# Patient Record
Sex: MALE | Race: WHITE | NOT HISPANIC OR LATINO | Employment: OTHER | URBAN - METROPOLITAN AREA
[De-identification: names, ages, dates, MRNs, and addresses within clinical notes are randomized per-mention and may not be internally consistent; named-entity substitution may affect disease eponyms.]

---

## 2018-01-08 ENCOUNTER — PES CALL (OUTPATIENT)
Dept: ADMINISTRATIVE | Facility: CLINIC | Age: 73
End: 2018-01-08

## 2018-05-07 ENCOUNTER — PES CALL (OUTPATIENT)
Dept: ADMINISTRATIVE | Facility: CLINIC | Age: 73
End: 2018-05-07

## 2018-08-15 ENCOUNTER — PES CALL (OUTPATIENT)
Dept: ADMINISTRATIVE | Facility: CLINIC | Age: 73
End: 2018-08-15

## 2019-04-25 ENCOUNTER — PES CALL (OUTPATIENT)
Dept: ADMINISTRATIVE | Facility: CLINIC | Age: 74
End: 2019-04-25

## 2019-07-26 DIAGNOSIS — N20.1 CALCULUS OF URETER: Primary | ICD-10-CM

## 2019-08-01 ENCOUNTER — ANESTHESIA (OUTPATIENT)
Dept: SURGERY | Facility: HOSPITAL | Age: 74
End: 2019-08-01
Payer: MEDICARE

## 2019-08-01 ENCOUNTER — HOSPITAL ENCOUNTER (OUTPATIENT)
Facility: HOSPITAL | Age: 74
Discharge: HOME | End: 2019-08-01
Attending: SPECIALIST | Admitting: SPECIALIST
Payer: MEDICARE

## 2019-08-01 ENCOUNTER — ANESTHESIA EVENT (OUTPATIENT)
Dept: SURGERY | Facility: HOSPITAL | Age: 74
End: 2019-08-01
Payer: MEDICARE

## 2019-08-01 VITALS
TEMPERATURE: 98 F | OXYGEN SATURATION: 98 % | SYSTOLIC BLOOD PRESSURE: 132 MMHG | HEART RATE: 62 BPM | RESPIRATION RATE: 16 BRPM | DIASTOLIC BLOOD PRESSURE: 72 MMHG

## 2019-08-01 DIAGNOSIS — N20.0 RENAL STONE: Primary | ICD-10-CM

## 2019-08-01 PROCEDURE — 71000033 HC RECOVERY, INTIAL HOUR: Performed by: SPECIALIST

## 2019-08-01 PROCEDURE — 63600175 PHARM REV CODE 636 W HCPCS: Performed by: NURSE ANESTHETIST, CERTIFIED REGISTERED

## 2019-08-01 PROCEDURE — 27000671 HC TUBING MICROBORE EXT: Performed by: ANESTHESIOLOGY

## 2019-08-01 PROCEDURE — S0028 INJECTION, FAMOTIDINE, 20 MG: HCPCS | Performed by: NURSE ANESTHETIST, CERTIFIED REGISTERED

## 2019-08-01 PROCEDURE — 27000673 HC TUBING BLOOD Y: Performed by: ANESTHESIOLOGY

## 2019-08-01 PROCEDURE — 36000705 HC OR TIME LEV I EA ADD 15 MIN: Performed by: SPECIALIST

## 2019-08-01 PROCEDURE — 71000015 HC POSTOP RECOV 1ST HR: Performed by: SPECIALIST

## 2019-08-01 PROCEDURE — 27200651 HC AIRWAY, LMA: Performed by: ANESTHESIOLOGY

## 2019-08-01 PROCEDURE — 37000008 HC ANESTHESIA 1ST 15 MINUTES: Performed by: SPECIALIST

## 2019-08-01 PROCEDURE — 37000009 HC ANESTHESIA EA ADD 15 MINS: Performed by: SPECIALIST

## 2019-08-01 PROCEDURE — 25000003 PHARM REV CODE 250: Performed by: NURSE ANESTHETIST, CERTIFIED REGISTERED

## 2019-08-01 PROCEDURE — 36000704 HC OR TIME LEV I 1ST 15 MIN: Performed by: SPECIALIST

## 2019-08-01 RX ORDER — PROPOFOL 10 MG/ML
INJECTION, EMULSION INTRAVENOUS
Status: DISCONTINUED | OUTPATIENT
Start: 2019-08-01 | End: 2019-08-01

## 2019-08-01 RX ORDER — HYDROMORPHONE HYDROCHLORIDE 1 MG/ML
0.2 INJECTION, SOLUTION INTRAMUSCULAR; INTRAVENOUS; SUBCUTANEOUS
Status: DISCONTINUED | OUTPATIENT
Start: 2019-08-01 | End: 2019-08-01 | Stop reason: HOSPADM

## 2019-08-01 RX ORDER — OXYCODONE HYDROCHLORIDE 5 MG/1
5 TABLET ORAL
Status: DISCONTINUED | OUTPATIENT
Start: 2019-08-01 | End: 2019-08-01 | Stop reason: HOSPADM

## 2019-08-01 RX ORDER — CEFAZOLIN SODIUM 1 G/50ML
SOLUTION INTRAVENOUS
Status: DISCONTINUED | OUTPATIENT
Start: 2019-08-01 | End: 2019-08-01

## 2019-08-01 RX ORDER — SODIUM CHLORIDE 0.9 % (FLUSH) 0.9 %
10 SYRINGE (ML) INJECTION
Status: DISCONTINUED | OUTPATIENT
Start: 2019-08-01 | End: 2019-08-01 | Stop reason: HOSPADM

## 2019-08-01 RX ORDER — ONDANSETRON 2 MG/ML
INJECTION INTRAMUSCULAR; INTRAVENOUS
Status: DISCONTINUED | OUTPATIENT
Start: 2019-08-01 | End: 2019-08-01

## 2019-08-01 RX ORDER — ACETAMINOPHEN 10 MG/ML
INJECTION, SOLUTION INTRAVENOUS
Status: DISCONTINUED | OUTPATIENT
Start: 2019-08-01 | End: 2019-08-01

## 2019-08-01 RX ORDER — FENTANYL CITRATE 50 UG/ML
25 INJECTION, SOLUTION INTRAMUSCULAR; INTRAVENOUS EVERY 5 MIN PRN
Status: DISCONTINUED | OUTPATIENT
Start: 2019-08-01 | End: 2019-08-01 | Stop reason: HOSPADM

## 2019-08-01 RX ORDER — ONDANSETRON 2 MG/ML
4 INJECTION INTRAMUSCULAR; INTRAVENOUS DAILY PRN
Status: DISCONTINUED | OUTPATIENT
Start: 2019-08-01 | End: 2019-08-01 | Stop reason: HOSPADM

## 2019-08-01 RX ORDER — FENTANYL CITRATE 50 UG/ML
INJECTION, SOLUTION INTRAMUSCULAR; INTRAVENOUS
Status: DISCONTINUED | OUTPATIENT
Start: 2019-08-01 | End: 2019-08-01

## 2019-08-01 RX ORDER — SODIUM CHLORIDE, SODIUM LACTATE, POTASSIUM CHLORIDE, CALCIUM CHLORIDE 600; 310; 30; 20 MG/100ML; MG/100ML; MG/100ML; MG/100ML
INJECTION, SOLUTION INTRAVENOUS CONTINUOUS PRN
Status: DISCONTINUED | OUTPATIENT
Start: 2019-08-01 | End: 2019-08-01

## 2019-08-01 RX ORDER — HYDROCODONE BITARTRATE AND ACETAMINOPHEN 5; 325 MG/1; MG/1
1 TABLET ORAL EVERY 4 HOURS PRN
COMMUNITY
End: 2020-10-30 | Stop reason: CLARIF

## 2019-08-01 RX ORDER — LIDOCAINE HCL/PF 100 MG/5ML
SYRINGE (ML) INTRAVENOUS
Status: DISCONTINUED | OUTPATIENT
Start: 2019-08-01 | End: 2019-08-01

## 2019-08-01 RX ORDER — FAMOTIDINE 10 MG/ML
INJECTION INTRAVENOUS
Status: DISCONTINUED | OUTPATIENT
Start: 2019-08-01 | End: 2019-08-01

## 2019-08-01 RX ORDER — OXYCODONE HYDROCHLORIDE 5 MG/1
5 TABLET ORAL
Status: DISCONTINUED | OUTPATIENT
Start: 2019-08-01 | End: 2019-08-01

## 2019-08-01 RX ORDER — DIPHENHYDRAMINE HYDROCHLORIDE 50 MG/ML
6.25 INJECTION INTRAMUSCULAR; INTRAVENOUS ONCE
Status: DISCONTINUED | OUTPATIENT
Start: 2019-08-01 | End: 2019-08-01 | Stop reason: HOSPADM

## 2019-08-01 RX ORDER — DEXAMETHASONE SODIUM PHOSPHATE 4 MG/ML
INJECTION, SOLUTION INTRA-ARTICULAR; INTRALESIONAL; INTRAMUSCULAR; INTRAVENOUS; SOFT TISSUE
Status: DISCONTINUED | OUTPATIENT
Start: 2019-08-01 | End: 2019-08-01

## 2019-08-01 RX ADMIN — PROPOFOL 150 MG: 10 INJECTION, EMULSION INTRAVENOUS at 11:08

## 2019-08-01 RX ADMIN — LIDOCAINE HYDROCHLORIDE 100 MG: 20 INJECTION, SOLUTION INTRAVENOUS at 11:08

## 2019-08-01 RX ADMIN — CEFAZOLIN SODIUM 2 G: 1 SOLUTION INTRAVENOUS at 11:08

## 2019-08-01 RX ADMIN — DEXAMETHASONE SODIUM PHOSPHATE 8 MG: 4 INJECTION, SOLUTION INTRAMUSCULAR; INTRAVENOUS at 11:08

## 2019-08-01 RX ADMIN — FAMOTIDINE 20 MG: 10 INJECTION, SOLUTION INTRAVENOUS at 11:08

## 2019-08-01 RX ADMIN — ONDANSETRON 4 MG: 2 INJECTION INTRAMUSCULAR; INTRAVENOUS at 10:08

## 2019-08-01 RX ADMIN — SODIUM CHLORIDE, SODIUM LACTATE, POTASSIUM CHLORIDE, AND CALCIUM CHLORIDE: .6; .31; .03; .02 INJECTION, SOLUTION INTRAVENOUS at 11:08

## 2019-08-01 RX ADMIN — ACETAMINOPHEN 1000 MG: 10 INJECTION, SOLUTION INTRAVENOUS at 11:08

## 2019-08-01 RX ADMIN — FENTANYL CITRATE 100 MCG: 50 INJECTION INTRAMUSCULAR; INTRAVENOUS at 11:08

## 2019-08-01 NOTE — ANESTHESIA POSTPROCEDURE EVALUATION
Anesthesia Post Evaluation    Patient: Jerome Amaro Jr.    Procedure(s) Performed: Procedure(s) (LRB):  LITHOTRIPSY, ESWL (Right)    Final Anesthesia Type: general  Patient location during evaluation: PACU  Patient participation: Yes- Able to Participate  Level of consciousness: awake and alert  Post-procedure vital signs: reviewed and stable  Pain management: adequate  Airway patency: patent  PONV status at discharge: No PONV  Anesthetic complications: no      Cardiovascular status: blood pressure returned to baseline and stable  Respiratory status: unassisted and room air  Hydration status: euvolemic  Follow-up not needed.          Vitals Value Taken Time   /80 8/1/2019 12:15 PM   Temp 36.7 °C (98 °F) 8/1/2019 12:15 PM   Pulse 61 8/1/2019 12:28 PM   Resp 10 8/1/2019 12:28 PM   SpO2 96 % 8/1/2019 12:28 PM   Vitals shown include unvalidated device data.      No case tracking events are documented in the log.      Pain/Susanne Score: Susanne Score: 10 (8/1/2019 12:15 PM)

## 2019-08-01 NOTE — DISCHARGE SUMMARY
Patient comes in for outpatient lithotripsy    Procedure is done w no complication    After a brief stay in recovery, patient is discharged in good condition    Meds given:  Lortab and Bactrim    F/u office:  2 weeks with belem SHARP

## 2019-08-01 NOTE — ANESTHESIA PREPROCEDURE EVALUATION
08/01/2019  Jerome Amaro Jr. is a 73 y.o., male.    Anesthesia Evaluation    I have reviewed the Patient Summary Reports.     I have reviewed the Medications.     Review of Systems  Anesthesia Hx:  History of prior surgery of interest to airway management or planning: Previous anesthesia: General Denies Family Hx of Anesthesia complications.   Denies Personal Hx of Anesthesia complications.   Social:  Former Smoker    Hematology/Oncology:     Oncology Normal    -- Anemia: Hematology Comments: Blood thinners    EENT/Dental:EENT/Dental Normal   Cardiovascular:   Pacemaker (st fatoumata) Hypertension Past MI CAD  CABG/stent Dysrhythmias atrial fibrillation ECG has been reviewed.    Pulmonary:   Sleep Apnea    Renal/:   renal calculi    Hepatic/GI:  Hepatic/GI Normal    Musculoskeletal:  Musculoskeletal Normal    Neurological:   Seizures Neuropathy both hands   Endocrine:   Hypothyroidism    Dermatological:  Skin Normal    Psych:   depression          Physical Exam  General:  Well nourished    Airway/Jaw/Neck:  Airway Findings: Mouth Opening: Normal Mallampati: III  TM Distance: Normal, at least 6 cm  Jaw/Neck Findings:  Neck ROM: Normal ROM      Dental:  Dental Findings:   Chest/Lungs:  Chest/Lungs Findings: Clear to auscultation     Heart/Vascular:  Heart Findings: Rate: Normal  Rhythm: Regular Rhythm  Sounds: Normal  Heart murmur: negative       Mental Status:  Mental Status Findings:  Cooperative, Alert and Oriented         Anesthesia Plan  Type of Anesthesia, risks & benefits discussed:  Anesthesia Type:  general  Patient's Preference:   Intra-op Monitoring Plan: standard ASA monitors  Intra-op Monitoring Plan Comments:   Post Op Pain Control Plan: multimodal analgesia  Post Op Pain Control Plan Comments:   Induction:   IV  Beta Blocker:         Informed Consent: Patient understands risks and agrees with  Anesthesia plan.  Questions answered. Anesthesia consent signed with patient.  ASA Score: 3     Day of Surgery Review of History & Physical:        Anesthesia Plan Notes: No Versed  Avoid sux.  LMA  [facility]an  [facility]  Decadron 8  Pepcid        Ready For Surgery From Anesthesia Perspective.

## 2019-08-01 NOTE — TRANSFER OF CARE
Anesthesia Transfer of Care Note    Patient: Jerome Amaro Jr.    Procedure(s) Performed: Procedure(s) (LRB):  LITHOTRIPSY, ESWL (Right)    Patient location: PACU    Anesthesia Type: general    Transport from OR: Transported from OR on room air with adequate spontaneous ventilation    Post pain: adequate analgesia    Post assessment: no apparent anesthetic complications    Post vital signs: stable    Level of consciousness: awake and alert    Nausea/Vomiting: no nausea/vomiting    Complications: none    Transfer of care protocol was followed      Last vitals:   Visit Vitals  /84 (BP Location: Right arm, Patient Position: Lying)   Temp 36.8 °C (98.2 °F) (Oral)   Resp 16   SpO2 97%

## 2019-08-01 NOTE — OP NOTE
Operative Note         SUMMARY     Surgery Date:  8/1/2019     Pre-op Diagnosis:   Right ureteral stone    Post-op Diagnosis:  Same    Procedure  right ESWL    Anesthesia: General    Description of Procedure:   After consents were obtained the patient was taken to the operating room  Placed in a supine position  Anesthesia is given  Dornier lithotripter is brought into the room  Patient is properly positioned on the lithotripsy probe  The stone is  Rt 5       mm  We began at a KV of 16 and gradually increased to 25    3000             Shocks were delivered      The procedure was done without any complication  After the procedure the patient is brought to the recovery room in satisfactory condition  See dictated    Findings/Key Components:          Estimated Blood Loss:

## 2019-08-12 ENCOUNTER — HOSPITAL ENCOUNTER (OUTPATIENT)
Dept: RADIOLOGY | Facility: HOSPITAL | Age: 74
Discharge: HOME | End: 2019-08-12
Attending: SPECIALIST
Payer: MEDICARE

## 2019-08-12 DIAGNOSIS — N20.1 CALCULUS OF URETER: ICD-10-CM

## 2019-08-12 PROCEDURE — 74018 RADEX ABDOMEN 1 VIEW: CPT | Mod: TC

## 2019-10-02 ENCOUNTER — PES CALL (OUTPATIENT)
Dept: ADMINISTRATIVE | Facility: CLINIC | Age: 74
End: 2019-10-02

## 2019-11-05 ENCOUNTER — HOSPITAL ENCOUNTER (INPATIENT)
Facility: HOSPITAL | Age: 74
LOS: 4 days | Discharge: HOME | DRG: 251 | End: 2019-11-09
Attending: EMERGENCY MEDICINE
Payer: MEDICARE

## 2019-11-05 DIAGNOSIS — I21.4 NSTEMI (NON-ST ELEVATED MYOCARDIAL INFARCTION): Primary | ICD-10-CM

## 2019-11-05 DIAGNOSIS — I25.10 CAD (CORONARY ARTERY DISEASE): ICD-10-CM

## 2019-11-05 DIAGNOSIS — R07.9 CHEST PAIN: ICD-10-CM

## 2019-11-05 LAB
ALBUMIN SERPL BCP-MCNC: 4.4 G/DL (ref 3.5–5.2)
ALP SERPL-CCNC: 75 U/L (ref 55–135)
ALT SERPL W/O P-5'-P-CCNC: 16 U/L (ref 10–44)
ANION GAP SERPL CALC-SCNC: 8 MMOL/L (ref 8–16)
AST SERPL-CCNC: 29 U/L (ref 10–40)
BASOPHILS # BLD AUTO: 0.06 K/UL (ref 0–0.2)
BASOPHILS NFR BLD: 0.8 % (ref 0–1.9)
BILIRUB SERPL-MCNC: 0.6 MG/DL (ref 0.1–1)
BNP SERPL-MCNC: 342 PG/ML (ref 0–99)
BUN SERPL-MCNC: 18 MG/DL (ref 8–23)
CALCIUM SERPL-MCNC: 8.6 MG/DL (ref 8.7–10.5)
CATH EF QUANTITATIVE: 60 %
CHLORIDE SERPL-SCNC: 103 MMOL/L (ref 95–110)
CO2 SERPL-SCNC: 27 MMOL/L (ref 23–29)
CREAT SERPL-MCNC: 1.2 MG/DL (ref 0.5–1.4)
DIFFERENTIAL METHOD: ABNORMAL
EOSINOPHIL # BLD AUTO: 0.1 K/UL (ref 0–0.5)
EOSINOPHIL NFR BLD: 1 % (ref 0–8)
ERYTHROCYTE [DISTWIDTH] IN BLOOD BY AUTOMATED COUNT: 13.2 % (ref 11.5–14.5)
EST. GFR  (AFRICAN AMERICAN): >60 ML/MIN/1.73 M^2
EST. GFR  (NON AFRICAN AMERICAN): 58.8 ML/MIN/1.73 M^2
GLUCOSE SERPL-MCNC: 121 MG/DL (ref 70–110)
HCT VFR BLD AUTO: 35.6 % (ref 40–54)
HGB BLD-MCNC: 11.8 G/DL (ref 14–18)
IMM GRANULOCYTES # BLD AUTO: 0.03 K/UL (ref 0–0.04)
IMM GRANULOCYTES NFR BLD AUTO: 0.4 % (ref 0–0.5)
INR PPP: 1.1
LYMPHOCYTES # BLD AUTO: 0.8 K/UL (ref 1–4.8)
LYMPHOCYTES NFR BLD: 11.6 % (ref 18–48)
MCH RBC QN AUTO: 29.7 PG (ref 27–31)
MCHC RBC AUTO-ENTMCNC: 33.1 G/DL (ref 32–36)
MCV RBC AUTO: 90 FL (ref 82–98)
MONOCYTES # BLD AUTO: 0.6 K/UL (ref 0.3–1)
MONOCYTES NFR BLD: 7.6 % (ref 4–15)
NEUTROPHILS # BLD AUTO: 5.7 K/UL (ref 1.8–7.7)
NEUTROPHILS NFR BLD: 78.6 % (ref 38–73)
NRBC BLD-RTO: 0 /100 WBC
PLATELET # BLD AUTO: 129 K/UL (ref 150–350)
PMV BLD AUTO: 10.8 FL (ref 9.2–12.9)
POTASSIUM SERPL-SCNC: 4.1 MMOL/L (ref 3.5–5.1)
PROT SERPL-MCNC: 7.4 G/DL (ref 6–8.4)
PROTHROMBIN TIME: 13.9 SEC (ref 10.6–14.8)
RBC # BLD AUTO: 3.97 M/UL (ref 4.6–6.2)
SODIUM SERPL-SCNC: 138 MMOL/L (ref 136–145)
TROPONIN I SERPL DL<=0.01 NG/ML-MCNC: 1.29 NG/ML (ref 0.02–0.04)
TROPONIN I SERPL DL<=0.01 NG/ML-MCNC: 1.33 NG/ML (ref 0.02–0.04)
TROPONIN I SERPL DL<=0.01 NG/ML-MCNC: 1.64 NG/ML (ref 0.02–0.04)
WBC # BLD AUTO: 7.23 K/UL (ref 3.9–12.7)

## 2019-11-05 PROCEDURE — 96375 TX/PRO/DX INJ NEW DRUG ADDON: CPT

## 2019-11-05 PROCEDURE — 63600175 PHARM REV CODE 636 W HCPCS: Performed by: NURSE PRACTITIONER

## 2019-11-05 PROCEDURE — 84484 ASSAY OF TROPONIN QUANT: CPT

## 2019-11-05 PROCEDURE — 27000221 HC OXYGEN, UP TO 24 HOURS

## 2019-11-05 PROCEDURE — 93459 L HRT ART/GRFT ANGIO: CPT

## 2019-11-05 PROCEDURE — 93005 ELECTROCARDIOGRAM TRACING: CPT

## 2019-11-05 PROCEDURE — 80053 COMPREHEN METABOLIC PANEL: CPT

## 2019-11-05 PROCEDURE — 63600175 PHARM REV CODE 636 W HCPCS: Performed by: EMERGENCY MEDICINE

## 2019-11-05 PROCEDURE — C1887 CATHETER, GUIDING: HCPCS | Performed by: SPECIALIST

## 2019-11-05 PROCEDURE — 94761 N-INVAS EAR/PLS OXIMETRY MLT: CPT

## 2019-11-05 PROCEDURE — 27201423 OPTIME MED/SURG SUP & DEVICES STERILE SUPPLY: Performed by: SPECIALIST

## 2019-11-05 PROCEDURE — 85025 COMPLETE CBC W/AUTO DIFF WBC: CPT

## 2019-11-05 PROCEDURE — C1725 CATH, TRANSLUMIN NON-LASER: HCPCS | Performed by: SPECIALIST

## 2019-11-05 PROCEDURE — 25000003 PHARM REV CODE 250

## 2019-11-05 PROCEDURE — 20000000 HC ICU ROOM

## 2019-11-05 PROCEDURE — 99285 EMERGENCY DEPT VISIT HI MDM: CPT | Mod: 25

## 2019-11-05 PROCEDURE — 36415 COLL VENOUS BLD VENIPUNCTURE: CPT

## 2019-11-05 PROCEDURE — C1760 CLOSURE DEV, VASC: HCPCS | Performed by: SPECIALIST

## 2019-11-05 PROCEDURE — 96374 THER/PROPH/DIAG INJ IV PUSH: CPT | Mod: 59

## 2019-11-05 PROCEDURE — C1769 GUIDE WIRE: HCPCS | Performed by: SPECIALIST

## 2019-11-05 PROCEDURE — 84484 ASSAY OF TROPONIN QUANT: CPT | Mod: 91

## 2019-11-05 PROCEDURE — 25000003 PHARM REV CODE 250: Performed by: NURSE PRACTITIONER

## 2019-11-05 PROCEDURE — 63600175 PHARM REV CODE 636 W HCPCS: Performed by: SPECIALIST

## 2019-11-05 PROCEDURE — 85610 PROTHROMBIN TIME: CPT

## 2019-11-05 PROCEDURE — 25000003 PHARM REV CODE 250: Performed by: EMERGENCY MEDICINE

## 2019-11-05 PROCEDURE — 93567 NJX CAR CTH SPRVLV AORTGRPHY: CPT

## 2019-11-05 PROCEDURE — 83880 ASSAY OF NATRIURETIC PEPTIDE: CPT

## 2019-11-05 DEVICE — ANGIO-SEAL VIP VASCULAR CLOSURE DEVICE
Type: IMPLANTABLE DEVICE | Site: GROIN | Status: FUNCTIONAL
Brand: ANGIO-SEAL

## 2019-11-05 RX ORDER — MAGNESIUM SULFATE HEPTAHYDRATE 40 MG/ML
4 INJECTION, SOLUTION INTRAVENOUS
Status: DISCONTINUED | OUTPATIENT
Start: 2019-11-05 | End: 2019-11-09 | Stop reason: HOSPADM

## 2019-11-05 RX ORDER — SODIUM CHLORIDE 450 MG/100ML
INJECTION, SOLUTION INTRAVENOUS ONCE
Status: COMPLETED | OUTPATIENT
Start: 2019-11-05 | End: 2019-11-05

## 2019-11-05 RX ORDER — ISOSORBIDE MONONITRATE 20 MG/1
20 TABLET ORAL DAILY
Status: DISCONTINUED | OUTPATIENT
Start: 2019-11-06 | End: 2019-11-09 | Stop reason: HOSPADM

## 2019-11-05 RX ORDER — NITROGLYCERIN 400 UG/1
1 SPRAY ORAL EVERY 5 MIN PRN
COMMUNITY
End: 2022-01-31

## 2019-11-05 RX ORDER — ENOXAPARIN SODIUM 100 MG/ML
80 INJECTION SUBCUTANEOUS EVERY 12 HOURS
Status: DISCONTINUED | OUTPATIENT
Start: 2019-11-05 | End: 2019-11-06

## 2019-11-05 RX ORDER — CHLORHEXIDINE GLUCONATE ORAL RINSE 1.2 MG/ML
15 SOLUTION DENTAL 2 TIMES DAILY
Status: DISCONTINUED | OUTPATIENT
Start: 2019-11-05 | End: 2019-11-09 | Stop reason: HOSPADM

## 2019-11-05 RX ORDER — DABIGATRAN ETEXILATE 150 MG/1
150 CAPSULE ORAL DAILY
COMMUNITY
End: 2020-10-30 | Stop reason: CLARIF

## 2019-11-05 RX ORDER — DIPHENHYDRAMINE HYDROCHLORIDE 50 MG/ML
INJECTION INTRAMUSCULAR; INTRAVENOUS
Status: DISCONTINUED | OUTPATIENT
Start: 2019-11-05 | End: 2019-11-08

## 2019-11-05 RX ORDER — ACETAMINOPHEN 325 MG/1
650 TABLET ORAL EVERY 4 HOURS PRN
Status: DISCONTINUED | OUTPATIENT
Start: 2019-11-05 | End: 2019-11-09 | Stop reason: HOSPADM

## 2019-11-05 RX ORDER — CLOPIDOGREL BISULFATE 75 MG/1
75 TABLET ORAL DAILY
COMMUNITY

## 2019-11-05 RX ORDER — ISOSORBIDE MONONITRATE 20 MG/1
10 TABLET ORAL DAILY
COMMUNITY
End: 2020-10-30 | Stop reason: CLARIF

## 2019-11-05 RX ORDER — MAGNESIUM SULFATE HEPTAHYDRATE 40 MG/ML
2 INJECTION, SOLUTION INTRAVENOUS
Status: DISCONTINUED | OUTPATIENT
Start: 2019-11-05 | End: 2019-11-09 | Stop reason: HOSPADM

## 2019-11-05 RX ORDER — AMOXICILLIN 250 MG
1 CAPSULE ORAL 2 TIMES DAILY
Status: DISCONTINUED | OUTPATIENT
Start: 2019-11-05 | End: 2019-11-09 | Stop reason: HOSPADM

## 2019-11-05 RX ORDER — AMIODARONE HYDROCHLORIDE 200 MG/1
200 TABLET ORAL DAILY
Status: DISCONTINUED | OUTPATIENT
Start: 2019-11-05 | End: 2019-11-09 | Stop reason: HOSPADM

## 2019-11-05 RX ORDER — AMLODIPINE BESYLATE 5 MG/1
5 TABLET ORAL DAILY
Status: DISCONTINUED | OUTPATIENT
Start: 2019-11-05 | End: 2019-11-05

## 2019-11-05 RX ORDER — LISINOPRIL 5 MG/1
10 TABLET ORAL DAILY
Status: DISCONTINUED | OUTPATIENT
Start: 2019-11-05 | End: 2019-11-09 | Stop reason: HOSPADM

## 2019-11-05 RX ORDER — RANOLAZINE 500 MG/1
500 TABLET, EXTENDED RELEASE ORAL DAILY
Status: DISCONTINUED | OUTPATIENT
Start: 2019-11-05 | End: 2019-11-07

## 2019-11-05 RX ORDER — LEVOTHYROXINE SODIUM 50 UG/1
50 TABLET ORAL DAILY
COMMUNITY
End: 2022-01-31

## 2019-11-05 RX ORDER — ISOSORBIDE MONONITRATE 20 MG/1
10 TABLET ORAL DAILY
Status: DISCONTINUED | OUTPATIENT
Start: 2019-11-05 | End: 2019-11-05

## 2019-11-05 RX ORDER — MIDAZOLAM HYDROCHLORIDE 1 MG/ML
INJECTION INTRAMUSCULAR; INTRAVENOUS
Status: DISCONTINUED | OUTPATIENT
Start: 2019-11-05 | End: 2019-11-08

## 2019-11-05 RX ORDER — PHENYTOIN SODIUM 100 MG/1
100 CAPSULE, EXTENDED RELEASE ORAL 4 TIMES DAILY
Status: DISCONTINUED | OUTPATIENT
Start: 2019-11-05 | End: 2019-11-09 | Stop reason: HOSPADM

## 2019-11-05 RX ORDER — ONDANSETRON 2 MG/ML
4 INJECTION INTRAMUSCULAR; INTRAVENOUS
Status: COMPLETED | OUTPATIENT
Start: 2019-11-05 | End: 2019-11-05

## 2019-11-05 RX ORDER — ONDANSETRON 2 MG/ML
4 INJECTION INTRAMUSCULAR; INTRAVENOUS EVERY 8 HOURS PRN
Status: DISCONTINUED | OUTPATIENT
Start: 2019-11-05 | End: 2019-11-09 | Stop reason: HOSPADM

## 2019-11-05 RX ORDER — METOPROLOL SUCCINATE 25 MG/1
100 TABLET, EXTENDED RELEASE ORAL DAILY
Status: DISCONTINUED | OUTPATIENT
Start: 2019-11-05 | End: 2019-11-09 | Stop reason: HOSPADM

## 2019-11-05 RX ORDER — POTASSIUM CHLORIDE 7.45 MG/ML
60 INJECTION INTRAVENOUS
Status: DISCONTINUED | OUTPATIENT
Start: 2019-11-05 | End: 2019-11-09 | Stop reason: HOSPADM

## 2019-11-05 RX ORDER — LEVOTHYROXINE SODIUM 25 UG/1
50 TABLET ORAL DAILY
Status: DISCONTINUED | OUTPATIENT
Start: 2019-11-06 | End: 2019-11-09 | Stop reason: HOSPADM

## 2019-11-05 RX ORDER — MUPIROCIN 20 MG/G
OINTMENT TOPICAL 2 TIMES DAILY
Status: DISCONTINUED | OUTPATIENT
Start: 2019-11-05 | End: 2019-11-09 | Stop reason: HOSPADM

## 2019-11-05 RX ORDER — NITROGLYCERIN 20 MG/100ML
5 INJECTION INTRAVENOUS CONTINUOUS
Status: DISCONTINUED | OUTPATIENT
Start: 2019-11-05 | End: 2019-11-07

## 2019-11-05 RX ORDER — POTASSIUM CHLORIDE 7.45 MG/ML
80 INJECTION INTRAVENOUS
Status: DISCONTINUED | OUTPATIENT
Start: 2019-11-05 | End: 2019-11-09 | Stop reason: HOSPADM

## 2019-11-05 RX ORDER — CLOPIDOGREL BISULFATE 75 MG/1
75 TABLET ORAL DAILY
Status: DISCONTINUED | OUTPATIENT
Start: 2019-11-05 | End: 2019-11-09 | Stop reason: HOSPADM

## 2019-11-05 RX ORDER — HYDROXYZINE PAMOATE 25 MG/1
25 CAPSULE ORAL DAILY
Status: DISCONTINUED | OUTPATIENT
Start: 2019-11-05 | End: 2019-11-05

## 2019-11-05 RX ORDER — HYDROCODONE BITARTRATE AND ACETAMINOPHEN 5; 325 MG/1; MG/1
1 TABLET ORAL EVERY 6 HOURS PRN
Status: DISCONTINUED | OUTPATIENT
Start: 2019-11-05 | End: 2019-11-09 | Stop reason: HOSPADM

## 2019-11-05 RX ORDER — MORPHINE SULFATE 4 MG/ML
4 INJECTION, SOLUTION INTRAMUSCULAR; INTRAVENOUS
Status: COMPLETED | OUTPATIENT
Start: 2019-11-05 | End: 2019-11-05

## 2019-11-05 RX ORDER — FENTANYL CITRATE 50 UG/ML
INJECTION, SOLUTION INTRAMUSCULAR; INTRAVENOUS
Status: DISCONTINUED | OUTPATIENT
Start: 2019-11-05 | End: 2019-11-08

## 2019-11-05 RX ORDER — SODIUM CHLORIDE 0.9 % (FLUSH) 0.9 %
10 SYRINGE (ML) INJECTION
Status: DISCONTINUED | OUTPATIENT
Start: 2019-11-05 | End: 2019-11-09 | Stop reason: HOSPADM

## 2019-11-05 RX ORDER — POTASSIUM CHLORIDE 7.45 MG/ML
40 INJECTION INTRAVENOUS
Status: DISCONTINUED | OUTPATIENT
Start: 2019-11-05 | End: 2019-11-09 | Stop reason: HOSPADM

## 2019-11-05 RX ADMIN — ISOSORBIDE MONONITRATE 10 MG: 20 TABLET ORAL at 04:11

## 2019-11-05 RX ADMIN — MORPHINE SULFATE 4 MG: 4 INJECTION INTRAVENOUS at 10:11

## 2019-11-05 RX ADMIN — AMIODARONE HYDROCHLORIDE 200 MG: 200 TABLET ORAL at 04:11

## 2019-11-05 RX ADMIN — HYDROXYZINE PAMOATE 25 MG: 25 CAPSULE ORAL at 04:11

## 2019-11-05 RX ADMIN — RANOLAZINE 500 MG: 500 TABLET, FILM COATED, EXTENDED RELEASE ORAL at 04:11

## 2019-11-05 RX ADMIN — CHLORHEXIDINE GLUCONATE 15 ML: 1.2 RINSE ORAL at 09:11

## 2019-11-05 RX ADMIN — PHENYTOIN SODIUM 100 MG: 100 CAPSULE ORAL at 05:11

## 2019-11-05 RX ADMIN — AMLODIPINE BESYLATE 5 MG: 5 TABLET ORAL at 04:11

## 2019-11-05 RX ADMIN — SENNOSIDES AND DOCUSATE SODIUM 1 TABLET: 8.6; 5 TABLET ORAL at 09:11

## 2019-11-05 RX ADMIN — CLOPIDOGREL BISULFATE 75 MG: 75 TABLET, FILM COATED ORAL at 04:11

## 2019-11-05 RX ADMIN — MUPIROCIN: 20 OINTMENT TOPICAL at 09:11

## 2019-11-05 RX ADMIN — ONDANSETRON 4 MG: 2 INJECTION INTRAMUSCULAR; INTRAVENOUS at 09:11

## 2019-11-05 RX ADMIN — ENOXAPARIN SODIUM 80 MG: 100 INJECTION SUBCUTANEOUS at 09:11

## 2019-11-05 RX ADMIN — NITROGLYCERIN 0.5 INCH: 20 OINTMENT TOPICAL at 10:11

## 2019-11-05 RX ADMIN — LISINOPRIL 10 MG: 5 TABLET ORAL at 04:11

## 2019-11-05 RX ADMIN — SODIUM CHLORIDE: 0.45 INJECTION, SOLUTION INTRAVENOUS at 03:11

## 2019-11-05 RX ADMIN — PHENYTOIN SODIUM 100 MG: 100 CAPSULE ORAL at 09:11

## 2019-11-05 RX ADMIN — METOPROLOL SUCCINATE 100 MG: 25 TABLET, FILM COATED, EXTENDED RELEASE ORAL at 04:11

## 2019-11-05 RX ADMIN — HYDROCORTISONE SODIUM SUCCINATE 125 MG: 100 INJECTION, POWDER, FOR SOLUTION INTRAMUSCULAR; INTRAVENOUS at 12:11

## 2019-11-05 NOTE — Clinical Note
Catheter is repositioned to the aorta. Angiography performed of the aorta in multiple views. Angiography performed via power injection with 20 mL contrast at 15 mL/s. AORTIC ROOT INJECTION; 6FR PIGTAIL

## 2019-11-05 NOTE — Clinical Note
Catheter is repositioned to the ostium   right coronary artery. Angiography performed of the right coronary arteries in multiple views. Angiography performed via power injection with 6 mL contrast at 3 mL/s. 6FR JR4

## 2019-11-05 NOTE — HPI
Mr. Amaro presents today with complaints of chest pain that radiates down the left arm for the last 2 days. It is severe. It is associated with nausea, diaphoresis, and weakness. He denies SOB, vomiting, diarrhea, fever, or chills. NTG improved symptoms. He has a history of CAD s/p CABG x2, PM, and AVR. In the ED, he was placed on a NTG gtt and was then quickly taken to the cath lab where he was found to need intervention on the left main circ, but had received too much contrast at this time. He was then transferred to the ICU where he will remain on a NTG gtt with IV fluids and be brought back to the cath lab later this week for intervention.

## 2019-11-05 NOTE — Clinical Note
Catheter is inserted into the left ventricle. Angiography performed of the left coronary arteries and LV PRESSURES in multiple views. Angiography performed via power injection with . 6fr JL4

## 2019-11-05 NOTE — Clinical Note
150 ml injected throughout the case. 30 mL total wasted during the case. 180 mL total used in the case.

## 2019-11-05 NOTE — ED PROVIDER NOTES
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  Patient states he uses nitroglycerin spray x2 with only equivocal relief.  He still rates his pain as 8/10 currently.  The patient denies any shortness of breath but states he has had nausea without any vomiting. He did admit becoming diaphoretic.  Patient admits he has had increasing frequency and chest pain as well as bouts of weakness.        Review of patient's allergies indicates:   Allergen Reactions    Iodine and iodide containing products Shortness Of Breath    Atorvastatin Other (See Comments)     Unknown reaction    Rosuvastatin Other (See Comments)     Unknown reaction     Past Medical History:   Diagnosis Date    Anticoagulant long-term use     2014    Aortic valve disease 2014    pig valve    Arthritis     all over    Chest pain 07/15/2019    CHF (congestive heart failure)     2014 on meds    Coronary artery disease     2007 cabg    Heart attack 1990    15 stents    Heart block     Hyperlipidemia     Hypertension     on meds    Hypothyroidism 2015    on meds    Seizures     last episode 1990 on meds     Past Surgical History:   Procedure Laterality Date    CARDIAC CATHETERIZATION      CARDIAC VALVE SURGERY      CORONARY ANGIOPLASTY      CORONARY ARTERY BYPASS GRAFT      EXTRACORPOREAL SHOCK WAVE LITHOTRIPSY Right 8/1/2019    Procedure: LITHOTRIPSY, ESWL;  Surgeon: Williams Ortega MD;  [facility];  Service: Urology;  Laterality: Right;    HEMORRHOID SURGERY  1990     Family History   Problem Relation Age of Onset    Heart attack Mother     Heart attack Father     Heart disease Sister     Stroke Sister      Diabetes Sister     No Known Problems Maternal Grandmother     No Known Problems Maternal Grandfather     No Known Problems Paternal Grandmother     No Known Problems Paternal Grandfather     No Known Problems Brother     No Known Problems Maternal Aunt     No Known Problems Maternal Uncle     No Known Problems Paternal Aunt     No Known Problems Paternal Uncle     Anemia Neg Hx     Arrhythmia Neg Hx     Asthma Neg Hx     Clotting disorder Neg Hx     Fainting Neg Hx     Heart failure Neg Hx     Hyperlipidemia Neg Hx     Hypertension Neg Hx     Atrial Septal Defect Neg Hx      Social History     Tobacco Use    Smoking status: Former Smoker     Packs/day: 0.50     Years: 4.00     Pack years: 2.00     Last attempt to quit:      Years since quittin.8   Substance Use Topics    Alcohol use: Yes     Comment: social    Drug use: No     Review of Systems   Constitutional: Positive for diaphoresis. Negative for activity change, appetite change, chills and fever.   HENT: Negative for congestion, ear pain, rhinorrhea, sinus pain and sore throat.    Eyes: Negative for pain.   Respiratory: Negative for cough, shortness of breath and wheezing.    Cardiovascular: Positive for chest pain. Negative for palpitations and leg swelling.   Gastrointestinal: Positive for nausea. Negative for abdominal pain, constipation, diarrhea and vomiting.   Genitourinary: Negative for flank pain, frequency and hematuria.   Musculoskeletal: Negative for arthralgias, back pain, myalgias and neck pain.   Skin: Negative for pallor, rash and wound.   Neurological: Negative for headaches.   All other systems reviewed and are negative.      Physical Exam     Initial Vitals [19 0855]   BP Pulse Resp Temp SpO2   (!) 187/88 65 17 97.9 °F (36.6 °C) 100 %      MAP       --         Physical Exam    Constitutional: He appears well-developed and well-nourished. He is not diaphoretic. He appears ill. No distress.   HENT:   Head:  Normocephalic and atraumatic.   Right Ear: External ear normal.   Left Ear: External ear normal.   Nose: Nose normal.   Mouth/Throat: Oropharynx is clear and moist.   Eyes: Conjunctivae and EOM are normal. Pupils are equal, round, and reactive to light. Right eye exhibits no discharge. Left eye exhibits no discharge. No scleral icterus.   Neck: Normal range of motion. Neck supple. No tracheal deviation present. No JVD present.   Cardiovascular: Normal rate, regular rhythm, normal heart sounds and intact distal pulses. Exam reveals no gallop and no friction rub.    No murmur heard.  Pulmonary/Chest: Breath sounds normal. No respiratory distress. He has no wheezes. He has no rhonchi. He has no rales. He exhibits no tenderness.   Healed midline sternal scar which extends into the upper abdomen   Abdominal: Soft. Bowel sounds are normal. He exhibits no distension and no mass. There is no tenderness. There is no rebound and no guarding.   Genitourinary: Penis normal.   Musculoskeletal: Normal range of motion. He exhibits no edema or tenderness.        Right lower leg: Normal. He exhibits no edema.        Left lower leg: Normal. He exhibits no edema.   Lymphadenopathy:     He has no cervical adenopathy.   Neurological: He is alert and oriented to person, place, and time. He has normal strength and normal reflexes. No cranial nerve deficit or sensory deficit. GCS score is 15. GCS eye subscore is 4. GCS verbal subscore is 5. GCS motor subscore is 6.   Skin: Skin is warm and dry. Capillary refill takes less than 2 seconds. No rash noted. No erythema. No pallor.   Psychiatric: He has a normal mood and affect. His behavior is normal. Judgment and thought content normal. His mood appears not anxious. He is not agitated.         ED Course   Procedures  Labs Reviewed   CBC W/ AUTO DIFFERENTIAL   COMPREHENSIVE METABOLIC PANEL   B-TYPE NATRIURETIC PEPTIDE   TROPONIN I   PROTIME-INR        ECG Results          EKG 12-lead (In  process)  Result time 11/05/19 09:28:53    In process by Interface, Lab In [facility] (11/05/19 09:28:53)                 Narrative:    Test Reason : R07.9,    Vent. Rate : 065 BPM     Atrial Rate : 065 BPM     P-R Int : 148 ms          QRS Dur : 194 ms      QT Int : 518 ms       P-R-T Axes : 014 -70 092 degrees     QTc Int : 538 ms    Atrial-sensed ventricular-paced rhythm  Abnormal ECG  When compared with ECG of 21-APR-2015 12:45,  Electronic ventricular pacemaker has replaced Electronic atrial pacemaker    Referred By:             Confirmed By:                             Imaging Results          X-Ray Chest AP Portable (Final result)  Result time 11/05/19 09:08:22    Final result by Sergio España MD (11/05/19 09:08:22)                 Impression:      No acute cardiac or pulmonary process.      Electronically signed by: Sergio España MD  Date:    11/05/2019  Time:    09:08             Narrative:    CLINICAL HISTORY:  ([id number])75 y/o  (1945) M    chest pain;    TECHNIQUE:  (A#[id number], exam time 11/5/2019 9:07)    XR CHEST AP PORTABLE [id number]    COMPARISON:  Most recent radiograph from 04/26/2019    FINDINGS:  The lungs are clear except for some vascular crowding bilaterally  likely related to a suboptimal inspiration. Costophrenic angles are seen without effusion. No pneumothorax is identified. The heart is mildly enlarged. The median sternotomy wires and the left sided pacemaker are unchanged. The mediastinum is within normal limits. Osseous structures show degenerative changes in the spine. The visualized upper abdomen is unremarkable.                                              Attending Attestation:             Attending ED Notes:   Who has a history of coronary artery disease a presented with chest pain since last night, has an elevated troponin of 1.64.  His H and H is 11.8 and 35.6.  Chemistries are remarkable for blood sugar 121.  BNP is elevated at 342.  Chest x-ray is negative.  EKG shows  a paced rhythm. During the ED course the patient was placed on Tridil with only some but inadequate relief his pain.  The patient's cardiologist, Dr. Willoughby was contacted and will be taking the patient to the cath lab.  Differential diagnosis include STEMI, non STEMI, unstable angina.    Hospital medicine is also being consulted for admission purposes.  Dr. Mcgregor will be admitting the patient                        Clinical Impression:       ICD-10-CM ICD-9-CM   1. NSTEMI (non-ST elevated myocardial infarction) I21.4 410.70   2. Chest pain R07.9 786.50                             Marbin Zamora Jr., MD  11/05/19 2065

## 2019-11-05 NOTE — Clinical Note
300 ml injected throughout the case. 30 mL total wasted during the case. 330 mL total used in the case.

## 2019-11-05 NOTE — ED NOTES
Pt reports feeling dizzy when getting out of bed this morning-reports getting diaphoretic,nauseated and having pain in l arm

## 2019-11-05 NOTE — Clinical Note
Catheter is inserted into the ostial  left coronary artery. Angiography performed of the aorta and left coronary arteries in multiple views. Angiography performed via power injection with 8 mL contrast at 4 mL/s.

## 2019-11-05 NOTE — Clinical Note
Catheter is inserted into the aorta. Angiography performed of the graft in multiple views. Angiography performed via power injection with .

## 2019-11-05 NOTE — Clinical Note
Catheter is removed from the aorta. Angiography performed of the left coronary arteries. Angiography performed via power injection with . JL4

## 2019-11-05 NOTE — Clinical Note
Catheter is inserted into the ostial  SVG . Angiography performed of the graft in multiple views. Angiography performed via power injection with 6 mL contrast at 3 mL/s. 6FR JR4

## 2019-11-05 NOTE — Clinical Note
Catheter is inserted into the left ventricle. Angiography performed of the aorta and LV. Angiography performed via power injection with 20 mL contrast at 15 mL/s. 6FR PIGTAIL

## 2019-11-06 LAB
ANION GAP SERPL CALC-SCNC: 10 MMOL/L (ref 8–16)
BASOPHILS # BLD AUTO: 0.04 K/UL (ref 0–0.2)
BASOPHILS NFR BLD: 0.5 % (ref 0–1.9)
BUN SERPL-MCNC: 13 MG/DL (ref 8–23)
CALCIUM SERPL-MCNC: 8.8 MG/DL (ref 8.7–10.5)
CHLORIDE SERPL-SCNC: 101 MMOL/L (ref 95–110)
CO2 SERPL-SCNC: 26 MMOL/L (ref 23–29)
CREAT SERPL-MCNC: 1.1 MG/DL (ref 0.5–1.4)
DIFFERENTIAL METHOD: ABNORMAL
EOSINOPHIL # BLD AUTO: 0.1 K/UL (ref 0–0.5)
EOSINOPHIL NFR BLD: 1.7 % (ref 0–8)
ERYTHROCYTE [DISTWIDTH] IN BLOOD BY AUTOMATED COUNT: 13.1 % (ref 11.5–14.5)
EST. GFR  (AFRICAN AMERICAN): >60 ML/MIN/1.73 M^2
EST. GFR  (NON AFRICAN AMERICAN): >60 ML/MIN/1.73 M^2
GLUCOSE SERPL-MCNC: 103 MG/DL (ref 70–110)
HCT VFR BLD AUTO: 33 % (ref 40–54)
HGB BLD-MCNC: 11.1 G/DL (ref 14–18)
IMM GRANULOCYTES # BLD AUTO: 0.04 K/UL (ref 0–0.04)
IMM GRANULOCYTES NFR BLD AUTO: 0.5 % (ref 0–0.5)
LYMPHOCYTES # BLD AUTO: 1.3 K/UL (ref 1–4.8)
LYMPHOCYTES NFR BLD: 16.6 % (ref 18–48)
MAGNESIUM SERPL-MCNC: 1.8 MG/DL (ref 1.6–2.6)
MCH RBC QN AUTO: 29.8 PG (ref 27–31)
MCHC RBC AUTO-ENTMCNC: 33.6 G/DL (ref 32–36)
MCV RBC AUTO: 89 FL (ref 82–98)
MONOCYTES # BLD AUTO: 0.8 K/UL (ref 0.3–1)
MONOCYTES NFR BLD: 9.8 % (ref 4–15)
NEUTROPHILS # BLD AUTO: 5.4 K/UL (ref 1.8–7.7)
NEUTROPHILS NFR BLD: 70.9 % (ref 38–73)
NRBC BLD-RTO: 0 /100 WBC
PLATELET # BLD AUTO: 133 K/UL (ref 150–350)
PMV BLD AUTO: 10.7 FL (ref 9.2–12.9)
POTASSIUM SERPL-SCNC: 3.7 MMOL/L (ref 3.5–5.1)
RBC # BLD AUTO: 3.73 M/UL (ref 4.6–6.2)
SODIUM SERPL-SCNC: 137 MMOL/L (ref 136–145)
TROPONIN I SERPL DL<=0.01 NG/ML-MCNC: 1.32 NG/ML (ref 0.02–0.04)
TSH SERPL DL<=0.005 MIU/L-ACNC: 3.01 UIU/ML (ref 0.34–5.6)
WBC # BLD AUTO: 7.67 K/UL (ref 3.9–12.7)

## 2019-11-06 PROCEDURE — 84443 ASSAY THYROID STIM HORMONE: CPT

## 2019-11-06 PROCEDURE — 25000003 PHARM REV CODE 250

## 2019-11-06 PROCEDURE — 85025 COMPLETE CBC W/AUTO DIFF WBC: CPT

## 2019-11-06 PROCEDURE — 25000003 PHARM REV CODE 250: Performed by: NURSE PRACTITIONER

## 2019-11-06 PROCEDURE — 83735 ASSAY OF MAGNESIUM: CPT

## 2019-11-06 PROCEDURE — 25000003 PHARM REV CODE 250: Performed by: STUDENT IN AN ORGANIZED HEALTH CARE EDUCATION/TRAINING PROGRAM

## 2019-11-06 PROCEDURE — 63600175 PHARM REV CODE 636 W HCPCS: Performed by: STUDENT IN AN ORGANIZED HEALTH CARE EDUCATION/TRAINING PROGRAM

## 2019-11-06 PROCEDURE — 80048 BASIC METABOLIC PNL TOTAL CA: CPT

## 2019-11-06 PROCEDURE — 84484 ASSAY OF TROPONIN QUANT: CPT

## 2019-11-06 PROCEDURE — 36415 COLL VENOUS BLD VENIPUNCTURE: CPT

## 2019-11-06 PROCEDURE — 94761 N-INVAS EAR/PLS OXIMETRY MLT: CPT

## 2019-11-06 PROCEDURE — 63600175 PHARM REV CODE 636 W HCPCS: Performed by: NURSE PRACTITIONER

## 2019-11-06 PROCEDURE — 20000000 HC ICU ROOM

## 2019-11-06 RX ORDER — POTASSIUM CHLORIDE 20 MEQ/1
20 TABLET, EXTENDED RELEASE ORAL ONCE
Status: COMPLETED | OUTPATIENT
Start: 2019-11-06 | End: 2019-11-06

## 2019-11-06 RX ORDER — ENOXAPARIN SODIUM 100 MG/ML
1 INJECTION SUBCUTANEOUS EVERY 12 HOURS
Status: DISCONTINUED | OUTPATIENT
Start: 2019-11-06 | End: 2019-11-08

## 2019-11-06 RX ADMIN — PHENYTOIN SODIUM 100 MG: 100 CAPSULE ORAL at 04:11

## 2019-11-06 RX ADMIN — CHLORHEXIDINE GLUCONATE 15 ML: 1.2 RINSE ORAL at 08:11

## 2019-11-06 RX ADMIN — LEVOTHYROXINE SODIUM 50 MCG: 25 TABLET ORAL at 05:11

## 2019-11-06 RX ADMIN — ENOXAPARIN SODIUM 90 MG: 100 INJECTION SUBCUTANEOUS at 08:11

## 2019-11-06 RX ADMIN — SENNOSIDES AND DOCUSATE SODIUM 1 TABLET: 8.6; 5 TABLET ORAL at 08:11

## 2019-11-06 RX ADMIN — CHLORHEXIDINE GLUCONATE 15 ML: 1.2 RINSE ORAL at 09:11

## 2019-11-06 RX ADMIN — PHENYTOIN SODIUM 100 MG: 100 CAPSULE ORAL at 09:11

## 2019-11-06 RX ADMIN — RANOLAZINE 500 MG: 500 TABLET, FILM COATED, EXTENDED RELEASE ORAL at 08:11

## 2019-11-06 RX ADMIN — NITROGLYCERIN 25 MCG/MIN: 20 INJECTION INTRAVENOUS at 12:11

## 2019-11-06 RX ADMIN — MUPIROCIN: 20 OINTMENT TOPICAL at 09:11

## 2019-11-06 RX ADMIN — PHENYTOIN SODIUM 100 MG: 100 CAPSULE ORAL at 01:11

## 2019-11-06 RX ADMIN — MUPIROCIN: 20 OINTMENT TOPICAL at 08:11

## 2019-11-06 RX ADMIN — AMIODARONE HYDROCHLORIDE 200 MG: 200 TABLET ORAL at 08:11

## 2019-11-06 RX ADMIN — CLOPIDOGREL BISULFATE 75 MG: 75 TABLET, FILM COATED ORAL at 08:11

## 2019-11-06 RX ADMIN — LISINOPRIL 10 MG: 5 TABLET ORAL at 08:11

## 2019-11-06 RX ADMIN — POTASSIUM CHLORIDE 20 MEQ: 1500 TABLET, EXTENDED RELEASE ORAL at 02:11

## 2019-11-06 RX ADMIN — ENOXAPARIN SODIUM 90 MG: 100 INJECTION SUBCUTANEOUS at 09:11

## 2019-11-06 RX ADMIN — PHENYTOIN SODIUM 100 MG: 100 CAPSULE ORAL at 08:11

## 2019-11-06 RX ADMIN — MAGNESIUM SULFATE 2 G: 2 INJECTION INTRAVENOUS at 05:11

## 2019-11-06 RX ADMIN — ISOSORBIDE MONONITRATE 20 MG: 20 TABLET ORAL at 08:11

## 2019-11-06 RX ADMIN — METOPROLOL SUCCINATE 100 MG: 25 TABLET, FILM COATED, EXTENDED RELEASE ORAL at 08:11

## 2019-11-06 NOTE — SUBJECTIVE & OBJECTIVE
Interval History: s/p cath yesterday. Stenosis noted but unable to continue with interventions 2/2 amount of contrast. Plans for repeat cath tomorrow. Pt still on NTG gtt 2/2 pain with is not present currently.   Patient seen and examined at bedside.  States he feels okay.  Back pain between shoulder blades  only when off drip.  Denies shortness of breath, cough, nausea, vomiting, diaphoresis, fever, chills    Review of Systems   Per interval history, all other systems reviewed and negative.     Objective:     Vital Signs (Most Recent):  Temp: 98.4 °F (36.9 °C) (11/06/19 1501)  Pulse: 62 (11/06/19 1701)  Resp: (!) 26 (11/06/19 1701)  BP: (!) 142/67 (11/06/19 1701)  SpO2: 97 % (11/06/19 1701) Vital Signs (24h Range):  Temp:  [97.7 °F (36.5 °C)-98.4 °F (36.9 °C)] 98.4 °F (36.9 °C)  Pulse:  [56-65] 62  Resp:  [9-33] 26  SpO2:  [95 %-99 %] 97 %  BP: (105-167)/(56-80) 142/67     Weight: 90 kg (198 lb 6.6 oz)  Body mass index is 32.02 kg/m².    Intake/Output Summary (Last 24 hours) at 11/6/2019 1746  Last data filed at 11/6/2019 1700  Gross per 24 hour   Intake 802 ml   Output 1200 ml   Net -398 ml      Physical Exam  Physical Exam   Constitutional:  Awake, alert and oriented x 3, NAD  HENT: ncat, mmm, perrla, eomi, neck normal rom and supple   Cardiovascular: RRR no mrg  Pulmonary/Chest: Effort normal, CTA b/l no wheezes, rales, rhonchi   Abdominal: Soft. +bs, ntnd  Musculoskeletal: Normal range of motion.  no edema, tenderness or deformity.   Neurological:  AAOx3, no focal deficits noted, CN II-XII grossly intact,  normal reflexes.   Skin: Skin is warm and dry. not diaphoretic.   Psychiatric:  normal mood and affect.  behavior is normal. Judgment and thought content normal.   Nursing note and vitals reviewed.    Significant Labs:   CBC:   Recent Labs   Lab 11/05/19  0954 11/06/19  0403   WBC 7.23 7.67   HGB 11.8* 11.1*   HCT 35.6* 33.0*   * 133*     CMP:   Recent Labs   Lab 11/05/19  0954 11/06/19  0403     137   K 4.1 3.7    101   CO2 27 26   * 103   BUN 18 13   CREATININE 1.2 1.1   CALCIUM 8.6* 8.8   PROT 7.4  --    ALBUMIN 4.4  --    BILITOT 0.6  --    ALKPHOS 75  --    AST 29  --    ALT 16  --    ANIONGAP 8 10   EGFRNONAA 58.8* >60.0     Cardiac Markers:   Recent Labs   Lab 11/05/19  0954   *     Troponin:   Recent Labs   Lab 11/05/19  1653 11/05/19 2010 11/06/19  0403   TROPONINI 1.295* 1.326* 1.318*     TSH:   Recent Labs   Lab 11/06/19  0403   TSH 3.010     All pertinent labs within the past 24 hours have been reviewed.    Significant Imaging: I have reviewed all pertinent imaging results/findings within the past 24 hours.

## 2019-11-06 NOTE — PROGRESS NOTES
[facility]    Critical Care Cardiology Progress Note    Subjective:  Patient comfortable sitting in chair.. events of yesterday noted.  Patient denies any shortness of breath or chest pain      Objective:  Vital Signs (Most Recent)  Temp: 98.3 °F (36.8 °C) (11/05/19 2300)  Pulse: 61 (11/06/19 0600)  Resp: (!) 9 (11/06/19 0600)  BP: (!) 144/74 (11/06/19 0600)  SpO2: 96 % (11/06/19 0600)    Vital Signs Range (Last 24H):  Temp:  [97.8 °F (36.6 °C)-98.3 °F (36.8 °C)]   Pulse:  [56-72]   Resp:  [8-34]   BP: (117-199)/()   SpO2:  [93 %-100 %]     I & O (Last 24H):    Intake/Output Summary (Last 24 hours) at 11/6/2019 0806  Last data filed at 11/6/2019 0600  Gross per 24 hour   Intake --   Output 800 ml   Net -800 ml       Current Diet:     Current Diet Order   Procedures    Diet Cardiac        Allergies:  Review of patient's allergies indicates:   Allergen Reactions    Iodine and iodide containing products Shortness Of Breath    Atorvastatin Other (See Comments)     Unknown reaction    Rosuvastatin Other (See Comments)     Unknown reaction       Meds:  Scheduled Meds:   amiodarone  200 mg Oral Daily    chlorhexidine  15 mL Mouth/Throat BID    clopidogrel  75 mg Oral Daily    enoxaparin  1 mg/kg Subcutaneous Q12H    isosorbide mononitrate  20 mg Oral Daily    levothyroxine  50 mcg Oral Daily    lisinopril  10 mg Oral Daily    metoprolol succinate  100 mg Oral Daily    mupirocin   Nasal BID    phenytoin  100 mg Oral QID    ranolazine  500 mg Oral Daily    senna-docusate 8.6-50 mg  1 tablet Oral BID     Continuous Infusions:   nitroGLYCERIN 25 mcg/min (11/06/19 0600)     PRN Meds:acetaminophen, calcium gluconate IVPB, calcium gluconate IVPB, calcium gluconate IVPB, diphenhydrAMINE, fentaNYL, HYDROcodone-acetaminophen, magnesium sulfate IVPB, magnesium sulfate IVPB, midazolam, ondansetron, potassium chloride in water **AND** potassium chloride in water **AND** potassium chloride in water,  sodium chloride 0.9%, sodium phosphate IVPB, sodium phosphate IVPB, sodium phosphate IVPB    Oxygen/Ventilator Data (Last 24H):  (if applicable)        Hemodynamic Parameters (Last 24H):   (if applicable)        Lines/Drains:  (if applicable)       Peripheral IV - Single Lumen 11/05/19 0955 20 G Right Forearm (Active)   Site Assessment Clean;Dry;Intact;No redness;No swelling 11/6/2019  3:05 AM   Line Status Flushed;Saline locked 11/6/2019  3:05 AM   Dressing Status Clean;Dry;Intact 11/6/2019  3:05 AM   Dressing Intervention Dressing reinforced 11/6/2019  3:05 AM   Reason Not Rotated Not due 11/6/2019  3:05 AM   Number of days: 0            Peripheral IV - Single Lumen 11/05/19 1151 20 G Left Antecubital (Active)   Site Assessment Clean;Dry;Intact;No redness;No swelling 11/6/2019  3:05 AM   Line Status Infusing 11/6/2019  3:05 AM   Dressing Status Clean;Dry;Intact 11/6/2019  3:05 AM   Dressing Intervention Dressing reinforced 11/6/2019  3:05 AM   Reason Not Rotated Not due 11/6/2019  3:05 AM   Number of days: 0       Lab Results :  Recent Results (from the past 24 hour(s))   CBC auto differential    Collection Time: 11/05/19  9:54 AM   Result Value Ref Range    WBC 7.23 3.90 - 12.70 K/uL    RBC 3.97 (L) 4.60 - 6.20 M/uL    Hemoglobin 11.8 (L) 14.0 - 18.0 g/dL    Hematocrit 35.6 (L) 40.0 - 54.0 %    Mean Corpuscular Volume 90 82 - 98 fL    Mean Corpuscular Hemoglobin 29.7 27.0 - 31.0 pg    Mean Corpuscular Hemoglobin Conc 33.1 32.0 - 36.0 g/dL    RDW 13.2 11.5 - 14.5 %    Platelets 129 (L) 150 - 350 K/uL    MPV 10.8 9.2 - 12.9 fL    Immature Granulocytes 0.4 0.0 - 0.5 %    Gran # (ANC) 5.7 1.8 - 7.7 K/uL    Immature Grans (Abs) 0.03 0.00 - 0.04 K/uL    Lymph # 0.8 (L) 1.0 - 4.8 K/uL    Mono # 0.6 0.3 - 1.0 K/uL    Eos # 0.1 0.0 - 0.5 K/uL    Baso # 0.06 0.00 - 0.20 K/uL    nRBC 0 0 /100 WBC    Gran% 78.6 (H) 38.0 - 73.0 %    Lymph% 11.6 (L) 18.0 - 48.0 %    Mono% 7.6 4.0 - 15.0 %    Eosinophil% 1.0 0.0 - 8.0 %     Basophil% 0.8 0.0 - 1.9 %    Differential Method Automated    Comprehensive metabolic panel    Collection Time: 11/05/19  9:54 AM   Result Value Ref Range    Sodium 138 136 - 145 mmol/L    Potassium 4.1 3.5 - 5.1 mmol/L    Chloride 103 95 - 110 mmol/L    CO2 27 23 - 29 mmol/L    Glucose 121 (H) 70 - 110 mg/dL    BUN, Bld 18 8 - 23 mg/dL    Creatinine 1.2 0.5 - 1.4 mg/dL    Calcium 8.6 (L) 8.7 - 10.5 mg/dL    Total Protein 7.4 6.0 - 8.4 g/dL    Albumin 4.4 3.5 - 5.2 g/dL    Total Bilirubin 0.6 0.1 - 1.0 mg/dL    Alkaline Phosphatase 75 55 - 135 U/L    AST 29 10 - 40 U/L    ALT 16 10 - 44 U/L    Anion Gap 8 8 - 16 mmol/L    eGFR if African American >60.0 >60 mL/min/1.73 m^2    eGFR if non  58.8 (A) >60 mL/min/1.73 m^2   Brain natriuretic peptide    Collection Time: 11/05/19  9:54 AM   Result Value Ref Range     (H) 0 - 99 pg/mL   Troponin I    Collection Time: 11/05/19  9:54 AM   Result Value Ref Range    Troponin I 1.644 (HH) 0.020 - 0.040 ng/mL   Protime-INR    Collection Time: 11/05/19  9:54 AM   Result Value Ref Range    PT 13.9 10.6 - 14.8 sec    INR 1.1    Cardiac catheterization    Collection Time: 11/05/19  2:19 PM   Result Value Ref Range    Cath EF Quantitative 60 %   Troponin I    Collection Time: 11/05/19  4:53 PM   Result Value Ref Range    Troponin I 1.295 (HH) 0.020 - 0.040 ng/mL   Troponin I    Collection Time: 11/05/19  8:10 PM   Result Value Ref Range    Troponin I 1.326 (HH) 0.020 - 0.040 ng/mL   Basic Metabolic Panel (BMP)    Collection Time: 11/06/19  4:03 AM   Result Value Ref Range    Sodium 137 136 - 145 mmol/L    Potassium 3.7 3.5 - 5.1 mmol/L    Chloride 101 95 - 110 mmol/L    CO2 26 23 - 29 mmol/L    Glucose 103 70 - 110 mg/dL    BUN, Bld 13 8 - 23 mg/dL    Creatinine 1.1 0.5 - 1.4 mg/dL    Calcium 8.8 8.7 - 10.5 mg/dL    Anion Gap 10 8 - 16 mmol/L    eGFR if African American >60.0 >60 mL/min/1.73 m^2    eGFR if non African American >60.0 >60 mL/min/1.73 m^2    Magnesium    Collection Time: 11/06/19  4:03 AM   Result Value Ref Range    Magnesium 1.8 1.6 - 2.6 mg/dL   CBC with Automated Differential    Collection Time: 11/06/19  4:03 AM   Result Value Ref Range    WBC 7.67 3.90 - 12.70 K/uL    RBC 3.73 (L) 4.60 - 6.20 M/uL    Hemoglobin 11.1 (L) 14.0 - 18.0 g/dL    Hematocrit 33.0 (L) 40.0 - 54.0 %    Mean Corpuscular Volume 89 82 - 98 fL    Mean Corpuscular Hemoglobin 29.8 27.0 - 31.0 pg    Mean Corpuscular Hemoglobin Conc 33.6 32.0 - 36.0 g/dL    RDW 13.1 11.5 - 14.5 %    Platelets 133 (L) 150 - 350 K/uL    MPV 10.7 9.2 - 12.9 fL    Immature Granulocytes 0.5 0.0 - 0.5 %    Gran # (ANC) 5.4 1.8 - 7.7 K/uL    Immature Grans (Abs) 0.04 0.00 - 0.04 K/uL    Lymph # 1.3 1.0 - 4.8 K/uL    Mono # 0.8 0.3 - 1.0 K/uL    Eos # 0.1 0.0 - 0.5 K/uL    Baso # 0.04 0.00 - 0.20 K/uL    nRBC 0 0 /100 WBC    Gran% 70.9 38.0 - 73.0 %    Lymph% 16.6 (L) 18.0 - 48.0 %    Mono% 9.8 4.0 - 15.0 %    Eosinophil% 1.7 0.0 - 8.0 %    Basophil% 0.5 0.0 - 1.9 %    Differential Method Automated        Diagnostic Results:  Imaging Results          X-Ray Chest AP Portable (Final result)  Result time 11/05/19 09:08:22    Final result by Sergio España MD (11/05/19 09:08:22)                 Impression:      No acute cardiac or pulmonary process.      Electronically signed by: Sergio España MD  Date:    11/05/2019  Time:    09:08             Narrative:    CLINICAL HISTORY:  ([id number] y/o  (1945) M    chest pain;    TECHNIQUE:  (A#[id number], exam time 11/5/2019 9:07)    XR CHEST AP PORTABLE [id number]    COMPARISON:  Most recent radiograph from 04/26/2019    FINDINGS:  The lungs are clear except for some vascular crowding bilaterally  likely related to a suboptimal inspiration. Costophrenic angles are seen without effusion. No pneumothorax is identified. The heart is mildly enlarged. The median sternotomy wires and the left sided pacemaker are unchanged. The mediastinum is within normal limits.  "Osseous structures show degenerative changes in the spine. The visualized upper abdomen is unremarkable.                                12-lead EKG interpretation:   (if applicable)    Recent Cardiac Rhythm:  (if applicable)      Physical Exam:  Objective:  General Appearance:  Comfortable.    Vital signs: (most recent): Blood pressure (!) 144/74, pulse 61, temperature 98.3 °F (36.8 °C), temperature source Oral, resp. rate (!) 9, height 5' 6" (1.676 m), weight 90 kg (198 lb 6.6 oz), SpO2 96 %.    Lungs:  Breath sounds clear to auscultation.    Heart: Normal rate.  Regular rhythm.  S1 normal and S2 normal.  Positive for murmur.  No gallop.   Abdomen: Abdomen is soft.  Bowel sounds are normal.   There is no abdominal tenderness.         Current Consults:  IP CONSULT TO CARDIOLOGY    Assessment/Plan:  Assessment:   Non-STEMI  Coronary artery disease status post cardiac catheterization  Status post aorta coronary bypass  Status post aortic valve replacement  DDD pacemaker      Plan:   Will discuss plan with Dr Juares I believe is going to try to intervene in the left main in stent stenosis  "

## 2019-11-06 NOTE — ASSESSMENT & PLAN NOTE
Admit to cardiology B   Consult Dr. Willoughby - seen in ED and brought to cath lab   Trend cardiac enzymes  Continue plavix  Wt based lovenox - holding pradaxa  IV hydration tonight   Routine post cath orders

## 2019-11-06 NOTE — PROGRESS NOTES
[facility]  Progress Note    Patient Name: Jerome Amaro Jr.  MRN: [id number]  Patient Class: IP- Inpatient   Admission Date: 11/5/2019  Length of Stay: 1 days  Attending Physician: Marybel Dowd DO  Primary Care Provider: Rodrigo Willoughby MD    Subjective:     Principal Problem:NSTEMI (non-ST elevated myocardial infarction)        HPI:  Mr. Amaro presents today with complaints of chest pain that radiates down the left arm for the last 2 days. It is severe. It is associated with nausea, diaphoresis, and weakness. He denies SOB, vomiting, diarrhea, fever, or chills. NTG improved symptoms. He has a history of CAD s/p CABG x2, PM, and AVR. In the ED, he was placed on a NTG gtt and was then quickly taken to the cath lab where he was found to need intervention on the left main circ, but had received too much contrast at this time. He was then transferred to the ICU where he will remain on a NTG gtt with IV fluids and be brought back to the cath lab later this week for intervention.           Overview/Hospital Course:  No notes on file    Interval History: s/p cath yesterday. Stenosis noted but unable to continue with interventions 2/2 amount of contrast. Plans for repeat cath tomorrow. Pt still on NTG gtt 2/2 pain with is not present currently.   Patient seen and examined at bedside.  States he feels okay.  Back pain between shoulder blades  only when off drip.  Denies shortness of breath, cough, nausea, vomiting, diaphoresis, fever, chills    Review of Systems   Per interval history, all other systems reviewed and negative.     Objective:     Vital Signs (Most Recent):  Temp: 98.4 °F (36.9 °C) (11/06/19 1501)  Pulse: 62 (11/06/19 1701)  Resp: (!) 26 (11/06/19 1701)  BP: (!) 142/67 (11/06/19 1701)  SpO2: 97 % (11/06/19 1701) Vital Signs (24h Range):  Temp:  [97.7 °F (36.5 °C)-98.4 °F (36.9 °C)] 98.4 °F (36.9 °C)  Pulse:  [56-65] 62  Resp:  [9-33] 26  SpO2:  [95 %-99 %] 97 %  BP:  (105-167)/(56-80) 142/67     Weight: 90 kg (198 lb 6.6 oz)  Body mass index is 32.02 kg/m².    Intake/Output Summary (Last 24 hours) at 11/6/2019 1746  Last data filed at 11/6/2019 1700  Gross per 24 hour   Intake 802 ml   Output 1200 ml   Net -398 ml      Physical Exam  Physical Exam   Constitutional:  Awake, alert and oriented x 3, NAD  HENT: ncat, mmm, perrla, eomi, neck normal rom and supple   Cardiovascular: RRR no mrg  Pulmonary/Chest: Effort normal, CTA b/l no wheezes, rales, rhonchi   Abdominal: Soft. +bs, ntnd  Musculoskeletal: Normal range of motion.  no edema, tenderness or deformity.   Neurological:  AAOx3, no focal deficits noted, CN II-XII grossly intact,  normal reflexes.   Skin: Skin is warm and dry. not diaphoretic.   Psychiatric:  normal mood and affect.  behavior is normal. Judgment and thought content normal.   Nursing note and vitals reviewed.    Significant Labs:   CBC:   Recent Labs   Lab 11/05/19 0954 11/06/19  0403   WBC 7.23 7.67   HGB 11.8* 11.1*   HCT 35.6* 33.0*   * 133*     CMP:   Recent Labs   Lab 11/05/19  0954 11/06/19  0403    137   K 4.1 3.7    101   CO2 27 26   * 103   BUN 18 13   CREATININE 1.2 1.1   CALCIUM 8.6* 8.8   PROT 7.4  --    ALBUMIN 4.4  --    BILITOT 0.6  --    ALKPHOS 75  --    AST 29  --    ALT 16  --    ANIONGAP 8 10   EGFRNONAA 58.8* >60.0     Cardiac Markers:   Recent Labs   Lab 11/05/19  0954   *     Troponin:   Recent Labs   Lab 11/05/19  1653 11/05/19 2010 11/06/19  0403   TROPONINI 1.295* 1.326* 1.318*     TSH:   Recent Labs   Lab 11/06/19  0403   TSH 3.010     All pertinent labs within the past 24 hours have been reviewed.    Significant Imaging: I have reviewed all pertinent imaging results/findings within the past 24 hours.      Assessment/Plan:      * NSTEMI (non-ST elevated myocardial infarction)  Consult Dr. Willoughby - seen in ED and brought to cath lab  plans for intervention tomorrow  Trend cardiac enzymes :   Plateaued  Continue plavix  Wt based lovenox - holding pradaxa  IV hydration tonight   Nitro GTT  Pain control  Routine post cath orders        HTN (hypertension)  Continue home meds  stable  Monitoring  Dr. Willoughby increased metoprolol   Current medications lisinopril 10 mg q.day and metoprolol 100 mg q.day            VTE Risk Mitigation (From admission, onward)         Ordered     enoxaparin injection 90 mg  Every 12 hours      11/06/19 0750                      Marybel Dowd DO  Department of Hospital Medicine   [facility]

## 2019-11-06 NOTE — H&P
[facility]  History & Physical    DOS: 11/05/2019  4:06 PM      Patient Name: Jerome Amaro Jr.  MRN: [id number]  Admission Date: 11/5/2019  Attending Physician: Dr. Zendejas  Primary Care Provider: Rodrigo Willoughby MD         Patient information was obtained from patient, spouse/SO and ER records   Case discussed with Dr. Willoughby.     Subjective:     Principal Problem:NSTEMI (non-ST elevated myocardial infarction)    Chief Complaint:   Chief Complaint   Patient presents with    Chest Pain        HPI: Mr. Amaro presents today with complaints of chest pain that radiates down the left arm for the last 2 days. It is severe. It is associated with nausea, diaphoresis, and weakness. He denies SOB, vomiting, diarrhea, fever, or chills. NTG improved symptoms. He has a history of CAD s/p CABG x2, PM, and AVR. In the ED, he was placed on a NTG gtt and was then quickly taken to the cath lab where he was found to need intervention on the left main circ, but had received too much contrast at this time. He was then transferred to the ICU where he will remain on a NTG gtt with IV fluids and be brought back to the cath lab later this week for intervention.           Past Medical History:   Diagnosis Date    Anticoagulant long-term use     2014    Aortic valve disease 2014    pig valve    Arthritis     all over    Chest pain 07/15/2019    CHF (congestive heart failure)     2014 on meds    Coronary artery disease     2007 cabg    Heart attack 1990    15 stents    Heart block     Hyperlipidemia     Hypertension     on meds    Hypothyroidism 2015    on meds    Seizures     last episode 1990 on meds       Past Surgical History:   Procedure Laterality Date    CARDIAC CATHETERIZATION      CARDIAC VALVE SURGERY      CORONARY ANGIOPLASTY      CORONARY ARTERY BYPASS GRAFT      EXTRACORPOREAL SHOCK WAVE LITHOTRIPSY Right 8/1/2019    Procedure: LITHOTRIPSY, ESWL;  Surgeon: Williams Ortega MD;   "Location: [facility] OR;  Service: Urology;  Laterality: Right;    HEMORRHOID SURGERY  1990       Review of patient's allergies indicates:   Allergen Reactions    Iodine and iodide containing products Shortness Of Breath    Atorvastatin Other (See Comments)     Unknown reaction    Rosuvastatin Other (See Comments)     Unknown reaction       No current facility-administered medications on file prior to encounter.      Current Outpatient Medications on File Prior to Encounter   Medication Sig    alirocumab (PRALUENT PEN SUBQ) Inject 1 Dose into the skin every 14 (fourteen) days.    amiodarone (PACERONE) 200 MG Tab Take 200 mg by mouth once daily.     amlodipine (NORVASC) 5 MG tablet Take 5 mg by mouth once daily.     citalopram (CELEXA) 20 MG tablet Take 20 mg by mouth once daily.    clopidogrel (PLAVIX) 75 mg tablet Take 75 mg by mouth once daily.    dabigatran etexilate (PRADAXA) 150 mg Cap Take 150 mg by mouth once daily. "Do NOT break, chew, or open capsules."    furosemide (LASIX) 20 MG tablet Take 20 mg by mouth 2 (two) times daily.    hydrOXYzine pamoate (VISTARIL) 25 MG Cap Take 25 mg by mouth once daily.    isosorbide mononitrate (ISMO,MONOKET) 20 MG Tab Take 10 mg by mouth once daily.    levothyroxine (SYNTHROID) 50 MCG tablet Take 50 mcg by mouth once daily.    lisinopril 10 MG tablet Take 10 mg by mouth once daily.    metoprolol succinate (TOPROL-XL) 50 MG 24 hr tablet Take 50 mg by mouth once daily.    nitroGLYCERIN 0.4 MG/DOSE TL SPRY (NITROLINGUAL) 400 mcg/spray spray Place 1 spray under the tongue every 5 (five) minutes as needed for Chest pain.    phenytoin (DILANTIN) 100 MG ER capsule Take 100 mg by mouth 4 (four) times daily.     ranolazine (RANEXA) 500 MG Tb12 Take 500 mg by mouth once daily.    HYDROcodone-acetaminophen (NORCO) 5-325 mg per tablet Take 1 tablet by mouth every 4 (four) hours as needed for Pain.    hydrocodone-acetaminophen 7.5-325mg (NORCO) 7.5-325 mg per tablet Take " 1 tablet by mouth 2 (two) times daily as needed.     levothyroxine (SYNTHROID) 25 MCG tablet Take 25 mcg by mouth before breakfast.      Family History     Problem Relation (Age of Onset)    Diabetes Sister    Heart attack Mother, Father    Heart disease Sister    No Known Problems Maternal Grandmother, Maternal Grandfather, Paternal Grandmother, Paternal Grandfather, Brother, Maternal Aunt, Maternal Uncle, Paternal Aunt, Paternal Uncle    Stroke Sister        Tobacco Use    Smoking status: Former Smoker     Packs/day: 0.50     Years: 4.00     Pack years: 2.00     Last attempt to quit:      Years since quittin.8   Substance and Sexual Activity    Alcohol use: Yes     Comment: social    Drug use: No    Sexual activity: Not on file     Review of Systems   Constitutional: Positive for diaphoresis and fatigue. Negative for chills and fever.   HENT: Negative for congestion, ear pain, sore throat and trouble swallowing.    Eyes: Negative for pain, discharge and visual disturbance.   Respiratory: Negative for cough, chest tightness, shortness of breath and wheezing.    Cardiovascular: Positive for chest pain. Negative for palpitations and leg swelling.   Gastrointestinal: Negative for abdominal distention, abdominal pain, blood in stool, constipation, diarrhea, nausea and vomiting.   Endocrine: Negative for polydipsia, polyphagia and polyuria.   Genitourinary: Negative for dysuria, flank pain, frequency and urgency.   Musculoskeletal: Negative for back pain, joint swelling, neck pain and neck stiffness.   Skin: Negative for rash and wound.   Allergic/Immunologic: Negative for immunocompromised state.   Neurological: Positive for weakness. Negative for dizziness, syncope, speech difficulty, light-headedness, numbness and headaches.   Hematological: Negative for adenopathy.   Psychiatric/Behavioral: Negative for confusion and suicidal ideas. The patient is not nervous/anxious.    All other systems reviewed and  are negative.    Objective:     Vital Signs (Most Recent):  Temp: 97.8 °F (36.6 °C) (11/05/19 1600)  Pulse: 62 (11/05/19 1701)  Resp: (!) 9 (11/05/19 1701)  BP: (!) 154/96 (11/05/19 1630)  SpO2: 97 % (11/05/19 1701) Vital Signs (24h Range):  Temp:  [97.8 °F (36.6 °C)-97.9 °F (36.6 °C)] 97.8 °F (36.6 °C)  Pulse:  [56-72] 62  Resp:  [8-34] 9  SpO2:  [93 %-100 %] 97 %  BP: (145-199)/() 154/96     Weight: 90 kg (198 lb 6.6 oz)  Body mass index is 32.02 kg/m².    Physical Exam   Constitutional: He is oriented to person, place, and time. He appears well-developed and well-nourished.   HENT:   Head: Normocephalic and atraumatic.   Eyes: Pupils are equal, round, and reactive to light. Conjunctivae and EOM are normal.   Neck: Normal range of motion. Neck supple.   Cardiovascular: Normal rate, regular rhythm, normal heart sounds and intact distal pulses.   Pulmonary/Chest: Effort normal and breath sounds normal.   Abdominal: Soft. Bowel sounds are normal.   Musculoskeletal: Normal range of motion.   Neurological: He is alert and oriented to person, place, and time.   Skin: Skin is warm and dry. Capillary refill takes less than 2 seconds.   Psychiatric: He has a normal mood and affect. His behavior is normal. Judgment and thought content normal.   Nursing note and vitals reviewed.        CRANIAL NERVES     CN III, IV, VI   Pupils are equal, round, and reactive to light.  Extraocular motions are normal.        Significant Labs:   CBC:   Recent Labs   Lab 11/05/19  0954   WBC 7.23   HGB 11.8*   HCT 35.6*   *     CMP:   Recent Labs   Lab 11/05/19  0954      K 4.1      CO2 27   *   BUN 18   CREATININE 1.2   CALCIUM 8.6*   PROT 7.4   ALBUMIN 4.4   BILITOT 0.6   ALKPHOS 75   AST 29   ALT 16   ANIONGAP 8   EGFRNONAA 58.8*     Cardiac Markers:   Recent Labs   Lab 11/05/19  0954   *     Troponin:   Recent Labs   Lab 11/05/19  0954 11/05/19  1653   TROPONINI 1.644* 1.295*       Significant Imaging:  CXR: I have reviewed all pertinent results/findings within the past 24 hours and my personal findings are:  no acute consolidation noted  EKG: I have reviewed all pertinent results/findings within the past 24 hours and my personal findings are: see below     EKG: Atrial-sensed ventricular-paced rhythm  Abnormal ECG  When compared with ECG of 21-APR-2015 12:45,  Electronic ventricular pacemaker has replaced Electronic atrial pacemaker    X-ray Chest Ap Portable    Result Date: 11/5/2019  CLINICAL HISTORY: ([id number] y/o  (1945) M chest pain; TECHNIQUE: (A#[id number], exam time 11/5/2019 9:07) XR CHEST AP PORTABLE [id number] COMPARISON: Most recent radiograph from 04/26/2019 FINDINGS: The lungs are clear except for some vascular crowding bilaterally  likely related to a suboptimal inspiration. Costophrenic angles are seen without effusion. No pneumothorax is identified. The heart is mildly enlarged. The median sternotomy wires and the left sided pacemaker are unchanged. The mediastinum is within normal limits. Osseous structures show degenerative changes in the spine. The visualized upper abdomen is unremarkable.     No acute cardiac or pulmonary process. Electronically signed by: Sergio España MD Date:    11/05/2019 Time:    09:08      Assessment/Plan:     * NSTEMI (non-ST elevated myocardial infarction)  Admit to cardiology B   Consult Dr. Willoughby - seen in ED and brought to cath lab   Trend cardiac enzymes  Continue plavix  Wt based lovenox - holding pradaxa  IV hydration tonight   Routine post cath orders        HTN (hypertension)  Continue home meds  Monitor  Dr. Willoughby increased metoprolol        VTE Risk Mitigation (From admission, onward)         Ordered     enoxaparin injection 80 mg  Every 12 hours      11/05/19 2196                   Ana Paula Varghese NP  Department of Hospital Medicine   [facility]

## 2019-11-06 NOTE — ASSESSMENT & PLAN NOTE
Continue home meds  stable  Monitoring  Dr. Willoughby increased metoprolol   Current medications lisinopril 10 mg q.day and metoprolol 100 mg q.day

## 2019-11-06 NOTE — ASSESSMENT & PLAN NOTE
Consult Dr. Willoughby - seen in ED and brought to cath lab  plans for intervention tomorrow  Trend cardiac enzymes :  Plateaued  Continue plavix  Wt based lovenox - holding pradaxa  IV hydration tonight   Nitro GTT  Pain control  Routine post cath orders

## 2019-11-06 NOTE — SUBJECTIVE & OBJECTIVE
"Past Medical History:   Diagnosis Date    Anticoagulant long-term use     2014    Aortic valve disease 2014    pig valve    Arthritis     all over    Chest pain 07/15/2019    CHF (congestive heart failure)     2014 on meds    Coronary artery disease     2007 cabg    Heart attack 1990    15 stents    Heart block     Hyperlipidemia     Hypertension     on meds    Hypothyroidism 2015    on meds    Seizures     last episode 1990 on meds       Past Surgical History:   Procedure Laterality Date    CARDIAC CATHETERIZATION      CARDIAC VALVE SURGERY      CORONARY ANGIOPLASTY      CORONARY ARTERY BYPASS GRAFT      EXTRACORPOREAL SHOCK WAVE LITHOTRIPSY Right 8/1/2019    Procedure: LITHOTRIPSY, ESWL;  Surgeon: Williams Ortega MD;  Location: [facility];  Service: Urology;  Laterality: Right;    HEMORRHOID SURGERY  1990       Review of patient's allergies indicates:   Allergen Reactions    Iodine and iodide containing products Shortness Of Breath    Atorvastatin Other (See Comments)     Unknown reaction    Rosuvastatin Other (See Comments)     Unknown reaction       No current facility-administered medications on file prior to encounter.      Current Outpatient Medications on File Prior to Encounter   Medication Sig    alirocumab (PRALUENT PEN SUBQ) Inject 1 Dose into the skin every 14 (fourteen) days.    amiodarone (PACERONE) 200 MG Tab Take 200 mg by mouth once daily.     amlodipine (NORVASC) 5 MG tablet Take 5 mg by mouth once daily.     citalopram (CELEXA) 20 MG tablet Take 20 mg by mouth once daily.    clopidogrel (PLAVIX) 75 mg tablet Take 75 mg by mouth once daily.    dabigatran etexilate (PRADAXA) 150 mg Cap Take 150 mg by mouth once daily. "Do NOT break, chew, or open capsules."    furosemide (LASIX) 20 MG tablet Take 20 mg by mouth 2 (two) times daily.    hydrOXYzine pamoate (VISTARIL) 25 MG Cap Take 25 mg by mouth once daily.    isosorbide mononitrate (ISMO,MONOKET) 20 MG Tab Take 10 mg " by mouth once daily.    levothyroxine (SYNTHROID) 50 MCG tablet Take 50 mcg by mouth once daily.    lisinopril 10 MG tablet Take 10 mg by mouth once daily.    metoprolol succinate (TOPROL-XL) 50 MG 24 hr tablet Take 50 mg by mouth once daily.    nitroGLYCERIN 0.4 MG/DOSE TL SPRY (NITROLINGUAL) 400 mcg/spray spray Place 1 spray under the tongue every 5 (five) minutes as needed for Chest pain.    phenytoin (DILANTIN) 100 MG ER capsule Take 100 mg by mouth 4 (four) times daily.     ranolazine (RANEXA) 500 MG Tb12 Take 500 mg by mouth once daily.    HYDROcodone-acetaminophen (NORCO) 5-325 mg per tablet Take 1 tablet by mouth every 4 (four) hours as needed for Pain.    hydrocodone-acetaminophen 7.5-325mg (NORCO) 7.5-325 mg per tablet Take 1 tablet by mouth 2 (two) times daily as needed.     levothyroxine (SYNTHROID) 25 MCG tablet Take 25 mcg by mouth before breakfast.      Family History     Problem Relation (Age of Onset)    Diabetes Sister    Heart attack Mother, Father    Heart disease Sister    No Known Problems Maternal Grandmother, Maternal Grandfather, Paternal Grandmother, Paternal Grandfather, Brother, Maternal Aunt, Maternal Uncle, Paternal Aunt, Paternal Uncle    Stroke Sister        Tobacco Use    Smoking status: Former Smoker     Packs/day: 0.50     Years: 4.00     Pack years: 2.00     Last attempt to quit:      Years since quittin.8   Substance and Sexual Activity    Alcohol use: Yes     Comment: social    Drug use: No    Sexual activity: Not on file     Review of Systems   Constitutional: Positive for diaphoresis and fatigue. Negative for chills and fever.   HENT: Negative for congestion, ear pain, sore throat and trouble swallowing.    Eyes: Negative for pain, discharge and visual disturbance.   Respiratory: Negative for cough, chest tightness, shortness of breath and wheezing.    Cardiovascular: Positive for chest pain. Negative for palpitations and leg swelling.   Gastrointestinal:  Negative for abdominal distention, abdominal pain, blood in stool, constipation, diarrhea, nausea and vomiting.   Endocrine: Negative for polydipsia, polyphagia and polyuria.   Genitourinary: Negative for dysuria, flank pain, frequency and urgency.   Musculoskeletal: Negative for back pain, joint swelling, neck pain and neck stiffness.   Skin: Negative for rash and wound.   Allergic/Immunologic: Negative for immunocompromised state.   Neurological: Positive for weakness. Negative for dizziness, syncope, speech difficulty, light-headedness, numbness and headaches.   Hematological: Negative for adenopathy.   Psychiatric/Behavioral: Negative for confusion and suicidal ideas. The patient is not nervous/anxious.    All other systems reviewed and are negative.    Objective:     Vital Signs (Most Recent):  Temp: 97.8 °F (36.6 °C) (11/05/19 1600)  Pulse: 62 (11/05/19 1701)  Resp: (!) 9 (11/05/19 1701)  BP: (!) 154/96 (11/05/19 1630)  SpO2: 97 % (11/05/19 1701) Vital Signs (24h Range):  Temp:  [97.8 °F (36.6 °C)-97.9 °F (36.6 °C)] 97.8 °F (36.6 °C)  Pulse:  [56-72] 62  Resp:  [8-34] 9  SpO2:  [93 %-100 %] 97 %  BP: (145-199)/() 154/96     Weight: 90 kg (198 lb 6.6 oz)  Body mass index is 32.02 kg/m².    Physical Exam   Constitutional: He is oriented to person, place, and time. He appears well-developed and well-nourished.   HENT:   Head: Normocephalic and atraumatic.   Eyes: Pupils are equal, round, and reactive to light. Conjunctivae and EOM are normal.   Neck: Normal range of motion. Neck supple.   Cardiovascular: Normal rate, regular rhythm, normal heart sounds and intact distal pulses.   Pulmonary/Chest: Effort normal and breath sounds normal.   Abdominal: Soft. Bowel sounds are normal.   Musculoskeletal: Normal range of motion.   Neurological: He is alert and oriented to person, place, and time.   Skin: Skin is warm and dry. Capillary refill takes less than 2 seconds.   Psychiatric: He has a normal mood and  affect. His behavior is normal. Judgment and thought content normal.   Nursing note and vitals reviewed.        CRANIAL NERVES     CN III, IV, VI   Pupils are equal, round, and reactive to light.  Extraocular motions are normal.        Significant Labs:   CBC:   Recent Labs   Lab 11/05/19  0954   WBC 7.23   HGB 11.8*   HCT 35.6*   *     CMP:   Recent Labs   Lab 11/05/19  0954      K 4.1      CO2 27   *   BUN 18   CREATININE 1.2   CALCIUM 8.6*   PROT 7.4   ALBUMIN 4.4   BILITOT 0.6   ALKPHOS 75   AST 29   ALT 16   ANIONGAP 8   EGFRNONAA 58.8*     Cardiac Markers:   Recent Labs   Lab 11/05/19  0954   *     Troponin:   Recent Labs   Lab 11/05/19  0954 11/05/19  1653   TROPONINI 1.644* 1.295*       Significant Imaging: CXR: I have reviewed all pertinent results/findings within the past 24 hours and my personal findings are:  no acute consolidation noted  EKG: I have reviewed all pertinent results/findings within the past 24 hours and my personal findings are: see below     EKG: Atrial-sensed ventricular-paced rhythm  Abnormal ECG  When compared with ECG of 21-APR-2015 12:45,  Electronic ventricular pacemaker has replaced Electronic atrial pacemaker    X-ray Chest Ap Portable    Result Date: 11/5/2019  CLINICAL HISTORY: ([id number])73 y/o  (1945) M chest pain; TECHNIQUE: (A#[id number], exam time 11/5/2019 9:07) XR CHEST AP PORTABLE [id number] COMPARISON: Most recent radiograph from 04/26/2019 FINDINGS: The lungs are clear except for some vascular crowding bilaterally  likely related to a suboptimal inspiration. Costophrenic angles are seen without effusion. No pneumothorax is identified. The heart is mildly enlarged. The median sternotomy wires and the left sided pacemaker are unchanged. The mediastinum is within normal limits. Osseous structures show degenerative changes in the spine. The visualized upper abdomen is unremarkable.     No acute cardiac or pulmonary process. Electronically  signed by: Sergio España MD Date:    11/05/2019 Time:    09:08

## 2019-11-06 NOTE — PROGRESS NOTES
Cardiac Rehab     Jerome Amarorony Chery   [id number]   11/6/2019         Cardiac Rehab Phase Taught: Phase 1    Teaching Method: Verbal    Handouts: None    Educational Videos: None    Understanding:  History of Previous Information Given, Knowledge indicated by feedback, Learning indicated by feedback and Verbalize understanding    Comments: discussed MI, angiogram, meds abnd cardiac rehab. Pt/wife voiced understanding.    total time spent: 20mins            Khadijah An RN

## 2019-11-07 LAB
ANION GAP SERPL CALC-SCNC: 9 MMOL/L (ref 8–16)
BASOPHILS # BLD AUTO: 0.06 K/UL (ref 0–0.2)
BASOPHILS NFR BLD: 0.8 % (ref 0–1.9)
BUN SERPL-MCNC: 12 MG/DL (ref 8–23)
CALCIUM SERPL-MCNC: 8.7 MG/DL (ref 8.7–10.5)
CHLORIDE SERPL-SCNC: 102 MMOL/L (ref 95–110)
CO2 SERPL-SCNC: 27 MMOL/L (ref 23–29)
CREAT SERPL-MCNC: 1.2 MG/DL (ref 0.5–1.4)
DIFFERENTIAL METHOD: ABNORMAL
EOSINOPHIL # BLD AUTO: 0.1 K/UL (ref 0–0.5)
EOSINOPHIL NFR BLD: 1.6 % (ref 0–8)
ERYTHROCYTE [DISTWIDTH] IN BLOOD BY AUTOMATED COUNT: 12.9 % (ref 11.5–14.5)
EST. GFR  (AFRICAN AMERICAN): >60 ML/MIN/1.73 M^2
EST. GFR  (NON AFRICAN AMERICAN): 58.8 ML/MIN/1.73 M^2
GLUCOSE SERPL-MCNC: 97 MG/DL (ref 70–110)
HCT VFR BLD AUTO: 32.4 % (ref 40–54)
HGB BLD-MCNC: 11.2 G/DL (ref 14–18)
IMM GRANULOCYTES # BLD AUTO: 0.02 K/UL (ref 0–0.04)
IMM GRANULOCYTES NFR BLD AUTO: 0.3 % (ref 0–0.5)
LYMPHOCYTES # BLD AUTO: 1.2 K/UL (ref 1–4.8)
LYMPHOCYTES NFR BLD: 17.3 % (ref 18–48)
MAGNESIUM SERPL-MCNC: 1.8 MG/DL (ref 1.6–2.6)
MCH RBC QN AUTO: 30.2 PG (ref 27–31)
MCHC RBC AUTO-ENTMCNC: 34.6 G/DL (ref 32–36)
MCV RBC AUTO: 87 FL (ref 82–98)
MONOCYTES # BLD AUTO: 0.9 K/UL (ref 0.3–1)
MONOCYTES NFR BLD: 12 % (ref 4–15)
NEUTROPHILS # BLD AUTO: 4.8 K/UL (ref 1.8–7.7)
NEUTROPHILS NFR BLD: 68 % (ref 38–73)
NRBC BLD-RTO: 0 /100 WBC
PHENYTOIN SERPL-MCNC: 9.8 UG/ML (ref 10–20)
PLATELET # BLD AUTO: 131 K/UL (ref 150–350)
PMV BLD AUTO: 10.8 FL (ref 9.2–12.9)
POTASSIUM SERPL-SCNC: 4.1 MMOL/L (ref 3.5–5.1)
RBC # BLD AUTO: 3.71 M/UL (ref 4.6–6.2)
SODIUM SERPL-SCNC: 138 MMOL/L (ref 136–145)
WBC # BLD AUTO: 7.09 K/UL (ref 3.9–12.7)

## 2019-11-07 PROCEDURE — 85025 COMPLETE CBC W/AUTO DIFF WBC: CPT

## 2019-11-07 PROCEDURE — 25000003 PHARM REV CODE 250: Performed by: NURSE PRACTITIONER

## 2019-11-07 PROCEDURE — 83735 ASSAY OF MAGNESIUM: CPT

## 2019-11-07 PROCEDURE — 80048 BASIC METABOLIC PNL TOTAL CA: CPT

## 2019-11-07 PROCEDURE — 25000003 PHARM REV CODE 250: Performed by: PHYSICIAN ASSISTANT

## 2019-11-07 PROCEDURE — 63600175 PHARM REV CODE 636 W HCPCS

## 2019-11-07 PROCEDURE — 36415 COLL VENOUS BLD VENIPUNCTURE: CPT

## 2019-11-07 PROCEDURE — 80185 ASSAY OF PHENYTOIN TOTAL: CPT

## 2019-11-07 PROCEDURE — 93005 ELECTROCARDIOGRAM TRACING: CPT

## 2019-11-07 PROCEDURE — 20000000 HC ICU ROOM

## 2019-11-07 PROCEDURE — 27000221 HC OXYGEN, UP TO 24 HOURS

## 2019-11-07 PROCEDURE — 25000003 PHARM REV CODE 250

## 2019-11-07 PROCEDURE — 94761 N-INVAS EAR/PLS OXIMETRY MLT: CPT

## 2019-11-07 PROCEDURE — 63600175 PHARM REV CODE 636 W HCPCS: Performed by: STUDENT IN AN ORGANIZED HEALTH CARE EDUCATION/TRAINING PROGRAM

## 2019-11-07 RX ORDER — MORPHINE SULFATE 2 MG/ML
INJECTION, SOLUTION INTRAMUSCULAR; INTRAVENOUS
Status: COMPLETED
Start: 2019-11-07 | End: 2019-11-07

## 2019-11-07 RX ORDER — MORPHINE SULFATE 2 MG/ML
2 INJECTION, SOLUTION INTRAMUSCULAR; INTRAVENOUS EVERY 6 HOURS PRN
Status: DISCONTINUED | OUTPATIENT
Start: 2019-11-07 | End: 2019-11-09 | Stop reason: HOSPADM

## 2019-11-07 RX ORDER — RANOLAZINE 500 MG/1
1000 TABLET, EXTENDED RELEASE ORAL DAILY
Status: DISCONTINUED | OUTPATIENT
Start: 2019-11-08 | End: 2019-11-09 | Stop reason: HOSPADM

## 2019-11-07 RX ADMIN — ENOXAPARIN SODIUM 90 MG: 100 INJECTION SUBCUTANEOUS at 09:11

## 2019-11-07 RX ADMIN — AMIODARONE HYDROCHLORIDE 200 MG: 200 TABLET ORAL at 09:11

## 2019-11-07 RX ADMIN — NITROGLYCERIN 1 INCH: 20 OINTMENT TOPICAL at 05:11

## 2019-11-07 RX ADMIN — MORPHINE SULFATE 2 MG: 2 INJECTION, SOLUTION INTRAMUSCULAR; INTRAVENOUS at 11:11

## 2019-11-07 RX ADMIN — LEVOTHYROXINE SODIUM 50 MCG: 25 TABLET ORAL at 06:11

## 2019-11-07 RX ADMIN — PHENYTOIN SODIUM 100 MG: 100 CAPSULE ORAL at 08:11

## 2019-11-07 RX ADMIN — ISOSORBIDE MONONITRATE 20 MG: 20 TABLET ORAL at 11:11

## 2019-11-07 RX ADMIN — HYDROCODONE BITARTRATE AND ACETAMINOPHEN 1 TABLET: 5; 325 TABLET ORAL at 05:11

## 2019-11-07 RX ADMIN — CLOPIDOGREL BISULFATE 75 MG: 75 TABLET, FILM COATED ORAL at 09:11

## 2019-11-07 RX ADMIN — PHENYTOIN SODIUM 100 MG: 100 CAPSULE ORAL at 09:11

## 2019-11-07 RX ADMIN — ENOXAPARIN SODIUM 90 MG: 100 INJECTION SUBCUTANEOUS at 08:11

## 2019-11-07 RX ADMIN — RANOLAZINE 500 MG: 500 TABLET, FILM COATED, EXTENDED RELEASE ORAL at 09:11

## 2019-11-07 RX ADMIN — MUPIROCIN: 20 OINTMENT TOPICAL at 09:11

## 2019-11-07 RX ADMIN — PHENYTOIN SODIUM 100 MG: 100 CAPSULE ORAL at 02:11

## 2019-11-07 RX ADMIN — NITROGLYCERIN 1 INCH: 20 OINTMENT TOPICAL at 11:11

## 2019-11-07 RX ADMIN — PHENYTOIN SODIUM 100 MG: 100 CAPSULE ORAL at 04:11

## 2019-11-07 RX ADMIN — MUPIROCIN: 20 OINTMENT TOPICAL at 08:11

## 2019-11-07 RX ADMIN — ACETAMINOPHEN 650 MG: 325 TABLET ORAL at 02:11

## 2019-11-07 RX ADMIN — SENNOSIDES AND DOCUSATE SODIUM 1 TABLET: 8.6; 5 TABLET ORAL at 09:11

## 2019-11-07 RX ADMIN — CHLORHEXIDINE GLUCONATE 15 ML: 1.2 RINSE ORAL at 09:11

## 2019-11-07 RX ADMIN — CHLORHEXIDINE GLUCONATE 15 ML: 1.2 RINSE ORAL at 08:11

## 2019-11-07 NOTE — NURSING
Pt states pain much improved after morphine administration. Rates 3/10 at this time. Resting quietly in bed with eyes closed. Skin warm and dry. No other complaints at this time.

## 2019-11-07 NOTE — PROGRESS NOTES
[facility]    Critical Care Cardiology Progress Note    Subjective: Seen and examined this AM. Plan for LM PCI tomorrow with Dr. Willoughby. Remains on Tridil drip, experiences CP off of drip. However, BP becomes intermittently hypotensive while on drip. Patient is comfortable at this time. VS otherwise stable.     Objective:  Vital Signs (Most Recent)  Temp: 98.5 °F (36.9 °C) (11/07/19 0730)  Pulse: 76 (11/07/19 0900)  Resp: 17 (11/07/19 0900)  BP: (!) 116/57 (11/07/19 0900)  SpO2: (!) 93 % (11/07/19 0900)    Vital Signs Range (Last 24H):  Temp:  [97.7 °F (36.5 °C)-99 °F (37.2 °C)]   Pulse:  [57-76]   Resp:  [8-37]   BP: (105-167)/(54-78)   SpO2:  [91 %-99 %]     I & O (Last 24H):    Intake/Output Summary (Last 24 hours) at 11/7/2019 1011  Last data filed at 11/7/2019 0900  Gross per 24 hour   Intake 884.63 ml   Output 1850 ml   Net -965.37 ml       Current Diet:     Current Diet Order   Procedures    Diet Cardiac        Allergies:  Review of patient's allergies indicates:   Allergen Reactions    Iodine and iodide containing products Shortness Of Breath    Atorvastatin Other (See Comments)     Unknown reaction    Rosuvastatin Other (See Comments)     Unknown reaction       Meds:  Scheduled Meds:   amiodarone  200 mg Oral Daily    chlorhexidine  15 mL Mouth/Throat BID    clopidogrel  75 mg Oral Daily    enoxaparin  1 mg/kg Subcutaneous Q12H    isosorbide mononitrate  20 mg Oral Daily    levothyroxine  50 mcg Oral Daily    lisinopril  10 mg Oral Daily    metoprolol succinate  100 mg Oral Daily    mupirocin   Nasal BID    nitroGLYCERIN 2% TD oint  1 inch Topical (Top) Q6H    phenytoin  100 mg Oral QID    [START ON 11/8/2019] ranolazine  1,000 mg Oral Daily    senna-docusate 8.6-50 mg  1 tablet Oral BID     Continuous Infusions:    PRN Meds:acetaminophen, calcium gluconate IVPB, calcium gluconate IVPB, calcium gluconate IVPB, diphenhydrAMINE, fentaNYL, HYDROcodone-acetaminophen, magnesium  sulfate IVPB, magnesium sulfate IVPB, midazolam, ondansetron, potassium chloride in water **AND** potassium chloride in water **AND** potassium chloride in water, sodium chloride 0.9%, sodium phosphate IVPB, sodium phosphate IVPB, sodium phosphate IVPB         Lines/Drains:  (if applicable)       Peripheral IV - Single Lumen 11/05/19 0955 20 G Right Forearm (Active)   Site Assessment Clean;Dry;Intact;No redness;No swelling 11/6/2019  3:05 AM   Line Status Flushed;Saline locked 11/6/2019  3:05 AM   Dressing Status Clean;Dry;Intact 11/6/2019  3:05 AM   Dressing Intervention Dressing reinforced 11/6/2019  3:05 AM   Reason Not Rotated Not due 11/6/2019  3:05 AM   Number of days: 0            Peripheral IV - Single Lumen 11/05/19 1151 20 G Left Antecubital (Active)   Site Assessment Clean;Dry;Intact;No redness;No swelling 11/6/2019  3:05 AM   Line Status Infusing 11/6/2019  3:05 AM   Dressing Status Clean;Dry;Intact 11/6/2019  3:05 AM   Dressing Intervention Dressing reinforced 11/6/2019  3:05 AM   Reason Not Rotated Not due 11/6/2019  3:05 AM   Number of days: 0       Lab Results :  Recent Results (from the past 24 hour(s))   Basic Metabolic Panel (BMP)    Collection Time: 11/07/19  5:00 AM   Result Value Ref Range    Sodium 138 136 - 145 mmol/L    Potassium 4.1 3.5 - 5.1 mmol/L    Chloride 102 95 - 110 mmol/L    CO2 27 23 - 29 mmol/L    Glucose 97 70 - 110 mg/dL    BUN, Bld 12 8 - 23 mg/dL    Creatinine 1.2 0.5 - 1.4 mg/dL    Calcium 8.7 8.7 - 10.5 mg/dL    Anion Gap 9 8 - 16 mmol/L    eGFR if African American >60.0 >60 mL/min/1.73 m^2    eGFR if non  58.8 (A) >60 mL/min/1.73 m^2   Magnesium    Collection Time: 11/07/19  5:00 AM   Result Value Ref Range    Magnesium 1.8 1.6 - 2.6 mg/dL   CBC with Automated Differential    Collection Time: 11/07/19  5:00 AM   Result Value Ref Range    WBC 7.09 3.90 - 12.70 K/uL    RBC 3.71 (L) 4.60 - 6.20 M/uL    Hemoglobin 11.2 (L) 14.0 - 18.0 g/dL    Hematocrit 32.4  (L) 40.0 - 54.0 %    Mean Corpuscular Volume 87 82 - 98 fL    Mean Corpuscular Hemoglobin 30.2 27.0 - 31.0 pg    Mean Corpuscular Hemoglobin Conc 34.6 32.0 - 36.0 g/dL    RDW 12.9 11.5 - 14.5 %    Platelets 131 (L) 150 - 350 K/uL    MPV 10.8 9.2 - 12.9 fL    Immature Granulocytes 0.3 0.0 - 0.5 %    Gran # (ANC) 4.8 1.8 - 7.7 K/uL    Immature Grans (Abs) 0.02 0.00 - 0.04 K/uL    Lymph # 1.2 1.0 - 4.8 K/uL    Mono # 0.9 0.3 - 1.0 K/uL    Eos # 0.1 0.0 - 0.5 K/uL    Baso # 0.06 0.00 - 0.20 K/uL    nRBC 0 0 /100 WBC    Gran% 68.0 38.0 - 73.0 %    Lymph% 17.3 (L) 18.0 - 48.0 %    Mono% 12.0 4.0 - 15.0 %    Eosinophil% 1.6 0.0 - 8.0 %    Basophil% 0.8 0.0 - 1.9 %    Differential Method Automated    Phenytoin level, total    Collection Time: 11/07/19  5:00 AM   Result Value Ref Range    Phenytoin Lvl 9.8 (L) 10.0 - 20.0 ug/mL       Diagnostic Results:  Imaging Results          X-Ray Chest AP Portable (Final result)  Result time 11/05/19 09:08:22    Final result by Sergio España MD (11/05/19 09:08:22)                 Impression:      No acute cardiac or pulmonary process.      Electronically signed by: Sergio España MD  Date:    11/05/2019  Time:    09:08             Narrative:    CLINICAL HISTORY:  ([id number])75 y/o  (1945) M    chest pain;    TECHNIQUE:  (A#[id number], exam time 11/5/2019 9:07)    XR CHEST AP PORTABLE [id number]    COMPARISON:  Most recent radiograph from 04/26/2019    FINDINGS:  The lungs are clear except for some vascular crowding bilaterally  likely related to a suboptimal inspiration. Costophrenic angles are seen without effusion. No pneumothorax is identified. The heart is mildly enlarged. The median sternotomy wires and the left sided pacemaker are unchanged. The mediastinum is within normal limits. Osseous structures show degenerative changes in the spine. The visualized upper abdomen is unremarkable.                                  Physical Exam:  Objective:  General Appearance:   "Comfortable.    Vital signs: (most recent): Blood pressure (!) 116/57, pulse 76, temperature 98.5 °F (36.9 °C), temperature source Oral, resp. rate 17, height 5' 6" (1.676 m), weight 87.5 kg (192 lb 14.4 oz), SpO2 (!) 93 %.  Vital signs are normal.    Lungs:  Normal effort and normal respiratory rate.  Breath sounds clear to auscultation.    Heart: Normal rate.  Regular rhythm.  S1 normal and S2 normal.  Positive for murmur.  No gallop.   Abdomen: Abdomen is soft.  Bowel sounds are normal.   There is no abdominal tenderness.     Extremities: Normal range of motion.  There is no dependent edema.    Neurological: Patient is alert and oriented to person, place and time.        Current Consults:  IP CONSULT TO CARDIOLOGY    Assessment/Plan:  Assessment:   Non-STEMI  Coronary artery disease status post cardiac catheterization  Status post aorta coronary bypass  Status post aortic valve replacement  DDD pacemaker  HTN    Plan:   PCI LM/Cx tomorrow AM Dr. Willoughby. NPO midnight. Last Lovenox dose today.  Wean off Tridil, administer nitropaste. Monitor BP.   Increase Ranexa to 1000 mg BID.     Ever Bush PA-C  11/07/19  "

## 2019-11-07 NOTE — PROGRESS NOTES
"[facility]  Adult Nutrition   Progress Note (Initial Assessment)     SUMMARY     Recommendations/Interventions:  1. Continue current diet as tolerated,encourage intake.  2. Reiterated the importance of following a Heart Healthy diet and the importance of compliance. Pt has no desire to follow diet and does not show understanding of its importance. Offered education materials, pt declined.  Goals:   1. Pt to meet at least 75% of estimated needs via PO intake of meals.  2. Pt to show understanding of Heart Healthy diet and the importance of compliance-NOT MET  Nutrition Goal Status: new  Communication of RD Recs: discussed on rounds    Reason for Assessment    Reason For Assessment: (ICU status )  Diagnosis: cardiac disease  Relevant Medical History: CHF, CAD, HLD, HTN, hypothyroidism  Interdisciplinary Rounds: attended    Nutrition Risk Screen    Nutrition Risk Screen: no indicators present    Nutrition/Diet History    Patient Reported Diet/Restrictions/Preferences: general  Food Allergies: NKFA  Factors Affecting Nutritional Intake: None identified at this time    Anthropometrics    Temp: 98.5 °F (36.9 °C)  Height Method: Stated  Height: 5' 6" (167.6 cm)  Height (inches): 66 in  Weight Method: Bed Scale  Weight: 87.5 kg (192 lb 14.4 oz)  Weight (lb): 192.9 lb  Ideal Body Weight (IBW), Male: 142 lb  % Ideal Body Weight, Male (lb): 139.73 lb  BMI (Calculated): 31.2  BMI Grade: 30 - 34.9- obesity - grade I     Weight History:  Wt Readings from Last 10 Encounters:   11/07/19 87.5 kg (192 lb 14.4 oz)   07/31/19 85.7 kg (189 lb)   12/02/15 90.4 kg (199 lb 4.7 oz)   04/21/15 87.5 kg (193 lb)   02/10/15 85.6 kg (188 lb 11.2 oz)   01/07/15 86 kg (189 lb 9.5 oz)   12/08/14 87.5 kg (193 lb)   12/08/14 87.9 kg (193 lb 11.2 oz)   12/08/14 87.8 kg (193 lb 9.6 oz)     Lab/Procedures/Meds: Pertinent Labs Reviewed  Clinical Chemistry:  Recent Labs   Lab 11/05/19  0954 11/07/19  0500    138   K 4.1 4.1    " 102   CO2 27 27   * 97   BUN 18 12   CREATININE 1.2 1.2   CALCIUM 8.6* 8.7   PROT 7.4  --    ALBUMIN 4.4  --    BILITOT 0.6  --    ALKPHOS 75  --    AST 29  --    ALT 16  --    ANIONGAP 8 9   ESTGFRAFRICA >60.0 >60.0   EGFRNONAA 58.8* 58.8*   MG  --  1.8    < > = values in this interval not displayed.     CBC:   Recent Labs   Lab 11/07/19  0500   WBC 7.09   RBC 3.71*   HGB 11.2*   HCT 32.4*   *   MCV 87   MCH 30.2   MCHC 34.6     Cardiac Profile:  Recent Labs   Lab 11/05/19  0954 11/05/19  1653 11/05/19 2010 11/06/19  0403   *  --   --   --    TROPONINI 1.644* 1.295* 1.326* 1.318*     Thyroid & Parathyroid:  Recent Labs   Lab 11/06/19  0403   TSH 3.010     Medications: Pertinent Medications reviewed  Scheduled Meds:   amiodarone  200 mg Oral Daily    chlorhexidine  15 mL Mouth/Throat BID    clopidogrel  75 mg Oral Daily    enoxaparin  1 mg/kg Subcutaneous Q12H    isosorbide mononitrate  20 mg Oral Daily    levothyroxine  50 mcg Oral Daily    lisinopril  10 mg Oral Daily    metoprolol succinate  100 mg Oral Daily    mupirocin   Nasal BID    nitroGLYCERIN 2% TD oint  1 inch Topical (Top) Q6H    phenytoin  100 mg Oral QID    [START ON 11/8/2019] ranolazine  1,000 mg Oral Daily    senna-docusate 8.6-50 mg  1 tablet Oral BID     Continuous Infusions:  PRN Meds:.acetaminophen, calcium gluconate IVPB, calcium gluconate IVPB, calcium gluconate IVPB, diphenhydrAMINE, fentaNYL, HYDROcodone-acetaminophen, magnesium sulfate IVPB, magnesium sulfate IVPB, midazolam, morphine, ondansetron, potassium chloride in water **AND** potassium chloride in water **AND** potassium chloride in water, sodium chloride 0.9%, sodium phosphate IVPB, sodium phosphate IVPB, sodium phosphate IVPB    Estimated/Assessed Needs    Weight Used For Calorie Calculations: 87.1 kg (192 lb)  Energy Calorie Requirements (kcal): 9751-9084 kcals/day (20-25 kcal/kg)  Energy Need Method: Kcal/kg  Protein Requirements:  g/day  (1.5-2.0 per IBW)  Weight Used For Protein Calculations: 64.4 kg (142 lb)     Estimated Fluid Requirement Method: RDA Method    Nutrition Prescription Ordered    Current Diet Order: Cardiac Diet    Evaluation of Received Nutrient/Fluid Intake    Energy Calories Required: not meeting needs  Protein Required: not meeting needs  Fluid Required: meeting needs  Tolerance: tolerating  % Intake of Estimated Energy Needs: 0 - 25 %  % Meal Intake: 0 - 25 %    Intake/Output Summary (Last 24 hours) at 11/7/2019 1304  Last data filed at 11/7/2019 0900  Gross per 24 hour   Intake 684.63 ml   Output 1650 ml   Net -965.37 ml      Nutrition Risk    Level of Risk/Frequency of Follow-up: moderate     Dietitian Rounds Brief:  · Non-complaint.  · Does not eat much because he doesn't like hospital food.  · Offered ONS, pt declined.    Monitor and Evaluation    Food and Nutrient Intake: energy intake, food and beverage intake  Food and Nutrient Adminstration: diet order  Knowledge/Beliefs/Attitudes: food and nutrition knowledge/skill, beliefs and attitudes  Physical Activity and Function: nutrition-related ADLs and IADLs  Anthropometric Measurements: weight, weight change  Biochemical Data, Medical Tests and Procedures: electrolyte and renal panel, lipid profile, gastrointestinal profile, glucose/endocrine profile, inflammatory profile  Nutrition-Focused Physical Findings: overall appearance     Nutrition Follow-Up    RD Follow-up?: Yes  Arielle Camacho RD 11/07/2019 1:10 PM

## 2019-11-07 NOTE — ASSESSMENT & PLAN NOTE
Continue home meds  stable  Monitoring  Dr. Willoughby increased metoprolol   Current medications lisinopril 10 mg q.day and metoprolol 100 mg q.day         never used

## 2019-11-07 NOTE — PLAN OF CARE
Spoke of tomorrow's cath procedure and what to expect before, during and after procedure. Questions answered.

## 2019-11-07 NOTE — SUBJECTIVE & OBJECTIVE
Interval History: -------    Review of Systems   Per interval history, all other systems reviewed and negative.     Objective:     Vital Signs (Most Recent):  Temp: 98.5 °F (36.9 °C) (11/07/19 0300)  Pulse: 66 (11/07/19 0600)  Resp: 10 (11/07/19 0600)  BP: (!) 147/68 (11/07/19 0600)  SpO2: 95 % (11/07/19 0600) Vital Signs (24h Range):  Temp:  [97.7 °F (36.5 °C)-99 °F (37.2 °C)] 98.5 °F (36.9 °C)  Pulse:  [57-69] 66  Resp:  [8-37] 10  SpO2:  [93 %-99 %] 95 %  BP: (105-167)/(54-78) 147/68     Weight: 87.5 kg (192 lb 14.4 oz)  Body mass index is 31.14 kg/m².    Intake/Output Summary (Last 24 hours) at 11/7/2019 0735  Last data filed at 11/7/2019 0600  Gross per 24 hour   Intake 869.5 ml   Output 1500 ml   Net -630.5 ml      Physical Exam    Significant Labs:   CBC:   Recent Labs   Lab 11/05/19 0954 11/06/19  0403 11/07/19  0500   WBC 7.23 7.67 7.09   HGB 11.8* 11.1* 11.2*   HCT 35.6* 33.0* 32.4*   * 133* 131*     CMP:   Recent Labs   Lab 11/05/19 0954 11/06/19  0403 11/07/19  0500    137 138   K 4.1 3.7 4.1    101 102   CO2 27 26 27   * 103 97   BUN 18 13 12   CREATININE 1.2 1.1 1.2   CALCIUM 8.6* 8.8 8.7   PROT 7.4  --   --    ALBUMIN 4.4  --   --    BILITOT 0.6  --   --    ALKPHOS 75  --   --    AST 29  --   --    ALT 16  --   --    ANIONGAP 8 10 9   EGFRNONAA 58.8* >60.0 58.8*     Cardiac Markers:   Recent Labs   Lab 11/05/19  0954   *     Recent Labs   Lab 11/06/19  0403   TROPONINI 1.318*         All pertinent labs within the past 24 hours have been reviewed.    Significant Imaging: no new images last 24 hours

## 2019-11-07 NOTE — PLAN OF CARE
Pt awaiting cath in AM. Pt rested quietly in bed and bedside chair throughout this shift. Pt able to be weaned off tridil drip and placed on nitro paste. Pt chest pain free at this time. Pt and family educated on plan of care. Verbalized understanding.

## 2019-11-07 NOTE — NURSING
Spoke to Dr. Willoughby. Received clarification that pt is not going to cath lab for procedure today. States pt can eat today. Informed Dr. Willoughby that pt remains on tridil drip at 25 mcg/min.Informed that pt family is here and questioning when angio will be done. Dr. Willoughby states he will do angio at some point tomorrow. Timing will be determined by cath lab. Updated pt and family on plan of care.Verbalized understanding.

## 2019-11-07 NOTE — PROGRESS NOTES
[facility]  Progress Note    Patient Name: Jerome Amaro Jr.  MRN: [id number]  Patient Class: IP- Inpatient   Admission Date: 11/5/2019  Length of Stay: 2 days  Attending Physician: Marybel Dowd DO  Primary Care Provider: Rodrigo Willoughby MD    Subjective:     Principal Problem:NSTEMI (non-ST elevated myocardial infarction)    HPI:  Mr. Amaro presents today with complaints of chest pain that radiates down the left arm for the last 2 days. It is severe. It is associated with nausea, diaphoresis, and weakness. He denies SOB, vomiting, diarrhea, fever, or chills. NTG improved symptoms. He has a history of CAD s/p CABG x2, PM, and AVR. In the ED, he was placed on a NTG gtt and was then quickly taken to the cath lab where he was found to need intervention on the left main circ, but had received too much contrast at this time. He was then transferred to the ICU where he will remain on a NTG gtt with IV fluids and be brought back to the cath lab later this week for intervention.         Interval History: Cath lab deferred until tomorrow per Cardiology.  Patient seen and examined at bedside.  States he feels okay.  Denies chest pain while on nitro drip.  Reports shoulder pain while off trip.  Denies nausea, vomiting, diaphoresis, SOB or cough.    Review of Systems   Per interval history, all other systems reviewed and negative.     Objective:     Vital Signs (Most Recent):  Temp: 98.5 °F (36.9 °C) (11/07/19 0300)  Pulse: 66 (11/07/19 0600)  Resp: 10 (11/07/19 0600)  BP: (!) 147/68 (11/07/19 0600)  SpO2: 95 % (11/07/19 0600) Vital Signs (24h Range):  Temp:  [97.7 °F (36.5 °C)-99 °F (37.2 °C)] 98.5 °F (36.9 °C)  Pulse:  [57-69] 66  Resp:  [8-37] 10  SpO2:  [93 %-99 %] 95 %  BP: (105-167)/(54-78) 147/68     Weight: 87.5 kg (192 lb 14.4 oz)  Body mass index is 31.14 kg/m².    Intake/Output Summary (Last 24 hours) at 11/7/2019 0735  Last data filed at 11/7/2019 0600  Gross per 24 hour   Intake  869.5 ml   Output 1500 ml   Net -630.5 ml      Physical Exam  Gen: nad, sitting up in bed eating breakfast   CV: rrr no mrg  Resp: CTA B/l  AB: +bs soft ntnd  Skin: dry, warm  Neuro: awake, sensation grossly intact        Significant Labs:   CBC:   Recent Labs   Lab 11/05/19  0954 11/06/19  0403 11/07/19  0500   WBC 7.23 7.67 7.09   HGB 11.8* 11.1* 11.2*   HCT 35.6* 33.0* 32.4*   * 133* 131*     CMP:   Recent Labs   Lab 11/05/19  0954 11/06/19  0403 11/07/19  0500    137 138   K 4.1 3.7 4.1    101 102   CO2 27 26 27   * 103 97   BUN 18 13 12   CREATININE 1.2 1.1 1.2   CALCIUM 8.6* 8.8 8.7   PROT 7.4  --   --    ALBUMIN 4.4  --   --    BILITOT 0.6  --   --    ALKPHOS 75  --   --    AST 29  --   --    ALT 16  --   --    ANIONGAP 8 10 9   EGFRNONAA 58.8* >60.0 58.8*     Cardiac Markers:   Recent Labs   Lab 11/05/19  0954   *     Recent Labs   Lab 11/06/19  0403   TROPONINI 1.318*         All pertinent labs within the past 24 hours have been reviewed.    Significant Imaging: no new images last 24 hours      Assessment/Plan:      * NSTEMI (non-ST elevated myocardial infarction)  Consult Dr. Willoughby - seen in ED and brought to cath lab  plans for intervention tomorrow  Trend cardiac enzymes :  Plateaued   Continue plavix  Wt based lovenox - holding pradaxa  Nitro GTT  Pain control      CAD (coronary artery disease)  S/P CABG (coronary artery bypass graft)  S/P TAVR (transcatheter aortic valve replacement)  Aware  As above    HTN (hypertension)  Continue home meds  stable  Monitoring  Dr. Willoughby increased metoprolol   Current medications lisinopril 10 mg q.day and metoprolol 100 mg q.day          VTE Risk Mitigation (From admission, onward)         Ordered     enoxaparin injection 90 mg  Every 12 hours      11/06/19 0750                    Marybel Dowd DO  Department of Hospital Medicine   [facility]

## 2019-11-07 NOTE — NURSING
Tridil has been weaned to 10 mcg/min in attempt to wean off per cardiology orders. Pt beginning to c/o increasing chest pain/pressure, R shoulder pain. Nitro paste applied and tridil increased to 20 mcg/min. Attempted to call John CAMPOS, awaiting call back. Notified Dr. Dowd who placed order for morphine 2 mg IV.

## 2019-11-08 LAB
ANION GAP SERPL CALC-SCNC: 11 MMOL/L (ref 8–16)
BASOPHILS # BLD AUTO: 0.04 K/UL (ref 0–0.2)
BASOPHILS NFR BLD: 0.8 % (ref 0–1.9)
BUN SERPL-MCNC: 14 MG/DL (ref 8–23)
CALCIUM SERPL-MCNC: 8.3 MG/DL (ref 8.7–10.5)
CHLORIDE SERPL-SCNC: 101 MMOL/L (ref 95–110)
CO2 SERPL-SCNC: 26 MMOL/L (ref 23–29)
CREAT SERPL-MCNC: 1.3 MG/DL (ref 0.5–1.4)
DIFFERENTIAL METHOD: ABNORMAL
EOSINOPHIL # BLD AUTO: 0.2 K/UL (ref 0–0.5)
EOSINOPHIL NFR BLD: 2.9 % (ref 0–8)
ERYTHROCYTE [DISTWIDTH] IN BLOOD BY AUTOMATED COUNT: 12.9 % (ref 11.5–14.5)
EST. GFR  (AFRICAN AMERICAN): >60 ML/MIN/1.73 M^2
EST. GFR  (NON AFRICAN AMERICAN): 53.4 ML/MIN/1.73 M^2
GLUCOSE SERPL-MCNC: 100 MG/DL (ref 70–110)
HCT VFR BLD AUTO: 32 % (ref 40–54)
HGB BLD-MCNC: 10.8 G/DL (ref 14–18)
IMM GRANULOCYTES # BLD AUTO: 0.02 K/UL (ref 0–0.04)
IMM GRANULOCYTES NFR BLD AUTO: 0.4 % (ref 0–0.5)
LYMPHOCYTES # BLD AUTO: 1.3 K/UL (ref 1–4.8)
LYMPHOCYTES NFR BLD: 25.5 % (ref 18–48)
MAGNESIUM SERPL-MCNC: 1.8 MG/DL (ref 1.6–2.6)
MCH RBC QN AUTO: 29.7 PG (ref 27–31)
MCHC RBC AUTO-ENTMCNC: 33.8 G/DL (ref 32–36)
MCV RBC AUTO: 88 FL (ref 82–98)
MONOCYTES # BLD AUTO: 0.7 K/UL (ref 0.3–1)
MONOCYTES NFR BLD: 12.7 % (ref 4–15)
NEUTROPHILS # BLD AUTO: 3 K/UL (ref 1.8–7.7)
NEUTROPHILS NFR BLD: 57.7 % (ref 38–73)
NRBC BLD-RTO: 0 /100 WBC
PLATELET # BLD AUTO: 117 K/UL (ref 150–350)
PMV BLD AUTO: 10.8 FL (ref 9.2–12.9)
POTASSIUM SERPL-SCNC: 3.6 MMOL/L (ref 3.5–5.1)
RBC # BLD AUTO: 3.64 M/UL (ref 4.6–6.2)
SODIUM SERPL-SCNC: 138 MMOL/L (ref 136–145)
WBC # BLD AUTO: 5.26 K/UL (ref 3.9–12.7)

## 2019-11-08 PROCEDURE — 80048 BASIC METABOLIC PNL TOTAL CA: CPT

## 2019-11-08 PROCEDURE — 63600175 PHARM REV CODE 636 W HCPCS: Performed by: NURSE PRACTITIONER

## 2019-11-08 PROCEDURE — 93005 ELECTROCARDIOGRAM TRACING: CPT

## 2019-11-08 PROCEDURE — 25000003 PHARM REV CODE 250: Performed by: NURSE PRACTITIONER

## 2019-11-08 PROCEDURE — C1725 CATH, TRANSLUMIN NON-LASER: HCPCS | Performed by: SPECIALIST

## 2019-11-08 PROCEDURE — 27800903 OPTIME MED/SURG SUP & DEVICES OTHER IMPLANTS: Performed by: SPECIALIST

## 2019-11-08 PROCEDURE — 63600175 PHARM REV CODE 636 W HCPCS: Performed by: SPECIALIST

## 2019-11-08 PROCEDURE — 25500020 PHARM REV CODE 255: Performed by: SPECIALIST

## 2019-11-08 PROCEDURE — 92920 PRQ TRLUML C ANGIOP 1ART&/BR: CPT | Mod: LC

## 2019-11-08 PROCEDURE — C1760 CLOSURE DEV, VASC: HCPCS | Performed by: SPECIALIST

## 2019-11-08 PROCEDURE — C1753 CATH, INTRAVAS ULTRASOUND: HCPCS | Performed by: SPECIALIST

## 2019-11-08 PROCEDURE — 25000003 PHARM REV CODE 250: Performed by: SPECIALIST

## 2019-11-08 PROCEDURE — 25000003 PHARM REV CODE 250: Performed by: INTERNAL MEDICINE

## 2019-11-08 PROCEDURE — 27201423 OPTIME MED/SURG SUP & DEVICES STERILE SUPPLY: Performed by: SPECIALIST

## 2019-11-08 PROCEDURE — 93458 L HRT ARTERY/VENTRICLE ANGIO: CPT | Mod: XU

## 2019-11-08 PROCEDURE — 83735 ASSAY OF MAGNESIUM: CPT

## 2019-11-08 PROCEDURE — 25000003 PHARM REV CODE 250: Performed by: STUDENT IN AN ORGANIZED HEALTH CARE EDUCATION/TRAINING PROGRAM

## 2019-11-08 PROCEDURE — C1769 GUIDE WIRE: HCPCS | Performed by: SPECIALIST

## 2019-11-08 PROCEDURE — 36415 COLL VENOUS BLD VENIPUNCTURE: CPT

## 2019-11-08 PROCEDURE — 63600175 PHARM REV CODE 636 W HCPCS: Performed by: STUDENT IN AN ORGANIZED HEALTH CARE EDUCATION/TRAINING PROGRAM

## 2019-11-08 PROCEDURE — 25000003 PHARM REV CODE 250

## 2019-11-08 PROCEDURE — 94761 N-INVAS EAR/PLS OXIMETRY MLT: CPT

## 2019-11-08 PROCEDURE — 92978 ENDOLUMINL IVUS OCT C 1ST: CPT

## 2019-11-08 PROCEDURE — 27000221 HC OXYGEN, UP TO 24 HOURS

## 2019-11-08 PROCEDURE — 25000003 PHARM REV CODE 250: Performed by: PHYSICIAN ASSISTANT

## 2019-11-08 PROCEDURE — C1887 CATHETER, GUIDING: HCPCS | Performed by: SPECIALIST

## 2019-11-08 PROCEDURE — 63600175 PHARM REV CODE 636 W HCPCS: Performed by: INTERNAL MEDICINE

## 2019-11-08 PROCEDURE — 85025 COMPLETE CBC W/AUTO DIFF WBC: CPT

## 2019-11-08 PROCEDURE — 20000000 HC ICU ROOM

## 2019-11-08 RX ORDER — LIDOCAINE HYDROCHLORIDE 10 MG/ML
INJECTION, SOLUTION EPIDURAL; INFILTRATION; INTRACAUDAL; PERINEURAL
Status: DISCONTINUED | OUTPATIENT
Start: 2019-11-08 | End: 2019-11-08

## 2019-11-08 RX ORDER — PROMETHAZINE HYDROCHLORIDE 25 MG/1
25 TABLET ORAL EVERY 6 HOURS PRN
Status: DISCONTINUED | OUTPATIENT
Start: 2019-11-08 | End: 2019-11-09 | Stop reason: HOSPADM

## 2019-11-08 RX ORDER — SODIUM CHLORIDE 9 MG/ML
INJECTION, SOLUTION INTRAVENOUS CONTINUOUS
Status: ACTIVE | OUTPATIENT
Start: 2019-11-08 | End: 2019-11-09

## 2019-11-08 RX ORDER — DABIGATRAN ETEXILATE 150 MG/1
150 CAPSULE ORAL 2 TIMES DAILY
Status: DISCONTINUED | OUTPATIENT
Start: 2019-11-09 | End: 2019-11-09 | Stop reason: HOSPADM

## 2019-11-08 RX ORDER — POTASSIUM CHLORIDE 20 MEQ/1
40 TABLET, EXTENDED RELEASE ORAL ONCE
Status: COMPLETED | OUTPATIENT
Start: 2019-11-08 | End: 2019-11-08

## 2019-11-08 RX ORDER — ACETAMINOPHEN 325 MG/1
650 TABLET ORAL EVERY 4 HOURS PRN
Status: DISCONTINUED | OUTPATIENT
Start: 2019-11-08 | End: 2019-11-08

## 2019-11-08 RX ORDER — HEPARIN SODIUM 1000 [USP'U]/ML
INJECTION, SOLUTION INTRAVENOUS; SUBCUTANEOUS
Status: DISCONTINUED | OUTPATIENT
Start: 2019-11-08 | End: 2019-11-08

## 2019-11-08 RX ADMIN — CHLORHEXIDINE GLUCONATE 15 ML: 1.2 RINSE ORAL at 09:11

## 2019-11-08 RX ADMIN — SENNOSIDES AND DOCUSATE SODIUM 1 TABLET: 8.6; 5 TABLET ORAL at 09:11

## 2019-11-08 RX ADMIN — PHENYTOIN SODIUM 100 MG: 100 CAPSULE ORAL at 09:11

## 2019-11-08 RX ADMIN — CLOPIDOGREL BISULFATE 75 MG: 75 TABLET, FILM COATED ORAL at 01:11

## 2019-11-08 RX ADMIN — SENNOSIDES AND DOCUSATE SODIUM 1 TABLET: 8.6; 5 TABLET ORAL at 10:11

## 2019-11-08 RX ADMIN — AMIODARONE HYDROCHLORIDE 200 MG: 200 TABLET ORAL at 09:11

## 2019-11-08 RX ADMIN — SODIUM CHLORIDE: 0.9 INJECTION, SOLUTION INTRAVENOUS at 01:11

## 2019-11-08 RX ADMIN — METOPROLOL SUCCINATE 100 MG: 25 TABLET, FILM COATED, EXTENDED RELEASE ORAL at 09:11

## 2019-11-08 RX ADMIN — POTASSIUM CHLORIDE 40 MEQ: 20 TABLET, EXTENDED RELEASE ORAL at 09:11

## 2019-11-08 RX ADMIN — NITROGLYCERIN 1 INCH: 20 OINTMENT TOPICAL at 05:11

## 2019-11-08 RX ADMIN — MAGNESIUM SULFATE 2 G: 2 INJECTION INTRAVENOUS at 05:11

## 2019-11-08 RX ADMIN — RANOLAZINE 1000 MG: 500 TABLET, EXTENDED RELEASE ORAL at 09:11

## 2019-11-08 RX ADMIN — POTASSIUM CHLORIDE 40 MEQ: 7.46 INJECTION, SOLUTION INTRAVENOUS at 07:11

## 2019-11-08 RX ADMIN — PHENYTOIN SODIUM 100 MG: 100 CAPSULE ORAL at 05:11

## 2019-11-08 RX ADMIN — PHENYTOIN SODIUM 100 MG: 100 CAPSULE ORAL at 01:11

## 2019-11-08 RX ADMIN — ISOSORBIDE MONONITRATE 20 MG: 20 TABLET ORAL at 10:11

## 2019-11-08 RX ADMIN — SODIUM CHLORIDE: 0.9 INJECTION, SOLUTION INTRAVENOUS at 09:11

## 2019-11-08 RX ADMIN — LEVOTHYROXINE SODIUM 50 MCG: 25 TABLET ORAL at 05:11

## 2019-11-08 RX ADMIN — MUPIROCIN: 20 OINTMENT TOPICAL at 09:11

## 2019-11-08 RX ADMIN — NITROGLYCERIN 1 INCH: 20 OINTMENT TOPICAL at 12:11

## 2019-11-08 RX ADMIN — MORPHINE SULFATE 2 MG: 2 INJECTION, SOLUTION INTRAMUSCULAR; INTRAVENOUS at 01:11

## 2019-11-08 RX ADMIN — HYDROCODONE BITARTRATE AND ACETAMINOPHEN 1 TABLET: 5; 325 TABLET ORAL at 02:11

## 2019-11-08 NOTE — SUBJECTIVE & OBJECTIVE
Interval History: weaned off of Nitro gtt to nitro paste. Chest pain controlled. OOB to toilet. NPO for Cath Lab this morning.   .deneis      Review of Systems   Per interval history, all other systems reviewed and negative.     Objective:     Vital Signs (Most Recent):  Temp: 97.8 °F (36.6 °C) (11/08/19 0300)  Pulse: 60 (11/08/19 0700)  Resp: 12 (11/08/19 0700)  BP: (!) 151/70 (11/08/19 0700)  SpO2: 100 % (11/08/19 0700) Vital Signs (24h Range):  Temp:  [97.7 °F (36.5 °C)-98.5 °F (36.9 °C)] 97.8 °F (36.6 °C)  Pulse:  [59-76] 60  Resp:  [11-29] 12  SpO2:  [91 %-100 %] 100 %  BP: ()/(46-82) 151/70     Weight: 86.9 kg (191 lb 9.3 oz)  Body mass index is 30.92 kg/m².    Intake/Output Summary (Last 24 hours) at 11/8/2019 0743  Last data filed at 11/8/2019 0500  Gross per 24 hour   Intake 876.05 ml   Output 2150 ml   Net -1273.95 ml      Physical Exam   Constitutional: He is oriented to person, place, and time. He appears well-developed and well-nourished. No distress.   HENT:   Head: Normocephalic and atraumatic.   Mouth/Throat: Oropharynx is clear and moist.   Eyes: Pupils are equal, round, and reactive to light. Conjunctivae and EOM are normal.   Neck: Neck supple.   Cardiovascular: Normal rate, regular rhythm, normal heart sounds and intact distal pulses.   Pulmonary/Chest: Effort normal and breath sounds normal.   Abdominal: Soft. Bowel sounds are normal. He exhibits no distension. There is no tenderness.   Musculoskeletal: Normal range of motion. He exhibits no edema, tenderness or deformity.   Neurological: He is alert and oriented to person, place, and time. He has normal reflexes.   Skin: Skin is warm and dry. He is not diaphoretic.   Psychiatric: He has a normal mood and affect. His behavior is normal. Judgment and thought content normal.   Nursing note and vitals reviewed.      Significant Labs:   CBC:   Recent Labs   Lab 11/07/19  0500 11/08/19  0323   WBC 7.09 5.26   HGB 11.2* 10.8*   HCT 32.4* 32.0*    * 117*     CMP:   Recent Labs   Lab 11/07/19  0500 11/08/19  0323    138   K 4.1 3.6    101   CO2 27 26   GLU 97 100   BUN 12 14   CREATININE 1.2 1.3   CALCIUM 8.7 8.3*   ANIONGAP 9 11   EGFRNONAA 58.8* 53.4*     All pertinent labs within the past 24 hours have been reviewed.    Significant Imaging: no new images

## 2019-11-08 NOTE — NURSING
Pt returned to room s/p L heart cath. L groin access site clean, dry, soft with no drainage or hematoma. Report received from John SUTTON. Pt wife at bedside and aware of pt return from cath lab. Pt without complaints at this time.

## 2019-11-08 NOTE — PLAN OF CARE
Reviewed safety precautions and up coming angiogram. Instructed on what to exoect and what to report after angiogram, verbalizes understanding.

## 2019-11-08 NOTE — NURSING
Cath lab team at bedside to transport pt to cath lab for angiogram. Pt family at bedside and aware. Report given to John SUTTON.

## 2019-11-08 NOTE — PLAN OF CARE
Pt went for cath today. Pt remains CP free at this time. No complaints. Plan of care reviewed with pt and family. Verbalized understanding of education provided.

## 2019-11-08 NOTE — PROGRESS NOTES
Cardiac Rehab     Jerome Amaro Jr.   [id number]   11/8/2019         Cardiac Rehab Phase Taught: Phase 1    Teaching Method: Verbal    Handouts: None    Educational Videos: None    Understanding:  History of Previous Information Given, Knowledge indicated by feedback, Learning indicated by feedback and Verbalize understanding    Comments: pt sleeping, spoke with wife at bedside, will follow on Monday for cardiac rehab appt    Total time spent: 15mins            Khadijah An RN

## 2019-11-08 NOTE — PLAN OF CARE
11/08/19 1538   Discharge Assessment   Assessment Type Discharge Planning Assessment   Confirmed/corrected address and phone number on facesheet? Yes   Assessment information obtained from? Caregiver  (Tiffany Amaro wife)   Communicated expected length of stay with patient/caregiver no   Prior to hospitilization cognitive status: Alert/Oriented   Prior to hospitalization functional status: Independent   Current cognitive status: Alert/Oriented   Current Functional Status: Needs Assistance   Lives With spouse   Able to Return to Prior Arrangements yes   Is patient able to care for self after discharge? Yes   Who are your caregiver(s) and their phone number(s)? Tiffany Amaro wife [phone]  Nilda Amaro  [phone]   Patient's perception of discharge disposition home or selfcare   Readmission Within the Last 30 Days no previous admission in last 30 days   Patient currently being followed by outpatient case management? No   Patient currently receives any other outside agency services? No   Equipment Currently Used at Home none   Do you have any problems affording any of your prescribed medications? No   Is the patient taking medications as prescribed? yes   Does the patient have transportation home? Yes   Transportation Anticipated family or friend will provide   Does the patient receive services at the [facility]? No   Discharge Plan A Home with family   Patient/Family in Agreement with Plan yes   Pt does not have home health and states he does not need a home health. Pt was ambulatory with no assistive devices. Cm to follow pt until discharge from hospital

## 2019-11-08 NOTE — PROGRESS NOTES
[facility]  Progress Note    Patient Name: Jerome Amaro Jr.  MRN: [id number]  Patient Class: IP- Inpatient   Admission Date: 11/5/2019  Length of Stay: 3 days  Attending Physician: Marybel Dowd DO  Primary Care Provider: Rodrigo Willoughby MD    Subjective:     Principal Problem:NSTEMI (non-ST elevated myocardial infarction)    HPI:  Mr. Amaro presents today with complaints of chest pain that radiates down the left arm for the last 2 days. It is severe. It is associated with nausea, diaphoresis, and weakness. He denies SOB, vomiting, diarrhea, fever, or chills. NTG improved symptoms. He has a history of CAD s/p CABG x2, PM, and AVR. In the ED, he was placed on a NTG gtt and was then quickly taken to the cath lab where he was found to need intervention on the left main circ, but had received too much contrast at this time. He was then transferred to the ICU where he will remain on a NTG gtt with IV fluids and be brought back to the cath lab later this week for intervention.         Interval History: Weaned off of Nitro gtt to nitro paste. Chest pain controlled. OOB to toilet. NPO for Cath Lab this morning.   Patient seen and examined at bed side. States that he is ready for procedure. Denies any CP, SOB, N/V/D, headaches, fever or chills.         Review of Systems   Per interval history, all other systems reviewed and negative.     Objective:     Vital Signs (Most Recent):  Temp: 97.8 °F (36.6 °C) (11/08/19 0300)  Pulse: 60 (11/08/19 0700)  Resp: 12 (11/08/19 0700)  BP: (!) 151/70 (11/08/19 0700)  SpO2: 100 % (11/08/19 0700) Vital Signs (24h Range):  Temp:  [97.7 °F (36.5 °C)-98.5 °F (36.9 °C)] 97.8 °F (36.6 °C)  Pulse:  [59-76] 60  Resp:  [11-29] 12  SpO2:  [91 %-100 %] 100 %  BP: ()/(46-82) 151/70     Weight: 86.9 kg (191 lb 9.3 oz)  Body mass index is 30.92 kg/m².    Intake/Output Summary (Last 24 hours) at 11/8/2019 2992  Last data filed at 11/8/2019 0500  Gross per 24 hour    Intake 876.05 ml   Output 2150 ml   Net -1273.95 ml      Physical Exam   Constitutional: He is oriented to person, place, and time. He appears well-developed and well-nourished. No distress.   HENT:   Head: Normocephalic and atraumatic.   Mouth/Throat: Oropharynx is clear and moist.   Eyes: Pupils are equal, round, and reactive to light. Conjunctivae and EOM are normal.   Neck: Neck supple.   Cardiovascular: Normal rate, regular rhythm, normal heart sounds and intact distal pulses.   Pulmonary/Chest: Effort normal and breath sounds normal.   Abdominal: Soft. Bowel sounds are normal. He exhibits no distension. There is no tenderness.   Musculoskeletal: Normal range of motion. He exhibits no edema, tenderness or deformity.   Neurological: He is alert and oriented to person, place, and time. He has normal reflexes.   Skin: Skin is warm and dry. He is not diaphoretic.   Psychiatric: He has a normal mood and affect. His behavior is normal. Judgment and thought content normal.   Nursing note and vitals reviewed.      Significant Labs:   CBC:   Recent Labs   Lab 11/07/19  0500 11/08/19  0323   WBC 7.09 5.26   HGB 11.2* 10.8*   HCT 32.4* 32.0*   * 117*     CMP:   Recent Labs   Lab 11/07/19  0500 11/08/19  0323    138   K 4.1 3.6    101   CO2 27 26   GLU 97 100   BUN 12 14   CREATININE 1.2 1.3   CALCIUM 8.7 8.3*   ANIONGAP 9 11   EGFRNONAA 58.8* 53.4*     All pertinent labs within the past 24 hours have been reviewed.    Significant Imaging: no new images      Assessment/Plan:      * NSTEMI (non-ST elevated myocardial infarction)  Consult Dr. Willoughby  Trend cardiac enzymes :  Plateaued  Continue plavix  Wt based lovenox - holding pradaxa   Nitro GTT weaned yesterday. Pain stable with NTG paste.   Plans for Cath lab toay      CAD (coronary artery disease)  S/P CABG (coronary artery bypass graft)  S/P TAVR (transcatheter aortic valve replacement)  Aware  As above      HTN (hypertension)  Continue home  meds  stable  Monitoring  Dr. Willoughby increased metoprolol   Current medications lisinopril 10 mg q.day and metoprolol 100 mg q.day        VTE Risk Mitigation (From admission, onward)         Ordered     enoxaparin injection 90 mg  Every 12 hours      11/06/19 0750                Marybel Dowd DO  Department of Hospital Medicine   [facility]

## 2019-11-09 VITALS
SYSTOLIC BLOOD PRESSURE: 156 MMHG | TEMPERATURE: 98 F | HEIGHT: 66 IN | DIASTOLIC BLOOD PRESSURE: 75 MMHG | BODY MASS INDEX: 30.86 KG/M2 | RESPIRATION RATE: 14 BRPM | HEART RATE: 67 BPM | OXYGEN SATURATION: 95 % | WEIGHT: 192 LBS

## 2019-11-09 LAB
ANION GAP SERPL CALC-SCNC: 8 MMOL/L (ref 8–16)
BASOPHILS # BLD AUTO: 0.02 K/UL (ref 0–0.2)
BASOPHILS NFR BLD: 0.3 % (ref 0–1.9)
BUN SERPL-MCNC: 16 MG/DL (ref 8–23)
CALCIUM SERPL-MCNC: 8.1 MG/DL (ref 8.7–10.5)
CHLORIDE SERPL-SCNC: 104 MMOL/L (ref 95–110)
CO2 SERPL-SCNC: 25 MMOL/L (ref 23–29)
CREAT SERPL-MCNC: 1.2 MG/DL (ref 0.5–1.4)
DIFFERENTIAL METHOD: ABNORMAL
EOSINOPHIL # BLD AUTO: 0.1 K/UL (ref 0–0.5)
EOSINOPHIL NFR BLD: 0.9 % (ref 0–8)
ERYTHROCYTE [DISTWIDTH] IN BLOOD BY AUTOMATED COUNT: 12.8 % (ref 11.5–14.5)
EST. GFR  (AFRICAN AMERICAN): >60 ML/MIN/1.73 M^2
EST. GFR  (NON AFRICAN AMERICAN): 58.8 ML/MIN/1.73 M^2
GLUCOSE SERPL-MCNC: 92 MG/DL (ref 70–110)
HCT VFR BLD AUTO: 30.8 % (ref 40–54)
HGB BLD-MCNC: 10.5 G/DL (ref 14–18)
IMM GRANULOCYTES # BLD AUTO: 0.02 K/UL (ref 0–0.04)
IMM GRANULOCYTES NFR BLD AUTO: 0.3 % (ref 0–0.5)
LYMPHOCYTES # BLD AUTO: 0.9 K/UL (ref 1–4.8)
LYMPHOCYTES NFR BLD: 11.5 % (ref 18–48)
MAGNESIUM SERPL-MCNC: 1.7 MG/DL (ref 1.6–2.6)
MCH RBC QN AUTO: 30 PG (ref 27–31)
MCHC RBC AUTO-ENTMCNC: 34.1 G/DL (ref 32–36)
MCV RBC AUTO: 88 FL (ref 82–98)
MONOCYTES # BLD AUTO: 0.7 K/UL (ref 0.3–1)
MONOCYTES NFR BLD: 10 % (ref 4–15)
NEUTROPHILS # BLD AUTO: 5.7 K/UL (ref 1.8–7.7)
NEUTROPHILS NFR BLD: 77 % (ref 38–73)
NRBC BLD-RTO: 0 /100 WBC
PLATELET # BLD AUTO: 128 K/UL (ref 150–350)
PMV BLD AUTO: 10.6 FL (ref 9.2–12.9)
POTASSIUM SERPL-SCNC: 4.2 MMOL/L (ref 3.5–5.1)
RBC # BLD AUTO: 3.5 M/UL (ref 4.6–6.2)
SODIUM SERPL-SCNC: 137 MMOL/L (ref 136–145)
WBC # BLD AUTO: 7.39 K/UL (ref 3.9–12.7)

## 2019-11-09 PROCEDURE — 94761 N-INVAS EAR/PLS OXIMETRY MLT: CPT

## 2019-11-09 PROCEDURE — 25000003 PHARM REV CODE 250: Performed by: INTERNAL MEDICINE

## 2019-11-09 PROCEDURE — 27000221 HC OXYGEN, UP TO 24 HOURS

## 2019-11-09 PROCEDURE — 83735 ASSAY OF MAGNESIUM: CPT

## 2019-11-09 PROCEDURE — 80048 BASIC METABOLIC PNL TOTAL CA: CPT

## 2019-11-09 PROCEDURE — 85025 COMPLETE CBC W/AUTO DIFF WBC: CPT

## 2019-11-09 PROCEDURE — 36415 COLL VENOUS BLD VENIPUNCTURE: CPT

## 2019-11-09 PROCEDURE — 63600175 PHARM REV CODE 636 W HCPCS: Performed by: INTERNAL MEDICINE

## 2019-11-09 PROCEDURE — 93005 ELECTROCARDIOGRAM TRACING: CPT

## 2019-11-09 RX ORDER — METOPROLOL SUCCINATE 100 MG/1
100 TABLET, EXTENDED RELEASE ORAL DAILY
Qty: 30 TABLET | Refills: 11 | Status: SHIPPED | OUTPATIENT
Start: 2019-11-09 | End: 2021-05-07

## 2019-11-09 RX ADMIN — RANOLAZINE 1000 MG: 500 TABLET, EXTENDED RELEASE ORAL at 08:11

## 2019-11-09 RX ADMIN — METOPROLOL SUCCINATE 100 MG: 25 TABLET, FILM COATED, EXTENDED RELEASE ORAL at 08:11

## 2019-11-09 RX ADMIN — MUPIROCIN: 20 OINTMENT TOPICAL at 08:11

## 2019-11-09 RX ADMIN — NITROGLYCERIN 1 INCH: 20 OINTMENT TOPICAL at 12:11

## 2019-11-09 RX ADMIN — PHENYTOIN SODIUM 100 MG: 100 CAPSULE ORAL at 08:11

## 2019-11-09 RX ADMIN — AMIODARONE HYDROCHLORIDE 200 MG: 200 TABLET ORAL at 08:11

## 2019-11-09 RX ADMIN — NITROGLYCERIN 1 INCH: 20 OINTMENT TOPICAL at 06:11

## 2019-11-09 RX ADMIN — ISOSORBIDE MONONITRATE 20 MG: 20 TABLET ORAL at 08:11

## 2019-11-09 RX ADMIN — LEVOTHYROXINE SODIUM 50 MCG: 25 TABLET ORAL at 06:11

## 2019-11-09 RX ADMIN — CLOPIDOGREL BISULFATE 75 MG: 75 TABLET, FILM COATED ORAL at 08:11

## 2019-11-09 RX ADMIN — SENNOSIDES AND DOCUSATE SODIUM 1 TABLET: 8.6; 5 TABLET ORAL at 08:11

## 2019-11-09 RX ADMIN — CHLORHEXIDINE GLUCONATE 15 ML: 1.2 RINSE ORAL at 08:11

## 2019-11-09 RX ADMIN — DABIGATRAN ETEXILATE MESYLATE 150 MG: 150 CAPSULE ORAL at 08:11

## 2019-11-09 RX ADMIN — MAGNESIUM SULFATE 2 G: 2 INJECTION INTRAVENOUS at 05:11

## 2019-11-09 RX ADMIN — LISINOPRIL 10 MG: 5 TABLET ORAL at 08:11

## 2019-11-09 NOTE — PLAN OF CARE
11/08/19 1905   PRE-TX-O2   O2 Device (Oxygen Therapy) room air   SpO2 99 %   Pulse Oximetry Type Continuous   Pulse 66   Resp 19

## 2019-11-09 NOTE — DISCHARGE SUMMARY
[facility]  Discharge Summary      Patient Name: Jerome Amaro Jr.  MRN: [id number]  Admission Date: 11/5/2019  Hospital Length of Stay: 4 days  Discharge Date and Time: 11/9/2019  9:03 AM  Attending Physician: Marybel Dowd DO   Discharging Provider: Marybel Dowd DO  Primary Care Provider: Rodrigo Willoughby MD      HPI:   Mr. Amaro presents today with complaints of chest pain that radiates down the left arm for the last 2 days. It is severe. It is associated with nausea, diaphoresis, and weakness. He denies SOB, vomiting, diarrhea, fever, or chills. NTG improved symptoms. He has a history of CAD s/p CABG x2, PM, and AVR. In the ED, he was placed on a NTG gtt and was then quickly taken to the cath lab where he was found to need intervention on the left main circ, but had received too much contrast at this time. He was then transferred to the ICU where he will remain on a NTG gtt with IV fluids and be brought back to the cath lab later this week for intervention.           Procedure(s) (LRB):  Angioplasty-coronary (N/A)  IVUS, Coronary      Hospital Course:   Patient was admitted for NSTEMI and brought to cath lab.  He was found to be in need of intervention however too much contrast was given and he was brought to ICU.  Patient continued to endorse chest pain at however resolved while on nitro drip.  He eventually was able to be weaned off of drip to nitro paste.  He was returned to cath lab on 11/09 for intervention.  See below.  Following intervention he returned ICU for monitoring overnight.  On day of discharge he was hemodynamically stable chest pain-free tolerating diet and suitable for discharge home with medication adjustments including increased metoprolol from 50-->100 q.day and addition of Pradaxa.  He is recommended to follow up with Cardiology as listed below and PCP as needed.    Physical exam on day of discharge  BP (!) 156/75   Pulse 67   Temp 97.8 °F (36.6 °C)    "Resp 14   Ht 5' 6" (1.676 m)   Wt 87.1 kg (192 lb 0.3 oz)   SpO2 95%   BMI 30.99 kg/m²    Gen: nad, sitting up in chair  CV: rrr no mrg  Resp: CTA B/l  AB: +bs soft nontender nondistended  Skin: dry, warm        Cardiac INTERVENTION  · POBA to distal LM into ostial LAD with sequentially upsized Sapphire Pro 11 SC balloons reducing a moderately-heavily calcified 99% lesion to 10% residual stenosis in the left main and 70% residual stenosis in the ostial LAD.  · Successful POBA to the entire length of the ostial to late mid LCx ISR of 70% with sequentially upsized Sapphire Pro 11 SC balloons; reduced to 10% residual stenosis.    Consults:   Consults (From admission, onward)        Status Ordering Provider     Inpatient consult to Cardiology  Once     Provider:  Rodrigo Willoughby MD    Acknowledged EMILY STROUD          No new Assessment & Plan notes have been filed under this hospital service since the last note was generated.  Service: Hospital Medicine    Final Active Diagnoses:    Diagnosis Date Noted POA    PRINCIPAL PROBLEM:  NSTEMI (non-ST elevated myocardial infarction) [I21.4] 11/05/2019 Yes    CAD (coronary artery disease) [I25.10] 01/07/2015 Yes    S/P CABG (coronary artery bypass graft) [Z95.1] 04/21/2015 Not Applicable    S/P TAVR (transcatheter aortic valve replacement) [Z95.2] 01/07/2015 Not Applicable    HTN (hypertension) [I10] 01/07/2015 Yes      Problems Resolved During this Admission:       Discharged Condition: poor    Disposition: Home or Self Care    Follow Up:  Follow-up Information     Call Rodrigo Willoughby MD.    Specialty:  Cardiology  Why:  hospital follow up  Contact information:  [address] [place] [address]  [place] [address] [phone]                 Patient Instructions:      Diet Cardiac     Notify your health care provider if you experience any of the following:  persistent nausea and vomiting or diarrhea     Notify your health care provider if you " experience any of the following:  severe uncontrolled pain     Notify your health care provider if you experience any of the following:  redness, tenderness, or signs of infection (pain, swelling, redness, odor or green/yellow discharge around incision site)     Notify your health care provider if you experience any of the following:  severe persistent headache     Notify your health care provider if you experience any of the following:  worsening rash     Notify your health care provider if you experience any of the following:  persistent dizziness, light-headedness, or visual disturbances     Notify your health care provider if you experience any of the following:  increased confusion or weakness     Activity as tolerated       Significant Diagnostic Studies: Labs: All labs within the past 24 hours have been reviewed    Pending Diagnostic Studies:     None         Medications:  Reconciled Home Medications:      Medication List      CHANGE how you take these medications    levothyroxine 50 MCG tablet  Commonly known as:  SYNTHROID  Take 50 mcg by mouth once daily.  What changed:  Another medication with the same name was removed. Continue taking this medication, and follow the directions you see here.     metoprolol succinate 100 MG 24 hr tablet  Commonly known as:  TOPROL-XL  Take 1 tablet (100 mg total) by mouth once daily.  What changed:    · medication strength  · how much to take        CONTINUE taking these medications    amiodarone 200 MG Tab  Commonly known as:  PACERONE  Take 200 mg by mouth once daily.     citalopram 20 MG tablet  Commonly known as:  CELEXA  Take 20 mg by mouth once daily.     clopidogrel 75 mg tablet  Commonly known as:  PLAVIX  Take 75 mg by mouth once daily.     furosemide 20 MG tablet  Commonly known as:  LASIX  Take 20 mg by mouth 2 (two) times daily.     * HYDROcodone-acetaminophen 5-325 mg per tablet  Commonly known as:  NORCO  Take 1 tablet by mouth every 4 (four) hours as needed  "for Pain.     * HYDROcodone-acetaminophen 7.5-325 mg per tablet  Commonly known as:  NORCO  Take 1 tablet by mouth 2 (two) times daily as needed.     hydrOXYzine pamoate 25 MG Cap  Commonly known as:  VISTARIL  Take 25 mg by mouth once daily.     isosorbide mononitrate 20 MG Tab  Commonly known as:  ISMO,MONOKET  Take 10 mg by mouth once daily.     lisinopril 10 MG tablet  Take 10 mg by mouth once daily.     nitroGLYCERIN 0.4 MG/DOSE TL SPRY 400 mcg/spray spray  Commonly known as:  NITROLINGUAL  Place 1 spray under the tongue every 5 (five) minutes as needed for Chest pain.     phenytoin 100 MG ER capsule  Commonly known as:  DILANTIN  Take 100 mg by mouth 4 (four) times daily.     PRADAXA 150 mg Cap  Generic drug:  dabigatran etexilate  Take 150 mg by mouth once daily. "Do NOT break, chew, or open capsules."     PRALUENT PEN SUBQ  Inject 1 Dose into the skin every 14 (fourteen) days.     RANEXA 500 MG Tb12  Generic drug:  ranolazine  Take 500 mg by mouth once daily.         * This list has 2 medication(s) that are the same as other medications prescribed for you. Read the directions carefully, and ask your doctor or other care provider to review them with you.            STOP taking these medications    amLODIPine 5 MG tablet  Commonly known as:  NORVASC            Indwelling Lines/Drains at time of discharge:   Lines/Drains/Airways     None                 Time spent on the discharge of patient: 32 minutes  Patient was seen and examined on the date of discharge and determined to be suitable for discharge.         Marybel Dowd DO  Department of Hospital Medicine  [facility]  "

## 2019-11-09 NOTE — PLAN OF CARE
Denies pain in groins, sites clean and intact, no bruising or hematoma. Reviewed safety precautions.

## 2019-11-11 ENCOUNTER — TELEPHONE (OUTPATIENT)
Dept: CARDIAC REHAB | Facility: HOSPITAL | Age: 74
End: 2019-11-11

## 2019-11-11 NOTE — TELEPHONE ENCOUNTER
Jerome Amaro Jr.   [id number]   11/11/2019         Spoke with: pt wife    Received Medications?:yes    Follow Up Appt?:yes    Cardio Pulmonary Rehab Appt?:yes    Comments: cardiac rehab apt 11/19/2019 @0930        Khadijah An RN

## 2019-11-19 ENCOUNTER — CLINICAL SUPPORT (OUTPATIENT)
Dept: CARDIAC REHAB | Facility: HOSPITAL | Age: 74
End: 2019-11-19
Attending: SPECIALIST
Payer: MEDICARE

## 2019-11-19 DIAGNOSIS — I21.4 NSTEMI (NON-ST ELEVATED MYOCARDIAL INFARCTION): Primary | ICD-10-CM

## 2019-11-19 PROCEDURE — 93797 PHYS/QHP OP CAR RHAB WO ECG: CPT

## 2020-01-29 ENCOUNTER — HOSPITAL ENCOUNTER (OUTPATIENT)
Dept: RADIOLOGY | Facility: HOSPITAL | Age: 75
Discharge: HOME | End: 2020-01-29
Attending: SPECIALIST
Payer: MEDICARE

## 2020-01-29 DIAGNOSIS — Z12.5 SPECIAL SCREENING FOR MALIGNANT NEOPLASM OF PROSTATE: ICD-10-CM

## 2020-01-29 DIAGNOSIS — N20.1 CALCULUS OF URETER: Primary | ICD-10-CM

## 2020-01-29 DIAGNOSIS — N20.1 CALCULUS OF URETER: ICD-10-CM

## 2020-01-29 PROCEDURE — 74018 RADEX ABDOMEN 1 VIEW: CPT | Mod: TC,PO

## 2020-09-17 DIAGNOSIS — I25.118 ATHEROSCLEROTIC HEART DISEASE OF NATIVE CORONARY ARTERY WITH OTHER FORMS OF ANGINA PECTORIS: Primary | ICD-10-CM

## 2020-10-26 DIAGNOSIS — I25.118 ATHEROSCLEROTIC HEART DISEASE OF NATIVE CORONARY ARTERY WITH OTHER FORMS OF ANGINA PECTORIS: Primary | ICD-10-CM

## 2020-10-30 ENCOUNTER — HOSPITAL ENCOUNTER (OUTPATIENT)
Dept: RADIOLOGY | Facility: HOSPITAL | Age: 75
Discharge: HOME | End: 2020-10-30
Attending: SPECIALIST
Payer: MEDICARE

## 2020-10-30 ENCOUNTER — HOSPITAL ENCOUNTER (OUTPATIENT)
Dept: PREADMISSION TESTING | Facility: HOSPITAL | Age: 75
Discharge: HOME | End: 2020-10-30
Attending: SPECIALIST
Payer: MEDICARE

## 2020-10-30 DIAGNOSIS — I25.118 ATHEROSCLEROTIC HEART DISEASE OF NATIVE CORONARY ARTERY WITH OTHER FORMS OF ANGINA PECTORIS: ICD-10-CM

## 2020-10-30 DIAGNOSIS — Z01.810 PRE-PROCEDURAL CARDIOVASCULAR EXAMINATION: Primary | ICD-10-CM

## 2020-10-30 LAB
ALBUMIN SERPL BCP-MCNC: 4.1 G/DL (ref 3.5–5.2)
ALP SERPL-CCNC: 67 U/L (ref 55–135)
ALT SERPL W/O P-5'-P-CCNC: 16 U/L (ref 10–44)
ANION GAP SERPL CALC-SCNC: 9 MMOL/L (ref 8–16)
APTT PPP: 33.4 SEC (ref 23.6–33.3)
AST SERPL-CCNC: 21 U/L (ref 10–40)
BILIRUB SERPL-MCNC: 0.6 MG/DL (ref 0.1–1)
BUN SERPL-MCNC: 17 MG/DL (ref 8–23)
CALCIUM SERPL-MCNC: 8.5 MG/DL (ref 8.7–10.5)
CHLORIDE SERPL-SCNC: 100 MMOL/L (ref 95–110)
CO2 SERPL-SCNC: 24 MMOL/L (ref 23–29)
CREAT SERPL-MCNC: 1.2 MG/DL (ref 0.5–1.4)
ERYTHROCYTE [DISTWIDTH] IN BLOOD BY AUTOMATED COUNT: 13.2 % (ref 11.5–14.5)
EST. GFR  (AFRICAN AMERICAN): >60 ML/MIN/1.73 M^2
EST. GFR  (NON AFRICAN AMERICAN): 58.4 ML/MIN/1.73 M^2
GLUCOSE SERPL-MCNC: 88 MG/DL (ref 70–110)
HCT VFR BLD AUTO: 36.7 % (ref 40–54)
HGB BLD-MCNC: 12.2 G/DL (ref 14–18)
INR PPP: 1.4
MAGNESIUM SERPL-MCNC: 1.9 MG/DL (ref 1.6–2.6)
MCH RBC QN AUTO: 29.3 PG (ref 27–31)
MCHC RBC AUTO-ENTMCNC: 33.2 G/DL (ref 32–36)
MCV RBC AUTO: 88 FL (ref 82–98)
PLATELET # BLD AUTO: 145 K/UL (ref 150–350)
PMV BLD AUTO: 10.9 FL (ref 9.2–12.9)
POTASSIUM SERPL-SCNC: 4.4 MMOL/L (ref 3.5–5.1)
PROT SERPL-MCNC: 7 G/DL (ref 6–8.4)
PROTHROMBIN TIME: 16.1 SEC (ref 10.6–14.8)
RBC # BLD AUTO: 4.17 M/UL (ref 4.6–6.2)
SODIUM SERPL-SCNC: 133 MMOL/L (ref 136–145)
WBC # BLD AUTO: 5.16 K/UL (ref 3.9–12.7)

## 2020-10-30 PROCEDURE — 85610 PROTHROMBIN TIME: CPT

## 2020-10-30 PROCEDURE — 85027 COMPLETE CBC AUTOMATED: CPT

## 2020-10-30 PROCEDURE — 80053 COMPREHEN METABOLIC PANEL: CPT

## 2020-10-30 PROCEDURE — 83735 ASSAY OF MAGNESIUM: CPT

## 2020-10-30 PROCEDURE — 71046 X-RAY EXAM CHEST 2 VIEWS: CPT | Mod: TC

## 2020-10-30 PROCEDURE — 85730 THROMBOPLASTIN TIME PARTIAL: CPT

## 2020-10-30 PROCEDURE — 36415 COLL VENOUS BLD VENIPUNCTURE: CPT

## 2020-10-30 RX ORDER — AMLODIPINE BESYLATE 5 MG/1
5 TABLET ORAL DAILY
COMMUNITY
End: 2021-09-24

## 2020-10-30 RX ORDER — TRAMADOL HYDROCHLORIDE 50 MG/1
50 TABLET ORAL EVERY 6 HOURS PRN
COMMUNITY
End: 2022-01-31

## 2020-10-30 RX ORDER — EZETIMIBE 10 MG/1
10 TABLET ORAL DAILY
COMMUNITY

## 2020-11-01 ENCOUNTER — LAB VISIT (OUTPATIENT)
Dept: PRIMARY CARE CLINIC | Facility: CLINIC | Age: 75
End: 2020-11-01
Payer: MEDICARE

## 2020-11-01 DIAGNOSIS — I25.118 ATHEROSCLEROTIC HEART DISEASE OF NATIVE CORONARY ARTERY WITH OTHER FORMS OF ANGINA PECTORIS: ICD-10-CM

## 2020-11-01 PROCEDURE — U0003 INFECTIOUS AGENT DETECTION BY NUCLEIC ACID (DNA OR RNA); SEVERE ACUTE RESPIRATORY SYNDROME CORONAVIRUS 2 (SARS-COV-2) (CORONAVIRUS DISEASE [COVID-19]), AMPLIFIED PROBE TECHNIQUE, MAKING USE OF HIGH THROUGHPUT TECHNOLOGIES AS DESCRIBED BY CMS-2020-01-R: HCPCS

## 2020-11-02 LAB — SARS-COV-2 RNA RESP QL NAA+PROBE: NOT DETECTED

## 2020-11-03 ENCOUNTER — HOSPITAL ENCOUNTER (OUTPATIENT)
Facility: HOSPITAL | Age: 75
Discharge: HOME | End: 2020-11-03
Attending: SPECIALIST | Admitting: SPECIALIST
Payer: MEDICARE

## 2020-11-03 VITALS
RESPIRATION RATE: 20 BRPM | HEART RATE: 50 BPM | DIASTOLIC BLOOD PRESSURE: 73 MMHG | SYSTOLIC BLOOD PRESSURE: 148 MMHG | OXYGEN SATURATION: 99 %

## 2020-11-03 DIAGNOSIS — I73.9 PAD (PERIPHERAL ARTERY DISEASE): ICD-10-CM

## 2020-11-03 DIAGNOSIS — I25.118 CORONARY ARTERY DISEASE OF NATIVE ARTERY WITH STABLE ANGINA PECTORIS, UNSPECIFIED WHETHER NATIVE OR TRANSPLANTED HEART: ICD-10-CM

## 2020-11-03 LAB
CATH EF QUANTITATIVE: 55 %
POC ACTIVATED CLOTTING TIME K: 285 SEC (ref 74–137)
SAMPLE: ABNORMAL

## 2020-11-03 PROCEDURE — 99152 MOD SED SAME PHYS/QHP 5/>YRS: CPT | Performed by: SPECIALIST

## 2020-11-03 PROCEDURE — 25500020 PHARM REV CODE 255: Performed by: SPECIALIST

## 2020-11-03 PROCEDURE — C1725 CATH, TRANSLUMIN NON-LASER: HCPCS | Performed by: SPECIALIST

## 2020-11-03 PROCEDURE — 96375 TX/PRO/DX INJ NEW DRUG ADDON: CPT

## 2020-11-03 PROCEDURE — 93459 L HRT ART/GRFT ANGIO: CPT | Mod: XU

## 2020-11-03 PROCEDURE — 25000003 PHARM REV CODE 250: Performed by: PHYSICIAN ASSISTANT

## 2020-11-03 PROCEDURE — 27201423 OPTIME MED/SURG SUP & DEVICES STERILE SUPPLY: Performed by: SPECIALIST

## 2020-11-03 PROCEDURE — C1894 INTRO/SHEATH, NON-LASER: HCPCS | Performed by: SPECIALIST

## 2020-11-03 PROCEDURE — 99153 MOD SED SAME PHYS/QHP EA: CPT | Performed by: SPECIALIST

## 2020-11-03 PROCEDURE — 25000003 PHARM REV CODE 250: Performed by: SPECIALIST

## 2020-11-03 PROCEDURE — C1874 STENT, COATED/COV W/DEL SYS: HCPCS | Performed by: SPECIALIST

## 2020-11-03 PROCEDURE — C1769 GUIDE WIRE: HCPCS | Performed by: SPECIALIST

## 2020-11-03 PROCEDURE — C9600 PERC DRUG-EL COR STENT SING: HCPCS | Mod: LC

## 2020-11-03 PROCEDURE — C1887 CATHETER, GUIDING: HCPCS | Performed by: SPECIALIST

## 2020-11-03 PROCEDURE — 63600175 PHARM REV CODE 636 W HCPCS: Performed by: SPECIALIST

## 2020-11-03 PROCEDURE — 27800903 OPTIME MED/SURG SUP & DEVICES OTHER IMPLANTS: Performed by: SPECIALIST

## 2020-11-03 PROCEDURE — C1760 CLOSURE DEV, VASC: HCPCS | Performed by: SPECIALIST

## 2020-11-03 PROCEDURE — 96374 THER/PROPH/DIAG INJ IV PUSH: CPT

## 2020-11-03 DEVICE — IMPLANTABLE DEVICE: Type: IMPLANTABLE DEVICE | Site: CORONARY | Status: FUNCTIONAL

## 2020-11-03 DEVICE — STENT RONYX35012UX RESOLUTE ONYX 3.50X12
Type: IMPLANTABLE DEVICE | Site: CORONARY | Status: FUNCTIONAL
Brand: RESOLUTE ONYX™

## 2020-11-03 RX ORDER — SODIUM CHLORIDE 450 MG/100ML
INJECTION, SOLUTION INTRAVENOUS CONTINUOUS
Status: DISCONTINUED | OUTPATIENT
Start: 2020-11-03 | End: 2020-11-03 | Stop reason: HOSPADM

## 2020-11-03 RX ORDER — SODIUM CHLORIDE 9 MG/ML
INJECTION, SOLUTION INTRAVENOUS CONTINUOUS
Status: DISCONTINUED | OUTPATIENT
Start: 2020-11-03 | End: 2020-11-03 | Stop reason: HOSPADM

## 2020-11-03 RX ORDER — HYDROMORPHONE HYDROCHLORIDE 1 MG/ML
INJECTION, SOLUTION INTRAMUSCULAR; INTRAVENOUS; SUBCUTANEOUS
Status: DISCONTINUED | OUTPATIENT
Start: 2020-11-03 | End: 2020-11-03 | Stop reason: HOSPADM

## 2020-11-03 RX ORDER — ONDANSETRON 4 MG/1
8 TABLET, ORALLY DISINTEGRATING ORAL EVERY 8 HOURS PRN
Status: DISCONTINUED | OUTPATIENT
Start: 2020-11-03 | End: 2020-11-03 | Stop reason: HOSPADM

## 2020-11-03 RX ORDER — MIDAZOLAM HYDROCHLORIDE 1 MG/ML
INJECTION INTRAMUSCULAR; INTRAVENOUS
Status: DISCONTINUED | OUTPATIENT
Start: 2020-11-03 | End: 2020-11-03 | Stop reason: HOSPADM

## 2020-11-03 RX ORDER — LIDOCAINE HYDROCHLORIDE 10 MG/ML
INJECTION, SOLUTION EPIDURAL; INFILTRATION; INTRACAUDAL; PERINEURAL
Status: DISCONTINUED | OUTPATIENT
Start: 2020-11-03 | End: 2020-11-03 | Stop reason: HOSPADM

## 2020-11-03 RX ORDER — HEPARIN SODIUM 1000 [USP'U]/ML
INJECTION, SOLUTION INTRAVENOUS; SUBCUTANEOUS
Status: DISCONTINUED | OUTPATIENT
Start: 2020-11-03 | End: 2020-11-03 | Stop reason: HOSPADM

## 2020-11-03 RX ORDER — IODIXANOL 320 MG/ML
INJECTION, SOLUTION INTRAVASCULAR
Status: DISCONTINUED | OUTPATIENT
Start: 2020-11-03 | End: 2020-11-03 | Stop reason: HOSPADM

## 2020-11-03 RX ORDER — FENTANYL CITRATE 50 UG/ML
INJECTION, SOLUTION INTRAMUSCULAR; INTRAVENOUS
Status: DISCONTINUED | OUTPATIENT
Start: 2020-11-03 | End: 2020-11-03 | Stop reason: HOSPADM

## 2020-11-03 RX ORDER — DIPHENHYDRAMINE HYDROCHLORIDE 50 MG/ML
50 INJECTION INTRAMUSCULAR; INTRAVENOUS ONCE
Status: COMPLETED | OUTPATIENT
Start: 2020-11-03 | End: 2020-11-03

## 2020-11-03 RX ORDER — ACETAMINOPHEN 325 MG/1
650 TABLET ORAL EVERY 4 HOURS PRN
Status: DISCONTINUED | OUTPATIENT
Start: 2020-11-03 | End: 2020-11-03 | Stop reason: HOSPADM

## 2020-11-03 RX ADMIN — SODIUM CHLORIDE: 0.9 INJECTION, SOLUTION INTRAVENOUS at 06:11

## 2020-11-03 RX ADMIN — DIPHENHYDRAMINE HYDROCHLORIDE 50 MG: 50 INJECTION INTRAMUSCULAR; INTRAVENOUS at 06:11

## 2020-11-03 RX ADMIN — ACETAMINOPHEN 650 MG: 325 TABLET, FILM COATED ORAL at 10:11

## 2020-11-03 RX ADMIN — HYDROCORTISONE SODIUM SUCCINATE 125 MG: 100 INJECTION, POWDER, FOR SOLUTION INTRAMUSCULAR; INTRAVENOUS at 06:11

## 2020-11-03 RX ADMIN — SODIUM CHLORIDE: 0.45 INJECTION, SOLUTION INTRAVENOUS at 11:11

## 2020-11-03 NOTE — DISCHARGE INSTRUCTIONS
Post angiogram discharge care   You have a puncture site in your right groin   You can remove your current dressing in 24 hours and take a shower. Showers only for the next week. Do not sit in a bathtub or pool of water 7 days until the puncture site is healed.    Gently clean the puncture site daily using soap and water while showering, dry thoroughly and cover with a Band-Aid. Make sure the Band-Aid and puncture site stay clean and dry.   Inspect the site daily for tenderness, discharge or signs of infection.    Observe the puncture site for signs of bleeding, obvious oozing, formation on knot under skin, or bruising. If any of these were to occur you should immediately lie down flat and apply firm pressure at the insertion site for 10 minutes. If bleeding or swelling does not stop, call 911! Do NOT try to drive yourself to the hospital.    Drink at least 6-8 glasses of clear liquids durin the next 24 hours to flush out the dye.   You must not be alone for the next 24 hours.    You may experience soreness or tenderness that my last for 1 week.    You may have mild oozing from the puncture site and/or possible bruising that could last up to 2 weeks.   You may also have a small lump form that may last up to 6 weeks.        Activity   Do not drive or operate an automobile or hazardous machinery for 24 hours   Do note engage in sports for 1 week.   Limit lifting over 10 pounds for the nest week, or until puncture site heals.   Limit you activities for the next 48 hours. Avoid excessive bending or squatting.   You may resume normal activity in 2-3 days, let pain be your guide.    Call your Doctor immediatly if you experience any of the following:   Chest pain, palpitations, shortness of breath, excessive dizziness, fainting, nausea or vomiting.   Jefe signs of infection: redness, warmth, swelling, pain, drainage (other than a little blood on bandaide), chills, poorly healing puncture site, fever  greater than 101.0 F   Change in color, coolness, swelling, numbness, or pain to the involved extremity   Significant bleeding from the puncture site.

## 2020-11-03 NOTE — Clinical Note
Catheter is inserted into the ostial  SVG graft. Angiography performed of the right coronary arteries in multiple views. Angiography performed via power injection. Injection was 6 mL contrast at 3 mL/s. Power injection PSI was 300.. SVG to LAD/LCX

## 2020-11-03 NOTE — Clinical Note
Catheter is inserted into the ostium   left main. Angiography performed of the left coronary arteries. Angiography performed via power injection. Injection was 8 mL contrast at 4 mL/s. Power injection PSI was 450.. Unable to engage and suboptimal result.[id number]

## 2020-11-03 NOTE — Clinical Note
220 ml injected throughout the case. 230 mL total wasted during the case. 450 mL total used in the case.

## 2020-11-03 NOTE — DISCHARGE SUMMARY
[facility]  Cardiology  Discharge Summary      Patient Name: Jerome Amaro Jr.  MRN: [id number]  Admission Date: 11/3/2020  Hospital Length of Stay: 0 days  Discharge Date and Time: 11/03/2020 1500   Attending Physician: Rodrigo Willoughby MD  Discharging Provider: Ever Bush PA-C  Primary Care Physician: Rodrigo Willoughby MD    HPI: [facility]    Procedure(s) (LRB):  ANGIOGRAM, CORONARY, INCLUDING BYPASS GRAFT, WITH LEFT HEART CATHETERIZATION (Left)  Percutaneous coronary intervention (N/A)  Stent, Drug Eluting, Single Vessel, Coronary     Indwelling Lines/Drains at time of discharge:  Lines/Drains/Airways     None                 Hospital Course This is a 74 y/o male with a PMHx CAD/PAD who presented to the cath lab on outpatient basis for [facility]. The study demonstrated occluded LAD which was unable to be intervened upon secondary to difficulty engaging the catheter for balloon angioplasty/stenting. Considering the patients CKD, the study was then aborted. We will plan to bring the patient back for repeat angiography for angioplasty of said occlusion. No intra-op complications. F/U Dr. Willoughby one week.     Pending Diagnostic Studies:     None          Final Active Diagnoses:    Diagnosis Date Noted POA    PRINCIPAL PROBLEM:  CAD (coronary artery disease) [I25.10] 01/07/2015 Yes      Problems Resolved During this Admission:       Discharged Condition: good    Follow Up:  Follow-up Information     Rodrigo Willoughby MD In 1 week.    Specialty: Cardiology  Contact information:  [address]  [address]  [place] [address]  [phone]                 Patient Instructions:   No discharge procedures on file.  Medications:  Reconciled Home Medications:      Medication List      CONTINUE taking these medications    amiodarone 200 MG Tab  Commonly known as: PACERONE  Take 200 mg by mouth once daily.     amLODIPine 5 MG tablet  Commonly known as: NORVASC  Take 5 mg by mouth once daily.     citalopram  20 MG tablet  Commonly known as: CELEXA  Take 20 mg by mouth once daily.     clopidogreL 75 mg tablet  Commonly known as: PLAVIX  Take 75 mg by mouth once daily.     ELIQUIS 5 mg Tab  Generic drug: apixaban  Take 5 mg by mouth 2 (two) times daily.     ezetimibe 10 mg tablet  Commonly known as: ZETIA  Take 10 mg by mouth once daily.     furosemide 20 MG tablet  Commonly known as: LASIX  Take 20 mg by mouth daily as needed.     hydrOXYzine pamoate 25 MG Cap  Commonly known as: VISTARIL  Take 25 mg by mouth once daily.     levothyroxine 50 MCG tablet  Commonly known as: SYNTHROID  Take 50 mcg by mouth once daily.     lisinopriL 40 MG tablet  Commonly known as: PRINIVIL,ZESTRIL  Take 40 mg by mouth once daily.     metoprolol succinate 100 MG 24 hr tablet  Commonly known as: TOPROL-XL  Take 1 tablet (100 mg total) by mouth once daily.     nitroGLYCERIN 0.4 MG/DOSE TL SPRY 400 mcg/spray spray  Commonly known as: NITROLINGUAL  Place 1 spray under the tongue every 5 (five) minutes as needed for Chest pain.     phenytoin 100 MG ER capsule  Commonly known as: DILANTIN  Take 100 mg by mouth 4 (four) times daily.     RANEXA 1,000 mg Tb12  Generic drug: ranolazine  Take 1,000 mg by mouth once daily.     traMADoL 50 mg tablet  Commonly known as: ULTRAM  Take 50 mg by mouth every 6 (six) hours as needed for Pain.            Time spent on the discharge of patient: 15 minutes    Ever Bush PA-C  Cardiology  Former[facility]

## 2020-11-03 NOTE — PROGRESS NOTES
Cardiac Rehab     Jerome Flanaganrony Chery   [id number]   11/3/2020         Cardiac Rehab Phase Taught: Phase 1    Teaching Method: Verbal, Written    Handouts: Cardiac Rehab Tear Sheet and Stent Card    Educational Videos: None    Understanding:  History of Previous Information Given, Knowledge indicated by feedback, Learning indicated by feedback and Verbalize understanding    Comments: discussed coronary stent, meds..plavix, cardiac rehab, lifestyle changes. Questions answered. Pt states he is interested in cardiac rehab. Pt/wife voiced understanding. Will follow    Total Time Spent:15mins            Khadijah An RN

## 2020-11-03 NOTE — Clinical Note
Catheter is inserted into the ostium   left main. Angiography performed of the left coronary arteries in multiple views. Angiography performed via power injection. Injection was 8 mL contrast at 4 mL/s. Power injection PSI was 450..

## 2020-11-03 NOTE — Clinical Note
Catheter is repositioned to the left ventricle. Angiography performed of the LV. Angiography performed via power injection. Injection was 12 mL contrast at 12 mL/s. Power injection PSI was 400.. JR4

## 2020-11-03 NOTE — Clinical Note
Catheter is repositioned to the ostium   left main. Angiography performed of the left coronary arteries in multiple views. Angiography performed via power injection. Injection was 8 mL contrast at 4 mL/s. Power injection PSI was 450..

## 2020-11-06 ENCOUNTER — TELEPHONE (OUTPATIENT)
Dept: CARDIAC REHAB | Facility: HOSPITAL | Age: 75
End: 2020-11-06

## 2020-11-06 NOTE — TELEPHONE ENCOUNTER
Jerome Amaro Jr.   [id number]   11/6/2020         Spoke with: no answer    Received Medications?:not applicable    Follow Up Appt?:not applicable    Cardio Pulmonary Rehab Appt?:not applicable    Comments: pt info to gym for follow up    Khadijah An RN

## 2021-05-07 ENCOUNTER — OFFICE VISIT (OUTPATIENT)
Dept: FAMILY MEDICINE | Facility: CLINIC | Age: 76
End: 2021-05-07
Payer: MEDICARE

## 2021-05-07 VITALS
DIASTOLIC BLOOD PRESSURE: 59 MMHG | WEIGHT: 185.88 LBS | OXYGEN SATURATION: 97 % | TEMPERATURE: 100 F | HEIGHT: 66 IN | HEART RATE: 88 BPM | SYSTOLIC BLOOD PRESSURE: 121 MMHG | BODY MASS INDEX: 29.87 KG/M2

## 2021-05-07 DIAGNOSIS — E78.5 DYSLIPIDEMIA: ICD-10-CM

## 2021-05-07 DIAGNOSIS — J02.9 SORE THROAT: ICD-10-CM

## 2021-05-07 DIAGNOSIS — G40.909 SEIZURE DISORDER: ICD-10-CM

## 2021-05-07 DIAGNOSIS — I25.119 CORONARY ARTERY DISEASE INVOLVING NATIVE CORONARY ARTERY OF NATIVE HEART WITH ANGINA PECTORIS: ICD-10-CM

## 2021-05-07 DIAGNOSIS — I10 ESSENTIAL HYPERTENSION: ICD-10-CM

## 2021-05-07 DIAGNOSIS — Z95.0 PACEMAKER: ICD-10-CM

## 2021-05-07 DIAGNOSIS — I48.0 PAROXYSMAL ATRIAL FIBRILLATION: ICD-10-CM

## 2021-05-07 DIAGNOSIS — J06.9 URI WITH COUGH AND CONGESTION: ICD-10-CM

## 2021-05-07 DIAGNOSIS — Z76.89 ENCOUNTER TO ESTABLISH CARE: Primary | ICD-10-CM

## 2021-05-07 PROCEDURE — 99204 PR OFFICE/OUTPT VISIT, NEW, LEVL IV, 45-59 MIN: ICD-10-PCS | Mod: S$GLB,,, | Performed by: NURSE PRACTITIONER

## 2021-05-07 PROCEDURE — 99204 OFFICE O/P NEW MOD 45 MIN: CPT | Mod: S$GLB,,, | Performed by: NURSE PRACTITIONER

## 2021-05-07 RX ORDER — FLUTICASONE PROPIONATE 50 MCG
2 SPRAY, SUSPENSION (ML) NASAL DAILY
Qty: 15.8 ML | Refills: 0 | Status: SHIPPED | OUTPATIENT
Start: 2021-05-07 | End: 2021-06-09

## 2021-05-07 RX ORDER — ISOSORBIDE DINITRATE 20 MG/1
40 TABLET ORAL DAILY PRN
Status: ON HOLD | COMMUNITY
End: 2023-12-06 | Stop reason: HOSPADM

## 2021-05-07 RX ORDER — DOXYCYCLINE HYCLATE 100 MG
100 TABLET ORAL 2 TIMES DAILY
Qty: 14 TABLET | Refills: 0 | Status: SHIPPED | OUTPATIENT
Start: 2021-05-07 | End: 2021-05-14

## 2021-05-07 RX ORDER — BENZONATATE 100 MG/1
100 CAPSULE ORAL 3 TIMES DAILY PRN
Qty: 21 CAPSULE | Refills: 0 | Status: SHIPPED | OUTPATIENT
Start: 2021-05-07 | End: 2021-05-10 | Stop reason: SDUPTHER

## 2021-05-07 RX ORDER — METOPROLOL SUCCINATE 50 MG/1
100 TABLET, EXTENDED RELEASE ORAL 2 TIMES DAILY
Status: ON HOLD | COMMUNITY
Start: 2021-02-20 | End: 2024-03-04 | Stop reason: HOSPADM

## 2021-05-10 ENCOUNTER — OFFICE VISIT (OUTPATIENT)
Dept: FAMILY MEDICINE | Facility: CLINIC | Age: 76
End: 2021-05-10
Payer: MEDICARE

## 2021-05-10 ENCOUNTER — HOSPITAL ENCOUNTER (OUTPATIENT)
Dept: RADIOLOGY | Facility: HOSPITAL | Age: 76
Discharge: HOME | End: 2021-05-10
Attending: NURSE PRACTITIONER
Payer: MEDICARE

## 2021-05-10 VITALS
OXYGEN SATURATION: 97 % | HEART RATE: 80 BPM | WEIGHT: 183 LBS | BODY MASS INDEX: 29.41 KG/M2 | SYSTOLIC BLOOD PRESSURE: 112 MMHG | DIASTOLIC BLOOD PRESSURE: 66 MMHG | TEMPERATURE: 98 F | HEIGHT: 66 IN

## 2021-05-10 DIAGNOSIS — R09.81 NASAL CONGESTION WITH RHINORRHEA: ICD-10-CM

## 2021-05-10 DIAGNOSIS — R05.9 COUGH: Primary | ICD-10-CM

## 2021-05-10 DIAGNOSIS — J34.89 NASAL CONGESTION WITH RHINORRHEA: ICD-10-CM

## 2021-05-10 DIAGNOSIS — R05.9 COUGH: ICD-10-CM

## 2021-05-10 PROCEDURE — 71046 X-RAY EXAM CHEST 2 VIEWS: CPT | Mod: TC,PO

## 2021-05-10 PROCEDURE — 99214 OFFICE O/P EST MOD 30 MIN: CPT | Mod: S$GLB,,, | Performed by: NURSE PRACTITIONER

## 2021-05-10 PROCEDURE — 99214 PR OFFICE/OUTPT VISIT, EST, LEVL IV, 30-39 MIN: ICD-10-PCS | Mod: S$GLB,,, | Performed by: NURSE PRACTITIONER

## 2021-05-10 RX ORDER — BENZONATATE 100 MG/1
100 CAPSULE ORAL 3 TIMES DAILY PRN
Qty: 21 CAPSULE | Refills: 0 | Status: SHIPPED | OUTPATIENT
Start: 2021-05-10 | End: 2021-05-20

## 2021-05-10 RX ORDER — AZELASTINE 1 MG/ML
1 SPRAY, METERED NASAL 2 TIMES DAILY
Qty: 30 ML | Refills: 0 | Status: SHIPPED | OUTPATIENT
Start: 2021-05-10 | End: 2021-06-08

## 2021-09-24 ENCOUNTER — HOSPITAL ENCOUNTER (OUTPATIENT)
Dept: RADIOLOGY | Facility: HOSPITAL | Age: 76
Discharge: HOME | End: 2021-09-24
Attending: SPECIALIST
Payer: MEDICARE

## 2021-09-24 ENCOUNTER — HOSPITAL ENCOUNTER (OUTPATIENT)
Dept: PREADMISSION TESTING | Facility: HOSPITAL | Age: 76
Discharge: HOME | End: 2021-09-24
Attending: SPECIALIST
Payer: MEDICARE

## 2021-09-24 VITALS — HEIGHT: 66 IN | BODY MASS INDEX: 31.02 KG/M2 | WEIGHT: 193 LBS

## 2021-09-24 DIAGNOSIS — I25.118 CORONARY ARTERY DISEASE OF NATIVE ARTERY OF NATIVE HEART WITH STABLE ANGINA PECTORIS: Primary | ICD-10-CM

## 2021-09-24 DIAGNOSIS — I25.118 CORONARY ARTERY DISEASE OF NATIVE ARTERY OF NATIVE HEART WITH STABLE ANGINA PECTORIS: ICD-10-CM

## 2021-09-24 DIAGNOSIS — Z01.818 PRE-PROCEDURAL EXAMINATION: Primary | ICD-10-CM

## 2021-09-24 LAB
ALBUMIN SERPL BCP-MCNC: 4.2 G/DL (ref 3.5–5.2)
ALP SERPL-CCNC: 63 U/L (ref 55–135)
ALT SERPL W/O P-5'-P-CCNC: 18 U/L (ref 10–44)
ANION GAP SERPL CALC-SCNC: 15 MMOL/L (ref 8–16)
APTT PPP: 33.2 SEC (ref 25.6–35.8)
AST SERPL-CCNC: 20 U/L (ref 10–40)
BASOPHILS # BLD AUTO: 0.04 K/UL (ref 0–0.2)
BASOPHILS NFR BLD: 0.6 % (ref 0–1.9)
BILIRUB SERPL-MCNC: 1 MG/DL (ref 0.1–1)
BUN SERPL-MCNC: 17 MG/DL (ref 8–23)
CALCIUM SERPL-MCNC: 9.3 MG/DL (ref 8.7–10.5)
CHLORIDE SERPL-SCNC: 104 MMOL/L (ref 95–110)
CO2 SERPL-SCNC: 23 MMOL/L (ref 23–29)
CREAT SERPL-MCNC: 1.2 MG/DL (ref 0.5–1.4)
DIFFERENTIAL METHOD: ABNORMAL
EOSINOPHIL # BLD AUTO: 0.2 K/UL (ref 0–0.5)
EOSINOPHIL NFR BLD: 2.8 % (ref 0–8)
ERYTHROCYTE [DISTWIDTH] IN BLOOD BY AUTOMATED COUNT: 13.9 % (ref 11.5–14.5)
EST. GFR  (AFRICAN AMERICAN): >60 ML/MIN/1.73 M^2
EST. GFR  (NON AFRICAN AMERICAN): 58 ML/MIN/1.73 M^2
GLUCOSE SERPL-MCNC: 99 MG/DL (ref 70–110)
HCT VFR BLD AUTO: 34.4 % (ref 40–54)
HGB BLD-MCNC: 11.6 G/DL (ref 14–18)
IMM GRANULOCYTES # BLD AUTO: 0.02 K/UL (ref 0–0.04)
IMM GRANULOCYTES NFR BLD AUTO: 0.3 % (ref 0–0.5)
INR PPP: 1.3
LYMPHOCYTES # BLD AUTO: 1 K/UL (ref 1–4.8)
LYMPHOCYTES NFR BLD: 15.8 % (ref 18–48)
MAGNESIUM SERPL-MCNC: 1.8 MG/DL (ref 1.6–2.6)
MCH RBC QN AUTO: 30.2 PG (ref 27–31)
MCHC RBC AUTO-ENTMCNC: 33.7 G/DL (ref 32–36)
MCV RBC AUTO: 90 FL (ref 82–98)
MONOCYTES # BLD AUTO: 0.7 K/UL (ref 0.3–1)
MONOCYTES NFR BLD: 11.4 % (ref 4–15)
NEUTROPHILS # BLD AUTO: 4.4 K/UL (ref 1.8–7.7)
NEUTROPHILS NFR BLD: 69.1 % (ref 38–73)
NRBC BLD-RTO: 0 /100 WBC
PLATELET # BLD AUTO: 156 K/UL (ref 150–450)
PMV BLD AUTO: 10.7 FL (ref 9.2–12.9)
POTASSIUM SERPL-SCNC: 4.8 MMOL/L (ref 3.5–5.1)
PROT SERPL-MCNC: 7.2 G/DL (ref 6–8.4)
PROTHROMBIN TIME: 15.2 SEC (ref 11.8–14.3)
RBC # BLD AUTO: 3.84 M/UL (ref 4.6–6.2)
SODIUM SERPL-SCNC: 142 MMOL/L (ref 136–145)
WBC # BLD AUTO: 6.33 K/UL (ref 3.9–12.7)

## 2021-09-24 PROCEDURE — 71046 X-RAY EXAM CHEST 2 VIEWS: CPT | Mod: TC

## 2021-09-24 PROCEDURE — 83735 ASSAY OF MAGNESIUM: CPT | Performed by: SPECIALIST

## 2021-09-24 PROCEDURE — 85730 THROMBOPLASTIN TIME PARTIAL: CPT | Performed by: SPECIALIST

## 2021-09-24 PROCEDURE — 80053 COMPREHEN METABOLIC PANEL: CPT | Performed by: SPECIALIST

## 2021-09-24 PROCEDURE — 36415 COLL VENOUS BLD VENIPUNCTURE: CPT | Performed by: SPECIALIST

## 2021-09-24 PROCEDURE — 85610 PROTHROMBIN TIME: CPT | Performed by: SPECIALIST

## 2021-09-24 PROCEDURE — 85025 COMPLETE CBC W/AUTO DIFF WBC: CPT | Performed by: SPECIALIST

## 2021-09-28 ENCOUNTER — HOSPITAL ENCOUNTER (OUTPATIENT)
Facility: HOSPITAL | Age: 76
Discharge: HOME | End: 2021-09-28
Attending: SPECIALIST | Admitting: SPECIALIST
Payer: MEDICARE

## 2021-09-28 DIAGNOSIS — I25.118 CORONARY ARTERY DISEASE OF NATIVE ARTERY OF NATIVE HEART WITH STABLE ANGINA PECTORIS: ICD-10-CM

## 2021-09-28 DIAGNOSIS — I21.4 NSTEMI (NON-ST ELEVATED MYOCARDIAL INFARCTION): ICD-10-CM

## 2021-09-28 DIAGNOSIS — Z01.810 PREOP CARDIOVASCULAR EXAM: ICD-10-CM

## 2021-09-28 DIAGNOSIS — I49.49 PREMATURE BEATS: Primary | ICD-10-CM

## 2021-09-28 LAB
CATH EF QUANTITATIVE: 55 %
SARS-COV-2 RDRP RESP QL NAA+PROBE: NEGATIVE

## 2021-09-28 PROCEDURE — 27800903 OPTIME MED/SURG SUP & DEVICES OTHER IMPLANTS: Performed by: SPECIALIST

## 2021-09-28 PROCEDURE — 25500020 PHARM REV CODE 255: Performed by: SPECIALIST

## 2021-09-28 PROCEDURE — 25000003 PHARM REV CODE 250: Performed by: PHYSICIAN ASSISTANT

## 2021-09-28 PROCEDURE — 93459 L HRT ART/GRFT ANGIO: CPT | Mod: XU

## 2021-09-28 PROCEDURE — 99153 MOD SED SAME PHYS/QHP EA: CPT | Performed by: SPECIALIST

## 2021-09-28 PROCEDURE — C9600 PERC DRUG-EL COR STENT SING: HCPCS | Mod: LC

## 2021-09-28 PROCEDURE — C1769 GUIDE WIRE: HCPCS | Performed by: SPECIALIST

## 2021-09-28 PROCEDURE — U0002 COVID-19 LAB TEST NON-CDC: HCPCS | Performed by: SPECIALIST

## 2021-09-28 PROCEDURE — C1887 CATHETER, GUIDING: HCPCS | Performed by: SPECIALIST

## 2021-09-28 PROCEDURE — C1894 INTRO/SHEATH, NON-LASER: HCPCS | Performed by: SPECIALIST

## 2021-09-28 PROCEDURE — 25000003 PHARM REV CODE 250: Performed by: SPECIALIST

## 2021-09-28 PROCEDURE — 63600175 PHARM REV CODE 636 W HCPCS: Performed by: SPECIALIST

## 2021-09-28 PROCEDURE — C1760 CLOSURE DEV, VASC: HCPCS | Performed by: SPECIALIST

## 2021-09-28 PROCEDURE — C1725 CATH, TRANSLUMIN NON-LASER: HCPCS | Performed by: SPECIALIST

## 2021-09-28 PROCEDURE — 99152 MOD SED SAME PHYS/QHP 5/>YRS: CPT | Performed by: SPECIALIST

## 2021-09-28 PROCEDURE — C1874 STENT, COATED/COV W/DEL SYS: HCPCS | Performed by: SPECIALIST

## 2021-09-28 DEVICE — ANGIO-SEAL VIP VASCULAR CLOSURE DEVICE
Type: IMPLANTABLE DEVICE | Site: CHEST | Status: FUNCTIONAL
Brand: ANGIO-SEAL

## 2021-09-28 DEVICE — STENT RONYX30022UX RESOLUTE ONYX 3.00X22
Type: IMPLANTABLE DEVICE | Site: CHEST | Status: FUNCTIONAL
Brand: RESOLUTE ONYX™

## 2021-09-28 RX ORDER — LORAZEPAM 2 MG/ML
0.5 INJECTION INTRAMUSCULAR ONCE
Status: COMPLETED | OUTPATIENT
Start: 2021-09-28 | End: 2021-09-28

## 2021-09-28 RX ORDER — LANOLIN ALCOHOL/MO/W.PET/CERES
800 CREAM (GRAM) TOPICAL
Status: DISCONTINUED | OUTPATIENT
Start: 2021-09-28 | End: 2021-09-28 | Stop reason: HOSPADM

## 2021-09-28 RX ORDER — SODIUM CHLORIDE 9 MG/ML
INJECTION, SOLUTION INTRAVENOUS ONCE
Status: COMPLETED | OUTPATIENT
Start: 2021-09-28 | End: 2021-09-28

## 2021-09-28 RX ORDER — IODIXANOL 320 MG/ML
INJECTION, SOLUTION INTRAVASCULAR
Status: DISCONTINUED | OUTPATIENT
Start: 2021-09-28 | End: 2021-09-28 | Stop reason: HOSPADM

## 2021-09-28 RX ORDER — SODIUM CHLORIDE 450 MG/100ML
INJECTION, SOLUTION INTRAVENOUS CONTINUOUS
Status: DISCONTINUED | OUTPATIENT
Start: 2021-09-28 | End: 2021-09-28 | Stop reason: HOSPADM

## 2021-09-28 RX ORDER — METOPROLOL TARTRATE 1 MG/ML
INJECTION, SOLUTION INTRAVENOUS
Status: DISCONTINUED | OUTPATIENT
Start: 2021-09-28 | End: 2021-09-28 | Stop reason: HOSPADM

## 2021-09-28 RX ORDER — FENTANYL CITRATE 50 UG/ML
INJECTION, SOLUTION INTRAMUSCULAR; INTRAVENOUS
Status: DISCONTINUED | OUTPATIENT
Start: 2021-09-28 | End: 2021-09-28 | Stop reason: HOSPADM

## 2021-09-28 RX ORDER — MIDAZOLAM HYDROCHLORIDE 1 MG/ML
INJECTION INTRAMUSCULAR; INTRAVENOUS
Status: DISCONTINUED | OUTPATIENT
Start: 2021-09-28 | End: 2021-09-28 | Stop reason: HOSPADM

## 2021-09-28 RX ORDER — LIDOCAINE HYDROCHLORIDE 10 MG/ML
INJECTION, SOLUTION EPIDURAL; INFILTRATION; INTRACAUDAL; PERINEURAL
Status: DISCONTINUED | OUTPATIENT
Start: 2021-09-28 | End: 2021-09-28 | Stop reason: HOSPADM

## 2021-09-28 RX ORDER — CLOPIDOGREL BISULFATE 75 MG/1
TABLET ORAL
Status: DISCONTINUED | OUTPATIENT
Start: 2021-09-28 | End: 2021-09-28 | Stop reason: HOSPADM

## 2021-09-28 RX ORDER — HEPARIN SODIUM 10000 [USP'U]/ML
INJECTION, SOLUTION INTRAVENOUS; SUBCUTANEOUS
Status: DISCONTINUED | OUTPATIENT
Start: 2021-09-28 | End: 2021-09-28 | Stop reason: HOSPADM

## 2021-09-28 RX ADMIN — SODIUM CHLORIDE: 0.45 INJECTION, SOLUTION INTRAVENOUS at 12:09

## 2021-09-28 RX ADMIN — MAGNESIUM OXIDE 800 MG: 400 TABLET ORAL at 08:09

## 2021-09-28 RX ADMIN — LORAZEPAM 0.5 MG: 2 INJECTION INTRAMUSCULAR; INTRAVENOUS at 08:09

## 2021-09-28 RX ADMIN — SODIUM CHLORIDE: 0.9 INJECTION, SOLUTION INTRAVENOUS at 08:09

## 2021-09-29 VITALS
BODY MASS INDEX: 31.02 KG/M2 | WEIGHT: 193 LBS | HEART RATE: 68 BPM | RESPIRATION RATE: 16 BRPM | OXYGEN SATURATION: 100 % | HEIGHT: 66 IN | TEMPERATURE: 98 F | SYSTOLIC BLOOD PRESSURE: 155 MMHG | DIASTOLIC BLOOD PRESSURE: 77 MMHG

## 2021-11-15 ENCOUNTER — HOSPITAL ENCOUNTER (OUTPATIENT)
Dept: PREADMISSION TESTING | Facility: HOSPITAL | Age: 76
Discharge: HOME | End: 2021-11-15
Attending: SPECIALIST
Payer: MEDICARE

## 2021-11-15 VITALS — BODY MASS INDEX: 30.86 KG/M2 | HEIGHT: 66 IN | WEIGHT: 192 LBS

## 2021-11-15 DIAGNOSIS — Z01.812 PRE-PROCEDURE LAB EXAM: Primary | ICD-10-CM

## 2021-11-15 LAB
ALBUMIN SERPL BCP-MCNC: 4.3 G/DL (ref 3.5–5.2)
ALP SERPL-CCNC: 69 U/L (ref 55–135)
ALT SERPL W/O P-5'-P-CCNC: 15 U/L (ref 10–44)
ANION GAP SERPL CALC-SCNC: 9 MMOL/L (ref 8–16)
APTT PPP: 32.8 SEC (ref 23.3–35.1)
AST SERPL-CCNC: 19 U/L (ref 10–40)
BASOPHILS # BLD AUTO: 0.05 K/UL (ref 0–0.2)
BASOPHILS NFR BLD: 0.8 % (ref 0–1.9)
BILIRUB SERPL-MCNC: 1.2 MG/DL (ref 0.1–1)
BUN SERPL-MCNC: 13 MG/DL (ref 8–23)
CALCIUM SERPL-MCNC: 9.1 MG/DL (ref 8.7–10.5)
CHLORIDE SERPL-SCNC: 101 MMOL/L (ref 95–110)
CO2 SERPL-SCNC: 26 MMOL/L (ref 23–29)
CREAT SERPL-MCNC: 1.1 MG/DL (ref 0.5–1.4)
DIFFERENTIAL METHOD: ABNORMAL
EOSINOPHIL # BLD AUTO: 0.1 K/UL (ref 0–0.5)
EOSINOPHIL NFR BLD: 1.8 % (ref 0–8)
ERYTHROCYTE [DISTWIDTH] IN BLOOD BY AUTOMATED COUNT: 13.2 % (ref 11.5–14.5)
EST. GFR  (AFRICAN AMERICAN): >60 ML/MIN/1.73 M^2
EST. GFR  (NON AFRICAN AMERICAN): >60 ML/MIN/1.73 M^2
GLUCOSE SERPL-MCNC: 110 MG/DL (ref 70–110)
HCT VFR BLD AUTO: 39.4 % (ref 40–54)
HGB BLD-MCNC: 13.4 G/DL (ref 14–18)
IMM GRANULOCYTES # BLD AUTO: 0.02 K/UL (ref 0–0.04)
IMM GRANULOCYTES NFR BLD AUTO: 0.3 % (ref 0–0.5)
INR PPP: 1.1
LYMPHOCYTES # BLD AUTO: 1 K/UL (ref 1–4.8)
LYMPHOCYTES NFR BLD: 15.8 % (ref 18–48)
MAGNESIUM SERPL-MCNC: 1.8 MG/DL (ref 1.6–2.6)
MCH RBC QN AUTO: 30.4 PG (ref 27–31)
MCHC RBC AUTO-ENTMCNC: 34 G/DL (ref 32–36)
MCV RBC AUTO: 89 FL (ref 82–98)
MONOCYTES # BLD AUTO: 0.8 K/UL (ref 0.3–1)
MONOCYTES NFR BLD: 12.2 % (ref 4–15)
NEUTROPHILS # BLD AUTO: 4.5 K/UL (ref 1.8–7.7)
NEUTROPHILS NFR BLD: 69.1 % (ref 38–73)
NRBC BLD-RTO: 0 /100 WBC
PLATELET # BLD AUTO: 160 K/UL (ref 150–450)
PMV BLD AUTO: 10.6 FL (ref 9.2–12.9)
POTASSIUM SERPL-SCNC: 4.2 MMOL/L (ref 3.5–5.1)
PROT SERPL-MCNC: 7.4 G/DL (ref 6–8.4)
PROTHROMBIN TIME: 13.8 SEC (ref 11.4–13.7)
RBC # BLD AUTO: 4.41 M/UL (ref 4.6–6.2)
SODIUM SERPL-SCNC: 136 MMOL/L (ref 136–145)
WBC # BLD AUTO: 6.5 K/UL (ref 3.9–12.7)

## 2021-11-15 PROCEDURE — 93010 EKG 12-LEAD: ICD-10-PCS | Mod: ,,, | Performed by: INTERNAL MEDICINE

## 2021-11-15 PROCEDURE — 93005 ELECTROCARDIOGRAM TRACING: CPT | Performed by: INTERNAL MEDICINE

## 2021-11-15 PROCEDURE — 85025 COMPLETE CBC W/AUTO DIFF WBC: CPT | Performed by: SPECIALIST

## 2021-11-15 PROCEDURE — 83735 ASSAY OF MAGNESIUM: CPT | Performed by: SPECIALIST

## 2021-11-15 PROCEDURE — 85730 THROMBOPLASTIN TIME PARTIAL: CPT | Performed by: SPECIALIST

## 2021-11-15 PROCEDURE — 93010 ELECTROCARDIOGRAM REPORT: CPT | Mod: ,,, | Performed by: INTERNAL MEDICINE

## 2021-11-15 PROCEDURE — 85610 PROTHROMBIN TIME: CPT | Performed by: SPECIALIST

## 2021-11-15 PROCEDURE — 80053 COMPREHEN METABOLIC PANEL: CPT | Performed by: SPECIALIST

## 2021-11-15 PROCEDURE — 36415 COLL VENOUS BLD VENIPUNCTURE: CPT | Performed by: SPECIALIST

## 2021-11-15 RX ORDER — DICLOFENAC SODIUM 20 MG/G
1 SOLUTION TOPICAL DAILY
COMMUNITY
End: 2024-01-24

## 2021-11-16 ENCOUNTER — HOSPITAL ENCOUNTER (OUTPATIENT)
Facility: HOSPITAL | Age: 76
Discharge: HOME | End: 2021-11-16
Attending: SPECIALIST | Admitting: SPECIALIST
Payer: MEDICARE

## 2021-11-16 VITALS
TEMPERATURE: 98 F | WEIGHT: 192 LBS | DIASTOLIC BLOOD PRESSURE: 88 MMHG | SYSTOLIC BLOOD PRESSURE: 148 MMHG | HEART RATE: 61 BPM | HEIGHT: 66 IN | RESPIRATION RATE: 12 BRPM | BODY MASS INDEX: 30.86 KG/M2 | OXYGEN SATURATION: 99 %

## 2021-11-16 DIAGNOSIS — I70.219 ATHEROSCLEROTIC PVD WITH INTERMITTENT CLAUDICATION: ICD-10-CM

## 2021-11-16 LAB — SARS-COV-2 RDRP RESP QL NAA+PROBE: NEGATIVE

## 2021-11-16 PROCEDURE — C1884 EMBOLIZATION PROTECT SYST: HCPCS | Performed by: SPECIALIST

## 2021-11-16 PROCEDURE — 37225 HC FEM/POPL REVAS W/ATHER: CPT | Mod: LT

## 2021-11-16 PROCEDURE — 99152 MOD SED SAME PHYS/QHP 5/>YRS: CPT | Performed by: SPECIALIST

## 2021-11-16 PROCEDURE — 63600175 PHARM REV CODE 636 W HCPCS: Performed by: SPECIALIST

## 2021-11-16 PROCEDURE — 25000003 PHARM REV CODE 250: Performed by: PHYSICIAN ASSISTANT

## 2021-11-16 PROCEDURE — 37228 HC TIB/PER REVASC W/TLA: CPT | Mod: LT

## 2021-11-16 PROCEDURE — C1894 INTRO/SHEATH, NON-LASER: HCPCS | Performed by: SPECIALIST

## 2021-11-16 PROCEDURE — C1760 CLOSURE DEV, VASC: HCPCS | Performed by: SPECIALIST

## 2021-11-16 PROCEDURE — C1769 GUIDE WIRE: HCPCS | Performed by: SPECIALIST

## 2021-11-16 PROCEDURE — 75630 X-RAY AORTA LEG ARTERIES: CPT | Mod: XU

## 2021-11-16 PROCEDURE — 25000003 PHARM REV CODE 250: Performed by: SPECIALIST

## 2021-11-16 PROCEDURE — C1725 CATH, TRANSLUMIN NON-LASER: HCPCS | Performed by: SPECIALIST

## 2021-11-16 PROCEDURE — 27201423 OPTIME MED/SURG SUP & DEVICES STERILE SUPPLY: Performed by: SPECIALIST

## 2021-11-16 PROCEDURE — 99153 MOD SED SAME PHYS/QHP EA: CPT | Performed by: SPECIALIST

## 2021-11-16 PROCEDURE — C1887 CATHETER, GUIDING: HCPCS | Performed by: SPECIALIST

## 2021-11-16 PROCEDURE — U0002 COVID-19 LAB TEST NON-CDC: HCPCS | Performed by: SPECIALIST

## 2021-11-16 PROCEDURE — C1714 CATH, TRANS ATHERECTOMY, DIR: HCPCS | Performed by: SPECIALIST

## 2021-11-16 PROCEDURE — 25500020 PHARM REV CODE 255: Performed by: SPECIALIST

## 2021-11-16 DEVICE — DEVICE CLOSURE  ANGIO-SEAL 6FR 610130: Type: IMPLANTABLE DEVICE | Site: GROIN | Status: FUNCTIONAL

## 2021-11-16 RX ORDER — ACETAMINOPHEN 325 MG/1
650 TABLET ORAL EVERY 6 HOURS PRN
Status: DISCONTINUED | OUTPATIENT
Start: 2021-11-16 | End: 2021-11-16 | Stop reason: HOSPADM

## 2021-11-16 RX ORDER — LIDOCAINE HYDROCHLORIDE 10 MG/ML
INJECTION, SOLUTION EPIDURAL; INFILTRATION; INTRACAUDAL; PERINEURAL
Status: DISCONTINUED | OUTPATIENT
Start: 2021-11-16 | End: 2021-11-16 | Stop reason: HOSPADM

## 2021-11-16 RX ORDER — FENTANYL CITRATE 50 UG/ML
INJECTION, SOLUTION INTRAMUSCULAR; INTRAVENOUS
Status: DISCONTINUED | OUTPATIENT
Start: 2021-11-16 | End: 2021-11-16 | Stop reason: HOSPADM

## 2021-11-16 RX ORDER — IODIXANOL 320 MG/ML
INJECTION, SOLUTION INTRAVASCULAR
Status: DISCONTINUED | OUTPATIENT
Start: 2021-11-16 | End: 2021-11-16 | Stop reason: HOSPADM

## 2021-11-16 RX ORDER — SODIUM CHLORIDE 450 MG/100ML
INJECTION, SOLUTION INTRAVENOUS CONTINUOUS
Status: DISCONTINUED | OUTPATIENT
Start: 2021-11-16 | End: 2021-11-16 | Stop reason: HOSPADM

## 2021-11-16 RX ORDER — MIDAZOLAM HYDROCHLORIDE 1 MG/ML
INJECTION INTRAMUSCULAR; INTRAVENOUS
Status: DISCONTINUED | OUTPATIENT
Start: 2021-11-16 | End: 2021-11-16 | Stop reason: HOSPADM

## 2021-11-16 RX ORDER — SODIUM CHLORIDE 0.9 % (FLUSH) 0.9 %
10 SYRINGE (ML) INJECTION
Status: ACTIVE | OUTPATIENT
Start: 2021-11-16

## 2021-11-16 RX ORDER — SODIUM CHLORIDE 9 MG/ML
INJECTION, SOLUTION INTRAVENOUS ONCE
Status: COMPLETED | OUTPATIENT
Start: 2021-11-16 | End: 2021-11-16

## 2021-11-16 RX ORDER — NITROGLYCERIN 5 MG/ML
INJECTION, SOLUTION INTRAVENOUS
Status: DISCONTINUED | OUTPATIENT
Start: 2021-11-16 | End: 2021-11-16 | Stop reason: HOSPADM

## 2021-11-16 RX ORDER — LANOLIN ALCOHOL/MO/W.PET/CERES
800 CREAM (GRAM) TOPICAL
Status: ACTIVE | OUTPATIENT
Start: 2021-11-16

## 2021-11-16 RX ORDER — HEPARIN SODIUM 1000 [USP'U]/ML
INJECTION, SOLUTION INTRAVENOUS; SUBCUTANEOUS
Status: DISCONTINUED | OUTPATIENT
Start: 2021-11-16 | End: 2021-11-16 | Stop reason: HOSPADM

## 2021-11-16 RX ADMIN — MAGNESIUM OXIDE 800 MG: 400 TABLET ORAL at 07:11

## 2021-11-16 RX ADMIN — ACETAMINOPHEN 650 MG: 325 TABLET, FILM COATED ORAL at 03:11

## 2021-11-16 RX ADMIN — SODIUM CHLORIDE: 0.9 INJECTION, SOLUTION INTRAVENOUS at 07:11

## 2021-11-16 RX ADMIN — SODIUM CHLORIDE: 0.45 INJECTION, SOLUTION INTRAVENOUS at 12:11

## 2022-01-18 DIAGNOSIS — U07.1 COVID: Primary | ICD-10-CM

## 2022-01-18 RX ORDER — AZITHROMYCIN 250 MG/1
TABLET, FILM COATED ORAL
Qty: 6 TABLET | Refills: 0 | Status: SHIPPED | OUTPATIENT
Start: 2022-01-18 | End: 2022-01-23

## 2022-01-18 RX ORDER — PREDNISONE 10 MG/1
TABLET ORAL
Qty: 13 TABLET | Refills: 0 | Status: SHIPPED | OUTPATIENT
Start: 2022-01-18 | End: 2022-01-23

## 2022-01-18 RX ORDER — PROMETHAZINE HYDROCHLORIDE AND DEXTROMETHORPHAN HYDROBROMIDE 6.25; 15 MG/5ML; MG/5ML
5 SYRUP ORAL EVERY 6 HOURS PRN
Qty: 100 ML | Refills: 0 | Status: SHIPPED | OUTPATIENT
Start: 2022-01-18 | End: 2022-01-23

## 2022-01-31 ENCOUNTER — OFFICE VISIT (OUTPATIENT)
Dept: FAMILY MEDICINE | Facility: CLINIC | Age: 77
End: 2022-01-31
Payer: COMMERCIAL

## 2022-01-31 VITALS
HEART RATE: 86 BPM | DIASTOLIC BLOOD PRESSURE: 82 MMHG | SYSTOLIC BLOOD PRESSURE: 126 MMHG | HEIGHT: 66 IN | WEIGHT: 181 LBS | OXYGEN SATURATION: 98 % | BODY MASS INDEX: 29.09 KG/M2 | TEMPERATURE: 99 F

## 2022-01-31 DIAGNOSIS — U07.1 COVID-19: Primary | ICD-10-CM

## 2022-01-31 DIAGNOSIS — R06.89 DECREASED BREATH SOUNDS: ICD-10-CM

## 2022-01-31 DIAGNOSIS — U07.1 COVID-19 VIRUS DETECTED: ICD-10-CM

## 2022-01-31 DIAGNOSIS — R09.89 SYMPTOMS OF UPPER RESPIRATORY INFECTION (URI): ICD-10-CM

## 2022-01-31 LAB
CTP QC/QA: YES
SARS-COV-2 RDRP RESP QL NAA+PROBE: POSITIVE

## 2022-01-31 PROCEDURE — 1159F PR MEDICATION LIST DOCUMENTED IN MEDICAL RECORD: ICD-10-PCS | Mod: S$GLB,,, | Performed by: NURSE PRACTITIONER

## 2022-01-31 PROCEDURE — 1160F PR REVIEW ALL MEDS BY PRESCRIBER/CLIN PHARMACIST DOCUMENTED: ICD-10-PCS | Mod: S$GLB,,, | Performed by: NURSE PRACTITIONER

## 2022-01-31 PROCEDURE — 1101F PT FALLS ASSESS-DOCD LE1/YR: CPT | Mod: S$GLB,,, | Performed by: NURSE PRACTITIONER

## 2022-01-31 PROCEDURE — 3079F DIAST BP 80-89 MM HG: CPT | Mod: S$GLB,,, | Performed by: NURSE PRACTITIONER

## 2022-01-31 PROCEDURE — 3074F PR MOST RECENT SYSTOLIC BLOOD PRESSURE < 130 MM HG: ICD-10-PCS | Mod: S$GLB,,, | Performed by: NURSE PRACTITIONER

## 2022-01-31 PROCEDURE — U0002 COVID-19 LAB TEST NON-CDC: HCPCS | Mod: QW,S$GLB,, | Performed by: NURSE PRACTITIONER

## 2022-01-31 PROCEDURE — 1160F RVW MEDS BY RX/DR IN RCRD: CPT | Mod: S$GLB,,, | Performed by: NURSE PRACTITIONER

## 2022-01-31 PROCEDURE — 3288F FALL RISK ASSESSMENT DOCD: CPT | Mod: S$GLB,,, | Performed by: NURSE PRACTITIONER

## 2022-01-31 PROCEDURE — 1101F PR PT FALLS ASSESS DOC 0-1 FALLS W/OUT INJ PAST YR: ICD-10-PCS | Mod: S$GLB,,, | Performed by: NURSE PRACTITIONER

## 2022-01-31 PROCEDURE — 3288F PR FALLS RISK ASSESSMENT DOCUMENTED: ICD-10-PCS | Mod: S$GLB,,, | Performed by: NURSE PRACTITIONER

## 2022-01-31 PROCEDURE — 99213 OFFICE O/P EST LOW 20 MIN: CPT | Mod: S$GLB,,, | Performed by: NURSE PRACTITIONER

## 2022-01-31 PROCEDURE — 3079F PR MOST RECENT DIASTOLIC BLOOD PRESSURE 80-89 MM HG: ICD-10-PCS | Mod: S$GLB,,, | Performed by: NURSE PRACTITIONER

## 2022-01-31 PROCEDURE — 99213 PR OFFICE/OUTPT VISIT, EST, LEVL III, 20-29 MIN: ICD-10-PCS | Mod: S$GLB,,, | Performed by: NURSE PRACTITIONER

## 2022-01-31 PROCEDURE — U0002: ICD-10-PCS | Mod: QW,S$GLB,, | Performed by: NURSE PRACTITIONER

## 2022-01-31 PROCEDURE — 3074F SYST BP LT 130 MM HG: CPT | Mod: S$GLB,,, | Performed by: NURSE PRACTITIONER

## 2022-01-31 PROCEDURE — 1159F MED LIST DOCD IN RCRD: CPT | Mod: S$GLB,,, | Performed by: NURSE PRACTITIONER

## 2022-01-31 RX ORDER — ISOSORBIDE MONONITRATE 20 MG/1
TABLET ORAL
COMMUNITY
Start: 2022-01-17 | End: 2022-07-07 | Stop reason: SDUPTHER

## 2022-01-31 RX ORDER — AMLODIPINE BESYLATE 5 MG/1
5 TABLET ORAL 2 TIMES DAILY
Status: ON HOLD | COMMUNITY
Start: 2021-11-08 | End: 2024-01-27 | Stop reason: HOSPADM

## 2022-01-31 RX ORDER — DOXYCYCLINE HYCLATE 100 MG
100 TABLET ORAL 2 TIMES DAILY
Qty: 20 TABLET | Refills: 0 | Status: SHIPPED | OUTPATIENT
Start: 2022-01-31 | End: 2023-12-02 | Stop reason: ALTCHOICE

## 2022-01-31 RX ORDER — AZITHROMYCIN 250 MG/1
TABLET, FILM COATED ORAL
Qty: 6 TABLET | Refills: 0 | Status: SHIPPED | OUTPATIENT
Start: 2022-01-31 | End: 2022-01-31 | Stop reason: ALTCHOICE

## 2022-01-31 RX ORDER — PREDNISONE 20 MG/1
TABLET ORAL
Qty: 7 TABLET | Refills: 0 | Status: SHIPPED | OUTPATIENT
Start: 2022-01-31 | End: 2022-01-31 | Stop reason: ALTCHOICE

## 2022-01-31 RX ORDER — HYDROCODONE BITARTRATE AND ACETAMINOPHEN 5; 325 MG/1; MG/1
TABLET ORAL
COMMUNITY
Start: 2021-09-29 | End: 2022-01-31

## 2022-01-31 NOTE — PROGRESS NOTES
SUBJECTIVE:      Patient ID: Jerome Amaro Jr. is a 76 y.o. male.    Chief Complaint: Fatigue (Fatigue, sore throat, diarrhea, low grade fever. Feels very weak)    Pt of [facility] [place] presents for sick visit, wife tested positive for Covid 1/18, he was given ABX & steroids at that time with minimal relief of his symptoms    Fatigue  This is a new problem. The current episode started 1 to 4 weeks ago. The problem occurs constantly. The problem has been unchanged. Associated symptoms include anorexia, a change in bowel habit, congestion, fatigue, a fever, myalgias, a sore throat and weakness. Pertinent negatives include no abdominal pain, arthralgias, chest pain, coughing, headaches, joint swelling, nausea, neck pain, rash or vomiting. The symptoms are aggravated by exertion. He has tried lying down, rest and sleep (ABX, steroids) for the symptoms. The treatment provided mild relief.       Past Surgical History:   Procedure Laterality Date    AORTOGRAPHY WITH SERIALOGRAPHY N/A 11/16/2021    Procedure: AORTOGRAM, WITH RUNOFF;  Surgeon: Rodrigo Willoughby MD;  Location: [facility] CATH/EP LAB;  Service: Cardiology;  Laterality: N/A;    ARTERIOGRAPHY OF AORTIC ROOT N/A 11/5/2019    Procedure: ARTERIOGRAM, AORTIC ROOT;  Surgeon: Rodrigo Willoughby MD;  Location: [facility] CATH/EP LAB;  Service: Cardiology;  Laterality: N/A;    CARDIAC CATHETERIZATION      CARDIAC VALVE SURGERY      CORONARY ANGIOGRAPHY INCLUDING BYPASS GRAFTS WITH CATHETERIZATION OF LEFT HEART N/A 11/5/2019    Procedure: ANGIOGRAM, CORONARY, INCLUDING BYPASS GRAFT, WITH LEFT HEART CATHETERIZATION;  Surgeon: Rodrigo Willoughby MD;  Location: [facility] CATH/EP LAB;  Service: Cardiology;  Laterality: N/A;    CORONARY ANGIOGRAPHY INCLUDING BYPASS GRAFTS WITH CATHETERIZATION OF LEFT HEART Left 11/3/2020    Procedure: ANGIOGRAM, CORONARY, INCLUDING BYPASS GRAFT, WITH LEFT HEART CATHETERIZATION;  Surgeon: Rodrigo Willoughby MD;  Location: [facility] CATH/EP LAB;  Service: Cardiology;   Laterality: Left;    CORONARY ANGIOGRAPHY INCLUDING BYPASS GRAFTS WITH CATHETERIZATION OF LEFT HEART N/A 2021    Procedure: ANGIOGRAM, CORONARY, INCLUDING BYPASS GRAFT, WITH LEFT HEART CATHETERIZATION;  Surgeon: Rodrigo Willoughby MD;  Location: [facility] CATH/EP LAB;  Service: Cardiology;  Laterality: N/A;    CORONARY ANGIOPLASTY      CORONARY ARTERY BYPASS GRAFT      EXTRACORPOREAL SHOCK WAVE LITHOTRIPSY Right 2019    Procedure: LITHOTRIPSY, ESWL;  Surgeon: Williams Ortega MD;  Location: [facility] OR;  Service: Urology;  Laterality: Right;    HEMORRHOID SURGERY      INSERTION OF PACEMAKER      PERCUTANEOUS TRANSLUMINAL BALLOON ANGIOPLASTY OF CORONARY ARTERY N/A 2019    Procedure: Angioplasty-coronary;  Surgeon: Rodrigo Willoughby MD;  Location: [facility] CATH/EP LAB;  Service: Cardiology;  Laterality: N/A;    WRIST FRACTURE SURGERY       Family History   Problem Relation Age of Onset    Heart attack Mother     Heart attack Father     Heart disease Sister     Stroke Sister     Diabetes Sister     No Known Problems Maternal Grandmother     No Known Problems Maternal Grandfather     No Known Problems Paternal Grandmother     No Known Problems Paternal Grandfather     No Known Problems Brother     No Known Problems Maternal Aunt     No Known Problems Maternal Uncle     No Known Problems Paternal Aunt     No Known Problems Paternal Uncle     Anemia Neg Hx     Arrhythmia Neg Hx     Asthma Neg Hx     Clotting disorder Neg Hx     Fainting Neg Hx     Heart failure Neg Hx     Hyperlipidemia Neg Hx     Hypertension Neg Hx     Atrial Septal Defect Neg Hx       Social History     Socioeconomic History    Marital status:    Tobacco Use    Smoking status: Former Smoker     Packs/day: 0.50     Years: 4.00     Pack years: 2.00     Quit date:      Years since quittin.1    Smokeless tobacco: Never Used   Substance and Sexual Activity    Alcohol use: Yes     Comment: social    Drug  use: No     Current Outpatient Medications   Medication Sig Dispense Refill    amLODIPine (NORVASC) 5 MG tablet       citalopram (CELEXA) 20 MG tablet Take 20 mg by mouth once daily.      clopidogrel (PLAVIX) 75 mg tablet Take 75 mg by mouth once daily.      ezetimibe (ZETIA) 10 mg tablet Take 10 mg by mouth once daily.      furosemide (LASIX) 20 MG tablet Take 20 mg by mouth daily as needed.       hydrOXYzine pamoate (VISTARIL) 25 MG Cap Take 25 mg by mouth once daily.      isosorbide mononitrate (ISMO,MONOKET) 20 MG Tab       ivabradine (CORLANOR) 5 mg Tab Take by mouth.      lisinopriL (PRINIVIL,ZESTRIL) 40 MG tablet Take 40 mg by mouth once daily.      metoprolol succinate (TOPROL-XL) 50 MG 24 hr tablet TAKE 2 TABLETS BY MOUTH IN AM AND 1 TAB IN THE EVENING      phenytoin (DILANTIN) 100 MG ER capsule Take 400 mg by mouth once daily.   0    ranolazine (RANEXA) 1,000 mg Tb12 Take 1,000 mg by mouth 2 (two) times daily.       diclofenac sodium (PENNSAID) 2 % SoPk Apply 1 packet topically once daily.      doxycycline (VIBRA-TABS) 100 MG tablet Take 1 tablet (100 mg total) by mouth 2 (two) times daily. 20 tablet 0    isosorbide dinitrate (ISORDIL) 20 MG tablet Take 40 mg by mouth daily as needed.        No current facility-administered medications for this visit.     Facility-Administered Medications Ordered in Other Visits   Medication Dose Route Frequency Provider Last Rate Last Admin    magnesium oxide tablet 800 mg  800 mg Oral PRN Rodrigo Willoughby MD   800 mg at 11/16/21 0737    sodium chloride 0.9% flush 10 mL  10 mL Intravenous PRN Rodrigo Willoughby MD         Review of patient's allergies indicates:   Allergen Reactions    Atorvastatin Other (See Comments)     Muscle pain    Rosuvastatin Other (See Comments)     Muscle pain      Past Medical History:   Diagnosis Date    Anticoagulant long-term use     2014    Aortic valve disease 2014    pig valve    Arthritis     all over    Chest pain  07/15/2019    CHF (congestive heart failure)     2014 on meds    Coronary artery disease     2007 cabg    Heart attack 1990    15 stents    Heart block     Hyperlipidemia     Hypertension     on meds    Hypothyroidism 2015    on meds    PVD (peripheral vascular disease)     Seizures     last episode 1990 on meds     Past Surgical History:   Procedure Laterality Date    AORTOGRAPHY WITH SERIALOGRAPHY N/A 11/16/2021    Procedure: AORTOGRAM, WITH RUNOFF;  Surgeon: Rodrigo Willoughby MD;  Location: [facility] CATH/EP LAB;  Service: Cardiology;  Laterality: N/A;    ARTERIOGRAPHY OF AORTIC ROOT N/A 11/5/2019    Procedure: ARTERIOGRAM, AORTIC ROOT;  Surgeon: Rodrigo Willoughby MD;  Location: [facility] CATH/EP LAB;  Service: Cardiology;  Laterality: N/A;    CARDIAC CATHETERIZATION      CARDIAC VALVE SURGERY      CORONARY ANGIOGRAPHY INCLUDING BYPASS GRAFTS WITH CATHETERIZATION OF LEFT HEART N/A 11/5/2019    Procedure: ANGIOGRAM, CORONARY, INCLUDING BYPASS GRAFT, WITH LEFT HEART CATHETERIZATION;  Surgeon: Rodrigo Willoughby MD;  Location: [facility] CATH/EP LAB;  Service: Cardiology;  Laterality: N/A;    CORONARY ANGIOGRAPHY INCLUDING BYPASS GRAFTS WITH CATHETERIZATION OF LEFT HEART Left 11/3/2020    Procedure: ANGIOGRAM, CORONARY, INCLUDING BYPASS GRAFT, WITH LEFT HEART CATHETERIZATION;  Surgeon: Rodrigo Willoughby MD;  Location: [facility] CATH/EP LAB;  Service: Cardiology;  Laterality: Left;    CORONARY ANGIOGRAPHY INCLUDING BYPASS GRAFTS WITH CATHETERIZATION OF LEFT HEART N/A 9/28/2021    Procedure: ANGIOGRAM, CORONARY, INCLUDING BYPASS GRAFT, WITH LEFT HEART CATHETERIZATION;  Surgeon: Rodrigo Willoughby MD;  Location: [facility] CATH/EP LAB;  Service: Cardiology;  Laterality: N/A;    CORONARY ANGIOPLASTY      CORONARY ARTERY BYPASS GRAFT      EXTRACORPOREAL SHOCK WAVE LITHOTRIPSY Right 8/1/2019    Procedure: LITHOTRIPSY, ESWL;  Surgeon: Williams Ortega MD;  Location: [facility] OR;  Service: Urology;  Laterality: Right;    HEMORRHOID SURGERY  1990  "   INSERTION OF PACEMAKER      PERCUTANEOUS TRANSLUMINAL BALLOON ANGIOPLASTY OF CORONARY ARTERY N/A 11/8/2019    Procedure: Angioplasty-coronary;  Surgeon: Rodrigo Willoughby MD;  Location: [facility] CATH/EP LAB;  Service: Cardiology;  Laterality: N/A;    WRIST FRACTURE SURGERY         Review of Systems   Constitutional: Positive for activity change, appetite change, fatigue and fever.   HENT: Positive for congestion, sinus pressure and sore throat. Negative for ear pain, hearing loss, postnasal drip, sinus pain and sneezing.    Eyes: Negative for photophobia and pain.   Respiratory: Negative for cough, chest tightness, shortness of breath and wheezing.    Cardiovascular: Negative for chest pain and leg swelling.   Gastrointestinal: Positive for anorexia, change in bowel habit and diarrhea. Negative for abdominal distention, abdominal pain, blood in stool, constipation, nausea and vomiting.   Endocrine: Negative for cold intolerance, heat intolerance, polydipsia and polyuria.   Genitourinary: Negative for difficulty urinating, dysuria, flank pain, frequency, hematuria and urgency.   Musculoskeletal: Positive for myalgias. Negative for arthralgias, back pain, joint swelling and neck pain.   Skin: Negative for pallor and rash.   Allergic/Immunologic: Negative for environmental allergies and food allergies.   Neurological: Positive for weakness. Negative for dizziness, light-headedness and headaches.   Hematological: Does not bruise/bleed easily.   Psychiatric/Behavioral: Negative for confusion, decreased concentration and sleep disturbance. The patient is not nervous/anxious.       OBJECTIVE:      Vitals:    01/31/22 0948   BP: 126/82   BP Location: Right arm   Patient Position: Sitting   BP Method: Large (Manual)   Pulse: 86   Temp: 99.4 °F (37.4 °C)   TempSrc: Oral   SpO2: 98%   Weight: 82.1 kg (181 lb)   Height: 5' 6" (1.676 m)     Physical Exam  Vitals and nursing note reviewed.   Constitutional:       General: He is " not in acute distress.Vital signs are normal.      Appearance: He is well-developed, well-groomed, overweight and well-nourished. He is ill-appearing.   HENT:      Head: Normocephalic and atraumatic.      Right Ear: Hearing normal.      Left Ear: Hearing normal.      Nose: Nose normal. No rhinorrhea.      Right Sinus: No maxillary sinus tenderness or frontal sinus tenderness.      Left Sinus: No maxillary sinus tenderness or frontal sinus tenderness.      Mouth/Throat:      Lips: Pink.      Mouth: Mucous membranes are normal. Mucous membranes are moist.      Tongue: No lesions.      Palate: No lesions.      Pharynx: Uvula midline.   Eyes:      General: Lids are normal.         Right eye: No discharge.         Left eye: No discharge.      Conjunctiva/sclera: Conjunctivae normal.      Right eye: Right conjunctiva is not injected.      Left eye: Left conjunctiva is not injected.      Pupils: Pupils are equal, round, and reactive to light. Pupils are equal.      Right eye: Pupil is round and reactive.      Left eye: Pupil is round and reactive.   Neck:      Thyroid: No thyromegaly.      Vascular: No JVD.      Trachea: Trachea normal. No tracheal deviation.   Cardiovascular:      Rate and Rhythm: Normal rate and regular rhythm.      Pulses: Intact distal pulses.           Radial pulses are 2+ on the right side and 2+ on the left side.      Heart sounds: Normal heart sounds. No murmur heard.  No friction rub. No gallop.    Pulmonary:      Effort: Pulmonary effort is normal. No respiratory distress.      Breath sounds: No stridor. Examination of the right-lower field reveals decreased breath sounds. Examination of the left-lower field reveals decreased breath sounds. Decreased breath sounds present. No wheezing, rhonchi or rales.   Abdominal:      General: Bowel sounds are normal. There is no distension.      Palpations: Abdomen is soft. Abdomen is not rigid.      Tenderness: There is no abdominal tenderness. There is no  guarding.   Musculoskeletal:         General: No edema. Normal range of motion.      Cervical back: Normal range of motion and neck supple.   Lymphadenopathy:      Head:      Right side of head: Submandibular adenopathy present.      Left side of head: Submandibular adenopathy present.      Cervical: No cervical adenopathy.   Skin:     General: Skin is warm and dry.      Capillary Refill: Capillary refill takes less than 2 seconds.      Coloration: Skin is not pale.      Findings: No lesion or rash.      Nails: There is no cyanosis.   Neurological:      Mental Status: He is alert and oriented to person, place, and time.      Motor: No atrophy.      Coordination: He displays a negative Romberg sign. Coordination normal.      Gait: Gait normal.      Deep Tendon Reflexes: Strength normal.   Psychiatric:         Attention and Perception: He is attentive.         Mood and Affect: Mood and affect normal.         Speech: Speech normal.         Behavior: Behavior normal.         Thought Content: Thought content normal.         Cognition and Memory: Cognition and memory normal.         Judgment: Judgment normal.        Assessment:       1. COVID-19    2. Decreased breath sounds    3. Symptoms of upper respiratory infection (URI)        Plan:       COVID-19  -     doxycycline (VIBRA-TABS) 100 MG tablet; Take 1 tablet (100 mg total) by mouth 2 (two) times daily.  Dispense: 20 tablet; Refill: 0  -     Ambulatory referral/consult to [facility]; Future; Expected date: 02/01/2022    Decreased breath sounds  -     doxycycline (VIBRA-TABS) 100 MG tablet; Take 1 tablet (100 mg total) by mouth 2 (two) times daily.  Dispense: 20 tablet; Refill: 0  -     Ambulatory referral/consult to [facility]; Future; Expected date: 02/01/2022    Symptoms of upper respiratory infection (URI)  -     POCT COVID-19 Rapid Screening        Follow up if symptoms worsen or fail to improve.      1/31/2022 LEVAR Macario, FNP-C

## 2022-07-07 ENCOUNTER — OFFICE VISIT (OUTPATIENT)
Dept: FAMILY MEDICINE | Facility: CLINIC | Age: 77
End: 2022-07-07
Payer: COMMERCIAL

## 2022-07-07 VITALS
WEIGHT: 178 LBS | SYSTOLIC BLOOD PRESSURE: 100 MMHG | HEART RATE: 58 BPM | TEMPERATURE: 98 F | HEIGHT: 66 IN | DIASTOLIC BLOOD PRESSURE: 76 MMHG | OXYGEN SATURATION: 99 % | BODY MASS INDEX: 28.61 KG/M2

## 2022-07-07 DIAGNOSIS — J02.9 SORE THROAT: Primary | ICD-10-CM

## 2022-07-07 DIAGNOSIS — R09.82 POST-NASAL DRAINAGE: ICD-10-CM

## 2022-07-07 DIAGNOSIS — E66.3 OVERWEIGHT (BMI 25.0-29.9): ICD-10-CM

## 2022-07-07 LAB
CTP QC/QA: YES
SARS-COV-2 RDRP RESP QL NAA+PROBE: NEGATIVE

## 2022-07-07 PROCEDURE — 1160F RVW MEDS BY RX/DR IN RCRD: CPT | Mod: CPTII,S$GLB,, | Performed by: NURSE PRACTITIONER

## 2022-07-07 PROCEDURE — 1159F MED LIST DOCD IN RCRD: CPT | Mod: CPTII,S$GLB,, | Performed by: NURSE PRACTITIONER

## 2022-07-07 PROCEDURE — 1160F PR REVIEW ALL MEDS BY PRESCRIBER/CLIN PHARMACIST DOCUMENTED: ICD-10-PCS | Mod: CPTII,S$GLB,, | Performed by: NURSE PRACTITIONER

## 2022-07-07 PROCEDURE — 3288F PR FALLS RISK ASSESSMENT DOCUMENTED: ICD-10-PCS | Mod: CPTII,S$GLB,, | Performed by: NURSE PRACTITIONER

## 2022-07-07 PROCEDURE — 3288F FALL RISK ASSESSMENT DOCD: CPT | Mod: CPTII,S$GLB,, | Performed by: NURSE PRACTITIONER

## 2022-07-07 PROCEDURE — U0002 COVID-19 LAB TEST NON-CDC: HCPCS | Mod: QW,S$GLB,, | Performed by: NURSE PRACTITIONER

## 2022-07-07 PROCEDURE — 99213 PR OFFICE/OUTPT VISIT, EST, LEVL III, 20-29 MIN: ICD-10-PCS | Mod: S$GLB,,, | Performed by: NURSE PRACTITIONER

## 2022-07-07 PROCEDURE — 1125F AMNT PAIN NOTED PAIN PRSNT: CPT | Mod: CPTII,S$GLB,, | Performed by: NURSE PRACTITIONER

## 2022-07-07 PROCEDURE — U0002: ICD-10-PCS | Mod: QW,S$GLB,, | Performed by: NURSE PRACTITIONER

## 2022-07-07 PROCEDURE — 99213 OFFICE O/P EST LOW 20 MIN: CPT | Mod: S$GLB,,, | Performed by: NURSE PRACTITIONER

## 2022-07-07 PROCEDURE — 1101F PR PT FALLS ASSESS DOC 0-1 FALLS W/OUT INJ PAST YR: ICD-10-PCS | Mod: CPTII,S$GLB,, | Performed by: NURSE PRACTITIONER

## 2022-07-07 PROCEDURE — 1125F PR PAIN SEVERITY QUANTIFIED, PAIN PRESENT: ICD-10-PCS | Mod: CPTII,S$GLB,, | Performed by: NURSE PRACTITIONER

## 2022-07-07 PROCEDURE — 1159F PR MEDICATION LIST DOCUMENTED IN MEDICAL RECORD: ICD-10-PCS | Mod: CPTII,S$GLB,, | Performed by: NURSE PRACTITIONER

## 2022-07-07 PROCEDURE — 1101F PT FALLS ASSESS-DOCD LE1/YR: CPT | Mod: CPTII,S$GLB,, | Performed by: NURSE PRACTITIONER

## 2022-07-07 RX ORDER — BEMPEDOIC ACID AND EZETIMIBE 180; 10 MG/1; MG/1
1 TABLET, FILM COATED ORAL DAILY
COMMUNITY
Start: 2022-03-21 | End: 2023-12-07

## 2022-12-06 ENCOUNTER — OFFICE VISIT (OUTPATIENT)
Dept: FAMILY MEDICINE | Facility: CLINIC | Age: 77
End: 2022-12-06
Payer: COMMERCIAL

## 2022-12-06 VITALS
DIASTOLIC BLOOD PRESSURE: 64 MMHG | OXYGEN SATURATION: 98 % | TEMPERATURE: 98 F | HEART RATE: 61 BPM | HEIGHT: 66 IN | BODY MASS INDEX: 29.86 KG/M2 | WEIGHT: 185.81 LBS | SYSTOLIC BLOOD PRESSURE: 106 MMHG

## 2022-12-06 DIAGNOSIS — S51.812A SKIN TEAR OF LEFT FOREARM WITHOUT COMPLICATION, INITIAL ENCOUNTER: Primary | ICD-10-CM

## 2022-12-06 DIAGNOSIS — Z79.01 LONG TERM (CURRENT) USE OF ANTICOAGULANTS: ICD-10-CM

## 2022-12-06 PROCEDURE — 1101F PT FALLS ASSESS-DOCD LE1/YR: CPT | Mod: CPTII,S$GLB,, | Performed by: NURSE PRACTITIONER

## 2022-12-06 PROCEDURE — 1125F PR PAIN SEVERITY QUANTIFIED, PAIN PRESENT: ICD-10-PCS | Mod: CPTII,S$GLB,, | Performed by: NURSE PRACTITIONER

## 2022-12-06 PROCEDURE — 3078F PR MOST RECENT DIASTOLIC BLOOD PRESSURE < 80 MM HG: ICD-10-PCS | Mod: CPTII,S$GLB,, | Performed by: NURSE PRACTITIONER

## 2022-12-06 PROCEDURE — 1101F PR PT FALLS ASSESS DOC 0-1 FALLS W/OUT INJ PAST YR: ICD-10-PCS | Mod: CPTII,S$GLB,, | Performed by: NURSE PRACTITIONER

## 2022-12-06 PROCEDURE — 99214 OFFICE O/P EST MOD 30 MIN: CPT | Mod: S$GLB,,, | Performed by: NURSE PRACTITIONER

## 2022-12-06 PROCEDURE — 1159F PR MEDICATION LIST DOCUMENTED IN MEDICAL RECORD: ICD-10-PCS | Mod: CPTII,S$GLB,, | Performed by: NURSE PRACTITIONER

## 2022-12-06 PROCEDURE — 1159F MED LIST DOCD IN RCRD: CPT | Mod: CPTII,S$GLB,, | Performed by: NURSE PRACTITIONER

## 2022-12-06 PROCEDURE — 3078F DIAST BP <80 MM HG: CPT | Mod: CPTII,S$GLB,, | Performed by: NURSE PRACTITIONER

## 2022-12-06 PROCEDURE — 99214 PR OFFICE/OUTPT VISIT, EST, LEVL IV, 30-39 MIN: ICD-10-PCS | Mod: S$GLB,,, | Performed by: NURSE PRACTITIONER

## 2022-12-06 PROCEDURE — 1125F AMNT PAIN NOTED PAIN PRSNT: CPT | Mod: CPTII,S$GLB,, | Performed by: NURSE PRACTITIONER

## 2022-12-06 PROCEDURE — 3288F FALL RISK ASSESSMENT DOCD: CPT | Mod: CPTII,S$GLB,, | Performed by: NURSE PRACTITIONER

## 2022-12-06 PROCEDURE — 3074F PR MOST RECENT SYSTOLIC BLOOD PRESSURE < 130 MM HG: ICD-10-PCS | Mod: CPTII,S$GLB,, | Performed by: NURSE PRACTITIONER

## 2022-12-06 PROCEDURE — 3288F PR FALLS RISK ASSESSMENT DOCUMENTED: ICD-10-PCS | Mod: CPTII,S$GLB,, | Performed by: NURSE PRACTITIONER

## 2022-12-06 PROCEDURE — 3074F SYST BP LT 130 MM HG: CPT | Mod: CPTII,S$GLB,, | Performed by: NURSE PRACTITIONER

## 2022-12-06 RX ORDER — MUPIROCIN 20 MG/G
OINTMENT TOPICAL 3 TIMES DAILY
Qty: 22 G | Refills: 0 | Status: SHIPPED | OUTPATIENT
Start: 2022-12-06 | End: 2024-01-24

## 2022-12-06 NOTE — PROGRESS NOTES
SUBJECTIVE:      Patient ID: Jerome Amaro Jr. is a 77 y.o. male.    Chief Complaint: Injury (Pt states a case of water fell on his left arm and he hasnt stopped bleeding since  )    77-year-old male with a history of aortic valve disease, CHF, CAD, MI, hypertension, hyperlipidemia, seizures, and hypothyroidism presents to the clinic with complaints of an injury to his left arm. A case of water fell on his left forearm  causing a skin tear.  The area continues to bleed, he is on Plavix.  Wound is dressed in a coban dressing.        Family History   Problem Relation Age of Onset    Heart attack Mother     Heart attack Father     Heart disease Sister     Stroke Sister     Diabetes Sister     No Known Problems Maternal Grandmother     No Known Problems Maternal Grandfather     No Known Problems Paternal Grandmother     No Known Problems Paternal Grandfather     No Known Problems Brother     No Known Problems Maternal Aunt     No Known Problems Maternal Uncle     No Known Problems Paternal Aunt     No Known Problems Paternal Uncle     Anemia Neg Hx     Arrhythmia Neg Hx     Asthma Neg Hx     Clotting disorder Neg Hx     Fainting Neg Hx     Heart failure Neg Hx     Hyperlipidemia Neg Hx     Hypertension Neg Hx     Atrial Septal Defect Neg Hx       Social History     Socioeconomic History    Marital status:    Tobacco Use    Smoking status: Former     Packs/day: 0.50     Years: 4.00     Pack years: 2.00     Types: Cigarettes     Quit date:      Years since quittin.9    Smokeless tobacco: Never   Substance and Sexual Activity    Alcohol use: Yes     Comment: social    Drug use: No    Sexual activity: Not Currently     Current Outpatient Medications   Medication Sig Dispense Refill    amLODIPine (NORVASC) 5 MG tablet       citalopram (CELEXA) 20 MG tablet Take 20 mg by mouth once daily.      clopidogrel (PLAVIX) 75 mg tablet Take 75 mg by mouth once daily.      diclofenac sodium (PENNSAID) 2 %  SoPk Apply 1 packet topically once daily.      doxycycline (VIBRA-TABS) 100 MG tablet Take 1 tablet (100 mg total) by mouth 2 (two) times daily. 20 tablet 0    ezetimibe (ZETIA) 10 mg tablet Take 10 mg by mouth once daily.      furosemide (LASIX) 20 MG tablet Take 20 mg by mouth daily as needed.       hydrOXYzine pamoate (VISTARIL) 25 MG Cap Take 25 mg by mouth once daily.      isosorbide dinitrate (ISORDIL) 20 MG tablet Take 40 mg by mouth daily as needed.       ivabradine (CORLANOR) 5 mg Tab Take by mouth.      lisinopriL (PRINIVIL,ZESTRIL) 40 MG tablet Take 40 mg by mouth once daily.      metoprolol succinate (TOPROL-XL) 50 MG 24 hr tablet TAKE 2 TABLETS BY MOUTH IN AM AND 1 TAB IN THE EVENING      NEXLIZET 180-10 mg Tab Take 1 tablet by mouth once daily.      phenytoin (DILANTIN) 100 MG ER capsule Take 400 mg by mouth once daily.   0    ranolazine (RANEXA) 1,000 mg Tb12 Take 1,000 mg by mouth 2 (two) times daily.       mupirocin (BACTROBAN) 2 % ointment Apply topically 3 (three) times daily. 22 g 0     No current facility-administered medications for this visit.     Facility-Administered Medications Ordered in Other Visits   Medication Dose Route Frequency Provider Last Rate Last Admin    magnesium oxide tablet 800 mg  800 mg Oral PRN Rodrigo Willoughby MD   800 mg at 11/16/21 0737    sodium chloride 0.9% flush 10 mL  10 mL Intravenous PRN Rodrigo Willoughby MD         Review of patient's allergies indicates:   Allergen Reactions    Atorvastatin Other (See Comments)     Muscle pain    Rosuvastatin Other (See Comments)     Muscle pain      Past Medical History:   Diagnosis Date    Anticoagulant long-term use     2014    Aortic valve disease 2014    pig valve    Arthritis     all over    Chest pain 07/15/2019    CHF (congestive heart failure)     2014 on meds    Coronary artery disease     2007 cabg    Heart attack 1990    15 stents    Heart block     Hyperlipidemia     Hypertension     on meds    Hypothyroidism 2015     on meds    PVD (peripheral vascular disease)     Seizures     last episode 1990 on meds     Past Surgical History:   Procedure Laterality Date    AORTOGRAPHY WITH SERIALOGRAPHY N/A 11/16/2021    Procedure: AORTOGRAM, WITH RUNOFF;  Surgeon: Rodrigo Willoughby MD;  Location: [facility] CATH/EP LAB;  Service: Cardiology;  Laterality: N/A;    ARTERIOGRAPHY OF AORTIC ROOT N/A 11/5/2019    Procedure: ARTERIOGRAM, AORTIC ROOT;  Surgeon: Rodrigo Willoughby MD;  Location: [facility] CATH/EP LAB;  Service: Cardiology;  Laterality: N/A;    CARDIAC CATHETERIZATION      CARDIAC VALVE SURGERY      CORONARY ANGIOGRAPHY INCLUDING BYPASS GRAFTS WITH CATHETERIZATION OF LEFT HEART N/A 11/5/2019    Procedure: ANGIOGRAM, CORONARY, INCLUDING BYPASS GRAFT, WITH LEFT HEART CATHETERIZATION;  Surgeon: Rodrigo Willoughby MD;  Location: [facility] CATH/EP LAB;  Service: Cardiology;  Laterality: N/A;    CORONARY ANGIOGRAPHY INCLUDING BYPASS GRAFTS WITH CATHETERIZATION OF LEFT HEART Left 11/3/2020    Procedure: ANGIOGRAM, CORONARY, INCLUDING BYPASS GRAFT, WITH LEFT HEART CATHETERIZATION;  Surgeon: Rodrigo Willoughby MD;  Location: [facility] CATH/EP LAB;  Service: Cardiology;  Laterality: Left;    CORONARY ANGIOGRAPHY INCLUDING BYPASS GRAFTS WITH CATHETERIZATION OF LEFT HEART N/A 9/28/2021    Procedure: ANGIOGRAM, CORONARY, INCLUDING BYPASS GRAFT, WITH LEFT HEART CATHETERIZATION;  Surgeon: Rodrigo Willoughby MD;  Location: [facility] CATH/EP LAB;  Service: Cardiology;  Laterality: N/A;    CORONARY ANGIOPLASTY      CORONARY ARTERY BYPASS GRAFT      EXTRACORPOREAL SHOCK WAVE LITHOTRIPSY Right 8/1/2019    Procedure: LITHOTRIPSY, ESWL;  Surgeon: Williams Ortega MD;  Location: [facility] OR;  Service: Urology;  Laterality: Right;    HEMORRHOID SURGERY  1990    INSERTION OF PACEMAKER      PERCUTANEOUS TRANSLUMINAL BALLOON ANGIOPLASTY OF CORONARY ARTERY N/A 11/8/2019    Procedure: Angioplasty-coronary;  Surgeon: Rodrigo Willoughby MD;  Location: [facility] CATH/EP LAB;  Service: Cardiology;  Laterality: N/A;     "WRIST FRACTURE SURGERY         Review of Systems   Constitutional:  Negative for activity change, chills, fatigue, fever and unexpected weight change.   HENT:  Negative for congestion, ear pain, sinus pressure, sore throat, trouble swallowing and voice change.    Eyes:  Negative for pain, discharge and visual disturbance.   Respiratory:  Negative for cough, chest tightness, shortness of breath and wheezing.    Cardiovascular:  Negative for chest pain and palpitations.        CAD, hypertension, hyperlipidemia, CHF   Gastrointestinal:  Negative for abdominal pain, constipation, diarrhea, nausea and vomiting.   Endocrine:        Hypothyroidism   Genitourinary:  Negative for difficulty urinating, flank pain, frequency and urgency.   Musculoskeletal:  Negative for back pain and joint swelling.   Skin:  Positive for wound (left forearm). Negative for color change and rash.   Neurological:  Negative for dizziness, seizures, syncope, weakness, numbness and headaches.   Hematological:  Negative for adenopathy. Bruises/bleeds easily (On Plavix).   Psychiatric/Behavioral:  Negative for dysphoric mood and sleep disturbance. The patient is not nervous/anxious.     OBJECTIVE:      Vitals:    12/06/22 1618   BP: 106/64   BP Location: Right arm   Patient Position: Sitting   BP Method: Medium (Manual)   Pulse: 61   Temp: 97.8 °F (36.6 °C)   TempSrc: Oral   SpO2: 98%   Weight: 84.3 kg (185 lb 12.8 oz)   Height: 5' 6" (1.676 m)     Physical Exam  Vitals and nursing note reviewed.   Constitutional:       General: He is awake. He is not in acute distress.     Appearance: Normal appearance. He is overweight. He is not ill-appearing, toxic-appearing or diaphoretic.   HENT:      Head: Normocephalic and atraumatic.      Nose: Nose normal.   Eyes:      General: Lids are normal. Gaze aligned appropriately.      Conjunctiva/sclera: Conjunctivae normal.      Right eye: Right conjunctiva is not injected.      Left eye: Left conjunctiva is not " injected.      Pupils: Pupils are equal, round, and reactive to light.   Cardiovascular:      Rate and Rhythm: Normal rate and regular rhythm.      Pulses: Normal pulses.      Heart sounds: Normal heart sounds, S1 normal and S2 normal. No murmur heard.    No friction rub. No gallop.   Pulmonary:      Effort: Pulmonary effort is normal. No respiratory distress.      Breath sounds: Normal breath sounds. No stridor. No decreased breath sounds, wheezing, rhonchi or rales.   Chest:      Chest wall: No tenderness.   Musculoskeletal:      Cervical back: Neck supple.      Right lower leg: No edema.      Left lower leg: No edema.   Lymphadenopathy:      Cervical: No cervical adenopathy.   Skin:     General: Skin is warm and dry.      Capillary Refill: Capillary refill takes less than 2 seconds.      Findings: Wound present. No erythema or rash.          Neurological:      Mental Status: He is alert and oriented to person, place, and time. Mental status is at baseline.   Psychiatric:         Attention and Perception: Attention normal.         Mood and Affect: Mood normal.         Speech: Speech normal.         Behavior: Behavior normal. Behavior is cooperative.         Thought Content: Thought content normal.         Judgment: Judgment normal.      Assessment:       1. Skin tear of left forearm without complication, initial encounter    2. Long term (current) use of anticoagulants        Plan:       Skin tear of left forearm without complication, initial encounter  Patient informed the skin tear will take time to heal.  Apply mupirocin ointment.  Use only non adherent dressing.  Continue Coban wrap or Ace wrap to keep pressure on wound and prevent bleeding.  Makes sure dressing is not too tight.  Avoid soaking extremity.  May wash with soap and water.  -     mupirocin (BACTROBAN) 2 % ointment; Apply topically 3 (three) times daily.  Dispense: 22 g; Refill: 0    Long term (current) use of anticoagulants  Continue Plavix, wound  may continue to bleed, keep light pressure on the wound.     This note was created using [employer] voice recognition software that occasionally misinterprets phrases or words.     No follow-ups on file.      12/6/2022 LEVAR Howe, MARTÍNEZP

## 2023-08-16 ENCOUNTER — OFFICE VISIT (OUTPATIENT)
Dept: FAMILY MEDICINE | Facility: CLINIC | Age: 78
End: 2023-08-16
Payer: COMMERCIAL

## 2023-08-16 VITALS
HEART RATE: 62 BPM | OXYGEN SATURATION: 98 % | BODY MASS INDEX: 29.57 KG/M2 | WEIGHT: 184 LBS | TEMPERATURE: 98 F | HEIGHT: 66 IN | DIASTOLIC BLOOD PRESSURE: 74 MMHG | SYSTOLIC BLOOD PRESSURE: 120 MMHG

## 2023-08-16 DIAGNOSIS — L73.9 FOLLICULITIS: Primary | ICD-10-CM

## 2023-08-16 DIAGNOSIS — R21 FACIAL RASH: ICD-10-CM

## 2023-08-16 PROCEDURE — 1101F PT FALLS ASSESS-DOCD LE1/YR: CPT | Mod: CPTII,S$GLB,, | Performed by: NURSE PRACTITIONER

## 2023-08-16 PROCEDURE — 1159F PR MEDICATION LIST DOCUMENTED IN MEDICAL RECORD: ICD-10-PCS | Mod: CPTII,S$GLB,, | Performed by: NURSE PRACTITIONER

## 2023-08-16 PROCEDURE — 1126F AMNT PAIN NOTED NONE PRSNT: CPT | Mod: CPTII,S$GLB,, | Performed by: NURSE PRACTITIONER

## 2023-08-16 PROCEDURE — 3074F PR MOST RECENT SYSTOLIC BLOOD PRESSURE < 130 MM HG: ICD-10-PCS | Mod: CPTII,S$GLB,, | Performed by: NURSE PRACTITIONER

## 2023-08-16 PROCEDURE — 3074F SYST BP LT 130 MM HG: CPT | Mod: CPTII,S$GLB,, | Performed by: NURSE PRACTITIONER

## 2023-08-16 PROCEDURE — 3078F PR MOST RECENT DIASTOLIC BLOOD PRESSURE < 80 MM HG: ICD-10-PCS | Mod: CPTII,S$GLB,, | Performed by: NURSE PRACTITIONER

## 2023-08-16 PROCEDURE — 1159F MED LIST DOCD IN RCRD: CPT | Mod: CPTII,S$GLB,, | Performed by: NURSE PRACTITIONER

## 2023-08-16 PROCEDURE — 99213 PR OFFICE/OUTPT VISIT, EST, LEVL III, 20-29 MIN: ICD-10-PCS | Mod: S$GLB,,, | Performed by: NURSE PRACTITIONER

## 2023-08-16 PROCEDURE — 3288F PR FALLS RISK ASSESSMENT DOCUMENTED: ICD-10-PCS | Mod: CPTII,S$GLB,, | Performed by: NURSE PRACTITIONER

## 2023-08-16 PROCEDURE — 3078F DIAST BP <80 MM HG: CPT | Mod: CPTII,S$GLB,, | Performed by: NURSE PRACTITIONER

## 2023-08-16 PROCEDURE — 1160F RVW MEDS BY RX/DR IN RCRD: CPT | Mod: CPTII,S$GLB,, | Performed by: NURSE PRACTITIONER

## 2023-08-16 PROCEDURE — 3288F FALL RISK ASSESSMENT DOCD: CPT | Mod: CPTII,S$GLB,, | Performed by: NURSE PRACTITIONER

## 2023-08-16 PROCEDURE — 1160F PR REVIEW ALL MEDS BY PRESCRIBER/CLIN PHARMACIST DOCUMENTED: ICD-10-PCS | Mod: CPTII,S$GLB,, | Performed by: NURSE PRACTITIONER

## 2023-08-16 PROCEDURE — 1101F PR PT FALLS ASSESS DOC 0-1 FALLS W/OUT INJ PAST YR: ICD-10-PCS | Mod: CPTII,S$GLB,, | Performed by: NURSE PRACTITIONER

## 2023-08-16 PROCEDURE — 1126F PR PAIN SEVERITY QUANTIFIED, NO PAIN PRESENT: ICD-10-PCS | Mod: CPTII,S$GLB,, | Performed by: NURSE PRACTITIONER

## 2023-08-16 PROCEDURE — 99213 OFFICE O/P EST LOW 20 MIN: CPT | Mod: S$GLB,,, | Performed by: NURSE PRACTITIONER

## 2023-08-16 NOTE — PROGRESS NOTES
SUBJECTIVE:      Patient ID: Jerome Amaro Jr. is a 77 y.o. male.    Chief Complaint: Referral (Pt states he has scaps appearing around his beard line and wants to be seen by dermatology )    77-year-old male presents to the clinic for a dermatology referral.  Reports rash to face.  Started 3-4 months ago.  Reports scabs and red bumps around hair follicles.  The rash is intermittent.  Its not always associated with shaving. The area burns if he scratches a scab, but other wise denies pain or itching.  He has tried Cortizone cream, neosporin, and lotions without relief. Denies fever. Requesting referral to Dr. Reid.        Family History   Problem Relation Age of Onset    Heart attack Mother     Heart attack Father     Heart disease Sister     Stroke Sister     Diabetes Sister     No Known Problems Maternal Grandmother     No Known Problems Maternal Grandfather     No Known Problems Paternal Grandmother     No Known Problems Paternal Grandfather     No Known Problems Brother     No Known Problems Maternal Aunt     No Known Problems Maternal Uncle     No Known Problems Paternal Aunt     No Known Problems Paternal Uncle     Anemia Neg Hx     Arrhythmia Neg Hx     Asthma Neg Hx     Clotting disorder Neg Hx     Fainting Neg Hx     Heart failure Neg Hx     Hyperlipidemia Neg Hx     Hypertension Neg Hx     Atrial Septal Defect Neg Hx       Social History     Socioeconomic History    Marital status:    Tobacco Use    Smoking status: Former     Current packs/day: 0.00     Average packs/day: 0.5 packs/day for 4.0 years (2.0 ttl pk-yrs)     Types: Cigarettes     Start date:      Quit date:      Years since quittin.6    Smokeless tobacco: Never   Substance and Sexual Activity    Alcohol use: Yes     Comment: social    Drug use: No    Sexual activity: Not Currently     Current Outpatient Medications   Medication Sig Dispense Refill    amLODIPine (NORVASC) 5 MG tablet       citalopram (CELEXA) 20 MG  tablet Take 20 mg by mouth once daily.      clopidogrel (PLAVIX) 75 mg tablet Take 75 mg by mouth once daily.      diclofenac sodium (PENNSAID) 2 % SoPk Apply 1 packet topically once daily.      doxycycline (VIBRA-TABS) 100 MG tablet Take 1 tablet (100 mg total) by mouth 2 (two) times daily. 20 tablet 0    ezetimibe (ZETIA) 10 mg tablet Take 10 mg by mouth once daily.      furosemide (LASIX) 20 MG tablet Take 20 mg by mouth daily as needed.       hydrOXYzine pamoate (VISTARIL) 25 MG Cap Take 25 mg by mouth once daily.      isosorbide dinitrate (ISORDIL) 20 MG tablet Take 40 mg by mouth daily as needed.       ivabradine (CORLANOR) 5 mg Tab Take by mouth.      lisinopriL (PRINIVIL,ZESTRIL) 40 MG tablet Take 40 mg by mouth once daily.      metoprolol succinate (TOPROL-XL) 50 MG 24 hr tablet TAKE 2 TABLETS BY MOUTH IN AM AND 1 TAB IN THE EVENING      mupirocin (BACTROBAN) 2 % ointment Apply topically 3 (three) times daily. 22 g 0    NEXLIZET 180-10 mg Tab Take 1 tablet by mouth once daily.      phenytoin (DILANTIN) 100 MG ER capsule Take 400 mg by mouth once daily.   0    ranolazine (RANEXA) 1,000 mg Tb12 Take 1,000 mg by mouth 2 (two) times daily.        No current facility-administered medications for this visit.     Facility-Administered Medications Ordered in Other Visits   Medication Dose Route Frequency Provider Last Rate Last Admin    magnesium oxide tablet 800 mg  800 mg Oral PRN Rodrigo Willoughby MD   800 mg at 11/16/21 0737    sodium chloride 0.9% flush 10 mL  10 mL Intravenous PRN Rodrigo Willoughby MD         Review of patient's allergies indicates:   Allergen Reactions    Atorvastatin Other (See Comments)     Muscle pain    Rosuvastatin Other (See Comments)     Muscle pain      Past Medical History:   Diagnosis Date    Anticoagulant long-term use     2014    Aortic valve disease 2014    pig valve    Arthritis     all over    Chest pain 07/15/2019    CHF (congestive heart failure)     2014 on meds    Coronary  artery disease     2007 cabg    Heart attack 1990    15 stents    Heart block     Hyperlipidemia     Hypertension     on meds    Hypothyroidism 2015    on meds    PVD (peripheral vascular disease)     Seizures     last episode 1990 on meds     Past Surgical History:   Procedure Laterality Date    AORTOGRAPHY WITH SERIALOGRAPHY N/A 11/16/2021    Procedure: AORTOGRAM, WITH RUNOFF;  Surgeon: Rodrigo Willoughby MD;  Location: [facility] CATH/EP LAB;  Service: Cardiology;  Laterality: N/A;    ARTERIOGRAPHY OF AORTIC ROOT N/A 11/5/2019    Procedure: ARTERIOGRAM, AORTIC ROOT;  Surgeon: Rodrigo Willoughby MD;  Location: [facility] CATH/EP LAB;  Service: Cardiology;  Laterality: N/A;    CARDIAC CATHETERIZATION      CARDIAC VALVE SURGERY      CORONARY ANGIOGRAPHY INCLUDING BYPASS GRAFTS WITH CATHETERIZATION OF LEFT HEART N/A 11/5/2019    Procedure: ANGIOGRAM, CORONARY, INCLUDING BYPASS GRAFT, WITH LEFT HEART CATHETERIZATION;  Surgeon: Rodrigo Willoughby MD;  Location: [facility] CATH/EP LAB;  Service: Cardiology;  Laterality: N/A;    CORONARY ANGIOGRAPHY INCLUDING BYPASS GRAFTS WITH CATHETERIZATION OF LEFT HEART Left 11/3/2020    Procedure: ANGIOGRAM, CORONARY, INCLUDING BYPASS GRAFT, WITH LEFT HEART CATHETERIZATION;  Surgeon: Rodrigo Willoughby MD;  Location: [facility] CATH/EP LAB;  Service: Cardiology;  Laterality: Left;    CORONARY ANGIOGRAPHY INCLUDING BYPASS GRAFTS WITH CATHETERIZATION OF LEFT HEART N/A 9/28/2021    Procedure: ANGIOGRAM, CORONARY, INCLUDING BYPASS GRAFT, WITH LEFT HEART CATHETERIZATION;  Surgeon: Rodrigo Willoughby MD;  Location: [facility] CATH/EP LAB;  Service: Cardiology;  Laterality: N/A;    CORONARY ANGIOPLASTY      CORONARY ARTERY BYPASS GRAFT      EXTRACORPOREAL SHOCK WAVE LITHOTRIPSY Right 8/1/2019    Procedure: LITHOTRIPSY, ESWL;  Surgeon: Williams Ortega MD;  Location: [facility] OR;  Service: Urology;  Laterality: Right;    HEMORRHOID SURGERY  1990    INSERTION OF PACEMAKER      PERCUTANEOUS TRANSLUMINAL BALLOON ANGIOPLASTY OF CORONARY ARTERY  "N/A 11/8/2019    Procedure: Angioplasty-coronary;  Surgeon: Rodrigo Willuoghby MD;  Location: [facility] CATH/EP LAB;  Service: Cardiology;  Laterality: N/A;    WRIST FRACTURE SURGERY         Review of Systems   Constitutional:  Negative for activity change, appetite change, chills, diaphoresis, fatigue, fever and unexpected weight change.   HENT:  Negative for congestion, ear pain, sinus pressure, sore throat, trouble swallowing and voice change.    Eyes:  Negative for pain, discharge and visual disturbance.   Respiratory:  Negative for cough, chest tightness, shortness of breath and wheezing.    Cardiovascular:  Negative for chest pain and palpitations.        CAD, hypertension, hyperlipidemia, CHF   Gastrointestinal:  Negative for abdominal pain, constipation, diarrhea, nausea and vomiting.   Endocrine:        Hypothyroidism   Genitourinary:  Negative for difficulty urinating, flank pain, frequency and urgency.   Musculoskeletal:  Negative for back pain and joint swelling.   Skin:  Positive for rash. Negative for color change.   Neurological:  Negative for dizziness, seizures, syncope, weakness, numbness and headaches.   Hematological:  Negative for adenopathy. Bruises/bleeds easily (On Plavix).   Psychiatric/Behavioral:  Negative for dysphoric mood and sleep disturbance. The patient is not nervous/anxious.       OBJECTIVE:      Vitals:    08/16/23 1255   BP: 120/74   BP Location: Left arm   Patient Position: Sitting   BP Method: Medium (Manual)   Pulse: 62   Temp: 98.3 °F (36.8 °C)   TempSrc: Oral   SpO2: 98%   Weight: 83.5 kg (184 lb)   Height: 5' 6" (1.676 m)     Physical Exam  Vitals and nursing note reviewed.   Constitutional:       General: He is awake. He is not in acute distress.     Appearance: Normal appearance. He is overweight. He is not ill-appearing, toxic-appearing or diaphoretic.   HENT:      Head: Normocephalic and atraumatic.      Nose: Nose normal.   Eyes:      General: Lids are normal. Gaze aligned " appropriately.      Conjunctiva/sclera: Conjunctivae normal.      Right eye: Right conjunctiva is not injected.      Left eye: Left conjunctiva is not injected.      Pupils: Pupils are equal, round, and reactive to light.   Cardiovascular:      Rate and Rhythm: Normal rate and regular rhythm.      Pulses: Normal pulses.      Heart sounds: Normal heart sounds, S1 normal and S2 normal. No murmur heard.     No friction rub. No gallop.   Pulmonary:      Effort: Pulmonary effort is normal. No respiratory distress.      Breath sounds: Normal breath sounds. No stridor. No decreased breath sounds, wheezing, rhonchi or rales.   Chest:      Chest wall: No tenderness.   Musculoskeletal:      Cervical back: Neck supple.      Right lower leg: No edema.      Left lower leg: No edema.   Lymphadenopathy:      Cervical: No cervical adenopathy.   Skin:     General: Skin is warm and dry.      Capillary Refill: Capillary refill takes less than 2 seconds.      Findings: Rash present. No erythema. Rash is papular.      Comments: Erythematous papules to chin and sides of face within his beard.  The erythema is mild. No significant scabs noted.    Neurological:      Mental Status: He is alert and oriented to person, place, and time. Mental status is at baseline.   Psychiatric:         Attention and Perception: Attention normal.         Mood and Affect: Mood normal.         Speech: Speech normal.         Behavior: Behavior normal. Behavior is cooperative.         Thought Content: Thought content normal.         Judgment: Judgment normal.        Assessment:       1. Folliculitis    2. Facial rash        Plan:       Folliculitis  Derm referral placed at patient request.  The rash/papules are not significantly inflamed today. He is not having pain or pruritis.  Will hold off on oral antibiotics such as doxycyline at this time as there does not appear to be an acute infection. He can continue otc hydrocortisone until he sees dermatology.   Electric razor may help.  Shave in one direction.  Limit frequency of shaving.   -     Ambulatory referral/consult to Dermatology; Future; Expected date: 08/23/2023    Facial rash  -     Ambulatory referral/consult to Dermatology; Future; Expected date: 08/23/2023    This note was created using [employer] voice recognition software that occasionally misinterprets phrases or words.     I spent a total of 25 minutes on the day of the visit.This includes face to face time and non-face to face time preparing to see the patient (eg, review of tests), obtaining and/or reviewing separately obtained history, documenting clinical information in the electronic or other health record, independently interpreting results and communicating results to the patient/family/caregiver, or care coordinator.    Follow up for Keep routine follow-up.      8/16/2023 LEVAR Howe, MARTÍNEZP

## 2023-08-17 ENCOUNTER — TELEPHONE (OUTPATIENT)
Dept: FAMILY MEDICINE | Facility: CLINIC | Age: 78
End: 2023-08-17

## 2023-12-02 ENCOUNTER — CLINICAL SUPPORT (OUTPATIENT)
Dept: CARDIOLOGY | Facility: HOSPITAL | Age: 78
DRG: 280 | End: 2023-12-02
Attending: STUDENT IN AN ORGANIZED HEALTH CARE EDUCATION/TRAINING PROGRAM
Payer: COMMERCIAL

## 2023-12-02 ENCOUNTER — HOSPITAL ENCOUNTER (INPATIENT)
Facility: HOSPITAL | Age: 78
LOS: 4 days | Discharge: HOME | DRG: 280 | End: 2023-12-06
Attending: EMERGENCY MEDICINE | Admitting: STUDENT IN AN ORGANIZED HEALTH CARE EDUCATION/TRAINING PROGRAM
Payer: COMMERCIAL

## 2023-12-02 VITALS — BODY MASS INDEX: 29.73 KG/M2 | HEIGHT: 66 IN | WEIGHT: 185 LBS

## 2023-12-02 DIAGNOSIS — I50.9 ACUTE EXACERBATION OF CHF (CONGESTIVE HEART FAILURE): ICD-10-CM

## 2023-12-02 DIAGNOSIS — I50.9 CONGESTIVE HEART FAILURE, UNSPECIFIED HF CHRONICITY, UNSPECIFIED HEART FAILURE TYPE: ICD-10-CM

## 2023-12-02 DIAGNOSIS — I21.4 NSTEMI (NON-ST ELEVATED MYOCARDIAL INFARCTION): Primary | ICD-10-CM

## 2023-12-02 DIAGNOSIS — R07.9 CHEST PAIN: ICD-10-CM

## 2023-12-02 LAB
ALBUMIN SERPL BCP-MCNC: 4.3 G/DL (ref 3.5–5.2)
ALLENS TEST: ABNORMAL
ALP SERPL-CCNC: 75 U/L (ref 55–135)
ALT SERPL W/O P-5'-P-CCNC: 11 U/L (ref 10–44)
ANION GAP SERPL CALC-SCNC: 11 MMOL/L (ref 8–16)
AST SERPL-CCNC: 20 U/L (ref 10–40)
BASOPHILS # BLD AUTO: 0.08 K/UL (ref 0–0.2)
BASOPHILS NFR BLD: 0.8 % (ref 0–1.9)
BILIRUB SERPL-MCNC: 1 MG/DL (ref 0.1–1)
BNP SERPL-MCNC: 773 PG/ML (ref 0–99)
BUN SERPL-MCNC: 20 MG/DL (ref 8–23)
CALCIUM SERPL-MCNC: 8.9 MG/DL (ref 8.7–10.5)
CHLORIDE SERPL-SCNC: 101 MMOL/L (ref 95–110)
CO2 SERPL-SCNC: 22 MMOL/L (ref 23–29)
CREAT SERPL-MCNC: 1.2 MG/DL (ref 0.5–1.4)
DELSYS: ABNORMAL
DIFFERENTIAL METHOD: ABNORMAL
EOSINOPHIL # BLD AUTO: 0.1 K/UL (ref 0–0.5)
EOSINOPHIL NFR BLD: 0.8 % (ref 0–8)
ERYTHROCYTE [DISTWIDTH] IN BLOOD BY AUTOMATED COUNT: 13.6 % (ref 11.5–14.5)
EST. GFR  (NO RACE VARIABLE): >60 ML/MIN/1.73 M^2
FLOW: 15
GLUCOSE SERPL-MCNC: 120 MG/DL (ref 70–110)
GLUCOSE SERPL-MCNC: 127 MG/DL (ref 70–110)
HCO3 UR-SCNC: 24.7 MMOL/L (ref 24–28)
HCT VFR BLD AUTO: 38.4 % (ref 40–54)
HCT VFR BLD CALC: 39 %PCV (ref 36–54)
HGB BLD-MCNC: 13 G/DL (ref 14–18)
IMM GRANULOCYTES # BLD AUTO: 0.03 K/UL (ref 0–0.04)
IMM GRANULOCYTES NFR BLD AUTO: 0.3 % (ref 0–0.5)
LYMPHOCYTES # BLD AUTO: 1 K/UL (ref 1–4.8)
LYMPHOCYTES NFR BLD: 9.3 % (ref 18–48)
MAGNESIUM SERPL-MCNC: 1.8 MG/DL (ref 1.6–2.6)
MCH RBC QN AUTO: 30.7 PG (ref 27–31)
MCHC RBC AUTO-ENTMCNC: 33.9 G/DL (ref 32–36)
MCV RBC AUTO: 91 FL (ref 82–98)
MODE: ABNORMAL
MONOCYTES # BLD AUTO: 1.2 K/UL (ref 0.3–1)
MONOCYTES NFR BLD: 11.2 % (ref 4–15)
NEUTROPHILS # BLD AUTO: 8.1 K/UL (ref 1.8–7.7)
NEUTROPHILS NFR BLD: 77.6 % (ref 38–73)
NRBC BLD-RTO: 0 /100 WBC
PCO2 BLDA: 43.5 MMHG (ref 35–45)
PH SMN: 7.36 [PH] (ref 7.35–7.45)
PHENYTOIN SERPL-MCNC: 13.6 UG/ML (ref 10–20)
PLATELET # BLD AUTO: 188 K/UL (ref 150–450)
PMV BLD AUTO: 10.8 FL (ref 9.2–12.9)
PO2 BLDA: 27 MMHG (ref 40–60)
POC BE: -1 MMOL/L
POC IONIZED CALCIUM: 1.13 MMOL/L (ref 1.06–1.42)
POC SATURATED O2: 47 % (ref 95–100)
POC TCO2: 26 MMOL/L (ref 24–29)
POTASSIUM BLD-SCNC: 5.1 MMOL/L (ref 3.5–5.1)
POTASSIUM SERPL-SCNC: 4.9 MMOL/L (ref 3.5–5.1)
PROT SERPL-MCNC: 7.2 G/DL (ref 6–8.4)
RBC # BLD AUTO: 4.23 M/UL (ref 4.6–6.2)
SAMPLE: ABNORMAL
SARS-COV-2 RDRP RESP QL NAA+PROBE: NEGATIVE
SITE: ABNORMAL
SODIUM BLD-SCNC: 136 MMOL/L (ref 136–145)
SODIUM SERPL-SCNC: 134 MMOL/L (ref 136–145)
SP02: 98
TROPONIN I SERPL HS-MCNC: 2180.3 PG/ML (ref 0–14.9)
TROPONIN I SERPL HS-MCNC: 2226.4 PG/ML (ref 0–14.9)
TROPONIN I SERPL HS-MCNC: 458.4 PG/ML (ref 0–14.9)
TROPONIN I SERPL HS-MCNC: 94.3 PG/ML (ref 0–14.9)
TSH SERPL DL<=0.005 MIU/L-ACNC: 2.04 UIU/ML (ref 0.34–5.6)
WBC # BLD AUTO: 10.39 K/UL (ref 3.9–12.7)

## 2023-12-02 PROCEDURE — 85014 HEMATOCRIT: CPT

## 2023-12-02 PROCEDURE — U0002 COVID-19 LAB TEST NON-CDC: HCPCS | Performed by: STUDENT IN AN ORGANIZED HEALTH CARE EDUCATION/TRAINING PROGRAM

## 2023-12-02 PROCEDURE — 80185 ASSAY OF PHENYTOIN TOTAL: CPT | Performed by: EMERGENCY MEDICINE

## 2023-12-02 PROCEDURE — 36415 COLL VENOUS BLD VENIPUNCTURE: CPT | Performed by: STUDENT IN AN ORGANIZED HEALTH CARE EDUCATION/TRAINING PROGRAM

## 2023-12-02 PROCEDURE — 93005 ELECTROCARDIOGRAM TRACING: CPT | Performed by: GENERAL PRACTICE

## 2023-12-02 PROCEDURE — 21000000 HC CCU ICU ROOM CHARGE

## 2023-12-02 PROCEDURE — 84443 ASSAY THYROID STIM HORMONE: CPT | Performed by: EMERGENCY MEDICINE

## 2023-12-02 PROCEDURE — 84484 ASSAY OF TROPONIN QUANT: CPT | Mod: 91 | Performed by: EMERGENCY MEDICINE

## 2023-12-02 PROCEDURE — 80053 COMPREHEN METABOLIC PANEL: CPT | Performed by: EMERGENCY MEDICINE

## 2023-12-02 PROCEDURE — 84132 ASSAY OF SERUM POTASSIUM: CPT

## 2023-12-02 PROCEDURE — 94761 N-INVAS EAR/PLS OXIMETRY MLT: CPT

## 2023-12-02 PROCEDURE — 94640 AIRWAY INHALATION TREATMENT: CPT

## 2023-12-02 PROCEDURE — 96374 THER/PROPH/DIAG INJ IV PUSH: CPT

## 2023-12-02 PROCEDURE — 27000221 HC OXYGEN, UP TO 24 HOURS

## 2023-12-02 PROCEDURE — 82803 BLOOD GASES ANY COMBINATION: CPT

## 2023-12-02 PROCEDURE — 83735 ASSAY OF MAGNESIUM: CPT | Performed by: EMERGENCY MEDICINE

## 2023-12-02 PROCEDURE — 36415 COLL VENOUS BLD VENIPUNCTURE: CPT | Performed by: EMERGENCY MEDICINE

## 2023-12-02 PROCEDURE — 63600175 PHARM REV CODE 636 W HCPCS: Performed by: STUDENT IN AN ORGANIZED HEALTH CARE EDUCATION/TRAINING PROGRAM

## 2023-12-02 PROCEDURE — 99900035 HC TECH TIME PER 15 MIN (STAT)

## 2023-12-02 PROCEDURE — 82962 GLUCOSE BLOOD TEST: CPT

## 2023-12-02 PROCEDURE — 93010 EKG 12-LEAD: ICD-10-PCS | Mod: ,,, | Performed by: GENERAL PRACTICE

## 2023-12-02 PROCEDURE — 25000003 PHARM REV CODE 250: Performed by: STUDENT IN AN ORGANIZED HEALTH CARE EDUCATION/TRAINING PROGRAM

## 2023-12-02 PROCEDURE — 94799 UNLISTED PULMONARY SVC/PX: CPT

## 2023-12-02 PROCEDURE — 93306 TTE W/DOPPLER COMPLETE: CPT

## 2023-12-02 PROCEDURE — 99285 EMERGENCY DEPT VISIT HI MDM: CPT | Mod: 25

## 2023-12-02 PROCEDURE — 99900031 HC PATIENT EDUCATION (STAT)

## 2023-12-02 PROCEDURE — 93010 ELECTROCARDIOGRAM REPORT: CPT | Mod: ,,, | Performed by: GENERAL PRACTICE

## 2023-12-02 PROCEDURE — 82330 ASSAY OF CALCIUM: CPT

## 2023-12-02 PROCEDURE — 84484 ASSAY OF TROPONIN QUANT: CPT | Mod: 91 | Performed by: STUDENT IN AN ORGANIZED HEALTH CARE EDUCATION/TRAINING PROGRAM

## 2023-12-02 PROCEDURE — 85025 COMPLETE CBC W/AUTO DIFF WBC: CPT | Performed by: EMERGENCY MEDICINE

## 2023-12-02 PROCEDURE — 83880 ASSAY OF NATRIURETIC PEPTIDE: CPT | Performed by: EMERGENCY MEDICINE

## 2023-12-02 PROCEDURE — 63600175 PHARM REV CODE 636 W HCPCS: Performed by: NURSE PRACTITIONER

## 2023-12-02 PROCEDURE — 94660 CPAP INITIATION&MGMT: CPT

## 2023-12-02 PROCEDURE — 25000242 PHARM REV CODE 250 ALT 637 W/ HCPCS: Performed by: EMERGENCY MEDICINE

## 2023-12-02 PROCEDURE — 84295 ASSAY OF SERUM SODIUM: CPT

## 2023-12-02 RX ORDER — METOPROLOL SUCCINATE 50 MG/1
50 TABLET, EXTENDED RELEASE ORAL NIGHTLY
Status: DISCONTINUED | OUTPATIENT
Start: 2023-12-02 | End: 2023-12-06

## 2023-12-02 RX ORDER — AMLODIPINE BESYLATE 5 MG/1
5 TABLET ORAL DAILY
Status: DISCONTINUED | OUTPATIENT
Start: 2023-12-03 | End: 2023-12-06 | Stop reason: HOSPADM

## 2023-12-02 RX ORDER — IPRATROPIUM BROMIDE AND ALBUTEROL SULFATE 2.5; .5 MG/3ML; MG/3ML
3 SOLUTION RESPIRATORY (INHALATION) EVERY 4 HOURS PRN
Status: DISCONTINUED | OUTPATIENT
Start: 2023-12-02 | End: 2023-12-06 | Stop reason: HOSPADM

## 2023-12-02 RX ORDER — FUROSEMIDE 10 MG/ML
40 INJECTION INTRAMUSCULAR; INTRAVENOUS
Status: COMPLETED | OUTPATIENT
Start: 2023-12-02 | End: 2023-12-02

## 2023-12-02 RX ORDER — ASPIRIN 81 MG/1
81 TABLET ORAL DAILY
Status: DISCONTINUED | OUTPATIENT
Start: 2023-12-03 | End: 2023-12-06 | Stop reason: HOSPADM

## 2023-12-02 RX ORDER — HYDRALAZINE HYDROCHLORIDE 20 MG/ML
10 INJECTION INTRAMUSCULAR; INTRAVENOUS EVERY 4 HOURS PRN
Status: DISCONTINUED | OUTPATIENT
Start: 2023-12-02 | End: 2023-12-06 | Stop reason: HOSPADM

## 2023-12-02 RX ORDER — EZETIMIBE 10 MG/1
10 TABLET ORAL DAILY
Status: DISCONTINUED | OUTPATIENT
Start: 2023-12-03 | End: 2023-12-06 | Stop reason: HOSPADM

## 2023-12-02 RX ORDER — MAGNESIUM SULFATE HEPTAHYDRATE 40 MG/ML
2 INJECTION, SOLUTION INTRAVENOUS ONCE
Status: DISCONTINUED | OUTPATIENT
Start: 2023-12-02 | End: 2023-12-02

## 2023-12-02 RX ORDER — LISINOPRIL 10 MG/1
40 TABLET ORAL DAILY
Status: DISCONTINUED | OUTPATIENT
Start: 2023-12-03 | End: 2023-12-04

## 2023-12-02 RX ORDER — PHENYTOIN SODIUM 100 MG/1
400 CAPSULE, EXTENDED RELEASE ORAL DAILY
Status: DISCONTINUED | OUTPATIENT
Start: 2023-12-03 | End: 2023-12-06 | Stop reason: HOSPADM

## 2023-12-02 RX ORDER — MAGNESIUM SULFATE HEPTAHYDRATE 40 MG/ML
2 INJECTION, SOLUTION INTRAVENOUS ONCE
Status: COMPLETED | OUTPATIENT
Start: 2023-12-02 | End: 2023-12-02

## 2023-12-02 RX ORDER — CITALOPRAM 20 MG/1
20 TABLET, FILM COATED ORAL DAILY
Status: DISCONTINUED | OUTPATIENT
Start: 2023-12-03 | End: 2023-12-06 | Stop reason: HOSPADM

## 2023-12-02 RX ORDER — ENOXAPARIN SODIUM 100 MG/ML
1 INJECTION SUBCUTANEOUS EVERY 12 HOURS
Status: DISCONTINUED | OUTPATIENT
Start: 2023-12-02 | End: 2023-12-06 | Stop reason: HOSPADM

## 2023-12-02 RX ORDER — ONDANSETRON 2 MG/ML
4 INJECTION INTRAMUSCULAR; INTRAVENOUS EVERY 8 HOURS PRN
Status: DISCONTINUED | OUTPATIENT
Start: 2023-12-02 | End: 2023-12-05

## 2023-12-02 RX ORDER — NITROGLYCERIN 400 UG/1
1 SPRAY ORAL EVERY 5 MIN PRN
COMMUNITY
Start: 2023-09-19

## 2023-12-02 RX ORDER — ISOSORBIDE MONONITRATE 20 MG/1
40 TABLET ORAL DAILY PRN
Status: ON HOLD | COMMUNITY
Start: 2023-11-13 | End: 2023-12-08 | Stop reason: HOSPADM

## 2023-12-02 RX ORDER — CLOPIDOGREL BISULFATE 75 MG/1
75 TABLET ORAL DAILY
Status: DISCONTINUED | OUTPATIENT
Start: 2023-12-03 | End: 2023-12-02

## 2023-12-02 RX ORDER — METOPROLOL SUCCINATE 50 MG/1
100 TABLET, EXTENDED RELEASE ORAL DAILY
Status: DISCONTINUED | OUTPATIENT
Start: 2023-12-03 | End: 2023-12-06

## 2023-12-02 RX ORDER — LEVALBUTEROL INHALATION SOLUTION 1.25 MG/3ML
1.25 SOLUTION RESPIRATORY (INHALATION)
Status: COMPLETED | OUTPATIENT
Start: 2023-12-02 | End: 2023-12-02

## 2023-12-02 RX ORDER — FUROSEMIDE 10 MG/ML
40 INJECTION INTRAMUSCULAR; INTRAVENOUS
Status: DISCONTINUED | OUTPATIENT
Start: 2023-12-02 | End: 2023-12-04

## 2023-12-02 RX ORDER — RANOLAZINE 500 MG/1
1000 TABLET, EXTENDED RELEASE ORAL 2 TIMES DAILY
Status: DISCONTINUED | OUTPATIENT
Start: 2023-12-02 | End: 2023-12-06 | Stop reason: HOSPADM

## 2023-12-02 RX ORDER — ACETAMINOPHEN 325 MG/1
650 TABLET ORAL EVERY 4 HOURS PRN
Status: DISCONTINUED | OUTPATIENT
Start: 2023-12-02 | End: 2023-12-04

## 2023-12-02 RX ORDER — SODIUM CHLORIDE 0.9 % (FLUSH) 0.9 %
10 SYRINGE (ML) INJECTION
Status: DISCONTINUED | OUTPATIENT
Start: 2023-12-02 | End: 2023-12-04

## 2023-12-02 RX ADMIN — LEVALBUTEROL HYDROCHLORIDE 1.25 MG: 1.25 SOLUTION RESPIRATORY (INHALATION) at 09:12

## 2023-12-02 RX ADMIN — FUROSEMIDE 40 MG: 10 INJECTION, SOLUTION INTRAMUSCULAR; INTRAVENOUS at 10:12

## 2023-12-02 RX ADMIN — RANOLAZINE 1000 MG: 500 TABLET, EXTENDED RELEASE ORAL at 08:12

## 2023-12-02 RX ADMIN — ENOXAPARIN SODIUM 80 MG: 80 INJECTION SUBCUTANEOUS at 08:12

## 2023-12-02 RX ADMIN — MAGNESIUM SULFATE HEPTAHYDRATE 2 G: 40 INJECTION, SOLUTION INTRAVENOUS at 01:12

## 2023-12-02 RX ADMIN — METOPROLOL SUCCINATE 50 MG: 50 TABLET, EXTENDED RELEASE ORAL at 08:12

## 2023-12-02 RX ADMIN — FUROSEMIDE 40 MG: 10 INJECTION, SOLUTION INTRAMUSCULAR; INTRAVENOUS at 05:12

## 2023-12-02 NOTE — SUBJECTIVE & OBJECTIVE
Past Medical History:   Diagnosis Date    Anticoagulant long-term use     2014    Aortic valve disease 2014    pig valve    Arthritis     all over    Chest pain 07/15/2019    CHF (congestive heart failure)     2014 on meds    Coronary artery disease     2007 cabg    Heart attack 1990    15 stents    Heart block     Hyperlipidemia     Hypertension     on meds    Hypothyroidism 2015    on meds    PVD (peripheral vascular disease)     Seizures     last episode 1990 on meds       Past Surgical History:   Procedure Laterality Date    AORTOGRAPHY WITH SERIALOGRAPHY N/A 11/16/2021    Procedure: AORTOGRAM, WITH RUNOFF;  Surgeon: Rodrigo Willoughby MD;  Location: [facility] CATH/EP LAB;  Service: Cardiology;  Laterality: N/A;    ARTERIOGRAPHY OF AORTIC ROOT N/A 11/5/2019    Procedure: ARTERIOGRAM, AORTIC ROOT;  Surgeon: Rodrigo Willoughby MD;  Location: [facility] CATH/EP LAB;  Service: Cardiology;  Laterality: N/A;    CARDIAC CATHETERIZATION      CARDIAC VALVE SURGERY      CORONARY ANGIOGRAPHY INCLUDING BYPASS GRAFTS WITH CATHETERIZATION OF LEFT HEART N/A 11/5/2019    Procedure: ANGIOGRAM, CORONARY, INCLUDING BYPASS GRAFT, WITH LEFT HEART CATHETERIZATION;  Surgeon: Rodrigo Willoughby MD;  Location: [facility] CATH/EP LAB;  Service: Cardiology;  Laterality: N/A;    CORONARY ANGIOGRAPHY INCLUDING BYPASS GRAFTS WITH CATHETERIZATION OF LEFT HEART Left 11/3/2020    Procedure: ANGIOGRAM, CORONARY, INCLUDING BYPASS GRAFT, WITH LEFT HEART CATHETERIZATION;  Surgeon: Rodrigo Willoughby MD;  Location: [facility] CATH/EP LAB;  Service: Cardiology;  Laterality: Left;    CORONARY ANGIOGRAPHY INCLUDING BYPASS GRAFTS WITH CATHETERIZATION OF LEFT HEART N/A 9/28/2021    Procedure: ANGIOGRAM, CORONARY, INCLUDING BYPASS GRAFT, WITH LEFT HEART CATHETERIZATION;  Surgeon: Rodrigo Willoughby MD;  Location: [facility] CATH/EP LAB;  Service: Cardiology;  Laterality: N/A;    CORONARY ANGIOPLASTY      CORONARY ARTERY BYPASS GRAFT      EXTRACORPOREAL SHOCK WAVE LITHOTRIPSY Right  8/1/2019    Procedure: LITHOTRIPSY, ESWL;  Surgeon: Williams Ortega MD;  Location: [facility] OR;  Service: Urology;  Laterality: Right;    HEMORRHOID SURGERY  1990    INSERTION OF PACEMAKER      PERCUTANEOUS TRANSLUMINAL BALLOON ANGIOPLASTY OF CORONARY ARTERY N/A 11/8/2019    Procedure: Angioplasty-coronary;  Surgeon: Rodrigo Willoughby MD;  Location: [facility] CATH/EP LAB;  Service: Cardiology;  Laterality: N/A;    WRIST FRACTURE SURGERY         Review of patient's allergies indicates:   Allergen Reactions    Atorvastatin Other (See Comments)     Muscle pain    Rosuvastatin Other (See Comments)     Muscle pain       Current Facility-Administered Medications on File Prior to Encounter   Medication    magnesium oxide tablet 800 mg    sodium chloride 0.9% flush 10 mL     Current Outpatient Medications on File Prior to Encounter   Medication Sig    amLODIPine (NORVASC) 5 MG tablet Take 5 mg by mouth once daily.    citalopram (CELEXA) 20 MG tablet Take 20 mg by mouth once daily.    clopidogrel (PLAVIX) 75 mg tablet Take 75 mg by mouth once daily.    ezetimibe (ZETIA) 10 mg tablet Take 10 mg by mouth once daily.    hydrOXYzine pamoate (VISTARIL) 25 MG Cap Take 25 mg by mouth once daily.    isosorbide mononitrate (ISMO,MONOKET) 20 MG Tab Take 40 mg by mouth daily as needed.    ivabradine (CORLANOR) 5 mg Tab Take 1 tablet by mouth 2 (two) times a day.    lisinopriL (PRINIVIL,ZESTRIL) 40 MG tablet Take 40 mg by mouth once daily.    metoprolol succinate (TOPROL-XL) 50 MG 24 hr tablet TAKE 2 TABLETS BY MOUTH IN AM AND 1 TAB IN THE EVENING    NEXLIZET 180-10 mg Tab Take 1 tablet by mouth once daily.    NITROLINGUAL 400 mcg/spray spray Place 1 spray under the tongue every 5 (five) minutes as needed.    phenytoin (DILANTIN) 100 MG ER capsule Take 400 mg by mouth once daily.     ranolazine (RANEXA) 1,000 mg Tb12 Take 1,000 mg by mouth 2 (two) times daily.     diclofenac sodium (PENNSAID) 2 % SoPk Apply 1 packet  topically once daily.    furosemide (LASIX) 20 MG tablet Take 20 mg by mouth daily as needed.     isosorbide dinitrate (ISORDIL) 20 MG tablet Take 40 mg by mouth daily as needed.     mupirocin (BACTROBAN) 2 % ointment Apply topically 3 (three) times daily.    [DISCONTINUED] doxycycline (VIBRA-TABS) 100 MG tablet Take 1 tablet (100 mg total) by mouth 2 (two) times daily.     Family History       Problem Relation (Age of Onset)    Diabetes Sister    Heart attack Mother, Father    Heart disease Sister    No Known Problems Maternal Grandmother, Maternal Grandfather, Paternal Grandmother, Paternal Grandfather, Brother, Maternal Aunt, Maternal Uncle, Paternal Aunt, Paternal Uncle    Stroke Sister          Tobacco Use    Smoking status: Former     Current packs/day: 0.00     Average packs/day: 0.5 packs/day for 4.0 years (2.0 ttl pk-yrs)     Types: Cigarettes     Start date:      Quit date:      Years since quittin.9    Smokeless tobacco: Never   Substance and Sexual Activity    Alcohol use: Yes     Comment: social    Drug use: No    Sexual activity: Not Currently     Review of Systems   Constitutional: Negative.    HENT: Negative.     Eyes: Negative.    Respiratory:  Positive for shortness of breath. Negative for cough, wheezing and stridor.    Cardiovascular:  Positive for chest pain and leg swelling. Negative for palpitations.   Gastrointestinal: Negative.    Endocrine: Negative.    Genitourinary: Negative.    Musculoskeletal: Negative.    Skin: Negative.    Neurological: Negative.    Hematological: Negative.    Psychiatric/Behavioral: Negative.       Objective:     Vital Signs (Most Recent):  Temp: 98.3 °F (36.8 °C) (23 0833)  Pulse: 75 (23 1300)  Resp: 19 (23 1200)  BP: (!) 143/72 (23 1300)  SpO2: 97 % (23 1300) Vital Signs (24h Range):  Temp:  [98.3 °F (36.8 °C)] 98.3 °F (36.8 °C)  Pulse:  [71-86] 75  Resp:  [18-40] 19  SpO2:  [96 %-100 %] 97 %  BP:  (137-152)/(72-88) 143/72     Weight: 83.9 kg (185 lb)  Body mass index is 29.86 kg/m².     Physical Exam  Vitals and nursing note reviewed.   Constitutional:       Appearance: Normal appearance.   HENT:      Head: Normocephalic and atraumatic.      Nose: Nose normal.   Eyes:      Extraocular Movements: Extraocular movements intact.      Conjunctiva/sclera: Conjunctivae normal.   Cardiovascular:      Rate and Rhythm: Normal rate and regular rhythm.      Heart sounds: No murmur heard.  Pulmonary:      Effort: Respiratory distress present.      Breath sounds: No stridor. Rales present. No wheezing.      Comments: On bipap  Abdominal:      General: Abdomen is flat. There is no distension.      Palpations: Abdomen is soft.      Tenderness: There is no abdominal tenderness.   Musculoskeletal:         General: No swelling, tenderness or deformity. Normal range of motion.      Cervical back: Normal range of motion and neck supple.      Right lower leg: Edema present.      Left lower leg: Edema present.   Skin:     General: Skin is warm and dry.      Coloration: Skin is not jaundiced.   Neurological:      General: No focal deficit present.      Mental Status: He is alert and oriented to person, place, and time.                Significant Labs: All pertinent labs within the past 24 hours have been reviewed.  Recent Lab Results         12/02/23  1209   12/02/23  1024   12/02/23  0836   12/02/23  0834        Albumin       4.3       ALP       75       Allens Test     N/A         ALT       11       Anion Gap       11       AST       20       Baso #       0.08       Basophil %       0.8       BILIRUBIN TOTAL       1.0  Comment: For infants and newborns, interpretation of results should be based  on gestational age, weight and in agreement with clinical  observations.    Premature Infant recommended reference ranges:  Up to 24 hours.............<8.0 mg/dL  Up to 48 hours............<12.0 mg/dL  3-5 days..................<15.0  mg/dL  6-29 days.................<15.0 mg/dL         BNP       773  Comment: Values of less than 100 pg/ml are consistent with non-CHF populations.       Site     Other         BUN       20       Calcium       8.9       Chloride       101       CO2       22       Creatinine       1.2       DelSys     NRB         Differential Method       Automated       eGFR       >60.0       Eos #       0.1       Eosinophil %       0.8       Flow     15         Glucose       127       Gran # (ANC)       8.1       Gran %       77.6       Hematocrit       38.4       Hemoglobin       13.0       Immature Grans (Abs)       0.03  Comment: Mild elevation in immature granulocytes is non specific and   can be seen in a variety of conditions including stress response,   acute inflammation, trauma and pregnancy. Correlation with other   laboratory and clinical findings is essential.         Immature Granulocytes       0.3       Lymph #       1.0       Lymph %       9.3       Magnesium        1.8       MCH       30.7       MCHC       33.9       MCV       91       Mode     SPONT         Mono #       1.2       Mono %       11.2       MPV       10.8       nRBC       0       Phenytoin Lvl       13.6       Platelet Count       188       POC BE     -1         POC Glucose     120         POC HCO3     24.7         POC Hematocrit     39         POC Ionized Calcium     1.13         POC PCO2     43.5         POC PH     7.362         POC PO2     27         Potassium, Blood Gas     5.1         POC SATURATED O2     47         Sodium, Blood Gas     136         POC TCO2     26         Potassium       4.9       PROTEIN TOTAL       7.2       RBC       4.23       RDW       13.6       Sample     VENOUS         SARS-CoV-2 RNA, Amplification, Qual Negative  Comment: This test utilizes isothermal nucleic acid amplification technology   to   detect the SARS-CoV-2 RdRp nucleic acid segment. The analytical   sensitivity   (limit of detection) is 500 copies/swab.     A  POSITIVE result is indicative of the presence of SARS-CoV-2 RNA;   clinical   correlation with patient history and other diagnostic information is   necessary to determine patient infection status.    A NEGATIVE result means that SARS-CoV-2 nucleic acids are not present   above   the limit of detection. A NEGATIVE result should be treated as   presumptive.   It does not rule out the possibility of COVID-19 and should not be   the sole   basis for treatment decisions. If COVID-19 is strongly suspected   based on   clinical and exposure history, re-testing using an alternate   molecular assay   should be considered.     This test is only for use under the Food and Drug Administration s   Emergency   Use Authorization (EUA).     Commercial kits are provided by [employer]. Performance   characteristics of the EUA have been independently verified by   [facility]  _________________________________________________________________   The authorized Fact Sheet for Healthcare Providers and the authorized   Fact   Sheet for Patients of the ID NOW COVID-19 are available on the FDA   website:   https://www.fda.gov/media/951751/download   https://www.fda.gov/media/848200/download               Sodium       134       Sp02     98         Troponin I High Sensitivity   458.4  Comment: Troponin results differ between methods. Do not use   results between Troponin methods interchangeably.    Alkaline Phospatase levels above 400 U/L may   cause false positive results.    Access hsTnI should not be used for patients taking   Asfotase sanjay (Strensiq).  Critical result TNIHS 458.4 pg/mL called to and read back by Amy Jerez RN/ED at 02-Dec-2023 12:23 by [facility].  Result Rechecked       94.3  Comment: Troponin results differ between methods. Do not use   results between Troponin methods interchangeably.    Alkaline Phospatase levels above 400 U/L may   cause false positive results.    Access  hsTnI should not be used for patients taking   Asfotase sanjay (Strensiq).  Critical result TNIHS 94.3 pg/mL called to and read back by Amy Jerez RN/ED at 02-Dec-2023 10:17 by [facility].  Result Rechecked         WBC       10.39               Significant Imaging: I have reviewed all pertinent imaging results/findings within the past 24 hours.
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
## 2023-12-02 NOTE — CARE UPDATE
12/02/23 1504   PRE-TX-O2   Device (Oxygen Therapy) nasal cannula   $ Is the patient on Low Flow Oxygen? Yes   Flow (L/min) 3   SpO2 98 %   Pulse Oximetry Type Continuous   $ Pulse Oximetry - Multiple Charge Pulse Oximetry - Multiple   Pulse 79   Resp 16   /76   Respiratory Evaluation   $ Care Plan Tech Time 15 min

## 2023-12-02 NOTE — Clinical Note
The catheter was inserted into the and was inserted over the wire into the left ventricle. Hemodynamics were performed.  and Pullback was recorded.  An angiography was performed of the lV. The angiography was performed via power injection. The injected amount was 18 mL contrast at 12 mL/s.

## 2023-12-02 NOTE — NURSING
Nurses Note -- 4 Eyes      12/2/2023   3:22 PM      Skin assessed during: Admit      [x] No Altered Skin Integrity Present    []Prevention Measures Documented      [] Yes- Altered Skin Integrity Present or Discovered   [] LDA Added if Not in Epic (Describe Wound)   [] New Altered Skin Integrity was Present on Admit and Documented in LDA   [] Wound Image Taken    Wound Care Consulted? No    Attending Nurse:  Ashley SUERO RN    Second RN/Staff Member:     Renata CABALLERO RN

## 2023-12-02 NOTE — ASSESSMENT & PLAN NOTE
Troponin was 94.3 with repeat of 458.  Trend troponin.  Has been on Eliquis and Plavix outpatient.  We will start therapeutic Lovenox.  Cardiology consulted.

## 2023-12-02 NOTE — ASSESSMENT & PLAN NOTE
Patient is identified as having Diastolic (HFpEF) heart failure that is Acute on chronic. CHF is currently controlled. Latest ECHO performed and demonstrates- No results found for this or any previous visit.  . Continue Beta Blocker, ACE/ARB, and Furosemide and monitor clinical status closely. Monitor on telemetry. Patient is on CHF pathway.  Monitor strict Is&Os and daily weights.  Place on fluid restriction of 1.5 L. Cardiology has been consulted. Continue to stress to patient importance of self efficacy and  on diet for CHF. Last BNP reviewed- and noted below   Recent Labs   Lab 12/02/23  0834   *   .  Currently on BiPAP.  Continue IV Lasix 40 mg b.i.d..

## 2023-12-02 NOTE — Clinical Note
85 ml of contrast were injected throughout the case. 40 mL of contrast was the total wasted during the case. 125 mL was the total amount used during the case.

## 2023-12-02 NOTE — CARE UPDATE
12/02/23 0840   Patient Assessment/Suction   Level of Consciousness (AVPU) alert   Respiratory Effort Mild;Labored   Expansion/Accessory Muscles/Retractions abdominal muscle use;accessory muscle use   All Lung Fields Breath Sounds wheezes, expiratory;crackles, fine   Rhythm/Pattern, Respiratory assisted mechanically;shortness of breath;shallow;tachypneic   Cough Frequency no cough   PRE-TX-O2   Device (Oxygen Therapy) BIPAP   $ Is the patient on Low Flow Oxygen? Yes   Oxygen Concentration (%) 80   Pulse Oximetry Type Continuous   $ Pulse Oximetry - Multiple Charge Pulse Oximetry - Multiple   Ready to Wean/Extubation Screen   FIO2<=50 (chart decimal) (!) 0.8   Preset CPAP/BiPAP Settings   Mode Of Delivery BiPAP S/T   $ CPAP/BiPAP Daily Charge BiPAP/CPAP Daily   $ Initial CPAP/BiPAP Setup? Yes   $ Is patient using? Yes   Size of Mask Small   Sized Appropriately? Yes   Equipment Type V60   Airway Device Type small full face mask   Humidifier not applicable   Ipap 12   EPAP (cm H2O) 6   Pressure Support (cm H2O) 6   Set Rate (Breaths/Min) 16   ITime (sec) 1   Rise Time (sec) 3   Patient CPAP/BiPAP Settings   CPAP/BIPAP ID   (')   Timed Inspiration (Sec) 1   IPAP Rise Time (sec) 3   RR Total (Breaths/Min) 32   Tidal Volume (mL) 426   VE Minute Ventilation (L/min) 13.4 L/min   Peak Inspiratory Pressure (cm H2O) 13   TiTOT (%) 32   Total Leak (L/Min) 15   Patient Trigger - ST Mode Only (%) 72   CPAP/BiPAP Backup Settings   Backup Rate 16 breaths per minute (bpm)   CPAP/BiPAP Alarms   High Pressure (cm H2O) 40   Low Pressure (cm H2O) 5   Minute Ventilation (L/Min) 3   High RR (breaths/min) 50   Low RR (breaths/min) 8   Apnea (Sec) 20   Education   $ Education BiPAP;15 min   Respiratory Evaluation   $ Care Plan Tech Time 15 min   $ Eval/Re-eval Charges Evaluation   Evaluation For New Orders   Admitting Diagnosis CHEST PAIN   Cardiac Diagnosis CHF   Pulmonary Diagnosis NA   Home Oxygen   Has Home Oxygen? No   Home Aerosol,  MDI, DPI, and Other Treatments/Therapies   Home Respiratory Therapy Per Patient/Review of Chart No

## 2023-12-02 NOTE — Clinical Note
The catheter was repositioned into the ostial SVG graft. An angiography was performed of the graft. Multiple views were taken. The angiography was performed via power injection. The injected amount was 6 mL contrast at 3 mL/s.  SVG to LAD, CX, RCA, OM

## 2023-12-02 NOTE — PHARMACY MED REC
"Admission Medication History     The home medication history was taken by Julia Hinojosa.    You may go to "Admission" then "Reconcile Home Medications" tabs to review and/or act upon these items.     The home medication list has been updated by the Pharmacy department.   Please read ALL comments highlighted in yellow.   Please address this information as you see fit.    Feel free to contact us if you have any questions or require assistance.      The medications listed below were removed from the home medication list. Please reorder if appropriate:  Patient reports no longer taking the following medication(s):  Doxycycline 100        Medications listed below were obtained from: Patient/family and Analytic software- [employer]  Current Facility-Administered Medications on File Prior to Encounter   Medication Dose Route Frequency Provider Last Rate Last Admin    magnesium oxide tablet 800 mg  800 mg Oral PRN Rodrigo Willoughby MD   800 mg at 11/16/21 0737    sodium chloride 0.9% flush 10 mL  10 mL Intravenous PRN Rodrigo Willoughby MD         Current Outpatient Medications on File Prior to Encounter   Medication Sig Dispense Refill    amLODIPine (NORVASC) 5 MG tablet Take 5 mg by mouth once daily.      citalopram (CELEXA) 20 MG tablet Take 20 mg by mouth once daily.      clopidogrel (PLAVIX) 75 mg tablet Take 75 mg by mouth once daily.      ezetimibe (ZETIA) 10 mg tablet Take 10 mg by mouth once daily.      hydrOXYzine pamoate (VISTARIL) 25 MG Cap Take 25 mg by mouth once daily.      isosorbide mononitrate (ISMO,MONOKET) 20 MG Tab Take 40 mg by mouth daily as needed.      ivabradine (CORLANOR) 5 mg Tab Take 1 tablet by mouth 2 (two) times a day.      lisinopriL (PRINIVIL,ZESTRIL) 40 MG tablet Take 40 mg by mouth once daily.      metoprolol succinate (TOPROL-XL) 50 MG 24 hr tablet TAKE 2 TABLETS BY MOUTH IN AM AND 1 TAB IN THE EVENING      NEXLIZET 180-10 mg Tab Take 1 tablet by mouth once daily.      NITROLINGUAL 400 mcg/spray " spray Place 1 spray under the tongue every 5 (five) minutes as needed.      phenytoin (DILANTIN) 100 MG ER capsule Take 400 mg by mouth once daily.   0    ranolazine (RANEXA) 1,000 mg Tb12 Take 1,000 mg by mouth 2 (two) times daily.       diclofenac sodium (PENNSAID) 2 % SoPk Apply 1 packet topically once daily.      furosemide (LASIX) 20 MG tablet Take 20 mg by mouth daily as needed.       isosorbide dinitrate (ISORDIL) 20 MG tablet Take 40 mg by mouth daily as needed.       mupirocin (BACTROBAN) 2 % ointment Apply topically 3 (three) times daily. 22 g 0    [DISCONTINUED] doxycycline (VIBRA-TABS) 100 MG tablet Take 1 tablet (100 mg total) by mouth 2 (two) times daily. 20 tablet 0       Potential issues to be addressed PRIOR TO DISCHARGE  none    Julia Hinojosa  [id number]                  .

## 2023-12-02 NOTE — H&P
[facility] - Emergency Dept  Hospital Medicine  History & Physical    Patient Name: Jerome Amaro Jr.  MRN: [id number]  Patient Class: IP- Inpatient  Admission Date: 12/2/2023  Attending Physician: Jacob Casas MD   Primary Care Provider: Oscar Madrid NP         Patient information was obtained from patient, relative(s), past medical records, and ER records.     Subjective:     Principal Problem:NSTEMI (non-ST elevated myocardial infarction)    Chief Complaint:   Chief Complaint   Patient presents with    Chest Pain        HPI: Patient is a 78-year-old male with CAD, PVD, hypertension, seizures, and CHF who presents with shortness breath and chest pain.  Shortness for breath and chest pain began earlier this morning.  Patient took 2 sublingual nitro was at home with some improvement in chest pain.  Chest pain is located centrally chest and radiates to his left arm.  Pain was 8/10.  Patient was given 324 mg of aspirin.  Patient was found to be 84% on room air.  Patient was placed on a non-rebreather.  On arrival to ED, patient was placed on BiPAP.  WBC 10.3 and hemoglobin 13.0.  Creatinine 1.2.  Troponin was 94.3 with repeat of 458.  .  EKG with atrial sensed ventricular paced rhythm.  Chest x-ray consistent with CHF.  Patient was given IV Lasix in ED.  Cardiology consulted.    Past Medical History:   Diagnosis Date    Anticoagulant long-term use     2014    Aortic valve disease 2014    pig valve    Arthritis     all over    Chest pain 07/15/2019    CHF (congestive heart failure)     2014 on meds    Coronary artery disease     2007 cabg    Heart attack 1990    15 stents    Heart block     Hyperlipidemia     Hypertension     on meds    Hypothyroidism 2015    on meds    PVD (peripheral vascular disease)     Seizures     last episode 1990 on meds       Past Surgical History:   Procedure Laterality Date    AORTOGRAPHY WITH SERIALOGRAPHY N/A 11/16/2021    Procedure:  AORTOGRAM, WITH RUNOFF;  Surgeon: Rodrigo Willoughby MD;  Location: [facility] CATH/EP LAB;  Service: Cardiology;  Laterality: N/A;    ARTERIOGRAPHY OF AORTIC ROOT N/A 11/5/2019    Procedure: ARTERIOGRAM, AORTIC ROOT;  Surgeon: Rodrigo Willoughby MD;  Location: [facility] CATH/EP LAB;  Service: Cardiology;  Laterality: N/A;    CARDIAC CATHETERIZATION      CARDIAC VALVE SURGERY      CORONARY ANGIOGRAPHY INCLUDING BYPASS GRAFTS WITH CATHETERIZATION OF LEFT HEART N/A 11/5/2019    Procedure: ANGIOGRAM, CORONARY, INCLUDING BYPASS GRAFT, WITH LEFT HEART CATHETERIZATION;  Surgeon: Rodrigo Willoughby MD;  Location: [facility] CATH/EP LAB;  Service: Cardiology;  Laterality: N/A;    CORONARY ANGIOGRAPHY INCLUDING BYPASS GRAFTS WITH CATHETERIZATION OF LEFT HEART Left 11/3/2020    Procedure: ANGIOGRAM, CORONARY, INCLUDING BYPASS GRAFT, WITH LEFT HEART CATHETERIZATION;  Surgeon: Rodrigo Willoughby MD;  Location: [facility] CATH/EP LAB;  Service: Cardiology;  Laterality: Left;    CORONARY ANGIOGRAPHY INCLUDING BYPASS GRAFTS WITH CATHETERIZATION OF LEFT HEART N/A 9/28/2021    Procedure: ANGIOGRAM, CORONARY, INCLUDING BYPASS GRAFT, WITH LEFT HEART CATHETERIZATION;  Surgeon: Rodrigo Willoughby MD;  Location: [facility] CATH/EP LAB;  Service: Cardiology;  Laterality: N/A;    CORONARY ANGIOPLASTY      CORONARY ARTERY BYPASS GRAFT      EXTRACORPOREAL SHOCK WAVE LITHOTRIPSY Right 8/1/2019    Procedure: LITHOTRIPSY, ESWL;  Surgeon: Williams Ortega MD;  Location: [facility] OR;  Service: Urology;  Laterality: Right;    HEMORRHOID SURGERY  1990    INSERTION OF PACEMAKER      PERCUTANEOUS TRANSLUMINAL BALLOON ANGIOPLASTY OF CORONARY ARTERY N/A 11/8/2019    Procedure: Angioplasty-coronary;  Surgeon: Rodrigo Willoughby MD;  Location: [facility] CATH/EP LAB;  Service: Cardiology;  Laterality: N/A;    WRIST FRACTURE SURGERY         Review of patient's allergies indicates:   Allergen Reactions    Atorvastatin Other (See Comments)     Muscle pain    Rosuvastatin Other (See Comments)      Muscle pain       Current Facility-Administered Medications on File Prior to Encounter   Medication    magnesium oxide tablet 800 mg    sodium chloride 0.9% flush 10 mL     Current Outpatient Medications on File Prior to Encounter   Medication Sig    amLODIPine (NORVASC) 5 MG tablet Take 5 mg by mouth once daily.    citalopram (CELEXA) 20 MG tablet Take 20 mg by mouth once daily.    clopidogrel (PLAVIX) 75 mg tablet Take 75 mg by mouth once daily.    ezetimibe (ZETIA) 10 mg tablet Take 10 mg by mouth once daily.    hydrOXYzine pamoate (VISTARIL) 25 MG Cap Take 25 mg by mouth once daily.    isosorbide mononitrate (ISMO,MONOKET) 20 MG Tab Take 40 mg by mouth daily as needed.    ivabradine (CORLANOR) 5 mg Tab Take 1 tablet by mouth 2 (two) times a day.    lisinopriL (PRINIVIL,ZESTRIL) 40 MG tablet Take 40 mg by mouth once daily.    metoprolol succinate (TOPROL-XL) 50 MG 24 hr tablet TAKE 2 TABLETS BY MOUTH IN AM AND 1 TAB IN THE EVENING    NEXLIZET 180-10 mg Tab Take 1 tablet by mouth once daily.    NITROLINGUAL 400 mcg/spray spray Place 1 spray under the tongue every 5 (five) minutes as needed.    phenytoin (DILANTIN) 100 MG ER capsule Take 400 mg by mouth once daily.     ranolazine (RANEXA) 1,000 mg Tb12 Take 1,000 mg by mouth 2 (two) times daily.     diclofenac sodium (PENNSAID) 2 % SoPk Apply 1 packet topically once daily.    furosemide (LASIX) 20 MG tablet Take 20 mg by mouth daily as needed.     isosorbide dinitrate (ISORDIL) 20 MG tablet Take 40 mg by mouth daily as needed.     mupirocin (BACTROBAN) 2 % ointment Apply topically 3 (three) times daily.    [DISCONTINUED] doxycycline (VIBRA-TABS) 100 MG tablet Take 1 tablet (100 mg total) by mouth 2 (two) times daily.     Family History       Problem Relation (Age of Onset)    Diabetes Sister    Heart attack Mother, Father    Heart disease Sister    No Known Problems Maternal Grandmother, Maternal Grandfather, Paternal Grandmother, Paternal  Grandfather, Brother, Maternal Aunt, Maternal Uncle, Paternal Aunt, Paternal Uncle    Stroke Sister          Tobacco Use    Smoking status: Former     Current packs/day: 0.00     Average packs/day: 0.5 packs/day for 4.0 years (2.0 ttl pk-yrs)     Types: Cigarettes     Start date:      Quit date:      Years since quittin.9    Smokeless tobacco: Never   Substance and Sexual Activity    Alcohol use: Yes     Comment: social    Drug use: No    Sexual activity: Not Currently     Review of Systems   Constitutional: Negative.    HENT: Negative.     Eyes: Negative.    Respiratory:  Positive for shortness of breath. Negative for cough, wheezing and stridor.    Cardiovascular:  Positive for chest pain and leg swelling. Negative for palpitations.   Gastrointestinal: Negative.    Endocrine: Negative.    Genitourinary: Negative.    Musculoskeletal: Negative.    Skin: Negative.    Neurological: Negative.    Hematological: Negative.    Psychiatric/Behavioral: Negative.       Objective:     Vital Signs (Most Recent):  Temp: 98.3 °F (36.8 °C) (23 0833)  Pulse: 75 (23 1300)  Resp: 19 (23 1200)  BP: (!) 143/72 (23 1300)  SpO2: 97 % (23 1300) Vital Signs (24h Range):  Temp:  [98.3 °F (36.8 °C)] 98.3 °F (36.8 °C)  Pulse:  [71-86] 75  Resp:  [18-40] 19  SpO2:  [96 %-100 %] 97 %  BP: (137-152)/(72-88) 143/72     Weight: 83.9 kg (185 lb)  Body mass index is 29.86 kg/m².     Physical Exam  Vitals and nursing note reviewed.   Constitutional:       Appearance: Normal appearance.   HENT:      Head: Normocephalic and atraumatic.      Nose: Nose normal.   Eyes:      Extraocular Movements: Extraocular movements intact.      Conjunctiva/sclera: Conjunctivae normal.   Cardiovascular:      Rate and Rhythm: Normal rate and regular rhythm.      Heart sounds: No murmur heard.  Pulmonary:      Effort: Respiratory distress present.      Breath sounds: No stridor. Rales present. No wheezing.      Comments: On  bipap  Abdominal:      General: Abdomen is flat. There is no distension.      Palpations: Abdomen is soft.      Tenderness: There is no abdominal tenderness.   Musculoskeletal:         General: No swelling, tenderness or deformity. Normal range of motion.      Cervical back: Normal range of motion and neck supple.      Right lower leg: Edema present.      Left lower leg: Edema present.   Skin:     General: Skin is warm and dry.      Coloration: Skin is not jaundiced.   Neurological:      General: No focal deficit present.      Mental Status: He is alert and oriented to person, place, and time.                Significant Labs: All pertinent labs within the past 24 hours have been reviewed.  Recent Lab Results         12/02/23  1209   12/02/23  1024   12/02/23  0836   12/02/23  0834        Albumin       4.3       ALP       75       Allens Test     N/A         ALT       11       Anion Gap       11       AST       20       Baso #       0.08       Basophil %       0.8       BILIRUBIN TOTAL       1.0  Comment: For infants and newborns, interpretation of results should be based  on gestational age, weight and in agreement with clinical  observations.    Premature Infant recommended reference ranges:  Up to 24 hours.............<8.0 mg/dL  Up to 48 hours............<12.0 mg/dL  3-5 days..................<15.0 mg/dL  6-29 days.................<15.0 mg/dL         BNP       773  Comment: Values of less than 100 pg/ml are consistent with non-CHF populations.       Site     Other         BUN       20       Calcium       8.9       Chloride       101       CO2       22       Creatinine       1.2       DelSys     NRB         Differential Method       Automated       eGFR       >60.0       Eos #       0.1       Eosinophil %       0.8       Flow     15         Glucose       127       Gran # (ANC)       8.1       Gran %       77.6       Hematocrit       38.4       Hemoglobin       13.0       Immature Grans (Abs)       0.03  Comment:  Mild elevation in immature granulocytes is non specific and   can be seen in a variety of conditions including stress response,   acute inflammation, trauma and pregnancy. Correlation with other   laboratory and clinical findings is essential.         Immature Granulocytes       0.3       Lymph #       1.0       Lymph %       9.3       Magnesium        1.8       MCH       30.7       MCHC       33.9       MCV       91       Mode     SPONT         Mono #       1.2       Mono %       11.2       MPV       10.8       nRBC       0       Phenytoin Lvl       13.6       Platelet Count       188       POC BE     -1         POC Glucose     120         POC HCO3     24.7         POC Hematocrit     39         POC Ionized Calcium     1.13         POC PCO2     43.5         POC PH     7.362         POC PO2     27         Potassium, Blood Gas     5.1         POC SATURATED O2     47         Sodium, Blood Gas     136         POC TCO2     26         Potassium       4.9       PROTEIN TOTAL       7.2       RBC       4.23       RDW       13.6       Sample     VENOUS         SARS-CoV-2 RNA, Amplification, Qual Negative  Comment: This test utilizes isothermal nucleic acid amplification technology   to   detect the SARS-CoV-2 RdRp nucleic acid segment. The analytical   sensitivity   (limit of detection) is 500 copies/swab.     A POSITIVE result is indicative of the presence of SARS-CoV-2 RNA;   clinical   correlation with patient history and other diagnostic information is   necessary to determine patient infection status.    A NEGATIVE result means that SARS-CoV-2 nucleic acids are not present   above   the limit of detection. A NEGATIVE result should be treated as   presumptive.   It does not rule out the possibility of COVID-19 and should not be   the sole   basis for treatment decisions. If COVID-19 is strongly suspected   based on   clinical and exposure history, re-testing using an alternate   molecular assay   should be considered.      This test is only for use under the Food and Drug Administration s   Emergency   Use Authorization (EUA).     Commercial kits are provided by [employer]. Performance   characteristics of the EUA have been independently verified by   [facility]  _________________________________________________________________   The authorized Fact Sheet for Healthcare Providers and the authorized   Fact   Sheet for Patients of the ID NOW COVID-19 are available on the FDA   website:   https://www.fda.gov/media/013436/download   https://www.fda.gov/media/026993/download               Sodium       134       Sp02     98         Troponin I High Sensitivity   458.4  Comment: Troponin results differ between methods. Do not use   results between Troponin methods interchangeably.    Alkaline Phospatase levels above 400 U/L may   cause false positive results.    Access hsTnI should not be used for patients taking   Asfotase sanjay (Strensiq).  Critical result TNIHS 458.4 pg/mL called to and read back by Amy Jerez RN/ED at 02-Dec-2023 12:[facility]  Result Rechecked       94.3  Comment: Troponin results differ between methods. Do not use   results between Troponin methods interchangeably.    Alkaline Phospatase levels above 400 U/L may   cause false positive results.    Access hsTnI should not be used for patients taking   Asfotase sanjay (Strensiq).  Critical result TNIHS 94.3 pg/mL called to and read back by Amy Jerez RN/ED at 02-Dec-2023 10:17 [facility]  Result Rechecked         WBC       10.39               Significant Imaging: I have reviewed all pertinent imaging results/findings within the past 24 hours.  Assessment/Plan:     * NSTEMI (non-ST elevated myocardial infarction)  Troponin was 94.3 with repeat of 458.  Trend troponin.  Has been on Eliquis and Plavix outpatient.  We will start therapeutic Lovenox.  Cardiology consulted.        Congestive heart  failure  Patient is identified as having Diastolic (HFpEF) heart failure that is Acute on chronic. CHF is currently controlled. Latest ECHO performed and demonstrates- No results found for this or any previous visit.  . Continue Beta Blocker, ACE/ARB, and Furosemide and monitor clinical status closely. Monitor on telemetry. Patient is on CHF pathway.  Monitor strict Is&Os and daily weights.  Place on fluid restriction of 1.5 L. Cardiology has been consulted. Continue to stress to patient importance of self efficacy and  on diet for CHF. Last BNP reviewed- and noted below   Recent Labs   Lab 12/02/23  0834   *   .  Currently on BiPAP.  Continue IV Lasix 40 mg b.i.d..    Seizure disorder  Continue home medication      HTN (hypertension)  Continue home medications      VTE Risk Mitigation (From admission, onward)           Ordered     enoxaparin injection 80 mg  Every 12 hours         12/02/23 1230     IP VTE HIGH RISK PATIENT  Once         12/02/23 1202     Place sequential compression device  Until discontinued         12/02/23 1202                                    Jacob Casas MD  Department of Hospital Medicine  [facility] - Emergency Dept

## 2023-12-02 NOTE — HPI
Patient is a 78-year-old male with CAD, PVD, hypertension, seizures, and CHF who presents with shortness breath and chest pain.  Shortness for breath and chest pain began earlier this morning.  Patient took 2 sublingual nitro was at home with some improvement in chest pain.  Chest pain is located centrally chest and radiates to his left arm.  Pain was 8/10.  Patient was given 324 mg of aspirin.  Patient was found to be 84% on room air.  Patient was placed on a non-rebreather.  On arrival to ED, patient was placed on BiPAP.  WBC 10.3 and hemoglobin 13.0.  Creatinine 1.2.  Troponin was 94.3 with repeat of 458.  .  EKG with atrial sensed ventricular paced rhythm.  Chest x-ray consistent with CHF.  Patient was given IV Lasix in ED.  Cardiology consulted.

## 2023-12-03 PROBLEM — F41.9 ANXIETY AND DEPRESSION: Status: ACTIVE | Noted: 2023-12-03

## 2023-12-03 PROBLEM — F32.A ANXIETY AND DEPRESSION: Status: ACTIVE | Noted: 2023-12-03

## 2023-12-03 LAB
ANION GAP SERPL CALC-SCNC: 11 MMOL/L (ref 8–16)
BUN SERPL-MCNC: 24 MG/DL (ref 8–23)
CALCIUM SERPL-MCNC: 9 MG/DL (ref 8.7–10.5)
CHLORIDE SERPL-SCNC: 97 MMOL/L (ref 95–110)
CHOLEST SERPL-MCNC: 271 MG/DL (ref 120–199)
CHOLEST/HDLC SERPL: 6 {RATIO} (ref 2–5)
CO2 SERPL-SCNC: 26 MMOL/L (ref 23–29)
CREAT SERPL-MCNC: 1.4 MG/DL (ref 0.5–1.4)
EST. GFR  (NO RACE VARIABLE): 51.1 ML/MIN/1.73 M^2
ESTIMATED AVG GLUCOSE: 103 MG/DL (ref 68–131)
GLUCOSE SERPL-MCNC: 99 MG/DL (ref 70–110)
HBA1C MFR BLD: 5.2 % (ref 4.5–6.2)
HDLC SERPL-MCNC: 45 MG/DL (ref 40–75)
HDLC SERPL: 16.6 % (ref 20–50)
LDLC SERPL CALC-MCNC: 201.2 MG/DL (ref 63–159)
MAGNESIUM SERPL-MCNC: 1.9 MG/DL (ref 1.6–2.6)
NONHDLC SERPL-MCNC: 226 MG/DL
POTASSIUM SERPL-SCNC: 4 MMOL/L (ref 3.5–5.1)
SODIUM SERPL-SCNC: 134 MMOL/L (ref 136–145)
TRIGL SERPL-MCNC: 124 MG/DL (ref 30–150)
TROPONIN I SERPL HS-MCNC: 1236.7 PG/ML (ref 0–14.9)
TROPONIN I SERPL HS-MCNC: 1270.8 PG/ML (ref 0–14.9)
TROPONIN I SERPL HS-MCNC: 2392.2 PG/ML (ref 0–14.9)

## 2023-12-03 PROCEDURE — 99900031 HC PATIENT EDUCATION (STAT)

## 2023-12-03 PROCEDURE — 99900035 HC TECH TIME PER 15 MIN (STAT)

## 2023-12-03 PROCEDURE — 80048 BASIC METABOLIC PNL TOTAL CA: CPT | Performed by: STUDENT IN AN ORGANIZED HEALTH CARE EDUCATION/TRAINING PROGRAM

## 2023-12-03 PROCEDURE — 27000221 HC OXYGEN, UP TO 24 HOURS

## 2023-12-03 PROCEDURE — 25000003 PHARM REV CODE 250: Performed by: STUDENT IN AN ORGANIZED HEALTH CARE EDUCATION/TRAINING PROGRAM

## 2023-12-03 PROCEDURE — 83036 HEMOGLOBIN GLYCOSYLATED A1C: CPT | Performed by: STUDENT IN AN ORGANIZED HEALTH CARE EDUCATION/TRAINING PROGRAM

## 2023-12-03 PROCEDURE — 83735 ASSAY OF MAGNESIUM: CPT | Performed by: STUDENT IN AN ORGANIZED HEALTH CARE EDUCATION/TRAINING PROGRAM

## 2023-12-03 PROCEDURE — 84484 ASSAY OF TROPONIN QUANT: CPT | Mod: 91 | Performed by: STUDENT IN AN ORGANIZED HEALTH CARE EDUCATION/TRAINING PROGRAM

## 2023-12-03 PROCEDURE — 94761 N-INVAS EAR/PLS OXIMETRY MLT: CPT

## 2023-12-03 PROCEDURE — 84484 ASSAY OF TROPONIN QUANT: CPT | Performed by: STUDENT IN AN ORGANIZED HEALTH CARE EDUCATION/TRAINING PROGRAM

## 2023-12-03 PROCEDURE — 94660 CPAP INITIATION&MGMT: CPT

## 2023-12-03 PROCEDURE — 36415 COLL VENOUS BLD VENIPUNCTURE: CPT | Performed by: STUDENT IN AN ORGANIZED HEALTH CARE EDUCATION/TRAINING PROGRAM

## 2023-12-03 PROCEDURE — 21000000 HC CCU ICU ROOM CHARGE

## 2023-12-03 PROCEDURE — 80061 LIPID PANEL: CPT | Performed by: STUDENT IN AN ORGANIZED HEALTH CARE EDUCATION/TRAINING PROGRAM

## 2023-12-03 PROCEDURE — 63600175 PHARM REV CODE 636 W HCPCS: Performed by: STUDENT IN AN ORGANIZED HEALTH CARE EDUCATION/TRAINING PROGRAM

## 2023-12-03 RX ORDER — NITROGLYCERIN 0.4 MG/1
0.4 TABLET SUBLINGUAL EVERY 5 MIN PRN
Status: DISCONTINUED | OUTPATIENT
Start: 2023-12-03 | End: 2023-12-06 | Stop reason: HOSPADM

## 2023-12-03 RX ORDER — CLOPIDOGREL BISULFATE 75 MG/1
75 TABLET ORAL DAILY
Status: DISCONTINUED | OUTPATIENT
Start: 2023-12-03 | End: 2023-12-06 | Stop reason: HOSPADM

## 2023-12-03 RX ORDER — ISOSORBIDE DINITRATE 10 MG/1
40 TABLET ORAL DAILY
Status: DISCONTINUED | OUTPATIENT
Start: 2023-12-03 | End: 2023-12-06 | Stop reason: HOSPADM

## 2023-12-03 RX ORDER — SODIUM CHLORIDE 0.9 % (FLUSH) 0.9 %
10 SYRINGE (ML) INJECTION
Status: DISCONTINUED | OUTPATIENT
Start: 2023-12-03 | End: 2023-12-06 | Stop reason: HOSPADM

## 2023-12-03 RX ADMIN — RANOLAZINE 1000 MG: 500 TABLET, EXTENDED RELEASE ORAL at 09:12

## 2023-12-03 RX ADMIN — ASPIRIN 81 MG: 81 TABLET, COATED ORAL at 09:12

## 2023-12-03 RX ADMIN — CLOPIDOGREL BISULFATE 75 MG: 75 TABLET, FILM COATED ORAL at 09:12

## 2023-12-03 RX ADMIN — PHENYTOIN SODIUM 400 MG: 100 CAPSULE ORAL at 09:12

## 2023-12-03 RX ADMIN — ACETAMINOPHEN 650 MG: 325 TABLET ORAL at 04:12

## 2023-12-03 RX ADMIN — FUROSEMIDE 40 MG: 10 INJECTION, SOLUTION INTRAMUSCULAR; INTRAVENOUS at 05:12

## 2023-12-03 RX ADMIN — AMLODIPINE BESYLATE 5 MG: 5 TABLET ORAL at 09:12

## 2023-12-03 RX ADMIN — ISOSORBIDE DINITRATE 40 MG: 10 TABLET ORAL at 09:12

## 2023-12-03 RX ADMIN — METOPROLOL SUCCINATE 50 MG: 50 TABLET, EXTENDED RELEASE ORAL at 10:12

## 2023-12-03 RX ADMIN — LISINOPRIL 40 MG: 10 TABLET ORAL at 09:12

## 2023-12-03 RX ADMIN — METOPROLOL SUCCINATE 100 MG: 50 TABLET, EXTENDED RELEASE ORAL at 09:12

## 2023-12-03 RX ADMIN — ENOXAPARIN SODIUM 80 MG: 80 INJECTION SUBCUTANEOUS at 11:12

## 2023-12-03 RX ADMIN — CITALOPRAM HYDROBROMIDE 20 MG: 20 TABLET ORAL at 09:12

## 2023-12-03 RX ADMIN — EZETIMIBE 10 MG: 10 TABLET ORAL at 09:12

## 2023-12-03 NOTE — CARE UPDATE
12/03/23 0904   Patient Assessment/Suction   Level of Consciousness (AVPU) alert   Respiratory Effort Unlabored   All Lung Fields Breath Sounds diminished   Skin Integrity   $ Wound Care Tech Time 15 min   Area Observed Bridge of nose   Skin Appearance without discoloration   PRE-TX-O2   Device (Oxygen Therapy) nasal cannula   $ Is the patient on Low Flow Oxygen? Yes   Flow (L/min) 3   SpO2 100 %   Pulse Oximetry Type Continuous   $ Pulse Oximetry - Multiple Charge Pulse Oximetry - Multiple   Pulse 88   Resp 16   /62   Aerosol Therapy   $ Aerosol Therapy Charges PRN treatment not required   Preset CPAP/BiPAP Settings   $ CPAP/BiPAP Daily Charge BiPAP/CPAP Daily   Education   $ Education Bronchodilator;15 min   Respiratory Evaluation   $ Care Plan Tech Time 15 min

## 2023-12-03 NOTE — HOSPITAL COURSE
Jerome Amaro is a 78 year old male with a past medical history of CAD s/p CABG, PVD, HTN, seizures, CHF, CKD, pacemaker, anemia, and MDD/JESUS who presented with an NSTEMI. Cardiology was consulted and performed an angiogram 12/5/2023 with no new interventions. The patient was on IV diuretics and was treated for a CHF exacerbation as well. His home medications were continued and he was able to be discharged 12/6/2023 and will follow up with Cardiology in the outpatient setting.

## 2023-12-03 NOTE — PROGRESS NOTES
"[facility] Medicine  Progress Note    Patient Name: Jerome Amaro Jr.  MRN: [id number]  Patient Class: IP- Inpatient   Admission Date: 12/2/2023  Length of Stay: 1 days  Attending Physician: Endy Ellison MD  Primary Care Provider: Oscar Madrid NP        Subjective:     Principal Problem:NSTEMI (non-ST elevated myocardial infarction)        HPI:  Patient is a 78-year-old male with CAD, PVD, hypertension, seizures, and CHF who presents with shortness breath and chest pain.  Shortness for breath and chest pain began earlier this morning.  Patient took 2 sublingual nitro was at home with some improvement in chest pain.  Chest pain is located centrally chest and radiates to his left arm.  Pain was 8/10.  Patient was given 324 mg of aspirin.  Patient was found to be 84% on room air.  Patient was placed on a non-rebreather.  On arrival to ED, patient was placed on BiPAP.  WBC 10.3 and hemoglobin 13.0.  Creatinine 1.2.  Troponin was 94.3 with repeat of 458.  .  EKG with atrial sensed ventricular paced rhythm.  Chest x-ray consistent with CHF.  Patient was given IV Lasix in ED.  Cardiology consulted.    Overview/Hospital Course:  Jerome Amaro is a 78 year old male with a past medical history of CAD s/p CABG, PVD, HTN, seizures, CHF, CKD, pacemaker, anemia, and MDD/JESUS who presented with an NSTEMI. Cardiology has been consulted and is planning to perform an angiogram 12/5/2023. The patient is on IV diuretics and being treated for a CHF exacerbation as well. He is on ASA, Isordil, lisinopril, metoprolol, and Lovenox. Troponin is being trended.    Interval History: see "Hospital Course"    Review of Systems   Respiratory:  Positive for shortness of breath.    Cardiovascular:  Positive for chest pain and leg swelling. Negative for palpitations.     Objective:     Vital Signs (Most Recent):  Temp: 97.9 °F (36.6 °C) (12/03/23 1100)  Pulse: 85 (12/03/23 1300)  Resp: 18 (12/03/23 " 1300)  BP: (!) 116/58 (12/03/23 1300)  SpO2: 98 % (12/03/23 1300) Vital Signs (24h Range):  Temp:  [97.7 °F (36.5 °C)-99 °F (37.2 °C)] 97.9 °F (36.6 °C)  Pulse:  [79-96] 85  Resp:  [15-25] 18  SpO2:  [94 %-100 %] 98 %  BP: (116-149)/(58-76) 116/58     Weight: 81 kg (178 lb 9.2 oz)  Body mass index is 28.82 kg/m².    Intake/Output Summary (Last 24 hours) at 12/3/2023 1402  Last data filed at 12/3/2023 1201  Gross per 24 hour   Intake 690 ml   Output 3550 ml   Net -2860 ml         Physical Exam  Vitals and nursing note reviewed.   Constitutional:       Appearance: He is ill-appearing.   HENT:      Head: Normocephalic and atraumatic.      Right Ear: External ear normal.      Left Ear: External ear normal.      Nose: Nose normal.      Mouth/Throat:      Mouth: Mucous membranes are moist.      Pharynx: Oropharynx is clear.   Eyes:      Extraocular Movements: Extraocular movements intact.      Conjunctiva/sclera: Conjunctivae normal.   Cardiovascular:      Rate and Rhythm: Normal rate and regular rhythm.      Pulses: Normal pulses.      Heart sounds: Normal heart sounds.   Pulmonary:      Effort: Pulmonary effort is normal.      Breath sounds: Rales present.      Comments: NC.  Abdominal:      General: Bowel sounds are normal. There is no distension.      Palpations: Abdomen is soft.      Tenderness: There is no abdominal tenderness.   Musculoskeletal:         General: Normal range of motion.      Cervical back: Normal range of motion and neck supple.      Right lower leg: Edema present.      Left lower leg: Edema present.   Skin:     General: Skin is warm and dry.      Comments: Sternotomy scar.   Neurological:      Mental Status: He is alert. Mental status is at baseline.   Psychiatric:         Mood and Affect: Mood normal.         Behavior: Behavior normal.             Significant Labs: All pertinent labs within the past 24 hours have been reviewed.    Significant Imaging: I have reviewed all pertinent imaging  results/findings within the past 24 hours.    Assessment/Plan:      * NSTEMI (non-ST elevated myocardial infarction)  -ASA  -Plavix  -Lovenox  -Metoprolol  -Lisinopril  -Isordil  -Cardiology consulted; will perform angiogram 12/5  -Telemetry      Congestive heart failure  Treating for acute exacerbation.  -IV Lasix  -Metoprolol  -Lisinopril  -Telemetry  -Strict I's and O's  -Keep K > 4 and Mg > 2    Anxiety and depression  -Continue home citalopram      Seizure disorder  -Continue home Dilantin      Pacemaker  Chronic. Stable.      Dyslipidemia  -Continue ezetimibe      CAD (coronary artery disease)  -Continue ASA  -Continue metoprolol  -Lovenox  -Telemetry  -Cardiology following    S/P TAVR (transcatheter aortic valve replacement)  Chronic. Stable.      Anemia  Stable.  -Trend Hgb with CBC    HTN (hypertension)  -Continue home amlodipine, nitrate, lisinopril and metoprolol  -Continue to monitor      VTE Risk Mitigation (From admission, onward)           Ordered     enoxaparin injection 80 mg  Every 12 hours         12/02/23 1230     IP VTE HIGH RISK PATIENT  Once         12/02/23 1202     Place sequential compression device  Until discontinued         12/02/23 1202                    Discharge Planning   AXEL:      Code Status: Full Code   Is the patient medically ready for discharge?:     Reason for patient still in hospital (select all that apply): Patient trending condition, Laboratory test, Treatment, and Consult recommendations                     Endy Ellison MD  Department of Hospital Medicine   [facility]

## 2023-12-03 NOTE — RESPIRATORY THERAPY
12/02/23 1950   Patient Assessment/Suction   Level of Consciousness (AVPU) alert   Respiratory Effort Unlabored;Normal   Expansion/Accessory Muscles/Retractions no use of accessory muscles   Rhythm/Pattern, Respiratory unlabored;pattern regular;depth regular;no shortness of breath reported   PRE-TX-O2   Device (Oxygen Therapy) nasal cannula   Flow (L/min) 3   SpO2 (!) 94 %   Pulse Oximetry Type Continuous   $ Pulse Oximetry - Multiple Charge Pulse Oximetry - Multiple   Pulse 85   Resp (!) 21   Aerosol Therapy   $ Aerosol Therapy Charges PRN treatment not required   Daily Review of Necessity (SVN) completed   Respiratory Treatment Status (SVN) PRN treatment not required   Preset CPAP/BiPAP Settings   Mode Of Delivery Standby  (pt stated he does not want to wear and does not wear one at home)   Equipment Type V60   Education   $ Education DME BiPAP;DME Oxygen;15 min   Respiratory Evaluation   $ Care Plan Tech Time 15 min

## 2023-12-03 NOTE — PLAN OF CARE
Problem: Adult Inpatient Plan of Care  Goal: Plan of Care Review  Outcome: Ongoing, Progressing     Problem: Fall Injury Risk  Goal: Absence of Fall and Fall-Related Injury  Outcome: Ongoing, Progressing  Intervention: Identify and Manage Contributors  Flowsheets (Taken 12/3/2023 0147)  Self-Care Promotion: independence encouraged  Medication Review/Management: medications reviewed     Problem: Gas Exchange Impaired  Goal: Optimal Gas Exchange  Outcome: Ongoing, Progressing  Intervention: Optimize Oxygenation and Ventilation  Flowsheets (Taken 12/3/2023 0147)  Airway/Ventilation Management: airway patency maintained  Head of Bed (HOB) Positioning: HOB at 20-30 degrees     Problem: Chest Pain  Goal: Resolution of Chest Pain Symptoms  Outcome: Ongoing, Progressing  Intervention: Manage Acute Chest Pain  Flowsheets (Taken 12/3/2023 0147)  Chest Pain Intervention: activity minimized

## 2023-12-03 NOTE — SUBJECTIVE & OBJECTIVE
"Interval History: see "Hospital Course"    Review of Systems   Respiratory:  Positive for shortness of breath.    Cardiovascular:  Positive for chest pain and leg swelling. Negative for palpitations.     Objective:     Vital Signs (Most Recent):  Temp: 97.9 °F (36.6 °C) (12/03/23 1100)  Pulse: 85 (12/03/23 1300)  Resp: 18 (12/03/23 1300)  BP: (!) 116/58 (12/03/23 1300)  SpO2: 98 % (12/03/23 1300) Vital Signs (24h Range):  Temp:  [97.7 °F (36.5 °C)-99 °F (37.2 °C)] 97.9 °F (36.6 °C)  Pulse:  [79-96] 85  Resp:  [15-25] 18  SpO2:  [94 %-100 %] 98 %  BP: (116-149)/(58-76) 116/58     Weight: 81 kg (178 lb 9.2 oz)  Body mass index is 28.82 kg/m².    Intake/Output Summary (Last 24 hours) at 12/3/2023 1402  Last data filed at 12/3/2023 1201  Gross per 24 hour   Intake 690 ml   Output 3550 ml   Net -2860 ml         Physical Exam  Vitals and nursing note reviewed.   Constitutional:       Appearance: He is ill-appearing.   HENT:      Head: Normocephalic and atraumatic.      Right Ear: External ear normal.      Left Ear: External ear normal.      Nose: Nose normal.      Mouth/Throat:      Mouth: Mucous membranes are moist.      Pharynx: Oropharynx is clear.   Eyes:      Extraocular Movements: Extraocular movements intact.      Conjunctiva/sclera: Conjunctivae normal.   Cardiovascular:      Rate and Rhythm: Normal rate and regular rhythm.      Pulses: Normal pulses.      Heart sounds: Normal heart sounds.   Pulmonary:      Effort: Pulmonary effort is normal.      Breath sounds: Rales present.      Comments: NC.  Abdominal:      General: Bowel sounds are normal. There is no distension.      Palpations: Abdomen is soft.      Tenderness: There is no abdominal tenderness.   Musculoskeletal:         General: Normal range of motion.      Cervical back: Normal range of motion and neck supple.      Right lower leg: Edema present.      Left lower leg: Edema present.   Skin:     General: Skin is warm and dry.      Comments: Sternotomy " Patient had also called on 9/26/23 stating he believes he has had an ultrasound of the kidneys done within the last 6 months ordered by dr Missael Kumar. scar.   Neurological:      Mental Status: He is alert. Mental status is at baseline.   Psychiatric:         Mood and Affect: Mood normal.         Behavior: Behavior normal.             Significant Labs: All pertinent labs within the past 24 hours have been reviewed.    Significant Imaging: I have reviewed all pertinent imaging results/findings within the past 24 hours.

## 2023-12-03 NOTE — ASSESSMENT & PLAN NOTE
Treating for acute exacerbation.  -IV Lasix  -Metoprolol  -Lisinopril  -Telemetry  -Strict I's and O's  -Keep K > 4 and Mg > 2

## 2023-12-03 NOTE — CONSULTS
[facility]   Cardiology Note    Consult Requested By: GAIL  Reason for Consult: elevated trop    SUBJECTIVE:     History of Present Illness:   Patient of Dr Willoughby h/o CAD  Chronic angina  NSTEMI  HLP  HFpEF  Pafib  PM  TAVR bioprosthetic    H/o CABG with redo  2021 LHC- angioplasty and restenting of AV grooveof circ- instent restenosis, patent graft to LAD, OM, distal RCA, occlusion native LAD and RCA  ECHO 2022 ef normal, DD    Patient was seen recently in office and had been using increased SL nitro and MD wanted to proceed with [facility] but patient was reluctant due to elderly wife he cares for at home  Currently wife in hospital and 2 days ago patient was visiting her and used nitro a few times went home and continues with chest pain radiating to arm with associated SOB and nausea and dry heaving - he called ems and received additional asa and nitro and chest pain resolved in er  Trop elevated to 2000s, BNP elevated   Labs reviewed   EKG v paced  Did receive lasix in er with good response  Patient reports no further chest pain or SOB throughout the night  Discussed plan with patient and he is receptive to proceeding with [facility] with Dr Wliloughby on Tuesday am  Patient has significant CAD with h/o bypass with possible little options for intervention         Review of patient's allergies indicates:   Allergen Reactions    Atorvastatin Other (See Comments)     Muscle pain    Rosuvastatin Other (See Comments)     Muscle pain       Past Medical History:   Diagnosis Date    Anticoagulant long-term use     2014    Aortic valve disease 2014    pig valve    Arthritis     all over    Chest pain 07/15/2019    CHF (congestive heart failure)     2014 on meds    Coronary artery disease     2007 cabg    Heart attack 1990    15 stents    Heart block     Hyperlipidemia     Hypertension     on meds    Hypothyroidism 2015    on meds    PVD (peripheral vascular disease)     Seizures     last episode 1990 on meds     Past Surgical  History:   Procedure Laterality Date    AORTOGRAPHY WITH SERIALOGRAPHY N/A 11/16/2021    Procedure: AORTOGRAM, WITH RUNOFF;  Surgeon: Rodrigo Willoughby MD;  Location: [facility] CATH/EP LAB;  Service: Cardiology;  Laterality: N/A;    ARTERIOGRAPHY OF AORTIC ROOT N/A 11/5/2019    Procedure: ARTERIOGRAM, AORTIC ROOT;  Surgeon: Rodrigo Willoughby MD;  Location: [facility] CATH/EP LAB;  Service: Cardiology;  Laterality: N/A;    CARDIAC CATHETERIZATION      CARDIAC VALVE SURGERY      CORONARY ANGIOGRAPHY INCLUDING BYPASS GRAFTS WITH CATHETERIZATION OF LEFT HEART N/A 11/5/2019    Procedure: ANGIOGRAM, CORONARY, INCLUDING BYPASS GRAFT, WITH LEFT HEART CATHETERIZATION;  Surgeon: Rodrigo Willoughby MD;  Location: [facility] CATH/EP LAB;  Service: Cardiology;  Laterality: N/A;    CORONARY ANGIOGRAPHY INCLUDING BYPASS GRAFTS WITH CATHETERIZATION OF LEFT HEART Left 11/3/2020    Procedure: ANGIOGRAM, CORONARY, INCLUDING BYPASS GRAFT, WITH LEFT HEART CATHETERIZATION;  Surgeon: Rodrigo Willoughby MD;  Location: [facility] CATH/EP LAB;  Service: Cardiology;  Laterality: Left;    CORONARY ANGIOGRAPHY INCLUDING BYPASS GRAFTS WITH CATHETERIZATION OF LEFT HEART N/A 9/28/2021    Procedure: ANGIOGRAM, CORONARY, INCLUDING BYPASS GRAFT, WITH LEFT HEART CATHETERIZATION;  Surgeon: Rodrigo Willoughby MD;  Location: [facility] CATH/EP LAB;  Service: Cardiology;  Laterality: N/A;    CORONARY ANGIOPLASTY      CORONARY ARTERY BYPASS GRAFT      EXTRACORPOREAL SHOCK WAVE LITHOTRIPSY Right 8/1/2019    Procedure: LITHOTRIPSY, ESWL;  Surgeon: Williams Ortega MD;  Location: [facility] OR;  Service: Urology;  Laterality: Right;    HEMORRHOID SURGERY  1990    INSERTION OF PACEMAKER      PERCUTANEOUS TRANSLUMINAL BALLOON ANGIOPLASTY OF CORONARY ARTERY N/A 11/8/2019    Procedure: Angioplasty-coronary;  Surgeon: Rodrigo Willoughby MD;  Location: [facility] CATH/EP LAB;  Service: Cardiology;  Laterality: N/A;    WRIST FRACTURE SURGERY       Family History   Problem Relation Age of Onset    Heart attack Mother      Heart attack Father     Heart disease Sister     Stroke Sister     Diabetes Sister     No Known Problems Maternal Grandmother     No Known Problems Maternal Grandfather     No Known Problems Paternal Grandmother     No Known Problems Paternal Grandfather     No Known Problems Brother     No Known Problems Maternal Aunt     No Known Problems Maternal Uncle     No Known Problems Paternal Aunt     No Known Problems Paternal Uncle     Anemia Neg Hx     Arrhythmia Neg Hx     Asthma Neg Hx     Clotting disorder Neg Hx     Fainting Neg Hx     Heart failure Neg Hx     Hyperlipidemia Neg Hx     Hypertension Neg Hx     Atrial Septal Defect Neg Hx      Social History     Tobacco Use    Smoking status: Former     Current packs/day: 0.00     Average packs/day: 0.5 packs/day for 4.0 years (2.0 ttl pk-yrs)     Types: Cigarettes     Start date:      Quit date:      Years since quittin.9    Smokeless tobacco: Never   Substance Use Topics    Alcohol use: Yes     Comment: social    Drug use: No       Review of Systems:  Review of Systems   Constitutional:  Negative for chills and fever.   Respiratory:  Positive for shortness of breath.    Cardiovascular:  Positive for chest pain and leg swelling.   Gastrointestinal:  Positive for nausea. Negative for heartburn.       OBJECTIVE:     Vital Signs (Most Recent)  Temp: 98 °F (36.7 °C) (23)  Pulse: 86 (23)  Resp: 19 (23)  BP: (!) 143/69 (23)  SpO2: 100 % (23)    Vital Signs Range (Last 24H):  Temp:  [97.7 °F (36.5 °C)-99 °F (37.2 °C)]   Pulse:  [71-90]   Resp:  [15-33]   BP: (118-152)/(62-82)   SpO2:  [94 %-100 %]     I & O (Last 24H):    Intake/Output Summary (Last 24 hours) at 12/3/2023 0848  Last data filed at 12/3/2023 0651  Gross per 24 hour   Intake 50 ml   Output 3650 ml   Net -3600 ml       Current Diet:     Current Diet Order   Procedures    Diet Cardiac        Allergies:  Review of patient's allergies indicates:    Allergen Reactions    Atorvastatin Other (See Comments)     Muscle pain    Rosuvastatin Other (See Comments)     Muscle pain       Meds:  Scheduled Meds:   amLODIPine  5 mg Oral Daily    aspirin  81 mg Oral Daily    citalopram  20 mg Oral Daily    clopidogreL  75 mg Oral Daily    enoxparin  1 mg/kg Subcutaneous Q12H (treatment, non-standard time)    ezetimibe  10 mg Oral Daily    furosemide (LASIX) injection  40 mg Intravenous Q12H    isosorbide dinitrate  40 mg Oral Daily    lisinopriL  40 mg Oral Daily    metoprolol succinate  100 mg Oral Daily    metoprolol succinate  50 mg Oral QHS    phenytoin  400 mg Oral Daily    ranolazine  1,000 mg Oral BID     Continuous Infusions:  PRN Meds:acetaminophen, albuterol-ipratropium, hydrALAZINE, nitroGLYCERIN, ondansetron, sodium chloride 0.9%    Oxygen/Ventilator Data (Last 24H):  (if applicable)   Oxygen Concentration (%):  [24-35] 24        Hemodynamic Parameters (Last 24H):   (if applicable)        Laboratory and Radiology Data:  Recent Results (from the past 24 hour(s))   Troponin I High Sensitivity #2    Collection Time: 12/02/23 10:24 AM   Result Value Ref Range    Troponin I High Sensitivity 458.4 (HH) 0.0 - 14.9 pg/mL   COVID-19 Rapid Screening    Collection Time: 12/02/23 12:09 PM   Result Value Ref Range    SARS-CoV-2 RNA, Amplification, Qual Negative Negative   Echo    Collection Time: 12/02/23  1:32 PM   Result Value Ref Range    Loco's Biplane MOD Ejection Fraction 48 %    LVOT stroke volume 58.51 cm3    LVIDd 4.79 3.5 - 6.0 cm    LV Systolic Volume 75.90 mL    LV Systolic Volume Index 39.3 mL/m2    LVIDs 4.14 (A) 2.1 - 4.0 cm    LV Diastolic Volume 107.00 mL    LV Diastolic Volume Index 55.44 mL/m2    IVS 1.45 (A) 0.6 - 1.1 cm    LVOT diameter 2.10 cm    LVOT area 3.5 cm2    FS 14 (A) 28 - 44 %    Left Ventricle Relative Wall Thickness 0.54 cm    Posterior Wall 1.29 (A) 0.6 - 1.1 cm    LV mass 264.39 g    LV Mass Index 137 g/m2    MV Peak E Bradly 1.48 m/s     TDI LATERAL 0.06 m/s    TDI SEPTAL 0.05 m/s    E/E' ratio 26.91 m/s    MV Peak A Bradly 1.53 m/s    TR Max Bradly 2.62 m/s    E/A ratio 0.97     E wave deceleration time 225.00 msec    LV SEPTAL E/E' RATIO 29.60 m/s    LV LATERAL E/E' RATIO 24.67 m/s    LVOT peak bradly 0.82 m/s    Left Ventricular Outflow Tract Mean Velocity 0.53 cm/s    Left Ventricular Outflow Tract Mean Gradient 1.00 mmHg    RV S' 4.57 cm/s    TAPSE 1.68 cm    LA volume (mod) 69.00 cm3    LA Volume Index (Mod) 35.8 mL/m2    Vn Nyquist MS 0.27 m/s    AV mean gradient 4 mmHg    AV peak gradient 8 mmHg    Ao peak bradly 1.38 m/s    Ao VTI 26.90 cm    LVOT peak VTI 16.90 cm    AV valve area 2.17 cm²    AV Velocity Ratio 0.59     AV index (prosthetic) 0.63     CASSIE by Velocity Ratio 2.06 cm²    Radius 0.60 cm    Vn Nyquist 0.27 m/s    Mr max bradly 5.07 m/s    MR PISA EROA 0.12 cm2    MV mean gradient 6 mmHg    MV peak gradient 9 mmHg    MV stenosis pressure 1/2 time 68.00 ms    MV valve area p 1/2 method 3.24 cm2    MV valve area by continuity eq 1.50 cm2    MV VTI 39.0 cm    Triscuspid Valve Regurgitation Peak Gradient 27 mmHg    PV PEAK VELOCITY 0.97 m/s    PV peak gradient 4 mmHg    Pulmonary Valve Mean Velocity 0.66 m/s    Ao root annulus 2.30 cm    Sinus 2.88 cm    STJ 2.23 cm    Mean e' 0.06 m/s    ZLVIDS 1.64     ZLVIDD -1.32     BSA 1.98 m2   TROPONIN I HIGH SENSITIVITY    Collection Time: 12/02/23  4:19 PM   Result Value Ref Range    Troponin I High Sensitivity 2180.3 (HH) 0.0 - 14.9 pg/mL   TROPONIN I HIGH SENSITIVITY    Collection Time: 12/02/23  7:09 PM   Result Value Ref Range    Troponin I High Sensitivity 2226.4 (HH) 0.0 - 14.9 pg/mL   TROPONIN I HIGH SENSITIVITY    Collection Time: 12/03/23 12:32 AM   Result Value Ref Range    Troponin I High Sensitivity 2392.2 (HH) 0.0 - 14.9 pg/mL   Basic Metabolic Panel    Collection Time: 12/03/23  4:55 AM   Result Value Ref Range    Sodium 134 (L) 136 - 145 mmol/L    Potassium 4.0 3.5 - 5.1 mmol/L    Chloride  97 95 - 110 mmol/L    CO2 26 23 - 29 mmol/L    Glucose 99 70 - 110 mg/dL    BUN 24 (H) 8 - 23 mg/dL    Creatinine 1.4 0.5 - 1.4 mg/dL    Calcium 9.0 8.7 - 10.5 mg/dL    Anion Gap 11 8 - 16 mmol/L    eGFR 51.1 (A) >60 mL/min/1.73 m^2   Magnesium    Collection Time: 12/03/23  4:55 AM   Result Value Ref Range    Magnesium 1.9 1.6 - 2.6 mg/dL   Lipid Panel    Collection Time: 12/03/23  4:55 AM   Result Value Ref Range    Cholesterol 271 (H) 120 - 199 mg/dL    Triglycerides 124 30 - 150 mg/dL    HDL 45 40 - 75 mg/dL    LDL Cholesterol 201.2 (H) 63.0 - 159.0 mg/dL    HDL/Cholesterol Ratio 16.6 (L) 20.0 - 50.0 %    Total Cholesterol/HDL Ratio 6.0 (H) 2.0 - 5.0    Non-HDL Cholesterol 226 mg/dL     Imaging Results              X-Ray Chest AP Portable (Final result)  Result time 12/02/23 09:25:31      Final result by Francisco Nicholson MD (12/02/23 09:25:31)                   Narrative:    Reason: Chest Pain Chest pain; Hx of chf, cad, heart attack    FINDINGS:  Portable chest at 843 compared with 9/24/2021 shows unchanged cardiomediastinal silhouette. Dual lead left transvenous pacer unchanged.    Bibasilar pleural-parenchymal opacities are new. Central pulmonary vasculature has not significantly changed. Lung volumes remain low. No pneumothorax. No acute osseous abnormality.    IMPRESSION:  New bibasilar pleural-parenchymal opacities suggesting small bilateral pleural effusions.    Electronically signed by:  Francisco Nicholson MD  12/02/2023 09:25 AM [facility] Workstation: [id number]                                    12-lead EKG interpretation:  (if applicable)      Current Cardiac Rhythm:   (if applicable)    Physical Exam:   Physical Exam  Constitutional:       Appearance: Normal appearance.      Comments: elderly   Cardiovascular:      Rate and Rhythm: Normal rate and regular rhythm.   Pulmonary:      Effort: Pulmonary effort is normal.      Breath sounds: Normal breath sounds.   Musculoskeletal:         General: No swelling.    Skin:     General: Skin is warm and dry.   Neurological:      Mental Status: He is alert.         ASSESSMENT/PLAN:   Assessment: Plan:       CAD  Chronic angina  NSTEMI  HLP  HFpEF  Pafib  PM  TAVR bioprosthetic    H/o CABG with redo  2021 LHC- angioplasty and restenting of AV groove of circ- instent restenosis, patent graft to LAD, OM, distal RCA, occlusion native LAD and RCA  ECHO 2022 ef normal, DD      ECHO pending  Zetia  Statin intolerant  Unable to afford PCSK9- may need to look into drug assist program as outpatient  DAPT  JAVIER, Imdur, ranexa  Hold eliquis- on lovenox  Will schedule LHC with Dr Willoughby for Tuesday am

## 2023-12-03 NOTE — PLAN OF CARE
[facility]  Initial Discharge Assessment       Primary Care Provider: Oscar Madrid NP    Admission Diagnosis: Congestive heart failure, unspecified HF chronicity, unspecified heart failure type [I50.9]    Admission Date: 12/2/2023  Expected Discharge Date:   Assessment was completed at bedside with Pt. Pt was alert and oriented. Cm verified demographic, insurance and pharmacy. Pt denies any in home services, DME's, dialysis or coumadin clinics. Pt reports he is independent with all ADL's. Pt is a full code.  Pt states his wife are daughter is her power of     Pt plans to discharge home. At this time Pt has no discharge needs.       Transition of Care Barriers: None    Payor: [employer] / Plan: [employer] MEDICARE HMO / Product Type: Medicare Advantage /     Extended Emergency Contact Information  Primary Emergency Contact: Tiffany Rhodes  Address: [address] [place] [address] .           [place] [address] [facility]  Home Phone: [phone]  Mobile Phone: [phone]  Relation: Spouse  Secondary Emergency Contact: hoa rhodes  Mobile Phone: [phone]  Relation: Daughter   needed? No    Discharge Plan A: Home with family  Discharge Plan B: Home with family      [id number] - [place] - [address]  [address] [place] [address]  Phone: [phone] Fax: [phone]      Initial Assessment (most recent)       Adult Discharge Assessment - 12/03/23 1602          Discharge Assessment    Assessment Type Discharge Planning Assessment     Confirmed/corrected address, phone number and insurance Yes     Confirmed Demographics Correct on Facesheet     Source of Information patient     When was your last doctors appointment? --   pt reported a month ago    Reason For Admission NSTEMI (non-ST elevated myocardial infarction)     People in Home spouse     Facility Arrived From: home     Do you expect to return to your current living situation? Yes      Do you have help at home or someone to help you manage your care at home? Yes     Who are your caregiver(s) and their phone number(s)? negrito Tai [phone]     Prior to hospitilization cognitive status: Alert/Oriented     Current cognitive status: Alert/Oriented     Home Accessibility wheelchair accessible     Home Layout Able to live on 1st floor     Equipment Currently Used at Home none     Patient currently being followed by outpatient case management? No     Do you currently have service(s) that help you manage your care at home? No     Do you take prescription medications? Yes     Do you have prescription coverage? Yes     Coverage [employer] medicare hmo     Do you have any problems affording any of your prescribed medications? No     Is the patient taking medications as prescribed? yes     Who is going to help you get home at discharge? negrito     How do you get to doctors appointments? family or friend will provide     Are you on dialysis? No     Do you take coumadin? No     DME Needed Upon Discharge  none     Discharge Plan discussed with: Patient     Transition of Care Barriers None     Discharge Plan A Home with family     Discharge Plan B Home with family

## 2023-12-03 NOTE — ASSESSMENT & PLAN NOTE
-ASA  -Plavix  -Lovenox  -Metoprolol  -Lisinopril  -Isordil  -Cardiology consulted; will perform angiogram 12/5  -Telemetry

## 2023-12-04 LAB
ANION GAP SERPL CALC-SCNC: 9 MMOL/L (ref 8–16)
BUN SERPL-MCNC: 28 MG/DL (ref 8–23)
CALCIUM SERPL-MCNC: 8.8 MG/DL (ref 8.7–10.5)
CHLORIDE SERPL-SCNC: 96 MMOL/L (ref 95–110)
CO2 SERPL-SCNC: 30 MMOL/L (ref 23–29)
CREAT SERPL-MCNC: 1.5 MG/DL (ref 0.5–1.4)
ERYTHROCYTE [DISTWIDTH] IN BLOOD BY AUTOMATED COUNT: 13.1 % (ref 11.5–14.5)
EST. GFR  (NO RACE VARIABLE): 47.1 ML/MIN/1.73 M^2
GLUCOSE SERPL-MCNC: 107 MG/DL (ref 70–110)
HCT VFR BLD AUTO: 39.8 % (ref 40–54)
HGB BLD-MCNC: 13.5 G/DL (ref 14–18)
MAGNESIUM SERPL-MCNC: 1.8 MG/DL (ref 1.6–2.6)
MCH RBC QN AUTO: 30.4 PG (ref 27–31)
MCHC RBC AUTO-ENTMCNC: 33.9 G/DL (ref 32–36)
MCV RBC AUTO: 90 FL (ref 82–98)
PLATELET # BLD AUTO: 147 K/UL (ref 150–450)
PMV BLD AUTO: 11.3 FL (ref 9.2–12.9)
POTASSIUM SERPL-SCNC: 4.5 MMOL/L (ref 3.5–5.1)
RBC # BLD AUTO: 4.44 M/UL (ref 4.6–6.2)
SODIUM SERPL-SCNC: 135 MMOL/L (ref 136–145)
TROPONIN I SERPL HS-MCNC: 843.8 PG/ML (ref 0–14.9)
WBC # BLD AUTO: 8.64 K/UL (ref 3.9–12.7)

## 2023-12-04 PROCEDURE — 25000003 PHARM REV CODE 250: Performed by: STUDENT IN AN ORGANIZED HEALTH CARE EDUCATION/TRAINING PROGRAM

## 2023-12-04 PROCEDURE — 21000000 HC CCU ICU ROOM CHARGE

## 2023-12-04 PROCEDURE — 83735 ASSAY OF MAGNESIUM: CPT | Performed by: STUDENT IN AN ORGANIZED HEALTH CARE EDUCATION/TRAINING PROGRAM

## 2023-12-04 PROCEDURE — 63600175 PHARM REV CODE 636 W HCPCS: Performed by: STUDENT IN AN ORGANIZED HEALTH CARE EDUCATION/TRAINING PROGRAM

## 2023-12-04 PROCEDURE — 80048 BASIC METABOLIC PNL TOTAL CA: CPT | Performed by: STUDENT IN AN ORGANIZED HEALTH CARE EDUCATION/TRAINING PROGRAM

## 2023-12-04 PROCEDURE — 99900035 HC TECH TIME PER 15 MIN (STAT)

## 2023-12-04 PROCEDURE — 85027 COMPLETE CBC AUTOMATED: CPT | Performed by: STUDENT IN AN ORGANIZED HEALTH CARE EDUCATION/TRAINING PROGRAM

## 2023-12-04 PROCEDURE — 84484 ASSAY OF TROPONIN QUANT: CPT | Performed by: STUDENT IN AN ORGANIZED HEALTH CARE EDUCATION/TRAINING PROGRAM

## 2023-12-04 PROCEDURE — 36415 COLL VENOUS BLD VENIPUNCTURE: CPT | Performed by: STUDENT IN AN ORGANIZED HEALTH CARE EDUCATION/TRAINING PROGRAM

## 2023-12-04 PROCEDURE — 99900031 HC PATIENT EDUCATION (STAT)

## 2023-12-04 PROCEDURE — 27000221 HC OXYGEN, UP TO 24 HOURS

## 2023-12-04 PROCEDURE — 94761 N-INVAS EAR/PLS OXIMETRY MLT: CPT

## 2023-12-04 RX ORDER — ACETAMINOPHEN 325 MG/1
650 TABLET ORAL EVERY 4 HOURS PRN
Status: DISCONTINUED | OUTPATIENT
Start: 2023-12-04 | End: 2023-12-06 | Stop reason: HOSPADM

## 2023-12-04 RX ORDER — MAGNESIUM SULFATE HEPTAHYDRATE 40 MG/ML
2 INJECTION, SOLUTION INTRAVENOUS ONCE
Status: COMPLETED | OUTPATIENT
Start: 2023-12-04 | End: 2023-12-04

## 2023-12-04 RX ORDER — SODIUM CHLORIDE 9 MG/ML
INJECTION, SOLUTION INTRAVENOUS ONCE
Status: COMPLETED | OUTPATIENT
Start: 2023-12-05 | End: 2023-12-05

## 2023-12-04 RX ADMIN — METOPROLOL SUCCINATE 50 MG: 50 TABLET, EXTENDED RELEASE ORAL at 09:12

## 2023-12-04 RX ADMIN — ENOXAPARIN SODIUM 80 MG: 80 INJECTION SUBCUTANEOUS at 12:12

## 2023-12-04 RX ADMIN — FUROSEMIDE 40 MG: 10 INJECTION, SOLUTION INTRAMUSCULAR; INTRAVENOUS at 05:12

## 2023-12-04 RX ADMIN — RANOLAZINE 1000 MG: 500 TABLET, EXTENDED RELEASE ORAL at 09:12

## 2023-12-04 RX ADMIN — ASPIRIN 81 MG: 81 TABLET, COATED ORAL at 09:12

## 2023-12-04 RX ADMIN — ISOSORBIDE DINITRATE 40 MG: 10 TABLET ORAL at 09:12

## 2023-12-04 RX ADMIN — CLOPIDOGREL BISULFATE 75 MG: 75 TABLET, FILM COATED ORAL at 09:12

## 2023-12-04 RX ADMIN — CITALOPRAM HYDROBROMIDE 20 MG: 20 TABLET ORAL at 09:12

## 2023-12-04 RX ADMIN — SODIUM CHLORIDE, SODIUM LACTATE, POTASSIUM CHLORIDE, AND CALCIUM CHLORIDE 500 ML: .6; .31; .03; .02 INJECTION, SOLUTION INTRAVENOUS at 12:12

## 2023-12-04 RX ADMIN — METOPROLOL SUCCINATE 100 MG: 50 TABLET, EXTENDED RELEASE ORAL at 09:12

## 2023-12-04 RX ADMIN — EZETIMIBE 10 MG: 10 TABLET ORAL at 09:12

## 2023-12-04 RX ADMIN — PHENYTOIN SODIUM 400 MG: 100 CAPSULE ORAL at 09:12

## 2023-12-04 RX ADMIN — MAGNESIUM SULFATE HEPTAHYDRATE 2 G: 40 INJECTION, SOLUTION INTRAVENOUS at 09:12

## 2023-12-04 RX ADMIN — AMLODIPINE BESYLATE 5 MG: 5 TABLET ORAL at 09:12

## 2023-12-04 NOTE — PROGRESS NOTES
"[facility] Medicine  Progress Note    Patient Name: Jerome Amaro Jr.  MRN: [id number]  Patient Class: IP- Inpatient   Admission Date: 12/2/2023  Length of Stay: 2 days  Attending Physician: Endy Ellison MD  Primary Care Provider: Oscar Madrid NP        Subjective:     Principal Problem:NSTEMI (non-ST elevated myocardial infarction)        HPI:  Patient is a 78-year-old male with CAD, PVD, hypertension, seizures, and CHF who presents with shortness breath and chest pain.  Shortness for breath and chest pain began earlier this morning.  Patient took 2 sublingual nitro was at home with some improvement in chest pain.  Chest pain is located centrally chest and radiates to his left arm.  Pain was 8/10.  Patient was given 324 mg of aspirin.  Patient was found to be 84% on room air.  Patient was placed on a non-rebreather.  On arrival to ED, patient was placed on BiPAP.  WBC 10.3 and hemoglobin 13.0.  Creatinine 1.2.  Troponin was 94.3 with repeat of 458.  .  EKG with atrial sensed ventricular paced rhythm.  Chest x-ray consistent with CHF.  Patient was given IV Lasix in ED.  Cardiology consulted.    Overview/Hospital Course:  Jerome Amaro is a 78 year old male with a past medical history of CAD s/p CABG, PVD, HTN, seizures, CHF, CKD, pacemaker, anemia, and MDD/JESUS who presented with an NSTEMI. Cardiology has been consulted and is planning to perform an angiogram 12/5/2023. The patient was on IV diuretics and was treated for a CHF exacerbation as well. He is on ASA, Isordil, lisinopril, metoprolol, and Lovenox. Troponin is now improving.    Interval History: see "Hospital Course"    Review of Systems   Respiratory:  Positive for shortness of breath.    Cardiovascular:  Positive for chest pain and leg swelling. Negative for palpitations.     Objective:     Vital Signs (Most Recent):  Temp: 99 °F (37.2 °C) (12/04/23 0701)  Pulse: 94 (12/04/23 0900)  Resp: 16 (12/04/23 " 0900)  BP: (!) 143/68 (12/04/23 0900)  SpO2: 97 % (12/04/23 0900) Vital Signs (24h Range):  Temp:  [97 °F (36.1 °C)-99 °F (37.2 °C)] 99 °F (37.2 °C)  Pulse:  [] 94  Resp:  [12-31] 16  SpO2:  [92 %-100 %] 97 %  BP: ()/(50-86) 143/68     Weight: 81 kg (178 lb 9.2 oz)  Body mass index is 28.82 kg/m².    Intake/Output Summary (Last 24 hours) at 12/4/2023 1303  Last data filed at 12/4/2023 0959  Gross per 24 hour   Intake 220 ml   Output 1650 ml   Net -1430 ml         Physical Exam  Vitals and nursing note reviewed.   Constitutional:       Appearance: He is ill-appearing.   HENT:      Head: Normocephalic and atraumatic.      Right Ear: External ear normal.      Left Ear: External ear normal.      Nose: Nose normal.      Mouth/Throat:      Mouth: Mucous membranes are moist.      Pharynx: Oropharynx is clear.   Eyes:      Extraocular Movements: Extraocular movements intact.      Conjunctiva/sclera: Conjunctivae normal.   Cardiovascular:      Rate and Rhythm: Normal rate and regular rhythm.      Pulses: Normal pulses.      Heart sounds: Normal heart sounds.   Pulmonary:      Effort: Pulmonary effort is normal.      Breath sounds: No rales.      Comments: NC.  Abdominal:      General: Bowel sounds are normal. There is no distension.      Palpations: Abdomen is soft.      Tenderness: There is no abdominal tenderness.   Musculoskeletal:         General: Normal range of motion.      Cervical back: Normal range of motion and neck supple.      Right lower leg: No edema.      Left lower leg: No edema.   Skin:     General: Skin is warm and dry.      Comments: Sternotomy scar.   Neurological:      Mental Status: He is alert. Mental status is at baseline.   Psychiatric:         Mood and Affect: Mood normal.         Behavior: Behavior normal.             Significant Labs: All pertinent labs within the past 24 hours have been reviewed.    Significant Imaging: I have reviewed all pertinent imaging results/findings within the  past 24 hours.    Assessment/Plan:      * NSTEMI (non-ST elevated myocardial infarction)  -ASA  -Plavix  -Lovenox  -Metoprolol  -Lisinopril held  -Isordil  -Cardiology consulted; will perform angiogram 12/5  -Telemetry      Congestive heart failure  Treated for acute exacerbation.  -IV Lasix held  -Metoprolol  -Lisinopril held  -Telemetry  -Strict I's and O's  -Keep K > 4 and Mg > 2    Anxiety and depression  -Continue home citalopram      Seizure disorder  -Continue home Dilantin      Pacemaker  Chronic. Stable.      Dyslipidemia  -Continue ezetimibe      CAD (coronary artery disease)  -Continue ASA  -Continue metoprolol  -Lovenox  -Telemetry  -Cardiology following    S/P TAVR (transcatheter aortic valve replacement)  Chronic. Stable.      Anemia  Stable.  -Trend Hgb with CBC    HTN (hypertension)  -Continue home amlodipine, nitrate, and metoprolol  -Continue to monitor      VTE Risk Mitigation (From admission, onward)           Ordered     enoxaparin injection 80 mg  Every 12 hours         12/02/23 1230     IP VTE HIGH RISK PATIENT  Once         12/02/23 1202     Place sequential compression device  Until discontinued         12/02/23 1202                    Discharge Planning   AXEL: 12/5/2023     Code Status: Full Code   Is the patient medically ready for discharge?:     Reason for patient still in hospital (select all that apply): Patient trending condition, Laboratory test, Treatment, Imaging, and Consult recommendations  Discharge Plan A: Home with family                  Endy Ellison MD  Department of Hospital Medicine   Formerly [facility]

## 2023-12-04 NOTE — CONSULTS
Educated pt on a Healthy Heart diet today due to pt having an MI, CHF, HTN, dyslipidemia and CAD by providing written handouts on Low Sodium diet, Healthy Heart, Heart Failure Nutrition Therapy and Hypertension Nutrition Therapy (DASH) diet along with my contact information. He has previously been offered this education and declined due to no desire to follow these recommendations. The written information was left with him in case he thought about it and changed his mind. Below is documentation from the previous attempt.              11/07/2019 (Arielle Camacho RD):  Recommendations/Interventions:  Continue current diet as tolerated,encourage intake.  Reiterated the importance of following a Heart Healthy diet and the importance of compliance. Pt has no desire to follow diet and does not show understanding of its importance. Offered education materials, pt declined.  Goals:   Pt to meet at least 75% of estimated needs via PO intake of meals.  Pt to show understanding of Heart Healthy diet and the importance of compliance-NOT MET

## 2023-12-04 NOTE — CARE UPDATE
12/03/23 2154   Patient Assessment/Suction   Level of Consciousness (AVPU) alert   Respiratory Effort Normal   Expansion/Accessory Muscles/Retractions no use of accessory muscles   All Lung Fields Breath Sounds clear;diminished   Rhythm/Pattern, Respiratory unlabored   Cough Frequency no cough   PRE-TX-O2   Device (Oxygen Therapy) nasal cannula   $ Is the patient on Low Flow Oxygen? Yes   Flow (L/min) 1   SpO2 97 %   Pulse Oximetry Type Continuous   $ Pulse Oximetry - Multiple Charge Pulse Oximetry - Multiple   Pulse 82   Resp 20   Aerosol Therapy   $ Aerosol Therapy Charges PRN treatment not required   Education   $ Education 15 min   Respiratory Evaluation   $ Care Plan Tech Time 15 min

## 2023-12-04 NOTE — ASSESSMENT & PLAN NOTE
Treated for acute exacerbation.  -IV Lasix held  -Metoprolol  -Lisinopril held  -Telemetry  -Strict I's and O's  -Keep K > 4 and Mg > 2

## 2023-12-04 NOTE — SUBJECTIVE & OBJECTIVE
"Interval History: see "Hospital Course"    Review of Systems   Respiratory:  Positive for shortness of breath.    Cardiovascular:  Positive for chest pain and leg swelling. Negative for palpitations.     Objective:     Vital Signs (Most Recent):  Temp: 99 °F (37.2 °C) (12/04/23 0701)  Pulse: 94 (12/04/23 0900)  Resp: 16 (12/04/23 0900)  BP: (!) 143/68 (12/04/23 0900)  SpO2: 97 % (12/04/23 0900) Vital Signs (24h Range):  Temp:  [97 °F (36.1 °C)-99 °F (37.2 °C)] 99 °F (37.2 °C)  Pulse:  [] 94  Resp:  [12-31] 16  SpO2:  [92 %-100 %] 97 %  BP: ()/(50-86) 143/68     Weight: 81 kg (178 lb 9.2 oz)  Body mass index is 28.82 kg/m².    Intake/Output Summary (Last 24 hours) at 12/4/2023 1303  Last data filed at 12/4/2023 0959  Gross per 24 hour   Intake 220 ml   Output 1650 ml   Net -1430 ml         Physical Exam  Vitals and nursing note reviewed.   Constitutional:       Appearance: He is ill-appearing.   HENT:      Head: Normocephalic and atraumatic.      Right Ear: External ear normal.      Left Ear: External ear normal.      Nose: Nose normal.      Mouth/Throat:      Mouth: Mucous membranes are moist.      Pharynx: Oropharynx is clear.   Eyes:      Extraocular Movements: Extraocular movements intact.      Conjunctiva/sclera: Conjunctivae normal.   Cardiovascular:      Rate and Rhythm: Normal rate and regular rhythm.      Pulses: Normal pulses.      Heart sounds: Normal heart sounds.   Pulmonary:      Effort: Pulmonary effort is normal.      Breath sounds: No rales.      Comments: NC.  Abdominal:      General: Bowel sounds are normal. There is no distension.      Palpations: Abdomen is soft.      Tenderness: There is no abdominal tenderness.   Musculoskeletal:         General: Normal range of motion.      Cervical back: Normal range of motion and neck supple.      Right lower leg: No edema.      Left lower leg: No edema.   Skin:     General: Skin is warm and dry.      Comments: Sternotomy scar.   Neurological:    "   Mental Status: He is alert. Mental status is at baseline.   Psychiatric:         Mood and Affect: Mood normal.         Behavior: Behavior normal.             Significant Labs: All pertinent labs within the past 24 hours have been reviewed.    Significant Imaging: I have reviewed all pertinent imaging results/findings within the past 24 hours.

## 2023-12-04 NOTE — PROGRESS NOTES
[facility]   Cardiology Note    Consult Requested By: GAIL  Reason for Consult: elevated trop    SUBJECTIVE:     History of Present Illness:   Patient of Dr Willoughby h/o CAD  Chronic angina  NSTEMI  HLP  HFpEF  Pafib  PM  TAVR bioprosthetic    H/o CABG with redo  2021 [facility]- angioplasty and restenting of AV grooveof circ- instent restenosis, patent graft to LAD, OM, distal RCA, occlusion native LAD and RCA  ECHO 2022 ef normal, DD    Patient was seen recently in office and had been using increased SL nitro and MD wanted to proceed with [facility] but patient was reluctant due to elderly wife he cares for at home  Currently wife in hospital and 2 days ago patient was visiting her and used nitro a few times went home and continues with chest pain radiating to arm with associated SOB and nausea and dry heaving - he called ems and received additional asa and nitro and chest pain resolved in er  Trop elevated to 2000s, BNP elevated   Labs reviewed   EKG v paced  Did receive lasix in er with good response  Patient reports no further chest pain or SOB throughout the night  Discussed plan with patient and he is receptive to proceeding with [facility] with Dr Willoughby on Tuesday am  Patient has significant CAD with h/o bypass with possible little options for intervention     12/4---trop trending down , peak 2392 down to 843 yesterday . H/H 13.3/39.8 cr 1.5 this am. BUN 28 LDL noted at 201. Echo pending --net output since admit -4565    Review of patient's allergies indicates:   Allergen Reactions    Atorvastatin Other (See Comments)     Muscle pain    Rosuvastatin Other (See Comments)     Muscle pain       Past Medical History:   Diagnosis Date    Anticoagulant long-term use     2014    Aortic valve disease 2014    pig valve    Arthritis     all over    Chest pain 07/15/2019    CHF (congestive heart failure)     2014 on meds    Coronary artery disease     2007 cabg    Heart attack 1990    15 stents    Heart block     Hyperlipidemia      Hypertension     on meds    Hypothyroidism 2015    on meds    PVD (peripheral vascular disease)     Seizures     last episode 1990 on meds     Past Surgical History:   Procedure Laterality Date    AORTOGRAPHY WITH SERIALOGRAPHY N/A 11/16/2021    Procedure: AORTOGRAM, WITH RUNOFF;  Surgeon: Rodrigo Willoughby MD;  Location: [facility] CATH/EP LAB;  Service: Cardiology;  Laterality: N/A;    ARTERIOGRAPHY OF AORTIC ROOT N/A 11/5/2019    Procedure: ARTERIOGRAM, AORTIC ROOT;  Surgeon: Rodrigo Willougbhy MD;  Location: [facility] CATH/EP LAB;  Service: Cardiology;  Laterality: N/A;    CARDIAC CATHETERIZATION      CARDIAC VALVE SURGERY      CORONARY ANGIOGRAPHY INCLUDING BYPASS GRAFTS WITH CATHETERIZATION OF LEFT HEART N/A 11/5/2019    Procedure: ANGIOGRAM, CORONARY, INCLUDING BYPASS GRAFT, WITH LEFT HEART CATHETERIZATION;  Surgeon: Rodrigo Willoughby MD;  Location: [facility] CATH/EP LAB;  Service: Cardiology;  Laterality: N/A;    CORONARY ANGIOGRAPHY INCLUDING BYPASS GRAFTS WITH CATHETERIZATION OF LEFT HEART Left 11/3/2020    Procedure: ANGIOGRAM, CORONARY, INCLUDING BYPASS GRAFT, WITH LEFT HEART CATHETERIZATION;  Surgeon: Rodrigo Willoughby MD;  Location: [facility] CATH/EP LAB;  Service: Cardiology;  Laterality: Left;    CORONARY ANGIOGRAPHY INCLUDING BYPASS GRAFTS WITH CATHETERIZATION OF LEFT HEART N/A 9/28/2021    Procedure: ANGIOGRAM, CORONARY, INCLUDING BYPASS GRAFT, WITH LEFT HEART CATHETERIZATION;  Surgeon: Rodrigo Willoughby MD;  Location: [facility] CATH/EP LAB;  Service: Cardiology;  Laterality: N/A;    CORONARY ANGIOPLASTY      CORONARY ARTERY BYPASS GRAFT      EXTRACORPOREAL SHOCK WAVE LITHOTRIPSY Right 8/1/2019    Procedure: LITHOTRIPSY, ESWL;  Surgeon: Williams Ortega MD;  Location: [facility] OR;  Service: Urology;  Laterality: Right;    HEMORRHOID SURGERY  1990    INSERTION OF PACEMAKER      PERCUTANEOUS TRANSLUMINAL BALLOON ANGIOPLASTY OF CORONARY ARTERY N/A 11/8/2019    Procedure: Angioplasty-coronary;  Surgeon: Rodrigo Willoughby MD;  Location: [facility]  CATH/EP LAB;  Service: Cardiology;  Laterality: N/A;    WRIST FRACTURE SURGERY       Family History   Problem Relation Age of Onset    Heart attack Mother     Heart attack Father     Heart disease Sister     Stroke Sister     Diabetes Sister     No Known Problems Maternal Grandmother     No Known Problems Maternal Grandfather     No Known Problems Paternal Grandmother     No Known Problems Paternal Grandfather     No Known Problems Brother     No Known Problems Maternal Aunt     No Known Problems Maternal Uncle     No Known Problems Paternal Aunt     No Known Problems Paternal Uncle     Anemia Neg Hx     Arrhythmia Neg Hx     Asthma Neg Hx     Clotting disorder Neg Hx     Fainting Neg Hx     Heart failure Neg Hx     Hyperlipidemia Neg Hx     Hypertension Neg Hx     Atrial Septal Defect Neg Hx      Social History     Tobacco Use    Smoking status: Former     Current packs/day: 0.00     Average packs/day: 0.5 packs/day for 4.0 years (2.0 ttl pk-yrs)     Types: Cigarettes     Start date:      Quit date:      Years since quittin.9    Smokeless tobacco: Never   Substance Use Topics    Alcohol use: Yes     Comment: social    Drug use: No       Review of Systems:  Review of Systems   Constitutional:  Negative for chills and fever.   Respiratory:  Negative for shortness of breath.    Cardiovascular:  Positive for leg swelling. Negative for chest pain.   Gastrointestinal:  Negative for heartburn and nausea.       OBJECTIVE:     Vital Signs (Most Recent)  Temp: 99 °F (37.2 °C) (23 0701)  Pulse: 104 (23 0754)  Resp: (!) 25 (23 0754)  BP: 130/68 (23 0500)  SpO2: (!) 92 % (23 0754)    Vital Signs Range (Last 24H):  Temp:  [97 °F (36.1 °C)-99 °F (37.2 °C)]   Pulse:  []   Resp:  [12-31]   BP: ()/(50-75)   SpO2:  [92 %-100 %]     I & O (Last 24H):    Intake/Output Summary (Last 24 hours) at 2023 0835  Last data filed at 2023 0558  Gross per 24 hour   Intake 440 ml    Output 1225 ml   Net -785 ml         Current Diet:     Current Diet Order   Procedures    Diet Cardiac    Diet NPO Except for: Medication, Ice Chips, Sips with Medication     Order Specific Question:   Except for     Answer:   Medication     Order Specific Question:   Except for     Answer:   Ice Chips     Order Specific Question:   Except for     Answer:   Sips with Medication        Allergies:  Review of patient's allergies indicates:   Allergen Reactions    Atorvastatin Other (See Comments)     Muscle pain    Rosuvastatin Other (See Comments)     Muscle pain       Meds:  Scheduled Meds:   amLODIPine  5 mg Oral Daily    aspirin  81 mg Oral Daily    citalopram  20 mg Oral Daily    clopidogreL  75 mg Oral Daily    enoxparin  1 mg/kg Subcutaneous Q12H (treatment, non-standard time)    ezetimibe  10 mg Oral Daily    isosorbide dinitrate  40 mg Oral Daily    magnesium sulfate IVPB  2 g Intravenous Once    metoprolol succinate  100 mg Oral Daily    metoprolol succinate  50 mg Oral QHS    phenytoin  400 mg Oral Daily    ranolazine  1,000 mg Oral BID     Continuous Infusions:  PRN Meds:acetaminophen, albuterol-ipratropium, hydrALAZINE, nitroGLYCERIN, ondansetron, sodium chloride 0.9%    Oxygen/Ventilator Data (Last 24H):  (if applicable)            Hemodynamic Parameters (Last 24H):   (if applicable)        Laboratory and Radiology Data:  Recent Results (from the past 24 hour(s))   Troponin I High Sensitivity    Collection Time: 12/03/23 12:03 PM   Result Value Ref Range    Troponin I High Sensitivity 1270.8 (HH) 0.0 - 14.9 pg/mL   Troponin I High Sensitivity    Collection Time: 12/03/23  6:45 PM   Result Value Ref Range    Troponin I High Sensitivity 1236.7 (HH) 0.0 - 14.9 pg/mL   Troponin I High Sensitivity    Collection Time: 12/04/23 12:29 AM   Result Value Ref Range    Troponin I High Sensitivity 843.8 (HH) 0.0 - 14.9 pg/mL   CBC Without Differential    Collection Time: 12/04/23  3:29 AM   Result Value Ref Range     WBC 8.64 3.90 - 12.70 K/uL    RBC 4.44 (L) 4.60 - 6.20 M/uL    Hemoglobin 13.5 (L) 14.0 - 18.0 g/dL    Hematocrit 39.8 (L) 40.0 - 54.0 %    MCV 90 82 - 98 fL    MCH 30.4 27.0 - 31.0 pg    MCHC 33.9 32.0 - 36.0 g/dL    RDW 13.1 11.5 - 14.5 %    Platelets 147 (L) 150 - 450 K/uL    MPV 11.3 9.2 - 12.9 fL   Basic Metabolic Panel    Collection Time: 12/04/23  3:29 AM   Result Value Ref Range    Sodium 135 (L) 136 - 145 mmol/L    Potassium 4.5 3.5 - 5.1 mmol/L    Chloride 96 95 - 110 mmol/L    CO2 30 (H) 23 - 29 mmol/L    Glucose 107 70 - 110 mg/dL    BUN 28 (H) 8 - 23 mg/dL    Creatinine 1.5 (H) 0.5 - 1.4 mg/dL    Calcium 8.8 8.7 - 10.5 mg/dL    Anion Gap 9 8 - 16 mmol/L    eGFR 47.1 (A) >60 mL/min/1.73 m^2   Magnesium    Collection Time: 12/04/23  3:29 AM   Result Value Ref Range    Magnesium 1.8 1.6 - 2.6 mg/dL     Imaging Results              X-Ray Chest AP Portable (Final result)  Result time 12/02/23 09:25:31      Final result by Francisco Nicholson MD (12/02/23 09:25:31)                   Narrative:    Reason: Chest Pain Chest pain; Hx of chf, cad, heart attack    FINDINGS:  Portable chest at 843 compared with 9/24/2021 shows unchanged cardiomediastinal silhouette. Dual lead left transvenous pacer unchanged.    Bibasilar pleural-parenchymal opacities are new. Central pulmonary vasculature has not significantly changed. Lung volumes remain low. No pneumothorax. No acute osseous abnormality.    IMPRESSION:  New bibasilar pleural-parenchymal opacities suggesting small bilateral pleural effusions.    Electronically signed by:  Francisco Nicholson MD  12/02/2023 09:25 AM CST Workstation: [id number]                                    12-lead EKG interpretation:  (if applicable)      Current Cardiac Rhythm:   (if applicable)    Physical Exam:   Physical Exam  Constitutional:       Appearance: Normal appearance.      Comments: elderly   Cardiovascular:      Rate and Rhythm: Normal rate and regular rhythm.   Pulmonary:      Effort:  Pulmonary effort is normal.      Breath sounds: Normal breath sounds.   Musculoskeletal:         General: No swelling.   Skin:     General: Skin is warm and dry.   Neurological:      Mental Status: He is alert.         ASSESSMENT/PLAN:   Assessment: Plan:       CAD  Chronic angina  NSTEMI  HLP  HFpEF  Pafib  PM  TAVR bioprosthetic    H/o CABG with redo  2021 [facility]- angioplasty and restenting of AV groove of circ- instent restenosis, patent graft to LAD, OM, distal RCA, occlusion native LAD and RCA  ECHO 2022 ef normal, DD      ECHO pending  Zetia  Statin intolerant  Unable to afford PCSK9- may need to look into drug assist program as outpatient  DAPT  BB, Imdur, ranexa  Hold eliquis- on lovenox  C scheduled Tomorrow, cr 1.5 BUN 28--plan IV hydration prior to procedure  Recheck kidney function in early am.

## 2023-12-04 NOTE — ASSESSMENT & PLAN NOTE
-ASA  -Plavix  -Lovenox  -Metoprolol  -Lisinopril held  -Isordil  -Cardiology consulted; will perform angiogram 12/5  -Telemetry

## 2023-12-05 LAB
ANION GAP SERPL CALC-SCNC: 7 MMOL/L (ref 8–16)
BUN SERPL-MCNC: 27 MG/DL (ref 8–23)
CALCIUM SERPL-MCNC: 8.5 MG/DL (ref 8.7–10.5)
CATH EF QUANTITATIVE: 45 %
CHLORIDE SERPL-SCNC: 95 MMOL/L (ref 95–110)
CO2 SERPL-SCNC: 31 MMOL/L (ref 23–29)
CREAT SERPL-MCNC: 1.3 MG/DL (ref 0.5–1.4)
ERYTHROCYTE [DISTWIDTH] IN BLOOD BY AUTOMATED COUNT: 13.2 % (ref 11.5–14.5)
EST. GFR  (NO RACE VARIABLE): 55.9 ML/MIN/1.73 M^2
GLUCOSE SERPL-MCNC: 100 MG/DL (ref 70–110)
HCT VFR BLD AUTO: 37.1 % (ref 40–54)
HGB BLD-MCNC: 12.8 G/DL (ref 14–18)
MAGNESIUM SERPL-MCNC: 2.2 MG/DL (ref 1.6–2.6)
MCH RBC QN AUTO: 30.7 PG (ref 27–31)
MCHC RBC AUTO-ENTMCNC: 34.5 G/DL (ref 32–36)
MCV RBC AUTO: 89 FL (ref 82–98)
PLATELET # BLD AUTO: 129 K/UL (ref 150–450)
PMV BLD AUTO: 11.4 FL (ref 9.2–12.9)
POTASSIUM SERPL-SCNC: 3.5 MMOL/L (ref 3.5–5.1)
RBC # BLD AUTO: 4.17 M/UL (ref 4.6–6.2)
SODIUM SERPL-SCNC: 133 MMOL/L (ref 136–145)
WBC # BLD AUTO: 6.54 K/UL (ref 3.9–12.7)

## 2023-12-05 PROCEDURE — 25000003 PHARM REV CODE 250: Performed by: NURSE PRACTITIONER

## 2023-12-05 PROCEDURE — 93459 L HRT ART/GRFT ANGIO: CPT | Performed by: SPECIALIST

## 2023-12-05 PROCEDURE — C1760 CLOSURE DEV, VASC: HCPCS | Performed by: SPECIALIST

## 2023-12-05 PROCEDURE — 25000003 PHARM REV CODE 250: Performed by: STUDENT IN AN ORGANIZED HEALTH CARE EDUCATION/TRAINING PROGRAM

## 2023-12-05 PROCEDURE — 85027 COMPLETE CBC AUTOMATED: CPT | Performed by: STUDENT IN AN ORGANIZED HEALTH CARE EDUCATION/TRAINING PROGRAM

## 2023-12-05 PROCEDURE — 63600175 PHARM REV CODE 636 W HCPCS: Performed by: STUDENT IN AN ORGANIZED HEALTH CARE EDUCATION/TRAINING PROGRAM

## 2023-12-05 PROCEDURE — 94761 N-INVAS EAR/PLS OXIMETRY MLT: CPT

## 2023-12-05 PROCEDURE — 83735 ASSAY OF MAGNESIUM: CPT | Performed by: STUDENT IN AN ORGANIZED HEALTH CARE EDUCATION/TRAINING PROGRAM

## 2023-12-05 PROCEDURE — 80048 BASIC METABOLIC PNL TOTAL CA: CPT | Performed by: STUDENT IN AN ORGANIZED HEALTH CARE EDUCATION/TRAINING PROGRAM

## 2023-12-05 PROCEDURE — 25500020 PHARM REV CODE 255: Performed by: SPECIALIST

## 2023-12-05 PROCEDURE — 36415 COLL VENOUS BLD VENIPUNCTURE: CPT | Performed by: STUDENT IN AN ORGANIZED HEALTH CARE EDUCATION/TRAINING PROGRAM

## 2023-12-05 PROCEDURE — C1769 GUIDE WIRE: HCPCS | Performed by: SPECIALIST

## 2023-12-05 PROCEDURE — C1887 CATHETER, GUIDING: HCPCS | Performed by: SPECIALIST

## 2023-12-05 PROCEDURE — 99900035 HC TECH TIME PER 15 MIN (STAT)

## 2023-12-05 PROCEDURE — 27000221 HC OXYGEN, UP TO 24 HOURS

## 2023-12-05 PROCEDURE — 21000000 HC CCU ICU ROOM CHARGE

## 2023-12-05 PROCEDURE — 27201423 OPTIME MED/SURG SUP & DEVICES STERILE SUPPLY: Performed by: SPECIALIST

## 2023-12-05 PROCEDURE — 63600175 PHARM REV CODE 636 W HCPCS: Performed by: SPECIALIST

## 2023-12-05 PROCEDURE — 25000003 PHARM REV CODE 250: Performed by: SPECIALIST

## 2023-12-05 DEVICE — DEVICE CLOSURE  ANGIO-SEAL 6FR 610130: Type: IMPLANTABLE DEVICE | Site: GROIN | Status: FUNCTIONAL

## 2023-12-05 RX ORDER — LANOLIN ALCOHOL/MO/W.PET/CERES
800 CREAM (GRAM) TOPICAL
Status: DISCONTINUED | OUTPATIENT
Start: 2023-12-05 | End: 2023-12-06 | Stop reason: HOSPADM

## 2023-12-05 RX ORDER — MIDAZOLAM HYDROCHLORIDE 1 MG/ML
INJECTION INTRAMUSCULAR; INTRAVENOUS
Status: DISCONTINUED | OUTPATIENT
Start: 2023-12-05 | End: 2023-12-05 | Stop reason: HOSPADM

## 2023-12-05 RX ORDER — FENTANYL CITRATE 50 UG/ML
INJECTION, SOLUTION INTRAMUSCULAR; INTRAVENOUS
Status: DISCONTINUED | OUTPATIENT
Start: 2023-12-05 | End: 2023-12-05 | Stop reason: HOSPADM

## 2023-12-05 RX ORDER — ACETAMINOPHEN 325 MG/1
650 TABLET ORAL EVERY 4 HOURS PRN
Status: DISCONTINUED | OUTPATIENT
Start: 2023-12-05 | End: 2023-12-05

## 2023-12-05 RX ORDER — ONDANSETRON 4 MG/1
8 TABLET, ORALLY DISINTEGRATING ORAL EVERY 8 HOURS PRN
Status: DISCONTINUED | OUTPATIENT
Start: 2023-12-05 | End: 2023-12-06 | Stop reason: HOSPADM

## 2023-12-05 RX ORDER — SODIUM,POTASSIUM PHOSPHATES 280-250MG
2 POWDER IN PACKET (EA) ORAL
Status: DISCONTINUED | OUTPATIENT
Start: 2023-12-05 | End: 2023-12-06 | Stop reason: HOSPADM

## 2023-12-05 RX ORDER — SODIUM CHLORIDE 9 MG/ML
INJECTION, SOLUTION INTRAVENOUS CONTINUOUS
Status: ACTIVE | OUTPATIENT
Start: 2023-12-05 | End: 2023-12-05

## 2023-12-05 RX ORDER — IODIXANOL 320 MG/ML
INJECTION, SOLUTION INTRAVASCULAR
Status: DISCONTINUED | OUTPATIENT
Start: 2023-12-05 | End: 2023-12-05 | Stop reason: HOSPADM

## 2023-12-05 RX ADMIN — ENOXAPARIN SODIUM 80 MG: 80 INJECTION SUBCUTANEOUS at 08:12

## 2023-12-05 RX ADMIN — ISOSORBIDE DINITRATE 40 MG: 10 TABLET ORAL at 09:12

## 2023-12-05 RX ADMIN — METOPROLOL SUCCINATE 100 MG: 50 TABLET, EXTENDED RELEASE ORAL at 09:12

## 2023-12-05 RX ADMIN — CLOPIDOGREL BISULFATE 75 MG: 75 TABLET, FILM COATED ORAL at 09:12

## 2023-12-05 RX ADMIN — AMLODIPINE BESYLATE 5 MG: 5 TABLET ORAL at 09:12

## 2023-12-05 RX ADMIN — RANOLAZINE 1000 MG: 500 TABLET, EXTENDED RELEASE ORAL at 08:12

## 2023-12-05 RX ADMIN — POTASSIUM BICARBONATE 50 MEQ: 977.5 TABLET, EFFERVESCENT ORAL at 06:12

## 2023-12-05 RX ADMIN — ASPIRIN 81 MG: 81 TABLET, COATED ORAL at 09:12

## 2023-12-05 RX ADMIN — METOPROLOL SUCCINATE 50 MG: 50 TABLET, EXTENDED RELEASE ORAL at 08:12

## 2023-12-05 RX ADMIN — SODIUM CHLORIDE: 0.9 INJECTION, SOLUTION INTRAVENOUS at 02:12

## 2023-12-05 RX ADMIN — ACETAMINOPHEN 650 MG: 325 TABLET ORAL at 12:12

## 2023-12-05 RX ADMIN — SODIUM CHLORIDE: 0.9 INJECTION, SOLUTION INTRAVENOUS at 05:12

## 2023-12-05 NOTE — PLAN OF CARE
Problem: Adult Inpatient Plan of Care  Goal: Plan of Care Review  Outcome: Ongoing, Progressing  Goal: Patient-Specific Goal (Individualized)  Outcome: Ongoing, Progressing  Goal: Absence of Hospital-Acquired Illness or Injury  Outcome: Ongoing, Progressing  Goal: Optimal Comfort and Wellbeing  Outcome: Ongoing, Progressing     Problem: Fall Injury Risk  Goal: Absence of Fall and Fall-Related Injury  Outcome: Ongoing, Progressing     Problem: Gas Exchange Impaired  Goal: Optimal Gas Exchange  Outcome: Ongoing, Progressing     Problem: Chest Pain  Goal: Resolution of Chest Pain Symptoms  Outcome: Ongoing, Progressing

## 2023-12-05 NOTE — SUBJECTIVE & OBJECTIVE
"Interval History: see "Hospital Course"    Review of Systems   Respiratory:  Negative for shortness of breath.    Cardiovascular:  Negative for chest pain, palpitations and leg swelling.     Objective:     Vital Signs (Most Recent):  Temp: 97.8 °F (36.6 °C) (12/05/23 0701)  Pulse: 80 (12/05/23 1315)  Resp: 10 (12/05/23 1315)  BP: 128/62 (12/05/23 1315)  SpO2: (!) 93 % (12/05/23 1315) Vital Signs (24h Range):  Temp:  [97.2 °F (36.2 °C)-100.5 °F (38.1 °C)] 97.8 °F (36.6 °C)  Pulse:  [54-88] 80  Resp:  [10-29] 10  SpO2:  [92 %-97 %] 93 %  BP: ()/(50-71) 128/62     Weight: 81 kg (178 lb 9.2 oz)  Body mass index is 28.82 kg/m².    Intake/Output Summary (Last 24 hours) at 12/5/2023 1326  Last data filed at 12/5/2023 1100  Gross per 24 hour   Intake 620 ml   Output 1025 ml   Net -405 ml         Physical Exam  Vitals and nursing note reviewed.   Constitutional:       Appearance: He is not ill-appearing.   HENT:      Head: Normocephalic and atraumatic.      Right Ear: External ear normal.      Left Ear: External ear normal.      Nose: Nose normal.      Mouth/Throat:      Mouth: Mucous membranes are moist.      Pharynx: Oropharynx is clear.   Eyes:      Extraocular Movements: Extraocular movements intact.      Conjunctiva/sclera: Conjunctivae normal.   Cardiovascular:      Rate and Rhythm: Normal rate and regular rhythm.      Pulses: Normal pulses.      Heart sounds: Normal heart sounds.   Pulmonary:      Effort: Pulmonary effort is normal.      Breath sounds: No rales.      Comments: NC.  Abdominal:      General: Bowel sounds are normal. There is no distension.      Palpations: Abdomen is soft.      Tenderness: There is no abdominal tenderness.   Musculoskeletal:         General: Normal range of motion.      Cervical back: Normal range of motion and neck supple.      Right lower leg: No edema.      Left lower leg: No edema.   Skin:     General: Skin is warm and dry.      Comments: Sternotomy scar.   Neurological:      " Mental Status: He is alert. Mental status is at baseline.   Psychiatric:         Mood and Affect: Mood normal.         Behavior: Behavior normal.             Significant Labs: All pertinent labs within the past 24 hours have been reviewed.    Significant Imaging: I have reviewed all pertinent imaging results/findings within the past 24 hours.

## 2023-12-05 NOTE — PROGRESS NOTES
"[facility] Medicine  Progress Note    Patient Name: Jerome Amaro Jr.  MRN: [id number]  Patient Class: IP- Inpatient   Admission Date: 12/2/2023  Length of Stay: 3 days  Attending Physician: Endy Ellison MD  Primary Care Provider: Oscar Madrid NP        Subjective:     Principal Problem:NSTEMI (non-ST elevated myocardial infarction)        HPI:  Patient is a 78-year-old male with CAD, PVD, hypertension, seizures, and CHF who presents with shortness breath and chest pain.  Shortness for breath and chest pain began earlier this morning.  Patient took 2 sublingual nitro was at home with some improvement in chest pain.  Chest pain is located centrally chest and radiates to his left arm.  Pain was 8/10.  Patient was given 324 mg of aspirin.  Patient was found to be 84% on room air.  Patient was placed on a non-rebreather.  On arrival to ED, patient was placed on BiPAP.  WBC 10.3 and hemoglobin 13.0.  Creatinine 1.2.  Troponin was 94.3 with repeat of 458.  .  EKG with atrial sensed ventricular paced rhythm.  Chest x-ray consistent with CHF.  Patient was given IV Lasix in ED.  Cardiology consulted.    Overview/Hospital Course:  Jerome Amaro is a 78 year old male with a past medical history of CAD s/p CABG, PVD, HTN, seizures, CHF, CKD, pacemaker, anemia, and MDD/JESUS who presented with an NSTEMI. Cardiology has been consulted and is planning to perform an angiogram 12/5/2023. The patient was on IV diuretics and was treated for a CHF exacerbation as well. He is on ASA, Isordil, lisinopril, metoprolol, and Lovenox. Troponin is now improving.    Interval History: see "Hospital Course"    Review of Systems   Respiratory:  Negative for shortness of breath.    Cardiovascular:  Negative for chest pain, palpitations and leg swelling.     Objective:     Vital Signs (Most Recent):  Temp: 97.8 °F (36.6 °C) (12/05/23 0701)  Pulse: 80 (12/05/23 1315)  Resp: 10 (12/05/23 1315)  BP: 128/62 " (12/05/23 1315)  SpO2: (!) 93 % (12/05/23 1315) Vital Signs (24h Range):  Temp:  [97.2 °F (36.2 °C)-100.5 °F (38.1 °C)] 97.8 °F (36.6 °C)  Pulse:  [54-88] 80  Resp:  [10-29] 10  SpO2:  [92 %-97 %] 93 %  BP: ()/(50-71) 128/62     Weight: 81 kg (178 lb 9.2 oz)  Body mass index is 28.82 kg/m².    Intake/Output Summary (Last 24 hours) at 12/5/2023 1326  Last data filed at 12/5/2023 1100  Gross per 24 hour   Intake 620 ml   Output 1025 ml   Net -405 ml         Physical Exam  Vitals and nursing note reviewed.   Constitutional:       Appearance: He is not ill-appearing.   HENT:      Head: Normocephalic and atraumatic.      Right Ear: External ear normal.      Left Ear: External ear normal.      Nose: Nose normal.      Mouth/Throat:      Mouth: Mucous membranes are moist.      Pharynx: Oropharynx is clear.   Eyes:      Extraocular Movements: Extraocular movements intact.      Conjunctiva/sclera: Conjunctivae normal.   Cardiovascular:      Rate and Rhythm: Normal rate and regular rhythm.      Pulses: Normal pulses.      Heart sounds: Normal heart sounds.   Pulmonary:      Effort: Pulmonary effort is normal.      Breath sounds: No rales.      Comments: NC.  Abdominal:      General: Bowel sounds are normal. There is no distension.      Palpations: Abdomen is soft.      Tenderness: There is no abdominal tenderness.   Musculoskeletal:         General: Normal range of motion.      Cervical back: Normal range of motion and neck supple.      Right lower leg: No edema.      Left lower leg: No edema.   Skin:     General: Skin is warm and dry.      Comments: Sternotomy scar.   Neurological:      Mental Status: He is alert. Mental status is at baseline.   Psychiatric:         Mood and Affect: Mood normal.         Behavior: Behavior normal.             Significant Labs: All pertinent labs within the past 24 hours have been reviewed.    Significant Imaging: I have reviewed all pertinent imaging results/findings within the past 24  hours.    Assessment/Plan:      * NSTEMI (non-ST elevated myocardial infarction)  -ASA  -Plavix  -Lovenox  -Metoprolol  -Lisinopril held  -Isordil  -Cardiology consulted; will perform angiogram 12/5  -Telemetry      Congestive heart failure  Treated for acute exacerbation.  -IV Lasix held  -Metoprolol  -Lisinopril held  -Telemetry  -Strict I's and O's  -Keep K > 4 and Mg > 2    Anxiety and depression  -Continue home citalopram      Seizure disorder  -Continue home Dilantin      Pacemaker  Chronic. Stable.      Dyslipidemia  -Continue ezetimibe      CAD (coronary artery disease)  -Continue ASA  -Continue metoprolol  -Lovenox  -Telemetry  -Cardiology following    S/P TAVR (transcatheter aortic valve replacement)  Chronic. Stable.      Anemia  Stable.  -Trend Hgb with CBC    HTN (hypertension)  -Continue home amlodipine, nitrate, and metoprolol  -Continue to monitor      VTE Risk Mitigation (From admission, onward)           Ordered     enoxaparin injection 80 mg  Every 12 hours         12/02/23 1230     IP VTE HIGH RISK PATIENT  Once         12/02/23 1202     Place sequential compression device  Until discontinued         12/02/23 1202                    Discharge Planning   AXEL: 12/5/2023     Code Status: Full Code   Is the patient medically ready for discharge?:     Reason for patient still in hospital (select all that apply): Patient trending condition, Treatment, Imaging, and Consult recommendations  Discharge Plan A: Home with family                  Endy Ellison MD  Department of Hospital Medicine   [facility]

## 2023-12-05 NOTE — CARE UPDATE
12/04/23 2009   Patient Assessment/Suction   Level of Consciousness (AVPU) alert   Respiratory Effort Normal   Expansion/Accessory Muscles/Retractions no use of accessory muscles   All Lung Fields Breath Sounds clear   Rhythm/Pattern, Respiratory unlabored   Cough Frequency no cough   PRE-TX-O2   Device (Oxygen Therapy) nasal cannula   $ Is the patient on Low Flow Oxygen? Yes   Flow (L/min) 2   SpO2 97 %   Pulse Oximetry Type Continuous   $ Pulse Oximetry - Multiple Charge Pulse Oximetry - Multiple   Pulse 80   Aerosol Therapy   $ Aerosol Therapy Charges PRN treatment not required   Education   $ Education 15 min   Respiratory Evaluation   $ Care Plan Tech Time 15 min

## 2023-12-05 NOTE — PLAN OF CARE
Problem: Adult Inpatient Plan of Care  Goal: Plan of Care Review  Outcome: Ongoing, Progressing  Goal: Patient-Specific Goal (Individualized)  Outcome: Ongoing, Progressing  Goal: Absence of Hospital-Acquired Illness or Injury  Outcome: Ongoing, Progressing  Goal: Optimal Comfort and Wellbeing  Outcome: Ongoing, Progressing  Goal: Readiness for Transition of Care  Outcome: Ongoing, Progressing     Problem: Fall Injury Risk  Goal: Absence of Fall and Fall-Related Injury  Outcome: Ongoing, Progressing     Problem: Gas Exchange Impaired  Goal: Optimal Gas Exchange  Outcome: Ongoing, Progressing     Problem: Chest Pain  Goal: Resolution of Chest Pain Symptoms  Outcome: Ongoing, Progressing

## 2023-12-06 ENCOUNTER — HOSPITAL ENCOUNTER (OUTPATIENT)
Facility: HOSPITAL | Age: 78
Discharge: HOME | End: 2023-12-08
Attending: STUDENT IN AN ORGANIZED HEALTH CARE EDUCATION/TRAINING PROGRAM | Admitting: STUDENT IN AN ORGANIZED HEALTH CARE EDUCATION/TRAINING PROGRAM
Payer: COMMERCIAL

## 2023-12-06 VITALS
SYSTOLIC BLOOD PRESSURE: 123 MMHG | DIASTOLIC BLOOD PRESSURE: 70 MMHG | WEIGHT: 177.94 LBS | HEART RATE: 90 BPM | BODY MASS INDEX: 28.6 KG/M2 | TEMPERATURE: 99 F | HEIGHT: 66 IN | OXYGEN SATURATION: 95 % | RESPIRATION RATE: 18 BRPM

## 2023-12-06 DIAGNOSIS — I20.0 UNSTABLE ANGINA: Primary | ICD-10-CM

## 2023-12-06 DIAGNOSIS — R07.9 CHEST PAIN: ICD-10-CM

## 2023-12-06 PROBLEM — I21.4 NSTEMI (NON-ST ELEVATED MYOCARDIAL INFARCTION): Status: RESOLVED | Noted: 2019-11-05 | Resolved: 2023-12-06

## 2023-12-06 LAB
ANION GAP SERPL CALC-SCNC: 7 MMOL/L (ref 8–16)
AORTIC ROOT ANNULUS: 2.3 CM
AV INDEX (PROSTH): 0.63
AV MEAN GRADIENT: 4 MMHG
AV PEAK GRADIENT: 8 MMHG
AV VALVE AREA BY VELOCITY RATIO: 2.06 CM²
AV VALVE AREA: 2.17 CM²
AV VELOCITY RATIO: 0.59
BSA FOR ECHO PROCEDURE: 1.98 M2
BUN SERPL-MCNC: 20 MG/DL (ref 8–23)
CALCIUM SERPL-MCNC: 8.2 MG/DL (ref 8.7–10.5)
CHLORIDE SERPL-SCNC: 96 MMOL/L (ref 95–110)
CO2 SERPL-SCNC: 29 MMOL/L (ref 23–29)
CREAT SERPL-MCNC: 1.1 MG/DL (ref 0.5–1.4)
CV ECHO LV RWT: 0.54 CM
DOP CALC AO PEAK VEL: 1.38 M/S
DOP CALC AO VTI: 26.9 CM
DOP CALC LVOT AREA: 3.5 CM2
DOP CALC LVOT DIAMETER: 2.1 CM
DOP CALC LVOT PEAK VEL: 0.82 M/S
DOP CALC LVOT STROKE VOLUME: 58.51 CM3
DOP CALC MV VTI: 39 CM
DOP CALCLVOT PEAK VEL VTI: 16.9 CM
E WAVE DECELERATION TIME: 225 MSEC
E/A RATIO: 0.97
E/E' RATIO: 26.91 M/S
ECHO LV POSTERIOR WALL: 1.29 CM (ref 0.6–1.1)
ERYTHROCYTE [DISTWIDTH] IN BLOOD BY AUTOMATED COUNT: 13 % (ref 11.5–14.5)
EST. GFR  (NO RACE VARIABLE): >60 ML/MIN/1.73 M^2
FRACTIONAL SHORTENING: 14 % (ref 28–44)
GLUCOSE SERPL-MCNC: 88 MG/DL (ref 70–110)
GLUCOSE SERPL-MCNC: 92 MG/DL (ref 70–110)
HCT VFR BLD AUTO: 36.7 % (ref 40–54)
HGB BLD-MCNC: 12.4 G/DL (ref 14–18)
INTERVENTRICULAR SEPTUM: 1.45 CM (ref 0.6–1.1)
LEFT ATRIUM VOLUME INDEX MOD: 35.8 ML/M2
LEFT ATRIUM VOLUME MOD: 69 CM3
LEFT INTERNAL DIMENSION IN SYSTOLE: 4.14 CM (ref 2.1–4)
LEFT VENTRICLE DIASTOLIC VOLUME INDEX: 55.44 ML/M2
LEFT VENTRICLE DIASTOLIC VOLUME: 107 ML
LEFT VENTRICLE MASS INDEX: 137 G/M2
LEFT VENTRICLE SYSTOLIC VOLUME INDEX: 39.3 ML/M2
LEFT VENTRICLE SYSTOLIC VOLUME: 75.9 ML
LEFT VENTRICULAR INTERNAL DIMENSION IN DIASTOLE: 4.79 CM (ref 3.5–6)
LEFT VENTRICULAR MASS: 264.39 G
LV LATERAL E/E' RATIO: 24.67 M/S
LV SEPTAL E/E' RATIO: 29.6 M/S
LVOT MG: 1 MMHG
LVOT MV: 0.53 CM/S
MAGNESIUM SERPL-MCNC: 1.9 MG/DL (ref 1.6–2.6)
MCH RBC QN AUTO: 30.4 PG (ref 27–31)
MCHC RBC AUTO-ENTMCNC: 33.8 G/DL (ref 32–36)
MCV RBC AUTO: 90 FL (ref 82–98)
MR PISA EROA: 0.12 CM2
MV MEAN GRADIENT: 6 MMHG
MV PEAK A VEL: 1.53 M/S
MV PEAK E VEL: 1.48 M/S
MV PEAK GRADIENT: 9 MMHG
MV STENOSIS PRESSURE HALF TIME: 68 MS
MV VALVE AREA BY CONTINUITY EQUATION: 1.5 CM2
MV VALVE AREA P 1/2 METHOD: 3.24 CM2
OHS LV EJECTION FRACTION SIMPSONS BIPLANE MOD: 48 %
PISA MRMAX VEL: 5.07 M/S
PISA RADIUS: 0.6 CM
PISA TR MAX VEL: 2.62 M/S
PISA VN NYQUIST MS: 0.27 M/S
PISA VN NYQUIST: 0.27 M/S
PLATELET # BLD AUTO: 149 K/UL (ref 150–450)
PMV BLD AUTO: 11.1 FL (ref 9.2–12.9)
POTASSIUM SERPL-SCNC: 4.3 MMOL/L (ref 3.5–5.1)
PV MV: 0.66 M/S
PV PEAK GRADIENT: 4 MMHG
PV PEAK VELOCITY: 0.97 M/S
RA PRESSURE ESTIMATED: 3 MMHG
RBC # BLD AUTO: 4.08 M/UL (ref 4.6–6.2)
RV TB RVSP: 6 MMHG
RV TISSUE DOPPLER FREE WALL SYSTOLIC VELOCITY 1 (APICAL 4 CHAMBER VIEW): 4.57 CM/S
SINUS: 2.88 CM
SODIUM SERPL-SCNC: 132 MMOL/L (ref 136–145)
STJ: 2.23 CM
TDI LATERAL: 0.06 M/S
TDI SEPTAL: 0.05 M/S
TDI: 0.06 M/S
TR MAX PG: 27 MMHG
TRICUSPID ANNULAR PLANE SYSTOLIC EXCURSION: 1.68 CM
TV REST PULMONARY ARTERY PRESSURE: 30 MMHG
WBC # BLD AUTO: 6.18 K/UL (ref 3.9–12.7)
Z-SCORE OF LEFT VENTRICULAR DIMENSION IN END DIASTOLE: -1.32
Z-SCORE OF LEFT VENTRICULAR DIMENSION IN END SYSTOLE: 1.64

## 2023-12-06 PROCEDURE — 99900035 HC TECH TIME PER 15 MIN (STAT)

## 2023-12-06 PROCEDURE — 85027 COMPLETE CBC AUTOMATED: CPT | Performed by: STUDENT IN AN ORGANIZED HEALTH CARE EDUCATION/TRAINING PROGRAM

## 2023-12-06 PROCEDURE — 94761 N-INVAS EAR/PLS OXIMETRY MLT: CPT

## 2023-12-06 PROCEDURE — 93005 ELECTROCARDIOGRAM TRACING: CPT | Performed by: GENERAL PRACTICE

## 2023-12-06 PROCEDURE — 83735 ASSAY OF MAGNESIUM: CPT | Performed by: STUDENT IN AN ORGANIZED HEALTH CARE EDUCATION/TRAINING PROGRAM

## 2023-12-06 PROCEDURE — 25000003 PHARM REV CODE 250: Performed by: SPECIALIST

## 2023-12-06 PROCEDURE — 80048 BASIC METABOLIC PNL TOTAL CA: CPT | Performed by: STUDENT IN AN ORGANIZED HEALTH CARE EDUCATION/TRAINING PROGRAM

## 2023-12-06 PROCEDURE — 93010 EKG 12-LEAD: ICD-10-PCS | Mod: ,,, | Performed by: GENERAL PRACTICE

## 2023-12-06 PROCEDURE — 36415 COLL VENOUS BLD VENIPUNCTURE: CPT | Performed by: STUDENT IN AN ORGANIZED HEALTH CARE EDUCATION/TRAINING PROGRAM

## 2023-12-06 PROCEDURE — 27000221 HC OXYGEN, UP TO 24 HOURS

## 2023-12-06 PROCEDURE — 93010 ELECTROCARDIOGRAM REPORT: CPT | Mod: ,,, | Performed by: GENERAL PRACTICE

## 2023-12-06 PROCEDURE — 99285 EMERGENCY DEPT VISIT HI MDM: CPT | Mod: 25

## 2023-12-06 PROCEDURE — 93010 ELECTROCARDIOGRAM REPORT: CPT | Mod: 76,,, | Performed by: GENERAL PRACTICE

## 2023-12-06 PROCEDURE — 93010 EKG 12-LEAD: ICD-10-PCS | Mod: 76,,, | Performed by: GENERAL PRACTICE

## 2023-12-06 RX ORDER — FUROSEMIDE 20 MG/1
20 TABLET ORAL DAILY PRN
Status: DISCONTINUED | OUTPATIENT
Start: 2023-12-06 | End: 2023-12-06 | Stop reason: HOSPADM

## 2023-12-06 RX ORDER — HYDROXYZINE PAMOATE 25 MG/1
25 CAPSULE ORAL DAILY PRN
Start: 2023-12-06

## 2023-12-06 RX ORDER — HYDROXYZINE PAMOATE 25 MG/1
25 CAPSULE ORAL DAILY
Status: DISCONTINUED | OUTPATIENT
Start: 2023-12-06 | End: 2023-12-06 | Stop reason: HOSPADM

## 2023-12-06 RX ORDER — METOPROLOL SUCCINATE 50 MG/1
50 TABLET, EXTENDED RELEASE ORAL 2 TIMES DAILY
Status: DISCONTINUED | OUTPATIENT
Start: 2023-12-06 | End: 2023-12-06 | Stop reason: HOSPADM

## 2023-12-06 RX ORDER — MUPIROCIN 20 MG/G
OINTMENT TOPICAL 3 TIMES DAILY
Status: DISCONTINUED | OUTPATIENT
Start: 2023-12-06 | End: 2023-12-06 | Stop reason: HOSPADM

## 2023-12-06 RX ORDER — METOPROLOL TARTRATE 1 MG/ML
5 INJECTION, SOLUTION INTRAVENOUS
Status: COMPLETED | OUTPATIENT
Start: 2023-12-06 | End: 2023-12-07

## 2023-12-06 RX ORDER — ASPIRIN 325 MG
325 TABLET ORAL
Status: COMPLETED | OUTPATIENT
Start: 2023-12-06 | End: 2023-12-07

## 2023-12-06 RX ADMIN — RANOLAZINE 1000 MG: 500 TABLET, EXTENDED RELEASE ORAL at 09:12

## 2023-12-06 RX ADMIN — EZETIMIBE 10 MG: 10 TABLET ORAL at 09:12

## 2023-12-06 RX ADMIN — METOPROLOL SUCCINATE 50 MG: 50 TABLET, EXTENDED RELEASE ORAL at 09:12

## 2023-12-06 RX ADMIN — PHENYTOIN SODIUM 400 MG: 100 CAPSULE ORAL at 09:12

## 2023-12-06 RX ADMIN — MUPIROCIN 1 G: 20 OINTMENT TOPICAL at 09:12

## 2023-12-06 RX ADMIN — CLOPIDOGREL BISULFATE 75 MG: 75 TABLET, FILM COATED ORAL at 09:12

## 2023-12-06 RX ADMIN — CITALOPRAM HYDROBROMIDE 20 MG: 20 TABLET ORAL at 09:12

## 2023-12-06 RX ADMIN — HYDROXYZINE PAMOATE 25 MG: 25 CAPSULE ORAL at 09:12

## 2023-12-06 RX ADMIN — AMLODIPINE BESYLATE 5 MG: 5 TABLET ORAL at 09:12

## 2023-12-06 RX ADMIN — ISOSORBIDE DINITRATE 40 MG: 10 TABLET ORAL at 09:12

## 2023-12-06 RX ADMIN — ASPIRIN 81 MG: 81 TABLET, COATED ORAL at 09:12

## 2023-12-06 NOTE — ASSESSMENT & PLAN NOTE
Treated for acute exacerbation.  -Lasix  -Metoprolol  -Lisinopril  -Ivabradine  -Telemetry  -Strict I's and O's  -Keep K > 4 and Mg > 2

## 2023-12-06 NOTE — DISCHARGE SUMMARY
[facility] Medicine  Discharge Summary      Patient Name: Jerome Amaro Jr.  MRN: [id number]  HANSEL: [id number]  Patient Class: IP- Inpatient  Admission Date: 12/2/2023  Hospital Length of Stay: 4 days  Discharge Date and Time:  12/06/2023 7:36 AM  Attending Physician: Endy Ellison MD   Discharging Provider: Endy Ellison MD  Primary Care Provider: Oscar Madrid NP    Primary Care Team: Networked reference to record PCT     HPI:   Patient is a 78-year-old male with CAD, PVD, hypertension, seizures, and CHF who presents with shortness breath and chest pain.  Shortness for breath and chest pain began earlier this morning.  Patient took 2 sublingual nitro was at home with some improvement in chest pain.  Chest pain is located centrally chest and radiates to his left arm.  Pain was 8/10.  Patient was given 324 mg of aspirin.  Patient was found to be 84% on room air.  Patient was placed on a non-rebreather.  On arrival to ED, patient was placed on BiPAP.  WBC 10.3 and hemoglobin 13.0.  Creatinine 1.2.  Troponin was 94.3 with repeat of 458.  .  EKG with atrial sensed ventricular paced rhythm.  Chest x-ray consistent with CHF.  Patient was given IV Lasix in ED.  Cardiology consulted.    Procedure(s) (LRB):  ANGIOGRAM, CORONARY, INCLUDING BYPASS GRAFT, WITH LEFT HEART CATHETERIZATION (N/A)      Hospital Course:   Jerome Amaro is a 78 year old male with a past medical history of CAD s/p CABG, PVD, HTN, seizures, CHF, CKD, pacemaker, anemia, and MDD/JESUS who presented with an NSTEMI. Cardiology was consulted and performed an angiogram 12/5/2023 with no new interventions. The patient was on IV diuretics and was treated for a CHF exacerbation as well. His home medications were continued and he was able to be discharged 12/6/2023 and will follow up with Cardiology in the outpatient setting.     Goals of Care Treatment Preferences:  Code Status: Full Code      Consults:   Consults  (From admission, onward)          Status Ordering Provider     Inpatient consult to Cardiology  Once        Provider:  Rodrigo Willoughby MD    Completed NIYAH PANDYA     Inpatient consult to Registered Dietitian/Nutritionist  Once        Provider:  (Not yet assigned)    Completed NIYAH PANDYA            Neuro  Seizure disorder  -Continue home Dilantin      Cardiac/Vascular  Pacemaker  Chronic. Stable.      Dyslipidemia  -Continue ezetimibe      CAD (coronary artery disease)  -Continue Plavix and ranolazine  -Continue metoprolol  -Telemetry  -Cardiology following    S/P TAVR (transcatheter aortic valve replacement)  Chronic. Stable.      HTN (hypertension)  -Continue home amlodipine, nitrate, and metoprolol  -Continue to monitor    Congestive heart failure  Treated for acute exacerbation.  -Lasix  -Metoprolol  -Lisinopril  -Ivabradine  -Telemetry  -Strict I's and O's  -Keep K > 4 and Mg > 2    Oncology  Anemia  Stable.  -Trend Hgb with CBC    Other  Anxiety and depression  -Continue home citalopram        Final Active Diagnoses:    Diagnosis Date Noted POA    Congestive heart failure [I50.9] 12/02/2023 Yes    Anxiety and depression [F41.9, F32.A] 12/03/2023 Yes    Seizure disorder [G40.909] 05/07/2021 Yes    Pacemaker [Z95.0] 04/21/2015 Yes    HTN (hypertension) [I10] 01/07/2015 Yes    S/P TAVR (transcatheter aortic valve replacement) [Z95.2] 01/07/2015 Not Applicable    CAD (coronary artery disease) [I25.10] 01/07/2015 Yes    Dyslipidemia [E78.5] 01/07/2015 Yes    Anemia [D64.9] 01/07/2015 Yes      Problems Resolved During this Admission:    Diagnosis Date Noted Date Resolved POA    PRINCIPAL PROBLEM:  NSTEMI (non-ST elevated myocardial infarction) [I21.4] 11/05/2019 12/06/2023 Yes       Discharged Condition: stable    Disposition: Home or Self Care    Follow Up:   Follow-up Information       Rodrigo Willoughby MD Follow up in 2 week(s).    Specialty: Cardiology  Contact information:  [address]  [address]  [facility]  [place] [address]  [phone]                           Patient Instructions:      Diet Cardiac     Notify your health care provider if you experience any of the following:  increased confusion or weakness     Notify your health care provider if you experience any of the following:  persistent dizziness, light-headedness, or visual disturbances     Notify your health care provider if you experience any of the following:  worsening rash     Notify your health care provider if you experience any of the following:  severe persistent headache     Notify your health care provider if you experience any of the following:  difficulty breathing or increased cough     Notify your health care provider if you experience any of the following:  redness, tenderness, or signs of infection (pain, swelling, redness, odor or green/yellow discharge around incision site)     Notify your health care provider if you experience any of the following:  severe uncontrolled pain     Notify your health care provider if you experience any of the following:  persistent nausea and vomiting or diarrhea     Notify your health care provider if you experience any of the following:  temperature >100.4     Activity as tolerated       Significant Diagnostic Studies: Labs: All labs within the past 24 hours have been reviewed    Pending Diagnostic Studies:       Procedure Component Value Units Date/Time    Echo [[id number]]  (Abnormal) Resulted: 12/02/23 1435    Order Status: Sent Lab Status: In process Updated: 12/02/23 1435     Loco's Biplane MOD Ejection Fraction 48 %      LVOT stroke volume 58.51 cm3      LVIDd 4.79 cm      LV Systolic Volume 75.90 mL      LV Systolic Volume Index 39.3 mL/m2      LVIDs 4.14 cm      LV Diastolic Volume 107.00 mL      LV Diastolic Volume Index 55.44 mL/m2      IVS 1.45 cm      LVOT diameter 2.10 cm      LVOT area 3.5 cm2      FS 14 %      Left Ventricle Relative Wall Thickness 0.54 cm       Posterior Wall 1.29 cm      LV mass 264.39 g      LV Mass Index 137 g/m2      MV Peak E Bradly 1.48 m/s      TDI LATERAL 0.06 m/s      TDI SEPTAL 0.05 m/s      E/E' ratio 26.91 m/s      MV Peak A Bradly 1.53 m/s      TR Max Bradly 2.62 m/s      E/A ratio 0.97     E wave deceleration time 225.00 msec      LV SEPTAL E/E' RATIO 29.60 m/s      LV LATERAL E/E' RATIO 24.67 m/s      LVOT peak bradly 0.82 m/s      Left Ventricular Outflow Tract Mean Velocity 0.53 cm/s      Left Ventricular Outflow Tract Mean Gradient 1.00 mmHg      RV S' 4.57 cm/s      TAPSE 1.68 cm      LA volume (mod) 69.00 cm3      LA Volume Index (Mod) 35.8 mL/m2      Vn Nyquist MS 0.27 m/s      AV mean gradient 4 mmHg      AV peak gradient 8 mmHg      Ao peak bradly 1.38 m/s      Ao VTI 26.90 cm      LVOT peak VTI 16.90 cm      AV valve area 2.17 cm²      AV Velocity Ratio 0.59     AV index (prosthetic) 0.63     CASSIE by Velocity Ratio 2.06 cm²      Radius 0.60 cm      Vn Nyquist 0.27 m/s      Mr max bradly 5.07 m/s      MR PISA EROA 0.12 cm2      MV mean gradient 6 mmHg      MV peak gradient 9 mmHg      MV stenosis pressure 1/2 time 68.00 ms      MV valve area p 1/2 method 3.24 cm2      MV valve area by continuity eq 1.50 cm2      MV VTI 39.0 cm      Triscuspid Valve Regurgitation Peak Gradient 27 mmHg      PV PEAK VELOCITY 0.97 m/s      PV peak gradient 4 mmHg      Pulmonary Valve Mean Velocity 0.66 m/s      Ao root annulus 2.30 cm      Sinus 2.88 cm      STJ 2.23 cm      Mean e' 0.06 m/s      ZLVIDS 1.64     ZLVIDD -1.32     BSA 1.98 m2            Medications:  Reconciled Home Medications:      Medication List        CHANGE how you take these medications      hydrOXYzine pamoate 25 MG Cap  Commonly known as: VISTARIL  Take 1 capsule (25 mg total) by mouth daily as needed (anxiety).  What changed:   when to take this  reasons to take this            CONTINUE taking these medications      amLODIPine 5 MG tablet  Commonly known as: NORVASC  Take 5 mg by mouth 2 (two)  times daily.     citalopram 20 MG tablet  Commonly known as: CeleXA  Take 20 mg by mouth once daily.     clopidogreL 75 mg tablet  Commonly known as: PLAVIX  Take 75 mg by mouth once daily.     ezetimibe 10 mg tablet  Commonly known as: ZETIA  Take 10 mg by mouth once daily.     furosemide 20 MG tablet  Commonly known as: LASIX  Take 20 mg by mouth daily as needed.     isosorbide mononitrate 20 MG Tab  Commonly known as: ISMO,MONOKET  Take 40 mg by mouth daily as needed.     ivabradine 5 mg Tab  Commonly known as: CORLANOR  Take 1 tablet by mouth 2 (two) times a day.     lisinopriL 40 MG tablet  Commonly known as: PRINIVIL,ZESTRIL  Take 40 mg by mouth once daily.     metoprolol succinate 50 MG 24 hr tablet  Commonly known as: TOPROL-XL  TAKE 2 TABLETS BY MOUTH IN AM AND 1 TAB IN THE EVENING     mupirocin 2 % ointment  Commonly known as: BACTROBAN  Apply topically 3 (three) times daily.     NEXLIZET 180-10 mg Tab  Generic drug: bempedoic acid-ezetimibe  Take 1 tablet by mouth once daily.     NITROLINGUAL 400 mcg/spray spray  Generic drug: nitroGLYCERIN 0.4 MG/DOSE TL SPRY  Place 1 spray under the tongue every 5 (five) minutes as needed.     PENNSAID 2 % Sopk  Generic drug: diclofenac sodium  Apply 1 packet topically once daily.     phenytoin 100 MG ER capsule  Commonly known as: DILANTIN  Take 400 mg by mouth once daily.     ranolazine 1,000 mg Tb12  Commonly known as: RANEXA  Take 1,000 mg by mouth 2 (two) times daily.            STOP taking these medications      isosorbide dinitrate 20 MG tablet  Commonly known as: ISORDIL              Indwelling Lines/Drains at time of discharge:   Lines/Drains/Airways       None                   Time spent on the discharge of patient: 31 minutes         Endy Ellison MD  Department of Hospital Medicine  [facility]

## 2023-12-06 NOTE — PLAN OF CARE
12/06/23 0800   Patient Assessment/Suction   Level of Consciousness (AVPU) alert   Respiratory Effort Unlabored   PRE-TX-O2   Device (Oxygen Therapy) nasal cannula   $ Is the patient on Low Flow Oxygen? Yes   Flow (L/min) 1   SpO2 95 %   Pulse Oximetry Type Continuous   $ Pulse Oximetry - Multiple Charge Pulse Oximetry - Multiple   Pulse 90   Resp 18   /70   Aerosol Therapy   $ Aerosol Therapy Charges PRN treatment not required   Respiratory Evaluation   $ Care Plan Tech Time 15 min

## 2023-12-06 NOTE — PLAN OF CARE
Pt is clear for dc from CM once seen by hospital medicine       12/06/23 0107   Final Note   Assessment Type Final Discharge Note   Anticipated Discharge Disposition Home   Hospital Resources/Appts/Education Provided Appointments scheduled and added to AVS

## 2023-12-07 PROBLEM — E87.1 HYPONATREMIA: Status: ACTIVE | Noted: 2023-12-07

## 2023-12-07 PROBLEM — R07.9 CHEST PAIN: Status: ACTIVE | Noted: 2023-12-07

## 2023-12-07 LAB
ALBUMIN SERPL BCP-MCNC: 3.6 G/DL (ref 3.5–5.2)
ALP SERPL-CCNC: 64 U/L (ref 55–135)
ALT SERPL W/O P-5'-P-CCNC: 11 U/L (ref 10–44)
ANION GAP SERPL CALC-SCNC: 11 MMOL/L (ref 8–16)
ANION GAP SERPL CALC-SCNC: 8 MMOL/L (ref 8–16)
APTT PPP: 33.6 SEC (ref 21–32)
APTT PPP: 47.2 SEC (ref 21–32)
AST SERPL-CCNC: 20 U/L (ref 10–40)
BACTERIA #/AREA URNS HPF: NORMAL /HPF
BASOPHILS # BLD AUTO: 0.04 K/UL (ref 0–0.2)
BASOPHILS # BLD AUTO: 0.04 K/UL (ref 0–0.2)
BASOPHILS NFR BLD: 0.8 % (ref 0–1.9)
BASOPHILS NFR BLD: 0.9 % (ref 0–1.9)
BILIRUB SERPL-MCNC: 0.5 MG/DL (ref 0.1–1)
BILIRUB UR QL STRIP: NEGATIVE
BNP SERPL-MCNC: 662 PG/ML (ref 0–99)
BUN SERPL-MCNC: 21 MG/DL (ref 8–23)
BUN SERPL-MCNC: 22 MG/DL (ref 8–23)
CALCIUM SERPL-MCNC: 7.7 MG/DL (ref 8.7–10.5)
CALCIUM SERPL-MCNC: 8.2 MG/DL (ref 8.7–10.5)
CHLORIDE SERPL-SCNC: 95 MMOL/L (ref 95–110)
CHLORIDE SERPL-SCNC: 99 MMOL/L (ref 95–110)
CLARITY UR: CLEAR
CO2 SERPL-SCNC: 22 MMOL/L (ref 23–29)
CO2 SERPL-SCNC: 27 MMOL/L (ref 23–29)
COLOR UR: YELLOW
CREAT SERPL-MCNC: 1 MG/DL (ref 0.5–1.4)
CREAT SERPL-MCNC: 1.2 MG/DL (ref 0.5–1.4)
DIFFERENTIAL METHOD: ABNORMAL
DIFFERENTIAL METHOD: ABNORMAL
EOSINOPHIL # BLD AUTO: 0.2 K/UL (ref 0–0.5)
EOSINOPHIL # BLD AUTO: 0.2 K/UL (ref 0–0.5)
EOSINOPHIL NFR BLD: 3.5 % (ref 0–8)
EOSINOPHIL NFR BLD: 4.3 % (ref 0–8)
ERYTHROCYTE [DISTWIDTH] IN BLOOD BY AUTOMATED COUNT: 12.9 % (ref 11.5–14.5)
ERYTHROCYTE [DISTWIDTH] IN BLOOD BY AUTOMATED COUNT: 13.1 % (ref 11.5–14.5)
EST. GFR  (NO RACE VARIABLE): >60 ML/MIN/1.73 M^2
EST. GFR  (NO RACE VARIABLE): >60 ML/MIN/1.73 M^2
GLUCOSE SERPL-MCNC: 125 MG/DL (ref 70–110)
GLUCOSE SERPL-MCNC: 97 MG/DL (ref 70–110)
GLUCOSE UR QL STRIP: NEGATIVE
HCT VFR BLD AUTO: 33.5 % (ref 40–54)
HCT VFR BLD AUTO: 37.2 % (ref 40–54)
HGB BLD-MCNC: 11.5 G/DL (ref 14–18)
HGB BLD-MCNC: 12.7 G/DL (ref 14–18)
HGB UR QL STRIP: NEGATIVE
HYALINE CASTS #/AREA URNS LPF: 1 /LPF
IMM GRANULOCYTES # BLD AUTO: 0.01 K/UL (ref 0–0.04)
IMM GRANULOCYTES # BLD AUTO: 0.01 K/UL (ref 0–0.04)
IMM GRANULOCYTES NFR BLD AUTO: 0.2 % (ref 0–0.5)
IMM GRANULOCYTES NFR BLD AUTO: 0.2 % (ref 0–0.5)
INR PPP: 1 (ref 0.8–1.2)
KETONES UR QL STRIP: NEGATIVE
LEUKOCYTE ESTERASE UR QL STRIP: NEGATIVE
LYMPHOCYTES # BLD AUTO: 1.1 K/UL (ref 1–4.8)
LYMPHOCYTES # BLD AUTO: 1.2 K/UL (ref 1–4.8)
LYMPHOCYTES NFR BLD: 21.5 % (ref 18–48)
LYMPHOCYTES NFR BLD: 25.9 % (ref 18–48)
MAGNESIUM SERPL-MCNC: 1.8 MG/DL (ref 1.6–2.6)
MCH RBC QN AUTO: 30.2 PG (ref 27–31)
MCH RBC QN AUTO: 31.3 PG (ref 27–31)
MCHC RBC AUTO-ENTMCNC: 34.1 G/DL (ref 32–36)
MCHC RBC AUTO-ENTMCNC: 34.3 G/DL (ref 32–36)
MCV RBC AUTO: 88 FL (ref 82–98)
MCV RBC AUTO: 92 FL (ref 82–98)
MICROSCOPIC COMMENT: NORMAL
MONOCYTES # BLD AUTO: 1 K/UL (ref 0.3–1)
MONOCYTES # BLD AUTO: 1.2 K/UL (ref 0.3–1)
MONOCYTES NFR BLD: 22.5 % (ref 4–15)
MONOCYTES NFR BLD: 22.8 % (ref 4–15)
NEUTROPHILS # BLD AUTO: 2.1 K/UL (ref 1.8–7.7)
NEUTROPHILS # BLD AUTO: 2.7 K/UL (ref 1.8–7.7)
NEUTROPHILS NFR BLD: 46.2 % (ref 38–73)
NEUTROPHILS NFR BLD: 51.2 % (ref 38–73)
NITRITE UR QL STRIP: NEGATIVE
NRBC BLD-RTO: 0 /100 WBC
NRBC BLD-RTO: 0 /100 WBC
PH UR STRIP: 6 [PH] (ref 5–8)
PHENYTOIN SERPL-MCNC: 7.5 UG/ML (ref 10–20)
PLATELET # BLD AUTO: 137 K/UL (ref 150–450)
PLATELET # BLD AUTO: 163 K/UL (ref 150–450)
PMV BLD AUTO: 10.8 FL (ref 9.2–12.9)
PMV BLD AUTO: 11.1 FL (ref 9.2–12.9)
POTASSIUM SERPL-SCNC: 3.4 MMOL/L (ref 3.5–5.1)
POTASSIUM SERPL-SCNC: 3.5 MMOL/L (ref 3.5–5.1)
PROT SERPL-MCNC: 6.5 G/DL (ref 6–8.4)
PROT UR QL STRIP: ABNORMAL
PROTHROMBIN TIME: 11 SEC (ref 9–12.5)
RBC # BLD AUTO: 3.81 M/UL (ref 4.6–6.2)
RBC # BLD AUTO: 4.06 M/UL (ref 4.6–6.2)
RBC #/AREA URNS HPF: 2 /HPF (ref 0–4)
SODIUM SERPL-SCNC: 130 MMOL/L (ref 136–145)
SODIUM SERPL-SCNC: 132 MMOL/L (ref 136–145)
SP GR UR STRIP: 1.02 (ref 1–1.03)
SQUAMOUS #/AREA URNS HPF: 0 /HPF
TROPONIN I SERPL HS-MCNC: 236.4 PG/ML (ref 0–14.9)
TROPONIN I SERPL HS-MCNC: 292.8 PG/ML (ref 0–14.9)
TROPONIN I SERPL HS-MCNC: 388.6 PG/ML (ref 0–14.9)
TROPONIN I SERPL HS-MCNC: 407 PG/ML (ref 0–14.9)
TROPONIN I SERPL HS-MCNC: 421.9 PG/ML (ref 0–14.9)
URN SPEC COLLECT METH UR: ABNORMAL
UROBILINOGEN UR STRIP-ACNC: ABNORMAL EU/DL
WBC # BLD AUTO: 4.44 K/UL (ref 3.9–12.7)
WBC # BLD AUTO: 5.17 K/UL (ref 3.9–12.7)
WBC #/AREA URNS HPF: 1 /HPF (ref 0–5)

## 2023-12-07 PROCEDURE — 84484 ASSAY OF TROPONIN QUANT: CPT | Mod: 91 | Performed by: STUDENT IN AN ORGANIZED HEALTH CARE EDUCATION/TRAINING PROGRAM

## 2023-12-07 PROCEDURE — G0378 HOSPITAL OBSERVATION PER HR: HCPCS

## 2023-12-07 PROCEDURE — 25000003 PHARM REV CODE 250: Performed by: STUDENT IN AN ORGANIZED HEALTH CARE EDUCATION/TRAINING PROGRAM

## 2023-12-07 PROCEDURE — 63600175 PHARM REV CODE 636 W HCPCS: Performed by: STUDENT IN AN ORGANIZED HEALTH CARE EDUCATION/TRAINING PROGRAM

## 2023-12-07 PROCEDURE — 96365 THER/PROPH/DIAG IV INF INIT: CPT

## 2023-12-07 PROCEDURE — 85025 COMPLETE CBC W/AUTO DIFF WBC: CPT | Mod: 91

## 2023-12-07 PROCEDURE — 84484 ASSAY OF TROPONIN QUANT: CPT | Performed by: STUDENT IN AN ORGANIZED HEALTH CARE EDUCATION/TRAINING PROGRAM

## 2023-12-07 PROCEDURE — 85025 COMPLETE CBC W/AUTO DIFF WBC: CPT | Performed by: STUDENT IN AN ORGANIZED HEALTH CARE EDUCATION/TRAINING PROGRAM

## 2023-12-07 PROCEDURE — 80048 BASIC METABOLIC PNL TOTAL CA: CPT | Mod: XB

## 2023-12-07 PROCEDURE — 25000003 PHARM REV CODE 250

## 2023-12-07 PROCEDURE — 63600175 PHARM REV CODE 636 W HCPCS

## 2023-12-07 PROCEDURE — 96366 THER/PROPH/DIAG IV INF ADDON: CPT

## 2023-12-07 PROCEDURE — 85610 PROTHROMBIN TIME: CPT | Performed by: STUDENT IN AN ORGANIZED HEALTH CARE EDUCATION/TRAINING PROGRAM

## 2023-12-07 PROCEDURE — 96375 TX/PRO/DX INJ NEW DRUG ADDON: CPT

## 2023-12-07 PROCEDURE — 83880 ASSAY OF NATRIURETIC PEPTIDE: CPT | Performed by: STUDENT IN AN ORGANIZED HEALTH CARE EDUCATION/TRAINING PROGRAM

## 2023-12-07 PROCEDURE — 36415 COLL VENOUS BLD VENIPUNCTURE: CPT | Performed by: STUDENT IN AN ORGANIZED HEALTH CARE EDUCATION/TRAINING PROGRAM

## 2023-12-07 PROCEDURE — 83735 ASSAY OF MAGNESIUM: CPT | Performed by: STUDENT IN AN ORGANIZED HEALTH CARE EDUCATION/TRAINING PROGRAM

## 2023-12-07 PROCEDURE — 96376 TX/PRO/DX INJ SAME DRUG ADON: CPT

## 2023-12-07 PROCEDURE — 80053 COMPREHEN METABOLIC PANEL: CPT | Performed by: STUDENT IN AN ORGANIZED HEALTH CARE EDUCATION/TRAINING PROGRAM

## 2023-12-07 PROCEDURE — 96361 HYDRATE IV INFUSION ADD-ON: CPT

## 2023-12-07 PROCEDURE — 80185 ASSAY OF PHENYTOIN TOTAL: CPT | Performed by: STUDENT IN AN ORGANIZED HEALTH CARE EDUCATION/TRAINING PROGRAM

## 2023-12-07 PROCEDURE — 84484 ASSAY OF TROPONIN QUANT: CPT | Mod: 91,XB | Performed by: STUDENT IN AN ORGANIZED HEALTH CARE EDUCATION/TRAINING PROGRAM

## 2023-12-07 PROCEDURE — 81001 URINALYSIS AUTO W/SCOPE: CPT | Performed by: STUDENT IN AN ORGANIZED HEALTH CARE EDUCATION/TRAINING PROGRAM

## 2023-12-07 PROCEDURE — 85730 THROMBOPLASTIN TIME PARTIAL: CPT | Performed by: STUDENT IN AN ORGANIZED HEALTH CARE EDUCATION/TRAINING PROGRAM

## 2023-12-07 RX ORDER — LANOLIN ALCOHOL/MO/W.PET/CERES
800 CREAM (GRAM) TOPICAL
Status: DISCONTINUED | OUTPATIENT
Start: 2023-12-07 | End: 2023-12-08 | Stop reason: HOSPADM

## 2023-12-07 RX ORDER — AMLODIPINE BESYLATE 5 MG/1
5 TABLET ORAL 2 TIMES DAILY
Status: DISCONTINUED | OUTPATIENT
Start: 2023-12-07 | End: 2023-12-08 | Stop reason: HOSPADM

## 2023-12-07 RX ORDER — HEPARIN SODIUM,PORCINE/D5W 25000/250
0-40 INTRAVENOUS SOLUTION INTRAVENOUS CONTINUOUS
Status: DISCONTINUED | OUTPATIENT
Start: 2023-12-07 | End: 2023-12-08

## 2023-12-07 RX ORDER — PROMETHAZINE HYDROCHLORIDE 25 MG/1
25 TABLET ORAL EVERY 6 HOURS PRN
Status: DISCONTINUED | OUTPATIENT
Start: 2023-12-07 | End: 2023-12-08 | Stop reason: HOSPADM

## 2023-12-07 RX ORDER — FUROSEMIDE 20 MG/1
20 TABLET ORAL DAILY PRN
Status: DISCONTINUED | OUTPATIENT
Start: 2023-12-07 | End: 2023-12-08 | Stop reason: HOSPADM

## 2023-12-07 RX ORDER — EZETIMIBE 10 MG/1
10 TABLET ORAL DAILY
Status: DISCONTINUED | OUTPATIENT
Start: 2023-12-07 | End: 2023-12-08 | Stop reason: HOSPADM

## 2023-12-07 RX ORDER — ISOSORBIDE MONONITRATE 20 MG/1
40 TABLET ORAL DAILY PRN
Status: DISCONTINUED | OUTPATIENT
Start: 2023-12-07 | End: 2023-12-07

## 2023-12-07 RX ORDER — NITROGLYCERIN 0.4 MG/1
0.4 TABLET SUBLINGUAL EVERY 5 MIN PRN
COMMUNITY
Start: 2023-09-20 | End: 2024-01-24 | Stop reason: ALTCHOICE

## 2023-12-07 RX ORDER — ASPIRIN 81 MG/1
81 TABLET ORAL DAILY
Status: DISCONTINUED | OUTPATIENT
Start: 2023-12-07 | End: 2023-12-08 | Stop reason: HOSPADM

## 2023-12-07 RX ORDER — SODIUM,POTASSIUM PHOSPHATES 280-250MG
2 POWDER IN PACKET (EA) ORAL
Status: DISCONTINUED | OUTPATIENT
Start: 2023-12-07 | End: 2023-12-08 | Stop reason: HOSPADM

## 2023-12-07 RX ORDER — MORPHINE SULFATE 4 MG/ML
4 INJECTION, SOLUTION INTRAMUSCULAR; INTRAVENOUS EVERY 4 HOURS PRN
Status: DISCONTINUED | OUTPATIENT
Start: 2023-12-07 | End: 2023-12-08 | Stop reason: HOSPADM

## 2023-12-07 RX ORDER — TALC
6 POWDER (GRAM) TOPICAL NIGHTLY PRN
Status: DISCONTINUED | OUTPATIENT
Start: 2023-12-07 | End: 2023-12-08 | Stop reason: HOSPADM

## 2023-12-07 RX ORDER — LISINOPRIL 20 MG/1
40 TABLET ORAL DAILY
Status: DISCONTINUED | OUTPATIENT
Start: 2023-12-07 | End: 2023-12-08 | Stop reason: HOSPADM

## 2023-12-07 RX ORDER — MORPHINE SULFATE 2 MG/ML
2 INJECTION, SOLUTION INTRAMUSCULAR; INTRAVENOUS
Status: COMPLETED | OUTPATIENT
Start: 2023-12-07 | End: 2023-12-07

## 2023-12-07 RX ORDER — CITALOPRAM 20 MG/1
20 TABLET, FILM COATED ORAL DAILY
Status: DISCONTINUED | OUTPATIENT
Start: 2023-12-07 | End: 2023-12-08 | Stop reason: HOSPADM

## 2023-12-07 RX ORDER — MORPHINE SULFATE 2 MG/ML
2 INJECTION, SOLUTION INTRAMUSCULAR; INTRAVENOUS EVERY 4 HOURS PRN
Status: DISCONTINUED | OUTPATIENT
Start: 2023-12-07 | End: 2023-12-08 | Stop reason: HOSPADM

## 2023-12-07 RX ORDER — CLOPIDOGREL BISULFATE 75 MG/1
75 TABLET ORAL DAILY
Status: DISCONTINUED | OUTPATIENT
Start: 2023-12-07 | End: 2023-12-08 | Stop reason: HOSPADM

## 2023-12-07 RX ORDER — ASPIRIN 81 MG/1
81 TABLET ORAL DAILY
Status: DISCONTINUED | OUTPATIENT
Start: 2023-12-07 | End: 2023-12-07

## 2023-12-07 RX ORDER — HYDROXYZINE PAMOATE 25 MG/1
25 CAPSULE ORAL DAILY PRN
Status: DISCONTINUED | OUTPATIENT
Start: 2023-12-07 | End: 2023-12-08 | Stop reason: HOSPADM

## 2023-12-07 RX ORDER — LIDOCAINE 50 MG/G
1 PATCH TOPICAL
Status: DISCONTINUED | OUTPATIENT
Start: 2023-12-07 | End: 2023-12-08 | Stop reason: HOSPADM

## 2023-12-07 RX ORDER — POTASSIUM CHLORIDE 20 MEQ/1
40 TABLET, EXTENDED RELEASE ORAL ONCE
Status: COMPLETED | OUTPATIENT
Start: 2023-12-07 | End: 2023-12-07

## 2023-12-07 RX ORDER — FUROSEMIDE 10 MG/ML
40 INJECTION INTRAMUSCULAR; INTRAVENOUS ONCE
Status: COMPLETED | OUTPATIENT
Start: 2023-12-07 | End: 2023-12-07

## 2023-12-07 RX ORDER — METOPROLOL SUCCINATE 50 MG/1
50 TABLET, EXTENDED RELEASE ORAL NIGHTLY
Status: DISCONTINUED | OUTPATIENT
Start: 2023-12-07 | End: 2023-12-08 | Stop reason: HOSPADM

## 2023-12-07 RX ORDER — ACETAMINOPHEN 325 MG/1
650 TABLET ORAL EVERY 8 HOURS PRN
Status: DISCONTINUED | OUTPATIENT
Start: 2023-12-07 | End: 2023-12-08 | Stop reason: HOSPADM

## 2023-12-07 RX ORDER — ACETAMINOPHEN 325 MG/1
650 TABLET ORAL EVERY 8 HOURS PRN
Status: DISCONTINUED | OUTPATIENT
Start: 2023-12-07 | End: 2023-12-07

## 2023-12-07 RX ORDER — ONDANSETRON 2 MG/ML
4 INJECTION INTRAMUSCULAR; INTRAVENOUS EVERY 8 HOURS PRN
Status: DISCONTINUED | OUTPATIENT
Start: 2023-12-07 | End: 2023-12-08 | Stop reason: HOSPADM

## 2023-12-07 RX ORDER — ENOXAPARIN SODIUM 100 MG/ML
40 INJECTION SUBCUTANEOUS EVERY 24 HOURS
Status: DISCONTINUED | OUTPATIENT
Start: 2023-12-07 | End: 2023-12-07

## 2023-12-07 RX ORDER — RANOLAZINE 500 MG/1
1000 TABLET, EXTENDED RELEASE ORAL 2 TIMES DAILY
Status: DISCONTINUED | OUTPATIENT
Start: 2023-12-07 | End: 2023-12-08 | Stop reason: HOSPADM

## 2023-12-07 RX ORDER — PHENYTOIN SODIUM 100 MG/1
400 CAPSULE, EXTENDED RELEASE ORAL DAILY
Status: DISCONTINUED | OUTPATIENT
Start: 2023-12-07 | End: 2023-12-08 | Stop reason: HOSPADM

## 2023-12-07 RX ORDER — SODIUM CHLORIDE 0.9 % (FLUSH) 0.9 %
2 SYRINGE (ML) INJECTION
Status: DISCONTINUED | OUTPATIENT
Start: 2023-12-07 | End: 2023-12-08 | Stop reason: HOSPADM

## 2023-12-07 RX ORDER — NITROGLYCERIN 0.4 MG/1
0.4 TABLET SUBLINGUAL EVERY 5 MIN PRN
Status: DISCONTINUED | OUTPATIENT
Start: 2023-12-07 | End: 2023-12-08 | Stop reason: HOSPADM

## 2023-12-07 RX ADMIN — EZETIMIBE 10 MG: 10 TABLET ORAL at 08:12

## 2023-12-07 RX ADMIN — MORPHINE SULFATE 2 MG: 2 INJECTION, SOLUTION INTRAMUSCULAR; INTRAVENOUS at 08:12

## 2023-12-07 RX ADMIN — METOPROLOL SUCCINATE 50 MG: 50 TABLET, EXTENDED RELEASE ORAL at 09:12

## 2023-12-07 RX ADMIN — NITROGLYCERIN 0.5 INCH: 20 OINTMENT TOPICAL at 03:12

## 2023-12-07 RX ADMIN — CITALOPRAM HYDROBROMIDE 20 MG: 20 TABLET ORAL at 08:12

## 2023-12-07 RX ADMIN — ASPIRIN 325 MG ORAL TABLET 325 MG: 325 PILL ORAL at 01:12

## 2023-12-07 RX ADMIN — METOPROLOL TARTRATE 5 MG: 1 INJECTION, SOLUTION INTRAVENOUS at 02:12

## 2023-12-07 RX ADMIN — AMLODIPINE BESYLATE 5 MG: 5 TABLET ORAL at 09:12

## 2023-12-07 RX ADMIN — POTASSIUM CHLORIDE 40 MEQ: 1500 TABLET, EXTENDED RELEASE ORAL at 08:12

## 2023-12-07 RX ADMIN — FUROSEMIDE 40 MG: 10 INJECTION, SOLUTION INTRAMUSCULAR; INTRAVENOUS at 08:12

## 2023-12-07 RX ADMIN — AMLODIPINE BESYLATE 5 MG: 5 TABLET ORAL at 08:12

## 2023-12-07 RX ADMIN — MORPHINE SULFATE 2 MG: 2 INJECTION, SOLUTION INTRAMUSCULAR; INTRAVENOUS at 01:12

## 2023-12-07 RX ADMIN — ASPIRIN 81 MG: 81 TABLET, COATED ORAL at 03:12

## 2023-12-07 RX ADMIN — NITROGLYCERIN 0.5 INCH: 20 OINTMENT TOPICAL at 08:12

## 2023-12-07 RX ADMIN — LIDOCAINE 1 PATCH: 50 PATCH TOPICAL at 08:12

## 2023-12-07 RX ADMIN — RANOLAZINE 1000 MG: 500 TABLET, FILM COATED, EXTENDED RELEASE ORAL at 09:12

## 2023-12-07 RX ADMIN — RANOLAZINE 1000 MG: 500 TABLET, FILM COATED, EXTENDED RELEASE ORAL at 08:12

## 2023-12-07 RX ADMIN — HEPARIN SODIUM 12 UNITS/KG/HR: 10000 INJECTION, SOLUTION INTRAVENOUS at 03:12

## 2023-12-07 RX ADMIN — PHENYTOIN SODIUM 400 MG: 100 CAPSULE ORAL at 08:12

## 2023-12-07 RX ADMIN — CLOPIDOGREL BISULFATE 75 MG: 75 TABLET, FILM COATED ORAL at 08:12

## 2023-12-07 RX ADMIN — NITROGLYCERIN 0.5 INCH: 20 OINTMENT TOPICAL at 11:12

## 2023-12-07 RX ADMIN — SODIUM CHLORIDE 1000 ML: 0.9 INJECTION, SOLUTION INTRAVENOUS at 01:12

## 2023-12-07 RX ADMIN — LISINOPRIL 40 MG: 20 TABLET ORAL at 08:12

## 2023-12-07 NOTE — ED PROVIDER NOTES
Encounter Date: 12/6/2023       History     Chief Complaint   Patient presents with    Chest Pain     Patient just got out of hospital     78-year-old male with history of chronic LAD occlusion status post angiogram 1 day ago, discharge this morning, presents now for ongoing chest pain.  It is left-sided, radiating down the left arm, constant, nothing makes better or worse.  He was pain-free on discharge, took his morning medications, went home and ate a sandwich.  He was laying in his chair when the pain restarted again.  Feels like his chronic angina.  It was improved with nitro spray.  Pain did not improve much so he came in for further evaluation.  History of aortic valve status post poor seen valve replacement, CHF, CAD with 15 stents.      Review of patient's allergies indicates:   Allergen Reactions    Atorvastatin Other (See Comments)     Muscle pain    Rosuvastatin Other (See Comments)     Muscle pain     Past Medical History:   Diagnosis Date    Anticoagulant long-term use     2014    Aortic valve disease 2014    pig valve    Arthritis     all over    Chest pain 07/15/2019    CHF (congestive heart failure)     2014 on meds    Coronary artery disease     2007 cabg    Heart attack 1990    15 stents    Heart block     Hyperlipidemia     Hypertension     on meds    Hypothyroidism 2015    on meds    PVD (peripheral vascular disease)     Seizures     last episode 1990 on meds     Past Surgical History:   Procedure Laterality Date    AORTOGRAPHY WITH SERIALOGRAPHY N/A 11/16/2021    Procedure: AORTOGRAM, WITH RUNOFF;  Surgeon: Rodrigo Willoughby MD;  Location: [facility] CATH/EP LAB;  Service: Cardiology;  Laterality: N/A;    ARTERIOGRAPHY OF AORTIC ROOT N/A 11/5/2019    Procedure: ARTERIOGRAM, AORTIC ROOT;  Surgeon: Rodrigo Willoughby MD;  Location: [facility] CATH/EP LAB;  Service: Cardiology;  Laterality: N/A;    CARDIAC CATHETERIZATION      CARDIAC VALVE SURGERY      CORONARY ANGIOGRAPHY INCLUDING BYPASS GRAFTS WITH  CATHETERIZATION OF LEFT HEART N/A 11/5/2019    Procedure: ANGIOGRAM, CORONARY, INCLUDING BYPASS GRAFT, WITH LEFT HEART CATHETERIZATION;  Surgeon: Rodrigo Willoughby MD;  Location: [facility] CATH/EP LAB;  Service: Cardiology;  Laterality: N/A;    CORONARY ANGIOGRAPHY INCLUDING BYPASS GRAFTS WITH CATHETERIZATION OF LEFT HEART Left 11/3/2020    Procedure: ANGIOGRAM, CORONARY, INCLUDING BYPASS GRAFT, WITH LEFT HEART CATHETERIZATION;  Surgeon: Rodrigo Willoughby MD;  Location: [facility] CATH/EP LAB;  Service: Cardiology;  Laterality: Left;    CORONARY ANGIOGRAPHY INCLUDING BYPASS GRAFTS WITH CATHETERIZATION OF LEFT HEART N/A 9/28/2021    Procedure: ANGIOGRAM, CORONARY, INCLUDING BYPASS GRAFT, WITH LEFT HEART CATHETERIZATION;  Surgeon: Rodrigo Willoughby MD;  Location: [facility] CATH/EP LAB;  Service: Cardiology;  Laterality: N/A;    CORONARY ANGIOGRAPHY INCLUDING BYPASS GRAFTS WITH CATHETERIZATION OF LEFT HEART N/A 12/5/2023    Procedure: ANGIOGRAM, CORONARY, INCLUDING BYPASS GRAFT, WITH LEFT HEART CATHETERIZATION;  Surgeon: Rodrigo Willoughby MD;  Location: [facility] CATH/EP LAB;  Service: Cardiology;  Laterality: N/A;    CORONARY ANGIOPLASTY      CORONARY ARTERY BYPASS GRAFT      EXTRACORPOREAL SHOCK WAVE LITHOTRIPSY Right 8/1/2019    Procedure: LITHOTRIPSY, ESWL;  Surgeon: Williams Ortega MD;  Location: [facility] OR;  Service: Urology;  Laterality: Right;    HEMORRHOID SURGERY  1990    INSERTION OF PACEMAKER      PERCUTANEOUS TRANSLUMINAL BALLOON ANGIOPLASTY OF CORONARY ARTERY N/A 11/8/2019    Procedure: Angioplasty-coronary;  Surgeon: Rodrigo Willoughby MD;  Location: [facility] CATH/EP LAB;  Service: Cardiology;  Laterality: N/A;    WRIST FRACTURE SURGERY       Family History   Problem Relation Age of Onset    Heart attack Mother     Heart attack Father     Heart disease Sister     Stroke Sister     Diabetes Sister     No Known Problems Maternal Grandmother     No Known Problems Maternal Grandfather     No Known Problems Paternal Grandmother     No Known  Problems Paternal Grandfather     No Known Problems Brother     No Known Problems Maternal Aunt     No Known Problems Maternal Uncle     No Known Problems Paternal Aunt     No Known Problems Paternal Uncle     Anemia Neg Hx     Arrhythmia Neg Hx     Asthma Neg Hx     Clotting disorder Neg Hx     Fainting Neg Hx     Heart failure Neg Hx     Hyperlipidemia Neg Hx     Hypertension Neg Hx     Atrial Septal Defect Neg Hx      Social History     Tobacco Use    Smoking status: Former     Current packs/day: 0.00     Average packs/day: 0.5 packs/day for 4.0 years (2.0 ttl pk-yrs)     Types: Cigarettes     Start date:      Quit date:      Years since quittin.9    Smokeless tobacco: Never   Substance Use Topics    Alcohol use: Yes     Comment: social    Drug use: No     Review of Systems   Constitutional:  Negative for activity change and appetite change.   HENT:  Negative for congestion and drooling.    Eyes:  Negative for discharge and itching.   Respiratory:  Negative for cough and chest tightness.    Cardiovascular:  Positive for chest pain. Negative for leg swelling.   Gastrointestinal:  Negative for abdominal distention and abdominal pain.   Genitourinary:  Negative for difficulty urinating and dysuria.   Musculoskeletal:  Negative for arthralgias.   Skin:  Negative for color change and pallor.   Neurological:  Negative for dizziness and facial asymmetry.   Psychiatric/Behavioral:  Negative for agitation and behavioral problems.        Physical Exam     Initial Vitals [23 2323]   BP Pulse Resp Temp SpO2   109/75 100 20 98.2 °F (36.8 °C) 95 %      MAP       --         Physical Exam    Nursing note and vitals reviewed.  Constitutional: He appears well-developed and well-nourished.   Patient is pleasant but uncomfortable   HENT:   Head: Normocephalic and atraumatic.   Mouth/Throat: Oropharynx is clear and moist.   Eyes: Conjunctivae and EOM are normal. Pupils are equal, round, and reactive to light.    Neck: No thyromegaly present.   Normal range of motion.  Cardiovascular:  Regular rhythm and intact distal pulses.           Murmur heard.  Pulmonary/Chest: Breath sounds normal. No respiratory distress. He has no wheezes.   Abdominal: Abdomen is soft. Bowel sounds are normal. He exhibits no distension. There is no abdominal tenderness.   Musculoskeletal:         General: No tenderness or edema. Normal range of motion.      Cervical back: Normal range of motion.     Neurological: He is alert and oriented to person, place, and time. He has normal strength. No cranial nerve deficit.   Skin: Skin is warm and dry. No rash noted.   Psychiatric: He has a normal mood and affect. His behavior is normal. Thought content normal.         ED Course   Procedures  Labs Reviewed   CBC W/ AUTO DIFFERENTIAL - Abnormal; Notable for the following components:       Result Value    RBC 4.06 (*)     Hemoglobin 12.7 (*)     Hematocrit 37.2 (*)     MCH 31.3 (*)     Mono # 1.2 (*)     Mono % 22.8 (*)     All other components within normal limits   COMPREHENSIVE METABOLIC PANEL - Abnormal; Notable for the following components:    Sodium 130 (*)     Glucose 125 (*)     Calcium 8.2 (*)     All other components within normal limits   TROPONIN I HIGH SENSITIVITY - Abnormal; Notable for the following components:    Troponin I High Sensitivity 236.4 (*)     All other components within normal limits   B-TYPE NATRIURETIC PEPTIDE - Abnormal; Notable for the following components:     (*)     All other components within normal limits   URINALYSIS, REFLEX TO URINE CULTURE - Abnormal; Notable for the following components:    Protein, UA 1+ (*)     Urobilinogen, UA 2.0-3.0 (*)     All other components within normal limits    Narrative:     Specimen Source->Urine   MAGNESIUM   URINALYSIS MICROSCOPIC    Narrative:     Specimen Source->Urine   PHENYTOIN LEVEL, TOTAL   TROPONIN I HIGH SENSITIVITY   CBC W/ AUTO DIFFERENTIAL   BASIC METABOLIC PANEL    PHENYTOIN LEVEL, TOTAL     EKG Readings: (Independently Interpreted)   Initial Reading: No STEMI.   EKG shows ventricular paced rhythm, no acute ST elevations or depressions, PVCs.  There are no SD, wide QRS, prolonged QT intervals.       Imaging Results              X-Ray Chest AP Portable (In process)                      Medications   aspirin EC tablet 81 mg (has no administration in time range)   amLODIPine tablet 5 mg (has no administration in time range)   citalopram tablet 20 mg (has no administration in time range)   clopidogreL tablet 75 mg (has no administration in time range)   ezetimibe tablet 10 mg (has no administration in time range)   furosemide tablet 20 mg (has no administration in time range)   hydrOXYzine pamoate capsule 25 mg (has no administration in time range)   isosorbide mononitrate tablet 40 mg (has no administration in time range)   metoprolol succinate (TOPROL-XL) 24 hr tablet 50 mg (has no administration in time range)   phenytoin (DILANTIN) ER capsule 400 mg (has no administration in time range)   ranolazine 12 hr tablet 1,000 mg (has no administration in time range)   sodium chloride 0.9% flush 2 mL (has no administration in time range)   melatonin tablet 6 mg (has no administration in time range)   enoxaparin injection 40 mg (has no administration in time range)   potassium bicarbonate disintegrating tablet 50 mEq (has no administration in time range)   potassium bicarbonate disintegrating tablet 35 mEq (has no administration in time range)   potassium bicarbonate disintegrating tablet 60 mEq (has no administration in time range)   magnesium oxide tablet 800 mg (has no administration in time range)   magnesium oxide tablet 800 mg (has no administration in time range)   potassium, sodium phosphates 280-160-250 mg packet 2 packet (has no administration in time range)   potassium, sodium phosphates 280-160-250 mg packet 2 packet (has no administration in time range)   potassium,  sodium phosphates 280-160-250 mg packet 2 packet (has no administration in time range)   acetaminophen tablet 650 mg (has no administration in time range)   morphine injection 2 mg (has no administration in time range)   morphine injection 4 mg (has no administration in time range)   ondansetron injection 4 mg (has no administration in time range)   promethazine tablet 25 mg (has no administration in time range)   acetaminophen tablet 650 mg (has no administration in time range)   nitroGLYCERIN 2% TD oint ointment 0.5 inch (has no administration in time range)   LIDOcaine 5 % patch 1 patch (has no administration in time range)   aspirin tablet 325 mg (325 mg Oral Given 12/7/23 0111)   metoprolol injection 5 mg (5 mg Intravenous Given 12/7/23 0207)   sodium chloride 0.9% bolus 1,000 mL 1,000 mL (0 mLs Intravenous Stopped 12/7/23 0207)   morphine injection 2 mg (2 mg Intravenous Given 12/7/23 0113)     Medical Decision Making  78-year-old with coronary artery disease, 15 stents, chronic angina, presents now for ongoing chest pain, discharged yesterday.  Pain is left-sided, radiating to left consistent with his chronic angina.  Vitals within normal limits on initial exam aside from borderline tachycardia.  EKG without acute ischemic changes he has a paced ventricular rhythm.  Troponin elevated at 259.  BNP elevated at 650.  Will admit to hospital medicine for unstable angina.    Amount and/or Complexity of Data Reviewed  Labs: ordered. Decision-making details documented in ED Course.  Radiology: ordered.    Risk  OTC drugs.  Prescription drug management.               ED Course as of 12/07/23 0433   Thu Dec 07, 2023   0211 B-Type natriuretic peptide (BNP)(!) [BS]   0211 Comprehensive metabolic panel(!) [BS]   0211 Magnesium [BS]   0211 CBC auto differential(!) [BS]   0231 Troponin I High Sensitivity #1(!!) [BS]      ED Course User Index  [BS] Nicolás Chi MD                           Clinical Impression:  Final  diagnoses:  [R07.9] Chest pain  [I20.0] Unstable angina (Primary)          ED Disposition Condition    Observation Stable                Nicolás Chi MD  12/07/23 7629

## 2023-12-07 NOTE — ASSESSMENT & PLAN NOTE
Patient has hyponatremia with sodium of 130 on arrival to the ED.  We will aim to correct the sodium by 4-6mEq in 24 hours.   We will monitor sodium Daily.   We will treat the hyponatremia with Fluid restriction of:  1 liter per day.

## 2023-12-07 NOTE — SUBJECTIVE & OBJECTIVE
Past Medical History:   Diagnosis Date    Anticoagulant long-term use     2014    Aortic valve disease 2014    pig valve    Arthritis     all over    Chest pain 07/15/2019    CHF (congestive heart failure)     2014 on meds    Coronary artery disease     2007 cabg    Heart attack 1990    15 stents    Heart block     Hyperlipidemia     Hypertension     on meds    Hypothyroidism 2015    on meds    PVD (peripheral vascular disease)     Seizures     last episode 1990 on meds       Past Surgical History:   Procedure Laterality Date    AORTOGRAPHY WITH SERIALOGRAPHY N/A 11/16/2021    Procedure: AORTOGRAM, WITH RUNOFF;  Surgeon: oRdrigo Willoughby MD;  Location: [facility] CATH/EP LAB;  Service: Cardiology;  Laterality: N/A;    ARTERIOGRAPHY OF AORTIC ROOT N/A 11/5/2019    Procedure: ARTERIOGRAM, AORTIC ROOT;  Surgeon: Rodrigo Willoughby MD;  Location: [facility] CATH/EP LAB;  Service: Cardiology;  Laterality: N/A;    CARDIAC CATHETERIZATION      CARDIAC VALVE SURGERY      CORONARY ANGIOGRAPHY INCLUDING BYPASS GRAFTS WITH CATHETERIZATION OF LEFT HEART N/A 11/5/2019    Procedure: ANGIOGRAM, CORONARY, INCLUDING BYPASS GRAFT, WITH LEFT HEART CATHETERIZATION;  Surgeon: Rodrigo Willoughby MD;  Location: [facility] CATH/EP LAB;  Service: Cardiology;  Laterality: N/A;    CORONARY ANGIOGRAPHY INCLUDING BYPASS GRAFTS WITH CATHETERIZATION OF LEFT HEART Left 11/3/2020    Procedure: ANGIOGRAM, CORONARY, INCLUDING BYPASS GRAFT, WITH LEFT HEART CATHETERIZATION;  Surgeon: Rodrigo Willoughby MD;  Location: [facility] CATH/EP LAB;  Service: Cardiology;  Laterality: Left;    CORONARY ANGIOGRAPHY INCLUDING BYPASS GRAFTS WITH CATHETERIZATION OF LEFT HEART N/A 9/28/2021    Procedure: ANGIOGRAM, CORONARY, INCLUDING BYPASS GRAFT, WITH LEFT HEART CATHETERIZATION;  Surgeon: Rodrigo Willoughby MD;  Location: [facility] CATH/EP LAB;  Service: Cardiology;  Laterality: N/A;    CORONARY ANGIOGRAPHY INCLUDING BYPASS GRAFTS WITH CATHETERIZATION OF LEFT HEART N/A 12/5/2023    Procedure: ANGIOGRAM,  CORONARY, INCLUDING BYPASS GRAFT, WITH LEFT HEART CATHETERIZATION;  Surgeon: Rodrigo Willoughby MD;  Location: [facility] CATH/EP LAB;  Service: Cardiology;  Laterality: N/A;    CORONARY ANGIOPLASTY      CORONARY ARTERY BYPASS GRAFT      EXTRACORPOREAL SHOCK WAVE LITHOTRIPSY Right 8/1/2019    Procedure: LITHOTRIPSY, ESWL;  Surgeon: Williams Ortega MD;  Location: [facility] OR;  Service: Urology;  Laterality: Right;    HEMORRHOID SURGERY  1990    INSERTION OF PACEMAKER      PERCUTANEOUS TRANSLUMINAL BALLOON ANGIOPLASTY OF CORONARY ARTERY N/A 11/8/2019    Procedure: Angioplasty-coronary;  Surgeon: Rodrigo Willoughby MD;  Location: [facility] CATH/EP LAB;  Service: Cardiology;  Laterality: N/A;    WRIST FRACTURE SURGERY         Review of patient's allergies indicates:   Allergen Reactions    Atorvastatin Other (See Comments)     Muscle pain    Rosuvastatin Other (See Comments)     Muscle pain       Current Facility-Administered Medications on File Prior to Encounter   Medication    magnesium oxide tablet 800 mg    sodium chloride 0.9% flush 10 mL    [DISCONTINUED] acetaminophen tablet 650 mg    [DISCONTINUED] albuterol-ipratropium 2.5 mg-0.5 mg/3 mL nebulizer solution 3 mL    [DISCONTINUED] amLODIPine tablet 5 mg    [DISCONTINUED] aspirin EC tablet 81 mg    [DISCONTINUED] citalopram tablet 20 mg    [DISCONTINUED] clopidogreL tablet 75 mg    [DISCONTINUED] diclofenac sodium 2 % SoPk 1 packet    [DISCONTINUED] enoxaparin injection 80 mg    [DISCONTINUED] ezetimibe tablet 10 mg    [DISCONTINUED] furosemide tablet 20 mg    [DISCONTINUED] hydrALAZINE injection 10 mg    [DISCONTINUED] hydrOXYzine pamoate capsule 25 mg    [DISCONTINUED] isosorbide dinitrate tablet 40 mg    [DISCONTINUED] magnesium oxide tablet 800 mg    [DISCONTINUED] magnesium oxide tablet 800 mg    [DISCONTINUED] metoprolol succinate (TOPROL-XL) 24 hr tablet 50 mg    [DISCONTINUED] mupirocin 2 % ointment    [DISCONTINUED] nitroGLYCERIN SL tablet 0.4 mg    [DISCONTINUED]  ondansetron disintegrating tablet 8 mg    [DISCONTINUED] phenytoin (DILANTIN) ER capsule 400 mg    [DISCONTINUED] potassium bicarbonate disintegrating tablet 35 mEq    [DISCONTINUED] potassium bicarbonate disintegrating tablet 50 mEq    [DISCONTINUED] potassium bicarbonate disintegrating tablet 60 mEq    [DISCONTINUED] potassium, sodium phosphates 280-160-250 mg packet 2 packet    [DISCONTINUED] potassium, sodium phosphates 280-160-250 mg packet 2 packet    [DISCONTINUED] potassium, sodium phosphates 280-160-250 mg packet 2 packet    [DISCONTINUED] ranolazine 12 hr tablet 1,000 mg    [DISCONTINUED] sodium chloride 0.9% flush 10 mL     Current Outpatient Medications on File Prior to Encounter   Medication Sig    amLODIPine (NORVASC) 5 MG tablet Take 5 mg by mouth 2 (two) times daily.    citalopram (CELEXA) 20 MG tablet Take 20 mg by mouth once daily.    clopidogrel (PLAVIX) 75 mg tablet Take 75 mg by mouth once daily.    diclofenac sodium (PENNSAID) 2 % SoPk Apply 1 packet topically once daily.    ezetimibe (ZETIA) 10 mg tablet Take 10 mg by mouth once daily.    furosemide (LASIX) 20 MG tablet Take 20 mg by mouth daily as needed.     hydrOXYzine pamoate (VISTARIL) 25 MG Cap Take 1 capsule (25 mg total) by mouth daily as needed (anxiety).    isosorbide mononitrate (ISMO,MONOKET) 20 MG Tab Take 40 mg by mouth daily as needed.    ivabradine (CORLANOR) 5 mg Tab Take 1 tablet by mouth 2 (two) times a day.    lisinopriL (PRINIVIL,ZESTRIL) 40 MG tablet Take 40 mg by mouth once daily.    metoprolol succinate (TOPROL-XL) 50 MG 24 hr tablet TAKE 2 TABLETS BY MOUTH IN AM AND 1 TAB IN THE EVENING    mupirocin (BACTROBAN) 2 % ointment Apply topically 3 (three) times daily.    NEXLIZET 180-10 mg Tab Take 1 tablet by mouth once daily.    NITROLINGUAL 400 mcg/spray spray Place 1 spray under the tongue every 5 (five) minutes as needed.    phenytoin (DILANTIN) 100 MG ER capsule Take 400 mg by mouth once daily.     ranolazine (RANEXA)  1,000 mg Tb12 Take 1,000 mg by mouth 2 (two) times daily.      Family History       Problem Relation (Age of Onset)    Diabetes Sister    Heart attack Mother, Father    Heart disease Sister    No Known Problems Maternal Grandmother, Maternal Grandfather, Paternal Grandmother, Paternal Grandfather, Brother, Maternal Aunt, Maternal Uncle, Paternal Aunt, Paternal Uncle    Stroke Sister          Tobacco Use    Smoking status: Former     Current packs/day: 0.00     Average packs/day: 0.5 packs/day for 4.0 years (2.0 ttl pk-yrs)     Types: Cigarettes     Start date:      Quit date:      Years since quittin.9    Smokeless tobacco: Never   Substance and Sexual Activity    Alcohol use: Yes     Comment: social    Drug use: No    Sexual activity: Not Currently     Review of Systems   Constitutional:  Negative for activity change, appetite change, chills, diaphoresis, fatigue and fever.   HENT:  Negative for congestion, ear discharge, ear pain, postnasal drip, rhinorrhea, sinus pressure and sore throat.    Eyes:  Negative for pain, discharge, redness and itching.   Respiratory:  Negative for cough and chest tightness.    Cardiovascular:  Positive for chest pain (left sided radiating to his left arm). Negative for leg swelling.   Gastrointestinal:  Negative for abdominal pain, constipation, diarrhea, nausea and vomiting.   Genitourinary:  Negative for dysuria, frequency, hematuria and urgency.   Musculoskeletal:  Negative for back pain.        Leg and left arm pain with the chest pain   Skin:  Negative for color change, pallor and rash.   Neurological:  Negative for dizziness, syncope, facial asymmetry, weakness and light-headedness.   Psychiatric/Behavioral:  Negative for behavioral problems, confusion and hallucinations.      Objective:     Vital Signs (Most Recent):  Temp: 98.2 °F (36.8 °C) (23 2323)  Pulse: 83 (23 0245)  Resp: 13 (23 0245)  BP: 116/68 (23 0300)  SpO2: 95 % (23 0245)  Vital Signs (24h Range):  Temp:  [98.2 °F (36.8 °C)-99.1 °F (37.3 °C)] 98.2 °F (36.8 °C)  Pulse:  [] 83  Resp:  [13-20] 13  SpO2:  [91 %-96 %] 95 %  BP: (109-140)/(60-91) 116/68     Weight: 77.1 kg (170 lb)  Body mass index is 27.44 kg/m².     Physical Exam  Vitals and nursing note reviewed.   Constitutional:       General: He is not in acute distress.     Appearance: Normal appearance. He is not ill-appearing, toxic-appearing or diaphoretic.   HENT:      Head: Normocephalic and atraumatic.      Nose: Nose normal.      Mouth/Throat:      Mouth: Mucous membranes are moist.   Eyes:      Extraocular Movements: Extraocular movements intact.   Cardiovascular:      Rate and Rhythm: Normal rate and regular rhythm.      Comments: TAVR valve in place  Pulmonary:      Effort: Pulmonary effort is normal. No respiratory distress.      Breath sounds: Normal breath sounds. No wheezing.   Abdominal:      General: Abdomen is flat. Bowel sounds are normal. There is no distension.      Palpations: Abdomen is soft. There is no mass.      Tenderness: There is no abdominal tenderness. There is no guarding.      Hernia: No hernia is present.   Musculoskeletal:         General: No swelling, tenderness or deformity. Normal range of motion.      Cervical back: Normal range of motion. No rigidity or tenderness.   Skin:     General: Skin is warm and dry.      Coloration: Skin is not jaundiced.      Findings: No bruising or erythema.   Neurological:      General: No focal deficit present.      Mental Status: He is alert and oriented to person, place, and time. Mental status is at baseline.   Psychiatric:         Mood and Affect: Mood normal.         Behavior: Behavior normal.                Significant Labs: All pertinent labs within the past 24 hours have been reviewed.  CBC:   Recent Labs   Lab 12/05/23  0430 12/06/23  0305 12/07/23  0104   WBC 6.54 6.18 5.17   HGB 12.8* 12.4* 12.7*   HCT 37.1* 36.7* 37.2*   * 149* 163     CMP:    Recent Labs   Lab 12/05/23  0430 12/06/23  0305 12/07/23  0104   * 132* 130*   K 3.5 4.3 3.5   CL 95 96 95   CO2 31* 29 27    92 125*   BUN 27* 20 22   CREATININE 1.3 1.1 1.2   CALCIUM 8.5* 8.2* 8.2*   PROT  --   --  6.5   ALBUMIN  --   --  3.6   BILITOT  --   --  0.5   ALKPHOS  --   --  64   AST  --   --  20   ALT  --   --  11   ANIONGAP 7* 7* 8     Cardiac Markers:   Recent Labs   Lab 12/07/23  0104   *     Magnesium:   Recent Labs   Lab 12/05/23  0430 12/06/23  0305 12/07/23  0104   MG 2.2 1.9 1.8     Troponin:   Recent Labs   Lab 12/07/23 0104   TROPONINIHS 236.4*       Significant Imaging: I have reviewed all pertinent imaging results/findings within the past 24 hours.

## 2023-12-07 NOTE — H&P
[facility] - Emergency Dept  Hospital Medicine  History & Physical    Patient Name: Jerome Amaro Jr.  MRN: [id number]  Patient Class: OP- Observation  Admission Date: 12/6/2023  Attending Physician:  Dr. Alcantar  Primary Care Provider: Oscar Madrid NP         Patient information was obtained from patient, relative(s), and ER records.     Subjective:     Principal Problem:Chest pain    Chief Complaint:   Chief Complaint   Patient presents with    Chest Pain     Patient just got out of hospital        HPI: 79 y/o male with history of CHF, CAD s/p 2 CABG procedures, 15 cardiac stents, aortic valve replacement, MI, HTN, seizures, and pacemaker placement presents to the ED for chest pain. Patient was discharged from the hospital yesterday after having a heart cath done. Patient states that he was chest pain free when he left however while doing stuff around his house yesterday he had his typical anginal chest pain. Patient stated that it was tolerable and he continued to go about his day. Patient states that he had a sandwich for lunch then laid down to go to sleep and he suddenly had left sided chest pain that radiated to his left arm and was associated with leg pain. Patient said this lasted about 15 minutes and he was able to call his daughter to the room and take NTG which helped some. Patient denied nausea, vomiting, abdominal pain, or dizziness. Code status was discussed and patient was placed as a full code at this time.     ED: Vitals showed , /75, RR 20 saturating at 95% on RA. Labs were significant for troponin 236.4, , and sodium 130.     Past Medical History:   Diagnosis Date    Anticoagulant long-term use     2014    Aortic valve disease 2014    pig valve    Arthritis     all over    Chest pain 07/15/2019    CHF (congestive heart failure)     2014 on meds    Coronary artery disease     2007 cabg    Heart attack 1990    15 stents    Heart block      Hyperlipidemia     Hypertension     on meds    Hypothyroidism 2015    on meds    PVD (peripheral vascular disease)     Seizures     last episode 1990 on meds       Past Surgical History:   Procedure Laterality Date    AORTOGRAPHY WITH SERIALOGRAPHY N/A 11/16/2021    Procedure: AORTOGRAM, WITH RUNOFF;  Surgeon: Rodrigo Willoughby MD;  Location: [facility] CATH/EP LAB;  Service: Cardiology;  Laterality: N/A;    ARTERIOGRAPHY OF AORTIC ROOT N/A 11/5/2019    Procedure: ARTERIOGRAM, AORTIC ROOT;  Surgeon: Rodrigo Willoughby MD;  Location: [facility] CATH/EP LAB;  Service: Cardiology;  Laterality: N/A;    CARDIAC CATHETERIZATION      CARDIAC VALVE SURGERY      CORONARY ANGIOGRAPHY INCLUDING BYPASS GRAFTS WITH CATHETERIZATION OF LEFT HEART N/A 11/5/2019    Procedure: ANGIOGRAM, CORONARY, INCLUDING BYPASS GRAFT, WITH LEFT HEART CATHETERIZATION;  Surgeon: Rodrigo Willoughby MD;  Location: [facility] CATH/EP LAB;  Service: Cardiology;  Laterality: N/A;    CORONARY ANGIOGRAPHY INCLUDING BYPASS GRAFTS WITH CATHETERIZATION OF LEFT HEART Left 11/3/2020    Procedure: ANGIOGRAM, CORONARY, INCLUDING BYPASS GRAFT, WITH LEFT HEART CATHETERIZATION;  Surgeon: Rodrigo Willoughby MD;  Location: [facility] CATH/EP LAB;  Service: Cardiology;  Laterality: Left;    CORONARY ANGIOGRAPHY INCLUDING BYPASS GRAFTS WITH CATHETERIZATION OF LEFT HEART N/A 9/28/2021    Procedure: ANGIOGRAM, CORONARY, INCLUDING BYPASS GRAFT, WITH LEFT HEART CATHETERIZATION;  Surgeon: Rodrigo Willoughby MD;  Location: [facility] CATH/EP LAB;  Service: Cardiology;  Laterality: N/A;    CORONARY ANGIOGRAPHY INCLUDING BYPASS GRAFTS WITH CATHETERIZATION OF LEFT HEART N/A 12/5/2023    Procedure: ANGIOGRAM, CORONARY, INCLUDING BYPASS GRAFT, WITH LEFT HEART CATHETERIZATION;  Surgeon: Rodrigo Willoughby MD;  Location: [facility] CATH/EP LAB;  Service: Cardiology;  Laterality: N/A;    CORONARY ANGIOPLASTY      CORONARY ARTERY BYPASS GRAFT      EXTRACORPOREAL SHOCK WAVE LITHOTRIPSY Right 8/1/2019    Procedure: LITHOTRIPSY, ESWL;  Surgeon:  Williams Ortega MD;  Location: [facility] OR;  Service: Urology;  Laterality: Right;    HEMORRHOID SURGERY  1990    INSERTION OF PACEMAKER      PERCUTANEOUS TRANSLUMINAL BALLOON ANGIOPLASTY OF CORONARY ARTERY N/A 11/8/2019    Procedure: Angioplasty-coronary;  Surgeon: Rodrigo Willoughby MD;  Location: [facility] CATH/EP LAB;  Service: Cardiology;  Laterality: N/A;    WRIST FRACTURE SURGERY         Review of patient's allergies indicates:   Allergen Reactions    Atorvastatin Other (See Comments)     Muscle pain    Rosuvastatin Other (See Comments)     Muscle pain       Current Facility-Administered Medications on File Prior to Encounter   Medication    magnesium oxide tablet 800 mg    sodium chloride 0.9% flush 10 mL    [DISCONTINUED] acetaminophen tablet 650 mg    [DISCONTINUED] albuterol-ipratropium 2.5 mg-0.5 mg/3 mL nebulizer solution 3 mL    [DISCONTINUED] amLODIPine tablet 5 mg    [DISCONTINUED] aspirin EC tablet 81 mg    [DISCONTINUED] citalopram tablet 20 mg    [DISCONTINUED] clopidogreL tablet 75 mg    [DISCONTINUED] diclofenac sodium 2 % SoPk 1 packet    [DISCONTINUED] enoxaparin injection 80 mg    [DISCONTINUED] ezetimibe tablet 10 mg    [DISCONTINUED] furosemide tablet 20 mg    [DISCONTINUED] hydrALAZINE injection 10 mg    [DISCONTINUED] hydrOXYzine pamoate capsule 25 mg    [DISCONTINUED] isosorbide dinitrate tablet 40 mg    [DISCONTINUED] magnesium oxide tablet 800 mg    [DISCONTINUED] magnesium oxide tablet 800 mg    [DISCONTINUED] metoprolol succinate (TOPROL-XL) 24 hr tablet 50 mg    [DISCONTINUED] mupirocin 2 % ointment    [DISCONTINUED] nitroGLYCERIN SL tablet 0.4 mg    [DISCONTINUED] ondansetron disintegrating tablet 8 mg    [DISCONTINUED] phenytoin (DILANTIN) ER capsule 400 mg    [DISCONTINUED] potassium bicarbonate disintegrating tablet 35 mEq    [DISCONTINUED] potassium bicarbonate disintegrating tablet 50 mEq    [DISCONTINUED] potassium bicarbonate disintegrating tablet 60 mEq    [DISCONTINUED] potassium,  sodium phosphates 280-160-250 mg packet 2 packet    [DISCONTINUED] potassium, sodium phosphates 280-160-250 mg packet 2 packet    [DISCONTINUED] potassium, sodium phosphates 280-160-250 mg packet 2 packet    [DISCONTINUED] ranolazine 12 hr tablet 1,000 mg    [DISCONTINUED] sodium chloride 0.9% flush 10 mL     Current Outpatient Medications on File Prior to Encounter   Medication Sig    amLODIPine (NORVASC) 5 MG tablet Take 5 mg by mouth 2 (two) times daily.    citalopram (CELEXA) 20 MG tablet Take 20 mg by mouth once daily.    clopidogrel (PLAVIX) 75 mg tablet Take 75 mg by mouth once daily.    diclofenac sodium (PENNSAID) 2 % SoPk Apply 1 packet topically once daily.    ezetimibe (ZETIA) 10 mg tablet Take 10 mg by mouth once daily.    furosemide (LASIX) 20 MG tablet Take 20 mg by mouth daily as needed.     hydrOXYzine pamoate (VISTARIL) 25 MG Cap Take 1 capsule (25 mg total) by mouth daily as needed (anxiety).    isosorbide mononitrate (ISMO,MONOKET) 20 MG Tab Take 40 mg by mouth daily as needed.    ivabradine (CORLANOR) 5 mg Tab Take 1 tablet by mouth 2 (two) times a day.    lisinopriL (PRINIVIL,ZESTRIL) 40 MG tablet Take 40 mg by mouth once daily.    metoprolol succinate (TOPROL-XL) 50 MG 24 hr tablet TAKE 2 TABLETS BY MOUTH IN AM AND 1 TAB IN THE EVENING    mupirocin (BACTROBAN) 2 % ointment Apply topically 3 (three) times daily.    NEXLIZET 180-10 mg Tab Take 1 tablet by mouth once daily.    NITROLINGUAL 400 mcg/spray spray Place 1 spray under the tongue every 5 (five) minutes as needed.    phenytoin (DILANTIN) 100 MG ER capsule Take 400 mg by mouth once daily.     ranolazine (RANEXA) 1,000 mg Tb12 Take 1,000 mg by mouth 2 (two) times daily.      Family History       Problem Relation (Age of Onset)    Diabetes Sister    Heart attack Mother, Father    Heart disease Sister    No Known Problems Maternal Grandmother, Maternal Grandfather, Paternal Grandmother, Paternal Grandfather, Brother, Maternal Aunt, Maternal  Uncle, Paternal Aunt, Paternal Uncle    Stroke Sister          Tobacco Use    Smoking status: Former     Current packs/day: 0.00     Average packs/day: 0.5 packs/day for 4.0 years (2.0 ttl pk-yrs)     Types: Cigarettes     Start date:      Quit date:      Years since quittin.9    Smokeless tobacco: Never   Substance and Sexual Activity    Alcohol use: Yes     Comment: social    Drug use: No    Sexual activity: Not Currently     Review of Systems   Constitutional:  Negative for activity change, appetite change, chills, diaphoresis, fatigue and fever.   HENT:  Negative for congestion, ear discharge, ear pain, postnasal drip, rhinorrhea, sinus pressure and sore throat.    Eyes:  Negative for pain, discharge, redness and itching.   Respiratory:  Negative for cough and chest tightness.    Cardiovascular:  Positive for chest pain (left sided radiating to his left arm). Negative for leg swelling.   Gastrointestinal:  Negative for abdominal pain, constipation, diarrhea, nausea and vomiting.   Genitourinary:  Negative for dysuria, frequency, hematuria and urgency.   Musculoskeletal:  Negative for back pain.        Leg and left arm pain with the chest pain   Skin:  Negative for color change, pallor and rash.   Neurological:  Negative for dizziness, syncope, facial asymmetry, weakness and light-headedness.   Psychiatric/Behavioral:  Negative for behavioral problems, confusion and hallucinations.      Objective:     Vital Signs (Most Recent):  Temp: 98.2 °F (36.8 °C) (23 2323)  Pulse: 83 (23 0245)  Resp: 13 (23 0245)  BP: 116/68 (23 0300)  SpO2: 95 % (23 0245) Vital Signs (24h Range):  Temp:  [98.2 °F (36.8 °C)-99.1 °F (37.3 °C)] 98.2 °F (36.8 °C)  Pulse:  [] 83  Resp:  [13-20] 13  SpO2:  [91 %-96 %] 95 %  BP: (109-140)/(60-91) 116/68     Weight: 77.1 kg (170 lb)  Body mass index is 27.44 kg/m².     Physical Exam  Vitals and nursing note reviewed.   Constitutional:       General:  He is not in acute distress.     Appearance: Normal appearance. He is not ill-appearing, toxic-appearing or diaphoretic.   HENT:      Head: Normocephalic and atraumatic.      Nose: Nose normal.      Mouth/Throat:      Mouth: Mucous membranes are moist.   Eyes:      Extraocular Movements: Extraocular movements intact.   Cardiovascular:      Rate and Rhythm: Normal rate and regular rhythm.      Comments: TAVR valve in place  Pulmonary:      Effort: Pulmonary effort is normal. No respiratory distress.      Breath sounds: Normal breath sounds. No wheezing.   Abdominal:      General: Abdomen is flat. Bowel sounds are normal. There is no distension.      Palpations: Abdomen is soft. There is no mass.      Tenderness: There is no abdominal tenderness. There is no guarding.      Hernia: No hernia is present.   Musculoskeletal:         General: No swelling, tenderness or deformity. Normal range of motion.      Cervical back: Normal range of motion. No rigidity or tenderness.   Skin:     General: Skin is warm and dry.      Coloration: Skin is not jaundiced.      Findings: No bruising or erythema.   Neurological:      General: No focal deficit present.      Mental Status: He is alert and oriented to person, place, and time. Mental status is at baseline.   Psychiatric:         Mood and Affect: Mood normal.         Behavior: Behavior normal.                Significant Labs: All pertinent labs within the past 24 hours have been reviewed.  CBC:   Recent Labs   Lab 12/05/23  0430 12/06/23  0305 12/07/23  0104   WBC 6.54 6.18 5.17   HGB 12.8* 12.4* 12.7*   HCT 37.1* 36.7* 37.2*   * 149* 163     CMP:   Recent Labs   Lab 12/05/23  0430 12/06/23  0305 12/07/23  0104   * 132* 130*   K 3.5 4.3 3.5   CL 95 96 95   CO2 31* 29 27    92 125*   BUN 27* 20 22   CREATININE 1.3 1.1 1.2   CALCIUM 8.5* 8.2* 8.2*   PROT  --   --  6.5   ALBUMIN  --   --  3.6   BILITOT  --   --  0.5   ALKPHOS  --   --  64   AST  --   --  20   ALT   --   --  11   ANIONGAP 7* 7* 8     Cardiac Markers:   Recent Labs   Lab 12/07/23  0104   *     Magnesium:   Recent Labs   Lab 12/05/23  0430 12/06/23  0305 12/07/23  0104   MG 2.2 1.9 1.8     Troponin:   Recent Labs   Lab 12/07/23  0104   TROPONINIHS 236.4*       Significant Imaging: I have reviewed all pertinent imaging results/findings within the past 24 hours.  Assessment/Plan:     * Chest pain  Patient was discharged yesterday after having a cardiac cath done  Patient was chest pain free on discharge  Patient had his typical anginal pain throughout the day and could not reach his cardiologist   Patient laid down last night and had an episode of left sided chest pain that radiated to his left arm and was associated with leg pain     Patient took NTG at home prior to coming to the ED  - Helped to ease the pain     Patient was given Morphine,  mg, and Metoprolol in the ED    Troponin 236.4      ECG: ventricularly paced     Cardiology consulted    PRN Nitroglycerine and Morphine ordered       Hyponatremia  Patient has hyponatremia with sodium of 130 on arrival to the ED.  We will aim to correct the sodium by 4-6mEq in 24 hours.   We will monitor sodium Daily.   We will treat the hyponatremia with Fluid restriction of:  1 liter per day.         Anxiety and depression  Continue home Citalopram        Congestive heart failure  Patient is identified as having congestive heart failure that is Chronic. CHF is currently controlled.   Latest ECHO 12/02/23    Echo    Interpretation Summary    Left Ventricle: There is moderate concentric hypertrophy. There is low normal systolic function with a visually estimated ejection fraction of 50 - 55%. Grade I diastolic dysfunction. Normal left ventricular filling pressure.    Left Atrium: Left atrium is mildly dilated.    Mitral Valve: There is mild regurgitation.    Tricuspid Valve: There is mild to moderate regurgitation.    IVC/SVC: Normal venous pressure at 3  mmHg.    Pulmonary artery systolic pressure approximately 30-35 mmHg  . Continue Beta Blocker, Furosemide, and Nitrate/Vasodilator and monitor clinical status closely.   Monitor on telemetry.   Monitor strict Is&Os and daily weights.    Place on fluid restriction of 1 L.   Last       Seizure disorder  Continue home phenytoin      Dyslipidemia  Continue home ezetimibe       HTN (hypertension)  Continue home regimen as tolerated  Continue to monitor vitals       VTE Risk Mitigation (From admission, onward)           Ordered     enoxaparin injection 40 mg  Daily         12/07/23 0316     IP VTE HIGH RISK PATIENT  Once         12/07/23 0316     Place sequential compression device  Until discontinued         12/07/23 0316                         On 12/07/2023, patient should be placed in hospital observation services under my care in collaboration with Dr. Alcantar.           Eileen Pacheco PA-C  Department of Hospital Medicine  [facility] - Emergency Dept

## 2023-12-07 NOTE — ASSESSMENT & PLAN NOTE
Patient was discharged yesterday after having a cardiac cath done  Patient was chest pain free on discharge  Patient had his typical anginal pain throughout the day and could not reach his cardiologist   Patient laid down last night and had an episode of left sided chest pain that radiated to his left arm and was associated with leg pain     Patient took NTG at home prior to coming to the ED  - Helped to ease the pain     Patient was given Morphine,  mg, and Metoprolol in the ED    Troponin 236.4      ECG: ventricularly paced     Cardiology consulted    PRN Nitroglycerine and Morphine ordered

## 2023-12-07 NOTE — ASSESSMENT & PLAN NOTE
Patient is identified as having congestive heart failure that is Chronic. CHF is currently controlled.   Latest ECHO 12/02/23    Echo    Interpretation Summary    Left Ventricle: There is moderate concentric hypertrophy. There is low normal systolic function with a visually estimated ejection fraction of 50 - 55%. Grade I diastolic dysfunction. Normal left ventricular filling pressure.    Left Atrium: Left atrium is mildly dilated.    Mitral Valve: There is mild regurgitation.    Tricuspid Valve: There is mild to moderate regurgitation.    IVC/SVC: Normal venous pressure at 3 mmHg.    Pulmonary artery systolic pressure approximately 30-35 mmHg  . Continue Beta Blocker, Furosemide, and Nitrate/Vasodilator and monitor clinical status closely.   Monitor on telemetry.   Monitor strict Is&Os and daily weights.    Place on fluid restriction of 1 L.   Last

## 2023-12-07 NOTE — HPI
77 y/o male with history of CHF, CAD s/p 2 CABG procedures, 15 cardiac stents, aortic valve replacement, MI, HTN, seizures, and pacemaker placement presents to the ED for chest pain. Patient was discharged from the hospital yesterday after having a heart cath done. Patient states that he was chest pain free when he left however while doing stuff around his house yesterday he had his typical anginal chest pain. Patient stated that it was tolerable and he continued to go about his day. Patient states that he had a sandwich for lunch then laid down to go to sleep and he suddenly had left sided chest pain that radiated to his left arm and was associated with leg pain. Patient said this lasted about 15 minutes and he was able to call his daughter to the room and take NTG which helped some. Patient denied nausea, vomiting, abdominal pain, or dizziness. Code status was discussed and patient was placed as a full code at this time.     ED: Vitals showed , /75, RR 20 saturating at 95% on RA. Labs were significant for troponin 236.4, , and sodium 130.

## 2023-12-07 NOTE — PLAN OF CARE
[facility] - Emergency Dept  Initial Discharge Assessment       Primary Care Provider: Oscar Madrid NP    Admission Diagnosis: Unstable angina [I20.0]    Admission Date: 12/6/2023  Expected Discharge Date:      completed discharge assessment with Pt at bedside. Pt AAOx4s. Pt lives at home with spouse. Pt's spouse is currently in hospital. Demographics, PCP, and insurance verified. No home health. No dialysis. Pt completes ADLs without assistance. Pt to discharge home via family transport. Pt has no other needs to be addressed at this time.    Transition of Care Barriers: None    Payor: [employer] / Plan: [employer] MEDICARE HMO / Product Type: Medicare Advantage /     Extended Emergency Contact Information  Primary Emergency Contact: Schwab,Cynthia  Mobile Phone: [phone]  Relation: Daughter  Preferred language: English   needed? No  Secondary Emergency Contact: Tiffany Amaro  Address: [address] [place] [address] .           [place] [address] [facility]  Home Phone: [phone]  Mobile Phone: [phone]  Relation: Spouse    Discharge Plan A: Home with family  Discharge Plan B: Home with family      [id number] - [place] - [address]  [address] [place] [address]  Phone: [phone] Fax: [phone]      Initial Assessment (most recent)       Adult Discharge Assessment - 12/07/23 1244          Discharge Assessment    Assessment Type Discharge Planning Assessment     Confirmed/corrected address, phone number and insurance Yes     Confirmed Demographics Correct on Facesheet     Source of Information patient     Does patient/caregiver understand observation status Yes     Communicated AXEL with patient/caregiver Date not available/Unable to determine     Reason For Admission Chest Pain     People in Home spouse   Pt's spouse currently in hospital    Facility Arrived From: Home     Do you expect to return to your current living  situation? Yes     Prior to hospitilization cognitive status: Alert/Oriented     Current cognitive status: Alert/Oriented     Walking or Climbing Stairs Difficulty no     Home Accessibility wheelchair accessible     Home Layout Able to live on 1st floor     Equipment Currently Used at Home none     Readmission within 30 days? Yes     Patient currently being followed by outpatient case management? No     Do you currently have service(s) that help you manage your care at home? No     Do you take prescription medications? Yes     Do you have prescription coverage? Yes     Coverage Payor: [facility] - [employer] MEDICARE HMO -     Do you have any problems affording any of your prescribed medications? No     Is the patient taking medications as prescribed? yes     Who is going to help you get home at discharge? Daughter (Nilda or Violette)     How do you get to doctors appointments? car, drives self;family or friend will provide     Are you on dialysis? No     Do you take coumadin? No     Discharge Plan A Home with family     Discharge Plan B Home with family     DME Needed Upon Discharge  none     Discharge Plan discussed with: Patient     Transition of Care Barriers None

## 2023-12-07 NOTE — PLAN OF CARE
Assuming care of 78 year old male with a past medical history of CAD s/p CABG, HTN, HLD, pacemaker, AVR, CHF, seizure and MDD/JESUS with recent admission for NSTEMI (no intervention during angiogram) who presented with persistent chest pain. Troponin now increasing and being trended. Cardiology consulted. ASA, Plavix, metoprolol, lisinopril, nitro paste and heparin infusion ordered.

## 2023-12-08 ENCOUNTER — TELEPHONE (OUTPATIENT)
Dept: FAMILY MEDICINE | Facility: CLINIC | Age: 78
End: 2023-12-08

## 2023-12-08 VITALS
TEMPERATURE: 99 F | OXYGEN SATURATION: 96 % | DIASTOLIC BLOOD PRESSURE: 86 MMHG | HEART RATE: 90 BPM | RESPIRATION RATE: 18 BRPM | BODY MASS INDEX: 28.63 KG/M2 | WEIGHT: 178.13 LBS | HEIGHT: 66 IN | SYSTOLIC BLOOD PRESSURE: 141 MMHG

## 2023-12-08 LAB
ANION GAP SERPL CALC-SCNC: 9 MMOL/L (ref 8–16)
APTT PPP: 47 SEC (ref 21–32)
BASOPHILS # BLD AUTO: 0.05 K/UL (ref 0–0.2)
BASOPHILS NFR BLD: 0.9 % (ref 0–1.9)
BUN SERPL-MCNC: 16 MG/DL (ref 8–23)
CALCIUM SERPL-MCNC: 8.4 MG/DL (ref 8.7–10.5)
CHLORIDE SERPL-SCNC: 96 MMOL/L (ref 95–110)
CO2 SERPL-SCNC: 26 MMOL/L (ref 23–29)
CREAT SERPL-MCNC: 1 MG/DL (ref 0.5–1.4)
DIFFERENTIAL METHOD: ABNORMAL
EOSINOPHIL # BLD AUTO: 0.3 K/UL (ref 0–0.5)
EOSINOPHIL NFR BLD: 5.2 % (ref 0–8)
ERYTHROCYTE [DISTWIDTH] IN BLOOD BY AUTOMATED COUNT: 13.2 % (ref 11.5–14.5)
EST. GFR  (NO RACE VARIABLE): >60 ML/MIN/1.73 M^2
GLUCOSE SERPL-MCNC: 104 MG/DL (ref 70–110)
HCT VFR BLD AUTO: 35.9 % (ref 40–54)
HGB BLD-MCNC: 12.6 G/DL (ref 14–18)
IMM GRANULOCYTES # BLD AUTO: 0.02 K/UL (ref 0–0.04)
IMM GRANULOCYTES NFR BLD AUTO: 0.4 % (ref 0–0.5)
LYMPHOCYTES # BLD AUTO: 1.5 K/UL (ref 1–4.8)
LYMPHOCYTES NFR BLD: 26 % (ref 18–48)
MAGNESIUM SERPL-MCNC: 1.7 MG/DL (ref 1.6–2.6)
MCH RBC QN AUTO: 30.8 PG (ref 27–31)
MCHC RBC AUTO-ENTMCNC: 35.1 G/DL (ref 32–36)
MCV RBC AUTO: 88 FL (ref 82–98)
MONOCYTES # BLD AUTO: 1.1 K/UL (ref 0.3–1)
MONOCYTES NFR BLD: 18.8 % (ref 4–15)
NEUTROPHILS # BLD AUTO: 2.7 K/UL (ref 1.8–7.7)
NEUTROPHILS NFR BLD: 48.7 % (ref 38–73)
NRBC BLD-RTO: 0 /100 WBC
PLATELET # BLD AUTO: 147 K/UL (ref 150–450)
PMV BLD AUTO: 10.8 FL (ref 9.2–12.9)
POTASSIUM SERPL-SCNC: 4.2 MMOL/L (ref 3.5–5.1)
RBC # BLD AUTO: 4.09 M/UL (ref 4.6–6.2)
SODIUM SERPL-SCNC: 131 MMOL/L (ref 136–145)
WBC # BLD AUTO: 5.58 K/UL (ref 3.9–12.7)

## 2023-12-08 PROCEDURE — 25000003 PHARM REV CODE 250

## 2023-12-08 PROCEDURE — 85025 COMPLETE CBC W/AUTO DIFF WBC: CPT

## 2023-12-08 PROCEDURE — 25000003 PHARM REV CODE 250: Performed by: STUDENT IN AN ORGANIZED HEALTH CARE EDUCATION/TRAINING PROGRAM

## 2023-12-08 PROCEDURE — 36415 COLL VENOUS BLD VENIPUNCTURE: CPT | Performed by: STUDENT IN AN ORGANIZED HEALTH CARE EDUCATION/TRAINING PROGRAM

## 2023-12-08 PROCEDURE — 96366 THER/PROPH/DIAG IV INF ADDON: CPT

## 2023-12-08 PROCEDURE — 80048 BASIC METABOLIC PNL TOTAL CA: CPT | Mod: XB

## 2023-12-08 PROCEDURE — 36415 COLL VENOUS BLD VENIPUNCTURE: CPT

## 2023-12-08 PROCEDURE — 83735 ASSAY OF MAGNESIUM: CPT | Performed by: STUDENT IN AN ORGANIZED HEALTH CARE EDUCATION/TRAINING PROGRAM

## 2023-12-08 PROCEDURE — 25000003 PHARM REV CODE 250: Performed by: INTERNAL MEDICINE

## 2023-12-08 PROCEDURE — G0378 HOSPITAL OBSERVATION PER HR: HCPCS

## 2023-12-08 PROCEDURE — 85730 THROMBOPLASTIN TIME PARTIAL: CPT | Performed by: STUDENT IN AN ORGANIZED HEALTH CARE EDUCATION/TRAINING PROGRAM

## 2023-12-08 RX ORDER — ISOSORBIDE MONONITRATE 30 MG/1
30 TABLET, EXTENDED RELEASE ORAL DAILY
Qty: 30 TABLET | Refills: 11 | Status: ON HOLD | OUTPATIENT
Start: 2023-12-09 | End: 2024-02-11

## 2023-12-08 RX ORDER — LANOLIN ALCOHOL/MO/W.PET/CERES
800 CREAM (GRAM) TOPICAL ONCE
Status: COMPLETED | OUTPATIENT
Start: 2023-12-08 | End: 2023-12-08

## 2023-12-08 RX ORDER — ISOSORBIDE MONONITRATE 30 MG/1
30 TABLET, EXTENDED RELEASE ORAL DAILY
Status: DISCONTINUED | OUTPATIENT
Start: 2023-12-08 | End: 2023-12-08 | Stop reason: HOSPADM

## 2023-12-08 RX ORDER — ASPIRIN 81 MG/1
81 TABLET ORAL DAILY
Qty: 30 TABLET | Refills: 11 | Status: ON HOLD | OUTPATIENT
Start: 2023-12-09 | End: 2024-02-11 | Stop reason: HOSPADM

## 2023-12-08 RX ADMIN — NITROGLYCERIN 0.5 INCH: 20 OINTMENT TOPICAL at 05:12

## 2023-12-08 RX ADMIN — LISINOPRIL 40 MG: 20 TABLET ORAL at 08:12

## 2023-12-08 RX ADMIN — CLOPIDOGREL BISULFATE 75 MG: 75 TABLET, FILM COATED ORAL at 08:12

## 2023-12-08 RX ADMIN — Medication 800 MG: at 05:12

## 2023-12-08 RX ADMIN — PHENYTOIN SODIUM 400 MG: 100 CAPSULE ORAL at 08:12

## 2023-12-08 RX ADMIN — Medication 800 MG: at 08:12

## 2023-12-08 RX ADMIN — EZETIMIBE 10 MG: 10 TABLET ORAL at 08:12

## 2023-12-08 RX ADMIN — CITALOPRAM HYDROBROMIDE 20 MG: 20 TABLET ORAL at 08:12

## 2023-12-08 RX ADMIN — AMLODIPINE BESYLATE 5 MG: 5 TABLET ORAL at 08:12

## 2023-12-08 RX ADMIN — ISOSORBIDE MONONITRATE 30 MG: 30 TABLET, EXTENDED RELEASE ORAL at 08:12

## 2023-12-08 RX ADMIN — ASPIRIN 81 MG: 81 TABLET, COATED ORAL at 08:12

## 2023-12-08 RX ADMIN — RANOLAZINE 1000 MG: 500 TABLET, FILM COATED, EXTENDED RELEASE ORAL at 08:12

## 2023-12-08 NOTE — NURSING
Nurses Note -- 4 Eyes      12/7/2023   10:16 PM      Skin assessed during: Admit      [x] No Altered Skin Integrity Present    [x]Prevention Measures Documented      [] Yes- Altered Skin Integrity Present or Discovered   [] LDA Added if Not in Epic (Describe Wound)   [] New Altered Skin Integrity was Present on Admit and Documented in LDA   [] Wound Image Taken    Wound Care Consulted? No    Attending Nurse:  Milana Wheeler RN/Staff Member:   [id number]

## 2023-12-08 NOTE — PROGRESS NOTES
[facility]   Cardiology Note    Consult Requested By:  Hospital medicine  Reason for Consult:  Chest pain    SUBJECTIVE:     History of Present Illness:  78-year-old male admitted with chest pain.  The patient was angiogram of the proximally 1 week ago in which his right coronary artery was totally occluded via filling via collaterals with patent saphenous vein bypass graft to the LAD and obtuse marginal.  No intervention was done patient comes in with some substernal chest pain with mild increase in troponin.  The patient has been very depressed about his wife who was in the hospital with seizure.  At this time he is totally pain-free and would like to go home.  There was no intervention done in the previous angiogram.    Review of patient's allergies indicates:   Allergen Reactions    Atorvastatin Other (See Comments)     Muscle pain    Rosuvastatin Other (See Comments)     Muscle pain       Past Medical History:   Diagnosis Date    Anticoagulant long-term use     2014    Aortic valve disease 2014    pig valve    Arthritis     all over    Chest pain 07/15/2019    CHF (congestive heart failure)     2014 on meds    Coronary artery disease     2007 cabg    Heart attack 1990    15 stents    Heart block     Hyperlipidemia     Hypertension     on meds    Hypothyroidism 2015    on meds    PVD (peripheral vascular disease)     Seizures     last episode 1990 on meds     Past Surgical History:   Procedure Laterality Date    AORTOGRAPHY WITH SERIALOGRAPHY N/A 11/16/2021    Procedure: AORTOGRAM, WITH RUNOFF;  Surgeon: Rodrigo Willoughby MD;  Location: [facility] CATH/EP LAB;  Service: Cardiology;  Laterality: N/A;    ARTERIOGRAPHY OF AORTIC ROOT N/A 11/5/2019    Procedure: ARTERIOGRAM, AORTIC ROOT;  Surgeon: Rodrigo Willoughby MD;  Location: [facility] CATH/EP LAB;  Service: Cardiology;  Laterality: N/A;    CARDIAC CATHETERIZATION      CARDIAC VALVE SURGERY      CORONARY ANGIOGRAPHY INCLUDING BYPASS GRAFTS WITH CATHETERIZATION OF  LEFT HEART N/A 11/5/2019    Procedure: ANGIOGRAM, CORONARY, INCLUDING BYPASS GRAFT, WITH LEFT HEART CATHETERIZATION;  Surgeon: Rodrigo Willoughby MD;  Location: [facility] CATH/EP LAB;  Service: Cardiology;  Laterality: N/A;    CORONARY ANGIOGRAPHY INCLUDING BYPASS GRAFTS WITH CATHETERIZATION OF LEFT HEART Left 11/3/2020    Procedure: ANGIOGRAM, CORONARY, INCLUDING BYPASS GRAFT, WITH LEFT HEART CATHETERIZATION;  Surgeon: Rodrigo Willoughby MD;  Location: [facility] CATH/EP LAB;  Service: Cardiology;  Laterality: Left;    CORONARY ANGIOGRAPHY INCLUDING BYPASS GRAFTS WITH CATHETERIZATION OF LEFT HEART N/A 9/28/2021    Procedure: ANGIOGRAM, CORONARY, INCLUDING BYPASS GRAFT, WITH LEFT HEART CATHETERIZATION;  Surgeon: Rodrigo Willoughby MD;  Location: [facility] CATH/EP LAB;  Service: Cardiology;  Laterality: N/A;    CORONARY ANGIOGRAPHY INCLUDING BYPASS GRAFTS WITH CATHETERIZATION OF LEFT HEART N/A 12/5/2023    Procedure: ANGIOGRAM, CORONARY, INCLUDING BYPASS GRAFT, WITH LEFT HEART CATHETERIZATION;  Surgeon: Rodrigo Willoughby MD;  Location: [facility] CATH/EP LAB;  Service: Cardiology;  Laterality: N/A;    CORONARY ANGIOPLASTY      CORONARY ARTERY BYPASS GRAFT      EXTRACORPOREAL SHOCK WAVE LITHOTRIPSY Right 8/1/2019    Procedure: LITHOTRIPSY, ESWL;  Surgeon: Williams Ortega MD;  Location: [facility] OR;  Service: Urology;  Laterality: Right;    HEMORRHOID SURGERY  1990    INSERTION OF PACEMAKER      PERCUTANEOUS TRANSLUMINAL BALLOON ANGIOPLASTY OF CORONARY ARTERY N/A 11/8/2019    Procedure: Angioplasty-coronary;  Surgeon: Rodrigo Willoughby MD;  Location: [facility] CATH/EP LAB;  Service: Cardiology;  Laterality: N/A;    WRIST FRACTURE SURGERY       Family History   Problem Relation Age of Onset    Heart attack Mother     Heart attack Father     Heart disease Sister     Stroke Sister     Diabetes Sister     No Known Problems Maternal Grandmother     No Known Problems Maternal Grandfather     No Known Problems Paternal Grandmother     No Known Problems Paternal  Grandfather     No Known Problems Brother     No Known Problems Maternal Aunt     No Known Problems Maternal Uncle     No Known Problems Paternal Aunt     No Known Problems Paternal Uncle     Anemia Neg Hx     Arrhythmia Neg Hx     Asthma Neg Hx     Clotting disorder Neg Hx     Fainting Neg Hx     Heart failure Neg Hx     Hyperlipidemia Neg Hx     Hypertension Neg Hx     Atrial Septal Defect Neg Hx      Social History     Tobacco Use    Smoking status: Former     Current packs/day: 0.00     Average packs/day: 0.5 packs/day for 4.0 years (2.0 ttl pk-yrs)     Types: Cigarettes     Start date:      Quit date:      Years since quittin.9    Smokeless tobacco: Never   Substance Use Topics    Alcohol use: Yes     Comment: social    Drug use: No       Review of Systems:  Review of Systems   Constitutional: Negative.    All other systems reviewed and are negative.      OBJECTIVE:     Vital Signs (Most Recent)  Temp: 98.4 °F (36.9 °C) (23)  Pulse: 95 (23)  Resp: 18 (23)  BP: (!) 152/98 (23)  SpO2: 96 % (23)    Vital Signs Range (Last 24H):  Temp:  [98.4 °F (36.9 °C)-98.8 °F (37.1 °C)]   Pulse:  [85-99]   Resp:  [10-24]   BP: (103-152)/(55-98)   SpO2:  [92 %-97 %]     I & O (Last 24H):    Intake/Output Summary (Last 24 hours) at 2023  Last data filed at 2023  Gross per 24 hour   Intake 120 ml   Output 400 ml   Net -280 ml       Current Diet:     Current Diet Order   Procedures    Diet Cardiac        Allergies:  Review of patient's allergies indicates:   Allergen Reactions    Atorvastatin Other (See Comments)     Muscle pain    Rosuvastatin Other (See Comments)     Muscle pain       Meds:  Scheduled Meds:   amLODIPine  5 mg Oral BID    aspirin  81 mg Oral Daily    citalopram  20 mg Oral Daily    clopidogreL  75 mg Oral Daily    ezetimibe  10 mg Oral Daily    LIDOcaine  1 patch Transdermal Q24H    lisinopriL  40 mg Oral Daily    magnesium  oxide  800 mg Oral Once    metoprolol succinate  50 mg Oral QHS    nitroGLYCERIN 2% TD oint  0.5 inch Topical (Top) Q6H    phenytoin  400 mg Oral Daily    ranolazine  1,000 mg Oral BID     Continuous Infusions:   heparin (porcine) in D5W 12 Units/kg/hr (12/07/23 1547)     PRN Meds:acetaminophen, furosemide, heparin (PORCINE), heparin (PORCINE), hydrOXYzine pamoate, magnesium oxide, magnesium oxide, melatonin, morphine, morphine, nitroGLYCERIN, ondansetron, potassium bicarbonate, potassium bicarbonate, potassium bicarbonate, potassium, sodium phosphates, potassium, sodium phosphates, potassium, sodium phosphates, promethazine, sodium chloride 0.9%    Oxygen/Ventilator Data (Last 24H):  (if applicable)            Hemodynamic Parameters (Last 24H):   (if applicable)        Laboratory and Radiology Data:  Recent Results (from the past 24 hour(s))   Troponin I High Sensitivity    Collection Time: 12/07/23  9:01 AM   Result Value Ref Range    Troponin I High Sensitivity 388.6 (HH) 0.0 - 14.9 pg/mL   Troponin I High Sensitivity    Collection Time: 12/07/23 12:19 PM   Result Value Ref Range    Troponin I High Sensitivity 421.9 (HH) 0.0 - 14.9 pg/mL   APTT    Collection Time: 12/07/23  2:04 PM   Result Value Ref Range    aPTT 33.6 (H) 21.0 - 32.0 sec   Protime-INR    Collection Time: 12/07/23  2:04 PM   Result Value Ref Range    Prothrombin Time 11.0 9.0 - 12.5 sec    INR 1.0 0.8 - 1.2   Troponin I High Sensitivity    Collection Time: 12/07/23  5:59 PM   Result Value Ref Range    Troponin I High Sensitivity 407.0 (HH) 0.0 - 14.9 pg/mL   APTT    Collection Time: 12/07/23  9:12 PM   Result Value Ref Range    aPTT 47.2 (H) 21.0 - 32.0 sec   Basic metabolic panel    Collection Time: 12/08/23  3:07 AM   Result Value Ref Range    Sodium 131 (L) 136 - 145 mmol/L    Potassium 4.2 3.5 - 5.1 mmol/L    Chloride 96 95 - 110 mmol/L    CO2 26 23 - 29 mmol/L    Glucose 104 70 - 110 mg/dL    BUN 16 8 - 23 mg/dL    Creatinine 1.0 0.5 - 1.4  mg/dL    Calcium 8.4 (L) 8.7 - 10.5 mg/dL    Anion Gap 9 8 - 16 mmol/L    eGFR >60.0 >60 mL/min/1.73 m^2   Magnesium    Collection Time: 12/08/23  3:07 AM   Result Value Ref Range    Magnesium 1.7 1.6 - 2.6 mg/dL   APTT    Collection Time: 12/08/23  3:07 AM   Result Value Ref Range    aPTT 47.0 (H) 21.0 - 32.0 sec   CBC Auto Differential    Collection Time: 12/08/23  5:05 AM   Result Value Ref Range    WBC 5.58 3.90 - 12.70 K/uL    RBC 4.09 (L) 4.60 - 6.20 M/uL    Hemoglobin 12.6 (L) 14.0 - 18.0 g/dL    Hematocrit 35.9 (L) 40.0 - 54.0 %    MCV 88 82 - 98 fL    MCH 30.8 27.0 - 31.0 pg    MCHC 35.1 32.0 - 36.0 g/dL    RDW 13.2 11.5 - 14.5 %    Platelets 147 (L) 150 - 450 K/uL    MPV 10.8 9.2 - 12.9 fL    Immature Granulocytes 0.4 0.0 - 0.5 %    Gran # (ANC) 2.7 1.8 - 7.7 K/uL    Immature Grans (Abs) 0.02 0.00 - 0.04 K/uL    Lymph # 1.5 1.0 - 4.8 K/uL    Mono # 1.1 (H) 0.3 - 1.0 K/uL    Eos # 0.3 0.0 - 0.5 K/uL    Baso # 0.05 0.00 - 0.20 K/uL    nRBC 0 0 /100 WBC    Gran % 48.7 38.0 - 73.0 %    Lymph % 26.0 18.0 - 48.0 %    Mono % 18.8 (H) 4.0 - 15.0 %    Eosinophil % 5.2 0.0 - 8.0 %    Basophil % 0.9 0.0 - 1.9 %    Differential Method Automated      Imaging Results              X-Ray Chest AP Portable (Final result)  Result time 12/07/23 07:20:18      Final result by Truman Dawson MD (12/07/23 07:20:18)                   Narrative:    XR CHEST 1 VIEW    CLINICAL HISTORY:  78 years Male Chest Pain    COMPARISON: December 2, 2023    FINDINGS: Cardiac silhouette size is stable compared to prior. Patient status post median sternotomy and CABG. Trace pleural fluid suspected bilaterally. Pulmonary vascular fullness suggesting mild edema/volume overload. Groundglass opacities right upper lung. No pneumothorax.    IMPRESSION:    Trace pleural fluid bilaterally.    Findings of mild interstitial pulmonary edema/volume overload versus atypical infection.    Electronically signed by:  Truman Dawson MD  12/07/2023 07:20 AM  [facility] Workstation: [id number]                                    12-lead EKG interpretation:  (if applicable)      Current Cardiac Rhythm:   (if applicable)    Physical Exam:   Physical Exam  Constitutional:       General: He is not in acute distress.     Appearance: Normal appearance. He is ill-appearing.   Cardiovascular:      Rate and Rhythm: Normal rate and regular rhythm.      Heart sounds: Murmur heard.      Gallop present.   Pulmonary:      Effort: Pulmonary effort is normal.      Breath sounds: Normal breath sounds.   Abdominal:      General: Abdomen is flat. Bowel sounds are normal.      Palpations: Abdomen is soft.   Musculoskeletal:         General: No swelling.     Atrial sensed V paced    ASSESSMENT/PLAN:   Assessment:   Chest pain with mild increase in troponin  Patient had reached sent angiogram with no intervention necessary  Status post aortic valve replacement  Status post DDD pacemaker  Plan:   DC heparin ambulate patient if he is pain-free can discharge this afternoon.  I have added Imdur.  Follow up with Dr. Willoughby within the next week

## 2023-12-08 NOTE — TELEPHONE ENCOUNTER
----- Message from Angeli Jarvis LPN sent at 12/7/2023  8:08 AM CST -----  Regarding: hospital follow up  Please call patient - needs post-hospital phone call within 2 business days of discharge and hospital follow up visit scheduled within 7-14 days if not already scheduled.

## 2023-12-08 NOTE — PLAN OF CARE
Chart and orders reviewed. Pt discharging home via family transport with no post acute needs. Hospital follow up scheduled from previous admission. Pt has no other needs to be addressed at this time. Pt cleared to discharge from case management standpoint.         12/08/23 1237   Final Note   Assessment Type Final Discharge Note   Anticipated Discharge Disposition Home   What phone number can be called within the next 1-3 days to see how you are doing after discharge? [phone]   Hospital Resources/Appts/Education Provided Provided patient/caregiver with written discharge plan information;Appointments scheduled and added to AVS   Post-Acute Status   Coverage Payor: [facility] - [employer] HMO -   Discharge Delays None known at this time

## 2023-12-22 NOTE — DISCHARGE SUMMARY
[facility]  Discharge Summary  Patient Name: Jerome Amaro Jr. MRN: [id number]   Patient Class: OP- Observation  Length of Stay: 0   Admission Date: 12/6/2023 11:20 PM Attending Physician: Oscar Alcantra MD   Primary Care Provider: Oscar Madrid NP Face-to-Face encounter date: 12/8/23   Chief Complaint: Chest Pain (Patient just got out of hospital)    Date of Discharge: 12/8/23  Discharge Disposition:Home or Self Care   Condition: Stable       Reason for Hospitalization     Active Hospital Problems    Diagnosis    *Chest pain    Hyponatremia    Anxiety and depression    Congestive heart failure    Seizure disorder    HTN (hypertension)    Dyslipidemia         Brief History of Present Illness       79 y/o male with history of CHF, CAD s/p 2 CABG procedures, 15 cardiac stents, aortic valve replacement, MI, HTN, seizures, and pacemaker placement presents to the ED for chest pain. Patient was discharged from the hospital yesterday after having a heart cath done. Patient states that he was chest pain free when he left however while doing stuff around his house yesterday he had his typical anginal chest pain. Patient stated that it was tolerable and he continued to go about his day. Patient states that he had a sandwich for lunch then laid down to go to sleep and he suddenly had left sided chest pain that radiated to his left arm and was associated with leg pain. Patient said this lasted about 15 minutes and he was able to call his daughter to the room and take NTG which helped some. Patient denied nausea, vomiting, abdominal pain, or dizziness. Code status was discussed and patient was placed as a full code at this time.      ED: Vitals showed , /75, RR 20 saturating at 95% on RA. Labs were significant for troponin 236.4, , and sodium 130.          For the full HPI please refer to the History & Physical from this admission.    Hospital Course By Problem with Pertinent  "Findings     Chest pain  Patient was discharged yesterday after having a cardiac cath done  Patient was chest pain free on discharge  Patient had his typical anginal pain throughout the day and could not reach his cardiologist   Patient laid down last night and had an episode of left sided chest pain that radiated to his left arm and was associated with leg pain      Patient took NTG at home prior to coming to the ED  - Helped to ease the pain      Patient was given Morphine,  mg, and Metoprolol in the ED     Troponin 236.4       ECG: ventricularly paced      Cardiology consulted     PRN Nitroglycerine and Morphine ordered     Heparin infusion initiated    Eval by cardiology:     Assessment:   Chest pain with mild increase in troponin  Patient had reached sent angiogram with no intervention necessary  Status post aortic valve replacement  Status post DDD pacemaker  Plan:   DC heparin ambulate patient if he is pain-free can discharge this afternoon.  I have added Imdur.  Follow up with Dr. Willoughby within the next week      Patient was seen and examined on the date of discharge and determined to be suitable for discharge.    Physical Exam  BP (!) 141/86 (BP Location: Right arm, Patient Position: Lying)   Pulse 90   Temp 98.8 °F (37.1 °C) (Oral)   Resp 18   Ht 5' 6" (1.676 m)   Wt 80.8 kg (178 lb 2.1 oz)   SpO2 96%   BMI 28.75 kg/m²   Vitals reviewed.    Constitutional: No distress.   HENT: Atraumatic.   Cardiovascular: Normal rate, regular rhythm.  S1 S2.    Pulmonary/Chest: Effort normal. Clear to auscultation bilaterally. No wheezes.   Abdominal: Soft. Bowel sounds are normal. Exhibits no distension and no mass. No tenderness  Neurological: Alert.   Skin: Skin is warm and dry.     Following labs were Reviewed   No results for input(s): "WBC", "HGB", "HCT", "PLT", "GLUCOSE", "CALCIUM", "ALBUMIN", "PROT", "NA", "K", "CO2", "CL", "BUN", "CREATININE", "ALKPHOS", "ALT", "AST", "BILITOT" in the last 24 " "hours.  No results found for: "POCTGLUCOSE"     All labs within the past 24 hours have been reviewed    Microbiology Results (last 7 days)       ** No results found for the last 168 hours. **          X-Ray Chest AP Portable   Final Result          No results found for this or any previous visit.      Consultants and Procedures   Consultants:  cardiology     Procedures:   * No surgery found *     Discharge Information:   Diet:  Resume Cardiac diet/Diabetic Diet    Physical Activity:  Activity as tolerated    Instructions:  1. Take all medications as prescribed  2. Keep all follow-up appointments  3. Return to the hospital or call your primary care physicians if any worsening symptoms such as chest pain, shortness of breath, bleeding,  intractable pain, fever >101 occur.      Follow-Up Appointments:  Please call your primary care physician to schedule an appointment in 1 week time.     Follow-up Information       Rodrigo Willoughby MD Follow up in 1 week(s).    Specialty: Cardiology  Contact information:  [address]  [address]  [facility] [address]  [phone]                               Pending laboratory work/Tests to be performed/followed by the Primary care Physician:    The patient was discharged with discharge instructions reviewed in written and verbal form. All questions were answered and prescriptions were provided. The importance of making follow up appointments and compliance of medications has been emphasized. The patient will follow up in 1 week or sooner as needed with the PCP. Tthe patient understands and agrees with the plan. Upon discharge, patient needs to be on following medications.    Discharge Medications:     Medication List        START taking these medications      aspirin 81 MG EC tablet  Commonly known as: ECOTRIN  Take 1 tablet (81 mg total) by mouth once daily.     isosorbide mononitrate 30 MG 24 hr tablet  Commonly known as: IMDUR  Take 1 tablet (30 mg total) " by mouth once daily.  Replaces: isosorbide mononitrate 20 MG Tab            CONTINUE taking these medications      amLODIPine 5 MG tablet  Commonly known as: NORVASC     citalopram 20 MG tablet  Commonly known as: CeleXA     clopidogreL 75 mg tablet  Commonly known as: PLAVIX     ezetimibe 10 mg tablet  Commonly known as: ZETIA     furosemide 20 MG tablet  Commonly known as: LASIX     hydrOXYzine pamoate 25 MG Cap  Commonly known as: VISTARIL  Take 1 capsule (25 mg total) by mouth daily as needed (anxiety).     ivabradine 5 mg Tab  Commonly known as: CORLANOR     lisinopriL 40 MG tablet  Commonly known as: PRINIVIL,ZESTRIL     metoprolol succinate 50 MG 24 hr tablet  Commonly known as: TOPROL-XL     mupirocin 2 % ointment  Commonly known as: BACTROBAN  Apply topically 3 (three) times daily.     * NITROLINGUAL 400 mcg/spray spray  Generic drug: nitroGLYCERIN 0.4 MG/DOSE TL SPRY     * nitroGLYCERIN 0.4 MG SL tablet  Commonly known as: NITROSTAT     PENNSAID 2 % Sopk  Generic drug: diclofenac sodium     phenytoin 100 MG ER capsule  Commonly known as: DILANTIN     ranolazine 1,000 mg Tb12  Commonly known as: RANEXA           * This list has 2 medication(s) that are the same as other medications prescribed for you. Read the directions carefully, and ask your doctor or other care provider to review them with you.                STOP taking these medications      isosorbide mononitrate 20 MG Tab  Commonly known as: ISMO,MONOKET  Replaced by: isosorbide mononitrate 30 MG 24 hr tablet               Where to Get Your Medications        These medications were sent to [facility]/pharmacy #[id number] - [place] - [address]  [address] [address]      Phone: [phone]   aspirin 81 MG EC tablet  isosorbide mononitrate 30 MG 24 hr tablet           I spent 23 minutes preparing the discharge for this patient including reviewing records from previous encounters, preparation of discharge summary, assessing and final  examination of the patient, discharge medicine reconciliation, discussing plan of care, follow up and education and prescriptions.       Oscar Alcantar  [facility]

## 2024-01-24 ENCOUNTER — HOSPITAL ENCOUNTER (INPATIENT)
Facility: HOSPITAL | Age: 79
LOS: 3 days | Discharge: HOME | DRG: 281 | End: 2024-01-27
Attending: EMERGENCY MEDICINE | Admitting: INTERNAL MEDICINE
Payer: MEDICARE

## 2024-01-24 ENCOUNTER — CLINICAL SUPPORT (OUTPATIENT)
Dept: CARDIOLOGY | Facility: HOSPITAL | Age: 79
DRG: 281 | End: 2024-01-24
Attending: INTERNAL MEDICINE
Payer: MEDICARE

## 2024-01-24 VITALS — WEIGHT: 170 LBS | HEIGHT: 67 IN | BODY MASS INDEX: 26.68 KG/M2

## 2024-01-24 DIAGNOSIS — I21.4 NSTEMI (NON-ST ELEVATED MYOCARDIAL INFARCTION): ICD-10-CM

## 2024-01-24 DIAGNOSIS — R79.89 TROPONIN LEVEL ELEVATED: ICD-10-CM

## 2024-01-24 DIAGNOSIS — R07.9 CHEST PAIN: ICD-10-CM

## 2024-01-24 DIAGNOSIS — I21.4 NSTEMI (NON-ST ELEVATION MYOCARDIAL INFARCTION): ICD-10-CM

## 2024-01-24 PROBLEM — F43.21 GRIEF: Status: ACTIVE | Noted: 2024-01-24

## 2024-01-24 LAB
ALBUMIN SERPL BCP-MCNC: 3.6 G/DL (ref 3.5–5.2)
ALP SERPL-CCNC: 108 U/L (ref 55–135)
ALT SERPL W/O P-5'-P-CCNC: 19 U/L (ref 10–44)
ANION GAP SERPL CALC-SCNC: 9 MMOL/L (ref 8–16)
AORTIC ROOT ANNULUS: 2.5 CM
APTT PPP: 37.6 SEC (ref 21–32)
APTT PPP: 37.6 SEC (ref 21–32)
APTT PPP: 44.1 SEC (ref 21–32)
APTT PPP: 55.2 SEC (ref 21–32)
AST SERPL-CCNC: 26 U/L (ref 10–40)
AV INDEX (PROSTH): 0.7
AV MEAN GRADIENT: 3 MMHG
AV PEAK GRADIENT: 6 MMHG
AV VALVE AREA BY VELOCITY RATIO: 1.58 CM²
AV VALVE AREA: 1.58 CM²
AV VELOCITY RATIO: 0.7
BASOPHILS # BLD AUTO: 0.05 K/UL (ref 0–0.2)
BASOPHILS # BLD AUTO: 0.05 K/UL (ref 0–0.2)
BASOPHILS # BLD AUTO: 0.08 K/UL (ref 0–0.2)
BASOPHILS NFR BLD: 0.7 % (ref 0–1.9)
BASOPHILS NFR BLD: 0.7 % (ref 0–1.9)
BASOPHILS NFR BLD: 0.9 % (ref 0–1.9)
BILIRUB SERPL-MCNC: 0.8 MG/DL (ref 0.1–1)
BNP SERPL-MCNC: 958 PG/ML (ref 0–99)
BSA FOR ECHO PROCEDURE: 1.91 M2
BUN SERPL-MCNC: 17 MG/DL (ref 8–23)
CALCIUM SERPL-MCNC: 8.4 MG/DL (ref 8.7–10.5)
CHLORIDE SERPL-SCNC: 100 MMOL/L (ref 95–110)
CO2 SERPL-SCNC: 23 MMOL/L (ref 23–29)
CREAT SERPL-MCNC: 1 MG/DL (ref 0.5–1.4)
CV ECHO LV RWT: 0.54 CM
DIFFERENTIAL METHOD BLD: ABNORMAL
DOP CALC AO PEAK VEL: 1.19 M/S
DOP CALC AO VTI: 21 CM
DOP CALC LVOT AREA: 2.3 CM2
DOP CALC LVOT DIAMETER: 1.7 CM
DOP CALC LVOT PEAK VEL: 0.83 M/S
DOP CALC LVOT STROKE VOLUME: 33.12 CM3
DOP CALC MV VTI: 36 CM
DOP CALCLVOT PEAK VEL VTI: 14.6 CM
E WAVE DECELERATION TIME: 173 MSEC
E/A RATIO: 0.88
E/E' RATIO: 23.38 M/S
ECHO LV POSTERIOR WALL: 1.39 CM (ref 0.6–1.1)
EOSINOPHIL # BLD AUTO: 0.2 K/UL (ref 0–0.5)
EOSINOPHIL NFR BLD: 2.6 % (ref 0–8)
EOSINOPHIL NFR BLD: 3 % (ref 0–8)
EOSINOPHIL NFR BLD: 3 % (ref 0–8)
ERYTHROCYTE [DISTWIDTH] IN BLOOD BY AUTOMATED COUNT: 13.4 % (ref 11.5–14.5)
ERYTHROCYTE [DISTWIDTH] IN BLOOD BY AUTOMATED COUNT: 13.4 % (ref 11.5–14.5)
ERYTHROCYTE [DISTWIDTH] IN BLOOD BY AUTOMATED COUNT: 13.5 % (ref 11.5–14.5)
EST. GFR  (NO RACE VARIABLE): >60 ML/MIN/1.73 M^2
FRACTIONAL SHORTENING: 29 % (ref 28–44)
GLUCOSE SERPL-MCNC: 111 MG/DL (ref 70–110)
HCT VFR BLD AUTO: 31.6 % (ref 40–54)
HCT VFR BLD AUTO: 31.6 % (ref 40–54)
HCT VFR BLD AUTO: 32.9 % (ref 40–54)
HGB BLD-MCNC: 10.7 G/DL (ref 14–18)
HGB BLD-MCNC: 10.7 G/DL (ref 14–18)
HGB BLD-MCNC: 11.3 G/DL (ref 14–18)
IMM GRANULOCYTES # BLD AUTO: 0.02 K/UL (ref 0–0.04)
IMM GRANULOCYTES # BLD AUTO: 0.02 K/UL (ref 0–0.04)
IMM GRANULOCYTES # BLD AUTO: 0.03 K/UL (ref 0–0.04)
IMM GRANULOCYTES NFR BLD AUTO: 0.3 % (ref 0–0.5)
INR PPP: 1.1 (ref 0.8–1.2)
INTERVENTRICULAR SEPTUM: 1.27 CM (ref 0.6–1.1)
LEFT INTERNAL DIMENSION IN SYSTOLE: 3.62 CM (ref 2.1–4)
LEFT VENTRICLE DIASTOLIC VOLUME INDEX: 66.67 ML/M2
LEFT VENTRICLE DIASTOLIC VOLUME: 126 ML
LEFT VENTRICLE MASS INDEX: 149 G/M2
LEFT VENTRICLE SYSTOLIC VOLUME INDEX: 29.2 ML/M2
LEFT VENTRICLE SYSTOLIC VOLUME: 55.2 ML
LEFT VENTRICULAR INTERNAL DIMENSION IN DIASTOLE: 5.13 CM (ref 3.5–6)
LEFT VENTRICULAR MASS: 281.59 G
LV LATERAL E/E' RATIO: 25.33 M/S
LV SEPTAL E/E' RATIO: 21.71 M/S
LVOT MG: 1 MMHG
LVOT MV: 0.53 CM/S
LYMPHOCYTES # BLD AUTO: 1.4 K/UL (ref 1–4.8)
LYMPHOCYTES # BLD AUTO: 1.4 K/UL (ref 1–4.8)
LYMPHOCYTES # BLD AUTO: 1.6 K/UL (ref 1–4.8)
LYMPHOCYTES NFR BLD: 17.4 % (ref 18–48)
LYMPHOCYTES NFR BLD: 18.8 % (ref 18–48)
LYMPHOCYTES NFR BLD: 18.8 % (ref 18–48)
MAGNESIUM SERPL-MCNC: 1.6 MG/DL (ref 1.6–2.6)
MCH RBC QN AUTO: 30 PG (ref 27–31)
MCH RBC QN AUTO: 30 PG (ref 27–31)
MCH RBC QN AUTO: 30.4 PG (ref 27–31)
MCHC RBC AUTO-ENTMCNC: 33.9 G/DL (ref 32–36)
MCHC RBC AUTO-ENTMCNC: 33.9 G/DL (ref 32–36)
MCHC RBC AUTO-ENTMCNC: 34.3 G/DL (ref 32–36)
MCV RBC AUTO: 88 FL (ref 82–98)
MCV RBC AUTO: 89 FL (ref 82–98)
MCV RBC AUTO: 89 FL (ref 82–98)
MONOCYTES # BLD AUTO: 1.3 K/UL (ref 0.3–1)
MONOCYTES # BLD AUTO: 1.3 K/UL (ref 0.3–1)
MONOCYTES # BLD AUTO: 1.6 K/UL (ref 0.3–1)
MONOCYTES NFR BLD: 16.9 % (ref 4–15)
MONOCYTES NFR BLD: 17.9 % (ref 4–15)
MONOCYTES NFR BLD: 17.9 % (ref 4–15)
MV MEAN GRADIENT: 7 MMHG
MV PEAK A VEL: 1.73 M/S
MV PEAK E VEL: 1.52 M/S
MV PEAK GRADIENT: 11 MMHG
MV STENOSIS PRESSURE HALF TIME: 42 MS
MV VALVE AREA BY CONTINUITY EQUATION: 0.92 CM2
MV VALVE AREA P 1/2 METHOD: 5.24 CM2
NEUTROPHILS # BLD AUTO: 4.4 K/UL (ref 1.8–7.7)
NEUTROPHILS # BLD AUTO: 4.4 K/UL (ref 1.8–7.7)
NEUTROPHILS # BLD AUTO: 5.7 K/UL (ref 1.8–7.7)
NEUTROPHILS NFR BLD: 59.3 % (ref 38–73)
NEUTROPHILS NFR BLD: 59.3 % (ref 38–73)
NEUTROPHILS NFR BLD: 61.9 % (ref 38–73)
NRBC BLD-RTO: 0 /100 WBC
OHS LV EJECTION FRACTION SIMPSONS BIPLANE MOD: 63 %
PHENYTOIN SERPL-MCNC: 9.2 UG/ML (ref 10–20)
PISA MRMAX VEL: 4.59 M/S
PISA TR MAX VEL: 2.38 M/S
PLATELET # BLD AUTO: 177 K/UL (ref 150–450)
PLATELET # BLD AUTO: 177 K/UL (ref 150–450)
PLATELET # BLD AUTO: 196 K/UL (ref 150–450)
PMV BLD AUTO: 10.3 FL (ref 9.2–12.9)
PMV BLD AUTO: 10.4 FL (ref 9.2–12.9)
PMV BLD AUTO: 10.4 FL (ref 9.2–12.9)
POTASSIUM SERPL-SCNC: 3.9 MMOL/L (ref 3.5–5.1)
PROT SERPL-MCNC: 6.7 G/DL (ref 6–8.4)
PROTHROMBIN TIME: 11.7 SEC (ref 9–12.5)
PV MV: 0.67 M/S
PV PEAK GRADIENT: 4 MMHG
PV PEAK VELOCITY: 1 M/S
RBC # BLD AUTO: 3.57 M/UL (ref 4.6–6.2)
RBC # BLD AUTO: 3.57 M/UL (ref 4.6–6.2)
RBC # BLD AUTO: 3.72 M/UL (ref 4.6–6.2)
RV TISSUE DOPPLER FREE WALL SYSTOLIC VELOCITY 1 (APICAL 4 CHAMBER VIEW): 8.27 CM/S
SODIUM SERPL-SCNC: 132 MMOL/L (ref 136–145)
TDI LATERAL: 0.06 M/S
TDI SEPTAL: 0.07 M/S
TDI: 0.07 M/S
TR MAX PG: 23 MMHG
TROPONIN I SERPL HS-MCNC: 136 PG/ML (ref 0–14.9)
TROPONIN I SERPL HS-MCNC: 178.4 PG/ML (ref 0–14.9)
TROPONIN I SERPL HS-MCNC: 361.1 PG/ML (ref 0–14.9)
TROPONIN I SERPL HS-MCNC: 406.6 PG/ML (ref 0–14.9)
TROPONIN I SERPL HS-MCNC: 95.3 PG/ML (ref 0–14.9)
TSH SERPL DL<=0.005 MIU/L-ACNC: 2.78 UIU/ML (ref 0.34–5.6)
WBC # BLD AUTO: 7.38 K/UL (ref 3.9–12.7)
WBC # BLD AUTO: 7.38 K/UL (ref 3.9–12.7)
WBC # BLD AUTO: 9.21 K/UL (ref 3.9–12.7)
Z-SCORE OF LEFT VENTRICULAR DIMENSION IN END DIASTOLE: -0.31
Z-SCORE OF LEFT VENTRICULAR DIMENSION IN END SYSTOLE: 0.83

## 2024-01-24 PROCEDURE — 84484 ASSAY OF TROPONIN QUANT: CPT | Mod: 91 | Performed by: STUDENT IN AN ORGANIZED HEALTH CARE EDUCATION/TRAINING PROGRAM

## 2024-01-24 PROCEDURE — 84443 ASSAY THYROID STIM HORMONE: CPT | Performed by: INTERNAL MEDICINE

## 2024-01-24 PROCEDURE — 80053 COMPREHEN METABOLIC PANEL: CPT | Performed by: EMERGENCY MEDICINE

## 2024-01-24 PROCEDURE — 21400001 HC TELEMETRY ROOM

## 2024-01-24 PROCEDURE — 25000003 PHARM REV CODE 250: Performed by: INTERNAL MEDICINE

## 2024-01-24 PROCEDURE — 85025 COMPLETE CBC W/AUTO DIFF WBC: CPT | Performed by: INTERNAL MEDICINE

## 2024-01-24 PROCEDURE — 63600175 PHARM REV CODE 636 W HCPCS

## 2024-01-24 PROCEDURE — 36415 COLL VENOUS BLD VENIPUNCTURE: CPT | Performed by: EMERGENCY MEDICINE

## 2024-01-24 PROCEDURE — 85730 THROMBOPLASTIN TIME PARTIAL: CPT | Performed by: INTERNAL MEDICINE

## 2024-01-24 PROCEDURE — 85025 COMPLETE CBC W/AUTO DIFF WBC: CPT | Mod: 91 | Performed by: EMERGENCY MEDICINE

## 2024-01-24 PROCEDURE — 25000003 PHARM REV CODE 250: Performed by: EMERGENCY MEDICINE

## 2024-01-24 PROCEDURE — 83735 ASSAY OF MAGNESIUM: CPT | Performed by: EMERGENCY MEDICINE

## 2024-01-24 PROCEDURE — 80185 ASSAY OF PHENYTOIN TOTAL: CPT | Performed by: EMERGENCY MEDICINE

## 2024-01-24 PROCEDURE — 93005 ELECTROCARDIOGRAM TRACING: CPT | Performed by: INTERNAL MEDICINE

## 2024-01-24 PROCEDURE — 94761 N-INVAS EAR/PLS OXIMETRY MLT: CPT

## 2024-01-24 PROCEDURE — 84484 ASSAY OF TROPONIN QUANT: CPT | Performed by: INTERNAL MEDICINE

## 2024-01-24 PROCEDURE — 99285 EMERGENCY DEPT VISIT HI MDM: CPT | Mod: 25

## 2024-01-24 PROCEDURE — 83880 ASSAY OF NATRIURETIC PEPTIDE: CPT | Performed by: EMERGENCY MEDICINE

## 2024-01-24 PROCEDURE — 93010 ELECTROCARDIOGRAM REPORT: CPT | Mod: ,,, | Performed by: INTERNAL MEDICINE

## 2024-01-24 PROCEDURE — 85610 PROTHROMBIN TIME: CPT | Performed by: INTERNAL MEDICINE

## 2024-01-24 PROCEDURE — 36415 COLL VENOUS BLD VENIPUNCTURE: CPT | Performed by: STUDENT IN AN ORGANIZED HEALTH CARE EDUCATION/TRAINING PROGRAM

## 2024-01-24 PROCEDURE — 63600175 PHARM REV CODE 636 W HCPCS: Performed by: INTERNAL MEDICINE

## 2024-01-24 PROCEDURE — 85730 THROMBOPLASTIN TIME PARTIAL: CPT | Mod: 91 | Performed by: STUDENT IN AN ORGANIZED HEALTH CARE EDUCATION/TRAINING PROGRAM

## 2024-01-24 PROCEDURE — 25000242 PHARM REV CODE 250 ALT 637 W/ HCPCS: Performed by: INTERNAL MEDICINE

## 2024-01-24 PROCEDURE — 93306 TTE W/DOPPLER COMPLETE: CPT

## 2024-01-24 PROCEDURE — 84484 ASSAY OF TROPONIN QUANT: CPT | Mod: 91 | Performed by: EMERGENCY MEDICINE

## 2024-01-24 RX ORDER — AMLODIPINE BESYLATE 5 MG/1
5 TABLET ORAL 2 TIMES DAILY
Status: DISCONTINUED | OUTPATIENT
Start: 2024-01-24 | End: 2024-01-25

## 2024-01-24 RX ORDER — NAPROXEN SODIUM 220 MG/1
324 TABLET, FILM COATED ORAL
Status: COMPLETED | OUTPATIENT
Start: 2024-01-24 | End: 2024-01-24

## 2024-01-24 RX ORDER — EZETIMIBE 10 MG/1
10 TABLET ORAL DAILY
Status: DISCONTINUED | OUTPATIENT
Start: 2024-01-24 | End: 2024-01-27 | Stop reason: HOSPADM

## 2024-01-24 RX ORDER — MORPHINE SULFATE 4 MG/ML
4 INJECTION, SOLUTION INTRAMUSCULAR; INTRAVENOUS EVERY 4 HOURS PRN
Status: DISCONTINUED | OUTPATIENT
Start: 2024-01-24 | End: 2024-01-27 | Stop reason: HOSPADM

## 2024-01-24 RX ORDER — FUROSEMIDE 10 MG/ML
40 INJECTION INTRAMUSCULAR; INTRAVENOUS DAILY
Status: DISCONTINUED | OUTPATIENT
Start: 2024-01-24 | End: 2024-01-25

## 2024-01-24 RX ORDER — LORAZEPAM 2 MG/ML
0.5 INJECTION INTRAMUSCULAR
Status: COMPLETED | OUTPATIENT
Start: 2024-01-24 | End: 2024-01-24

## 2024-01-24 RX ORDER — CLOPIDOGREL BISULFATE 75 MG/1
75 TABLET ORAL DAILY
Status: DISCONTINUED | OUTPATIENT
Start: 2024-01-24 | End: 2024-01-27 | Stop reason: HOSPADM

## 2024-01-24 RX ORDER — MORPHINE SULFATE 2 MG/ML
2 INJECTION, SOLUTION INTRAMUSCULAR; INTRAVENOUS EVERY 4 HOURS PRN
Status: DISCONTINUED | OUTPATIENT
Start: 2024-01-24 | End: 2024-01-27 | Stop reason: HOSPADM

## 2024-01-24 RX ORDER — APIXABAN 5 MG/1
5 TABLET, FILM COATED ORAL EVERY MORNING
Status: ON HOLD | COMMUNITY
Start: 2024-01-06

## 2024-01-24 RX ORDER — METOPROLOL SUCCINATE 50 MG/1
100 TABLET, EXTENDED RELEASE ORAL 2 TIMES DAILY
Status: DISCONTINUED | OUTPATIENT
Start: 2024-01-24 | End: 2024-01-27 | Stop reason: HOSPADM

## 2024-01-24 RX ORDER — NITROGLYCERIN 0.4 MG/1
0.4 TABLET SUBLINGUAL EVERY 5 MIN PRN
Status: DISCONTINUED | OUTPATIENT
Start: 2024-01-24 | End: 2024-01-27 | Stop reason: HOSPADM

## 2024-01-24 RX ORDER — HEPARIN SODIUM,PORCINE/D5W 25000/250
0-40 INTRAVENOUS SOLUTION INTRAVENOUS CONTINUOUS
Status: DISCONTINUED | OUTPATIENT
Start: 2024-01-24 | End: 2024-01-25

## 2024-01-24 RX ORDER — ONDANSETRON HYDROCHLORIDE 2 MG/ML
4 INJECTION, SOLUTION INTRAVENOUS EVERY 8 HOURS PRN
Status: DISCONTINUED | OUTPATIENT
Start: 2024-01-24 | End: 2024-01-27 | Stop reason: HOSPADM

## 2024-01-24 RX ORDER — PHENYTOIN SODIUM 100 MG/1
400 CAPSULE, EXTENDED RELEASE ORAL DAILY
Status: DISCONTINUED | OUTPATIENT
Start: 2024-01-24 | End: 2024-01-27 | Stop reason: HOSPADM

## 2024-01-24 RX ORDER — CITALOPRAM 20 MG/1
20 TABLET, FILM COATED ORAL DAILY
Status: DISCONTINUED | OUTPATIENT
Start: 2024-01-24 | End: 2024-01-27 | Stop reason: HOSPADM

## 2024-01-24 RX ORDER — LISINOPRIL 20 MG/1
40 TABLET ORAL DAILY
Status: DISCONTINUED | OUTPATIENT
Start: 2024-01-24 | End: 2024-01-26

## 2024-01-24 RX ORDER — ASPIRIN 81 MG/1
81 TABLET ORAL DAILY
Status: DISCONTINUED | OUTPATIENT
Start: 2024-01-24 | End: 2024-01-27 | Stop reason: HOSPADM

## 2024-01-24 RX ORDER — ACETAMINOPHEN 325 MG/1
650 TABLET ORAL EVERY 8 HOURS PRN
Status: DISCONTINUED | OUTPATIENT
Start: 2024-01-24 | End: 2024-01-27 | Stop reason: HOSPADM

## 2024-01-24 RX ORDER — RANOLAZINE 500 MG/1
1000 TABLET, EXTENDED RELEASE ORAL 2 TIMES DAILY
Status: DISCONTINUED | OUTPATIENT
Start: 2024-01-24 | End: 2024-01-27 | Stop reason: HOSPADM

## 2024-01-24 RX ORDER — NAPROXEN SODIUM 220 MG/1
162 TABLET, FILM COATED ORAL
Status: DISCONTINUED | OUTPATIENT
Start: 2024-01-24 | End: 2024-01-24

## 2024-01-24 RX ORDER — MORPHINE SULFATE 4 MG/ML
4 INJECTION, SOLUTION INTRAMUSCULAR; INTRAVENOUS ONCE
Status: DISCONTINUED | OUTPATIENT
Start: 2024-01-24 | End: 2024-01-27 | Stop reason: HOSPADM

## 2024-01-24 RX ORDER — ISOSORBIDE MONONITRATE 30 MG/1
30 TABLET, EXTENDED RELEASE ORAL DAILY
Status: DISCONTINUED | OUTPATIENT
Start: 2024-01-24 | End: 2024-01-27 | Stop reason: HOSPADM

## 2024-01-24 RX ORDER — FAMOTIDINE 40 MG/1
40 TABLET, FILM COATED ORAL DAILY
Status: ON HOLD | COMMUNITY
Start: 2024-01-23 | End: 2024-02-29

## 2024-01-24 RX ORDER — LORAZEPAM 2 MG/ML
0.5 INJECTION INTRAMUSCULAR EVERY 6 HOURS PRN
Status: DISCONTINUED | OUTPATIENT
Start: 2024-01-24 | End: 2024-01-26 | Stop reason: SDUPTHER

## 2024-01-24 RX ORDER — FUROSEMIDE 20 MG/1
20 TABLET ORAL DAILY
Status: DISCONTINUED | OUTPATIENT
Start: 2024-01-24 | End: 2024-01-24

## 2024-01-24 RX ORDER — TALC
6 POWDER (GRAM) TOPICAL NIGHTLY PRN
Status: DISCONTINUED | OUTPATIENT
Start: 2024-01-24 | End: 2024-01-27 | Stop reason: HOSPADM

## 2024-01-24 RX ADMIN — MORPHINE SULFATE 4 MG: 4 INJECTION, SOLUTION INTRAMUSCULAR; INTRAVENOUS at 06:01

## 2024-01-24 RX ADMIN — EZETIMIBE 10 MG: 10 TABLET ORAL at 08:01

## 2024-01-24 RX ADMIN — CITALOPRAM HYDROBROMIDE 20 MG: 20 TABLET ORAL at 08:01

## 2024-01-24 RX ADMIN — HEPARIN SODIUM 12 UNITS/KG/HR: 10000 INJECTION, SOLUTION INTRAVENOUS at 05:01

## 2024-01-24 RX ADMIN — NITROGLYCERIN 0.4 MG: 0.4 TABLET SUBLINGUAL at 06:01

## 2024-01-24 RX ADMIN — FUROSEMIDE 40 MG: 10 INJECTION, SOLUTION INTRAMUSCULAR; INTRAVENOUS at 12:01

## 2024-01-24 RX ADMIN — ISOSORBIDE MONONITRATE 30 MG: 30 TABLET, EXTENDED RELEASE ORAL at 08:01

## 2024-01-24 RX ADMIN — AMLODIPINE BESYLATE 5 MG: 5 TABLET ORAL at 08:01

## 2024-01-24 RX ADMIN — PHENYTOIN SODIUM 400 MG: 100 CAPSULE ORAL at 08:01

## 2024-01-24 RX ADMIN — ASPIRIN 81 MG 324 MG: 81 TABLET ORAL at 03:01

## 2024-01-24 RX ADMIN — METOPROLOL SUCCINATE 100 MG: 50 TABLET, EXTENDED RELEASE ORAL at 08:01

## 2024-01-24 RX ADMIN — ASPIRIN 81 MG: 81 TABLET, COATED ORAL at 08:01

## 2024-01-24 RX ADMIN — RANOLAZINE 1000 MG: 500 TABLET, FILM COATED, EXTENDED RELEASE ORAL at 08:01

## 2024-01-24 RX ADMIN — Medication 6 MG: at 09:01

## 2024-01-24 RX ADMIN — CLOPIDOGREL BISULFATE 75 MG: 75 TABLET, FILM COATED ORAL at 08:01

## 2024-01-24 RX ADMIN — LORAZEPAM 0.5 MG: 2 INJECTION INTRAMUSCULAR; INTRAVENOUS at 04:01

## 2024-01-24 RX ADMIN — LISINOPRIL 40 MG: 20 TABLET ORAL at 08:01

## 2024-01-24 NOTE — HPI
"78 year old male with history of CAD (s/p CABG twice), HFrEF (45% recent [facility] --12/2023), AVR (porcine), Heart Block (PPM), HTN, Hypothyroidism, Seizure disorder presented to ED complaining of left arm pain and band like chest heaviness "Across the front of my chest", "Worst pain I've ever had", which was accompanied by SOB. Symptoms lasted 2 hours and had resolved by the time he reached the ED--no further discomfort since. He admits to PND, but no orthopnea or edema. No cough or sputum. He has been "Under a lot of stress" according to a daughter at bedside--he was recently , and is not coping well--actively grieving.     [facility] 12/2023: Summary    Left ventricular ejection fraction was calculated to be 45%. with inferior wall hypokinesis    Diastolic dysfunction.    Patent sequential saphenous vein graft to LAD as well as 1st and 2nd marginal divisions of the circumflex with right coronary artery arm of graft occluded    In ED:  EKG reviewed: paced. CXR reviewed: cardiomegaly with interstitial edema. Labs reviewed: BNP and troponin are elevated; no leukocytosis with mild normocytic anemia; minimal hyponatremia otherwise normal non-fasting CMP.    Discussed with ED MD: Inpatient admission for NSTEMI (Takotsubo??); hold apixaban and start heparin infusion, otherwise continuing home regimen -- l-thyroxine not on his home meds list; Consult his Cardiologist; prn NTG, morphine for chest discomfort; prn lorazepam for anxiety/grief; TSH with AM labs for review; serial biomarkers; ECHO  "

## 2024-01-24 NOTE — ED PROVIDER NOTES
Encounter Date: 1/24/2024       History     Chief Complaint   Patient presents with    Chest Pain     Biba for chest pain starting 30 minutes pta, pt took nitro at home x2 doses     78-year-old male chief complaint chest pain lower anterior chest radiating to left arm starting proximally 1:00 a.m..  He tried 2 sprays of nitro.  By the time he got here to the ER the pain had gone away.  However he normally gets occasional anginal pains daily but they usually only affect the left arm and go away immediately with 1 nitro.  This particular chest pain lasted longer was more severe and required 2 nitroglycerines to alleviate the pain.  Patient has a history of 2 previous bypass surgeries has a pacemaker was a previous smoker.  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  He also has history of CHF.  He also has a history of high cholesterol.        Review of patient's allergies indicates:   Allergen Reactions    Atorvastatin Other (See Comments)     Muscle pain    Rosuvastatin Other (See Comments)     Muscle pain     Past Medical History:   Diagnosis Date    Anticoagulant long-term use     2014    Aortic valve disease 2014    pig valve    Arthritis     all over    Chest pain 07/15/2019    CHF (congestive heart failure)     2014 on meds    Coronary artery disease     2007 cabg    Heart attack 1990    15 stents    Heart block     Hyperlipidemia     Hypertension     on meds    Hypothyroidism 2015    on meds    PVD (peripheral vascular disease)     Seizures     last episode 1990 on meds     Past Surgical History:   Procedure Laterality Date    AORTOGRAPHY WITH SERIALOGRAPHY N/A 11/16/2021    Procedure: AORTOGRAM, WITH RUNOFF;  Surgeon: Rodrigo Willoughby MD;  Location: [facility] CATH/EP LAB;  Service: Cardiology;  Laterality: N/A;    ARTERIOGRAPHY OF AORTIC ROOT N/A 11/5/2019    Procedure: ARTERIOGRAM, AORTIC ROOT;  Surgeon: Rodrigo Willoughby MD;  Location: [facility] CATH/EP LAB;  Service: Cardiology;   Laterality: N/A;    CARDIAC CATHETERIZATION      CARDIAC VALVE SURGERY      CORONARY ANGIOGRAPHY INCLUDING BYPASS GRAFTS WITH CATHETERIZATION OF LEFT HEART N/A 11/5/2019    Procedure: ANGIOGRAM, CORONARY, INCLUDING BYPASS GRAFT, WITH LEFT HEART CATHETERIZATION;  Surgeon: Rodrigo Willoughby MD;  Location: [facility] CATH/EP LAB;  Service: Cardiology;  Laterality: N/A;    CORONARY ANGIOGRAPHY INCLUDING BYPASS GRAFTS WITH CATHETERIZATION OF LEFT HEART Left 11/3/2020    Procedure: ANGIOGRAM, CORONARY, INCLUDING BYPASS GRAFT, WITH LEFT HEART CATHETERIZATION;  Surgeon: Rodrigo Willoughby MD;  Location: [facility] CATH/EP LAB;  Service: Cardiology;  Laterality: Left;    CORONARY ANGIOGRAPHY INCLUDING BYPASS GRAFTS WITH CATHETERIZATION OF LEFT HEART N/A 9/28/2021    Procedure: ANGIOGRAM, CORONARY, INCLUDING BYPASS GRAFT, WITH LEFT HEART CATHETERIZATION;  Surgeon: Rodrigo Willoughby MD;  Location: [facility] CATH/EP LAB;  Service: Cardiology;  Laterality: N/A;    CORONARY ANGIOGRAPHY INCLUDING BYPASS GRAFTS WITH CATHETERIZATION OF LEFT HEART N/A 12/5/2023    Procedure: ANGIOGRAM, CORONARY, INCLUDING BYPASS GRAFT, WITH LEFT HEART CATHETERIZATION;  Surgeon: Rodrigo Willoughby MD;  Location: [facility] CATH/EP LAB;  Service: Cardiology;  Laterality: N/A;    CORONARY ANGIOPLASTY      CORONARY ARTERY BYPASS GRAFT      EXTRACORPOREAL SHOCK WAVE LITHOTRIPSY Right 8/1/2019    Procedure: LITHOTRIPSY, ESWL;  Surgeon: Williams Ortega MD;  Location: [facility] OR;  Service: Urology;  Laterality: Right;    HEMORRHOID SURGERY  1990    INSERTION OF PACEMAKER      PERCUTANEOUS TRANSLUMINAL BALLOON ANGIOPLASTY OF CORONARY ARTERY N/A 11/8/2019    Procedure: Angioplasty-coronary;  Surgeon: Rodrigo Willoughby MD;  Location: [facility] CATH/EP LAB;  Service: Cardiology;  Laterality: N/A;    WRIST FRACTURE SURGERY       Family History   Problem Relation Age of Onset    Heart attack Mother     Heart attack Father     Heart disease Sister     Stroke Sister     Diabetes Sister     No Known Problems  Maternal Grandmother     No Known Problems Maternal Grandfather     No Known Problems Paternal Grandmother     No Known Problems Paternal Grandfather     No Known Problems Brother     No Known Problems Maternal Aunt     No Known Problems Maternal Uncle     No Known Problems Paternal Aunt     No Known Problems Paternal Uncle     Anemia Neg Hx     Arrhythmia Neg Hx     Asthma Neg Hx     Clotting disorder Neg Hx     Fainting Neg Hx     Heart failure Neg Hx     Hyperlipidemia Neg Hx     Hypertension Neg Hx     Atrial Septal Defect Neg Hx      Social History     Tobacco Use    Smoking status: Former     Current packs/day: 0.00     Average packs/day: 0.5 packs/day for 4.0 years (2.0 ttl pk-yrs)     Types: Cigarettes     Start date:      Quit date:      Years since quittin.0    Smokeless tobacco: Never   Substance Use Topics    Alcohol use: Yes     Comment: social    Drug use: No     Review of Systems   Constitutional:  Negative for chills and fever.   HENT:  Negative for ear pain, rhinorrhea and sore throat.    Eyes:  Negative for pain and visual disturbance.   Respiratory:  Negative for cough and shortness of breath.    Cardiovascular:  Positive for chest pain. Negative for palpitations.   Gastrointestinal:  Negative for abdominal pain, constipation, diarrhea, nausea and vomiting.   Genitourinary:  Negative for dysuria, frequency, hematuria and urgency.   Musculoskeletal:  Negative for back pain, joint swelling and myalgias.   Skin:  Negative for rash.   Neurological:  Negative for dizziness, seizures, weakness and headaches.   Psychiatric/Behavioral:  Negative for dysphoric mood. The patient is not nervous/anxious.        Physical Exam     Initial Vitals   BP Pulse Resp Temp SpO2   24 0233 24 0233 24 0233 24 0235 24 0233   131/77 107 20 98.5 °F (36.9 °C) 97 %      MAP       --                Physical Exam    Nursing note and vitals reviewed.  Constitutional: He appears  well-developed and well-nourished.   HENT:   Head: Normocephalic and atraumatic.   Eyes: Conjunctivae, EOM and lids are normal. Pupils are equal, round, and reactive to light.   Neck: Trachea normal. Neck supple. No thyroid mass present.   Cardiovascular:  Normal rate and normal heart sounds.           Tachycardic   Pulmonary/Chest: Breath sounds normal. No respiratory distress.   Abdominal: Abdomen is soft. There is no abdominal tenderness.   Musculoskeletal:         General: Normal range of motion.      Cervical back: Neck supple.     Neurological: He is alert and oriented to person, place, and time. He has normal strength and normal reflexes. No cranial nerve deficit or sensory deficit.   Skin: Skin is warm and dry.   Psychiatric: He has a normal mood and affect. His speech is normal and behavior is normal. Judgment and thought content normal.         ED Course   Procedures  Labs Reviewed   CBC W/ AUTO DIFFERENTIAL - Abnormal; Notable for the following components:       Result Value    RBC 3.72 (*)     Hemoglobin 11.3 (*)     Hematocrit 32.9 (*)     Mono # 1.6 (*)     Lymph % 17.4 (*)     Mono % 16.9 (*)     All other components within normal limits   COMPREHENSIVE METABOLIC PANEL - Abnormal; Notable for the following components:    Sodium 132 (*)     Glucose 111 (*)     Calcium 8.4 (*)     All other components within normal limits   TROPONIN I HIGH SENSITIVITY - Abnormal; Notable for the following components:    Troponin I High Sensitivity 95.3 (*)     All other components within normal limits   B-TYPE NATRIURETIC PEPTIDE - Abnormal; Notable for the following components:     (*)     All other components within normal limits   MAGNESIUM   TROPONIN I HIGH SENSITIVITY          Imaging Results              X-Ray Chest AP Portable (In process)                      Medications   aspirin chewable tablet 324 mg (324 mg Oral Given 1/24/24 0305)     Medical Decision Making  Patient with atypical chest pain chest  pain different than his normal chest pain relieved by nitroglycerin.  Troponin 95.3 here with pacemaker rhythm.  But is chest pain-free will be admitted to hospitalist.  Differential diagnosis of chest pain includes STEMI non-STEMI pericarditis aortic dissection pneumonia pulmonary embolus pneumothorax acute MI gastroenteritis acute cholecystitis pancreatitis among others.    Amount and/or Complexity of Data Reviewed  Labs: ordered.  Radiology: ordered.    Risk  OTC drugs.  Decision regarding hospitalization.              Attending Attestation:         Attending Critical Care:   Critical Care Times:   Direct Patient Care (initial evaluation, reassessments, and time considering the case)................................................................10 minutes.   Additional History from reviewing old medical records or taking additional history from the family, EMS, PCP, etc.......................5 minutes.   Ordering, Reviewing, and Interpreting Diagnostic Studies...............................................................................................................5 minutes.   Documentation..................................................................................................................................................................................10 minutes.   Consultation with other Physicians. .................................................................................................................................................5 minutes.   ==============================================================  Total Critical Care Time - exclusive of procedural time: 35 minutes.  ==============================================================  Critical care was necessary to treat or prevent imminent or life-threatening deterioration of the following conditions: myocardial infarction.   The following critical care procedures were done by me (see procedure notes): pulse oximetry.   Critical  care was time spent personally by me on the following activities: examination of patient, ordering lab, x-rays, and/or EKG, evaluation of patient's response to treatment and discussion with consultants.   Critical Care Condition: critical                                  Clinical Impression:  Final diagnoses:  [R07.9] Chest pain                 Belkys Herr MD  01/24/24 0401       Belkys Herr MD  02/07/24 0790

## 2024-01-24 NOTE — CONSULTS
[facility]   Cardiology Note    Consult Requested By: GAIL  Reason for Consult: chest pain    SUBJECTIVE:     History of Present Illness: The pt is 77 y/o M patient of , [facility] chronic angina, NSTEMI, HLP, HFpEF , pAF , PM, TAVR, bioprosthetic, H/o CABG with redo  Patient has significant CAD with h/o bypass with possible little options for intervention--Last [facility] 12/2023.     The pt started having chest pain yesterday non-exertional , non-radiating, mid sternal chest pain with no associated symptoms, relieved by nitro and morphine. BP stable. Pt trop elevated 95/136/178--currently on heparin infusion, CXR ground glass infiltrates, slight edema noted, bnp 958, pt reports some CHURCH but this is chronic, cr 1.0, no LE edema. Vpaced EKG BP stable.        Review of patient's allergies indicates:   Allergen Reactions    Atorvastatin Other (See Comments)     Muscle pain    Rosuvastatin Other (See Comments)     Muscle pain       Past Medical History:   Diagnosis Date    Anticoagulant long-term use     2014    Aortic valve disease 2014    pig valve    Arthritis     all over    Chest pain 07/15/2019    CHF (congestive heart failure)     2014 on meds    Coronary artery disease     2007 cabg    Heart attack 1990    15 stents    Heart block     Hyperlipidemia     Hypertension     on meds    Hypothyroidism 2015    on meds    PVD (peripheral vascular disease)     Seizures     last episode 1990 on meds     Past Surgical History:   Procedure Laterality Date    AORTOGRAPHY WITH SERIALOGRAPHY N/A 11/16/2021    Procedure: AORTOGRAM, WITH RUNOFF;  Surgeon: Rodrigo Willoughby MD;  Location: [facility] CATH/EP LAB;  Service: Cardiology;  Laterality: N/A;    ARTERIOGRAPHY OF AORTIC ROOT N/A 11/5/2019    Procedure: ARTERIOGRAM, AORTIC ROOT;  Surgeon: Rodrigo Willoughby MD;  Location: [facility] CATH/EP LAB;  Service: Cardiology;  Laterality: N/A;    CARDIAC CATHETERIZATION      CARDIAC VALVE SURGERY      CORONARY ANGIOGRAPHY INCLUDING BYPASS GRAFTS  WITH CATHETERIZATION OF LEFT HEART N/A 11/5/2019    Procedure: ANGIOGRAM, CORONARY, INCLUDING BYPASS GRAFT, WITH LEFT HEART CATHETERIZATION;  Surgeon: Rodrigo Willoughby MD;  Location: [facility] CATH/EP LAB;  Service: Cardiology;  Laterality: N/A;    CORONARY ANGIOGRAPHY INCLUDING BYPASS GRAFTS WITH CATHETERIZATION OF LEFT HEART Left 11/3/2020    Procedure: ANGIOGRAM, CORONARY, INCLUDING BYPASS GRAFT, WITH LEFT HEART CATHETERIZATION;  Surgeon: Rodrigo Willoughby MD;  Location: [facility] CATH/EP LAB;  Service: Cardiology;  Laterality: Left;    CORONARY ANGIOGRAPHY INCLUDING BYPASS GRAFTS WITH CATHETERIZATION OF LEFT HEART N/A 9/28/2021    Procedure: ANGIOGRAM, CORONARY, INCLUDING BYPASS GRAFT, WITH LEFT HEART CATHETERIZATION;  Surgeon: Rodrigo Willoughby MD;  Location: [facility] CATH/EP LAB;  Service: Cardiology;  Laterality: N/A;    CORONARY ANGIOGRAPHY INCLUDING BYPASS GRAFTS WITH CATHETERIZATION OF LEFT HEART N/A 12/5/2023    Procedure: ANGIOGRAM, CORONARY, INCLUDING BYPASS GRAFT, WITH LEFT HEART CATHETERIZATION;  Surgeon: Rodrigo Willoughby MD;  Location: [facility] CATH/EP LAB;  Service: Cardiology;  Laterality: N/A;    CORONARY ANGIOPLASTY      CORONARY ARTERY BYPASS GRAFT      EXTRACORPOREAL SHOCK WAVE LITHOTRIPSY Right 8/1/2019    Procedure: LITHOTRIPSY, ESWL;  Surgeon: Williams Ortega MD;  Location: [facility] OR;  Service: Urology;  Laterality: Right;    HEMORRHOID SURGERY  1990    INSERTION OF PACEMAKER      PERCUTANEOUS TRANSLUMINAL BALLOON ANGIOPLASTY OF CORONARY ARTERY N/A 11/8/2019    Procedure: Angioplasty-coronary;  Surgeon: Rodrigo Willoughby MD;  Location: [facility] CATH/EP LAB;  Service: Cardiology;  Laterality: N/A;    WRIST FRACTURE SURGERY       Family History   Problem Relation Age of Onset    Heart attack Mother     Heart attack Father     Heart disease Sister     Stroke Sister     Diabetes Sister     No Known Problems Maternal Grandmother     No Known Problems Maternal Grandfather     No Known Problems Paternal Grandmother     No Known  Problems Paternal Grandfather     No Known Problems Brother     No Known Problems Maternal Aunt     No Known Problems Maternal Uncle     No Known Problems Paternal Aunt     No Known Problems Paternal Uncle     Anemia Neg Hx     Arrhythmia Neg Hx     Asthma Neg Hx     Clotting disorder Neg Hx     Fainting Neg Hx     Heart failure Neg Hx     Hyperlipidemia Neg Hx     Hypertension Neg Hx     Atrial Septal Defect Neg Hx      Social History     Tobacco Use    Smoking status: Former     Current packs/day: 0.00     Average packs/day: 0.5 packs/day for 4.0 years (2.0 ttl pk-yrs)     Types: Cigarettes     Start date:      Quit date:      Years since quittin.0    Smokeless tobacco: Never   Substance Use Topics    Alcohol use: Yes     Comment: social    Drug use: No       Review of Systems:  Review of Systems   Constitutional:  Negative for chills and fever.   Respiratory:  Positive for shortness of breath.    Cardiovascular:  Positive for chest pain.   Gastrointestinal:  Negative for abdominal pain, heartburn, nausea and vomiting.       OBJECTIVE:     Vital Signs (Most Recent)  Temp: 98.6 °F (37 °C) (24 0737)  Pulse: 84 (24 0856)  Resp: 19 (24 0737)  BP: 123/68 (24 0856)  SpO2: 100 % (24 0642)    Vital Signs Range (Last 24H):  Temp:  [98.5 °F (36.9 °C)-98.6 °F (37 °C)]   Pulse:  []   Resp:  [16-20]   BP: (102-140)/(53-86)   SpO2:  [95 %-100 %]     I & O (Last 24H):  No intake or output data in the 24 hours ending 24 0943    Current Diet:     Current Diet Order   Procedures    Diet NPO Except for: Ice Chips, Sips with Medication     Order Specific Question:   Except for     Answer:   Ice Chips     Order Specific Question:   Except for     Answer:   Sips with Medication        Allergies:  Review of patient's allergies indicates:   Allergen Reactions    Atorvastatin Other (See Comments)     Muscle pain    Rosuvastatin Other (See Comments)     Muscle pain        Meds:  Scheduled Meds:   amLODIPine  5 mg Oral BID    aspirin  81 mg Oral Daily    citalopram  20 mg Oral Daily    clopidogreL  75 mg Oral Daily    ezetimibe  10 mg Oral Daily    furosemide  20 mg Oral Daily    isosorbide mononitrate  30 mg Oral Daily    lisinopriL  40 mg Oral Daily    metoprolol succinate  100 mg Oral BID    morphine  4 mg Intravenous Once    phenytoin  400 mg Oral Daily    ranolazine  1,000 mg Oral BID     Continuous Infusions:   heparin (porcine) in D5W 12 Units/kg/hr (01/24/24 0500)     PRN Meds:acetaminophen, heparin (PORCINE), heparin (PORCINE), lorazepam, melatonin, morphine, morphine, nitroGLYCERIN, ondansetron    Oxygen/Ventilator Data (Last 24H):  (if applicable)            Hemodynamic Parameters (Last 24H):   (if applicable)        Laboratory and Radiology Data:  Recent Results (from the past 24 hour(s))   Magnesium    Collection Time: 01/24/24  2:50 AM   Result Value Ref Range    Magnesium 1.6 1.6 - 2.6 mg/dL   CBC auto differential    Collection Time: 01/24/24  2:50 AM   Result Value Ref Range    WBC 9.21 3.90 - 12.70 K/uL    RBC 3.72 (L) 4.60 - 6.20 M/uL    Hemoglobin 11.3 (L) 14.0 - 18.0 g/dL    Hematocrit 32.9 (L) 40.0 - 54.0 %    MCV 88 82 - 98 fL    MCH 30.4 27.0 - 31.0 pg    MCHC 34.3 32.0 - 36.0 g/dL    RDW 13.5 11.5 - 14.5 %    Platelets 196 150 - 450 K/uL    MPV 10.3 9.2 - 12.9 fL    Immature Granulocytes 0.3 0.0 - 0.5 %    Gran # (ANC) 5.7 1.8 - 7.7 K/uL    Immature Grans (Abs) 0.03 0.00 - 0.04 K/uL    Lymph # 1.6 1.0 - 4.8 K/uL    Mono # 1.6 (H) 0.3 - 1.0 K/uL    Eos # 0.2 0.0 - 0.5 K/uL    Baso # 0.08 0.00 - 0.20 K/uL    nRBC 0 0 /100 WBC    Gran % 61.9 38.0 - 73.0 %    Lymph % 17.4 (L) 18.0 - 48.0 %    Mono % 16.9 (H) 4.0 - 15.0 %    Eosinophil % 2.6 0.0 - 8.0 %    Basophil % 0.9 0.0 - 1.9 %    Differential Method Automated    Comprehensive metabolic panel    Collection Time: 01/24/24  2:50 AM   Result Value Ref Range    Sodium 132 (L) 136 - 145 mmol/L    Potassium  3.9 3.5 - 5.1 mmol/L    Chloride 100 95 - 110 mmol/L    CO2 23 23 - 29 mmol/L    Glucose 111 (H) 70 - 110 mg/dL    BUN 17 8 - 23 mg/dL    Creatinine 1.0 0.5 - 1.4 mg/dL    Calcium 8.4 (L) 8.7 - 10.5 mg/dL    Total Protein 6.7 6.0 - 8.4 g/dL    Albumin 3.6 3.5 - 5.2 g/dL    Total Bilirubin 0.8 0.1 - 1.0 mg/dL    Alkaline Phosphatase 108 55 - 135 U/L    AST 26 10 - 40 U/L    ALT 19 10 - 44 U/L    eGFR >60.0 >60 mL/min/1.73 m^2    Anion Gap 9 8 - 16 mmol/L   Troponin I High Sensitivity #1    Collection Time: 01/24/24  2:50 AM   Result Value Ref Range    Troponin I High Sensitivity 95.3 (HH) 0.0 - 14.9 pg/mL   B-Type natriuretic peptide (BNP)    Collection Time: 01/24/24  2:50 AM   Result Value Ref Range     (H) 0 - 99 pg/mL   Phenytoin Level, Total    Collection Time: 01/24/24  2:50 AM   Result Value Ref Range    Phenytoin Lvl 9.2 (L) 10.0 - 20.0 ug/mL   APTT    Collection Time: 01/24/24  4:53 AM   Result Value Ref Range    aPTT 37.6 (H) 21.0 - 32.0 sec   APTT    Collection Time: 01/24/24  4:53 AM   Result Value Ref Range    aPTT 37.6 (H) 21.0 - 32.0 sec   Protime-INR    Collection Time: 01/24/24  4:53 AM   Result Value Ref Range    Prothrombin Time 11.7 9.0 - 12.5 sec    INR 1.1 0.8 - 1.2   CBC auto differential    Collection Time: 01/24/24  4:53 AM   Result Value Ref Range    WBC 7.38 3.90 - 12.70 K/uL    RBC 3.57 (L) 4.60 - 6.20 M/uL    Hemoglobin 10.7 (L) 14.0 - 18.0 g/dL    Hematocrit 31.6 (L) 40.0 - 54.0 %    MCV 89 82 - 98 fL    MCH 30.0 27.0 - 31.0 pg    MCHC 33.9 32.0 - 36.0 g/dL    RDW 13.4 11.5 - 14.5 %    Platelets 177 150 - 450 K/uL    MPV 10.4 9.2 - 12.9 fL    Immature Granulocytes 0.3 0.0 - 0.5 %    Gran # (ANC) 4.4 1.8 - 7.7 K/uL    Immature Grans (Abs) 0.02 0.00 - 0.04 K/uL    Lymph # 1.4 1.0 - 4.8 K/uL    Mono # 1.3 (H) 0.3 - 1.0 K/uL    Eos # 0.2 0.0 - 0.5 K/uL    Baso # 0.05 0.00 - 0.20 K/uL    nRBC 0 0 /100 WBC    Gran % 59.3 38.0 - 73.0 %    Lymph % 18.8 18.0 - 48.0 %    Mono % 17.9 (H) 4.0  - 15.0 %    Eosinophil % 3.0 0.0 - 8.0 %    Basophil % 0.7 0.0 - 1.9 %    Differential Method Automated    CBC auto differential    Collection Time: 01/24/24  4:53 AM   Result Value Ref Range    WBC 7.38 3.90 - 12.70 K/uL    RBC 3.57 (L) 4.60 - 6.20 M/uL    Hemoglobin 10.7 (L) 14.0 - 18.0 g/dL    Hematocrit 31.6 (L) 40.0 - 54.0 %    MCV 89 82 - 98 fL    MCH 30.0 27.0 - 31.0 pg    MCHC 33.9 32.0 - 36.0 g/dL    RDW 13.4 11.5 - 14.5 %    Platelets 177 150 - 450 K/uL    MPV 10.4 9.2 - 12.9 fL    Immature Granulocytes 0.3 0.0 - 0.5 %    Gran # (ANC) 4.4 1.8 - 7.7 K/uL    Immature Grans (Abs) 0.02 0.00 - 0.04 K/uL    Lymph # 1.4 1.0 - 4.8 K/uL    Mono # 1.3 (H) 0.3 - 1.0 K/uL    Eos # 0.2 0.0 - 0.5 K/uL    Baso # 0.05 0.00 - 0.20 K/uL    nRBC 0 0 /100 WBC    Gran % 59.3 38.0 - 73.0 %    Lymph % 18.8 18.0 - 48.0 %    Mono % 17.9 (H) 4.0 - 15.0 %    Eosinophil % 3.0 0.0 - 8.0 %    Basophil % 0.7 0.0 - 1.9 %    Differential Method Automated    Troponin I High Sensitivity    Collection Time: 01/24/24  4:53 AM   Result Value Ref Range    Troponin I High Sensitivity 136.0 (HH) 0.0 - 14.9 pg/mL   TSH    Collection Time: 01/24/24  4:53 AM   Result Value Ref Range    TSH 2.778 0.340 - 5.600 uIU/mL   Troponin I High Sensitivity    Collection Time: 01/24/24  6:05 AM   Result Value Ref Range    Troponin I High Sensitivity 178.4 (HH) 0.0 - 14.9 pg/mL     Imaging Results              X-Ray Chest AP Portable (Final result)  Result time 01/24/24 08:09:06      Final result by Truman Dawson MD (01/24/24 08:09:06)                   Narrative:    XR CHEST 1 VIEW    CLINICAL HISTORY:  78 years Male Chest Pain    COMPARISON: December 7, 2023    FINDINGS: Cardiac silhouette is enlarged and stable compared to prior. Patient status post median sternotomy and CABG. Pacing leads are in stable position. Patchy groundglass opacities within the mid and upper lung zones, worsened compared to prior. Blunting of both costophrenic angles consistent  with bilateral pleural fluid. No pneumothorax.    IMPRESSION:    Patchy bilateral groundglass opacities which could represent atypical infection or pulmonary edema.    Small bilateral pleural effusions.    Electronically signed by:  Truman Dawson MD  01/24/2024 08:09 AM [facility] Workstation: [id number]                                    12-lead EKG interpretation:  (if applicable)      Current Cardiac Rhythm:   (if applicable)    Physical Exam:   Physical Exam  Cardiovascular:      Rate and Rhythm: Normal rate and regular rhythm.      Heart sounds: Murmur heard.   Pulmonary:      Effort: Pulmonary effort is normal. No respiratory distress.      Breath sounds: Normal breath sounds.   Musculoskeletal:         General: No swelling.   Skin:     General: Skin is warm and dry.   Neurological:      Mental Status: He is alert and oriented to person, place, and time.         ASSESSMENT/PLAN:   Assessment:   CAD  Chronic angina  NSTEMI  HLP  HFpEF  Pafib  PM --Abbot   TAVR bioprosthetic     H/o CABG with redo  Recent LHC one functioning graft      Plan---recent LHC reviewed by  and discussed with   Pt has one functioning graft - unable to intervene at this point  Trop slightly elevated  Chest pain has subsided with nitro and morphine  Pt is on heparin , will continue for 24 hours  Plan to manage medically   Continue ASA/Statin/BB/Ranexa and isosorbide.     Slight congestion noted on xray  IV lasix   I/O  Monitor electrolytes  Thank you for your consult,   Will follow

## 2024-01-24 NOTE — H&P
"  [facility] - Emergency Dept  Hospital Medicine  History & Physical    Patient Name: Jerome Amaro Jr.  MRN: [id number]  Patient Class: IP- Inpatient  Admission Date: 1/24/2024  Attending Physician: Luiz Garrett MD  Primary Care Provider: Oscar Madrid NP         Patient information was obtained from patient, relative(s), past medical records, ER records, and ED MD .     Subjective:     Principal Problem:NSTEMI (non-ST elevated myocardial infarction)    Chief Complaint:   Chief Complaint   Patient presents with    Chest Pain     Biba for chest pain starting 30 minutes pta, pt took nitro at home x2 doses        HPI: 78 year old male with history of CAD (s/p CABG twice), HFrEF (45% recent [facility] --12/2023), AVR (porcine), Heart Block (PPM), HTN, Hypothyroidism, Seizure disorder presented to ED complaining of left arm pain and band like chest heaviness "Across the front of my chest", "Worst pain I've ever had", which was accompanied by SOB. Symptoms lasted 2 hours and had resolved by the time he reached the ED--no further discomfort since. He admits to PND, but no orthopnea or edema. No cough or sputum. He has been "Under a lot of stress" according to a daughter at bedside--he was recently , and is not coping well--actively grieving.     [facility] 12/2023: Summary    Left ventricular ejection fraction was calculated to be 45%. with inferior wall hypokinesis    Diastolic dysfunction.    Patent sequential saphenous vein graft to LAD as well as 1st and 2nd marginal divisions of the circumflex with right coronary artery arm of graft occluded    In ED:  EKG reviewed: paced. CXR reviewed: cardiomegaly with interstitial edema. Labs reviewed: BNP and troponin are elevated; no leukocytosis with mild normocytic anemia; minimal hyponatremia otherwise normal non-fasting CMP.    Discussed with ED MD: Inpatient admission for NSTEMI (Takotsubo??); hold apixaban and start heparin infusion, otherwise " continuing home regimen -- l-thyroxine not on his home meds list; Consult his Cardiologist; prn NTG, morphine for chest discomfort; prn lorazepam for anxiety/grief; TSH with AM labs for review; serial biomarkers; ECHO    Past Medical History:   Diagnosis Date    Anticoagulant long-term use     2014    Aortic valve disease 2014    pig valve    Arthritis     all over    Chest pain 07/15/2019    CHF (congestive heart failure)     2014 on meds    Coronary artery disease     2007 cabg    Heart attack 1990    15 stents    Heart block     Hyperlipidemia     Hypertension     on meds    Hypothyroidism 2015    on meds    PVD (peripheral vascular disease)     Seizures     last episode 1990 on meds       Past Surgical History:   Procedure Laterality Date    AORTOGRAPHY WITH SERIALOGRAPHY N/A 11/16/2021    Procedure: AORTOGRAM, WITH RUNOFF;  Surgeon: Rodrigo Willoughby MD;  Location: [facility] CATH/EP LAB;  Service: Cardiology;  Laterality: N/A;    ARTERIOGRAPHY OF AORTIC ROOT N/A 11/5/2019    Procedure: ARTERIOGRAM, AORTIC ROOT;  Surgeon: Rodrigo Willoughby MD;  Location: [facility] CATH/EP LAB;  Service: Cardiology;  Laterality: N/A;    CARDIAC CATHETERIZATION      CARDIAC VALVE SURGERY      CORONARY ANGIOGRAPHY INCLUDING BYPASS GRAFTS WITH CATHETERIZATION OF LEFT HEART N/A 11/5/2019    Procedure: ANGIOGRAM, CORONARY, INCLUDING BYPASS GRAFT, WITH LEFT HEART CATHETERIZATION;  Surgeon: Rodrigo Willoughby MD;  Location: [facility] CATH/EP LAB;  Service: Cardiology;  Laterality: N/A;    CORONARY ANGIOGRAPHY INCLUDING BYPASS GRAFTS WITH CATHETERIZATION OF LEFT HEART Left 11/3/2020    Procedure: ANGIOGRAM, CORONARY, INCLUDING BYPASS GRAFT, WITH LEFT HEART CATHETERIZATION;  Surgeon: Rodrigo Willoughby MD;  Location: [facility] CATH/EP LAB;  Service: Cardiology;  Laterality: Left;    CORONARY ANGIOGRAPHY INCLUDING BYPASS GRAFTS WITH CATHETERIZATION OF LEFT HEART N/A 9/28/2021    Procedure: ANGIOGRAM, CORONARY, INCLUDING BYPASS GRAFT, WITH LEFT HEART CATHETERIZATION;   Surgeon: Rodrigo Willoughby MD;  Location: [facility] CATH/EP LAB;  Service: Cardiology;  Laterality: N/A;    CORONARY ANGIOGRAPHY INCLUDING BYPASS GRAFTS WITH CATHETERIZATION OF LEFT HEART N/A 12/5/2023    Procedure: ANGIOGRAM, CORONARY, INCLUDING BYPASS GRAFT, WITH LEFT HEART CATHETERIZATION;  Surgeon: Rodrigo Willoughby MD;  Location: [facility] CATH/EP LAB;  Service: Cardiology;  Laterality: N/A;    CORONARY ANGIOPLASTY      CORONARY ARTERY BYPASS GRAFT      EXTRACORPOREAL SHOCK WAVE LITHOTRIPSY Right 8/1/2019    Procedure: LITHOTRIPSY, ESWL;  Surgeon: Williams Ortega MD;  Location: [facility] OR;  Service: Urology;  Laterality: Right;    HEMORRHOID SURGERY  1990    INSERTION OF PACEMAKER      PERCUTANEOUS TRANSLUMINAL BALLOON ANGIOPLASTY OF CORONARY ARTERY N/A 11/8/2019    Procedure: Angioplasty-coronary;  Surgeon: Rodrigo Willoughby MD;  Location: [facility] CATH/EP LAB;  Service: Cardiology;  Laterality: N/A;    WRIST FRACTURE SURGERY         Review of patient's allergies indicates:   Allergen Reactions    Atorvastatin Other (See Comments)     Muscle pain    Rosuvastatin Other (See Comments)     Muscle pain       Current Facility-Administered Medications on File Prior to Encounter   Medication    magnesium oxide tablet 800 mg    sodium chloride 0.9% flush 10 mL     Current Outpatient Medications on File Prior to Encounter   Medication Sig    amLODIPine (NORVASC) 5 MG tablet Take 5 mg by mouth 2 (two) times daily.    aspirin (ECOTRIN) 81 MG EC tablet Take 1 tablet (81 mg total) by mouth once daily.    citalopram (CELEXA) 20 MG tablet Take 20 mg by mouth once daily.    clopidogrel (PLAVIX) 75 mg tablet Take 75 mg by mouth once daily.    diclofenac sodium (PENNSAID) 2 % SoPk Apply 1 packet topically once daily.    ezetimibe (ZETIA) 10 mg tablet Take 10 mg by mouth once daily.    furosemide (LASIX) 20 MG tablet Take 20 mg by mouth once daily.    hydrOXYzine pamoate (VISTARIL) 25 MG Cap Take 1 capsule (25 mg total) by mouth daily as needed  (anxiety).    isosorbide mononitrate (IMDUR) 30 MG 24 hr tablet Take 1 tablet (30 mg total) by mouth once daily.    ivabradine (CORLANOR) 5 mg Tab Take 1 tablet by mouth 2 (two) times a day.    lisinopriL (PRINIVIL,ZESTRIL) 40 MG tablet Take 40 mg by mouth once daily.    metoprolol succinate (TOPROL-XL) 50 MG 24 hr tablet Take 100 mg by mouth 2 (two) times daily.    mupirocin (BACTROBAN) 2 % ointment Apply topically 3 (three) times daily. (Patient taking differently: Apply 1 g topically 3 (three) times daily.)    nitroGLYCERIN (NITROSTAT) 0.4 MG SL tablet Place 0.4 mg under the tongue every 5 (five) minutes as needed for Chest pain.    NITROLINGUAL 400 mcg/spray spray Place 1 spray under the tongue every 5 (five) minutes as needed.    phenytoin (DILANTIN) 100 MG ER capsule Take 400 mg by mouth once daily.     ranolazine (RANEXA) 1,000 mg Tb12 Take 1,000 mg by mouth 2 (two) times daily.      Family History       Problem Relation (Age of Onset)    Diabetes Sister    Heart attack Mother, Father    Heart disease Sister    No Known Problems Maternal Grandmother, Maternal Grandfather, Paternal Grandmother, Paternal Grandfather, Brother, Maternal Aunt, Maternal Uncle, Paternal Aunt, Paternal Uncle    Stroke Sister          Tobacco Use    Smoking status: Former     Current packs/day: 0.00     Average packs/day: 0.5 packs/day for 4.0 years (2.0 ttl pk-yrs)     Types: Cigarettes     Start date:      Quit date:      Years since quittin.0    Smokeless tobacco: Never   Substance and Sexual Activity    Alcohol use: Yes     Comment: social    Drug use: No    Sexual activity: Not Currently     Review of Systems   Constitutional:  Positive for fatigue.   HENT: Negative.     Eyes: Negative.    Respiratory:  Positive for chest tightness and shortness of breath.    Cardiovascular:  Positive for chest pain.   Gastrointestinal: Negative.    Endocrine: Negative.    Genitourinary: Negative.    Musculoskeletal: Negative.     Skin: Negative.    Allergic/Immunologic: Negative.    Neurological: Negative.    Hematological: Negative.    Psychiatric/Behavioral:          Grief   All other systems reviewed and are negative.    Objective:     Vital Signs (Most Recent):  Temp: 98.5 °F (36.9 °C) (01/24/24 0235)  Pulse: 96 (01/24/24 0400)  Resp: 16 (01/24/24 0400)  BP: (!) 140/86 (01/24/24 0400)  SpO2: 97 % (01/24/24 0400) Vital Signs (24h Range):  Temp:  [98.5 °F (36.9 °C)] 98.5 °F (36.9 °C)  Pulse:  [] 96  Resp:  [16-20] 16  SpO2:  [95 %-97 %] 97 %  BP: (102-140)/(53-86) 140/86     Weight: 77.1 kg (170 lb)  Body mass index is 26.63 kg/m².     Physical Exam  Vitals and nursing note reviewed.   Constitutional:       Appearance: He is well-developed.      Comments: Appears dysphoric   HENT:      Head: Normocephalic and atraumatic.      Right Ear: External ear normal.      Left Ear: External ear normal.      Nose: Nose normal.   Eyes:      Conjunctiva/sclera: Conjunctivae normal.      Pupils: Pupils are equal, round, and reactive to light.   Cardiovascular:      Rate and Rhythm: Normal rate and regular rhythm.      Heart sounds: Murmur heard.   Pulmonary:      Effort: Pulmonary effort is normal.      Breath sounds: Normal breath sounds.      Comments: Decreased entry without adventitious  Abdominal:      General: Bowel sounds are normal.      Palpations: Abdomen is soft.   Musculoskeletal:         General: Normal range of motion.      Cervical back: Normal range of motion and neck supple.   Skin:     General: Skin is warm and dry.      Capillary Refill: Capillary refill takes less than 2 seconds.   Neurological:      Mental Status: He is alert and oriented to person, place, and time.   Psychiatric:         Behavior: Behavior normal.         Thought Content: Thought content normal.         Judgment: Judgment normal.              CRANIAL NERVES     CN III, IV, VI   Pupils are equal, round, and reactive to light.       Significant Labs: All  pertinent labs within the past 24 hours have been reviewed.  CBC:   Recent Labs   Lab 01/24/24 0250   WBC 9.21   HGB 11.3*   HCT 32.9*        CMP:   Recent Labs   Lab 01/24/24 0250   *   K 3.9      CO2 23   *   BUN 17   CREATININE 1.0   CALCIUM 8.4*   PROT 6.7   ALBUMIN 3.6   BILITOT 0.8   ALKPHOS 108   AST 26   ALT 19   ANIONGAP 9     Cardiac Markers:   Recent Labs   Lab 01/24/24 0250   *     Troponin:   Recent Labs   Lab 01/24/24 0250   TROPONINIHS 95.3*       Significant Imaging: I have reviewed all pertinent imaging results/findings within the past 24 hours.  Assessment/Plan:     * NSTEMI (non-ST elevated myocardial infarction)  Inpatient admission for NSTEMI (Takotsubo??); hold apixaban and start heparin infusion, otherwise continuing home regimen -- l-thyroxine not on his home meds list; Consult his Cardiologist; prn NTG, morphine for chest discomfort; prn lorazepam for anxiety/grief; TSH with AM labs for review; serial biomarkers; ECHO      Grief  Inpatient admission for NSTEMI (Takotsubo??); hold apixaban and start heparin infusion, otherwise continuing home regimen -- l-thyroxine not on his home meds list; Consult his Cardiologist; prn NTG, morphine for chest discomfort; prn lorazepam for anxiety/grief; TSH with AM labs for review; serial biomarkers; ECHO        VTE Risk Mitigation (From admission, onward)           Ordered     heparin 25,000 units in dextrose 5% (100 units/ml) IV bolus from bag - ADDITIONAL PRN BOLUS - 60 units/kg (max bolus 4000 units)  As needed (PRN)        Question:  Heparin Infusion Adjustment (DO NOT MODIFY ANSWER)  Answer:  [email or web address]\epic\Images\Pharmacy\HeparinInfusions\heparin LOW INTENSITY nomogram for [facility] [id number].pdf    01/24/24 0429     heparin 25,000 units in dextrose 5% (100 units/ml) IV bolus from bag - ADDITIONAL PRN BOLUS - 30 units/kg (max bolus 4000 units)  As needed (PRN)        Question:  Heparin Infusion Adjustment (DO NOT  MODIFY ANSWER)  Answer:  \\[email or web address]\epic\Images\Pharmacy\HeparinInfusions\heparin LOW INTENSITY nomogram for [facility] [id number].pdf    01/24/24 0429     IP VTE HIGH RISK PATIENT  Once         01/24/24 0429     Place sequential compression device  Until discontinued         01/24/24 0429     Reason for No Pharmacological VTE Prophylaxis  Once        Question:  Reasons:  Answer:  IV Heparin w/in 24 hrs. Pre or Post-Op    01/24/24 0429     heparin 25,000 units in dextrose 5% 250 mL (100 units/mL) infusion LOW INTENSITY nomogram - [facility]  Continuous        Question:  Begin at (units/kg/hr)  Answer:  12    01/24/24 0429                                    Luiz Garrett MD  Department of Hospital Medicine  [facility] - Emergency Dept

## 2024-01-24 NOTE — CARE UPDATE
Patient seen on rounds, still in ER.  No chest pain currently.  Agree with admitting physician assessment and plan          NSTEMI (non-ST elevated myocardial infarction)  Inpatient admission for NSTEMI   hold apixaban and start heparin infusion otherwise continuing home regimen    Consult cardiology, know to Dr. Willoughby  prn NTG, morphine for chest discomfort  prn lorazepam for anxiety/grief;   TSH with AM labs for review;   Trend trop up to 400, continue to trend  ECHO shows decrease EF 40%, new from last echo one month ago     per cardiology    -recent [facility] reviewed by  and discussed with   Pt has one functioning graft - unable to intervene at this point  Trop slightly elevated  Chest pain has subsided with nitro and morphine  Pt is on heparin , will continue for 24 hours  Plan to manage medically   Continue ASA/Statin/BB/Ranexa and isosorbide.     Slight congestion noted on xray  IV lasix

## 2024-01-24 NOTE — PHARMACY MED REC
"Admission Medication History     The home medication history was taken by Jd Cottrell.    You may go to "Admission" then "Reconcile Home Medications" tabs to review and/or act upon these items.     The home medication list has been updated by the Pharmacy department.   Please read ALL comments highlighted in yellow.   Please address this information as you see fit.    Feel free to contact us if you have any questions or require assistance.      The medications listed below were removed from the home medication list. Please reorder if appropriate:  Patient reports no longer taking the following medication(s):  Pennsaid packet  Corlanor 5 mg  Bactroban Oint    Medications listed below were obtained from: Patient/family and Analytic software- [employer]  Current Facility-Administered Medications on File Prior to Encounter   Medication Dose Route Frequency Provider Last Rate Last Admin    magnesium oxide tablet 800 mg  800 mg Oral PRN Rodrigo Willoughby MD   800 mg at 11/16/21 0737    sodium chloride 0.9% flush 10 mL  10 mL Intravenous PRN Rodrigo Willoughby MD         Current Outpatient Medications on File Prior to Encounter   Medication Sig Dispense Refill    amLODIPine (NORVASC) 5 MG tablet Take 5 mg by mouth 2 (two) times daily.      aspirin (ECOTRIN) 81 MG EC tablet Take 1 tablet (81 mg total) by mouth once daily. 30 tablet 11    citalopram (CELEXA) 20 MG tablet Take 20 mg by mouth once daily.      clopidogrel (PLAVIX) 75 mg tablet Take 75 mg by mouth once daily.      ELIQUIS 5 mg Tab Take 5 mg by mouth every morning.      ezetimibe (ZETIA) 10 mg tablet Take 10 mg by mouth once daily.      famotidine (PEPCID) 40 MG tablet Take 40 mg by mouth Daily.      furosemide (LASIX) 20 MG tablet Take 20 mg by mouth daily as needed (Fluid).      hydrOXYzine pamoate (VISTARIL) 25 MG Cap Take 1 capsule (25 mg total) by mouth daily as needed (anxiety). (Patient taking differently: Take 25 mg by mouth Daily.)      isosorbide mononitrate " (IMDUR) 30 MG 24 hr tablet Take 1 tablet (30 mg total) by mouth once daily. 30 tablet 11    lisinopriL (PRINIVIL,ZESTRIL) 40 MG tablet Take 40 mg by mouth once daily.      metoprolol succinate (TOPROL-XL) 50 MG 24 hr tablet Take 100 mg by mouth 2 (two) times daily.      NITROLINGUAL 400 mcg/spray spray Place 1 spray under the tongue every 5 (five) minutes as needed for Chest pain.      phenytoin (DILANTIN) 100 MG ER capsule Take 400 mg by mouth once daily.   0    ranolazine (RANEXA) 1,000 mg Tb12 Take 1,000 mg by mouth 2 (two) times daily.       [DISCONTINUED] diclofenac sodium (PENNSAID) 2 % SoPk Apply 1 packet topically once daily.      [DISCONTINUED] ivabradine (CORLANOR) 5 mg Tab Take 1 tablet by mouth 2 (two) times a day.      [DISCONTINUED] mupirocin (BACTROBAN) 2 % ointment Apply topically 3 (three) times daily. (Patient taking differently: Apply 1 g topically 3 (three) times daily.) 22 g 0    [DISCONTINUED] nitroGLYCERIN (NITROSTAT) 0.4 MG SL tablet Place 0.4 mg under the tongue every 5 (five) minutes as needed for Chest pain.           Jd Cottrell  EXT 1924                .

## 2024-01-24 NOTE — ASSESSMENT & PLAN NOTE
Inpatient admission for NSTEMI (Takotsubo??); hold apixaban and start heparin infusion, otherwise continuing home regimen -- l-thyroxine not on his home meds list; Consult his Cardiologist; prn NTG, morphine for chest discomfort; prn lorazepam for anxiety/grief; TSH with AM labs for review; serial biomarkers; ECHO

## 2024-01-24 NOTE — PLAN OF CARE
[facility] - Emergency Dept  Initial Discharge Assessment       Primary Care Provider: Oscar Madrid NP    Admission Diagnosis: NSTEMI (non-ST elevation myocardial infarction) [I21.4]    Admission Date: 1/24/2024  Expected Discharge Date:    met with Pt at bedside to complete discharge assessment. Pt AAOx4s. Demographics, PCP, and insurance verified. No home health. No dialysis. Pt reports ability to complete ADLs without assistance. Pt verbalized plan to discharge home via family transport. Pt has no other needs to be addressed at this time.    Transition of Care Barriers: None    Payor: [employer] MEDICARE / Plan: [employer] MEDICARE HMO / Product Type: Capitation /     Extended Emergency Contact Information  Primary Emergency Contact: Schwab,Cynthia  Mobile Phone: [phone]  Relation: Daughter  Preferred language: English   needed? No  Secondary Emergency Contact: hoa rhodes  Mobile Phone: [phone]  Relation: Daughter   needed? No    Discharge Plan A: Home  Discharge Plan B: Home      [id number] [place] - [address]  [address] [address]  Phone: [phone] Fax: [phone]      Initial Assessment (most recent)       Adult Discharge Assessment - 01/24/24 1113          Discharge Assessment    Assessment Type Discharge Planning Assessment     Confirmed/corrected address, phone number and insurance Yes     Confirmed Demographics Correct on Facesheet     Source of Information patient     Does patient/caregiver understand observation status Yes     Communicated AXEL with patient/caregiver Date not available/Unable to determine     Reason For Admission NSTEMI (non-ST elevated myocardial infarction)     People in Home alone     Do you expect to return to your current living situation? Yes     Prior to hospitilization cognitive status: Alert/Oriented     Current cognitive status: Alert/Oriented     Walking or  Climbing Stairs Difficulty no     Dressing/Bathing Difficulty no     Home Layout Able to live on 1st floor     Equipment Currently Used at Home walker, rolling     Readmission within 30 days? No     Patient currently being followed by outpatient case management? No     Do you currently have service(s) that help you manage your care at home? No     Do you take prescription medications? Yes     Do you have prescription coverage? Yes     Coverage Payor: [employer] MANAGED MEDICARE - [employer] MEDICARE HMO -     Do you have any problems affording any of your prescribed medications? No     Is the patient taking medications as prescribed? yes     Who is going to help you get home at discharge? Schwab,Cynthia (Daughter) [phone] (     How do you get to doctors appointments? family or friend will provide     Are you on dialysis? No     Do you take coumadin? No     Discharge Plan A Home     Discharge Plan B Home     DME Needed Upon Discharge  none     Discharge Plan discussed with: Patient     Transition of Care Barriers None

## 2024-01-24 NOTE — SUBJECTIVE & OBJECTIVE
Past Medical History:   Diagnosis Date    Anticoagulant long-term use     2014    Aortic valve disease 2014    pig valve    Arthritis     all over    Chest pain 07/15/2019    CHF (congestive heart failure)     2014 on meds    Coronary artery disease     2007 cabg    Heart attack 1990    15 stents    Heart block     Hyperlipidemia     Hypertension     on meds    Hypothyroidism 2015    on meds    PVD (peripheral vascular disease)     Seizures     last episode 1990 on meds       Past Surgical History:   Procedure Laterality Date    AORTOGRAPHY WITH SERIALOGRAPHY N/A 11/16/2021    Procedure: AORTOGRAM, WITH RUNOFF;  Surgeon: Rodrigo Willoughby MD;  Location: [facility] CATH/EP LAB;  Service: Cardiology;  Laterality: N/A;    ARTERIOGRAPHY OF AORTIC ROOT N/A 11/5/2019    Procedure: ARTERIOGRAM, AORTIC ROOT;  Surgeon: Rodrigo Willoughby MD;  Location: [facility] CATH/EP LAB;  Service: Cardiology;  Laterality: N/A;    CARDIAC CATHETERIZATION      CARDIAC VALVE SURGERY      CORONARY ANGIOGRAPHY INCLUDING BYPASS GRAFTS WITH CATHETERIZATION OF LEFT HEART N/A 11/5/2019    Procedure: ANGIOGRAM, CORONARY, INCLUDING BYPASS GRAFT, WITH LEFT HEART CATHETERIZATION;  Surgeon: Rodrigo Willoughby MD;  Location: [facility] CATH/EP LAB;  Service: Cardiology;  Laterality: N/A;    CORONARY ANGIOGRAPHY INCLUDING BYPASS GRAFTS WITH CATHETERIZATION OF LEFT HEART Left 11/3/2020    Procedure: ANGIOGRAM, CORONARY, INCLUDING BYPASS GRAFT, WITH LEFT HEART CATHETERIZATION;  Surgeon: Rodrigo Willoughby MD;  Location: [facility] CATH/EP LAB;  Service: Cardiology;  Laterality: Left;    CORONARY ANGIOGRAPHY INCLUDING BYPASS GRAFTS WITH CATHETERIZATION OF LEFT HEART N/A 9/28/2021    Procedure: ANGIOGRAM, CORONARY, INCLUDING BYPASS GRAFT, WITH LEFT HEART CATHETERIZATION;  Surgeon: Rodrigo Willoughby MD;  Location: [facility] CATH/EP LAB;  Service: Cardiology;  Laterality: N/A;    CORONARY ANGIOGRAPHY INCLUDING BYPASS GRAFTS WITH CATHETERIZATION OF LEFT HEART N/A 12/5/2023    Procedure: ANGIOGRAM,  CORONARY, INCLUDING BYPASS GRAFT, WITH LEFT HEART CATHETERIZATION;  Surgeon: Rodrigo Willoughby MD;  Location: [facility] CATH/EP LAB;  Service: Cardiology;  Laterality: N/A;    CORONARY ANGIOPLASTY      CORONARY ARTERY BYPASS GRAFT      EXTRACORPOREAL SHOCK WAVE LITHOTRIPSY Right 8/1/2019    Procedure: LITHOTRIPSY, ESWL;  Surgeon: Williams Ortega MD;  Location: [facility] OR;  Service: Urology;  Laterality: Right;    HEMORRHOID SURGERY  1990    INSERTION OF PACEMAKER      PERCUTANEOUS TRANSLUMINAL BALLOON ANGIOPLASTY OF CORONARY ARTERY N/A 11/8/2019    Procedure: Angioplasty-coronary;  Surgeon: Rodrigo Willoughby MD;  Location: [facility] CATH/EP LAB;  Service: Cardiology;  Laterality: N/A;    WRIST FRACTURE SURGERY         Review of patient's allergies indicates:   Allergen Reactions    Atorvastatin Other (See Comments)     Muscle pain    Rosuvastatin Other (See Comments)     Muscle pain       Current Facility-Administered Medications on File Prior to Encounter   Medication    magnesium oxide tablet 800 mg    sodium chloride 0.9% flush 10 mL     Current Outpatient Medications on File Prior to Encounter   Medication Sig    amLODIPine (NORVASC) 5 MG tablet Take 5 mg by mouth 2 (two) times daily.    aspirin (ECOTRIN) 81 MG EC tablet Take 1 tablet (81 mg total) by mouth once daily.    citalopram (CELEXA) 20 MG tablet Take 20 mg by mouth once daily.    clopidogrel (PLAVIX) 75 mg tablet Take 75 mg by mouth once daily.    diclofenac sodium (PENNSAID) 2 % SoPk Apply 1 packet topically once daily.    ezetimibe (ZETIA) 10 mg tablet Take 10 mg by mouth once daily.    furosemide (LASIX) 20 MG tablet Take 20 mg by mouth once daily.    hydrOXYzine pamoate (VISTARIL) 25 MG Cap Take 1 capsule (25 mg total) by mouth daily as needed (anxiety).    isosorbide mononitrate (IMDUR) 30 MG 24 hr tablet Take 1 tablet (30 mg total) by mouth once daily.    ivabradine (CORLANOR) 5 mg Tab Take 1 tablet by mouth 2 (two) times a day.    lisinopriL (PRINIVIL,ZESTRIL)  40 MG tablet Take 40 mg by mouth once daily.    metoprolol succinate (TOPROL-XL) 50 MG 24 hr tablet Take 100 mg by mouth 2 (two) times daily.    mupirocin (BACTROBAN) 2 % ointment Apply topically 3 (three) times daily. (Patient taking differently: Apply 1 g topically 3 (three) times daily.)    nitroGLYCERIN (NITROSTAT) 0.4 MG SL tablet Place 0.4 mg under the tongue every 5 (five) minutes as needed for Chest pain.    NITROLINGUAL 400 mcg/spray spray Place 1 spray under the tongue every 5 (five) minutes as needed.    phenytoin (DILANTIN) 100 MG ER capsule Take 400 mg by mouth once daily.     ranolazine (RANEXA) 1,000 mg Tb12 Take 1,000 mg by mouth 2 (two) times daily.      Family History       Problem Relation (Age of Onset)    Diabetes Sister    Heart attack Mother, Father    Heart disease Sister    No Known Problems Maternal Grandmother, Maternal Grandfather, Paternal Grandmother, Paternal Grandfather, Brother, Maternal Aunt, Maternal Uncle, Paternal Aunt, Paternal Uncle    Stroke Sister          Tobacco Use    Smoking status: Former     Current packs/day: 0.00     Average packs/day: 0.5 packs/day for 4.0 years (2.0 ttl pk-yrs)     Types: Cigarettes     Start date:      Quit date:      Years since quittin.0    Smokeless tobacco: Never   Substance and Sexual Activity    Alcohol use: Yes     Comment: social    Drug use: No    Sexual activity: Not Currently     Review of Systems   Constitutional:  Positive for fatigue.   HENT: Negative.     Eyes: Negative.    Respiratory:  Positive for chest tightness and shortness of breath.    Cardiovascular:  Positive for chest pain.   Gastrointestinal: Negative.    Endocrine: Negative.    Genitourinary: Negative.    Musculoskeletal: Negative.    Skin: Negative.    Allergic/Immunologic: Negative.    Neurological: Negative.    Hematological: Negative.    Psychiatric/Behavioral:          Grief   All other systems reviewed and are negative.    Objective:     Vital Signs  (Most Recent):  Temp: 98.5 °F (36.9 °C) (01/24/24 0235)  Pulse: 96 (01/24/24 0400)  Resp: 16 (01/24/24 0400)  BP: (!) 140/86 (01/24/24 0400)  SpO2: 97 % (01/24/24 0400) Vital Signs (24h Range):  Temp:  [98.5 °F (36.9 °C)] 98.5 °F (36.9 °C)  Pulse:  [] 96  Resp:  [16-20] 16  SpO2:  [95 %-97 %] 97 %  BP: (102-140)/(53-86) 140/86     Weight: 77.1 kg (170 lb)  Body mass index is 26.63 kg/m².     Physical Exam  Vitals and nursing note reviewed.   Constitutional:       Appearance: He is well-developed.      Comments: Appears dysphoric   HENT:      Head: Normocephalic and atraumatic.      Right Ear: External ear normal.      Left Ear: External ear normal.      Nose: Nose normal.   Eyes:      Conjunctiva/sclera: Conjunctivae normal.      Pupils: Pupils are equal, round, and reactive to light.   Cardiovascular:      Rate and Rhythm: Normal rate and regular rhythm.      Heart sounds: Murmur heard.   Pulmonary:      Effort: Pulmonary effort is normal.      Breath sounds: Normal breath sounds.      Comments: Decreased entry without adventitious  Abdominal:      General: Bowel sounds are normal.      Palpations: Abdomen is soft.   Musculoskeletal:         General: Normal range of motion.      Cervical back: Normal range of motion and neck supple.   Skin:     General: Skin is warm and dry.      Capillary Refill: Capillary refill takes less than 2 seconds.   Neurological:      Mental Status: He is alert and oriented to person, place, and time.   Psychiatric:         Behavior: Behavior normal.         Thought Content: Thought content normal.         Judgment: Judgment normal.              CRANIAL NERVES     CN III, IV, VI   Pupils are equal, round, and reactive to light.       Significant Labs: All pertinent labs within the past 24 hours have been reviewed.  CBC:   Recent Labs   Lab 01/24/24 0250   WBC 9.21   HGB 11.3*   HCT 32.9*        CMP:   Recent Labs   Lab 01/24/24 0250   *   K 3.9      CO2 23   GLU  111*   BUN 17   CREATININE 1.0   CALCIUM 8.4*   PROT 6.7   ALBUMIN 3.6   BILITOT 0.8   ALKPHOS 108   AST 26   ALT 19   ANIONGAP 9     Cardiac Markers:   Recent Labs   Lab 01/24/24  0250   *     Troponin:   Recent Labs   Lab 01/24/24  0250   TROPONINIHS 95.3*       Significant Imaging: I have reviewed all pertinent imaging results/findings within the past 24 hours.

## 2024-01-25 LAB
ANION GAP SERPL CALC-SCNC: 11 MMOL/L (ref 8–16)
APTT PPP: 49.6 SEC (ref 21–32)
BASOPHILS # BLD AUTO: 0.05 K/UL (ref 0–0.2)
BASOPHILS # BLD AUTO: 0.05 K/UL (ref 0–0.2)
BASOPHILS NFR BLD: 0.7 % (ref 0–1.9)
BASOPHILS NFR BLD: 0.7 % (ref 0–1.9)
BUN SERPL-MCNC: 17 MG/DL (ref 8–23)
CALCIUM SERPL-MCNC: 9 MG/DL (ref 8.7–10.5)
CHLORIDE SERPL-SCNC: 97 MMOL/L (ref 95–110)
CO2 SERPL-SCNC: 25 MMOL/L (ref 23–29)
CREAT SERPL-MCNC: 1 MG/DL (ref 0.5–1.4)
DIFFERENTIAL METHOD BLD: ABNORMAL
DIFFERENTIAL METHOD BLD: ABNORMAL
EOSINOPHIL # BLD AUTO: 0.3 K/UL (ref 0–0.5)
EOSINOPHIL # BLD AUTO: 0.3 K/UL (ref 0–0.5)
EOSINOPHIL NFR BLD: 4.1 % (ref 0–8)
EOSINOPHIL NFR BLD: 4.1 % (ref 0–8)
ERYTHROCYTE [DISTWIDTH] IN BLOOD BY AUTOMATED COUNT: 13.2 % (ref 11.5–14.5)
ERYTHROCYTE [DISTWIDTH] IN BLOOD BY AUTOMATED COUNT: 13.2 % (ref 11.5–14.5)
EST. GFR  (NO RACE VARIABLE): >60 ML/MIN/1.73 M^2
GLUCOSE SERPL-MCNC: 117 MG/DL (ref 70–110)
GLUCOSE SERPL-MCNC: 94 MG/DL (ref 70–110)
HCT VFR BLD AUTO: 34.6 % (ref 40–54)
HCT VFR BLD AUTO: 34.6 % (ref 40–54)
HGB BLD-MCNC: 11.5 G/DL (ref 14–18)
HGB BLD-MCNC: 11.5 G/DL (ref 14–18)
IMM GRANULOCYTES # BLD AUTO: 0.02 K/UL (ref 0–0.04)
IMM GRANULOCYTES # BLD AUTO: 0.02 K/UL (ref 0–0.04)
IMM GRANULOCYTES NFR BLD AUTO: 0.3 % (ref 0–0.5)
IMM GRANULOCYTES NFR BLD AUTO: 0.3 % (ref 0–0.5)
LYMPHOCYTES # BLD AUTO: 1.2 K/UL (ref 1–4.8)
LYMPHOCYTES # BLD AUTO: 1.2 K/UL (ref 1–4.8)
LYMPHOCYTES NFR BLD: 17.6 % (ref 18–48)
LYMPHOCYTES NFR BLD: 17.6 % (ref 18–48)
MCH RBC QN AUTO: 29.3 PG (ref 27–31)
MCH RBC QN AUTO: 29.3 PG (ref 27–31)
MCHC RBC AUTO-ENTMCNC: 33.2 G/DL (ref 32–36)
MCHC RBC AUTO-ENTMCNC: 33.2 G/DL (ref 32–36)
MCV RBC AUTO: 88 FL (ref 82–98)
MCV RBC AUTO: 88 FL (ref 82–98)
MONOCYTES # BLD AUTO: 1.1 K/UL (ref 0.3–1)
MONOCYTES # BLD AUTO: 1.1 K/UL (ref 0.3–1)
MONOCYTES NFR BLD: 15.9 % (ref 4–15)
MONOCYTES NFR BLD: 15.9 % (ref 4–15)
NEUTROPHILS # BLD AUTO: 4.2 K/UL (ref 1.8–7.7)
NEUTROPHILS # BLD AUTO: 4.2 K/UL (ref 1.8–7.7)
NEUTROPHILS NFR BLD: 61.4 % (ref 38–73)
NEUTROPHILS NFR BLD: 61.4 % (ref 38–73)
NRBC BLD-RTO: 0 /100 WBC
NRBC BLD-RTO: 0 /100 WBC
PLATELET # BLD AUTO: 196 K/UL (ref 150–450)
PLATELET # BLD AUTO: 196 K/UL (ref 150–450)
PMV BLD AUTO: 10.8 FL (ref 9.2–12.9)
PMV BLD AUTO: 10.8 FL (ref 9.2–12.9)
POTASSIUM SERPL-SCNC: 4 MMOL/L (ref 3.5–5.1)
RBC # BLD AUTO: 3.92 M/UL (ref 4.6–6.2)
RBC # BLD AUTO: 3.92 M/UL (ref 4.6–6.2)
SODIUM SERPL-SCNC: 133 MMOL/L (ref 136–145)
WBC # BLD AUTO: 6.81 K/UL (ref 3.9–12.7)
WBC # BLD AUTO: 6.81 K/UL (ref 3.9–12.7)

## 2024-01-25 PROCEDURE — 94760 N-INVAS EAR/PLS OXIMETRY 1: CPT

## 2024-01-25 PROCEDURE — 80048 BASIC METABOLIC PNL TOTAL CA: CPT | Performed by: INTERNAL MEDICINE

## 2024-01-25 PROCEDURE — 85025 COMPLETE CBC W/AUTO DIFF WBC: CPT | Performed by: INTERNAL MEDICINE

## 2024-01-25 PROCEDURE — 36415 COLL VENOUS BLD VENIPUNCTURE: CPT | Performed by: INTERNAL MEDICINE

## 2024-01-25 PROCEDURE — 25000003 PHARM REV CODE 250: Performed by: INTERNAL MEDICINE

## 2024-01-25 PROCEDURE — 85730 THROMBOPLASTIN TIME PARTIAL: CPT | Performed by: INTERNAL MEDICINE

## 2024-01-25 PROCEDURE — 25000003 PHARM REV CODE 250

## 2024-01-25 PROCEDURE — 94761 N-INVAS EAR/PLS OXIMETRY MLT: CPT

## 2024-01-25 PROCEDURE — 63600175 PHARM REV CODE 636 W HCPCS: Performed by: INTERNAL MEDICINE

## 2024-01-25 PROCEDURE — 21400001 HC TELEMETRY ROOM

## 2024-01-25 RX ORDER — FUROSEMIDE 20 MG/1
20 TABLET ORAL DAILY
Status: DISCONTINUED | OUTPATIENT
Start: 2024-01-25 | End: 2024-01-26

## 2024-01-25 RX ORDER — SPIRONOLACTONE 25 MG/1
25 TABLET ORAL DAILY
Status: DISCONTINUED | OUTPATIENT
Start: 2024-01-25 | End: 2024-01-26

## 2024-01-25 RX ADMIN — RANOLAZINE 1000 MG: 500 TABLET, FILM COATED, EXTENDED RELEASE ORAL at 09:01

## 2024-01-25 RX ADMIN — CITALOPRAM HYDROBROMIDE 20 MG: 20 TABLET ORAL at 09:01

## 2024-01-25 RX ADMIN — ONDANSETRON 4 MG: 2 INJECTION INTRAMUSCULAR; INTRAVENOUS at 01:01

## 2024-01-25 RX ADMIN — MORPHINE SULFATE 4 MG: 4 INJECTION, SOLUTION INTRAMUSCULAR; INTRAVENOUS at 09:01

## 2024-01-25 RX ADMIN — ASPIRIN 81 MG: 81 TABLET, COATED ORAL at 09:01

## 2024-01-25 RX ADMIN — CLOPIDOGREL BISULFATE 75 MG: 75 TABLET, FILM COATED ORAL at 09:01

## 2024-01-25 RX ADMIN — LISINOPRIL 40 MG: 20 TABLET ORAL at 09:01

## 2024-01-25 RX ADMIN — METOPROLOL SUCCINATE 100 MG: 50 TABLET, EXTENDED RELEASE ORAL at 09:01

## 2024-01-25 RX ADMIN — EZETIMIBE 10 MG: 10 TABLET ORAL at 09:01

## 2024-01-25 RX ADMIN — SPIRONOLACTONE 25 MG: 25 TABLET ORAL at 09:01

## 2024-01-25 RX ADMIN — HEPARIN SODIUM 12 UNITS/KG/HR: 10000 INJECTION, SOLUTION INTRAVENOUS at 04:01

## 2024-01-25 RX ADMIN — ISOSORBIDE MONONITRATE 30 MG: 30 TABLET, EXTENDED RELEASE ORAL at 09:01

## 2024-01-25 RX ADMIN — FUROSEMIDE 20 MG: 20 TABLET ORAL at 09:01

## 2024-01-25 RX ADMIN — PHENYTOIN SODIUM 400 MG: 100 CAPSULE ORAL at 09:01

## 2024-01-25 NOTE — NURSING
Nurses Note -- 4 Eyes      1/24/2024   8:43 PM      Skin assessed during: Admit      [x] No Altered Skin Integrity Present    [x]Prevention Measures Documented      [] Yes- Altered Skin Integrity Present or Discovered   [] LDA Added if Not in Epic (Describe Wound)   [] New Altered Skin Integrity was Present on Admit and Documented in LDA   [] Wound Image Taken    Wound Care Consulted? No    Attending Nurse:  Loida Wheeler RN/Staff Member:   Arlene Coyne

## 2024-01-25 NOTE — ASSESSMENT & PLAN NOTE
Cardiology recommendation Plan---recent [facility] reviewed by  and discussed with   Pt has one functioning graft - unable to intervene at this point  Trop down trending  Chest pain has subsided with nitro and morphine  D/c IV heparin  Manage medically--Continue ASA/Statin/BB/Ranexa and isosorbide.   Echo EF 45-50 % d/c amlodipine   Previously started on SGLT2 , med to expensive  Add spironolactone--on lisinopril  Will consider converting to Entresto at follow up.   --IV lasix given  Appears euvolemic   Will convert to oral lasix now  Pt to mobilize

## 2024-01-25 NOTE — CARE UPDATE
01/25/24 0810   Patient Assessment/Suction   Level of Consciousness (AVPU) alert   Respiratory Effort Normal;Unlabored   Expansion/Accessory Muscles/Retractions no use of accessory muscles   Rhythm/Pattern, Respiratory unlabored   PRE-TX-O2   Device (Oxygen Therapy) room air   SpO2 95 %   Pulse Oximetry Type Intermittent   $ Pulse Oximetry - Single Charge Pulse Oximetry - Single   Pulse 80   Resp 16   Positioning HOB elevated 45 degrees

## 2024-01-25 NOTE — PROGRESS NOTES
[facility]   Cardiology Note    Consult Requested By: GAIL  Reason for Consult: chest pain    SUBJECTIVE:     History of Present Illness: The pt is 79 y/o M patient of , [facility] chronic angina, NSTEMI, HLP, HFpEF , pAF , PM, TAVR, bioprosthetic, H/o CABG with redo  Patient has significant CAD with h/o bypass with possible little options for intervention--Last [facility] 12/2023.     The pt started having chest pain yesterday non-exertional , non-radiating, mid sternal chest pain with no associated symptoms, relieved by nitro and morphine. BP stable. Pt trop elevated 95/136/178--currently on heparin infusion, CXR ground glass infiltrates, slight edema noted, bnp 958, pt reports some CHURCH but this is chronic, cr 1.0, no LE edema. Vpaced EKG BP stable.     1/25--The pt is chest pain free, daughter at bedside. The pt is sitting up in bedside chair in no distress, trop trending down, peaked at 406.6 last value 361.1. heparin infusing for 24 hours. H/H 11/34 cr stable 1.0. no LE edema or SOB.       Review of patient's allergies indicates:   Allergen Reactions    Atorvastatin Other (See Comments)     Muscle pain    Rosuvastatin Other (See Comments)     Muscle pain       Past Medical History:   Diagnosis Date    Anticoagulant long-term use     2014    Aortic valve disease 2014    pig valve    Arthritis     all over    Chest pain 07/15/2019    CHF (congestive heart failure)     2014 on meds    Coronary artery disease     2007 cabg    Heart attack 1990    15 stents    Heart block     Hyperlipidemia     Hypertension     on meds    Hypothyroidism 2015    on meds    PVD (peripheral vascular disease)     Seizures     last episode 1990 on meds     Past Surgical History:   Procedure Laterality Date    AORTOGRAPHY WITH SERIALOGRAPHY N/A 11/16/2021    Procedure: AORTOGRAM, WITH RUNOFF;  Surgeon: Rodrigo Willoughby MD;  Location: [facility] CATH/EP LAB;  Service: Cardiology;  Laterality: N/A;    ARTERIOGRAPHY OF AORTIC ROOT N/A 11/5/2019     Procedure: ARTERIOGRAM, AORTIC ROOT;  Surgeon: Rodrigo Willoughby MD;  Location: [facility] CATH/EP LAB;  Service: Cardiology;  Laterality: N/A;    CARDIAC CATHETERIZATION      CARDIAC VALVE SURGERY      CORONARY ANGIOGRAPHY INCLUDING BYPASS GRAFTS WITH CATHETERIZATION OF LEFT HEART N/A 11/5/2019    Procedure: ANGIOGRAM, CORONARY, INCLUDING BYPASS GRAFT, WITH LEFT HEART CATHETERIZATION;  Surgeon: Rodrigo Willoughby MD;  Location: [facility] CATH/EP LAB;  Service: Cardiology;  Laterality: N/A;    CORONARY ANGIOGRAPHY INCLUDING BYPASS GRAFTS WITH CATHETERIZATION OF LEFT HEART Left 11/3/2020    Procedure: ANGIOGRAM, CORONARY, INCLUDING BYPASS GRAFT, WITH LEFT HEART CATHETERIZATION;  Surgeon: Rodrigo Willoughby MD;  Location: [facility] CATH/EP LAB;  Service: Cardiology;  Laterality: Left;    CORONARY ANGIOGRAPHY INCLUDING BYPASS GRAFTS WITH CATHETERIZATION OF LEFT HEART N/A 9/28/2021    Procedure: ANGIOGRAM, CORONARY, INCLUDING BYPASS GRAFT, WITH LEFT HEART CATHETERIZATION;  Surgeon: Rodrigo Willoughby MD;  Location: [facility] CATH/EP LAB;  Service: Cardiology;  Laterality: N/A;    CORONARY ANGIOGRAPHY INCLUDING BYPASS GRAFTS WITH CATHETERIZATION OF LEFT HEART N/A 12/5/2023    Procedure: ANGIOGRAM, CORONARY, INCLUDING BYPASS GRAFT, WITH LEFT HEART CATHETERIZATION;  Surgeon: Rodrigo Willoughby MD;  Location: [facility] CATH/EP LAB;  Service: Cardiology;  Laterality: N/A;    CORONARY ANGIOPLASTY      CORONARY ARTERY BYPASS GRAFT      EXTRACORPOREAL SHOCK WAVE LITHOTRIPSY Right 8/1/2019    Procedure: LITHOTRIPSY, ESWL;  Surgeon: Williams Ortega MD;  Location: [facility] OR;  Service: Urology;  Laterality: Right;    HEMORRHOID SURGERY  1990    INSERTION OF PACEMAKER      PERCUTANEOUS TRANSLUMINAL BALLOON ANGIOPLASTY OF CORONARY ARTERY N/A 11/8/2019    Procedure: Angioplasty-coronary;  Surgeon: Rodrigo Willoughby MD;  Location: [facility] CATH/EP LAB;  Service: Cardiology;  Laterality: N/A;    WRIST FRACTURE SURGERY       Family History   Problem Relation Age of Onset    Heart  attack Mother     Heart attack Father     Heart disease Sister     Stroke Sister     Diabetes Sister     No Known Problems Maternal Grandmother     No Known Problems Maternal Grandfather     No Known Problems Paternal Grandmother     No Known Problems Paternal Grandfather     No Known Problems Brother     No Known Problems Maternal Aunt     No Known Problems Maternal Uncle     No Known Problems Paternal Aunt     No Known Problems Paternal Uncle     Anemia Neg Hx     Arrhythmia Neg Hx     Asthma Neg Hx     Clotting disorder Neg Hx     Fainting Neg Hx     Heart failure Neg Hx     Hyperlipidemia Neg Hx     Hypertension Neg Hx     Atrial Septal Defect Neg Hx      Social History     Tobacco Use    Smoking status: Former     Current packs/day: 0.00     Average packs/day: 0.5 packs/day for 4.0 years (2.0 ttl pk-yrs)     Types: Cigarettes     Start date:      Quit date:      Years since quittin.0    Smokeless tobacco: Never   Substance Use Topics    Alcohol use: Yes     Comment: social    Drug use: No       Review of Systems:  Review of Systems   Constitutional:  Negative for chills and fever.   Respiratory:  Negative for shortness of breath.    Cardiovascular:  Negative for chest pain.   Gastrointestinal:  Negative for abdominal pain, heartburn, nausea and vomiting.       OBJECTIVE:     Vital Signs (Most Recent)  Temp: 98.4 °F (36.9 °C) (24 07)  Pulse: 89 (24 07)  Resp: 18 (24)  BP: 113/69 (24)  SpO2: 96 % (24)    Vital Signs Range (Last 24H):  Temp:  [98 °F (36.7 °C)-98.4 °F (36.9 °C)]   Pulse:  [77-97]   Resp:  [15-25]   BP: (102-160)/()   SpO2:  [91 %-100 %]     I & O (Last 24H):    Intake/Output Summary (Last 24 hours) at 2024 08  Last data filed at 2024 0323  Gross per 24 hour   Intake 240 ml   Output 400 ml   Net -160 ml       Current Diet:     Current Diet Order   Procedures    Diet Cardiac Standard Tray     Order Specific Question:    Tray type:     Answer:   Standard Tray        Allergies:  Review of patient's allergies indicates:   Allergen Reactions    Atorvastatin Other (See Comments)     Muscle pain    Rosuvastatin Other (See Comments)     Muscle pain       Meds:  Scheduled Meds:   amLODIPine  5 mg Oral BID    aspirin  81 mg Oral Daily    citalopram  20 mg Oral Daily    clopidogreL  75 mg Oral Daily    ezetimibe  10 mg Oral Daily    furosemide (LASIX) injection  40 mg Intravenous Daily    isosorbide mononitrate  30 mg Oral Daily    lisinopriL  40 mg Oral Daily    metoprolol succinate  100 mg Oral BID    morphine  4 mg Intravenous Once    phenytoin  400 mg Oral Daily    ranolazine  1,000 mg Oral BID     Continuous Infusions:   heparin (porcine) in D5W 12 Units/kg/hr (01/25/24 4313)     PRN Meds:acetaminophen, heparin (PORCINE), heparin (PORCINE), lorazepam, melatonin, morphine, morphine, nitroGLYCERIN, ondansetron    Oxygen/Ventilator Data (Last 24H):  (if applicable)            Hemodynamic Parameters (Last 24H):   (if applicable)        Laboratory and Radiology Data:  Recent Results (from the past 24 hour(s))   Echo Saline Bubble? No    Collection Time: 01/24/24  9:50 AM   Result Value Ref Range    BSA 1.91 m2    Loco's Biplane MOD Ejection Fraction 63 %    LVOT stroke volume 33.12 cm3    LVIDd 5.13 3.5 - 6.0 cm    LV Systolic Volume 55.20 mL    LV Systolic Volume Index 29.2 mL/m2    LVIDs 3.62 2.1 - 4.0 cm    LV Diastolic Volume 126.00 mL    LV Diastolic Volume Index 66.67 mL/m2    IVS 1.27 (A) 0.6 - 1.1 cm    LVOT diameter 1.70 cm    LVOT area 2.3 cm2    FS 29 28 - 44 %    Left Ventricle Relative Wall Thickness 0.54 cm    Posterior Wall 1.39 (A) 0.6 - 1.1 cm    LV mass 281.59 g    LV Mass Index 149 g/m2    MV Peak E Bradly 1.52 m/s    TDI LATERAL 0.06 m/s    TDI SEPTAL 0.07 m/s    E/E' ratio 23.38 m/s    MV Peak A Bradly 1.73 m/s    TR Max Bradly 2.38 m/s    E/A ratio 0.88     E wave deceleration time 173.00 msec    LV SEPTAL E/E' RATIO 21.71  m/s    LV LATERAL E/E' RATIO 25.33 m/s    LVOT peak duc 0.83 m/s    Left Ventricular Outflow Tract Mean Velocity 0.53 cm/s    Left Ventricular Outflow Tract Mean Gradient 1.00 mmHg    RV S' 8.27 cm/s    AV mean gradient 3 mmHg    AV peak gradient 6 mmHg    Ao peak duc 1.19 m/s    Ao VTI 21.00 cm    LVOT peak VTI 14.60 cm    AV valve area 1.58 cm²    AV Velocity Ratio 0.70     AV index (prosthetic) 0.70     CASSIE by Velocity Ratio 1.58 cm²    Mr max duc 4.59 m/s    MV mean gradient 7 mmHg    MV peak gradient 11 mmHg    MV stenosis pressure 1/2 time 42.00 ms    MV valve area p 1/2 method 5.24 cm2    MV valve area by continuity eq 0.92 cm2    MV VTI 36.0 cm    Triscuspid Valve Regurgitation Peak Gradient 23 mmHg    PV PEAK VELOCITY 1.00 m/s    PV peak gradient 4 mmHg    Pulmonary Valve Mean Velocity 0.67 m/s    Ao root annulus 2.50 cm    Mean e' 0.07 m/s    ZLVIDS 0.83     ZLVIDD -0.31    TROPONIN I HIGH SENSITIVITY    Collection Time: 01/24/24 11:28 AM   Result Value Ref Range    Troponin I High Sensitivity 406.6 (HH) 0.0 - 14.9 pg/mL   APTT    Collection Time: 01/24/24 11:59 AM   Result Value Ref Range    aPTT 44.1 (H) 21.0 - 32.0 sec   Troponin I High Sensitivity    Collection Time: 01/24/24  6:19 PM   Result Value Ref Range    Troponin I High Sensitivity 361.1 (HH) 0.0 - 14.9 pg/mL   APTT    Collection Time: 01/24/24  6:19 PM   Result Value Ref Range    aPTT 55.2 (H) 21.0 - 32.0 sec   APTT    Collection Time: 01/25/24  3:45 AM   Result Value Ref Range    aPTT 49.6 (H) 21.0 - 32.0 sec   CBC auto differential    Collection Time: 01/25/24  3:45 AM   Result Value Ref Range    WBC 6.81 3.90 - 12.70 K/uL    RBC 3.92 (L) 4.60 - 6.20 M/uL    Hemoglobin 11.5 (L) 14.0 - 18.0 g/dL    Hematocrit 34.6 (L) 40.0 - 54.0 %    MCV 88 82 - 98 fL    MCH 29.3 27.0 - 31.0 pg    MCHC 33.2 32.0 - 36.0 g/dL    RDW 13.2 11.5 - 14.5 %    Platelets 196 150 - 450 K/uL    MPV 10.8 9.2 - 12.9 fL    Immature Granulocytes 0.3 0.0 - 0.5 %    Gran #  (ANC) 4.2 1.8 - 7.7 K/uL    Immature Grans (Abs) 0.02 0.00 - 0.04 K/uL    Lymph # 1.2 1.0 - 4.8 K/uL    Mono # 1.1 (H) 0.3 - 1.0 K/uL    Eos # 0.3 0.0 - 0.5 K/uL    Baso # 0.05 0.00 - 0.20 K/uL    nRBC 0 0 /100 WBC    Gran % 61.4 38.0 - 73.0 %    Lymph % 17.6 (L) 18.0 - 48.0 %    Mono % 15.9 (H) 4.0 - 15.0 %    Eosinophil % 4.1 0.0 - 8.0 %    Basophil % 0.7 0.0 - 1.9 %    Differential Method Automated    CBC auto differential    Collection Time: 01/25/24  3:45 AM   Result Value Ref Range    WBC 6.81 3.90 - 12.70 K/uL    RBC 3.92 (L) 4.60 - 6.20 M/uL    Hemoglobin 11.5 (L) 14.0 - 18.0 g/dL    Hematocrit 34.6 (L) 40.0 - 54.0 %    MCV 88 82 - 98 fL    MCH 29.3 27.0 - 31.0 pg    MCHC 33.2 32.0 - 36.0 g/dL    RDW 13.2 11.5 - 14.5 %    Platelets 196 150 - 450 K/uL    MPV 10.8 9.2 - 12.9 fL    Immature Granulocytes 0.3 0.0 - 0.5 %    Gran # (ANC) 4.2 1.8 - 7.7 K/uL    Immature Grans (Abs) 0.02 0.00 - 0.04 K/uL    Lymph # 1.2 1.0 - 4.8 K/uL    Mono # 1.1 (H) 0.3 - 1.0 K/uL    Eos # 0.3 0.0 - 0.5 K/uL    Baso # 0.05 0.00 - 0.20 K/uL    nRBC 0 0 /100 WBC    Gran % 61.4 38.0 - 73.0 %    Lymph % 17.6 (L) 18.0 - 48.0 %    Mono % 15.9 (H) 4.0 - 15.0 %    Eosinophil % 4.1 0.0 - 8.0 %    Basophil % 0.7 0.0 - 1.9 %    Differential Method Automated    Basic metabolic panel    Collection Time: 01/25/24  3:45 AM   Result Value Ref Range    Sodium 133 (L) 136 - 145 mmol/L    Potassium 4.0 3.5 - 5.1 mmol/L    Chloride 97 95 - 110 mmol/L    CO2 25 23 - 29 mmol/L    Glucose 94 70 - 110 mg/dL    BUN 17 8 - 23 mg/dL    Creatinine 1.0 0.5 - 1.4 mg/dL    Calcium 9.0 8.7 - 10.5 mg/dL    Anion Gap 11 8 - 16 mmol/L    eGFR >60.0 >60 mL/min/1.73 m^2     Imaging Results              X-Ray Chest AP Portable (Final result)  Result time 01/24/24 08:09:06      Final result by Truman Dawson MD (01/24/24 08:09:06)                   Narrative:    XR CHEST 1 VIEW    CLINICAL HISTORY:  78 years Male Chest Pain    COMPARISON: December 7,  2023    FINDINGS: Cardiac silhouette is enlarged and stable compared to prior. Patient status post median sternotomy and CABG. Pacing leads are in stable position. Patchy groundglass opacities within the mid and upper lung zones, worsened compared to prior. Blunting of both costophrenic angles consistent with bilateral pleural fluid. No pneumothorax.    IMPRESSION:    Patchy bilateral groundglass opacities which could represent atypical infection or pulmonary edema.    Small bilateral pleural effusions.    Electronically signed by:  Truman Dawson MD  01/24/2024 08:09 AM [facility] Workstation: [id number]                                    12-lead EKG interpretation:  (if applicable)      Current Cardiac Rhythm:   (if applicable)    Physical Exam:   Physical Exam  Cardiovascular:      Rate and Rhythm: Normal rate and regular rhythm.      Heart sounds: Murmur heard.   Pulmonary:      Effort: Pulmonary effort is normal. No respiratory distress.      Breath sounds: Normal breath sounds.   Musculoskeletal:         General: No swelling.   Skin:     General: Skin is warm and dry.   Neurological:      Mental Status: He is alert and oriented to person, place, and time.         ASSESSMENT/PLAN:   Assessment:   CAD  Chronic angina  NSTEMI  HLP  HFpEF  Pafib  PM --Abbot   TAVR bioprosthetic     H/o CABG with redo  Recent LHC one functioning graft    Echo EF 45-50 % Grade II DD normal functioning TAVR     Plan---recent LHC reviewed by  and discussed with   Pt has one functioning graft - unable to intervene at this point  Trop trended down peaked at 406   Chest pain has subsided   Manage medically--Continue ASA/Statin/BB/Ranexa and isosorbide.   Echo EF 45-50 % d/c amlodipine   Previously started on SGLT2 , med to expensive  Add spironolactone--on lisinopril  Will consider converting to Entresto at follow up.   --IV lasix given  Appears euvolemic   Oral lasix  .

## 2024-01-25 NOTE — PLAN OF CARE
Problem: Malnutrition  Goal: Improved Nutritional Intake  Outcome: Ongoing, Progressing  Intervention: Promote and Optimize Oral Intake  Flowsheets (Taken 1/25/2024 1221)  Oral Nutrition Promotion: calorie-dense liquids provided

## 2024-01-25 NOTE — PROGRESS NOTES
Formerly [facility], later [facility]  Adult Nutrition   Progress Note (Initial Assessment)     SUMMARY     Recommendations  Recommendation/Intervention: 1. RD added ONS, Ensure Plus High Protein (to provide 1050 kcal/day and 60 g/day protein)    2. Continue current diet and encourage adequate PO intake.   3. Recommend that a medical diagnosis of malnutrition be added to patient's problem list if physician agrees with dietitian's Nutrition Focused Physical Exam (NFPE) findings that support medical diagnosis per ASPEN guidelines.  Goals: 1. Patient to meet at least 75% of estimated energy and protein needs via PO intake of meals and supplements by follow up.   2. Prevent further weight loss and progression of malnutrition.  Nutrition Goal Status: new  Communication of RD Recs: reviewed with physician    Nutritional Diagnosis (PES Statement)   Nutrition Diagnosis PES Statement: Severe malnutrition related to inadequate protein and energy intake due to grief as evidence by patient states he has not eaten well since his wife passed away, severe weight loss >7.5% in less than 3 months, severe muscle wasting (temples, clavicle, and acromion regions), and mild fat depletion (orbital, upper arm, and thoracic region).    Dietitian Rounds Brief  Pt assessed 2' MST score 3. PO intake of meals is poor, ~25%. Pt dislikes some foods provided and has decreased appetite. Pt state he lost his wife 2 weeks ago, currently grieving. Pt states he knows he has lost weight by his appearance, but does not know his UBW. EMR weight history was used to assess weight loss. Severe weight loss of 6.5 kg (7.7%) weight loss from 12/2/23 to 1/24/24. Pt states he has difficulty swallowing pills and needs his esophagus stretched, but denies any issues chewing or swallowing foods. RD offered ONS, patient accepted. RD performed NFPE, malnutrition identified (see findings below). Notified patient of findings and treatment plan.   LBM: 01/25/24    Malnutrition  Assessment (NFPE)  Malnutrition Context: social/environmental circumstances  Malnutrition Level: severe  Weight Loss (Malnutrition): greater than 7.5% in 3 months  Energy Intake (Malnutrition): less than 75% for greater than or equal to 3 months  Subcutaneous Fat (Malnutrition): mild depletion  Muscle Mass (Malnutrition): severe depletion   Orbital Region (Subcutaneous Fat Loss): mild depletion  Upper Arm Region (Subcutaneous Fat Loss): mild depletion  Thoracic and Lumbar Region: mild depletion   [place] Region (Muscle Loss): severe depletion  Clavicle Bone Region (Muscle Loss): severe depletion  Clavicle and Acromion Bone Region (Muscle Loss): severe depletion  Dorsal Hand (Muscle Loss): well nourished (left side possible mild depletion, howeverer right hand dominant)  Posterior Calf Region (Muscle Loss): mild depletion     Reason for Assessment  Reason For Assessment: identified at risk by screening criteria (MST 3)  Diagnosis: cardiac disease  Relevant Medical History:  Aortic valvar stenosis   HTN (hypertension)   Anemia   S/P TAVR (transcatheter aortic valve replacement)   CAD (coronary artery disease)   Dyslipidemia   Pacemaker   S/P CABG (coronary artery bypass graft)   Renal stone   NSTEMI (non-ST elevated myocardial infarction)   Seizure disorder   Congestive heart failure   Anxiety and depression   Hyponatremia   Chest pain   Grief  Nutrition Discharge Planning: Cardiac Diet, eat 3 meals per day. Oral nutrition supplements (i.e Ensure Plus High Protein) TID for weight restroation/ malnutrition treatment    Nutrition Risk Screen  Nutrition Risk Screen: difficulty chewing/swallowing, unintentional loss of 10 lbs or more in the past 2 months, reduced oral intake over the last month     MST Score: 3  Have you recently lost weight without trying?: Yes: 14-23 lbs  Weight loss score: 2  Have you been eating poorly because of a decreased appetite?: Yes  Appetite score: 1       Nutrition/Diet History  Patient  "Reported Diet/Restrictions/Preferences: general  Food Allergies: NKFA  Factors Affecting Nutritional Intake: decreased appetite, other (see comments) (grief)    Anthropometrics  Temp: 98 °F (36.7 °C)  Height Method: Stated  Height: 5' 7" (170.2 cm)  Height (inches): 67 in  Weight Method: Bed Scale  Weight: 77.4 kg (170 lb 10.2 oz)  Weight (lb): 170.64 lb  Ideal Body Weight (IBW), Male: 148 lb  % Ideal Body Weight, Male (lb): 115.3 %  BMI (Calculated): 26.7  BMI Grade: 25 - 29.9 - overweight  Weight Loss: unintentional  Usual Body Weight (UBW), k.9 kg (23)  % Usual Body Weight: 92.45  % Weight Change From Usual Weight: -7.75 %       Weight History:  Wt Readings from Last 10 Encounters:   24 77.4 kg (170 lb 10.2 oz)   24 77.1 kg (169 lb 15.6 oz)   23 80.8 kg (178 lb 2.1 oz)   23 80.7 kg (177 lb 14.6 oz)   23 83.9 kg (185 lb)   23 83.5 kg (184 lb)   22 84.3 kg (185 lb 12.8 oz)   22 80.7 kg (178 lb)   22 82.1 kg (181 lb)   21 87.1 kg (192 lb)     RD evaluation of weight history: 6.5 kg (7.7%) weight loss from 23 to 24. Severe.     Lab/Procedures/Meds: Pertinent Labs Reviewed    Clinical Chemistry:  Recent Labs   Lab 24  0250 24  0345   * 133*   K 3.9 4.0    97   CO2 23 25   * 94   BUN 17 17   CREATININE 1.0 1.0   CALCIUM 8.4* 9.0   PROT 6.7  --    ALBUMIN 3.6  --    BILITOT 0.8  --    ALKPHOS 108  --    AST 26  --    ALT 19  --    ANIONGAP 9 11   MG 1.6  --        CBC:   Recent Labs   Lab 24  0345   WBC 6.81  6.81   RBC 3.92*  3.92*   HGB 11.5*  11.5*   HCT 34.6*  34.6*     196   MCV 88  88   MCH 29.3  29.3   MCHC 33.2  33.2       Cardiac Profile:  Recent Labs   Lab 24  0250   *         Thyroid & Parathyroid:  Recent Labs   Lab 24  0453   TSH 2.778       Medications: Pertinent Medications reviewed    Scheduled Meds:   aspirin  81 mg Oral Daily    citalopram  20 mg Oral " Daily    clopidogreL  75 mg Oral Daily    ezetimibe  10 mg Oral Daily    furosemide  20 mg Oral Daily    isosorbide mononitrate  30 mg Oral Daily    lisinopriL  40 mg Oral Daily    metoprolol succinate  100 mg Oral BID    morphine  4 mg Intravenous Once    phenytoin  400 mg Oral Daily    ranolazine  1,000 mg Oral BID    spironolactone  25 mg Oral Daily         PRN Meds:.acetaminophen, lorazepam, melatonin, morphine, morphine, nitroGLYCERIN, ondansetron    Estimated/Assessed Needs  Weight Used For Calorie Calculations: 77.4 kg (170 lb 10.2 oz)  Energy Calorie Requirements (kcal): 1936 - 2322 (25 - 30 kcal/kg)  Energy Need Method: Kcal/kg  Protein Requirements: 93 - 116 (1.2 - 1.5 g/kg)  Weight Used For Protein Calculations: 77.4 kg (170 lb 10.2 oz)  Fluid Requirements (mL): 1936 (25 ml/kg or per MD 2' CHF)  Estimated Fluid Requirement Method: other (see comments)  RDA Method (mL): 1936  CHO Requirement: N/A    Nutrition Prescription Ordered  Current Diet Order: Cardiac    Evaluation of Received Nutrient/Fluid Intake  Energy Calories Required: not meeting needs  Protein Required: not meeting needs  Fluid Required: meeting needs  Tolerance: tolerating     Intake/Output Summary (Last 24 hours) at 1/25/2024 1219  Last data filed at 1/25/2024 1056  Gross per 24 hour   Intake 440 ml   Output 202 ml   Net 238 ml      % Intake of Estimated Energy Needs: 0 - 25 %  % Meal Intake: 0 - 25 %    Nutrition Risk  Level of Risk/Frequency of Follow-up:  (2x/week)     Monitor and Evaluation  Food and Nutrient Intake: energy intake, food and beverage intake  Food and Nutrient Adminstration: diet order  Knowledge/Beliefs/Attitudes: beliefs and attitudes, food and nutrition knowledge/skill  Physical Activity and Function: factors affecting access to physical activity, nutrition-related ADLs and IADLs  Anthropometric Measurements: weight, weight change, body mass index  Biochemical Data, Medical Tests and Procedures: electrolyte and renal  panel, lipid profile, gastrointestinal profile, glucose/endocrine profile, inflammatory profile  Nutrition-Focused Physical Findings: overall appearance     Nutrition Follow-Up  RD Follow-up?: Yes    Dinorah Bernardo RD 01/25/2024 12:18 PM

## 2024-01-25 NOTE — HOSPITAL COURSE
Patient admitted to the hospital for chest pain.  Cardiology consult and saw the patient.  Start the patient on diuretic and Lasix Patient found to have acute kidney injury.  We will hold Lasix and spironolactone and lisinopril for now.  And gave the patient normal saline 250 cc x1  and consult Nephrology.  Patient today kidney function back to normal.  From Cardiology standpoint we will continue hold spironolactone and lisinopril.  Patient [facility] Card[facility]    [facility] recommendation  Plan---recent C reviewed by  and discussed with   Pt has one functioning graft - unable to intervene at this point  Trop down trending  Chest pain has subsided with nitro and morphine  D/c IV heparin  Manage medically--Continue ASA/Statin/BB/Ranexa and isosorbide.   Echo EF 45-50 % d/c amlodipine   Previously started on SGLT2 , med to expensive  Add spironolactone--on lisinopril  Will consider converting to Entresto at follow up.   --IV lasix given  Appears euvolemic   Pt to mobilize

## 2024-01-25 NOTE — CARE UPDATE
01/24/24 8550   Patient Assessment/Suction   Level of Consciousness (AVPU) alert   Respiratory Effort Unlabored   Expansion/Accessory Muscles/Retractions no use of accessory muscles   All Lung Fields Breath Sounds clear;diminished   Rhythm/Pattern, Respiratory no shortness of breath reported   Cough Frequency no cough   PRE-TX-O2   Device (Oxygen Therapy) room air   SpO2 96 %   Pulse Oximetry Type Intermittent   $ Pulse Oximetry - Multiple Charge Pulse Oximetry - Multiple   Pulse 96   Resp 15   Positioning   Head of Bed (HOB) Positioning HOB elevated;HOB at 30 degrees

## 2024-01-25 NOTE — SUBJECTIVE & OBJECTIVE
Interval History:  Chest pain resolved      Objective:     Vital Signs (Most Recent):  Temp: 98 °F (36.7 °C) (01/25/24 1500)  Pulse: 78 (01/25/24 1500)  Resp: 18 (01/25/24 1500)  BP: 100/60 (01/25/24 1500)  SpO2: 96 % (01/25/24 1500) Vital Signs (24h Range):  Temp:  [98 °F (36.7 °C)-98.4 °F (36.9 °C)] 98 °F (36.7 °C)  Pulse:  [78-97] 78  Resp:  [15-20] 18  SpO2:  [94 %-96 %] 96 %  BP: (100-160)/(58-94) 100/60     Weight: 77.4 kg (170 lb 10.2 oz)  Body mass index is 26.73 kg/m².    Intake/Output Summary (Last 24 hours) at 1/25/2024 1705  Last data filed at 1/25/2024 1601  Gross per 24 hour   Intake 680 ml   Output 502 ml   Net 178 ml         Physical Exam  Constitutional:       General: He is not in acute distress.  HENT:      Head: Normocephalic and atraumatic.      Nose: No congestion.   Eyes:      General: No scleral icterus.        Right eye: No discharge.         Left eye: No discharge.      Extraocular Movements: Extraocular movements intact.   Cardiovascular:      Rate and Rhythm: Normal rate and regular rhythm.   Pulmonary:      Effort: Pulmonary effort is normal.      Breath sounds: Normal breath sounds.   Abdominal:      General: Abdomen is flat. Bowel sounds are normal.   Musculoskeletal:         General: No swelling or tenderness.      Cervical back: Normal range of motion and neck supple. No rigidity.   Skin:     General: Skin is warm.   Neurological:      General: No focal deficit present.      Mental Status: He is alert and oriented to person, place, and time.   Psychiatric:         Mood and Affect: Mood normal.             Significant Labs: All pertinent labs within the past 24 hours have been reviewed.  CBC:   Recent Labs   Lab 01/24/24  0250 01/24/24  0453 01/25/24  0345   WBC 9.21 7.38  7.38 6.81  6.81   HGB 11.3* 10.7*  10.7* 11.5*  11.5*   HCT 32.9* 31.6*  31.6* 34.6*  34.6*    177  177 196  196     CMP:   Recent Labs   Lab 01/24/24  0250 01/25/24  0345   * 133*   K 3.9 4.0     97   CO2 23 25   * 94   BUN 17 17   CREATININE 1.0 1.0   CALCIUM 8.4* 9.0   PROT 6.7  --    ALBUMIN 3.6  --    BILITOT 0.8  --    ALKPHOS 108  --    AST 26  --    ALT 19  --    ANIONGAP 9 11     Cardiac Markers:   Recent Labs   Lab 01/24/24  0250   *       Significant Imaging: I have reviewed all pertinent imaging results/findings within the past 24 hours.

## 2024-01-25 NOTE — PROGRESS NOTES
"[facility]  Progress Note    Patient Name: Jerome Amaro Jr.  MRN: [id number]  Patient Class: IP- Inpatient   Admission Date: 1/24/2024  Length of Stay: 1 days  Attending Physician: Sugn Berkowitz MD  Primary Care Provider: Oscar Madrid NP        Subjective:     Principal Problem:NSTEMI (non-ST elevated myocardial infarction)        HPI:  78 year old male with history of CAD (s/p CABG twice), HFrEF (45% recent [facility] --12/2023), AVR (porcine), Heart Block (PPM), HTN, Hypothyroidism, Seizure disorder presented to ED complaining of left arm pain and band like chest heaviness "Across the front of my chest", "Worst pain I've ever had", which was accompanied by SOB. Symptoms lasted 2 hours and had resolved by the time he reached the ED--no further discomfort since. He admits to PND, but no orthopnea or edema. No cough or sputum. He has been "Under a lot of stress" according to a daughter at bedside--he was recently , and is not coping well--actively grieving.     [facility] 12/2023: Summary    Left ventricular ejection fraction was calculated to be 45%. with inferior wall hypokinesis    Diastolic dysfunction.    Patent sequential saphenous vein graft to LAD as well as 1st and 2nd marginal divisions of the circumflex with right coronary artery arm of graft occluded    In ED:  EKG reviewed: paced. CXR reviewed: cardiomegaly with interstitial edema. Labs reviewed: BNP and troponin are elevated; no leukocytosis with mild normocytic anemia; minimal hyponatremia otherwise normal non-fasting CMP.    Discussed with ED MD: Inpatient admission for NSTEMI (Takotsubo??); hold apixaban and start heparin infusion, otherwise continuing home regimen -- l-thyroxine not on his home meds list; Consult his Cardiologist; prn NTG, morphine for chest discomfort; prn lorazepam for anxiety/grief; TSH with AM labs for review; serial biomarkers; ECHO    Overview/Hospital Course:  Patient admitted to the " hospital for chest pain.  Cardiology consult and saw the patient.  Hopefully can discharge home tomorrow    Cardiology recommendation  Plan---recent [facility] reviewed by  and discussed with   Pt has one functioning graft - unable to intervene at this point  Trop down trending  Chest pain has subsided with nitro and morphine  D/c IV heparin  Manage medically--Continue ASA/Statin/BB/Ranexa and isosorbide.   Echo EF 45-50 % d/c amlodipine   Previously started on SGLT2 , med to expensive  Add spironolactone--on lisinopril  Will consider converting to Entresto at follow up.   --IV lasix given  Appears euvolemic   Will convert to oral lasix now  Pt to mobilize    Interval History:  Chest pain resolved      Objective:     Vital Signs (Most Recent):  Temp: 98 °F (36.7 °C) (01/25/24 1500)  Pulse: 78 (01/25/24 1500)  Resp: 18 (01/25/24 1500)  BP: 100/60 (01/25/24 1500)  SpO2: 96 % (01/25/24 1500) Vital Signs (24h Range):  Temp:  [98 °F (36.7 °C)-98.4 °F (36.9 °C)] 98 °F (36.7 °C)  Pulse:  [78-97] 78  Resp:  [15-20] 18  SpO2:  [94 %-96 %] 96 %  BP: (100-160)/(58-94) 100/60     Weight: 77.4 kg (170 lb 10.2 oz)  Body mass index is 26.73 kg/m².    Intake/Output Summary (Last 24 hours) at 1/25/2024 1705  Last data filed at 1/25/2024 1601  Gross per 24 hour   Intake 680 ml   Output 502 ml   Net 178 ml         Physical Exam  Constitutional:       General: He is not in acute distress.  HENT:      Head: Normocephalic and atraumatic.      Nose: No congestion.   Eyes:      General: No scleral icterus.        Right eye: No discharge.         Left eye: No discharge.      Extraocular Movements: Extraocular movements intact.   Cardiovascular:      Rate and Rhythm: Normal rate and regular rhythm.   Pulmonary:      Effort: Pulmonary effort is normal.      Breath sounds: Normal breath sounds.   Abdominal:      General: Abdomen is flat. Bowel sounds are normal.   Musculoskeletal:         General: No swelling or tenderness.       Cervical back: Normal range of motion and neck supple. No rigidity.   Skin:     General: Skin is warm.   Neurological:      General: No focal deficit present.      Mental Status: He is alert and oriented to person, place, and time.   Psychiatric:         Mood and Affect: Mood normal.             Significant Labs: All pertinent labs within the past 24 hours have been reviewed.  CBC:   Recent Labs   Lab 01/24/24  0250 01/24/24  0453 01/25/24  0345   WBC 9.21 7.38  7.38 6.81  6.81   HGB 11.3* 10.7*  10.7* 11.5*  11.5*   HCT 32.9* 31.6*  31.6* 34.6*  34.6*    177  177 196  196     CMP:   Recent Labs   Lab 01/24/24  0250 01/25/24  0345   * 133*   K 3.9 4.0    97   CO2 23 25   * 94   BUN 17 17   CREATININE 1.0 1.0   CALCIUM 8.4* 9.0   PROT 6.7  --    ALBUMIN 3.6  --    BILITOT 0.8  --    ALKPHOS 108  --    AST 26  --    ALT 19  --    ANIONGAP 9 11     Cardiac Markers:   Recent Labs   Lab 01/24/24  0250   *       Significant Imaging: I have reviewed all pertinent imaging results/findings within the past 24 hours.    Assessment/Plan:      * NSTEMI (non-ST elevated myocardial infarction)  Cardiology recommendation Plan---recent [facility] reviewed by  and discussed with   Pt has one functioning graft - unable to intervene at this point  Trop down trending  Chest pain has subsided with nitro and morphine  D/c IV heparin  Manage medically--Continue ASA/Statin/BB/Ranexa and isosorbide.   Echo EF 45-50 % d/c amlodipine   Previously started on SGLT2 , med to expensive  Add spironolactone--on lisinopril  Will consider converting to Entresto at follow up.   --IV lasix given  Appears euvolemic   Will convert to oral lasix now  Pt to mobilize    Grief  Inpatient admission for NSTEMI (Takotsubo??); hold apixaban and start heparin infusion, otherwise continuing home regimen -- l-thyroxine not on his home meds list; Consult his Cardiologist; prn NTG, morphine for chest  discomfort; prn lorazepam for anxiety/grief; TSH with AM labs for review; serial biomarkers; ECHO      S/P CABG (coronary artery bypass graft)  Continue his home medication      S/P TAVR (transcatheter aortic valve replacement)  Continue home medication         VTE Risk Mitigation (From admission, onward)           Ordered     IP VTE HIGH RISK PATIENT  Once         01/24/24 0429     Place sequential compression device  Until discontinued         01/24/24 0429     Reason for No Pharmacological VTE Prophylaxis  Once        Question:  Reasons:  Answer:  IV Heparin w/in 24 hrs. Pre or Post-Op    01/24/24 0429                    Discharge Planning   AXEL:      Code Status: Full Code   Is the patient medically ready for discharge?:     Reason for patient still in hospital (select all that apply): Treatment  Discharge Plan A: Home                  Sung Berkowitz MD  Department of Hospital Medicine   [facility]

## 2024-01-25 NOTE — PLAN OF CARE
Problem: Adult Inpatient Plan of Care  Goal: Plan of Care Review  Outcome: Ongoing, Progressing  Goal: Patient-Specific Goal (Individualized)  Outcome: Ongoing, Progressing  Goal: Absence of Hospital-Acquired Illness or Injury  Outcome: Ongoing, Progressing  Goal: Optimal Comfort and Wellbeing  Outcome: Ongoing, Progressing  Goal: Readiness for Transition of Care  Outcome: Ongoing, Progressing     Problem: Skin Injury Risk Increased  Goal: Skin Health and Integrity  Outcome: Ongoing, Progressing     Problem: Fall Injury Risk  Goal: Absence of Fall and Fall-Related Injury  Outcome: Ongoing, Progressing     Problem: Malnutrition  Goal: Improved Nutritional Intake  Outcome: Ongoing, Progressing

## 2024-01-25 NOTE — NURSING
Pt's daughter came to desk and stated the patient does not want to be intubated should he have cardiac arrest. He does want chest compressions and shock if needed. Pt needs code status changed to DNI.

## 2024-01-26 PROBLEM — N17.9 AKI (ACUTE KIDNEY INJURY): Status: ACTIVE | Noted: 2024-01-26

## 2024-01-26 LAB
ANION GAP SERPL CALC-SCNC: 10 MMOL/L (ref 8–16)
BACTERIA #/AREA URNS HPF: NEGATIVE /HPF
BASOPHILS # BLD AUTO: 0.05 K/UL (ref 0–0.2)
BASOPHILS NFR BLD: 0.7 % (ref 0–1.9)
BILIRUB UR QL STRIP: NEGATIVE
BUN SERPL-MCNC: 26 MG/DL (ref 8–23)
CALCIUM SERPL-MCNC: 8.6 MG/DL (ref 8.7–10.5)
CHLORIDE SERPL-SCNC: 95 MMOL/L (ref 95–110)
CLARITY UR: ABNORMAL
CO2 SERPL-SCNC: 26 MMOL/L (ref 23–29)
COLOR UR: YELLOW
CREAT SERPL-MCNC: 1.5 MG/DL (ref 0.5–1.4)
DIFFERENTIAL METHOD BLD: ABNORMAL
EOSINOPHIL # BLD AUTO: 0.4 K/UL (ref 0–0.5)
EOSINOPHIL NFR BLD: 5.9 % (ref 0–8)
ERYTHROCYTE [DISTWIDTH] IN BLOOD BY AUTOMATED COUNT: 13.1 % (ref 11.5–14.5)
EST. GFR  (NO RACE VARIABLE): 47.1 ML/MIN/1.73 M^2
GLUCOSE SERPL-MCNC: 86 MG/DL (ref 70–110)
GLUCOSE SERPL-MCNC: 92 MG/DL (ref 70–110)
GLUCOSE UR QL STRIP: NEGATIVE
HCT VFR BLD AUTO: 32.9 % (ref 40–54)
HGB BLD-MCNC: 11.3 G/DL (ref 14–18)
HGB UR QL STRIP: NEGATIVE
HYALINE CASTS #/AREA URNS LPF: 5 /LPF
IMM GRANULOCYTES # BLD AUTO: 0.02 K/UL (ref 0–0.04)
IMM GRANULOCYTES NFR BLD AUTO: 0.3 % (ref 0–0.5)
KETONES UR QL STRIP: ABNORMAL
LEUKOCYTE ESTERASE UR QL STRIP: ABNORMAL
LYMPHOCYTES # BLD AUTO: 1.1 K/UL (ref 1–4.8)
LYMPHOCYTES NFR BLD: 16.3 % (ref 18–48)
MAGNESIUM SERPL-MCNC: 1.7 MG/DL (ref 1.6–2.6)
MCH RBC QN AUTO: 30.2 PG (ref 27–31)
MCHC RBC AUTO-ENTMCNC: 34.3 G/DL (ref 32–36)
MCV RBC AUTO: 88 FL (ref 82–98)
MICROSCOPIC COMMENT: ABNORMAL
MONOCYTES # BLD AUTO: 1.1 K/UL (ref 0.3–1)
MONOCYTES NFR BLD: 16.5 % (ref 4–15)
NEUTROPHILS # BLD AUTO: 4.1 K/UL (ref 1.8–7.7)
NEUTROPHILS NFR BLD: 60.3 % (ref 38–73)
NITRITE UR QL STRIP: NEGATIVE
NRBC BLD-RTO: 0 /100 WBC
PH UR STRIP: 6 [PH] (ref 5–8)
PLATELET # BLD AUTO: 194 K/UL (ref 150–450)
PMV BLD AUTO: 10.2 FL (ref 9.2–12.9)
POTASSIUM SERPL-SCNC: 4.4 MMOL/L (ref 3.5–5.1)
PROT UR QL STRIP: ABNORMAL
RBC # BLD AUTO: 3.74 M/UL (ref 4.6–6.2)
RBC #/AREA URNS HPF: 3 /HPF (ref 0–4)
SODIUM SERPL-SCNC: 131 MMOL/L (ref 136–145)
SP GR UR STRIP: 1.02 (ref 1–1.03)
SQUAMOUS #/AREA URNS HPF: 0 /HPF
URN SPEC COLLECT METH UR: ABNORMAL
UROBILINOGEN UR STRIP-ACNC: ABNORMAL EU/DL
WBC # BLD AUTO: 6.8 K/UL (ref 3.9–12.7)
WBC #/AREA URNS HPF: >100 /HPF (ref 0–5)

## 2024-01-26 PROCEDURE — 25000003 PHARM REV CODE 250

## 2024-01-26 PROCEDURE — 21400001 HC TELEMETRY ROOM

## 2024-01-26 PROCEDURE — 25000003 PHARM REV CODE 250: Performed by: INTERNAL MEDICINE

## 2024-01-26 PROCEDURE — 87086 URINE CULTURE/COLONY COUNT: CPT | Performed by: INTERNAL MEDICINE

## 2024-01-26 PROCEDURE — 36415 COLL VENOUS BLD VENIPUNCTURE: CPT | Performed by: INTERNAL MEDICINE

## 2024-01-26 PROCEDURE — 81001 URINALYSIS AUTO W/SCOPE: CPT | Performed by: INTERNAL MEDICINE

## 2024-01-26 PROCEDURE — 85025 COMPLETE CBC W/AUTO DIFF WBC: CPT | Performed by: INTERNAL MEDICINE

## 2024-01-26 PROCEDURE — 80048 BASIC METABOLIC PNL TOTAL CA: CPT | Performed by: INTERNAL MEDICINE

## 2024-01-26 PROCEDURE — 94761 N-INVAS EAR/PLS OXIMETRY MLT: CPT

## 2024-01-26 PROCEDURE — 83735 ASSAY OF MAGNESIUM: CPT | Performed by: INTERNAL MEDICINE

## 2024-01-26 PROCEDURE — 99900031 HC PATIENT EDUCATION (STAT)

## 2024-01-26 RX ORDER — ALPRAZOLAM 0.25 MG/1
0.25 TABLET ORAL 2 TIMES DAILY PRN
Status: DISCONTINUED | OUTPATIENT
Start: 2024-01-26 | End: 2024-01-27 | Stop reason: HOSPADM

## 2024-01-26 RX ADMIN — EZETIMIBE 10 MG: 10 TABLET ORAL at 09:01

## 2024-01-26 RX ADMIN — LISINOPRIL 40 MG: 20 TABLET ORAL at 09:01

## 2024-01-26 RX ADMIN — CLOPIDOGREL BISULFATE 75 MG: 75 TABLET, FILM COATED ORAL at 09:01

## 2024-01-26 RX ADMIN — RANOLAZINE 1000 MG: 500 TABLET, FILM COATED, EXTENDED RELEASE ORAL at 09:01

## 2024-01-26 RX ADMIN — FUROSEMIDE 20 MG: 20 TABLET ORAL at 09:01

## 2024-01-26 RX ADMIN — SODIUM CHLORIDE 250 ML: 9 INJECTION, SOLUTION INTRAVENOUS at 11:01

## 2024-01-26 RX ADMIN — ISOSORBIDE MONONITRATE 30 MG: 30 TABLET, EXTENDED RELEASE ORAL at 09:01

## 2024-01-26 RX ADMIN — CITALOPRAM HYDROBROMIDE 20 MG: 20 TABLET ORAL at 09:01

## 2024-01-26 RX ADMIN — PHENYTOIN SODIUM 400 MG: 100 CAPSULE ORAL at 09:01

## 2024-01-26 RX ADMIN — SPIRONOLACTONE 25 MG: 25 TABLET ORAL at 09:01

## 2024-01-26 RX ADMIN — METOPROLOL SUCCINATE 100 MG: 50 TABLET, EXTENDED RELEASE ORAL at 09:01

## 2024-01-26 RX ADMIN — ALPRAZOLAM 0.25 MG: 0.25 TABLET ORAL at 12:01

## 2024-01-26 RX ADMIN — ASPIRIN 81 MG: 81 TABLET, COATED ORAL at 09:01

## 2024-01-26 NOTE — PROGRESS NOTES
[facility]   Cardiology Note    Consult Requested By: GAIL  Reason for Consult: chest pain    SUBJECTIVE:     History of Present Illness: The pt is 79 y/o M patient of , [facility] chronic angina, NSTEMI, HLP, HFpEF , pAF , PM, TAVR, bioprosthetic, H/o CABG with redo  Patient has significant CAD with h/o bypass with possible little options for intervention--Last [facility] 12/2023.     The pt started having chest pain yesterday non-exertional , non-radiating, mid sternal chest pain with no associated symptoms, relieved by nitro and morphine. BP stable. Pt trop elevated 95/136/178--currently on heparin infusion, CXR ground glass infiltrates, slight edema noted, bnp 958, pt reports some CHURCH but this is chronic, cr 1.0, no LE edema. Vpaced EKG BP stable.     1/25--The pt is chest pain free, daughter at bedside. The pt is sitting up in bedside chair in no distress, trop trending down, peaked at 406.6 last value 361.1. heparin infusing for 24 hours. H/H 11/34 cr stable 1.0. no LE edema or SOB.      1/26--H/H 11/32 cr 1.5 The pt is feeling better , denies CP or SOB, no LE edema , no overnight events , BP stable.        Review of patient's allergies indicates:   Allergen Reactions    Atorvastatin Other (See Comments)     Muscle pain    Rosuvastatin Other (See Comments)     Muscle pain       Past Medical History:   Diagnosis Date    Anticoagulant long-term use     2014    Aortic valve disease 2014    pig valve    Arthritis     all over    Chest pain 07/15/2019    CHF (congestive heart failure)     2014 on meds    Coronary artery disease     2007 cabg    Heart attack 1990    15 stents    Heart block     Hyperlipidemia     Hypertension     on meds    Hypothyroidism 2015    on meds    PVD (peripheral vascular disease)     Seizures     last episode 1990 on meds     Past Surgical History:   Procedure Laterality Date    AORTOGRAPHY WITH SERIALOGRAPHY N/A 11/16/2021    Procedure: AORTOGRAM, WITH RUNOFF;  Surgeon: Rodrigo BACON  MD Case;  Location: [facility] CATH/EP LAB;  Service: Cardiology;  Laterality: N/A;    ARTERIOGRAPHY OF AORTIC ROOT N/A 11/5/2019    Procedure: ARTERIOGRAM, AORTIC ROOT;  Surgeon: Rodrigo Willoughby MD;  Location: [facility] CATH/EP LAB;  Service: Cardiology;  Laterality: N/A;    CARDIAC CATHETERIZATION      CARDIAC VALVE SURGERY      CORONARY ANGIOGRAPHY INCLUDING BYPASS GRAFTS WITH CATHETERIZATION OF LEFT HEART N/A 11/5/2019    Procedure: ANGIOGRAM, CORONARY, INCLUDING BYPASS GRAFT, WITH LEFT HEART CATHETERIZATION;  Surgeon: Rodrigo Willoughby MD;  Location: [facility] CATH/EP LAB;  Service: Cardiology;  Laterality: N/A;    CORONARY ANGIOGRAPHY INCLUDING BYPASS GRAFTS WITH CATHETERIZATION OF LEFT HEART Left 11/3/2020    Procedure: ANGIOGRAM, CORONARY, INCLUDING BYPASS GRAFT, WITH LEFT HEART CATHETERIZATION;  Surgeon: Rodrigo Willoughby MD;  Location: [facility] CATH/EP LAB;  Service: Cardiology;  Laterality: Left;    CORONARY ANGIOGRAPHY INCLUDING BYPASS GRAFTS WITH CATHETERIZATION OF LEFT HEART N/A 9/28/2021    Procedure: ANGIOGRAM, CORONARY, INCLUDING BYPASS GRAFT, WITH LEFT HEART CATHETERIZATION;  Surgeon: Rodrigo Willoughby MD;  Location: [facility] CATH/EP LAB;  Service: Cardiology;  Laterality: N/A;    CORONARY ANGIOGRAPHY INCLUDING BYPASS GRAFTS WITH CATHETERIZATION OF LEFT HEART N/A 12/5/2023    Procedure: ANGIOGRAM, CORONARY, INCLUDING BYPASS GRAFT, WITH LEFT HEART CATHETERIZATION;  Surgeon: Rodrigo Willoughby MD;  Location: [facility] CATH/EP LAB;  Service: Cardiology;  Laterality: N/A;    CORONARY ANGIOPLASTY      CORONARY ARTERY BYPASS GRAFT      EXTRACORPOREAL SHOCK WAVE LITHOTRIPSY Right 8/1/2019    Procedure: LITHOTRIPSY, ESWL;  Surgeon: Williams Ortega MD;  Location: [facility] OR;  Service: Urology;  Laterality: Right;    HEMORRHOID SURGERY  1990    INSERTION OF PACEMAKER      PERCUTANEOUS TRANSLUMINAL BALLOON ANGIOPLASTY OF CORONARY ARTERY N/A 11/8/2019    Procedure: Angioplasty-coronary;  Surgeon: Rodrigo Willoughby MD;  Location: [facility] CATH/EP LAB;   Service: Cardiology;  Laterality: N/A;    WRIST FRACTURE SURGERY       Family History   Problem Relation Age of Onset    Heart attack Mother     Heart attack Father     Heart disease Sister     Stroke Sister     Diabetes Sister     No Known Problems Maternal Grandmother     No Known Problems Maternal Grandfather     No Known Problems Paternal Grandmother     No Known Problems Paternal Grandfather     No Known Problems Brother     No Known Problems Maternal Aunt     No Known Problems Maternal Uncle     No Known Problems Paternal Aunt     No Known Problems Paternal Uncle     Anemia Neg Hx     Arrhythmia Neg Hx     Asthma Neg Hx     Clotting disorder Neg Hx     Fainting Neg Hx     Heart failure Neg Hx     Hyperlipidemia Neg Hx     Hypertension Neg Hx     Atrial Septal Defect Neg Hx      Social History     Tobacco Use    Smoking status: Former     Current packs/day: 0.00     Average packs/day: 0.5 packs/day for 4.0 years (2.0 ttl pk-yrs)     Types: Cigarettes     Start date:      Quit date:      Years since quittin.0    Smokeless tobacco: Never   Substance Use Topics    Alcohol use: Yes     Comment: social    Drug use: No       Review of Systems:  Review of Systems   Constitutional:  Negative for chills and fever.   Respiratory:  Negative for shortness of breath.    Cardiovascular:  Negative for chest pain.   Gastrointestinal:  Negative for abdominal pain, heartburn, nausea and vomiting.       OBJECTIVE:     Vital Signs (Most Recent)  Temp: 98 °F (36.7 °C) (24)  Pulse: 86 (24)  Resp: 18 (24)  BP: 116/63 (24)  SpO2: 95 % (24)    Vital Signs Range (Last 24H):  Temp:  [98 °F (36.7 °C)-99.4 °F (37.4 °C)]   Pulse:  [78-88]   Resp:  [17-18]   BP: ()/(52-70)   SpO2:  [95 %-96 %]     I & O (Last 24H):    Intake/Output Summary (Last 24 hours) at 2024 0906  Last data filed at 2024 0122  Gross per 24 hour   Intake 730 ml   Output 502 ml   Net 228  ml         Current Diet:     Current Diet Order   Procedures    Diet Cardiac Standard Tray     Order Specific Question:   Tray type:     Answer:   Standard Tray        Allergies:  Review of patient's allergies indicates:   Allergen Reactions    Atorvastatin Other (See Comments)     Muscle pain    Rosuvastatin Other (See Comments)     Muscle pain       Meds:  Scheduled Meds:   aspirin  81 mg Oral Daily    citalopram  20 mg Oral Daily    clopidogreL  75 mg Oral Daily    ezetimibe  10 mg Oral Daily    furosemide  20 mg Oral Daily    isosorbide mononitrate  30 mg Oral Daily    lisinopriL  40 mg Oral Daily    metoprolol succinate  100 mg Oral BID    morphine  4 mg Intravenous Once    phenytoin  400 mg Oral Daily    ranolazine  1,000 mg Oral BID    spironolactone  25 mg Oral Daily     Continuous Infusions:      PRN Meds:acetaminophen, lorazepam, melatonin, morphine, morphine, nitroGLYCERIN, ondansetron    Oxygen/Ventilator Data (Last 24H):  (if applicable)            Hemodynamic Parameters (Last 24H):   (if applicable)        Laboratory and Radiology Data:  Recent Results (from the past 24 hour(s))   POCT glucose    Collection Time: 01/25/24  8:25 PM   Result Value Ref Range    POC Glucose 117 (H) 70 - 110   Urinalysis, Reflex to Urine Culture Urine, Clean Catch    Collection Time: 01/26/24  3:55 AM    Specimen: Urine, Clean Catch   Result Value Ref Range    Specimen UA Urine, Clean Catch     Color, UA Yellow Yellow, Straw, [place]    Appearance, UA Hazy (A) Clear    pH, UA 6.0 5.0 - 8.0    Specific Gravity, UA 1.020 1.005 - 1.030    Protein, UA Trace (A) Negative    Glucose, UA Negative Negative    Ketones, UA Trace (A) Negative    Bilirubin (UA) Negative Negative    Occult Blood UA Negative Negative    Nitrite, UA Negative Negative    Urobilinogen, UA 2.0-3.0 (A) Negative EU/dL    Leukocytes, UA 3+ (A) Negative   Urinalysis Microscopic    Collection Time: 01/26/24  3:55 AM   Result Value Ref Range    RBC, UA 3 0 - 4 /hpf     WBC, UA >100 (H) 0 - 5 /hpf    Bacteria Negative None-Occ /hpf    Squam Epithel, UA 0 /hpf    Hyaline Casts, UA 5 (A) 0-1/lpf /lpf    Microscopic Comment SEE COMMENT    POCT glucose    Collection Time: 01/26/24  4:01 AM   Result Value Ref Range    POC Glucose 86 70 - 110   CBC auto differential    Collection Time: 01/26/24  4:35 AM   Result Value Ref Range    WBC 6.80 3.90 - 12.70 K/uL    RBC 3.74 (L) 4.60 - 6.20 M/uL    Hemoglobin 11.3 (L) 14.0 - 18.0 g/dL    Hematocrit 32.9 (L) 40.0 - 54.0 %    MCV 88 82 - 98 fL    MCH 30.2 27.0 - 31.0 pg    MCHC 34.3 32.0 - 36.0 g/dL    RDW 13.1 11.5 - 14.5 %    Platelets 194 150 - 450 K/uL    MPV 10.2 9.2 - 12.9 fL    Immature Granulocytes 0.3 0.0 - 0.5 %    Gran # (ANC) 4.1 1.8 - 7.7 K/uL    Immature Grans (Abs) 0.02 0.00 - 0.04 K/uL    Lymph # 1.1 1.0 - 4.8 K/uL    Mono # 1.1 (H) 0.3 - 1.0 K/uL    Eos # 0.4 0.0 - 0.5 K/uL    Baso # 0.05 0.00 - 0.20 K/uL    nRBC 0 0 /100 WBC    Gran % 60.3 38.0 - 73.0 %    Lymph % 16.3 (L) 18.0 - 48.0 %    Mono % 16.5 (H) 4.0 - 15.0 %    Eosinophil % 5.9 0.0 - 8.0 %    Basophil % 0.7 0.0 - 1.9 %    Differential Method Automated    Basic metabolic panel    Collection Time: 01/26/24  4:35 AM   Result Value Ref Range    Sodium 131 (L) 136 - 145 mmol/L    Potassium 4.4 3.5 - 5.1 mmol/L    Chloride 95 95 - 110 mmol/L    CO2 26 23 - 29 mmol/L    Glucose 92 70 - 110 mg/dL    BUN 26 (H) 8 - 23 mg/dL    Creatinine 1.5 (H) 0.5 - 1.4 mg/dL    Calcium 8.6 (L) 8.7 - 10.5 mg/dL    Anion Gap 10 8 - 16 mmol/L    eGFR 47.1 (A) >60 mL/min/1.73 m^2   Magnesium    Collection Time: 01/26/24  4:35 AM   Result Value Ref Range    Magnesium 1.7 1.6 - 2.6 mg/dL     Imaging Results              X-Ray Chest AP Portable (Final result)  Result time 01/24/24 08:09:06      Final result by Truman Dawson MD (01/24/24 08:09:06)                   Narrative:    XR CHEST 1 VIEW    CLINICAL HISTORY:  78 years Male Chest Pain    COMPARISON: December 7, 2023    FINDINGS:  Cardiac silhouette is enlarged and stable compared to prior. Patient status post median sternotomy and CABG. Pacing leads are in stable position. Patchy groundglass opacities within the mid and upper lung zones, worsened compared to prior. Blunting of both costophrenic angles consistent with bilateral pleural fluid. No pneumothorax.    IMPRESSION:    Patchy bilateral groundglass opacities which could represent atypical infection or pulmonary edema.    Small bilateral pleural effusions.    Electronically signed by:  Truman Dawson MD  01/24/2024 08:09 AM [facility] Workstation: [id number]                                    12-lead EKG interpretation:  (if applicable)      Current Cardiac Rhythm:   (if applicable)    Physical Exam:   Physical Exam  Cardiovascular:      Rate and Rhythm: Normal rate and regular rhythm.      Heart sounds: Murmur heard.   Pulmonary:      Effort: Pulmonary effort is normal. No respiratory distress.      Breath sounds: Normal breath sounds.   Musculoskeletal:         General: No swelling.   Skin:     General: Skin is warm and dry.   Neurological:      Mental Status: He is alert and oriented to person, place, and time.         ASSESSMENT/PLAN:   Assessment:   CAD  Chronic angina  NSTEMI  HLP  HFpEF  Pafib  PM --Abbot   TAVR bioprosthetic     H/o CABG with redo  Recent LHC one functioning graft    Echo EF 45-50 % Grade II DD normal functioning TAVR     Plan---recent LHC reviewed by  and discussed with   Pt has one functioning graft - unable to intervene at this point  Trop trended down peaked at 406   Chest pain has subsided   Manage medically--Continue ASA/Statin/BB/Ranexa and isosorbide.   Echo EF 45-50 % d/c amlodipine   Previously started on SGLT2 , med to expensive  Add spironolactone--on lisinopril and BB --GDMT   Will consider converting to Entresto at follow up.   --IV lasix given  Appears euvolemic   Oral lasix  Cr 1.5  --pt dry, has not been drinking water, only  soda hydrate, recheck bmp in office  Add xanax prn acute anxiety  Stable for d/c from a cardiac standpoint, follow up in office in one week.   .

## 2024-01-26 NOTE — CONSULTS
Nephrology Consult Note        Patient Name: Jerome Amaro Jr.  MRN: [id number]    Patient Class: IP- Inpatient   Admission Date: 1/24/2024  Length of Stay: 2 days  Date of Service: 1/26/2024    Attending Physician: Sung Berkowitz MD  Primary Care Provider: Oscar Madrid NP    Reason for Consult: trisha    SUBJECTIVE:     HPI: 78M patient of Dr. Willoughby, with HFpEF, pAF, PPM, TAVR, bioprosthetic, CABG with redo with little options for intervention started having chest pain, non-exertional, non-radiating, with no associated symptoms, relieved by Nitro and morphine. BP stable. Trop elevated, CXR with slight edema, , Cr 1.0 on admission, no LE edema.     Started on heparin infusion, CP subsided , trops peaked at 400, managed medically by cards. Scr went up today from 1.0 to 1.5., sNa low at 131. BP is marginal, was opretty low yesterday. Lasix decreased to 20mg daily yesterday, was dc today. Lisinopril was also dc. Was given at 40gm daily since admission. Spironolactone started yesterday, was dc today.    Past Medical History:   Diagnosis Date    Anticoagulant long-term use     2014    Aortic valve disease 2014    pig valve    Arthritis     all over    Chest pain 07/15/2019    CHF (congestive heart failure)     2014 on meds    Coronary artery disease     2007 cabg    Heart attack 1990    15 stents    Heart block     Hyperlipidemia     Hypertension     on meds    Hypothyroidism 2015    on meds    PVD (peripheral vascular disease)     Seizures     last episode 1990 on meds     Past Surgical History:   Procedure Laterality Date    AORTOGRAPHY WITH SERIALOGRAPHY N/A 11/16/2021    Procedure: AORTOGRAM, WITH RUNOFF;  Surgeon: Rodrigo Willoughby MD;  Location: [facility] CATH/EP LAB;  Service: Cardiology;  Laterality: N/A;    ARTERIOGRAPHY OF AORTIC ROOT N/A 11/5/2019    Procedure: ARTERIOGRAM, AORTIC ROOT;  Surgeon: Rodrigo Willoughby MD;  Location: [facility] CATH/EP LAB;  Service: Cardiology;  Laterality: N/A;    CARDIAC  CATHETERIZATION      CARDIAC VALVE SURGERY      CORONARY ANGIOGRAPHY INCLUDING BYPASS GRAFTS WITH CATHETERIZATION OF LEFT HEART N/A 11/5/2019    Procedure: ANGIOGRAM, CORONARY, INCLUDING BYPASS GRAFT, WITH LEFT HEART CATHETERIZATION;  Surgeon: Rodrigo Willoughby MD;  Location: [facility] CATH/EP LAB;  Service: Cardiology;  Laterality: N/A;    CORONARY ANGIOGRAPHY INCLUDING BYPASS GRAFTS WITH CATHETERIZATION OF LEFT HEART Left 11/3/2020    Procedure: ANGIOGRAM, CORONARY, INCLUDING BYPASS GRAFT, WITH LEFT HEART CATHETERIZATION;  Surgeon: Rodrigo Willoughby MD;  Location: [facility] CATH/EP LAB;  Service: Cardiology;  Laterality: Left;    CORONARY ANGIOGRAPHY INCLUDING BYPASS GRAFTS WITH CATHETERIZATION OF LEFT HEART N/A 9/28/2021    Procedure: ANGIOGRAM, CORONARY, INCLUDING BYPASS GRAFT, WITH LEFT HEART CATHETERIZATION;  Surgeon: Rodrigo Willoughby MD;  Location: [facility] CATH/EP LAB;  Service: Cardiology;  Laterality: N/A;    CORONARY ANGIOGRAPHY INCLUDING BYPASS GRAFTS WITH CATHETERIZATION OF LEFT HEART N/A 12/5/2023    Procedure: ANGIOGRAM, CORONARY, INCLUDING BYPASS GRAFT, WITH LEFT HEART CATHETERIZATION;  Surgeon: Rodrigo Willoughby MD;  Location: [facility] CATH/EP LAB;  Service: Cardiology;  Laterality: N/A;    CORONARY ANGIOPLASTY      CORONARY ARTERY BYPASS GRAFT      EXTRACORPOREAL SHOCK WAVE LITHOTRIPSY Right 8/1/2019    Procedure: LITHOTRIPSY, ESWL;  Surgeon: Williams Ortega MD;  Location: [facility] OR;  Service: Urology;  Laterality: Right;    HEMORRHOID SURGERY  1990    INSERTION OF PACEMAKER      PERCUTANEOUS TRANSLUMINAL BALLOON ANGIOPLASTY OF CORONARY ARTERY N/A 11/8/2019    Procedure: Angioplasty-coronary;  Surgeon: Rodrigo Willoughby MD;  Location: [facility] CATH/EP LAB;  Service: Cardiology;  Laterality: N/A;    WRIST FRACTURE SURGERY       Family History   Problem Relation Age of Onset    Heart attack Mother     Heart attack Father     Heart disease Sister     Stroke Sister     Diabetes Sister     No Known Problems Maternal Grandmother     No  Known Problems Maternal Grandfather     No Known Problems Paternal Grandmother     No Known Problems Paternal Grandfather     No Known Problems Brother     No Known Problems Maternal Aunt     No Known Problems Maternal Uncle     No Known Problems Paternal Aunt     No Known Problems Paternal Uncle     Anemia Neg Hx     Arrhythmia Neg Hx     Asthma Neg Hx     Clotting disorder Neg Hx     Fainting Neg Hx     Heart failure Neg Hx     Hyperlipidemia Neg Hx     Hypertension Neg Hx     Atrial Septal Defect Neg Hx      Social History     Tobacco Use    Smoking status: Former     Current packs/day: 0.00     Average packs/day: 0.5 packs/day for 4.0 years (2.0 ttl pk-yrs)     Types: Cigarettes     Start date:      Quit date:      Years since quittin.0    Smokeless tobacco: Never   Substance Use Topics    Alcohol use: Yes     Comment: social    Drug use: No       Review of patient's allergies indicates:   Allergen Reactions    Atorvastatin Other (See Comments)     Muscle pain    Rosuvastatin Other (See Comments)     Muscle pain       Outpatient meds:  Current Facility-Administered Medications on File Prior to Encounter   Medication Dose Route Frequency Provider Last Rate Last Admin    magnesium oxide tablet 800 mg  800 mg Oral PRN Rodrigo Willoughby MD   800 mg at 21 0737    sodium chloride 0.9% flush 10 mL  10 mL Intravenous PRN Rodrigo Willoughby MD         Current Outpatient Medications on File Prior to Encounter   Medication Sig Dispense Refill    amLODIPine (NORVASC) 5 MG tablet Take 5 mg by mouth 2 (two) times daily.      aspirin (ECOTRIN) 81 MG EC tablet Take 1 tablet (81 mg total) by mouth once daily. 30 tablet 11    citalopram (CELEXA) 20 MG tablet Take 20 mg by mouth once daily.      clopidogrel (PLAVIX) 75 mg tablet Take 75 mg by mouth once daily.      ELIQUIS 5 mg Tab Take 5 mg by mouth every morning.      ezetimibe (ZETIA) 10 mg tablet Take 10 mg by mouth once daily.      famotidine (PEPCID) 40 MG  tablet Take 40 mg by mouth Daily.      furosemide (LASIX) 20 MG tablet Take 20 mg by mouth daily as needed (Fluid).      hydrOXYzine pamoate (VISTARIL) 25 MG Cap Take 1 capsule (25 mg total) by mouth daily as needed (anxiety). (Patient taking differently: Take 25 mg by mouth Daily.)      isosorbide mononitrate (IMDUR) 30 MG 24 hr tablet Take 1 tablet (30 mg total) by mouth once daily. 30 tablet 11    lisinopriL (PRINIVIL,ZESTRIL) 40 MG tablet Take 40 mg by mouth once daily.      metoprolol succinate (TOPROL-XL) 50 MG 24 hr tablet Take 100 mg by mouth 2 (two) times daily.      NITROLINGUAL 400 mcg/spray spray Place 1 spray under the tongue every 5 (five) minutes as needed for Chest pain.      phenytoin (DILANTIN) 100 MG ER capsule Take 400 mg by mouth once daily.   0    ranolazine (RANEXA) 1,000 mg Tb12 Take 1,000 mg by mouth 2 (two) times daily.          Scheduled meds:   aspirin  81 mg Oral Daily    citalopram  20 mg Oral Daily    clopidogreL  75 mg Oral Daily    ezetimibe  10 mg Oral Daily    isosorbide mononitrate  30 mg Oral Daily    metoprolol succinate  100 mg Oral BID    morphine  4 mg Intravenous Once    phenytoin  400 mg Oral Daily    ranolazine  1,000 mg Oral BID       Infusions:      PRN meds:  acetaminophen, ALPRAZolam, melatonin, morphine, morphine, nitroGLYCERIN, ondansetron    Review of Systems:  Constitutional:  Negative for chills, fever, malaise/fatigue and weight loss.   HENT:  Negative for hearing loss and nosebleeds.    Eyes:  Negative for blurred vision, double vision and photophobia.   Respiratory:  Negative for cough, shortness of breath and wheezing.    Cardiovascular:  Negative for chest pain, palpitations and leg swelling.   Gastrointestinal:  Negative for abdominal pain, constipation, diarrhea, heartburn, nausea and vomiting.   Genitourinary:  Negative for dysuria, frequency and urgency.   Musculoskeletal:  Negative for falls, joint pain and myalgias.   Skin:  Negative for itching and  rash.   Neurological:  Negative for dizziness, speech change, focal weakness, loss of consciousness and headaches.   Endo/Heme/Allergies:  Does not bruise/bleed easily.   Psychiatric/Behavioral:  Negative for depression and substance abuse. The patient is not nervous/anxious.      OBJECTIVE:     Vital Signs and IO:  Temp:  [98 °F (36.7 °C)-99.4 °F (37.4 °C)]   Pulse:  [78-88]   Resp:  [17-18]   BP: ()/(52-70)   SpO2:  [95 %-96 %]   I/O last 3 completed shifts:  In: 970 [P.O.:970]  Out: 702 [Urine:701; Stool:1]  Wt Readings from Last 5 Encounters:   01/25/24 77.4 kg (170 lb 10.2 oz)   01/24/24 77.1 kg (169 lb 15.6 oz)   12/07/23 80.8 kg (178 lb 2.1 oz)   12/06/23 80.7 kg (177 lb 14.6 oz)   12/02/23 83.9 kg (185 lb)     Body mass index is 26.73 kg/m².    Physical Exam  Constitutional:       General: Patient is not in acute distress.     Appearance: Patient is well-developed. She is not diaphoretic.   HENT:      Head: Normocephalic and atraumatic.      Mouth/Throat: Mucous membranes are moist.   Eyes:      General: No scleral icterus.     Pupils: Pupils are equal, round, and reactive to light.   Cardiovascular:      Rate and Rhythm: Normal rate and regular rhythm.   Pulmonary:      Effort: Pulmonary effort is normal. No respiratory distress.      Breath sounds: No stridor.   Abdominal:      General: There is no distension.      Palpations: Abdomen is soft.   Musculoskeletal:         General: No deformity. Normal range of motion.      Cervical back: Neck supple.   Skin:     General: Skin is warm and dry.      Findings: No rash present. No erythema.   Neurological:      Mental Status: Patient is alert and oriented to person, place, and time.      Cranial Nerves: No cranial nerve deficit.   Psychiatric:         Behavior: Behavior normal.     Laboratory:  Recent Labs   Lab 01/24/24  0250 01/25/24  0345 01/26/24  0435   * 133* 131*   K 3.9 4.0 4.4    97 95   CO2 23 25 26   BUN 17 17 26*   CREATININE 1.0  "1.0 1.5*   * 94 92       Recent Labs   Lab 01/24/24  0250 01/25/24  0345 01/26/24  0435   CALCIUM 8.4* 9.0 8.6*   ALBUMIN 3.6  --   --    MG 1.6  --  1.7             No results for input(s): "POCTGLUCOSE" in the last 168 hours.    Recent Labs   Lab 12/03/23  0455   Hemoglobin A1C 5.2       Recent Labs   Lab 01/24/24  0453 01/25/24  0345 01/26/24  0435   WBC 7.38  7.38 6.81  6.81 6.80   HGB 10.7*  10.7* 11.5*  11.5* 11.3*   HCT 31.6*  31.6* 34.6*  34.6* 32.9*     177 196  196 194   MCV 89  89 88  88 88   MCHC 33.9  33.9 33.2  33.2 34.3   MONO 17.9*  17.9*  1.3*  1.3* 15.9*  15.9*  1.1*  1.1* 16.5*  1.1*   EOSINOPHIL 3.0  3.0 4.1  4.1 5.9       Recent Labs   Lab 01/24/24  0250   BILITOT 0.8   PROT 6.7   ALBUMIN 3.6   ALKPHOS 108   ALT 19   AST 26       Recent Labs   Lab 12/07/23  0258 01/26/24  0355   Color, UA Yellow Yellow   Appearance, UA Clear Hazy A   pH, UA 6.0 6.0   Specific Gravity, UA 1.025 1.020   Protein, UA 1+ A Trace A   Glucose, UA Negative Negative   Ketones, UA Negative Trace A   Urobilinogen, UA 2.0-3.0 A 2.0-3.0 A   Bilirubin (UA) Negative Negative   Occult Blood UA Negative Negative   Nitrite, UA Negative Negative   RBC, UA 2 3   WBC, UA 1 >100 H   Bacteria Rare Negative   Hyaline Casts, UA 1 5 A       Recent Labs   Lab 12/02/23  0836   POC PH 7.362   POC PCO2 43.5   POC HCO3 24.7   POC PO2 27 LL   POC SATURATED O2 47   POC BE -1   Sample VENOUS       Microbiology Results (last 7 days)       Procedure Component Value Units Date/Time    Urine culture [[id number]] Collected: 01/26/24 0355    Order Status: No result Specimen: Urine Updated: 01/26/24 5936            ASSESSMENT/PLAN:     DORIS due to CRS due to hypotension  HFpEF  Chronic angina with NSTEMI  Hist of TAVR  CKD stage 2, baseline sCr 1  Anemia  No NSAIDs, ACEI/ARB, IV contrast or other nephrotoxins.  Keep MAP > 60, SBP > 100.  Dose meds for GFR < 60 ml/min.  Renal diet - low K, low phos.    Cards will have " to decide which meds are most important at this time, he may not be able to tolerate full dose ACEi/ARB, Loop diuretic and Spironolactone, BBL and Imdur.    Thank you for allowing us to participate in the care of your patient!   We will follow the patient and provide recommendations as needed.    Patient care time was spent personally by me on the following activities:     Obtaining a history.  Examination of patient.  Providing medical care at the patients bedside.  Developing a treatment plan with patient or surrogate and bedside caregivers.  Ordering and reviewing laboratory studies, radiographic studies, pulse oximetry.  Ordering and performing treatments and interventions.  Evaluation of patient's response to treatment.  Discussions with consultants while on the unit and immediately available to the patient.  Re-evaluation of the patient's condition.  Documentation in the medical record.     Ephraim Romero MD    [facility]  [address] [place] [id number]    [phone] - tel  [phone] - fax    1/26/2024

## 2024-01-26 NOTE — ASSESSMENT & PLAN NOTE
Patient with acute kidney injury/acute renal failure likely due to pre-renal azotemia due to dehydration DORIS is currently worsening. Baseline creatinine  1  - Labs reviewed- Renal function/electrolytes with Estimated Creatinine Clearance: 37.3 mL/min (A) (based on SCr of 1.5 mg/dL (H)). according to latest data. Monitor urine output and serial BMP and adjust therapy as needed. Avoid nephrotoxins and renally dose meds for GFR listed above.    We will give normal saline 250 x 1.  And consult Nephrology for low urine output

## 2024-01-26 NOTE — CARE UPDATE
01/25/24 2120   Patient Assessment/Suction   Level of Consciousness (AVPU) alert   Respiratory Effort Unlabored   Expansion/Accessory Muscles/Retractions no use of accessory muscles   All Lung Fields Breath Sounds clear;equal bilaterally   Rhythm/Pattern, Respiratory no shortness of breath reported   Cough Frequency no cough   PRE-TX-O2   Device (Oxygen Therapy) room air   SpO2 95 %   Pulse Oximetry Type Intermittent   $ Pulse Oximetry - Multiple Charge Pulse Oximetry - Multiple   Pulse 88   Resp 17   Positioning   Head of Bed (HOB) Positioning HOB elevated;HOB at 30 degrees

## 2024-01-26 NOTE — CARE UPDATE
01/26/24 0819   Patient Assessment/Suction   Level of Consciousness (AVPU) alert   PRE-TX-O2   Device (Oxygen Therapy) room air   SpO2 96 %   Pulse Oximetry Type Intermittent   $ Pulse Oximetry - Multiple Charge Pulse Oximetry - Multiple   Education   $ Education Other (see comment);15 min

## 2024-01-26 NOTE — ASSESSMENT & PLAN NOTE
Patient with acute kidney injury/acute renal failure likely due to pre-renal azotemia due to dehydration DORIS is currently worsening. Baseline creatinine  1  - Labs reviewed- Renal function/electrolytes with Estimated Creatinine Clearance: 37.3 mL/min (A) (based on SCr of 1.5 mg/dL (H)). according to latest data. Monitor urine output and serial BMP and adjust therapy as needed. Avoid nephrotoxins and renally dose meds for GFR listed above.    We will give normal saline 250 x 1.  And consult Nephrology for low urine output.  Will hold lasix, spirolactone and lisninopril

## 2024-01-26 NOTE — PLAN OF CARE
Problem: Adult Inpatient Plan of Care  Goal: Plan of Care Review  Outcome: Ongoing, Progressing  Goal: Patient-Specific Goal (Individualized)  Outcome: Ongoing, Progressing  Goal: Absence of Hospital-Acquired Illness or Injury  Outcome: Ongoing, Progressing  Goal: Optimal Comfort and Wellbeing  Outcome: Ongoing, Progressing  Goal: Readiness for Transition of Care  Outcome: Ongoing, Progressing     Problem: Skin Injury Risk Increased  Goal: Skin Health and Integrity  Outcome: Ongoing, Progressing     Problem: Fall Injury Risk  Goal: Absence of Fall and Fall-Related Injury  Outcome: Ongoing, Progressing     Problem: Malnutrition  Goal: Improved Nutritional Intake  Outcome: Ongoing, Progressing     Problem: Fluid and Electrolyte Imbalance (Acute Kidney Injury/Impairment)  Goal: Fluid and Electrolyte Balance  Outcome: Ongoing, Progressing     Problem: Oral Intake Inadequate (Acute Kidney Injury/Impairment)  Goal: Optimal Nutrition Intake  Outcome: Ongoing, Progressing     Problem: Renal Function Impairment (Acute Kidney Injury/Impairment)  Goal: Effective Renal Function  Outcome: Ongoing, Progressing

## 2024-01-26 NOTE — SUBJECTIVE & OBJECTIVE
Interval History:  Complained of dark urine and low urine output      Objective:     Vital Signs (Most Recent):  Temp: 98 °F (36.7 °C) (01/26/24 0730)  Pulse: 86 (01/26/24 0730)  Resp: 18 (01/26/24 0730)  BP: 116/63 (01/26/24 0730)  SpO2: 96 % (01/26/24 0819) Vital Signs (24h Range):  Temp:  [98 °F (36.7 °C)-99.4 °F (37.4 °C)] 98 °F (36.7 °C)  Pulse:  [78-88] 86  Resp:  [17-18] 18  SpO2:  [95 %-96 %] 96 %  BP: ()/(52-70) 116/63     Weight: 77.4 kg (170 lb 10.2 oz)  Body mass index is 26.73 kg/m².    Intake/Output Summary (Last 24 hours) at 1/26/2024 1150  Last data filed at 1/26/2024 1025  Gross per 24 hour   Intake 890 ml   Output 500 ml   Net 390 ml         Physical Exam  Constitutional:       General: He is not in acute distress.  HENT:      Head: Normocephalic and atraumatic.      Nose: No congestion.   Eyes:      General: No scleral icterus.        Right eye: No discharge.         Left eye: No discharge.      Extraocular Movements: Extraocular movements intact.   Cardiovascular:      Rate and Rhythm: Normal rate and regular rhythm.   Pulmonary:      Effort: Pulmonary effort is normal.      Breath sounds: Normal breath sounds.   Abdominal:      General: Abdomen is flat. Bowel sounds are normal.   Musculoskeletal:         General: No swelling or tenderness.      Cervical back: Normal range of motion and neck supple. No rigidity.   Skin:     General: Skin is warm and dry.   Neurological:      General: No focal deficit present.      Mental Status: He is alert and oriented to person, place, and time.   Psychiatric:         Mood and Affect: Mood normal.             Significant Labs: All pertinent labs within the past 24 hours have been reviewed.  CBC:   Recent Labs   Lab 01/25/24 0345 01/26/24  0435   WBC 6.81  6.81 6.80   HGB 11.5*  11.5* 11.3*   HCT 34.6*  34.6* 32.9*     196 194     CMP:   Recent Labs   Lab 01/25/24  0345 01/26/24  0435   * 131*   K 4.0 4.4   CL 97 95   CO2 25 26   GLU 94  92   BUN 17 26*   CREATININE 1.0 1.5*   CALCIUM 9.0 8.6*   ANIONGAP 11 10       Significant Imaging: I have reviewed all pertinent imaging results/findings within the past 24 hours.

## 2024-01-26 NOTE — PROGRESS NOTES
"[facility]  Progress Note    Patient Name: Jerome Amaro Jr.  MRN: [id number]  Patient Class: IP- Inpatient   Admission Date: 1/24/2024  Length of Stay: 2 days  Attending Physician: Sung Berkowitz MD  Primary Care Provider: Oscar Madrid NP        Subjective:     Principal Problem:NSTEMI (non-ST elevated myocardial infarction)        HPI:  78 year old male with history of CAD (s/p CABG twice), HFrEF (45% recent [facility] --12/2023), AVR (porcine), Heart Block (PPM), HTN, Hypothyroidism, Seizure disorder presented to ED complaining of left arm pain and band like chest heaviness "Across the front of my chest", "Worst pain I've ever had", which was accompanied by SOB. Symptoms lasted 2 hours and had resolved by the time he reached the ED--no further discomfort since. He admits to PND, but no orthopnea or edema. No cough or sputum. He has been "Under a lot of stress" according to a daughter at bedside--he was recently , and is not coping well--actively grieving.     [facility] 12/2023: Summary    Left ventricular ejection fraction was calculated to be 45%. with inferior wall hypokinesis    Diastolic dysfunction.    Patent sequential saphenous vein graft to LAD as well as 1st and 2nd marginal divisions of the circumflex with right coronary artery arm of graft occluded    In ED:  EKG reviewed: paced. CXR reviewed: cardiomegaly with interstitial edema. Labs reviewed: BNP and troponin are elevated; no leukocytosis with mild normocytic anemia; minimal hyponatremia otherwise normal non-fasting CMP.    Discussed with ED MD: Inpatient admission for NSTEMI (Takotsubo??); hold apixaban and start heparin infusion, otherwise continuing home regimen -- l-thyroxine not on his home meds list; Consult his Cardiologist; prn NTG, morphine for chest discomfort; prn lorazepam for anxiety/grief; TSH with AM labs for review; serial biomarkers; ECHO    Overview/Hospital Course:  Patient admitted to the [facility] for chest pain.  Cardiology consult and saw the patient.  Patient found to have acute kidney injury.  We will hold Lasix for now.  And gave the patient normal saline 250 cc x1  and consult Nephrology.    Cardiology recommendation  Plan---recent C reviewed by  and discussed with   Pt has one functioning graft - unable to intervene at this point  Trop down trending  Chest pain has subsided with nitro and morphine  D/c IV heparin  Manage medically--Continue ASA/Statin/BB/Ranexa and isosorbide.   Echo EF 45-50 % d/c amlodipine   Previously started on SGLT2 , med to expensive  Add spironolactone--on lisinopril  Will consider converting to Entresto at follow up.   --IV lasix given  Appears euvolemic   Pt to mobilize    Interval History:  Complained of dark urine and low urine output      Objective:     Vital Signs (Most Recent):  Temp: 98 °F (36.7 °C) (01/26/24 0730)  Pulse: 86 (01/26/24 0730)  Resp: 18 (01/26/24 0730)  BP: 116/63 (01/26/24 0730)  SpO2: 96 % (01/26/24 0819) Vital Signs (24h Range):  Temp:  [98 °F (36.7 °C)-99.4 °F (37.4 °C)] 98 °F (36.7 °C)  Pulse:  [78-88] 86  Resp:  [17-18] 18  SpO2:  [95 %-96 %] 96 %  BP: ()/(52-70) 116/63     Weight: 77.4 kg (170 lb 10.2 oz)  Body mass index is 26.73 kg/m².    Intake/Output Summary (Last 24 hours) at 1/26/2024 1150  Last data filed at 1/26/2024 1025  Gross per 24 hour   Intake 890 ml   Output 500 ml   Net 390 ml         Physical Exam  Constitutional:       General: He is not in acute distress.  HENT:      Head: Normocephalic and atraumatic.      Nose: No congestion.   Eyes:      General: No scleral icterus.        Right eye: No discharge.         Left eye: No discharge.      Extraocular Movements: Extraocular movements intact.   Cardiovascular:      Rate and Rhythm: Normal rate and regular rhythm.   Pulmonary:      Effort: Pulmonary effort is normal.      Breath sounds: Normal breath sounds.   Abdominal:      General:  Abdomen is flat. Bowel sounds are normal.   Musculoskeletal:         General: No swelling or tenderness.      Cervical back: Normal range of motion and neck supple. No rigidity.   Skin:     General: Skin is warm and dry.   Neurological:      General: No focal deficit present.      Mental Status: He is alert and oriented to person, place, and time.   Psychiatric:         Mood and Affect: Mood normal.             Significant Labs: All pertinent labs within the past 24 hours have been reviewed.  CBC:   Recent Labs   Lab 01/25/24  0345 01/26/24  0435   WBC 6.81  6.81 6.80   HGB 11.5*  11.5* 11.3*   HCT 34.6*  34.6* 32.9*     196 194     CMP:   Recent Labs   Lab 01/25/24 0345 01/26/24  0435   * 131*   K 4.0 4.4   CL 97 95   CO2 25 26   GLU 94 92   BUN 17 26*   CREATININE 1.0 1.5*   CALCIUM 9.0 8.6*   ANIONGAP 11 10       Significant Imaging: I have reviewed all pertinent imaging results/findings within the past 24 hours.    Assessment/Plan:      * NSTEMI (non-ST elevated myocardial infarction)  Cardiology recommendation Plan---recent C reviewed by  and discussed with   Pt has one functioning graft - unable to intervene at this point  Trop down trending  Chest pain has subsided with nitro and morphine  D/c IV heparin  Manage medically--Continue ASA/Statin/BB/Ranexa and isosorbide.   Echo EF 45-50 % d/c amlodipine   Previously started on SGLT2 , med to expensive  Add spironolactone--on lisinopril  Will consider converting to Entresto at follow up.   --IV lasix given  Appears euvolemic   Will convert to oral lasix now  Pt to mobilize    DORIS (acute kidney injury)  Patient with acute kidney injury/acute renal failure likely due to pre-renal azotemia due to dehydration DORIS is currently worsening. Baseline creatinine  1  - Labs reviewed- Renal function/electrolytes with Estimated Creatinine Clearance: 37.3 mL/min (A) (based on SCr of 1.5 mg/dL (H)). according to latest data.  Monitor urine output and serial BMP and adjust therapy as needed. Avoid nephrotoxins and renally dose meds for GFR listed above.    We will give normal saline 250 x 1.  And consult Nephrology for low urine output.  Will hold lasix, spirolactone and lisninopril    Grief  Inpatient admission for NSTEMI (Takotsubo??); hold apixaban and start heparin infusion, otherwise continuing home regimen -- l-thyroxine not on his home meds list; Consult his Cardiologist; prn NTG, morphine for chest discomfort; prn lorazepam for anxiety/grief; TSH with AM labs for review; serial biomarkers; ECHO      S/P CABG (coronary artery bypass graft)  Continue his home medication      S/P TAVR (transcatheter aortic valve replacement)  Continue home medication         VTE Risk Mitigation (From admission, onward)           Ordered     IP VTE HIGH RISK PATIENT  Once         01/24/24 0429     Place sequential compression device  Until discontinued         01/24/24 0429     Reason for No Pharmacological VTE Prophylaxis  Once        Question:  Reasons:  Answer:  IV Heparin w/in 24 hrs. Pre or Post-Op    01/24/24 0429                    Discharge Planning   AXEL: 1/27/2024     Code Status: Partial Code   Is the patient medically ready for discharge?:     Reason for patient still in hospital (select all that apply): Treatment  Discharge Plan A: Home                  Sung Berkowitz MD  Department of Hospital Medicine   Formerly [facility], later [facility]

## 2024-01-27 VITALS
HEART RATE: 75 BPM | WEIGHT: 171.06 LBS | TEMPERATURE: 98 F | DIASTOLIC BLOOD PRESSURE: 70 MMHG | BODY MASS INDEX: 26.85 KG/M2 | SYSTOLIC BLOOD PRESSURE: 117 MMHG | RESPIRATION RATE: 17 BRPM | OXYGEN SATURATION: 96 % | HEIGHT: 67 IN

## 2024-01-27 LAB
ANION GAP SERPL CALC-SCNC: 8 MMOL/L (ref 8–16)
BASOPHILS # BLD AUTO: 0.04 K/UL (ref 0–0.2)
BASOPHILS NFR BLD: 0.7 % (ref 0–1.9)
BUN SERPL-MCNC: 19 MG/DL (ref 8–23)
CALCIUM SERPL-MCNC: 8.4 MG/DL (ref 8.7–10.5)
CHLORIDE SERPL-SCNC: 95 MMOL/L (ref 95–110)
CO2 SERPL-SCNC: 26 MMOL/L (ref 23–29)
CREAT SERPL-MCNC: 1.1 MG/DL (ref 0.5–1.4)
DIFFERENTIAL METHOD BLD: ABNORMAL
EOSINOPHIL # BLD AUTO: 0.4 K/UL (ref 0–0.5)
EOSINOPHIL NFR BLD: 6.8 % (ref 0–8)
ERYTHROCYTE [DISTWIDTH] IN BLOOD BY AUTOMATED COUNT: 13 % (ref 11.5–14.5)
EST. GFR  (NO RACE VARIABLE): >60 ML/MIN/1.73 M^2
GLUCOSE SERPL-MCNC: 95 MG/DL (ref 70–110)
HCT VFR BLD AUTO: 33.3 % (ref 40–54)
HGB BLD-MCNC: 11.4 G/DL (ref 14–18)
IMM GRANULOCYTES # BLD AUTO: 0.02 K/UL (ref 0–0.04)
IMM GRANULOCYTES NFR BLD AUTO: 0.4 % (ref 0–0.5)
LYMPHOCYTES # BLD AUTO: 1.1 K/UL (ref 1–4.8)
LYMPHOCYTES NFR BLD: 20.5 % (ref 18–48)
MCH RBC QN AUTO: 29.8 PG (ref 27–31)
MCHC RBC AUTO-ENTMCNC: 34.2 G/DL (ref 32–36)
MCV RBC AUTO: 87 FL (ref 82–98)
MONOCYTES # BLD AUTO: 0.9 K/UL (ref 0.3–1)
MONOCYTES NFR BLD: 16.2 % (ref 4–15)
NEUTROPHILS # BLD AUTO: 3.1 K/UL (ref 1.8–7.7)
NEUTROPHILS NFR BLD: 55.4 % (ref 38–73)
NRBC BLD-RTO: 0 /100 WBC
PLATELET # BLD AUTO: 193 K/UL (ref 150–450)
PMV BLD AUTO: 10.9 FL (ref 9.2–12.9)
POTASSIUM SERPL-SCNC: 3.9 MMOL/L (ref 3.5–5.1)
RBC # BLD AUTO: 3.83 M/UL (ref 4.6–6.2)
SODIUM SERPL-SCNC: 129 MMOL/L (ref 136–145)
WBC # BLD AUTO: 5.56 K/UL (ref 3.9–12.7)

## 2024-01-27 PROCEDURE — 80048 BASIC METABOLIC PNL TOTAL CA: CPT | Performed by: INTERNAL MEDICINE

## 2024-01-27 PROCEDURE — 36415 COLL VENOUS BLD VENIPUNCTURE: CPT | Performed by: INTERNAL MEDICINE

## 2024-01-27 PROCEDURE — 25000003 PHARM REV CODE 250: Performed by: INTERNAL MEDICINE

## 2024-01-27 PROCEDURE — 85025 COMPLETE CBC W/AUTO DIFF WBC: CPT | Performed by: INTERNAL MEDICINE

## 2024-01-27 RX ADMIN — ASPIRIN 81 MG: 81 TABLET, COATED ORAL at 09:01

## 2024-01-27 RX ADMIN — CLOPIDOGREL BISULFATE 75 MG: 75 TABLET, FILM COATED ORAL at 09:01

## 2024-01-27 RX ADMIN — CITALOPRAM HYDROBROMIDE 20 MG: 20 TABLET ORAL at 09:01

## 2024-01-27 RX ADMIN — ISOSORBIDE MONONITRATE 30 MG: 30 TABLET, EXTENDED RELEASE ORAL at 09:01

## 2024-01-27 RX ADMIN — PHENYTOIN SODIUM 400 MG: 100 CAPSULE ORAL at 09:01

## 2024-01-27 RX ADMIN — EZETIMIBE 10 MG: 10 TABLET ORAL at 09:01

## 2024-01-27 RX ADMIN — METOPROLOL SUCCINATE 100 MG: 50 TABLET, EXTENDED RELEASE ORAL at 09:01

## 2024-01-27 RX ADMIN — RANOLAZINE 1000 MG: 500 TABLET, FILM COATED, EXTENDED RELEASE ORAL at 09:01

## 2024-01-27 NOTE — DISCHARGE SUMMARY
"[facility]  Discharge Summary      Patient Name: Jerome Amaro Jr.  MRN: [id number]  HANSEL: [id number]  Patient Class: IP- Inpatient  Admission Date: 1/24/2024  Hospital Length of Stay: 3 days  Discharge Date and Time:  01/27/2024 3:30 PM  Attending Physician: Sung Berkowitz MD   Discharging Provider: Sung Berkowitz MD  Primary Care Provider: Oscar Madrid NP    Primary Care Team: Networked reference to record PCT     HPI:   78 year old male with history of CAD (s/p CABG twice), HFrEF (45% recent [facility] --12/2023), AVR (porcine), Heart Block (PPM), HTN, Hypothyroidism, Seizure disorder presented to ED complaining of left arm pain and band like chest heaviness "Across the front of my chest", "Worst pain I've ever had", which was accompanied by SOB. Symptoms lasted 2 hours and had resolved by the time he reached the ED--no further discomfort since. He admits to PND, but no orthopnea or edema. No cough or sputum. He has been "Under a lot of stress" according to a daughter at bedside--he was recently , and is not coping well--actively grieving.     [facility] 12/2023: Summary    Left ventricular ejection fraction was calculated to be 45%. with inferior wall hypokinesis    Diastolic dysfunction.    Patent sequential saphenous vein graft to LAD as well as 1st and 2nd marginal divisions of the circumflex with right coronary artery arm of graft occluded    In ED:  EKG reviewed: paced. CXR reviewed: cardiomegaly with interstitial edema. Labs reviewed: BNP and troponin are elevated; no leukocytosis with mild normocytic anemia; minimal hyponatremia otherwise normal non-fasting CMP.    Discussed with ED MD: Inpatient admission for NSTEMI (Takotsubo??); hold apixaban and start heparin infusion, otherwise continuing home regimen -- l-thyroxine not on his home meds list; Consult his Cardiologist; prn NTG, morphine for chest discomfort; prn lorazepam for anxiety/grief; TSH with AM labs for " review; serial biomarkers; ECHO    * No surgery found *      Hospital Course:   Patient admitted to the hospital for chest pain.  Cardiology consult and saw the patient.  Start the patient on diuretic and Lasix Patient found to have acute kidney injury.  We will hold Lasix and spironolactone and lisinopril for now.  And gave the patient normal saline 250 cc x1  and consult Nephrology.  Patient today kidney function back to normal.  From Cardiology standpoint we will continue hold spironolactone and lisinopril.  Patient will follow up with Cardiology one-week and possible restart enestro    Cardiology recommendation  Plan---recent C reviewed by  and discussed with   Pt has one functioning graft - unable to intervene at this point  Trop down trending  Chest pain has subsided with nitro and morphine  D/c IV heparin  Manage medically--Continue ASA/Statin/BB/Ranexa and isosorbide.   Echo EF 45-50 % d/c amlodipine   Previously started on SGLT2 , med to expensive  Add spironolactone--on lisinopril  Will consider converting to Entresto at follow up.   --IV lasix given  Appears euvolemic   Pt to mobilize     Goals of Care Treatment Preferences:  Code Status: Partial Code      Consults:   Consults (From admission, onward)          Status Ordering Provider     Inpatient consult to Nephrology  Once        Provider:  Ephraim Romero MD    Completed JL TOVAR     Inpatient consult to Cardiology  Once        Provider:  Rodrigo Willoughby MD    Completed GEORGINA MELTON            Renal/  DORIS (acute kidney injury)  Patient with acute kidney injury/acute renal failure likely due to pre-renal azotemia due to dehydration DORIS is currently worsening. Baseline creatinine  1  - Labs reviewed- Renal function/electrolytes with Estimated Creatinine Clearance: 37.3 mL/min (A) (based on SCr of 1.5 mg/dL (H)). according to latest data. Monitor urine output and serial BMP and adjust therapy as needed.  Avoid nephrotoxins and renally dose meds for GFR listed above.    We will give normal saline 250 x 1.  And consult Nephrology for low urine output.  Will hold lasix, spirolactone and lisninopril      Final Active Diagnoses:    Diagnosis Date Noted POA    PRINCIPAL PROBLEM:  NSTEMI (non-ST elevated myocardial infarction) [I21.4] 11/05/2019 Yes    DORIS (acute kidney injury) [N17.9] 01/26/2024 Unknown    Grief [F43.21] 01/24/2024 Yes    S/P CABG (coronary artery bypass graft) [Z95.1] 04/21/2015 Not Applicable    Pacemaker [Z95.0] 04/21/2015 Yes    S/P TAVR (transcatheter aortic valve replacement) [Z95.2] 01/07/2015 Not Applicable    HTN (hypertension) [I10] 01/07/2015 Yes    Dyslipidemia [E78.5] 01/07/2015 Yes      Problems Resolved During this Admission:       Discharged Condition: fair    Disposition:     Follow Up:   Follow-up Information       [facility]. Go on 2/1/2024.    Specialty: Primary Care  Why: Hospital follow up at 11:00 a.m. on 2/1  Contact information:  [address]  [place] [address] [phone]  Additional information:  [address]             Desmond Renee MD Follow up in 1 week(s).    Specialties: Cardiovascular Disease, Cardiology  Contact information:  [address]  [address]  [place] [address]  [phone]               Oscar Madrid NP Follow up in 1 week(s).    Specialty: Family Medicine  Contact information:  [address]  [address]  [place] [address]  [phone]                           Patient Instructions:   No discharge procedures on file.    Significant Diagnostic Studies: Labs: CMP   Recent Labs   Lab 01/26/24  0435 01/27/24  0422   * 129*   K 4.4 3.9   CL 95 95   CO2 26 26   GLU 92 95   BUN 26* 19   CREATININE 1.5* 1.1   CALCIUM 8.6* 8.4*   ANIONGAP 10 8    and CBC   Recent Labs   Lab 01/26/24  0435 01/27/24  0423   WBC 6.80 5.56   HGB 11.3* 11.4*   HCT 32.9* 33.3*    193     Physical Exam  Constitutional:        General: He is not in acute distress.  HENT:      Head: Normocephalic and atraumatic.      Nose: No congestion.   Eyes:      General: No scleral icterus.        Right eye: No discharge.         Left eye: No discharge.      Extraocular Movements: Extraocular movements intact.   Cardiovascular:      Rate and Rhythm: Normal rate and regular rhythm.   Pulmonary:      Effort: Pulmonary effort is normal.      Breath sounds: Normal breath sounds.   Abdominal:      General: Abdomen is flat. Bowel sounds are normal.   Musculoskeletal:         General: No swelling or tenderness.      Cervical back: Normal range of motion and neck supple. No rigidity.   Skin:     General: Skin is warm.   Neurological:      General: No focal deficit present.      Mental Status: He is alert and oriented to person, place, and time.   Psychiatric:         Mood and Affect: Mood normal.        Pending Diagnostic Studies:       None           Medications:  Reconciled Home Medications:      Medication List        CHANGE how you take these medications      hydrOXYzine pamoate 25 MG Cap  Commonly known as: VISTARIL  Take 1 capsule (25 mg total) by mouth daily as needed (anxiety).  What changed: when to take this            CONTINUE taking these medications      aspirin 81 MG EC tablet  Commonly known as: ECOTRIN  Take 1 tablet (81 mg total) by mouth once daily.     citalopram 20 MG tablet  Commonly known as: CeleXA  Take 20 mg by mouth once daily.     clopidogreL 75 mg tablet  Commonly known as: PLAVIX  Take 75 mg by mouth once daily.     ELIQUIS 5 mg Tab  Generic drug: apixaban  Take 5 mg by mouth every morning.     ezetimibe 10 mg tablet  Commonly known as: ZETIA  Take 10 mg by mouth once daily.     famotidine 40 MG tablet  Commonly known as: PEPCID  Take 40 mg by mouth Daily.     furosemide 20 MG tablet  Commonly known as: LASIX  Take 20 mg by mouth daily as needed (Fluid).     isosorbide mononitrate 30 MG 24 hr tablet  Commonly known as:  IMDUR  Take 1 tablet (30 mg total) by mouth once daily.     metoprolol succinate 50 MG 24 hr tablet  Commonly known as: TOPROL-XL  Take 100 mg by mouth 2 (two) times daily.     NITROLINGUAL 400 mcg/spray spray  Generic drug: nitroGLYCERIN 0.4 MG/DOSE TL SPRY  Place 1 spray under the tongue every 5 (five) minutes as needed for Chest pain.     phenytoin 100 MG ER capsule  Commonly known as: DILANTIN  Take 400 mg by mouth once daily.     ranolazine 1,000 mg Tb12  Commonly known as: RANEXA  Take 1,000 mg by mouth 2 (two) times daily.            STOP taking these medications      amLODIPine 5 MG tablet  Commonly known as: NORVASC     lisinopriL 40 MG tablet  Commonly known as: PRINIVIL,ZESTRIL              Indwelling Lines/Drains at time of discharge:   Lines/Drains/Airways       None                   Time spent on the discharge of patient: 31 minutes         Sung Berkowitz MD  Department of Hospital Medicine  [facility]

## 2024-01-27 NOTE — CONSULTS
Nephrology Consult Note        Patient Name: Jerome Amaro Jr.  MRN: [id number]    Patient Class: IP- Inpatient   Admission Date: 1/24/2024  Length of Stay: 3 days  Date of Service: 1/27/2024    Attending Physician: Sung Berkowitz MD  Primary Care Provider: Oscar Madrid NP    Reason for Consult: trisha    SUBJECTIVE:     HPI: 78M patient of Dr. Willoughby, with HFpEF, pAF, PPM, TAVR, bioprosthetic, CABG with redo with little options for intervention started having chest pain, non-exertional, non-radiating, with no associated symptoms, relieved by Nitro and morphine. BP stable. Trop elevated, CXR with slight edema, , Cr 1.0 on admission, no LE edema.     Started on heparin infusion, CP subsided , trops peaked at 400, managed medically by cards. Scr went up today from 1.0 to 1.5., sNa low at 131. BP is marginal, was opretty low yesterday. Lasix decreased to 20mg daily yesterday, was dc today. Lisinopril was also dc. Was given at 40gm daily since admission. Spironolactone started yesterday, was dc today.    1/27 VSS. sCr back to baseline after med changes yesterday.    Past Medical History:   Diagnosis Date    Anticoagulant long-term use     2014    Aortic valve disease 2014    pig valve    Arthritis     all over    Chest pain 07/15/2019    CHF (congestive heart failure)     2014 on meds    Coronary artery disease     2007 cabg    Heart attack 1990    15 stents    Heart block     Hyperlipidemia     Hypertension     on meds    Hypothyroidism 2015    on meds    PVD (peripheral vascular disease)     Seizures     last episode 1990 on meds     Past Surgical History:   Procedure Laterality Date    AORTOGRAPHY WITH SERIALOGRAPHY N/A 11/16/2021    Procedure: AORTOGRAM, WITH RUNOFF;  Surgeon: Rodrigo Willoughby MD;  Location: [facility] CATH/EP LAB;  Service: Cardiology;  Laterality: N/A;    ARTERIOGRAPHY OF AORTIC ROOT N/A 11/5/2019    Procedure: ARTERIOGRAM, AORTIC ROOT;  Surgeon: Rodrigo Willoughby MD;  Location: [facility] CATH/EP  LAB;  Service: Cardiology;  Laterality: N/A;    CARDIAC CATHETERIZATION      CARDIAC VALVE SURGERY      CORONARY ANGIOGRAPHY INCLUDING BYPASS GRAFTS WITH CATHETERIZATION OF LEFT HEART N/A 11/5/2019    Procedure: ANGIOGRAM, CORONARY, INCLUDING BYPASS GRAFT, WITH LEFT HEART CATHETERIZATION;  Surgeon: Rodrigo Willoughby MD;  Location: [facility] CATH/EP LAB;  Service: Cardiology;  Laterality: N/A;    CORONARY ANGIOGRAPHY INCLUDING BYPASS GRAFTS WITH CATHETERIZATION OF LEFT HEART Left 11/3/2020    Procedure: ANGIOGRAM, CORONARY, INCLUDING BYPASS GRAFT, WITH LEFT HEART CATHETERIZATION;  Surgeon: Rodrigo Willoughby MD;  Location: [facility] CATH/EP LAB;  Service: Cardiology;  Laterality: Left;    CORONARY ANGIOGRAPHY INCLUDING BYPASS GRAFTS WITH CATHETERIZATION OF LEFT HEART N/A 9/28/2021    Procedure: ANGIOGRAM, CORONARY, INCLUDING BYPASS GRAFT, WITH LEFT HEART CATHETERIZATION;  Surgeon: Rodrigo Willoughby MD;  Location: [facility] CATH/EP LAB;  Service: Cardiology;  Laterality: N/A;    CORONARY ANGIOGRAPHY INCLUDING BYPASS GRAFTS WITH CATHETERIZATION OF LEFT HEART N/A 12/5/2023    Procedure: ANGIOGRAM, CORONARY, INCLUDING BYPASS GRAFT, WITH LEFT HEART CATHETERIZATION;  Surgeon: Rodrigo Willoughby MD;  Location: [facility] CATH/EP LAB;  Service: Cardiology;  Laterality: N/A;    CORONARY ANGIOPLASTY      CORONARY ARTERY BYPASS GRAFT      EXTRACORPOREAL SHOCK WAVE LITHOTRIPSY Right 8/1/2019    Procedure: LITHOTRIPSY, ESWL;  Surgeon: Williams Ortega MD;  Location: [facility] OR;  Service: Urology;  Laterality: Right;    HEMORRHOID SURGERY  1990    INSERTION OF PACEMAKER      PERCUTANEOUS TRANSLUMINAL BALLOON ANGIOPLASTY OF CORONARY ARTERY N/A 11/8/2019    Procedure: Angioplasty-coronary;  Surgeon: Rodrigo Willoughby MD;  Location: [facility] CATH/EP LAB;  Service: Cardiology;  Laterality: N/A;    WRIST FRACTURE SURGERY       Family History   Problem Relation Age of Onset    Heart attack Mother     Heart attack Father     Heart disease Sister     Stroke Sister     Diabetes  Sister     No Known Problems Maternal Grandmother     No Known Problems Maternal Grandfather     No Known Problems Paternal Grandmother     No Known Problems Paternal Grandfather     No Known Problems Brother     No Known Problems Maternal Aunt     No Known Problems Maternal Uncle     No Known Problems Paternal Aunt     No Known Problems Paternal Uncle     Anemia Neg Hx     Arrhythmia Neg Hx     Asthma Neg Hx     Clotting disorder Neg Hx     Fainting Neg Hx     Heart failure Neg Hx     Hyperlipidemia Neg Hx     Hypertension Neg Hx     Atrial Septal Defect Neg Hx      Social History     Tobacco Use    Smoking status: Former     Current packs/day: 0.00     Average packs/day: 0.5 packs/day for 4.0 years (2.0 ttl pk-yrs)     Types: Cigarettes     Start date:      Quit date:      Years since quittin.0    Smokeless tobacco: Never   Substance Use Topics    Alcohol use: Yes     Comment: social    Drug use: No       Review of patient's allergies indicates:   Allergen Reactions    Atorvastatin Other (See Comments)     Muscle pain    Rosuvastatin Other (See Comments)     Muscle pain       Outpatient meds:  Current Facility-Administered Medications on File Prior to Encounter   Medication Dose Route Frequency Provider Last Rate Last Admin    magnesium oxide tablet 800 mg  800 mg Oral PRN Rodrigo Willoughby MD   800 mg at 21 0737    sodium chloride 0.9% flush 10 mL  10 mL Intravenous PRN Rodrigo Willoughby MD         Current Outpatient Medications on File Prior to Encounter   Medication Sig Dispense Refill    amLODIPine (NORVASC) 5 MG tablet Take 5 mg by mouth 2 (two) times daily.      aspirin (ECOTRIN) 81 MG EC tablet Take 1 tablet (81 mg total) by mouth once daily. 30 tablet 11    citalopram (CELEXA) 20 MG tablet Take 20 mg by mouth once daily.      clopidogrel (PLAVIX) 75 mg tablet Take 75 mg by mouth once daily.      ELIQUIS 5 mg Tab Take 5 mg by mouth every morning.      ezetimibe (ZETIA) 10 mg tablet Take 10 mg  by mouth once daily.      famotidine (PEPCID) 40 MG tablet Take 40 mg by mouth Daily.      furosemide (LASIX) 20 MG tablet Take 20 mg by mouth daily as needed (Fluid).      hydrOXYzine pamoate (VISTARIL) 25 MG Cap Take 1 capsule (25 mg total) by mouth daily as needed (anxiety). (Patient taking differently: Take 25 mg by mouth Daily.)      isosorbide mononitrate (IMDUR) 30 MG 24 hr tablet Take 1 tablet (30 mg total) by mouth once daily. 30 tablet 11    lisinopriL (PRINIVIL,ZESTRIL) 40 MG tablet Take 40 mg by mouth once daily.      metoprolol succinate (TOPROL-XL) 50 MG 24 hr tablet Take 100 mg by mouth 2 (two) times daily.      NITROLINGUAL 400 mcg/spray spray Place 1 spray under the tongue every 5 (five) minutes as needed for Chest pain.      phenytoin (DILANTIN) 100 MG ER capsule Take 400 mg by mouth once daily.   0    ranolazine (RANEXA) 1,000 mg Tb12 Take 1,000 mg by mouth 2 (two) times daily.          Scheduled meds:   aspirin  81 mg Oral Daily    citalopram  20 mg Oral Daily    clopidogreL  75 mg Oral Daily    ezetimibe  10 mg Oral Daily    isosorbide mononitrate  30 mg Oral Daily    metoprolol succinate  100 mg Oral BID    morphine  4 mg Intravenous Once    phenytoin  400 mg Oral Daily    ranolazine  1,000 mg Oral BID       Infusions:      PRN meds:  acetaminophen, ALPRAZolam, melatonin, morphine, morphine, nitroGLYCERIN, ondansetron    Review of Systems:    OBJECTIVE:     Vital Signs and IO:  Temp:  [97.3 °F (36.3 °C)-98.5 °F (36.9 °C)]   Pulse:  [81-87]   Resp:  [18-20]   BP: ()/(63-79)   SpO2:  [95 %-96 %]   I/O last 3 completed shifts:  In: 1530 [P.O.:1530]  Out: 1625 [Urine:1625]  Wt Readings from Last 5 Encounters:   01/27/24 77.6 kg (171 lb 1.2 oz)   01/24/24 77.1 kg (169 lb 15.6 oz)   12/07/23 80.8 kg (178 lb 2.1 oz)   12/06/23 80.7 kg (177 lb 14.6 oz)   12/02/23 83.9 kg (185 lb)     Body mass index is 26.79 kg/m².    Physical Exam  Constitutional:       General: Patient is not in acute  "distress.     Appearance: Patient is well-developed. She is not diaphoretic.   HENT:      Head: Normocephalic and atraumatic.      Mouth/Throat: Mucous membranes are moist.   Eyes:      General: No scleral icterus.     Pupils: Pupils are equal, round, and reactive to light.   Cardiovascular:      Rate and Rhythm: Normal rate and regular rhythm.   Pulmonary:      Effort: Pulmonary effort is normal. No respiratory distress.      Breath sounds: No stridor.   Abdominal:      General: There is no distension.      Palpations: Abdomen is soft.   Musculoskeletal:         General: No deformity. Normal range of motion.      Cervical back: Neck supple.   Skin:     General: Skin is warm and dry.      Findings: No rash present. No erythema.   Neurological:      Mental Status: Patient is alert and oriented to person, place, and time.      Cranial Nerves: No cranial nerve deficit.   Psychiatric:         Behavior: Behavior normal.     Laboratory:  Recent Labs   Lab 01/25/24 0345 01/26/24 0435 01/27/24 0422   * 131* 129*   K 4.0 4.4 3.9   CL 97 95 95   CO2 25 26 26   BUN 17 26* 19   CREATININE 1.0 1.5* 1.1   GLU 94 92 95         Recent Labs   Lab 01/24/24  0250 01/25/24 0345 01/26/24 0435 01/27/24  0422   CALCIUM 8.4* 9.0 8.6* 8.4*   ALBUMIN 3.6  --   --   --    MG 1.6  --  1.7  --                No results for input(s): "POCTGLUCOSE" in the last 168 hours.    Recent Labs   Lab 12/03/23  0455   Hemoglobin A1C 5.2         Recent Labs   Lab 01/25/24 0345 01/26/24 0435 01/27/24  0423   WBC 6.81  6.81 6.80 5.56   HGB 11.5*  11.5* 11.3* 11.4*   HCT 34.6*  34.6* 32.9* 33.3*     196 194 193   MCV 88  88 88 87   MCHC 33.2  33.2 34.3 34.2   MONO 15.9*  15.9*  1.1*  1.1* 16.5*  1.1* 16.2*  0.9   EOSINOPHIL 4.1  4.1 5.9 6.8         Recent Labs   Lab 01/24/24  0250   BILITOT 0.8   PROT 6.7   ALBUMIN 3.6   ALKPHOS 108   ALT 19   AST 26         Recent Labs   Lab 12/07/23  0258 01/26/24  0355   Color, UA Yellow " Yellow   Appearance, UA Clear Hazy A   pH, UA 6.0 6.0   Specific Gravity, UA 1.025 1.020   Protein, UA 1+ A Trace A   Glucose, UA Negative Negative   Ketones, UA Negative Trace A   Urobilinogen, UA 2.0-3.0 A 2.0-3.0 A   Bilirubin (UA) Negative Negative   Occult Blood UA Negative Negative   Nitrite, UA Negative Negative   RBC, UA 2 3   WBC, UA 1 >100 H   Bacteria Rare Negative   Hyaline Casts, UA 1 5 A         Recent Labs   Lab 12/02/23  0836   POC PH 7.362   POC PCO2 43.5   POC HCO3 24.7   POC PO2 27 LL   POC SATURATED O2 47   POC BE -1   Sample VENOUS         Microbiology Results (last 7 days)       Procedure Component Value Units Date/Time    Urine culture [[id number]] Collected: 01/26/24 0355    Order Status: No result Specimen: Urine Updated: 01/26/24 0438            ASSESSMENT/PLAN:     Hyponatremia  HFpEF  Chronic angina with NSTEMI  History of TAVR  CKD stage 2, baseline sCr 1  Anemia  No NSAIDs, ACEI/ARB, IV contrast or other nephrotoxins.  Keep MAP > 60, SBP > 100.  Dose meds for GFR < 60 ml/min.  Renal diet - low K, low phos.    Cards will have to decide which meds are most important at this time, he may not be able to tolerate full dose ACEi/ARB, Loop diuretic and Spironolactone, BBL and Imdur.    Thank you for allowing us to participate in the care of your patient!   We will follow the patient and provide recommendations as needed.    Patient care time was spent personally by me on the following activities:     Obtaining a history.  Examination of patient.  Providing medical care at the patients bedside.  Developing a treatment plan with patient or surrogate and bedside caregivers.  Ordering and reviewing laboratory studies, radiographic studies, pulse oximetry.  Ordering and performing treatments and interventions.  Evaluation of patient's response to treatment.  Discussions with consultants while on the unit and immediately available to the patient.  Re-evaluation of the patient's  condition.  Documentation in the medical record.     Ephraim Romero MD    [facility]  [address] [place] [address] [id number] [phone] - tel  [phone] - fax    1/27/2024

## 2024-01-27 NOTE — PROGRESS NOTES
[facility]   Cardiology Note    Consult Requested By: GAIL  Reason for Consult: chest pain    SUBJECTIVE:     History of Present Illness: The pt is 79 y/o M patient of , [facility] chronic angina, NSTEMI, HLP, HFpEF , pAF , PM, TAVR, bioprosthetic, H/o CABG with redo  Patient has significant CAD with h/o bypass with possible little options for intervention--Last [facility] 12/2023.     The pt started having chest pain yesterday non-exertional , non-radiating, mid sternal chest pain with no associated symptoms, relieved by nitro and morphine. BP stable. Pt trop elevated 95/136/178--currently on heparin infusion, CXR ground glass infiltrates, slight edema noted, bnp 958, pt reports some CHURCH but this is chronic, cr 1.0, no LE edema. Vpaced EKG BP stable.     1/25--The pt is chest pain free, daughter at bedside. The pt is sitting up in bedside chair in no distress, trop trending down, peaked at 406.6 last value 361.1. heparin infusing for 24 hours. H/H 11/34 cr stable 1.0. no LE edema or SOB.      1/26--H/H 11/32 cr 1.5 The pt is feeling better , denies CP or SOB, no LE edema , no overnight events , BP stable.     1/27 Pt seen/examined. Denies CP or SOB. HD stable. Cr improved 1.1. States ready to go home.        Review of patient's allergies indicates:   Allergen Reactions    Atorvastatin Other (See Comments)     Muscle pain    Rosuvastatin Other (See Comments)     Muscle pain       Past Medical History:   Diagnosis Date    Anticoagulant long-term use     2014    Aortic valve disease 2014    pig valve    Arthritis     all over    Chest pain 07/15/2019    CHF (congestive heart failure)     2014 on meds    Coronary artery disease     2007 cabg    Heart attack 1990    15 stents    Heart block     Hyperlipidemia     Hypertension     on meds    Hypothyroidism 2015    on meds    PVD (peripheral vascular disease)     Seizures     last episode 1990 on meds     Past Surgical History:   Procedure Laterality Date     AORTOGRAPHY WITH SERIALOGRAPHY N/A 11/16/2021    Procedure: AORTOGRAM, WITH RUNOFF;  Surgeon: Rodrigo Willoughby MD;  Location: [facility] CATH/EP LAB;  Service: Cardiology;  Laterality: N/A;    ARTERIOGRAPHY OF AORTIC ROOT N/A 11/5/2019    Procedure: ARTERIOGRAM, AORTIC ROOT;  Surgeon: Rodrigo Willoughby MD;  Location: [facility] CATH/EP LAB;  Service: Cardiology;  Laterality: N/A;    CARDIAC CATHETERIZATION      CARDIAC VALVE SURGERY      CORONARY ANGIOGRAPHY INCLUDING BYPASS GRAFTS WITH CATHETERIZATION OF LEFT HEART N/A 11/5/2019    Procedure: ANGIOGRAM, CORONARY, INCLUDING BYPASS GRAFT, WITH LEFT HEART CATHETERIZATION;  Surgeon: Rodrigo Willoughby MD;  Location: [facility] CATH/EP LAB;  Service: Cardiology;  Laterality: N/A;    CORONARY ANGIOGRAPHY INCLUDING BYPASS GRAFTS WITH CATHETERIZATION OF LEFT HEART Left 11/3/2020    Procedure: ANGIOGRAM, CORONARY, INCLUDING BYPASS GRAFT, WITH LEFT HEART CATHETERIZATION;  Surgeon: Rodrigo Willoughby MD;  Location: [facility] CATH/EP LAB;  Service: Cardiology;  Laterality: Left;    CORONARY ANGIOGRAPHY INCLUDING BYPASS GRAFTS WITH CATHETERIZATION OF LEFT HEART N/A 9/28/2021    Procedure: ANGIOGRAM, CORONARY, INCLUDING BYPASS GRAFT, WITH LEFT HEART CATHETERIZATION;  Surgeon: Rodrigo Willoughby MD;  Location: [facility] CATH/EP LAB;  Service: Cardiology;  Laterality: N/A;    CORONARY ANGIOGRAPHY INCLUDING BYPASS GRAFTS WITH CATHETERIZATION OF LEFT HEART N/A 12/5/2023    Procedure: ANGIOGRAM, CORONARY, INCLUDING BYPASS GRAFT, WITH LEFT HEART CATHETERIZATION;  Surgeon: Rodrigo Willoughby MD;  Location: [facility] CATH/EP LAB;  Service: Cardiology;  Laterality: N/A;    CORONARY ANGIOPLASTY      CORONARY ARTERY BYPASS GRAFT      EXTRACORPOREAL SHOCK WAVE LITHOTRIPSY Right 8/1/2019    Procedure: LITHOTRIPSY, ESWL;  Surgeon: Williams Ortega MD;  Location: [facility] OR;  Service: Urology;  Laterality: Right;    HEMORRHOID SURGERY  1990    INSERTION OF PACEMAKER      PERCUTANEOUS TRANSLUMINAL BALLOON ANGIOPLASTY OF CORONARY ARTERY N/A  2019    Procedure: Angioplasty-coronary;  Surgeon: Rodrigo Willoughby MD;  Location: [facility] CATH/EP LAB;  Service: Cardiology;  Laterality: N/A;    WRIST FRACTURE SURGERY       Family History   Problem Relation Age of Onset    Heart attack Mother     Heart attack Father     Heart disease Sister     Stroke Sister     Diabetes Sister     No Known Problems Maternal Grandmother     No Known Problems Maternal Grandfather     No Known Problems Paternal Grandmother     No Known Problems Paternal Grandfather     No Known Problems Brother     No Known Problems Maternal Aunt     No Known Problems Maternal Uncle     No Known Problems Paternal Aunt     No Known Problems Paternal Uncle     Anemia Neg Hx     Arrhythmia Neg Hx     Asthma Neg Hx     Clotting disorder Neg Hx     Fainting Neg Hx     Heart failure Neg Hx     Hyperlipidemia Neg Hx     Hypertension Neg Hx     Atrial Septal Defect Neg Hx      Social History     Tobacco Use    Smoking status: Former     Current packs/day: 0.00     Average packs/day: 0.5 packs/day for 4.0 years (2.0 ttl pk-yrs)     Types: Cigarettes     Start date:      Quit date:      Years since quittin.0    Smokeless tobacco: Never   Substance Use Topics    Alcohol use: Yes     Comment: social    Drug use: No       Review of Systems:  Review of Systems   Constitutional:  Negative for chills and fever.   Respiratory:  Negative for shortness of breath.    Cardiovascular:  Negative for chest pain.   Gastrointestinal:  Negative for abdominal pain, heartburn, nausea and vomiting.       OBJECTIVE:     Vital Signs (Most Recent)  Temp: 97.3 °F (36.3 °C) (24)  Pulse: 81 (24)  Resp: 18 (24)  BP: 125/77 (24)  SpO2: 95 % (24)    Vital Signs Range (Last 24H):  Temp:  [97.3 °F (36.3 °C)-98.5 °F (36.9 °C)]   Pulse:  [81-87]   Resp:  [18-20]   BP: ()/(63-79)   SpO2:  [95 %-96 %]     I & O (Last 24H):    Intake/Output Summary (Last 24 hours) at  1/27/2024 1042  Last data filed at 1/27/2024 0546  Gross per 24 hour   Intake 880 ml   Output 1425 ml   Net -545 ml         Current Diet:     Current Diet Order   Procedures    Diet Cardiac Standard Tray     Order Specific Question:   Tray type:     Answer:   Standard Tray        Allergies:  Review of patient's allergies indicates:   Allergen Reactions    Atorvastatin Other (See Comments)     Muscle pain    Rosuvastatin Other (See Comments)     Muscle pain       Meds:  Scheduled Meds:   aspirin  81 mg Oral Daily    citalopram  20 mg Oral Daily    clopidogreL  75 mg Oral Daily    ezetimibe  10 mg Oral Daily    isosorbide mononitrate  30 mg Oral Daily    metoprolol succinate  100 mg Oral BID    morphine  4 mg Intravenous Once    phenytoin  400 mg Oral Daily    ranolazine  1,000 mg Oral BID     Continuous Infusions:      PRN Meds:acetaminophen, ALPRAZolam, melatonin, morphine, morphine, nitroGLYCERIN, ondansetron    Oxygen/Ventilator Data (Last 24H):  (if applicable)            Hemodynamic Parameters (Last 24H):   (if applicable)        Laboratory and Radiology Data:  Recent Results (from the past 24 hour(s))   Basic metabolic panel    Collection Time: 01/27/24  4:22 AM   Result Value Ref Range    Sodium 129 (L) 136 - 145 mmol/L    Potassium 3.9 3.5 - 5.1 mmol/L    Chloride 95 95 - 110 mmol/L    CO2 26 23 - 29 mmol/L    Glucose 95 70 - 110 mg/dL    BUN 19 8 - 23 mg/dL    Creatinine 1.1 0.5 - 1.4 mg/dL    Calcium 8.4 (L) 8.7 - 10.5 mg/dL    Anion Gap 8 8 - 16 mmol/L    eGFR >60.0 >60 mL/min/1.73 m^2   CBC auto differential    Collection Time: 01/27/24  4:23 AM   Result Value Ref Range    WBC 5.56 3.90 - 12.70 K/uL    RBC 3.83 (L) 4.60 - 6.20 M/uL    Hemoglobin 11.4 (L) 14.0 - 18.0 g/dL    Hematocrit 33.3 (L) 40.0 - 54.0 %    MCV 87 82 - 98 fL    MCH 29.8 27.0 - 31.0 pg    MCHC 34.2 32.0 - 36.0 g/dL    RDW 13.0 11.5 - 14.5 %    Platelets 193 150 - 450 K/uL    MPV 10.9 9.2 - 12.9 fL    Immature Granulocytes 0.4 0.0 - 0.5  %    Gran # (ANC) 3.1 1.8 - 7.7 K/uL    Immature Grans (Abs) 0.02 0.00 - 0.04 K/uL    Lymph # 1.1 1.0 - 4.8 K/uL    Mono # 0.9 0.3 - 1.0 K/uL    Eos # 0.4 0.0 - 0.5 K/uL    Baso # 0.04 0.00 - 0.20 K/uL    nRBC 0 0 /100 WBC    Gran % 55.4 38.0 - 73.0 %    Lymph % 20.5 18.0 - 48.0 %    Mono % 16.2 (H) 4.0 - 15.0 %    Eosinophil % 6.8 0.0 - 8.0 %    Basophil % 0.7 0.0 - 1.9 %    Differential Method Automated      Imaging Results              X-Ray Chest AP Portable (Final result)  Result time 01/24/24 08:09:06      Final result by Truman Dawson MD (01/24/24 08:09:06)                   Narrative:    XR CHEST 1 VIEW    CLINICAL HISTORY:  78 years Male Chest Pain    COMPARISON: December 7, 2023    FINDINGS: Cardiac silhouette is enlarged and stable compared to prior. Patient status post median sternotomy and CABG. Pacing leads are in stable position. Patchy groundglass opacities within the mid and upper lung zones, worsened compared to prior. Blunting of both costophrenic angles consistent with bilateral pleural fluid. No pneumothorax.    IMPRESSION:    Patchy bilateral groundglass opacities which could represent atypical infection or pulmonary edema.    Small bilateral pleural effusions.    Electronically signed by:  Truman Dawson MD  01/24/2024 08:09 AM [facility] Workstation: [id number]                                    12-lead EKG interpretation:  (if applicable)      Current Cardiac Rhythm:   (if applicable)    Physical Exam:   Physical Exam  Cardiovascular:      Rate and Rhythm: Normal rate and regular rhythm.      Heart sounds: Murmur heard.   Pulmonary:      Effort: Pulmonary effort is normal. No respiratory distress.      Breath sounds: Normal breath sounds.   Skin:     General: Skin is warm and dry.   Neurological:      Mental Status: He is alert and oriented to person, place, and time.         ASSESSMENT/PLAN:   Assessment:   CAD  Chronic angina  NSTEMI  HLP  HFpEF  Pafib  PM --Abbot   TAVR bioprosthetic      H/o CABG with redo  Recent LHC one functioning graft    Echo EF 45-50 % Grade II DD normal functioning TAVR     Plan---recent LHC reviewed by  and discussed with   Pt has one functioning graft - unable to intervene at this point  Trop trended down peaked at 406   Chest pain has subsided   Manage medically--Continue ASA/Statin/BB/Ranexa and isosorbide.   Echo EF 45-50 % d/c amlodipine   Previously started on SGLT2 , med to expensive  Add spironolactone--on lisinopril and BB --GDMT   ---Stopped aldactone and lisinopril per nephrology,  Will consider adding Entresto at follow up.   --IV lasix given  Appears euvolemic   Oral lasix PRN  Cr 1.1 down from 1.5 --nephrology following  Stable for d/c from a cardiac standpoint, follow up in office in one week, call if any further questions  .

## 2024-01-27 NOTE — PLAN OF CARE
Problem: Adult Inpatient Plan of Care  Goal: Plan of Care Review  Outcome: Ongoing, Progressing  Goal: Optimal Comfort and Wellbeing  Outcome: Ongoing, Progressing     Problem: Skin Injury Risk Increased  Goal: Skin Health and Integrity  Outcome: Ongoing, Progressing     Problem: Fall Injury Risk  Goal: Absence of Fall and Fall-Related Injury  Outcome: Ongoing, Progressing     Problem: Fluid and Electrolyte Imbalance (Acute Kidney Injury/Impairment)  Goal: Fluid and Electrolyte Balance  Outcome: Ongoing, Progressing

## 2024-01-27 NOTE — PLAN OF CARE
CM attempted to complete assessment. CM reached out to Pt. And daughter but to no avail. CM will continue to reach out to family.

## 2024-01-28 LAB — BACTERIA UR CULT: NO GROWTH

## 2024-01-29 ENCOUNTER — PATIENT OUTREACH (OUTPATIENT)
Dept: ADMINISTRATIVE | Facility: CLINIC | Age: 79
End: 2024-01-29
Payer: MEDICARE

## 2024-01-29 NOTE — PROGRESS NOTES
C3 nurse spoke with Jerome Amaro Jr. for a TCC post hospital discharge follow up call. The patient has a scheduled [facility] appointment with [facility] on 02/01/2024 @ 1100.    Upon medication review, patient requests a callback to his daughter, Nilda, to review medications only as he is unaware of his daily medications. C3 nurse attempted to contact daughter, however, she requested a callback as she does not have the patient's medication list on hand at this time.

## 2024-01-29 NOTE — PROGRESS NOTES
C3 nurse successfully contacted and spoke with Jerome Amaro Jr. (Daughter, Nilda) to review medication list only.

## 2024-02-06 ENCOUNTER — TELEPHONE (OUTPATIENT)
Dept: PHARMACY | Facility: HOSPITAL | Age: 79
End: 2024-02-06

## 2024-02-06 NOTE — PHARMACY MED REC
[facility]  Transition of Care Assessment    Admit Date    1/24/2024    Hospital Visit [facility] Number    [id number]   Discharge Date    1/27/2024    Preferred Pharmacies      [facility]/pharmacy #[id number] - [place] - [address]  [address] [address]  Phone: [phone] Fax: [phone]     Drug Card Received?      Yes []     No []      Allergies      Review of patient's allergies indicates:   Allergen Reactions    Atorvastatin Other (See Comments)     Muscle pain    Rosuvastatin Other (See Comments)     Muscle pain         Telephone Medication Assessment Questions     How are you doing with your medications?    Patient's daughter stated that the patient has been experiencing pain in chest and weak arms, although the patient has been doing better since their visit.     Have you experienced any side effects and/or noticed any difference after taking        your medications?    The patient's daughter stated that the patient hasn't experienced any side effects.     Do you know the purpose of each of your medications and how to take them?    The daughter explained that they still know the purpose of the patient's medications and how to take them.     Do you need assistance taking your medications?    The patient does need assistance taking their medications. Both of their daughters help out with this.      Are you able to afford your medications?  Patient's daughter stated that the cardiologist added on Corlanor and Entresto which are both costly. The patient has been enrolled in a program to get Corlanor; however, it will take some time before approved. The patient was given some Entresto samples to start out.     Have you picked up your medications from the pharmacy?    The medications have been picked up except for the Corlanor.       Telephone Physician Follow-Up Questions     Have you seen your doctor? If so, were there any changes made to your medications?    The patient has seen their  cardiologist. The cardiologist added on Corlanor and Entresto.     Do you need any assistance getting to your doctor's appointment?    The patient does need assistance getting to their appointment, but their family helps out with this.     Notes from your doctor visit (if applicable):    Not Applicable     Have you picked up the new prescription from the pharmacy and started taking it?    The Corlanor has not been picked up due to cost; however the patient has samples for Entresto.     My new medications are (if applicable):    Corlanor and Entresto     Are you experiencing any new side effects?    The patient began the entresto today, but they have not noticed any side effects. The patient's daughter was educated on Entresto and told to watch the patient's weight for fluid increases.      Do you have a follow-up appointment to see your doctor?    The patient does have a follow up with their cardiologist.     Do you have transportation to your appointment?    The patient has family members that will bring them to the appointment.     If needed, do you have someone to accompany you to your appointment?    The patient does have someone to accompany them.     Is there anything you feel you need help with at this time?    The patient's daughter had a concern over the cost of the Entresto.They were educated to check the [employer] website for coupons, but they were also given a coupon over the phone.      Additional Notes            The patient was previously educated on how to take their medication; including dosing, frequency, and side-effects.

## 2024-02-09 ENCOUNTER — HOSPITAL ENCOUNTER (OUTPATIENT)
Facility: HOSPITAL | Age: 79
Discharge: HOME HEALTH | End: 2024-02-11
Attending: EMERGENCY MEDICINE | Admitting: INTERNAL MEDICINE
Payer: MEDICARE

## 2024-02-09 DIAGNOSIS — I20.89 ANGINA AT REST: ICD-10-CM

## 2024-02-09 DIAGNOSIS — R06.02 SOB (SHORTNESS OF BREATH): Primary | ICD-10-CM

## 2024-02-09 DIAGNOSIS — R07.9 CHEST PAIN: ICD-10-CM

## 2024-02-09 LAB
ALBUMIN SERPL BCP-MCNC: 3.7 G/DL (ref 3.5–5.2)
ALP SERPL-CCNC: 88 U/L (ref 55–135)
ALT SERPL W/O P-5'-P-CCNC: 11 U/L (ref 10–44)
ANION GAP SERPL CALC-SCNC: 7 MMOL/L (ref 8–16)
AST SERPL-CCNC: 14 U/L (ref 10–40)
BASOPHILS # BLD AUTO: 0.07 K/UL (ref 0–0.2)
BASOPHILS NFR BLD: 0.8 % (ref 0–1.9)
BILIRUB SERPL-MCNC: 0.6 MG/DL (ref 0.1–1)
BNP SERPL-MCNC: 958 PG/ML (ref 0–99)
BUN SERPL-MCNC: 18 MG/DL (ref 8–23)
CALCIUM SERPL-MCNC: 8.7 MG/DL (ref 8.7–10.5)
CHLORIDE SERPL-SCNC: 100 MMOL/L (ref 95–110)
CO2 SERPL-SCNC: 25 MMOL/L (ref 23–29)
CREAT SERPL-MCNC: 1.2 MG/DL (ref 0.5–1.4)
DIFFERENTIAL METHOD BLD: ABNORMAL
EOSINOPHIL # BLD AUTO: 0.3 K/UL (ref 0–0.5)
EOSINOPHIL NFR BLD: 3.6 % (ref 0–8)
ERYTHROCYTE [DISTWIDTH] IN BLOOD BY AUTOMATED COUNT: 14 % (ref 11.5–14.5)
EST. GFR  (NO RACE VARIABLE): >60 ML/MIN/1.73 M^2
GLUCOSE SERPL-MCNC: 132 MG/DL (ref 70–110)
HCT VFR BLD AUTO: 34.8 % (ref 40–54)
HGB BLD-MCNC: 11.8 G/DL (ref 14–18)
IMM GRANULOCYTES # BLD AUTO: 0.02 K/UL (ref 0–0.04)
IMM GRANULOCYTES NFR BLD AUTO: 0.2 % (ref 0–0.5)
LYMPHOCYTES # BLD AUTO: 1.2 K/UL (ref 1–4.8)
LYMPHOCYTES NFR BLD: 14.1 % (ref 18–48)
MAGNESIUM SERPL-MCNC: 1.8 MG/DL (ref 1.6–2.6)
MCH RBC QN AUTO: 30.4 PG (ref 27–31)
MCHC RBC AUTO-ENTMCNC: 33.9 G/DL (ref 32–36)
MCV RBC AUTO: 90 FL (ref 82–98)
MONOCYTES # BLD AUTO: 1 K/UL (ref 0.3–1)
MONOCYTES NFR BLD: 11.4 % (ref 4–15)
NEUTROPHILS # BLD AUTO: 6.1 K/UL (ref 1.8–7.7)
NEUTROPHILS NFR BLD: 69.9 % (ref 38–73)
NRBC BLD-RTO: 0 /100 WBC
PLATELET # BLD AUTO: 247 K/UL (ref 150–450)
PMV BLD AUTO: 10.5 FL (ref 9.2–12.9)
POTASSIUM SERPL-SCNC: 4.3 MMOL/L (ref 3.5–5.1)
PROT SERPL-MCNC: 6.7 G/DL (ref 6–8.4)
RBC # BLD AUTO: 3.88 M/UL (ref 4.6–6.2)
SODIUM SERPL-SCNC: 132 MMOL/L (ref 136–145)
TROPONIN I SERPL HS-MCNC: 20.2 PG/ML (ref 0–14.9)
TROPONIN I SERPL HS-MCNC: 20.4 PG/ML (ref 0–14.9)
TROPONIN I SERPL HS-MCNC: 21.3 PG/ML (ref 0–14.9)
WBC # BLD AUTO: 8.77 K/UL (ref 3.9–12.7)

## 2024-02-09 PROCEDURE — G0378 HOSPITAL OBSERVATION PER HR: HCPCS

## 2024-02-09 PROCEDURE — 99285 EMERGENCY DEPT VISIT HI MDM: CPT | Mod: 25

## 2024-02-09 PROCEDURE — 36415 COLL VENOUS BLD VENIPUNCTURE: CPT | Performed by: INTERNAL MEDICINE

## 2024-02-09 PROCEDURE — 25000003 PHARM REV CODE 250: Performed by: EMERGENCY MEDICINE

## 2024-02-09 PROCEDURE — 93005 ELECTROCARDIOGRAM TRACING: CPT | Performed by: INTERNAL MEDICINE

## 2024-02-09 PROCEDURE — 83735 ASSAY OF MAGNESIUM: CPT | Performed by: EMERGENCY MEDICINE

## 2024-02-09 PROCEDURE — 85025 COMPLETE CBC W/AUTO DIFF WBC: CPT | Performed by: EMERGENCY MEDICINE

## 2024-02-09 PROCEDURE — 80053 COMPREHEN METABOLIC PANEL: CPT | Performed by: EMERGENCY MEDICINE

## 2024-02-09 PROCEDURE — 84484 ASSAY OF TROPONIN QUANT: CPT | Mod: 91 | Performed by: INTERNAL MEDICINE

## 2024-02-09 PROCEDURE — 93010 ELECTROCARDIOGRAM REPORT: CPT | Mod: ,,, | Performed by: INTERNAL MEDICINE

## 2024-02-09 PROCEDURE — 83880 ASSAY OF NATRIURETIC PEPTIDE: CPT | Performed by: EMERGENCY MEDICINE

## 2024-02-09 PROCEDURE — 84484 ASSAY OF TROPONIN QUANT: CPT | Mod: 91 | Performed by: EMERGENCY MEDICINE

## 2024-02-09 PROCEDURE — 25000003 PHARM REV CODE 250: Performed by: INTERNAL MEDICINE

## 2024-02-09 RX ORDER — SODIUM CHLORIDE 0.9 % (FLUSH) 0.9 %
10 SYRINGE (ML) INJECTION EVERY 12 HOURS PRN
Status: DISCONTINUED | OUTPATIENT
Start: 2024-02-09 | End: 2024-02-11 | Stop reason: HOSPADM

## 2024-02-09 RX ORDER — IBUPROFEN 200 MG
24 TABLET ORAL
Status: DISCONTINUED | OUTPATIENT
Start: 2024-02-09 | End: 2024-02-11 | Stop reason: HOSPADM

## 2024-02-09 RX ORDER — ASPIRIN 81 MG/1
81 TABLET ORAL DAILY
Status: DISCONTINUED | OUTPATIENT
Start: 2024-02-10 | End: 2024-02-10

## 2024-02-09 RX ORDER — METOPROLOL SUCCINATE 50 MG/1
100 TABLET, EXTENDED RELEASE ORAL 2 TIMES DAILY
Status: DISCONTINUED | OUTPATIENT
Start: 2024-02-09 | End: 2024-02-11 | Stop reason: HOSPADM

## 2024-02-09 RX ORDER — IVABRADINE 5 MG/1
1 TABLET, FILM COATED ORAL 2 TIMES DAILY
Status: ON HOLD | COMMUNITY
Start: 2024-02-06

## 2024-02-09 RX ORDER — GLUCAGON 1 MG
1 KIT INJECTION
Status: DISCONTINUED | OUTPATIENT
Start: 2024-02-09 | End: 2024-02-11 | Stop reason: HOSPADM

## 2024-02-09 RX ORDER — ENOXAPARIN SODIUM 100 MG/ML
40 INJECTION SUBCUTANEOUS EVERY 24 HOURS
Status: DISCONTINUED | OUTPATIENT
Start: 2024-02-09 | End: 2024-02-09

## 2024-02-09 RX ORDER — ALPRAZOLAM 0.25 MG/1
0.25 TABLET ORAL 2 TIMES DAILY PRN
Status: ON HOLD | COMMUNITY
Start: 2024-02-05 | End: 2024-03-04 | Stop reason: HOSPADM

## 2024-02-09 RX ORDER — IBUPROFEN 200 MG
16 TABLET ORAL
Status: DISCONTINUED | OUTPATIENT
Start: 2024-02-09 | End: 2024-02-11 | Stop reason: HOSPADM

## 2024-02-09 RX ORDER — LANOLIN ALCOHOL/MO/W.PET/CERES
800 CREAM (GRAM) TOPICAL
Status: DISCONTINUED | OUTPATIENT
Start: 2024-02-09 | End: 2024-02-11 | Stop reason: HOSPADM

## 2024-02-09 RX ORDER — CLOPIDOGREL BISULFATE 75 MG/1
75 TABLET ORAL DAILY
Status: DISCONTINUED | OUTPATIENT
Start: 2024-02-10 | End: 2024-02-11 | Stop reason: HOSPADM

## 2024-02-09 RX ORDER — NALOXONE HCL 0.4 MG/ML
0.02 VIAL (ML) INJECTION
Status: DISCONTINUED | OUTPATIENT
Start: 2024-02-09 | End: 2024-02-11 | Stop reason: HOSPADM

## 2024-02-09 RX ORDER — ALPRAZOLAM 0.25 MG/1
0.25 TABLET ORAL 2 TIMES DAILY PRN
Status: DISCONTINUED | OUTPATIENT
Start: 2024-02-09 | End: 2024-02-11 | Stop reason: HOSPADM

## 2024-02-09 RX ORDER — ASPIRIN 325 MG
325 TABLET ORAL
Status: DISPENSED | OUTPATIENT
Start: 2024-02-09 | End: 2024-02-09

## 2024-02-09 RX ORDER — SODIUM,POTASSIUM PHOSPHATES 280-250MG
2 POWDER IN PACKET (EA) ORAL
Status: DISCONTINUED | OUTPATIENT
Start: 2024-02-09 | End: 2024-02-11 | Stop reason: HOSPADM

## 2024-02-09 RX ORDER — CITALOPRAM 20 MG/1
20 TABLET, FILM COATED ORAL DAILY
Status: DISCONTINUED | OUTPATIENT
Start: 2024-02-10 | End: 2024-02-11 | Stop reason: HOSPADM

## 2024-02-09 RX ORDER — SACUBITRIL AND VALSARTAN 24; 26 MG/1; MG/1
1 TABLET, FILM COATED ORAL 2 TIMES DAILY
Status: ON HOLD | COMMUNITY
Start: 2024-02-07

## 2024-02-09 RX ADMIN — NITROGLYCERIN 0.5 INCH: 20 OINTMENT TOPICAL at 12:02

## 2024-02-09 RX ADMIN — SACUBITRIL AND VALSARTAN 1 TABLET: 24; 26 TABLET, FILM COATED ORAL at 08:02

## 2024-02-09 RX ADMIN — METOPROLOL SUCCINATE 100 MG: 50 TABLET, EXTENDED RELEASE ORAL at 08:02

## 2024-02-09 NOTE — H&P
[facility] Medicine History & Physical Examination   Patient Name: Jerome Amaro Jr.  MRN: [id number]  Patient Class: OP- Observation   Admission Date: 2/9/2024  9:33 AM  Length of Stay: 0  Attending Physician: Dio Freeman MD  Primary Care Provider: Oscar Madrid NP  Face-to-Face encounter date: 02/09/2024  Code Status: Full Code  MPOA:  Chief Complaint: Chest Pain (Hx bypass, multiple stents, valve replacement, and pacemaker)        HISTORY OF PRESENT ILLNESS:   This is a 78-year-old male patient with past medical history of CAD status post CABG twice, diastolic CHF with an EF 45% in 12/23, heart block with pacemaker placement, hypertension, hypothyroidism, seizure disorder, aortic valve replacement who presented with onset of chest pain which started last night.  Patient states he has had these similar chest pains last couple of months.  He has also been admitted with similar symptoms.  He says the patient is 9/10 in the sternal region with radiation to the back and left arm.  The pain is intermittent and at rest and not associated with exertion.  Room his EKG showed ventricularly paced rhythm.  First troponin was 20.4.  He was given a dose of aspirin.  At the time of my encounter patient was not having any chest pain at the time    REVIEW OF SYSTEMS:   10 Point Review of System was performed and was found to be negative except for that mentioned already in the HPI above.     PAST MEDICAL HISTORY:     Past Medical History:   Diagnosis Date    Anticoagulant long-term use     2014    Aortic valve disease 2014    pig valve    Arthritis     all over    Chest pain 07/15/2019    CHF (congestive heart failure)     2014 on meds    Coronary artery disease     2007 cabg    Heart attack 1990    15 stents    Heart block     Hyperlipidemia     Hypertension     on meds    Hypothyroidism 2015    on meds    PVD (peripheral vascular disease)     Seizures     last episode 1990 on meds        PAST SURGICAL HISTORY:     Past Surgical History:   Procedure Laterality Date    AORTOGRAPHY WITH SERIALOGRAPHY N/A 11/16/2021    Procedure: AORTOGRAM, WITH RUNOFF;  Surgeon: Rodrigo Willoughby MD;  Location: [facility] CATH/EP LAB;  Service: Cardiology;  Laterality: N/A;    ARTERIOGRAPHY OF AORTIC ROOT N/A 11/5/2019    Procedure: ARTERIOGRAM, AORTIC ROOT;  Surgeon: Rodrigo Willoughby MD;  Location: [facility] CATH/EP LAB;  Service: Cardiology;  Laterality: N/A;    CARDIAC CATHETERIZATION      CARDIAC VALVE SURGERY      CORONARY ANGIOGRAPHY INCLUDING BYPASS GRAFTS WITH CATHETERIZATION OF LEFT HEART N/A 11/5/2019    Procedure: ANGIOGRAM, CORONARY, INCLUDING BYPASS GRAFT, WITH LEFT HEART CATHETERIZATION;  Surgeon: Rodrigo Willoughby MD;  Location: [facility] CATH/EP LAB;  Service: Cardiology;  Laterality: N/A;    CORONARY ANGIOGRAPHY INCLUDING BYPASS GRAFTS WITH CATHETERIZATION OF LEFT HEART Left 11/3/2020    Procedure: ANGIOGRAM, CORONARY, INCLUDING BYPASS GRAFT, WITH LEFT HEART CATHETERIZATION;  Surgeon: Rodrigo Willoughby MD;  Location: [facility] CATH/EP LAB;  Service: Cardiology;  Laterality: Left;    CORONARY ANGIOGRAPHY INCLUDING BYPASS GRAFTS WITH CATHETERIZATION OF LEFT HEART N/A 9/28/2021    Procedure: ANGIOGRAM, CORONARY, INCLUDING BYPASS GRAFT, WITH LEFT HEART CATHETERIZATION;  Surgeon: Rodrigo Willoughby MD;  Location: [facility] CATH/EP LAB;  Service: Cardiology;  Laterality: N/A;    CORONARY ANGIOGRAPHY INCLUDING BYPASS GRAFTS WITH CATHETERIZATION OF LEFT HEART N/A 12/5/2023    Procedure: ANGIOGRAM, CORONARY, INCLUDING BYPASS GRAFT, WITH LEFT HEART CATHETERIZATION;  Surgeon: Rodrigo Willoughby MD;  Location: [facility] CATH/EP LAB;  Service: Cardiology;  Laterality: N/A;    CORONARY ANGIOPLASTY      CORONARY ARTERY BYPASS GRAFT      EXTRACORPOREAL SHOCK WAVE LITHOTRIPSY Right 8/1/2019    Procedure: LITHOTRIPSY, ESWL;  Surgeon: Williams Ortega MD;  Location: [facility] OR;  Service: Urology;  Laterality: Right;    HEMORRHOID SURGERY  1990    INSERTION OF  PACEMAKER      PERCUTANEOUS TRANSLUMINAL BALLOON ANGIOPLASTY OF CORONARY ARTERY N/A 2019    Procedure: Angioplasty-coronary;  Surgeon: Rodrigo Willoughby MD;  Location: [facility] CATH/EP LAB;  Service: Cardiology;  Laterality: N/A;    WRIST FRACTURE SURGERY         ALLERGIES:   Atorvastatin and Rosuvastatin    FAMILY HISTORY:     Family History   Problem Relation Age of Onset    Heart attack Mother     Heart attack Father     Heart disease Sister     Stroke Sister     Diabetes Sister     No Known Problems Maternal Grandmother     No Known Problems Maternal Grandfather     No Known Problems Paternal Grandmother     No Known Problems Paternal Grandfather     No Known Problems Brother     No Known Problems Maternal Aunt     No Known Problems Maternal Uncle     No Known Problems Paternal Aunt     No Known Problems Paternal Uncle     Anemia Neg Hx     Arrhythmia Neg Hx     Asthma Neg Hx     Clotting disorder Neg Hx     Fainting Neg Hx     Heart failure Neg Hx     Hyperlipidemia Neg Hx     Hypertension Neg Hx     Atrial Septal Defect Neg Hx        SOCIAL HISTORY:     Social History     Tobacco Use    Smoking status: Former     Current packs/day: 0.00     Average packs/day: 0.5 packs/day for 4.0 years (2.0 ttl pk-yrs)     Types: Cigarettes     Start date:      Quit date:      Years since quittin.1    Smokeless tobacco: Never   Substance Use Topics    Alcohol use: Yes     Comment: social        Social History     Substance and Sexual Activity   Sexual Activity Not Currently        HOME MEDICATIONS:     Prior to Admission medications    Medication Sig Start Date End Date Taking? Authorizing Provider   aspirin (ECOTRIN) 81 MG EC tablet Take 1 tablet (81 mg total) by mouth once daily. 23 Yes Oscar Alcantar MD   citalopram (CELEXA) 20 MG tablet Take 20 mg by mouth once daily.   Yes Provider, Historical   clopidogrel (PLAVIX) 75 mg tablet Take 75 mg by mouth once daily.   Yes Provider, Historical  "  ELIQUIS 5 mg Tab Take 5 mg by mouth every morning. 1/6/24  Yes Provider, Historical   ENTRESTO 24-26 mg per tablet Take 1 tablet by mouth 2 (two) times daily. 2/7/24  Yes Provider, Historical   ezetimibe (ZETIA) 10 mg tablet Take 10 mg by mouth once daily.   Yes Provider, Historical   famotidine (PEPCID) 40 MG tablet Take 40 mg by mouth Daily. 1/23/24 2/22/24 Yes Provider, Historical   furosemide (LASIX) 20 MG tablet Take 20 mg by mouth daily as needed (Fluid).   Yes Provider, Historical   isosorbide mononitrate (IMDUR) 30 MG 24 hr tablet Take 1 tablet (30 mg total) by mouth once daily. 12/9/23 12/8/24 Yes Oscar Alcantar MD   metoprolol succinate (TOPROL-XL) 50 MG 24 hr tablet Take 100 mg by mouth 2 (two) times daily. 2/20/21  Yes Provider, Historical   NITROLINGUAL 400 mcg/spray spray Place 1 spray under the tongue every 5 (five) minutes as needed for Chest pain. 9/19/23  Yes Provider, Historical   phenytoin (DILANTIN) 100 MG ER capsule Take 400 mg by mouth once daily.  10/20/14  Yes Provider, Historical   ranolazine (RANEXA) 1,000 mg Tb12 Take 1,000 mg by mouth 2 (two) times daily.    Yes Provider, Historical   ALPRAZolam (XANAX) 0.25 MG tablet Take 0.25 mg by mouth 2 (two) times daily as needed. 2/5/24   Provider, Historical   CORLANOR 5 mg Tab Take 1 tablet by mouth 2 (two) times daily. 2/6/24   Provider, Historical   hydrOXYzine pamoate (VISTARIL) 25 MG Cap Take 1 capsule (25 mg total) by mouth daily as needed (anxiety). 12/6/23   Endy Ellison MD         PHYSICAL EXAM:   BP (!) 141/73 (BP Location: Right arm, Patient Position: Lying)   Pulse 78   Temp 97.7 °F (36.5 °C) (Oral)   Resp 16   Ht 5' 6" (1.676 m)   Wt 77.6 kg (171 lb)   SpO2 98%   BMI 27.60 kg/m²   Vitals Reviewed  General appearance:in no apparent distress.  Skin: No Rash.   HENT: Atraumatic head. Moist mucous membranes of oral cavity.  Eyes: Normal extraocular movements.   Neck: Supple. No evidence of lymphadenopathy. No " thyroidomegaly.  Lungs:  Nonlabored breathing, speaking full sentences, no use of accessory  Heart: Regular rate and rhythm  Extremities: No cyanosis, clubbing.  Psych/mental status: Alert and oriented. Cooperative. Responds appropriately to questions.   EMERGENCY DEPARTMENT LABS AND IMAGING:     Labs Reviewed   CBC W/ AUTO DIFFERENTIAL - Abnormal; Notable for the following components:       Result Value    RBC 3.88 (*)     Hemoglobin 11.8 (*)     Hematocrit 34.8 (*)     Lymph % 14.1 (*)     All other components within normal limits   COMPREHENSIVE METABOLIC PANEL - Abnormal; Notable for the following components:    Sodium 132 (*)     Glucose 132 (*)     Anion Gap 7 (*)     All other components within normal limits   TROPONIN I HIGH SENSITIVITY - Abnormal; Notable for the following components:    Troponin I High Sensitivity 21.3 (*)     All other components within normal limits   B-TYPE NATRIURETIC PEPTIDE - Abnormal; Notable for the following components:     (*)     All other components within normal limits   MAGNESIUM       X-Ray Chest 1 View   Final Result          ASSESSMENT & PLAN:   78-year-old male patient with significant cardiac history comes in with atypical chest pain with troponin of 20.4.  EKG shows ventricular paced rhythm.  Last left heart catheterization in 12/23.     Atypical chest pain   Chronic angina   CAD status post CABG   History of TAVR  CHF, EF of 45  PPM   -admit patient to telemetry monitoring.  Cardiology evaluation will defer to them on whether to start heparin.  Continue beta-blocker, aspirin, statin, Ranexa and isosorbide.   -patient initially complaining of shortness a breath with elevated BNP.  Appears compensated on exam.  We will give him 1 dose of IV Lasix.  Reassess tomorrow.     ________________________________________________________________________________    INPATIENT LIST OF MEDICATIONS     Current Facility-Administered Medications:     aspirin tablet 325 mg, 325 mg,  Oral, ED 1 Time, Amara Merlos MD    dextrose 50% injection 12.5 g, 12.5 g, Intravenous, PRN, Dio Freeman MD    dextrose 50% injection 25 g, 25 g, Intravenous, PRN, Dio Freeman MD    enoxaparin injection 40 mg, 40 mg, Subcutaneous, Daily, Dio Freeman MD    glucagon (human recombinant) injection 1 mg, 1 mg, Intramuscular, PRN, Dio Freeman MD    glucose chewable tablet 16 g, 16 g, Oral, PRN, Dio Freeman MD    glucose chewable tablet 24 g, 24 g, Oral, PRN, Dio Freeman MD    magnesium oxide tablet 800 mg, 800 mg, Oral, PRN, Dio Freeman MD    magnesium oxide tablet 800 mg, 800 mg, Oral, PRN, Dio Freeman MD    naloxone 0.4 mg/mL injection 0.02 mg, 0.02 mg, Intravenous, PRN, Dio Freeman MD    potassium bicarbonate disintegrating tablet 35 mEq, 35 mEq, Oral, PRN, Dio Freeman MD    potassium bicarbonate disintegrating tablet 50 mEq, 50 mEq, Oral, PRN, Dio Freeman MD    potassium bicarbonate disintegrating tablet 60 mEq, 60 mEq, Oral, PRN, Dio Freeman MD    potassium, sodium phosphates 280-160-250 mg packet 2 packet, 2 packet, Oral, PRN, Dio Freeman MD    potassium, sodium phosphates 280-160-250 mg packet 2 packet, 2 packet, Oral, PRN, Dio Freeman MD    potassium, sodium phosphates 280-160-250 mg packet 2 packet, 2 packet, Oral, PRN, Dio Freeman MD    sodium chloride 0.9% flush 10 mL, 10 mL, Intravenous, Q12H CARLOS, Dio Freeman MD    Facility-Administered Medications Ordered in Other Encounters:     magnesium oxide tablet 800 mg, 800 mg, Oral, PRN, Rodrigo Willoughby MD, 800 mg at 11/16/21 0737    sodium chloride 0.9% flush 10 mL, 10 mL, Intravenous, PRN, Rodrigo Willoughby MD      Scheduled Meds:   aspirin  325 mg Oral ED 1 Time    enoxparin  40 mg Subcutaneous Daily     Continuous Infusions:  PRN Meds:.dextrose 50% in water (D50W), dextrose 50% in water (D50W), glucagon (human recombinant), glucose, glucose, magnesium oxide, magnesium oxide, naloxone,  potassium bicarbonate, potassium bicarbonate, potassium bicarbonate, potassium, sodium phosphates, potassium, sodium phosphates, potassium, sodium phosphates, sodium chloride 0.9%      Dio Santiago  [facility] Hospitalist  02/09/2024

## 2024-02-09 NOTE — PLAN OF CARE
[facility]  Initial Discharge Assessment       Primary Care Provider: Oscar Madrid NP    Admission Diagnosis: SOB (shortness of breath) [R06.02]    Admission Date: 2/9/2024  Expected Discharge Date: TBD  SW met with patient at bedside and spoke with Cynthia Schwabb/daughter by phone to complete assessment. No POA, living will or advance directive. No HD or Coumadin. See DME. Patient was receiving services at [facility] prior to admission. Patient stated that he would like to resume care with [facility] when he is discharged. Patient's family will bring him home when he is discharged. No needs have been identified at this time.     Transition of Care Barriers: None    Payor: [employer] MEDICARE / Plan: [employer] MEDICARE HMO / Product Type: Capitation /     Extended Emergency Contact Information  Primary Emergency Contact: Schwab,Violette  Mobile Phone: [phone]  Relation: Daughter  Preferred language: English   needed? No  Secondary Emergency Contact: hoa rhodes  Mobile Phone: [phone]  Relation: Daughter   needed? No    Discharge Plan A: Home Health  Discharge Plan B: Home Health      [id number] - [place] - [address]  [address] [address]  Phone: [phone] Fax: [phone]      Initial Assessment (most recent)       Adult Discharge Assessment - 02/09/24 1500          Discharge Assessment    Assessment Type Discharge Planning Assessment     Confirmed/corrected address, phone number and insurance Yes     Confirmed Demographics Correct on Facesheet     Source of Information patient     When was your last doctors appointment? --   one month ago    Does patient/caregiver understand observation status Yes     Communicated AXEL with patient/caregiver Yes     Reason For Admission no active principal problem     People in Home alone     Facility Arrived From: home     Do you expect to return to your current  living situation? Yes     Do you have help at home or someone to help you manage your care at home? Yes     Who are your caregiver(s) and their phone number(s)? Cynthia Schwab/daughter [phone], Nilda Amaro/daughter [phone]     Prior to hospitilization cognitive status: Alert/Oriented     Current cognitive status: Alert/Oriented     Walking or Climbing Stairs Difficulty yes     Walking or Climbing Stairs ambulation difficulty, requires equipment;stair climbing difficulty, requires equipment     Mobility Management wheelchair, quad cane, rollator     Dressing/Bathing Difficulty no     Equipment Currently Used at Home cane, quad;rollator;wheelchair     Readmission within 30 days? No     Patient currently being followed by outpatient case management? No     Do you currently have service(s) that help you manage your care at home? Yes     Name and Contact number of agency [facility]     Is the pt/caregiver preference to resume services with current agency Yes     Do you take prescription medications? Yes     Do you have prescription coverage? Yes     Coverage [employer]     Do you have any problems affording any of your prescribed medications? No     Is the patient taking medications as prescribed? yes     Who is going to help you get home at discharge? Cynthia Schwab/daughter [phone], Nilda Amaro/daughter [phone]     How do you get to doctors appointments? family or friend will provide     Are you on dialysis? No     Do you take coumadin? No     Discharge Plan A Home Health     Discharge Plan B Home Health     DME Needed Upon Discharge  none     Discharge Plan discussed with: Patient     Transition of Care Barriers None

## 2024-02-09 NOTE — ED PROVIDER NOTES
Encounter Date: 2/9/2024       History     Chief Complaint   Patient presents with    Chest Pain     Hx bypass, multiple stents, valve replacement, and pacemaker     78-year-old male presented emergency department with chest pain and shortness of breath.  Patient having intermittent episodes of chest pain worse with exertion started last night.  Patient states her pain improved since then however still having shortness of breath with exertion.  Patient has known coronary artery disease and has pacemaker.  Patient recently had pacemaker adjusted as well.  Patient denies fever or chills or nausea vomiting or dysuria or hematuria.  Patient was told by cardiologist to come to the hospital.  Patient recently also had a heart attack.      Review of patient's allergies indicates:   Allergen Reactions    Atorvastatin Other (See Comments)     Muscle pain    Rosuvastatin Other (See Comments)     Muscle pain     Past Medical History:   Diagnosis Date    Anticoagulant long-term use     2014    Aortic valve disease 2014    pig valve    Arthritis     all over    Chest pain 07/15/2019    CHF (congestive heart failure)     2014 on meds    Coronary artery disease     2007 cabg    Heart attack 1990    15 stents    Heart block     Hyperlipidemia     Hypertension     on meds    Hypothyroidism 2015    on meds    PVD (peripheral vascular disease)     Seizures     last episode 1990 on meds     Past Surgical History:   Procedure Laterality Date    AORTOGRAPHY WITH SERIALOGRAPHY N/A 11/16/2021    Procedure: AORTOGRAM, WITH RUNOFF;  Surgeon: Rodrigo Willoughby MD;  Location: [facility] CATH/EP LAB;  Service: Cardiology;  Laterality: N/A;    ARTERIOGRAPHY OF AORTIC ROOT N/A 11/5/2019    Procedure: ARTERIOGRAM, AORTIC ROOT;  Surgeon: Rodrigo Willoughby MD;  Location: [facility] CATH/EP LAB;  Service: Cardiology;  Laterality: N/A;    CARDIAC CATHETERIZATION      CARDIAC VALVE SURGERY      CORONARY ANGIOGRAPHY INCLUDING BYPASS GRAFTS WITH CATHETERIZATION OF LEFT  HEART N/A 11/5/2019    Procedure: ANGIOGRAM, CORONARY, INCLUDING BYPASS GRAFT, WITH LEFT HEART CATHETERIZATION;  Surgeon: Rodrigo Willoughby MD;  Location: [facility] CATH/EP LAB;  Service: Cardiology;  Laterality: N/A;    CORONARY ANGIOGRAPHY INCLUDING BYPASS GRAFTS WITH CATHETERIZATION OF LEFT HEART Left 11/3/2020    Procedure: ANGIOGRAM, CORONARY, INCLUDING BYPASS GRAFT, WITH LEFT HEART CATHETERIZATION;  Surgeon: Rodrigo Willoughby MD;  Location: [facility] CATH/EP LAB;  Service: Cardiology;  Laterality: Left;    CORONARY ANGIOGRAPHY INCLUDING BYPASS GRAFTS WITH CATHETERIZATION OF LEFT HEART N/A 9/28/2021    Procedure: ANGIOGRAM, CORONARY, INCLUDING BYPASS GRAFT, WITH LEFT HEART CATHETERIZATION;  Surgeon: Rodrigo Willoughby MD;  Location: [facility] CATH/EP LAB;  Service: Cardiology;  Laterality: N/A;    CORONARY ANGIOGRAPHY INCLUDING BYPASS GRAFTS WITH CATHETERIZATION OF LEFT HEART N/A 12/5/2023    Procedure: ANGIOGRAM, CORONARY, INCLUDING BYPASS GRAFT, WITH LEFT HEART CATHETERIZATION;  Surgeon: Rodrigo Willoughby MD;  Location: [facility] CATH/EP LAB;  Service: Cardiology;  Laterality: N/A;    CORONARY ANGIOPLASTY      CORONARY ARTERY BYPASS GRAFT      EXTRACORPOREAL SHOCK WAVE LITHOTRIPSY Right 8/1/2019    Procedure: LITHOTRIPSY, ESWL;  Surgeon: Williams Ortega MD;  Location: [facility] OR;  Service: Urology;  Laterality: Right;    HEMORRHOID SURGERY  1990    INSERTION OF PACEMAKER      PERCUTANEOUS TRANSLUMINAL BALLOON ANGIOPLASTY OF CORONARY ARTERY N/A 11/8/2019    Procedure: Angioplasty-coronary;  Surgeon: Rodrigo Willoughby MD;  Location: [facility] CATH/EP LAB;  Service: Cardiology;  Laterality: N/A;    WRIST FRACTURE SURGERY       Family History   Problem Relation Age of Onset    Heart attack Mother     Heart attack Father     Heart disease Sister     Stroke Sister     Diabetes Sister     No Known Problems Maternal Grandmother     No Known Problems Maternal Grandfather     No Known Problems Paternal Grandmother     No Known Problems Paternal Grandfather      No Known Problems Brother     No Known Problems Maternal Aunt     No Known Problems Maternal Uncle     No Known Problems Paternal Aunt     No Known Problems Paternal Uncle     Anemia Neg Hx     Arrhythmia Neg Hx     Asthma Neg Hx     Clotting disorder Neg Hx     Fainting Neg Hx     Heart failure Neg Hx     Hyperlipidemia Neg Hx     Hypertension Neg Hx     Atrial Septal Defect Neg Hx      Social History     Tobacco Use    Smoking status: Former     Current packs/day: 0.00     Average packs/day: 0.5 packs/day for 4.0 years (2.0 ttl pk-yrs)     Types: Cigarettes     Start date:      Quit date:      Years since quittin.1    Smokeless tobacco: Never   Substance Use Topics    Alcohol use: Yes     Comment: social    Drug use: No     Review of Systems   Constitutional: Negative.    HENT: Negative.     Eyes: Negative.    Respiratory:  Positive for shortness of breath.    Cardiovascular:  Positive for chest pain.   Gastrointestinal: Negative.    Endocrine: Negative.    Genitourinary: Negative.    Musculoskeletal: Negative.    Skin: Negative.    Allergic/Immunologic: Negative.    Neurological: Negative.    Hematological: Negative.    Psychiatric/Behavioral: Negative.     All other systems reviewed and are negative.      Physical Exam     Initial Vitals [24 0936]   BP Pulse Resp Temp SpO2   116/75 92 18 97.6 °F (36.4 °C) 95 %      MAP       --         Physical Exam    Nursing note and vitals reviewed.  Constitutional: He appears well-developed and well-nourished.   HENT:   Head: Normocephalic and atraumatic.   Nose: Nose normal.   Eyes: Conjunctivae and EOM are normal.   Neck: No tracheal deviation present.   Normal range of motion.  Cardiovascular:  Normal rate, regular rhythm, normal heart sounds and intact distal pulses.     Exam reveals no friction rub.       No murmur heard.  Pulmonary/Chest: Breath sounds normal. No respiratory distress. He has no wheezes. He has no rales.   Abdominal: Abdomen is  soft. He exhibits no distension. There is no abdominal tenderness.   Musculoskeletal:         General: No tenderness. Normal range of motion.      Cervical back: Normal range of motion.     Neurological: He is alert and oriented to person, place, and time. He has normal strength. GCS score is 15. GCS eye subscore is 4. GCS verbal subscore is 5. GCS motor subscore is 6.   Skin: Skin is warm and dry. Capillary refill takes less than 2 seconds.   Psychiatric: He has a normal mood and affect. Thought content normal.         ED Course   Procedures  Labs Reviewed   CBC W/ AUTO DIFFERENTIAL - Abnormal; Notable for the following components:       Result Value    RBC 3.88 (*)     Hemoglobin 11.8 (*)     Hematocrit 34.8 (*)     Lymph % 14.1 (*)     All other components within normal limits   COMPREHENSIVE METABOLIC PANEL - Abnormal; Notable for the following components:    Sodium 132 (*)     Glucose 132 (*)     Anion Gap 7 (*)     All other components within normal limits   TROPONIN I HIGH SENSITIVITY - Abnormal; Notable for the following components:    Troponin I High Sensitivity 21.3 (*)     All other components within normal limits   B-TYPE NATRIURETIC PEPTIDE - Abnormal; Notable for the following components:     (*)     All other components within normal limits   MAGNESIUM   TROPONIN I HIGH SENSITIVITY     EKG Readings: (Independently Interpreted)   Initial Reading: No STEMI. Rhythm: Paced Rhythm. Ectopy: No Ectopy. Conduction: Normal.     ECG Results              EKG 12-lead (In process)        Collection Time Result Time QRS Duration OHS QTC Calculation    02/09/24 09:48:28 02/09/24 10:12:10 194 548                     In process by Interface, Lab In [facility] (02/09/24 10:12:19)                   Narrative:    Test Reason : I20.89,    Vent. Rate : 087 BPM     Atrial Rate : 087 BPM     P-R Int : 168 ms          QRS Dur : 194 ms      QT Int : 456 ms       P-R-T Axes : 000 -69 108 degrees     QTc Int : 548  ms    Atrial-sensed ventricular-paced rhythm  Abnormal ECG  When compared with ECG of 24-JAN-2024 12:58,  Vent. rate has increased BY   3 BPM    Referred By: [email or web address]   SELF           Confirmed By:                                   Imaging Results              X-Ray Chest 1 View (Final result)  Result time 02/09/24 11:00:37   Procedure changed from X-Ray Chest PA And Lateral     Final result by Maikel Biswas MD (02/09/24 11:00:37)                   Narrative:    HISTORY: chest pain; Chest Pain    FINDINGS: Portable chest radiograph at 1041 hours compared to prior exams shows dual lead left subclavian CCD, unchanged in position with distal lead tips in the right atrium and right ventricle. There are median sternotomy wires and mediastinal surgical clips, with stable enlarged cardiac silhouette and normal pulmonary vascularity. There are aortic vascular calcifications and changes of prior coronary endovascular stenting.    The lungs are normally expanded, with scattered interstitial opacities in both lungs, similar to prior exams. There is unchanged blunting of the costophrenic angles suggesting small pleural effusions, with no new pleural or parenchymal abnormality. No pneumothorax.    IMPRESSION: Stable appearance of the chest compared to 01/24/2024, with no definite evidence of acute cardiopulmonary disease.    Electronically signed by:  Maikel Biswas MD  02/09/2024 11:00 AM CST Workstation: [id number]                                     Medications   aspirin tablet 325 mg (has no administration in time range)   nitroGLYCERIN 2% TD oint ointment 0.5 inch (has no administration in time range)     Medical Decision Making  78-year-old male presented emergency department with intermittent episodes of exertional chest pain and shortness of breath.  Pain now resolved however still has some shortness of breath.  Supplemental oxygen urine.  Aspirin given..  Screening labs and cardiac workup reviewed.  EKG showed paced  rhythm.  Patient's chest pain completely resolved.  Troponin slightly elevated and given patient high-risk for cardiac chest pain and based on this presentation Hospital Medicine consult for evaluation for further management and treatment and Cardiology consult to be done in the hospital as well.    Amount and/or Complexity of Data Reviewed  Labs: ordered. Decision-making details documented in ED Course.  Radiology: ordered and independent interpretation performed. Decision-making details documented in ED Course.  ECG/medicine tests: ordered and independent interpretation performed. Decision-making details documented in ED Course.    Risk  OTC drugs.  Prescription drug management.  Decision regarding hospitalization.                                      Clinical Impression:  Final diagnoses:  [R07.9] Chest pain  [R06.02] SOB (shortness of breath) (Primary)          ED Disposition Condition    Admit Amara Donald MD  02/09/24 [id number]

## 2024-02-09 NOTE — PLAN OF CARE
02/09/24 1459   JEAN Message   Medicare Outpatient and Observation Notification regarding financial responsibility Given to patient/caregiver;Explained to patient/caregiver;Signed/date by patient/caregiver   Date JEAN was signed 02/09/24   Time JEAN was signed 1500

## 2024-02-10 LAB
ALBUMIN SERPL BCP-MCNC: 3.6 G/DL (ref 3.5–5.2)
ALP SERPL-CCNC: 87 U/L (ref 55–135)
ALT SERPL W/O P-5'-P-CCNC: 9 U/L (ref 10–44)
ANION GAP SERPL CALC-SCNC: 9 MMOL/L (ref 8–16)
AST SERPL-CCNC: 12 U/L (ref 10–40)
BASOPHILS # BLD AUTO: 0.06 K/UL (ref 0–0.2)
BASOPHILS NFR BLD: 0.7 % (ref 0–1.9)
BILIRUB SERPL-MCNC: 0.9 MG/DL (ref 0.1–1)
BUN SERPL-MCNC: 14 MG/DL (ref 8–23)
CALCIUM SERPL-MCNC: 8.8 MG/DL (ref 8.7–10.5)
CHLORIDE SERPL-SCNC: 98 MMOL/L (ref 95–110)
CO2 SERPL-SCNC: 24 MMOL/L (ref 23–29)
CREAT SERPL-MCNC: 0.9 MG/DL (ref 0.5–1.4)
DIFFERENTIAL METHOD BLD: ABNORMAL
EOSINOPHIL # BLD AUTO: 0.3 K/UL (ref 0–0.5)
EOSINOPHIL NFR BLD: 3.7 % (ref 0–8)
ERYTHROCYTE [DISTWIDTH] IN BLOOD BY AUTOMATED COUNT: 13.7 % (ref 11.5–14.5)
EST. GFR  (NO RACE VARIABLE): >60 ML/MIN/1.73 M^2
GLUCOSE SERPL-MCNC: 98 MG/DL (ref 70–110)
HCT VFR BLD AUTO: 36.8 % (ref 40–54)
HGB BLD-MCNC: 12.2 G/DL (ref 14–18)
IMM GRANULOCYTES # BLD AUTO: 0.02 K/UL (ref 0–0.04)
IMM GRANULOCYTES NFR BLD AUTO: 0.2 % (ref 0–0.5)
LYMPHOCYTES # BLD AUTO: 1.3 K/UL (ref 1–4.8)
LYMPHOCYTES NFR BLD: 14.5 % (ref 18–48)
MCH RBC QN AUTO: 29.3 PG (ref 27–31)
MCHC RBC AUTO-ENTMCNC: 33.2 G/DL (ref 32–36)
MCV RBC AUTO: 88 FL (ref 82–98)
MONOCYTES # BLD AUTO: 1 K/UL (ref 0.3–1)
MONOCYTES NFR BLD: 12.1 % (ref 4–15)
NEUTROPHILS # BLD AUTO: 5.9 K/UL (ref 1.8–7.7)
NEUTROPHILS NFR BLD: 68.8 % (ref 38–73)
NRBC BLD-RTO: 0 /100 WBC
PLATELET # BLD AUTO: 204 K/UL (ref 150–450)
PMV BLD AUTO: 10.3 FL (ref 9.2–12.9)
POTASSIUM SERPL-SCNC: 4.1 MMOL/L (ref 3.5–5.1)
PROT SERPL-MCNC: 6.6 G/DL (ref 6–8.4)
RBC # BLD AUTO: 4.17 M/UL (ref 4.6–6.2)
SODIUM SERPL-SCNC: 131 MMOL/L (ref 136–145)
WBC # BLD AUTO: 8.6 K/UL (ref 3.9–12.7)

## 2024-02-10 PROCEDURE — 85025 COMPLETE CBC W/AUTO DIFF WBC: CPT | Performed by: INTERNAL MEDICINE

## 2024-02-10 PROCEDURE — 80053 COMPREHEN METABOLIC PANEL: CPT | Performed by: INTERNAL MEDICINE

## 2024-02-10 PROCEDURE — 25000003 PHARM REV CODE 250: Performed by: INTERNAL MEDICINE

## 2024-02-10 PROCEDURE — G0378 HOSPITAL OBSERVATION PER HR: HCPCS

## 2024-02-10 PROCEDURE — 36415 COLL VENOUS BLD VENIPUNCTURE: CPT | Performed by: INTERNAL MEDICINE

## 2024-02-10 PROCEDURE — 94761 N-INVAS EAR/PLS OXIMETRY MLT: CPT

## 2024-02-10 PROCEDURE — 99900031 HC PATIENT EDUCATION (STAT)

## 2024-02-10 RX ORDER — ISOSORBIDE MONONITRATE 60 MG/1
60 TABLET, EXTENDED RELEASE ORAL DAILY
Status: DISCONTINUED | OUTPATIENT
Start: 2024-02-10 | End: 2024-02-11 | Stop reason: HOSPADM

## 2024-02-10 RX ORDER — PHENYTOIN SODIUM 100 MG/1
400 CAPSULE, EXTENDED RELEASE ORAL DAILY
Status: DISCONTINUED | OUTPATIENT
Start: 2024-02-10 | End: 2024-02-11 | Stop reason: HOSPADM

## 2024-02-10 RX ORDER — RANOLAZINE 500 MG/1
1000 TABLET, EXTENDED RELEASE ORAL 2 TIMES DAILY
Status: DISCONTINUED | OUTPATIENT
Start: 2024-02-10 | End: 2024-02-11 | Stop reason: HOSPADM

## 2024-02-10 RX ORDER — ACETAMINOPHEN 325 MG/1
650 TABLET ORAL EVERY 6 HOURS PRN
Status: DISCONTINUED | OUTPATIENT
Start: 2024-02-10 | End: 2024-02-11 | Stop reason: HOSPADM

## 2024-02-10 RX ADMIN — METOPROLOL SUCCINATE 100 MG: 50 TABLET, EXTENDED RELEASE ORAL at 08:02

## 2024-02-10 RX ADMIN — CITALOPRAM HYDROBROMIDE 20 MG: 20 TABLET ORAL at 08:02

## 2024-02-10 RX ADMIN — CLOPIDOGREL BISULFATE 75 MG: 75 TABLET, FILM COATED ORAL at 08:02

## 2024-02-10 RX ADMIN — SACUBITRIL AND VALSARTAN 1 TABLET: 24; 26 TABLET, FILM COATED ORAL at 08:02

## 2024-02-10 RX ADMIN — RANOLAZINE 1000 MG: 500 TABLET, FILM COATED, EXTENDED RELEASE ORAL at 08:02

## 2024-02-10 RX ADMIN — ASPIRIN 81 MG: 81 TABLET, COATED ORAL at 08:02

## 2024-02-10 RX ADMIN — RANOLAZINE 1000 MG: 500 TABLET, FILM COATED, EXTENDED RELEASE ORAL at 01:02

## 2024-02-10 RX ADMIN — PHENYTOIN SODIUM 400 MG: 100 CAPSULE ORAL at 01:02

## 2024-02-10 RX ADMIN — APIXABAN 5 MG: 5 TABLET, FILM COATED ORAL at 08:02

## 2024-02-10 RX ADMIN — ISOSORBIDE MONONITRATE 60 MG: 60 TABLET, EXTENDED RELEASE ORAL at 11:02

## 2024-02-10 NOTE — PROGRESS NOTES
Subjective:  Patient seen and examined today.  No episodes of chest pain overnight or since he has been admitted.  No overnight events reported    General appearance:in no apparent distress.  Skin: No Rash.   HENT: Atraumatic head. Moist mucous membranes of oral cavity.  Eyes: Normal extraocular movements.   Neck: Supple. No evidence of lymphadenopathy. No thyroidomegaly.  Lungs:  Nonlabored breathing, speaking full sentences, no use of accessory  Heart: Regular rate and rhythm  Extremities: No cyanosis, clubbing.  Psych/mental status: Alert and oriented. Cooperative. Responds appropriately to questions.     78-year-old male patient with significant cardiac history comes in with atypical chest pain with troponin of 20.4.  EKG shows ventricular paced rhythm.  Last left heart catheterization in 12/23.      Atypical chest pain   Chronic angina   CAD status post CABG   History of TAVR  CHF, EF of 45  PPM                 -cardio evaluation today:  Unfortunately little options remaining for him from an interventional standpoint.  We will increase Imdur to 60 mg p.o. daily.  Continue Ranexa, metoprolol Eliquis and Plavix.  Stop aspirin.  Hopeful for discharge tomorrow if patient responds well to Imdur.

## 2024-02-10 NOTE — CONSULTS
[facility]   Cardiology Note    Consult Requested By: Hospital Medicine  Reason for Consult: Chest pain    SUBJECTIVE:     History of Present Illness: The patient is an 78 y.o. male with ah/o CAD, s/p CABG with redo, chronic stable angina, chronic systolic heart failure, not in acute exacerbation, s/p TAVR, pAF, hyperlipidemia, heart block, s/p dcPPM, PVD, seizure disorder, HTN, and hypothyroidism that presented with recurrent chest pain. He has known chronic stable angina. Per last admission at the end of last month, he had a recent C with one functioning graft. There is little options available for him from an interventional standpoint, per last admission. He states that the chest pain was substernal, and worsened by exertion. No radiation. No n/v, diaphoresis. No further episodes of chest pain since he was admitted. Troponins minimally elevated but flat. He was started on new meds as an outpatient. Although, he has not started taking corlenor. No syncope, presyncope, orthopnea, PND.     Review of patient's allergies indicates:   Allergen Reactions    Atorvastatin Other (See Comments)     Muscle pain    Rosuvastatin Other (See Comments)     Muscle pain       Past Medical History:   Diagnosis Date    Anticoagulant long-term use     2014    Aortic valve disease 2014    pig valve    Arthritis     all over    Chest pain 07/15/2019    CHF (congestive heart failure)     2014 on meds    Coronary artery disease     2007 cabg    Heart attack 1990    15 stents    Heart block     Hyperlipidemia     Hypertension     on meds    Hypothyroidism 2015    on meds    PVD (peripheral vascular disease)     Seizures     last episode 1990 on meds     Past Surgical History:   Procedure Laterality Date    AORTOGRAPHY WITH SERIALOGRAPHY N/A 11/16/2021    Procedure: AORTOGRAM, WITH RUNOFF;  Surgeon: Rodrigo Willoughby MD;  Location: [facility] CATH/EP LAB;  Service: Cardiology;  Laterality: N/A;    ARTERIOGRAPHY OF AORTIC ROOT N/A  11/5/2019    Procedure: ARTERIOGRAM, AORTIC ROOT;  Surgeon: Rodrigo Willoughby MD;  Location: [facility] CATH/EP LAB;  Service: Cardiology;  Laterality: N/A;    CARDIAC CATHETERIZATION      CARDIAC VALVE SURGERY      CORONARY ANGIOGRAPHY INCLUDING BYPASS GRAFTS WITH CATHETERIZATION OF LEFT HEART N/A 11/5/2019    Procedure: ANGIOGRAM, CORONARY, INCLUDING BYPASS GRAFT, WITH LEFT HEART CATHETERIZATION;  Surgeon: Rodrigo Willoughby MD;  Location: [facility] CATH/EP LAB;  Service: Cardiology;  Laterality: N/A;    CORONARY ANGIOGRAPHY INCLUDING BYPASS GRAFTS WITH CATHETERIZATION OF LEFT HEART Left 11/3/2020    Procedure: ANGIOGRAM, CORONARY, INCLUDING BYPASS GRAFT, WITH LEFT HEART CATHETERIZATION;  Surgeon: Rodrigo Willoughby MD;  Location: [facility] CATH/EP LAB;  Service: Cardiology;  Laterality: Left;    CORONARY ANGIOGRAPHY INCLUDING BYPASS GRAFTS WITH CATHETERIZATION OF LEFT HEART N/A 9/28/2021    Procedure: ANGIOGRAM, CORONARY, INCLUDING BYPASS GRAFT, WITH LEFT HEART CATHETERIZATION;  Surgeon: Rodrigo Willoughby MD;  Location: [facility] CATH/EP LAB;  Service: Cardiology;  Laterality: N/A;    CORONARY ANGIOGRAPHY INCLUDING BYPASS GRAFTS WITH CATHETERIZATION OF LEFT HEART N/A 12/5/2023    Procedure: ANGIOGRAM, CORONARY, INCLUDING BYPASS GRAFT, WITH LEFT HEART CATHETERIZATION;  Surgeon: Rodrigo Willoughby MD;  Location: [facility] CATH/EP LAB;  Service: Cardiology;  Laterality: N/A;    CORONARY ANGIOPLASTY      CORONARY ARTERY BYPASS GRAFT      EXTRACORPOREAL SHOCK WAVE LITHOTRIPSY Right 8/1/2019    Procedure: LITHOTRIPSY, ESWL;  Surgeon: Williams Ortega MD;  Location: [facility] OR;  Service: Urology;  Laterality: Right;    HEMORRHOID SURGERY  1990    INSERTION OF PACEMAKER      PERCUTANEOUS TRANSLUMINAL BALLOON ANGIOPLASTY OF CORONARY ARTERY N/A 11/8/2019    Procedure: Angioplasty-coronary;  Surgeon: Rodrigo Willoughby MD;  Location: [facility] CATH/EP LAB;  Service: Cardiology;  Laterality: N/A;    WRIST FRACTURE SURGERY       Family History   Problem Relation Age of Onset     Heart attack Mother     Heart attack Father     Heart disease Sister     Stroke Sister     Diabetes Sister     No Known Problems Maternal Grandmother     No Known Problems Maternal Grandfather     No Known Problems Paternal Grandmother     No Known Problems Paternal Grandfather     No Known Problems Brother     No Known Problems Maternal Aunt     No Known Problems Maternal Uncle     No Known Problems Paternal Aunt     No Known Problems Paternal Uncle     Anemia Neg Hx     Arrhythmia Neg Hx     Asthma Neg Hx     Clotting disorder Neg Hx     Fainting Neg Hx     Heart failure Neg Hx     Hyperlipidemia Neg Hx     Hypertension Neg Hx     Atrial Septal Defect Neg Hx      Social History     Tobacco Use    Smoking status: Former     Current packs/day: 0.00     Average packs/day: 0.5 packs/day for 4.0 years (2.0 ttl pk-yrs)     Types: Cigarettes     Start date:      Quit date:      Years since quittin.1    Smokeless tobacco: Never   Substance Use Topics    Alcohol use: Yes     Comment: social    Drug use: No       Review of Systems:  Review of Systems   Constitutional:  Negative for chills and fever.   HENT:  Negative for congestion and sore throat.    Eyes:  Negative for blurred vision and double vision.   Respiratory:  Negative for cough.    Cardiovascular:  Positive for chest pain. Negative for palpitations, orthopnea and leg swelling.   Skin:  Negative for itching and rash.       OBJECTIVE:     Vital Signs (Most Recent)  Temp: 98.6 °F (37 °C) (02/10/24 112)  Pulse: 83 (02/10/24 1124)  Resp: 17 (02/10/24 1124)  BP: 131/75 (02/10/24 1124)  SpO2: (!) 94 % (02/10/24 1124)    Vital Signs Range (Last 24H):  Temp:  [97.7 °F (36.5 °C)-98.6 °F (37 °C)]   Pulse:  [78-96]   Resp:  [16-17]   BP: (120-143)/(68-89)   SpO2:  [94 %-98 %]     I & O (Last 24H):    Intake/Output Summary (Last 24 hours) at 2/10/2024 1354  Last data filed at 2/10/2024 1105  Gross per 24 hour   Intake 740 ml   Output 2350 ml   Net -1610 ml        Current Diet:     Current Diet Order   Procedures    Diet Cardiac Fluid - 2000mL     Order Specific Question:   Fluid restriction:     Answer:   Fluid - 2000mL        Allergies:  Review of patient's allergies indicates:   Allergen Reactions    Atorvastatin Other (See Comments)     Muscle pain    Rosuvastatin Other (See Comments)     Muscle pain       Meds:  Scheduled Meds:   apixaban  5 mg Oral QAM    aspirin  81 mg Oral Daily    citalopram  20 mg Oral Daily    clopidogreL  75 mg Oral Daily    isosorbide mononitrate  60 mg Oral Daily    metoprolol succinate  100 mg Oral BID    phenytoin  400 mg Oral Daily    ranolazine  1,000 mg Oral BID    sacubitriL-valsartan  1 tablet Oral BID     Continuous Infusions:  PRN Meds:acetaminophen, ALPRAZolam, dextrose 50% in water (D50W), dextrose 50% in water (D50W), glucagon (human recombinant), glucose, glucose, magnesium oxide, magnesium oxide, naloxone, potassium bicarbonate, potassium bicarbonate, potassium bicarbonate, potassium, sodium phosphates, potassium, sodium phosphates, potassium, sodium phosphates, sodium chloride 0.9%    Oxygen/Ventilator Data (Last 24H):  (if applicable)            Hemodynamic Parameters (Last 24H):   (if applicable)        Laboratory and Radiology Data:  Recent Results (from the past 24 hour(s))   Troponin I High Sensitivity    Collection Time: 02/09/24  6:50 PM   Result Value Ref Range    Troponin I High Sensitivity 20.2 (H) 0.0 - 14.9 pg/mL   Comprehensive Metabolic Panel (CMP)    Collection Time: 02/10/24  7:20 AM   Result Value Ref Range    Sodium 131 (L) 136 - 145 mmol/L    Potassium 4.1 3.5 - 5.1 mmol/L    Chloride 98 95 - 110 mmol/L    CO2 24 23 - 29 mmol/L    Glucose 98 70 - 110 mg/dL    BUN 14 8 - 23 mg/dL    Creatinine 0.9 0.5 - 1.4 mg/dL    Calcium 8.8 8.7 - 10.5 mg/dL    Total Protein 6.6 6.0 - 8.4 g/dL    Albumin 3.6 3.5 - 5.2 g/dL    Total Bilirubin 0.9 0.1 - 1.0 mg/dL    Alkaline Phosphatase 87 55 - 135 U/L    AST 12 10 - 40  U/L    ALT 9 (L) 10 - 44 U/L    eGFR >60.0 >60 mL/min/1.73 m^2    Anion Gap 9 8 - 16 mmol/L   CBC with Automated Differential    Collection Time: 02/10/24  7:20 AM   Result Value Ref Range    WBC 8.60 3.90 - 12.70 K/uL    RBC 4.17 (L) 4.60 - 6.20 M/uL    Hemoglobin 12.2 (L) 14.0 - 18.0 g/dL    Hematocrit 36.8 (L) 40.0 - 54.0 %    MCV 88 82 - 98 fL    MCH 29.3 27.0 - 31.0 pg    MCHC 33.2 32.0 - 36.0 g/dL    RDW 13.7 11.5 - 14.5 %    Platelets 204 150 - 450 K/uL    MPV 10.3 9.2 - 12.9 fL    Immature Granulocytes 0.2 0.0 - 0.5 %    Gran # (ANC) 5.9 1.8 - 7.7 K/uL    Immature Grans (Abs) 0.02 0.00 - 0.04 K/uL    Lymph # 1.3 1.0 - 4.8 K/uL    Mono # 1.0 0.3 - 1.0 K/uL    Eos # 0.3 0.0 - 0.5 K/uL    Baso # 0.06 0.00 - 0.20 K/uL    nRBC 0 0 /100 WBC    Gran % 68.8 38.0 - 73.0 %    Lymph % 14.5 (L) 18.0 - 48.0 %    Mono % 12.1 4.0 - 15.0 %    Eosinophil % 3.7 0.0 - 8.0 %    Basophil % 0.7 0.0 - 1.9 %    Differential Method Automated      Imaging Results              X-Ray Chest 1 View (Final result)  Result time 02/09/24 11:00:37   Procedure changed from X-Ray Chest PA And Lateral     Final result by Maikel Biswas MD (02/09/24 11:00:37)                   Narrative:    HISTORY: chest pain; Chest Pain    FINDINGS: Portable chest radiograph at 1041 hours compared to prior exams shows dual lead left subclavian CCD, unchanged in position with distal lead tips in the right atrium and right ventricle. There are median sternotomy wires and mediastinal surgical clips, with stable enlarged cardiac silhouette and normal pulmonary vascularity. There are aortic vascular calcifications and changes of prior coronary endovascular stenting.    The lungs are normally expanded, with scattered interstitial opacities in both lungs, similar to prior exams. There is unchanged blunting of the costophrenic angles suggesting small pleural effusions, with no new pleural or parenchymal abnormality. No pneumothorax.    IMPRESSION: Stable appearance  of the chest compared to 01/24/2024, with no definite evidence of acute cardiopulmonary disease.    Electronically signed by:  Maikel Biswas MD  02/09/2024 11:00 AM [facility] Workstation: [id number]                                    12-lead EKG interpretation: a sensed, v paced.   (if applicable)      Current Cardiac Rhythm:   (if applicable)    Physical Exam:   Physical Exam  Constitutional:       Appearance: Normal appearance. He is not ill-appearing.   HENT:      Head: Normocephalic and atraumatic.   Eyes:      General: No scleral icterus.     Conjunctiva/sclera: Conjunctivae normal.   Cardiovascular:      Rate and Rhythm: Normal rate and regular rhythm.      Pulses: Normal pulses.      Heart sounds: Murmur heard.   Pulmonary:      Effort: Pulmonary effort is normal.      Breath sounds: Normal breath sounds. No wheezing, rhonchi or rales.   Abdominal:      General: There is no distension.      Palpations: Abdomen is soft.   Musculoskeletal:         General: Normal range of motion.      Cervical back: Normal range of motion and neck supple.      Right lower leg: No edema.      Left lower leg: No edema.   Skin:     General: Skin is warm and dry.   Neurological:      General: No focal deficit present.      Mental Status: He is alert and oriented to person, place, and time. Mental status is at baseline.   Psychiatric:         Mood and Affect: Mood normal.         Behavior: Behavior normal.         Thought Content: Thought content normal.         Judgment: Judgment normal.         ASSESSMENT/PLAN:   Assessment:   Chronic stable angina  Chest pain  CAD, s/p cabg with redo  S/p TAVR  pAF on NOAC  PVD  DcPPM  Chronic systolic heart failure  Hyperlipidemia  Hypothyroidism  HTN      Plan:   Troponins minimally elevated but flat. No indication of ACS.  BNP unchanged from previous admit. Not volume up on exam. CXR reviewed.  - unfortunately, he has had cabg with redo. There are little options remaining for him from an  interventional standpoint, from the prior note last hospitalization.   - will try increasing IMDUR to 60 mg po daily. Monitor overnight.   - continue ranexa and metoprolol at current dose.  - stop Aspirin. Continue Eliquis and Plavix.   - monitor on telemetry  - known intolerant to statin therapy  - monitor vs.  - recent echo reviewed. EF 40-50.  - explained to the patient and his daughter that we are out of options other than medical management.  - Will see how he responds to the higher dose of Imdur. If chest pain improved, will be stable for discharge. Re-evaluate in the AM.    Jonathan Bey MD

## 2024-02-11 VITALS
HEART RATE: 88 BPM | OXYGEN SATURATION: 94 % | DIASTOLIC BLOOD PRESSURE: 78 MMHG | HEIGHT: 66 IN | RESPIRATION RATE: 18 BRPM | BODY MASS INDEX: 27.48 KG/M2 | WEIGHT: 171 LBS | TEMPERATURE: 98 F | SYSTOLIC BLOOD PRESSURE: 124 MMHG

## 2024-02-11 LAB
ALBUMIN SERPL BCP-MCNC: 3.5 G/DL (ref 3.5–5.2)
ALP SERPL-CCNC: 93 U/L (ref 55–135)
ALT SERPL W/O P-5'-P-CCNC: 15 U/L (ref 10–44)
ANION GAP SERPL CALC-SCNC: 7 MMOL/L (ref 8–16)
AST SERPL-CCNC: 23 U/L (ref 10–40)
BASOPHILS # BLD AUTO: 0.07 K/UL (ref 0–0.2)
BASOPHILS NFR BLD: 0.8 % (ref 0–1.9)
BILIRUB SERPL-MCNC: 0.6 MG/DL (ref 0.1–1)
BUN SERPL-MCNC: 17 MG/DL (ref 8–23)
CALCIUM SERPL-MCNC: 8.5 MG/DL (ref 8.7–10.5)
CHLORIDE SERPL-SCNC: 96 MMOL/L (ref 95–110)
CO2 SERPL-SCNC: 26 MMOL/L (ref 23–29)
CREAT SERPL-MCNC: 1 MG/DL (ref 0.5–1.4)
DIFFERENTIAL METHOD BLD: ABNORMAL
EOSINOPHIL # BLD AUTO: 0.3 K/UL (ref 0–0.5)
EOSINOPHIL NFR BLD: 3.5 % (ref 0–8)
ERYTHROCYTE [DISTWIDTH] IN BLOOD BY AUTOMATED COUNT: 13.5 % (ref 11.5–14.5)
EST. GFR  (NO RACE VARIABLE): >60 ML/MIN/1.73 M^2
GLUCOSE SERPL-MCNC: 97 MG/DL (ref 70–110)
HCT VFR BLD AUTO: 35.6 % (ref 40–54)
HGB BLD-MCNC: 12 G/DL (ref 14–18)
IMM GRANULOCYTES # BLD AUTO: 0.02 K/UL (ref 0–0.04)
IMM GRANULOCYTES NFR BLD AUTO: 0.2 % (ref 0–0.5)
LYMPHOCYTES # BLD AUTO: 1.7 K/UL (ref 1–4.8)
LYMPHOCYTES NFR BLD: 19 % (ref 18–48)
MCH RBC QN AUTO: 29.6 PG (ref 27–31)
MCHC RBC AUTO-ENTMCNC: 33.7 G/DL (ref 32–36)
MCV RBC AUTO: 88 FL (ref 82–98)
MONOCYTES # BLD AUTO: 1.1 K/UL (ref 0.3–1)
MONOCYTES NFR BLD: 12.9 % (ref 4–15)
NEUTROPHILS # BLD AUTO: 5.6 K/UL (ref 1.8–7.7)
NEUTROPHILS NFR BLD: 63.6 % (ref 38–73)
NRBC BLD-RTO: 0 /100 WBC
PLATELET # BLD AUTO: 212 K/UL (ref 150–450)
PMV BLD AUTO: 10.2 FL (ref 9.2–12.9)
POTASSIUM SERPL-SCNC: 4.5 MMOL/L (ref 3.5–5.1)
PROT SERPL-MCNC: 6.4 G/DL (ref 6–8.4)
RBC # BLD AUTO: 4.05 M/UL (ref 4.6–6.2)
SODIUM SERPL-SCNC: 129 MMOL/L (ref 136–145)
WBC # BLD AUTO: 8.85 K/UL (ref 3.9–12.7)

## 2024-02-11 PROCEDURE — 25000003 PHARM REV CODE 250: Performed by: INTERNAL MEDICINE

## 2024-02-11 PROCEDURE — 85025 COMPLETE CBC W/AUTO DIFF WBC: CPT | Performed by: INTERNAL MEDICINE

## 2024-02-11 PROCEDURE — 36415 COLL VENOUS BLD VENIPUNCTURE: CPT | Performed by: INTERNAL MEDICINE

## 2024-02-11 PROCEDURE — 80053 COMPREHEN METABOLIC PANEL: CPT | Performed by: INTERNAL MEDICINE

## 2024-02-11 PROCEDURE — G0378 HOSPITAL OBSERVATION PER HR: HCPCS

## 2024-02-11 RX ORDER — ISOSORBIDE MONONITRATE 30 MG/1
60 TABLET, EXTENDED RELEASE ORAL DAILY
Qty: 60 TABLET | Refills: 11 | Status: ON HOLD | OUTPATIENT
Start: 2024-02-11 | End: 2025-02-10

## 2024-02-11 RX ADMIN — ISOSORBIDE MONONITRATE 60 MG: 60 TABLET, EXTENDED RELEASE ORAL at 08:02

## 2024-02-11 RX ADMIN — CLOPIDOGREL BISULFATE 75 MG: 75 TABLET, FILM COATED ORAL at 08:02

## 2024-02-11 RX ADMIN — SACUBITRIL AND VALSARTAN 1 TABLET: 24; 26 TABLET, FILM COATED ORAL at 08:02

## 2024-02-11 RX ADMIN — METOPROLOL SUCCINATE 100 MG: 50 TABLET, EXTENDED RELEASE ORAL at 08:02

## 2024-02-11 RX ADMIN — RANOLAZINE 1000 MG: 500 TABLET, FILM COATED, EXTENDED RELEASE ORAL at 08:02

## 2024-02-11 RX ADMIN — APIXABAN 5 MG: 5 TABLET, FILM COATED ORAL at 08:02

## 2024-02-11 RX ADMIN — CITALOPRAM HYDROBROMIDE 20 MG: 20 TABLET ORAL at 08:02

## 2024-02-11 RX ADMIN — PHENYTOIN SODIUM 400 MG: 100 CAPSULE ORAL at 08:02

## 2024-02-11 NOTE — DISCHARGE SUMMARY
[facility] Medicine  Discharge Summary      Patient Name: Jerome Amaro Jr.  MRN: [id number]  HANSEL: [id number]  Patient Class: OP- Observation  Admission Date: 2/9/2024  Hospital Length of Stay: 2 days  Discharge Date and Time: 2/11/2024 10:50 AM  Attending Physician: No att. providers found   Discharging Provider: Ana Paula Varghese NP  Primary Care Provider: Oscar Madrid NP    Primary Care Team: Networked reference to record PCT     HPI:   This is a 78-year-old male patient with past medical history of CAD status post CABG twice, diastolic CHF with an EF 45% in 12/23, heart block with pacemaker placement, hypertension, hypothyroidism, seizure disorder, aortic valve replacement who presented with onset of chest pain which started last night.  Patient states he has had these similar chest pains last couple of months.  He has also been admitted with similar symptoms.  He says the patient is 9/10 in the sternal region with radiation to the back and left arm.  The pain is intermittent and at rest and not associated with exertion.  Room his EKG showed ventricularly paced rhythm.  First troponin was 20.4.  He was given a dose of aspirin.  At the time of my encounter patient was not having any chest pain at the time     * No surgery found *      Hospital Course:   Mr. Amaro was monitored closely during his hospitalization. He was admitted on 2/9/24 for chest pain. Highly sensitive troponins were trended and were slightly elevated at 20, but were flat.  - which is his baseline. Cardiology was consulted and recommended medical management given history of CABG + redo. He had an angiogram 12/5/23 that revealed 1 functioning graft. Cards recommended stopping ASA, continuing plavix and eliquis, and increasing imdur to 60mg daily. Imdur was increased while hospitalized and he remained normotensive and chest pain free while ambulating in his room. He was seen and examined on the date of  discharge. He was cleared from a cardiology standpoint for discharge with close outpt follow up. Increased dose of imdur 60mg daily was sent to his pharmacy and ASA discontinued. Sodium on the date of discharge in 129, which is in his baseline range over the last 2 months (129-132). He should f/u with his PCP in 1 week and consider follow up labs to monitor that this is not continuing to decrease. Strict return prxn provided.      Goals of Care Treatment Preferences:  Code Status: Full Code      Consults:   Consults (From admission, onward)          Status Ordering Provider     Inpatient consult to Cardiology  Once        Provider:  Jeffy Louie MD    Completed AYDEE WELCH     Inpatient consult to Cardiology  Once        Provider:  Andrew Whitt MD    Acknowledged AYDEE WELCH            No new Assessment & Plan notes have been filed under this hospital service since the last note was generated.  Service: Hospital Medicine    Final Active Diagnoses:    Diagnosis Date Noted POA    PRINCIPAL PROBLEM:  Chest pain [R07.9] 12/07/2023 Yes      Problems Resolved During this Admission:       Discharged Condition: stable    Disposition: Home-Health Care [facility]    Follow Up:   Follow-up Information       Oscar Madrid NP Follow up in 1 week(s).    Specialty: Family Medicine  Contact information:  [address]  [address]  [place] [address]  [phone]               Rodrigo Willoughby MD Follow up in 1 week(s).    Specialty: Cardiology  Contact information:  [address]  [address]  [facility]  [place] [address]  [phone]                           Patient Instructions:      Diet Cardiac     Notify your health care provider if you experience any of the following:  temperature >100.4     Notify your health care provider if you experience any of the following:  persistent nausea and vomiting or diarrhea     Notify your health care provider if you experience any of the following:   difficulty breathing or increased cough     Notify your health care provider if you experience any of the following:  persistent dizziness, light-headedness, or visual disturbances     Notify your health care provider if you experience any of the following:  increased confusion or weakness     Activity as tolerated       Significant Diagnostic Studies: Labs: CMP   Recent Labs   Lab 02/10/24  0720 02/11/24  0255   * 129*   K 4.1 4.5   CL 98 96   CO2 24 26   GLU 98 97   BUN 14 17   CREATININE 0.9 1.0   CALCIUM 8.8 8.5*   PROT 6.6 6.4   ALBUMIN 3.6 3.5   BILITOT 0.9 0.6   ALKPHOS 87 93   AST 12 23   ALT 9* 15   ANIONGAP 9 7*    and CBC   Recent Labs   Lab 02/10/24  0720 02/11/24  0255   WBC 8.60 8.85   HGB 12.2* 12.0*   HCT 36.8* 35.6*    212       Pending Diagnostic Studies:       None           Medications:  Reconciled Home Medications:      Medication List        CHANGE how you take these medications      isosorbide mononitrate 30 MG 24 hr tablet  Commonly known as: IMDUR  Take 2 tablets (60 mg total) by mouth once daily.  What changed: how much to take            CONTINUE taking these medications      ALPRAZolam 0.25 MG tablet  Commonly known as: XANAX  Take 0.25 mg by mouth 2 (two) times daily as needed.     citalopram 20 MG tablet  Commonly known as: CeleXA  Take 20 mg by mouth once daily.     clopidogreL 75 mg tablet  Commonly known as: PLAVIX  Take 75 mg by mouth once daily.     CORLANOR 5 mg Tab  Generic drug: ivabradine  Take 1 tablet by mouth 2 (two) times daily.     ELIQUIS 5 mg Tab  Generic drug: apixaban  Take 5 mg by mouth every morning.     ENTRESTO 24-26 mg per tablet  Generic drug: sacubitriL-valsartan  Take 1 tablet by mouth 2 (two) times daily.     ezetimibe 10 mg tablet  Commonly known as: ZETIA  Take 10 mg by mouth once daily.     famotidine 40 MG tablet  Commonly known as: PEPCID  Take 40 mg by mouth Daily.     furosemide 20 MG tablet  Commonly known as: LASIX  Take 20 mg by mouth  daily as needed (Fluid).     hydrOXYzine pamoate 25 MG Cap  Commonly known as: VISTARIL  Take 1 capsule (25 mg total) by mouth daily as needed (anxiety).     metoprolol succinate 50 MG 24 hr tablet  Commonly known as: TOPROL-XL  Take 100 mg by mouth 2 (two) times daily.     NITROLINGUAL 400 mcg/spray spray  Generic drug: nitroGLYCERIN 0.4 MG/DOSE TL SPRY  Place 1 spray under the tongue every 5 (five) minutes as needed for Chest pain.     phenytoin 100 MG ER capsule  Commonly known as: DILANTIN  Take 400 mg by mouth once daily.     ranolazine 1,000 mg Tb12  Commonly known as: RANEXA  Take 1,000 mg by mouth 2 (two) times daily.            STOP taking these medications      aspirin 81 MG EC tablet  Commonly known as: ECOTRIN              Indwelling Lines/Drains at time of discharge:   Lines/Drains/Airways       None                   Time spent on the discharge of patient: 33 minutes         Ana Paula Varghese NP  Department of Hospital Medicine  [facility]

## 2024-02-11 NOTE — HOSPITAL COURSE
Mr. Amaro was monitored closely during his hospitalization. He was admitted on 2/9/24 for chest pain. Highly sensitive troponins were trended and were slightly elevated at 20, but were flat.  - which is his baseline. Cardiology was consulted and recommended medical management given history of CABG + redo. He had an angiogram 12/5/23 that revealed 1 functioning graft. Cards recommended stopping ASA, continuing plavix and eliquis, and increasing imdur to 60mg daily. Imdur was increased while hospitalized and he remained normotensive and chest pain free while ambulating in his room. He was seen and examined on the date of discharge. He was cleared from a cardiology standpoint for discharge with close outpt follow up. Increased dose of imdur 60mg daily was sent to his pharmacy and ASA discontinued. Sodium on the date of discharge in 129, which is in his baseline range over the last 2 months (129-132). He should f/u with his PCP in 1 week and consider follow up labs to monitor that this is not continuing to decrease. Strict return prxn provided.

## 2024-02-11 NOTE — HPI
This is a 78-year-old male patient with past medical history of CAD status post CABG twice, diastolic CHF with an EF 45% in 12/23, heart block with pacemaker placement, hypertension, hypothyroidism, seizure disorder, aortic valve replacement who presented with onset of chest pain which started last night.  Patient states he has had these similar chest pains last couple of months.  He has also been admitted with similar symptoms.  He says the patient is 9/10 in the sternal region with radiation to the back and left arm.  The pain is intermittent and at rest and not associated with exertion.  Room his EKG showed ventricularly paced rhythm.  First troponin was 20.4.  He was given a dose of aspirin.  At the time of my encounter patient was not having any chest pain at the time

## 2024-02-11 NOTE — PROGRESS NOTES
[facility]   Cardiology Note    Consult Requested By: Hospital Medicine  Reason for Consult: Chest pain    SUBJECTIVE:     History of Present Illness: The patient is an 78 y.o. male with ah/o CAD, s/p CABG with redo, chronic stable angina, chronic systolic heart failure, not in acute exacerbation, s/p TAVR, pAF, hyperlipidemia, heart block, s/p dcPPM, PVD, seizure disorder, HTN, and hypothyroidism that presented with recurrent chest pain. He has known chronic stable angina. Per last admission at the end of last month, he had a recent C with one functioning graft. There is little options available for him from an interventional standpoint, per last admission. He states that the chest pain was substernal, and worsened by exertion. No radiation. No n/v, diaphoresis. No further episodes of chest pain since he was admitted. Troponins minimally elevated but flat. He was started on new meds as an outpatient. Although, he has not started taking corlenor. No syncope, presyncope, orthopnea, PND.     2/11:  Patient states he is doing very well today.  He denies any further episodes of chin.  He is doing when he walks to the bathroom the chest pain with ambulation.  He denies any syncope presyncope palpitations, orthopnea, paroxysmal nocturnal dyspnea, or lower extremity edema.  He would like to go home this afternoon.  Vital signs reviewed    Review of patient's allergies indicates:   Allergen Reactions    Atorvastatin Other (See Comments)     Muscle pain    Rosuvastatin Other (See Comments)     Muscle pain       Past Medical History:   Diagnosis Date    Anticoagulant long-term use     2014    Aortic valve disease 2014    pig valve    Arthritis     all over    Chest pain 07/15/2019    CHF (congestive heart failure)     2014 on meds    Coronary artery disease     2007 cabg    Heart attack 1990    15 stents    Heart block     Hyperlipidemia     Hypertension     on meds    Hypothyroidism 2015    on meds    PVD  (peripheral vascular disease)     Seizures     last episode 1990 on meds     Past Surgical History:   Procedure Laterality Date    AORTOGRAPHY WITH SERIALOGRAPHY N/A 11/16/2021    Procedure: AORTOGRAM, WITH RUNOFF;  Surgeon: Rodrigo Willoughby MD;  Location: [facility] CATH/EP LAB;  Service: Cardiology;  Laterality: N/A;    ARTERIOGRAPHY OF AORTIC ROOT N/A 11/5/2019    Procedure: ARTERIOGRAM, AORTIC ROOT;  Surgeon: Rodrigo Willoughby MD;  Location: [facility] CATH/EP LAB;  Service: Cardiology;  Laterality: N/A;    CARDIAC CATHETERIZATION      CARDIAC VALVE SURGERY      CORONARY ANGIOGRAPHY INCLUDING BYPASS GRAFTS WITH CATHETERIZATION OF LEFT HEART N/A 11/5/2019    Procedure: ANGIOGRAM, CORONARY, INCLUDING BYPASS GRAFT, WITH LEFT HEART CATHETERIZATION;  Surgeon: Rodrigo Willoughby MD;  Location: [facility] CATH/EP LAB;  Service: Cardiology;  Laterality: N/A;    CORONARY ANGIOGRAPHY INCLUDING BYPASS GRAFTS WITH CATHETERIZATION OF LEFT HEART Left 11/3/2020    Procedure: ANGIOGRAM, CORONARY, INCLUDING BYPASS GRAFT, WITH LEFT HEART CATHETERIZATION;  Surgeon: Rodrigo Willoughby MD;  Location: [facility] CATH/EP LAB;  Service: Cardiology;  Laterality: Left;    CORONARY ANGIOGRAPHY INCLUDING BYPASS GRAFTS WITH CATHETERIZATION OF LEFT HEART N/A 9/28/2021    Procedure: ANGIOGRAM, CORONARY, INCLUDING BYPASS GRAFT, WITH LEFT HEART CATHETERIZATION;  Surgeon: Rodrigo Willoughby MD;  Location: [facility] CATH/EP LAB;  Service: Cardiology;  Laterality: N/A;    CORONARY ANGIOGRAPHY INCLUDING BYPASS GRAFTS WITH CATHETERIZATION OF LEFT HEART N/A 12/5/2023    Procedure: ANGIOGRAM, CORONARY, INCLUDING BYPASS GRAFT, WITH LEFT HEART CATHETERIZATION;  Surgeon: Rodrigo Willoughby MD;  Location: [facility] CATH/EP LAB;  Service: Cardiology;  Laterality: N/A;    CORONARY ANGIOPLASTY      CORONARY ARTERY BYPASS GRAFT      EXTRACORPOREAL SHOCK WAVE LITHOTRIPSY Right 8/1/2019    Procedure: LITHOTRIPSY, ESWL;  Surgeon: Williams Ortega MD;  Location: [facility] OR;  Service: Urology;  Laterality: Right;     HEMORRHOID SURGERY      INSERTION OF PACEMAKER      PERCUTANEOUS TRANSLUMINAL BALLOON ANGIOPLASTY OF CORONARY ARTERY N/A 2019    Procedure: Angioplasty-coronary;  Surgeon: Rodrigo Willoughby MD;  Location: [facility] CATH/EP LAB;  Service: Cardiology;  Laterality: N/A;    WRIST FRACTURE SURGERY       Family History   Problem Relation Age of Onset    Heart attack Mother     Heart attack Father     Heart disease Sister     Stroke Sister     Diabetes Sister     No Known Problems Maternal Grandmother     No Known Problems Maternal Grandfather     No Known Problems Paternal Grandmother     No Known Problems Paternal Grandfather     No Known Problems Brother     No Known Problems Maternal Aunt     No Known Problems Maternal Uncle     No Known Problems Paternal Aunt     No Known Problems Paternal Uncle     Anemia Neg Hx     Arrhythmia Neg Hx     Asthma Neg Hx     Clotting disorder Neg Hx     Fainting Neg Hx     Heart failure Neg Hx     Hyperlipidemia Neg Hx     Hypertension Neg Hx     Atrial Septal Defect Neg Hx      Social History     Tobacco Use    Smoking status: Former     Current packs/day: 0.00     Average packs/day: 0.5 packs/day for 4.0 years (2.0 ttl pk-yrs)     Types: Cigarettes     Start date:      Quit date:      Years since quittin.1    Smokeless tobacco: Never   Substance Use Topics    Alcohol use: Yes     Comment: social    Drug use: No       Review of Systems:  Review of Systems   Constitutional:  Negative for chills and fever.   HENT:  Negative for congestion and sore throat.    Eyes:  Negative for blurred vision and double vision.   Respiratory:  Negative for cough.    Cardiovascular:  Negative for chest pain, palpitations, orthopnea and leg swelling.   Skin:  Negative for itching and rash.       OBJECTIVE:     Vital Signs (Most Recent)  Temp: 98.3 °F (36.8 °C) (24)  Pulse: 88 (24)  Resp: 18 (24 07)  BP: 124/78 (24 07)  SpO2: (!) 94 % (24  0720)    Vital Signs Range (Last 24H):  Temp:  [97 °F (36.1 °C)-98.6 °F (37 °C)]   Pulse:  [80-88]   Resp:  [17-18]   BP: (121-131)/(64-82)   SpO2:  [93 %-96 %]     I & O (Last 24H):    Intake/Output Summary (Last 24 hours) at 2/11/2024 0956  Last data filed at 2/11/2024 0841  Gross per 24 hour   Intake 240 ml   Output 1700 ml   Net -1460 ml         Current Diet:     Current Diet Order   Procedures    Diet Cardiac Fluid - 2000mL     Order Specific Question:   Fluid restriction:     Answer:   Fluid - 2000mL        Allergies:  Review of patient's allergies indicates:   Allergen Reactions    Atorvastatin Other (See Comments)     Muscle pain    Rosuvastatin Other (See Comments)     Muscle pain       Meds:  Scheduled Meds:   apixaban  5 mg Oral QAM    citalopram  20 mg Oral Daily    clopidogreL  75 mg Oral Daily    isosorbide mononitrate  60 mg Oral Daily    metoprolol succinate  100 mg Oral BID    phenytoin  400 mg Oral Daily    ranolazine  1,000 mg Oral BID    sacubitriL-valsartan  1 tablet Oral BID     Continuous Infusions:  PRN Meds:acetaminophen, ALPRAZolam, dextrose 50% in water (D50W), dextrose 50% in water (D50W), glucagon (human recombinant), glucose, glucose, magnesium oxide, magnesium oxide, naloxone, potassium bicarbonate, potassium bicarbonate, potassium bicarbonate, potassium, sodium phosphates, potassium, sodium phosphates, potassium, sodium phosphates, sodium chloride 0.9%    Oxygen/Ventilator Data (Last 24H):  (if applicable)            Hemodynamic Parameters (Last 24H):   (if applicable)        Laboratory and Radiology Data:  Recent Results (from the past 24 hour(s))   Comprehensive Metabolic Panel (CMP)    Collection Time: 02/11/24  2:55 AM   Result Value Ref Range    Sodium 129 (L) 136 - 145 mmol/L    Potassium 4.5 3.5 - 5.1 mmol/L    Chloride 96 95 - 110 mmol/L    CO2 26 23 - 29 mmol/L    Glucose 97 70 - 110 mg/dL    BUN 17 8 - 23 mg/dL    Creatinine 1.0 0.5 - 1.4 mg/dL    Calcium 8.5 (L) 8.7 - 10.5  mg/dL    Total Protein 6.4 6.0 - 8.4 g/dL    Albumin 3.5 3.5 - 5.2 g/dL    Total Bilirubin 0.6 0.1 - 1.0 mg/dL    Alkaline Phosphatase 93 55 - 135 U/L    AST 23 10 - 40 U/L    ALT 15 10 - 44 U/L    eGFR >60.0 >60 mL/min/1.73 m^2    Anion Gap 7 (L) 8 - 16 mmol/L   CBC with Automated Differential    Collection Time: 02/11/24  2:55 AM   Result Value Ref Range    WBC 8.85 3.90 - 12.70 K/uL    RBC 4.05 (L) 4.60 - 6.20 M/uL    Hemoglobin 12.0 (L) 14.0 - 18.0 g/dL    Hematocrit 35.6 (L) 40.0 - 54.0 %    MCV 88 82 - 98 fL    MCH 29.6 27.0 - 31.0 pg    MCHC 33.7 32.0 - 36.0 g/dL    RDW 13.5 11.5 - 14.5 %    Platelets 212 150 - 450 K/uL    MPV 10.2 9.2 - 12.9 fL    Immature Granulocytes 0.2 0.0 - 0.5 %    Gran # (ANC) 5.6 1.8 - 7.7 K/uL    Immature Grans (Abs) 0.02 0.00 - 0.04 K/uL    Lymph # 1.7 1.0 - 4.8 K/uL    Mono # 1.1 (H) 0.3 - 1.0 K/uL    Eos # 0.3 0.0 - 0.5 K/uL    Baso # 0.07 0.00 - 0.20 K/uL    nRBC 0 0 /100 WBC    Gran % 63.6 38.0 - 73.0 %    Lymph % 19.0 18.0 - 48.0 %    Mono % 12.9 4.0 - 15.0 %    Eosinophil % 3.5 0.0 - 8.0 %    Basophil % 0.8 0.0 - 1.9 %    Differential Method Automated      Imaging Results              X-Ray Chest 1 View (Final result)  Result time 02/09/24 11:00:37   Procedure changed from X-Ray Chest PA And Lateral     Final result by Maikel Biswas MD (02/09/24 11:00:37)                   Narrative:    HISTORY: chest pain; Chest Pain    FINDINGS: Portable chest radiograph at 1041 hours compared to prior exams shows dual lead left subclavian CCD, unchanged in position with distal lead tips in the right atrium and right ventricle. There are median sternotomy wires and mediastinal surgical clips, with stable enlarged cardiac silhouette and normal pulmonary vascularity. There are aortic vascular calcifications and changes of prior coronary endovascular stenting.    The lungs are normally expanded, with scattered interstitial opacities in both lungs, similar to prior exams. There is unchanged  blunting of the costophrenic angles suggesting small pleural effusions, with no new pleural or parenchymal abnormality. No pneumothorax.    IMPRESSION: Stable appearance of the chest compared to 01/24/2024, with no definite evidence of acute cardiopulmonary disease.    Electronically signed by:  Maikel Biswas MD  02/09/2024 11:00 AM [facility] Workstation: [id number]                                    12-lead EKG interpretation: a sensed, v paced.   (if applicable)      Current Cardiac Rhythm:   (if applicable)    Physical Exam:   Physical Exam  Constitutional:       Appearance: Normal appearance. He is not ill-appearing.   HENT:      Head: Normocephalic and atraumatic.   Eyes:      General: No scleral icterus.     Conjunctiva/sclera: Conjunctivae normal.   Cardiovascular:      Rate and Rhythm: Normal rate and regular rhythm.      Pulses: Normal pulses.      Heart sounds: Murmur heard.   Pulmonary:      Effort: Pulmonary effort is normal.      Breath sounds: Normal breath sounds. No wheezing, rhonchi or rales.   Abdominal:      General: There is no distension.      Palpations: Abdomen is soft.   Musculoskeletal:         General: Normal range of motion.      Cervical back: Normal range of motion and neck supple.      Right lower leg: No edema.      Left lower leg: No edema.   Skin:     General: Skin is warm and dry.   Neurological:      General: No focal deficit present.      Mental Status: He is alert and oriented to person, place, and time. Mental status is at baseline.   Psychiatric:         Mood and Affect: Mood normal.         Behavior: Behavior normal.         Thought Content: Thought content normal.         Judgment: Judgment normal.         ASSESSMENT/PLAN:   Assessment:   Chronic stable angina  Chest pain  CAD, s/p cabg with redo  S/p TAVR  pAF on NOAC  PVD  DcPPM  Chronic systolic heart failure  Hyperlipidemia  Hypothyroidism  HTN      Plan:   Troponins minimally elevated but flat. No indication of ACS.  BNP  unchanged from previous admit. Not volume up on exam. CXR reviewed.  - unfortunately, he has had cabg with redo. There are little options remaining for him from an interventional standpoint, from the prior note last hospitalization.   - will try increasing IMDUR to 60 mg po daily. Monitor overnight.   - continue ranexa and metoprolol at current dose.  - stop Aspirin. Continue Eliquis and Plavix.   - monitor on telemetry  - known intolerant to statin therapy  - monitor vs.  - recent echo reviewed. EF 40-50.  - explained to the patient and his daughter that we are out of options other than medical management.  - blood pressure is stable on the higher dose of Imdur.  It appears to be tolerating the medication well and he has had no further episodes of chest pain.  Therefore, he is stable for discharge from a cardiology standpoint.  Recommended he follow-up with his cardiologist in 1-2 weeks.  Discharge with the higher dose of Imdur 60 mg p.o. daily.  Continue other medications as stated above.  I have asked her to please give us a call should he have any further episodes of chest pain following discharge.    Jonathan Bey MD

## 2024-02-11 NOTE — PLAN OF CARE
02/11/24 1019   Final Note   Assessment Type Final Discharge Note   Anticipated Discharge Disposition Home-Health   What phone number can be called within the next 1-3 days to see how you are doing after discharge? [phone]   Post-Acute Status   Post-Acute Authorization Home Health   Home Health Status Set-up Complete/Auth obtained   Discharge Delays None known at this time     Discharge orders fax to [facility].  Patient cleared for discharge from case management standpoint.  Patient discharged home with no further case management needs.

## 2024-02-11 NOTE — PLAN OF CARE
Problem: Adult Inpatient Plan of Care  Goal: Plan of Care Review  2/10/2024 1814 by Derek Aguirre RN  Outcome: Ongoing, Progressing  2/10/2024 1232 by Derek Aguirre RN  Outcome: Ongoing, Progressing  Goal: Patient-Specific Goal (Individualized)  2/10/2024 1814 by Derek Aguirre RN  Outcome: Ongoing, Progressing  2/10/2024 1232 by Derek Aguirre RN  Outcome: Ongoing, Progressing  Goal: Absence of Hospital-Acquired Illness or Injury  2/10/2024 1814 by Derek Aguirre RN  Outcome: Ongoing, Progressing  2/10/2024 1232 by Derek Aguirre RN  Outcome: Ongoing, Progressing  Goal: Optimal Comfort and Wellbeing  2/10/2024 1814 by Derek Aguirre RN  Outcome: Ongoing, Progressing  2/10/2024 1232 by Derek Aguirre RN  Outcome: Ongoing, Progressing  Goal: Readiness for Transition of Care  Outcome: Ongoing, Progressing

## 2024-02-15 LAB
OHS QRS DURATION: 194 MS
OHS QTC CALCULATION: 548 MS

## 2024-02-19 ENCOUNTER — OFFICE VISIT (OUTPATIENT)
Dept: FAMILY MEDICINE | Facility: CLINIC | Age: 79
End: 2024-02-19
Payer: MEDICARE

## 2024-02-19 VITALS
HEIGHT: 66 IN | HEART RATE: 72 BPM | WEIGHT: 177 LBS | DIASTOLIC BLOOD PRESSURE: 64 MMHG | SYSTOLIC BLOOD PRESSURE: 100 MMHG | OXYGEN SATURATION: 97 % | BODY MASS INDEX: 28.45 KG/M2 | TEMPERATURE: 98 F | RESPIRATION RATE: 18 BRPM

## 2024-02-19 DIAGNOSIS — I10 PRIMARY HYPERTENSION: ICD-10-CM

## 2024-02-19 DIAGNOSIS — G40.909 SEIZURE DISORDER: ICD-10-CM

## 2024-02-19 DIAGNOSIS — E78.5 DYSLIPIDEMIA: ICD-10-CM

## 2024-02-19 DIAGNOSIS — I25.119 CORONARY ARTERY DISEASE INVOLVING NATIVE CORONARY ARTERY OF NATIVE HEART WITH ANGINA PECTORIS: ICD-10-CM

## 2024-02-19 DIAGNOSIS — E87.1 HYPONATREMIA: ICD-10-CM

## 2024-02-19 DIAGNOSIS — F41.9 ANXIETY AND DEPRESSION: Primary | ICD-10-CM

## 2024-02-19 DIAGNOSIS — I50.9 CONGESTIVE HEART FAILURE, UNSPECIFIED HF CHRONICITY, UNSPECIFIED HEART FAILURE TYPE: ICD-10-CM

## 2024-02-19 DIAGNOSIS — F32.A ANXIETY AND DEPRESSION: Primary | ICD-10-CM

## 2024-02-19 PROCEDURE — 1111F DSCHRG MED/CURRENT MED MERGE: CPT | Mod: HCNC,CPTII,S$GLB, | Performed by: NURSE PRACTITIONER

## 2024-02-19 PROCEDURE — 1126F AMNT PAIN NOTED NONE PRSNT: CPT | Mod: HCNC,CPTII,S$GLB, | Performed by: NURSE PRACTITIONER

## 2024-02-19 PROCEDURE — 1160F RVW MEDS BY RX/DR IN RCRD: CPT | Mod: HCNC,CPTII,S$GLB, | Performed by: NURSE PRACTITIONER

## 2024-02-19 PROCEDURE — 3078F DIAST BP <80 MM HG: CPT | Mod: HCNC,CPTII,S$GLB, | Performed by: NURSE PRACTITIONER

## 2024-02-19 PROCEDURE — 3288F FALL RISK ASSESSMENT DOCD: CPT | Mod: HCNC,CPTII,S$GLB, | Performed by: NURSE PRACTITIONER

## 2024-02-19 PROCEDURE — 1159F MED LIST DOCD IN RCRD: CPT | Mod: HCNC,CPTII,S$GLB, | Performed by: NURSE PRACTITIONER

## 2024-02-19 PROCEDURE — 3074F SYST BP LT 130 MM HG: CPT | Mod: HCNC,CPTII,S$GLB, | Performed by: NURSE PRACTITIONER

## 2024-02-19 PROCEDURE — 1101F PT FALLS ASSESS-DOCD LE1/YR: CPT | Mod: HCNC,CPTII,S$GLB, | Performed by: NURSE PRACTITIONER

## 2024-02-19 PROCEDURE — 99215 OFFICE O/P EST HI 40 MIN: CPT | Mod: HCNC,S$GLB,, | Performed by: NURSE PRACTITIONER

## 2024-02-19 PROCEDURE — 99999 PR PBB SHADOW E&M-EST. PATIENT-LVL V: CPT | Mod: PBBFAC,HCNC,, | Performed by: NURSE PRACTITIONER

## 2024-02-19 NOTE — PROGRESS NOTES
SUBJECTIVE:      Patient ID: Jerome Amaro Jr. is a 78 y.o. male.    Chief Complaint: Medication Refill (6 mth f/u )    78-year-old male presents to the clinic for follow-up and medication refills. He is present with his daughter. Last office visit 8/2023. I have only seen patient for sick visits x2 since he established care in 5/2021. Most of his care is proved by his cardiologist.     Has EGD scheduled for tomorrow with Dr. Lockhart due to difficulty swallowing. Patient was started on Pepcid 40 mg by ENT. He has stopped the Plavix and Eliquis for his procedure. Cleared by Cardiology per his daughter.     He takes Celexa 20 mg for depression/anxiety, which occurred after his wife passed away in December. He has Xanax prn, which he does not take. Mood is stable today.  Doing well on the medication.      He has an extensive cardiac history, which includes HFrEF (45% recent [facility] --12/2023), CAD s/p 2 CABG procedures, 15 cardiac stents, aortic valve replacement, MI, HTN, and pacemaker placement. Last angiogram 12/2023, blockages noted, but no new stents. Denies angina today, but gets angina frequently. Compliant with all his meds, which were reviewed. Followed by the Cardiologist Dr. Willoughby, has follow-up Friday.         Family History   Problem Relation Age of Onset    Heart attack Mother     Heart attack Father     Heart disease Sister     Stroke Sister     Diabetes Sister     No Known Problems Maternal Grandmother     No Known Problems Maternal Grandfather     No Known Problems Paternal Grandmother     No Known Problems Paternal Grandfather     No Known Problems Brother     No Known Problems Maternal Aunt     No Known Problems Maternal Uncle     No Known Problems Paternal Aunt     No Known Problems Paternal Uncle     Anemia Neg Hx     Arrhythmia Neg Hx     Asthma Neg Hx     Clotting disorder Neg Hx     Fainting Neg Hx     Heart failure Neg Hx     Hyperlipidemia Neg Hx     Hypertension Neg Hx     Atrial Septal  Defect Neg Hx       Social History     Socioeconomic History    Marital status:    Tobacco Use    Smoking status: Former     Current packs/day: 0.00     Average packs/day: 0.5 packs/day for 4.0 years (2.0 ttl pk-yrs)     Types: Cigarettes     Start date:      Quit date:      Years since quittin.1    Smokeless tobacco: Never   Substance and Sexual Activity    Alcohol use: Yes     Comment: social    Drug use: No    Sexual activity: Not Currently     Social Determinants of Health     Financial Resource Strain: Patient Declined (2024)    Overall Financial Resource Strain (CARDIA)     Difficulty of Paying Living Expenses: Patient declined   Food Insecurity: Unknown (2024)    Hunger Vital Sign     Ran Out of Food in the Last Year: Patient declined   Transportation Needs: Patient Declined (2024)    PRAPARE - Transportation     Lack of Transportation (Medical): Patient declined     Lack of Transportation (Non-Medical): Patient declined   Stress: Patient Declined (2024)    [employer] - Occupational Stress Questionnaire     Feeling of Stress : Patient declined   Housing Stability: Patient Declined (2024)    Housing Stability Vital Sign     Unable to Pay for Housing in the Last Year: Patient declined     Unstable Housing in the Last Year: Patient declined     Current Outpatient Medications   Medication Sig Dispense Refill    citalopram (CELEXA) 20 MG tablet Take 20 mg by mouth once daily.      clopidogrel (PLAVIX) 75 mg tablet Take 75 mg by mouth once daily.      ELIQUIS 5 mg Tab Take 5 mg by mouth every morning. Holding for surgery      ENTRESTO 24-26 mg per tablet Take 1 tablet by mouth 2 (two) times daily.      ezetimibe (ZETIA) 10 mg tablet Take 10 mg by mouth once daily.      famotidine (PEPCID) 40 MG tablet Take 40 mg by mouth Daily.      furosemide (LASIX) 20 MG tablet Take 20 mg by mouth daily as needed (Fluid).      hydrOXYzine pamoate  (VISTARIL) 25 MG Cap Take 1 capsule (25 mg total) by mouth daily as needed (anxiety).      isosorbide mononitrate (IMDUR) 30 MG 24 hr tablet Take 2 tablets (60 mg total) by mouth once daily. 60 tablet 11    metoprolol succinate (TOPROL-XL) 50 MG 24 hr tablet Take 100 mg by mouth 2 (two) times daily.      phenytoin (DILANTIN) 100 MG ER capsule Take 400 mg by mouth once daily.   0    ranolazine (RANEXA) 1,000 mg Tb12 Take 1,000 mg by mouth 2 (two) times daily.       ALPRAZolam (XANAX) 0.25 MG tablet Take 0.25 mg by mouth 2 (two) times daily as needed.      CORLANOR 5 mg Tab Take 1 tablet by mouth 2 (two) times daily.      NITROLINGUAL 400 mcg/spray spray Place 1 spray under the tongue every 5 (five) minutes as needed for Chest pain.       No current facility-administered medications for this visit.     Facility-Administered Medications Ordered in Other Visits   Medication Dose Route Frequency Provider Last Rate Last Admin    magnesium oxide tablet 800 mg  800 mg Oral PRN Rodrigo Willoughby MD   800 mg at 11/16/21 0737    sodium chloride 0.9% flush 10 mL  10 mL Intravenous PRN Rodrigo Willoughby MD         Review of patient's allergies indicates:   Allergen Reactions    Atorvastatin Other (See Comments)     Muscle pain    Rosuvastatin Other (See Comments)     Muscle pain      Past Medical History:   Diagnosis Date    Anticoagulant long-term use     2014    Aortic valve disease 2014    pig valve    Arthritis     all over    Chest pain 07/15/2019    CHF (congestive heart failure)     2014 on meds    Coronary artery disease     2007 cabg    Difficulty swallowing     Heart attack 1990    15 stents    Heart attack 02/01/2024    mild    Heart block     Hyperlipidemia     Hypertension     on meds    Hypothyroidism 2015    on meds    PVD (peripheral vascular disease)     Seizures     last episode 1990 on meds     Past Surgical History:   Procedure Laterality Date    AORTOGRAPHY WITH SERIALOGRAPHY N/A 11/16/2021    Procedure:  AORTOGRAM, WITH RUNOFF;  Surgeon: Rodrigo Willoughby MD;  Location: [facility] CATH/EP LAB;  Service: Cardiology;  Laterality: N/A;    ARTERIOGRAPHY OF AORTIC ROOT N/A 11/05/2019    Procedure: ARTERIOGRAM, AORTIC ROOT;  Surgeon: Rodrigo Willoughby MD;  Location: [facility] CATH/EP LAB;  Service: Cardiology;  Laterality: N/A;    CARDIAC CATHETERIZATION      CARDIAC VALVE SURGERY      CORONARY ANGIOGRAPHY INCLUDING BYPASS GRAFTS WITH CATHETERIZATION OF LEFT HEART N/A 11/05/2019    Procedure: ANGIOGRAM, CORONARY, INCLUDING BYPASS GRAFT, WITH LEFT HEART CATHETERIZATION;  Surgeon: Rodrigo Willoughby MD;  Location: [facility] CATH/EP LAB;  Service: Cardiology;  Laterality: N/A;    CORONARY ANGIOGRAPHY INCLUDING BYPASS GRAFTS WITH CATHETERIZATION OF LEFT HEART Left 11/03/2020    Procedure: ANGIOGRAM, CORONARY, INCLUDING BYPASS GRAFT, WITH LEFT HEART CATHETERIZATION;  Surgeon: Rodrigo Willoughby MD;  Location: [facility] CATH/EP LAB;  Service: Cardiology;  Laterality: Left;    CORONARY ANGIOGRAPHY INCLUDING BYPASS GRAFTS WITH CATHETERIZATION OF LEFT HEART N/A 09/28/2021    Procedure: ANGIOGRAM, CORONARY, INCLUDING BYPASS GRAFT, WITH LEFT HEART CATHETERIZATION;  Surgeon: Rodrigo Willoughby MD;  Location: [facility] CATH/EP LAB;  Service: Cardiology;  Laterality: N/A;    CORONARY ANGIOGRAPHY INCLUDING BYPASS GRAFTS WITH CATHETERIZATION OF LEFT HEART N/A 12/05/2023    Procedure: ANGIOGRAM, CORONARY, INCLUDING BYPASS GRAFT, WITH LEFT HEART CATHETERIZATION;  Surgeon: Rodrigo Willoughby MD;  Location: [facility] CATH/EP LAB;  Service: Cardiology;  Laterality: N/A;    CORONARY ANGIOPLASTY      CORONARY ARTERY BYPASS GRAFT      x 2    1991 2006    EXTRACORPOREAL SHOCK WAVE LITHOTRIPSY Right 08/01/2019    Procedure: LITHOTRIPSY, ESWL;  Surgeon: Williams Ortega MD;  Location: [facility] OR;  Service: Urology;  Laterality: Right;    HEMORRHOID SURGERY  1990    INSERTION OF PACEMAKER      PERCUTANEOUS TRANSLUMINAL BALLOON ANGIOPLASTY OF CORONARY ARTERY N/A 11/08/2019    Procedure:  "Angioplasty-coronary;  Surgeon: Rodrigo Willoughby MD;  Location: [facility] CATH/EP LAB;  Service: Cardiology;  Laterality: N/A;    WRIST FRACTURE SURGERY         Review of Systems   Constitutional:  Negative for activity change, appetite change, chills, diaphoresis, fatigue, fever and unexpected weight change.   HENT:  Positive for hearing loss and trouble swallowing (has EGD scheduled tomorrow). Negative for congestion, ear pain, sinus pressure, sore throat and voice change.    Eyes:  Negative for pain, discharge and visual disturbance.   Respiratory:  Positive for shortness of breath. Negative for cough, chest tightness and wheezing.    Cardiovascular:  Positive for chest pain (Intermittent, none today.). Negative for palpitations and leg swelling.        HFrEF (45% recent [facility] --12/2023), CAD s/p 2 CABG procedures, 15 cardiac stents, aortic valve replacement, MI, HTN, and pacemaker   Gastrointestinal:  Negative for abdominal pain, constipation, diarrhea, nausea and vomiting.        GERD   Endocrine: Negative for polydipsia, polyphagia and polyuria.   Genitourinary:  Negative for difficulty urinating, flank pain, frequency and urgency.   Musculoskeletal:  Positive for arthralgias and gait problem (ambulates with a cane). Negative for back pain, joint swelling and neck pain.   Skin:  Negative for color change and rash.   Neurological:  Negative for dizziness, seizures, syncope, weakness, numbness and headaches.   Hematological:  Negative for adenopathy.   Psychiatric/Behavioral:  Negative for dysphoric mood and sleep disturbance. The patient is not nervous/anxious.         Anxiety and depression stable      OBJECTIVE:      Vitals:    02/19/24 1356   BP: 100/64   BP Location: Right arm   Patient Position: Sitting   BP Method: Medium (Manual)   Pulse: 72   Resp: 18   Temp: 98.4 °F (36.9 °C)   TempSrc: Oral   SpO2: 97%   Weight: 80.3 kg (177 lb)   Height: 5' 6" (1.676 m)     Physical Exam  Vitals and nursing note reviewed. "   Constitutional:       General: He is awake. He is not in acute distress.     Appearance: Normal appearance. He is overweight. He is not ill-appearing, toxic-appearing or diaphoretic.   HENT:      Head: Normocephalic and atraumatic.      Right Ear: Tympanic membrane, ear canal and external ear normal. Decreased hearing noted.      Left Ear: Tympanic membrane, ear canal and external ear normal. Decreased hearing noted.      Nose: Nose normal.      Mouth/Throat:      Lips: Pink.      Mouth: Mucous membranes are moist.      Dentition: Abnormal dentition.      Tongue: Tongue does not deviate from midline.      Pharynx: Oropharynx is clear. Uvula midline. No pharyngeal swelling, oropharyngeal exudate, posterior oropharyngeal erythema or uvula swelling.   Eyes:      General: Lids are normal. Gaze aligned appropriately.      Conjunctiva/sclera: Conjunctivae normal.      Right eye: Right conjunctiva is not injected.      Left eye: Left conjunctiva is not injected.      Pupils: Pupils are equal, round, and reactive to light.   Cardiovascular:      Rate and Rhythm: Normal rate and regular rhythm.      Pulses: Normal pulses.      Heart sounds: Normal heart sounds, S1 normal and S2 normal.      Comments: Audible click noted.  Pulmonary:      Effort: Pulmonary effort is normal. No respiratory distress.      Breath sounds: No stridor. Decreased breath sounds present. No wheezing, rhonchi or rales.   Musculoskeletal:      Cervical back: Neck supple.      Right lower leg: No edema.      Left lower leg: No edema.   Lymphadenopathy:      Cervical: No cervical adenopathy.   Skin:     General: Skin is warm and dry.      Capillary Refill: Capillary refill takes less than 2 seconds.      Findings: No erythema or rash.   Neurological:      Mental Status: He is alert and oriented to person, place, and time. Mental status is at baseline.   Psychiatric:         Attention and Perception: Attention normal.         Mood and Affect: Mood  normal. Affect is flat.         Speech: Speech normal.         Behavior: Behavior normal. Behavior is cooperative.         Thought Content: Thought content normal.        No visits with results within 1 Week(s) from this visit.   Latest known visit with results is:   Admission on 02/09/2024, Discharged on 02/11/2024   Component Date Value Ref Range Status    QRS Duration 02/09/2024 194  ms Final    [facility] QTC Calculation 02/09/2024 548  ms Final    Magnesium 02/09/2024 1.8  1.6 - 2.6 mg/dL Final    WBC 02/09/2024 8.77  3.90 - 12.70 K/uL Final    RBC 02/09/2024 3.88 (L)  4.60 - 6.20 M/uL Final    Hemoglobin 02/09/2024 11.8 (L)  14.0 - 18.0 g/dL Final    Hematocrit 02/09/2024 34.8 (L)  40.0 - 54.0 % Final    MCV 02/09/2024 90  82 - 98 fL Final    MCH 02/09/2024 30.4  27.0 - 31.0 pg Final    MCHC 02/09/2024 33.9  32.0 - 36.0 g/dL Final    RDW 02/09/2024 14.0  11.5 - 14.5 % Final    Platelets 02/09/2024 247  150 - 450 K/uL Final    MPV 02/09/2024 10.5  9.2 - 12.9 fL Final    Immature Granulocytes 02/09/2024 0.2  0.0 - 0.5 % Final    Gran # (ANC) 02/09/2024 6.1  1.8 - 7.7 K/uL Final    Immature Grans (Abs) 02/09/2024 0.02  0.00 - 0.04 K/uL Final    Comment: Mild elevation in immature granulocytes is non specific and   can be seen in a variety of conditions including stress response,   acute inflammation, trauma and pregnancy. Correlation with other   laboratory and clinical findings is essential.      Lymph # 02/09/2024 1.2  1.0 - 4.8 K/uL Final    Mono # 02/09/2024 1.0  0.3 - 1.0 K/uL Final    Eos # 02/09/2024 0.3  0.0 - 0.5 K/uL Final    Baso # 02/09/2024 0.07  0.00 - 0.20 K/uL Final    nRBC 02/09/2024 0  0 /100 WBC Final    Gran % 02/09/2024 69.9  38.0 - 73.0 % Final    Lymph % 02/09/2024 14.1 (L)  18.0 - 48.0 % Final    Mono % 02/09/2024 11.4  4.0 - 15.0 % Final    Eosinophil % 02/09/2024 3.6  0.0 - 8.0 % Final    Basophil % 02/09/2024 0.8  0.0 - 1.9 % Final    Differential Method 02/09/2024 Automated   Final    Sodium  02/09/2024 132 (L)  136 - 145 mmol/L Final    Potassium 02/09/2024 4.3  3.5 - 5.1 mmol/L Final    Chloride 02/09/2024 100  95 - 110 mmol/L Final    CO2 02/09/2024 25  23 - 29 mmol/L Final    Glucose 02/09/2024 132 (H)  70 - 110 mg/dL Final    BUN 02/09/2024 18  8 - 23 mg/dL Final    Creatinine 02/09/2024 1.2  0.5 - 1.4 mg/dL Final    Calcium 02/09/2024 8.7  8.7 - 10.5 mg/dL Final    Total Protein 02/09/2024 6.7  6.0 - 8.4 g/dL Final    Albumin 02/09/2024 3.7  3.5 - 5.2 g/dL Final    Total Bilirubin 02/09/2024 0.6  0.1 - 1.0 mg/dL Final    Comment: For infants and newborns, interpretation of results should be based  on gestational age, weight and in agreement with clinical  observations.    Premature Infant recommended reference ranges:  Up to 24 hours.............<8.0 mg/dL  Up to 48 hours............<12.0 mg/dL  3-5 days..................<15.0 mg/dL  6-29 days.................<15.0 mg/dL      Alkaline Phosphatase 02/09/2024 88  55 - 135 U/L Final    AST 02/09/2024 14  10 - 40 U/L Final    ALT 02/09/2024 11  10 - 44 U/L Final    eGFR 02/09/2024 >60.0  >60 mL/min/1.73 m^2 Final    Anion Gap 02/09/2024 7 (L)  8 - 16 mmol/L Final    Troponin I High Sensitivity 02/09/2024 21.3 (H)  0.0 - 14.9 pg/mL Final    Comment: Troponin results differ between methods. Do not use   results between Troponin methods interchangeably.    Alkaline Phospatase levels above 400 U/L may   cause false positive results.    Access hsTnI should not be used for patients taking   Asfotase sanjay (Strensiq).      BNP 02/09/2024 958 (H)  0 - 99 pg/mL Final    Values of less than 100 pg/ml are consistent with non-CHF populations.    Troponin I High Sensitivity 02/09/2024 20.4 (H)  0.0 - 14.9 pg/mL Final    Comment: Troponin results differ between methods. Do not use   results between Troponin methods interchangeably.    Alkaline Phospatase levels above 400 U/L may   cause false positive results.    Access hsTnI should not be used for patients taking    Asfotase sanjay (Strensiq).      Troponin I High Sensitivity 02/09/2024 20.2 (H)  0.0 - 14.9 pg/mL Final    Comment: Troponin results differ between methods. Do not use   results between Troponin methods interchangeably.    Alkaline Phospatase levels above 400 U/L may   cause false positive results.    Access hsTnI should not be used for patients taking   Asfotase sanjay (Strensiq).      Sodium 02/10/2024 131 (L)  136 - 145 mmol/L Final    Potassium 02/10/2024 4.1  3.5 - 5.1 mmol/L Final    Chloride 02/10/2024 98  95 - 110 mmol/L Final    CO2 02/10/2024 24  23 - 29 mmol/L Final    Glucose 02/10/2024 98  70 - 110 mg/dL Final    BUN 02/10/2024 14  8 - 23 mg/dL Final    Creatinine 02/10/2024 0.9  0.5 - 1.4 mg/dL Final    Calcium 02/10/2024 8.8  8.7 - 10.5 mg/dL Final    Total Protein 02/10/2024 6.6  6.0 - 8.4 g/dL Final    Albumin 02/10/2024 3.6  3.5 - 5.2 g/dL Final    Total Bilirubin 02/10/2024 0.9  0.1 - 1.0 mg/dL Final    Comment: For infants and newborns, interpretation of results should be based  on gestational age, weight and in agreement with clinical  observations.    Premature Infant recommended reference ranges:  Up to 24 hours.............<8.0 mg/dL  Up to 48 hours............<12.0 mg/dL  3-5 days..................<15.0 mg/dL  6-29 days.................<15.0 mg/dL      Alkaline Phosphatase 02/10/2024 87  55 - 135 U/L Final    AST 02/10/2024 12  10 - 40 U/L Final    ALT 02/10/2024 9 (L)  10 - 44 U/L Final    eGFR 02/10/2024 >60.0  >60 mL/min/1.73 m^2 Final    Anion Gap 02/10/2024 9  8 - 16 mmol/L Final    WBC 02/10/2024 8.60  3.90 - 12.70 K/uL Final    RBC 02/10/2024 4.17 (L)  4.60 - 6.20 M/uL Final    Hemoglobin 02/10/2024 12.2 (L)  14.0 - 18.0 g/dL Final    Hematocrit 02/10/2024 36.8 (L)  40.0 - 54.0 % Final    MCV 02/10/2024 88  82 - 98 fL Final    MCH 02/10/2024 29.3  27.0 - 31.0 pg Final    MCHC 02/10/2024 33.2  32.0 - 36.0 g/dL Final    RDW 02/10/2024 13.7  11.5 - 14.5 % Final    Platelets 02/10/2024 204   150 - 450 K/uL Final    MPV 02/10/2024 10.3  9.2 - 12.9 fL Final    Immature Granulocytes 02/10/2024 0.2  0.0 - 0.5 % Final    Gran # (ANC) 02/10/2024 5.9  1.8 - 7.7 K/uL Final    Immature Grans (Abs) 02/10/2024 0.02  0.00 - 0.04 K/uL Final    Comment: Mild elevation in immature granulocytes is non specific and   can be seen in a variety of conditions including stress response,   acute inflammation, trauma and pregnancy. Correlation with other   laboratory and clinical findings is essential.      Lymph # 02/10/2024 1.3  1.0 - 4.8 K/uL Final    Mono # 02/10/2024 1.0  0.3 - 1.0 K/uL Final    Eos # 02/10/2024 0.3  0.0 - 0.5 K/uL Final    Baso # 02/10/2024 0.06  0.00 - 0.20 K/uL Final    nRBC 02/10/2024 0  0 /100 WBC Final    Gran % 02/10/2024 68.8  38.0 - 73.0 % Final    Lymph % 02/10/2024 14.5 (L)  18.0 - 48.0 % Final    Mono % 02/10/2024 12.1  4.0 - 15.0 % Final    Eosinophil % 02/10/2024 3.7  0.0 - 8.0 % Final    Basophil % 02/10/2024 0.7  0.0 - 1.9 % Final    Differential Method 02/10/2024 Automated   Final    Sodium 02/11/2024 129 (L)  136 - 145 mmol/L Final    Potassium 02/11/2024 4.5  3.5 - 5.1 mmol/L Final    Chloride 02/11/2024 96  95 - 110 mmol/L Final    CO2 02/11/2024 26  23 - 29 mmol/L Final    Glucose 02/11/2024 97  70 - 110 mg/dL Final    BUN 02/11/2024 17  8 - 23 mg/dL Final    Creatinine 02/11/2024 1.0  0.5 - 1.4 mg/dL Final    Calcium 02/11/2024 8.5 (L)  8.7 - 10.5 mg/dL Final    Total Protein 02/11/2024 6.4  6.0 - 8.4 g/dL Final    Albumin 02/11/2024 3.5  3.5 - 5.2 g/dL Final    Total Bilirubin 02/11/2024 0.6  0.1 - 1.0 mg/dL Final    Comment: For infants and newborns, interpretation of results should be based  on gestational age, weight and in agreement with clinical  observations.    Premature Infant recommended reference ranges:  Up to 24 hours.............<8.0 mg/dL  Up to 48 hours............<12.0 mg/dL  3-5 days..................<15.0 mg/dL  6-29 days.................<15.0 mg/dL      Alkaline  Phosphatase 02/11/2024 93  55 - 135 U/L Final    AST 02/11/2024 23  10 - 40 U/L Final    ALT 02/11/2024 15  10 - 44 U/L Final    eGFR 02/11/2024 >60.0  >60 mL/min/1.73 m^2 Final    Anion Gap 02/11/2024 7 (L)  8 - 16 mmol/L Final    WBC 02/11/2024 8.85  3.90 - 12.70 K/uL Final    RBC 02/11/2024 4.05 (L)  4.60 - 6.20 M/uL Final    Hemoglobin 02/11/2024 12.0 (L)  14.0 - 18.0 g/dL Final    Hematocrit 02/11/2024 35.6 (L)  40.0 - 54.0 % Final    MCV 02/11/2024 88  82 - 98 fL Final    MCH 02/11/2024 29.6  27.0 - 31.0 pg Final    MCHC 02/11/2024 33.7  32.0 - 36.0 g/dL Final    RDW 02/11/2024 13.5  11.5 - 14.5 % Final    Platelets 02/11/2024 212  150 - 450 K/uL Final    MPV 02/11/2024 10.2  9.2 - 12.9 fL Final    Immature Granulocytes 02/11/2024 0.2  0.0 - 0.5 % Final    Gran # (ANC) 02/11/2024 5.6  1.8 - 7.7 K/uL Final    Immature Grans (Abs) 02/11/2024 0.02  0.00 - 0.04 K/uL Final    Comment: Mild elevation in immature granulocytes is non specific and   can be seen in a variety of conditions including stress response,   acute inflammation, trauma and pregnancy. Correlation with other   laboratory and clinical findings is essential.      Lymph # 02/11/2024 1.7  1.0 - 4.8 K/uL Final    Mono # 02/11/2024 1.1 (H)  0.3 - 1.0 K/uL Final    Eos # 02/11/2024 0.3  0.0 - 0.5 K/uL Final    Baso # 02/11/2024 0.07  0.00 - 0.20 K/uL Final    nRBC 02/11/2024 0  0 /100 WBC Final    Gran % 02/11/2024 63.6  38.0 - 73.0 % Final    Lymph % 02/11/2024 19.0  18.0 - 48.0 % Final    Mono % 02/11/2024 12.9  4.0 - 15.0 % Final    Eosinophil % 02/11/2024 3.5  0.0 - 8.0 % Final    Basophil % 02/11/2024 0.8  0.0 - 1.9 % Final    Differential Method 02/11/2024 Automated   Final   ]  Assessment:       1. Anxiety and depression    2. Hyponatremia    3. Primary hypertension    4. Dyslipidemia    5. Congestive heart failure, unspecified HF chronicity, unspecified heart failure type    6. Coronary artery disease involving native coronary artery of native  heart with angina pectoris    7. Seizure disorder        Plan:       Anxiety and depression  Stable, continue Celexa 20 mg.  Notify clinic if symptoms worsen.  Xanax available prn.     Hyponatremia  Noted on labs from last hospital visit.  Will repeat sodium level to see if improved or worsening.   -     Comprehensive Metabolic Panel; Future; Expected date: 02/19/2024    Primary hypertension  Stable, managed by Cardiology.     Dyslipidemia  Previously elevated, statin intolerance. On Zetia. Managed by Cardiology.     Congestive heart failure, unspecified HF chronicity, unspecified heart failure type  Managed by Cardiology.     Coronary artery disease involving native coronary artery of native heart with angina pectoris  On Eliquis, and Plavix, managed by Cardiology.     Seizure disorder  Stable with Dilantin 400 mg daily. Managed by Neurology.     This note was created using [employer] voice recognition software that occasionally misinterprets phrases or words.     I spent a total of 50 minutes on the day of the visit.This includes face to face time and non-face to face time preparing to see the patient (eg, review of tests), obtaining and/or reviewing separately obtained history, documenting clinical information in the electronic or other health record, independently interpreting results and communicating results to the patient/family/caregiver, or care coordinator.    Follow up in about 1 year (around 2/19/2025).          2/19/2024 LEVAR Howe, MARTÍNEZP

## 2024-02-20 ENCOUNTER — HOSPITAL ENCOUNTER (OUTPATIENT)
Facility: HOSPITAL | Age: 79
Discharge: HOME | End: 2024-02-20
Attending: INTERNAL MEDICINE | Admitting: INTERNAL MEDICINE
Payer: MEDICARE

## 2024-02-20 ENCOUNTER — ANESTHESIA (OUTPATIENT)
Dept: SURGERY | Facility: HOSPITAL | Age: 79
End: 2024-02-20
Payer: MEDICARE

## 2024-02-20 ENCOUNTER — ANESTHESIA EVENT (OUTPATIENT)
Dept: SURGERY | Facility: HOSPITAL | Age: 79
End: 2024-02-20
Payer: MEDICARE

## 2024-02-20 VITALS
RESPIRATION RATE: 15 BRPM | DIASTOLIC BLOOD PRESSURE: 61 MMHG | OXYGEN SATURATION: 98 % | TEMPERATURE: 98 F | HEART RATE: 61 BPM | SYSTOLIC BLOOD PRESSURE: 112 MMHG

## 2024-02-20 DIAGNOSIS — R13.10 DYSPHAGIA: ICD-10-CM

## 2024-02-20 PROCEDURE — 63600175 PHARM REV CODE 636 W HCPCS: Performed by: NURSE ANESTHETIST, CERTIFIED REGISTERED

## 2024-02-20 PROCEDURE — D9220A PRA ANESTHESIA: Mod: CRNA,,, | Performed by: NURSE ANESTHETIST, CERTIFIED REGISTERED

## 2024-02-20 PROCEDURE — C1769 GUIDE WIRE: HCPCS | Performed by: INTERNAL MEDICINE

## 2024-02-20 PROCEDURE — 25000003 PHARM REV CODE 250: Performed by: NURSE ANESTHETIST, CERTIFIED REGISTERED

## 2024-02-20 PROCEDURE — 37000008 HC ANESTHESIA 1ST 15 MINUTES: Performed by: INTERNAL MEDICINE

## 2024-02-20 PROCEDURE — 43239 EGD BIOPSY SINGLE/MULTIPLE: CPT | Performed by: INTERNAL MEDICINE

## 2024-02-20 PROCEDURE — 27201013 HC FORCEPS, HOT BIOPSY, DISP: Performed by: INTERNAL MEDICINE

## 2024-02-20 PROCEDURE — 43248 EGD GUIDE WIRE INSERTION: CPT | Performed by: INTERNAL MEDICINE

## 2024-02-20 PROCEDURE — 88312 SPECIAL STAINS GROUP 1: CPT | Mod: TC | Performed by: PATHOLOGY

## 2024-02-20 PROCEDURE — 88305 TISSUE EXAM BY PATHOLOGIST: CPT | Mod: TC | Performed by: PATHOLOGY

## 2024-02-20 PROCEDURE — 37000009 HC ANESTHESIA EA ADD 15 MINS: Performed by: INTERNAL MEDICINE

## 2024-02-20 PROCEDURE — D9220A PRA ANESTHESIA: Mod: ANES,,, | Performed by: ANESTHESIOLOGY

## 2024-02-20 RX ORDER — PROPOFOL 10 MG/ML
VIAL (ML) INTRAVENOUS
Status: DISCONTINUED | OUTPATIENT
Start: 2024-02-20 | End: 2024-02-20

## 2024-02-20 RX ADMIN — PROPOFOL 30 MG: 10 INJECTION, EMULSION INTRAVENOUS at 09:02

## 2024-02-20 RX ADMIN — SODIUM CHLORIDE: 0.9 INJECTION, SOLUTION INTRAVENOUS at 09:02

## 2024-02-20 NOTE — PROVATION PATIENT INSTRUCTIONS
Discharge Summary/Instructions after an Endoscopic Procedure  Patient Name: Jerome Amaro  Patient MRN: [id number]  Patient YOB: 1945  Tuesday, February 20, 2024  Mal Lockhart III, MD  RESTRICTIONS:  During your procedure today, you received medications for sedation.  These   medications may affect your judgment, balance and coordination.  Therefore,   for 24 hours, you have the following restrictions:   - DO NOT drive a car, operate machinery, make legal/financial decisions,   sign important papers or drink alcohol.    ACTIVITY:  Today: no heavy lifting, straining or running due to procedural   sedation/anesthesia.  The following day: return to full activity including work.  DIET:  Eat and drink normally unless instructed otherwise.     TREATMENT FOR COMMON SIDE EFFECTS:  - Mild abdominal pain, nausea, belching, bloating or excessive gas:  rest,   eat lightly and use a heating pad.  - Sore Throat: treat with throat lozenges and/or gargle with warm salt   water.  - Because air was used during the procedure, expelling large amounts of air   from your rectum or belching is normal.  - If a bowel prep was taken, you may not have a bowel movement for 1-3 days.    This is normal.  SYMPTOMS TO WATCH FOR AND REPORT TO YOUR PHYSICIAN:  1. Abdominal pain or bloating, other than gas cramps.  2. Chest pain.  3. Back pain.  4. Signs of infection such as: chills or fever occurring within 24 hours   after the procedure.  5. Rectal bleeding, which would show as bright red, maroon, or black stools.   (A tablespoon of blood from the rectum is not serious, especially if   hemorrhoids are present.)  6. Vomiting.  7. Weakness or dizziness.  GO DIRECTLY TO THE NEAREST EMERGENCY ROOM IF YOU HAVE ANY OF THE FOLLOWING:      Difficulty breathing              Chills and/or fever over 101 F   Persistent vomiting and/or vomiting blood   Severe abdominal pain   Severe chest pain   Black, tarry stools   Bleeding- more than one  tablespoon   Any other symptom or condition that you feel may need urgent attention  Your doctor recommends these additional instructions:  If any biopsies were taken, your doctors clinic will contact you in 1 to 2   weeks with any results.  - Patient has a contact number available for emergencies.  The signs and   symptoms of potential delayed complications were discussed with the   patient.  Return to normal activities tomorrow.  Written discharge   instructions were provided to the patient.   - Discharge patient to home (with escort).   - Restart Plavix and Eliquis tomorrow. If still symptoms, get esophagram +/-   manometry.  For questions, problems or results please call your physician - Mal Lockhart III, MD at Work:  [phone].  [facility], EMERGENCY ROOM PHONE NUMBER: [phone]  IF A COMPLICATION OR EMERGENCY SITUATION ARISES AND YOU ARE UNABLE TO REACH   YOUR PHYSICIAN - GO DIRECTLY TO THE EMERGENCY ROOM.  Mal Lockhart III, MD  2/20/2024 10:03:55 AM  This report has been verified and signed electronically.  Dear patient,  As a result of recent federal legislation (The Federal Cures Act), you may   receive lab or pathology results from your procedure in your [facility]   account before your physician is able to contact you. Your physician or   their representative will relay the results to you with their   recommendations at their soonest availability.  Thank you,  PROVATION

## 2024-02-20 NOTE — H&P
GASTROENTEROLOGY PRE-PROCEDURE H&P NOTE  Patient Name: Jerome Amaro Jr.  Patient MRN: [id number]  Patient : 1945    Service date: 2024    PCP: Oscar Madrid NP    No chief complaint on file.      HPI: Patient is a 78 y.o. male with PMHx as below here for evaluation of dysphagia.     Past Medical History:  Past Medical History:   Diagnosis Date    Anticoagulant long-term use         Aortic valve disease     pig valve    Arthritis     all over    Chest pain 07/15/2019    CHF (congestive heart failure)      on meds    Coronary artery disease      cabg    Difficulty swallowing     Heart attack     15 stents    Heart attack 2024    mild    Heart block     Hyperlipidemia     Hypertension     on meds    Hypothyroidism     on meds    PVD (peripheral vascular disease)     Seizures     last episode  on meds        Past Surgical History:  Past Surgical History:   Procedure Laterality Date    AORTOGRAPHY WITH SERIALOGRAPHY N/A 2021    Procedure: AORTOGRAM, WITH RUNOFF;  Surgeon: Rodrigo Willoughby MD;  Location: [facility] CATH/EP LAB;  Service: Cardiology;  Laterality: N/A;    ARTERIOGRAPHY OF AORTIC ROOT N/A 2019    Procedure: ARTERIOGRAM, AORTIC ROOT;  Surgeon: Rodrigo Willoughby MD;  Location: [facility] CATH/EP LAB;  Service: Cardiology;  Laterality: N/A;    CARDIAC CATHETERIZATION      CARDIAC VALVE SURGERY      CORONARY ANGIOGRAPHY INCLUDING BYPASS GRAFTS WITH CATHETERIZATION OF LEFT HEART N/A 2019    Procedure: ANGIOGRAM, CORONARY, INCLUDING BYPASS GRAFT, WITH LEFT HEART CATHETERIZATION;  Surgeon: Rodrigo Willoughby MD;  Location: [facility] CATH/EP LAB;  Service: Cardiology;  Laterality: N/A;    CORONARY ANGIOGRAPHY INCLUDING BYPASS GRAFTS WITH CATHETERIZATION OF LEFT HEART Left 2020    Procedure: ANGIOGRAM, CORONARY, INCLUDING BYPASS GRAFT, WITH LEFT HEART CATHETERIZATION;  Surgeon: Rodrigo Willoughby MD;  Location: [facility] CATH/EP LAB;  Service: Cardiology;  Laterality:  Left;    CORONARY ANGIOGRAPHY INCLUDING BYPASS GRAFTS WITH CATHETERIZATION OF LEFT HEART N/A 09/28/2021    Procedure: ANGIOGRAM, CORONARY, INCLUDING BYPASS GRAFT, WITH LEFT HEART CATHETERIZATION;  Surgeon: Rodrigo Willoughby MD;  Location: [facility] CATH/EP LAB;  Service: Cardiology;  Laterality: N/A;    CORONARY ANGIOGRAPHY INCLUDING BYPASS GRAFTS WITH CATHETERIZATION OF LEFT HEART N/A 12/05/2023    Procedure: ANGIOGRAM, CORONARY, INCLUDING BYPASS GRAFT, WITH LEFT HEART CATHETERIZATION;  Surgeon: Rodrigo Willoughby MD;  Location: [facility] CATH/EP LAB;  Service: Cardiology;  Laterality: N/A;    CORONARY ANGIOPLASTY      CORONARY ARTERY BYPASS GRAFT      x 2    1991 2006    EXTRACORPOREAL SHOCK WAVE LITHOTRIPSY Right 08/01/2019    Procedure: LITHOTRIPSY, ESWL;  Surgeon: Williams Ortega MD;  Location: [facility] OR;  Service: Urology;  Laterality: Right;    HEMORRHOID SURGERY  1990    INSERTION OF PACEMAKER      PERCUTANEOUS TRANSLUMINAL BALLOON ANGIOPLASTY OF CORONARY ARTERY N/A 11/08/2019    Procedure: Angioplasty-coronary;  Surgeon: Rodrigo Willoughby MD;  Location: [facility] CATH/EP LAB;  Service: Cardiology;  Laterality: N/A;    WRIST FRACTURE SURGERY          Home Medications:  Medications Prior to Admission   Medication Sig Dispense Refill Last Dose    clopidogrel (PLAVIX) 75 mg tablet Take 75 mg by mouth once daily.   Past Week    ELIQUIS 5 mg Tab Take 5 mg by mouth every morning. Holding for surgery   Past Week    isosorbide mononitrate (IMDUR) 30 MG 24 hr tablet Take 2 tablets (60 mg total) by mouth once daily. 60 tablet 11 2/20/2024    metoprolol succinate (TOPROL-XL) 50 MG 24 hr tablet Take 100 mg by mouth 2 (two) times daily.   2/20/2024    phenytoin (DILANTIN) 100 MG ER capsule Take 400 mg by mouth once daily.   0 2/20/2024    ranolazine (RANEXA) 1,000 mg Tb12 Take 1,000 mg by mouth 2 (two) times daily.    2/20/2024    ALPRAZolam (XANAX) 0.25 MG tablet Take 0.25 mg by mouth 2 (two) times daily as needed.       citalopram  (CELEXA) 20 MG tablet Take 20 mg by mouth once daily.       CORLANOR 5 mg Tab Take 1 tablet by mouth 2 (two) times daily.       ENTRESTO 24-26 mg per tablet Take 1 tablet by mouth 2 (two) times daily.       ezetimibe (ZETIA) 10 mg tablet Take 10 mg by mouth once daily.       famotidine (PEPCID) 40 MG tablet Take 40 mg by mouth Daily.       furosemide (LASIX) 20 MG tablet Take 20 mg by mouth daily as needed (Fluid).       hydrOXYzine pamoate (VISTARIL) 25 MG Cap Take 1 capsule (25 mg total) by mouth daily as needed (anxiety).       NITROLINGUAL 400 mcg/spray spray Place 1 spray under the tongue every 5 (five) minutes as needed for Chest pain.                  Review of patient's allergies indicates:   Allergen Reactions    Atorvastatin Other (See Comments)     Muscle pain    Rosuvastatin Other (See Comments)     Muscle pain       Social History:   Social History     Occupational History    Not on file   Tobacco Use    Smoking status: Former     Current packs/day: 0.00     Average packs/day: 0.5 packs/day for 4.0 years (2.0 ttl pk-yrs)     Types: Cigarettes     Start date:      Quit date:      Years since quittin.1    Smokeless tobacco: Never   Substance and Sexual Activity    Alcohol use: Yes     Comment: social    Drug use: No    Sexual activity: Not Currently       Family History:   Family History   Problem Relation Age of Onset    Heart attack Mother     Heart attack Father     Heart disease Sister     Stroke Sister     Diabetes Sister     No Known Problems Maternal Grandmother     No Known Problems Maternal Grandfather     No Known Problems Paternal Grandmother     No Known Problems Paternal Grandfather     No Known Problems Brother     No Known Problems Maternal Aunt     No Known Problems Maternal Uncle     No Known Problems Paternal Aunt     No Known Problems Paternal Uncle     Anemia Neg Hx     Arrhythmia Neg Hx     Asthma Neg Hx     Clotting disorder Neg Hx     Fainting Neg Hx     Heart failure  "Neg Hx     Hyperlipidemia Neg Hx     Hypertension Neg Hx     Atrial Septal Defect Neg Hx        Review of Systems:  A 10 point review of systems was performed and was normal, except as mentioned in the HPI, including constitutional, HEENT, heme, lymph, cardiovascular, respiratory, gastrointestinal, genitourinary, neurologic, endocrine, psychiatric and musculoskeletal.      OBJECTIVE:    Physical Exam:  24 Hour Vital Sign Ranges: Temp:  [98.4 °F (36.9 °C)] 98.4 °F (36.9 °C)  Pulse:  [72-74] 74  Resp:  [16-18] 16  SpO2:  [97 %-98 %] 98 %  BP: (100-137)/(64-73) 137/73  Most recent vitals: /73   Pulse 74   Resp 16   SpO2 98%    GEN: well-developed, well-nourished, awake and alert, non-toxic appearing adult  HEENT: PERRL, sclera anicteric, oral mucosa pink and moist without lesion  NECK: trachea midline; Good ROM  CV: regular rate and rhythm, no murmurs or gallops  RESP: clear to auscultation bilaterally, no wheezes, rhonci or rales  ABD: soft, non-tender, non-distended, normal bowel sounds  EXT: no swelling or edema, 2+ pulses distally  SKIN: no rashes or jaundice  PSYCH: normal affect    Labs:   No results for input(s): "WBC", "MCV", "PLT" in the last 72 hours.    Invalid input(s): "HGBAU"  No results for input(s): "NA", "K", "CL", "CO2", "BUN", "GLU" in the last 72 hours.    Invalid input(s): "CREA"  No results for input(s): "ALB" in the last 72 hours.    Invalid input(s): "ALKP", "SGOT", "SGPT", "TBIL", "DBIL", "TPRO"  No results for input(s): "PT", "INR", "PTT" in the last 72 hours.      IMPRESSION / RECOMMENDATIONS:  EGD  with interventions as warranted.   RIsks, benefits, alternatives discussed in detail regarding upcoming procedures and sedation. Some of the more common endoscopic complications include but not limited to immediate or delayed perforation, bleeding, infections, pain, inadvertent injury to surrounding tissue / organs and possible need for surgical evaluation. Patient expressed " understanding, all questions answered and will proceed with procedure as planned.     Mal SUERO Darajive III  2/20/2024  9:45 AM

## 2024-02-20 NOTE — ANESTHESIA POSTPROCEDURE EVALUATION
Anesthesia Post Evaluation    Patient: Jerome Amaro Jr.    Procedure(s) Performed: Procedure(s) (LRB):  EGD (ESOPHAGOGASTRODUODENOSCOPY) (N/A)    Final Anesthesia Type: general      Patient location during evaluation: GI PACU  Patient participation: Yes- Able to Participate  Level of consciousness: awake and alert, oriented and awake  Post-procedure vital signs: reviewed and stable  Pain management: adequate  Airway patency: patent    PONV status at discharge: No PONV  Anesthetic complications: no      Cardiovascular status: blood pressure returned to baseline, hemodynamically stable and stable  Respiratory status: unassisted, spontaneous ventilation and room air  Hydration status: euvolemic  Follow-up not needed.  Comments: No angina at this time               Vitals Value Taken Time   /58 02/20/24 1020   Temp 36.8 °C (98.2 °F) 02/20/24 1015   Pulse 63 02/20/24 1023   Resp 16 02/20/24 1015   SpO2 98 % 02/20/24 1023   Vitals shown include unvalidated device data.      No case tracking events are documented in the log.      Pain/Susanne Score: No data recorded

## 2024-02-20 NOTE — TRANSFER OF CARE
Anesthesia Transfer of Care Note    Patient: Jerome Amaro Jr.    Procedure(s) Performed: Procedure(s) (LRB):  EGD (ESOPHAGOGASTRODUODENOSCOPY) (N/A)    Patient location: GI    Anesthesia Type: general    Transport from OR: Transported from OR on 6-10 L/min O2 by face mask with adequate spontaneous ventilation    Post pain: adequate analgesia    Post assessment: no apparent anesthetic complications    Post vital signs: stable    Level of consciousness: awake    Nausea/Vomiting: no nausea/vomiting    Complications: none    Transfer of care protocol was followed    Last vitals: Visit Vitals  /73   Pulse 74   Resp 16   SpO2 98%

## 2024-02-20 NOTE — ANESTHESIA PREPROCEDURE EVALUATION
02/20/2024  Jerome Amaro Jr. is a 78 y.o., male.    Results for orders placed or performed during the hospital encounter of 02/09/24   EKG 12-lead    Collection Time: 02/09/24  9:48 AM   Result Value Ref Range    QRS Duration 194 ms    [facility] QTC Calculation 548 ms    Narrative    Test Reason : I20.89,    Vent. Rate : 087 BPM     Atrial Rate : 087 BPM     P-R Int : 168 ms          QRS Dur : 194 ms      QT Int : 456 ms       P-R-T Axes : 000 -69 108 degrees     QTc Int : 548 ms    Atrial-sensed ventricular-paced rhythm  Abnormal ECG  When compared with ECG of 24-JAN-2024 12:58,  Vent. rate has increased BY   3 BPM  Confirmed by Andrew Whitt MD [id number]) on 2/15/2024 3:05:30 PM    Referred By: [email or web address]   SELF           Confirmed By:Andrew Whitt MD             Lab Results   Component Value Date    WBC 8.85 02/11/2024    HGB 12.0 (L) 02/11/2024    HCT 35.6 (L) 02/11/2024    MCV 88 02/11/2024     02/11/2024     BMP  Lab Results   Component Value Date     (L) 02/11/2024    K 4.5 02/11/2024    CL 96 02/11/2024    CO2 26 02/11/2024    BUN 17 02/11/2024    CREATININE 1.0 02/11/2024    CALCIUM 8.5 (L) 02/11/2024    ANIONGAP 7 (L) 02/11/2024    GLU 97 02/11/2024    GLU 98 02/10/2024     (H) 02/09/2024       Results for orders placed during the hospital encounter of 01/24/24    Echo Saline Bubble? No    Interpretation Summary    Left Ventricle: There is mild concentric hypertrophy. There is mildly reduced systolic function with a visually estimated ejection fraction of 40 - 50%. Grade II diastolic dysfunction. Elevated left ventricular filling pressure.    Left Atrium: Left atrium is moderately dilated.    Aortic Valve: There is a transcatheter valve replacement in the aortic position.    Mitral Valve: Moderately calcified leaflets. There is mild to moderate regurgitation.  No significant mitral  stenosis    Tricuspid Valve: There is mild to moderate regurgitation.  Pulmonary artery systolic pressure approximately 30 mmHg         Pre-op Assessment    I have reviewed the Patient Summary Reports.     I have reviewed the Nursing Notes. I have reviewed the NPO Status.   I have reviewed the Medications.     Review of Systems  Anesthesia Hx:  No problems with previous Anesthesia   History of prior surgery of interest to airway management or planning: heart surgery.         Denies Family Hx of Anesthesia complications.    Denies Personal Hx of Anesthesia complications.                    Social:  Former Smoker, Alcohol Use       Hematology/Oncology:    Oncology Normal    -- Anemia:               Hematology Comments: Plavix Therapy                     EENT/Dental:  EENT/Dental Normal           Cardiovascular:    Pacemaker Hypertension, well controlled Valvular problems/Murmurs, AS Past MI CAD   CABG/stent Dysrhythmias   CHF   PVD    ECG has been reviewed. Patient post TAVR   [facility] PM - last checked 1 week ago                          Pulmonary:  Pulmonary Normal                       Renal/:  Chronic Renal Disease renal calculi  History of DORIS   Hyponatremia      Kidney Function/Disease             Hepatic/GI:    Esophageal / Stomach Disorders Esophageal Disorder, dysphagia          Neurological:       Seizures, well controlled    Last seizure many years ago                             Endocrine:   Hypothyroidism          Psych:  Psychiatric History  depression                Physical Exam  General: Well nourished, Cooperative, Alert and Oriented    Airway:  Mallampati: III   Mouth Opening: Normal  TM Distance: > 6 cm  Tongue: Normal  Neck ROM: Normal ROM    Dental:  Intact    Chest/Lungs:  Clear to auscultation, Normal Respiratory Rate    Heart:  Rate: Normal  Rhythm: Regular Rhythm        Anesthesia Plan  Type of Anesthesia, risks & benefits discussed:    Anesthesia Type: Gen Natural Airway  Intra-op  Monitoring Plan: Standard ASA Monitors  Post Op Pain Control Plan:   (medical reason for not using multimodal pain management)  Induction:  IV  Informed Consent: Informed consent signed with the Patient and all parties understand the risks and agree with anesthesia plan.  All questions answered.   ASA Score: 3  Anesthesia Plan Notes:       GNA  POM  Propofol   No IV Lidocaine   Esmolol & Bro synephrine available     Ready For Surgery From Anesthesia Perspective.     .

## 2024-02-29 ENCOUNTER — HOSPITAL ENCOUNTER (INPATIENT)
Facility: HOSPITAL | Age: 79
LOS: 4 days | Discharge: HOME | DRG: 280 | End: 2024-03-04
Attending: EMERGENCY MEDICINE | Admitting: INTERNAL MEDICINE
Payer: MEDICARE

## 2024-02-29 DIAGNOSIS — I20.0 UNSTABLE ANGINA: Primary | ICD-10-CM

## 2024-02-29 DIAGNOSIS — R94.31 PROLONGED Q-T INTERVAL ON ECG: ICD-10-CM

## 2024-02-29 DIAGNOSIS — R07.9 CHEST PAIN: ICD-10-CM

## 2024-02-29 DIAGNOSIS — I25.702 CORONARY ARTERY DISEASE INVOLVING CORONARY BYPASS GRAFT OF NATIVE HEART WITH REFRACTORY ANGINA PECTORIS: ICD-10-CM

## 2024-02-29 PROBLEM — J81.0 ACUTE PULMONARY EDEMA: Status: ACTIVE | Noted: 2024-02-29

## 2024-02-29 PROBLEM — Z78.9 FREQUENT HOSPITAL ADMISSIONS: Status: ACTIVE | Noted: 2024-02-29

## 2024-02-29 PROBLEM — I48.91 ATRIAL FIBRILLATION: Status: ACTIVE | Noted: 2024-02-29

## 2024-02-29 PROBLEM — Z88.8 ALLERGY TO STATIN MEDICATION: Status: ACTIVE | Noted: 2024-02-29

## 2024-02-29 LAB
ALBUMIN SERPL BCP-MCNC: 4 G/DL (ref 3.5–5.2)
ALP SERPL-CCNC: 94 U/L (ref 55–135)
ALT SERPL W/O P-5'-P-CCNC: 7 U/L (ref 10–44)
ANION GAP SERPL CALC-SCNC: 9 MMOL/L (ref 8–16)
APTT PPP: 33.6 SEC (ref 21–32)
APTT PPP: 49 SEC (ref 21–32)
AST SERPL-CCNC: 13 U/L (ref 10–40)
BASOPHILS # BLD AUTO: 0.11 K/UL (ref 0–0.2)
BASOPHILS NFR BLD: 1 % (ref 0–1.9)
BILIRUB SERPL-MCNC: 0.6 MG/DL (ref 0.1–1)
BNP SERPL-MCNC: 1720 PG/ML (ref 0–99)
BUN SERPL-MCNC: 21 MG/DL (ref 8–23)
CALCIUM SERPL-MCNC: 9.6 MG/DL (ref 8.7–10.5)
CHLORIDE SERPL-SCNC: 99 MMOL/L (ref 95–110)
CO2 SERPL-SCNC: 25 MMOL/L (ref 23–29)
CREAT SERPL-MCNC: 1.2 MG/DL (ref 0.5–1.4)
DIFFERENTIAL METHOD BLD: ABNORMAL
EOSINOPHIL # BLD AUTO: 0.3 K/UL (ref 0–0.5)
EOSINOPHIL NFR BLD: 2.8 % (ref 0–8)
ERYTHROCYTE [DISTWIDTH] IN BLOOD BY AUTOMATED COUNT: 14.3 % (ref 11.5–14.5)
EST. GFR  (NO RACE VARIABLE): >60 ML/MIN/1.73 M^2
GLUCOSE SERPL-MCNC: 151 MG/DL (ref 70–110)
HCT VFR BLD AUTO: 38.6 % (ref 40–54)
HGB BLD-MCNC: 12.8 G/DL (ref 14–18)
IMM GRANULOCYTES # BLD AUTO: 0.04 K/UL (ref 0–0.04)
IMM GRANULOCYTES NFR BLD AUTO: 0.4 % (ref 0–0.5)
INR PPP: 1 (ref 0.8–1.2)
LYMPHOCYTES # BLD AUTO: 1.6 K/UL (ref 1–4.8)
LYMPHOCYTES NFR BLD: 14 % (ref 18–48)
MAGNESIUM SERPL-MCNC: 1.5 MG/DL (ref 1.6–2.6)
MCH RBC QN AUTO: 29.5 PG (ref 27–31)
MCHC RBC AUTO-ENTMCNC: 33.2 G/DL (ref 32–36)
MCV RBC AUTO: 89 FL (ref 82–98)
MONOCYTES # BLD AUTO: 1.1 K/UL (ref 0.3–1)
MONOCYTES NFR BLD: 9.4 % (ref 4–15)
NEUTROPHILS # BLD AUTO: 8.1 K/UL (ref 1.8–7.7)
NEUTROPHILS NFR BLD: 72.4 % (ref 38–73)
NRBC BLD-RTO: 0 /100 WBC
OHS QRS DURATION: 212 MS
OHS QTC CALCULATION: 608 MS
PHENYTOIN SERPL-MCNC: 12.1 UG/ML (ref 10–20)
PLATELET # BLD AUTO: 287 K/UL (ref 150–450)
PMV BLD AUTO: 10.4 FL (ref 9.2–12.9)
POTASSIUM SERPL-SCNC: 3.9 MMOL/L (ref 3.5–5.1)
PROT SERPL-MCNC: 7.1 G/DL (ref 6–8.4)
PROTHROMBIN TIME: 11.6 SEC (ref 9–12.5)
RBC # BLD AUTO: 4.34 M/UL (ref 4.6–6.2)
SODIUM SERPL-SCNC: 133 MMOL/L (ref 136–145)
TROPONIN I SERPL HS-MCNC: 1134.7 PG/ML (ref 0–14.9)
TROPONIN I SERPL HS-MCNC: 24.8 PG/ML (ref 0–14.9)
TROPONIN I SERPL HS-MCNC: 2543.4 PG/ML (ref 0–14.9)
WBC # BLD AUTO: 11.15 K/UL (ref 3.9–12.7)

## 2024-02-29 PROCEDURE — 20000000 HC ICU ROOM

## 2024-02-29 PROCEDURE — 63600175 PHARM REV CODE 636 W HCPCS: Performed by: EMERGENCY MEDICINE

## 2024-02-29 PROCEDURE — 63600175 PHARM REV CODE 636 W HCPCS: Performed by: INTERNAL MEDICINE

## 2024-02-29 PROCEDURE — 99900035 HC TECH TIME PER 15 MIN (STAT)

## 2024-02-29 PROCEDURE — 25000003 PHARM REV CODE 250: Performed by: INTERNAL MEDICINE

## 2024-02-29 PROCEDURE — 93005 ELECTROCARDIOGRAM TRACING: CPT | Performed by: GENERAL PRACTICE

## 2024-02-29 PROCEDURE — 85730 THROMBOPLASTIN TIME PARTIAL: CPT | Performed by: INTERNAL MEDICINE

## 2024-02-29 PROCEDURE — 94799 UNLISTED PULMONARY SVC/PX: CPT

## 2024-02-29 PROCEDURE — 85025 COMPLETE CBC W/AUTO DIFF WBC: CPT | Performed by: EMERGENCY MEDICINE

## 2024-02-29 PROCEDURE — 94761 N-INVAS EAR/PLS OXIMETRY MLT: CPT

## 2024-02-29 PROCEDURE — 84484 ASSAY OF TROPONIN QUANT: CPT | Mod: 91 | Performed by: INTERNAL MEDICINE

## 2024-02-29 PROCEDURE — 99900031 HC PATIENT EDUCATION (STAT)

## 2024-02-29 PROCEDURE — 80053 COMPREHEN METABOLIC PANEL: CPT | Performed by: EMERGENCY MEDICINE

## 2024-02-29 PROCEDURE — 96366 THER/PROPH/DIAG IV INF ADDON: CPT

## 2024-02-29 PROCEDURE — 96375 TX/PRO/DX INJ NEW DRUG ADDON: CPT

## 2024-02-29 PROCEDURE — 85610 PROTHROMBIN TIME: CPT | Performed by: INTERNAL MEDICINE

## 2024-02-29 PROCEDURE — 93010 ELECTROCARDIOGRAM REPORT: CPT | Mod: ,,, | Performed by: GENERAL PRACTICE

## 2024-02-29 PROCEDURE — 83735 ASSAY OF MAGNESIUM: CPT | Performed by: EMERGENCY MEDICINE

## 2024-02-29 PROCEDURE — 96365 THER/PROPH/DIAG IV INF INIT: CPT

## 2024-02-29 PROCEDURE — 83880 ASSAY OF NATRIURETIC PEPTIDE: CPT | Performed by: EMERGENCY MEDICINE

## 2024-02-29 PROCEDURE — 80185 ASSAY OF PHENYTOIN TOTAL: CPT | Performed by: INTERNAL MEDICINE

## 2024-02-29 PROCEDURE — 36415 COLL VENOUS BLD VENIPUNCTURE: CPT | Performed by: INTERNAL MEDICINE

## 2024-02-29 PROCEDURE — 84484 ASSAY OF TROPONIN QUANT: CPT | Mod: 91 | Performed by: EMERGENCY MEDICINE

## 2024-02-29 PROCEDURE — 25000003 PHARM REV CODE 250: Performed by: EMERGENCY MEDICINE

## 2024-02-29 PROCEDURE — 99285 EMERGENCY DEPT VISIT HI MDM: CPT | Mod: 25

## 2024-02-29 PROCEDURE — 27000221 HC OXYGEN, UP TO 24 HOURS

## 2024-02-29 PROCEDURE — 36415 COLL VENOUS BLD VENIPUNCTURE: CPT | Performed by: EMERGENCY MEDICINE

## 2024-02-29 RX ORDER — PHENYTOIN SODIUM 100 MG/1
400 CAPSULE, EXTENDED RELEASE ORAL DAILY
Status: DISCONTINUED | OUTPATIENT
Start: 2024-02-29 | End: 2024-03-04 | Stop reason: HOSPADM

## 2024-02-29 RX ORDER — ACETAMINOPHEN 325 MG/1
650 TABLET ORAL EVERY 8 HOURS PRN
Status: DISCONTINUED | OUTPATIENT
Start: 2024-02-29 | End: 2024-03-04 | Stop reason: HOSPADM

## 2024-02-29 RX ORDER — CITALOPRAM 20 MG/1
20 TABLET, FILM COATED ORAL DAILY
Status: DISCONTINUED | OUTPATIENT
Start: 2024-02-29 | End: 2024-03-04 | Stop reason: HOSPADM

## 2024-02-29 RX ORDER — ASPIRIN 325 MG
325 TABLET ORAL
Status: COMPLETED | OUTPATIENT
Start: 2024-02-29 | End: 2024-02-29

## 2024-02-29 RX ORDER — CITALOPRAM 40 MG/1
40 TABLET, FILM COATED ORAL DAILY
Status: ON HOLD | COMMUNITY
Start: 2024-02-27

## 2024-02-29 RX ORDER — SODIUM,POTASSIUM PHOSPHATES 280-250MG
2 POWDER IN PACKET (EA) ORAL
Status: DISCONTINUED | OUTPATIENT
Start: 2024-02-29 | End: 2024-03-04 | Stop reason: HOSPADM

## 2024-02-29 RX ORDER — RANOLAZINE 500 MG/1
1000 TABLET, EXTENDED RELEASE ORAL 2 TIMES DAILY
Status: DISCONTINUED | OUTPATIENT
Start: 2024-02-29 | End: 2024-02-29

## 2024-02-29 RX ORDER — LANOLIN ALCOHOL/MO/W.PET/CERES
800 CREAM (GRAM) TOPICAL
Status: DISCONTINUED | OUTPATIENT
Start: 2024-02-29 | End: 2024-03-04 | Stop reason: HOSPADM

## 2024-02-29 RX ORDER — ACETAMINOPHEN 325 MG/1
650 TABLET ORAL EVERY 4 HOURS PRN
Status: DISCONTINUED | OUTPATIENT
Start: 2024-02-29 | End: 2024-03-04 | Stop reason: HOSPADM

## 2024-02-29 RX ORDER — ONDANSETRON HYDROCHLORIDE 2 MG/ML
4 INJECTION, SOLUTION INTRAVENOUS EVERY 6 HOURS PRN
Status: DISCONTINUED | OUTPATIENT
Start: 2024-02-29 | End: 2024-03-04 | Stop reason: HOSPADM

## 2024-02-29 RX ORDER — HEPARIN SODIUM,PORCINE/D5W 25000/250
0-40 INTRAVENOUS SOLUTION INTRAVENOUS CONTINUOUS
Status: DISCONTINUED | OUTPATIENT
Start: 2024-02-29 | End: 2024-03-02

## 2024-02-29 RX ORDER — DOXYCYCLINE 100 MG/1
100 CAPSULE ORAL 2 TIMES DAILY
Status: ON HOLD | COMMUNITY
Start: 2024-02-23 | End: 2024-03-04 | Stop reason: HOSPADM

## 2024-02-29 RX ORDER — MORPHINE SULFATE 4 MG/ML
4 INJECTION, SOLUTION INTRAMUSCULAR; INTRAVENOUS
Status: COMPLETED | OUTPATIENT
Start: 2024-02-29 | End: 2024-02-29

## 2024-02-29 RX ORDER — BISMUTH SUBSALICYLATE 262 MG/1
1 TABLET ORAL 2 TIMES DAILY
Status: ON HOLD | COMMUNITY
Start: 2024-02-23

## 2024-02-29 RX ORDER — METOPROLOL SUCCINATE 50 MG/1
100 TABLET, EXTENDED RELEASE ORAL 2 TIMES DAILY
Status: DISCONTINUED | OUTPATIENT
Start: 2024-02-29 | End: 2024-03-04 | Stop reason: HOSPADM

## 2024-02-29 RX ORDER — LISINOPRIL 40 MG/1
40 TABLET ORAL DAILY
COMMUNITY
End: 2024-02-29 | Stop reason: ALTCHOICE

## 2024-02-29 RX ORDER — FUROSEMIDE 10 MG/ML
40 INJECTION INTRAMUSCULAR; INTRAVENOUS EVERY 12 HOURS
Status: DISCONTINUED | OUTPATIENT
Start: 2024-02-29 | End: 2024-03-01

## 2024-02-29 RX ORDER — ISOSORBIDE MONONITRATE 60 MG/1
60 TABLET, EXTENDED RELEASE ORAL DAILY
Status: DISCONTINUED | OUTPATIENT
Start: 2024-02-29 | End: 2024-03-01

## 2024-02-29 RX ORDER — METRONIDAZOLE 500 MG/1
500 TABLET ORAL 3 TIMES DAILY
Status: ON HOLD | COMMUNITY
Start: 2024-02-23 | End: 2024-03-04 | Stop reason: HOSPADM

## 2024-02-29 RX ORDER — METOPROLOL SUCCINATE 100 MG/1
100 TABLET, EXTENDED RELEASE ORAL 2 TIMES DAILY
Status: ON HOLD | COMMUNITY

## 2024-02-29 RX ORDER — PANTOPRAZOLE SODIUM 20 MG/1
20 TABLET, DELAYED RELEASE ORAL 2 TIMES DAILY
Status: DISCONTINUED | OUTPATIENT
Start: 2024-02-29 | End: 2024-03-04 | Stop reason: HOSPADM

## 2024-02-29 RX ORDER — MORPHINE SULFATE 2 MG/ML
2 INJECTION, SOLUTION INTRAMUSCULAR; INTRAVENOUS EVERY 4 HOURS PRN
Status: DISCONTINUED | OUTPATIENT
Start: 2024-02-29 | End: 2024-03-04 | Stop reason: HOSPADM

## 2024-02-29 RX ORDER — FUROSEMIDE 10 MG/ML
40 INJECTION INTRAMUSCULAR; INTRAVENOUS EVERY 12 HOURS
Status: DISCONTINUED | OUTPATIENT
Start: 2024-02-29 | End: 2024-02-29

## 2024-02-29 RX ORDER — CLOPIDOGREL BISULFATE 75 MG/1
75 TABLET ORAL DAILY
Status: DISCONTINUED | OUTPATIENT
Start: 2024-02-29 | End: 2024-03-01

## 2024-02-29 RX ORDER — ALUMINUM HYDROXIDE, MAGNESIUM HYDROXIDE, AND SIMETHICONE 1200; 120; 1200 MG/30ML; MG/30ML; MG/30ML
30 SUSPENSION ORAL 4 TIMES DAILY PRN
Status: DISCONTINUED | OUTPATIENT
Start: 2024-02-29 | End: 2024-03-04 | Stop reason: HOSPADM

## 2024-02-29 RX ORDER — NITROGLYCERIN 20 MG/100ML
0-400 INJECTION INTRAVENOUS CONTINUOUS
Status: DISCONTINUED | OUTPATIENT
Start: 2024-02-29 | End: 2024-03-01

## 2024-02-29 RX ORDER — HYDROCODONE BITARTRATE AND ACETAMINOPHEN 5; 325 MG/1; MG/1
1 TABLET ORAL EVERY 6 HOURS PRN
Status: DISCONTINUED | OUTPATIENT
Start: 2024-02-29 | End: 2024-03-04 | Stop reason: HOSPADM

## 2024-02-29 RX ORDER — TALC
6 POWDER (GRAM) TOPICAL NIGHTLY PRN
Status: DISCONTINUED | OUTPATIENT
Start: 2024-02-29 | End: 2024-03-04 | Stop reason: HOSPADM

## 2024-02-29 RX ORDER — PANTOPRAZOLE SODIUM 20 MG/1
20 TABLET, DELAYED RELEASE ORAL 2 TIMES DAILY
Status: ON HOLD | COMMUNITY
Start: 2024-02-22

## 2024-02-29 RX ORDER — ONDANSETRON HYDROCHLORIDE 2 MG/ML
4 INJECTION, SOLUTION INTRAVENOUS
Status: COMPLETED | OUTPATIENT
Start: 2024-02-29 | End: 2024-02-29

## 2024-02-29 RX ORDER — ALPRAZOLAM 0.25 MG/1
0.25 TABLET ORAL 2 TIMES DAILY PRN
Status: DISCONTINUED | OUTPATIENT
Start: 2024-02-29 | End: 2024-03-04 | Stop reason: HOSPADM

## 2024-02-29 RX ADMIN — METOPROLOL SUCCINATE 100 MG: 25 TABLET, EXTENDED RELEASE ORAL at 11:02

## 2024-02-29 RX ADMIN — ASPIRIN 325 MG ORAL TABLET 325 MG: 325 PILL ORAL at 08:02

## 2024-02-29 RX ADMIN — MORPHINE SULFATE 4 MG: 4 INJECTION, SOLUTION INTRAMUSCULAR; INTRAVENOUS at 07:02

## 2024-02-29 RX ADMIN — CLOPIDOGREL BISULFATE 75 MG: 75 TABLET, FILM COATED ORAL at 11:02

## 2024-02-29 RX ADMIN — ISOSORBIDE MONONITRATE 60 MG: 60 TABLET, EXTENDED RELEASE ORAL at 11:02

## 2024-02-29 RX ADMIN — HEPARIN SODIUM AND DEXTROSE 12 UNITS/KG/HR: 10000; 5 INJECTION INTRAVENOUS at 02:02

## 2024-02-29 RX ADMIN — Medication 6 MG: at 09:02

## 2024-02-29 RX ADMIN — FUROSEMIDE 40 MG: 10 INJECTION, SOLUTION INTRAMUSCULAR; INTRAVENOUS at 09:02

## 2024-02-29 RX ADMIN — NITROGLYCERIN 5 MCG/MIN: 20 INJECTION INTRAVENOUS at 07:02

## 2024-02-29 RX ADMIN — CITALOPRAM HYDROBROMIDE 20 MG: 20 TABLET ORAL at 11:02

## 2024-02-29 RX ADMIN — FUROSEMIDE 40 MG: 10 INJECTION, SOLUTION INTRAMUSCULAR; INTRAVENOUS at 11:02

## 2024-02-29 RX ADMIN — ONDANSETRON 4 MG: 2 INJECTION INTRAMUSCULAR; INTRAVENOUS at 07:02

## 2024-02-29 RX ADMIN — PANTOPRAZOLE SODIUM 20 MG: 20 TABLET, DELAYED RELEASE ORAL at 11:02

## 2024-02-29 RX ADMIN — PANTOPRAZOLE SODIUM 20 MG: 20 TABLET, DELAYED RELEASE ORAL at 09:02

## 2024-02-29 RX ADMIN — METOPROLOL SUCCINATE 100 MG: 25 TABLET, EXTENDED RELEASE ORAL at 09:02

## 2024-02-29 NOTE — PHARMACY MED REC
"    Admission Medication History     The home medication history was taken by Jasmin Corley.    You may go to "Admission" then "Reconcile Home Medications" tabs to review and/or act upon these items.     The home medication list has been updated by the Pharmacy department.   Please read ALL comments highlighted in yellow.   Please address this information as you see fit.    Feel free to contact us if you have any questions or require assistance.      The medications listed below were removed from the home medication list. Please reorder if appropriate:  Patient reports no longer taking the following medication(s):  Celexa 20mg  Lisinopril 40 mg            Medications listed below were obtained from: Patient/family and Analytic software- [employer]apps  Current Facility-Administered Medications on File Prior to Encounter   Medication Dose Route Frequency Provider Last Rate Last Admin    magnesium oxide tablet 800 mg  800 mg Oral PRN Rodrigo Willoughby MD   800 mg at 11/16/21 0737    sodium chloride 0.9% flush 10 mL  10 mL Intravenous PRN Rodrigo Willoughby MD         Current Outpatient Medications on File Prior to Encounter   Medication Sig Dispense Refill    bismuth subsalicylate (BISMAREX) 262 mg Chew Take 1 tablet by mouth 2 (two) times daily.      citalopram (CELEXA) 40 MG tablet Take 40 mg by mouth once daily. NEW Rx - NOT YET STARTED      clopidogrel (PLAVIX) 75 mg tablet Take 75 mg by mouth once daily.      CORLANOR 5 mg Tab Take 1 tablet by mouth 2 (two) times daily. NEW Rx - NOT YET STARTED      ELIQUIS 5 mg Tab Take 5 mg by mouth every morning.      ENTRESTO 24-26 mg per tablet Take 1 tablet by mouth 2 (two) times daily.      ezetimibe (ZETIA) 10 mg tablet Take 10 mg by mouth once daily.      famotidine (PEPCID) 40 MG tablet Take 40 mg by mouth Daily.      furosemide (LASIX) 20 MG tablet Take 20 mg by mouth daily as needed (Fluid).      hydrOXYzine pamoate (VISTARIL) 25 MG Cap Take 1 capsule (25 mg total) by mouth daily " as needed (anxiety).      isosorbide mononitrate (IMDUR) 30 MG 24 hr tablet Take 2 tablets (60 mg total) by mouth once daily. 60 tablet 11    metoprolol succinate (TOPROL-XL) 100 MG 24 hr tablet Take 100 mg by mouth 2 (two) times daily.      NITROLINGUAL 400 mcg/spray spray Place 1 spray under the tongue every 5 (five) minutes as needed for Chest pain.      phenytoin (DILANTIN) 100 MG ER capsule Take 400 mg by mouth once daily.   0    ranolazine (RANEXA) 1,000 mg Tb12 Take 1,000 mg by mouth 2 (two) times daily.       ALPRAZolam (XANAX) 0.25 MG tablet Take 0.25 mg by mouth 2 (two) times daily as needed for Anxiety.      doxycycline (VIBRAMYCIN) 100 MG Cap Take 100 mg by mouth 2 (two) times daily.      metoprolol succinate (TOPROL-XL) 50 MG 24 hr tablet Take 100 mg by mouth 2 (two) times daily.      metroNIDAZOLE (FLAGYL) 500 MG tablet Take 500 mg by mouth 3 (three) times daily.      pantoprazole (PROTONIX) 20 MG tablet Take 20 mg by mouth 2 (two) times daily.      [DISCONTINUED] citalopram (CELEXA) 20 MG tablet Take 20 mg by mouth once daily.      [DISCONTINUED] lisinopriL (PRINIVIL,ZESTRIL) 40 MG tablet Take 40 mg by mouth once daily.           Jasmin Corley  [id number]              .

## 2024-02-29 NOTE — CARE UPDATE
Second set of troponins on higher range  Will start pt on heparin drip     Latest Reference Range & Units 01/24/24 06:05 01/24/24 11:28 01/24/24 18:19 02/09/24 10:09 02/09/24 11:46 02/09/24 18:50 02/29/24 07:50 02/29/24 12:04   Troponin I High Sensitivity 0.0 - 14.9 pg/mL 178.4 (HH) 406.6 (HH) 361.1 (HH) 21.3 (H) 20.4 (H) 20.2 (H) 24.8 (H) 1134.7 (HH)

## 2024-02-29 NOTE — HPI
78 year old pt getting admitted with chest pain and acute CHF  Pt had Coronary angiogram on December 2023 and medical management was advised(1 functioning graft of CABG was found)  He takes lasix at home on PRN basis  Today AM pt started having chest pain mostly on central part of chest/pain radiated to back/ pain worse on exertion  Also c/o shortness of breath especially on exertion  He came to ER and got admitted  Since he had Angiogram done on December 2023 this is pts 4 th IP admission to hospital with same c/o ie:Chest pain   Pt is on Ranexa which will be put on Hold today because of prolonged QTC

## 2024-02-29 NOTE — ED PROVIDER NOTES
Encounter Date: 2/29/2024       History     Chief Complaint   Patient presents with    Chest Pain     Pt started having chest pain about an hour ago     78-year-old male presented emergency department with crushing substernal chest pain similar to previous episodes of cardiac chest pain.  Patient rates pain 8/10.  Patient took nitroglycerin prior to coming here which helped the pain slightly.  Patient also complains of feeling shortness of breath.  Patient slightly hypoxic as well.  Patient on blood thinners.  Denies fever or chills or nausea vomiting.  Patient has known coronary artery disease and recently had an angiogram and has coronary artery disease and could not place a stent so patient is being medically managed.  Patient started having chest pain last night.  Denies any dysuria or hematuria.  Denies any weakness or numbness.  Patient states this pain is similar to his heart attack pain.  Patient does have a pacemaker and EKG did show paced rhythm.      Review of patient's allergies indicates:   Allergen Reactions    Atorvastatin Other (See Comments)     Muscle pain    Rosuvastatin Other (See Comments)     Muscle pain     Past Medical History:   Diagnosis Date    Anticoagulant long-term use     2014    Aortic valve disease 2014    pig valve    Arthritis     all over    Chest pain 07/15/2019    CHF (congestive heart failure)     2014 on meds    Coronary artery disease     2007 cabg    Difficulty swallowing     Heart attack 1990    15 stents    Heart attack 02/01/2024    mild    Heart block     Hyperlipidemia     Hypertension     on meds    Hypothyroidism 2015    on meds    PVD (peripheral vascular disease)     Seizures     last episode 1990 on meds     Past Surgical History:   Procedure Laterality Date    AORTOGRAPHY WITH SERIALOGRAPHY N/A 11/16/2021    Procedure: AORTOGRAM, WITH RUNOFF;  Surgeon: Rodrigo Willoughby MD;  Location: [facility] CATH/EP LAB;  Service: Cardiology;  Laterality: N/A;    ARTERIOGRAPHY OF  AORTIC ROOT N/A 11/05/2019    Procedure: ARTERIOGRAM, AORTIC ROOT;  Surgeon: Rodrigo Willoughby MD;  Location: [facility] CATH/EP LAB;  Service: Cardiology;  Laterality: N/A;    CARDIAC CATHETERIZATION      CARDIAC VALVE SURGERY      CORONARY ANGIOGRAPHY INCLUDING BYPASS GRAFTS WITH CATHETERIZATION OF LEFT HEART N/A 11/05/2019    Procedure: ANGIOGRAM, CORONARY, INCLUDING BYPASS GRAFT, WITH LEFT HEART CATHETERIZATION;  Surgeon: Rodrigo Willoughby MD;  Location: [facility] CATH/EP LAB;  Service: Cardiology;  Laterality: N/A;    CORONARY ANGIOGRAPHY INCLUDING BYPASS GRAFTS WITH CATHETERIZATION OF LEFT HEART Left 11/03/2020    Procedure: ANGIOGRAM, CORONARY, INCLUDING BYPASS GRAFT, WITH LEFT HEART CATHETERIZATION;  Surgeon: Rodrigo Willoughby MD;  Location: [facility] CATH/EP LAB;  Service: Cardiology;  Laterality: Left;    CORONARY ANGIOGRAPHY INCLUDING BYPASS GRAFTS WITH CATHETERIZATION OF LEFT HEART N/A 09/28/2021    Procedure: ANGIOGRAM, CORONARY, INCLUDING BYPASS GRAFT, WITH LEFT HEART CATHETERIZATION;  Surgeon: Rodrigo Willoughby MD;  Location: [facility] CATH/EP LAB;  Service: Cardiology;  Laterality: N/A;    CORONARY ANGIOGRAPHY INCLUDING BYPASS GRAFTS WITH CATHETERIZATION OF LEFT HEART N/A 12/05/2023    Procedure: ANGIOGRAM, CORONARY, INCLUDING BYPASS GRAFT, WITH LEFT HEART CATHETERIZATION;  Surgeon: Rodrigo Willoughby MD;  Location: [facility] CATH/EP LAB;  Service: Cardiology;  Laterality: N/A;    CORONARY ANGIOPLASTY      CORONARY ARTERY BYPASS GRAFT      x 2    1991 2006    ESOPHAGOGASTRODUODENOSCOPY N/A 2/20/2024    Procedure: EGD (ESOPHAGOGASTRODUODENOSCOPY);  Surgeon: Mal Lockhart III, MD;  Location: [facility] ENDO;  Service: Endoscopy;  Laterality: N/A;    EXTRACORPOREAL SHOCK WAVE LITHOTRIPSY Right 08/01/2019    Procedure: LITHOTRIPSY, ESWL;  Surgeon: Williams Ortega MD;  Location: [facility] OR;  Service: Urology;  Laterality: Right;    HEMORRHOID SURGERY  1990    INSERTION OF PACEMAKER      PERCUTANEOUS TRANSLUMINAL BALLOON ANGIOPLASTY OF  CORONARY ARTERY N/A 2019    Procedure: Angioplasty-coronary;  Surgeon: Rodrigo Willoughby MD;  Location: [facility] CATH/EP LAB;  Service: Cardiology;  Laterality: N/A;    WRIST FRACTURE SURGERY       Family History   Problem Relation Age of Onset    Heart attack Mother     Heart attack Father     Heart disease Sister     Stroke Sister     Diabetes Sister     No Known Problems Maternal Grandmother     No Known Problems Maternal Grandfather     No Known Problems Paternal Grandmother     No Known Problems Paternal Grandfather     No Known Problems Brother     No Known Problems Maternal Aunt     No Known Problems Maternal Uncle     No Known Problems Paternal Aunt     No Known Problems Paternal Uncle     Anemia Neg Hx     Arrhythmia Neg Hx     Asthma Neg Hx     Clotting disorder Neg Hx     Fainting Neg Hx     Heart failure Neg Hx     Hyperlipidemia Neg Hx     Hypertension Neg Hx     Atrial Septal Defect Neg Hx      Social History     Tobacco Use    Smoking status: Former     Current packs/day: 0.00     Average packs/day: 0.5 packs/day for 4.0 years (2.0 ttl pk-yrs)     Types: Cigarettes     Start date:      Quit date:      Years since quittin.1    Smokeless tobacco: Never   Substance Use Topics    Alcohol use: Yes     Comment: social    Drug use: No     Review of Systems   Constitutional: Negative.    HENT: Negative.     Eyes: Negative.    Respiratory:  Positive for shortness of breath.    Cardiovascular:  Positive for chest pain.   Gastrointestinal: Negative.    Endocrine: Negative.    Genitourinary: Negative.    Musculoskeletal: Negative.    Skin: Negative.    Allergic/Immunologic: Negative.    Neurological: Negative.    Hematological: Negative.    Psychiatric/Behavioral: Negative.     All other systems reviewed and are negative.      Physical Exam     Initial Vitals [24 0703]   BP Pulse Resp Temp SpO2   (!) 148/91 109 (!) 26 98.1 °F (36.7 °C) (!) 88 %      MAP       --         Physical Exam    Nursing  note and vitals reviewed.  Constitutional: He appears well-developed and well-nourished.   HENT:   Head: Normocephalic and atraumatic.   Nose: Nose normal.   Eyes: Conjunctivae and EOM are normal.   Neck: Neck supple. No tracheal deviation present.   Normal range of motion.  Cardiovascular:  Normal rate, regular rhythm, normal heart sounds and intact distal pulses.     Exam reveals no friction rub.       No murmur heard.  Pulmonary/Chest: Breath sounds normal. No respiratory distress. He has no wheezes. He has no rales. He exhibits no tenderness.   Abdominal: Abdomen is soft. He exhibits no distension. There is no abdominal tenderness.   Musculoskeletal:         General: No tenderness. Normal range of motion.      Cervical back: Normal range of motion and neck supple.     Neurological: He is alert and oriented to person, place, and time. He has normal strength. GCS score is 15. GCS eye subscore is 4. GCS verbal subscore is 5. GCS motor subscore is 6.   Skin: Skin is warm and dry. Capillary refill takes less than 2 seconds.   Psychiatric: He has a normal mood and affect. Thought content normal.         ED Course   Critical Care    Date/Time: 2/29/2024 7:19 AM    Performed by: Amara Merlos MD  Authorized by: Amara Merlos MD  Direct patient critical care time: 20 minutes  Ordering / reviewing critical care time: 5 minutes  Documentation critical care time: 5 minutes  Total critical care time (exclusive of procedural time) : 30 minutes        Labs Reviewed   CBC W/ AUTO DIFFERENTIAL   COMPREHENSIVE METABOLIC PANEL   B-TYPE NATRIURETIC PEPTIDE   MAGNESIUM   TROPONIN I HIGH SENSITIVITY     EKG Readings: (Independently Interpreted)   Initial Reading: No STEMI. Rhythm: Paced Rhythm. Ectopy: No Ectopy. Conduction: Normal.     ECG Results              EKG 12-lead (In process)        Collection Time Result Time QRS Duration OHS QTC Calculation    02/29/24 07:03:09 02/29/24 07:18:15 212 608                     In process by  Interface, Lab In [facility] (02/29/24 07:18:21)                   Narrative:    Test Reason : R07.9,    Vent. Rate : 109 BPM     Atrial Rate : 109 BPM     P-R Int : 000 ms          QRS Dur : 212 ms      QT Int : 452 ms       P-R-T Axes : 065 -65 105 degrees     QTc Int : 608 ms    Ventricular-paced rhythm  Abnormal ECG  When compared with ECG of 09-FEB-2024 09:48,  Vent. rate has increased BY  22 BPM    Referred By: [email or web address]   SELF           Confirmed By:                                   Imaging Results              X-Ray Chest AP Portable (In process)    Procedure changed from X-Ray Chest PA And Lateral                    Medications   aspirin tablet 325 mg (has no administration in time range)   nitroGLYCERIN in 5 % dextrose 50 mg/250 mL (200 mcg/mL) infusion (has no administration in time range)   morphine injection 4 mg (has no administration in time range)     Medical Decision Making  78-year-old male with known coronary artery disease presented emergency department with chest pain consistent with previous episodes of cardiac chest pain.  Patient in severe pain.  Patient also short of breath and slightly hypoxic.  Patient on blood thinners and compliant with his medication.  Patient has coronary artery disease which could not be treated with stents.  Patient is currently being medically managed.  Given severity of patient's pain started nitroglycerin drip and aspirin given and nitroglycerin being titrated for pain control.  Patient did have improvement of symptoms.  Screening labs and cardiac workup done.  Patient's cardiologist is Dr. Louie and if pain does not improve Dr. Louie to be consulted.  Patient to be admitted to Hospital Medicine Service for further management and treatment and Cardiology to be consulted in the hospital as well.  Patient otherwise nontoxic and hemodynamically stable.  Did have improvement of symptoms with treatment.    Amount and/or Complexity of Data Reviewed  Labs: ordered.  Decision-making details documented in ED Course.  Radiology: ordered. Decision-making details documented in ED Course.  ECG/medicine tests: ordered. Decision-making details documented in ED Course.    Risk  OTC drugs.  Prescription drug management.                                      Clinical Impression:  Final diagnoses:  [R07.9] Chest pain  [I20.0] Unstable angina (Primary)          ED Disposition Condition    Admit Serious                Amara Merlos MD  02/29/24 0767

## 2024-02-29 NOTE — H&P
[facility] - Emergency Dept  Hospital Medicine  History & Physical    Patient Name: Jerome Amaro Jr.  MRN: [id number]  Patient Class: IP- Inpatient  Admission Date: 2/29/2024  Attending Physician: Maicol Zendejas MD   Primary Care Provider: Oscar Madrid NP         Patient information was obtained from patient and ER records.     Subjective:     Principal Problem:Chest pain    Chief Complaint:   Chief Complaint   Patient presents with    Chest Pain     Pt started having chest pain about an hour ago        HPI: 78 year old pt getting admitted with chest pain and acute CHF  Pt had Coronary angiogram on December 2023 and medical management was advised(1 functioning graft of CABG was found)  He takes lasix at home on PRN basis  Today AM pt started having chest pain mostly on central part of chest/pain radiated to back/ pain worse on exertion  Also c/o shortness of breath especially on exertion  He came to ER and got admitted  Since he had Angiogram done on December 2023 this is pts 4 th IP admission to hospital with same c/o ie:Chest pain   Pt is on Ranexa which will be put on Hold today because of prolonged QTC        Past Medical History:   Diagnosis Date    Anticoagulant long-term use     2014    Aortic valve disease 2014    pig valve    Arthritis     all over    Atrial fibrillation 2/29/2024    Chest pain 07/15/2019    CHF (congestive heart failure)     2014 on meds    Coronary artery disease     2007 cabg    Difficulty swallowing     Heart attack 1990    15 stents    Heart attack 02/01/2024    mild    Heart block     Hyperlipidemia     Hypertension     on meds    Hypothyroidism 2015    on meds    PVD (peripheral vascular disease)     Seizures     last episode 1990 on meds       Past Surgical History:   Procedure Laterality Date    AORTOGRAPHY WITH SERIALOGRAPHY N/A 11/16/2021    Procedure: AORTOGRAM, WITH RUNOFF;  Surgeon: Rodrigo Willoughby MD;  Location: [facility] CATH/EP LAB;  Service: Cardiology;   Laterality: N/A;    ARTERIOGRAPHY OF AORTIC ROOT N/A 11/05/2019    Procedure: ARTERIOGRAM, AORTIC ROOT;  Surgeon: Rodrigo Willoughby MD;  Location: [facility] CATH/EP LAB;  Service: Cardiology;  Laterality: N/A;    CARDIAC CATHETERIZATION      CARDIAC VALVE SURGERY      CORONARY ANGIOGRAPHY INCLUDING BYPASS GRAFTS WITH CATHETERIZATION OF LEFT HEART N/A 11/05/2019    Procedure: ANGIOGRAM, CORONARY, INCLUDING BYPASS GRAFT, WITH LEFT HEART CATHETERIZATION;  Surgeon: Rodrigo Willoughby MD;  Location: [facility] CATH/EP LAB;  Service: Cardiology;  Laterality: N/A;    CORONARY ANGIOGRAPHY INCLUDING BYPASS GRAFTS WITH CATHETERIZATION OF LEFT HEART Left 11/03/2020    Procedure: ANGIOGRAM, CORONARY, INCLUDING BYPASS GRAFT, WITH LEFT HEART CATHETERIZATION;  Surgeon: Rodrigo Willoughby MD;  Location: [facility] CATH/EP LAB;  Service: Cardiology;  Laterality: Left;    CORONARY ANGIOGRAPHY INCLUDING BYPASS GRAFTS WITH CATHETERIZATION OF LEFT HEART N/A 09/28/2021    Procedure: ANGIOGRAM, CORONARY, INCLUDING BYPASS GRAFT, WITH LEFT HEART CATHETERIZATION;  Surgeon: Rodrigo Willoughby MD;  Location: [facility] CATH/EP LAB;  Service: Cardiology;  Laterality: N/A;    CORONARY ANGIOGRAPHY INCLUDING BYPASS GRAFTS WITH CATHETERIZATION OF LEFT HEART N/A 12/05/2023    Procedure: ANGIOGRAM, CORONARY, INCLUDING BYPASS GRAFT, WITH LEFT HEART CATHETERIZATION;  Surgeon: Rodrigo Willoughby MD;  Location: [facility] CATH/EP LAB;  Service: Cardiology;  Laterality: N/A;    CORONARY ANGIOPLASTY      CORONARY ARTERY BYPASS GRAFT      x 2    1991 2006    ESOPHAGOGASTRODUODENOSCOPY N/A 2/20/2024    Procedure: EGD (ESOPHAGOGASTRODUODENOSCOPY);  Surgeon: Mal Lockhart III, MD;  Location: [facility] ENDO;  Service: Endoscopy;  Laterality: N/A;    EXTRACORPOREAL SHOCK WAVE LITHOTRIPSY Right 08/01/2019    Procedure: LITHOTRIPSY, ESWL;  Surgeon: Williams Ortega MD;  Location: [facility] OR;  Service: Urology;  Laterality: Right;    HEMORRHOID SURGERY  1990    INSERTION OF PACEMAKER      PERCUTANEOUS  TRANSLUMINAL BALLOON ANGIOPLASTY OF CORONARY ARTERY N/A 11/08/2019    Procedure: Angioplasty-coronary;  Surgeon: Rodrigo Willoughby MD;  Location: [facility] CATH/EP LAB;  Service: Cardiology;  Laterality: N/A;    WRIST FRACTURE SURGERY         Review of patient's allergies indicates:   Allergen Reactions    Atorvastatin Other (See Comments)     Muscle pain    Rosuvastatin Other (See Comments)     Muscle pain       Current Facility-Administered Medications on File Prior to Encounter   Medication    magnesium oxide tablet 800 mg    sodium chloride 0.9% flush 10 mL     Current Outpatient Medications on File Prior to Encounter   Medication Sig    bismuth subsalicylate (BISMAREX) 262 mg Chew Take 1 tablet by mouth 2 (two) times daily.    citalopram (CELEXA) 40 MG tablet Take 40 mg by mouth once daily. NEW Rx - NOT YET STARTED    clopidogrel (PLAVIX) 75 mg tablet Take 75 mg by mouth once daily.    CORLANOR 5 mg Tab Take 1 tablet by mouth 2 (two) times daily. NEW Rx - NOT YET STARTED    ELIQUIS 5 mg Tab Take 5 mg by mouth every morning.    ENTRESTO 24-26 mg per tablet Take 1 tablet by mouth 2 (two) times daily.    ezetimibe (ZETIA) 10 mg tablet Take 10 mg by mouth once daily.    famotidine (PEPCID) 40 MG tablet Take 40 mg by mouth Daily.    furosemide (LASIX) 20 MG tablet Take 20 mg by mouth daily as needed (Fluid).    hydrOXYzine pamoate (VISTARIL) 25 MG Cap Take 1 capsule (25 mg total) by mouth daily as needed (anxiety).    isosorbide mononitrate (IMDUR) 30 MG 24 hr tablet Take 2 tablets (60 mg total) by mouth once daily.    metoprolol succinate (TOPROL-XL) 100 MG 24 hr tablet Take 100 mg by mouth 2 (two) times daily.    NITROLINGUAL 400 mcg/spray spray Place 1 spray under the tongue every 5 (five) minutes as needed for Chest pain.    phenytoin (DILANTIN) 100 MG ER capsule Take 400 mg by mouth once daily.     ranolazine (RANEXA) 1,000 mg Tb12 Take 1,000 mg by mouth 2 (two) times daily.     ALPRAZolam (XANAX) 0.25 MG tablet Take  0.25 mg by mouth 2 (two) times daily as needed for Anxiety.    doxycycline (VIBRAMYCIN) 100 MG Cap Take 100 mg by mouth 2 (two) times daily.    metoprolol succinate (TOPROL-XL) 50 MG 24 hr tablet Take 100 mg by mouth 2 (two) times daily.    metroNIDAZOLE (FLAGYL) 500 MG tablet Take 500 mg by mouth 3 (three) times daily.    pantoprazole (PROTONIX) 20 MG tablet Take 20 mg by mouth 2 (two) times daily.    [DISCONTINUED] citalopram (CELEXA) 20 MG tablet Take 20 mg by mouth once daily.    [DISCONTINUED] lisinopriL (PRINIVIL,ZESTRIL) 40 MG tablet Take 40 mg by mouth once daily.     Family History       Problem Relation (Age of Onset)    Diabetes Sister    Heart attack Mother, Father    Heart disease Sister    No Known Problems Maternal Grandmother, Maternal Grandfather, Paternal Grandmother, Paternal Grandfather, Brother, Maternal Aunt, Maternal Uncle, Paternal Aunt, Paternal Uncle    Stroke Sister          Tobacco Use    Smoking status: Former     Current packs/day: 0.00     Average packs/day: 0.5 packs/day for 4.0 years (2.0 ttl pk-yrs)     Types: Cigarettes     Start date:      Quit date:      Years since quittin.1    Smokeless tobacco: Never   Substance and Sexual Activity    Alcohol use: Yes     Comment: social    Drug use: No    Sexual activity: Not Currently     Review of Systems   Constitutional:  Negative for activity change and appetite change.   HENT:  Negative for congestion and dental problem.    Eyes:  Negative for discharge and itching.   Respiratory:  Positive for shortness of breath.    Cardiovascular:  Positive for chest pain.   Gastrointestinal:  Negative for abdominal distention and abdominal pain.   Endocrine: Negative for cold intolerance.   Genitourinary:  Negative for difficulty urinating and dysuria.   Musculoskeletal:  Negative for arthralgias and back pain.   Skin:  Negative for color change.   Neurological:  Negative for dizziness and facial asymmetry.   Hematological:  Negative  for adenopathy.   Psychiatric/Behavioral:  Negative for agitation and behavioral problems.      Objective:     Vital Signs (Most Recent):  Temp: 98.1 °F (36.7 °C) (02/29/24 0703)  Pulse: 85 (02/29/24 1153)  Resp: 15 (02/29/24 1025)  BP: (!) 149/72 (02/29/24 1153)  SpO2: 97 % (02/29/24 1025) Vital Signs (24h Range):  Temp:  [98.1 °F (36.7 °C)] 98.1 °F (36.7 °C)  Pulse:  [] 85  Resp:  [15-26] 15  SpO2:  [88 %-97 %] 97 %  BP: (132-149)/(71-93) 149/72     Weight: 78 kg (172 lb)  Body mass index is 28.62 kg/m².     Physical Exam  Vitals and nursing note reviewed.   Constitutional:       Appearance: He is well-developed.   HENT:      Head: Atraumatic.      Right Ear: External ear normal.      Left Ear: External ear normal.      Nose: Nose normal.      Mouth/Throat:      Mouth: Mucous membranes are moist.   Cardiovascular:      Rate and Rhythm: Normal rate.   Pulmonary:      Effort: Pulmonary effort is normal.   Musculoskeletal:         General: Normal range of motion.      Cervical back: Full passive range of motion without pain and normal range of motion.   Skin:     General: Skin is warm.   Neurological:      Mental Status: He is alert and oriented to person, place, and time.   Psychiatric:         Behavior: Behavior normal.                Significant Labs: All pertinent labs within the past 24 hours have been reviewed.  CBC:   Recent Labs   Lab 02/29/24  0750   WBC 11.15   HGB 12.8*   HCT 38.6*        CMP:   Recent Labs   Lab 02/29/24  0750   *   K 3.9   CL 99   CO2 25   *   BUN 21   CREATININE 1.2   CALCIUM 9.6   PROT 7.1   ALBUMIN 4.0   BILITOT 0.6   ALKPHOS 94   AST 13   ALT 7*   ANIONGAP 9       Significant Imaging: I have reviewed all pertinent imaging results/findings within the past 24 hours.    Assessment/Plan:     * Chest pain  H/o CABG with recent angiogram on December 2023  This is his 4 th IP admission, after pt had Coronary angiogram in December 2023, with chest pain  Maintain  Nitroglycerin gtt  Hold Ranexa  Maintain iv morphine  Started on iv lasix for CHF flare  Consulted pts Cardiology team       Acute pulmonary edema  Maintain iv lasix      Frequent hospital admissions  Aware       Allergy to statin medication  Aware       Atrial fibrillation  H/o aware  Maintain home medications including anticoagulants    Seizure disorder  Maintain phenytoin   Check phenytoin levels      S/P CABG (coronary artery bypass graft)  Aware       Pacemaker  Aware       CAD (coronary artery disease)  H/o CABG with recent angiogram on December 2023  This is his 4 th IP admission, after pt had Coronary angiogram in December 2023, with chest pain      S/P TAVR (transcatheter aortic valve replacement)  Aware         Troponin elevation  Will start on heparin infusion for possible NSTEMI  Trend troponins  VTE Risk Mitigation (From admission, onward)           Ordered     apixaban tablet 5 mg  Every morning         02/29/24 1038     IP VTE HIGH RISK PATIENT  Once         02/29/24 1038     Place sequential compression device  Until discontinued         02/29/24 1038                       On 02/29/2024, patient should be placed in hospital observation services under my care.             Maicol Zendejas MD  Department of Hospital Medicine  [facility] - Emergency Dept

## 2024-02-29 NOTE — ASSESSMENT & PLAN NOTE
H/o CABG with recent angiogram on December 2023  This is his 4 th IP admission, after pt had Coronary angiogram in December 2023, with chest pain

## 2024-02-29 NOTE — PLAN OF CARE
02/29/24 1529   JEAN Message   Medicare Outpatient and Observation Notification regarding financial responsibility Given to patient/caregiver;Explained to patient/caregiver;Signed/date by patient/caregiver   Date JEAN was signed 02/29/24   Time JEAN was signed 1000

## 2024-02-29 NOTE — Clinical Note
Diagnosis: Chest pain [637377]   Future Attending Provider: KINGS MONTILLA [[id number]]   Admit to which facility:: [facility] [[id number]]   Reason for IP Medical Treatment  (Clinical interventions that can only be accomplished in the IP setting? ) :: need rx   I certify that Inpatient services for greater than or equal to 2 midnights are medically necessary:: Yes   Plans for Post-Acute care--if anticipated (pick the single best option):: A. No post acute care anticipated at this time   Special Needs:: No Special Needs [1]

## 2024-02-29 NOTE — SUBJECTIVE & OBJECTIVE
Past Medical History:   Diagnosis Date    Anticoagulant long-term use     2014    Aortic valve disease 2014    pig valve    Arthritis     all over    Atrial fibrillation 2/29/2024    Chest pain 07/15/2019    CHF (congestive heart failure)     2014 on meds    Coronary artery disease     2007 cabg    Difficulty swallowing     Heart attack 1990    15 stents    Heart attack 02/01/2024    mild    Heart block     Hyperlipidemia     Hypertension     on meds    Hypothyroidism 2015    on meds    PVD (peripheral vascular disease)     Seizures     last episode 1990 on meds       Past Surgical History:   Procedure Laterality Date    AORTOGRAPHY WITH SERIALOGRAPHY N/A 11/16/2021    Procedure: AORTOGRAM, WITH RUNOFF;  Surgeon: Rodrigo Willoughby MD;  Location: [facility] CATH/EP LAB;  Service: Cardiology;  Laterality: N/A;    ARTERIOGRAPHY OF AORTIC ROOT N/A 11/05/2019    Procedure: ARTERIOGRAM, AORTIC ROOT;  Surgeon: Rodrigo Willoughby MD;  Location: [facility] CATH/EP LAB;  Service: Cardiology;  Laterality: N/A;    CARDIAC CATHETERIZATION      CARDIAC VALVE SURGERY      CORONARY ANGIOGRAPHY INCLUDING BYPASS GRAFTS WITH CATHETERIZATION OF LEFT HEART N/A 11/05/2019    Procedure: ANGIOGRAM, CORONARY, INCLUDING BYPASS GRAFT, WITH LEFT HEART CATHETERIZATION;  Surgeon: Rodrigo Willoughby MD;  Location: [facility] CATH/EP LAB;  Service: Cardiology;  Laterality: N/A;    CORONARY ANGIOGRAPHY INCLUDING BYPASS GRAFTS WITH CATHETERIZATION OF LEFT HEART Left 11/03/2020    Procedure: ANGIOGRAM, CORONARY, INCLUDING BYPASS GRAFT, WITH LEFT HEART CATHETERIZATION;  Surgeon: Rodrigo Willoughby MD;  Location: [facility] CATH/EP LAB;  Service: Cardiology;  Laterality: Left;    CORONARY ANGIOGRAPHY INCLUDING BYPASS GRAFTS WITH CATHETERIZATION OF LEFT HEART N/A 09/28/2021    Procedure: ANGIOGRAM, CORONARY, INCLUDING BYPASS GRAFT, WITH LEFT HEART CATHETERIZATION;  Surgeon: Rodrigo Willoughby MD;  Location: [facility] CATH/EP LAB;  Service: Cardiology;  Laterality: N/A;    CORONARY ANGIOGRAPHY  INCLUDING BYPASS GRAFTS WITH CATHETERIZATION OF LEFT HEART N/A 12/05/2023    Procedure: ANGIOGRAM, CORONARY, INCLUDING BYPASS GRAFT, WITH LEFT HEART CATHETERIZATION;  Surgeon: Rodrigo Willoughby MD;  Location: [facility] CATH/EP LAB;  Service: Cardiology;  Laterality: N/A;    CORONARY ANGIOPLASTY      CORONARY ARTERY BYPASS GRAFT      x 2    1991 2006    ESOPHAGOGASTRODUODENOSCOPY N/A 2/20/2024    Procedure: EGD (ESOPHAGOGASTRODUODENOSCOPY);  Surgeon: Mal Lockhart III, MD;  Location: [facility] ENDO;  Service: Endoscopy;  Laterality: N/A;    EXTRACORPOREAL SHOCK WAVE LITHOTRIPSY Right 08/01/2019    Procedure: LITHOTRIPSY, ESWL;  Surgeon: Williams Ortega MD;  Location: [facility] OR;  Service: Urology;  Laterality: Right;    HEMORRHOID SURGERY  1990    INSERTION OF PACEMAKER      PERCUTANEOUS TRANSLUMINAL BALLOON ANGIOPLASTY OF CORONARY ARTERY N/A 11/08/2019    Procedure: Angioplasty-coronary;  Surgeon: Rodrigo Willoughby MD;  Location: [facility] CATH/EP LAB;  Service: Cardiology;  Laterality: N/A;    WRIST FRACTURE SURGERY         Review of patient's allergies indicates:   Allergen Reactions    Atorvastatin Other (See Comments)     Muscle pain    Rosuvastatin Other (See Comments)     Muscle pain       Current Facility-Administered Medications on File Prior to Encounter   Medication    magnesium oxide tablet 800 mg    sodium chloride 0.9% flush 10 mL     Current Outpatient Medications on File Prior to Encounter   Medication Sig    bismuth subsalicylate (BISMAREX) 262 mg Chew Take 1 tablet by mouth 2 (two) times daily.    citalopram (CELEXA) 40 MG tablet Take 40 mg by mouth once daily. NEW Rx - NOT YET STARTED    clopidogrel (PLAVIX) 75 mg tablet Take 75 mg by mouth once daily.    CORLANOR 5 mg Tab Take 1 tablet by mouth 2 (two) times daily. NEW Rx - NOT YET STARTED    ELIQUIS 5 mg Tab Take 5 mg by mouth every morning.    ENTRESTO 24-26 mg per tablet Take 1 tablet by mouth 2 (two) times daily.    ezetimibe (ZETIA) 10 mg tablet Take  10 mg by mouth once daily.    famotidine (PEPCID) 40 MG tablet Take 40 mg by mouth Daily.    furosemide (LASIX) 20 MG tablet Take 20 mg by mouth daily as needed (Fluid).    hydrOXYzine pamoate (VISTARIL) 25 MG Cap Take 1 capsule (25 mg total) by mouth daily as needed (anxiety).    isosorbide mononitrate (IMDUR) 30 MG 24 hr tablet Take 2 tablets (60 mg total) by mouth once daily.    metoprolol succinate (TOPROL-XL) 100 MG 24 hr tablet Take 100 mg by mouth 2 (two) times daily.    NITROLINGUAL 400 mcg/spray spray Place 1 spray under the tongue every 5 (five) minutes as needed for Chest pain.    phenytoin (DILANTIN) 100 MG ER capsule Take 400 mg by mouth once daily.     ranolazine (RANEXA) 1,000 mg Tb12 Take 1,000 mg by mouth 2 (two) times daily.     ALPRAZolam (XANAX) 0.25 MG tablet Take 0.25 mg by mouth 2 (two) times daily as needed for Anxiety.    doxycycline (VIBRAMYCIN) 100 MG Cap Take 100 mg by mouth 2 (two) times daily.    metoprolol succinate (TOPROL-XL) 50 MG 24 hr tablet Take 100 mg by mouth 2 (two) times daily.    metroNIDAZOLE (FLAGYL) 500 MG tablet Take 500 mg by mouth 3 (three) times daily.    pantoprazole (PROTONIX) 20 MG tablet Take 20 mg by mouth 2 (two) times daily.    [DISCONTINUED] citalopram (CELEXA) 20 MG tablet Take 20 mg by mouth once daily.    [DISCONTINUED] lisinopriL (PRINIVIL,ZESTRIL) 40 MG tablet Take 40 mg by mouth once daily.     Family History       Problem Relation (Age of Onset)    Diabetes Sister    Heart attack Mother, Father    Heart disease Sister    No Known Problems Maternal Grandmother, Maternal Grandfather, Paternal Grandmother, Paternal Grandfather, Brother, Maternal Aunt, Maternal Uncle, Paternal Aunt, Paternal Uncle    Stroke Sister          Tobacco Use    Smoking status: Former     Current packs/day: 0.00     Average packs/day: 0.5 packs/day for 4.0 years (2.0 ttl pk-yrs)     Types: Cigarettes     Start date:      Quit date:      Years since quittin.1     Smokeless tobacco: Never   Substance and Sexual Activity    Alcohol use: Yes     Comment: social    Drug use: No    Sexual activity: Not Currently     Review of Systems   Constitutional:  Negative for activity change and appetite change.   HENT:  Negative for congestion and dental problem.    Eyes:  Negative for discharge and itching.   Respiratory:  Positive for shortness of breath.    Cardiovascular:  Positive for chest pain.   Gastrointestinal:  Negative for abdominal distention and abdominal pain.   Endocrine: Negative for cold intolerance.   Genitourinary:  Negative for difficulty urinating and dysuria.   Musculoskeletal:  Negative for arthralgias and back pain.   Skin:  Negative for color change.   Neurological:  Negative for dizziness and facial asymmetry.   Hematological:  Negative for adenopathy.   Psychiatric/Behavioral:  Negative for agitation and behavioral problems.      Objective:     Vital Signs (Most Recent):  Temp: 98.1 °F (36.7 °C) (02/29/24 0703)  Pulse: 85 (02/29/24 1153)  Resp: 15 (02/29/24 1025)  BP: (!) 149/72 (02/29/24 1153)  SpO2: 97 % (02/29/24 1025) Vital Signs (24h Range):  Temp:  [98.1 °F (36.7 °C)] 98.1 °F (36.7 °C)  Pulse:  [] 85  Resp:  [15-26] 15  SpO2:  [88 %-97 %] 97 %  BP: (132-149)/(71-93) 149/72     Weight: 78 kg (172 lb)  Body mass index is 28.62 kg/m².     Physical Exam  Vitals and nursing note reviewed.   Constitutional:       Appearance: He is well-developed.   HENT:      Head: Atraumatic.      Right Ear: External ear normal.      Left Ear: External ear normal.      Nose: Nose normal.      Mouth/Throat:      Mouth: Mucous membranes are moist.   Cardiovascular:      Rate and Rhythm: Normal rate.   Pulmonary:      Effort: Pulmonary effort is normal.   Musculoskeletal:         General: Normal range of motion.      Cervical back: Full passive range of motion without pain and normal range of motion.   Skin:     General: Skin is warm.   Neurological:      Mental Status: He  is alert and oriented to person, place, and time.   Psychiatric:         Behavior: Behavior normal.                Significant Labs: All pertinent labs within the past 24 hours have been reviewed.  CBC:   Recent Labs   Lab 02/29/24  0750   WBC 11.15   HGB 12.8*   HCT 38.6*        CMP:   Recent Labs   Lab 02/29/24  0750   *   K 3.9   CL 99   CO2 25   *   BUN 21   CREATININE 1.2   CALCIUM 9.6   PROT 7.1   ALBUMIN 4.0   BILITOT 0.6   ALKPHOS 94   AST 13   ALT 7*   ANIONGAP 9       Significant Imaging: I have reviewed all pertinent imaging results/findings within the past 24 hours.

## 2024-02-29 NOTE — PLAN OF CARE
[facility]  Initial Discharge Assessment       Primary Care Provider: Oscar Madrid NP    Admission Diagnosis: Chest pain [R07.9]    Admission Date: 2/29/2024  Expected Discharge Date:      met with Pt at bedside to complete discharge assessment. Pt AAOx4s. Demographics, PCP, and insurance verified. No home health. No dialysis. Pt reports ability to complete ADLs without assistance. Pt verbalized plan to discharge home via family transport. Pt has no other needs to be addressed at this time.      Transition of Care Barriers: None    Payor: [employer] MEDICARE / Plan: [employer] MEDICARE HMO / Product Type: Capitation /     Extended Emergency Contact Information  Primary Emergency Contact: Schwab,Cynthia  Mobile Phone: [phone]  Relation: Daughter  Preferred language: English   needed? No  Secondary Emergency Contact: hoa rhodes  Mobile Phone: [phone]  Relation: Daughter   needed? No    Discharge Plan A: Home  Discharge Plan B: Home with family      [id number] [place] - [address]  [address] [address]  Phone: [phone] Fax: [phone]      Initial Assessment (most recent)       Adult Discharge Assessment - 02/29/24 1524          Discharge Assessment    Assessment Type Discharge Planning Assessment     Confirmed/corrected address, phone number and insurance Yes     Confirmed Demographics Correct on Facesheet     Source of Information patient     When was your last doctors appointment? --   one week ago    Communicated AXEL with patient/caregiver Date not available/Unable to determine     Reason For Admission Chest pain     People in Home alone     Facility Arrived From: home     Do you expect to return to your current living situation? Yes     Do you have help at home or someone to help you manage your care at home? Yes     Who are your caregiver(s) and their phone number(s)? Cynthia Schwab/daughter  [phone] or Nilda Amaro/daughter [phone]     Prior to hospitilization cognitive status: Alert/Oriented     Current cognitive status: Alert/Oriented     Walking or Climbing Stairs Difficulty yes     Walking or Climbing Stairs ambulation difficulty, requires equipment     Mobility Management rolling walker and straight cane     Dressing/Bathing Difficulty no     Home Accessibility not wheelchair accessible     Equipment Currently Used at Home walker, rolling;cane, straight     Readmission within 30 days? Yes     Patient currently being followed by outpatient case management? No     Do you currently have service(s) that help you manage your care at home? No     Do you take prescription medications? Yes     Do you have prescription coverage? Yes     Coverage Payor: [employer] MANAGED MEDICARE - HU[employer] MEDICARE HMO -     Do you have any problems affording any of your prescribed medications? No     Is the patient taking medications as prescribed? yes     Who is going to help you get home at discharge? Cynthia Schwab/daughter [phone] or Nilda Amaro/daughter [phone]     How do you get to doctors appointments? car, drives self     Are you on dialysis? No     Do you take coumadin? Yes   Eliquis    Discharge Plan A Home     Discharge Plan B Home with family     DME Needed Upon Discharge  none     Discharge Plan discussed with: Patient     Transition of Care Barriers None

## 2024-02-29 NOTE — NURSING
Nurses Note -- 4 Eyes      2/29/2024   3:42 PM      Skin assessed during: Admit      [x] No Altered Skin Integrity Present    [x]Prevention Measures Documented      [] Yes- Altered Skin Integrity Present or Discovered   [] LDA Added if Not in Epic (Describe Wound)   [] New Altered Skin Integrity was Present on Admit and Documented in LDA   [] Wound Image Taken    Wound Care Consulted? No    Attending Nurse:  Nora Wheeler RN/Staff Member:   [place]

## 2024-03-01 ENCOUNTER — CLINICAL SUPPORT (OUTPATIENT)
Dept: CARDIOLOGY | Facility: HOSPITAL | Age: 79
DRG: 280 | End: 2024-03-01
Attending: EMERGENCY MEDICINE
Payer: MEDICARE

## 2024-03-01 VITALS — BODY MASS INDEX: 27.53 KG/M2 | HEIGHT: 66 IN | WEIGHT: 171.31 LBS

## 2024-03-01 DIAGNOSIS — R07.9 CHEST PAIN, UNSPECIFIED TYPE: Primary | ICD-10-CM

## 2024-03-01 LAB
ALBUMIN SERPL BCP-MCNC: 3.5 G/DL (ref 3.5–5.2)
ALP SERPL-CCNC: 84 U/L (ref 55–135)
ALT SERPL W/O P-5'-P-CCNC: 7 U/L (ref 10–44)
ANION GAP SERPL CALC-SCNC: 8 MMOL/L (ref 8–16)
APTT PPP: 49.4 SEC (ref 21–32)
AST SERPL-CCNC: 18 U/L (ref 10–40)
BASOPHILS # BLD AUTO: 0.07 K/UL (ref 0–0.2)
BASOPHILS # BLD AUTO: 0.07 K/UL (ref 0–0.2)
BASOPHILS NFR BLD: 0.7 % (ref 0–1.9)
BASOPHILS NFR BLD: 0.7 % (ref 0–1.9)
BILIRUB SERPL-MCNC: 0.7 MG/DL (ref 0.1–1)
BUN SERPL-MCNC: 21 MG/DL (ref 8–23)
CALCIUM SERPL-MCNC: 8.6 MG/DL (ref 8.7–10.5)
CHLORIDE SERPL-SCNC: 99 MMOL/L (ref 95–110)
CO2 SERPL-SCNC: 27 MMOL/L (ref 23–29)
CREAT SERPL-MCNC: 1.1 MG/DL (ref 0.5–1.4)
DIFFERENTIAL METHOD BLD: ABNORMAL
DIFFERENTIAL METHOD BLD: ABNORMAL
EOSINOPHIL # BLD AUTO: 0.3 K/UL (ref 0–0.5)
EOSINOPHIL # BLD AUTO: 0.3 K/UL (ref 0–0.5)
EOSINOPHIL NFR BLD: 2.9 % (ref 0–8)
EOSINOPHIL NFR BLD: 2.9 % (ref 0–8)
ERYTHROCYTE [DISTWIDTH] IN BLOOD BY AUTOMATED COUNT: 14.1 % (ref 11.5–14.5)
ERYTHROCYTE [DISTWIDTH] IN BLOOD BY AUTOMATED COUNT: 14.1 % (ref 11.5–14.5)
EST. GFR  (NO RACE VARIABLE): >60 ML/MIN/1.73 M^2
GLUCOSE SERPL-MCNC: 95 MG/DL (ref 70–110)
HCT VFR BLD AUTO: 34.4 % (ref 40–54)
HCT VFR BLD AUTO: 34.4 % (ref 40–54)
HGB BLD-MCNC: 11.8 G/DL (ref 14–18)
HGB BLD-MCNC: 11.8 G/DL (ref 14–18)
IMM GRANULOCYTES # BLD AUTO: 0.02 K/UL (ref 0–0.04)
IMM GRANULOCYTES # BLD AUTO: 0.02 K/UL (ref 0–0.04)
IMM GRANULOCYTES NFR BLD AUTO: 0.2 % (ref 0–0.5)
IMM GRANULOCYTES NFR BLD AUTO: 0.2 % (ref 0–0.5)
LYMPHOCYTES # BLD AUTO: 1.8 K/UL (ref 1–4.8)
LYMPHOCYTES # BLD AUTO: 1.8 K/UL (ref 1–4.8)
LYMPHOCYTES NFR BLD: 18.7 % (ref 18–48)
LYMPHOCYTES NFR BLD: 18.7 % (ref 18–48)
MAGNESIUM SERPL-MCNC: 1.5 MG/DL (ref 1.6–2.6)
MCH RBC QN AUTO: 30 PG (ref 27–31)
MCH RBC QN AUTO: 30 PG (ref 27–31)
MCHC RBC AUTO-ENTMCNC: 34.3 G/DL (ref 32–36)
MCHC RBC AUTO-ENTMCNC: 34.3 G/DL (ref 32–36)
MCV RBC AUTO: 88 FL (ref 82–98)
MCV RBC AUTO: 88 FL (ref 82–98)
MONOCYTES # BLD AUTO: 1.1 K/UL (ref 0.3–1)
MONOCYTES # BLD AUTO: 1.1 K/UL (ref 0.3–1)
MONOCYTES NFR BLD: 11.3 % (ref 4–15)
MONOCYTES NFR BLD: 11.3 % (ref 4–15)
NEUTROPHILS # BLD AUTO: 6.2 K/UL (ref 1.8–7.7)
NEUTROPHILS # BLD AUTO: 6.2 K/UL (ref 1.8–7.7)
NEUTROPHILS NFR BLD: 66.2 % (ref 38–73)
NEUTROPHILS NFR BLD: 66.2 % (ref 38–73)
NRBC BLD-RTO: 0 /100 WBC
NRBC BLD-RTO: 0 /100 WBC
PLATELET # BLD AUTO: 225 K/UL (ref 150–450)
PLATELET # BLD AUTO: 225 K/UL (ref 150–450)
PMV BLD AUTO: 10.3 FL (ref 9.2–12.9)
PMV BLD AUTO: 10.3 FL (ref 9.2–12.9)
POTASSIUM SERPL-SCNC: 3.8 MMOL/L (ref 3.5–5.1)
PROT SERPL-MCNC: 6.2 G/DL (ref 6–8.4)
RBC # BLD AUTO: 3.93 M/UL (ref 4.6–6.2)
RBC # BLD AUTO: 3.93 M/UL (ref 4.6–6.2)
SODIUM SERPL-SCNC: 134 MMOL/L (ref 136–145)
TROPONIN I SERPL HS-MCNC: 1893.2 PG/ML (ref 0–14.9)
WBC # BLD AUTO: 9.41 K/UL (ref 3.9–12.7)
WBC # BLD AUTO: 9.41 K/UL (ref 3.9–12.7)

## 2024-03-01 PROCEDURE — 63600175 PHARM REV CODE 636 W HCPCS: Performed by: INTERNAL MEDICINE

## 2024-03-01 PROCEDURE — 85730 THROMBOPLASTIN TIME PARTIAL: CPT | Performed by: INTERNAL MEDICINE

## 2024-03-01 PROCEDURE — 94761 N-INVAS EAR/PLS OXIMETRY MLT: CPT

## 2024-03-01 PROCEDURE — 21000000 HC CCU ICU ROOM CHARGE

## 2024-03-01 PROCEDURE — 25000003 PHARM REV CODE 250: Performed by: INTERNAL MEDICINE

## 2024-03-01 PROCEDURE — 99900031 HC PATIENT EDUCATION (STAT)

## 2024-03-01 PROCEDURE — 83735 ASSAY OF MAGNESIUM: CPT | Performed by: INTERNAL MEDICINE

## 2024-03-01 PROCEDURE — 80053 COMPREHEN METABOLIC PANEL: CPT | Performed by: INTERNAL MEDICINE

## 2024-03-01 PROCEDURE — 93308 TTE F-UP OR LMTD: CPT

## 2024-03-01 PROCEDURE — 25000003 PHARM REV CODE 250: Performed by: HOSPITALIST

## 2024-03-01 PROCEDURE — 84484 ASSAY OF TROPONIN QUANT: CPT | Performed by: INTERNAL MEDICINE

## 2024-03-01 PROCEDURE — 27000221 HC OXYGEN, UP TO 24 HOURS

## 2024-03-01 PROCEDURE — 85025 COMPLETE CBC W/AUTO DIFF WBC: CPT | Performed by: INTERNAL MEDICINE

## 2024-03-01 RX ORDER — MAGNESIUM SULFATE HEPTAHYDRATE 40 MG/ML
2 INJECTION, SOLUTION INTRAVENOUS
Status: DISCONTINUED | OUTPATIENT
Start: 2024-03-01 | End: 2024-03-04 | Stop reason: HOSPADM

## 2024-03-01 RX ORDER — POTASSIUM CHLORIDE 20 MEQ/1
20 TABLET, EXTENDED RELEASE ORAL
Status: DISCONTINUED | OUTPATIENT
Start: 2024-03-01 | End: 2024-03-04 | Stop reason: HOSPADM

## 2024-03-01 RX ORDER — MAGNESIUM SULFATE HEPTAHYDRATE 40 MG/ML
4 INJECTION, SOLUTION INTRAVENOUS
Status: DISCONTINUED | OUTPATIENT
Start: 2024-03-01 | End: 2024-03-04 | Stop reason: HOSPADM

## 2024-03-01 RX ORDER — ISOSORBIDE MONONITRATE 60 MG/1
60 TABLET, EXTENDED RELEASE ORAL DAILY
Status: DISCONTINUED | OUTPATIENT
Start: 2024-03-01 | End: 2024-03-04 | Stop reason: HOSPADM

## 2024-03-01 RX ORDER — POTASSIUM CHLORIDE 20 MEQ/1
40 TABLET, EXTENDED RELEASE ORAL
Status: DISCONTINUED | OUTPATIENT
Start: 2024-03-01 | End: 2024-03-04 | Stop reason: HOSPADM

## 2024-03-01 RX ORDER — POTASSIUM CHLORIDE 7.45 MG/ML
20 INJECTION INTRAVENOUS
Status: DISCONTINUED | OUTPATIENT
Start: 2024-03-01 | End: 2024-03-04 | Stop reason: HOSPADM

## 2024-03-01 RX ORDER — MAGNESIUM SULFATE 1 G/100ML
1 INJECTION INTRAVENOUS
Status: DISCONTINUED | OUTPATIENT
Start: 2024-03-01 | End: 2024-03-04 | Stop reason: HOSPADM

## 2024-03-01 RX ORDER — FUROSEMIDE 10 MG/ML
40 INJECTION INTRAMUSCULAR; INTRAVENOUS DAILY
Status: DISCONTINUED | OUTPATIENT
Start: 2024-03-02 | End: 2024-03-02

## 2024-03-01 RX ORDER — MUPIROCIN 20 MG/G
OINTMENT TOPICAL 2 TIMES DAILY
Status: DISCONTINUED | OUTPATIENT
Start: 2024-03-01 | End: 2024-03-04 | Stop reason: HOSPADM

## 2024-03-01 RX ORDER — POTASSIUM CHLORIDE 7.45 MG/ML
40 INJECTION INTRAVENOUS
Status: DISCONTINUED | OUTPATIENT
Start: 2024-03-01 | End: 2024-03-04 | Stop reason: HOSPADM

## 2024-03-01 RX ADMIN — Medication 6 MG: at 08:03

## 2024-03-01 RX ADMIN — POTASSIUM BICARBONATE 50 MEQ: 977.5 TABLET, EFFERVESCENT ORAL at 06:03

## 2024-03-01 RX ADMIN — MUPIROCIN 1 G: 20 OINTMENT TOPICAL at 09:03

## 2024-03-01 RX ADMIN — ISOSORBIDE MONONITRATE 60 MG: 60 TABLET, EXTENDED RELEASE ORAL at 09:03

## 2024-03-01 RX ADMIN — PANTOPRAZOLE SODIUM 20 MG: 20 TABLET, DELAYED RELEASE ORAL at 09:03

## 2024-03-01 RX ADMIN — HYDROCODONE BITARTRATE AND ACETAMINOPHEN 1 TABLET: 5; 325 TABLET ORAL at 04:03

## 2024-03-01 RX ADMIN — HEPARIN SODIUM AND DEXTROSE 12 UNITS/KG/HR: 10000; 5 INJECTION INTRAVENOUS at 11:03

## 2024-03-01 RX ADMIN — PHENYTOIN SODIUM 400 MG: 100 CAPSULE ORAL at 09:03

## 2024-03-01 RX ADMIN — METOPROLOL SUCCINATE 100 MG: 25 TABLET, EXTENDED RELEASE ORAL at 08:03

## 2024-03-01 RX ADMIN — CLOPIDOGREL BISULFATE 75 MG: 75 TABLET, FILM COATED ORAL at 09:03

## 2024-03-01 RX ADMIN — METOPROLOL SUCCINATE 100 MG: 25 TABLET, EXTENDED RELEASE ORAL at 09:03

## 2024-03-01 RX ADMIN — Medication 800 MG: at 06:03

## 2024-03-01 RX ADMIN — FUROSEMIDE 40 MG: 10 INJECTION, SOLUTION INTRAMUSCULAR; INTRAVENOUS at 09:03

## 2024-03-01 RX ADMIN — CITALOPRAM HYDROBROMIDE 20 MG: 20 TABLET ORAL at 09:03

## 2024-03-01 RX ADMIN — PANTOPRAZOLE SODIUM 20 MG: 20 TABLET, DELAYED RELEASE ORAL at 08:03

## 2024-03-01 RX ADMIN — Medication 800 MG: at 09:03

## 2024-03-01 RX ADMIN — MUPIROCIN 1 G: 20 OINTMENT TOPICAL at 08:03

## 2024-03-01 NOTE — RESPIRATORY THERAPY
02/29/24 1950   Patient Assessment/Suction   Level of Consciousness (AVPU) alert   Respiratory Effort Normal;Unlabored   PRE-TX-O2   Device (Oxygen Therapy) nasal cannula   $ Is the patient on Low Flow Oxygen? Yes   Flow (L/min) 2   SpO2 98 %   Pulse Oximetry Type Continuous   $ Pulse Oximetry - Multiple Charge Pulse Oximetry - Multiple   Pulse 80   Resp (!) 25   Education   $ Education 15 min   Tobacco Cessation Intervention   Do you use any type of tobacco product? No   Respiratory Evaluation   $ Care Plan Tech Time 15 min   $ Eval/Re-eval Charges Evaluation   Evaluation For New Orders   Admitting Diagnosis Chest pain   Cardiac Diagnosis CAD, CABG   Home Oxygen   Has Home Oxygen? No   Home Aerosol, MDI, DPI, and Other Treatments/Therapies   Home Respiratory Therapy Per Patient/Review of Chart No   Atelectasis Care Plan   Rationale No Rational Found   Airway Clearance Care Plan   Rationale No rationale found

## 2024-03-01 NOTE — PLAN OF CARE
Problem: Adult Inpatient Plan of Care  Goal: Optimal Comfort and Wellbeing  Outcome: Ongoing, Progressing     Problem: Fluid and Electrolyte Imbalance (Acute Kidney Injury/Impairment)  Goal: Fluid and Electrolyte Balance  Outcome: Ongoing, Progressing

## 2024-03-01 NOTE — CONSULTS
"[facility]  Adult Nutrition   Consult Note (Initial Assessment)     SUMMARY     Recommendations  Continue current cardiac/1500 mL FR diet as tolerated.    Goals:   Pt to continue to meet 75% or more of his EEN/EPN.    Nutrition Goal Status: goal met    Communication of RD Recs: other (comment)    Dietitian Rounds Brief  Consult for decreased appetite d/t loss of wife: Pt has had 2 meals today and he ate 50% of his breakfast and 75% of his lunch. He declined an ONS and seemed very depressed. Will check on him again in a few days to be sure he is eating OK. His LBM was yesterday, his skin appears to be intake and his labs have been reviewed.     Nutrition Related Social Determinants of Health: SDOH: None Identified    Diet order:   Current Diet Order: Cardiac/1500 mL Fluid Restriction      Evaluation of Received Nutrient/Fluid Intake  Energy Calories Required: meeting needs  Protein Required: meeting needs  Fluid Required: meeting needs  Tolerance: tolerating     % Intake of Estimated Energy Needs: 75 - 100 %  % Meal Intake: 50 - 75 %      Intake/Output Summary (Last 24 hours) at 3/1/2024 1421  Last data filed at 3/1/2024 1328  Gross per 24 hour   Intake 639.03 ml   Output 2800 ml   Net -2160.97 ml        Anthropometrics  Temp: 97.8 °F (36.6 °C)  Height Method: Stated  Height: 5' 6" (167.6 cm)  Height (inches): 66 in  Weight Method: Bed Scale  Weight: 77.7 kg (171 lb 4.8 oz)  Weight (lb): 171.3 lb  Ideal Body Weight (IBW), Male: 142 lb  % Ideal Body Weight, Male (lb): 120.63 %  BMI (Calculated): 27.7  BMI Grade: 25 - 29.9 - overweight       Estimated/Assessed Needs  Weight Used For Calorie Calculations: 77.7 kg (171 lb 4.8 oz)  Energy Calorie Requirements (kcal): 3624-5098 kcals/day (25-30 kcals/kg ABW)  Energy Need Method: Kcal/kg  Protein Requirements:  g/day (1.2-2.0 g/kg ABW)  Weight Used For Protein Calculations: 77.7 kg (171 lb 4.8 oz)     Estimated Fluid Requirement Method: RDA Method  RDA " Method (mL): 1943       Reason for Assessment  Reason For Assessment: consult, other (see comments) (Decreased appetite d/t loss of wife)  Diagnosis: other (see comments) (Chest pain)  Relevant Medical History: Heart block, Hyperlipidemia, CHF (congestive heart failure), Coronary artery disease, Hypertension, Aortic valve disease, Heart attack, Chest pain, Hypothyroidism, Arthritis, Seizures, PVD (peripheral vascular disease), Difficulty swallowing, Heart attack, Atrial fibrillation  Interdisciplinary Rounds:  (No rounds on Friday)  Nutrition Discharge Planning: Pending    Nutrition/Diet History  Food Allergies: NKFA  Factors Affecting Nutritional Intake: NPO    Nutrition Risk Screen  Nutrition Risk Screen: reduced oral intake over the last month     MST Score: 4  Have you recently lost weight without trying?: Yes: 24-33 lbs  Weight loss score: 3  Have you been eating poorly because of a decreased appetite?: Yes  Appetite score: 1       Weight History:  Wt Readings from Last 5 Encounters:   02/29/24 77.7 kg (171 lb 4.8 oz)   03/01/24 77.7 kg (171 lb 4.8 oz)   02/19/24 80.3 kg (177 lb)   02/09/24 77.6 kg (171 lb)   01/27/24 77.6 kg (171 lb 1.2 oz)        Lab/Procedures/Meds: Pertinent Labs/Meds Reviewed    Medications:Pertinent Medications Reviewed  Scheduled Meds:   citalopram  20 mg Oral Daily    [START ON 3/2/2024] furosemide (LASIX) injection  40 mg Intravenous Daily    isosorbide mononitrate  60 mg Oral Daily    metoprolol succinate  100 mg Oral BID    mupirocin   Nasal BID    pantoprazole  20 mg Oral BID    phenytoin  400 mg Oral Daily     Continuous Infusions:   heparin (porcine) in D5W 12 Units/kg/hr (03/01/24 1117)     PRN Meds:.acetaminophen, acetaminophen, ALPRAZolam, aluminum-magnesium hydroxide-simethicone, calcium chloride IVPB, calcium chloride IVPB, calcium chloride IVPB, heparin (PORCINE), heparin (PORCINE), HYDROcodone-acetaminophen, magnesium oxide, magnesium oxide, magnesium sulfate IVPB,  magnesium sulfate IVPB, magnesium sulfate IVPB, magnesium sulfate IVPB, melatonin, morphine, ondansetron, potassium bicarbonate, potassium bicarbonate, potassium bicarbonate, potassium chloride in water, potassium chloride in water, potassium chloride in water, potassium chloride in water, potassium chloride, potassium chloride, potassium chloride, potassium chloride, potassium, sodium phosphates, potassium, sodium phosphates, potassium, sodium phosphates    Labs: Pertinent Labs Reviewed  Clinical Chemistry:  Recent Labs   Lab 02/29/24  0750 03/01/24  0403   * 134*   K 3.9 3.8   CL 99 99   CO2 25 27   * 95   BUN 21 21   CREATININE 1.2 1.1   CALCIUM 9.6 8.6*   PROT 7.1 6.2   ALBUMIN 4.0 3.5   BILITOT 0.6 0.7   ALKPHOS 94 84   AST 13 18   ALT 7* 7*   ANIONGAP 9 8   MG 1.5* 1.5*     CBC:   Recent Labs   Lab 03/01/24  0403   WBC 9.41  9.41   RBC 3.93*  3.93*   HGB 11.8*  11.8*   HCT 34.4*  34.4*     225   MCV 88  88   MCH 30.0  30.0   MCHC 34.3  34.3       Cardiac Profile:  Recent Labs   Lab 02/29/24  0750   BNP 1,720*         Monitor and Evaluation  Food and Nutrient Intake: food and beverage intake, energy intake  Food and Nutrient Adminstration: diet order  Knowledge/Beliefs/Attitudes: food and nutrition knowledge/skill, beliefs and attitudes  Physical Activity and Function: nutrition-related ADLs and IADLs, factors affecting access to physical activity  Anthropometric Measurements: weight, weight change, body mass index  Biochemical Data, Medical Tests and Procedures: lipid profile, inflammatory profile, glucose/endocrine profile, gastrointestinal profile, electrolyte and renal panel  Nutrition-Focused Physical Findings: overall appearance     Nutrition Risk  Level of Risk/Frequency of Follow-up: moderate     Nutrition Follow-Up  RD Follow-up?: Yes      Ashley Norris, TY 03/01/2024 2:21 PM

## 2024-03-01 NOTE — CONSULTS
[facility]   Cardiology Note    Consult Requested By: GAIL  Reason for Consult: Chest pain     SUBJECTIVE:     History of Present Illness: The patient is an 78 y.o. male with ah/o CAD, s/p CABG with redo, chronic  angina, chronic systolic heart failure, , s/p TAVR, pAF, hyperlipidemia, heart block, s/p dcPPM, PVD, seizure disorder, HTN, and hypothyroidism that presented with recurrent chest pain.  He had worsening chest pain yesterday midsternal , non-exertional , radiating to back . CP became worse with exertion. He presented to the ED . Pain slightly resolved with SL nitro. No associated n/v or diaphoresis. EKG Asensed Vpaced 109 n  trop elevated peaked at 2543. BNP 1700. The patient was started on heparin and nitro infusion, Iv lasix. Recent EGD per .     This am the pt is lying in bed with no c/o chest pain. He is not swollen and is laying flat with no JVD. No Le edema noted . H/H 12/38 cr 1.2       Past Medical History:   Diagnosis Date    Anticoagulant long-term use     2014    Aortic valve disease 2014    pig valve    Arthritis     all over    Atrial fibrillation 2/29/2024    Chest pain 07/15/2019    CHF (congestive heart failure)     2014 on meds    Coronary artery disease     2007 cabg    Difficulty swallowing     Heart attack 1990    15 stents    Heart attack 02/01/2024    mild    Heart block     Hyperlipidemia     Hypertension     on meds    Hypothyroidism 2015    on meds    PVD (peripheral vascular disease)     Seizures     last episode 1990 on meds     Past Surgical History:   Procedure Laterality Date    AORTOGRAPHY WITH SERIALOGRAPHY N/A 11/16/2021    Procedure: AORTOGRAM, WITH RUNOFF;  Surgeon: Rodrigo Willoughby MD;  Location: [facility] CATH/EP LAB;  Service: Cardiology;  Laterality: N/A;    ARTERIOGRAPHY OF AORTIC ROOT N/A 11/05/2019    Procedure: ARTERIOGRAM, AORTIC ROOT;  Surgeon: Rodrigo Willoughby MD;  Location: [facility] CATH/EP LAB;  Service: Cardiology;  Laterality: N/A;     CARDIAC CATHETERIZATION      CARDIAC VALVE SURGERY      CORONARY ANGIOGRAPHY INCLUDING BYPASS GRAFTS WITH CATHETERIZATION OF LEFT HEART N/A 11/05/2019    Procedure: ANGIOGRAM, CORONARY, INCLUDING BYPASS GRAFT, WITH LEFT HEART CATHETERIZATION;  Surgeon: oRdrigo Willoughby MD;  Location: [facility] CATH/EP LAB;  Service: Cardiology;  Laterality: N/A;    CORONARY ANGIOGRAPHY INCLUDING BYPASS GRAFTS WITH CATHETERIZATION OF LEFT HEART Left 11/03/2020    Procedure: ANGIOGRAM, CORONARY, INCLUDING BYPASS GRAFT, WITH LEFT HEART CATHETERIZATION;  Surgeon: Rodrigo Willoughby MD;  Location: [facility] CATH/EP LAB;  Service: Cardiology;  Laterality: Left;    CORONARY ANGIOGRAPHY INCLUDING BYPASS GRAFTS WITH CATHETERIZATION OF LEFT HEART N/A 09/28/2021    Procedure: ANGIOGRAM, CORONARY, INCLUDING BYPASS GRAFT, WITH LEFT HEART CATHETERIZATION;  Surgeon: Rodrigo Willoughby MD;  Location: [facility] CATH/EP LAB;  Service: Cardiology;  Laterality: N/A;    CORONARY ANGIOGRAPHY INCLUDING BYPASS GRAFTS WITH CATHETERIZATION OF LEFT HEART N/A 12/05/2023    Procedure: ANGIOGRAM, CORONARY, INCLUDING BYPASS GRAFT, WITH LEFT HEART CATHETERIZATION;  Surgeon: Rodrigo Willoughby MD;  Location: [facility] CATH/EP LAB;  Service: Cardiology;  Laterality: N/A;    CORONARY ANGIOPLASTY      CORONARY ARTERY BYPASS GRAFT      x 2    1991 2006    ESOPHAGOGASTRODUODENOSCOPY N/A 2/20/2024    Procedure: EGD (ESOPHAGOGASTRODUODENOSCOPY);  Surgeon: Mal Lockhart III, MD;  Location: [facility] ENDO;  Service: Endoscopy;  Laterality: N/A;    EXTRACORPOREAL SHOCK WAVE LITHOTRIPSY Right 08/01/2019    Procedure: LITHOTRIPSY, ESWL;  Surgeon: Williams Ortega MD;  Location: [facility] OR;  Service: Urology;  Laterality: Right;    HEMORRHOID SURGERY  1990    INSERTION OF PACEMAKER      PERCUTANEOUS TRANSLUMINAL BALLOON ANGIOPLASTY OF CORONARY ARTERY N/A 11/08/2019    Procedure: Angioplasty-coronary;  Surgeon: Rodrigo Willoughby MD;  Location: [facility] CATH/EP LAB;  Service: Cardiology;  Laterality: N/A;    WRIST  FRACTURE SURGERY       Family History   Problem Relation Age of Onset    Heart attack Mother     Heart attack Father     Heart disease Sister     Stroke Sister     Diabetes Sister     No Known Problems Maternal Grandmother     No Known Problems Maternal Grandfather     No Known Problems Paternal Grandmother     No Known Problems Paternal Grandfather     No Known Problems Brother     No Known Problems Maternal Aunt     No Known Problems Maternal Uncle     No Known Problems Paternal Aunt     No Known Problems Paternal Uncle     Anemia Neg Hx     Arrhythmia Neg Hx     Asthma Neg Hx     Clotting disorder Neg Hx     Fainting Neg Hx     Heart failure Neg Hx     Hyperlipidemia Neg Hx     Hypertension Neg Hx     Atrial Septal Defect Neg Hx      Social History     Tobacco Use    Smoking status: Former     Current packs/day: 0.00     Average packs/day: 0.5 packs/day for 4.0 years (2.0 ttl pk-yrs)     Types: Cigarettes     Start date:      Quit date:      Years since quittin.1    Smokeless tobacco: Never   Substance Use Topics    Alcohol use: Yes     Comment: social    Drug use: No       Review of Systems:  Review of Systems   Constitutional:  Negative for chills, fever, malaise/fatigue and weight loss.   Respiratory:  Positive for shortness of breath. Negative for cough, hemoptysis and sputum production.    Cardiovascular:  Positive for chest pain. Negative for palpitations, orthopnea, claudication and leg swelling.   Genitourinary:  Negative for dysuria, frequency, hematuria and urgency.   Neurological:  Negative for dizziness and headaches.       OBJECTIVE:     Vital Signs (Most Recent)  Temp: (P) 97.8 °F (36.6 °C) (24)  Pulse: 84 (24)  Resp: (!) 30 (24)  BP: 122/70 (24)  SpO2: 96 % (24)    Vital Signs Range (Last 24H):  Temp:  [97.4 °F (36.3 °C)-98.8 °F (37.1 °C)]   Pulse:  [77-96]   Resp:  [15-32]   BP: ()/(57-93)   SpO2:  [92 %-99 %]     I & O  (Last 24H):    Intake/Output Summary (Last 24 hours) at 3/1/2024 0902  Last data filed at 3/1/2024 0609  Gross per 24 hour   Intake --   Output 1500 ml   Net -1500 ml       Current Diet:     Current Diet Order   Procedures    Diet Cardiac Fluid - 1500mL; Standard Tray     Order Specific Question:   Fluid restriction:     Answer:   Fluid - 1500mL     Order Specific Question:   Tray type:     Answer:   Standard Tray        Allergies:  Review of patient's allergies indicates:   Allergen Reactions    Atorvastatin Other (See Comments)     Muscle pain    Rosuvastatin Other (See Comments)     Muscle pain       Meds:  Scheduled Meds:   citalopram  20 mg Oral Daily    clopidogreL  75 mg Oral Daily    furosemide (LASIX) injection  40 mg Intravenous Q12H    isosorbide mononitrate  60 mg Oral Daily    metoprolol succinate  100 mg Oral BID    mupirocin   Nasal BID    pantoprazole  20 mg Oral BID    phenytoin  400 mg Oral Daily     Continuous Infusions:   heparin (porcine) in D5W 12 Units/kg/hr (02/29/24 2203)    nitroGLYCERIN 5 mcg/min (02/29/24 2049)     PRN Meds:acetaminophen, acetaminophen, ALPRAZolam, aluminum-magnesium hydroxide-simethicone, calcium chloride IVPB, calcium chloride IVPB, calcium chloride IVPB, heparin (PORCINE), heparin (PORCINE), HYDROcodone-acetaminophen, magnesium oxide, magnesium oxide, magnesium sulfate IVPB, magnesium sulfate IVPB, magnesium sulfate IVPB, magnesium sulfate IVPB, melatonin, morphine, ondansetron, potassium bicarbonate, potassium bicarbonate, potassium bicarbonate, potassium chloride in water, potassium chloride in water, potassium chloride in water, potassium chloride in water, potassium chloride, potassium chloride, potassium chloride, potassium chloride, potassium, sodium phosphates, potassium, sodium phosphates, potassium, sodium phosphates    Oxygen/Ventilator Data (Last 24H):  (if applicable)            Hemodynamic Parameters (Last 24H):   (if applicable)        Laboratory and  Radiology Data:  Recent Results (from the past 24 hour(s))   TROPONIN I HIGH SENSITIVITY    Collection Time: 02/29/24 12:04 PM   Result Value Ref Range    Troponin I High Sensitivity 1134.7 (HH) 0.0 - 14.9 pg/mL   Phenytoin level, total    Collection Time: 02/29/24  1:22 PM   Result Value Ref Range    Phenytoin Lvl 12.1 10.0 - 20.0 ug/mL   APTT    Collection Time: 02/29/24  1:46 PM   Result Value Ref Range    aPTT 33.6 (H) 21.0 - 32.0 sec   Protime-INR    Collection Time: 02/29/24  1:46 PM   Result Value Ref Range    Prothrombin Time 11.6 9.0 - 12.5 sec    INR 1.0 0.8 - 1.2   TROPONIN I HIGH SENSITIVITY    Collection Time: 02/29/24  6:27 PM   Result Value Ref Range    Troponin I High Sensitivity 2543.4 (HH) 0.0 - 14.9 pg/mL   APTT    Collection Time: 02/29/24  8:59 PM   Result Value Ref Range    aPTT 49.0 (H) 21.0 - 32.0 sec   TROPONIN I HIGH SENSITIVITY    Collection Time: 03/01/24 12:06 AM   Result Value Ref Range    Troponin I High Sensitivity 1893.2 (HH) 0.0 - 14.9 pg/mL   Comprehensive Metabolic Panel (CMP)    Collection Time: 03/01/24  4:03 AM   Result Value Ref Range    Sodium 134 (L) 136 - 145 mmol/L    Potassium 3.8 3.5 - 5.1 mmol/L    Chloride 99 95 - 110 mmol/L    CO2 27 23 - 29 mmol/L    Glucose 95 70 - 110 mg/dL    BUN 21 8 - 23 mg/dL    Creatinine 1.1 0.5 - 1.4 mg/dL    Calcium 8.6 (L) 8.7 - 10.5 mg/dL    Total Protein 6.2 6.0 - 8.4 g/dL    Albumin 3.5 3.5 - 5.2 g/dL    Total Bilirubin 0.7 0.1 - 1.0 mg/dL    Alkaline Phosphatase 84 55 - 135 U/L    AST 18 10 - 40 U/L    ALT 7 (L) 10 - 44 U/L    eGFR >60.0 >60 mL/min/1.73 m^2    Anion Gap 8 8 - 16 mmol/L   Magnesium    Collection Time: 03/01/24  4:03 AM   Result Value Ref Range    Magnesium 1.5 (L) 1.6 - 2.6 mg/dL   CBC with Automated Differential    Collection Time: 03/01/24  4:03 AM   Result Value Ref Range    WBC 9.41 3.90 - 12.70 K/uL    RBC 3.93 (L) 4.60 - 6.20 M/uL    Hemoglobin 11.8 (L) 14.0 - 18.0 g/dL    Hematocrit 34.4 (L) 40.0 - 54.0 %    MCV  88 82 - 98 fL    MCH 30.0 27.0 - 31.0 pg    MCHC 34.3 32.0 - 36.0 g/dL    RDW 14.1 11.5 - 14.5 %    Platelets 225 150 - 450 K/uL    MPV 10.3 9.2 - 12.9 fL    Immature Granulocytes 0.2 0.0 - 0.5 %    Gran # (ANC) 6.2 1.8 - 7.7 K/uL    Immature Grans (Abs) 0.02 0.00 - 0.04 K/uL    Lymph # 1.8 1.0 - 4.8 K/uL    Mono # 1.1 (H) 0.3 - 1.0 K/uL    Eos # 0.3 0.0 - 0.5 K/uL    Baso # 0.07 0.00 - 0.20 K/uL    nRBC 0 0 /100 WBC    Gran % 66.2 38.0 - 73.0 %    Lymph % 18.7 18.0 - 48.0 %    Mono % 11.3 4.0 - 15.0 %    Eosinophil % 2.9 0.0 - 8.0 %    Basophil % 0.7 0.0 - 1.9 %    Differential Method Automated    CBC auto differential    Collection Time: 03/01/24  4:03 AM   Result Value Ref Range    WBC 9.41 3.90 - 12.70 K/uL    RBC 3.93 (L) 4.60 - 6.20 M/uL    Hemoglobin 11.8 (L) 14.0 - 18.0 g/dL    Hematocrit 34.4 (L) 40.0 - 54.0 %    MCV 88 82 - 98 fL    MCH 30.0 27.0 - 31.0 pg    MCHC 34.3 32.0 - 36.0 g/dL    RDW 14.1 11.5 - 14.5 %    Platelets 225 150 - 450 K/uL    MPV 10.3 9.2 - 12.9 fL    Immature Granulocytes 0.2 0.0 - 0.5 %    Gran # (ANC) 6.2 1.8 - 7.7 K/uL    Immature Grans (Abs) 0.02 0.00 - 0.04 K/uL    Lymph # 1.8 1.0 - 4.8 K/uL    Mono # 1.1 (H) 0.3 - 1.0 K/uL    Eos # 0.3 0.0 - 0.5 K/uL    Baso # 0.07 0.00 - 0.20 K/uL    nRBC 0 0 /100 WBC    Gran % 66.2 38.0 - 73.0 %    Lymph % 18.7 18.0 - 48.0 %    Mono % 11.3 4.0 - 15.0 %    Eosinophil % 2.9 0.0 - 8.0 %    Basophil % 0.7 0.0 - 1.9 %    Differential Method Automated    APTT    Collection Time: 03/01/24  4:03 AM   Result Value Ref Range    aPTT 49.4 (H) 21.0 - 32.0 sec     Imaging Results              X-Ray Chest AP Portable (Final result)  Result time 02/29/24 08:10:25   Procedure changed from X-Ray Chest PA And Lateral     Final result by Sergio Steinberg Jr., MD (02/29/24 08:10:25)                   Narrative:    CLINICAL HISTORY:  78 years (1945) Male Chest Pain    TECHNIQUE:  XR CHEST 1 VIEW. 1 images  obtained.    COMPARISON:  2/9/2024    FINDINGS:    Dual lead pacemaker device projects in the left upper chest with the position in the right atrium and right ventricle in optimal location. Heart is prominent size exacerbated by the diminished inspiratory effort. Mild central vascular congestion changes are noted. There is evidence of a small right infrahilar infiltrate. There are small effusions obscuring the bilateral costophrenic sulci. There are postoperative changes of previous coronary artery bypass surgery. No evidence of pneumothorax.    IMPRESSION: Stable appearance the chest when compared to previous evidence of mild central vascular congestion changes without evidence of any definable change upon comparison.    Electronically signed by:  Sergio Steinberg MD  02/29/2024 08:10 AM CST Workstation: [id number]                                    12-lead EKG interpretation:  (if applicable)      Current Cardiac Rhythm:   (if applicable)    Physical Exam:   Physical Exam  Constitutional:       Appearance: He is not ill-appearing.   Cardiovascular:      Rate and Rhythm: Normal rate and regular rhythm.      Heart sounds: Murmur heard.   Pulmonary:      Effort: Pulmonary effort is normal.      Breath sounds: Normal breath sounds.   Abdominal:      General: Abdomen is flat. There is no distension.      Palpations: Abdomen is soft.   Musculoskeletal:         General: No swelling.   Skin:     General: Skin is warm and dry.   Neurological:      General: No focal deficit present.      Mental Status: He is alert and oriented to person, place, and time.   Psychiatric:         Mood and Affect: Mood normal.         ASSESSMENT/PLAN:   Assessment:   NSTEMI  CAD, s/p cabg with redo  S/p TAVR  pAF on NOAC  PVD  DcPPM  Chronic systolic heart failure  Hyperlipidemia  Hypothyroidism  HTN    AV groove of circ Single vessel open grafts SVG to LAD, obtuse of the circ, and distal RCA occlusion of LAD and RCA--1 patent graft     Plan:    -Troponin peaked at 2500 , trending down   -Discussed with  and --There are little options available for him from an interventional standpoint   -Will continue medical management , heparin  24-48 hours.   Wean nitro, restart imdur  BNP elevated  CXR reviewed.  Patient received 2 doses of lasix,   U/o -1500 cc overnight  will reduce dose as pt does not appear extremely volume overloaded on exam--  Avoid over diuresing  -Ranexa on hold due to prolonged QT   - continue BB    -BP soft, stable this am  -Hold entresto while diuresing  - monitor on telemetry  - known intolerant to statin therapy  - monitor vs.  - repeat limited echo.  - explained to the patient and his daughter that we are out of options other than medical management.  - pt has not started corlanor yet, recently started in office-med not on formulary  Accurate I/O  Monitor electrolytes  BP soft but stable.

## 2024-03-02 PROBLEM — J81.0 ACUTE PULMONARY EDEMA: Status: RESOLVED | Noted: 2024-02-29 | Resolved: 2024-03-02

## 2024-03-02 LAB
ALBUMIN SERPL BCP-MCNC: 2.7 G/DL (ref 3.5–5.2)
ALP SERPL-CCNC: 71 U/L (ref 55–135)
ALT SERPL W/O P-5'-P-CCNC: 6 U/L (ref 10–44)
ANION GAP SERPL CALC-SCNC: 6 MMOL/L (ref 8–16)
ANION GAP SERPL CALC-SCNC: 6 MMOL/L (ref 8–16)
ANION GAP SERPL CALC-SCNC: 8 MMOL/L (ref 8–16)
APTT PPP: 51.7 SEC (ref 21–32)
AST SERPL-CCNC: 14 U/L (ref 10–40)
BASOPHILS # BLD AUTO: 0.05 K/UL (ref 0–0.2)
BASOPHILS # BLD AUTO: 0.05 K/UL (ref 0–0.2)
BASOPHILS NFR BLD: 0.7 % (ref 0–1.9)
BASOPHILS NFR BLD: 0.7 % (ref 0–1.9)
BILIRUB SERPL-MCNC: 0.4 MG/DL (ref 0.1–1)
BSA FOR ECHO PROCEDURE: 1.9 M2
BUN SERPL-MCNC: 23 MG/DL (ref 8–23)
BUN SERPL-MCNC: 24 MG/DL (ref 8–23)
BUN SERPL-MCNC: 26 MG/DL (ref 8–23)
CA-I BLDV-SCNC: 1.13 MMOL/L (ref 1.06–1.42)
CALCIUM SERPL-MCNC: 6.7 MG/DL (ref 8.7–10.5)
CALCIUM SERPL-MCNC: 8.2 MG/DL (ref 8.7–10.5)
CALCIUM SERPL-MCNC: 9.1 MG/DL (ref 8.7–10.5)
CHLORIDE SERPL-SCNC: 105 MMOL/L (ref 95–110)
CHLORIDE SERPL-SCNC: 95 MMOL/L (ref 95–110)
CHLORIDE SERPL-SCNC: 98 MMOL/L (ref 95–110)
CO2 SERPL-SCNC: 26 MMOL/L (ref 23–29)
CO2 SERPL-SCNC: 30 MMOL/L (ref 23–29)
CO2 SERPL-SCNC: 33 MMOL/L (ref 23–29)
CREAT SERPL-MCNC: 0.9 MG/DL (ref 0.5–1.4)
CREAT SERPL-MCNC: 1.1 MG/DL (ref 0.5–1.4)
CREAT SERPL-MCNC: 1.1 MG/DL (ref 0.5–1.4)
CV ECHO LV RWT: 0.54 CM
DIFFERENTIAL METHOD BLD: ABNORMAL
DIFFERENTIAL METHOD BLD: ABNORMAL
ECHO LV POSTERIOR WALL: 1.19 CM (ref 0.6–1.1)
EOSINOPHIL # BLD AUTO: 0.3 K/UL (ref 0–0.5)
EOSINOPHIL # BLD AUTO: 0.3 K/UL (ref 0–0.5)
EOSINOPHIL NFR BLD: 3.7 % (ref 0–8)
EOSINOPHIL NFR BLD: 3.7 % (ref 0–8)
ERYTHROCYTE [DISTWIDTH] IN BLOOD BY AUTOMATED COUNT: 14.2 % (ref 11.5–14.5)
ERYTHROCYTE [DISTWIDTH] IN BLOOD BY AUTOMATED COUNT: 14.2 % (ref 11.5–14.5)
EST. GFR  (NO RACE VARIABLE): >60 ML/MIN/1.73 M^2
FRACTIONAL SHORTENING: 19 % (ref 28–44)
GLUCOSE SERPL-MCNC: 107 MG/DL (ref 70–110)
GLUCOSE SERPL-MCNC: 87 MG/DL (ref 70–110)
GLUCOSE SERPL-MCNC: 98 MG/DL (ref 70–110)
HCT VFR BLD AUTO: 29 % (ref 40–54)
HCT VFR BLD AUTO: 29 % (ref 40–54)
HGB BLD-MCNC: 9.7 G/DL (ref 14–18)
HGB BLD-MCNC: 9.7 G/DL (ref 14–18)
IMM GRANULOCYTES # BLD AUTO: 0.02 K/UL (ref 0–0.04)
IMM GRANULOCYTES # BLD AUTO: 0.02 K/UL (ref 0–0.04)
IMM GRANULOCYTES NFR BLD AUTO: 0.3 % (ref 0–0.5)
IMM GRANULOCYTES NFR BLD AUTO: 0.3 % (ref 0–0.5)
INTERVENTRICULAR SEPTUM: 1.65 CM (ref 0.6–1.1)
LEFT INTERNAL DIMENSION IN SYSTOLE: 3.59 CM (ref 2.1–4)
LEFT VENTRICLE DIASTOLIC VOLUME INDEX: 47.38 ML/M2
LEFT VENTRICLE DIASTOLIC VOLUME: 88.6 ML
LEFT VENTRICLE MASS INDEX: 132 G/M2
LEFT VENTRICLE SYSTOLIC VOLUME INDEX: 28.9 ML/M2
LEFT VENTRICLE SYSTOLIC VOLUME: 54.1 ML
LEFT VENTRICULAR INTERNAL DIMENSION IN DIASTOLE: 4.42 CM (ref 3.5–6)
LEFT VENTRICULAR MASS: 247.13 G
LYMPHOCYTES # BLD AUTO: 1.8 K/UL (ref 1–4.8)
LYMPHOCYTES # BLD AUTO: 1.8 K/UL (ref 1–4.8)
LYMPHOCYTES NFR BLD: 24.4 % (ref 18–48)
LYMPHOCYTES NFR BLD: 24.4 % (ref 18–48)
MAGNESIUM SERPL-MCNC: 1.4 MG/DL (ref 1.6–2.6)
MAGNESIUM SERPL-MCNC: 2.6 MG/DL (ref 1.6–2.6)
MCH RBC QN AUTO: 29.4 PG (ref 27–31)
MCH RBC QN AUTO: 29.4 PG (ref 27–31)
MCHC RBC AUTO-ENTMCNC: 33.4 G/DL (ref 32–36)
MCHC RBC AUTO-ENTMCNC: 33.4 G/DL (ref 32–36)
MCV RBC AUTO: 88 FL (ref 82–98)
MCV RBC AUTO: 88 FL (ref 82–98)
MONOCYTES # BLD AUTO: 1 K/UL (ref 0.3–1)
MONOCYTES # BLD AUTO: 1 K/UL (ref 0.3–1)
MONOCYTES NFR BLD: 13.1 % (ref 4–15)
MONOCYTES NFR BLD: 13.1 % (ref 4–15)
NEUTROPHILS # BLD AUTO: 4.2 K/UL (ref 1.8–7.7)
NEUTROPHILS # BLD AUTO: 4.2 K/UL (ref 1.8–7.7)
NEUTROPHILS NFR BLD: 57.8 % (ref 38–73)
NEUTROPHILS NFR BLD: 57.8 % (ref 38–73)
NRBC BLD-RTO: 0 /100 WBC
NRBC BLD-RTO: 0 /100 WBC
OHS LV EJECTION FRACTION SIMPSONS BIPLANE MOD: 46 %
PISA MRMAX VEL: 4.44 M/S
PISA TR MAX VEL: 1.99 M/S
PLATELET # BLD AUTO: 188 K/UL (ref 150–450)
PLATELET # BLD AUTO: 188 K/UL (ref 150–450)
PMV BLD AUTO: 10.8 FL (ref 9.2–12.9)
PMV BLD AUTO: 10.8 FL (ref 9.2–12.9)
POTASSIUM SERPL-SCNC: 2.9 MMOL/L (ref 3.5–5.1)
POTASSIUM SERPL-SCNC: 3.6 MMOL/L (ref 3.5–5.1)
POTASSIUM SERPL-SCNC: 4.5 MMOL/L (ref 3.5–5.1)
PROT SERPL-MCNC: 4.8 G/DL (ref 6–8.4)
RBC # BLD AUTO: 3.3 M/UL (ref 4.6–6.2)
RBC # BLD AUTO: 3.3 M/UL (ref 4.6–6.2)
SODIUM SERPL-SCNC: 134 MMOL/L (ref 136–145)
SODIUM SERPL-SCNC: 136 MMOL/L (ref 136–145)
SODIUM SERPL-SCNC: 137 MMOL/L (ref 136–145)
TR MAX PG: 16 MMHG
WBC # BLD AUTO: 7.33 K/UL (ref 3.9–12.7)
WBC # BLD AUTO: 7.33 K/UL (ref 3.9–12.7)
Z-SCORE OF LEFT VENTRICULAR DIMENSION IN END DIASTOLE: -1.65
Z-SCORE OF LEFT VENTRICULAR DIMENSION IN END SYSTOLE: 0.87

## 2024-03-02 PROCEDURE — 82330 ASSAY OF CALCIUM: CPT | Performed by: HOSPITALIST

## 2024-03-02 PROCEDURE — 25000003 PHARM REV CODE 250: Performed by: HOSPITALIST

## 2024-03-02 PROCEDURE — 83735 ASSAY OF MAGNESIUM: CPT | Performed by: INTERNAL MEDICINE

## 2024-03-02 PROCEDURE — 99900035 HC TECH TIME PER 15 MIN (STAT)

## 2024-03-02 PROCEDURE — 80048 BASIC METABOLIC PNL TOTAL CA: CPT | Performed by: HOSPITALIST

## 2024-03-02 PROCEDURE — 80053 COMPREHEN METABOLIC PANEL: CPT | Performed by: INTERNAL MEDICINE

## 2024-03-02 PROCEDURE — 94761 N-INVAS EAR/PLS OXIMETRY MLT: CPT

## 2024-03-02 PROCEDURE — 85730 THROMBOPLASTIN TIME PARTIAL: CPT | Performed by: INTERNAL MEDICINE

## 2024-03-02 PROCEDURE — 36415 COLL VENOUS BLD VENIPUNCTURE: CPT | Performed by: HOSPITALIST

## 2024-03-02 PROCEDURE — 25000003 PHARM REV CODE 250

## 2024-03-02 PROCEDURE — 83735 ASSAY OF MAGNESIUM: CPT | Mod: 91 | Performed by: HOSPITALIST

## 2024-03-02 PROCEDURE — 63600175 PHARM REV CODE 636 W HCPCS: Performed by: HOSPITALIST

## 2024-03-02 PROCEDURE — 21000000 HC CCU ICU ROOM CHARGE

## 2024-03-02 PROCEDURE — 99900031 HC PATIENT EDUCATION (STAT)

## 2024-03-02 PROCEDURE — 25000003 PHARM REV CODE 250: Performed by: INTERNAL MEDICINE

## 2024-03-02 PROCEDURE — 85025 COMPLETE CBC W/AUTO DIFF WBC: CPT | Performed by: INTERNAL MEDICINE

## 2024-03-02 PROCEDURE — 63600175 PHARM REV CODE 636 W HCPCS

## 2024-03-02 RX ORDER — CLOPIDOGREL BISULFATE 75 MG/1
75 TABLET ORAL DAILY
Status: DISCONTINUED | OUTPATIENT
Start: 2024-03-03 | End: 2024-03-02

## 2024-03-02 RX ORDER — CLOPIDOGREL BISULFATE 75 MG/1
75 TABLET ORAL DAILY
Status: DISCONTINUED | OUTPATIENT
Start: 2024-03-03 | End: 2024-03-04 | Stop reason: HOSPADM

## 2024-03-02 RX ORDER — ENOXAPARIN SODIUM 100 MG/ML
1 INJECTION SUBCUTANEOUS EVERY 12 HOURS
Status: DISCONTINUED | OUTPATIENT
Start: 2024-03-02 | End: 2024-03-02

## 2024-03-02 RX ORDER — CLOPIDOGREL BISULFATE 75 MG/1
75 TABLET ORAL DAILY
Status: DISCONTINUED | OUTPATIENT
Start: 2024-03-02 | End: 2024-03-02

## 2024-03-02 RX ADMIN — ALPRAZOLAM 0.25 MG: 0.25 TABLET ORAL at 08:03

## 2024-03-02 RX ADMIN — PHENYTOIN SODIUM 400 MG: 100 CAPSULE ORAL at 09:03

## 2024-03-02 RX ADMIN — FUROSEMIDE 40 MG: 10 INJECTION, SOLUTION INTRAMUSCULAR; INTRAVENOUS at 09:03

## 2024-03-02 RX ADMIN — MUPIROCIN 1 G: 20 OINTMENT TOPICAL at 09:03

## 2024-03-02 RX ADMIN — CITALOPRAM HYDROBROMIDE 20 MG: 20 TABLET ORAL at 09:03

## 2024-03-02 RX ADMIN — METOPROLOL SUCCINATE 100 MG: 25 TABLET, EXTENDED RELEASE ORAL at 08:03

## 2024-03-02 RX ADMIN — PANTOPRAZOLE SODIUM 20 MG: 20 TABLET, DELAYED RELEASE ORAL at 08:03

## 2024-03-02 RX ADMIN — PANTOPRAZOLE SODIUM 20 MG: 20 TABLET, DELAYED RELEASE ORAL at 09:03

## 2024-03-02 RX ADMIN — MAGNESIUM SULFATE HEPTAHYDRATE 4 G: 40 INJECTION, SOLUTION INTRAVENOUS at 05:03

## 2024-03-02 RX ADMIN — METOPROLOL SUCCINATE 100 MG: 25 TABLET, EXTENDED RELEASE ORAL at 09:03

## 2024-03-02 RX ADMIN — ISOSORBIDE MONONITRATE 60 MG: 60 TABLET, EXTENDED RELEASE ORAL at 09:03

## 2024-03-02 RX ADMIN — POTASSIUM BICARBONATE 50 MEQ: 977.5 TABLET, EFFERVESCENT ORAL at 09:03

## 2024-03-02 RX ADMIN — MUPIROCIN 1 G: 20 OINTMENT TOPICAL at 08:03

## 2024-03-02 RX ADMIN — POTASSIUM CHLORIDE 40 MEQ: 1500 TABLET, EXTENDED RELEASE ORAL at 05:03

## 2024-03-02 NOTE — ASSESSMENT & PLAN NOTE
H/o CABG with recent angiogram on December 2023  This is his 4 th IP admission, after pt had Coronary angiogram in December 2023, with chest pain  Hold Ranexa  Maintain iv morphine  Started on iv lasix for CHF flare  Consulted pts Cardiology team   Heparin infusion

## 2024-03-02 NOTE — ASSESSMENT & PLAN NOTE
Patient is identified as having Combined Systolic and Diastolic heart failure that is Acute on chronic. CHF is currently controlled. Latest ECHO performed and demonstrates- Results for orders placed during the hospital encounter of 01/24/24    Echo Saline Bubble? No    Interpretation Summary    Left Ventricle: There is mild concentric hypertrophy. There is mildly reduced systolic function with a visually estimated ejection fraction of 40 - 50%. Grade II diastolic dysfunction. Elevated left ventricular filling pressure.    Left Atrium: Left atrium is moderately dilated.    Aortic Valve: There is a transcatheter valve replacement in the aortic position.    Mitral Valve: Moderately calcified leaflets. There is mild to moderate regurgitation.  No significant mitral stenosis    Tricuspid Valve: There is mild to moderate regurgitation.  Pulmonary artery systolic pressure approximately 30 mmHg  . Continue Beta Blocker and Furosemide and monitor clinical status closely. Monitor on telemetry. Patient is off CHF pathway.  Monitor strict Is&Os and daily weights.  Place on fluid restriction of 1.5 L. Cardiology has been consulted. Continue to stress to patient importance of self efficacy and  on diet for CHF. Last BNP reviewed- and noted below   Recent Labs   Lab 02/29/24  0750   BNP 1,720*     - IV lasix   - strict I/O

## 2024-03-02 NOTE — PLAN OF CARE
"  Problem: Adjustment to Illness (Heart Failure)  Goal: Optimal Coping  Outcome: Ongoing, Progressing     Problem: Cardiac Output Decreased (Heart Failure)  Goal: Optimal Cardiac Output  Outcome: Ongoing, Progressing     Problem: Respiratory Compromise (Heart Failure)  Goal: Effective Oxygenation and Ventilation  Outcome: Ongoing, Progressing     Problem: Cardiac Output Decreased  Goal: Effective Cardiac Output  Outcome: Ongoing, Progressing     Problem: Adjustment to Illness (Acute Coronary Syndrome)  Goal: Optimal Adaptation to Illness  Outcome: Ongoing, Progressing     Problem: Dysrhythmia (Acute Coronary Syndrome)  Goal: Normalized Cardiac Rhythm  Outcome: Ongoing, Progressing     Problem: Cardiac-Related Pain (Acute Coronary Syndrome)  Goal: Absence of Cardiac-Related Pain  Outcome: Ongoing, Progressing   Pt wants to go home. He has been waiting on cardiology all day. Cardiology wants pt to wait until Monday when his normal cardiologist that knows pt is in. She is disappointed making the statement " can't I just go home, then come back to see the Berstein". MD told pt that it would be better to wait. MD had discussion with pt of possible options. Pt agreed to stay. Pt ambulated to the restroom with stand by assist.  Confessed to cardiologist that he did get SOB when he ambulated. Has had multiple family and visitors in through out the day. Urinal at bedside. Denies chest pain at present.  "

## 2024-03-02 NOTE — SUBJECTIVE & OBJECTIVE
Interval History: see hospital course     Review of Systems   Constitutional:  Positive for activity change and fatigue. Negative for diaphoresis.   HENT: Negative.     Respiratory:  Positive for shortness of breath.    Cardiovascular:  Positive for chest pain.   Gastrointestinal: Negative.    Genitourinary: Negative.    Musculoskeletal: Negative.    Neurological: Negative.      Objective:     Vital Signs (Most Recent):  Temp: 97.8 °F (36.6 °C) (03/01/24 0701)  Pulse: 77 (03/01/24 1700)  Resp: 13 (03/01/24 1700)  BP: 113/62 (03/01/24 1700)  SpO2: 97 % (03/01/24 1700) Vital Signs (24h Range):  Temp:  [97.4 °F (36.3 °C)-97.9 °F (36.6 °C)] 97.8 °F (36.6 °C)  Pulse:  [77-87] 77  Resp:  [13-32] 13  SpO2:  [92 %-98 %] 97 %  BP: ()/(51-79) 113/62     Weight: 77.7 kg (171 lb 4.8 oz)  Body mass index is 27.65 kg/m².    Intake/Output Summary (Last 24 hours) at 3/1/2024 1808  Last data filed at 3/1/2024 1729  Gross per 24 hour   Intake 889.03 ml   Output 2400 ml   Net -1510.97 ml         Physical Exam  Constitutional:       Appearance: Normal appearance. He is not toxic-appearing or diaphoretic.   HENT:      Head: Normocephalic and atraumatic.      Nose: Nose normal. No congestion.      Mouth/Throat:      Mouth: Mucous membranes are moist.      Pharynx: Oropharynx is clear.   Eyes:      Extraocular Movements: Extraocular movements intact.      Pupils: Pupils are equal, round, and reactive to light.   Cardiovascular:      Rate and Rhythm: Normal rate and regular rhythm.      Pulses: Normal pulses.   Pulmonary:      Effort: Pulmonary effort is normal. No respiratory distress.      Breath sounds: Normal breath sounds. No wheezing.   Abdominal:      General: Abdomen is flat. Bowel sounds are normal.      Palpations: Abdomen is soft.   Musculoskeletal:         General: Swelling present. Normal range of motion.      Cervical back: Normal range of motion and neck supple. No rigidity.   Skin:     General: Skin is warm and dry.       Capillary Refill: Capillary refill takes less than 2 seconds.      Findings: No erythema.   Neurological:      General: No focal deficit present.      Mental Status: He is alert and oriented to person, place, and time. Mental status is at baseline.             Significant Labs: All pertinent labs within the past 24 hours have been reviewed.  Recent Lab Results         03/01/24  0403   03/01/24  0006   02/29/24  2059   02/29/24  1827        Albumin 3.5             ALP 84             ALT 7             Anion Gap 8             PTT 49.4  Comment: Refer to local heparin nomogram for intensity/dose specific   therapeutic   range.       49.0  Comment: Refer to local heparin nomogram for intensity/dose specific   therapeutic   range.           AST 18             Baso # 0.07              0.07             Basophil % 0.7              0.7             BILIRUBIN TOTAL 0.7  Comment: For infants and newborns, interpretation of results should be based  on gestational age, weight and in agreement with clinical  observations.    Premature Infant recommended reference ranges:  Up to 24 hours.............<8.0 mg/dL  Up to 48 hours............<12.0 mg/dL  3-5 days..................<15.0 mg/dL  6-29 days.................<15.0 mg/dL               BUN 21             Calcium 8.6             Chloride 99             CO2 27             Creatinine 1.1             Differential Method Automated              Automated             eGFR >60.0             Eos # 0.3              0.3             Eos % 2.9              2.9             Glucose 95             Gran # (ANC) 6.2              6.2             Gran % 66.2              66.2             Hematocrit 34.4              34.4             Hemoglobin 11.8              11.8             Immature Grans (Abs) 0.02  Comment: Mild elevation in immature granulocytes is non specific and   can be seen in a variety of conditions including stress response,   acute inflammation, trauma and pregnancy. Correlation  with other   laboratory and clinical findings is essential.                0.02  Comment: Mild elevation in immature granulocytes is non specific and   can be seen in a variety of conditions including stress response,   acute inflammation, trauma and pregnancy. Correlation with other   laboratory and clinical findings is essential.               Immature Granulocytes 0.2              0.2             Lymph # 1.8              1.8             Lymph % 18.7              18.7             Magnesium  1.5             MCH 30.0              30.0             MCHC 34.3              34.3             MCV 88              88             Mono # 1.1              1.1             Mono % 11.3              11.3             MPV 10.3              10.3             nRBC 0              0             Platelet Count 225              225             Potassium 3.8             PROTEIN TOTAL 6.2             RBC 3.93              3.93             RDW 14.1              14.1             Sodium 134             Troponin I High Sensitivity   1893.2  Comment: Troponin results differ between methods. Do not use   results between Troponin methods interchangeably.    Alkaline Phospatase levels above 400 U/L may   cause false positive results.    Access hsTnI should not be used for patients taking   Asfotase sanjay (Strensiq).  Critical result TNIHS 1893.2 pg/mL called to and read back by Lori Billingsley RN M3 at 01-Mar-2024 01:02 by [facility].       2543.4  Comment: Troponin results differ between methods. Do not use   results between Troponin methods interchangeably.    Alkaline Phospatase levels above 400 U/L may   cause false positive results.    Access hsTnI should not be used for patients taking   Asfotase sanjay (Strensiq).  Critical result TNIHS 2543.4 pg/mL called to and read back by Lori Billingsley RN M3 at 29-Feb-2024 19:13 by [facility].         WBC 9.41              9.41                     Significant Imaging: I have reviewed all pertinent  imaging results/findings within the past 24 hours.

## 2024-03-02 NOTE — PROGRESS NOTES
[facility]   Cardiology Note    Consult Requested By: GAIL  Reason for Consult: Chest pain     SUBJECTIVE:     History of Present Illness: The patient is an 78 y.o. male with ah/o CAD, s/p CABG with redo, chronic  angina, chronic systolic heart failure, , s/p TAVR, pAF, hyperlipidemia, heart block, s/p dcPPM, PVD, seizure disorder, HTN, and hypothyroidism that presented with recurrent chest pain.  He had worsening chest pain yesterday midsternal , non-exertional , radiating to back . CP became worse with exertion. He presented to the ED . Pain slightly resolved with SL nitro. No associated n/v or diaphoresis. EKG Asensed Vpaced 109 n  trop elevated peaked at 2543. BNP 1700. The patient was started on heparin and nitro infusion, Iv lasix. Recent EGD per .     This am the pt is lying in bed with no c/o chest pain. He is not swollen and is laying flat with no JVD. No Le edema noted . H/H 12/38 cr 1.2     3/2: No further chest pain. Remains SOB. No palpitations, syncope, presyncope. He is off nitro drip and has completed 48 hours of heparin. Of note, his hemoglobin has decreased significantly since admission. (12.9->9.7) BUN 25->33, Cr 1.1      Past Medical History:   Diagnosis Date    Anticoagulant long-term use     2014    Aortic valve disease 2014    pig valve    Arthritis     all over    Atrial fibrillation 2/29/2024    Chest pain 07/15/2019    CHF (congestive heart failure)     2014 on meds    Coronary artery disease     2007 cabg    Difficulty swallowing     Heart attack 1990    15 stents    Heart attack 02/01/2024    mild    Heart block     Hyperlipidemia     Hypertension     on meds    Hypothyroidism 2015    on meds    PVD (peripheral vascular disease)     Seizures     last episode 1990 on meds     Past Surgical History:   Procedure Laterality Date    AORTOGRAPHY WITH SERIALOGRAPHY N/A 11/16/2021    Procedure: AORTOGRAM, WITH RUNOFF;  Surgeon: Rodrigo Willoughby MD;  Location: [facility]  CATH/EP LAB;  Service: Cardiology;  Laterality: N/A;    ARTERIOGRAPHY OF AORTIC ROOT N/A 11/05/2019    Procedure: ARTERIOGRAM, AORTIC ROOT;  Surgeon: Rodrigo Willoughby MD;  Location: [facility] CATH/EP LAB;  Service: Cardiology;  Laterality: N/A;    CARDIAC CATHETERIZATION      CARDIAC VALVE SURGERY      CORONARY ANGIOGRAPHY INCLUDING BYPASS GRAFTS WITH CATHETERIZATION OF LEFT HEART N/A 11/05/2019    Procedure: ANGIOGRAM, CORONARY, INCLUDING BYPASS GRAFT, WITH LEFT HEART CATHETERIZATION;  Surgeon: Rodrigo Willoughby MD;  Location: [facility] CATH/EP LAB;  Service: Cardiology;  Laterality: N/A;    CORONARY ANGIOGRAPHY INCLUDING BYPASS GRAFTS WITH CATHETERIZATION OF LEFT HEART Left 11/03/2020    Procedure: ANGIOGRAM, CORONARY, INCLUDING BYPASS GRAFT, WITH LEFT HEART CATHETERIZATION;  Surgeon: Rodrigo Willoughby MD;  Location: [facility] CATH/EP LAB;  Service: Cardiology;  Laterality: Left;    CORONARY ANGIOGRAPHY INCLUDING BYPASS GRAFTS WITH CATHETERIZATION OF LEFT HEART N/A 09/28/2021    Procedure: ANGIOGRAM, CORONARY, INCLUDING BYPASS GRAFT, WITH LEFT HEART CATHETERIZATION;  Surgeon: Rodrigo Willoughby MD;  Location: [facility] CATH/EP LAB;  Service: Cardiology;  Laterality: N/A;    CORONARY ANGIOGRAPHY INCLUDING BYPASS GRAFTS WITH CATHETERIZATION OF LEFT HEART N/A 12/05/2023    Procedure: ANGIOGRAM, CORONARY, INCLUDING BYPASS GRAFT, WITH LEFT HEART CATHETERIZATION;  Surgeon: Rodrigo Willoughby MD;  Location: [facility] CATH/EP LAB;  Service: Cardiology;  Laterality: N/A;    CORONARY ANGIOPLASTY      CORONARY ARTERY BYPASS GRAFT      x 2    1991 2006    ESOPHAGOGASTRODUODENOSCOPY N/A 2/20/2024    Procedure: EGD (ESOPHAGOGASTRODUODENOSCOPY);  Surgeon: Mal Lockhart III, MD;  Location: [facility] ENDO;  Service: Endoscopy;  Laterality: N/A;    EXTRACORPOREAL SHOCK WAVE LITHOTRIPSY Right 08/01/2019    Procedure: LITHOTRIPSY, ESWL;  Surgeon: Williams Ortega MD;  Location: [facility] OR;  Service: Urology;  Laterality: Right;    HEMORRHOID SURGERY  1990    INSERTION  OF PACEMAKER      PERCUTANEOUS TRANSLUMINAL BALLOON ANGIOPLASTY OF CORONARY ARTERY N/A 2019    Procedure: Angioplasty-coronary;  Surgeon: Rodrigo Willoughby MD;  Location: [facility] CATH/EP LAB;  Service: Cardiology;  Laterality: N/A;    WRIST FRACTURE SURGERY       Family History   Problem Relation Age of Onset    Heart attack Mother     Heart attack Father     Heart disease Sister     Stroke Sister     Diabetes Sister     No Known Problems Maternal Grandmother     No Known Problems Maternal Grandfather     No Known Problems Paternal Grandmother     No Known Problems Paternal Grandfather     No Known Problems Brother     No Known Problems Maternal Aunt     No Known Problems Maternal Uncle     No Known Problems Paternal Aunt     No Known Problems Paternal Uncle     Anemia Neg Hx     Arrhythmia Neg Hx     Asthma Neg Hx     Clotting disorder Neg Hx     Fainting Neg Hx     Heart failure Neg Hx     Hyperlipidemia Neg Hx     Hypertension Neg Hx     Atrial Septal Defect Neg Hx      Social History     Tobacco Use    Smoking status: Former     Current packs/day: 0.00     Average packs/day: 0.5 packs/day for 4.0 years (2.0 ttl pk-yrs)     Types: Cigarettes     Start date:      Quit date:      Years since quittin.1    Smokeless tobacco: Never   Substance Use Topics    Alcohol use: Yes     Comment: social    Drug use: No       Review of Systems:  Review of Systems   Constitutional:  Negative for chills, fever, malaise/fatigue and weight loss.   Respiratory:  Positive for shortness of breath. Negative for cough, hemoptysis and sputum production.    Cardiovascular:  Positive for chest pain. Negative for palpitations, orthopnea, claudication and leg swelling.   Genitourinary:  Negative for dysuria, frequency, hematuria and urgency.   Neurological:  Negative for dizziness and headaches.       OBJECTIVE:     Vital Signs (Most Recent)  Temp: 98.1 °F (36.7 °C) (24 1130)  Pulse: 75 (24 1215)  Resp: 14 (24  1215)  BP: 118/65 (03/02/24 1200)  SpO2: 95 % (03/02/24 1215)    Vital Signs Range (Last 24H):  Temp:  [98 °F (36.7 °C)-98.4 °F (36.9 °C)]   Pulse:  [71-97]   Resp:  [10-33]   BP: (102-135)/(55-83)   SpO2:  [91 %-98 %]     I & O (Last 24H):    Intake/Output Summary (Last 24 hours) at 3/2/2024 1527  Last data filed at 3/2/2024 1524  Gross per 24 hour   Intake 840.28 ml   Output 1525 ml   Net -684.72 ml         Current Diet:     Current Diet Order   Procedures    Diet Cardiac Fluid - 1500mL; Standard Tray     Order Specific Question:   Fluid restriction:     Answer:   Fluid - 1500mL     Order Specific Question:   Tray type:     Answer:   Standard Tray        Allergies:  Review of patient's allergies indicates:   Allergen Reactions    Atorvastatin Other (See Comments)     Muscle pain    Rosuvastatin Other (See Comments)     Muscle pain       Meds:  Scheduled Meds:   citalopram  20 mg Oral Daily    furosemide (LASIX) injection  40 mg Intravenous Daily    isosorbide mononitrate  60 mg Oral Daily    metoprolol succinate  100 mg Oral BID    mupirocin   Nasal BID    pantoprazole  20 mg Oral BID    phenytoin  400 mg Oral Daily     Continuous Infusions:   heparin (porcine) in D5W 12 Units/kg/hr (03/01/24 2100)     PRN Meds:acetaminophen, acetaminophen, ALPRAZolam, aluminum-magnesium hydroxide-simethicone, calcium chloride IVPB, calcium chloride IVPB, calcium chloride IVPB, heparin (PORCINE), heparin (PORCINE), HYDROcodone-acetaminophen, magnesium oxide, magnesium oxide, magnesium sulfate IVPB, magnesium sulfate IVPB, magnesium sulfate IVPB, magnesium sulfate IVPB, melatonin, morphine, ondansetron, potassium bicarbonate, potassium bicarbonate, potassium bicarbonate, potassium chloride in water, potassium chloride in water, potassium chloride in water, potassium chloride in water, potassium chloride, potassium chloride, potassium chloride, potassium chloride, potassium, sodium phosphates, potassium, sodium phosphates,  potassium, sodium phosphates    Oxygen/Ventilator Data (Last 24H):  (if applicable)            Hemodynamic Parameters (Last 24H):   (if applicable)        Laboratory and Radiology Data:  Recent Results (from the past 24 hour(s))   Comprehensive Metabolic Panel (CMP)    Collection Time: 03/02/24  4:11 AM   Result Value Ref Range    Sodium 137 136 - 145 mmol/L    Potassium 2.9 (LL) 3.5 - 5.1 mmol/L    Chloride 105 95 - 110 mmol/L    CO2 26 23 - 29 mmol/L    Glucose 87 70 - 110 mg/dL    BUN 23 8 - 23 mg/dL    Creatinine 0.9 0.5 - 1.4 mg/dL    Calcium 6.7 (LL) 8.7 - 10.5 mg/dL    Total Protein 4.8 (L) 6.0 - 8.4 g/dL    Albumin 2.7 (L) 3.5 - 5.2 g/dL    Total Bilirubin 0.4 0.1 - 1.0 mg/dL    Alkaline Phosphatase 71 55 - 135 U/L    AST 14 10 - 40 U/L    ALT 6 (L) 10 - 44 U/L    eGFR >60.0 >60 mL/min/1.73 m^2    Anion Gap 6 (L) 8 - 16 mmol/L   Magnesium    Collection Time: 03/02/24  4:11 AM   Result Value Ref Range    Magnesium 1.4 (L) 1.6 - 2.6 mg/dL   CBC with Automated Differential    Collection Time: 03/02/24  4:11 AM   Result Value Ref Range    WBC 7.33 3.90 - 12.70 K/uL    RBC 3.30 (L) 4.60 - 6.20 M/uL    Hemoglobin 9.7 (L) 14.0 - 18.0 g/dL    Hematocrit 29.0 (L) 40.0 - 54.0 %    MCV 88 82 - 98 fL    MCH 29.4 27.0 - 31.0 pg    MCHC 33.4 32.0 - 36.0 g/dL    RDW 14.2 11.5 - 14.5 %    Platelets 188 150 - 450 K/uL    MPV 10.8 9.2 - 12.9 fL    Immature Granulocytes 0.3 0.0 - 0.5 %    Gran # (ANC) 4.2 1.8 - 7.7 K/uL    Immature Grans (Abs) 0.02 0.00 - 0.04 K/uL    Lymph # 1.8 1.0 - 4.8 K/uL    Mono # 1.0 0.3 - 1.0 K/uL    Eos # 0.3 0.0 - 0.5 K/uL    Baso # 0.05 0.00 - 0.20 K/uL    nRBC 0 0 /100 WBC    Gran % 57.8 38.0 - 73.0 %    Lymph % 24.4 18.0 - 48.0 %    Mono % 13.1 4.0 - 15.0 %    Eosinophil % 3.7 0.0 - 8.0 %    Basophil % 0.7 0.0 - 1.9 %    Differential Method Automated    CBC auto differential    Collection Time: 03/02/24  4:11 AM   Result Value Ref Range    WBC 7.33 3.90 - 12.70 K/uL    RBC 3.30 (L) 4.60 - 6.20  M/uL    Hemoglobin 9.7 (L) 14.0 - 18.0 g/dL    Hematocrit 29.0 (L) 40.0 - 54.0 %    MCV 88 82 - 98 fL    MCH 29.4 27.0 - 31.0 pg    MCHC 33.4 32.0 - 36.0 g/dL    RDW 14.2 11.5 - 14.5 %    Platelets 188 150 - 450 K/uL    MPV 10.8 9.2 - 12.9 fL    Immature Granulocytes 0.3 0.0 - 0.5 %    Gran # (ANC) 4.2 1.8 - 7.7 K/uL    Immature Grans (Abs) 0.02 0.00 - 0.04 K/uL    Lymph # 1.8 1.0 - 4.8 K/uL    Mono # 1.0 0.3 - 1.0 K/uL    Eos # 0.3 0.0 - 0.5 K/uL    Baso # 0.05 0.00 - 0.20 K/uL    nRBC 0 0 /100 WBC    Gran % 57.8 38.0 - 73.0 %    Lymph % 24.4 18.0 - 48.0 %    Mono % 13.1 4.0 - 15.0 %    Eosinophil % 3.7 0.0 - 8.0 %    Basophil % 0.7 0.0 - 1.9 %    Differential Method Automated    APTT    Collection Time: 03/02/24  4:11 AM   Result Value Ref Range    aPTT 51.7 (H) 21.0 - 32.0 sec   Basic metabolic panel    Collection Time: 03/02/24  5:15 AM   Result Value Ref Range    Sodium 136 136 - 145 mmol/L    Potassium 3.6 3.5 - 5.1 mmol/L    Chloride 98 95 - 110 mmol/L    CO2 30 (H) 23 - 29 mmol/L    Glucose 98 70 - 110 mg/dL    BUN 26 (H) 8 - 23 mg/dL    Creatinine 1.1 0.5 - 1.4 mg/dL    Calcium 8.2 (L) 8.7 - 10.5 mg/dL    Anion Gap 8 8 - 16 mmol/L    eGFR >60.0 >60 mL/min/1.73 m^2   Calcium, ionized    Collection Time: 03/02/24  5:15 AM   Result Value Ref Range    Ionized Calcium 1.13 1.06 - 1.42 mmol/L   Basic metabolic panel    Collection Time: 03/02/24 12:22 PM   Result Value Ref Range    Sodium 134 (L) 136 - 145 mmol/L    Potassium 4.5 3.5 - 5.1 mmol/L    Chloride 95 95 - 110 mmol/L    CO2 33 (H) 23 - 29 mmol/L    Glucose 107 70 - 110 mg/dL    BUN 24 (H) 8 - 23 mg/dL    Creatinine 1.1 0.5 - 1.4 mg/dL    Calcium 9.1 8.7 - 10.5 mg/dL    Anion Gap 6 (L) 8 - 16 mmol/L    eGFR >60.0 >60 mL/min/1.73 m^2   Magnesium    Collection Time: 03/02/24 12:22 PM   Result Value Ref Range    Magnesium 2.6 1.6 - 2.6 mg/dL     Imaging Results              X-Ray Chest AP Portable (Final result)  Result time 02/29/24 08:10:25   Procedure  changed from X-Ray Chest PA And Lateral     Final result by Sergio Steinberg Jr., MD (02/29/24 08:10:25)                   Narrative:    CLINICAL HISTORY:  78 years (1945) Male Chest Pain    TECHNIQUE:  XR CHEST 1 VIEW. 1 images obtained.    COMPARISON:  2/9/2024    FINDINGS:    Dual lead pacemaker device projects in the left upper chest with the position in the right atrium and right ventricle in optimal location. Heart is prominent size exacerbated by the diminished inspiratory effort. Mild central vascular congestion changes are noted. There is evidence of a small right infrahilar infiltrate. There are small effusions obscuring the bilateral costophrenic sulci. There are postoperative changes of previous coronary artery bypass surgery. No evidence of pneumothorax.    IMPRESSION: Stable appearance the chest when compared to previous evidence of mild central vascular congestion changes without evidence of any definable change upon comparison.    Electronically signed by:  Sergio Steinberg MD  02/29/2024 08:10 AM CST Workstation: [id number]                                    12-lead EKG interpretation:  (if applicable)      Current Cardiac Rhythm:   (if applicable)    Physical Exam:   Physical Exam  Constitutional:       Appearance: He is not ill-appearing.   Cardiovascular:      Rate and Rhythm: Normal rate and regular rhythm.      Heart sounds: Murmur heard.   Pulmonary:      Effort: Pulmonary effort is normal.      Breath sounds: Normal breath sounds.   Abdominal:      General: Abdomen is flat. There is no distension.      Palpations: Abdomen is soft.   Musculoskeletal:         General: No swelling.   Skin:     General: Skin is warm and dry.   Neurological:      General: No focal deficit present.      Mental Status: He is alert and oriented to person, place, and time.   Psychiatric:         Mood and Affect: Mood normal.         ASSESSMENT/PLAN:   Assessment:   NSTEMI  CAD, s/p cabg with redo  S/p TAVR  pAF  on NOAC  PVD  DcPPM  Chronic systolic heart failure  Hyperlipidemia  Hypothyroidism  HTN    AV groove of circ Single vessel open grafts SVG to LAD, obtuse of the circ, and distal RCA occlusion of LAD and RCA--1 patent graft     Plan:   -Troponin peaked at 2500 , trending down   -He has very limited options available. His heart is being supplied by a single SVG. LAD is occluded at the origin, Lcx occluded in proximal stent, and RCA occluded at the ostium. There is very minimal collateral flow from the Lcx to the distal RCA vessels.   -Will continue medical management.  Off nitro drip, restart imdur  BNP elevated  CXR reviewed.  Patient received 2 doses of lasix,   Recommend discontinuing lasix. He  is intravascularly Euvolemic on exam. Bicarbonate level rising. Caution against contraction alkalosis.   - continue BB    -Hold entresto for now. If blood pressure remains low normal, will need to add a different ARB in order to avaoid hypotension.   - monitor on telemetry  - known intolerant to statin therapy  - monitor vs.  - repeat limited echo pending  - Had a long discussion with the patient that options are very limited. He is basically maintaining on a single SVG. Any intervention could result in loss of his single SVG.   - to further complicate matters, his hemoglobin has decreased from 12 to 9 since being admitted, despite diuresis. This is while on heparin infusion. Will hold off restarting Eliquis, as he has had recent GI evaluation. BUN increasing slightly. Recommend evaluation for decreasing anemia.  - will restart plan to restart Plavix in AM, unless he is found to be bleeding. Will monitor for further blood loss and possible bleeding before restarting.   - would like Dr. Willoughby to evaluate the patient. If no options are ultimately available, as determined by Dr. Willoughby and the patient, then the best course of action, given multiple recent admissions,  then we may need to consider more comfort measures  (hospice) given multipple admissions for unstable angina, and 2 admissions for nstemi in the past 4 months.       Jonathan Bey Jr

## 2024-03-02 NOTE — ASSESSMENT & PLAN NOTE
NSTEMI  CAD with H/o CABG with recent angiogram on December 2023  This is his 4 th IP admission, after pt had Coronary angiogram in December 2023, with chest pain  Hold Ranexa  Maintain iv morphine  Cont iv lasix for CHF  Consulted pts Cardiology team, awaiting further recommendations    Heparin infusion, complete 48 hrs today.

## 2024-03-02 NOTE — RESPIRATORY THERAPY
03/01/24 1905   Patient Assessment/Suction   Level of Consciousness (AVPU) alert   Respiratory Effort Normal;Unlabored   PRE-TX-O2   Device (Oxygen Therapy) nasal cannula   Flow (L/min) 2   SpO2 (!) 93 %   Pulse Oximetry Type Continuous   $ Pulse Oximetry - Multiple Charge Pulse Oximetry - Multiple   Pulse 84   Resp (!) 22   Education   $ Education 15 min

## 2024-03-02 NOTE — CARE UPDATE
03/02/24 0856   PRE-TX-O2   Device (Oxygen Therapy) room air   SpO2 (!) 94 %   Pulse Oximetry Type Continuous   $ Pulse Oximetry - Multiple Charge Pulse Oximetry - Multiple   Pulse 82   Resp 19   Respiratory Evaluation   $ Care Plan Tech Time 15 min

## 2024-03-02 NOTE — PHYSICIAN QUERY
PT Name: Jerome Amaro Jr.  MR #: [id number]     DOCUMENTATION CLARIFICATION     CDS/: Angela Hirsch               Contact information:[phone] or through epic messenger  This form is a permanent document in the medical record.     Query Date: March 1, 2024    By submitting this query, we are merely seeking further clarification of documentation.  Please utilize your independent clinical judgment when addressing the question(s) below.  The Medical Record contains the following   Indicators   Supporting Clinical Findings Location in Medical Record    Subjective Respiratory Signs/Symptoms: SOB, CHURCH, Cough, etc. Patient also complains of feeling shortness of breath.  Constitutional:  Respiratory:  Positive for shortness of breath.      Also c/o shortness of breath especially on exertion ER Prov Note            HP, hospitalist's progress note 3/1    RR         O2 sat         O2 use Initial Vitals [02/29/24 0703]   BP Pulse Resp Temp SpO2   (!) 148/91 109 (!) 26 98.1 °F (36.7 °C) (!) 88 %    ER Prov Note    Hypoxia/Hypercapnia Patient slightly hypoxic as well.  Medical Decision Making :    Patient also short of breath and slightly hypoxic. ER Prov Note    Radiology findings Chest xray 2/29  Mild central vascular congestion changes  are noted.There is evidence of a small right infrahilar infiltrate.There are small effusions obscuring the bilateral costophrenic sulci.  Radiology EPIC    Acute/Chronic Illness Acute CHF exac, NSTEMI, CAD      The clinical guidelines noted are only a system guideline. It does not replace the providers clinical judgment.      [facility] Approved Diagnostic Criteria      Acute Respiratory Failure    Hypoxic: ABG pO2<60 mmHg or O2 sat of <91% on RA   AND/OR   Hypercapnic: ABG pCO2>50 mmHg with pH <7.35   AND   Respiratory symptoms documented (Subjective: SOB; Objective: Tachypnea, respiratory distress, increased work of breathing, unable to speak in complete sentences, labored  breathing, use of accessory muscles, RR>26, cyanosis, dyspnea, wheezing, stridor, lethargy)      Acute Respiratory Insufficiency - Generally describes less severe respiratory symptoms and measurements (pO2, SpO2, pH, and pCO2) not meeting criteria for respiratory failure         Provider, Is there an associated diagnosis that can be documented to better reflect the severity of the pt's respiratory status  this admission?    If a diagnosis is documented, please document it in your progress notes daily and include in your discharge summary.   [    x] Acute Respiratory Failure with Hypoxia   [    ] Acute Respiratory Failure with Hypoxia and Hypercapnia   [    ] Acute Respiratory Failure, unspecified whether with hypoxia or hypercapnia   [    ] Acute Respiratory Insufficiency   [    ] Hypoxia Only   [    ]        [    ] Other Respiratory Diagnosis (please specify type and acuity if applicable):         Insignificant finding

## 2024-03-02 NOTE — SUBJECTIVE & OBJECTIVE
Interval History: Patient has been resting comfortably. Denies any chest pain. Says he has been ambulating to the bathroom and did not have any chest pain. Patient is grieving for the lost of his wife for > 60 years few months ago.     Review of Systems  Objective:     Vital Signs (Most Recent):  Temp: 98.1 °F (36.7 °C) (03/02/24 1130)  Pulse: 75 (03/02/24 1215)  Resp: 14 (03/02/24 1215)  BP: 118/65 (03/02/24 1200)  SpO2: 95 % (03/02/24 1215) Vital Signs (24h Range):  Temp:  [98 °F (36.7 °C)-98.4 °F (36.9 °C)] 98.1 °F (36.7 °C)  Pulse:  [71-97] 75  Resp:  [10-33] 14  SpO2:  [91 %-98 %] 95 %  BP: (102-135)/(55-83) 118/65     Weight: 76.3 kg (168 lb 3.4 oz)  Body mass index is 27.15 kg/m².    Intake/Output Summary (Last 24 hours) at 3/2/2024 1526  Last data filed at 3/2/2024 1335  Gross per 24 hour   Intake 840.28 ml   Output 1250 ml   Net -409.72 ml         Physical Exam  Constitutional:       General: He is not in acute distress.  HENT:      Mouth/Throat:      Mouth: Mucous membranes are moist.      Pharynx: Oropharynx is clear.   Eyes:      Conjunctiva/sclera: Conjunctivae normal.   Cardiovascular:      Rate and Rhythm: Normal rate and regular rhythm.      Heart sounds: Normal heart sounds. No murmur heard.  Pulmonary:      Effort: No respiratory distress.      Breath sounds: Normal breath sounds. No wheezing.   Abdominal:      General: Bowel sounds are normal. There is no distension.      Palpations: Abdomen is soft.      Tenderness: There is no abdominal tenderness.   Musculoskeletal:         General: No swelling or deformity.   Skin:     Coloration: Skin is not jaundiced or pale.   Neurological:      General: No focal deficit present.      Mental Status: He is oriented to person, place, and time.      Cranial Nerves: No cranial nerve deficit.      Motor: No weakness.   Psychiatric:      Comments: Depressed              Significant Labs: All pertinent labs within the past 24 hours have been reviewed.  BMP:    Recent Labs   Lab 03/02/24  1222      *   K 4.5   CL 95   CO2 33*   BUN 24*   CREATININE 1.1   CALCIUM 9.1   MG 2.6     CBC:   Recent Labs   Lab 03/01/24  0403 03/02/24  0411   WBC 9.41  9.41 7.33  7.33   HGB 11.8*  11.8* 9.7*  9.7*   HCT 34.4*  34.4* 29.0*  29.0*     225 188  188     Troponin:   Recent Labs   Lab 02/29/24  1827 03/01/24  0006   TROPONINIHS 2543.4* 1893.2*       Significant Imaging: I have reviewed all pertinent imaging results/findings within the past 24 hours.

## 2024-03-02 NOTE — PROGRESS NOTES
[facility] Medicine  Progress Note    Patient Name: Jerome Amaro Jr.  MRN: [id number]  Patient Class: IP- Inpatient   Admission Date: 2/29/2024  Length of Stay: 1 days  Attending Physician: Celena Andersen MD  Primary Care Provider: Oscar Madrid NP        Subjective:     Principal Problem:Chest pain        HPI:  78 year old pt getting admitted with chest pain and acute CHF  Pt had Coronary angiogram on December 2023 and medical management was advised(1 functioning graft of CABG was found)  He takes lasix at home on PRN basis  Today AM pt started having chest pain mostly on central part of chest/pain radiated to back/ pain worse on exertion  Also c/o shortness of breath especially on exertion  He came to ER and got admitted  Since he had Angiogram done on December 2023 this is pts 4 th IP admission to hospital with same c/o ie:Chest pain   Pt is on Ranexa which will be put on Hold today because of prolonged QTC        Overview/Hospital Course:  Patient admitted again with chest pain and acute CHF. Ranexa on hold for prolong QT. Currently on heparin infusion. No further options available for patient. This was d/w patient by cardiology. Ok to transfer to stepdown unit on heparin infusion. NTG infusion off. Chest pain only when he ambulates, not at rest.     Interval History: see hospital course     Review of Systems   Constitutional:  Positive for activity change and fatigue. Negative for diaphoresis.   HENT: Negative.     Respiratory:  Positive for shortness of breath.    Cardiovascular:  Positive for chest pain.   Gastrointestinal: Negative.    Genitourinary: Negative.    Musculoskeletal: Negative.    Neurological: Negative.      Objective:     Vital Signs (Most Recent):  Temp: 97.8 °F (36.6 °C) (03/01/24 0701)  Pulse: 77 (03/01/24 1700)  Resp: 13 (03/01/24 1700)  BP: 113/62 (03/01/24 1700)  SpO2: 97 % (03/01/24 1700) Vital Signs (24h Range):  Temp:  [97.4 °F (36.3 °C)-97.9 °F  (36.6 °C)] 97.8 °F (36.6 °C)  Pulse:  [77-87] 77  Resp:  [13-32] 13  SpO2:  [92 %-98 %] 97 %  BP: ()/(51-79) 113/62     Weight: 77.7 kg (171 lb 4.8 oz)  Body mass index is 27.65 kg/m².    Intake/Output Summary (Last 24 hours) at 3/1/2024 1808  Last data filed at 3/1/2024 1729  Gross per 24 hour   Intake 889.03 ml   Output 2400 ml   Net -1510.97 ml         Physical Exam  Constitutional:       Appearance: Normal appearance. He is not toxic-appearing or diaphoretic.   HENT:      Head: Normocephalic and atraumatic.      Nose: Nose normal. No congestion.      Mouth/Throat:      Mouth: Mucous membranes are moist.      Pharynx: Oropharynx is clear.   Eyes:      Extraocular Movements: Extraocular movements intact.      Pupils: Pupils are equal, round, and reactive to light.   Cardiovascular:      Rate and Rhythm: Normal rate and regular rhythm.      Pulses: Normal pulses.   Pulmonary:      Effort: Pulmonary effort is normal. No respiratory distress.      Breath sounds: Normal breath sounds. No wheezing.   Abdominal:      General: Abdomen is flat. Bowel sounds are normal.      Palpations: Abdomen is soft.   Musculoskeletal:         General: Swelling present. Normal range of motion.      Cervical back: Normal range of motion and neck supple. No rigidity.   Skin:     General: Skin is warm and dry.      Capillary Refill: Capillary refill takes less than 2 seconds.      Findings: No erythema.   Neurological:      General: No focal deficit present.      Mental Status: He is alert and oriented to person, place, and time. Mental status is at baseline.             Significant Labs: All pertinent labs within the past 24 hours have been reviewed.  Recent Lab Results         03/01/24  0403   03/01/24  0006   02/29/24  2059   02/29/24  1827        Albumin 3.5             ALP 84             ALT 7             Anion Gap 8             PTT 49.4  Comment: Refer to local heparin nomogram for intensity/dose specific   therapeutic    range.       49.0  Comment: Refer to local heparin nomogram for intensity/dose specific   therapeutic   range.           AST 18             Baso # 0.07              0.07             Basophil % 0.7              0.7             BILIRUBIN TOTAL 0.7  Comment: For infants and newborns, interpretation of results should be based  on gestational age, weight and in agreement with clinical  observations.    Premature Infant recommended reference ranges:  Up to 24 hours.............<8.0 mg/dL  Up to 48 hours............<12.0 mg/dL  3-5 days..................<15.0 mg/dL  6-29 days.................<15.0 mg/dL               BUN 21             Calcium 8.6             Chloride 99             CO2 27             Creatinine 1.1             Differential Method Automated              Automated             eGFR >60.0             Eos # 0.3              0.3             Eos % 2.9              2.9             Glucose 95             Gran # (ANC) 6.2              6.2             Gran % 66.2              66.2             Hematocrit 34.4              34.4             Hemoglobin 11.8              11.8             Immature Grans (Abs) 0.02  Comment: Mild elevation in immature granulocytes is non specific and   can be seen in a variety of conditions including stress response,   acute inflammation, trauma and pregnancy. Correlation with other   laboratory and clinical findings is essential.                0.02  Comment: Mild elevation in immature granulocytes is non specific and   can be seen in a variety of conditions including stress response,   acute inflammation, trauma and pregnancy. Correlation with other   laboratory and clinical findings is essential.               Immature Granulocytes 0.2              0.2             Lymph # 1.8              1.8             Lymph % 18.7              18.7             Magnesium  1.5             MCH 30.0              30.0             MCHC 34.3              34.3             MCV 88              88              Mono # 1.1              1.1             Mono % 11.3              11.3             MPV 10.3              10.3             nRBC 0              0             Platelet Count 225              225             Potassium 3.8             PROTEIN TOTAL 6.2             RBC 3.93              3.93             RDW 14.1              14.1             Sodium 134             Troponin I High Sensitivity   1893.2  Comment: Troponin results differ between methods. Do not use   results between Troponin methods interchangeably.    Alkaline Phospatase levels above 400 U/L may   cause false positive results.    Access hsTnI should not be used for patients taking   Asfotase sanjay (Strensiq).  Critical result TNIHS 1893.2 pg/mL called to and read back by Lori Billingsley RN M3 at 01-Mar-2024 01:02 by [facility].       2543.4  Comment: Troponin results differ between methods. Do not use   results between Troponin methods interchangeably.    Alkaline Phospatase levels above 400 U/L may   cause false positive results.    Access hsTnI should not be used for patients taking   Asfotase sanjay (Strensiq).  Critical result TNIHS 2543.4 pg/mL called to and read back by Lori Billingsley RN M3 at 29-Feb-2024 19:13 by [facility].         WBC 9.41              9.41                     Significant Imaging: I have reviewed all pertinent imaging results/findings within the past 24 hours.    Assessment/Plan:      * Chest pain  H/o CABG with recent angiogram on December 2023  This is his 4 th IP admission, after pt had Coronary angiogram in December 2023, with chest pain  Hold Ranexa  Maintain iv morphine  Started on iv lasix for CHF flare  Consulted pts Cardiology team   Heparin infusion       Frequent hospital admissions  Aware       Allergy to statin medication  Aware       Atrial fibrillation  H/o aware  Maintain home medications including anticoagulants    Acute pulmonary edema  Maintain iv lasix      Seizure disorder  Maintain phenytoin   Check  phenytoin levels      S/P CABG (coronary artery bypass graft)  Aware       Pacemaker  Aware       CAD (coronary artery disease)  H/o CABG with recent angiogram on December 2023  This is his 4 th IP admission, after pt had Coronary angiogram in December 2023, with chest pain      S/P TAVR (transcatheter aortic valve replacement)  Aware         VTE Risk Mitigation (From admission, onward)           Ordered     heparin 25,000 units in dextrose 5% (100 units/ml) IV bolus from bag LOW INTENSITY nomogram - [facility]  As needed (PRN)        Question:  Heparin Infusion Adjustment (DO NOT MODIFY ANSWER)  Answer:  \\[email or web address]\epic\Images\Pharmacy\HeparinInfusions\heparin LOW INTENSITY nomogram for [facility] [id number].pdf    02/29/24 1332     heparin 25,000 units in dextrose 5% (100 units/ml) IV bolus from bag LOW INTENSITY nomogram - [facility]  As needed (PRN)        Question:  Heparin Infusion Adjustment (DO NOT MODIFY ANSWER)  Answer:  \[employer]\epic\Images\Pharmacy\HeparinInfusions\heparin LOW INTENSITY nomogram for [facility] [id number].pdf    02/29/24 1332     heparin 25,000 units in dextrose 5% 250 mL (100 units/mL) infusion LOW INTENSITY nomogram - [facility]  Continuous        Question:  Begin at (units/kg/hr)  Answer:  12    02/29/24 1332     IP VTE HIGH RISK PATIENT  Once         02/29/24 1038     Place sequential compression device  Until discontinued         02/29/24 1038                    Discharge Planning   AXEL: 3/4/2024     Code Status: Full Code   Is the patient medically ready for discharge?:     Reason for patient still in hospital (select all that apply): Patient trending condition  Discharge Plan A: Home            Critical care time spent on the evaluation and treatment of severe organ dysfunction, review of pertinent labs and imaging studies, discussions with consulting providers and discussions with patient/family: 30 minutes.      Celena Andersen MD  Department of Hospital Medicine   [facility]

## 2024-03-02 NOTE — PROGRESS NOTES
[facility] Medicine  Progress Note    Patient Name: Jerome Amaro Jr.  MRN: [id number]  Patient Class: IP- Inpatient   Admission Date: 2/29/2024  Length of Stay: 2 days  Attending Physician: Lacho Ordaz MD  Primary Care Provider: Oscar Madrid NP        Subjective:     Principal Problem:Chest pain        HPI:  78 year old pt getting admitted with chest pain and acute CHF  Pt had Coronary angiogram on December 2023 and medical management was advised(1 functioning graft of CABG was found)  He takes lasix at home on PRN basis  Today AM pt started having chest pain mostly on central part of chest/pain radiated to back/ pain worse on exertion  Also c/o shortness of breath especially on exertion  He came to ER and got admitted  Since he had Angiogram done on December 2023 this is pts 4 th IP admission to hospital with same c/o ie:Chest pain   Pt is on Ranexa which will be put on Hold today because of prolonged QTC        Overview/Hospital Course:  Patient admitted again with chest pain and acute CHF. Ranexa on hold for prolong QT. Currently on heparin infusion. No further options available for patient. This was d/w patient by cardiology. Ok to transfer to stepdown unit on heparin infusion. NTG infusion off. Chest pain only when he ambulates, not at rest.     Interval History: Patient has been resting comfortably. Denies any chest pain. Says he has been ambulating to the bathroom and did not have any chest pain. Patient is grieving for the lost of his wife for > 60 years few months ago.     Review of Systems  Objective:     Vital Signs (Most Recent):  Temp: 98.1 °F (36.7 °C) (03/02/24 1130)  Pulse: 75 (03/02/24 1215)  Resp: 14 (03/02/24 1215)  BP: 118/65 (03/02/24 1200)  SpO2: 95 % (03/02/24 1215) Vital Signs (24h Range):  Temp:  [98 °F (36.7 °C)-98.4 °F (36.9 °C)] 98.1 °F (36.7 °C)  Pulse:  [71-97] 75  Resp:  [10-33] 14  SpO2:  [91 %-98 %] 95 %  BP: (102-135)/(55-83) 118/65      Weight: 76.3 kg (168 lb 3.4 oz)  Body mass index is 27.15 kg/m².    Intake/Output Summary (Last 24 hours) at 3/2/2024 1526  Last data filed at 3/2/2024 1335  Gross per 24 hour   Intake 840.28 ml   Output 1250 ml   Net -409.72 ml         Physical Exam  Constitutional:       General: He is not in acute distress.  HENT:      Mouth/Throat:      Mouth: Mucous membranes are moist.      Pharynx: Oropharynx is clear.   Eyes:      Conjunctiva/sclera: Conjunctivae normal.   Cardiovascular:      Rate and Rhythm: Normal rate and regular rhythm.      Heart sounds: Normal heart sounds. No murmur heard.  Pulmonary:      Effort: No respiratory distress.      Breath sounds: Normal breath sounds. No wheezing.   Abdominal:      General: Bowel sounds are normal. There is no distension.      Palpations: Abdomen is soft.      Tenderness: There is no abdominal tenderness.   Musculoskeletal:         General: No swelling or deformity.   Skin:     Coloration: Skin is not jaundiced or pale.   Neurological:      General: No focal deficit present.      Mental Status: He is oriented to person, place, and time.      Cranial Nerves: No cranial nerve deficit.      Motor: No weakness.   Psychiatric:      Comments: Depressed              Significant Labs: All pertinent labs within the past 24 hours have been reviewed.  BMP:   Recent Labs   Lab 03/02/24  1222      *   K 4.5   CL 95   CO2 33*   BUN 24*   CREATININE 1.1   CALCIUM 9.1   MG 2.6     CBC:   Recent Labs   Lab 03/01/24  0403 03/02/24  0411   WBC 9.41  9.41 7.33  7.33   HGB 11.8*  11.8* 9.7*  9.7*   HCT 34.4*  34.4* 29.0*  29.0*     225 188  188     Troponin:   Recent Labs   Lab 02/29/24  1827 03/01/24  0006   TROPONINIHS 2543.4* 1893.2*       Significant Imaging: I have reviewed all pertinent imaging results/findings within the past 24 hours.    Assessment/Plan:      * Chest pain  NSTEMI  CAD with H/o CABG with recent angiogram on December 2023  This is his 4  th IP admission, after pt had Coronary angiogram in December 2023, with chest pain  Hold Ranexa  Maintain iv morphine  Cont iv lasix for CHF  Consulted pts Cardiology team, awaiting further recommendations    Heparin infusion, complete 48 hrs today.       Acute on chronic heart failure with preserved ejection fraction (HFpEF)  Patient is identified as having Combined Systolic and Diastolic heart failure that is Acute on chronic. CHF is currently controlled. Latest ECHO performed and demonstrates- Results for orders placed during the hospital encounter of 01/24/24    Echo Saline Bubble? No    Interpretation Summary    Left Ventricle: There is mild concentric hypertrophy. There is mildly reduced systolic function with a visually estimated ejection fraction of 40 - 50%. Grade II diastolic dysfunction. Elevated left ventricular filling pressure.    Left Atrium: Left atrium is moderately dilated.    Aortic Valve: There is a transcatheter valve replacement in the aortic position.    Mitral Valve: Moderately calcified leaflets. There is mild to moderate regurgitation.  No significant mitral stenosis    Tricuspid Valve: There is mild to moderate regurgitation.  Pulmonary artery systolic pressure approximately 30 mmHg  . Continue Beta Blocker and Furosemide and monitor clinical status closely. Monitor on telemetry. Patient is off CHF pathway.  Monitor strict Is&Os and daily weights.  Place on fluid restriction of 1.5 L. Cardiology has been consulted. Continue to stress to patient importance of self efficacy and  on diet for CHF. Last BNP reviewed- and noted below   Recent Labs   Lab 02/29/24  0750   BNP 1,720*     - IV lasix   - strict I/O    Frequent hospital admissions  Aware       Allergy to statin medication  Aware       Atrial fibrillation  H/o aware  Maintain home medications including anticoagulants    Seizure disorder  - resume home meds       Pacemaker  Aware       S/P TAVR (transcatheter aortic valve  replacement)  Aware         VTE Risk Mitigation (From admission, onward)           Ordered     heparin 25,000 units in dextrose 5% (100 units/ml) IV bolus from bag LOW INTENSITY nomogram - [facility]  As needed (PRN)        Question:  Heparin Infusion Adjustment (DO NOT MODIFY ANSWER)  Answer:  \\[email or web address]\epic\Images\Pharmacy\HeparinInfusions\heparin LOW INTENSITY nomogram for [facility] [id number].pdf    02/29/24 1332     heparin 25,000 units in dextrose 5% (100 units/ml) IV bolus from bag LOW INTENSITY nomogram - [facility]  As needed (PRN)        Question:  Heparin Infusion Adjustment (DO NOT MODIFY ANSWER)  Answer:  \\[email or web address]\epic\Images\Pharmacy\HeparinInfusions\heparin LOW INTENSITY nomogram for [facility] [id number].pdf    02/29/24 1332     heparin 25,000 units in dextrose 5% 250 mL (100 units/mL) infusion LOW INTENSITY nomogram - [facility]  Continuous        Question:  Begin at (units/kg/hr)  Answer:  12    02/29/24 1332     IP VTE HIGH RISK PATIENT  Once         02/29/24 1038     Place sequential compression device  Until discontinued         02/29/24 1038                    Discharge Planning   AXEL: 3/4/2024     Code Status: Full Code   Is the patient medically ready for discharge?:     Reason for patient still in hospital (select all that apply): Treatment and Consult recommendations  Discharge Plan A: Home          Transfer out to stepdown       Lacho Ordaz MD  Department of Hospital Medicine   [facility]

## 2024-03-02 NOTE — HOSPITAL COURSE
Patient admitted again with chest pain and acute CHF. Ranexa on hold for prolong QT. Currently on heparin infusion. No further options available for patient. This was d/w patient by cardiology. Ok to transfer to stepdown unit on heparin infusion. NTG infusion off. Chest pain only when he ambulates, not at rest. Patient remained chest pain free. Completed 48 hrs of heparin infusion. Per cardiology, they would like to continue to observe him today and discuss with Dr. Xiao, Seen by Cardiology this morning, cleared to discharge with two weeks follow up. QTC improved but still high. Holding Ranexa.

## 2024-03-03 LAB
ALBUMIN SERPL BCP-MCNC: 3.6 G/DL (ref 3.5–5.2)
ALP SERPL-CCNC: 92 U/L (ref 55–135)
ALT SERPL W/O P-5'-P-CCNC: 8 U/L (ref 10–44)
ANION GAP SERPL CALC-SCNC: 7 MMOL/L (ref 8–16)
AST SERPL-CCNC: 15 U/L (ref 10–40)
BASOPHILS # BLD AUTO: 0.05 K/UL (ref 0–0.2)
BASOPHILS NFR BLD: 0.6 % (ref 0–1.9)
BILIRUB SERPL-MCNC: 0.5 MG/DL (ref 0.1–1)
BUN SERPL-MCNC: 23 MG/DL (ref 8–23)
CALCIUM SERPL-MCNC: 8.6 MG/DL (ref 8.7–10.5)
CHLORIDE SERPL-SCNC: 97 MMOL/L (ref 95–110)
CO2 SERPL-SCNC: 30 MMOL/L (ref 23–29)
CREAT SERPL-MCNC: 1.1 MG/DL (ref 0.5–1.4)
DIFFERENTIAL METHOD BLD: ABNORMAL
EOSINOPHIL # BLD AUTO: 0.3 K/UL (ref 0–0.5)
EOSINOPHIL NFR BLD: 4.3 % (ref 0–8)
ERYTHROCYTE [DISTWIDTH] IN BLOOD BY AUTOMATED COUNT: 14.1 % (ref 11.5–14.5)
EST. GFR  (NO RACE VARIABLE): >60 ML/MIN/1.73 M^2
GLUCOSE SERPL-MCNC: 106 MG/DL (ref 70–110)
HCT VFR BLD AUTO: 35.2 % (ref 40–54)
HGB BLD-MCNC: 11.6 G/DL (ref 14–18)
IMM GRANULOCYTES # BLD AUTO: 0.02 K/UL (ref 0–0.04)
IMM GRANULOCYTES NFR BLD AUTO: 0.3 % (ref 0–0.5)
LYMPHOCYTES # BLD AUTO: 1.9 K/UL (ref 1–4.8)
LYMPHOCYTES NFR BLD: 24.3 % (ref 18–48)
MAGNESIUM SERPL-MCNC: 2.3 MG/DL (ref 1.6–2.6)
MCH RBC QN AUTO: 28.8 PG (ref 27–31)
MCHC RBC AUTO-ENTMCNC: 33 G/DL (ref 32–36)
MCV RBC AUTO: 87 FL (ref 82–98)
MONOCYTES # BLD AUTO: 1 K/UL (ref 0.3–1)
MONOCYTES NFR BLD: 13.2 % (ref 4–15)
NEUTROPHILS # BLD AUTO: 4.4 K/UL (ref 1.8–7.7)
NEUTROPHILS NFR BLD: 57.3 % (ref 38–73)
NRBC BLD-RTO: 0 /100 WBC
OB PNL STL: NEGATIVE
PLATELET # BLD AUTO: 201 K/UL (ref 150–450)
PMV BLD AUTO: 10.4 FL (ref 9.2–12.9)
POTASSIUM SERPL-SCNC: 3.9 MMOL/L (ref 3.5–5.1)
PROT SERPL-MCNC: 6.3 G/DL (ref 6–8.4)
RBC # BLD AUTO: 4.03 M/UL (ref 4.6–6.2)
SODIUM SERPL-SCNC: 134 MMOL/L (ref 136–145)
WBC # BLD AUTO: 7.73 K/UL (ref 3.9–12.7)

## 2024-03-03 PROCEDURE — 25000003 PHARM REV CODE 250: Performed by: INTERNAL MEDICINE

## 2024-03-03 PROCEDURE — 36415 COLL VENOUS BLD VENIPUNCTURE: CPT | Performed by: INTERNAL MEDICINE

## 2024-03-03 PROCEDURE — 25000003 PHARM REV CODE 250

## 2024-03-03 PROCEDURE — 25000003 PHARM REV CODE 250: Performed by: HOSPITALIST

## 2024-03-03 PROCEDURE — 82272 OCCULT BLD FECES 1-3 TESTS: CPT | Performed by: INTERNAL MEDICINE

## 2024-03-03 PROCEDURE — 21000000 HC CCU ICU ROOM CHARGE

## 2024-03-03 PROCEDURE — 85025 COMPLETE CBC W/AUTO DIFF WBC: CPT | Performed by: INTERNAL MEDICINE

## 2024-03-03 PROCEDURE — 94761 N-INVAS EAR/PLS OXIMETRY MLT: CPT

## 2024-03-03 PROCEDURE — 80053 COMPREHEN METABOLIC PANEL: CPT | Performed by: INTERNAL MEDICINE

## 2024-03-03 PROCEDURE — 93005 ELECTROCARDIOGRAM TRACING: CPT | Performed by: GENERAL PRACTICE

## 2024-03-03 PROCEDURE — 93010 ELECTROCARDIOGRAM REPORT: CPT | Mod: ,,, | Performed by: GENERAL PRACTICE

## 2024-03-03 PROCEDURE — 99900035 HC TECH TIME PER 15 MIN (STAT)

## 2024-03-03 PROCEDURE — 83735 ASSAY OF MAGNESIUM: CPT | Performed by: INTERNAL MEDICINE

## 2024-03-03 RX ADMIN — METOPROLOL SUCCINATE 100 MG: 25 TABLET, EXTENDED RELEASE ORAL at 08:03

## 2024-03-03 RX ADMIN — MUPIROCIN 1 G: 20 OINTMENT TOPICAL at 08:03

## 2024-03-03 RX ADMIN — APIXABAN 5 MG: 5 TABLET, FILM COATED ORAL at 08:03

## 2024-03-03 RX ADMIN — CLOPIDOGREL BISULFATE 75 MG: 75 TABLET, FILM COATED ORAL at 08:03

## 2024-03-03 RX ADMIN — CITALOPRAM HYDROBROMIDE 20 MG: 20 TABLET ORAL at 08:03

## 2024-03-03 RX ADMIN — APIXABAN 5 MG: 5 TABLET, FILM COATED ORAL at 01:03

## 2024-03-03 RX ADMIN — PANTOPRAZOLE SODIUM 20 MG: 20 TABLET, DELAYED RELEASE ORAL at 08:03

## 2024-03-03 RX ADMIN — SACUBITRIL AND VALSARTAN 1 TABLET: 24; 26 TABLET, FILM COATED ORAL at 08:03

## 2024-03-03 RX ADMIN — PHENYTOIN SODIUM 400 MG: 100 CAPSULE ORAL at 08:03

## 2024-03-03 RX ADMIN — ISOSORBIDE MONONITRATE 60 MG: 60 TABLET, EXTENDED RELEASE ORAL at 08:03

## 2024-03-03 NOTE — SUBJECTIVE & OBJECTIVE
Interval History: Patient is chest pain free and denies any new complains. No bloody Bms or any other signs of bleeding.     Review of Systems  Objective:     Vital Signs (Most Recent):  Temp: 98 °F (36.7 °C) (03/03/24 0715)  Pulse: 71 (03/03/24 1000)  Resp: (!) 27 (03/03/24 1000)  BP: 120/69 (03/03/24 1000)  SpO2: 95 % (03/03/24 1000) Vital Signs (24h Range):  Temp:  [97.8 °F (36.6 °C)-98.6 °F (37 °C)] 98 °F (36.7 °C)  Pulse:  [69-84] 71  Resp:  [10-38] 27  SpO2:  [86 %-100 %] 95 %  BP: (102-157)/(58-76) 120/69     Weight: 76.3 kg (168 lb 3.4 oz)  Body mass index is 27.15 kg/m².    Intake/Output Summary (Last 24 hours) at 3/3/2024 1114  Last data filed at 3/3/2024 0844  Gross per 24 hour   Intake 785.6 ml   Output 1200 ml   Net -414.4 ml         Physical Exam  Vitals and nursing note reviewed.   Constitutional:       General: He is not in acute distress.     Appearance: He is not toxic-appearing.   HENT:      Head: Atraumatic.      Mouth/Throat:      Mouth: Mucous membranes are moist.      Pharynx: Oropharynx is clear.   Eyes:      General: No scleral icterus.     Conjunctiva/sclera: Conjunctivae normal.      Pupils: Pupils are equal, round, and reactive to light.   Cardiovascular:      Rate and Rhythm: Normal rate and regular rhythm.      Heart sounds: No murmur heard.  Pulmonary:      Effort: No respiratory distress.      Breath sounds: No wheezing, rhonchi or rales.   Abdominal:      General: Abdomen is flat. Bowel sounds are normal.      Palpations: Abdomen is soft.   Musculoskeletal:         General: No swelling or deformity.      Cervical back: No rigidity or tenderness.   Skin:     Coloration: Skin is not jaundiced or pale.      Findings: No bruising, erythema or rash.   Neurological:      General: No focal deficit present.      Mental Status: He is alert and oriented to person, place, and time.      Cranial Nerves: No cranial nerve deficit.      Sensory: No sensory deficit.      Motor: No weakness.    Psychiatric:         Mood and Affect: Mood normal.         Behavior: Behavior normal.         Thought Content: Thought content normal.         Judgment: Judgment normal.             Significant Labs: All pertinent labs within the past 24 hours have been reviewed.  CBC:   Recent Labs   Lab 03/02/24 0411 03/03/24 0252   WBC 7.33  7.33 7.73   HGB 9.7*  9.7* 11.6*   HCT 29.0*  29.0* 35.2*     188 201     CMP:   Recent Labs   Lab 03/02/24  0411 03/02/24  0515 03/02/24  1222 03/03/24 0253    136 134* 134*   K 2.9* 3.6 4.5 3.9    98 95 97   CO2 26 30* 33* 30*   GLU 87 98 107 106   BUN 23 26* 24* 23   CREATININE 0.9 1.1 1.1 1.1   CALCIUM 6.7* 8.2* 9.1 8.6*   PROT 4.8*  --   --  6.3   ALBUMIN 2.7*  --   --  3.6   BILITOT 0.4  --   --  0.5   ALKPHOS 71  --   --  92   AST 14  --   --  15   ALT 6*  --   --  8*   ANIONGAP 6* 8 6* 7*       Significant Imaging: I have reviewed all pertinent imaging results/findings within the past 24 hours.

## 2024-03-03 NOTE — ASSESSMENT & PLAN NOTE
"NSTEMI  CAD with H/o CABG with recent angiogram on December 2023  This is his 4 th IP admission, after pt had Coronary angiogram in December 2023, with chest pain  Hold Ranexa  Maintain iv morphine  Euvolemic now, off IV lasix  Consulted pts Cardiology team, awaiting further recommendations    Completed heparin drip   - Off nitro drip, resumed Imdur   - Plavix to be started today   - Per cardiology "he has very limited options available. His heart is being supplied by a single SVG. LAD is occluded at the origin, Lcx occluded in proximal stent, and RCA occluded at the ostium. There is very minimal collateral flow from the Lcx to the distal RCA vessels.  He is basically maintaining on a single SVG. Any intervention could result in loss of his single SVG. Would like Dr. Willoughby to evaluate the patient. If no options are ultimately available, as determined by Dr. Willoughby and the patient, then the best course of action, given multiple recent admissions,  then we may need to consider more comfort measures (hospice) given multipple admissions for unstable angina, and 2 admissions for nstemi in the past 4 months"    "

## 2024-03-03 NOTE — CARE UPDATE
03/03/24 1021   Patient Assessment/Suction   Level of Consciousness (AVPU) alert   Respiratory Effort Normal;Unlabored   Expansion/Accessory Muscles/Retractions no use of accessory muscles   PRE-TX-O2   Device (Oxygen Therapy) room air   Pulse Oximetry Type Continuous   $ Pulse Oximetry - Multiple Charge Pulse Oximetry - Multiple   Respiratory Evaluation   $ Care Plan Tech Time 15 min   Home Oxygen   Has Home Oxygen? No

## 2024-03-03 NOTE — RESPIRATORY THERAPY
03/02/24 1908   Patient Assessment/Suction   Level of Consciousness (AVPU) alert   Respiratory Effort Normal;Unlabored   PRE-TX-O2   Device (Oxygen Therapy) room air   SpO2 (!) 93 %   Pulse Oximetry Type Continuous   $ Pulse Oximetry - Multiple Charge Pulse Oximetry - Multiple   Pulse 76   Resp 17   Education   $ Education Other (see comment);15 min  (Patient on room air)

## 2024-03-03 NOTE — PROGRESS NOTES
[facility] Medicine  Progress Note    Patient Name: Jerome Amaro Jr.  MRN: [id number]  Patient Class: IP- Inpatient   Admission Date: 2/29/2024  Length of Stay: 3 days  Attending Physician: Lacho Ordaz MD  Primary Care Provider: Oscar Madrid NP        Subjective:     Principal Problem:Chest pain        HPI:  78 year old pt getting admitted with chest pain and acute CHF  Pt had Coronary angiogram on December 2023 and medical management was advised(1 functioning graft of CABG was found)  He takes lasix at home on PRN basis  Today AM pt started having chest pain mostly on central part of chest/pain radiated to back/ pain worse on exertion  Also c/o shortness of breath especially on exertion  He came to ER and got admitted  Since he had Angiogram done on December 2023 this is pts 4 th IP admission to hospital with same c/o ie:Chest pain   Pt is on Ranexa which will be put on Hold today because of prolonged QTC        Overview/Hospital Course:  Patient admitted again with chest pain and acute CHF. Ranexa on hold for prolong QT. Currently on heparin infusion. No further options available for patient. This was d/w patient by cardiology. Ok to transfer to stepdown unit on heparin infusion. NTG infusion off. Chest pain only when he ambulates, not at rest. Patient remained chest pain free. Completed 48 hrs of heparin infusion. Per cardiology, they would like to continue to observe him today and discuss with Dr. Xiao tomorrow.     Interval History: Patient is chest pain free and denies any new complains. No bloody Bms or any other signs of bleeding.     Review of Systems  Objective:     Vital Signs (Most Recent):  Temp: 98 °F (36.7 °C) (03/03/24 0715)  Pulse: 71 (03/03/24 1000)  Resp: (!) 27 (03/03/24 1000)  BP: 120/69 (03/03/24 1000)  SpO2: 95 % (03/03/24 1000) Vital Signs (24h Range):  Temp:  [97.8 °F (36.6 °C)-98.6 °F (37 °C)] 98 °F (36.7 °C)  Pulse:  [69-84] 71  Resp:  [10-38]  27  SpO2:  [86 %-100 %] 95 %  BP: (102-157)/(58-76) 120/69     Weight: 76.3 kg (168 lb 3.4 oz)  Body mass index is 27.15 kg/m².    Intake/Output Summary (Last 24 hours) at 3/3/2024 1114  Last data filed at 3/3/2024 0844  Gross per 24 hour   Intake 785.6 ml   Output 1200 ml   Net -414.4 ml         Physical Exam  Vitals and nursing note reviewed.   Constitutional:       General: He is not in acute distress.     Appearance: He is not toxic-appearing.   HENT:      Head: Atraumatic.      Mouth/Throat:      Mouth: Mucous membranes are moist.      Pharynx: Oropharynx is clear.   Eyes:      General: No scleral icterus.     Conjunctiva/sclera: Conjunctivae normal.      Pupils: Pupils are equal, round, and reactive to light.   Cardiovascular:      Rate and Rhythm: Normal rate and regular rhythm.      Heart sounds: No murmur heard.  Pulmonary:      Effort: No respiratory distress.      Breath sounds: No wheezing, rhonchi or rales.   Abdominal:      General: Abdomen is flat. Bowel sounds are normal.      Palpations: Abdomen is soft.   Musculoskeletal:         General: No swelling or deformity.      Cervical back: No rigidity or tenderness.   Skin:     Coloration: Skin is not jaundiced or pale.      Findings: No bruising, erythema or rash.   Neurological:      General: No focal deficit present.      Mental Status: He is alert and oriented to person, place, and time.      Cranial Nerves: No cranial nerve deficit.      Sensory: No sensory deficit.      Motor: No weakness.   Psychiatric:         Mood and Affect: Mood normal.         Behavior: Behavior normal.         Thought Content: Thought content normal.         Judgment: Judgment normal.             Significant Labs: All pertinent labs within the past 24 hours have been reviewed.  CBC:   Recent Labs   Lab 03/02/24 0411 03/03/24  0252   WBC 7.33  7.33 7.73   HGB 9.7*  9.7* 11.6*   HCT 29.0*  29.0* 35.2*     188 201     CMP:   Recent Labs   Lab 03/02/24 0411  "03/02/24  0515 03/02/24  1222 03/03/24  0253    136 134* 134*   K 2.9* 3.6 4.5 3.9    98 95 97   CO2 26 30* 33* 30*   GLU 87 98 107 106   BUN 23 26* 24* 23   CREATININE 0.9 1.1 1.1 1.1   CALCIUM 6.7* 8.2* 9.1 8.6*   PROT 4.8*  --   --  6.3   ALBUMIN 2.7*  --   --  3.6   BILITOT 0.4  --   --  0.5   ALKPHOS 71  --   --  92   AST 14  --   --  15   ALT 6*  --   --  8*   ANIONGAP 6* 8 6* 7*       Significant Imaging: I have reviewed all pertinent imaging results/findings within the past 24 hours.    Assessment/Plan:      * Chest pain  NSTEMI  CAD with H/o CABG with recent angiogram on December 2023  This is his 4 th IP admission, after pt had Coronary angiogram in December 2023, with chest pain  Hold Ranexa  Maintain iv morphine  Euvolemic now, off IV lasix  Consulted pts Cardiology team, awaiting further recommendations    Completed heparin drip   - Off nitro drip, resumed Imdur   - Plavix to be started today   - Per cardiology "he has very limited options available. His heart is being supplied by a single SVG. LAD is occluded at the origin, Lcx occluded in proximal stent, and RCA occluded at the ostium. There is very minimal collateral flow from the Lcx to the distal RCA vessels.  He is basically maintaining on a single SVG. Any intervention could result in loss of his single SVG. Would like Dr. Willoughby to evaluate the patient. If no options are ultimately available, as determined by Dr. Willoughby and the patient, then the best course of action, given multiple recent admissions,  then we may need to consider more comfort measures (hospice) given multipple admissions for unstable angina, and 2 admissions for nstemi in the past 4 months"      Acute on chronic heart failure with preserved ejection fraction (HFpEF)  Patient is identified as having Combined Systolic and Diastolic heart failure that is Acute on chronic. CHF is currently controlled. Latest ECHO performed and demonstrates- Results for orders placed " during the hospital encounter of 01/24/24    Echo Saline Bubble? No    Interpretation Summary    Left Ventricle: There is mild concentric hypertrophy. There is mildly reduced systolic function with a visually estimated ejection fraction of 40 - 50%. Grade II diastolic dysfunction. Elevated left ventricular filling pressure.    Left Atrium: Left atrium is moderately dilated.    Aortic Valve: There is a transcatheter valve replacement in the aortic position.    Mitral Valve: Moderately calcified leaflets. There is mild to moderate regurgitation.  No significant mitral stenosis    Tricuspid Valve: There is mild to moderate regurgitation.  Pulmonary artery systolic pressure approximately 30 mmHg  . Continue Beta Blocker and Furosemide and monitor clinical status closely. Monitor on telemetry. Patient is off CHF pathway.  Monitor strict Is&Os and daily weights.  Place on fluid restriction of 1.5 L. Cardiology has been consulted. Continue to stress to patient importance of self efficacy and  on diet for CHF. Last BNP reviewed- and noted below   Recent Labs   Lab 02/29/24  0750   BNP 1,720*       Currently euvolemic, off IV lasix. Monitor volume status     Frequent hospital admissions  Aware       Allergy to statin medication  Aware       Atrial fibrillation  - cont metoprolol, rate controlled   - Since Hb is stable, will start Eliquis today     Seizure disorder  - resume home meds       Pacemaker  Aware       S/P TAVR (transcatheter aortic valve replacement)  Aware         VTE Risk Mitigation (From admission, onward)           Ordered     apixaban tablet 5 mg  2 times daily         03/03/24 1119     IP VTE HIGH RISK PATIENT  Once         02/29/24 1038     Place sequential compression device  Until discontinued         02/29/24 1038                    Discharge Planning   AXEL: 3/4/2024     Code Status: Full Code   Is the patient medically ready for discharge?:     Reason for patient still in hospital (select all  that apply): Consult recommendations  Discharge Plan A: Home              Lacho Ordaz MD  Department of Hospital Medicine   [facility]

## 2024-03-03 NOTE — ASSESSMENT & PLAN NOTE
Patient is identified as having Combined Systolic and Diastolic heart failure that is Acute on chronic. CHF is currently controlled. Latest ECHO performed and demonstrates- Results for orders placed during the hospital encounter of 01/24/24    Echo Saline Bubble? No    Interpretation Summary    Left Ventricle: There is mild concentric hypertrophy. There is mildly reduced systolic function with a visually estimated ejection fraction of 40 - 50%. Grade II diastolic dysfunction. Elevated left ventricular filling pressure.    Left Atrium: Left atrium is moderately dilated.    Aortic Valve: There is a transcatheter valve replacement in the aortic position.    Mitral Valve: Moderately calcified leaflets. There is mild to moderate regurgitation.  No significant mitral stenosis    Tricuspid Valve: There is mild to moderate regurgitation.  Pulmonary artery systolic pressure approximately 30 mmHg  . Continue Beta Blocker and Furosemide and monitor clinical status closely. Monitor on telemetry. Patient is off CHF pathway.  Monitor strict Is&Os and daily weights.  Place on fluid restriction of 1.5 L. Cardiology has been consulted. Continue to stress to patient importance of self efficacy and  on diet for CHF. Last BNP reviewed- and noted below   Recent Labs   Lab 02/29/24  0750   BNP 1,720*       Currently euvolemic, off IV lasix. Monitor volume status

## 2024-03-03 NOTE — PLAN OF CARE
Problem: Adult Inpatient Plan of Care  Goal: Optimal Comfort and Wellbeing  Outcome: Ongoing, Progressing  Goal: Readiness for Transition of Care  Outcome: Ongoing, Progressing     Problem: Fall Injury Risk  Goal: Absence of Fall and Fall-Related Injury  Outcome: Ongoing, Progressing     Problem: Skin Injury Risk Increased  Goal: Skin Health and Integrity  Outcome: Ongoing, Progressing     Problem: Dysrhythmia (Heart Failure)  Goal: Stable Heart Rate and Rhythm  Outcome: Ongoing, Progressing     Problem: Respiratory Compromise (Heart Failure)  Goal: Effective Oxygenation and Ventilation  Outcome: Ongoing, Progressing   Pt still wants to go home. Understands the need to make sure Dr Deal doesn't want any further treatment or heart cath. Pt's daughter and son in law came by for a couple of hours. Pt ambulated to restroom with standby assist. Gets a little SOB at times when moving around, but is able to recover his O2 levels at rest. Denies chest pains. Has transfer orders but no bed available.

## 2024-03-04 VITALS
WEIGHT: 168.19 LBS | DIASTOLIC BLOOD PRESSURE: 73 MMHG | OXYGEN SATURATION: 88 % | SYSTOLIC BLOOD PRESSURE: 145 MMHG | RESPIRATION RATE: 23 BRPM | HEART RATE: 79 BPM | HEIGHT: 66 IN | BODY MASS INDEX: 27.03 KG/M2 | TEMPERATURE: 98 F

## 2024-03-04 LAB
ALBUMIN SERPL BCP-MCNC: 3.5 G/DL (ref 3.5–5.2)
ALP SERPL-CCNC: 92 U/L (ref 55–135)
ALT SERPL W/O P-5'-P-CCNC: 9 U/L (ref 10–44)
ANION GAP SERPL CALC-SCNC: 7 MMOL/L (ref 8–16)
AST SERPL-CCNC: 18 U/L (ref 10–40)
BASOPHILS # BLD AUTO: 0.07 K/UL (ref 0–0.2)
BASOPHILS NFR BLD: 0.9 % (ref 0–1.9)
BILIRUB SERPL-MCNC: 0.4 MG/DL (ref 0.1–1)
BUN SERPL-MCNC: 21 MG/DL (ref 8–23)
CALCIUM SERPL-MCNC: 8.4 MG/DL (ref 8.7–10.5)
CHLORIDE SERPL-SCNC: 99 MMOL/L (ref 95–110)
CHOLEST SERPL-MCNC: 217 MG/DL (ref 120–199)
CHOLEST/HDLC SERPL: 5.2 {RATIO} (ref 2–5)
CO2 SERPL-SCNC: 27 MMOL/L (ref 23–29)
CREAT SERPL-MCNC: 1.1 MG/DL (ref 0.5–1.4)
DIFFERENTIAL METHOD BLD: ABNORMAL
EOSINOPHIL # BLD AUTO: 0.4 K/UL (ref 0–0.5)
EOSINOPHIL NFR BLD: 4.6 % (ref 0–8)
ERYTHROCYTE [DISTWIDTH] IN BLOOD BY AUTOMATED COUNT: 14 % (ref 11.5–14.5)
EST. GFR  (NO RACE VARIABLE): >60 ML/MIN/1.73 M^2
GLUCOSE SERPL-MCNC: 96 MG/DL (ref 70–110)
HCT VFR BLD AUTO: 33.8 % (ref 40–54)
HDLC SERPL-MCNC: 42 MG/DL (ref 40–75)
HDLC SERPL: 19.4 % (ref 20–50)
HGB BLD-MCNC: 11.2 G/DL (ref 14–18)
IMM GRANULOCYTES # BLD AUTO: 0.02 K/UL (ref 0–0.04)
IMM GRANULOCYTES NFR BLD AUTO: 0.3 % (ref 0–0.5)
LDLC SERPL CALC-MCNC: 143 MG/DL (ref 63–159)
LYMPHOCYTES # BLD AUTO: 2 K/UL (ref 1–4.8)
LYMPHOCYTES NFR BLD: 26.7 % (ref 18–48)
MAGNESIUM SERPL-MCNC: 2 MG/DL (ref 1.6–2.6)
MCH RBC QN AUTO: 29.2 PG (ref 27–31)
MCHC RBC AUTO-ENTMCNC: 33.1 G/DL (ref 32–36)
MCV RBC AUTO: 88 FL (ref 82–98)
MONOCYTES # BLD AUTO: 1 K/UL (ref 0.3–1)
MONOCYTES NFR BLD: 12.8 % (ref 4–15)
NEUTROPHILS # BLD AUTO: 4.2 K/UL (ref 1.8–7.7)
NEUTROPHILS NFR BLD: 54.7 % (ref 38–73)
NONHDLC SERPL-MCNC: 175 MG/DL
NRBC BLD-RTO: 0 /100 WBC
PLATELET # BLD AUTO: 192 K/UL (ref 150–450)
PMV BLD AUTO: 10.7 FL (ref 9.2–12.9)
POTASSIUM SERPL-SCNC: 4.1 MMOL/L (ref 3.5–5.1)
PROT SERPL-MCNC: 6.2 G/DL (ref 6–8.4)
RBC # BLD AUTO: 3.84 M/UL (ref 4.6–6.2)
SODIUM SERPL-SCNC: 133 MMOL/L (ref 136–145)
TRIGL SERPL-MCNC: 160 MG/DL (ref 30–150)
WBC # BLD AUTO: 7.64 K/UL (ref 3.9–12.7)

## 2024-03-04 PROCEDURE — 80053 COMPREHEN METABOLIC PANEL: CPT | Performed by: INTERNAL MEDICINE

## 2024-03-04 PROCEDURE — 80061 LIPID PANEL: CPT | Performed by: INTERNAL MEDICINE

## 2024-03-04 PROCEDURE — 85025 COMPLETE CBC W/AUTO DIFF WBC: CPT | Performed by: INTERNAL MEDICINE

## 2024-03-04 PROCEDURE — 36415 COLL VENOUS BLD VENIPUNCTURE: CPT | Performed by: INTERNAL MEDICINE

## 2024-03-04 PROCEDURE — 25000003 PHARM REV CODE 250: Performed by: INTERNAL MEDICINE

## 2024-03-04 PROCEDURE — 94761 N-INVAS EAR/PLS OXIMETRY MLT: CPT

## 2024-03-04 PROCEDURE — 83735 ASSAY OF MAGNESIUM: CPT | Performed by: INTERNAL MEDICINE

## 2024-03-04 PROCEDURE — 25000003 PHARM REV CODE 250

## 2024-03-04 PROCEDURE — 25000003 PHARM REV CODE 250: Performed by: HOSPITALIST

## 2024-03-04 RX ADMIN — CITALOPRAM HYDROBROMIDE 20 MG: 20 TABLET ORAL at 08:03

## 2024-03-04 RX ADMIN — PHENYTOIN SODIUM 400 MG: 100 CAPSULE ORAL at 08:03

## 2024-03-04 RX ADMIN — HYDROCODONE BITARTRATE AND ACETAMINOPHEN 1 TABLET: 5; 325 TABLET ORAL at 02:03

## 2024-03-04 RX ADMIN — METOPROLOL SUCCINATE 100 MG: 25 TABLET, EXTENDED RELEASE ORAL at 08:03

## 2024-03-04 RX ADMIN — SACUBITRIL AND VALSARTAN 1 TABLET: 24; 26 TABLET, FILM COATED ORAL at 08:03

## 2024-03-04 RX ADMIN — CLOPIDOGREL BISULFATE 75 MG: 75 TABLET, FILM COATED ORAL at 08:03

## 2024-03-04 RX ADMIN — PANTOPRAZOLE SODIUM 20 MG: 20 TABLET, DELAYED RELEASE ORAL at 08:03

## 2024-03-04 RX ADMIN — ISOSORBIDE MONONITRATE 60 MG: 60 TABLET, EXTENDED RELEASE ORAL at 08:03

## 2024-03-04 RX ADMIN — MUPIROCIN 1 G: 20 OINTMENT TOPICAL at 08:03

## 2024-03-04 RX ADMIN — APIXABAN 5 MG: 5 TABLET, FILM COATED ORAL at 08:03

## 2024-03-04 NOTE — PROGRESS NOTES
[facility]   Cardiology Note    Consult Requested By: GAIL  Reason for Consult: Chest pain     SUBJECTIVE:     History of Present Illness: The patient is an 78 y.o. male with ah/o CAD, s/p CABG with redo, chronic  angina, chronic systolic heart failure, , s/p TAVR, pAF, hyperlipidemia, heart block, s/p dcPPM, PVD, seizure disorder, HTN, and hypothyroidism that presented with recurrent chest pain.  He had worsening chest pain yesterday midsternal , non-exertional , radiating to back . CP became worse with exertion. He presented to the ED . Pain slightly resolved with SL nitro. No associated n/v or diaphoresis. EKG Asensed Vpaced 109 n  trop elevated peaked at 2543. BNP 1700. The patient was started on heparin and nitro infusion, Iv lasix. Recent EGD per .     This am the pt is lying in bed with no c/o chest pain. He is not swollen and is laying flat with no JVD. No Le edema noted . H/H 12/38 cr 1.2     3/2: No further chest pain. Remains SOB. No palpitations, syncope, presyncope. He is off nitro drip and has completed 48 hours of heparin. Of note, his hemoglobin has decreased significantly since admission. (12.9->9.7) BUN 25->33, Cr 1.1    3/3: No further chest pain. He states he is ready to be discharged. No further SOB. No palpitations, syncope, bleeding, dark tarry stools, hemoptysis, or hematuria. No orthopnea. Intermittent tachypnea noted on vital signs check. Hgb improved overnight. V pacing on telemetry. Eliquis was restarted by primary team.       Past Medical History:   Diagnosis Date    Anticoagulant long-term use     2014    Aortic valve disease 2014    pig valve    Arthritis     all over    Atrial fibrillation 2/29/2024    Chest pain 07/15/2019    CHF (congestive heart failure)     2014 on meds    Coronary artery disease     2007 cabg    Difficulty swallowing     Heart attack 1990    15 stents    Heart attack 02/01/2024    mild    Heart block     Hyperlipidemia      Hypertension     on meds    Hypothyroidism 2015    on meds    PVD (peripheral vascular disease)     Seizures     last episode 1990 on meds     Past Surgical History:   Procedure Laterality Date    AORTOGRAPHY WITH SERIALOGRAPHY N/A 11/16/2021    Procedure: AORTOGRAM, WITH RUNOFF;  Surgeon: Rodrigo Willoughby MD;  Location: [facility] CATH/EP LAB;  Service: Cardiology;  Laterality: N/A;    ARTERIOGRAPHY OF AORTIC ROOT N/A 11/05/2019    Procedure: ARTERIOGRAM, AORTIC ROOT;  Surgeon: Rodrigo Willoughby MD;  Location: [facility] CATH/EP LAB;  Service: Cardiology;  Laterality: N/A;    CARDIAC CATHETERIZATION      CARDIAC VALVE SURGERY      CORONARY ANGIOGRAPHY INCLUDING BYPASS GRAFTS WITH CATHETERIZATION OF LEFT HEART N/A 11/05/2019    Procedure: ANGIOGRAM, CORONARY, INCLUDING BYPASS GRAFT, WITH LEFT HEART CATHETERIZATION;  Surgeon: Rodrigo Willoughby MD;  Location: [facility] CATH/EP LAB;  Service: Cardiology;  Laterality: N/A;    CORONARY ANGIOGRAPHY INCLUDING BYPASS GRAFTS WITH CATHETERIZATION OF LEFT HEART Left 11/03/2020    Procedure: ANGIOGRAM, CORONARY, INCLUDING BYPASS GRAFT, WITH LEFT HEART CATHETERIZATION;  Surgeon: Rodrigo Willoughby MD;  Location: [facility] CATH/EP LAB;  Service: Cardiology;  Laterality: Left;    CORONARY ANGIOGRAPHY INCLUDING BYPASS GRAFTS WITH CATHETERIZATION OF LEFT HEART N/A 09/28/2021    Procedure: ANGIOGRAM, CORONARY, INCLUDING BYPASS GRAFT, WITH LEFT HEART CATHETERIZATION;  Surgeon: Rodrigo Willoughby MD;  Location: [facility] CATH/EP LAB;  Service: Cardiology;  Laterality: N/A;    CORONARY ANGIOGRAPHY INCLUDING BYPASS GRAFTS WITH CATHETERIZATION OF LEFT HEART N/A 12/05/2023    Procedure: ANGIOGRAM, CORONARY, INCLUDING BYPASS GRAFT, WITH LEFT HEART CATHETERIZATION;  Surgeon: Rodrigo Willoughby MD;  Location: [facility] CATH/EP LAB;  Service: Cardiology;  Laterality: N/A;    CORONARY ANGIOPLASTY      CORONARY ARTERY BYPASS GRAFT      x 2    1991 2006    ESOPHAGOGASTRODUODENOSCOPY N/A 2/20/2024    Procedure: EGD (ESOPHAGOGASTRODUODENOSCOPY);   Surgeon: Mal Lockhart III, MD;  Location: [facility] ENDO;  Service: Endoscopy;  Laterality: N/A;    EXTRACORPOREAL SHOCK WAVE LITHOTRIPSY Right 2019    Procedure: LITHOTRIPSY, ESWL;  Surgeon: Williams Ortega MD;  Location: [facility] OR;  Service: Urology;  Laterality: Right;    HEMORRHOID SURGERY      INSERTION OF PACEMAKER      PERCUTANEOUS TRANSLUMINAL BALLOON ANGIOPLASTY OF CORONARY ARTERY N/A 2019    Procedure: Angioplasty-coronary;  Surgeon: Rodrigo Willoughby MD;  Location: [facility] CATH/EP LAB;  Service: Cardiology;  Laterality: N/A;    WRIST FRACTURE SURGERY       Family History   Problem Relation Age of Onset    Heart attack Mother     Heart attack Father     Heart disease Sister     Stroke Sister     Diabetes Sister     No Known Problems Maternal Grandmother     No Known Problems Maternal Grandfather     No Known Problems Paternal Grandmother     No Known Problems Paternal Grandfather     No Known Problems Brother     No Known Problems Maternal Aunt     No Known Problems Maternal Uncle     No Known Problems Paternal Aunt     No Known Problems Paternal Uncle     Anemia Neg Hx     Arrhythmia Neg Hx     Asthma Neg Hx     Clotting disorder Neg Hx     Fainting Neg Hx     Heart failure Neg Hx     Hyperlipidemia Neg Hx     Hypertension Neg Hx     Atrial Septal Defect Neg Hx      Social History     Tobacco Use    Smoking status: Former     Current packs/day: 0.00     Average packs/day: 0.5 packs/day for 4.0 years (2.0 ttl pk-yrs)     Types: Cigarettes     Start date:      Quit date:      Years since quittin.1    Smokeless tobacco: Never   Substance Use Topics    Alcohol use: Yes     Comment: social    Drug use: No       Review of Systems:  Review of Systems   Constitutional:  Negative for chills, fever, malaise/fatigue and weight loss.   Respiratory:  Positive for shortness of breath. Negative for cough, hemoptysis and sputum production.    Cardiovascular:  Positive for chest pain. Negative  for palpitations, orthopnea, claudication and leg swelling.   Genitourinary:  Negative for dysuria, frequency, hematuria and urgency.   Neurological:  Negative for dizziness and headaches.       OBJECTIVE:     Vital Signs (Most Recent)  Temp: 98.2 °F (36.8 °C) (03/03/24 1515)  Pulse: 83 (03/03/24 1531)  Resp: (!) 29 (03/03/24 1531)  BP: (!) 122/59 (03/03/24 1500)  SpO2: (!) 93 % (03/03/24 1531)    Vital Signs Range (Last 24H):  Temp:  [98 °F (36.7 °C)-98.6 °F (37 °C)]   Pulse:  [68-83]   Resp:  [12-35]   BP: (106-135)/(58-70)   SpO2:  [89 %-100 %]     I & O (Last 24H):    Intake/Output Summary (Last 24 hours) at 3/3/2024 2009  Last data filed at 3/3/2024 1759  Gross per 24 hour   Intake 560 ml   Output 725 ml   Net -165 ml         Current Diet:     Current Diet Order   Procedures    Diet Cardiac Fluid - 1500mL; Standard Tray     Order Specific Question:   Fluid restriction:     Answer:   Fluid - 1500mL     Order Specific Question:   Tray type:     Answer:   Standard Tray        Allergies:  Review of patient's allergies indicates:   Allergen Reactions    Atorvastatin Other (See Comments)     Muscle pain    Rosuvastatin Other (See Comments)     Muscle pain       Meds:  Scheduled Meds:   apixaban  5 mg Oral BID    citalopram  20 mg Oral Daily    clopidogreL  75 mg Oral Daily    isosorbide mononitrate  60 mg Oral Daily    metoprolol succinate  100 mg Oral BID    mupirocin   Nasal BID    pantoprazole  20 mg Oral BID    phenytoin  400 mg Oral Daily    sacubitriL-valsartan  1 tablet Oral BID     Continuous Infusions:      PRN Meds:acetaminophen, acetaminophen, ALPRAZolam, aluminum-magnesium hydroxide-simethicone, calcium chloride IVPB, calcium chloride IVPB, calcium chloride IVPB, HYDROcodone-acetaminophen, magnesium oxide, magnesium oxide, magnesium sulfate IVPB, magnesium sulfate IVPB, magnesium sulfate IVPB, magnesium sulfate IVPB, melatonin, morphine, ondansetron, potassium bicarbonate, potassium bicarbonate, potassium  bicarbonate, potassium chloride in water, potassium chloride in water, potassium chloride in water, potassium chloride in water, potassium chloride, potassium chloride, potassium chloride, potassium chloride, potassium, sodium phosphates, potassium, sodium phosphates, potassium, sodium phosphates    Oxygen/Ventilator Data (Last 24H):  (if applicable)            Hemodynamic Parameters (Last 24H):   (if applicable)        Laboratory and Radiology Data:  Recent Results (from the past 24 hour(s))   CBC with Automated Differential    Collection Time: 03/03/24  2:52 AM   Result Value Ref Range    WBC 7.73 3.90 - 12.70 K/uL    RBC 4.03 (L) 4.60 - 6.20 M/uL    Hemoglobin 11.6 (L) 14.0 - 18.0 g/dL    Hematocrit 35.2 (L) 40.0 - 54.0 %    MCV 87 82 - 98 fL    MCH 28.8 27.0 - 31.0 pg    MCHC 33.0 32.0 - 36.0 g/dL    RDW 14.1 11.5 - 14.5 %    Platelets 201 150 - 450 K/uL    MPV 10.4 9.2 - 12.9 fL    Immature Granulocytes 0.3 0.0 - 0.5 %    Gran # (ANC) 4.4 1.8 - 7.7 K/uL    Immature Grans (Abs) 0.02 0.00 - 0.04 K/uL    Lymph # 1.9 1.0 - 4.8 K/uL    Mono # 1.0 0.3 - 1.0 K/uL    Eos # 0.3 0.0 - 0.5 K/uL    Baso # 0.05 0.00 - 0.20 K/uL    nRBC 0 0 /100 WBC    Gran % 57.3 38.0 - 73.0 %    Lymph % 24.3 18.0 - 48.0 %    Mono % 13.2 4.0 - 15.0 %    Eosinophil % 4.3 0.0 - 8.0 %    Basophil % 0.6 0.0 - 1.9 %    Differential Method Automated    Comprehensive Metabolic Panel (CMP)    Collection Time: 03/03/24  2:53 AM   Result Value Ref Range    Sodium 134 (L) 136 - 145 mmol/L    Potassium 3.9 3.5 - 5.1 mmol/L    Chloride 97 95 - 110 mmol/L    CO2 30 (H) 23 - 29 mmol/L    Glucose 106 70 - 110 mg/dL    BUN 23 8 - 23 mg/dL    Creatinine 1.1 0.5 - 1.4 mg/dL    Calcium 8.6 (L) 8.7 - 10.5 mg/dL    Total Protein 6.3 6.0 - 8.4 g/dL    Albumin 3.6 3.5 - 5.2 g/dL    Total Bilirubin 0.5 0.1 - 1.0 mg/dL    Alkaline Phosphatase 92 55 - 135 U/L    AST 15 10 - 40 U/L    ALT 8 (L) 10 - 44 U/L    eGFR >60.0 >60 mL/min/1.73 m^2    Anion Gap 7 (L) 8 - 16  mmol/L   Magnesium    Collection Time: 03/03/24  2:53 AM   Result Value Ref Range    Magnesium 2.3 1.6 - 2.6 mg/dL   EKG 12-lead    Collection Time: 03/03/24 11:09 AM   Result Value Ref Range    QRS Duration 188 ms    [facility] QTC Calculation 535 ms   Occult blood x 1, stool    Collection Time: 03/03/24  6:22 PM    Specimen: Stool   Result Value Ref Range    Occult Blood Negative Negative     Imaging Results              X-Ray Chest AP Portable (Final result)  Result time 02/29/24 08:10:25   Procedure changed from X-Ray Chest PA And Lateral     Final result by Sergio Steinberg Jr., MD (02/29/24 08:10:25)                   Narrative:    CLINICAL HISTORY:  78 years (1945) Male Chest Pain    TECHNIQUE:  XR CHEST 1 VIEW. 1 images obtained.    COMPARISON:  2/9/2024    FINDINGS:    Dual lead pacemaker device projects in the left upper chest with the position in the right atrium and right ventricle in optimal location. Heart is prominent size exacerbated by the diminished inspiratory effort. Mild central vascular congestion changes are noted. There is evidence of a small right infrahilar infiltrate. There are small effusions obscuring the bilateral costophrenic sulci. There are postoperative changes of previous coronary artery bypass surgery. No evidence of pneumothorax.    IMPRESSION: Stable appearance the chest when compared to previous evidence of mild central vascular congestion changes without evidence of any definable change upon comparison.    Electronically signed by:  Sergio Steinberg MD  02/29/2024 08:10 AM CST Workstation: [id number]                                    12-lead EKG interpretation:  (if applicable)      Current Cardiac Rhythm:   (if applicable)    Physical Exam:   Physical Exam  Constitutional:       Appearance: He is not ill-appearing.   Cardiovascular:      Rate and Rhythm: Normal rate and regular rhythm.      Heart sounds: Murmur heard.   Pulmonary:      Effort: Pulmonary effort is normal.       Breath sounds: Normal breath sounds.   Abdominal:      General: Abdomen is flat. There is no distension.      Palpations: Abdomen is soft.   Musculoskeletal:         General: No swelling.   Skin:     General: Skin is warm and dry.   Neurological:      General: No focal deficit present.      Mental Status: He is alert and oriented to person, place, and time.   Psychiatric:         Mood and Affect: Mood normal.         ASSESSMENT/PLAN:   Assessment:   NSTEMI  CAD, s/p cabg with redo  S/p TAVR  pAF on NOAC  PVD  DcPPM  Chronic systolic heart failure  Hyperlipidemia  Hypothyroidism  HTN    AV groove of circ Single vessel open grafts SVG to LAD, obtuse of the circ, and distal RCA occlusion of LAD and RCA--1 patent graft     Plan:   -Troponin peaked at 2500 , trending down   -He has very limited options available. His heart is being supplied by a single SVG. LAD is occluded at the origin, Lcx occluded in proximal stent, and RCA occluded at the ostium. There is very minimal collateral flow from the Lcx to the distal RCA vessels.   -Will continue medical management.  Off nitro drip, restarted imdur  BNP elevated  CXR reviewed.  - Euvolemic on exam. Still with intermittent tachypnea. Patient appears frail on exam.   - continue BB    Will restart Entresto. Should he become hypotensive, we will need to switch to alternative ARB.   - monitor on telemetry  - known intolerant to statin therapy  - monitor vs.  - repeat limited echo pending  - Had a long discussion with the patient that options are very limited. He is basically maintaining on a single SVG. Any intervention could result in loss of his single SVG.   - hemoglobin improved overnight. No reported bloeeding. Plavix restarted as well as Eliquis.   - would like Dr. Willoughby to evaluate the patient. If no options are ultimately available, as determined by Dr. Willoughby and the patient, then the best course of action, given multiple recent admissions. We will need to consider  alternative treatment options.       Jonathan Bey Jr

## 2024-03-04 NOTE — PLAN OF CARE
Clear for dc   03/04/24 1158   Final Note   Assessment Type Final Discharge Note   Anticipated Discharge Disposition Home   What phone number can be called within the next 1-3 days to see how you are doing after discharge? [phone]   Hospital Resources/Appts/Education Provided Provided patient/caregiver with written discharge plan information;Patient refused appointment set-up   Post-Acute Status   Coverage Payor: [employer] MANAGED MEDICARE - [employer] MEDICARE HMO -   Discharge Delays None known at this time

## 2024-03-04 NOTE — PLAN OF CARE
03/04/24 0721   Patient Assessment/Suction   Level of Consciousness (AVPU) alert   Respiratory Effort Unlabored   PRE-TX-O2   Device (Oxygen Therapy) room air   SpO2 97 %   Pulse Oximetry Type Continuous   $ Pulse Oximetry - Multiple Charge Pulse Oximetry - Multiple   Pulse 67   Resp 20

## 2024-03-04 NOTE — ASSESSMENT & PLAN NOTE
Patient is identified as having Combined Systolic and Diastolic heart failure that is Acute on chronic. CHF is currently controlled. Latest ECHO performed and demonstrates- Results for orders placed during the hospital encounter of 01/24/24    Echo Saline Bubble? No    Interpretation Summary    Left Ventricle: There is mild concentric hypertrophy. There is mildly reduced systolic function with a visually estimated ejection fraction of 40 - 50%. Grade II diastolic dysfunction. Elevated left ventricular filling pressure.    Left Atrium: Left atrium is moderately dilated.    Aortic Valve: There is a transcatheter valve replacement in the aortic position.    Mitral Valve: Moderately calcified leaflets. There is mild to moderate regurgitation.  No significant mitral stenosis    Tricuspid Valve: There is mild to moderate regurgitation.  Pulmonary artery systolic pressure approximately 30 mmHg  . Continue Beta Blocker and Furosemide and monitor clinical status closely. Monitor on telemetry. Patient is off CHF pathway.  Monitor strict Is&Os and daily weights.  Place on fluid restriction of 1.5 L. Cardiology has been consulted. Continue to stress to patient importance of self efficacy and  on diet for CHF. Last BNP reviewed- and noted below   Recent Labs   Lab 02/29/24  0750   BNP 1,720*       Currently euvolemic, off IV lasix. Monitor volume status   Resume PRN Lasix on discharge

## 2024-03-04 NOTE — DISCHARGE SUMMARY
[facility]  Discharge Summary      Patient Name: Jerome Amaro Jr.  MRN: [id number]  HANSEL: [id number]  Patient Class: IP- Inpatient  Admission Date: 2/29/2024  Hospital Length of Stay: 4 days  Discharge Date and Time:  03/04/2024 10:42 AM  Attending Physician: Lacho Ordaz MD   Discharging Provider: Lacho Ordaz MD  Primary Care Provider: Oscar Madrid NP    Primary Care Team: Networked reference to record PCT     HPI:   78 year old pt getting admitted with chest pain and acute CHF  Pt had Coronary angiogram on December 2023 and medical management was advised(1 functioning graft of CABG was found)  He takes lasix at home on PRN basis  Today AM pt started having chest pain mostly on central part of chest/pain radiated to back/ pain worse on exertion  Also c/o shortness of breath especially on exertion  He came to ER and got admitted  Since he had Angiogram done on December 2023 this is pts 4 th IP admission to hospital with same c/o ie:Chest pain   Pt is on Ranexa which will be put on Hold today because of prolonged QTC        * No surgery found *      Hospital Course:   Patient admitted again with chest pain and acute CHF. Ranexa on hold for prolong QT. Currently on heparin infusion. No further options available for patient. This was d/w patient by cardiology. Ok to transfer to stepdown unit on heparin infusion. NTG infusion off. Chest pain only when he ambulates, not at rest. Patient remained chest pain free. Completed 48 hrs of heparin infusion. Per cardiology, they would like to continue to observe him today and discuss with Dr. Xiao, Seen by Cardiology this morning, cleared to discharge with two weeks follow up. QTC improved but still high. Holding Ranexa.      Goals of Care Treatment Preferences:  Code Status: Full Code    Physical Exam  Vitals and nursing note reviewed.   Constitutional:       General: He is not in acute distress.     Appearance: He  is not toxic-appearing.   HENT:      Head: Atraumatic.      Mouth/Throat:      Mouth: Mucous membranes are moist.      Pharynx: Oropharynx is clear.   Eyes:      General: No scleral icterus.     Conjunctiva/sclera: Conjunctivae normal.      Pupils: Pupils are equal, round, and reactive to light.   Cardiovascular:      Rate and Rhythm: Normal rate and regular rhythm.      Heart sounds: No murmur heard.  Pulmonary:      Effort: No respiratory distress.      Breath sounds: No wheezing, rhonchi or rales.   Abdominal:      General: Abdomen is flat. Bowel sounds are normal.      Palpations: Abdomen is soft.   Musculoskeletal:         General: No swelling or deformity.      Cervical back: No rigidity or tenderness.   Skin:     Coloration: Skin is not jaundiced or pale.      Findings: No bruising, erythema or rash.   Neurological:      General: No focal deficit present.      Mental Status: He is alert and oriented to person, place, and time.      Cranial Nerves: No cranial nerve deficit.      Sensory: No sensory deficit.      Motor: No weakness.   Psychiatric:         Mood and Affect: Mood normal.         Behavior: Behavior normal.         Thought Content: Thought content normal.         Judgment: Judgment normal.    Consults:   Consults (From admission, onward)          Status Ordering Provider     Inpatient consult to Registered Dietitian/Nutritionist  Once        Provider:  (Not yet assigned)    KINGS Boss     Inpatient consult to Cardiology  Once        Provider:  Jeffy Louie MD Completed JOSE, ALAN            Cardiac/Vascular  * Chest pain  NSTEMI  CAD with H/o CABG with recent angiogram on December 2023  This is his 4 th IP admission, after pt had Coronary angiogram in December 2023, with chest pain  Hold Ranexa  Maintain iv morphine  Euvolemic now, off IV lasix  Consulted pts Cardiology team, awaiting further recommendations    Completed heparin drip   - Off nitro drip, resumed Imdur   - Plavix  "resumed  - Per cardiology "he has very limited options available. His heart is being supplied by a single SVG. LAD is occluded at the origin, Lcx occluded in proximal stent, and RCA occluded at the ostium. There is very minimal collateral flow from the Lcx to the distal RCA vessels.  He is basically maintaining on a single SVG. Any intervention could result in loss of his single SVG. Would like Dr. Willoughby to evaluate the patient. If no options are ultimately available, as determined by Dr. Willoughby and the patient, then the best course of action, given multiple recent admissions,  then we may need to consider more comfort measures (hospice) given multipple admissions for unstable angina, and 2 admissions for nstemi in the past 4 months"  - Cardiology seen this morning, cleared for discharge       Atrial fibrillation  - cont metoprolol, rate controlled   - Since Hb is stable, resume Eliquis     Acute on chronic heart failure with preserved ejection fraction (HFpEF)  Patient is identified as having Combined Systolic and Diastolic heart failure that is Acute on chronic. CHF is currently controlled. Latest ECHO performed and demonstrates- Results for orders placed during the hospital encounter of 01/24/24    Echo Saline Bubble? No    Interpretation Summary    Left Ventricle: There is mild concentric hypertrophy. There is mildly reduced systolic function with a visually estimated ejection fraction of 40 - 50%. Grade II diastolic dysfunction. Elevated left ventricular filling pressure.    Left Atrium: Left atrium is moderately dilated.    Aortic Valve: There is a transcatheter valve replacement in the aortic position.    Mitral Valve: Moderately calcified leaflets. There is mild to moderate regurgitation.  No significant mitral stenosis    Tricuspid Valve: There is mild to moderate regurgitation.  Pulmonary artery systolic pressure approximately 30 mmHg  . Continue Beta Blocker and Furosemide and monitor clinical status " closely. Monitor on telemetry. Patient is off CHF pathway.  Monitor strict Is&Os and daily weights.  Place on fluid restriction of 1.5 L. Cardiology has been consulted. Continue to stress to patient importance of self efficacy and  on diet for CHF. Last BNP reviewed- and noted below   Recent Labs   Lab 02/29/24  0750   BNP 1,720*       Currently euvolemic, off IV lasix. Monitor volume status   Resume PRN Lasix on discharge       Final Active Diagnoses:    Diagnosis Date Noted POA    PRINCIPAL PROBLEM:  Chest pain [R07.9] 12/07/2023 Yes    Acute on chronic heart failure with preserved ejection fraction (HFpEF) [I50.33] 01/07/2015 Yes    Atrial fibrillation [I48.91] 02/29/2024 Yes    Allergy to statin medication [Z88.8] 02/29/2024 Not Applicable    Frequent hospital admissions [Z78.9] 02/29/2024 Yes    Seizure disorder [G40.909] 05/07/2021 Yes    Pacemaker [Z95.0] 04/21/2015 Yes    S/P TAVR (transcatheter aortic valve replacement) [Z95.2] 01/07/2015 Not Applicable      Problems Resolved During this Admission:    Diagnosis Date Noted Date Resolved POA    Acute pulmonary edema [J81.0] 02/29/2024 03/02/2024 Yes    S/P CABG (coronary artery bypass graft) [Z95.1] 04/21/2015 03/02/2024 Not Applicable    CAD (coronary artery disease) [I25.10] 01/07/2015 03/02/2024 Yes       Discharged Condition: good    Disposition: Home or Self Care    Follow Up:   Follow-up Information       Oscar Madrid NP Follow up in 1 week(s).    Specialty: Family Medicine  Contact information:  [address] [place] [address]  [address]  [facility] [address]  [phone]                           Patient Instructions:   No discharge procedures on file.    Significant Diagnostic Studies: Labs: CMP   Recent Labs   Lab 03/02/24  1222 03/03/24  0253 03/04/24  0325   * 134* 133*   K 4.5 3.9 4.1   CL 95 97 99   CO2 33* 30* 27    106 96   BUN 24* 23 21   CREATININE 1.1 1.1 1.1   CALCIUM 9.1 8.6* 8.4*   PROT  --  6.3 6.2   ALBUMIN  --  3.6 3.5    BILITOT  --  0.5 0.4   ALKPHOS  --  92 92   AST  --  15 18   ALT  --  8* 9*   ANIONGAP 6* 7* 7*    and CBC   Recent Labs   Lab 03/03/24  0252 03/04/24  0325   WBC 7.73 7.64   HGB 11.6* 11.2*   HCT 35.2* 33.8*    192       Pending Diagnostic Studies:       None           Medications:  Reconciled Home Medications:      Medication List        CHANGE how you take these medications      metoprolol succinate 100 MG 24 hr tablet  Commonly known as: TOPROL-XL  Take 100 mg by mouth 2 (two) times daily.  What changed: Another medication with the same name was removed. Continue taking this medication, and follow the directions you see here.            CONTINUE taking these medications      bismuth subsalicylate 262 mg Chew  Commonly known as: BISMAREX  Take 1 tablet by mouth 2 (two) times daily.     citalopram 40 MG tablet  Commonly known as: CeleXA  Take 40 mg by mouth once daily. NEW Rx - NOT YET STARTED     clopidogreL 75 mg tablet  Commonly known as: PLAVIX  Take 75 mg by mouth once daily.     CORLANOR 5 mg Tab  Generic drug: ivabradine  Take 1 tablet by mouth 2 (two) times daily. NEW Rx - NOT YET STARTED     ELIQUIS 5 mg Tab  Generic drug: apixaban  Take 5 mg by mouth every morning.     ENTRESTO 24-26 mg per tablet  Generic drug: sacubitriL-valsartan  Take 1 tablet by mouth 2 (two) times daily.     ezetimibe 10 mg tablet  Commonly known as: ZETIA  Take 10 mg by mouth once daily.     famotidine 40 MG tablet  Commonly known as: PEPCID  Take 40 mg by mouth Daily.     furosemide 20 MG tablet  Commonly known as: LASIX  Take 20 mg by mouth daily as needed (Fluid).     hydrOXYzine pamoate 25 MG Cap  Commonly known as: VISTARIL  Take 1 capsule (25 mg total) by mouth daily as needed (anxiety).     isosorbide mononitrate 30 MG 24 hr tablet  Commonly known as: IMDUR  Take 2 tablets (60 mg total) by mouth once daily.     NITROLINGUAL 400 mcg/spray spray  Generic drug: nitroGLYCERIN 0.4 MG/DOSE TL SPRY  Place 1 spray under  the tongue every 5 (five) minutes as needed for Chest pain.     pantoprazole 20 MG tablet  Commonly known as: PROTONIX  Take 20 mg by mouth 2 (two) times daily.     phenytoin 100 MG ER capsule  Commonly known as: DILANTIN  Take 400 mg by mouth once daily.            STOP taking these medications      ALPRAZolam 0.25 MG tablet  Commonly known as: XANAX     doxycycline 100 MG Cap  Commonly known as: VIBRAMYCIN     metroNIDAZOLE 500 MG tablet  Commonly known as: FLAGYL     ranolazine 1,000 mg Tb12  Commonly known as: RANEXA              Indwelling Lines/Drains at time of discharge:   Lines/Drains/Airways       None                   Time spent on the discharge of patient: 40 minutes      Lacho Ordaz MD  Department of Hospital Medicine  [facility]

## 2024-03-04 NOTE — NURSING
Discharge orders given to pt , meds explained to pt and son in law. Taken to car via WC. IV's removed with catheters intact.

## 2024-03-04 NOTE — PROGRESS NOTES
[facility]   Cardiology Note    Consult Requested By: GAIL  Reason for Consult: Chest pain     SUBJECTIVE:     History of Present Illness: The patient is an 78 y.o. male with ah/o CAD, s/p CABG with redo, chronic  angina, chronic systolic heart failure, , s/p TAVR, pAF, hyperlipidemia, heart block, s/p dcPPM, PVD, seizure disorder, HTN, and hypothyroidism that presented with recurrent chest pain.  He had worsening chest pain yesterday midsternal , non-exertional , radiating to back . CP became worse with exertion. He presented to the ED . Pain slightly resolved with SL nitro. No associated n/v or diaphoresis. EKG Asensed Vpaced 109 n  trop elevated peaked at 2543. BNP 1700. The patient was started on heparin and nitro infusion, Iv lasix. Recent EGD per .     This am the pt is lying in bed with no c/o chest pain. He is not swollen and is laying flat with no JVD. No Le edema noted . H/H 12/38 cr 1.2     3/2: No further chest pain. Remains SOB. No palpitations, syncope, presyncope. He is off nitro drip and has completed 48 hours of heparin. Of note, his hemoglobin has decreased significantly since admission. (12.9->9.7) BUN 25->33, Cr 1.1    3/3: No further chest pain. He states he is ready to be discharged. No further SOB. No palpitations, syncope, bleeding, dark tarry stools, hemoptysis, or hematuria. No orthopnea. Intermittent tachypnea noted on vital signs check. Hgb improved overnight. V pacing on telemetry. Eliquis was restarted by primary team.     3/4: Pt is feeling well. Denies chest pain this morning. VSS. Labs reviewed. Anemia stable. Tele reviewed. Plavix and eliquis restarted.       Past Medical History:   Diagnosis Date    Anticoagulant long-term use     2014    Aortic valve disease 2014    pig valve    Arthritis     all over    Atrial fibrillation 2/29/2024    Chest pain 07/15/2019    CHF (congestive heart failure)     2014 on meds    Coronary artery disease     2007 cabg     Difficulty swallowing     Heart attack 1990    15 stents    Heart attack 02/01/2024    mild    Heart block     Hyperlipidemia     Hypertension     on meds    Hypothyroidism 2015    on meds    PVD (peripheral vascular disease)     Seizures     last episode 1990 on meds     Past Surgical History:   Procedure Laterality Date    AORTOGRAPHY WITH SERIALOGRAPHY N/A 11/16/2021    Procedure: AORTOGRAM, WITH RUNOFF;  Surgeon: Rodrigo Willoughby MD;  Location: [facility] CATH/EP LAB;  Service: Cardiology;  Laterality: N/A;    ARTERIOGRAPHY OF AORTIC ROOT N/A 11/05/2019    Procedure: ARTERIOGRAM, AORTIC ROOT;  Surgeon: Rodrigo Willoughby MD;  Location: [facility] CATH/EP LAB;  Service: Cardiology;  Laterality: N/A;    CARDIAC CATHETERIZATION      CARDIAC VALVE SURGERY      CORONARY ANGIOGRAPHY INCLUDING BYPASS GRAFTS WITH CATHETERIZATION OF LEFT HEART N/A 11/05/2019    Procedure: ANGIOGRAM, CORONARY, INCLUDING BYPASS GRAFT, WITH LEFT HEART CATHETERIZATION;  Surgeon: Rodrigo Willoughby MD;  Location: [facility] CATH/EP LAB;  Service: Cardiology;  Laterality: N/A;    CORONARY ANGIOGRAPHY INCLUDING BYPASS GRAFTS WITH CATHETERIZATION OF LEFT HEART Left 11/03/2020    Procedure: ANGIOGRAM, CORONARY, INCLUDING BYPASS GRAFT, WITH LEFT HEART CATHETERIZATION;  Surgeon: Rodrigo Willoughby MD;  Location: [facility] CATH/EP LAB;  Service: Cardiology;  Laterality: Left;    CORONARY ANGIOGRAPHY INCLUDING BYPASS GRAFTS WITH CATHETERIZATION OF LEFT HEART N/A 09/28/2021    Procedure: ANGIOGRAM, CORONARY, INCLUDING BYPASS GRAFT, WITH LEFT HEART CATHETERIZATION;  Surgeon: Rodrigo Willoughby MD;  Location: [facility] CATH/EP LAB;  Service: Cardiology;  Laterality: N/A;    CORONARY ANGIOGRAPHY INCLUDING BYPASS GRAFTS WITH CATHETERIZATION OF LEFT HEART N/A 12/05/2023    Procedure: ANGIOGRAM, CORONARY, INCLUDING BYPASS GRAFT, WITH LEFT HEART CATHETERIZATION;  Surgeon: Rodrigo Willoughby MD;  Location: [facility] CATH/EP LAB;  Service: Cardiology;  Laterality: N/A;    CORONARY ANGIOPLASTY      CORONARY  ARTERY BYPASS GRAFT      x 2    1991    ESOPHAGOGASTRODUODENOSCOPY N/A 2024    Procedure: EGD (ESOPHAGOGASTRODUODENOSCOPY);  Surgeon: Mal Lockhart III, MD;  Location: [facility] ENDO;  Service: Endoscopy;  Laterality: N/A;    EXTRACORPOREAL SHOCK WAVE LITHOTRIPSY Right 2019    Procedure: LITHOTRIPSY, ESWL;  Surgeon: Williams Ortega MD;  Location: [facility] OR;  Service: Urology;  Laterality: Right;    HEMORRHOID SURGERY      INSERTION OF PACEMAKER      PERCUTANEOUS TRANSLUMINAL BALLOON ANGIOPLASTY OF CORONARY ARTERY N/A 2019    Procedure: Angioplasty-coronary;  Surgeon: Rodrigo Willoughby MD;  Location: [facility] CATH/EP LAB;  Service: Cardiology;  Laterality: N/A;    WRIST FRACTURE SURGERY       Family History   Problem Relation Age of Onset    Heart attack Mother     Heart attack Father     Heart disease Sister     Stroke Sister     Diabetes Sister     No Known Problems Maternal Grandmother     No Known Problems Maternal Grandfather     No Known Problems Paternal Grandmother     No Known Problems Paternal Grandfather     No Known Problems Brother     No Known Problems Maternal Aunt     No Known Problems Maternal Uncle     No Known Problems Paternal Aunt     No Known Problems Paternal Uncle     Anemia Neg Hx     Arrhythmia Neg Hx     Asthma Neg Hx     Clotting disorder Neg Hx     Fainting Neg Hx     Heart failure Neg Hx     Hyperlipidemia Neg Hx     Hypertension Neg Hx     Atrial Septal Defect Neg Hx      Social History     Tobacco Use    Smoking status: Former     Current packs/day: 0.00     Average packs/day: 0.5 packs/day for 4.0 years (2.0 ttl pk-yrs)     Types: Cigarettes     Start date:      Quit date:      Years since quittin.1    Smokeless tobacco: Never   Substance Use Topics    Alcohol use: Yes     Comment: social    Drug use: No       Review of Systems:  Review of Systems   Constitutional:  Negative for chills, fever, malaise/fatigue and weight loss.   Respiratory:   Positive for shortness of breath. Negative for cough, hemoptysis and sputum production.    Cardiovascular:  Positive for chest pain. Negative for palpitations, orthopnea, claudication and leg swelling.   Genitourinary:  Negative for dysuria, frequency, hematuria and urgency.   Neurological:  Negative for dizziness and headaches.       OBJECTIVE:     Vital Signs (Most Recent)  Temp: 97.8 °F (36.6 °C) (03/04/24 0730)  Pulse: 76 (03/04/24 0800)  Resp: (!) 24 (03/04/24 0800)  BP: (!) 145/73 (03/04/24 0800)  SpO2: (!) 90 % (03/04/24 0800)    Vital Signs Range (Last 24H):  Temp:  [97.8 °F (36.6 °C)-98.4 °F (36.9 °C)]   Pulse:  [65-83]   Resp:  [11-35]   BP: (106-145)/(59-73)   SpO2:  [89 %-98 %]     I & O (Last 24H):    Intake/Output Summary (Last 24 hours) at 3/4/2024 0846  Last data filed at 3/4/2024 0838  Gross per 24 hour   Intake 560 ml   Output 750 ml   Net -190 ml         Current Diet:     Current Diet Order   Procedures    Diet Cardiac Fluid - 1500mL; Standard Tray     Order Specific Question:   Fluid restriction:     Answer:   Fluid - 1500mL     Order Specific Question:   Tray type:     Answer:   Standard Tray        Allergies:  Review of patient's allergies indicates:   Allergen Reactions    Atorvastatin Other (See Comments)     Muscle pain    Rosuvastatin Other (See Comments)     Muscle pain       Meds:  Scheduled Meds:   apixaban  5 mg Oral BID    citalopram  20 mg Oral Daily    clopidogreL  75 mg Oral Daily    isosorbide mononitrate  60 mg Oral Daily    metoprolol succinate  100 mg Oral BID    mupirocin   Nasal BID    pantoprazole  20 mg Oral BID    phenytoin  400 mg Oral Daily    sacubitriL-valsartan  1 tablet Oral BID     Continuous Infusions:      PRN Meds:acetaminophen, acetaminophen, ALPRAZolam, aluminum-magnesium hydroxide-simethicone, calcium chloride IVPB, calcium chloride IVPB, calcium chloride IVPB, HYDROcodone-acetaminophen, magnesium oxide, magnesium oxide, magnesium sulfate IVPB, magnesium  sulfate IVPB, magnesium sulfate IVPB, magnesium sulfate IVPB, melatonin, morphine, ondansetron, potassium bicarbonate, potassium bicarbonate, potassium bicarbonate, potassium chloride in water, potassium chloride in water, potassium chloride in water, potassium chloride in water, potassium chloride, potassium chloride, potassium chloride, potassium chloride, potassium, sodium phosphates, potassium, sodium phosphates, potassium, sodium phosphates    Oxygen/Ventilator Data (Last 24H):  (if applicable)            Hemodynamic Parameters (Last 24H):   (if applicable)        Laboratory and Radiology Data:  Recent Results (from the past 24 hour(s))   EKG 12-lead    Collection Time: 03/03/24 11:09 AM   Result Value Ref Range    QRS Duration 188 ms    [facility] QTC Calculation 535 ms   Occult blood x 1, stool    Collection Time: 03/03/24  6:22 PM    Specimen: Stool   Result Value Ref Range    Occult Blood Negative Negative   Comprehensive Metabolic Panel (CMP)    Collection Time: 03/04/24  3:25 AM   Result Value Ref Range    Sodium 133 (L) 136 - 145 mmol/L    Potassium 4.1 3.5 - 5.1 mmol/L    Chloride 99 95 - 110 mmol/L    CO2 27 23 - 29 mmol/L    Glucose 96 70 - 110 mg/dL    BUN 21 8 - 23 mg/dL    Creatinine 1.1 0.5 - 1.4 mg/dL    Calcium 8.4 (L) 8.7 - 10.5 mg/dL    Total Protein 6.2 6.0 - 8.4 g/dL    Albumin 3.5 3.5 - 5.2 g/dL    Total Bilirubin 0.4 0.1 - 1.0 mg/dL    Alkaline Phosphatase 92 55 - 135 U/L    AST 18 10 - 40 U/L    ALT 9 (L) 10 - 44 U/L    eGFR >60.0 >60 mL/min/1.73 m^2    Anion Gap 7 (L) 8 - 16 mmol/L   Magnesium    Collection Time: 03/04/24  3:25 AM   Result Value Ref Range    Magnesium 2.0 1.6 - 2.6 mg/dL   CBC with Automated Differential    Collection Time: 03/04/24  3:25 AM   Result Value Ref Range    WBC 7.64 3.90 - 12.70 K/uL    RBC 3.84 (L) 4.60 - 6.20 M/uL    Hemoglobin 11.2 (L) 14.0 - 18.0 g/dL    Hematocrit 33.8 (L) 40.0 - 54.0 %    MCV 88 82 - 98 fL    MCH 29.2 27.0 - 31.0 pg    MCHC 33.1 32.0 - 36.0  g/dL    RDW 14.0 11.5 - 14.5 %    Platelets 192 150 - 450 K/uL    MPV 10.7 9.2 - 12.9 fL    Immature Granulocytes 0.3 0.0 - 0.5 %    Gran # (ANC) 4.2 1.8 - 7.7 K/uL    Immature Grans (Abs) 0.02 0.00 - 0.04 K/uL    Lymph # 2.0 1.0 - 4.8 K/uL    Mono # 1.0 0.3 - 1.0 K/uL    Eos # 0.4 0.0 - 0.5 K/uL    Baso # 0.07 0.00 - 0.20 K/uL    nRBC 0 0 /100 WBC    Gran % 54.7 38.0 - 73.0 %    Lymph % 26.7 18.0 - 48.0 %    Mono % 12.8 4.0 - 15.0 %    Eosinophil % 4.6 0.0 - 8.0 %    Basophil % 0.9 0.0 - 1.9 %    Differential Method Automated      Imaging Results              X-Ray Chest AP Portable (Final result)  Result time 02/29/24 08:10:25   Procedure changed from X-Ray Chest PA And Lateral     Final result by Sergio Stienberg Jr., MD (02/29/24 08:10:25)                   Narrative:    CLINICAL HISTORY:  78 years (1945) Male Chest Pain    TECHNIQUE:  XR CHEST 1 VIEW. 1 images obtained.    COMPARISON:  2/9/2024    FINDINGS:    Dual lead pacemaker device projects in the left upper chest with the position in the right atrium and right ventricle in optimal location. Heart is prominent size exacerbated by the diminished inspiratory effort. Mild central vascular congestion changes are noted. There is evidence of a small right infrahilar infiltrate. There are small effusions obscuring the bilateral costophrenic sulci. There are postoperative changes of previous coronary artery bypass surgery. No evidence of pneumothorax.    IMPRESSION: Stable appearance the chest when compared to previous evidence of mild central vascular congestion changes without evidence of any definable change upon comparison.    Electronically signed by:  Sergio Steinberg MD  02/29/2024 08:10 AM [facility] Workstation: [id number]                                    12-lead EKG interpretation:  (if applicable)      Current Cardiac Rhythm:   (if applicable)    Physical Exam:   Physical Exam  Constitutional:       Appearance: He is not ill-appearing.    Cardiovascular:      Rate and Rhythm: Normal rate and regular rhythm.      Heart sounds: Murmur heard.   Pulmonary:      Effort: Pulmonary effort is normal.      Breath sounds: Normal breath sounds.   Abdominal:      General: Abdomen is flat. There is no distension.      Palpations: Abdomen is soft.   Musculoskeletal:         General: No swelling.   Skin:     General: Skin is warm and dry.   Neurological:      General: No focal deficit present.      Mental Status: He is alert and oriented to person, place, and time.   Psychiatric:         Mood and Affect: Mood normal.         ASSESSMENT/PLAN:   Assessment:   NSTEMI  CAD, s/p cabg with redo  S/p TAVR  pAF on NOAC  PVD  DcPPM  Chronic systolic heart failure  Hyperlipidemia  Hypothyroidism  HTN    AV groove of circ Single vessel open grafts SVG to LAD, obtuse of the circ, and distal RCA occlusion of LAD and RCA--1 patent graft     Plan:   -Troponin peaked at 2500 , trending down   -He has very limited options available. His heart is being supplied by a single SVG. LAD is occluded at the origin, Lcx occluded in proximal stent, and RCA occluded at the ostium. There is very minimal collateral flow from the Lcx to the distal RCA vessels.   -Will continue medical management.  Off nitro drip, restarted imdur  - Euvolemic on exam.   - continue BB and entresto  -- monitor on telemetry  - known intolerant to statin therapy  - Plavix restarted as well as Eliquis.   - Patient can be discharged from a cardiac standpoint. He will follow up in clinic in 1-2 weeks and will discuss further options with Dr. Willoughby at that time.       Magda Salinas

## 2024-03-04 NOTE — ASSESSMENT & PLAN NOTE
"NSTEMI  CAD with H/o CABG with recent angiogram on December 2023  This is his 4 th IP admission, after pt had Coronary angiogram in December 2023, with chest pain  Hold Ranexa  Maintain iv morphine  Euvolemic now, off IV lasix  Consulted pts Cardiology team, awaiting further recommendations    Completed heparin drip   - Off nitro drip, resumed Imdur   - Plavix resumed  - Per cardiology "he has very limited options available. His heart is being supplied by a single SVG. LAD is occluded at the origin, Lcx occluded in proximal stent, and RCA occluded at the ostium. There is very minimal collateral flow from the Lcx to the distal RCA vessels.  He is basically maintaining on a single SVG. Any intervention could result in loss of his single SVG. Would like Dr. Willoughby to evaluate the patient. If no options are ultimately available, as determined by Dr. Willoughby and the patient, then the best course of action, given multiple recent admissions,  then we may need to consider more comfort measures (hospice) given multipple admissions for unstable angina, and 2 admissions for nstemi in the past 4 months"  - Cardiology seen this morning, cleared for discharge     "

## 2024-03-04 NOTE — PLAN OF CARE
Problem: Adult Inpatient Plan of Care  Goal: Plan of Care Review  Outcome: Ongoing, Progressing  Goal: Absence of Hospital-Acquired Illness or Injury  Outcome: Ongoing, Progressing  Goal: Readiness for Transition of Care  Outcome: Met     Problem: Fluid and Electrolyte Imbalance (Acute Kidney Injury/Impairment)  Goal: Fluid and Electrolyte Balance  Outcome: Met     Problem: Renal Function Impairment (Acute Kidney Injury/Impairment)  Goal: Effective Renal Function  Outcome: Met     Problem: Fall Injury Risk  Goal: Absence of Fall and Fall-Related Injury  Outcome: Ongoing, Progressing   Pt ready to go home. Daughter at bedside. PA for cardiology came by as well as primary MD. Waiting on MD to put in dc orders after she completes her pt rounding.

## 2024-03-08 ENCOUNTER — PATIENT OUTREACH (OUTPATIENT)
Dept: ADMINISTRATIVE | Facility: CLINIC | Age: 79
End: 2024-03-08
Payer: MEDICARE

## 2024-03-13 ENCOUNTER — CLINICAL SUPPORT (OUTPATIENT)
Dept: CARDIAC REHAB | Facility: HOSPITAL | Age: 79
End: 2024-03-13
Attending: SPECIALIST
Payer: MEDICARE

## 2024-03-13 DIAGNOSIS — I25.10 CAD (CORONARY ARTERY DISEASE): Primary | ICD-10-CM

## 2024-03-13 PROCEDURE — 93797 PHYS/QHP OP CAR RHAB WO ECG: CPT

## 2024-03-18 ENCOUNTER — CLINICAL SUPPORT (OUTPATIENT)
Dept: CARDIAC REHAB | Facility: HOSPITAL | Age: 79
End: 2024-03-18
Payer: MEDICARE

## 2024-03-18 DIAGNOSIS — I25.10 CAD (CORONARY ARTERY DISEASE): Primary | ICD-10-CM

## 2024-03-18 PROCEDURE — 93798 PHYS/QHP OP CAR RHAB W/ECG: CPT

## 2024-03-20 ENCOUNTER — CLINICAL SUPPORT (OUTPATIENT)
Dept: CARDIAC REHAB | Facility: HOSPITAL | Age: 79
End: 2024-03-20
Payer: MEDICARE

## 2024-03-20 DIAGNOSIS — I25.10 CAD (CORONARY ARTERY DISEASE): Primary | ICD-10-CM

## 2024-03-20 PROCEDURE — 93798 PHYS/QHP OP CAR RHAB W/ECG: CPT

## 2024-03-22 ENCOUNTER — CLINICAL SUPPORT (OUTPATIENT)
Dept: CARDIAC REHAB | Facility: HOSPITAL | Age: 79
End: 2024-03-22
Payer: MEDICARE

## 2024-03-22 DIAGNOSIS — I25.10 CAD (CORONARY ARTERY DISEASE): Primary | ICD-10-CM

## 2024-03-22 PROCEDURE — 93798 PHYS/QHP OP CAR RHAB W/ECG: CPT

## 2024-03-23 LAB
OHS QRS DURATION: 188 MS
OHS QTC CALCULATION: 535 MS

## 2024-03-25 ENCOUNTER — CLINICAL SUPPORT (OUTPATIENT)
Dept: CARDIAC REHAB | Facility: HOSPITAL | Age: 79
End: 2024-03-25
Payer: MEDICARE

## 2024-03-25 DIAGNOSIS — I25.10 CAD (CORONARY ARTERY DISEASE): Primary | ICD-10-CM

## 2024-03-25 PROCEDURE — 93798 PHYS/QHP OP CAR RHAB W/ECG: CPT

## 2024-03-28 NOTE — Clinical Note
[facility] Medicine Services       Patient Name: Stephanie Sol  : 1943  MRN: [id number]  Primary Care Physician:  Lior Oscar MD  Date of admission: 3/26/2024  Date and Time of Service: 3/26/2024 at 2145        Assessment & Plan       Chief Complaint: altered mental status, weakness     Plan:     Home medications not verified at the time of assessment and plan     CRISTIAN  -Creatinine 2.1 (baseline 1.1 ), monitor  -IV fluids ordered  -Avoid nephrotoxic medication and contrast  -Avoid hypotension     Generalized Weakness  -PT/OT consult  -Fall precautions  -Case management consult for discharge planning     Essential Hypertension  -Controlled  -Monitor BP  -Continue home lisinopril, hydrochlorothiazide     Depression  -Continue Lexapro     GERD  -PPI     Rash  -Patient believes this is from the antibiotics she has been on for an abdominal infection  -Solumedrol x1 ordered  -Monitor for improvement     History of Present Illness            History of Present Illness: Stephanie Sol is a 80 y.o. female with a previous medical history of HTN, SSS, S/P pacemaker, Depression and GERD who presented to [facility] on [facility]/26/2024 due to altered mental status and weakness. She denies any other complaints.      In the ED, Sodium is 135, Potassium 5.6, Creatinine 2.1 (baseline 1.13). Otherwise, labs are unremarkable.  UA shows trace protein.  She is afebrile, all vitals are stable.  Hospitalist was consulted for further management.     12 point ROS reviewed and negative except as mentioned above      3/27  Patient is pleasantly confused  Feeling weak  Patient  report that patient has been confused as a baseline but and her balance has been extremely compared over last 3 days  Will get an MRI of the brain for further evaluation to evaluate posterior fossa for any cerebellar stroke that could have caused the balance issue  Her creatinine is down to 1.74 from 2.14 and is awaiting nephrology  Catheter is inserted into the mid   left main pulmonary artery.  evaluation  Continue hydration and follow daily creatinine  I tried to order MRI patient had some metallic implant so could not have an MRI    Patient had CT of the brain yesterday with no acute abnormalities  Will get neuroconsult for further recommendation    3/28  Patient seen with RN at bedside  She is pleasantly confused sitting in her chair comfortable  Creatinine is at 1.74 with baseline of 1.1 seen per nephrology  ctof the brain is negative for acute abnormalities with chronic microvascular changes  Patient had MRI of the brain in 2022 showing atrophy  Her  saying that her memory was fine up till recently  I think patient does have underlying history of dementia she is little more depressed due to dementia  She will probably benefit from skilled nursing facility placement  She will need 30 days or less  Check MRI results  Her TSH was normal  Discharge planning skilled nursing facility once bed is available and if cleared per nephrology     Objective       Vitals:   Temp:  [100.5 °F (38.1 °C)] 100.5 °F (38.1 °C)  Heart Rate:  [60-75] 65  Resp:  [16-18] 18  BP: (110-149)/(60-85) 123/64  Body mass index is 24.69 kg/m².  Physical Exam  Vitals and nursing note reviewed.   Constitutional:       Appearance: Normal appearance.   HENT:      Mouth/Throat:      Mouth: Mucous membranes are moist.   Cardiovascular:      Rate and Rhythm: Normal rate and regular rhythm.   Pulmonary:      Effort: Pulmonary effort is normal.      Breath sounds: Normal breath sounds.   Abdominal:      General: Bowel sounds are normal.      Palpations: Abdomen is soft.   Musculoskeletal:         General: Normal range of motion.   Skin:     General: Skin is warm and dry.      Findings: Rash (generalized, over her entire body) present.   Neurological:      General: No focal deficit present.      Mental Status: She is alert and oriented to person, place, and time. Mental status is at baseline.   Psychiatric:         Mood and Affect: Mood  normal.         Behavior: Behavior normal.               Personal History      This is a 80 y.o. female with:     Medical History        Past Medical History:   Diagnosis Date    Essential hypertension 11/26/2022    Gout, unspecified 3/18/2022    Major depressive disorder, single episode, unspecified 3/18/2022    Presence of cardiac pacemaker 3/18/2022    Sick sinus syndrome 3/18/2022            Surgical History         Past Surgical History:   Procedure Laterality Date    CARDIAC ELECTROPHYSIOLOGY PROCEDURE N/A 11/30/2022     Procedure: Biv pacemaker upgradeBiotronik;  Surgeon: Karin Pedersen MD;  Location: [facility] CATH INVASIVE LOCATION;  Service: Cardiovascular;  Laterality: N/A;            Active and Resolved Problems  There are no hospital problems to display for this patient.        Family History: family history is not on file. Otherwise pertinent FHx was reviewed and not pertinent to current issue.     Social History:  reports that she has never smoked. She has never used smokeless tobacco. She reports that she does not drink alcohol and does not use drugs.     Home Medications:  Prior to Admission Medications         None                   Allergies:  Allergies   No Known Allergies              DVT prophylaxis:  Mechanical DVT prophylaxis orders are present.           CODE STATUS:       Code Status (Patient has no pulse and is not breathing): CPR (Attempt to Resuscitate)  Medical Interventions (Patient has pulse or is breathing): Full Support           Admission Status:  I believe this patient meets observation status.     I discussed the patient's findings and my recommendations with patient.     Signature:

## 2024-04-03 ENCOUNTER — CLINICAL SUPPORT (OUTPATIENT)
Dept: CARDIAC REHAB | Facility: HOSPITAL | Age: 79
End: 2024-04-03
Payer: MEDICARE

## 2024-04-03 DIAGNOSIS — I25.10 CAD (CORONARY ARTERY DISEASE): Primary | ICD-10-CM

## 2024-04-15 ENCOUNTER — CLINICAL SUPPORT (OUTPATIENT)
Dept: CARDIAC REHAB | Facility: HOSPITAL | Age: 79
End: 2024-04-15
Payer: MEDICARE

## 2024-04-15 DIAGNOSIS — I25.10 CAD (CORONARY ARTERY DISEASE): Primary | ICD-10-CM

## 2024-04-15 PROCEDURE — 93798 PHYS/QHP OP CAR RHAB W/ECG: CPT

## 2024-04-17 ENCOUNTER — CLINICAL SUPPORT (OUTPATIENT)
Dept: CARDIAC REHAB | Facility: HOSPITAL | Age: 79
End: 2024-04-17
Payer: MEDICARE

## 2024-04-17 DIAGNOSIS — I25.10 CAD (CORONARY ARTERY DISEASE): Primary | ICD-10-CM

## 2024-04-17 PROCEDURE — 93798 PHYS/QHP OP CAR RHAB W/ECG: CPT

## 2024-04-19 ENCOUNTER — CLINICAL SUPPORT (OUTPATIENT)
Dept: CARDIAC REHAB | Facility: HOSPITAL | Age: 79
End: 2024-04-19
Payer: MEDICARE

## 2024-04-19 DIAGNOSIS — I25.10 CAD (CORONARY ARTERY DISEASE): Primary | ICD-10-CM

## 2024-04-19 PROCEDURE — 93798 PHYS/QHP OP CAR RHAB W/ECG: CPT

## 2024-04-22 ENCOUNTER — CLINICAL SUPPORT (OUTPATIENT)
Dept: CARDIAC REHAB | Facility: HOSPITAL | Age: 79
End: 2024-04-22
Payer: MEDICARE

## 2024-04-22 DIAGNOSIS — I25.10 CAD (CORONARY ARTERY DISEASE): Primary | ICD-10-CM

## 2024-04-22 PROCEDURE — 93798 PHYS/QHP OP CAR RHAB W/ECG: CPT

## 2024-04-24 ENCOUNTER — CLINICAL SUPPORT (OUTPATIENT)
Dept: CARDIAC REHAB | Facility: HOSPITAL | Age: 79
End: 2024-04-24
Payer: MEDICARE

## 2024-04-24 DIAGNOSIS — I25.10 CAD (CORONARY ARTERY DISEASE): Primary | ICD-10-CM

## 2024-04-24 PROCEDURE — 93798 PHYS/QHP OP CAR RHAB W/ECG: CPT

## 2024-04-26 ENCOUNTER — CLINICAL SUPPORT (OUTPATIENT)
Dept: CARDIAC REHAB | Facility: HOSPITAL | Age: 79
End: 2024-04-26
Payer: MEDICARE

## 2024-04-26 DIAGNOSIS — I25.10 CAD (CORONARY ARTERY DISEASE): Primary | ICD-10-CM

## 2024-04-26 PROCEDURE — 93798 PHYS/QHP OP CAR RHAB W/ECG: CPT

## 2024-04-29 ENCOUNTER — CLINICAL SUPPORT (OUTPATIENT)
Dept: CARDIAC REHAB | Facility: HOSPITAL | Age: 79
End: 2024-04-29
Payer: MEDICARE

## 2024-04-29 DIAGNOSIS — I25.10 CAD (CORONARY ARTERY DISEASE): Primary | ICD-10-CM

## 2024-04-29 PROBLEM — I21.4 NSTEMI (NON-ST ELEVATED MYOCARDIAL INFARCTION): Status: RESOLVED | Noted: 2019-11-05 | Resolved: 2024-04-29

## 2024-04-29 PROBLEM — N17.9 AKI (ACUTE KIDNEY INJURY): Status: RESOLVED | Noted: 2024-01-26 | Resolved: 2024-04-29

## 2024-04-29 PROCEDURE — 93798 PHYS/QHP OP CAR RHAB W/ECG: CPT

## 2024-05-01 ENCOUNTER — CLINICAL SUPPORT (OUTPATIENT)
Dept: CARDIAC REHAB | Facility: HOSPITAL | Age: 79
End: 2024-05-01
Payer: MEDICARE

## 2024-05-01 DIAGNOSIS — I25.10 CAD (CORONARY ARTERY DISEASE): Primary | ICD-10-CM

## 2024-05-01 PROCEDURE — 93798 PHYS/QHP OP CAR RHAB W/ECG: CPT

## 2024-05-03 ENCOUNTER — CLINICAL SUPPORT (OUTPATIENT)
Dept: CARDIAC REHAB | Facility: HOSPITAL | Age: 79
End: 2024-05-03
Payer: MEDICARE

## 2024-05-03 DIAGNOSIS — I25.10 CAD (CORONARY ARTERY DISEASE): Primary | ICD-10-CM

## 2024-05-03 PROCEDURE — 93798 PHYS/QHP OP CAR RHAB W/ECG: CPT

## 2024-05-06 ENCOUNTER — CLINICAL SUPPORT (OUTPATIENT)
Dept: CARDIAC REHAB | Facility: HOSPITAL | Age: 79
End: 2024-05-06
Payer: MEDICARE

## 2024-05-06 DIAGNOSIS — I25.10 CAD (CORONARY ARTERY DISEASE): Primary | ICD-10-CM

## 2024-05-06 PROCEDURE — 93798 PHYS/QHP OP CAR RHAB W/ECG: CPT

## 2024-05-08 ENCOUNTER — CLINICAL SUPPORT (OUTPATIENT)
Dept: CARDIAC REHAB | Facility: HOSPITAL | Age: 79
End: 2024-05-08
Payer: MEDICARE

## 2024-05-08 DIAGNOSIS — I25.10 CAD (CORONARY ARTERY DISEASE): Primary | ICD-10-CM

## 2024-05-08 PROCEDURE — 93798 PHYS/QHP OP CAR RHAB W/ECG: CPT

## 2024-05-10 ENCOUNTER — CLINICAL SUPPORT (OUTPATIENT)
Dept: CARDIAC REHAB | Facility: HOSPITAL | Age: 79
End: 2024-05-10
Payer: MEDICARE

## 2024-05-10 DIAGNOSIS — I25.10 CAD (CORONARY ARTERY DISEASE): Primary | ICD-10-CM

## 2024-05-10 PROCEDURE — 93798 PHYS/QHP OP CAR RHAB W/ECG: CPT

## 2024-05-13 ENCOUNTER — CLINICAL SUPPORT (OUTPATIENT)
Dept: CARDIAC REHAB | Facility: HOSPITAL | Age: 79
End: 2024-05-13
Payer: MEDICARE

## 2024-05-13 DIAGNOSIS — I25.10 CAD (CORONARY ARTERY DISEASE): Primary | ICD-10-CM

## 2024-05-13 PROCEDURE — 93798 PHYS/QHP OP CAR RHAB W/ECG: CPT

## 2024-05-20 ENCOUNTER — CLINICAL SUPPORT (OUTPATIENT)
Dept: CARDIAC REHAB | Facility: HOSPITAL | Age: 79
End: 2024-05-20
Payer: MEDICARE

## 2024-05-20 DIAGNOSIS — I25.10 CAD (CORONARY ARTERY DISEASE): Primary | ICD-10-CM

## 2024-05-20 PROCEDURE — 93798 PHYS/QHP OP CAR RHAB W/ECG: CPT

## 2024-05-22 ENCOUNTER — CLINICAL SUPPORT (OUTPATIENT)
Dept: CARDIAC REHAB | Facility: HOSPITAL | Age: 79
End: 2024-05-22
Payer: MEDICARE

## 2024-05-22 DIAGNOSIS — I25.10 CORONARY ARTERY DISEASE INVOLVING NATIVE CORONARY ARTERY OF NATIVE HEART WITHOUT ANGINA PECTORIS: Primary | ICD-10-CM

## 2024-05-22 PROCEDURE — 93798 PHYS/QHP OP CAR RHAB W/ECG: CPT

## 2024-05-24 ENCOUNTER — CLINICAL SUPPORT (OUTPATIENT)
Dept: CARDIAC REHAB | Facility: HOSPITAL | Age: 79
End: 2024-05-24
Payer: MEDICARE

## 2024-05-24 DIAGNOSIS — I25.10 CORONARY ARTERY DISEASE INVOLVING NATIVE CORONARY ARTERY OF NATIVE HEART WITHOUT ANGINA PECTORIS: Primary | ICD-10-CM

## 2024-05-24 PROCEDURE — 93798 PHYS/QHP OP CAR RHAB W/ECG: CPT

## 2024-05-27 ENCOUNTER — CLINICAL SUPPORT (OUTPATIENT)
Dept: CARDIAC REHAB | Facility: HOSPITAL | Age: 79
End: 2024-05-27
Payer: MEDICARE

## 2024-05-27 DIAGNOSIS — I25.10 CORONARY ARTERY DISEASE INVOLVING NATIVE CORONARY ARTERY OF NATIVE HEART WITHOUT ANGINA PECTORIS: Primary | ICD-10-CM

## 2024-05-27 PROCEDURE — 93798 PHYS/QHP OP CAR RHAB W/ECG: CPT

## 2024-05-29 ENCOUNTER — CLINICAL SUPPORT (OUTPATIENT)
Dept: CARDIAC REHAB | Facility: HOSPITAL | Age: 79
End: 2024-05-29
Payer: MEDICARE

## 2024-05-29 DIAGNOSIS — I25.10 CORONARY ARTERY DISEASE INVOLVING NATIVE CORONARY ARTERY OF NATIVE HEART WITHOUT ANGINA PECTORIS: Primary | ICD-10-CM

## 2024-05-29 PROCEDURE — 93798 PHYS/QHP OP CAR RHAB W/ECG: CPT

## 2024-05-31 ENCOUNTER — CLINICAL SUPPORT (OUTPATIENT)
Dept: CARDIAC REHAB | Facility: HOSPITAL | Age: 79
End: 2024-05-31
Payer: MEDICARE

## 2024-05-31 DIAGNOSIS — I25.10 CORONARY ARTERY DISEASE INVOLVING NATIVE CORONARY ARTERY OF NATIVE HEART WITHOUT ANGINA PECTORIS: Primary | ICD-10-CM

## 2024-05-31 PROCEDURE — 93798 PHYS/QHP OP CAR RHAB W/ECG: CPT

## 2024-06-03 ENCOUNTER — CLINICAL SUPPORT (OUTPATIENT)
Dept: CARDIAC REHAB | Facility: HOSPITAL | Age: 79
End: 2024-06-03
Payer: MEDICARE

## 2024-06-03 DIAGNOSIS — I25.10 CORONARY ARTERY DISEASE INVOLVING NATIVE CORONARY ARTERY OF NATIVE HEART WITHOUT ANGINA PECTORIS: Primary | ICD-10-CM

## 2024-06-03 PROCEDURE — 93798 PHYS/QHP OP CAR RHAB W/ECG: CPT

## 2024-06-05 ENCOUNTER — CLINICAL SUPPORT (OUTPATIENT)
Dept: CARDIAC REHAB | Facility: HOSPITAL | Age: 79
End: 2024-06-05
Payer: MEDICARE

## 2024-06-05 DIAGNOSIS — I25.10 CORONARY ARTERY DISEASE INVOLVING NATIVE CORONARY ARTERY OF NATIVE HEART WITHOUT ANGINA PECTORIS: Primary | ICD-10-CM

## 2024-06-05 PROCEDURE — 93798 PHYS/QHP OP CAR RHAB W/ECG: CPT

## 2024-06-07 ENCOUNTER — CLINICAL SUPPORT (OUTPATIENT)
Dept: CARDIAC REHAB | Facility: HOSPITAL | Age: 79
End: 2024-06-07
Payer: MEDICARE

## 2024-06-07 DIAGNOSIS — I25.10 CORONARY ARTERY DISEASE INVOLVING NATIVE CORONARY ARTERY OF NATIVE HEART WITHOUT ANGINA PECTORIS: Primary | ICD-10-CM

## 2024-06-07 PROCEDURE — 93798 PHYS/QHP OP CAR RHAB W/ECG: CPT

## 2024-06-15 ENCOUNTER — HOSPITAL ENCOUNTER (INPATIENT)
Facility: HOSPITAL | Age: 79
LOS: 5 days | Discharge: NURSING, REHAB OR LONG-TERM CARE | DRG: 871 | End: 2024-06-21
Attending: EMERGENCY MEDICINE | Admitting: INTERNAL MEDICINE
Payer: MEDICARE

## 2024-06-15 DIAGNOSIS — R09.02 HYPOXIA: ICD-10-CM

## 2024-06-15 DIAGNOSIS — I50.43 ACUTE ON CHRONIC COMBINED SYSTOLIC AND DIASTOLIC HF (HEART FAILURE): ICD-10-CM

## 2024-06-15 DIAGNOSIS — M54.9 BACK PAIN: ICD-10-CM

## 2024-06-15 DIAGNOSIS — R07.9 CHEST PAIN: ICD-10-CM

## 2024-06-15 DIAGNOSIS — I50.43 ACUTE ON CHRONIC COMBINED SYSTOLIC AND DIASTOLIC HEART FAILURE: Primary | ICD-10-CM

## 2024-06-15 DIAGNOSIS — I21.4 NSTEMI (NON-ST ELEVATED MYOCARDIAL INFARCTION): ICD-10-CM

## 2024-06-15 PROBLEM — Z95.1 HX OF CABG: Status: ACTIVE | Noted: 2024-06-15

## 2024-06-15 PROBLEM — F05 ACUTE CONFUSIONAL STATE: Status: ACTIVE | Noted: 2024-06-15

## 2024-06-15 PROBLEM — S32.029A L2 VERTEBRAL FRACTURE: Status: ACTIVE | Noted: 2024-06-15

## 2024-06-15 LAB
ALBUMIN SERPL BCP-MCNC: 4.1 G/DL (ref 3.5–5.2)
ALP SERPL-CCNC: 95 U/L (ref 55–135)
ALT SERPL W/O P-5'-P-CCNC: 8 U/L (ref 10–44)
ANION GAP SERPL CALC-SCNC: 12 MMOL/L (ref 8–16)
AST SERPL-CCNC: 15 U/L (ref 10–40)
BASOPHILS # BLD AUTO: 0.08 K/UL (ref 0–0.2)
BASOPHILS NFR BLD: 0.6 % (ref 0–1.9)
BILIRUB SERPL-MCNC: 1.9 MG/DL (ref 0.1–1)
BNP SERPL-MCNC: 1892 PG/ML (ref 0–99)
BUN SERPL-MCNC: 17 MG/DL (ref 8–23)
CALCIUM SERPL-MCNC: 9.1 MG/DL (ref 8.7–10.5)
CHLORIDE SERPL-SCNC: 92 MMOL/L (ref 95–110)
CO2 SERPL-SCNC: 26 MMOL/L (ref 23–29)
CREAT SERPL-MCNC: 1.1 MG/DL (ref 0.5–1.4)
DIFFERENTIAL METHOD BLD: ABNORMAL
EOSINOPHIL # BLD AUTO: 0.3 K/UL (ref 0–0.5)
EOSINOPHIL NFR BLD: 2.7 % (ref 0–8)
ERYTHROCYTE [DISTWIDTH] IN BLOOD BY AUTOMATED COUNT: 15 % (ref 11.5–14.5)
EST. GFR  (NO RACE VARIABLE): >60 ML/MIN/1.73 M^2
GLUCOSE SERPL-MCNC: 113 MG/DL (ref 70–110)
HCT VFR BLD AUTO: 38.3 % (ref 40–54)
HGB BLD-MCNC: 12.7 G/DL (ref 14–18)
IMM GRANULOCYTES # BLD AUTO: 0.05 K/UL (ref 0–0.04)
IMM GRANULOCYTES NFR BLD AUTO: 0.4 % (ref 0–0.5)
LDH SERPL L TO P-CCNC: 1.17 MMOL/L (ref 0.5–2.2)
LYMPHOCYTES # BLD AUTO: 0.8 K/UL (ref 1–4.8)
LYMPHOCYTES NFR BLD: 6.6 % (ref 18–48)
MAGNESIUM SERPL-MCNC: 1.7 MG/DL (ref 1.6–2.6)
MCH RBC QN AUTO: 28.3 PG (ref 27–31)
MCHC RBC AUTO-ENTMCNC: 33.2 G/DL (ref 32–36)
MCV RBC AUTO: 85 FL (ref 82–98)
MONOCYTES # BLD AUTO: 0.9 K/UL (ref 0.3–1)
MONOCYTES NFR BLD: 7.1 % (ref 4–15)
NEUTROPHILS # BLD AUTO: 10.2 K/UL (ref 1.8–7.7)
NEUTROPHILS NFR BLD: 82.6 % (ref 38–73)
NRBC BLD-RTO: 0 /100 WBC
PHENYTOIN SERPL-MCNC: 13 UG/ML (ref 10–20)
PLATELET # BLD AUTO: 189 K/UL (ref 150–450)
PMV BLD AUTO: 10.5 FL (ref 9.2–12.9)
POTASSIUM SERPL-SCNC: 3.7 MMOL/L (ref 3.5–5.1)
PROT SERPL-MCNC: 7.3 G/DL (ref 6–8.4)
RBC # BLD AUTO: 4.49 M/UL (ref 4.6–6.2)
SAMPLE: NORMAL
SODIUM SERPL-SCNC: 130 MMOL/L (ref 136–145)
TROPONIN I SERPL HS-MCNC: 207.7 PG/ML (ref 0–14.9)
WBC # BLD AUTO: 12.41 K/UL (ref 3.9–12.7)

## 2024-06-15 PROCEDURE — 83735 ASSAY OF MAGNESIUM: CPT | Performed by: NURSE PRACTITIONER

## 2024-06-15 PROCEDURE — 96367 TX/PROPH/DG ADDL SEQ IV INF: CPT

## 2024-06-15 PROCEDURE — 63600175 PHARM REV CODE 636 W HCPCS: Performed by: INTERNAL MEDICINE

## 2024-06-15 PROCEDURE — 80053 COMPREHEN METABOLIC PANEL: CPT | Performed by: NURSE PRACTITIONER

## 2024-06-15 PROCEDURE — 63600175 PHARM REV CODE 636 W HCPCS: Performed by: EMERGENCY MEDICINE

## 2024-06-15 PROCEDURE — 96374 THER/PROPH/DIAG INJ IV PUSH: CPT | Mod: 59

## 2024-06-15 PROCEDURE — 83880 ASSAY OF NATRIURETIC PEPTIDE: CPT | Performed by: NURSE PRACTITIONER

## 2024-06-15 PROCEDURE — 87086 URINE CULTURE/COLONY COUNT: CPT | Performed by: INTERNAL MEDICINE

## 2024-06-15 PROCEDURE — 96365 THER/PROPH/DIAG IV INF INIT: CPT

## 2024-06-15 PROCEDURE — 25000003 PHARM REV CODE 250: Performed by: INTERNAL MEDICINE

## 2024-06-15 PROCEDURE — 93005 ELECTROCARDIOGRAM TRACING: CPT | Performed by: INTERNAL MEDICINE

## 2024-06-15 PROCEDURE — 93010 ELECTROCARDIOGRAM REPORT: CPT | Mod: ,,, | Performed by: INTERNAL MEDICINE

## 2024-06-15 PROCEDURE — 87040 BLOOD CULTURE FOR BACTERIA: CPT | Performed by: INTERNAL MEDICINE

## 2024-06-15 PROCEDURE — 80185 ASSAY OF PHENYTOIN TOTAL: CPT | Performed by: INTERNAL MEDICINE

## 2024-06-15 PROCEDURE — G0378 HOSPITAL OBSERVATION PER HR: HCPCS

## 2024-06-15 PROCEDURE — 99285 EMERGENCY DEPT VISIT HI MDM: CPT | Mod: 25

## 2024-06-15 PROCEDURE — 96375 TX/PRO/DX INJ NEW DRUG ADDON: CPT

## 2024-06-15 PROCEDURE — 85025 COMPLETE CBC W/AUTO DIFF WBC: CPT | Performed by: NURSE PRACTITIONER

## 2024-06-15 PROCEDURE — 36415 COLL VENOUS BLD VENIPUNCTURE: CPT | Performed by: INTERNAL MEDICINE

## 2024-06-15 PROCEDURE — 84484 ASSAY OF TROPONIN QUANT: CPT | Performed by: NURSE PRACTITIONER

## 2024-06-15 RX ORDER — SODIUM,POTASSIUM PHOSPHATES 280-250MG
2 POWDER IN PACKET (EA) ORAL
Status: DISCONTINUED | OUTPATIENT
Start: 2024-06-15 | End: 2024-06-21 | Stop reason: HOSPADM

## 2024-06-15 RX ORDER — CITALOPRAM 20 MG/1
20 TABLET, FILM COATED ORAL DAILY
Status: DISCONTINUED | OUTPATIENT
Start: 2024-06-16 | End: 2024-06-15

## 2024-06-15 RX ORDER — LIDOCAINE 50 MG/G
1 PATCH TOPICAL
Status: DISCONTINUED | OUTPATIENT
Start: 2024-06-15 | End: 2024-06-21 | Stop reason: HOSPADM

## 2024-06-15 RX ORDER — MORPHINE SULFATE 4 MG/ML
4 INJECTION, SOLUTION INTRAMUSCULAR; INTRAVENOUS
Status: COMPLETED | OUTPATIENT
Start: 2024-06-15 | End: 2024-06-15

## 2024-06-15 RX ORDER — ACETAMINOPHEN 325 MG/1
650 TABLET ORAL EVERY 8 HOURS PRN
Status: DISCONTINUED | OUTPATIENT
Start: 2024-06-15 | End: 2024-06-21 | Stop reason: HOSPADM

## 2024-06-15 RX ORDER — ACETAMINOPHEN 325 MG/1
650 TABLET ORAL EVERY 4 HOURS PRN
Status: DISCONTINUED | OUTPATIENT
Start: 2024-06-15 | End: 2024-06-21 | Stop reason: HOSPADM

## 2024-06-15 RX ORDER — SODIUM CHLORIDE 0.9 % (FLUSH) 0.9 %
10 SYRINGE (ML) INJECTION
Status: DISCONTINUED | OUTPATIENT
Start: 2024-06-15 | End: 2024-06-21 | Stop reason: HOSPADM

## 2024-06-15 RX ORDER — CLOPIDOGREL BISULFATE 75 MG/1
75 TABLET ORAL DAILY
Status: DISCONTINUED | OUTPATIENT
Start: 2024-06-16 | End: 2024-06-15

## 2024-06-15 RX ORDER — HYDROMORPHONE HYDROCHLORIDE 1 MG/ML
0.5 INJECTION, SOLUTION INTRAMUSCULAR; INTRAVENOUS; SUBCUTANEOUS EVERY 6 HOURS PRN
Status: DISCONTINUED | OUTPATIENT
Start: 2024-06-15 | End: 2024-06-21 | Stop reason: HOSPADM

## 2024-06-15 RX ORDER — HYDROCODONE BITARTRATE AND ACETAMINOPHEN 5; 325 MG/1; MG/1
1 TABLET ORAL EVERY 6 HOURS PRN
Status: DISCONTINUED | OUTPATIENT
Start: 2024-06-15 | End: 2024-06-21 | Stop reason: HOSPADM

## 2024-06-15 RX ORDER — HYDROCODONE BITARTRATE AND ACETAMINOPHEN 5; 325 MG/1; MG/1
1 TABLET ORAL EVERY 6 HOURS PRN
Status: DISCONTINUED | OUTPATIENT
Start: 2024-06-15 | End: 2024-06-15

## 2024-06-15 RX ORDER — PANTOPRAZOLE SODIUM 20 MG/1
20 TABLET, DELAYED RELEASE ORAL 2 TIMES DAILY
Status: DISCONTINUED | OUTPATIENT
Start: 2024-06-15 | End: 2024-06-21 | Stop reason: HOSPADM

## 2024-06-15 RX ORDER — LANOLIN ALCOHOL/MO/W.PET/CERES
800 CREAM (GRAM) TOPICAL
Status: DISCONTINUED | OUTPATIENT
Start: 2024-06-15 | End: 2024-06-21 | Stop reason: HOSPADM

## 2024-06-15 RX ORDER — FUROSEMIDE 10 MG/ML
40 INJECTION INTRAMUSCULAR; INTRAVENOUS EVERY 12 HOURS
Status: DISCONTINUED | OUTPATIENT
Start: 2024-06-15 | End: 2024-06-17

## 2024-06-15 RX ORDER — NITROGLYCERIN 400 UG/1
1 SPRAY ORAL EVERY 5 MIN PRN
Status: DISCONTINUED | OUTPATIENT
Start: 2024-06-15 | End: 2024-06-21 | Stop reason: HOSPADM

## 2024-06-15 RX ORDER — TALC
6 POWDER (GRAM) TOPICAL NIGHTLY PRN
Status: DISCONTINUED | OUTPATIENT
Start: 2024-06-15 | End: 2024-06-21 | Stop reason: HOSPADM

## 2024-06-15 RX ORDER — METOPROLOL SUCCINATE 50 MG/1
100 TABLET, EXTENDED RELEASE ORAL 2 TIMES DAILY
Status: DISCONTINUED | OUTPATIENT
Start: 2024-06-15 | End: 2024-06-15

## 2024-06-15 RX ORDER — EZETIMIBE 10 MG/1
10 TABLET ORAL DAILY
Status: DISCONTINUED | OUTPATIENT
Start: 2024-06-16 | End: 2024-06-21 | Stop reason: HOSPADM

## 2024-06-15 RX ORDER — ALUMINUM HYDROXIDE, MAGNESIUM HYDROXIDE, AND SIMETHICONE 1200; 120; 1200 MG/30ML; MG/30ML; MG/30ML
30 SUSPENSION ORAL 4 TIMES DAILY PRN
Status: DISCONTINUED | OUTPATIENT
Start: 2024-06-15 | End: 2024-06-21 | Stop reason: HOSPADM

## 2024-06-15 RX ORDER — RANOLAZINE 1000 MG/1
1000 TABLET, EXTENDED RELEASE ORAL 2 TIMES DAILY
Status: ON HOLD | COMMUNITY
Start: 2024-05-30 | End: 2024-06-21 | Stop reason: HOSPADM

## 2024-06-15 RX ORDER — METOPROLOL SUCCINATE 50 MG/1
100 TABLET, EXTENDED RELEASE ORAL DAILY
Status: DISCONTINUED | OUTPATIENT
Start: 2024-06-16 | End: 2024-06-21 | Stop reason: HOSPADM

## 2024-06-15 RX ORDER — ONDANSETRON HYDROCHLORIDE 2 MG/ML
4 INJECTION, SOLUTION INTRAVENOUS EVERY 6 HOURS PRN
Status: DISCONTINUED | OUTPATIENT
Start: 2024-06-15 | End: 2024-06-21 | Stop reason: HOSPADM

## 2024-06-15 RX ORDER — PHENYTOIN SODIUM 100 MG/1
400 CAPSULE, EXTENDED RELEASE ORAL DAILY
Status: DISCONTINUED | OUTPATIENT
Start: 2024-06-16 | End: 2024-06-21 | Stop reason: HOSPADM

## 2024-06-15 RX ADMIN — PANTOPRAZOLE SODIUM 20 MG: 20 TABLET, DELAYED RELEASE ORAL at 06:06

## 2024-06-15 RX ADMIN — FUROSEMIDE 40 MG: 10 INJECTION, SOLUTION INTRAVENOUS at 06:06

## 2024-06-15 RX ADMIN — SACUBITRIL AND VALSARTAN 1 TABLET: 24; 26 TABLET, FILM COATED ORAL at 08:06

## 2024-06-15 RX ADMIN — ONDANSETRON 4 MG: 2 INJECTION INTRAMUSCULAR; INTRAVENOUS at 06:06

## 2024-06-15 RX ADMIN — PROMETHAZINE HYDROCHLORIDE 6.25 MG: 25 INJECTION INTRAMUSCULAR; INTRAVENOUS at 08:06

## 2024-06-15 RX ADMIN — MEROPENEM 1 G: 1 INJECTION, POWDER, FOR SOLUTION INTRAVENOUS at 10:06

## 2024-06-15 RX ADMIN — ACETAMINOPHEN 650 MG: 325 TABLET ORAL at 08:06

## 2024-06-15 RX ADMIN — VANCOMYCIN HYDROCHLORIDE 1500 MG: 1.5 INJECTION, POWDER, LYOPHILIZED, FOR SOLUTION INTRAVENOUS at 11:06

## 2024-06-15 RX ADMIN — MORPHINE SULFATE 4 MG: 4 INJECTION, SOLUTION INTRAMUSCULAR; INTRAVENOUS at 02:06

## 2024-06-15 RX ADMIN — LIDOCAINE 5% 1 PATCH: 700 PATCH TOPICAL at 06:06

## 2024-06-15 NOTE — H&P
[facility] - Emergency Dept  Hospital Medicine  History & Physical    Patient Name: Jerome Amaro Jr.  MRN: [id number]  Patient Class: OP- Observation  Admission Date: 6/15/2024  Attending Physician: Maicol Zendejas MD   Primary Care Provider: Magda Ruiz FNP-C         Patient information was obtained from patient, spouse/SO, past medical records, and ER records.     Subjective:     Principal Problem:Acute on chronic combined systolic and diastolic heart failure    Chief Complaint:   Chief Complaint   Patient presents with    Fall     Fall yesterday. Denies hitting head or LOC.     Back Pain        HPI: 78 year old pt getting admitted with acute CHF and L2 fracture  Pt was started on SQ Lasix inj recently   Yesterday pt had a fall and hit his back on ground  Denied hitting head/No loss of consciousness   Pt since then was unable to walk or move around due to pain  Family thought pt had Side effect from SQ Lasix  Today symptoms went worse and he was escorted to ER   Pt was found to be hypoxic in ER which was new to him  Per family , pt is not on home oxygen  Pt was alert and oriented x 3 upon arrival to ER   In ER after pt had CT Lumbar spine he appeared confused and had tachycardia & EKG showed paced rhythm       Past Medical History:   Diagnosis Date    Anticoagulant long-term use     2014    Aortic valve disease 2014    pig valve    Arthritis     all over    Atrial fibrillation 2/29/2024    Chest pain 07/15/2019    CHF (congestive heart failure)     2014 on meds    Coronary artery disease     2007 cabg    Difficulty swallowing     Heart attack 1990    15 stents    Heart attack 02/01/2024    mild    Heart block     Hyperlipidemia     Hypertension     on meds    Hypothyroidism 2015    on meds    PVD (peripheral vascular disease)     Seizures     last episode 1990 on meds       Past Surgical History:   Procedure Laterality Date    AORTOGRAPHY WITH SERIALOGRAPHY N/A 11/16/2021    Procedure: AORTOGRAM,  WITH RUNOFF;  Surgeon: Rodrigo Willoughby MD;  Location: [facility] CATH/EP LAB;  Service: Cardiology;  Laterality: N/A;    ARTERIOGRAPHY OF AORTIC ROOT N/A 11/05/2019    Procedure: ARTERIOGRAM, AORTIC ROOT;  Surgeon: Rodrigo Willoughby MD;  Location: [facility] CATH/EP LAB;  Service: Cardiology;  Laterality: N/A;    CARDIAC CATHETERIZATION      CARDIAC VALVE SURGERY      CORONARY ANGIOGRAPHY INCLUDING BYPASS GRAFTS WITH CATHETERIZATION OF LEFT HEART N/A 11/05/2019    Procedure: ANGIOGRAM, CORONARY, INCLUDING BYPASS GRAFT, WITH LEFT HEART CATHETERIZATION;  Surgeon: Rodrigo Willoughby MD;  Location: [facility] CATH/EP LAB;  Service: Cardiology;  Laterality: N/A;    CORONARY ANGIOGRAPHY INCLUDING BYPASS GRAFTS WITH CATHETERIZATION OF LEFT HEART Left 11/03/2020    Procedure: ANGIOGRAM, CORONARY, INCLUDING BYPASS GRAFT, WITH LEFT HEART CATHETERIZATION;  Surgeon: Rodrigo Willoughby MD;  Location: [facility] CATH/EP LAB;  Service: Cardiology;  Laterality: Left;    CORONARY ANGIOGRAPHY INCLUDING BYPASS GRAFTS WITH CATHETERIZATION OF LEFT HEART N/A 09/28/2021    Procedure: ANGIOGRAM, CORONARY, INCLUDING BYPASS GRAFT, WITH LEFT HEART CATHETERIZATION;  Surgeon: Rodrigo Willoughby MD;  Location: [facility] CATH/EP LAB;  Service: Cardiology;  Laterality: N/A;    CORONARY ANGIOGRAPHY INCLUDING BYPASS GRAFTS WITH CATHETERIZATION OF LEFT HEART N/A 12/05/2023    Procedure: ANGIOGRAM, CORONARY, INCLUDING BYPASS GRAFT, WITH LEFT HEART CATHETERIZATION;  Surgeon: Rodrigo Willoughby MD;  Location: [facility] CATH/EP LAB;  Service: Cardiology;  Laterality: N/A;    CORONARY ANGIOPLASTY      CORONARY ARTERY BYPASS GRAFT      x 2    1991 2006    ESOPHAGOGASTRODUODENOSCOPY N/A 2/20/2024    Procedure: EGD (ESOPHAGOGASTRODUODENOSCOPY);  Surgeon: Mal Lockhart III, MD;  Location: [facility] ENDO;  Service: Endoscopy;  Laterality: N/A;    EXTRACORPOREAL SHOCK WAVE LITHOTRIPSY Right 08/01/2019    Procedure: LITHOTRIPSY, ESWL;  Surgeon: Williams Ortega MD;  Location: [facility] OR;  Service: Urology;   Laterality: Right;    HEMORRHOID SURGERY  1990    INSERTION OF PACEMAKER      PERCUTANEOUS TRANSLUMINAL BALLOON ANGIOPLASTY OF CORONARY ARTERY N/A 11/08/2019    Procedure: Angioplasty-coronary;  Surgeon: Rodrigo Willoughby MD;  Location: [facility] CATH/EP LAB;  Service: Cardiology;  Laterality: N/A;    WRIST FRACTURE SURGERY         Review of patient's allergies indicates:   Allergen Reactions    Atorvastatin Other (See Comments)     Muscle pain    Rosuvastatin Other (See Comments)     Muscle pain       Current Facility-Administered Medications on File Prior to Encounter   Medication    magnesium oxide tablet 800 mg    sodium chloride 0.9% flush 10 mL     Current Outpatient Medications on File Prior to Encounter   Medication Sig    citalopram (CELEXA) 40 MG tablet Take 40 mg by mouth once daily. NEW Rx - NOT YET STARTED    clopidogrel (PLAVIX) 75 mg tablet Take 75 mg by mouth once daily.    CORLANOR 5 mg Tab Take 1 tablet by mouth 2 (two) times daily. NEW Rx - NOT YET STARTED    ELIQUIS 5 mg Tab Take 5 mg by mouth every morning.    ENTRESTO 24-26 mg per tablet Take 1 tablet by mouth 2 (two) times daily.    ezetimibe (ZETIA) 10 mg tablet Take 10 mg by mouth once daily.    furosemide (LASIX) 20 MG tablet Take 20 mg by mouth daily as needed (Fluid).    hydrOXYzine pamoate (VISTARIL) 25 MG Cap Take 1 capsule (25 mg total) by mouth daily as needed (anxiety).    isosorbide mononitrate (IMDUR) 30 MG 24 hr tablet Take 2 tablets (60 mg total) by mouth once daily.    metoprolol succinate (TOPROL-XL) 100 MG 24 hr tablet Take 100 mg by mouth 2 (two) times daily.    phenytoin (DILANTIN) 100 MG ER capsule Take 400 mg by mouth once daily.     bismuth subsalicylate (BISMAREX) 262 mg Chew Take 1 tablet by mouth 2 (two) times daily.    famotidine (PEPCID) 40 MG tablet Take 40 mg by mouth Daily.    NITROLINGUAL 400 mcg/spray spray Place 1 spray under the tongue every 5 (five) minutes as needed for Chest pain.    pantoprazole (PROTONIX) 20 MG  tablet Take 20 mg by mouth 2 (two) times daily.    ranolazine (RANEXA) 1,000 mg Tb12 Take 1,000 mg by mouth 2 (two) times daily.     Family History       Problem Relation (Age of Onset)    Diabetes Sister    Heart attack Mother, Father    Heart disease Sister    No Known Problems Maternal Grandmother, Maternal Grandfather, Paternal Grandmother, Paternal Grandfather, Brother, Maternal Aunt, Maternal Uncle, Paternal Aunt, Paternal Uncle    Stroke Sister          Tobacco Use    Smoking status: Former     Current packs/day: 0.00     Average packs/day: 0.5 packs/day for 4.0 years (2.0 ttl pk-yrs)     Types: Cigarettes     Start date:      Quit date:      Years since quittin.4    Smokeless tobacco: Never   Substance and Sexual Activity    Alcohol use: Yes     Comment: social    Drug use: No    Sexual activity: Not Currently     Review of Systems   Unable to perform ROS: Mental status change     Objective:     Vital Signs (Most Recent):  Temp: 98.7 °F (37.1 °C) (06/15/24 1835)  Pulse: 105 (06/15/24 1830)  Resp: (!) 35 (06/15/24 1830)  BP: (!) 179/80 (06/15/24 1830)  SpO2: (!) 94 % (06/15/24 1830) Vital Signs (24h Range):  Temp:  [97.9 °F (36.6 °C)-99 °F (37.2 °C)] 98.7 °F (37.1 °C)  Pulse:  [] 105  Resp:  [18-38] 35  SpO2:  [88 %-97 %] 94 %  BP: (100-179)/() 179/80     Weight: 78.5 kg (173 lb)  Body mass index is 27.92 kg/m².     Physical Exam  Vitals and nursing note reviewed.   Constitutional:       Appearance: Normal appearance. He is well-developed.   HENT:      Head: Atraumatic.      Right Ear: External ear normal.      Left Ear: External ear normal.      Nose: Nose normal.      Mouth/Throat:      Mouth: Mucous membranes are moist.   Eyes:      Extraocular Movements: Extraocular movements intact.   Cardiovascular:      Rate and Rhythm: Normal rate.   Pulmonary:      Effort: Pulmonary effort is normal.   Abdominal:      Palpations: Abdomen is soft.   Musculoskeletal:         General: Normal  range of motion.      Cervical back: Full passive range of motion without pain and normal range of motion.   Skin:     General: Skin is warm.   Neurological:      Mental Status: He is disoriented.                Significant Labs: All pertinent labs within the past 24 hours have been reviewed.  CBC:   Recent Labs   Lab 06/15/24  1332   WBC 12.41   HGB 12.7*   HCT 38.3*        CMP:   Recent Labs   Lab 06/15/24  1332   *   K 3.7   CL 92*   CO2 26   *   BUN 17   CREATININE 1.1   CALCIUM 9.1   PROT 7.3   ALBUMIN 4.1   BILITOT 1.9*   ALKPHOS 95   AST 15   ALT 8*   ANIONGAP 12       Significant Imaging: I have reviewed all pertinent imaging results/findings within the past 24 hours.    Assessment/Plan:     * Acute on chronic combined systolic and diastolic heart failure  Maintain iv lasix at the moment   Recent ECHO reviewed   Latest Reference Range & Units 02/09/24 10:09 02/29/24 07:50 06/15/24 13:32   BNP 0 - 99 pg/mL 958 (H) 1,720 (H) 1,892 (H)         L2 vertebral fracture  Mild-to-moderate compression fracture deformity of the L2 vertebral body   Neurosurgeon consulted       Hypoxia  Mostly from CHF flare  Maintain iv diuresis  Need home oxygen evaluation before discharge from hospital      Acute confusional state  Pt developed confusion after he came  back from CT room  Ordered blood and urine cultures  Afebrile   Pulling out lines  Received iv morphine in ER upon arrival as per records  Pt has h/o seizure disorder and on phenytoin   Ordered CT brain         Hx of CABG  Aware       Atrial fibrillation  H/o aware     Seizure disorder  Maintain phenytoin PO  Check serum levels      CAD (coronary artery disease)  H/o CABG with recent angiogram on December 2023    S/P TAVR (transcatheter aortic valve replacement)  Aware       HTN (hypertension)  Chronic, controlled. Latest blood pressure and vitals reviewed-     Temp:  [97.9 °F (36.6 °C)-99 °F (37.2 °C)]   Pulse:  []   Resp:  [18-38]   BP:  "(100-179)/()   SpO2:  [88 %-97 %] .   Home meds for hypertension were reviewed and noted below.   Hypertension Medications               ENTRESTO 24-26 mg per tablet Take 1 tablet by mouth 2 (two) times daily.    furosemide (LASIX) 20 MG tablet Take 20 mg by mouth daily as needed (Fluid).    isosorbide mononitrate (IMDUR) 30 MG 24 hr tablet Take 2 tablets (60 mg total) by mouth once daily.    metoprolol succinate (TOPROL-XL) 100 MG 24 hr tablet Take 100 mg by mouth 2 (two) times daily.    NITROLINGUAL 400 mcg/spray spray Place 1 spray under the tongue every 5 (five) minutes as needed for Chest pain.            While in the hospital, will manage blood pressure as follows; Continue home antihypertensive regimen    Will utilize p.r.n. blood pressure medication only if patient's blood pressure greater than 140/90 and he develops symptoms such as worsening chest pain or shortness of breath.      VTE Risk Mitigation (From admission, onward)           Ordered     apixaban tablet 5 mg  Every morning        Note to Pharmacy: Ht: 5' 6" (1.676 m)  Wt: 78.5 kg (173 lb)  Estimated Creatinine Clearance: 53.7 mL/min (based on SCr of 1.1 mg/dL).  Body mass index is 27.92 kg/m².    06/15/24 1633     IP VTE HIGH RISK PATIENT  Once         06/15/24 1635     Place sequential compression device  Until discontinued         06/15/24 1635                       On 06/15/2024, patient should be placed in hospital observation services under my care.             Maicol Zendejas MD  Department of Hospital Medicine  [facility] - Emergency Dept          "

## 2024-06-15 NOTE — FIRST PROVIDER EVALUATION
" Emergency Department TeleTriage Encounter Note      CHIEF COMPLAINT    Chief Complaint   Patient presents with    Fall     Fall yesterday. Denies hitting head or LOC.     Back Pain       VITAL SIGNS   Initial Vitals [06/15/24 1223]   BP Pulse Resp Temp SpO2   100/69 87 20 97.9 °F (36.6 °C) (!) 88 %      MAP       --            ALLERGIES    Review of patient's allergies indicates:   Allergen Reactions    Atorvastatin Other (See Comments)     Muscle pain    Rosuvastatin Other (See Comments)     Muscle pain       PROVIDER TRIAGE NOTE  This is a teletriage evaluation of a 78 y.o. male presenting to the ED complaining of back pain. Pt "lost his balance" while trying to throw water out of a bucket and fell onto his back. Low back pain.     Pt is hypoxic on RA. Denies CP and SOB. No home O2 use.     Alert, sitting upright.     Initial orders will be placed and care will be transferred to an alternate provider when patient is roomed for a full evaluation. Any additional orders and the final disposition will be determined by that provider.         ORDERS  Labs Reviewed - No data to display    ED Orders (720h ago, onward)      Start Ordered     Status Ordering Provider    06/15/24 1239 06/15/24 1238  X-Ray Lumbar Spine Ap And Lateral  1 time imaging         Ordered [facility] B[facility]KER              Virtual Visit Note: The provider triage portion of this emergency department evaluation and documentation was performed via [employer], a HIPAA-compliant telemedicine application, in concert with a tele-presenter in the room. A face to face patient evaluation with one of my colleagues will occur once the patient is placed in an emergency department room.      DISCLAIMER: This note was prepared with [employer] voice recognition transcription software. Garbled syntax, mangled pronouns, and other bizarre constructions may be attributed to that software system.    "

## 2024-06-15 NOTE — ED PROVIDER NOTES
Encounter Date: 6/15/2024       History     Chief Complaint   Patient presents with    Fall     Fall yesterday. Denies hitting head or LOC.     Back Pain     78-year-old male history of long-term anticoagulant use, CHF, CAD, multiple cardiac stents, peripheral vascular disease presents to the emergency room with back pain after mechanical fall yesterday.  He was apparently throwing some water out of a bucket when he fell.  Pain has been constant in his lower back since then.  Unable to walk due to pain.  Hypoxia at triage.  Patient denies shortness of breath worse than baseline.  Saw cardiology earlier this week and got subcutaneous Lasix for lower extremity edema.  Patient currently denies lower extremity edema.  Family states no history of hypoxia but 3 months ago when he was triaged he did have similar oxygen saturation.  Shortness of breath is at baseline, he denies chest pain he denies leg swelling.        Review of patient's allergies indicates:   Allergen Reactions    Atorvastatin Other (See Comments)     Muscle pain    Rosuvastatin Other (See Comments)     Muscle pain     Past Medical History:   Diagnosis Date    Anticoagulant long-term use     2014    Aortic valve disease 2014    pig valve    Arthritis     all over    Atrial fibrillation 2/29/2024    Chest pain 07/15/2019    CHF (congestive heart failure)     2014 on meds    Coronary artery disease     2007 cabg    Difficulty swallowing     Heart attack 1990    15 stents    Heart attack 02/01/2024    mild    Heart block     Hyperlipidemia     Hypertension     on meds    Hypothyroidism 2015    on meds    PVD (peripheral vascular disease)     Seizures     last episode 1990 on meds     Past Surgical History:   Procedure Laterality Date    AORTOGRAPHY WITH SERIALOGRAPHY N/A 11/16/2021    Procedure: AORTOGRAM, WITH RUNOFF;  Surgeon: Rodrigo Willoughby MD;  Location: [facility] CATH/EP LAB;  Service: Cardiology;  Laterality: N/A;    ARTERIOGRAPHY OF AORTIC ROOT N/A  11/05/2019    Procedure: ARTERIOGRAM, AORTIC ROOT;  Surgeon: Rodrigo Willoughby MD;  Location: [facility] CATH/EP LAB;  Service: Cardiology;  Laterality: N/A;    CARDIAC CATHETERIZATION      CARDIAC VALVE SURGERY      CORONARY ANGIOGRAPHY INCLUDING BYPASS GRAFTS WITH CATHETERIZATION OF LEFT HEART N/A 11/05/2019    Procedure: ANGIOGRAM, CORONARY, INCLUDING BYPASS GRAFT, WITH LEFT HEART CATHETERIZATION;  Surgeon: Rodrigo Willoughby MD;  Location: [facility] CATH/EP LAB;  Service: Cardiology;  Laterality: N/A;    CORONARY ANGIOGRAPHY INCLUDING BYPASS GRAFTS WITH CATHETERIZATION OF LEFT HEART Left 11/03/2020    Procedure: ANGIOGRAM, CORONARY, INCLUDING BYPASS GRAFT, WITH LEFT HEART CATHETERIZATION;  Surgeon: Rodrigo Willoughby MD;  Location: [facility] CATH/EP LAB;  Service: Cardiology;  Laterality: Left;    CORONARY ANGIOGRAPHY INCLUDING BYPASS GRAFTS WITH CATHETERIZATION OF LEFT HEART N/A 09/28/2021    Procedure: ANGIOGRAM, CORONARY, INCLUDING BYPASS GRAFT, WITH LEFT HEART CATHETERIZATION;  Surgeon: Rodrigo Willoughby MD;  Location: [facility] CATH/EP LAB;  Service: Cardiology;  Laterality: N/A;    CORONARY ANGIOGRAPHY INCLUDING BYPASS GRAFTS WITH CATHETERIZATION OF LEFT HEART N/A 12/05/2023    Procedure: ANGIOGRAM, CORONARY, INCLUDING BYPASS GRAFT, WITH LEFT HEART CATHETERIZATION;  Surgeon: Rodrigo Willoughby MD;  Location: [facility] CATH/EP LAB;  Service: Cardiology;  Laterality: N/A;    CORONARY ANGIOPLASTY      CORONARY ARTERY BYPASS GRAFT      x 2 1991 2006    ESOPHAGOGASTRODUODENOSCOPY N/A 2/20/2024    Procedure: EGD (ESOPHAGOGASTRODUODENOSCOPY);  Surgeon: Mal Lockhart III, MD;  Location: [facility] ENDO;  Service: Endoscopy;  Laterality: N/A;    EXTRACORPOREAL SHOCK WAVE LITHOTRIPSY Right 08/01/2019    Procedure: LITHOTRIPSY, ESWL;  Surgeon: Williams Ortega MD;  Location: [facility] OR;  Service: Urology;  Laterality: Right;    HEMORRHOID SURGERY  1990    INSERTION OF PACEMAKER      PERCUTANEOUS TRANSLUMINAL BALLOON ANGIOPLASTY OF CORONARY ARTERY N/A  2019    Procedure: Angioplasty-coronary;  Surgeon: Rodrigo Willoughby MD;  Location: [facility] CATH/EP LAB;  Service: Cardiology;  Laterality: N/A;    WRIST FRACTURE SURGERY       Family History   Problem Relation Name Age of Onset    Heart attack Mother      Heart attack Father      Heart disease Sister 2     Stroke Sister 2     Diabetes Sister 2     No Known Problems Maternal Grandmother      No Known Problems Maternal Grandfather      No Known Problems Paternal Grandmother      No Known Problems Paternal Grandfather      No Known Problems Brother      No Known Problems Maternal Aunt      No Known Problems Maternal Uncle      No Known Problems Paternal Aunt      No Known Problems Paternal Uncle      Anemia Neg Hx      Arrhythmia Neg Hx      Asthma Neg Hx      Clotting disorder Neg Hx      Fainting Neg Hx      Heart failure Neg Hx      Hyperlipidemia Neg Hx      Hypertension Neg Hx      Atrial Septal Defect Neg Hx       Social History     Tobacco Use    Smoking status: Former     Current packs/day: 0.00     Average packs/day: 0.5 packs/day for 4.0 years (2.0 ttl pk-yrs)     Types: Cigarettes     Start date:      Quit date:      Years since quittin.4    Smokeless tobacco: Never   Substance Use Topics    Alcohol use: Yes     Comment: social    Drug use: No     Review of Systems   Respiratory:  Negative for shortness of breath.    Cardiovascular:  Negative for chest pain and leg swelling.   Musculoskeletal:  Positive for back pain.       Physical Exam     Initial Vitals [06/15/24 1223]   BP Pulse Resp Temp SpO2   100/69 87 20 97.9 °F (36.6 °C) (!) 88 %      MAP       --         Physical Exam    Nursing note and vitals reviewed.  Constitutional: He appears well-developed and well-nourished. He is not diaphoretic.  Non-toxic appearance. He does not have a sickly appearance. He does not appear ill. No distress.   HENT:   Head: Normocephalic and atraumatic.   Eyes: EOM are normal.   Neck: Neck supple.   Normal  range of motion.  Cardiovascular:  Normal rate, regular rhythm and normal heart sounds.     Exam reveals no gallop and no friction rub.       No murmur heard.The patient has a device (subcutaneous pacemaker) in the left chest.   Pulmonary/Chest: Breath sounds normal. No respiratory distress. He has no wheezes. He has no rhonchi. He has no rales.   Musculoskeletal:      Cervical back: Normal range of motion and neck supple. No rigidity. Normal range of motion.      Thoracic back: Normal.      Lumbar back: Tenderness and bony tenderness present. Decreased range of motion.      Right hip: Normal.      Left hip: Normal.      Comments: No leg weakness     Neurological: He is alert and oriented to person, place, and time.   Skin: Skin is warm and dry. No rash noted.   Psychiatric: He has a normal mood and affect. His behavior is normal. Judgment and thought content normal.         ED Course   Procedures  Labs Reviewed   CBC W/ AUTO DIFFERENTIAL - Abnormal; Notable for the following components:       Result Value    RBC 4.49 (*)     Hemoglobin 12.7 (*)     Hematocrit 38.3 (*)     RDW 15.0 (*)     Gran # (ANC) 10.2 (*)     Immature Grans (Abs) 0.05 (*)     Lymph # 0.8 (*)     Gran % 82.6 (*)     Lymph % 6.6 (*)     All other components within normal limits   COMPREHENSIVE METABOLIC PANEL - Abnormal; Notable for the following components:    Sodium 130 (*)     Chloride 92 (*)     Glucose 113 (*)     Total Bilirubin 1.9 (*)     ALT 8 (*)     All other components within normal limits   TROPONIN I HIGH SENSITIVITY - Abnormal; Notable for the following components:    Troponin I High Sensitivity 207.7 (*)     All other components within normal limits   B-TYPE NATRIURETIC PEPTIDE - Abnormal; Notable for the following components:    BNP 1,892 (*)     All other components within normal limits   MAGNESIUM   PHENYTOIN LEVEL, TOTAL        ECG Results              EKG 12-lead (In process)        Collection Time Result Time QRS Duration  [facility] QTC Calculation    06/15/24 12:52:01 06/15/24 14:23:27 152 549                     In process by Interface, Lab In [facility] (06/15/24 14:23:35)                   Narrative:    Test Reason : R09.02,    Vent. Rate : 089 BPM     Atrial Rate : 089 BPM     P-R Int : 136 ms          QRS Dur : 152 ms      QT Int : 452 ms       P-R-T Axes : -22 -58 101 degrees     QTc Int : 549 ms    Atrial-sensed ventricular-paced rhythm  Biventricular pacemaker detected  Abnormal ECG  When compared with ECG of 03-MAR-2024 11:09,  Vent. rate has increased BY  19 BPM    Referred By: [email or web address]   SELF           Confirmed By:                                   Imaging Results              CT Lumbar Spine Without Contrast (Final result)  Result time 06/15/24 17:32:35      Final result by Angel Kitchen MD (06/15/24 17:32:35)                   Impression:      Recent appearing mild-to-moderate compression fracture deformity of the L2 vertebral body.  Multiple additional compression deformities of the T9, T10, T12, and L1 vertebral bodies as above.  These have a somewhat more chronic appearance.    Moderately advanced multilevel degenerative changes of the lumbar spine as detailed above.  Further evaluation and follow-up could be performed with MRI when clinically appropriate if there are no patient contraindications.    Significant volume bilateral pleural effusion with adjacent compressive atelectasis and/or consolidation.      Electronically signed by: Angel Kitchen MD  Date:    06/15/2024  Time:    17:32               Narrative:    EXAMINATION:  CT LUMBAR SPINE WITHOUT CONTRAST    CLINICAL HISTORY:  l2 fracture;    TECHNIQUE:  Low-dose axial, sagittal and coronal reformations are obtained through the lumbar spine.  Contrast was not administered.    COMPARISON:  Chest radiograph 09/24/2021    FINDINGS:  There is mild nonspecific diffuse heterogeneity of the visualized osseous structures which may relate to underlying osteopenia.   Clinical correlation is advised.  There are mild-to-moderate compression deformities of the T9 and T10 vertebral bodies.  There is a mild superior endplate compression deformity of the T12 vertebral body.  There is a moderate compression deformity of the L1 vertebral body.  There is a recent appearing mild to moderate compression fracture of the L2 vertebral body.  Fracture lucency is noted through the anterior-superior endplate.  There is minimal retropulsion with mild effacement of the anterior thecal sac.  The L3, L4, and L5 vertebral body heights appear well maintained without significant height loss appreciated allowing for underlying osteopenia.    There is minimal grade 1 anterolisthesis of L4 on L5. There is mild intervertebral disc space height loss most pronounced at L5-S1.    There are degenerative changes of the lumbar spine as detailed below:    T12-L1: Broad-based disc bulge and mild ligamentum flavum thickening.  Mild bilateral neural foraminal narrowing.    L1-L2: Circumferential disc bulge, ligamentum flavum thickening, and bilateral facet arthropathy.  Mild spinal canal stenosis and moderate bilateral neural foraminal narrowing.    L2-L3: Circumferential disc bulge, ligamentum flavum thickening, and bilateral facet arthropathy.  Moderate to severe spinal canal stenosis.  Moderate bilateral neural foraminal narrowing.    L3-L4: Circumferential disc bulge, ligamentum flavum thickening, and bilateral facet arthropathy.  Moderate spinal canal stenosis.  Moderate bilateral neural foraminal narrowing.    L4-L5: Circumferential disc bulge, ligamentum flavum thickening, and bilateral facet arthropathy.  Moderate spinal canal stenosis.  Severe moderate to severe bilateral neural foraminal narrowing.    L5-S1: Broad-based circumferential disc bulge, asymmetric to the right.  Moderate to severe bilateral neural foraminal narrowing.    No grossly displaced sacral fracture identified.  There are degenerative  changes of bilateral sacroiliac joints.    Limited views of the posterior abdomen demonstrate significant volume layering bilateral pleural effusions with associated compressive atelectasis at the visualized lung bases.  There is a right renal cyst.  There is prominent aortic atherosclerosis.  No retroperitoneal hematoma identified.                                       X-Ray Hips Bilateral 2 View Incl AP Pelvis (Final result)  Result time 06/15/24 15:57:04      Final result by Francisco Nicholson MD (06/15/24 15:57:04)                   Impression:      No acute abnormality of bilateral hips.      Electronically signed by: Francisco Nicholson  Date:    06/15/2024  Time:    15:57               Narrative:    EXAMINATION:  XR HIPS BILATERAL 2 VIEW INCL AP PELVIS    CLINICAL HISTORY:  Dorsalgia, unspecified    FINDINGS:  AP pelvis and two views of bilateral hips show no fracture, dislocation, or destructive osseous lesion.  Bilateral hip joint spaces are maintained.  Pubic symphysis and sacroiliac joints are maintained.  Surgical clips lie in left inguinal region.  Vascular calcifications are present.  Stool and bowel gas partially obscures the lower lumbar spine and sacrum and coccyx.                                       X-Ray Lumbar Spine 5 View (Final result)  Result time 06/15/24 14:16:08   Procedure changed from X-Ray Lumbar Spine Ap And Lateral     Final result by Francisco Nicholson MD (06/15/24 14:16:08)                   Impression:      1. Age indeterminate superior endplate L2 compression fracture which has progressed since 2019.  Correlation for symptoms at this level is requested.  2. Compression fractures of T10, T11, T12, and L1, not significantly changed since 2016.  3. Moderate L5-S1 disc degeneration.      Electronically signed by: Francisco Nicholson  Date:    06/15/2024  Time:    14:16               Narrative:    EXAMINATION:  XR LUMBAR SPINE COMPLETE 5 VIEW    CLINICAL HISTORY:  fall;    FINDINGS:  Five views of lumbar  spine show normal lumbosacral alignment.    Compression fractures of T10, T11, T12, L1, and L2 are evident.  T10, T11, T12, and L1 compression fractures have not significantly changed since CT 04/24/2019.  Superior endplate of L2 compression fracture has progressed since that time pre wall inferior endplate compression fracture of L2 is unchanged.    Moderate L5-S1 disc degeneration is evident.  Remaining lumbar intervertebral disc space heights are relatively well maintained.    Sacroiliac joints are normal. Arterial vascular calcifications are present.  Postsurgical changes of CABG are suggested 12 partially visualize, along with cardiac pacing leads.  Mild-to-moderate stool and moderate bowel gas are present throughout the abdomen, limiting evaluation.                                       X-Ray Chest AP Portable (Final result)  Result time 06/15/24 14:18:32      Final result by Francisco Nicholson MD (06/15/24 14:18:32)                   Impression:      1. Trace bilateral pleural effusions.  2. Improvement of interstitial edema and central pulmonary vascular prominence.      Electronically signed by: Francisco Nicholson  Date:    06/15/2024  Time:    14:18               Narrative:    EXAMINATION:  XR CHEST AP PORTABLE    CLINICAL HISTORY:  hypoxia;    FINDINGS:  Portable chest at 1254 compared with 12/29/2024 shows unchanged borderline enlarged cardiac silhouette size.  Mediastinal contours are unchanged with postsurgical changes of CABG.  Prior aortic valve replacement noted.  Left transvenous pacer unchanged.    Lung volumes are low.  Blunting of the costophrenic angles bilaterally suggest trace pleural effusions.  Prior central pulmonary vascular prominence and central interstitial opacities have significantly improved.  Minimal bibasilar interstitial opacities persist.  Minimal calcification suggested along the right diaphragm and in right hemithorax suggesting pleural plaques.  No definite acute osseous abnormality  identified.  Bones are diffusely demineralized, limiting evaluation.                                       Medications   citalopram tablet 20 mg (has no administration in time range)   clopidogreL tablet 75 mg (has no administration in time range)   apixaban tablet 5 mg (has no administration in time range)   sacubitriL-valsartan 24-26 mg per tablet 1 tablet (has no administration in time range)   ezetimibe tablet 10 mg (has no administration in time range)   nitroGLYCERIN 0.4 MG/DOSE TL SPRY 400 mcg/spray spray 1 spray (has no administration in time range)   pantoprazole EC tablet 20 mg (has no administration in time range)   phenytoin (DILANTIN) ER capsule 400 mg (has no administration in time range)   furosemide injection 40 mg (has no administration in time range)   sodium chloride 0.9% flush 10 mL (has no administration in time range)   melatonin tablet 6 mg (has no administration in time range)   ondansetron injection 4 mg (has no administration in time range)   acetaminophen tablet 650 mg (has no administration in time range)   aluminum-magnesium hydroxide-simethicone 200-200-20 mg/5 mL suspension 30 mL (has no administration in time range)   acetaminophen tablet 650 mg (has no administration in time range)   potassium bicarbonate disintegrating tablet 50 mEq (has no administration in time range)   potassium bicarbonate disintegrating tablet 35 mEq (has no administration in time range)   potassium bicarbonate disintegrating tablet 60 mEq (has no administration in time range)   magnesium oxide tablet 800 mg (has no administration in time range)   magnesium oxide tablet 800 mg (has no administration in time range)   potassium, sodium phosphates 280-160-250 mg packet 2 packet (has no administration in time range)   potassium, sodium phosphates 280-160-250 mg packet 2 packet (has no administration in time range)   potassium, sodium phosphates 280-160-250 mg packet 2 packet (has no administration in time range)    metoprolol succinate (TOPROL-XL) 24 hr tablet 100 mg (has no administration in time range)   HYDROcodone-acetaminophen 5-325 mg per tablet 1 tablet (has no administration in time range)   HYDROmorphone injection 0.5 mg (has no administration in time range)   LIDOcaine 5 % patch 1 patch (has no administration in time range)   morphine injection 4 mg (4 mg Intravenous Given 6/15/24 1423)     Medical Decision Making  78-year-old male extensive cardiac history including heart attack, stents, CHF presents with low back pain after a fall yesterday.  X-ray seems to demonstrate a new L2 fracture.  Pain is improvement morphine.  He has a new oxygen requirement however.  Hypoxic at triage sats in the upper 80s.  This has improved with supplemental oxygen.  It does look like he had a prior episode of hypoxia in the hospital several months ago.  BNP is markedly elevated but patient denies any significant change in his shortness of breath and chest x-ray looks improved from prior.  However given his age and difficulty ambulating and abnormal labs and new oxygen requirement he will be admitted.    Amount and/or Complexity of Data Reviewed  Independent Historian: caregiver  External Data Reviewed: labs, radiology, ECG and notes.  Labs:  Decision-making details documented in ED Course.  Radiology: ordered. Decision-making details documented in ED Course.  ECG/medicine tests:  Decision-making details documented in ED Course.    Risk  Prescription drug management.  Parenteral controlled substances.  Decision regarding hospitalization.               ED Course as of 06/15/24 1743   Sat Dawit 15, 2024   1357 BP: 100/69 [EF]   1357 Temp: 97.9 °F (36.6 °C) [EF]   1357 Temp Source: Oral [EF]   1357 Pulse: 87 [EF]   1357 Resp: 20 [EF]   1357 SpO2(!): 88 % [EF]   1357 SpO2(!): 94 % [EF]   1358 WBC: 12.41 [EF]   1358 Hemoglobin(!): 12.7 [EF]   1358 Platelet Count: 189 [EF]   1359 Atrial sensed ventricular paced rhythm 90 beats per minute  unchanged from prior EKGs independently interpreted [EF]   1413 Daughter would like hip x-rays.  Patient has no hip pain at all and has a full range of motion without pain.  I advised her very unlikely to have any hip pathology without any pain but she would still like x-rays so these will be ordered. [EF]   1414 BNP(!): 1,892 [EF]   1420 X-Ray Lumbar Spine 5 View [EF]   1421 X-Ray Chest AP Portable [EF]   1602 X-Ray Hips Bilateral 2 View Incl AP Pelvis [EF]   1626 Dr Zendejas to admit [EF]      ED Course User Index  [EF] Partha Mckee MD                           Clinical Impression:  Final diagnoses:  [R09.02] Hypoxia  [M54.9] Back pain          ED Disposition Condition    Observation Stable                Partha Mckee MD  06/15/24 [id number]

## 2024-06-15 NOTE — Clinical Note
Diagnosis: Hypoxia [857383]   Future Attending Provider: KINGS MONTILLA [[id number]]   Place in Observation: [facility] [[id number]]   Special Needs:: No Special Needs [1]

## 2024-06-16 ENCOUNTER — CLINICAL SUPPORT (OUTPATIENT)
Dept: CARDIOLOGY | Facility: HOSPITAL | Age: 79
End: 2024-06-16
Attending: INTERNAL MEDICINE
Payer: MEDICARE

## 2024-06-16 VITALS — HEIGHT: 66 IN | WEIGHT: 165 LBS | BODY MASS INDEX: 26.52 KG/M2

## 2024-06-16 PROBLEM — A41.9 SEVERE SEPSIS: Status: ACTIVE | Noted: 2024-06-16

## 2024-06-16 PROBLEM — R65.20 SEVERE SEPSIS: Status: ACTIVE | Noted: 2024-06-16

## 2024-06-16 LAB
ALBUMIN SERPL BCP-MCNC: 3.6 G/DL (ref 3.5–5.2)
ALP SERPL-CCNC: 79 U/L (ref 55–135)
ALT SERPL W/O P-5'-P-CCNC: 7 U/L (ref 10–44)
ANION GAP SERPL CALC-SCNC: 10 MMOL/L (ref 8–16)
AORTIC ROOT ANNULUS: 3.6 CM
APTT PPP: 40.9 SEC (ref 21–32)
AST SERPL-CCNC: 14 U/L (ref 10–40)
AV INDEX (PROSTH): 0.75
AV MEAN GRADIENT: 3 MMHG
AV PEAK GRADIENT: 6 MMHG
AV VALVE AREA BY VELOCITY RATIO: 2.27 CM²
AV VALVE AREA: 2.36 CM²
AV VELOCITY RATIO: 0.72
BASOPHILS # BLD AUTO: 0.04 K/UL (ref 0–0.2)
BASOPHILS # BLD AUTO: 0.05 K/UL (ref 0–0.2)
BASOPHILS NFR BLD: 0.5 % (ref 0–1.9)
BASOPHILS NFR BLD: 0.5 % (ref 0–1.9)
BILIRUB SERPL-MCNC: 1.9 MG/DL (ref 0.1–1)
BILIRUB UR QL STRIP: NEGATIVE
BSA FOR ECHO PROCEDURE: 1.87 M2
BUN SERPL-MCNC: 18 MG/DL (ref 8–23)
CALCIUM SERPL-MCNC: 8.3 MG/DL (ref 8.7–10.5)
CHLORIDE SERPL-SCNC: 93 MMOL/L (ref 95–110)
CLARITY UR: CLEAR
CO2 SERPL-SCNC: 28 MMOL/L (ref 23–29)
COLOR UR: YELLOW
CREAT SERPL-MCNC: 1.2 MG/DL (ref 0.5–1.4)
CV ECHO LV RWT: 0.55 CM
DIFFERENTIAL METHOD BLD: ABNORMAL
DIFFERENTIAL METHOD BLD: ABNORMAL
DOP CALC AO PEAK VEL: 1.26 M/S
DOP CALC AO VTI: 19.8 CM
DOP CALC LVOT AREA: 3.1 CM2
DOP CALC LVOT DIAMETER: 2 CM
DOP CALC LVOT PEAK VEL: 0.91 M/S
DOP CALC LVOT STROKE VOLUME: 46.79 CM3
DOP CALC MV VTI: 39.4 CM
DOP CALCLVOT PEAK VEL VTI: 14.9 CM
E WAVE DECELERATION TIME: 239 MSEC
E/A RATIO: 0.76
E/E' RATIO: 26 M/S
ECHO LV POSTERIOR WALL: 1.4 CM (ref 0.6–1.1)
EOSINOPHIL # BLD AUTO: 0.2 K/UL (ref 0–0.5)
EOSINOPHIL # BLD AUTO: 0.2 K/UL (ref 0–0.5)
EOSINOPHIL NFR BLD: 2.2 % (ref 0–8)
EOSINOPHIL NFR BLD: 2.5 % (ref 0–8)
ERYTHROCYTE [DISTWIDTH] IN BLOOD BY AUTOMATED COUNT: 14.8 % (ref 11.5–14.5)
ERYTHROCYTE [DISTWIDTH] IN BLOOD BY AUTOMATED COUNT: 14.8 % (ref 11.5–14.5)
EST. GFR  (NO RACE VARIABLE): >60 ML/MIN/1.73 M^2
FRACTIONAL SHORTENING: 29 % (ref 28–44)
GLUCOSE SERPL-MCNC: 103 MG/DL (ref 70–110)
GLUCOSE UR QL STRIP: NEGATIVE
HCT VFR BLD AUTO: 34.1 % (ref 40–54)
HCT VFR BLD AUTO: 37.3 % (ref 40–54)
HGB BLD-MCNC: 11.4 G/DL (ref 14–18)
HGB BLD-MCNC: 12.6 G/DL (ref 14–18)
HGB UR QL STRIP: NEGATIVE
IMM GRANULOCYTES # BLD AUTO: 0.05 K/UL (ref 0–0.04)
IMM GRANULOCYTES # BLD AUTO: 0.05 K/UL (ref 0–0.04)
IMM GRANULOCYTES NFR BLD AUTO: 0.5 % (ref 0–0.5)
IMM GRANULOCYTES NFR BLD AUTO: 0.6 % (ref 0–0.5)
INR PPP: 1.1 (ref 0.8–1.2)
INTERVENTRICULAR SEPTUM: 1.2 CM (ref 0.6–1.1)
KETONES UR QL STRIP: NEGATIVE
LEFT INTERNAL DIMENSION IN SYSTOLE: 3.6 CM (ref 2.1–4)
LEFT VENTRICLE DIASTOLIC VOLUME INDEX: 67.29 ML/M2
LEFT VENTRICLE DIASTOLIC VOLUME: 123.81 ML
LEFT VENTRICLE MASS INDEX: 147 G/M2
LEFT VENTRICLE SYSTOLIC VOLUME INDEX: 29.6 ML/M2
LEFT VENTRICLE SYSTOLIC VOLUME: 54.43 ML
LEFT VENTRICULAR INTERNAL DIMENSION IN DIASTOLE: 5.1 CM (ref 3.5–6)
LEFT VENTRICULAR MASS: 270.07 G
LEUKOCYTE ESTERASE UR QL STRIP: NEGATIVE
LV LATERAL E/E' RATIO: 23.4 M/S
LV SEPTAL E/E' RATIO: 29.25 M/S
LVOT MG: 2 MMHG
LVOT MV: 0.58 CM/S
LYMPHOCYTES # BLD AUTO: 0.8 K/UL (ref 1–4.8)
LYMPHOCYTES # BLD AUTO: 1 K/UL (ref 1–4.8)
LYMPHOCYTES NFR BLD: 12.1 % (ref 18–48)
LYMPHOCYTES NFR BLD: 8.4 % (ref 18–48)
MAGNESIUM SERPL-MCNC: 1.6 MG/DL (ref 1.6–2.6)
MCH RBC QN AUTO: 28.1 PG (ref 27–31)
MCH RBC QN AUTO: 28.5 PG (ref 27–31)
MCHC RBC AUTO-ENTMCNC: 33.4 G/DL (ref 32–36)
MCHC RBC AUTO-ENTMCNC: 33.8 G/DL (ref 32–36)
MCV RBC AUTO: 84 FL (ref 82–98)
MCV RBC AUTO: 84 FL (ref 82–98)
MONOCYTES # BLD AUTO: 0.9 K/UL (ref 0.3–1)
MONOCYTES # BLD AUTO: 1.2 K/UL (ref 0.3–1)
MONOCYTES NFR BLD: 11.4 % (ref 4–15)
MONOCYTES NFR BLD: 12.4 % (ref 4–15)
MV MEAN GRADIENT: 7 MMHG
MV PEAK A VEL: 1.53 M/S
MV PEAK E VEL: 1.17 M/S
MV PEAK GRADIENT: 11 MMHG
MV STENOSIS PRESSURE HALF TIME: 62 MS
MV VALVE AREA BY CONTINUITY EQUATION: 1.19 CM2
MV VALVE AREA P 1/2 METHOD: 3.55 CM2
NEUTROPHILS # BLD AUTO: 5.9 K/UL (ref 1.8–7.7)
NEUTROPHILS # BLD AUTO: 7.1 K/UL (ref 1.8–7.7)
NEUTROPHILS NFR BLD: 73.2 % (ref 38–73)
NEUTROPHILS NFR BLD: 75.7 % (ref 38–73)
NITRITE UR QL STRIP: NEGATIVE
NRBC BLD-RTO: 0 /100 WBC
NRBC BLD-RTO: 0 /100 WBC
PH UR STRIP: 6 [PH] (ref 5–8)
PISA MRMAX VEL: 4.78 M/S
PISA TR MAX VEL: 3.2 M/S
PLATELET # BLD AUTO: 150 K/UL (ref 150–450)
PLATELET # BLD AUTO: 166 K/UL (ref 150–450)
PMV BLD AUTO: 10.7 FL (ref 9.2–12.9)
PMV BLD AUTO: 10.7 FL (ref 9.2–12.9)
POTASSIUM SERPL-SCNC: 3.4 MMOL/L (ref 3.5–5.1)
PROT SERPL-MCNC: 6.4 G/DL (ref 6–8.4)
PROT UR QL STRIP: NEGATIVE
PROTHROMBIN TIME: 12.4 SEC (ref 9–12.5)
PV MV: 0.71 M/S
PV PEAK GRADIENT: 4 MMHG
PV PEAK VELOCITY: 1.04 M/S
RBC # BLD AUTO: 4.05 M/UL (ref 4.6–6.2)
RBC # BLD AUTO: 4.42 M/UL (ref 4.6–6.2)
SODIUM SERPL-SCNC: 131 MMOL/L (ref 136–145)
SP GR UR STRIP: 1.01 (ref 1–1.03)
TDI LATERAL: 0.05 M/S
TDI SEPTAL: 0.04 M/S
TDI: 0.05 M/S
TR MAX PG: 41 MMHG
TR MEAN GRADIENT: 27 MMHG
TRICUSPID ANNULAR PLANE SYSTOLIC EXCURSION: 0.76 CM
TROPONIN I SERPL HS-MCNC: 949.1 PG/ML (ref 0–14.9)
URN SPEC COLLECT METH UR: ABNORMAL
UROBILINOGEN UR STRIP-ACNC: ABNORMAL EU/DL
WBC # BLD AUTO: 8.04 K/UL (ref 3.9–12.7)
WBC # BLD AUTO: 9.31 K/UL (ref 3.9–12.7)
Z-SCORE OF LEFT VENTRICULAR DIMENSION IN END DIASTOLE: -0.01
Z-SCORE OF LEFT VENTRICULAR DIMENSION IN END SYSTOLE: 1.05

## 2024-06-16 PROCEDURE — 94799 UNLISTED PULMONARY SVC/PX: CPT

## 2024-06-16 PROCEDURE — 36415 COLL VENOUS BLD VENIPUNCTURE: CPT | Performed by: INTERNAL MEDICINE

## 2024-06-16 PROCEDURE — 99900035 HC TECH TIME PER 15 MIN (STAT)

## 2024-06-16 PROCEDURE — 93005 ELECTROCARDIOGRAM TRACING: CPT | Performed by: INTERNAL MEDICINE

## 2024-06-16 PROCEDURE — 93306 TTE W/DOPPLER COMPLETE: CPT

## 2024-06-16 PROCEDURE — 25000003 PHARM REV CODE 250: Performed by: INTERNAL MEDICINE

## 2024-06-16 PROCEDURE — 27000221 HC OXYGEN, UP TO 24 HOURS

## 2024-06-16 PROCEDURE — 85730 THROMBOPLASTIN TIME PARTIAL: CPT | Performed by: INTERNAL MEDICINE

## 2024-06-16 PROCEDURE — 83735 ASSAY OF MAGNESIUM: CPT | Performed by: INTERNAL MEDICINE

## 2024-06-16 PROCEDURE — 94761 N-INVAS EAR/PLS OXIMETRY MLT: CPT

## 2024-06-16 PROCEDURE — 63600175 PHARM REV CODE 636 W HCPCS: Performed by: INTERNAL MEDICINE

## 2024-06-16 PROCEDURE — 81003 URINALYSIS AUTO W/O SCOPE: CPT | Performed by: INTERNAL MEDICINE

## 2024-06-16 PROCEDURE — 85610 PROTHROMBIN TIME: CPT | Performed by: INTERNAL MEDICINE

## 2024-06-16 PROCEDURE — 85025 COMPLETE CBC W/AUTO DIFF WBC: CPT | Performed by: INTERNAL MEDICINE

## 2024-06-16 PROCEDURE — 84484 ASSAY OF TROPONIN QUANT: CPT | Performed by: INTERNAL MEDICINE

## 2024-06-16 PROCEDURE — 93010 ELECTROCARDIOGRAM REPORT: CPT | Mod: ,,, | Performed by: INTERNAL MEDICINE

## 2024-06-16 PROCEDURE — 99900031 HC PATIENT EDUCATION (STAT)

## 2024-06-16 PROCEDURE — 99406 BEHAV CHNG SMOKING 3-10 MIN: CPT

## 2024-06-16 PROCEDURE — 80053 COMPREHEN METABOLIC PANEL: CPT | Performed by: INTERNAL MEDICINE

## 2024-06-16 PROCEDURE — 21400001 HC TELEMETRY ROOM

## 2024-06-16 PROCEDURE — 85025 COMPLETE CBC W/AUTO DIFF WBC: CPT | Mod: 91 | Performed by: INTERNAL MEDICINE

## 2024-06-16 RX ORDER — MUPIROCIN 20 MG/G
OINTMENT TOPICAL 2 TIMES DAILY
Status: COMPLETED | OUTPATIENT
Start: 2024-06-16 | End: 2024-06-21

## 2024-06-16 RX ORDER — HEPARIN SODIUM,PORCINE/D5W 25000/250
12 INTRAVENOUS SOLUTION INTRAVENOUS CONTINUOUS
Status: DISCONTINUED | OUTPATIENT
Start: 2024-06-16 | End: 2024-06-16 | Stop reason: SDDI

## 2024-06-16 RX ADMIN — Medication 800 MG: at 06:06

## 2024-06-16 RX ADMIN — FUROSEMIDE 40 MG: 10 INJECTION, SOLUTION INTRAVENOUS at 08:06

## 2024-06-16 RX ADMIN — HEPARIN SODIUM 12 UNITS/KG/HR: 10000 INJECTION, SOLUTION INTRAVENOUS at 11:06

## 2024-06-16 RX ADMIN — METOPROLOL SUCCINATE 100 MG: 50 TABLET, FILM COATED, EXTENDED RELEASE ORAL at 08:06

## 2024-06-16 RX ADMIN — Medication 800 MG: at 08:06

## 2024-06-16 RX ADMIN — EZETIMIBE 10 MG: 10 TABLET ORAL at 08:06

## 2024-06-16 RX ADMIN — POTASSIUM BICARBONATE 35 MEQ: 391 TABLET, EFFERVESCENT ORAL at 06:06

## 2024-06-16 RX ADMIN — PHENYTOIN SODIUM 400 MG: 100 CAPSULE ORAL at 08:06

## 2024-06-16 RX ADMIN — MEROPENEM 1 G: 1 INJECTION, POWDER, FOR SOLUTION INTRAVENOUS at 09:06

## 2024-06-16 RX ADMIN — FUROSEMIDE 40 MG: 10 INJECTION, SOLUTION INTRAVENOUS at 09:06

## 2024-06-16 RX ADMIN — PANTOPRAZOLE SODIUM 20 MG: 20 TABLET, DELAYED RELEASE ORAL at 06:06

## 2024-06-16 RX ADMIN — SACUBITRIL AND VALSARTAN 1 TABLET: 24; 26 TABLET, FILM COATED ORAL at 08:06

## 2024-06-16 RX ADMIN — LIDOCAINE 5% 1 PATCH: 700 PATCH TOPICAL at 05:06

## 2024-06-16 RX ADMIN — MUPIROCIN 1 G: 20 OINTMENT TOPICAL at 09:06

## 2024-06-16 RX ADMIN — MEROPENEM 1 G: 1 INJECTION, POWDER, FOR SOLUTION INTRAVENOUS at 08:06

## 2024-06-16 RX ADMIN — PANTOPRAZOLE SODIUM 20 MG: 20 TABLET, DELAYED RELEASE ORAL at 05:06

## 2024-06-16 RX ADMIN — POTASSIUM BICARBONATE 35 MEQ: 391 TABLET, EFFERVESCENT ORAL at 08:06

## 2024-06-16 NOTE — ASSESSMENT & PLAN NOTE
Mostly from CHF flare  Maintain iv diuresis  Need home oxygen evaluation before discharge from hospital

## 2024-06-16 NOTE — PROGRESS NOTES
VANCOMYCIN PHARMACOKINETIC NOTE:  Vancomycin Day # 1    Objective/Assessment:    Diagnosis/Indication for Vancomycin:Bacteremia      78 y.o., male; Actual Body Weight = 75.6 kg (166 lb 10.7 oz).    The patient has the following labs:  6/15/2024 Estimated Creatinine Clearance: 49.1 mL/min (based on SCr of 1.1 mg/dL). Lab Results   Component Value Date    BUN 17 06/15/2024     Lab Results   Component Value Date    WBC 12.41 06/15/2024          Plan:  Adjust vancomycin dose and/or frequency based on the patient's actual weight and renal function:  Initiate Vancomycin 1250 mg IV every 24 hours following 1500 mg loading dose.  Orders have been entered into patient's chart.        Vancomycin trough level has been ordered for 2300 on 06/17.    Pharmacy will manage vancomycin therapy, monitor serum vancomycin levels, monitor renal function and adjust regimen as necessary.    Thank you for allowing us to participate in this patient's care.     Sabina Berkowitz 6/15/2024   Department of Pharmacy  Ext [phone]

## 2024-06-16 NOTE — ED NOTES
RN to BS to evaluate patient/ administer medications. Patient complains of feeling hot. Patient requesting to sit up on side of the bed. Patient assisted to seated position. Patient anxious and confused. When asked what year it is patient answers 1945. Patient was able to answer this correctly earlier, was not confused. Patient fidgety. Patient reporting nausea, Zofran administered. Patient reports no relief from Zofran following administration. Able to get patient back into bed, repositioned in bed for comfort. Patient remains confused and fidgety. Patient denies pain at this time, only complaint at this time is nausea. Patient has cool towel on forehead and family is fanning patient. Following getting patient back into bed patient then reports feeling cold. No fever noted, temperature checked orally x 2.

## 2024-06-16 NOTE — ASSESSMENT & PLAN NOTE
Trend troponin.  Consult cardiologist.  Follow 2 D ECHO.  Start heparin gtt if ok with neurologist. Subdural hygroma - does not appear to be SDH.  Continue cardiac meds. Patient is CP free.

## 2024-06-16 NOTE — ASSESSMENT & PLAN NOTE
Pt developed confusion after he came  back from CT room  Ordered blood and urine cultures  Afebrile   Pulling out lines  Received iv morphine in ER upon arrival as per records  Pt has h/o seizure disorder and on phenytoin   Ordered CT brain

## 2024-06-16 NOTE — ASSESSMENT & PLAN NOTE
Maintain iv lasix at the moment   Recent ECHO reviewed   Latest Reference Range & Units 02/09/24 10:09 02/29/24 07:50 06/15/24 13:32   BNP 0 - 99 pg/mL 958 (H) 1,720 (H) 1,892 (H)

## 2024-06-16 NOTE — SUBJECTIVE & OBJECTIVE
Interval History: Tmax 102.8 F. Patient admitted after fall at home with back pain. Patient is confused and but answers questions appropriately. No focal subjective complaints except lower back pain but no lower extremity weakness or numbness.     Review of Systems   Unable to perform ROS: Mental status change     Objective:     Vital Signs (Most Recent):  Temp: 99 °F (37.2 °C) (06/16/24 0700)  Pulse: 93 (06/16/24 1000)  Resp: (!) 25 (06/16/24 1000)  BP: 130/62 (06/16/24 1000)  SpO2: 95 % (06/16/24 1000) Vital Signs (24h Range):  Temp:  [97.9 °F (36.6 °C)-102.8 °F (39.3 °C)] 99 °F (37.2 °C)  Pulse:  [] 93  Resp:  [18-38] 25  SpO2:  [88 %-100 %] 95 %  BP: ()/() 130/62     Weight: 75.6 kg (166 lb 10.7 oz)  Body mass index is 26.9 kg/m².    Intake/Output Summary (Last 24 hours) at 6/16/2024 1045  Last data filed at 6/16/2024 1000  Gross per 24 hour   Intake 740.05 ml   Output 400 ml   Net 340.05 ml         Physical Exam  Vitals and nursing note reviewed.   Constitutional:       Appearance: Normal appearance. He is well-developed.   HENT:      Head: Atraumatic.      Right Ear: External ear normal.      Left Ear: External ear normal.      Nose: Nose normal.      Mouth/Throat:      Mouth: Mucous membranes are moist.   Eyes:      Extraocular Movements: Extraocular movements intact.   Cardiovascular:      Rate and Rhythm: Normal rate.   Pulmonary:      Effort: Pulmonary effort is normal.   Abdominal:      Palpations: Abdomen is soft.   Musculoskeletal:         General: Normal range of motion.      Cervical back: Full passive range of motion without pain and normal range of motion.   Skin:     General: Skin is warm.   Neurological:      Mental Status: He is disoriented.             Significant Labs: All pertinent labs within the past 24 hours have been reviewed.  CBC:   Recent Labs   Lab 06/15/24  1332 06/16/24  0410   WBC 12.41 8.04   HGB 12.7* 11.4*   HCT 38.3* 34.1*    150     CMP:   Recent Labs  "  Lab 06/15/24  1332 06/16/24  0410   * 131*   K 3.7 3.4*   CL 92* 93*   CO2 26 28   * 103   BUN 17 18   CREATININE 1.1 1.2   CALCIUM 9.1 8.3*   PROT 7.3 6.4   ALBUMIN 4.1 3.6   BILITOT 1.9* 1.9*   ALKPHOS 95 79   AST 15 14   ALT 8* 7*   ANIONGAP 12 10     Lactic Acid: No results for input(s): "LACTATE" in the last 48 hours.  Troponin:   Recent Labs   Lab 06/15/24  1332 06/16/24  0410   TROPONINIHS 207.7* 949.1*     TSH:   Recent Labs   Lab 01/24/24  0453   TSH 2.778     Urine Culture:   Recent Labs   Lab 06/15/24  2013   LABURIN No growth to date     Microbiology Results (last 7 days)       Procedure Component Value Units Date/Time    Urine Culture High Risk [[id number]] Collected: 06/15/24 2013    Order Status: Completed Specimen: Urine, Clean Catch Updated: 06/16/24 0920     Urine Culture, Routine No growth to date    Narrative:      Indicated criteria for high risk culture:->Other  Other (specify):->uti/confusion    Blood culture [[id number]] Collected: 06/15/24 2206    Order Status: Completed Specimen: Blood Updated: 06/16/24 0517     Blood Culture, Routine No Growth to date          Significant Imaging:   Imaging Results               CT Head Without Contrast (Final result)  Result time 06/15/24 20:52:14      Final result by Angel Kitchen MD (06/15/24 20:52:14)                   Impression:      Bilateral hypoattenuating extra-axial collections overlying the cerebral convexities, right greater than left.  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  There is mild associated mass effect on the right vertex, although no significant midline shift or hydrocephalus appreciated.  Questionable minimal internal complexity overlying the frontal convexities may be artifactual, although component of subacute blood products not excluded.  Consider short-term CT follow-up.    Chronic senescent and microvascular " ischemic changes.    This report was flagged in Epic as abnormal.      Electronically signed by: Angel Kitchen MD  Date:    06/15/2024  Time:    20:52               Narrative:    EXAMINATION:  CT HEAD WITHOUT CONTRAST    CLINICAL HISTORY:  Mental status change, unknown cause;    TECHNIQUE:  Low dose axial images were obtained through the head.  Coronal and sagittal reformations were also performed. Contrast was not administered.    COMPARISON:  No prior studies are available for comparison.    FINDINGS:  There is generalized cerebral volume loss with compensatory sulcal widening and ventricular enlargement.  There are bilateral hypoattenuating extra-axial collections overlying the cerebral convexities suggestive of subdural hygromas or chronic subdural hematomas.  These measure approximately 1.5 cm on the right (coronal series 6, image 44) and 0.8 cm on the left (coronal series 6, image 30).  There is mild associated mass effect of the right vertex.  There is questionable slight internal complexity overlying the frontal convexities.  This may reflect artifact, although component of subacute blood products not excluded.  There is no significant midline shift.  No evidence to suggest overt hydrocephalus at this time.  There is periventricular white matter hypoattenuation suggesting sequelae of chronic microvascular ischemic change.  The basal cisterns are patent. The mastoid air cells and paranasal sinuses are clear of acute process. The visualized bones of the calvarium demonstrate no acute osseous abnormality.                                       CT Lumbar Spine Without Contrast (Final result)  Result time 06/15/24 17:32:35      Final result by Angel Kitchen MD (06/15/24 17:32:35)                   Impression:      Recent appearing mild-to-moderate compression fracture deformity of the L2 vertebral body.  Multiple additional compression deformities of the T9, T10, T12, and L1 vertebral bodies as above.   These have a somewhat more chronic appearance.    Moderately advanced multilevel degenerative changes of the lumbar spine as detailed above.  Further evaluation and follow-up could be performed with MRI when clinically appropriate if there are no patient contraindications.    Significant volume bilateral pleural effusion with adjacent compressive atelectasis and/or consolidation.      Electronically signed by: Angel Kitchen MD  Date:    06/15/2024  Time:    17:32               Narrative:    EXAMINATION:  CT LUMBAR SPINE WITHOUT CONTRAST    CLINICAL HISTORY:  l2 fracture;    TECHNIQUE:  Low-dose axial, sagittal and coronal reformations are obtained through the lumbar spine.  Contrast was not administered.    COMPARISON:  Chest radiograph 09/24/2021    FINDINGS:  There is mild nonspecific diffuse heterogeneity of the visualized osseous structures which may relate to underlying osteopenia.  Clinical correlation is advised.  There are mild-to-moderate compression deformities of the T9 and T10 vertebral bodies.  There is a mild superior endplate compression deformity of the T12 vertebral body.  There is a moderate compression deformity of the L1 vertebral body.  There is a recent appearing mild to moderate compression fracture of the L2 vertebral body.  Fracture lucency is noted through the anterior-superior endplate.  There is minimal retropulsion with mild effacement of the anterior thecal sac.  The L3, L4, and L5 vertebral body heights appear well maintained without significant height loss appreciated allowing for underlying osteopenia.    There is minimal grade 1 anterolisthesis of L4 on L5. There is mild intervertebral disc space height loss most pronounced at L5-S1.    There are degenerative changes of the lumbar spine as detailed below:    T12-L1: Broad-based disc bulge and mild ligamentum flavum thickening.  Mild bilateral neural foraminal narrowing.    L1-L2: Circumferential disc bulge, ligamentum flavum  thickening, and bilateral facet arthropathy.  Mild spinal canal stenosis and moderate bilateral neural foraminal narrowing.    L2-L3: Circumferential disc bulge, ligamentum flavum thickening, and bilateral facet arthropathy.  Moderate to severe spinal canal stenosis.  Moderate bilateral neural foraminal narrowing.    L3-L4: Circumferential disc bulge, ligamentum flavum thickening, and bilateral facet arthropathy.  Moderate spinal canal stenosis.  Moderate bilateral neural foraminal narrowing.    L4-L5: Circumferential disc bulge, ligamentum flavum thickening, and bilateral facet arthropathy.  Moderate spinal canal stenosis.  Severe moderate to severe bilateral neural foraminal narrowing.    L5-S1: Broad-based circumferential disc bulge, asymmetric to the right.  Moderate to severe bilateral neural foraminal narrowing.    No grossly displaced sacral fracture identified.  There are degenerative changes of bilateral sacroiliac joints.    Limited views of the posterior abdomen demonstrate significant volume layering bilateral pleural effusions with associated compressive atelectasis at the visualized lung bases.  There is a right renal cyst.  There is prominent aortic atherosclerosis.  No retroperitoneal hematoma identified.                                       X-Ray Hips Bilateral 2 View Incl AP Pelvis (Final result)  Result time 06/15/24 15:57:04      Final result by Francisco Nicholson MD (06/15/24 15:57:04)                   Impression:      No acute abnormality of bilateral hips.      Electronically signed by: Francisco Nicholson  Date:    06/15/2024  Time:    15:57               Narrative:    EXAMINATION:  XR HIPS BILATERAL 2 VIEW INCL AP PELVIS    CLINICAL HISTORY:  Dorsalgia, unspecified    FINDINGS:  AP pelvis and two views of bilateral hips show no fracture, dislocation, or destructive osseous lesion.  Bilateral hip joint spaces are maintained.  Pubic symphysis and sacroiliac joints are maintained.  Surgical clips lie in  left inguinal region.  Vascular calcifications are present.  Stool and bowel gas partially obscures the lower lumbar spine and sacrum and coccyx.                                       X-Ray Lumbar Spine 5 View (Final result)  Result time 06/15/24 14:16:08   Procedure changed from X-Ray Lumbar Spine Ap And Lateral     Final result by Francisco Nicholson MD (06/15/24 14:16:08)                   Impression:      1. Age indeterminate superior endplate L2 compression fracture which has progressed since 2019.  Correlation for symptoms at this level is requested.  2. Compression fractures of T10, T11, T12, and L1, not significantly changed since 2016.  3. Moderate L5-S1 disc degeneration.      Electronically signed by: Francisco Nicholson  Date:    06/15/2024  Time:    14:16               Narrative:    EXAMINATION:  XR LUMBAR SPINE COMPLETE 5 VIEW    CLINICAL HISTORY:  fall;    FINDINGS:  Five views of lumbar spine show normal lumbosacral alignment.    Compression fractures of T10, T11, T12, L1, and L2 are evident.  T10, T11, T12, and L1 compression fractures have not significantly changed since CT 04/24/2019.  Superior endplate of L2 compression fracture has progressed since that time pre wall inferior endplate compression fracture of L2 is unchanged.    Moderate L5-S1 disc degeneration is evident.  Remaining lumbar intervertebral disc space heights are relatively well maintained.    Sacroiliac joints are normal. Arterial vascular calcifications are present.  Postsurgical changes of CABG are suggested 12 partially visualize, along with cardiac pacing leads.  Mild-to-moderate stool and moderate bowel gas are present throughout the abdomen, limiting evaluation.                                       X-Ray Chest AP Portable (Final result)  Result time 06/15/24 14:18:32      Final result by Francisco Nicholson MD (06/15/24 14:18:32)                   Impression:      1. Trace bilateral pleural effusions.  2. Improvement of interstitial edema  and central pulmonary vascular prominence.      Electronically signed by: Francisco Nicholson  Date:    06/15/2024  Time:    14:18               Narrative:    EXAMINATION:  XR CHEST AP PORTABLE    CLINICAL HISTORY:  hypoxia;    FINDINGS:  Portable chest at 1254 compared with 12/29/2024 shows unchanged borderline enlarged cardiac silhouette size.  Mediastinal contours are unchanged with postsurgical changes of CABG.  Prior aortic valve replacement noted.  Left transvenous pacer unchanged.    Lung volumes are low.  Blunting of the costophrenic angles bilaterally suggest trace pleural effusions.  Prior central pulmonary vascular prominence and central interstitial opacities have significantly improved.  Minimal bibasilar interstitial opacities persist.  Minimal calcification suggested along the right diaphragm and in right hemithorax suggesting pleural plaques.  No definite acute osseous abnormality identified.  Bones are diffusely demineralized, limiting evaluation.

## 2024-06-16 NOTE — ASSESSMENT & PLAN NOTE
Secondary to sepsis. Pt developed confusion after he came  back from CT room  Ordered blood and urine cultures  Afebrile   Pulling out lines  Received iv morphine in ER upon arrival as per records  Pt has h/o seizure disorder and on phenytoin   CT brain results reviewed.

## 2024-06-16 NOTE — PROGRESS NOTES
Pharmacist Renal Adjustment Note    Jerome Amaro Jr. is a 78 y.o. male being treated with Meropenem.     Patient Data:    Vital Signs (Most Recent):  Temp: (!) 102.8 °F (39.3 °C) (06/15/24 2013)  Pulse: 105 (06/15/24 1830)  Resp: (!) 35 (06/15/24 1830)  BP: (!) 154/74 (06/15/24 2013)  SpO2: (!) 94 % (06/15/24 1830) Vital Signs (72h Range):  Temp:  [97.9 °F (36.6 °C)-102.8 °F (39.3 °C)]   Pulse:  []   Resp:  [18-38]   BP: (100-179)/()   SpO2:  [88 %-97 %]      Recent Labs   Lab 06/15/24  1332   CREATININE 1.1     Serum creatinine: 1.1 mg/dL 06/15/24 1332  Estimated creatinine clearance: 53.7 mL/min    Meropenem 1 g every 8 hours will be changed to Meropenem 1 g every 12 hours due to CrCl 26-50 per Renal Dose Adjustment protocol.    Pharmacist's Name: Sabina Berkowitz  Pharmacist's Extension: [phone]

## 2024-06-16 NOTE — CARE UPDATE
CT Brain results are as follows    Bilateral hypoattenuating extra-axial collections overlying the cerebral convexities, right greater than left.  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  There is mild associated mass effect on the right vertex, although no significant midline shift or hydrocephalus appreciated.  Questionable minimal internal complexity overlying the frontal convexities may be artifactual, although component of subacute blood products not excluded       Plan  Neurosurgeon already consulted  Transfer pt to ICU  Stop Plavix and eliquis  Need repeat CT brain in AM  NV Checks  Consult Neurologist

## 2024-06-16 NOTE — NURSING
Shift notes:    Pt arrived from ED, AAOx4 and appears to be in good spirit. Pt care provided, admission asseeement completed. Pt went to sleep soon after. Easily arousable for q2 neuro vascular check.    Pt remained afebrile but with low-grade temp (99s), Otherwise VSS.    Hospitalist Dr Medeiros notified of increased Troponin from AM lab. No c/o of any discomfort from pt. Order received to do EKG. Result communicated to MD.    Electrolyte replacement initiated. Report given to AM nurse.

## 2024-06-16 NOTE — NURSING
Nurses Note -- 4 Eyes      6/16/2024   3:43 AM      Skin assessed during: Admit      [x] No Altered Skin Integrity Present     Prevention Measures Documented      [] Yes- Altered Skin Integrity Present or Discovered   [] LDA Added if Not in Epic (Describe Wound)   [] New Altered Skin Integrity was Present on Admit and Documented in LDA   [] Wound Image Taken    Wound Care Consulted? No    Attending Nurse:  Lemuel Wheeler RN/Staff Member:  Jeimy SUTTON

## 2024-06-16 NOTE — ASSESSMENT & PLAN NOTE
Chronic, controlled. Latest blood pressure and vitals reviewed-     Temp:  [97.9 °F (36.6 °C)-102.8 °F (39.3 °C)]   Pulse:  []   Resp:  [18-38]   BP: ()/()   SpO2:  [88 %-100 %] .   Home meds for hypertension were reviewed and noted below.   Hypertension Medications               ENTRESTO 24-26 mg per tablet Take 1 tablet by mouth 2 (two) times daily.    furosemide (LASIX) 20 MG tablet Take 20 mg by mouth daily as needed (Fluid).    isosorbide mononitrate (IMDUR) 30 MG 24 hr tablet Take 2 tablets (60 mg total) by mouth once daily.    metoprolol succinate (TOPROL-XL) 100 MG 24 hr tablet Take 100 mg by mouth 2 (two) times daily.    NITROLINGUAL 400 mcg/spray spray Place 1 spray under the tongue every 5 (five) minutes as needed for Chest pain.        Hold ISBD and ARB for now due to low BP.    While in the hospital, will manage blood pressure as follows; Continue home antihypertensive regimen    Will utilize p.r.n. blood pressure medication only if patient's blood pressure greater than 140/90 and he develops symptoms such as worsening chest pain or shortness of breath.

## 2024-06-16 NOTE — ASSESSMENT & PLAN NOTE
Chronic, controlled. Latest blood pressure and vitals reviewed-     Temp:  [97.9 °F (36.6 °C)-99 °F (37.2 °C)]   Pulse:  []   Resp:  [18-38]   BP: (100-179)/()   SpO2:  [88 %-97 %] .   Home meds for hypertension were reviewed and noted below.   Hypertension Medications               ENTRESTO 24-26 mg per tablet Take 1 tablet by mouth 2 (two) times daily.    furosemide (LASIX) 20 MG tablet Take 20 mg by mouth daily as needed (Fluid).    isosorbide mononitrate (IMDUR) 30 MG 24 hr tablet Take 2 tablets (60 mg total) by mouth once daily.    metoprolol succinate (TOPROL-XL) 100 MG 24 hr tablet Take 100 mg by mouth 2 (two) times daily.    NITROLINGUAL 400 mcg/spray spray Place 1 spray under the tongue every 5 (five) minutes as needed for Chest pain.            While in the hospital, will manage blood pressure as follows; Continue home antihypertensive regimen    Will utilize p.r.n. blood pressure medication only if patient's blood pressure greater than 140/90 and he develops symptoms such as worsening chest pain or shortness of breath.

## 2024-06-16 NOTE — ASSESSMENT & PLAN NOTE
Maintain iv lasix at the moment   Recent ECHO reviewed   Latest Reference Range & Units 02/09/24 10:09 02/29/24 07:50 06/15/24 13:32   BNP 0 - 99 pg/mL 958 (H) 1,720 (H) 1,892 (H)   Holding Entersto due to low BP.

## 2024-06-16 NOTE — PLAN OF CARE
[facility]  Initial Discharge Assessment       Primary Care Provider: Magda Ruiz FNP-C    Admission Diagnosis: Hypoxia [R09.02]    Admission Date: 6/15/2024  Expected Discharge Date:     Presented at pt's bedside to complete discharge assessment. Pt AAOx4s. Demographics, PCP, and insurance verified. Pt informed No home health, No dialysis, No Coumadin therapy but take Eliquis. Pt reports ability to complete ADLs with the assistance of a cane and rolling walker . Pt verbalized plan to discharge home via family transport. Pt has no other needs to be addressed at this time.     Transition of Care Barriers: None    Payor: [employer] MANAGED MEDICARE / Plan: [employer] MEDICARE HMO / Product Type: Capitation /     Extended Emergency Contact Information  Primary Emergency Contact: Schwab,Cynthia  Mobile Phone: [phone]  Relation: Daughter  Preferred language: English   needed? No  Secondary Emergency Contact: hoa rhodes  Mobile Phone: [phone]  Relation: Daughter   needed? No    Discharge Plan A: Home  Discharge Plan B: Home with family      [id number] [place] - [address]  [address] [address]  Phone: [phone] Fax: [phone]      Initial Assessment (most recent)       Adult Discharge Assessment - 06/16/24 0926          Discharge Assessment    Assessment Type Discharge Planning Assessment     Confirmed/corrected address, phone number and insurance Yes     Confirmed Demographics Correct on Facesheet     Source of Information patient;health record     Reason For Admission Acute on chronic combined systolic and diastolic heart failure     People in Home alone     Facility Arrived From: Home     Do you expect to return to your current living situation? Yes     Do you have help at home or someone to help you manage your care at home? Yes     Who are your caregiver(s) and their phone number(s)? 1  Schwab,Cynthia  Daughter  [phone]     2  hoa rhodes  Daughter  [phone]    3  schwab,frank  Other  [phone]     Prior to hospitilization cognitive status: Alert/Oriented     Current cognitive status: Alert/Oriented     Walking or Climbing Stairs Difficulty yes     Walking or Climbing Stairs ambulation difficulty, requires equipment     Mobility Management Rolling Walker and cane     Dressing/Bathing Difficulty no     Home Accessibility not wheelchair accessible     Equipment Currently Used at Home cane, straight;walker, rolling     Readmission within 30 days? No     Patient currently being followed by outpatient case management? No     Do you currently have service(s) that help you manage your care at home? No     Do you take prescription medications? No     Do you have prescription coverage? Yes     Coverage [employer] MANAGED MEDICARE - [employer] MEDICARE HMO -     Do you have any problems affording any of your prescribed medications? No     Is the patient taking medications as prescribed? yes     Who is going to help you get home at discharge? 1 Schwab,Cynthia Daughter [phone] 2 hoa rhodes Daughter [phone] 3 schwabmora Other [phone]     How do you get to doctors appointments? car, drives self     Are you on dialysis? No     Do you take coumadin? No   Take Eliquis    Discharge Plan A Home     Discharge Plan B Home with family     DME Needed Upon Discharge  none     Discharge Plan discussed with: Patient     Transition of Care Barriers None

## 2024-06-16 NOTE — CARE UPDATE
Pt now started having febrile episodes in ER   Blood and urine cultures already ordered  CT Brain already ordered  Will start pt on iv abx     Troponinemia seems a chronic issue  EKG showed paced rhythm     Latest Reference Range & Units 02/29/24 07:50 02/29/24 12:04 02/29/24 18:27 03/01/24 00:06 06/15/24 13:32   Troponin I High Sensitivity 0.0 - 14.9 pg/mL 24.8 (H) 1134.7 (HH) 2543.4 (HH) 1893.2 (HH) 207.7 ()

## 2024-06-16 NOTE — CONSULTS
Former[facility]  Department of Neurology  Neurology Consultation Note        PATIENT NAME: Jerome Amaro Jr.  MRN: [id number]  CSN: [id number]      TODAY'S DATE: 06/16/2024  ADMIT DATE: 6/15/2024                            CONSULTING PROVIDER: Romero Simon MD  CONSULT REQUESTED BY: Maicol Zendejas MD      Reason for consult: SDH       History obtained from chart review and the patient.    HPI per EMR: Jerome Amaro Jr. is a 78 y.o. male pt getting admitted with acute CHF and L2 fracture. Pt was started on SQ Lasix inj recently. Pt had a fall and hit his back on ground prior to the day of presentation. Denied hitting head/No loss of consciousness . Pt since then was unable to walk or move around due to pain  Family thought pt had Side effect from SQ Lasix  Today symptoms went worse and he was escorted to ER   Pt was found to be hypoxic in ER which was new to him  Per family , pt is not on home oxygen  Pt was alert and oriented x 3 upon arrival to ER     Neurology consult:  Patient was seen and examined by me.  He is drowsy however wakes up to stimulus and oriented x3.  He states that he had a fall hitting his back and came in with complaints of back pain.  He denies any trauma to the head or losing consciousness with the fall.  He states that he has been having pain in his back and unable to ambulate due to pain.  Denies headache, vision loss, nausea, vomiting or dizziness.    In the ER had a CT of the head which showed subdural hygroma with question of some acute component of blood indicating subdural hematoma.  CT lumbar spine showed mild-to-moderate compression fracture deformity of L2 vertebral body.    In the ER he was found to be hypoxic.  He was admitted to the ICU for further workup and management.    PREVIOUS MEDICAL HISTORY:  Past Medical History:   Diagnosis Date    Anticoagulant long-term use     2014    Aortic valve disease 2014    pig valve    Arthritis     all over    Atrial fibrillation 2/29/2024     Chest pain 07/15/2019    CHF (congestive heart failure)     2014 on meds    Coronary artery disease     2007 cabg    Difficulty swallowing     Heart attack 1990    15 stents    Heart attack 02/01/2024    mild    Heart block     Hyperlipidemia     Hypertension     on meds    Hypothyroidism 2015    on meds    PVD (peripheral vascular disease)     Seizures     last episode 1990 on meds     PREVIOUS SURGICAL HISTORY:  Past Surgical History:   Procedure Laterality Date    AORTOGRAPHY WITH SERIALOGRAPHY N/A 11/16/2021    Procedure: AORTOGRAM, WITH RUNOFF;  Surgeon: Rodrigo Willoughby MD;  Location: [facility] CATH/EP LAB;  Service: Cardiology;  Laterality: N/A;    ARTERIOGRAPHY OF AORTIC ROOT N/A 11/05/2019    Procedure: ARTERIOGRAM, AORTIC ROOT;  Surgeon: Rodrigo Willoughby MD;  Location: [facility] CATH/EP LAB;  Service: Cardiology;  Laterality: N/A;    CARDIAC CATHETERIZATION      CARDIAC VALVE SURGERY      CORONARY ANGIOGRAPHY INCLUDING BYPASS GRAFTS WITH CATHETERIZATION OF LEFT HEART N/A 11/05/2019    Procedure: ANGIOGRAM, CORONARY, INCLUDING BYPASS GRAFT, WITH LEFT HEART CATHETERIZATION;  Surgeon: Rodrigo Willoughby MD;  Location: [facility] CATH/EP LAB;  Service: Cardiology;  Laterality: N/A;    CORONARY ANGIOGRAPHY INCLUDING BYPASS GRAFTS WITH CATHETERIZATION OF LEFT HEART Left 11/03/2020    Procedure: ANGIOGRAM, CORONARY, INCLUDING BYPASS GRAFT, WITH LEFT HEART CATHETERIZATION;  Surgeon: Rodrigo Willoughby MD;  Location: [facility] CATH/EP LAB;  Service: Cardiology;  Laterality: Left;    CORONARY ANGIOGRAPHY INCLUDING BYPASS GRAFTS WITH CATHETERIZATION OF LEFT HEART N/A 09/28/2021    Procedure: ANGIOGRAM, CORONARY, INCLUDING BYPASS GRAFT, WITH LEFT HEART CATHETERIZATION;  Surgeon: Rodrigo Willoughby MD;  Location: [facility] CATH/EP LAB;  Service: Cardiology;  Laterality: N/A;    CORONARY ANGIOGRAPHY INCLUDING BYPASS GRAFTS WITH CATHETERIZATION OF LEFT HEART N/A 12/05/2023    Procedure: ANGIOGRAM, CORONARY, INCLUDING BYPASS GRAFT, WITH LEFT HEART  CATHETERIZATION;  Surgeon: Rodrigo Willoughby MD;  Location: [facility] CATH/EP LAB;  Service: Cardiology;  Laterality: N/A;    CORONARY ANGIOPLASTY      CORONARY ARTERY BYPASS GRAFT      x 2    1991    ESOPHAGOGASTRODUODENOSCOPY N/A 2024    Procedure: EGD (ESOPHAGOGASTRODUODENOSCOPY);  Surgeon: Mal Lockhart III, MD;  Location: [facility] ENDO;  Service: Endoscopy;  Laterality: N/A;    EXTRACORPOREAL SHOCK WAVE LITHOTRIPSY Right 2019    Procedure: LITHOTRIPSY, ESWL;  Surgeon: Williams Ortega MD;  Location: [facility] OR;  Service: Urology;  Laterality: Right;    HEMORRHOID SURGERY      INSERTION OF PACEMAKER      PERCUTANEOUS TRANSLUMINAL BALLOON ANGIOPLASTY OF CORONARY ARTERY N/A 2019    Procedure: Angioplasty-coronary;  Surgeon: Rodrigo Willoughby MD;  Location: [facility] CATH/EP LAB;  Service: Cardiology;  Laterality: N/A;    WRIST FRACTURE SURGERY       FAMILY MEDICAL HISTORY:  Family History   Problem Relation Name Age of Onset    Heart attack Mother      Heart attack Father      Heart disease Sister 2     Stroke Sister 2     Diabetes Sister 2     No Known Problems Maternal Grandmother      No Known Problems Maternal Grandfather      No Known Problems Paternal Grandmother      No Known Problems Paternal Grandfather      No Known Problems Brother      No Known Problems Maternal Aunt      No Known Problems Maternal Uncle      No Known Problems Paternal Aunt      No Known Problems Paternal Uncle      Anemia Neg Hx      Arrhythmia Neg Hx      Asthma Neg Hx      Clotting disorder Neg Hx      Fainting Neg Hx      Heart failure Neg Hx      Hyperlipidemia Neg Hx      Hypertension Neg Hx      Atrial Septal Defect Neg Hx       SOCIAL HISTORY:  Social History     Tobacco Use    Smoking status: Former     Current packs/day: 0.00     Average packs/day: 0.5 packs/day for 4.0 years (2.0 ttl pk-yrs)     Types: Cigarettes     Start date:      Quit date:      Years since quittin.4    Smokeless tobacco:  Never   Substance Use Topics    Alcohol use: Yes     Comment: social    Drug use: No     ALLERGIES:  Review of patient's allergies indicates:   Allergen Reactions    Atorvastatin Other (See Comments)     Muscle pain    Rosuvastatin Other (See Comments)     Muscle pain     HOME MEDICATIONS:  Prior to Admission medications    Medication Sig Start Date End Date Taking? Authorizing Provider   citalopram (CELEXA) 40 MG tablet Take 40 mg by mouth once daily. NEW Rx - NOT YET STARTED 2/27/24  Yes Provider, Historical   clopidogrel (PLAVIX) 75 mg tablet Take 75 mg by mouth once daily.   Yes Provider, Historical   CORLANOR 5 mg Tab Take 1 tablet by mouth 2 (two) times daily. NEW Rx - NOT YET STARTED 2/6/24  Yes Provider, Historical   ELIQUIS 5 mg Tab Take 5 mg by mouth every morning. 1/6/24  Yes Provider, Historical   ENTRESTO 24-26 mg per tablet Take 1 tablet by mouth 2 (two) times daily. 2/7/24  Yes Provider, Historical   ezetimibe (ZETIA) 10 mg tablet Take 10 mg by mouth once daily.   Yes Provider, Historical   furosemide (LASIX) 20 MG tablet Take 20 mg by mouth daily as needed (Fluid).   Yes Provider, Historical   hydrOXYzine pamoate (VISTARIL) 25 MG Cap Take 1 capsule (25 mg total) by mouth daily as needed (anxiety). 12/6/23  Yes Endy Ellison MD   isosorbide mononitrate (IMDUR) 30 MG 24 hr tablet Take 2 tablets (60 mg total) by mouth once daily. 2/11/24 2/10/25 Yes Ana Paula Varghese NP   metoprolol succinate (TOPROL-XL) 100 MG 24 hr tablet Take 100 mg by mouth 2 (two) times daily.   Yes Provider, Historical   phenytoin (DILANTIN) 100 MG ER capsule Take 400 mg by mouth once daily.  10/20/14  Yes Provider, Historical   bismuth subsalicylate (BISMAREX) 262 mg Chew Take 1 tablet by mouth 2 (two) times daily. 2/23/24   Provider, Historical   famotidine (PEPCID) 40 MG tablet Take 40 mg by mouth Daily. 1/23/24 2/29/24  Provider, Historical   NITROLINGUAL 400 mcg/spray spray Place 1 spray under the tongue every 5 (five)  minutes as needed for Chest pain. 9/19/23   Provider, Historical   pantoprazole (PROTONIX) 20 MG tablet Take 20 mg by mouth 2 (two) times daily. 2/22/24   Provider, Historical   ranolazine (RANEXA) 1,000 mg Tb12 Take 1,000 mg by mouth 2 (two) times daily. 5/30/24   Provider, Historical     CURRENT SCHEDULED MEDICATIONS:   ezetimibe  10 mg Oral Daily    furosemide (LASIX) injection  40 mg Intravenous Q12H    LIDOcaine  1 patch Transdermal Q24H    meropenem IV (PEDS and ADULTS)  1 g Intravenous Q12H    metoprolol succinate  100 mg Oral Daily    pantoprazole  20 mg Oral BID    phenytoin  400 mg Oral Daily    sacubitriL-valsartan  1 tablet Oral BID    [START ON 6/17/2024] vancomycin (VANCOCIN) IV (PEDS and ADULTS)  1,500 mg Intravenous Q24H     CURRENT INFUSIONS:    CURRENT PRN MEDICATIONS:    Current Facility-Administered Medications:     acetaminophen, 650 mg, Oral, Q8H PRN    acetaminophen, 650 mg, Oral, Q4H PRN    aluminum-magnesium hydroxide-simethicone, 30 mL, Oral, QID PRN    HYDROcodone-acetaminophen, 1 tablet, Oral, Q6H PRN    HYDROmorphone, 0.5 mg, Intravenous, Q6H PRN    magnesium oxide, 800 mg, Oral, PRN    magnesium oxide, 800 mg, Oral, PRN    melatonin, 6 mg, Oral, Nightly PRN    nitroGLYCERIN 0.4 MG/DOSE TL SPRY, 1 spray, Sublingual, Q5 Min PRN    ondansetron, 4 mg, Intravenous, Q6H PRN    potassium bicarbonate, 35 mEq, Oral, PRN    potassium bicarbonate, 50 mEq, Oral, PRN    potassium bicarbonate, 60 mEq, Oral, PRN    potassium, sodium phosphates, 2 packet, Oral, PRN    potassium, sodium phosphates, 2 packet, Oral, PRN    potassium, sodium phosphates, 2 packet, Oral, PRN    promethazine (PHENERGAN) 6.25 mg in sodium chloride 0.9% 50 mL IVPB, 6.25 mg, Intravenous, Q6H PRN    sodium chloride 0.9%, 10 mL, Intravenous, PRN    Pharmacy to dose Vancomycin consult, , , Once **AND** vancomycin - pharmacy to dose, , Intravenous, pharmacy to manage frequency    REVIEW OF SYSTEMS:  Please refer to the HPI for all  "pertinent positive and negative findings. A comprehensive review of all other systems was negative.       PHYSICAL EXAM:  Patient Vitals for the past 24 hrs:   BP Temp Temp src Pulse Resp SpO2 Height Weight   06/16/24 0900 133/70 -- -- 94 (!) 26 95 % -- --   06/16/24 0830 (!) 142/66 -- -- 94 (!) 24 97 % -- --   06/16/24 0800 -- -- -- 91 (!) 24 97 % -- --   06/16/24 0734 -- -- -- 91 (!) 22 96 % -- --   06/16/24 0700 137/65 99 °F (37.2 °C) Axillary 89 (!) 24 99 % -- --   06/16/24 0600 (!) 113/56 99.1 °F (37.3 °C) Axillary 86 (!) 22 96 % -- --   06/16/24 0500 (!) 116/57 -- -- 85 (!) 23 95 % -- --   06/16/24 0400 (!) 126/58 -- -- 86 (!) 24 97 % -- --   06/16/24 0330 (!) 143/69 99.6 °F (37.6 °C) Axillary 84 (!) 25 100 % -- --   06/16/24 0300 (!) 107/55 -- -- 81 (!) 21 98 % -- --   06/16/24 0200 (!) 98/53 -- -- 79 (!) 21 100 % -- --   06/16/24 0100 (!) 90/55 -- -- 82 20 98 % -- --   06/16/24 0000 124/62 -- -- 86 (!) 21 100 % -- --   06/15/24 2301 (!) 105/59 98.7 °F (37.1 °C) Axillary 88 (!) 22 99 % 5' 6" (1.676 m) 75.6 kg (166 lb 10.7 oz)   06/15/24 2300 (!) 105/59 -- -- 88 (!) 22 99 % -- --   06/15/24 2208 115/62 -- -- 89 -- 98 % -- --   06/15/24 2145 129/67 (!) 101.3 °F (38.5 °C) Rectal -- -- 95 % -- --   06/15/24 2058 (!) 105/58 -- -- 95 -- -- -- --   06/15/24 2033 (!) 141/67 -- -- 101 20 (!) 94 % -- --   06/15/24 2013 (!) 154/74 (!) 102.8 °F (39.3 °C) -- 101 -- (!) 94 % -- --   06/15/24 2010 -- (!) 102.8 °F (39.3 °C) Rectal -- -- -- -- --   06/15/24 1958 (!) 154/74 -- -- (!) 115 -- (!) 91 % -- --   06/15/24 1835 -- 98.7 °F (37.1 °C) Oral -- -- -- -- --   06/15/24 1830 (!) 179/80 -- -- 105 (!) 35 (!) 94 % -- --   06/15/24 1820 (!) 171/99 -- -- 105 (!) 38 (!) 94 % -- --   06/15/24 1810 (!) 170/102 -- -- 110 (!) 34 (!) 93 % -- --   06/15/24 1755 (!) 156/80 99 °F (37.2 °C) Oral (!) 111 (!) 26 (!) 94 % -- --   06/15/24 1427 -- -- -- 86 (!) 22 (!) 93 % -- --   06/15/24 1423 -- -- -- -- 18 -- -- --   06/15/24 1419 -- -- -- " "83 (!) 22 97 % -- --   06/15/24 1231 -- -- -- -- -- (!) 94 % -- --   06/15/24 1223 100/69 97.9 °F (36.6 °C) Oral 87 20 (!) 88 % 5' 6" (1.676 m) 78.5 kg (173 lb)       GENERAL APPEARANCE: Drowsy, well-developed, well-nourished male in no acute distress.  HEENT: Normocephalic and atraumatic. PERRL. Oropharynx unremarkable.  PULM: Normal respiratory effort. No accessory muscle use.  CV: RRR.  ABDOMEN: Soft, nontender.  EXTREMITIES: No obvious signs of vascular compromise. Pulses present. No cyanosis, clubbing or edema.  SKIN: Clear; no rashes, lesions or skin breaks in exposed areas.    NEURO:  MENTAL STATUS:  Drowsy, wakes up to stimulus.  Oriented to self time and place.  Follows commands appropriately.    CRANIAL NERVES:  Pupils equal round and reactive to light, extraocular movements are intact, face is grossly symmetric.    MOTOR:  Bulk normal. Tone normal and symmetric throughout.  Abnormal movements absent.  Tremor: none present.  Strength  4/5 throughout .    REFLEXES:  DTRs 1+ throughout.  Plantar response equivocal bilaterally.  SENSATION: grossly intact throughout.  COORDINATION: normal finger-to-nose.  STATION: not tested.  GAIT: not tested.      Labs:  Recent Labs   Lab 06/15/24  1332 06/16/24  0410   * 131*   K 3.7 3.4*   CL 92* 93*   CO2 26 28   BUN 17 18   CREATININE 1.1 1.2   * 103   CALCIUM 9.1 8.3*   MG 1.7 1.6     Recent Labs   Lab 06/15/24  1332 06/16/24  0410   WBC 12.41 8.04   HGB 12.7* 11.4*   HCT 38.3* 34.1*    150     Recent Labs   Lab 06/15/24  1332 06/16/24  0410   ALBUMIN 4.1 3.6   PROT 7.3 6.4   BILITOT 1.9* 1.9*   ALKPHOS 95 79   ALT 8* 7*   AST 15 14     Lab Results   Component Value Date    INR 1.0 02/29/2024     Lab Results   Component Value Date    TRIG 160 (H) 03/04/2024    HDL 42 03/04/2024    CHOLHDL 19.4 (L) 03/04/2024     Lab Results   Component Value Date    HGBA1C 5.2 12/03/2023     No results found for: "PROTEINCSF", "GLUCCSF", "WBCCSF"    Imaging:  I have " reviewed and interpreted the pertinent imaging and lab results.      CT Head Without Contrast  Narrative: EXAMINATION:  CT HEAD WITHOUT CONTRAST    CLINICAL HISTORY:  sdh;.    TECHNIQUE:  CMS MANDATED QUALITY DATA - CT RADIATION - 436    All CT scans at this facility utilize dose modulation, iterative reconstruction, and/or weight based dosing when appropriate to reduce radiation dose to as low as reasonably achievable.    Non infusion images were obtained from the skull base to the vertex.    COMPARISON:  06/15/2024    FINDINGS:  The ventricles and sulci are prominent compatible with generalized cerebral atrophy.    There is no hemorrhage, mass or midline shift.    There is stable prominence of the subdural spaces in the cerebral convexities is likely representing chronic subdural hygromas.  There is no evidence of acute hemorrhage.    There is low-attenuation within the periventricular white matter compatible with chronic small vessel disease.  The cerebellum and brainstem are normal.  The orbits are normal.  The paranasal sinuses and mastoid air cells are clear.  Impression: 1. Generalized cerebral atrophy with stable prominence of the subdural spaces in the cerebral convexities which may represent chronic subdural hygromas.  There is no intraparenchymal hemorrhage or midline shift  2. Mild chronic small vessel disease.    Electronically signed by: Marie Laguna  Date:    06/16/2024  Time:    07:38         ASSESSMENT & PLAN:      Fall   Back pain   Lumbar Vertebral fracture   Subdural hygroma  Fever  Encephalopathy      Plan:   Patient presented to the fall landing on his back leading to lumbar vertebral fracture.  He denies hitting his head.  CT head showed subdural hygromas and on my impression there was no obvious acute hematoma however initial CT mentioned about a possible acute component of blood as well.  Repeat CT showed no acute hematoma.  Encephalopathy was noted on arrival however improving now.   Likely encephalopathy from systemic infectious causes.  Neurosurgery consulted for subdural hematoma/hygroma and lumbar vertebral fracture    Hold antiplatelet/anticoagulation therapy at this time until cleared by Neurosurgery.  Workup and management for fever-infectious causes per primary team.    Neuro checks per unit protocol.  Continue Dilantin 400 mg extended release tablet daily (levels are therapeutic)  Will follow peripherally            Thank you kindly for including us in the care of this patient. Please do not hesitate to contact us with any questions.           Romero Simon MD  Neurology/vascular Neurology  Date of Service: 06/16/2024  9:58 AM    --------------------------------------------------------------------------------------------------------------------------------------------------------------------------------------------------------------------------------------------------------------  Please note: This note was transcribed using voice recognition software. Because of this technology there are often uinintended grammatical, spelling, and other transcription errors. Please disregard these errors.

## 2024-06-16 NOTE — PLAN OF CARE
Problem: Adult Inpatient Plan of Care  Goal: Absence of Hospital-Acquired Illness or Injury  Outcome: Progressing     Problem: Adult Inpatient Plan of Care  Goal: Optimal Comfort and Wellbeing  Outcome: Progressing     Problem: Skin Injury Risk Increased  Goal: Skin Health and Integrity  Outcome: Progressing     Problem: Fall Injury Risk  Goal: Absence of Fall and Fall-Related Injury  Outcome: Progressing     Problem: Sepsis/Septic Shock  Goal: Optimal Coping  Outcome: Progressing     Problem: Sepsis/Septic Shock  Goal: Absence of Bleeding  Outcome: Progressing

## 2024-06-16 NOTE — PROGRESS NOTES
[facility] Medicine  Progress Note    Patient Name: Jerome Amaro Jr.  MRN: [id number]  Patient Class: OP- Observation   Admission Date: 6/15/2024  Length of Stay: 0 days  Attending Physician: Maicol Zendejas MD  Primary Care Provider: Magda Ruiz FNP-C        Subjective:     Principal Problem:Severe sepsis        HPI:  78 year old pt getting admitted with acute CHF and L2 fracture  Pt was started on SQ Lasix inj recently   Yesterday pt had a fall and hit his back on ground  Denied hitting head/No loss of consciousness   Pt since then was unable to walk or move around due to pain  Family thought pt had Side effect from SQ Lasix  Today symptoms went worse and he was escorted to ER   Pt was found to be hypoxic in ER which was new to him  Per family , pt is not on home oxygen  Pt was alert and oriented x 3 upon arrival to ER   In ER after pt had CT Lumbar spine he appeared confused and had tachycardia & EKG showed paced rhythm       Overview/Hospital Course:  No notes on file    Interval History: Tmax 102.8 F. Patient admitted after fall at home with back pain. Patient is confused and but answers questions appropriately. No focal subjective complaints except lower back pain but no lower extremity weakness or numbness.     Review of Systems   Unable to perform ROS: Mental status change     Objective:     Vital Signs (Most Recent):  Temp: 99 °F (37.2 °C) (06/16/24 0700)  Pulse: 93 (06/16/24 1000)  Resp: (!) 25 (06/16/24 1000)  BP: 130/62 (06/16/24 1000)  SpO2: 95 % (06/16/24 1000) Vital Signs (24h Range):  Temp:  [97.9 °F (36.6 °C)-102.8 °F (39.3 °C)] 99 °F (37.2 °C)  Pulse:  [] 93  Resp:  [18-38] 25  SpO2:  [88 %-100 %] 95 %  BP: ()/() 130/62     Weight: 75.6 kg (166 lb 10.7 oz)  Body mass index is 26.9 kg/m².    Intake/Output Summary (Last 24 hours) at 6/16/2024 1045  Last data filed at 6/16/2024 1000  Gross per 24 hour   Intake 740.05 ml   Output 400 ml   Net 340.05 ml        "  Physical Exam  Vitals and nursing note reviewed.   Constitutional:       Appearance: Normal appearance. He is well-developed.   HENT:      Head: Atraumatic.      Right Ear: External ear normal.      Left Ear: External ear normal.      Nose: Nose normal.      Mouth/Throat:      Mouth: Mucous membranes are moist.   Eyes:      Extraocular Movements: Extraocular movements intact.   Cardiovascular:      Rate and Rhythm: Normal rate.   Pulmonary:      Effort: Pulmonary effort is normal.   Abdominal:      Palpations: Abdomen is soft.   Musculoskeletal:         General: Normal range of motion.      Cervical back: Full passive range of motion without pain and normal range of motion.   Skin:     General: Skin is warm.   Neurological:      Mental Status: He is disoriented.             Significant Labs: All pertinent labs within the past 24 hours have been reviewed.  CBC:   Recent Labs   Lab 06/15/24  1332 06/16/24  0410   WBC 12.41 8.04   HGB 12.7* 11.4*   HCT 38.3* 34.1*    150     CMP:   Recent Labs   Lab 06/15/24  1332 06/16/24  0410   * 131*   K 3.7 3.4*   CL 92* 93*   CO2 26 28   * 103   BUN 17 18   CREATININE 1.1 1.2   CALCIUM 9.1 8.3*   PROT 7.3 6.4   ALBUMIN 4.1 3.6   BILITOT 1.9* 1.9*   ALKPHOS 95 79   AST 15 14   ALT 8* 7*   ANIONGAP 12 10     Lactic Acid: No results for input(s): "LACTATE" in the last 48 hours.  Troponin:   Recent Labs   Lab 06/15/24  1332 06/16/24  0410   TROPONINIHS 207.7* 949.1*     TSH:   Recent Labs   Lab 01/24/24  0453   TSH 2.778     Urine Culture:   Recent Labs   Lab 06/15/24  2013   LABURIN No growth to date     Microbiology Results (last 7 days)       Procedure Component Value Units Date/Time    Urine Culture High Risk [[id number]] Collected: 06/15/24 2013    Order Status: Completed Specimen: Urine, Clean Catch Updated: 06/16/24 0920     Urine Culture, Routine No growth to date    Narrative:      Indicated criteria for high risk culture:->Other  Other " (specify):->uti/confusion    Blood culture [[id number]] Collected: 06/15/24 2206    Order Status: Completed Specimen: Blood Updated: 06/16/24 0517     Blood Culture, Routine No Growth to date          Significant Imaging:   Imaging Results               CT Head Without Contrast (Final result)  Result time 06/15/24 20:52:14      Final result by Angel Kitchen MD (06/15/24 20:52:14)                   Impression:      Bilateral hypoattenuating extra-axial collections overlying the cerebral convexities, right greater than left.  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  There is mild associated mass effect on the right vertex, although no significant midline shift or hydrocephalus appreciated.  Questionable minimal internal complexity overlying the frontal convexities may be artifactual, although component of subacute blood products not excluded.  Consider short-term CT follow-up.    Chronic senescent and microvascular ischemic changes.    This report was flagged in Epic as abnormal.      Electronically signed by: Angel Kitchen MD  Date:    06/15/2024  Time:    20:52               Narrative:    EXAMINATION:  CT HEAD WITHOUT CONTRAST    CLINICAL HISTORY:  Mental status change, unknown cause;    TECHNIQUE:  Low dose axial images were obtained through the head.  Coronal and sagittal reformations were also performed. Contrast was not administered.    COMPARISON:  No prior studies are available for comparison.    FINDINGS:  There is generalized cerebral volume loss with compensatory sulcal widening and ventricular enlargement.  There are bilateral hypoattenuating extra-axial collections overlying the cerebral convexities suggestive of subdural hygromas or chronic subdural hematomas.  These measure approximately 1.5 cm on the right (coronal series 6, image 44) and 0.8 cm on the left (coronal series 6, image 30).  There is mild  associated mass effect of the right vertex.  There is questionable slight internal complexity overlying the frontal convexities.  This may reflect artifact, although component of subacute blood products not excluded.  There is no significant midline shift.  No evidence to suggest overt hydrocephalus at this time.  There is periventricular white matter hypoattenuation suggesting sequelae of chronic microvascular ischemic change.  The basal cisterns are patent. The mastoid air cells and paranasal sinuses are clear of acute process. The visualized bones of the calvarium demonstrate no acute osseous abnormality.                                       CT Lumbar Spine Without Contrast (Final result)  Result time 06/15/24 17:32:35      Final result by Angel Kitchen MD (06/15/24 17:32:35)                   Impression:      Recent appearing mild-to-moderate compression fracture deformity of the L2 vertebral body.  Multiple additional compression deformities of the T9, T10, T12, and L1 vertebral bodies as above.  These have a somewhat more chronic appearance.    Moderately advanced multilevel degenerative changes of the lumbar spine as detailed above.  Further evaluation and follow-up could be performed with MRI when clinically appropriate if there are no patient contraindications.    Significant volume bilateral pleural effusion with adjacent compressive atelectasis and/or consolidation.      Electronically signed by: Angel Kitchen MD  Date:    06/15/2024  Time:    17:32               Narrative:    EXAMINATION:  CT LUMBAR SPINE WITHOUT CONTRAST    CLINICAL HISTORY:  l2 fracture;    TECHNIQUE:  Low-dose axial, sagittal and coronal reformations are obtained through the lumbar spine.  Contrast was not administered.    COMPARISON:  Chest radiograph 09/24/2021    FINDINGS:  There is mild nonspecific diffuse heterogeneity of the visualized osseous structures which may relate to underlying osteopenia.  Clinical correlation  is advised.  There are mild-to-moderate compression deformities of the T9 and T10 vertebral bodies.  There is a mild superior endplate compression deformity of the T12 vertebral body.  There is a moderate compression deformity of the L1 vertebral body.  There is a recent appearing mild to moderate compression fracture of the L2 vertebral body.  Fracture lucency is noted through the anterior-superior endplate.  There is minimal retropulsion with mild effacement of the anterior thecal sac.  The L3, L4, and L5 vertebral body heights appear well maintained without significant height loss appreciated allowing for underlying osteopenia.    There is minimal grade 1 anterolisthesis of L4 on L5. There is mild intervertebral disc space height loss most pronounced at L5-S1.    There are degenerative changes of the lumbar spine as detailed below:    T12-L1: Broad-based disc bulge and mild ligamentum flavum thickening.  Mild bilateral neural foraminal narrowing.    L1-L2: Circumferential disc bulge, ligamentum flavum thickening, and bilateral facet arthropathy.  Mild spinal canal stenosis and moderate bilateral neural foraminal narrowing.    L2-L3: Circumferential disc bulge, ligamentum flavum thickening, and bilateral facet arthropathy.  Moderate to severe spinal canal stenosis.  Moderate bilateral neural foraminal narrowing.    L3-L4: Circumferential disc bulge, ligamentum flavum thickening, and bilateral facet arthropathy.  Moderate spinal canal stenosis.  Moderate bilateral neural foraminal narrowing.    L4-L5: Circumferential disc bulge, ligamentum flavum thickening, and bilateral facet arthropathy.  Moderate spinal canal stenosis.  Severe moderate to severe bilateral neural foraminal narrowing.    L5-S1: Broad-based circumferential disc bulge, asymmetric to the right.  Moderate to severe bilateral neural foraminal narrowing.    No grossly displaced sacral fracture identified.  There are degenerative changes of bilateral  sacroiliac joints.    Limited views of the posterior abdomen demonstrate significant volume layering bilateral pleural effusions with associated compressive atelectasis at the visualized lung bases.  There is a right renal cyst.  There is prominent aortic atherosclerosis.  No retroperitoneal hematoma identified.                                       X-Ray Hips Bilateral 2 View Incl AP Pelvis (Final result)  Result time 06/15/24 15:57:04      Final result by Francisco Nicholson MD (06/15/24 15:57:04)                   Impression:      No acute abnormality of bilateral hips.      Electronically signed by: Francisco Nicholson  Date:    06/15/2024  Time:    15:57               Narrative:    EXAMINATION:  XR HIPS BILATERAL 2 VIEW INCL AP PELVIS    CLINICAL HISTORY:  Dorsalgia, unspecified    FINDINGS:  AP pelvis and two views of bilateral hips show no fracture, dislocation, or destructive osseous lesion.  Bilateral hip joint spaces are maintained.  Pubic symphysis and sacroiliac joints are maintained.  Surgical clips lie in left inguinal region.  Vascular calcifications are present.  Stool and bowel gas partially obscures the lower lumbar spine and sacrum and coccyx.                                       X-Ray Lumbar Spine 5 View (Final result)  Result time 06/15/24 14:16:08   Procedure changed from X-Ray Lumbar Spine Ap And Lateral     Final result by Francisco Nicholson MD (06/15/24 14:16:08)                   Impression:      1. Age indeterminate superior endplate L2 compression fracture which has progressed since 2019.  Correlation for symptoms at this level is requested.  2. Compression fractures of T10, T11, T12, and L1, not significantly changed since 2016.  3. Moderate L5-S1 disc degeneration.      Electronically signed by: Francisco Nicholson  Date:    06/15/2024  Time:    14:16               Narrative:    EXAMINATION:  XR LUMBAR SPINE COMPLETE 5 VIEW    CLINICAL HISTORY:  fall;    FINDINGS:  Five views of lumbar spine show normal  lumbosacral alignment.    Compression fractures of T10, T11, T12, L1, and L2 are evident.  T10, T11, T12, and L1 compression fractures have not significantly changed since CT 04/24/2019.  Superior endplate of L2 compression fracture has progressed since that time pre wall inferior endplate compression fracture of L2 is unchanged.    Moderate L5-S1 disc degeneration is evident.  Remaining lumbar intervertebral disc space heights are relatively well maintained.    Sacroiliac joints are normal. Arterial vascular calcifications are present.  Postsurgical changes of CABG are suggested 12 partially visualize, along with cardiac pacing leads.  Mild-to-moderate stool and moderate bowel gas are present throughout the abdomen, limiting evaluation.                                       X-Ray Chest AP Portable (Final result)  Result time 06/15/24 14:18:32      Final result by Francisco Nicholson MD (06/15/24 14:18:32)                   Impression:      1. Trace bilateral pleural effusions.  2. Improvement of interstitial edema and central pulmonary vascular prominence.      Electronically signed by: Francisco Nicholson  Date:    06/15/2024  Time:    14:18               Narrative:    EXAMINATION:  XR CHEST AP PORTABLE    CLINICAL HISTORY:  hypoxia;    FINDINGS:  Portable chest at 1254 compared with 12/29/2024 shows unchanged borderline enlarged cardiac silhouette size.  Mediastinal contours are unchanged with postsurgical changes of CABG.  Prior aortic valve replacement noted.  Left transvenous pacer unchanged.    Lung volumes are low.  Blunting of the costophrenic angles bilaterally suggest trace pleural effusions.  Prior central pulmonary vascular prominence and central interstitial opacities have significantly improved.  Minimal bibasilar interstitial opacities persist.  Minimal calcification suggested along the right diaphragm and in right hemithorax suggesting pleural plaques.  No definite acute osseous abnormality identified.  Bones  "are diffusely demineralized, limiting evaluation.                                      Assessment/Plan:      * Severe sepsis  This patient does have evidence of infective focus, unspecified source  My overall impression is  severe sepsis .  Source: Unknown  Antibiotics given-   Antibiotics (72h ago, onward)      Start     Stop Route Frequency Ordered    06/17/24 0000  vancomycin 1.25 g in dextrose 5% 250 mL IVPB (ready to mix)        Note to Pharmacy: Ht: 5' 6" (1.676 m)  Wt: 78.5 kg (173 lb)  Estimated Creatinine Clearance: 53.7 mL/min (based on SCr of 1.1 mg/dL).  Body mass index is 27.92 kg/m².    -- IV Every 24 hours (non-standard times) 06/15/24 2342    06/15/24 2114  vancomycin - pharmacy to dose  (vancomycin IVPB (PEDS and ADULTS))        Placed in "And" Linked Group    -- IV pharmacy to manage frequency 06/15/24 2014    06/15/24 2100  meropenem 1 g in sodium chloride 0.9 % 100 mL IVPB (ready to mix system)        Note to Pharmacy: Ht: 5' 6" (1.676 m)  Wt: 78.5 kg (173 lb)  Estimated Creatinine Clearance: 53.7 mL/min (based on SCr of 1.1 mg/dL).  Body mass index is 27.92 kg/m².    -- IV Every 12 hours (non-standard times) 06/15/24 2027          Latest lactate reviewed-  Recent Labs   Lab 06/15/24  2147   POCLAC 1.17     Organ dysfunction indicated by Encephalopathy    Fluid challenge : avoid IVF due to CHF exacerbation    Post- resuscitation assessment Yes Perfusion exam was performed within 6 hours of septic shock presentation after bolus shows Adequate tissue perfusion assessed by non-invasive monitoring       Will Start Pressors- Levophed for MAP of 65  Source control achieved by: IV Vancomycin and Cefepime.    Acute confusional state  Secondary to sepsis. Pt developed confusion after he came  back from CT room  Ordered blood and urine cultures  Afebrile   Pulling out lines  Received iv morphine in ER upon arrival as per records  Pt has h/o seizure disorder and on phenytoin   CT brain results reviewed. "         Hx of CABG  Aware       Hypoxia  Mostly from CHF flare  Maintain iv diuresis  Need home oxygen evaluation before discharge from hospital      L2 vertebral fracture  Mild-to-moderate compression fracture deformity of the L2 vertebral body   Neurosurgeon consulted       Acute on chronic combined systolic and diastolic heart failure  Maintain iv lasix at the moment   Recent ECHO reviewed   Latest Reference Range & Units 02/09/24 10:09 02/29/24 07:50 06/15/24 13:32   BNP 0 - 99 pg/mL 958 (H) 1,720 (H) 1,892 (H)   Holding Entersto due to low BP.      Atrial fibrillation  H/o aware     Seizure disorder  Maintain phenytoin PO  Check serum levels  Seizure precautions      NSTEMI (non-ST elevated myocardial infarction)  Trend troponin.  Consult cardiologist.  Follow 2 D ECHO.  Start heparin gtt if ok with neurologist. Subdural hygroma - does not appear to be SDH.  Continue cardiac meds. Patient is CP free.       CAD (coronary artery disease)  H/o CABG with recent angiogram on December 2023    S/P TAVR (transcatheter aortic valve replacement)  Aware       HTN (hypertension)  Chronic, controlled. Latest blood pressure and vitals reviewed-     Temp:  [97.9 °F (36.6 °C)-102.8 °F (39.3 °C)]   Pulse:  []   Resp:  [18-38]   BP: ()/()   SpO2:  [88 %-100 %] .   Home meds for hypertension were reviewed and noted below.   Hypertension Medications               ENTRESTO 24-26 mg per tablet Take 1 tablet by mouth 2 (two) times daily.    furosemide (LASIX) 20 MG tablet Take 20 mg by mouth daily as needed (Fluid).    isosorbide mononitrate (IMDUR) 30 MG 24 hr tablet Take 2 tablets (60 mg total) by mouth once daily.    metoprolol succinate (TOPROL-XL) 100 MG 24 hr tablet Take 100 mg by mouth 2 (two) times daily.    NITROLINGUAL 400 mcg/spray spray Place 1 spray under the tongue every 5 (five) minutes as needed for Chest pain.        Hold ISBD and ARB for now due to low BP.    While in the hospital, will manage  blood pressure as follows; Continue home antihypertensive regimen    Will utilize p.r.n. blood pressure medication only if patient's blood pressure greater than 140/90 and he develops symptoms such as worsening chest pain or shortness of breath.    Check UA.   VTE Risk Mitigation (From admission, onward)           Ordered     IP VTE HIGH RISK PATIENT  Once         06/15/24 1635     Place sequential compression device  Until discontinued         06/15/24 1635                    Discharge Planning   AXEL:  6/20/24    Code Status: Full Code   Is the patient medically ready for discharge?:     Reason for patient still in hospital (select all that apply): Patient trending condition and Consult recommendations  Discharge Plan A: Home            Critical care time spent on the evaluation and treatment of severe organ dysfunction, review of pertinent labs and imaging studies, discussions with consulting providers and discussions with patient/family: 35 minutes.      Edvin Almonte MD  Department of Hospital Medicine   [facility]

## 2024-06-16 NOTE — CARE UPDATE
06/16/24 0734   Patient Assessment/Suction   Level of Consciousness (AVPU) alert   Respiratory Effort Normal;Unlabored   Expansion/Accessory Muscles/Retractions no use of accessory muscles;no retractions   All Lung Fields Breath Sounds Anterior:;Posterior:;clear   Rhythm/Pattern, Respiratory no shortness of breath reported;unlabored;pattern regular   Cough Frequency no cough   Skin Integrity   $ Wound Care Tech Time 15 min   Area Observed Left;Right;Cheek;Bridge of nose;Upper lip;Lower lip;Corner lip   Skin Appearance without discoloration   PRE-TX-O2   Device (Oxygen Therapy) Oxymask   $ Is the patient on Low Flow Oxygen? Yes   Flow (L/min) (Oxygen Therapy) 2   SpO2 96 %   Pulse Oximetry Type Continuous   $ Pulse Oximetry - Multiple Charge Pulse Oximetry - Multiple   Pulse 91   Resp (!) 22   Education   $ Education 15 min   Tobacco Cessation Intervention   Do you use any type of tobacco product? No   Are you interested in quitting use of tobacco products?   (FORMER)   $ Smoking Cessation Charges Smoking Cessation - Intermediate (Non-CTTS)   Respiratory Evaluation   $ Care Plan Tech Time 15 min   $ Respiratory Evaluation Complete   Evaluation For New Orders   Admitting Diagnosis ACUTE ON CHRONIC COMBINED SYSTOLIC AND DISASTOLIC HEART FAILURE   Cardiac Diagnosis HYPERTENSION   Pulmonary Diagnosis N/A   Current Surgeries N/A   Home Oxygen   Has Home Oxygen? No   Home Aerosol, MDI, DPI, and Other Treatments/Therapies   Home Respiratory Therapy Per Patient/Review of Chart No   Oxygen Care Plan   Oxygen Care Plan Per Protocol   SPO2 Goal (%) 92% non-cardiac   Rationale SpO2 is <92% (Non-cardiac Pt.)   Bronchodilator Care Plan   Rationale No Rationale found   Atelectasis Care Plan   Rationale No Rational Found   Airway Clearance Care Plan   Rationale No rationale found

## 2024-06-16 NOTE — ASSESSMENT & PLAN NOTE
"This patient does have evidence of infective focus, unspecified source  My overall impression is  severe sepsis .  Source: Unknown  Antibiotics given-   Antibiotics (72h ago, onward)      Start     Stop Route Frequency Ordered    06/17/24 0000  vancomycin 1.25 g in dextrose 5% 250 mL IVPB (ready to mix)        Note to Pharmacy: Ht: 5' 6" (1.676 m)  Wt: 78.5 kg (173 lb)  Estimated Creatinine Clearance: 53.7 mL/min (based on SCr of 1.1 mg/dL).  Body mass index is 27.92 kg/m².    -- IV Every 24 hours (non-standard times) 06/15/24 2342    06/15/24 2114  vancomycin - pharmacy to dose  (vancomycin IVPB (PEDS and ADULTS))        Placed in "And" Linked Group    -- IV pharmacy to manage frequency 06/15/24 2014    06/15/24 2100  meropenem 1 g in sodium chloride 0.9 % 100 mL IVPB (ready to mix system)        Note to Pharmacy: Ht: 5' 6" (1.676 m)  Wt: 78.5 kg (173 lb)  Estimated Creatinine Clearance: 53.7 mL/min (based on SCr of 1.1 mg/dL).  Body mass index is 27.92 kg/m².    -- IV Every 12 hours (non-standard times) 06/15/24 2027          Latest lactate reviewed-  Recent Labs   Lab 06/15/24  2147   POCLAC 1.17     Organ dysfunction indicated by Encephalopathy    Fluid challenge : avoid IVF due to CHF exacerbation    Post- resuscitation assessment Yes Perfusion exam was performed within 6 hours of septic shock presentation after bolus shows Adequate tissue perfusion assessed by non-invasive monitoring       Will Start Pressors- Levophed for MAP of 65  Source control achieved by: IV Vancomycin and Cefepime.  "

## 2024-06-16 NOTE — SUBJECTIVE & OBJECTIVE
Past Medical History:   Diagnosis Date    Anticoagulant long-term use     2014    Aortic valve disease 2014    pig valve    Arthritis     all over    Atrial fibrillation 2/29/2024    Chest pain 07/15/2019    CHF (congestive heart failure)     2014 on meds    Coronary artery disease     2007 cabg    Difficulty swallowing     Heart attack 1990    15 stents    Heart attack 02/01/2024    mild    Heart block     Hyperlipidemia     Hypertension     on meds    Hypothyroidism 2015    on meds    PVD (peripheral vascular disease)     Seizures     last episode 1990 on meds       Past Surgical History:   Procedure Laterality Date    AORTOGRAPHY WITH SERIALOGRAPHY N/A 11/16/2021    Procedure: AORTOGRAM, WITH RUNOFF;  Surgeon: Rodrigo Willoughby MD;  Location: [facility] CATH/EP LAB;  Service: Cardiology;  Laterality: N/A;    ARTERIOGRAPHY OF AORTIC ROOT N/A 11/05/2019    Procedure: ARTERIOGRAM, AORTIC ROOT;  Surgeon: Rodrigo Willoughby MD;  Location: [facility] CATH/EP LAB;  Service: Cardiology;  Laterality: N/A;    CARDIAC CATHETERIZATION      CARDIAC VALVE SURGERY      CORONARY ANGIOGRAPHY INCLUDING BYPASS GRAFTS WITH CATHETERIZATION OF LEFT HEART N/A 11/05/2019    Procedure: ANGIOGRAM, CORONARY, INCLUDING BYPASS GRAFT, WITH LEFT HEART CATHETERIZATION;  Surgeon: Rodrigo Willoughby MD;  Location: [facility] CATH/EP LAB;  Service: Cardiology;  Laterality: N/A;    CORONARY ANGIOGRAPHY INCLUDING BYPASS GRAFTS WITH CATHETERIZATION OF LEFT HEART Left 11/03/2020    Procedure: ANGIOGRAM, CORONARY, INCLUDING BYPASS GRAFT, WITH LEFT HEART CATHETERIZATION;  Surgeon: Rodrigo Willoughby MD;  Location: [facility] CATH/EP LAB;  Service: Cardiology;  Laterality: Left;    CORONARY ANGIOGRAPHY INCLUDING BYPASS GRAFTS WITH CATHETERIZATION OF LEFT HEART N/A 09/28/2021    Procedure: ANGIOGRAM, CORONARY, INCLUDING BYPASS GRAFT, WITH LEFT HEART CATHETERIZATION;  Surgeon: Rodrigo Willoughby MD;  Location: [facility] CATH/EP LAB;  Service: Cardiology;  Laterality: N/A;    CORONARY ANGIOGRAPHY  INCLUDING BYPASS GRAFTS WITH CATHETERIZATION OF LEFT HEART N/A 12/05/2023    Procedure: ANGIOGRAM, CORONARY, INCLUDING BYPASS GRAFT, WITH LEFT HEART CATHETERIZATION;  Surgeon: Rodrigo Willoughby MD;  Location: [facility] CATH/EP LAB;  Service: Cardiology;  Laterality: N/A;    CORONARY ANGIOPLASTY      CORONARY ARTERY BYPASS GRAFT      x 2    1991 2006    ESOPHAGOGASTRODUODENOSCOPY N/A 2/20/2024    Procedure: EGD (ESOPHAGOGASTRODUODENOSCOPY);  Surgeon: Mal Lockhart III, MD;  Location: [facility] ENDO;  Service: Endoscopy;  Laterality: N/A;    EXTRACORPOREAL SHOCK WAVE LITHOTRIPSY Right 08/01/2019    Procedure: LITHOTRIPSY, ESWL;  Surgeon: Williams Ortega MD;  Location: [facility] OR;  Service: Urology;  Laterality: Right;    HEMORRHOID SURGERY  1990    INSERTION OF PACEMAKER      PERCUTANEOUS TRANSLUMINAL BALLOON ANGIOPLASTY OF CORONARY ARTERY N/A 11/08/2019    Procedure: Angioplasty-coronary;  Surgeon: Rodrigo Willoughby MD;  Location: [facility] CATH/EP LAB;  Service: Cardiology;  Laterality: N/A;    WRIST FRACTURE SURGERY         Review of patient's allergies indicates:   Allergen Reactions    Atorvastatin Other (See Comments)     Muscle pain    Rosuvastatin Other (See Comments)     Muscle pain       Current Facility-Administered Medications on File Prior to Encounter   Medication    magnesium oxide tablet 800 mg    sodium chloride 0.9% flush 10 mL     Current Outpatient Medications on File Prior to Encounter   Medication Sig    citalopram (CELEXA) 40 MG tablet Take 40 mg by mouth once daily. NEW Rx - NOT YET STARTED    clopidogrel (PLAVIX) 75 mg tablet Take 75 mg by mouth once daily.    CORLANOR 5 mg Tab Take 1 tablet by mouth 2 (two) times daily. NEW Rx - NOT YET STARTED    ELIQUIS 5 mg Tab Take 5 mg by mouth every morning.    ENTRESTO 24-26 mg per tablet Take 1 tablet by mouth 2 (two) times daily.    ezetimibe (ZETIA) 10 mg tablet Take 10 mg by mouth once daily.    furosemide (LASIX) 20 MG tablet Take 20 mg by mouth daily as  needed (Fluid).    hydrOXYzine pamoate (VISTARIL) 25 MG Cap Take 1 capsule (25 mg total) by mouth daily as needed (anxiety).    isosorbide mononitrate (IMDUR) 30 MG 24 hr tablet Take 2 tablets (60 mg total) by mouth once daily.    metoprolol succinate (TOPROL-XL) 100 MG 24 hr tablet Take 100 mg by mouth 2 (two) times daily.    phenytoin (DILANTIN) 100 MG ER capsule Take 400 mg by mouth once daily.     bismuth subsalicylate (BISMAREX) 262 mg Chew Take 1 tablet by mouth 2 (two) times daily.    famotidine (PEPCID) 40 MG tablet Take 40 mg by mouth Daily.    NITROLINGUAL 400 mcg/spray spray Place 1 spray under the tongue every 5 (five) minutes as needed for Chest pain.    pantoprazole (PROTONIX) 20 MG tablet Take 20 mg by mouth 2 (two) times daily.    ranolazine (RANEXA) 1,000 mg Tb12 Take 1,000 mg by mouth 2 (two) times daily.     Family History       Problem Relation (Age of Onset)    Diabetes Sister    Heart attack Mother, Father    Heart disease Sister    No Known Problems Maternal Grandmother, Maternal Grandfather, Paternal Grandmother, Paternal Grandfather, Brother, Maternal Aunt, Maternal Uncle, Paternal Aunt, Paternal Uncle    Stroke Sister          Tobacco Use    Smoking status: Former     Current packs/day: 0.00     Average packs/day: 0.5 packs/day for 4.0 years (2.0 ttl pk-yrs)     Types: Cigarettes     Start date:      Quit date:      Years since quittin.4    Smokeless tobacco: Never   Substance and Sexual Activity    Alcohol use: Yes     Comment: social    Drug use: No    Sexual activity: Not Currently     Review of Systems   Unable to perform ROS: Mental status change     Objective:     Vital Signs (Most Recent):  Temp: 98.7 °F (37.1 °C) (06/15/24 1835)  Pulse: 105 (06/15/24 1830)  Resp: (!) 35 (06/15/24 1830)  BP: (!) 179/80 (06/15/24 1830)  SpO2: (!) 94 % (06/15/24 1830) Vital Signs (24h Range):  Temp:  [97.9 °F (36.6 °C)-99 °F (37.2 °C)] 98.7 °F (37.1 °C)  Pulse:  [] 105  Resp:   [18-38] 35  SpO2:  [88 %-97 %] 94 %  BP: (100-179)/() 179/80     Weight: 78.5 kg (173 lb)  Body mass index is 27.92 kg/m².     Physical Exam  Vitals and nursing note reviewed.   Constitutional:       Appearance: Normal appearance. He is well-developed.   HENT:      Head: Atraumatic.      Right Ear: External ear normal.      Left Ear: External ear normal.      Nose: Nose normal.      Mouth/Throat:      Mouth: Mucous membranes are moist.   Eyes:      Extraocular Movements: Extraocular movements intact.   Cardiovascular:      Rate and Rhythm: Normal rate.   Pulmonary:      Effort: Pulmonary effort is normal.   Abdominal:      Palpations: Abdomen is soft.   Musculoskeletal:         General: Normal range of motion.      Cervical back: Full passive range of motion without pain and normal range of motion.   Skin:     General: Skin is warm.   Neurological:      Mental Status: He is disoriented.                Significant Labs: All pertinent labs within the past 24 hours have been reviewed.  CBC:   Recent Labs   Lab 06/15/24  1332   WBC 12.41   HGB 12.7*   HCT 38.3*        CMP:   Recent Labs   Lab 06/15/24  1332   *   K 3.7   CL 92*   CO2 26   *   BUN 17   CREATININE 1.1   CALCIUM 9.1   PROT 7.3   ALBUMIN 4.1   BILITOT 1.9*   ALKPHOS 95   AST 15   ALT 8*   ANIONGAP 12       Significant Imaging: I have reviewed all pertinent imaging results/findings within the past 24 hours.

## 2024-06-16 NOTE — HPI
78 year old pt getting admitted with acute CHF and L2 fracture  Pt was started on SQ Lasix inj recently   Yesterday pt had a fall and hit his back on ground  Denied hitting head/No loss of consciousness   Pt since then was unable to walk or move around due to pain  Family thought pt had Side effect from SQ Lasix  Today symptoms went worse and he was escorted to ER   Pt was found to be hypoxic in ER which was new to him  Per family , pt is not on home oxygen  Pt was alert and oriented x 3 upon arrival to ER   In ER after pt had CT Lumbar spine he appeared confused and had tachycardia & EKG showed paced rhythm

## 2024-06-16 NOTE — ASSESSMENT & PLAN NOTE
Mild-to-moderate compression fracture deformity of the L2 vertebral body   Neurosurgeon consulted

## 2024-06-17 PROBLEM — G96.08 SUBDURAL HYGROMA: Status: ACTIVE | Noted: 2024-06-17

## 2024-06-17 LAB
ALBUMIN SERPL BCP-MCNC: 3.7 G/DL (ref 3.5–5.2)
ALP SERPL-CCNC: 88 U/L (ref 55–135)
ALT SERPL W/O P-5'-P-CCNC: 7 U/L (ref 10–44)
ANION GAP SERPL CALC-SCNC: 9 MMOL/L (ref 8–16)
APTT PPP: 38.1 SEC (ref 21–32)
APTT PPP: 51.3 SEC (ref 21–32)
AST SERPL-CCNC: 20 U/L (ref 10–40)
BACTERIA UR CULT: NORMAL
BACTERIA UR CULT: NORMAL
BASOPHILS # BLD AUTO: 0.05 K/UL (ref 0–0.2)
BASOPHILS NFR BLD: 0.7 % (ref 0–1.9)
BILIRUB SERPL-MCNC: 1.7 MG/DL (ref 0.1–1)
BUN SERPL-MCNC: 20 MG/DL (ref 8–23)
CALCIUM SERPL-MCNC: 8.8 MG/DL (ref 8.7–10.5)
CHLORIDE SERPL-SCNC: 91 MMOL/L (ref 95–110)
CHOLEST SERPL-MCNC: 237 MG/DL (ref 120–199)
CHOLEST/HDLC SERPL: 7.2 {RATIO} (ref 2–5)
CO2 SERPL-SCNC: 32 MMOL/L (ref 23–29)
CREAT SERPL-MCNC: 1.2 MG/DL (ref 0.5–1.4)
DIFFERENTIAL METHOD BLD: ABNORMAL
EOSINOPHIL # BLD AUTO: 0.3 K/UL (ref 0–0.5)
EOSINOPHIL NFR BLD: 4.4 % (ref 0–8)
ERYTHROCYTE [DISTWIDTH] IN BLOOD BY AUTOMATED COUNT: 14.8 % (ref 11.5–14.5)
EST. GFR  (NO RACE VARIABLE): >60 ML/MIN/1.73 M^2
GLUCOSE SERPL-MCNC: 116 MG/DL (ref 70–110)
HCT VFR BLD AUTO: 36.8 % (ref 40–54)
HDLC SERPL-MCNC: 33 MG/DL (ref 40–75)
HDLC SERPL: 13.9 % (ref 20–50)
HGB BLD-MCNC: 12.5 G/DL (ref 14–18)
IMM GRANULOCYTES # BLD AUTO: 0.01 K/UL (ref 0–0.04)
IMM GRANULOCYTES NFR BLD AUTO: 0.1 % (ref 0–0.5)
INR PPP: 1.1 (ref 0.8–1.2)
LDLC SERPL CALC-MCNC: 170.2 MG/DL (ref 63–159)
LYMPHOCYTES # BLD AUTO: 0.9 K/UL (ref 1–4.8)
LYMPHOCYTES NFR BLD: 12.6 % (ref 18–48)
MAGNESIUM SERPL-MCNC: 1.7 MG/DL (ref 1.6–2.6)
MCH RBC QN AUTO: 28.7 PG (ref 27–31)
MCHC RBC AUTO-ENTMCNC: 34 G/DL (ref 32–36)
MCV RBC AUTO: 85 FL (ref 82–98)
MONOCYTES # BLD AUTO: 1.3 K/UL (ref 0.3–1)
MONOCYTES NFR BLD: 17.8 % (ref 4–15)
NEUTROPHILS # BLD AUTO: 4.5 K/UL (ref 1.8–7.7)
NEUTROPHILS NFR BLD: 64.4 % (ref 38–73)
NONHDLC SERPL-MCNC: 204 MG/DL
NRBC BLD-RTO: 0 /100 WBC
PLATELET # BLD AUTO: 154 K/UL (ref 150–450)
PMV BLD AUTO: 11 FL (ref 9.2–12.9)
POTASSIUM SERPL-SCNC: 3.6 MMOL/L (ref 3.5–5.1)
PROT SERPL-MCNC: 6.7 G/DL (ref 6–8.4)
PROTHROMBIN TIME: 12.1 SEC (ref 9–12.5)
RBC # BLD AUTO: 4.35 M/UL (ref 4.6–6.2)
SODIUM SERPL-SCNC: 132 MMOL/L (ref 136–145)
TRIGL SERPL-MCNC: 169 MG/DL (ref 30–150)
TROPONIN I SERPL HS-MCNC: 324 PG/ML (ref 0–14.9)
WBC # BLD AUTO: 7.04 K/UL (ref 3.9–12.7)

## 2024-06-17 PROCEDURE — 36415 COLL VENOUS BLD VENIPUNCTURE: CPT | Performed by: INTERNAL MEDICINE

## 2024-06-17 PROCEDURE — 63600175 PHARM REV CODE 636 W HCPCS: Performed by: INTERNAL MEDICINE

## 2024-06-17 PROCEDURE — 25000003 PHARM REV CODE 250: Performed by: INTERNAL MEDICINE

## 2024-06-17 PROCEDURE — 85610 PROTHROMBIN TIME: CPT | Performed by: INTERNAL MEDICINE

## 2024-06-17 PROCEDURE — 85025 COMPLETE CBC W/AUTO DIFF WBC: CPT | Performed by: INTERNAL MEDICINE

## 2024-06-17 PROCEDURE — 21400001 HC TELEMETRY ROOM

## 2024-06-17 PROCEDURE — 83735 ASSAY OF MAGNESIUM: CPT | Performed by: INTERNAL MEDICINE

## 2024-06-17 PROCEDURE — 99222 1ST HOSP IP/OBS MODERATE 55: CPT | Mod: ,,, | Performed by: HEALTH CARE PROVIDER

## 2024-06-17 PROCEDURE — 99900031 HC PATIENT EDUCATION (STAT)

## 2024-06-17 PROCEDURE — 80061 LIPID PANEL: CPT | Performed by: INTERNAL MEDICINE

## 2024-06-17 PROCEDURE — 80053 COMPREHEN METABOLIC PANEL: CPT | Performed by: INTERNAL MEDICINE

## 2024-06-17 PROCEDURE — 85730 THROMBOPLASTIN TIME PARTIAL: CPT | Mod: 91 | Performed by: INTERNAL MEDICINE

## 2024-06-17 PROCEDURE — 84484 ASSAY OF TROPONIN QUANT: CPT | Performed by: SPECIALIST

## 2024-06-17 PROCEDURE — 94761 N-INVAS EAR/PLS OXIMETRY MLT: CPT

## 2024-06-17 RX ORDER — FUROSEMIDE 20 MG/1
20 TABLET ORAL DAILY
Status: DISCONTINUED | OUTPATIENT
Start: 2024-06-17 | End: 2024-06-21 | Stop reason: HOSPADM

## 2024-06-17 RX ORDER — HEPARIN SODIUM,PORCINE/D5W 25000/250
12 INTRAVENOUS SOLUTION INTRAVENOUS CONTINUOUS
Status: DISCONTINUED | OUTPATIENT
Start: 2024-06-17 | End: 2024-06-17

## 2024-06-17 RX ADMIN — METOPROLOL SUCCINATE 100 MG: 50 TABLET, FILM COATED, EXTENDED RELEASE ORAL at 08:06

## 2024-06-17 RX ADMIN — POTASSIUM BICARBONATE 50 MEQ: 977.5 TABLET, EFFERVESCENT ORAL at 05:06

## 2024-06-17 RX ADMIN — EZETIMIBE 10 MG: 10 TABLET ORAL at 08:06

## 2024-06-17 RX ADMIN — MUPIROCIN 1 G: 20 OINTMENT TOPICAL at 09:06

## 2024-06-17 RX ADMIN — MEROPENEM 1 G: 1 INJECTION, POWDER, FOR SOLUTION INTRAVENOUS at 08:06

## 2024-06-17 RX ADMIN — PANTOPRAZOLE SODIUM 20 MG: 20 TABLET, DELAYED RELEASE ORAL at 05:06

## 2024-06-17 RX ADMIN — Medication 800 MG: at 05:06

## 2024-06-17 RX ADMIN — MEROPENEM 1 G: 1 INJECTION, POWDER, FOR SOLUTION INTRAVENOUS at 09:06

## 2024-06-17 RX ADMIN — HYDROCODONE BITARTRATE AND ACETAMINOPHEN 1 TABLET: 5; 325 TABLET ORAL at 08:06

## 2024-06-17 RX ADMIN — PHENYTOIN SODIUM 400 MG: 100 CAPSULE ORAL at 08:06

## 2024-06-17 RX ADMIN — HYDROCODONE BITARTRATE AND ACETAMINOPHEN 1 TABLET: 5; 325 TABLET ORAL at 05:06

## 2024-06-17 RX ADMIN — MUPIROCIN 1 G: 20 OINTMENT TOPICAL at 08:06

## 2024-06-17 RX ADMIN — VANCOMYCIN HYDROCHLORIDE 1250 MG: 1.25 INJECTION, POWDER, LYOPHILIZED, FOR SOLUTION INTRAVENOUS at 01:06

## 2024-06-17 RX ADMIN — HEPARIN SODIUM 12 UNITS/KG/HR: 10000 INJECTION, SOLUTION INTRAVENOUS at 09:06

## 2024-06-17 RX ADMIN — FUROSEMIDE 20 MG: 20 TABLET ORAL at 08:06

## 2024-06-17 RX ADMIN — LIDOCAINE 5% 1 PATCH: 700 PATCH TOPICAL at 05:06

## 2024-06-17 RX ADMIN — Medication 800 MG: at 08:06

## 2024-06-17 NOTE — PROGRESS NOTES
[facility] Medicine  Progress Note    Patient Name: Jermoe Amaro Jr.  MRN: [id number]  Patient Class: IP- Inpatient   Admission Date: 6/15/2024  Length of Stay: 1 days  Attending Physician: Edvin Almonte MD  Primary Care Provider: Magda Ruiz FNP-C        Subjective:     Principal Problem:Severe sepsis        HPI:  78 year old pt getting admitted with acute CHF and L2 fracture  Pt was started on SQ Lasix inj recently   Yesterday pt had a fall and hit his back on ground  Denied hitting head/No loss of consciousness   Pt since then was unable to walk or move around due to pain  Family thought pt had Side effect from SQ Lasix  Today symptoms went worse and he was escorted to ER   Pt was found to be hypoxic in ER which was new to him  Per family , pt is not on home oxygen  Pt was alert and oriented x 3 upon arrival to ER   In ER after pt had CT Lumbar spine he appeared confused and had tachycardia & EKG showed paced rhythm       Overview/Hospital Course:  No notes on file    Interval History: Tmax 102.8 F. Patient admitted after fall at home with back pain. Patient is confused and but answers questions appropriately. No focal subjective complaints except lower back pain but no lower extremity weakness or numbness.     Review of Systems   Unable to perform ROS: Mental status change     Objective:     Vital Signs (Most Recent):  Temp: 98.9 °F (37.2 °C) (06/17/24 0400)  Pulse: 91 (06/17/24 0700)  Resp: (!) 25 (06/17/24 0700)  BP: 114/73 (06/17/24 0700)  SpO2: 99 % (06/17/24 0700) Vital Signs (24h Range):  Temp:  [98.9 °F (37.2 °C)-99.5 °F (37.5 °C)] 98.9 °F (37.2 °C)  Pulse:  [] 91  Resp:  [15-36] 25  SpO2:  [85 %-100 %] 99 %  BP: (107-150)/(55-89) 114/73     Weight: 75.6 kg (166 lb 10.7 oz)  Body mass index is 26.9 kg/m².    Intake/Output Summary (Last 24 hours) at 6/17/2024 0732  Last data filed at 6/17/2024 0543  Gross per 24 hour   Intake 779.93 ml   Output 2300 ml   Net -1520.07 ml     "     Physical Exam  Vitals and nursing note reviewed.   Constitutional:       Appearance: Normal appearance. He is well-developed.   HENT:      Head: Atraumatic.      Right Ear: External ear normal.      Left Ear: External ear normal.      Nose: Nose normal.      Mouth/Throat:      Mouth: Mucous membranes are moist.   Eyes:      Extraocular Movements: Extraocular movements intact.   Cardiovascular:      Rate and Rhythm: Normal rate.   Pulmonary:      Effort: Pulmonary effort is normal.   Abdominal:      Palpations: Abdomen is soft.   Musculoskeletal:         General: Normal range of motion.      Cervical back: Full passive range of motion without pain and normal range of motion.   Skin:     General: Skin is warm.   Neurological:      Mental Status: He is disoriented.             Significant Labs: All pertinent labs within the past 24 hours have been reviewed.  CBC:   Recent Labs   Lab 06/15/24  1332 06/16/24  0410 06/16/24  1144   WBC 12.41 8.04 9.31   HGB 12.7* 11.4* 12.6*   HCT 38.3* 34.1* 37.3*    150 166     CMP:   Recent Labs   Lab 06/15/24  1332 06/16/24  0410 06/17/24  0257   * 131* 132*   K 3.7 3.4* 3.6   CL 92* 93* 91*   CO2 26 28 32*   * 103 116*   BUN 17 18 20   CREATININE 1.1 1.2 1.2   CALCIUM 9.1 8.3* 8.8   PROT 7.3 6.4 6.7   ALBUMIN 4.1 3.6 3.7   BILITOT 1.9* 1.9* 1.7*   ALKPHOS 95 79 88   AST 15 14 20   ALT 8* 7* 7*   ANIONGAP 12 10 9     Lactic Acid: No results for input(s): "LACTATE" in the last 48 hours.  Troponin:   Recent Labs   Lab 06/15/24  1332 06/16/24  0410   TROPONINIHS 207.7* 949.1*     TSH:   Recent Labs   Lab 01/24/24  0453   TSH 2.778     Urine Culture:   Recent Labs   Lab 06/15/24  2013   LABURIN Multiple organisms isolated. None in predominance.  Repeat if  clinically necessary.     Microbiology Results (last 7 days)       Procedure Component Value Units Date/Time    Urine Culture High Risk [[id number]] Collected: 06/15/24 2013    Order Status: Completed Specimen: " Urine, Clean Catch Updated: 06/17/24 0715     Urine Culture, Routine Multiple organisms isolated. None in predominance.  Repeat if      clinically necessary.    Narrative:      Indicated criteria for high risk culture:->Other  Other (specify):->uti/confusion    Blood culture [[id number]] Collected: 06/15/24 2206    Order Status: Completed Specimen: Blood Updated: 06/17/24 0232     Blood Culture, Routine No Growth to date      No Growth to date          Significant Imaging:   Imaging Results               CT Head Without Contrast (Final result)  Result time 06/15/24 20:52:14      Final result by Angel Kitchen MD (06/15/24 20:52:14)                   Impression:      Bilateral hypoattenuating extra-axial collections overlying the cerebral convexities, right greater than left.  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  There is mild associated mass effect on the right vertex, although no significant midline shift or hydrocephalus appreciated.  Questionable minimal internal complexity overlying the frontal convexities may be artifactual, although component of subacute blood products not excluded.  Consider short-term CT follow-up.    Chronic senescent and microvascular ischemic changes.    This report was flagged in Epic as abnormal.      Electronically signed by: Angel Kitchen MD  Date:    06/15/2024  Time:    20:52               Narrative:    EXAMINATION:  CT HEAD WITHOUT CONTRAST    CLINICAL HISTORY:  Mental status change, unknown cause;    TECHNIQUE:  Low dose axial images were obtained through the head.  Coronal and sagittal reformations were also performed. Contrast was not administered.    COMPARISON:  No prior studies are available for comparison.    FINDINGS:  There is generalized cerebral volume loss with compensatory sulcal widening and ventricular enlargement.  There are bilateral hypoattenuating extra-axial  collections overlying the cerebral convexities suggestive of subdural hygromas or chronic subdural hematomas.  These measure approximately 1.5 cm on the right (coronal series 6, image 44) and 0.8 cm on the left (coronal series 6, image 30).  There is mild associated mass effect of the right vertex.  There is questionable slight internal complexity overlying the frontal convexities.  This may reflect artifact, although component of subacute blood products not excluded.  There is no significant midline shift.  No evidence to suggest overt hydrocephalus at this time.  There is periventricular white matter hypoattenuation suggesting sequelae of chronic microvascular ischemic change.  The basal cisterns are patent. The mastoid air cells and paranasal sinuses are clear of acute process. The visualized bones of the calvarium demonstrate no acute osseous abnormality.                                       CT Lumbar Spine Without Contrast (Final result)  Result time 06/15/24 17:32:35      Final result by Angel Kitchen MD (06/15/24 17:32:35)                   Impression:      Recent appearing mild-to-moderate compression fracture deformity of the L2 vertebral body.  Multiple additional compression deformities of the T9, T10, T12, and L1 vertebral bodies as above.  These have a somewhat more chronic appearance.    Moderately advanced multilevel degenerative changes of the lumbar spine as detailed above.  Further evaluation and follow-up could be performed with MRI when clinically appropriate if there are no patient contraindications.    Significant volume bilateral pleural effusion with adjacent compressive atelectasis and/or consolidation.      Electronically signed by: Angel Kitchen MD  Date:    06/15/2024  Time:    17:32               Narrative:    EXAMINATION:  CT LUMBAR SPINE WITHOUT CONTRAST    CLINICAL HISTORY:  l2 fracture;    TECHNIQUE:  Low-dose axial, sagittal and coronal reformations are obtained through the  lumbar spine.  Contrast was not administered.    COMPARISON:  Chest radiograph 09/24/2021    FINDINGS:  There is mild nonspecific diffuse heterogeneity of the visualized osseous structures which may relate to underlying osteopenia.  Clinical correlation is advised.  There are mild-to-moderate compression deformities of the T9 and T10 vertebral bodies.  There is a mild superior endplate compression deformity of the T12 vertebral body.  There is a moderate compression deformity of the L1 vertebral body.  There is a recent appearing mild to moderate compression fracture of the L2 vertebral body.  Fracture lucency is noted through the anterior-superior endplate.  There is minimal retropulsion with mild effacement of the anterior thecal sac.  The L3, L4, and L5 vertebral body heights appear well maintained without significant height loss appreciated allowing for underlying osteopenia.    There is minimal grade 1 anterolisthesis of L4 on L5. There is mild intervertebral disc space height loss most pronounced at L5-S1.    There are degenerative changes of the lumbar spine as detailed below:    T12-L1: Broad-based disc bulge and mild ligamentum flavum thickening.  Mild bilateral neural foraminal narrowing.    L1-L2: Circumferential disc bulge, ligamentum flavum thickening, and bilateral facet arthropathy.  Mild spinal canal stenosis and moderate bilateral neural foraminal narrowing.    L2-L3: Circumferential disc bulge, ligamentum flavum thickening, and bilateral facet arthropathy.  Moderate to severe spinal canal stenosis.  Moderate bilateral neural foraminal narrowing.    L3-L4: Circumferential disc bulge, ligamentum flavum thickening, and bilateral facet arthropathy.  Moderate spinal canal stenosis.  Moderate bilateral neural foraminal narrowing.    L4-L5: Circumferential disc bulge, ligamentum flavum thickening, and bilateral facet arthropathy.  Moderate spinal canal stenosis.  Severe moderate to severe bilateral neural  foraminal narrowing.    L5-S1: Broad-based circumferential disc bulge, asymmetric to the right.  Moderate to severe bilateral neural foraminal narrowing.    No grossly displaced sacral fracture identified.  There are degenerative changes of bilateral sacroiliac joints.    Limited views of the posterior abdomen demonstrate significant volume layering bilateral pleural effusions with associated compressive atelectasis at the visualized lung bases.  There is a right renal cyst.  There is prominent aortic atherosclerosis.  No retroperitoneal hematoma identified.                                       X-Ray Hips Bilateral 2 View Incl AP Pelvis (Final result)  Result time 06/15/24 15:57:04      Final result by Francisco Nicholson MD (06/15/24 15:57:04)                   Impression:      No acute abnormality of bilateral hips.      Electronically signed by: Francisco Nicholson  Date:    06/15/2024  Time:    15:57               Narrative:    EXAMINATION:  XR HIPS BILATERAL 2 VIEW INCL AP PELVIS    CLINICAL HISTORY:  Dorsalgia, unspecified    FINDINGS:  AP pelvis and two views of bilateral hips show no fracture, dislocation, or destructive osseous lesion.  Bilateral hip joint spaces are maintained.  Pubic symphysis and sacroiliac joints are maintained.  Surgical clips lie in left inguinal region.  Vascular calcifications are present.  Stool and bowel gas partially obscures the lower lumbar spine and sacrum and coccyx.                                       X-Ray Lumbar Spine 5 View (Final result)  Result time 06/15/24 14:16:08   Procedure changed from X-Ray Lumbar Spine Ap And Lateral     Final result by Francisco Nicholson MD (06/15/24 14:16:08)                   Impression:      1. Age indeterminate superior endplate L2 compression fracture which has progressed since 2019.  Correlation for symptoms at this level is requested.  2. Compression fractures of T10, T11, T12, and L1, not significantly changed since 2016.  3. Moderate L5-S1 disc  degeneration.      Electronically signed by: Francisco Nicholson  Date:    06/15/2024  Time:    14:16               Narrative:    EXAMINATION:  XR LUMBAR SPINE COMPLETE 5 VIEW    CLINICAL HISTORY:  fall;    FINDINGS:  Five views of lumbar spine show normal lumbosacral alignment.    Compression fractures of T10, T11, T12, L1, and L2 are evident.  T10, T11, T12, and L1 compression fractures have not significantly changed since CT 04/24/2019.  Superior endplate of L2 compression fracture has progressed since that time pre wall inferior endplate compression fracture of L2 is unchanged.    Moderate L5-S1 disc degeneration is evident.  Remaining lumbar intervertebral disc space heights are relatively well maintained.    Sacroiliac joints are normal. Arterial vascular calcifications are present.  Postsurgical changes of CABG are suggested 12 partially visualize, along with cardiac pacing leads.  Mild-to-moderate stool and moderate bowel gas are present throughout the abdomen, limiting evaluation.                                       X-Ray Chest AP Portable (Final result)  Result time 06/15/24 14:18:32      Final result by Francisco Nicholson MD (06/15/24 14:18:32)                   Impression:      1. Trace bilateral pleural effusions.  2. Improvement of interstitial edema and central pulmonary vascular prominence.      Electronically signed by: Francisco Nicholson  Date:    06/15/2024  Time:    14:18               Narrative:    EXAMINATION:  XR CHEST AP PORTABLE    CLINICAL HISTORY:  hypoxia;    FINDINGS:  Portable chest at 1254 compared with 12/29/2024 shows unchanged borderline enlarged cardiac silhouette size.  Mediastinal contours are unchanged with postsurgical changes of CABG.  Prior aortic valve replacement noted.  Left transvenous pacer unchanged.    Lung volumes are low.  Blunting of the costophrenic angles bilaterally suggest trace pleural effusions.  Prior central pulmonary vascular prominence and central interstitial opacities  "have significantly improved.  Minimal bibasilar interstitial opacities persist.  Minimal calcification suggested along the right diaphragm and in right hemithorax suggesting pleural plaques.  No definite acute osseous abnormality identified.  Bones are diffusely demineralized, limiting evaluation.                                  ECHO:    Left Ventricle: The left ventricle is normal in size. Increased ventricular mass. Mildly increased wall thickness. There is mild concentric hypertrophy. Unable to assess wall motion. Septal motion is consistent with pacing. There is normal systolic function with a visually estimated ejection fraction of 55 - 60%. Diastolic function cannot be reliably determined in the presence of mitral annular calcification.    Right Ventricle: Right ventricle was not well visualized due to poor acoustic window. Normal right ventricular cavity size. Pacemaker lead present in the ventricle.    Aortic Valve: There is a transcatheter valve replacement in the aortic position.    Mitral Valve: Mildly thickened leaflets. There is moderate mitral annular calcification present. There is mild regurgitation.    Tricuspid Valve: There is mild to moderate regurgitation.    Assessment/Plan:      * Severe sepsis  This patient does have evidence of infective focus, unspecified source  My overall impression is  severe sepsis .  Source: Unknown  Antibiotics given-   Antibiotics (72h ago, onward)      Start     Stop Route Frequency Ordered    06/17/24 0000  vancomycin 1.25 g in dextrose 5% 250 mL IVPB (ready to mix)        Note to Pharmacy: Ht: 5' 6" (1.676 m)  Wt: 78.5 kg (173 lb)  Estimated Creatinine Clearance: 53.7 mL/min (based on SCr of 1.1 mg/dL).  Body mass index is 27.92 kg/m².    -- IV Every 24 hours (non-standard times) 06/15/24 2342    06/15/24 2114  vancomycin - pharmacy to dose  (vancomycin IVPB (PEDS and ADULTS))        Placed in "And" Linked Group    -- IV pharmacy to manage frequency 06/15/24 2014 " "   06/15/24 2100  meropenem 1 g in sodium chloride 0.9 % 100 mL IVPB (ready to mix system)        Note to Pharmacy: Ht: 5' 6" (1.676 m)  Wt: 78.5 kg (173 lb)  Estimated Creatinine Clearance: 53.7 mL/min (based on SCr of 1.1 mg/dL).  Body mass index is 27.92 kg/m².    -- IV Every 12 hours (non-standard times) 06/15/24 2027          Latest lactate reviewed-  Recent Labs   Lab 06/15/24  2147   POCLAC 1.17     Organ dysfunction indicated by Encephalopathy    Fluid challenge : avoid IVF due to CHF exacerbation    Post- resuscitation assessment Yes Perfusion exam was performed within 6 hours of septic shock presentation after bolus shows Adequate tissue perfusion assessed by non-invasive monitoring       Will Start Pressors- Levophed for MAP of 65  Source control achieved by: IV Vancomycin and Cefepime.    Acute confusional state  Secondary to sepsis. Pt developed confusion after he came  back from CT room  Ordered blood and urine cultures  Afebrile   Pulling out lines  Received iv morphine in ER upon arrival as per records  Pt has h/o seizure disorder and on phenytoin   CT brain results reviewed.         Hx of CABG  Aware       Hypoxia  Mostly from CHF flare  Maintain iv diuresis  Need home oxygen evaluation before discharge from hospital      L2 vertebral fracture  Mild-to-moderate compression fracture deformity of the L2 vertebral body   Neurosurgeon consulted       Acute on chronic combined systolic and diastolic heart failure  Maintain iv lasix at the moment   Recent ECHO reviewed   Latest Reference Range & Units 02/09/24 10:09 02/29/24 07:50 06/15/24 13:32   BNP 0 - 99 pg/mL 958 (H) 1,720 (H) 1,892 (H)   Holding Entersto due to low BP.      Atrial fibrillation  H/o aware     Seizure disorder  Maintain phenytoin PO  Check serum levels  Seizure precautions      NSTEMI (non-ST elevated myocardial infarction)  Trend troponin.  Consult cardiologist.  Follow 2 D ECHO.  Start heparin gtt if ok with neurologist. Subdural " hygroma - does not appear to be SDH.  Continue cardiac meds. Patient is CP free.       CAD (coronary artery disease)  H/o CABG with recent angiogram on December 2023    S/P TAVR (transcatheter aortic valve replacement)  Aware       HTN (hypertension)  Chronic, controlled. Latest blood pressure and vitals reviewed-     Temp:  [97.9 °F (36.6 °C)-102.8 °F (39.3 °C)]   Pulse:  []   Resp:  [18-38]   BP: ()/()   SpO2:  [88 %-100 %] .   Home meds for hypertension were reviewed and noted below.   Hypertension Medications               ENTRESTO 24-26 mg per tablet Take 1 tablet by mouth 2 (two) times daily.    furosemide (LASIX) 20 MG tablet Take 20 mg by mouth daily as needed (Fluid).    isosorbide mononitrate (IMDUR) 30 MG 24 hr tablet Take 2 tablets (60 mg total) by mouth once daily.    metoprolol succinate (TOPROL-XL) 100 MG 24 hr tablet Take 100 mg by mouth 2 (two) times daily.    NITROLINGUAL 400 mcg/spray spray Place 1 spray under the tongue every 5 (five) minutes as needed for Chest pain.        Hold ISBD and ARB for now due to low BP.    While in the hospital, will manage blood pressure as follows; Continue home antihypertensive regimen    Will utilize p.r.n. blood pressure medication only if patient's blood pressure greater than 140/90 and he develops symptoms such as worsening chest pain or shortness of breath.      VTE Risk Mitigation (From admission, onward)           Ordered     IP VTE HIGH RISK PATIENT  Once         06/15/24 1635     Place sequential compression device  Until discontinued         06/15/24 1635                    Discharge Planning   AXEL:      Code Status: Full Code   Is the patient medically ready for discharge?:     Reason for patient still in hospital (select all that apply): {HMREASONPATIENTINHOSP:72069}  Discharge Plan A: Home            Critical care time spent on the evaluation and treatment of severe organ dysfunction, review of pertinent labs and imaging studies,  discussions with consulting providers and discussions with patient/family: *** minutes.      Edvin Almonte MD  Department of Hospital Medicine   [facility]

## 2024-06-17 NOTE — ASSESSMENT & PLAN NOTE
Trend troponin.  Follow Cardiology recommendations.  2D echocardiogram results reviewed; ejection fraction 55-60%.  Continue heparin gtt if ok with neurologist. Subdural hygroma - does not appear to be SDH.  Continue cardiac meds. Patient is CP free.

## 2024-06-17 NOTE — CONSULTS
[facility]  Neurosurgery  Consult Note    Inpatient consult to Neurosurgery  Consult performed by: Jeanine Arias PA-C  Consult ordered by: Maicol Zendejas MD  Reason for consult: L2 fracture and bilateral hygroma        Subjective:     Chief Complaint/Reason for Admission:  CHF and L2 fracture    History of Present Illness: Jerome Amaro is a 79 y/o male with past medical history of hypertension, coronary artery disease status post CABG, status post transcatheter aortic valve replacement, seizure disorder, atrial fibrillation presented to the emergency department on 06/15/2024 complaining back pain after a mechanical fall on a day prior care.  CT lumbar revealed acute L2 fracture.  CT head revealed chronic subdural hygromas without  hemorrhage, mass effect or midline shift.  Patient was subsequently admitted for CHF and L2 fracture.  Neurosurgery consulted.    On initiation of encounter, patient was found in bed, sleeping.  Patient did awake with voice prompting.  He reports pain in back is so severe he can not move his legs.  Prior to fall, patient reported he lived independently and ambulated without difficulty.    Of note, patient is on Eliquis 5 mg, and Plavix 75 mg for cardiac stents.    Medications Prior to Admission   Medication Sig Dispense Refill Last Dose    citalopram (CELEXA) 40 MG tablet Take 40 mg by mouth once daily. NEW Rx - NOT YET STARTED   6/15/2024    clopidogrel (PLAVIX) 75 mg tablet Take 75 mg by mouth once daily.   6/15/2024    CORLANOR 5 mg Tab Take 1 tablet by mouth 2 (two) times daily. NEW Rx - NOT YET STARTED   6/15/2024    ELIQUIS 5 mg Tab Take 5 mg by mouth every morning.   6/15/2024    ENTRESTO 24-26 mg per tablet Take 1 tablet by mouth 2 (two) times daily.   6/15/2024    ezetimibe (ZETIA) 10 mg tablet Take 10 mg by mouth once daily.   6/15/2024    furosemide (LASIX) 20 MG tablet Take 20 mg by mouth daily as needed (Fluid).   6/15/2024    hydrOXYzine pamoate (VISTARIL) 25 MG  Cap Take 1 capsule (25 mg total) by mouth daily as needed (anxiety).   6/15/2024    isosorbide mononitrate (IMDUR) 30 MG 24 hr tablet Take 2 tablets (60 mg total) by mouth once daily. 60 tablet 11 6/15/2024    metoprolol succinate (TOPROL-XL) 100 MG 24 hr tablet Take 100 mg by mouth 2 (two) times daily.   6/15/2024    phenytoin (DILANTIN) 100 MG ER capsule Take 400 mg by mouth once daily.   0 6/15/2024    bismuth subsalicylate (BISMAREX) 262 mg Chew Take 1 tablet by mouth 2 (two) times daily.   Unknown    famotidine (PEPCID) 40 MG tablet Take 40 mg by mouth Daily.       NITROLINGUAL 400 mcg/spray spray Place 1 spray under the tongue every 5 (five) minutes as needed for Chest pain.   Unknown    pantoprazole (PROTONIX) 20 MG tablet Take 20 mg by mouth 2 (two) times daily.   Unknown    ranolazine (RANEXA) 1,000 mg Tb12 Take 1,000 mg by mouth 2 (two) times daily.   Unknown       Review of patient's allergies indicates:   Allergen Reactions    Atorvastatin Other (See Comments)     Muscle pain    Rosuvastatin Other (See Comments)     Muscle pain       Past Medical History:   Diagnosis Date    Anticoagulant long-term use     2014    Aortic valve disease 2014    pig valve    Arthritis     all over    Atrial fibrillation 2/29/2024    Chest pain 07/15/2019    CHF (congestive heart failure)     2014 on meds    Coronary artery disease     2007 cabg    Difficulty swallowing     Heart attack 1990    15 stents    Heart attack 02/01/2024    mild    Heart block     Hyperlipidemia     Hypertension     on meds    Hypothyroidism 2015    on meds    PVD (peripheral vascular disease)     Seizures     last episode 1990 on meds     Past Surgical History:   Procedure Laterality Date    AORTOGRAPHY WITH SERIALOGRAPHY N/A 11/16/2021    Procedure: AORTOGRAM, WITH RUNOFF;  Surgeon: Rodrigo Willoughby MD;  Location: [facility] CATH/EP LAB;  Service: Cardiology;  Laterality: N/A;    ARTERIOGRAPHY OF AORTIC ROOT N/A 11/05/2019    Procedure: ARTERIOGRAM,  AORTIC ROOT;  Surgeon: Rodrigo Willoughby MD;  Location: [facility] CATH/EP LAB;  Service: Cardiology;  Laterality: N/A;    CARDIAC CATHETERIZATION      CARDIAC VALVE SURGERY      CORONARY ANGIOGRAPHY INCLUDING BYPASS GRAFTS WITH CATHETERIZATION OF LEFT HEART N/A 11/05/2019    Procedure: ANGIOGRAM, CORONARY, INCLUDING BYPASS GRAFT, WITH LEFT HEART CATHETERIZATION;  Surgeon: Rodrigo Willoughby MD;  Location: [facility] CATH/EP LAB;  Service: Cardiology;  Laterality: N/A;    CORONARY ANGIOGRAPHY INCLUDING BYPASS GRAFTS WITH CATHETERIZATION OF LEFT HEART Left 11/03/2020    Procedure: ANGIOGRAM, CORONARY, INCLUDING BYPASS GRAFT, WITH LEFT HEART CATHETERIZATION;  Surgeon: Rodrigo Willoughby MD;  Location: [facility] CATH/EP LAB;  Service: Cardiology;  Laterality: Left;    CORONARY ANGIOGRAPHY INCLUDING BYPASS GRAFTS WITH CATHETERIZATION OF LEFT HEART N/A 09/28/2021    Procedure: ANGIOGRAM, CORONARY, INCLUDING BYPASS GRAFT, WITH LEFT HEART CATHETERIZATION;  Surgeon: Rodrigo Willoughby MD;  Location: [facility] CATH/EP LAB;  Service: Cardiology;  Laterality: N/A;    CORONARY ANGIOGRAPHY INCLUDING BYPASS GRAFTS WITH CATHETERIZATION OF LEFT HEART N/A 12/05/2023    Procedure: ANGIOGRAM, CORONARY, INCLUDING BYPASS GRAFT, WITH LEFT HEART CATHETERIZATION;  Surgeon: Rodrigo Willoughby MD;  Location: [facility] CATH/EP LAB;  Service: Cardiology;  Laterality: N/A;    CORONARY ANGIOPLASTY      CORONARY ARTERY BYPASS GRAFT      x 2    1991 2006    ESOPHAGOGASTRODUODENOSCOPY N/A 2/20/2024    Procedure: EGD (ESOPHAGOGASTRODUODENOSCOPY);  Surgeon: Mal Lockhart III, MD;  Location: [facility] ENDO;  Service: Endoscopy;  Laterality: N/A;    EXTRACORPOREAL SHOCK WAVE LITHOTRIPSY Right 08/01/2019    Procedure: LITHOTRIPSY, ESWL;  Surgeon: Williams Ortega MD;  Location: [facility] OR;  Service: Urology;  Laterality: Right;    HEMORRHOID SURGERY  1990    INSERTION OF PACEMAKER      PERCUTANEOUS TRANSLUMINAL BALLOON ANGIOPLASTY OF CORONARY ARTERY N/A 11/08/2019    Procedure:  Angioplasty-coronary;  Surgeon: Rodrigo Willoughby MD;  Location: [facility] CATH/EP LAB;  Service: Cardiology;  Laterality: N/A;    WRIST FRACTURE SURGERY       Family History       Problem Relation (Age of Onset)    Diabetes Sister    Heart attack Mother, Father    Heart disease Sister    No Known Problems Maternal Grandmother, Maternal Grandfather, Paternal Grandmother, Paternal Grandfather, Brother, Maternal Aunt, Maternal Uncle, Paternal Aunt, Paternal Uncle    Stroke Sister          Tobacco Use    Smoking status: Former     Current packs/day: 0.00     Average packs/day: 0.5 packs/day for 4.0 years (2.0 ttl pk-yrs)     Types: Cigarettes     Start date:      Quit date:      Years since quittin.4    Smokeless tobacco: Never   Substance and Sexual Activity    Alcohol use: Yes     Comment: social    Drug use: No    Sexual activity: Not Currently       Objective:     Weight: 75.6 kg (166 lb 10.7 oz)  Body mass index is 26.9 kg/m².  Vital Signs (Most Recent):  Temp: 98.1 °F (36.7 °C) (24 1120)  Pulse: 83 (24 1200)  Resp: (!) 24 (24 1200)  BP: 105/63 (24 1200)  SpO2: 99 % (24 1200) Vital Signs (24h Range):  Temp:  [98.1 °F (36.7 °C)-99.5 °F (37.5 °C)] 98.1 °F (36.7 °C)  Pulse:  [] 83  Resp:  [15-36] 24  SpO2:  [85 %-100 %] 99 %  BP: ()/(49-89) 105/63                         Male External Urine Management Device w/ Suction 06/15/24 2030 (Active)   Collection Container Other (Comment) 24 1101   Securement Method other (see comments) 24 1101   Skin no redness;no breakdown 24 1101   Tolerance no signs/symptoms of discomfort 24 1101   Suction Continuous suction at 70 mmHg 24 0701   Output (mL) 900 mL 24 0501   Catheter Change Date 24 0836   Catheter Change Time 0836 24 0836         Neurosurgery Physical Exam  General:  No acute distress.    Neurologic: Alert and oriented. Thought content appropriate.  GCS: E4 V5 M6; Total:  15  Pulmonary: normal respirations, no signs of respiratory distress  Skin: Skin is warm, dry and intact.    Motor Strength:   Bilateral lower extremity iliopsoas-self-limited due to pain, EHL/TA/gastrocnemius 4/5 self-limited due to pain     Significant Labs:  Recent Labs   Lab 06/15/24  1332 06/16/24  0410 06/17/24  0257   * 103 116*   * 131* 132*   K 3.7 3.4* 3.6   CL 92* 93* 91*   CO2 26 28 32*   BUN 17 18 20   CREATININE 1.1 1.2 1.2   CALCIUM 9.1 8.3* 8.8   MG 1.7 1.6 1.7     Recent Labs   Lab 06/16/24  0410 06/16/24  1144 06/17/24  0946   WBC 8.04 9.31 7.04   HGB 11.4* 12.6* 12.5*   HCT 34.1* 37.3* 36.8*    166 154     Recent Labs   Lab 06/16/24  1144 06/17/24  0946   INR 1.1 1.1   APTT 40.9* 38.1*     Microbiology Results (last 7 days)       Procedure Component Value Units Date/Time    Urine Culture High Risk [[id number]] Collected: 06/15/24 2013    Order Status: Completed Specimen: Urine, Clean Catch Updated: 06/17/24 0715     Urine Culture, Routine Multiple organisms isolated. None in predominance.  Repeat if      clinically necessary.    Narrative:      Indicated criteria for high risk culture:->Other  Other (specify):->uti/confusion    Blood culture [[id number]] Collected: 06/15/24 2206    Order Status: Completed Specimen: Blood Updated: 06/17/24 0232     Blood Culture, Routine No Growth to date      No Growth to date          Assessment/Plan:     Subdural hygroma  Repeat CT head demonstrate no evidence of acute bleeding hemorrhage, mass effect or midline shift.    No surgical intervention warrant.     Follow up outpatient clinic in 2 weeks with repeat head CT.    L2 vertebral fracture  Jerome Amaro is a 77 y/o male presented to the emergency department on 06/15/2024 complaining back pain after a mechanical fall on a day prior care.  CT lumbar revealed acute L2 fracture.  CT head revealed chronic subdural hygromas without  hemorrhage, mass effect or midline shift.     No acute surgical  intervention is warranted at this time.    Recommend:  -TLSO brace when out of bed and ambulating  -Robaxin 750 mg q 8 hours   -hydrocodone 7.5-325 mg q 6 hours scheduled  -Ibuprofen 800 mg 1 tab every 8 hour scheduled  -PT/OT/OOB         Thank you for your consult.     NED VALENZUELA PA-C  Neurosurgery  Formerly [facility]

## 2024-06-17 NOTE — PLAN OF CARE
Problem: Adult Inpatient Plan of Care  Goal: Plan of Care Review  Outcome: Progressing  Goal: Patient-Specific Goal (Individualized)  Outcome: Progressing  Goal: Absence of Hospital-Acquired Illness or Injury  Outcome: Progressing  Goal: Optimal Comfort and Wellbeing  Outcome: Progressing  Goal: Readiness for Transition of Care  Outcome: Progressing     Problem: Skin Injury Risk Increased  Goal: Skin Health and Integrity  Outcome: Progressing     Problem: Fall Injury Risk  Goal: Absence of Fall and Fall-Related Injury  Outcome: Progressing     Problem: Sepsis/Septic Shock  Goal: Optimal Coping  Outcome: Progressing  Goal: Absence of Bleeding  Outcome: Progressing  Goal: Blood Glucose Level Within Targeted Range  Outcome: Progressing  Goal: Absence of Infection Signs and Symptoms  Outcome: Progressing  Goal: Optimal Nutrition Intake  Outcome: Progressing   Uneventful shift. Awaiting tlso brace from specialty order. Heparin gtt infusing. PTT therapeutic. NADN, VSS. Pain tolerable with prn hydrocodone. WCTM.

## 2024-06-17 NOTE — CARE UPDATE
06/16/24 2012   Patient Assessment/Suction   Level of Consciousness (AVPU) alert   Respiratory Effort Normal   Skin Integrity   $ Wound Care Tech Time 15 min   Area Observed Left;Right;Cheek;Nares   Skin Appearance without discoloration   PRE-TX-O2   Device (Oxygen Therapy) nasal cannula   Flow (L/min) (Oxygen Therapy) 2   SpO2 (!) 92 %   Pulse Oximetry Type Continuous   $ Pulse Oximetry - Multiple Charge Pulse Oximetry - Multiple   Pulse 99   Resp 17   Education   $ Education 15 min   Respiratory Evaluation   $ Care Plan Tech Time 15 min

## 2024-06-17 NOTE — SUBJECTIVE & OBJECTIVE
Interval History:  Presently afebrile.  Low back pain is more responsive pain medicine.  Denies any chest pain or shortness of breath.  On heparin infusion.    Review of Systems   Musculoskeletal:  Positive for back pain.     Objective:     Vital Signs (Most Recent):  Temp: 98.9 °F (37.2 °C) (06/17/24 0400)  Pulse: 91 (06/17/24 0700)  Resp: (!) 25 (06/17/24 0700)  BP: 114/73 (06/17/24 0700)  SpO2: 99 % (06/17/24 0700) Vital Signs (24h Range):  Temp:  [98.9 °F (37.2 °C)-99.5 °F (37.5 °C)] 98.9 °F (37.2 °C)  Pulse:  [] 91  Resp:  [15-36] 25  SpO2:  [85 %-100 %] 99 %  BP: (107-150)/(55-89) 114/73     Weight: 75.6 kg (166 lb 10.7 oz)  Body mass index is 26.9 kg/m².    Intake/Output Summary (Last 24 hours) at 6/17/2024 0732  Last data filed at 6/17/2024 0543  Gross per 24 hour   Intake 779.93 ml   Output 2300 ml   Net -1520.07 ml         Physical Exam  Vitals and nursing note reviewed.   Constitutional:       Appearance: Normal appearance. He is well-developed.   HENT:      Head: Atraumatic.      Right Ear: External ear normal.      Left Ear: External ear normal.      Nose: Nose normal.      Mouth/Throat:      Mouth: Mucous membranes are moist.   Eyes:      Extraocular Movements: Extraocular movements intact.   Cardiovascular:      Rate and Rhythm: Normal rate.   Pulmonary:      Effort: Pulmonary effort is normal.   Abdominal:      Palpations: Abdomen is soft.   Musculoskeletal:         General: Normal range of motion.      Cervical back: Full passive range of motion without pain and normal range of motion.   Skin:     General: Skin is warm.   Neurological:      Mental Status: He is oriented to person, place, and time.             Significant Labs: All pertinent labs within the past 24 hours have been reviewed.  CBC:   Recent Labs   Lab 06/15/24  1332 06/16/24  0410 06/16/24  1144   WBC 12.41 8.04 9.31   HGB 12.7* 11.4* 12.6*   HCT 38.3* 34.1* 37.3*    150 166     CMP:   Recent Labs   Lab 06/15/24  1331  "06/16/24  0410 06/17/24  0257   * 131* 132*   K 3.7 3.4* 3.6   CL 92* 93* 91*   CO2 26 28 32*   * 103 116*   BUN 17 18 20   CREATININE 1.1 1.2 1.2   CALCIUM 9.1 8.3* 8.8   PROT 7.3 6.4 6.7   ALBUMIN 4.1 3.6 3.7   BILITOT 1.9* 1.9* 1.7*   ALKPHOS 95 79 88   AST 15 14 20   ALT 8* 7* 7*   ANIONGAP 12 10 9     Lactic Acid: No results for input(s): "LACTATE" in the last 48 hours.  Troponin:   Recent Labs   Lab 06/15/24  1332 06/16/24  0410   TROPONINIHS 207.7* 949.1*     TSH:   Recent Labs   Lab 01/24/24  0453   TSH 2.778     Urine Culture:   Recent Labs   Lab 06/15/24  2013   LABURIN Multiple organisms isolated. None in predominance.  Repeat if  clinically necessary.     Microbiology Results (last 7 days)       Procedure Component Value Units Date/Time    Urine Culture High Risk [[id number]] Collected: 06/15/24 2013    Order Status: Completed Specimen: Urine, Clean Catch Updated: 06/17/24 0715     Urine Culture, Routine Multiple organisms isolated. None in predominance.  Repeat if      clinically necessary.    Narrative:      Indicated criteria for high risk culture:->Other  Other (specify):->uti/confusion    Blood culture [[id number]] Collected: 06/15/24 2206    Order Status: Completed Specimen: Blood Updated: 06/17/24 0232     Blood Culture, Routine No Growth to date      No Growth to date          Significant Imaging:   Imaging Results               CT Head Without Contrast (Final result)  Result time 06/15/24 20:52:14      Final result by Angel Kitchen MD (06/15/24 20:52:14)                   Impression:      Bilateral hypoattenuating extra-axial collections overlying the cerebral convexities, right greater than left.  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  There is mild associated mass effect on the right vertex, although no significant midline shift or hydrocephalus appreciated.  Questionable minimal " internal complexity overlying the frontal convexities may be artifactual, although component of subacute blood products not excluded.  Consider short-term CT follow-up.    Chronic senescent and microvascular ischemic changes.    This report was flagged in Epic as abnormal.      Electronically signed by: Angel Kitchen MD  Date:    06/15/2024  Time:    20:52               Narrative:    EXAMINATION:  CT HEAD WITHOUT CONTRAST    CLINICAL HISTORY:  Mental status change, unknown cause;    TECHNIQUE:  Low dose axial images were obtained through the head.  Coronal and sagittal reformations were also performed. Contrast was not administered.    COMPARISON:  No prior studies are available for comparison.    FINDINGS:  There is generalized cerebral volume loss with compensatory sulcal widening and ventricular enlargement.  There are bilateral hypoattenuating extra-axial collections overlying the cerebral convexities suggestive of subdural hygromas or chronic subdural hematomas.  These measure approximately 1.5 cm on the right (coronal series 6, image 44) and 0.8 cm on the left (coronal series 6, image 30).  There is mild associated mass effect of the right vertex.  There is questionable slight internal complexity overlying the frontal convexities.  This may reflect artifact, although component of subacute blood products not excluded.  There is no significant midline shift.  No evidence to suggest overt hydrocephalus at this time.  There is periventricular white matter hypoattenuation suggesting sequelae of chronic microvascular ischemic change.  The basal cisterns are patent. The mastoid air cells and paranasal sinuses are clear of acute process. The visualized bones of the calvarium demonstrate no acute osseous abnormality.                                       CT Lumbar Spine Without Contrast (Final result)  Result time 06/15/24 17:32:35      Final result by Angel Kitchen MD (06/15/24 17:32:35)                    Impression:      Recent appearing mild-to-moderate compression fracture deformity of the L2 vertebral body.  Multiple additional compression deformities of the T9, T10, T12, and L1 vertebral bodies as above.  These have a somewhat more chronic appearance.    Moderately advanced multilevel degenerative changes of the lumbar spine as detailed above.  Further evaluation and follow-up could be performed with MRI when clinically appropriate if there are no patient contraindications.    Significant volume bilateral pleural effusion with adjacent compressive atelectasis and/or consolidation.      Electronically signed by: Angel Kitchen MD  Date:    06/15/2024  Time:    17:32               Narrative:    EXAMINATION:  CT LUMBAR SPINE WITHOUT CONTRAST    CLINICAL HISTORY:  l2 fracture;    TECHNIQUE:  Low-dose axial, sagittal and coronal reformations are obtained through the lumbar spine.  Contrast was not administered.    COMPARISON:  Chest radiograph 09/24/2021    FINDINGS:  There is mild nonspecific diffuse heterogeneity of the visualized osseous structures which may relate to underlying osteopenia.  Clinical correlation is advised.  There are mild-to-moderate compression deformities of the T9 and T10 vertebral bodies.  There is a mild superior endplate compression deformity of the T12 vertebral body.  There is a moderate compression deformity of the L1 vertebral body.  There is a recent appearing mild to moderate compression fracture of the L2 vertebral body.  Fracture lucency is noted through the anterior-superior endplate.  There is minimal retropulsion with mild effacement of the anterior thecal sac.  The L3, L4, and L5 vertebral body heights appear well maintained without significant height loss appreciated allowing for underlying osteopenia.    There is minimal grade 1 anterolisthesis of L4 on L5. There is mild intervertebral disc space height loss most pronounced at L5-S1.    There are degenerative changes of the  lumbar spine as detailed below:    T12-L1: Broad-based disc bulge and mild ligamentum flavum thickening.  Mild bilateral neural foraminal narrowing.    L1-L2: Circumferential disc bulge, ligamentum flavum thickening, and bilateral facet arthropathy.  Mild spinal canal stenosis and moderate bilateral neural foraminal narrowing.    L2-L3: Circumferential disc bulge, ligamentum flavum thickening, and bilateral facet arthropathy.  Moderate to severe spinal canal stenosis.  Moderate bilateral neural foraminal narrowing.    L3-L4: Circumferential disc bulge, ligamentum flavum thickening, and bilateral facet arthropathy.  Moderate spinal canal stenosis.  Moderate bilateral neural foraminal narrowing.    L4-L5: Circumferential disc bulge, ligamentum flavum thickening, and bilateral facet arthropathy.  Moderate spinal canal stenosis.  Severe moderate to severe bilateral neural foraminal narrowing.    L5-S1: Broad-based circumferential disc bulge, asymmetric to the right.  Moderate to severe bilateral neural foraminal narrowing.    No grossly displaced sacral fracture identified.  There are degenerative changes of bilateral sacroiliac joints.    Limited views of the posterior abdomen demonstrate significant volume layering bilateral pleural effusions with associated compressive atelectasis at the visualized lung bases.  There is a right renal cyst.  There is prominent aortic atherosclerosis.  No retroperitoneal hematoma identified.                                       X-Ray Hips Bilateral 2 View Incl AP Pelvis (Final result)  Result time 06/15/24 15:57:04      Final result by Francisco Nicholson MD (06/15/24 15:57:04)                   Impression:      No acute abnormality of bilateral hips.      Electronically signed by: Francisco Nicholson  Date:    06/15/2024  Time:    15:57               Narrative:    EXAMINATION:  XR HIPS BILATERAL 2 VIEW INCL AP PELVIS    CLINICAL HISTORY:  Dorsalgia, unspecified    FINDINGS:  AP pelvis and two  views of bilateral hips show no fracture, dislocation, or destructive osseous lesion.  Bilateral hip joint spaces are maintained.  Pubic symphysis and sacroiliac joints are maintained.  Surgical clips lie in left inguinal region.  Vascular calcifications are present.  Stool and bowel gas partially obscures the lower lumbar spine and sacrum and coccyx.                                       X-Ray Lumbar Spine 5 View (Final result)  Result time 06/15/24 14:16:08   Procedure changed from X-Ray Lumbar Spine Ap And Lateral     Final result by Francisco Nicholson MD (06/15/24 14:16:08)                   Impression:      1. Age indeterminate superior endplate L2 compression fracture which has progressed since 2019.  Correlation for symptoms at this level is requested.  2. Compression fractures of T10, T11, T12, and L1, not significantly changed since 2016.  3. Moderate L5-S1 disc degeneration.      Electronically signed by: Francisco Nicholson  Date:    06/15/2024  Time:    14:16               Narrative:    EXAMINATION:  XR LUMBAR SPINE COMPLETE 5 VIEW    CLINICAL HISTORY:  fall;    FINDINGS:  Five views of lumbar spine show normal lumbosacral alignment.    Compression fractures of T10, T11, T12, L1, and L2 are evident.  T10, T11, T12, and L1 compression fractures have not significantly changed since CT 04/24/2019.  Superior endplate of L2 compression fracture has progressed since that time pre wall inferior endplate compression fracture of L2 is unchanged.    Moderate L5-S1 disc degeneration is evident.  Remaining lumbar intervertebral disc space heights are relatively well maintained.    Sacroiliac joints are normal. Arterial vascular calcifications are present.  Postsurgical changes of CABG are suggested 12 partially visualize, along with cardiac pacing leads.  Mild-to-moderate stool and moderate bowel gas are present throughout the abdomen, limiting evaluation.                                       X-Ray Chest AP Portable (Final  result)  Result time 06/15/24 14:18:32      Final result by Francisco Nicholson MD (06/15/24 14:18:32)                   Impression:      1. Trace bilateral pleural effusions.  2. Improvement of interstitial edema and central pulmonary vascular prominence.      Electronically signed by: Francisco Nicholson  Date:    06/15/2024  Time:    14:18               Narrative:    EXAMINATION:  XR CHEST AP PORTABLE    CLINICAL HISTORY:  hypoxia;    FINDINGS:  Portable chest at 1254 compared with 12/29/2024 shows unchanged borderline enlarged cardiac silhouette size.  Mediastinal contours are unchanged with postsurgical changes of CABG.  Prior aortic valve replacement noted.  Left transvenous pacer unchanged.    Lung volumes are low.  Blunting of the costophrenic angles bilaterally suggest trace pleural effusions.  Prior central pulmonary vascular prominence and central interstitial opacities have significantly improved.  Minimal bibasilar interstitial opacities persist.  Minimal calcification suggested along the right diaphragm and in right hemithorax suggesting pleural plaques.  No definite acute osseous abnormality identified.  Bones are diffusely demineralized, limiting evaluation.                                  ECHO:    Left Ventricle: The left ventricle is normal in size. Increased ventricular mass. Mildly increased wall thickness. There is mild concentric hypertrophy. Unable to assess wall motion. Septal motion is consistent with pacing. There is normal systolic function with a visually estimated ejection fraction of 55 - 60%. Diastolic function cannot be reliably determined in the presence of mitral annular calcification.    Right Ventricle: Right ventricle was not well visualized due to poor acoustic window. Normal right ventricular cavity size. Pacemaker lead present in the ventricle.    Aortic Valve: There is a transcatheter valve replacement in the aortic position.    Mitral Valve: Mildly thickened leaflets. There is moderate  mitral annular calcification present. There is mild regurgitation.    Tricuspid Valve: There is mild to moderate regurgitation.

## 2024-06-17 NOTE — NURSING
Nurses Note -- 4 Eyes      6/17/2024   5:17 PM      Skin assessed during: Daily Assessment      [x] No Altered Skin Integrity Present    [x]Prevention Measures Documented      [] Yes- Altered Skin Integrity Present or Discovered   [] LDA Added if Not in Epic (Describe Wound)   [] New Altered Skin Integrity was Present on Admit and Documented in LDA   [] Wound Image Taken    Wound Care Consulted? No    Attending Nurse:  Loida Wheeler RN/Staff Member:  Kvng Cristina RN

## 2024-06-17 NOTE — PLAN OF CARE
06/17/24 0752   Patient Assessment/Suction   Level of Consciousness (AVPU) alert   Respiratory Effort Unlabored;Normal   Expansion/Accessory Muscles/Retractions no use of accessory muscles;no retractions   PRE-TX-O2   Device (Oxygen Therapy) room air  (nasal cannula on stand by)   SpO2 100 %   Pulse Oximetry Type Continuous   $ Pulse Oximetry - Multiple Charge Pulse Oximetry - Multiple   Pulse 101   Resp (!) 24   Education   $ Education 15 min

## 2024-06-17 NOTE — PROGRESS NOTES
[facility] Medicine  Progress Note    Patient Name: Jerome Amaro Jr.  MRN: [id number]  Patient Class: IP- Inpatient   Admission Date: 6/15/2024  Length of Stay: 1 days  Attending Physician: Edvin Almonte MD  Primary Care Provider: Magda Ruiz FNP-C        Subjective:     Principal Problem:Severe sepsis        HPI:  78 year old pt getting admitted with acute CHF and L2 fracture  Pt was started on SQ Lasix inj recently   Yesterday pt had a fall and hit his back on ground  Denied hitting head/No loss of consciousness   Pt since then was unable to walk or move around due to pain  Family thought pt had Side effect from SQ Lasix  Today symptoms went worse and he was escorted to ER   Pt was found to be hypoxic in ER which was new to him  Per family , pt is not on home oxygen  Pt was alert and oriented x 3 upon arrival to ER   In ER after pt had CT Lumbar spine he appeared confused and had tachycardia & EKG showed paced rhythm       Overview/Hospital Course:  No notes on file    Interval History:  Presently afebrile.  Low back pain is more responsive pain medicine.  Denies any chest pain or shortness of breath.  On heparin infusion.    Review of Systems   Musculoskeletal:  Positive for back pain.     Objective:     Vital Signs (Most Recent):  Temp: 98.9 °F (37.2 °C) (06/17/24 0400)  Pulse: 91 (06/17/24 0700)  Resp: (!) 25 (06/17/24 0700)  BP: 114/73 (06/17/24 0700)  SpO2: 99 % (06/17/24 0700) Vital Signs (24h Range):  Temp:  [98.9 °F (37.2 °C)-99.5 °F (37.5 °C)] 98.9 °F (37.2 °C)  Pulse:  [] 91  Resp:  [15-36] 25  SpO2:  [85 %-100 %] 99 %  BP: (107-150)/(55-89) 114/73     Weight: 75.6 kg (166 lb 10.7 oz)  Body mass index is 26.9 kg/m².    Intake/Output Summary (Last 24 hours) at 6/17/2024 0732  Last data filed at 6/17/2024 0543  Gross per 24 hour   Intake 779.93 ml   Output 2300 ml   Net -1520.07 ml         Physical Exam  Vitals and nursing note reviewed.   Constitutional:       Appearance:  "Normal appearance. He is well-developed.   HENT:      Head: Atraumatic.      Right Ear: External ear normal.      Left Ear: External ear normal.      Nose: Nose normal.      Mouth/Throat:      Mouth: Mucous membranes are moist.   Eyes:      Extraocular Movements: Extraocular movements intact.   Cardiovascular:      Rate and Rhythm: Normal rate.   Pulmonary:      Effort: Pulmonary effort is normal.   Abdominal:      Palpations: Abdomen is soft.   Musculoskeletal:         General: Normal range of motion.      Cervical back: Full passive range of motion without pain and normal range of motion.   Skin:     General: Skin is warm.   Neurological:      Mental Status: He is oriented to person, place, and time.             Significant Labs: All pertinent labs within the past 24 hours have been reviewed.  CBC:   Recent Labs   Lab 06/15/24  1332 06/16/24  0410 06/16/24  1144   WBC 12.41 8.04 9.31   HGB 12.7* 11.4* 12.6*   HCT 38.3* 34.1* 37.3*    150 166     CMP:   Recent Labs   Lab 06/15/24  1332 06/16/24  0410 06/17/24  0257   * 131* 132*   K 3.7 3.4* 3.6   CL 92* 93* 91*   CO2 26 28 32*   * 103 116*   BUN 17 18 20   CREATININE 1.1 1.2 1.2   CALCIUM 9.1 8.3* 8.8   PROT 7.3 6.4 6.7   ALBUMIN 4.1 3.6 3.7   BILITOT 1.9* 1.9* 1.7*   ALKPHOS 95 79 88   AST 15 14 20   ALT 8* 7* 7*   ANIONGAP 12 10 9     Lactic Acid: No results for input(s): "LACTATE" in the last 48 hours.  Troponin:   Recent Labs   Lab 06/15/24  1332 06/16/24  0410   TROPONINIHS 207.7* 949.1*     TSH:   Recent Labs   Lab 01/24/24  0453   TSH 2.778     Urine Culture:   Recent Labs   Lab 06/15/24  2013   LABURIN Multiple organisms isolated. None in predominance.  Repeat if  clinically necessary.     Microbiology Results (last 7 days)       Procedure Component Value Units Date/Time    Urine Culture High Risk [[id number]] Collected: 06/15/24 2013    Order Status: Completed Specimen: Urine, Clean Catch Updated: 06/17/24 0715     Urine Culture, " Routine Multiple organisms isolated. None in predominance.  Repeat if      clinically necessary.    Narrative:      Indicated criteria for high risk culture:->Other  Other (specify):->uti/confusion    Blood culture [[id number]] Collected: 06/15/24 2206    Order Status: Completed Specimen: Blood Updated: 06/17/24 0232     Blood Culture, Routine No Growth to date      No Growth to date          Significant Imaging:   Imaging Results               CT Head Without Contrast (Final result)  Result time 06/15/24 20:52:14      Final result by Angel Kitchen MD (06/15/24 20:52:14)                   Impression:      Bilateral hypoattenuating extra-axial collections overlying the cerebral convexities, right greater than left.  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  There is mild associated mass effect on the right vertex, although no significant midline shift or hydrocephalus appreciated.  Questionable minimal internal complexity overlying the frontal convexities may be artifactual, although component of subacute blood products not excluded.  Consider short-term CT follow-up.    Chronic senescent and microvascular ischemic changes.    This report was flagged in Epic as abnormal.      Electronically signed by: Angel Kitchen MD  Date:    06/15/2024  Time:    20:52               Narrative:    EXAMINATION:  CT HEAD WITHOUT CONTRAST    CLINICAL HISTORY:  Mental status change, unknown cause;    TECHNIQUE:  Low dose axial images were obtained through the head.  Coronal and sagittal reformations were also performed. Contrast was not administered.    COMPARISON:  No prior studies are available for comparison.    FINDINGS:  There is generalized cerebral volume loss with compensatory sulcal widening and ventricular enlargement.  There are bilateral hypoattenuating extra-axial collections overlying the cerebral convexities suggestive of  subdural hygromas or chronic subdural hematomas.  These measure approximately 1.5 cm on the right (coronal series 6, image 44) and 0.8 cm on the left (coronal series 6, image 30).  There is mild associated mass effect of the right vertex.  There is questionable slight internal complexity overlying the frontal convexities.  This may reflect artifact, although component of subacute blood products not excluded.  There is no significant midline shift.  No evidence to suggest overt hydrocephalus at this time.  There is periventricular white matter hypoattenuation suggesting sequelae of chronic microvascular ischemic change.  The basal cisterns are patent. The mastoid air cells and paranasal sinuses are clear of acute process. The visualized bones of the calvarium demonstrate no acute osseous abnormality.                                       CT Lumbar Spine Without Contrast (Final result)  Result time 06/15/24 17:32:35      Final result by Angel Kitchen MD (06/15/24 17:32:35)                   Impression:      Recent appearing mild-to-moderate compression fracture deformity of the L2 vertebral body.  Multiple additional compression deformities of the T9, T10, T12, and L1 vertebral bodies as above.  These have a somewhat more chronic appearance.    Moderately advanced multilevel degenerative changes of the lumbar spine as detailed above.  Further evaluation and follow-up could be performed with MRI when clinically appropriate if there are no patient contraindications.    Significant volume bilateral pleural effusion with adjacent compressive atelectasis and/or consolidation.      Electronically signed by: Angel Kitchen MD  Date:    06/15/2024  Time:    17:32               Narrative:    EXAMINATION:  CT LUMBAR SPINE WITHOUT CONTRAST    CLINICAL HISTORY:  l2 fracture;    TECHNIQUE:  Low-dose axial, sagittal and coronal reformations are obtained through the lumbar spine.  Contrast was not  administered.    COMPARISON:  Chest radiograph 09/24/2021    FINDINGS:  There is mild nonspecific diffuse heterogeneity of the visualized osseous structures which may relate to underlying osteopenia.  Clinical correlation is advised.  There are mild-to-moderate compression deformities of the T9 and T10 vertebral bodies.  There is a mild superior endplate compression deformity of the T12 vertebral body.  There is a moderate compression deformity of the L1 vertebral body.  There is a recent appearing mild to moderate compression fracture of the L2 vertebral body.  Fracture lucency is noted through the anterior-superior endplate.  There is minimal retropulsion with mild effacement of the anterior thecal sac.  The L3, L4, and L5 vertebral body heights appear well maintained without significant height loss appreciated allowing for underlying osteopenia.    There is minimal grade 1 anterolisthesis of L4 on L5. There is mild intervertebral disc space height loss most pronounced at L5-S1.    There are degenerative changes of the lumbar spine as detailed below:    T12-L1: Broad-based disc bulge and mild ligamentum flavum thickening.  Mild bilateral neural foraminal narrowing.    L1-L2: Circumferential disc bulge, ligamentum flavum thickening, and bilateral facet arthropathy.  Mild spinal canal stenosis and moderate bilateral neural foraminal narrowing.    L2-L3: Circumferential disc bulge, ligamentum flavum thickening, and bilateral facet arthropathy.  Moderate to severe spinal canal stenosis.  Moderate bilateral neural foraminal narrowing.    L3-L4: Circumferential disc bulge, ligamentum flavum thickening, and bilateral facet arthropathy.  Moderate spinal canal stenosis.  Moderate bilateral neural foraminal narrowing.    L4-L5: Circumferential disc bulge, ligamentum flavum thickening, and bilateral facet arthropathy.  Moderate spinal canal stenosis.  Severe moderate to severe bilateral neural foraminal narrowing.    L5-S1:  Broad-based circumferential disc bulge, asymmetric to the right.  Moderate to severe bilateral neural foraminal narrowing.    No grossly displaced sacral fracture identified.  There are degenerative changes of bilateral sacroiliac joints.    Limited views of the posterior abdomen demonstrate significant volume layering bilateral pleural effusions with associated compressive atelectasis at the visualized lung bases.  There is a right renal cyst.  There is prominent aortic atherosclerosis.  No retroperitoneal hematoma identified.                                       X-Ray Hips Bilateral 2 View Incl AP Pelvis (Final result)  Result time 06/15/24 15:57:04      Final result by Francisco Nicholson MD (06/15/24 15:57:04)                   Impression:      No acute abnormality of bilateral hips.      Electronically signed by: Francisco Nicholson  Date:    06/15/2024  Time:    15:57               Narrative:    EXAMINATION:  XR HIPS BILATERAL 2 VIEW INCL AP PELVIS    CLINICAL HISTORY:  Dorsalgia, unspecified    FINDINGS:  AP pelvis and two views of bilateral hips show no fracture, dislocation, or destructive osseous lesion.  Bilateral hip joint spaces are maintained.  Pubic symphysis and sacroiliac joints are maintained.  Surgical clips lie in left inguinal region.  Vascular calcifications are present.  Stool and bowel gas partially obscures the lower lumbar spine and sacrum and coccyx.                                       X-Ray Lumbar Spine 5 View (Final result)  Result time 06/15/24 14:16:08   Procedure changed from X-Ray Lumbar Spine Ap And Lateral     Final result by Francisco Nicholson MD (06/15/24 14:16:08)                   Impression:      1. Age indeterminate superior endplate L2 compression fracture which has progressed since 2019.  Correlation for symptoms at this level is requested.  2. Compression fractures of T10, T11, T12, and L1, not significantly changed since 2016.  3. Moderate L5-S1 disc degeneration.      Electronically  signed by: Francisco Nicholson  Date:    06/15/2024  Time:    14:16               Narrative:    EXAMINATION:  XR LUMBAR SPINE COMPLETE 5 VIEW    CLINICAL HISTORY:  fall;    FINDINGS:  Five views of lumbar spine show normal lumbosacral alignment.    Compression fractures of T10, T11, T12, L1, and L2 are evident.  T10, T11, T12, and L1 compression fractures have not significantly changed since CT 04/24/2019.  Superior endplate of L2 compression fracture has progressed since that time pre wall inferior endplate compression fracture of L2 is unchanged.    Moderate L5-S1 disc degeneration is evident.  Remaining lumbar intervertebral disc space heights are relatively well maintained.    Sacroiliac joints are normal. Arterial vascular calcifications are present.  Postsurgical changes of CABG are suggested 12 partially visualize, along with cardiac pacing leads.  Mild-to-moderate stool and moderate bowel gas are present throughout the abdomen, limiting evaluation.                                       X-Ray Chest AP Portable (Final result)  Result time 06/15/24 14:18:32      Final result by Francisco Nicholson MD (06/15/24 14:18:32)                   Impression:      1. Trace bilateral pleural effusions.  2. Improvement of interstitial edema and central pulmonary vascular prominence.      Electronically signed by: Francisco Nicholson  Date:    06/15/2024  Time:    14:18               Narrative:    EXAMINATION:  XR CHEST AP PORTABLE    CLINICAL HISTORY:  hypoxia;    FINDINGS:  Portable chest at 1254 compared with 12/29/2024 shows unchanged borderline enlarged cardiac silhouette size.  Mediastinal contours are unchanged with postsurgical changes of CABG.  Prior aortic valve replacement noted.  Left transvenous pacer unchanged.    Lung volumes are low.  Blunting of the costophrenic angles bilaterally suggest trace pleural effusions.  Prior central pulmonary vascular prominence and central interstitial opacities have significantly improved.   "Minimal bibasilar interstitial opacities persist.  Minimal calcification suggested along the right diaphragm and in right hemithorax suggesting pleural plaques.  No definite acute osseous abnormality identified.  Bones are diffusely demineralized, limiting evaluation.                                  ECHO:    Left Ventricle: The left ventricle is normal in size. Increased ventricular mass. Mildly increased wall thickness. There is mild concentric hypertrophy. Unable to assess wall motion. Septal motion is consistent with pacing. There is normal systolic function with a visually estimated ejection fraction of 55 - 60%. Diastolic function cannot be reliably determined in the presence of mitral annular calcification.    Right Ventricle: Right ventricle was not well visualized due to poor acoustic window. Normal right ventricular cavity size. Pacemaker lead present in the ventricle.    Aortic Valve: There is a transcatheter valve replacement in the aortic position.    Mitral Valve: Mildly thickened leaflets. There is moderate mitral annular calcification present. There is mild regurgitation.    Tricuspid Valve: There is mild to moderate regurgitation.    Assessment/Plan:      * Severe sepsis  This patient does have evidence of infective focus, unspecified source  My overall impression is  severe sepsis .  Source: Unknown  Antibiotics given-   Antibiotics (72h ago, onward)      Start     Stop Route Frequency Ordered    06/17/24 0000  vancomycin 1.25 g in dextrose 5% 250 mL IVPB (ready to mix)        Note to Pharmacy: Ht: 5' 6" (1.676 m)  Wt: 78.5 kg (173 lb)  Estimated Creatinine Clearance: 53.7 mL/min (based on SCr of 1.1 mg/dL).  Body mass index is 27.92 kg/m².    -- IV Every 24 hours (non-standard times) 06/15/24 2342    06/15/24 2114  vancomycin - pharmacy to dose  (vancomycin IVPB (PEDS and ADULTS))        Placed in "And" Linked Group    -- IV pharmacy to manage frequency 06/15/24 2014    06/15/24 2100  meropenem 1 " "g in sodium chloride 0.9 % 100 mL IVPB (ready to mix system)        Note to Pharmacy: Ht: 5' 6" (1.676 m)  Wt: 78.5 kg (173 lb)  Estimated Creatinine Clearance: 53.7 mL/min (based on SCr of 1.1 mg/dL).  Body mass index is 27.92 kg/m².    -- IV Every 12 hours (non-standard times) 06/15/24 2027          Latest lactate reviewed-  Recent Labs   Lab 06/15/24  2147   POCLAC 1.17     Organ dysfunction indicated by Encephalopathy    Fluid challenge : avoid IVF due to CHF exacerbation    Post- resuscitation assessment Yes Perfusion exam was performed within 6 hours of septic shock presentation after bolus shows Adequate tissue perfusion assessed by non-invasive monitoring       Will Start Pressors- Levophed for MAP of 65  Source control achieved by: IV Vancomycin and Cefepime.    Acute confusional state  Secondary to sepsis. Pt developed confusion after he came  back from CT room  Ordered blood and urine cultures  Afebrile   Pulling out lines  Received iv morphine in ER upon arrival as per records  Pt has h/o seizure disorder and on phenytoin   CT brain results reviewed.         Hx of CABG  Aware       Hypoxia  Mostly from CHF flare  Maintain iv diuresis  Need home oxygen evaluation before discharge from hospital      L2 vertebral fracture  Mild-to-moderate compression fracture deformity of the L2 vertebral body   Neurosurgeon consulted       Acute on chronic combined systolic and diastolic heart failure  Maintain iv lasix at the moment   Recent ECHO reviewed   Latest Reference Range & Units 02/09/24 10:09 02/29/24 07:50 06/15/24 13:32   BNP 0 - 99 pg/mL 958 (H) 1,720 (H) 1,892 (H)   Holding Entersto due to low BP.      Atrial fibrillation  H/o aware     Seizure disorder  Maintain phenytoin PO  Check serum levels  Seizure precautions      NSTEMI (non-ST elevated myocardial infarction)  Trend troponin.  Follow Cardiology recommendations.  2D echocardiogram results reviewed; ejection fraction 55-60%.  Continue heparin gtt if " ok with neurologist. Subdural hygroma - does not appear to be SDH.  Continue cardiac meds. Patient is CP free.       CAD (coronary artery disease)  H/o CABG with recent angiogram on December 2023    S/P TAVR (transcatheter aortic valve replacement)  Aware       HTN (hypertension)  Chronic, controlled. Latest blood pressure and vitals reviewed-     Temp:  [97.9 °F (36.6 °C)-102.8 °F (39.3 °C)]   Pulse:  []   Resp:  [18-38]   BP: ()/()   SpO2:  [88 %-100 %] .   Home meds for hypertension were reviewed and noted below.   Hypertension Medications               ENTRESTO 24-26 mg per tablet Take 1 tablet by mouth 2 (two) times daily.    furosemide (LASIX) 20 MG tablet Take 20 mg by mouth daily as needed (Fluid).    isosorbide mononitrate (IMDUR) 30 MG 24 hr tablet Take 2 tablets (60 mg total) by mouth once daily.    metoprolol succinate (TOPROL-XL) 100 MG 24 hr tablet Take 100 mg by mouth 2 (two) times daily.    NITROLINGUAL 400 mcg/spray spray Place 1 spray under the tongue every 5 (five) minutes as needed for Chest pain.        Hold ISBD and ARB for now due to low BP.    While in the hospital, will manage blood pressure as follows; Continue home antihypertensive regimen    Will utilize p.r.n. blood pressure medication only if patient's blood pressure greater than 140/90 and he develops symptoms such as worsening chest pain or shortness of breath.    Patient may be transferred to medicine telemetry service.  VTE Risk Mitigation (From admission, onward)           Ordered     heparin 25,000 units in dextrose 5% (100 units/ml) IV bolus from bag LOW INTENSITY nomogram - [facility]  As needed (PRN)        Question:  Heparin Infusion Adjustment (DO NOT MODIFY ANSWER)  Answer:  [email or web address]\Images\Pharmacy\HeparinInfusions\heparin LOW INTENSITY nomogram for [facility] [id number]    06/17/24 0919     heparin 25,000 units in dextrose 5% (100 units/ml) IV bolus from bag LOW INTENSITY nomogram - [facility]  As needed (PRN)         Question:  Heparin Infusion Adjustment (DO NOT MODIFY ANSWER)  Answer:  [email or web address]\[email or web address]\Images\Pharmacy\HeparinInfusions\heparin LOW INTENSITY nomogram for [facility] [id number]    06/17/24 0919     heparin 25,000 units in dextrose 5% 250 mL (100 units/mL) infusion LOW INTENSITY nomogram - [facility]  Continuous        Question:  Begin at (units/kg/hr)  Answer:  12    06/17/24 0919     IP VTE HIGH RISK PATIENT  Once         06/15/24 1635     Place sequential compression device  Until discontinued         06/15/24 1635                    Discharge Planning   AXEL: 6/18/2024     Code Status: Full Code   Is the patient medically ready for discharge?:     Reason for patient still in hospital (select all that apply): Patient trending condition and Consult recommendations  Discharge Plan A: Home            Critical care time spent on the evaluation and treatment of severe organ dysfunction, review of pertinent labs and imaging studies, discussions with consulting providers and discussions with patient/family: 35 minutes.      Edvin Almonte MD  Department of Hospital Medicine   [facility]

## 2024-06-17 NOTE — CONSULTS
[facility]   Cardiology Note    Consult Requested By: Hospital Medicine  Reason for Consult: NSTEMI    SUBJECTIVE:     History of Present Illness: 79 yo male with a history of coronary artery disease, status post coronary artery bypass surgery with redo, chronic angina, chronic systolic heart failure, status post TAVR, paroxysmal atrial fibrillation, hyperlipidemia, heart block, status post DC permanent pacemaker, peripheral vascular disease, seizure disorder, hypertension, and hypothyroidism that presented to the hospital after he had a fall.  It appears that he was somewhat encephalopathic on admission.  He states he did not hit his head.  He is currently being treated for severe sepsis as he had a temp of a 102.9°.  CT head found evidence of subdural hygroma.  He states that he was recently started on subcutaneous Lasix.  He states the reason he fell was he tripped over something when he was outside of his house.  He denies loss of consciousness.  He denies any chest pain, shortness of breath, orthopnea, or paroxysmal nocturnal dyspnea.  No lower extremity edema.  Complaining mostly of mid back pain.  X-rays of the back found a compression fracture of his spine.  Initial troponins were about 207 and increased to 949.  These are high sensitivity troponins.  This is in the setting of the temperature of 102.9°.  BNP was found to be elevated. ECG shows a V paced rhythm.     Review of patient's allergies indicates:   Allergen Reactions    Atorvastatin Other (See Comments)     Muscle pain    Rosuvastatin Other (See Comments)     Muscle pain       Past Medical History:   Diagnosis Date    Anticoagulant long-term use     2014    Aortic valve disease 2014    pig valve    Arthritis     all over    Atrial fibrillation 2/29/2024    Chest pain 07/15/2019    CHF (congestive heart failure)     2014 on meds    Coronary artery disease     2007 cabg    Difficulty swallowing     Heart attack 1990    15 stents    Heart  attack 02/01/2024    mild    Heart block     Hyperlipidemia     Hypertension     on meds    Hypothyroidism 2015    on meds    PVD (peripheral vascular disease)     Seizures     last episode 1990 on meds     Past Surgical History:   Procedure Laterality Date    AORTOGRAPHY WITH SERIALOGRAPHY N/A 11/16/2021    Procedure: AORTOGRAM, WITH RUNOFF;  Surgeon: Rodrigo Willoughby MD;  Location: [facility] CATH/EP LAB;  Service: Cardiology;  Laterality: N/A;    ARTERIOGRAPHY OF AORTIC ROOT N/A 11/05/2019    Procedure: ARTERIOGRAM, AORTIC ROOT;  Surgeon: Rodrigo Willoughby MD;  Location: [facility] CATH/EP LAB;  Service: Cardiology;  Laterality: N/A;    CARDIAC CATHETERIZATION      CARDIAC VALVE SURGERY      CORONARY ANGIOGRAPHY INCLUDING BYPASS GRAFTS WITH CATHETERIZATION OF LEFT HEART N/A 11/05/2019    Procedure: ANGIOGRAM, CORONARY, INCLUDING BYPASS GRAFT, WITH LEFT HEART CATHETERIZATION;  Surgeon: Rodrigo Willoughby MD;  Location: [facility] CATH/EP LAB;  Service: Cardiology;  Laterality: N/A;    CORONARY ANGIOGRAPHY INCLUDING BYPASS GRAFTS WITH CATHETERIZATION OF LEFT HEART Left 11/03/2020    Procedure: ANGIOGRAM, CORONARY, INCLUDING BYPASS GRAFT, WITH LEFT HEART CATHETERIZATION;  Surgeon: Rodrigo Willoughby MD;  Location: [facility] CATH/EP LAB;  Service: Cardiology;  Laterality: Left;    CORONARY ANGIOGRAPHY INCLUDING BYPASS GRAFTS WITH CATHETERIZATION OF LEFT HEART N/A 09/28/2021    Procedure: ANGIOGRAM, CORONARY, INCLUDING BYPASS GRAFT, WITH LEFT HEART CATHETERIZATION;  Surgeon: Rodrigo Willoughby MD;  Location: [facility] CATH/EP LAB;  Service: Cardiology;  Laterality: N/A;    CORONARY ANGIOGRAPHY INCLUDING BYPASS GRAFTS WITH CATHETERIZATION OF LEFT HEART N/A 12/05/2023    Procedure: ANGIOGRAM, CORONARY, INCLUDING BYPASS GRAFT, WITH LEFT HEART CATHETERIZATION;  Surgeon: Rodrigo Willoughby MD;  Location: [facility] CATH/EP LAB;  Service: Cardiology;  Laterality: N/A;    CORONARY ANGIOPLASTY      CORONARY ARTERY BYPASS GRAFT      x 2    1991 2006     ESOPHAGOGASTRODUODENOSCOPY N/A 2024    Procedure: EGD (ESOPHAGOGASTRODUODENOSCOPY);  Surgeon: Mal Lockhart III, MD;  Location: [facility] ENDO;  Service: Endoscopy;  Laterality: N/A;    EXTRACORPOREAL SHOCK WAVE LITHOTRIPSY Right 2019    Procedure: LITHOTRIPSY, ESWL;  Surgeon: Williams Ortega MD;  Location: [facility] OR;  Service: Urology;  Laterality: Right;    HEMORRHOID SURGERY      INSERTION OF PACEMAKER      PERCUTANEOUS TRANSLUMINAL BALLOON ANGIOPLASTY OF CORONARY ARTERY N/A 2019    Procedure: Angioplasty-coronary;  Surgeon: Rodrigo Willoughby MD;  Location: [facility] CATH/EP LAB;  Service: Cardiology;  Laterality: N/A;    WRIST FRACTURE SURGERY       Family History   Problem Relation Name Age of Onset    Heart attack Mother      Heart attack Father      Heart disease Sister 2     Stroke Sister 2     Diabetes Sister 2     No Known Problems Maternal Grandmother      No Known Problems Maternal Grandfather      No Known Problems Paternal Grandmother      No Known Problems Paternal Grandfather      No Known Problems Brother      No Known Problems Maternal Aunt      No Known Problems Maternal Uncle      No Known Problems Paternal Aunt      No Known Problems Paternal Uncle      Anemia Neg Hx      Arrhythmia Neg Hx      Asthma Neg Hx      Clotting disorder Neg Hx      Fainting Neg Hx      Heart failure Neg Hx      Hyperlipidemia Neg Hx      Hypertension Neg Hx      Atrial Septal Defect Neg Hx       Social History     Tobacco Use    Smoking status: Former     Current packs/day: 0.00     Average packs/day: 0.5 packs/day for 4.0 years (2.0 ttl pk-yrs)     Types: Cigarettes     Start date:      Quit date:      Years since quittin.4    Smokeless tobacco: Never   Substance Use Topics    Alcohol use: Yes     Comment: social    Drug use: No       Review of Systems:  Review of Systems   Constitutional:  Negative for chills and fever.   HENT:  Negative for congestion and sore throat.    Eyes:   Negative for blurred vision and double vision.   Respiratory:  Negative for shortness of breath and wheezing.    Cardiovascular:  Negative for chest pain, palpitations, orthopnea, leg swelling and PND.   Gastrointestinal:  Negative for nausea and vomiting.   Musculoskeletal:  Positive for back pain. Negative for neck pain.   Skin:  Negative for itching and rash.   Neurological:  Negative for dizziness and loss of consciousness.   Psychiatric/Behavioral:  Negative for depression. The patient does not have insomnia.        OBJECTIVE:     Vital Signs (Most Recent)  Temp: 98.9 °F (37.2 °C) (06/16/24 1500)  Pulse: 88 (06/16/24 1800)  Resp: (!) 36 (06/16/24 1800)  BP: 135/63 (06/16/24 1800)  SpO2: 96 % (06/16/24 1800)    Vital Signs Range (Last 24H):  Temp:  [98.7 °F (37.1 °C)-102.8 °F (39.3 °C)]   Pulse:  []   Resp:  [19-36]   BP: ()/(53-75)   SpO2:  [91 %-100 %]     I & O (Last 24H):    Intake/Output Summary (Last 24 hours) at 6/16/2024 1921  Last data filed at 6/16/2024 1800  Gross per 24 hour   Intake 949.52 ml   Output 1500 ml   Net -550.48 ml       Current Diet:     Current Diet Order   Procedures    Diet Low Sodium, 2gm Fluid - 1500mL     Order Specific Question:   Fluid restriction:     Answer:   Fluid - 1500mL        Allergies:  Review of patient's allergies indicates:   Allergen Reactions    Atorvastatin Other (See Comments)     Muscle pain    Rosuvastatin Other (See Comments)     Muscle pain       Meds:  Scheduled Meds:   ezetimibe  10 mg Oral Daily    furosemide (LASIX) injection  40 mg Intravenous Q12H    LIDOcaine  1 patch Transdermal Q24H    meropenem IV (PEDS and ADULTS)  1 g Intravenous Q12H    metoprolol succinate  100 mg Oral Daily    pantoprazole  20 mg Oral BID    phenytoin  400 mg Oral Daily    [START ON 6/17/2024] vancomycin (VANCOCIN) IV (PEDS and ADULTS)  1,500 mg Intravenous Q24H     Continuous Infusions:   heparin (porcine) in D5W  12 Units/kg/hr (Adjusted) Intravenous Continuous    Stopped at 06/16/24 1803     PRN Meds:  Current Facility-Administered Medications:     acetaminophen, 650 mg, Oral, Q8H PRN    acetaminophen, 650 mg, Oral, Q4H PRN    aluminum-magnesium hydroxide-simethicone, 30 mL, Oral, QID PRN    heparin (PORCINE), 58.4 Units/kg (Adjusted), Intravenous, PRN    heparin (PORCINE), 30 Units/kg (Adjusted), Intravenous, PRN    HYDROcodone-acetaminophen, 1 tablet, Oral, Q6H PRN    HYDROmorphone, 0.5 mg, Intravenous, Q6H PRN    magnesium oxide, 800 mg, Oral, PRN    magnesium oxide, 800 mg, Oral, PRN    melatonin, 6 mg, Oral, Nightly PRN    nitroGLYCERIN 0.4 MG/DOSE TL SPRY, 1 spray, Sublingual, Q5 Min PRN    ondansetron, 4 mg, Intravenous, Q6H PRN    potassium bicarbonate, 35 mEq, Oral, PRN    potassium bicarbonate, 50 mEq, Oral, PRN    potassium bicarbonate, 60 mEq, Oral, PRN    potassium, sodium phosphates, 2 packet, Oral, PRN    potassium, sodium phosphates, 2 packet, Oral, PRN    potassium, sodium phosphates, 2 packet, Oral, PRN    promethazine (PHENERGAN) 6.25 mg in sodium chloride 0.9% 50 mL IVPB, 6.25 mg, Intravenous, Q6H PRN    sodium chloride 0.9%, 10 mL, Intravenous, PRN    Pharmacy to dose Vancomycin consult, , , Once **AND** vancomycin - pharmacy to dose, , Intravenous, pharmacy to manage frequency    Oxygen/Ventilator Data (Last 24H):  (if applicable)            Hemodynamic Parameters (Last 24H):   (if applicable)        Laboratory and Radiology Data:  Recent Results (from the past 24 hour(s))   Urine Culture High Risk    Collection Time: 06/15/24  8:13 PM    Specimen: Urine, Clean Catch   Result Value Ref Range    Urine Culture, Routine No growth to date    Phenytoin level, total    Collection Time: 06/15/24  8:20 PM   Result Value Ref Range    Phenytoin Lvl 13.0 10.0 - 20.0 ug/mL   ISTAT Lactate    Collection Time: 06/15/24  9:47 PM   Result Value Ref Range    POC Lactate 1.17 0.5 - 2.2 mmol/L    Sample VENOUS    Blood culture    Collection Time: 06/15/24 10:06 PM     Specimen: Blood   Result Value Ref Range    Blood Culture, Routine No Growth to date    Comprehensive Metabolic Panel (CMP)    Collection Time: 06/16/24  4:10 AM   Result Value Ref Range    Sodium 131 (L) 136 - 145 mmol/L    Potassium 3.4 (L) 3.5 - 5.1 mmol/L    Chloride 93 (L) 95 - 110 mmol/L    CO2 28 23 - 29 mmol/L    Glucose 103 70 - 110 mg/dL    BUN 18 8 - 23 mg/dL    Creatinine 1.2 0.5 - 1.4 mg/dL    Calcium 8.3 (L) 8.7 - 10.5 mg/dL    Total Protein 6.4 6.0 - 8.4 g/dL    Albumin 3.6 3.5 - 5.2 g/dL    Total Bilirubin 1.9 (H) 0.1 - 1.0 mg/dL    Alkaline Phosphatase 79 55 - 135 U/L    AST 14 10 - 40 U/L    ALT 7 (L) 10 - 44 U/L    eGFR >60.0 >60 mL/min/1.73 m^2    Anion Gap 10 8 - 16 mmol/L   Magnesium    Collection Time: 06/16/24  4:10 AM   Result Value Ref Range    Magnesium 1.6 1.6 - 2.6 mg/dL   CBC with Automated Differential    Collection Time: 06/16/24  4:10 AM   Result Value Ref Range    WBC 8.04 3.90 - 12.70 K/uL    RBC 4.05 (L) 4.60 - 6.20 M/uL    Hemoglobin 11.4 (L) 14.0 - 18.0 g/dL    Hematocrit 34.1 (L) 40.0 - 54.0 %    MCV 84 82 - 98 fL    MCH 28.1 27.0 - 31.0 pg    MCHC 33.4 32.0 - 36.0 g/dL    RDW 14.8 (H) 11.5 - 14.5 %    Platelets 150 150 - 450 K/uL    MPV 10.7 9.2 - 12.9 fL    Immature Granulocytes 0.6 (H) 0.0 - 0.5 %    Gran # (ANC) 5.9 1.8 - 7.7 K/uL    Immature Grans (Abs) 0.05 (H) 0.00 - 0.04 K/uL    Lymph # 1.0 1.0 - 4.8 K/uL    Mono # 0.9 0.3 - 1.0 K/uL    Eos # 0.2 0.0 - 0.5 K/uL    Baso # 0.04 0.00 - 0.20 K/uL    nRBC 0 0 /100 WBC    Gran % 73.2 (H) 38.0 - 73.0 %    Lymph % 12.1 (L) 18.0 - 48.0 %    Mono % 11.4 4.0 - 15.0 %    Eosinophil % 2.2 0.0 - 8.0 %    Basophil % 0.5 0.0 - 1.9 %    Differential Method Automated    Troponin I High Sensitivity    Collection Time: 06/16/24  4:10 AM   Result Value Ref Range    Troponin I High Sensitivity 949.1 (HH) 0.0 - 14.9 pg/mL   EKG 12-lead    Collection Time: 06/16/24  6:36 AM   Result Value Ref Range    QRS Duration 156 ms    [facility] QTC Calculation  545 ms   Echo    Collection Time: 06/16/24 11:32 AM   Result Value Ref Range    BSA 1.87 m2    LVOT stroke volume 46.79 cm3    LVIDd 5.10 3.5 - 6.0 cm    LV Systolic Volume 54.43 mL    LV Systolic Volume Index 29.6 mL/m2    LVIDs 3.60 2.1 - 4.0 cm    LV Diastolic Volume 123.81 mL    LV Diastolic Volume Index 67.29 mL/m2    IVS 1.20 (A) 0.6 - 1.1 cm    LVOT diameter 2.00 cm    LVOT area 3.1 cm2    FS 29 28 - 44 %    Left Ventricle Relative Wall Thickness 0.55 cm    Posterior Wall 1.40 (A) 0.6 - 1.1 cm    LV mass 270.07 g    LV Mass Index 147 g/m2    MV Peak E Bradly 1.17 m/s    TDI LATERAL 0.05 m/s    TDI SEPTAL 0.04 m/s    E/E' ratio 26.00 m/s    MV Peak A Bradly 1.53 m/s    TR Max Bradly 3.20 m/s    E/A ratio 0.76     E wave deceleration time 239.00 msec    LV SEPTAL E/E' RATIO 29.25 m/s    LV LATERAL E/E' RATIO 23.40 m/s    LVOT peak bradly 0.91 m/s    Left Ventricular Outflow Tract Mean Velocity 0.58 cm/s    Left Ventricular Outflow Tract Mean Gradient 2.00 mmHg    TAPSE 0.76 cm    AV mean gradient 3 mmHg    AV peak gradient 6 mmHg    Ao peak bradly 1.26 m/s    Ao VTI 19.80 cm    LVOT peak VTI 14.90 cm    AV valve area 2.36 cm²    AV Velocity Ratio 0.72     AV index (prosthetic) 0.75     CASSIE by Velocity Ratio 2.27 cm²    Mr max bradly 4.78 m/s    MV mean gradient 7 mmHg    MV peak gradient 11 mmHg    MV stenosis pressure 1/2 time 62.00 ms    MV valve area p 1/2 method 3.55 cm2    MV valve area by continuity eq 1.19 cm2    MV VTI 39.4 cm    TV mean gradient 27 mmHg    Triscuspid Valve Regurgitation Peak Gradient 41 mmHg    PV PEAK VELOCITY 1.04 m/s    PV peak gradient 4 mmHg    Pulmonary Valve Mean Velocity 0.71 m/s    Ao root annulus 3.60 cm    Mean e' 0.05 m/s    ZLVIDS 1.05     ZLVIDD -0.01    APTT    Collection Time: 06/16/24 11:44 AM   Result Value Ref Range    aPTT 40.9 (H) 21.0 - 32.0 sec   Protime-INR    Collection Time: 06/16/24 11:44 AM   Result Value Ref Range    Prothrombin Time 12.4 9.0 - 12.5 sec    INR 1.1 0.8 - 1.2    CBC auto differential    Collection Time: 06/16/24 11:44 AM   Result Value Ref Range    WBC 9.31 3.90 - 12.70 K/uL    RBC 4.42 (L) 4.60 - 6.20 M/uL    Hemoglobin 12.6 (L) 14.0 - 18.0 g/dL    Hematocrit 37.3 (L) 40.0 - 54.0 %    MCV 84 82 - 98 fL    MCH 28.5 27.0 - 31.0 pg    MCHC 33.8 32.0 - 36.0 g/dL    RDW 14.8 (H) 11.5 - 14.5 %    Platelets 166 150 - 450 K/uL    MPV 10.7 9.2 - 12.9 fL    Immature Granulocytes 0.5 0.0 - 0.5 %    Gran # (ANC) 7.1 1.8 - 7.7 K/uL    Immature Grans (Abs) 0.05 (H) 0.00 - 0.04 K/uL    Lymph # 0.8 (L) 1.0 - 4.8 K/uL    Mono # 1.2 (H) 0.3 - 1.0 K/uL    Eos # 0.2 0.0 - 0.5 K/uL    Baso # 0.05 0.00 - 0.20 K/uL    nRBC 0 0 /100 WBC    Gran % 75.7 (H) 38.0 - 73.0 %    Lymph % 8.4 (L) 18.0 - 48.0 %    Mono % 12.4 4.0 - 15.0 %    Eosinophil % 2.5 0.0 - 8.0 %    Basophil % 0.5 0.0 - 1.9 %    Differential Method Automated    Urinalysis, Reflex to Urine Culture Urine, Clean Catch    Collection Time: 06/16/24  3:03 PM    Specimen: Urine, Clean Catch   Result Value Ref Range    Specimen UA Urine, Clean Catch     Color, UA Yellow Yellow, Straw, Katy    Appearance, UA Clear Clear    pH, UA 6.0 5.0 - 8.0    Specific Gravity, UA 1.010 1.005 - 1.030    Protein, UA Negative Negative    Glucose, UA Negative Negative    Ketones, UA Negative Negative    Bilirubin (UA) Negative Negative    Occult Blood UA Negative Negative    Nitrite, UA Negative Negative    Urobilinogen, UA 2.0-3.0 (A) Negative EU/dL    Leukocytes, UA Negative Negative     Imaging Results               CT Head Without Contrast (Final result)  Result time 06/15/24 20:52:14      Final result by Angel Kitchen MD (06/15/24 20:52:14)                   Impression:      Bilateral hypoattenuating extra-axial collections overlying the cerebral convexities, right greater than left.  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  There is mild associated  mass effect on the right vertex, although no significant midline shift or hydrocephalus appreciated.  Questionable minimal internal complexity overlying the frontal convexities may be artifactual, although component of subacute blood products not excluded.  Consider short-term CT follow-up.    Chronic senescent and microvascular ischemic changes.    This report was flagged in Epic as abnormal.      Electronically signed by: Angel Kitchen MD  Date:    06/15/2024  Time:    20:52               Narrative:    EXAMINATION:  CT HEAD WITHOUT CONTRAST    CLINICAL HISTORY:  Mental status change, unknown cause;    TECHNIQUE:  Low dose axial images were obtained through the head.  Coronal and sagittal reformations were also performed. Contrast was not administered.    COMPARISON:  No prior studies are available for comparison.    FINDINGS:  There is generalized cerebral volume loss with compensatory sulcal widening and ventricular enlargement.  There are bilateral hypoattenuating extra-axial collections overlying the cerebral convexities suggestive of subdural hygromas or chronic subdural hematomas.  These measure approximately 1.5 cm on the right (coronal series 6, image 44) and 0.8 cm on the left (coronal series 6, image 30).  There is mild associated mass effect of the right vertex.  There is questionable slight internal complexity overlying the frontal convexities.  This may reflect artifact, although component of subacute blood products not excluded.  There is no significant midline shift.  No evidence to suggest overt hydrocephalus at this time.  There is periventricular white matter hypoattenuation suggesting sequelae of chronic microvascular ischemic change.  The basal cisterns are patent. The mastoid air cells and paranasal sinuses are clear of acute process. The visualized bones of the calvarium demonstrate no acute osseous abnormality.                                       CT Lumbar Spine Without Contrast (Final  result)  Result time 06/15/24 17:32:35      Final result by Agnel Kitchen MD (06/15/24 17:32:35)                   Impression:      Recent appearing mild-to-moderate compression fracture deformity of the L2 vertebral body.  Multiple additional compression deformities of the T9, T10, T12, and L1 vertebral bodies as above.  These have a somewhat more chronic appearance.    Moderately advanced multilevel degenerative changes of the lumbar spine as detailed above.  Further evaluation and follow-up could be performed with MRI when clinically appropriate if there are no patient contraindications.    Significant volume bilateral pleural effusion with adjacent compressive atelectasis and/or consolidation.      Electronically signed by: Angel Kitchen MD  Date:    06/15/2024  Time:    17:32               Narrative:    EXAMINATION:  CT LUMBAR SPINE WITHOUT CONTRAST    CLINICAL HISTORY:  l2 fracture;    TECHNIQUE:  Low-dose axial, sagittal and coronal reformations are obtained through the lumbar spine.  Contrast was not administered.    COMPARISON:  Chest radiograph 09/24/2021    FINDINGS:  There is mild nonspecific diffuse heterogeneity of the visualized osseous structures which may relate to underlying osteopenia.  Clinical correlation is advised.  There are mild-to-moderate compression deformities of the T9 and T10 vertebral bodies.  There is a mild superior endplate compression deformity of the T12 vertebral body.  There is a moderate compression deformity of the L1 vertebral body.  There is a recent appearing mild to moderate compression fracture of the L2 vertebral body.  Fracture lucency is noted through the anterior-superior endplate.  There is minimal retropulsion with mild effacement of the anterior thecal sac.  The L3, L4, and L5 vertebral body heights appear well maintained without significant height loss appreciated allowing for underlying osteopenia.    There is minimal grade 1 anterolisthesis of L4 on L5.  There is mild intervertebral disc space height loss most pronounced at L5-S1.    There are degenerative changes of the lumbar spine as detailed below:    T12-L1: Broad-based disc bulge and mild ligamentum flavum thickening.  Mild bilateral neural foraminal narrowing.    L1-L2: Circumferential disc bulge, ligamentum flavum thickening, and bilateral facet arthropathy.  Mild spinal canal stenosis and moderate bilateral neural foraminal narrowing.    L2-L3: Circumferential disc bulge, ligamentum flavum thickening, and bilateral facet arthropathy.  Moderate to severe spinal canal stenosis.  Moderate bilateral neural foraminal narrowing.    L3-L4: Circumferential disc bulge, ligamentum flavum thickening, and bilateral facet arthropathy.  Moderate spinal canal stenosis.  Moderate bilateral neural foraminal narrowing.    L4-L5: Circumferential disc bulge, ligamentum flavum thickening, and bilateral facet arthropathy.  Moderate spinal canal stenosis.  Severe moderate to severe bilateral neural foraminal narrowing.    L5-S1: Broad-based circumferential disc bulge, asymmetric to the right.  Moderate to severe bilateral neural foraminal narrowing.    No grossly displaced sacral fracture identified.  There are degenerative changes of bilateral sacroiliac joints.    Limited views of the posterior abdomen demonstrate significant volume layering bilateral pleural effusions with associated compressive atelectasis at the visualized lung bases.  There is a right renal cyst.  There is prominent aortic atherosclerosis.  No retroperitoneal hematoma identified.                                       X-Ray Hips Bilateral 2 View Incl AP Pelvis (Final result)  Result time 06/15/24 15:57:04      Final result by Francisco Nicholson MD (06/15/24 15:57:04)                   Impression:      No acute abnormality of bilateral hips.      Electronically signed by: Francisco Nicholson  Date:    06/15/2024  Time:    15:57               Narrative:     EXAMINATION:  XR HIPS BILATERAL 2 VIEW INCL AP PELVIS    CLINICAL HISTORY:  Dorsalgia, unspecified    FINDINGS:  AP pelvis and two views of bilateral hips show no fracture, dislocation, or destructive osseous lesion.  Bilateral hip joint spaces are maintained.  Pubic symphysis and sacroiliac joints are maintained.  Surgical clips lie in left inguinal region.  Vascular calcifications are present.  Stool and bowel gas partially obscures the lower lumbar spine and sacrum and coccyx.                                       X-Ray Lumbar Spine 5 View (Final result)  Result time 06/15/24 14:16:08   Procedure changed from X-Ray Lumbar Spine Ap And Lateral     Final result by Francisco Nicholson MD (06/15/24 14:16:08)                   Impression:      1. Age indeterminate superior endplate L2 compression fracture which has progressed since 2019.  Correlation for symptoms at this level is requested.  2. Compression fractures of T10, T11, T12, and L1, not significantly changed since 2016.  3. Moderate L5-S1 disc degeneration.      Electronically signed by: Francisco Nicholson  Date:    06/15/2024  Time:    14:16               Narrative:    EXAMINATION:  XR LUMBAR SPINE COMPLETE 5 VIEW    CLINICAL HISTORY:  fall;    FINDINGS:  Five views of lumbar spine show normal lumbosacral alignment.    Compression fractures of T10, T11, T12, L1, and L2 are evident.  T10, T11, T12, and L1 compression fractures have not significantly changed since CT 04/24/2019.  Superior endplate of L2 compression fracture has progressed since that time pre wall inferior endplate compression fracture of L2 is unchanged.    Moderate L5-S1 disc degeneration is evident.  Remaining lumbar intervertebral disc space heights are relatively well maintained.    Sacroiliac joints are normal. Arterial vascular calcifications are present.  Postsurgical changes of CABG are suggested 12 partially visualize, along with cardiac pacing leads.  Mild-to-moderate stool and moderate bowel  gas are present throughout the abdomen, limiting evaluation.                                       X-Ray Chest AP Portable (Final result)  Result time 06/15/24 14:18:32      Final result by Francisco Nicholson MD (06/15/24 14:18:32)                   Impression:      1. Trace bilateral pleural effusions.  2. Improvement of interstitial edema and central pulmonary vascular prominence.      Electronically signed by: Francisco Nicholson  Date:    06/15/2024  Time:    14:18               Narrative:    EXAMINATION:  XR CHEST AP PORTABLE    CLINICAL HISTORY:  hypoxia;    FINDINGS:  Portable chest at 1254 compared with 12/29/2024 shows unchanged borderline enlarged cardiac silhouette size.  Mediastinal contours are unchanged with postsurgical changes of CABG.  Prior aortic valve replacement noted.  Left transvenous pacer unchanged.    Lung volumes are low.  Blunting of the costophrenic angles bilaterally suggest trace pleural effusions.  Prior central pulmonary vascular prominence and central interstitial opacities have significantly improved.  Minimal bibasilar interstitial opacities persist.  Minimal calcification suggested along the right diaphragm and in right hemithorax suggesting pleural plaques.  No definite acute osseous abnormality identified.  Bones are diffusely demineralized, limiting evaluation.                                        Physical Exam:   Physical Exam  Constitutional:       Comments: frail   HENT:      Head: Normocephalic and atraumatic.      Nose: Nose normal.   Eyes:      General: No scleral icterus.     Conjunctiva/sclera: Conjunctivae normal.   Cardiovascular:      Rate and Rhythm: Normal rate and regular rhythm.      Pulses: Normal pulses.      Heart sounds: Murmur heard.      No friction rub. No gallop.   Pulmonary:      Effort: Pulmonary effort is normal.      Breath sounds: No wheezing, rhonchi or rales.   Abdominal:      General: There is no distension.      Palpations: Abdomen is soft.    Musculoskeletal:      Right lower leg: No edema.      Left lower leg: No edema.   Skin:     General: Skin is warm and dry.   Neurological:      General: No focal deficit present.      Mental Status: He is alert and oriented to person, place, and time. Mental status is at baseline.   Psychiatric:         Mood and Affect: Mood normal.         Behavior: Behavior normal.         Thought Content: Thought content normal.         Judgment: Judgment normal.         ASSESSMENT/PLAN:   Assessment:   Febrile  Sepsis  Fall  S/p TAVR  pAF  AV block, s/p PPM.  CAD, s/p CABG with redo  Cardiac injury  Chronic systolic heart failure  HLD  Hypothyroidism  Subdural hygroma  Compression fracture L2, with possible fracture L1, T9-T12.    Plan:   - multiple admissions for NSTEMI. No interventional options left at this point per his primary cardiologist. He is not having chest pain or SOB this admission. He usually has symptoms of chest pain and/or SOB when admitted for nstemi. Differential for troponin elevation includes NSTEMI, infection, and acute neurologic injury. Difficult to differentiate as he had some initial encephalopathy.   - he has limited interventional options. Thus, when and if okay with neuro, would manage medically with Aspirin, plavix, and heparin infusion.   - per neurology recs, will hold off antiplatelets and anticoagulation for now with possible subdural process.   - monitor on telemetry.  - BNP is elevated. However, he is not having any clinical symptoms of heart failure and does not appear volume up on exam. Consider getting a CT non-contrast of his chest.   -  HE is currently on Lasix 40 mg IV BID  - recommend caution with aggressive diuresis. Okay to continue this for one day. Would re-evaluate in the AM and consider changing to daily.  - no ins and outs. Recommend strict ins and outs, and daily weight.  - telemetry  - echo  - continue Zetia  - Defer infection to primary team.  - recommend device  interrogation.  - monitor and replace electrolytes.  - daily BMP.  - will follow.    Jonathan Bey MD

## 2024-06-17 NOTE — SUBJECTIVE & OBJECTIVE
Medications Prior to Admission   Medication Sig Dispense Refill Last Dose    citalopram (CELEXA) 40 MG tablet Take 40 mg by mouth once daily. NEW Rx - NOT YET STARTED   6/15/2024    clopidogrel (PLAVIX) 75 mg tablet Take 75 mg by mouth once daily.   6/15/2024    CORLANOR 5 mg Tab Take 1 tablet by mouth 2 (two) times daily. NEW Rx - NOT YET STARTED   6/15/2024    ELIQUIS 5 mg Tab Take 5 mg by mouth every morning.   6/15/2024    ENTRESTO 24-26 mg per tablet Take 1 tablet by mouth 2 (two) times daily.   6/15/2024    ezetimibe (ZETIA) 10 mg tablet Take 10 mg by mouth once daily.   6/15/2024    furosemide (LASIX) 20 MG tablet Take 20 mg by mouth daily as needed (Fluid).   6/15/2024    hydrOXYzine pamoate (VISTARIL) 25 MG Cap Take 1 capsule (25 mg total) by mouth daily as needed (anxiety).   6/15/2024    isosorbide mononitrate (IMDUR) 30 MG 24 hr tablet Take 2 tablets (60 mg total) by mouth once daily. 60 tablet 11 6/15/2024    metoprolol succinate (TOPROL-XL) 100 MG 24 hr tablet Take 100 mg by mouth 2 (two) times daily.   6/15/2024    phenytoin (DILANTIN) 100 MG ER capsule Take 400 mg by mouth once daily.   0 6/15/2024    bismuth subsalicylate (BISMAREX) 262 mg Chew Take 1 tablet by mouth 2 (two) times daily.   Unknown    famotidine (PEPCID) 40 MG tablet Take 40 mg by mouth Daily.       NITROLINGUAL 400 mcg/spray spray Place 1 spray under the tongue every 5 (five) minutes as needed for Chest pain.   Unknown    pantoprazole (PROTONIX) 20 MG tablet Take 20 mg by mouth 2 (two) times daily.   Unknown    ranolazine (RANEXA) 1,000 mg Tb12 Take 1,000 mg by mouth 2 (two) times daily.   Unknown       Review of patient's allergies indicates:   Allergen Reactions    Atorvastatin Other (See Comments)     Muscle pain    Rosuvastatin Other (See Comments)     Muscle pain       Past Medical History:   Diagnosis Date    Anticoagulant long-term use     2014    Aortic valve disease 2014    pig valve    Arthritis     all over    Atrial  fibrillation 2/29/2024    Chest pain 07/15/2019    CHF (congestive heart failure)     2014 on meds    Coronary artery disease     2007 cabg    Difficulty swallowing     Heart attack 1990    15 stents    Heart attack 02/01/2024    mild    Heart block     Hyperlipidemia     Hypertension     on meds    Hypothyroidism 2015    on meds    PVD (peripheral vascular disease)     Seizures     last episode 1990 on meds     Past Surgical History:   Procedure Laterality Date    AORTOGRAPHY WITH SERIALOGRAPHY N/A 11/16/2021    Procedure: AORTOGRAM, WITH RUNOFF;  Surgeon: Rodrigo Willoughby MD;  Location: [facility] CATH/EP LAB;  Service: Cardiology;  Laterality: N/A;    ARTERIOGRAPHY OF AORTIC ROOT N/A 11/05/2019    Procedure: ARTERIOGRAM, AORTIC ROOT;  Surgeon: Rodrigo Willoughby MD;  Location: [facility] CATH/EP LAB;  Service: Cardiology;  Laterality: N/A;    CARDIAC CATHETERIZATION      CARDIAC VALVE SURGERY      CORONARY ANGIOGRAPHY INCLUDING BYPASS GRAFTS WITH CATHETERIZATION OF LEFT HEART N/A 11/05/2019    Procedure: ANGIOGRAM, CORONARY, INCLUDING BYPASS GRAFT, WITH LEFT HEART CATHETERIZATION;  Surgeon: Rodrigo Willoughby MD;  Location: [facility] CATH/EP LAB;  Service: Cardiology;  Laterality: N/A;    CORONARY ANGIOGRAPHY INCLUDING BYPASS GRAFTS WITH CATHETERIZATION OF LEFT HEART Left 11/03/2020    Procedure: ANGIOGRAM, CORONARY, INCLUDING BYPASS GRAFT, WITH LEFT HEART CATHETERIZATION;  Surgeon: Rodrigo Willoughby MD;  Location: [facility] CATH/EP LAB;  Service: Cardiology;  Laterality: Left;    CORONARY ANGIOGRAPHY INCLUDING BYPASS GRAFTS WITH CATHETERIZATION OF LEFT HEART N/A 09/28/2021    Procedure: ANGIOGRAM, CORONARY, INCLUDING BYPASS GRAFT, WITH LEFT HEART CATHETERIZATION;  Surgeon: Rodrigo Willoughby MD;  Location: [facility] CATH/EP LAB;  Service: Cardiology;  Laterality: N/A;    CORONARY ANGIOGRAPHY INCLUDING BYPASS GRAFTS WITH CATHETERIZATION OF LEFT HEART N/A 12/05/2023    Procedure: ANGIOGRAM, CORONARY, INCLUDING BYPASS GRAFT, WITH LEFT HEART CATHETERIZATION;   Surgeon: Rodrigo Willoughby MD;  Location: [facility] CATH/EP LAB;  Service: Cardiology;  Laterality: N/A;    CORONARY ANGIOPLASTY      CORONARY ARTERY BYPASS GRAFT      x 2    1991    ESOPHAGOGASTRODUODENOSCOPY N/A 2024    Procedure: EGD (ESOPHAGOGASTRODUODENOSCOPY);  Surgeon: Mal Lockhart III, MD;  Location: [facility] ENDO;  Service: Endoscopy;  Laterality: N/A;    EXTRACORPOREAL SHOCK WAVE LITHOTRIPSY Right 2019    Procedure: LITHOTRIPSY, ESWL;  Surgeon: Williams Ortega MD;  Location: [facility] OR;  Service: Urology;  Laterality: Right;    HEMORRHOID SURGERY      INSERTION OF PACEMAKER      PERCUTANEOUS TRANSLUMINAL BALLOON ANGIOPLASTY OF CORONARY ARTERY N/A 2019    Procedure: Angioplasty-coronary;  Surgeon: Rodriog Willoughby MD;  Location: [facility] CATH/EP LAB;  Service: Cardiology;  Laterality: N/A;    WRIST FRACTURE SURGERY       Family History       Problem Relation (Age of Onset)    Diabetes Sister    Heart attack Mother, Father    Heart disease Sister    No Known Problems Maternal Grandmother, Maternal Grandfather, Paternal Grandmother, Paternal Grandfather, Brother, Maternal Aunt, Maternal Uncle, Paternal Aunt, Paternal Uncle    Stroke Sister          Tobacco Use    Smoking status: Former     Current packs/day: 0.00     Average packs/day: 0.5 packs/day for 4.0 years (2.0 ttl pk-yrs)     Types: Cigarettes     Start date:      Quit date:      Years since quittin.4    Smokeless tobacco: Never   Substance and Sexual Activity    Alcohol use: Yes     Comment: social    Drug use: No    Sexual activity: Not Currently       Objective:     Weight: 75.6 kg (166 lb 10.7 oz)  Body mass index is 26.9 kg/m².  Vital Signs (Most Recent):  Temp: 98.1 °F (36.7 °C) (24 1120)  Pulse: 83 (24 1200)  Resp: (!) 24 (24 1200)  BP: 105/63 (24 1200)  SpO2: 99 % (24 1200) Vital Signs (24h Range):  Temp:  [98.1 °F (36.7 °C)-99.5 °F (37.5 °C)] 98.1 °F (36.7 °C)  Pulse:  []  83  Resp:  [15-36] 24  SpO2:  [85 %-100 %] 99 %  BP: ()/(49-89) 105/63                         Male External Urine Management Device w/ Suction 06/15/24 2030 (Active)   Collection Container Other (Comment) 06/17/24 1101   Securement Method other (see comments) 06/17/24 1101   Skin no redness;no breakdown 06/17/24 1101   Tolerance no signs/symptoms of discomfort 06/17/24 1101   Suction Continuous suction at 70 mmHg 06/17/24 0701   Output (mL) 900 mL 06/17/24 0501   Catheter Change Date 06/17/24 06/17/24 0836   Catheter Change Time 0836 06/17/24 0836         Neurosurgery Physical Exam  General:  No acute distress.    Neurologic: Alert and oriented. Thought content appropriate.  GCS: E4 V5 M6; Total: 15  Pulmonary: normal respirations, no signs of respiratory distress  Skin: Skin is warm, dry and intact.    Motor Strength:   Bilateral lower extremity iliopsoas-self-limited due to pain, EHL/TA/gastrocnemius 4/5 self-limited due to pain     Significant Labs:  Recent Labs   Lab 06/15/24  1332 06/16/24  0410 06/17/24  0257   * 103 116*   * 131* 132*   K 3.7 3.4* 3.6   CL 92* 93* 91*   CO2 26 28 32*   BUN 17 18 20   CREATININE 1.1 1.2 1.2   CALCIUM 9.1 8.3* 8.8   MG 1.7 1.6 1.7     Recent Labs   Lab 06/16/24  0410 06/16/24  1144 06/17/24  0946   WBC 8.04 9.31 7.04   HGB 11.4* 12.6* 12.5*   HCT 34.1* 37.3* 36.8*    166 154     Recent Labs   Lab 06/16/24  1144 06/17/24  0946   INR 1.1 1.1   APTT 40.9* 38.1*     Microbiology Results (last 7 days)       Procedure Component Value Units Date/Time    Urine Culture High Risk [[id number]] Collected: 06/15/24 2013    Order Status: Completed Specimen: Urine, Clean Catch Updated: 06/17/24 0715     Urine Culture, Routine Multiple organisms isolated. None in predominance.  Repeat if      clinically necessary.    Narrative:      Indicated criteria for high risk culture:->Other  Other (specify):->uti/confusion    Blood culture [[id number]] Collected: 06/15/24 9145     Order Status: Completed Specimen: Blood Updated: 06/17/24 0232     Blood Culture, Routine No Growth to date      No Growth to date

## 2024-06-17 NOTE — PT/OT/SLP PROGRESS
Physical Therapy      Patient Name:  Jerome Amaro JrEve   MRN:  [id number]    Patient not seen today secondary to awaiting TLSO x2 attempts. Will follow-up 06/18/24.

## 2024-06-17 NOTE — NURSING
Spoke with BETH Walker. Tlso brace ordered when pt OOB/ambulation. Okay to work with PT and OT when brace is applied.

## 2024-06-17 NOTE — NURSING
Pt's family member, Abida was at the bedside. She was concerned because she was told by Dr. Louie that the heparin gtt would be discontinued. I spoke with Dr. Louie who asked me to dc heparin gtt. Pt. Is no c/o CP. Notified Dr. Almonte as well who confirmed that heparin gtt can be dc'd.

## 2024-06-17 NOTE — PROGRESS NOTES
[facility]   Cardiology Note    Consult Requested By: Hospital Medicine  Reason for Consult: NSTEMI    SUBJECTIVE:     History of Present Illness: 79 yo male with a history of coronary artery disease, status post coronary artery bypass surgery with redo, chronic angina, chronic systolic heart failure, status post TAVR, paroxysmal atrial fibrillation, hyperlipidemia, heart block, status post DC permanent pacemaker, peripheral vascular disease, seizure disorder, hypertension, and hypothyroidism that presented to the hospital after he had a fall.  It appears that he was somewhat encephalopathic on admission.  He states he did not hit his head.  He is currently being treated for severe sepsis as he had a temp of a 102.9°.  CT head found evidence of subdural hygroma.  He states that he was recently started on subcutaneous Lasix.  He states the reason he fell was he tripped over something when he was outside of his house.  He denies loss of consciousness.  He denies any chest pain, shortness of breath, orthopnea, or paroxysmal nocturnal dyspnea.  No lower extremity edema.  Complaining mostly of mid back pain.  X-rays of the back found a compression fracture of his spine.  Initial troponins were about 207 and increased to 949.  These are high sensitivity troponins.  This is in the setting of the temperature of 102.9°.  BNP was found to be elevated. ECG shows a V paced rhythm.       6/17: PT seen and examined. No events overnight. BP slightly elevated this morning, was well controlled yesterday evening. Tele reviewed. Echo showed EF 55-60%, mild to mod MR, TAVR. EKG Atrial sensing, V pacing. PT denies chest pain or shortness of breath.     Review of patient's allergies indicates:   Allergen Reactions    Atorvastatin Other (See Comments)     Muscle pain    Rosuvastatin Other (See Comments)     Muscle pain       Past Medical History:   Diagnosis Date    Anticoagulant long-term use     2014    Aortic valve disease  2014    pig valve    Arthritis     all over    Atrial fibrillation 2/29/2024    Chest pain 07/15/2019    CHF (congestive heart failure)     2014 on meds    Coronary artery disease     2007 cabg    Difficulty swallowing     Heart attack 1990    15 stents    Heart attack 02/01/2024    mild    Heart block     Hyperlipidemia     Hypertension     on meds    Hypothyroidism 2015    on meds    PVD (peripheral vascular disease)     Seizures     last episode 1990 on meds     Past Surgical History:   Procedure Laterality Date    AORTOGRAPHY WITH SERIALOGRAPHY N/A 11/16/2021    Procedure: AORTOGRAM, WITH RUNOFF;  Surgeon: Rodrigo Willoughby MD;  Location: [facility] CATH/EP LAB;  Service: Cardiology;  Laterality: N/A;    ARTERIOGRAPHY OF AORTIC ROOT N/A 11/05/2019    Procedure: ARTERIOGRAM, AORTIC ROOT;  Surgeon: Rodrigo Willoughby MD;  Location: [facility] CATH/EP LAB;  Service: Cardiology;  Laterality: N/A;    CARDIAC CATHETERIZATION      CARDIAC VALVE SURGERY      CORONARY ANGIOGRAPHY INCLUDING BYPASS GRAFTS WITH CATHETERIZATION OF LEFT HEART N/A 11/05/2019    Procedure: ANGIOGRAM, CORONARY, INCLUDING BYPASS GRAFT, WITH LEFT HEART CATHETERIZATION;  Surgeon: Rodrigo Willoughby MD;  Location: [facility] CATH/EP LAB;  Service: Cardiology;  Laterality: N/A;    CORONARY ANGIOGRAPHY INCLUDING BYPASS GRAFTS WITH CATHETERIZATION OF LEFT HEART Left 11/03/2020    Procedure: ANGIOGRAM, CORONARY, INCLUDING BYPASS GRAFT, WITH LEFT HEART CATHETERIZATION;  Surgeon: Rodrigo Willoughby MD;  Location: [facility] CATH/EP LAB;  Service: Cardiology;  Laterality: Left;    CORONARY ANGIOGRAPHY INCLUDING BYPASS GRAFTS WITH CATHETERIZATION OF LEFT HEART N/A 09/28/2021    Procedure: ANGIOGRAM, CORONARY, INCLUDING BYPASS GRAFT, WITH LEFT HEART CATHETERIZATION;  Surgeon: Rodrigo Willoughby MD;  Location: [facility] CATH/EP LAB;  Service: Cardiology;  Laterality: N/A;    CORONARY ANGIOGRAPHY INCLUDING BYPASS GRAFTS WITH CATHETERIZATION OF LEFT HEART N/A 12/05/2023    Procedure: ANGIOGRAM, CORONARY,  INCLUDING BYPASS GRAFT, WITH LEFT HEART CATHETERIZATION;  Surgeon: Rodrigo Willoughby MD;  Location: [facility] CATH/EP LAB;  Service: Cardiology;  Laterality: N/A;    CORONARY ANGIOPLASTY      CORONARY ARTERY BYPASS GRAFT      x 2    1991    ESOPHAGOGASTRODUODENOSCOPY N/A 2024    Procedure: EGD (ESOPHAGOGASTRODUODENOSCOPY);  Surgeon: Mal Lockhart III, MD;  Location: [facility] ENDO;  Service: Endoscopy;  Laterality: N/A;    EXTRACORPOREAL SHOCK WAVE LITHOTRIPSY Right 2019    Procedure: LITHOTRIPSY, ESWL;  Surgeon: Williams Ortega MD;  Location: [facility] OR;  Service: Urology;  Laterality: Right;    HEMORRHOID SURGERY      INSERTION OF PACEMAKER      PERCUTANEOUS TRANSLUMINAL BALLOON ANGIOPLASTY OF CORONARY ARTERY N/A 2019    Procedure: Angioplasty-coronary;  Surgeon: Rodrigo Willoughby MD;  Location: [facility] CATH/EP LAB;  Service: Cardiology;  Laterality: N/A;    WRIST FRACTURE SURGERY       Family History   Problem Relation Name Age of Onset    Heart attack Mother      Heart attack Father      Heart disease Sister 2     Stroke Sister 2     Diabetes Sister 2     No Known Problems Maternal Grandmother      No Known Problems Maternal Grandfather      No Known Problems Paternal Grandmother      No Known Problems Paternal Grandfather      No Known Problems Brother      No Known Problems Maternal Aunt      No Known Problems Maternal Uncle      No Known Problems Paternal Aunt      No Known Problems Paternal Uncle      Anemia Neg Hx      Arrhythmia Neg Hx      Asthma Neg Hx      Clotting disorder Neg Hx      Fainting Neg Hx      Heart failure Neg Hx      Hyperlipidemia Neg Hx      Hypertension Neg Hx      Atrial Septal Defect Neg Hx       Social History     Tobacco Use    Smoking status: Former     Current packs/day: 0.00     Average packs/day: 0.5 packs/day for 4.0 years (2.0 ttl pk-yrs)     Types: Cigarettes     Start date:      Quit date:      Years since quittin.4    Smokeless tobacco:  Never   Substance Use Topics    Alcohol use: Yes     Comment: social    Drug use: No       Review of Systems:  Review of Systems   Constitutional:  Negative for chills and fever.   HENT:  Negative for congestion and sore throat.    Eyes:  Negative for blurred vision and double vision.   Respiratory:  Negative for shortness of breath and wheezing.    Cardiovascular:  Negative for chest pain, palpitations, orthopnea, leg swelling and PND.   Gastrointestinal:  Negative for nausea and vomiting.   Musculoskeletal:  Positive for back pain. Negative for neck pain.   Skin:  Negative for itching and rash.   Neurological:  Negative for dizziness and loss of consciousness.   Psychiatric/Behavioral:  Negative for depression. The patient does not have insomnia.        OBJECTIVE:     Vital Signs (Most Recent)  Temp: 98.6 °F (37 °C) (06/17/24 0759)  Pulse: 91 (06/17/24 0805)  Resp: 20 (06/17/24 0805)  BP: (!) 151/77 (06/17/24 0805)  SpO2: 100 % (06/17/24 0752)    Vital Signs Range (Last 24H):  Temp:  [98.6 °F (37 °C)-99.5 °F (37.5 °C)]   Pulse:  []   Resp:  [15-36]   BP: (107-151)/(55-89)   SpO2:  [85 %-100 %]     I & O (Last 24H):    Intake/Output Summary (Last 24 hours) at 6/17/2024 0832  Last data filed at 6/17/2024 0543  Gross per 24 hour   Intake 779.93 ml   Output 2300 ml   Net -1520.07 ml       Current Diet:     Current Diet Order   Procedures    Diet Low Sodium, 2gm Fluid - 1500mL     Order Specific Question:   Fluid restriction:     Answer:   Fluid - 1500mL        Allergies:  Review of patient's allergies indicates:   Allergen Reactions    Atorvastatin Other (See Comments)     Muscle pain    Rosuvastatin Other (See Comments)     Muscle pain       Meds:  Scheduled Meds:   ezetimibe  10 mg Oral Daily    furosemide  20 mg Oral Daily    LIDOcaine  1 patch Transdermal Q24H    meropenem IV (PEDS and ADULTS)  1 g Intravenous Q12H    metoprolol succinate  100 mg Oral Daily    mupirocin   Nasal BID    pantoprazole  20 mg  Oral BID    phenytoin  400 mg Oral Daily    vancomycin (VANCOCIN) IV (PEDS and ADULTS)  1,250 mg Intravenous Q24H     Continuous Infusions:      PRN Meds:  Current Facility-Administered Medications:     acetaminophen, 650 mg, Oral, Q8H PRN    acetaminophen, 650 mg, Oral, Q4H PRN    aluminum-magnesium hydroxide-simethicone, 30 mL, Oral, QID PRN    HYDROcodone-acetaminophen, 1 tablet, Oral, Q6H PRN    HYDROmorphone, 0.5 mg, Intravenous, Q6H PRN    magnesium oxide, 800 mg, Oral, PRN    magnesium oxide, 800 mg, Oral, PRN    melatonin, 6 mg, Oral, Nightly PRN    nitroGLYCERIN 0.4 MG/DOSE TL SPRY, 1 spray, Sublingual, Q5 Min PRN    ondansetron, 4 mg, Intravenous, Q6H PRN    potassium bicarbonate, 35 mEq, Oral, PRN    potassium bicarbonate, 50 mEq, Oral, PRN    potassium bicarbonate, 60 mEq, Oral, PRN    potassium, sodium phosphates, 2 packet, Oral, PRN    potassium, sodium phosphates, 2 packet, Oral, PRN    potassium, sodium phosphates, 2 packet, Oral, PRN    promethazine (PHENERGAN) 6.25 mg in sodium chloride 0.9% 50 mL IVPB, 6.25 mg, Intravenous, Q6H PRN    sodium chloride 0.9%, 10 mL, Intravenous, PRN    Pharmacy to dose Vancomycin consult, , , Once **AND** vancomycin - pharmacy to dose, , Intravenous, pharmacy to manage frequency    Oxygen/Ventilator Data (Last 24H):  (if applicable)            Hemodynamic Parameters (Last 24H):   (if applicable)        Laboratory and Radiology Data:  Recent Results (from the past 24 hour(s))   Echo    Collection Time: 06/16/24 11:32 AM   Result Value Ref Range    BSA 1.87 m2    LVOT stroke volume 46.79 cm3    LVIDd 5.10 3.5 - 6.0 cm    LV Systolic Volume 54.43 mL    LV Systolic Volume Index 29.6 mL/m2    LVIDs 3.60 2.1 - 4.0 cm    LV Diastolic Volume 123.81 mL    LV Diastolic Volume Index 67.29 mL/m2    IVS 1.20 (A) 0.6 - 1.1 cm    LVOT diameter 2.00 cm    LVOT area 3.1 cm2    FS 29 28 - 44 %    Left Ventricle Relative Wall Thickness 0.55 cm    Posterior Wall 1.40 (A) 0.6 - 1.1 cm     LV mass 270.07 g    LV Mass Index 147 g/m2    MV Peak E Bradly 1.17 m/s    TDI LATERAL 0.05 m/s    TDI SEPTAL 0.04 m/s    E/E' ratio 26.00 m/s    MV Peak A Bradly 1.53 m/s    TR Max Bradly 3.20 m/s    E/A ratio 0.76     E wave deceleration time 239.00 msec    LV SEPTAL E/E' RATIO 29.25 m/s    LV LATERAL E/E' RATIO 23.40 m/s    LVOT peak bradly 0.91 m/s    Left Ventricular Outflow Tract Mean Velocity 0.58 cm/s    Left Ventricular Outflow Tract Mean Gradient 2.00 mmHg    TAPSE 0.76 cm    AV mean gradient 3 mmHg    AV peak gradient 6 mmHg    Ao peak bradly 1.26 m/s    Ao VTI 19.80 cm    LVOT peak VTI 14.90 cm    AV valve area 2.36 cm²    AV Velocity Ratio 0.72     AV index (prosthetic) 0.75     CASSIE by Velocity Ratio 2.27 cm²    Mr max bradly 4.78 m/s    MV mean gradient 7 mmHg    MV peak gradient 11 mmHg    MV stenosis pressure 1/2 time 62.00 ms    MV valve area p 1/2 method 3.55 cm2    MV valve area by continuity eq 1.19 cm2    MV VTI 39.4 cm    TV mean gradient 27 mmHg    Triscuspid Valve Regurgitation Peak Gradient 41 mmHg    PV PEAK VELOCITY 1.04 m/s    PV peak gradient 4 mmHg    Pulmonary Valve Mean Velocity 0.71 m/s    Ao root annulus 3.60 cm    Mean e' 0.05 m/s    ZLVIDS 1.05     ZLVIDD -0.01    APTT    Collection Time: 06/16/24 11:44 AM   Result Value Ref Range    aPTT 40.9 (H) 21.0 - 32.0 sec   Protime-INR    Collection Time: 06/16/24 11:44 AM   Result Value Ref Range    Prothrombin Time 12.4 9.0 - 12.5 sec    INR 1.1 0.8 - 1.2   CBC auto differential    Collection Time: 06/16/24 11:44 AM   Result Value Ref Range    WBC 9.31 3.90 - 12.70 K/uL    RBC 4.42 (L) 4.60 - 6.20 M/uL    Hemoglobin 12.6 (L) 14.0 - 18.0 g/dL    Hematocrit 37.3 (L) 40.0 - 54.0 %    MCV 84 82 - 98 fL    MCH 28.5 27.0 - 31.0 pg    MCHC 33.8 32.0 - 36.0 g/dL    RDW 14.8 (H) 11.5 - 14.5 %    Platelets 166 150 - 450 K/uL    MPV 10.7 9.2 - 12.9 fL    Immature Granulocytes 0.5 0.0 - 0.5 %    Gran # (ANC) 7.1 1.8 - 7.7 K/uL    Immature Grans (Abs) 0.05 (H) 0.00 -  0.04 K/uL    Lymph # 0.8 (L) 1.0 - 4.8 K/uL    Mono # 1.2 (H) 0.3 - 1.0 K/uL    Eos # 0.2 0.0 - 0.5 K/uL    Baso # 0.05 0.00 - 0.20 K/uL    nRBC 0 0 /100 WBC    Gran % 75.7 (H) 38.0 - 73.0 %    Lymph % 8.4 (L) 18.0 - 48.0 %    Mono % 12.4 4.0 - 15.0 %    Eosinophil % 2.5 0.0 - 8.0 %    Basophil % 0.5 0.0 - 1.9 %    Differential Method Automated    Urinalysis, Reflex to Urine Culture Urine, Clean Catch    Collection Time: 06/16/24  3:03 PM    Specimen: Urine, Clean Catch   Result Value Ref Range    Specimen UA Urine, Clean Catch     Color, UA Yellow Yellow, Straw, Katy    Appearance, UA Clear Clear    pH, UA 6.0 5.0 - 8.0    Specific Gravity, UA 1.010 1.005 - 1.030    Protein, UA Negative Negative    Glucose, UA Negative Negative    Ketones, UA Negative Negative    Bilirubin (UA) Negative Negative    Occult Blood UA Negative Negative    Nitrite, UA Negative Negative    Urobilinogen, UA 2.0-3.0 (A) Negative EU/dL    Leukocytes, UA Negative Negative   Comprehensive Metabolic Panel (CMP)    Collection Time: 06/17/24  2:57 AM   Result Value Ref Range    Sodium 132 (L) 136 - 145 mmol/L    Potassium 3.6 3.5 - 5.1 mmol/L    Chloride 91 (L) 95 - 110 mmol/L    CO2 32 (H) 23 - 29 mmol/L    Glucose 116 (H) 70 - 110 mg/dL    BUN 20 8 - 23 mg/dL    Creatinine 1.2 0.5 - 1.4 mg/dL    Calcium 8.8 8.7 - 10.5 mg/dL    Total Protein 6.7 6.0 - 8.4 g/dL    Albumin 3.7 3.5 - 5.2 g/dL    Total Bilirubin 1.7 (H) 0.1 - 1.0 mg/dL    Alkaline Phosphatase 88 55 - 135 U/L    AST 20 10 - 40 U/L    ALT 7 (L) 10 - 44 U/L    eGFR >60.0 >60 mL/min/1.73 m^2    Anion Gap 9 8 - 16 mmol/L   Magnesium    Collection Time: 06/17/24  2:57 AM   Result Value Ref Range    Magnesium 1.7 1.6 - 2.6 mg/dL   Lipid Panel    Collection Time: 06/17/24  2:57 AM   Result Value Ref Range    Cholesterol 237 (H) 120 - 199 mg/dL    Triglycerides 169 (H) 30 - 150 mg/dL    HDL 33 (L) 40 - 75 mg/dL    LDL Cholesterol 170.2 (H) 63.0 - 159.0 mg/dL    HDL/Cholesterol Ratio 13.9  (L) 20.0 - 50.0 %    Total Cholesterol/HDL Ratio 7.2 (H) 2.0 - 5.0    Non-HDL Cholesterol 204 mg/dL     Imaging Results               CT Head Without Contrast (Final result)  Result time 06/15/24 20:52:14      Final result by Angel Kitchen MD (06/15/24 20:52:14)                   Impression:      Bilateral hypoattenuating extra-axial collections overlying the cerebral convexities, right greater than left.  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  There is mild associated mass effect on the right vertex, although no significant midline shift or hydrocephalus appreciated.  Questionable minimal internal complexity overlying the frontal convexities may be artifactual, although component of subacute blood products not excluded.  Consider short-term CT follow-up.    Chronic senescent and microvascular ischemic changes.    This report was flagged in Epic as abnormal.      Electronically signed by: Angel Kitchen MD  Date:    06/15/2024  Time:    20:52               Narrative:    EXAMINATION:  CT HEAD WITHOUT CONTRAST    CLINICAL HISTORY:  Mental status change, unknown cause;    TECHNIQUE:  Low dose axial images were obtained through the head.  Coronal and sagittal reformations were also performed. Contrast was not administered.    COMPARISON:  No prior studies are available for comparison.    FINDINGS:  There is generalized cerebral volume loss with compensatory sulcal widening and ventricular enlargement.  There are bilateral hypoattenuating extra-axial collections overlying the cerebral convexities suggestive of subdural hygromas or chronic subdural hematomas.  These measure approximately 1.5 cm on the right (coronal series 6, image 44) and 0.8 cm on the left (coronal series 6, image 30).  There is mild associated mass effect of the right vertex.  There is questionable slight internal complexity overlying the frontal  convexities.  This may reflect artifact, although component of subacute blood products not excluded.  There is no significant midline shift.  No evidence to suggest overt hydrocephalus at this time.  There is periventricular white matter hypoattenuation suggesting sequelae of chronic microvascular ischemic change.  The basal cisterns are patent. The mastoid air cells and paranasal sinuses are clear of acute process. The visualized bones of the calvarium demonstrate no acute osseous abnormality.                                       CT Lumbar Spine Without Contrast (Final result)  Result time 06/15/24 17:32:35      Final result by Angel Kitchen MD (06/15/24 17:32:35)                   Impression:      Recent appearing mild-to-moderate compression fracture deformity of the L2 vertebral body.  Multiple additional compression deformities of the T9, T10, T12, and L1 vertebral bodies as above.  These have a somewhat more chronic appearance.    Moderately advanced multilevel degenerative changes of the lumbar spine as detailed above.  Further evaluation and follow-up could be performed with MRI when clinically appropriate if there are no patient contraindications.    Significant volume bilateral pleural effusion with adjacent compressive atelectasis and/or consolidation.      Electronically signed by: Angel Kitchen MD  Date:    06/15/2024  Time:    17:32               Narrative:    EXAMINATION:  CT LUMBAR SPINE WITHOUT CONTRAST    CLINICAL HISTORY:  l2 fracture;    TECHNIQUE:  Low-dose axial, sagittal and coronal reformations are obtained through the lumbar spine.  Contrast was not administered.    COMPARISON:  Chest radiograph 09/24/2021    FINDINGS:  There is mild nonspecific diffuse heterogeneity of the visualized osseous structures which may relate to underlying osteopenia.  Clinical correlation is advised.  There are mild-to-moderate compression deformities of the T9 and T10 vertebral bodies.  There is a mild  superior endplate compression deformity of the T12 vertebral body.  There is a moderate compression deformity of the L1 vertebral body.  There is a recent appearing mild to moderate compression fracture of the L2 vertebral body.  Fracture lucency is noted through the anterior-superior endplate.  There is minimal retropulsion with mild effacement of the anterior thecal sac.  The L3, L4, and L5 vertebral body heights appear well maintained without significant height loss appreciated allowing for underlying osteopenia.    There is minimal grade 1 anterolisthesis of L4 on L5. There is mild intervertebral disc space height loss most pronounced at L5-S1.    There are degenerative changes of the lumbar spine as detailed below:    T12-L1: Broad-based disc bulge and mild ligamentum flavum thickening.  Mild bilateral neural foraminal narrowing.    L1-L2: Circumferential disc bulge, ligamentum flavum thickening, and bilateral facet arthropathy.  Mild spinal canal stenosis and moderate bilateral neural foraminal narrowing.    L2-L3: Circumferential disc bulge, ligamentum flavum thickening, and bilateral facet arthropathy.  Moderate to severe spinal canal stenosis.  Moderate bilateral neural foraminal narrowing.    L3-L4: Circumferential disc bulge, ligamentum flavum thickening, and bilateral facet arthropathy.  Moderate spinal canal stenosis.  Moderate bilateral neural foraminal narrowing.    L4-L5: Circumferential disc bulge, ligamentum flavum thickening, and bilateral facet arthropathy.  Moderate spinal canal stenosis.  Severe moderate to severe bilateral neural foraminal narrowing.    L5-S1: Broad-based circumferential disc bulge, asymmetric to the right.  Moderate to severe bilateral neural foraminal narrowing.    No grossly displaced sacral fracture identified.  There are degenerative changes of bilateral sacroiliac joints.    Limited views of the posterior abdomen demonstrate significant volume layering bilateral pleural  effusions with associated compressive atelectasis at the visualized lung bases.  There is a right renal cyst.  There is prominent aortic atherosclerosis.  No retroperitoneal hematoma identified.                                       X-Ray Hips Bilateral 2 View Incl AP Pelvis (Final result)  Result time 06/15/24 15:57:04      Final result by Francisco Nicholson MD (06/15/24 15:57:04)                   Impression:      No acute abnormality of bilateral hips.      Electronically signed by: Francisco Nicholson  Date:    06/15/2024  Time:    15:57               Narrative:    EXAMINATION:  XR HIPS BILATERAL 2 VIEW INCL AP PELVIS    CLINICAL HISTORY:  Dorsalgia, unspecified    FINDINGS:  AP pelvis and two views of bilateral hips show no fracture, dislocation, or destructive osseous lesion.  Bilateral hip joint spaces are maintained.  Pubic symphysis and sacroiliac joints are maintained.  Surgical clips lie in left inguinal region.  Vascular calcifications are present.  Stool and bowel gas partially obscures the lower lumbar spine and sacrum and coccyx.                                       X-Ray Lumbar Spine 5 View (Final result)  Result time 06/15/24 14:16:08   Procedure changed from X-Ray Lumbar Spine Ap And Lateral     Final result by Francisco Nicholson MD (06/15/24 14:16:08)                   Impression:      1. Age indeterminate superior endplate L2 compression fracture which has progressed since 2019.  Correlation for symptoms at this level is requested.  2. Compression fractures of T10, T11, T12, and L1, not significantly changed since 2016.  3. Moderate L5-S1 disc degeneration.      Electronically signed by: Francisco Nicholson  Date:    06/15/2024  Time:    14:16               Narrative:    EXAMINATION:  XR LUMBAR SPINE COMPLETE 5 VIEW    CLINICAL HISTORY:  fall;    FINDINGS:  Five views of lumbar spine show normal lumbosacral alignment.    Compression fractures of T10, T11, T12, L1, and L2 are evident.  T10, T11, T12, and L1  compression fractures have not significantly changed since CT 04/24/2019.  Superior endplate of L2 compression fracture has progressed since that time pre wall inferior endplate compression fracture of L2 is unchanged.    Moderate L5-S1 disc degeneration is evident.  Remaining lumbar intervertebral disc space heights are relatively well maintained.    Sacroiliac joints are normal. Arterial vascular calcifications are present.  Postsurgical changes of CABG are suggested 12 partially visualize, along with cardiac pacing leads.  Mild-to-moderate stool and moderate bowel gas are present throughout the abdomen, limiting evaluation.                                       X-Ray Chest AP Portable (Final result)  Result time 06/15/24 14:18:32      Final result by Francisco Nicholson MD (06/15/24 14:18:32)                   Impression:      1. Trace bilateral pleural effusions.  2. Improvement of interstitial edema and central pulmonary vascular prominence.      Electronically signed by: Francisco Nicholson  Date:    06/15/2024  Time:    14:18               Narrative:    EXAMINATION:  XR CHEST AP PORTABLE    CLINICAL HISTORY:  hypoxia;    FINDINGS:  Portable chest at 1254 compared with 12/29/2024 shows unchanged borderline enlarged cardiac silhouette size.  Mediastinal contours are unchanged with postsurgical changes of CABG.  Prior aortic valve replacement noted.  Left transvenous pacer unchanged.    Lung volumes are low.  Blunting of the costophrenic angles bilaterally suggest trace pleural effusions.  Prior central pulmonary vascular prominence and central interstitial opacities have significantly improved.  Minimal bibasilar interstitial opacities persist.  Minimal calcification suggested along the right diaphragm and in right hemithorax suggesting pleural plaques.  No definite acute osseous abnormality identified.  Bones are diffusely demineralized, limiting evaluation.                                        Physical Exam:   Physical  Exam  Constitutional:       Comments: frail   HENT:      Head: Normocephalic and atraumatic.      Nose: Nose normal.   Eyes:      General: No scleral icterus.     Conjunctiva/sclera: Conjunctivae normal.   Cardiovascular:      Rate and Rhythm: Normal rate and regular rhythm.      Pulses: Normal pulses.      Heart sounds: Murmur heard.      No friction rub. No gallop.   Pulmonary:      Effort: Pulmonary effort is normal.      Breath sounds: No wheezing, rhonchi or rales.   Abdominal:      General: There is no distension.      Palpations: Abdomen is soft.   Musculoskeletal:      Right lower leg: No edema.      Left lower leg: No edema.   Skin:     General: Skin is warm and dry.   Neurological:      General: No focal deficit present.      Mental Status: He is alert and oriented to person, place, and time. Mental status is at baseline.   Psychiatric:         Mood and Affect: Mood normal.         Behavior: Behavior normal.         Thought Content: Thought content normal.         Judgment: Judgment normal.       ASSESSMENT/PLAN:   Assessment:   Febrile  Sepsis  Fall  S/p TAVR  pAF  AV block, s/p PPM.  CAD, s/p CABG with redo  Cardiac injury  Chronic systolic heart failure  HLD  Hypothyroidism  Subdural hygroma  Compression fracture L2, with possible fracture L1, T9-T12.    Plan:   - multiple admissions for NSTEMI. No interventional options left at this point per his primary cardiologist. He is not having chest pain or SOB this admission. He usually has symptoms of chest pain and/or SOB when admitted for nstemi. Differential for troponin elevation includes NSTEMI, infection, and acute neurologic injury. Difficult to differentiate as he had some initial encephalopathy.   - he has limited interventional options. Thus, when and if okay with neuro, would manage medically with Aspirin, plavix, and heparin infusion.   - per neurology recs, will hold off antiplatelets and anticoagulation for now with possible subdural process.    - monitor on telemetry.  - BNP is elevated. However, he is not having any clinical symptoms of heart failure and does not appear volume up on exam. Consider getting a CT non-contrast of his chest.   -  He is currently on Lasix 40 mg IV BID. Now on oral dose 20 mg daily.  -good UOP  - telemetry  - echo reviewed.   - continue Zetia  - Defer infection to primary team.  - recommend device interrogation.  - monitor and replace electrolytes.  - daily BMP.  - will follow.

## 2024-06-17 NOTE — ASSESSMENT & PLAN NOTE
Repeat CT head demonstrate no evidence of acute bleeding hemorrhage, mass effect or midline shift.    No surgical intervention warrant.     Patient can follow up as needed.

## 2024-06-17 NOTE — NURSING
Multiple attempts made to obtain ordered brace. Per central, brace is special order. Extention received to call for special order. Called ext [phone] multiple times and left a voice mail. Still awaiting a response.

## 2024-06-17 NOTE — ASSESSMENT & PLAN NOTE
Jerome Amaro is a 77 y/o male presented to the emergency department on 06/15/2024 complaining back pain after a mechanical fall on a day prior care.  CT lumbar revealed acute L2 fracture.  CT head revealed chronic subdural hygromas without  hemorrhage, mass effect or midline shift.     No acute surgical intervention is warranted at this time.    Recommend:  -TLSO brace when out of bed and ambulating  -Robaxin 750 mg q 8 hours   -hydrocodone 7.5-325 mg q 6 hours scheduled  -Ibuprofen 800 mg 1 tab every 8 hour scheduled  -PT/OT/OOB

## 2024-06-18 PROBLEM — A41.9 SEVERE SEPSIS: Status: RESOLVED | Noted: 2024-06-16 | Resolved: 2024-06-18

## 2024-06-18 PROBLEM — R65.20 SEVERE SEPSIS: Status: RESOLVED | Noted: 2024-06-16 | Resolved: 2024-06-18

## 2024-06-18 LAB
ALBUMIN SERPL BCP-MCNC: 3.7 G/DL (ref 3.5–5.2)
ALP SERPL-CCNC: 92 U/L (ref 55–135)
ALT SERPL W/O P-5'-P-CCNC: 9 U/L (ref 10–44)
ANION GAP SERPL CALC-SCNC: 7 MMOL/L (ref 8–16)
AST SERPL-CCNC: 23 U/L (ref 10–40)
BILIRUB SERPL-MCNC: 1.4 MG/DL (ref 0.1–1)
BUN SERPL-MCNC: 20 MG/DL (ref 8–23)
CALCIUM SERPL-MCNC: 8.8 MG/DL (ref 8.7–10.5)
CHLORIDE SERPL-SCNC: 92 MMOL/L (ref 95–110)
CO2 SERPL-SCNC: 34 MMOL/L (ref 23–29)
CREAT SERPL-MCNC: 1 MG/DL (ref 0.5–1.4)
EST. GFR  (NO RACE VARIABLE): >60 ML/MIN/1.73 M^2
GLUCOSE SERPL-MCNC: 105 MG/DL (ref 70–110)
MAGNESIUM SERPL-MCNC: 1.9 MG/DL (ref 1.6–2.6)
POTASSIUM SERPL-SCNC: 3.9 MMOL/L (ref 3.5–5.1)
PROT SERPL-MCNC: 6.6 G/DL (ref 6–8.4)
SODIUM SERPL-SCNC: 133 MMOL/L (ref 136–145)
TROPONIN I SERPL HS-MCNC: 289.4 PG/ML (ref 0–14.9)
TROPONIN I SERPL HS-MCNC: 301 PG/ML (ref 0–14.9)
VANCOMYCIN TROUGH SERPL-MCNC: 10.9 UG/ML

## 2024-06-18 PROCEDURE — 25000003 PHARM REV CODE 250: Performed by: HOSPITALIST

## 2024-06-18 PROCEDURE — 36415 COLL VENOUS BLD VENIPUNCTURE: CPT | Performed by: INTERNAL MEDICINE

## 2024-06-18 PROCEDURE — 97535 SELF CARE MNGMENT TRAINING: CPT

## 2024-06-18 PROCEDURE — 80202 ASSAY OF VANCOMYCIN: CPT | Performed by: INTERNAL MEDICINE

## 2024-06-18 PROCEDURE — 63600175 PHARM REV CODE 636 W HCPCS: Mod: JZ,JG | Performed by: INTERNAL MEDICINE

## 2024-06-18 PROCEDURE — 94761 N-INVAS EAR/PLS OXIMETRY MLT: CPT

## 2024-06-18 PROCEDURE — 25000003 PHARM REV CODE 250: Performed by: INTERNAL MEDICINE

## 2024-06-18 PROCEDURE — 97162 PT EVAL MOD COMPLEX 30 MIN: CPT

## 2024-06-18 PROCEDURE — 97530 THERAPEUTIC ACTIVITIES: CPT

## 2024-06-18 PROCEDURE — 97116 GAIT TRAINING THERAPY: CPT

## 2024-06-18 PROCEDURE — 21000000 HC CCU ICU ROOM CHARGE

## 2024-06-18 PROCEDURE — 99900035 HC TECH TIME PER 15 MIN (STAT)

## 2024-06-18 PROCEDURE — 80053 COMPREHEN METABOLIC PANEL: CPT | Performed by: INTERNAL MEDICINE

## 2024-06-18 PROCEDURE — 83735 ASSAY OF MAGNESIUM: CPT | Performed by: INTERNAL MEDICINE

## 2024-06-18 PROCEDURE — 84484 ASSAY OF TROPONIN QUANT: CPT | Performed by: INTERNAL MEDICINE

## 2024-06-18 PROCEDURE — 99222 1ST HOSP IP/OBS MODERATE 55: CPT | Mod: ,,, | Performed by: NEUROLOGICAL SURGERY

## 2024-06-18 PROCEDURE — 99900031 HC PATIENT EDUCATION (STAT)

## 2024-06-18 PROCEDURE — 97166 OT EVAL MOD COMPLEX 45 MIN: CPT

## 2024-06-18 RX ORDER — CITALOPRAM 20 MG/1
20 TABLET, FILM COATED ORAL DAILY
Status: DISCONTINUED | OUTPATIENT
Start: 2024-06-19 | End: 2024-06-18

## 2024-06-18 RX ORDER — CITALOPRAM 20 MG/1
40 TABLET, FILM COATED ORAL DAILY
Status: DISCONTINUED | OUTPATIENT
Start: 2024-06-18 | End: 2024-06-18

## 2024-06-18 RX ORDER — CITALOPRAM 20 MG/1
20 TABLET, FILM COATED ORAL DAILY
Status: DISCONTINUED | OUTPATIENT
Start: 2024-06-18 | End: 2024-06-21 | Stop reason: HOSPADM

## 2024-06-18 RX ORDER — CLOPIDOGREL BISULFATE 75 MG/1
75 TABLET ORAL DAILY
Status: DISCONTINUED | OUTPATIENT
Start: 2024-06-18 | End: 2024-06-21 | Stop reason: HOSPADM

## 2024-06-18 RX ADMIN — MUPIROCIN 1 G: 20 OINTMENT TOPICAL at 08:06

## 2024-06-18 RX ADMIN — LIDOCAINE 5% 1 PATCH: 700 PATCH TOPICAL at 06:06

## 2024-06-18 RX ADMIN — CLOPIDOGREL BISULFATE 75 MG: 75 TABLET, FILM COATED ORAL at 12:06

## 2024-06-18 RX ADMIN — Medication 800 MG: at 09:06

## 2024-06-18 RX ADMIN — MUPIROCIN 1 G: 20 OINTMENT TOPICAL at 09:06

## 2024-06-18 RX ADMIN — APIXABAN 5 MG: 5 TABLET, FILM COATED ORAL at 12:06

## 2024-06-18 RX ADMIN — POTASSIUM BICARBONATE 35 MEQ: 391 TABLET, EFFERVESCENT ORAL at 09:06

## 2024-06-18 RX ADMIN — EZETIMIBE 10 MG: 10 TABLET ORAL at 09:06

## 2024-06-18 RX ADMIN — FUROSEMIDE 20 MG: 20 TABLET ORAL at 09:06

## 2024-06-18 RX ADMIN — APIXABAN 5 MG: 5 TABLET, FILM COATED ORAL at 08:06

## 2024-06-18 RX ADMIN — METOPROLOL SUCCINATE 100 MG: 50 TABLET, FILM COATED, EXTENDED RELEASE ORAL at 09:06

## 2024-06-18 RX ADMIN — VANCOMYCIN HYDROCHLORIDE 1250 MG: 1.25 INJECTION, POWDER, LYOPHILIZED, FOR SOLUTION INTRAVENOUS at 01:06

## 2024-06-18 RX ADMIN — PANTOPRAZOLE SODIUM 20 MG: 20 TABLET, DELAYED RELEASE ORAL at 06:06

## 2024-06-18 RX ADMIN — HYDROCODONE BITARTRATE AND ACETAMINOPHEN 1 TABLET: 5; 325 TABLET ORAL at 10:06

## 2024-06-18 RX ADMIN — PANTOPRAZOLE SODIUM 20 MG: 20 TABLET, DELAYED RELEASE ORAL at 04:06

## 2024-06-18 RX ADMIN — CITALOPRAM HYDROBROMIDE 20 MG: 20 TABLET ORAL at 12:06

## 2024-06-18 RX ADMIN — PHENYTOIN SODIUM 400 MG: 100 CAPSULE ORAL at 09:06

## 2024-06-18 NOTE — ASSESSMENT & PLAN NOTE
Mild-to-moderate compression fracture deformity of the L2 vertebral body   Neurosurgeon consulted , no surgical intervention, pending TLSO

## 2024-06-18 NOTE — ASSESSMENT & PLAN NOTE
2D echocardiogram results reviewed; ejection fraction 55-60%.  Continue heparin gtt if ok with neurologist. Subdural hygroma - does not appear to be SDH.  Continue cardiac meds. Patient is CP free.     Previous extensive medical records has been reviewed by me patient apparently has CABG x1 and CABG redo X1, patient also has multiple stents, patient has had multiple hospitalizations for non-STEMI, most recent cardiac catheterization on 12/2023 only shows Patent sequential saphenous vein graft to LAD as well as 1st and 2nd marginal divisions of the circumflex with right coronary artery arm of graft occluded, no intervention was done at the time.  Later patient has frequent hospitalization for refractory angina, patient initially was placed on Ranexa this has to be discontinued due to QTC prolongation, and now patient is on Imdur 60 mg once a day.  Cardiology on recommended medical management.   Resume Plavix

## 2024-06-18 NOTE — PLAN OF CARE
Spoke with Damian with [facility] and requested TLSO brace.  Gave requested information and they will try to deliver early this afternoon but no one currently in area so may be after hours       06/18/24 1115   Post-Acute Status   Post-Acute Authorization E   E Status Referrals Sent

## 2024-06-18 NOTE — PHYSICIAN QUERY
Please clarify the conflicting documentation.      Acute on Chronic Diastolic Heart Failure (HFpEF)

## 2024-06-18 NOTE — NURSING
Nurses Note -- 4 Eyes      6/18/2024   1:23 AM      Skin assessed during: Transfer      [x] No Altered Skin Integrity Present    []Prevention Measures Documented      [] Yes- Altered Skin Integrity Present or Discovered   [] LDA Added if Not in Epic (Describe Wound)   [] New Altered Skin Integrity was Present on Admit and Documented in LDA   [] Wound Image Taken    Wound Care Consulted? No    Attending Nurse: LESLEY Lin     Second RN/Staff Member:  LESLEY Lassiter

## 2024-06-18 NOTE — ASSESSMENT & PLAN NOTE
Secondary to sepsis. Pt developed confusion after he came  back from CT room  Ordered blood and urine cultures  Afebrile   Pulling out lines  Received iv morphine in ER upon arrival as per records  Pt has h/o seizure disorder and on phenytoin   CT brain results reviewed.   Resolved

## 2024-06-18 NOTE — PROGRESS NOTES
Pharmacy Consult Discontinuation: Vancomycin    Jerome Amaro Jr. [id number] is a 78 y.o. male was consulted for vancomycin pharmacotherapy management by pharmacy.    Pharmacy consult for vancomycin dosing is no longer required.  Vancomycin was discontinued 06/18/2024 @ 0929 by Dr. Thomason.    Thank you for allowing us to participate in this patient's care. Should you have any questions or concerns please feel free to contact the pharmacy department at [phone].    Rodrigo Hebert RPH

## 2024-06-18 NOTE — PROGRESS NOTES
[facility]  Progress Note    Patient Name: Jerome Amaro Jr.  MRN: [id number]  Patient Class: IP- Inpatient   Admission Date: 6/15/2024  Length of Stay: 2 days  Attending Physician: Andi Thomason MD  Primary Care Provider: Magda Ruiz FNP-C        Subjective:     Principal Problem:Acute on chronic combined systolic and diastolic heart failure        HPI:  78 year old pt getting admitted with acute CHF and L2 fracture  Pt was started on SQ Lasix inj recently   Yesterday pt had a fall and hit his back on ground  Denied hitting head/No loss of consciousness   Pt since then was unable to walk or move around due to pain  Family thought pt had Side effect from SQ Lasix  Today symptoms went worse and he was escorted to ER   Pt was found to be hypoxic in ER which was new to him  Per family , pt is not on home oxygen  Pt was alert and oriented x 3 upon arrival to ER   In ER after pt had CT Lumbar spine he appeared confused and had tachycardia & EKG showed paced rhythm       Overview/Hospital Course:  No notes on file    Interval History:    Seen in the multidisciplinary rounds, patient is afebrile, awake alert, lower back pain is controlled, patient stated that he never passed out, no loss of consciousness, no head trauma.  No nausea vomiting diarrhea.  Able to move lower extremity.  Pending TSLO placement.  On 2 L nasal cannula.     Review of Systems   Musculoskeletal:  Positive for back pain.     Objective:     Vital Signs (Most Recent):  Temp: 97.6 °F (36.4 °C) (06/18/24 1101)  Pulse: 93 (06/18/24 0600)  Resp: (!) 23 (06/18/24 1057)  BP: 117/75 (06/18/24 0957)  SpO2: 100 % (06/18/24 0600) Vital Signs (24h Range):  Temp:  [97.6 °F (36.4 °C)-99.1 °F (37.3 °C)] 97.6 °F (36.4 °C)  Pulse:  [80-96] 93  Resp:  [12-28] 23  SpO2:  [91 %-100 %] 100 %  BP: ()/(52-99) 117/75     Weight: 75.6 kg (166 lb 10.7 oz)  Body mass index is 26.9 kg/m².    Intake/Output Summary (Last 24 hours) at  "6/18/2024 1237  Last data filed at 6/18/2024 0440  Gross per 24 hour   Intake 821.39 ml   Output 550 ml   Net 271.39 ml         Physical Exam  Vitals and nursing note reviewed.   Constitutional:       Appearance: Normal appearance. He is well-developed.   HENT:      Head: Atraumatic.      Right Ear: External ear normal.      Left Ear: External ear normal.      Nose: Nose normal.      Mouth/Throat:      Mouth: Mucous membranes are moist.   Eyes:      Extraocular Movements: Extraocular movements intact.   Cardiovascular:      Rate and Rhythm: Normal rate.   Pulmonary:      Effort: Pulmonary effort is normal.   Abdominal:      Palpations: Abdomen is soft.   Musculoskeletal:         General: Normal range of motion.      Cervical back: Full passive range of motion without pain and normal range of motion.   Skin:     General: Skin is warm.   Neurological:      Mental Status: He is oriented to person, place, and time.             Significant Labs: All pertinent labs within the past 24 hours have been reviewed.  CBC:   Recent Labs   Lab 06/17/24  0946   WBC 7.04   HGB 12.5*   HCT 36.8*        CMP:   Recent Labs   Lab 06/17/24  0257 06/18/24  0427   * 133*   K 3.6 3.9   CL 91* 92*   CO2 32* 34*   * 105   BUN 20 20   CREATININE 1.2 1.0   CALCIUM 8.8 8.8   PROT 6.7 6.6   ALBUMIN 3.7 3.7   BILITOT 1.7* 1.4*   ALKPHOS 88 92   AST 20 23   ALT 7* 9*   ANIONGAP 9 7*     Lactic Acid: No results for input(s): "LACTATE" in the last 48 hours.  Troponin:   Recent Labs   Lab 06/17/24  1600 06/18/24  0427 06/18/24  0552   TROPONINIHS 324.0* 301.0* 289.4*     TSH:   Recent Labs   Lab 01/24/24  0453   TSH 2.778     Urine Culture:   No results for input(s): "LABURIN" in the last 48 hours.    Microbiology Results (last 7 days)       Procedure Component Value Units Date/Time    Blood culture [[id number]] Collected: 06/15/24 2206    Order Status: Completed Specimen: Blood Updated: 06/18/24 0232     Blood Culture, Routine No " Growth to date      No Growth to date      No Growth to date    Urine Culture High Risk [[id number]] Collected: 06/15/24 2013    Order Status: Completed Specimen: Urine, Clean Catch Updated: 06/17/24 0715     Urine Culture, Routine Multiple organisms isolated. None in predominance.  Repeat if      clinically necessary.    Narrative:      Indicated criteria for high risk culture:->Other  Other (specify):->uti/confusion          Significant Imaging:   Imaging Results               CT Head Without Contrast (Final result)  Result time 06/15/24 20:52:14      Final result by Angel Kitchen MD (06/15/24 20:52:14)                   Impression:      Bilateral hypoattenuating extra-axial collections overlying the cerebral convexities, right greater than left.  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  There is mild associated mass effect on the right vertex, although no significant midline shift or hydrocephalus appreciated.  Questionable minimal internal complexity overlying the frontal convexities may be artifactual, although component of subacute blood products not excluded.  Consider short-term CT follow-up.    Chronic senescent and microvascular ischemic changes.    This report was flagged in Epic as abnormal.      Electronically signed by: Angel Kitchen MD  Date:    06/15/2024  Time:    20:52               Narrative:    EXAMINATION:  CT HEAD WITHOUT CONTRAST    CLINICAL HISTORY:  Mental status change, unknown cause;    TECHNIQUE:  Low dose axial images were obtained through the head.  Coronal and sagittal reformations were also performed. Contrast was not administered.    COMPARISON:  No prior studies are available for comparison.    FINDINGS:  There is generalized cerebral volume loss with compensatory sulcal widening and ventricular enlargement.  There are bilateral hypoattenuating extra-axial collections overlying the  cerebral convexities suggestive of subdural hygromas or chronic subdural hematomas.  These measure approximately 1.5 cm on the right (coronal series 6, image 44) and 0.8 cm on the left (coronal series 6, image 30).  There is mild associated mass effect of the right vertex.  There is questionable slight internal complexity overlying the frontal convexities.  This may reflect artifact, although component of subacute blood products not excluded.  There is no significant midline shift.  No evidence to suggest overt hydrocephalus at this time.  There is periventricular white matter hypoattenuation suggesting sequelae of chronic microvascular ischemic change.  The basal cisterns are patent. The mastoid air cells and paranasal sinuses are clear of acute process. The visualized bones of the calvarium demonstrate no acute osseous abnormality.                                       CT Lumbar Spine Without Contrast (Final result)  Result time 06/15/24 17:32:35      Final result by Angel Kitchen MD (06/15/24 17:32:35)                   Impression:      Recent appearing mild-to-moderate compression fracture deformity of the L2 vertebral body.  Multiple additional compression deformities of the T9, T10, T12, and L1 vertebral bodies as above.  These have a somewhat more chronic appearance.    Moderately advanced multilevel degenerative changes of the lumbar spine as detailed above.  Further evaluation and follow-up could be performed with MRI when clinically appropriate if there are no patient contraindications.    Significant volume bilateral pleural effusion with adjacent compressive atelectasis and/or consolidation.      Electronically signed by: Angel Kitchen MD  Date:    06/15/2024  Time:    17:32               Narrative:    EXAMINATION:  CT LUMBAR SPINE WITHOUT CONTRAST    CLINICAL HISTORY:  l2 fracture;    TECHNIQUE:  Low-dose axial, sagittal and coronal reformations are obtained through the lumbar spine.  Contrast  was not administered.    COMPARISON:  Chest radiograph 09/24/2021    FINDINGS:  There is mild nonspecific diffuse heterogeneity of the visualized osseous structures which may relate to underlying osteopenia.  Clinical correlation is advised.  There are mild-to-moderate compression deformities of the T9 and T10 vertebral bodies.  There is a mild superior endplate compression deformity of the T12 vertebral body.  There is a moderate compression deformity of the L1 vertebral body.  There is a recent appearing mild to moderate compression fracture of the L2 vertebral body.  Fracture lucency is noted through the anterior-superior endplate.  There is minimal retropulsion with mild effacement of the anterior thecal sac.  The L3, L4, and L5 vertebral body heights appear well maintained without significant height loss appreciated allowing for underlying osteopenia.    There is minimal grade 1 anterolisthesis of L4 on L5. There is mild intervertebral disc space height loss most pronounced at L5-S1.    There are degenerative changes of the lumbar spine as detailed below:    T12-L1: Broad-based disc bulge and mild ligamentum flavum thickening.  Mild bilateral neural foraminal narrowing.    L1-L2: Circumferential disc bulge, ligamentum flavum thickening, and bilateral facet arthropathy.  Mild spinal canal stenosis and moderate bilateral neural foraminal narrowing.    L2-L3: Circumferential disc bulge, ligamentum flavum thickening, and bilateral facet arthropathy.  Moderate to severe spinal canal stenosis.  Moderate bilateral neural foraminal narrowing.    L3-L4: Circumferential disc bulge, ligamentum flavum thickening, and bilateral facet arthropathy.  Moderate spinal canal stenosis.  Moderate bilateral neural foraminal narrowing.    L4-L5: Circumferential disc bulge, ligamentum flavum thickening, and bilateral facet arthropathy.  Moderate spinal canal stenosis.  Severe moderate to severe bilateral neural foraminal  narrowing.    L5-S1: Broad-based circumferential disc bulge, asymmetric to the right.  Moderate to severe bilateral neural foraminal narrowing.    No grossly displaced sacral fracture identified.  There are degenerative changes of bilateral sacroiliac joints.    Limited views of the posterior abdomen demonstrate significant volume layering bilateral pleural effusions with associated compressive atelectasis at the visualized lung bases.  There is a right renal cyst.  There is prominent aortic atherosclerosis.  No retroperitoneal hematoma identified.                                       X-Ray Hips Bilateral 2 View Incl AP Pelvis (Final result)  Result time 06/15/24 15:57:04      Final result by Francisco Nicholson MD (06/15/24 15:57:04)                   Impression:      No acute abnormality of bilateral hips.      Electronically signed by: Francisco Nicholson  Date:    06/15/2024  Time:    15:57               Narrative:    EXAMINATION:  XR HIPS BILATERAL 2 VIEW INCL AP PELVIS    CLINICAL HISTORY:  Dorsalgia, unspecified    FINDINGS:  AP pelvis and two views of bilateral hips show no fracture, dislocation, or destructive osseous lesion.  Bilateral hip joint spaces are maintained.  Pubic symphysis and sacroiliac joints are maintained.  Surgical clips lie in left inguinal region.  Vascular calcifications are present.  Stool and bowel gas partially obscures the lower lumbar spine and sacrum and coccyx.                                       X-Ray Lumbar Spine 5 View (Final result)  Result time 06/15/24 14:16:08   Procedure changed from X-Ray Lumbar Spine Ap And Lateral     Final result by Francisco Nicholson MD (06/15/24 14:16:08)                   Impression:      1. Age indeterminate superior endplate L2 compression fracture which has progressed since 2019.  Correlation for symptoms at this level is requested.  2. Compression fractures of T10, T11, T12, and L1, not significantly changed since 2016.  3. Moderate L5-S1 disc  degeneration.      Electronically signed by: Francisco Nicholson  Date:    06/15/2024  Time:    14:16               Narrative:    EXAMINATION:  XR LUMBAR SPINE COMPLETE 5 VIEW    CLINICAL HISTORY:  fall;    FINDINGS:  Five views of lumbar spine show normal lumbosacral alignment.    Compression fractures of T10, T11, T12, L1, and L2 are evident.  T10, T11, T12, and L1 compression fractures have not significantly changed since CT 04/24/2019.  Superior endplate of L2 compression fracture has progressed since that time pre wall inferior endplate compression fracture of L2 is unchanged.    Moderate L5-S1 disc degeneration is evident.  Remaining lumbar intervertebral disc space heights are relatively well maintained.    Sacroiliac joints are normal. Arterial vascular calcifications are present.  Postsurgical changes of CABG are suggested 12 partially visualize, along with cardiac pacing leads.  Mild-to-moderate stool and moderate bowel gas are present throughout the abdomen, limiting evaluation.                                       X-Ray Chest AP Portable (Final result)  Result time 06/15/24 14:18:32      Final result by Francisco Nicholson MD (06/15/24 14:18:32)                   Impression:      1. Trace bilateral pleural effusions.  2. Improvement of interstitial edema and central pulmonary vascular prominence.      Electronically signed by: Francisco Nicholson  Date:    06/15/2024  Time:    14:18               Narrative:    EXAMINATION:  XR CHEST AP PORTABLE    CLINICAL HISTORY:  hypoxia;    FINDINGS:  Portable chest at 1254 compared with 12/29/2024 shows unchanged borderline enlarged cardiac silhouette size.  Mediastinal contours are unchanged with postsurgical changes of CABG.  Prior aortic valve replacement noted.  Left transvenous pacer unchanged.    Lung volumes are low.  Blunting of the costophrenic angles bilaterally suggest trace pleural effusions.  Prior central pulmonary vascular prominence and central interstitial opacities  have significantly improved.  Minimal bibasilar interstitial opacities persist.  Minimal calcification suggested along the right diaphragm and in right hemithorax suggesting pleural plaques.  No definite acute osseous abnormality identified.  Bones are diffusely demineralized, limiting evaluation.                                  ECHO:    Left Ventricle: The left ventricle is normal in size. Increased ventricular mass. Mildly increased wall thickness. There is mild concentric hypertrophy. Unable to assess wall motion. Septal motion is consistent with pacing. There is normal systolic function with a visually estimated ejection fraction of 55 - 60%. Diastolic function cannot be reliably determined in the presence of mitral annular calcification.    Right Ventricle: Right ventricle was not well visualized due to poor acoustic window. Normal right ventricular cavity size. Pacemaker lead present in the ventricle.    Aortic Valve: There is a transcatheter valve replacement in the aortic position.    Mitral Valve: Mildly thickened leaflets. There is moderate mitral annular calcification present. There is mild regurgitation.    Tricuspid Valve: There is mild to moderate regurgitation.    Assessment/Plan:      * Acute on chronic combined systolic and diastolic heart failure  Latest ECHO performed and demonstrates- Results for orders placed during the hospital encounter of 06/15/24    Echo    Interpretation Summary    Left Ventricle: The left ventricle is normal in size. Increased ventricular mass. Mildly increased wall thickness. There is mild concentric hypertrophy. Unable to assess wall motion. Septal motion is consistent with pacing. There is normal systolic function with a visually estimated ejection fraction of 55 - 60%. Diastolic function cannot be reliably determined in the presence of mitral annular calcification.    Right Ventricle: Right ventricle was not well visualized due to poor acoustic window. Normal right  ventricular cavity size. Pacemaker lead present in the ventricle.    Aortic Valve: There is a transcatheter valve replacement in the aortic position.    Mitral Valve: Mildly thickened leaflets. There is moderate mitral annular calcification present. There is mild regurgitation.    Tricuspid Valve: There is mild to moderate regurgitation.    Recent Labs   Lab 06/15/24  1332   BNP 1,892*   .        Recent ECHO reviewed   Latest Reference Range & Units 02/09/24 10:09 02/29/24 07:50 06/15/24 13:32   BNP 0 - 99 pg/mL 958 (H) 1,720 (H) 1,892 (H)   Holding Entersto due to low BP.    Patient is status post IV Lasix has good urine output, now changed to oral Lasix.  Patient apparently euvolemic now.     No signs of severe sepsis, all culture has been negative, patient has been afebrile since hospitalization, discontinue IV antibiotics.   We will get a pacemaker interrogation.      Subdural hygroma    No signs of SDH, resume Eliquis and Plavix    Acute confusional state  Secondary to sepsis. Pt developed confusion after he came  back from CT room  Ordered blood and urine cultures  Afebrile   Pulling out lines  Received iv morphine in ER upon arrival as per records  Pt has h/o seizure disorder and on phenytoin   CT brain results reviewed.   Resolved        Hx of CABG  Aware       Hypoxia  Mostly from CHF flare  Maintain iv diuresis  Need home oxygen evaluation before discharge from hospital      L2 vertebral fracture  Mild-to-moderate compression fracture deformity of the L2 vertebral body   Neurosurgeon consulted , no surgical intervention, pending TLSO       Atrial fibrillation  H/o aware   Resume Eliquis    Seizure disorder  Maintain phenytoin PO  Check serum levels  Seizure precautions      NSTEMI (non-ST elevated myocardial infarction)  2D echocardiogram results reviewed; ejection fraction 55-60%.  Continue heparin gtt if ok with neurologist. Subdural hygroma - does not appear to be SDH.  Continue cardiac meds. Patient is  CP free.     Previous extensive medical records has been reviewed by me patient apparently has CABG x1 and CABG redo X1, patient also has multiple stents, patient has had multiple hospitalizations for non-STEMI, most recent cardiac catheterization on 12/2023 only shows Patent sequential saphenous vein graft to LAD as well as 1st and 2nd marginal divisions of the circumflex with right coronary artery arm of graft occluded, no intervention was done at the time.  Later patient has frequent hospitalization for refractory angina, patient initially was placed on Ranexa this has to be discontinued due to QTC prolongation, and now patient is on Imdur 60 mg once a day.  Cardiology on recommended medical management.   Resume Plavix      CAD (coronary artery disease)  H/o CABG with recent angiogram on December 2023    S/P TAVR (transcatheter aortic valve replacement)  Aware       HTN (hypertension)  Chronic, controlled. Latest blood pressure and vitals reviewed-     Temp:  [97.9 °F (36.6 °C)-102.8 °F (39.3 °C)]   Pulse:  []   Resp:  [18-38]   BP: ()/()   SpO2:  [88 %-100 %] .   Home meds for hypertension were reviewed and noted below.   Hypertension Medications               ENTRESTO 24-26 mg per tablet Take 1 tablet by mouth 2 (two) times daily.    furosemide (LASIX) 20 MG tablet Take 20 mg by mouth daily as needed (Fluid).    isosorbide mononitrate (IMDUR) 30 MG 24 hr tablet Take 2 tablets (60 mg total) by mouth once daily.    metoprolol succinate (TOPROL-XL) 100 MG 24 hr tablet Take 100 mg by mouth 2 (two) times daily.    NITROLINGUAL 400 mcg/spray spray Place 1 spray under the tongue every 5 (five) minutes as needed for Chest pain.        Hold ISBD and ARB for now due to low BP.    While in the hospital, will manage blood pressure as follows; Continue home antihypertensive regimen    Will utilize p.r.n. blood pressure medication only if patient's blood pressure greater than 140/90 and he develops symptoms  such as worsening chest pain or shortness of breath.      VTE Risk Mitigation (From admission, onward)           Ordered     apixaban tablet 5 mg  2 times daily         06/18/24 1105     IP VTE HIGH RISK PATIENT  Once         06/15/24 1635     Place sequential compression device  Until discontinued         06/15/24 1635                    Discharge Planning   AXEL: 6/20/2024     Code Status: Full Code   Is the patient medically ready for discharge?:     Reason for patient still in hospital (select all that apply): Patient trending condition and Treatment  Discharge Plan A: Home        Discontinue IV antibiotics, resume Eliquis and Plavix, get a pacemaker interrogation, likely discharge in in the a.m. with home home physical therapy.  Patient refuse rehab.  PT OT evaluation.           Andi Thomason MD  Department of Hospital Medicine   [facility]

## 2024-06-18 NOTE — PT/OT/SLP EVAL
"Physical Therapy Evaluation    Patient Name:  Jerome Amaro Jr.   MRN:  [id number]    Recommendations:     Discharge Recommendations: Moderate Intensity Therapy   Discharge Equipment Recommendations: to be determined by next level of care   Barriers to discharge: Decreased caregiver support    Assessment:     Jerome Amaro Jr. is a 78 y.o. male admitted with a medical diagnosis of Acute on chronic combined systolic and diastolic heart failure.  He presents with the following impairments/functional limitations: weakness, impaired endurance, impaired self care skills, impaired functional mobility, gait instability, decreased lower extremity function, decreased safety awareness, pain, impaired cardiopulmonary response to activity .    Rehab Prognosis: Good; patient would benefit from acute skilled PT services to address these deficits and reach maximum level of function.    Recent Surgery: * No surgery found *      Plan:     During this hospitalization, patient to be seen 6 x/week to address the identified rehab impairments via gait training, therapeutic exercises, therapeutic activities and progress toward the following goals:    Plan of Care Expires:  07/18/24    Subjective     Chief Complaint:"I've been in thi bed for 4 days."  Patient/Family Comments/goals: Return  home and continue OP PT    Pain/Comfort:  Pain Rating 1: 7/10  Location 1: back  Pain Addressed 1: Reposition, Cessation of Activity, Distraction    Patients cultural, spiritual, Sabianism conflicts given the current situation:      Living Environment:  Pt lives at home  alone in a [facility] w/o [facility].  Prior to admission, patients level of function was independent .  Equipment used at home: shower chair, grab bar, raised toilet.  DME owned (not currently used): none.  Upon discharge, patient will have assistance from facility/daughter    Objective:     Communicated with nurse prior to session.  Patient found supine with telemetry, blood pressure cuff, pulse ox " "(continuous)  upon PT entry to room.    General Precautions: Standard, fall, seizure  Orthopedic Precautions:spinal precautions   Braces:  (Per nurse Lela doctor consented to  gait w/o TLSO. Pt also reported "Now the doctor tells me   I don't need that brace.")  Respiratory Status: Room air    Exams:  RLE ROM: WFL  RLE Strength: WFL  LLE ROM: WFL  LLE Strength: WFL    Functional Mobility:  Bed Mobility:     Supine to Sit: minimum assistance  Transfers:     Sit to Stand:  minimum assistance with rolling walker  Gait: 10 ft x2 RW  Toilet t/f with Min A . Pt leaned posteriorly as he washed his hand at sink.      AM-PAC 6 CLICK MOBILITY  Total Score:17       Treatment & Education:  Pt was educated on safety, use of call light, PT POC/DC recommendation.    Patient left up in chair with all lines intact, call button in reach, chair alarm on, and nurse  present.    GOALS:   Multidisciplinary Problems       Physical Therapy Goals          Problem: Physical Therapy    Goal Priority Disciplines Outcome Goal Variances Interventions   Physical Therapy Goal     PT, PT/OT      Description: Goals to be met by: 2024     Patient will increase functional independence with mobility by performin. Supine to sit with Modified [place]  2. Sit to stand transfer with Modified [place]  3. Gait  x 150  feet with Stand-by Assistance using Rolling Walker.                          History:     Past Medical History:   Diagnosis Date    Anticoagulant long-term use         Aortic valve disease     pig valve    Arthritis     all over    Atrial fibrillation 2024    Chest pain 07/15/2019    CHF (congestive heart failure)      on meds    Coronary artery disease      cabg    Difficulty swallowing     Heart attack 1990    15 stents    Heart attack 2024    mild    Heart block     Hyperlipidemia     Hypertension     on meds    Hypothyroidism 2015    on meds    PVD (peripheral vascular disease)     Seizures  "    last episode 1990 on meds       Past Surgical History:   Procedure Laterality Date    AORTOGRAPHY WITH SERIALOGRAPHY N/A 11/16/2021    Procedure: AORTOGRAM, WITH RUNOFF;  Surgeon: Rodrigo Willoughby MD;  Location: [facility] CATH/EP LAB;  Service: Cardiology;  Laterality: N/A;    ARTERIOGRAPHY OF AORTIC ROOT N/A 11/05/2019    Procedure: ARTERIOGRAM, AORTIC ROOT;  Surgeon: Rodrigo Willoughby MD;  Location: [facility] CATH/EP LAB;  Service: Cardiology;  Laterality: N/A;    CARDIAC CATHETERIZATION      CARDIAC VALVE SURGERY      CORONARY ANGIOGRAPHY INCLUDING BYPASS GRAFTS WITH CATHETERIZATION OF LEFT HEART N/A 11/05/2019    Procedure: ANGIOGRAM, CORONARY, INCLUDING BYPASS GRAFT, WITH LEFT HEART CATHETERIZATION;  Surgeon: Rodrigo Willoughby MD;  Location: [facility] CATH/EP LAB;  Service: Cardiology;  Laterality: N/A;    CORONARY ANGIOGRAPHY INCLUDING BYPASS GRAFTS WITH CATHETERIZATION OF LEFT HEART Left 11/03/2020    Procedure: ANGIOGRAM, CORONARY, INCLUDING BYPASS GRAFT, WITH LEFT HEART CATHETERIZATION;  Surgeon: Rodrigo Willoughby MD;  Location: [facility] CATH/EP LAB;  Service: Cardiology;  Laterality: Left;    CORONARY ANGIOGRAPHY INCLUDING BYPASS GRAFTS WITH CATHETERIZATION OF LEFT HEART N/A 09/28/2021    Procedure: ANGIOGRAM, CORONARY, INCLUDING BYPASS GRAFT, WITH LEFT HEART CATHETERIZATION;  Surgeon: Rodrigo Willoughby MD;  Location: [facility] CATH/EP LAB;  Service: Cardiology;  Laterality: N/A;    CORONARY ANGIOGRAPHY INCLUDING BYPASS GRAFTS WITH CATHETERIZATION OF LEFT HEART N/A 12/05/2023    Procedure: ANGIOGRAM, CORONARY, INCLUDING BYPASS GRAFT, WITH LEFT HEART CATHETERIZATION;  Surgeon: Rodrigo Willoughby MD;  Location: [facility] CATH/EP LAB;  Service: Cardiology;  Laterality: N/A;    CORONARY ANGIOPLASTY      CORONARY ARTERY BYPASS GRAFT      x 2    1991 2006    ESOPHAGOGASTRODUODENOSCOPY N/A 2/20/2024    Procedure: EGD (ESOPHAGOGASTRODUODENOSCOPY);  Surgeon: Mal Lockhart III, MD;  Location: [facility] ENDO;  Service: Endoscopy;  Laterality: N/A;     EXTRACORPOREAL SHOCK WAVE LITHOTRIPSY Right 08/01/2019    Procedure: LITHOTRIPSY, ESWL;  Surgeon: Williams Ortega MD;  Location: [facility] OR;  Service: Urology;  Laterality: Right;    HEMORRHOID SURGERY  1990    INSERTION OF PACEMAKER      PERCUTANEOUS TRANSLUMINAL BALLOON ANGIOPLASTY OF CORONARY ARTERY N/A 11/08/2019    Procedure: Angioplasty-coronary;  Surgeon: Rodrigo Willoughby MD;  Location: [facility] CATH/EP LAB;  Service: Cardiology;  Laterality: N/A;    WRIST FRACTURE SURGERY         Time Tracking:     PT Received On: 06/18/24  PT Start Time: 1025     PT Stop Time: 1057  PT Total Time (min): 32 min     Billable Minutes: Evaluation 8 minutes , Gait Training 10 minutes , and Therapeutic Activity 14 minutes      06/18/2024

## 2024-06-18 NOTE — NURSING
TLSO brace delivered to bedside.      Pacemaker Device is an Abbot confirmed from Dr Willoughby's office.    Rep contacted to come interrogate device.

## 2024-06-18 NOTE — PROGRESS NOTES
Pharmacokinetic Assessment Follow Up: IV Vancomycin    Patient brief summary:  Jerome Amaro Jr. is a 78 y.o. male initiated on antimicrobial therapy with IV Vancomycin for treatment of Sepsis    The patient's current regimen is Vancomycin 1250 mg IV every 24 hours.       Actual Body Weight = 75.6 kg (166 lb 10.7 oz).    Renal Function:   Estimated Creatinine Clearance: 45 mL/min (based on SCr of 1.2 mg/dL).      Vancomycin serum concentration assessment(s):    The trough level was drawn correctly and can be used to guide therapy at this time. The measurement is below the desired definitive target range of 15 to 20 mcg/mL.    Vancomycin Regimen Plan:    Change regimen to Vancomycin 1250 mg IV every 20 hours with next serum trough concentration measured at 1700 on 06/19.    Drug levels (last 3 results):  Recent Labs   Lab Result Units 06/18/24  0039   Vancomycin-Trough ug/mL 10.9       Pharmacy will continue to follow and monitor vancomycin.    Please contact pharmacy at extension [phone] for questions regarding this assessment.    Thank you for the consult,   Sabina Berkowitz

## 2024-06-18 NOTE — NURSING
Nurses Note -- 4 Eyes      6/17/2024   7:29 PM      Skin assessed during: Q Shift Change      [x] No Altered Skin Integrity Present    []Prevention Measures Documented      [] Yes- Altered Skin Integrity Present or Discovered   [] LDA Added if Not in Epic (Describe Wound)   [] New Altered Skin Integrity was Present on Admit and Documented in LDA   [] Wound Image Taken    Wound Care Consulted? No    Attending Nurse:  Jessica Wheeler RN/Staff Member:  LESLEY Carvajal

## 2024-06-18 NOTE — PLAN OF CARE
CM discussed SNF with pt but he states that he is going to outpt rehab three times a week. States that CM can call his daughter and CM spoke with daughter who clarified that pt is in cardiac rehab. Discussed SNF and pt choice list sent to pt daughter phone. She will discuss with sister and pt. Will continue to follow.       06/18/24 1600   Post-Acute Status   Post-Acute Authorization Placement   Post-Acute Placement Status Patient List Provided

## 2024-06-18 NOTE — PROGRESS NOTES
[facility]   Cardiology Note    Consult Requested By: Hospital Medicine  Reason for Consult: NSTEMI    SUBJECTIVE:     History of Present Illness: 77 yo male with a history of coronary artery disease, status post coronary artery bypass surgery with redo, chronic angina, chronic systolic heart failure, status post TAVR, paroxysmal atrial fibrillation, hyperlipidemia, heart block, status post DC permanent pacemaker, peripheral vascular disease, seizure disorder, hypertension, and hypothyroidism that presented to the hospital after he had a fall.  It appears that he was somewhat encephalopathic on admission.  He states he did not hit his head.  He is currently being treated for severe sepsis as he had a temp of a 102.9°.  CT head found evidence of subdural hygroma.  He states that he was recently started on subcutaneous Lasix.  He states the reason he fell was he tripped over something when he was outside of his house.  He denies loss of consciousness.  He denies any chest pain, shortness of breath, orthopnea, or paroxysmal nocturnal dyspnea.  No lower extremity edema.  Complaining mostly of mid back pain.  X-rays of the back found a compression fracture of his spine.  Initial troponins were about 207 and increased to 949.  These are high sensitivity troponins.  This is in the setting of the temperature of 102.9°.  BNP was found to be elevated. ECG shows a V paced rhythm.       6/17: PT seen and examined. No events overnight. BP slightly elevated this morning, was well controlled yesterday evening. Tele reviewed. Echo showed EF 55-60%, mild to mod MR, TAVR. EKG Atrial sensing, V pacing. PT denies chest pain or shortness of breath.     6/18: Pt seen and examined, no events overnight. He reports he is feeling better today. Denies chest pain or shortness of breath. BP stable. Tele reviewed. Labs reviewed. Troponins trending down. Temp 99 today. Neurosurgery following.     Review of patient's allergies  indicates:   Allergen Reactions    Atorvastatin Other (See Comments)     Muscle pain    Rosuvastatin Other (See Comments)     Muscle pain       Past Medical History:   Diagnosis Date    Anticoagulant long-term use     2014    Aortic valve disease 2014    pig valve    Arthritis     all over    Atrial fibrillation 2/29/2024    Chest pain 07/15/2019    CHF (congestive heart failure)     2014 on meds    Coronary artery disease     2007 cabg    Difficulty swallowing     Heart attack 1990    15 stents    Heart attack 02/01/2024    mild    Heart block     Hyperlipidemia     Hypertension     on meds    Hypothyroidism 2015    on meds    PVD (peripheral vascular disease)     Seizures     last episode 1990 on meds     Past Surgical History:   Procedure Laterality Date    AORTOGRAPHY WITH SERIALOGRAPHY N/A 11/16/2021    Procedure: AORTOGRAM, WITH RUNOFF;  Surgeon: Rodrigo Willoughby MD;  Location: [facility] CATH/EP LAB;  Service: Cardiology;  Laterality: N/A;    ARTERIOGRAPHY OF AORTIC ROOT N/A 11/05/2019    Procedure: ARTERIOGRAM, AORTIC ROOT;  Surgeon: Rodrigo Willoughby MD;  Location: [facility] CATH/EP LAB;  Service: Cardiology;  Laterality: N/A;    CARDIAC CATHETERIZATION      CARDIAC VALVE SURGERY      CORONARY ANGIOGRAPHY INCLUDING BYPASS GRAFTS WITH CATHETERIZATION OF LEFT HEART N/A 11/05/2019    Procedure: ANGIOGRAM, CORONARY, INCLUDING BYPASS GRAFT, WITH LEFT HEART CATHETERIZATION;  Surgeon: Rodrigo Willoughby MD;  Location: [facility] CATH/EP LAB;  Service: Cardiology;  Laterality: N/A;    CORONARY ANGIOGRAPHY INCLUDING BYPASS GRAFTS WITH CATHETERIZATION OF LEFT HEART Left 11/03/2020    Procedure: ANGIOGRAM, CORONARY, INCLUDING BYPASS GRAFT, WITH LEFT HEART CATHETERIZATION;  Surgeon: Rodrigo Willoughby MD;  Location: [facility] CATH/EP LAB;  Service: Cardiology;  Laterality: Left;    CORONARY ANGIOGRAPHY INCLUDING BYPASS GRAFTS WITH CATHETERIZATION OF LEFT HEART N/A 09/28/2021    Procedure: ANGIOGRAM, CORONARY, INCLUDING BYPASS GRAFT, WITH LEFT HEART  CATHETERIZATION;  Surgeon: Rodrigo Willoughby MD;  Location: [facility] CATH/EP LAB;  Service: Cardiology;  Laterality: N/A;    CORONARY ANGIOGRAPHY INCLUDING BYPASS GRAFTS WITH CATHETERIZATION OF LEFT HEART N/A 12/05/2023    Procedure: ANGIOGRAM, CORONARY, INCLUDING BYPASS GRAFT, WITH LEFT HEART CATHETERIZATION;  Surgeon: Rodrigo Willoughby MD;  Location: [facility] CATH/EP LAB;  Service: Cardiology;  Laterality: N/A;    CORONARY ANGIOPLASTY      CORONARY ARTERY BYPASS GRAFT      x 2    1991 2006    ESOPHAGOGASTRODUODENOSCOPY N/A 2/20/2024    Procedure: EGD (ESOPHAGOGASTRODUODENOSCOPY);  Surgeon: Mal Lockhart III, MD;  Location: [facility] ENDO;  Service: Endoscopy;  Laterality: N/A;    EXTRACORPOREAL SHOCK WAVE LITHOTRIPSY Right 08/01/2019    Procedure: LITHOTRIPSY, ESWL;  Surgeon: Williams Ortega MD;  Location: [facility] OR;  Service: Urology;  Laterality: Right;    HEMORRHOID SURGERY  1990    INSERTION OF PACEMAKER      PERCUTANEOUS TRANSLUMINAL BALLOON ANGIOPLASTY OF CORONARY ARTERY N/A 11/08/2019    Procedure: Angioplasty-coronary;  Surgeon: Rodrigo Willoughby MD;  Location: [facility] CATH/EP LAB;  Service: Cardiology;  Laterality: N/A;    WRIST FRACTURE SURGERY       Family History   Problem Relation Name Age of Onset    Heart attack Mother      Heart attack Father      Heart disease Sister 2     Stroke Sister 2     Diabetes Sister 2     No Known Problems Maternal Grandmother      No Known Problems Maternal Grandfather      No Known Problems Paternal Grandmother      No Known Problems Paternal Grandfather      No Known Problems Brother      No Known Problems Maternal Aunt      No Known Problems Maternal Uncle      No Known Problems Paternal Aunt      No Known Problems Paternal Uncle      Anemia Neg Hx      Arrhythmia Neg Hx      Asthma Neg Hx      Clotting disorder Neg Hx      Fainting Neg Hx      Heart failure Neg Hx      Hyperlipidemia Neg Hx      Hypertension Neg Hx      Atrial Septal Defect Neg Hx       Social History      Tobacco Use    Smoking status: Former     Current packs/day: 0.00     Average packs/day: 0.5 packs/day for 4.0 years (2.0 ttl pk-yrs)     Types: Cigarettes     Start date:      Quit date:      Years since quittin.4    Smokeless tobacco: Never   Substance Use Topics    Alcohol use: Yes     Comment: social    Drug use: No       Review of Systems:  Review of Systems   Constitutional:  Negative for chills and fever.   HENT:  Negative for congestion and sore throat.    Eyes:  Negative for blurred vision and double vision.   Respiratory:  Negative for shortness of breath and wheezing.    Cardiovascular:  Negative for chest pain, palpitations, orthopnea, leg swelling and PND.   Gastrointestinal:  Negative for nausea and vomiting.   Musculoskeletal:  Positive for back pain. Negative for neck pain.   Skin:  Negative for itching and rash.   Neurological:  Negative for dizziness and loss of consciousness.   Psychiatric/Behavioral:  Negative for depression. The patient does not have insomnia.        OBJECTIVE:     Vital Signs (Most Recent)  Temp: 99.1 °F (37.3 °C) (24 0701)  Pulse: 93 (24 0600)  Resp: 15 (24 0600)  BP: 119/65 (24 0600)  SpO2: 100 % (24 06)    Vital Signs Range (Last 24H):  Temp:  [98 °F (36.7 °C)-99.1 °F (37.3 °C)]   Pulse:  [80-96]   Resp:  [12-28]   BP: ()/(49-99)   SpO2:  [91 %-100 %]     I & O (Last 24H):    Intake/Output Summary (Last 24 hours) at 2024 0842  Last data filed at 2024 0440  Gross per 24 hour   Intake 921.39 ml   Output 550 ml   Net 371.39 ml       Current Diet:     Current Diet Order   Procedures    Diet Low Sodium, 2gm Fluid - 1500mL     Order Specific Question:   Fluid restriction:     Answer:   Fluid - 1500mL        Allergies:  Review of patient's allergies indicates:   Allergen Reactions    Atorvastatin Other (See Comments)     Muscle pain    Rosuvastatin Other (See Comments)     Muscle pain       Meds:  Scheduled Meds:    ezetimibe  10 mg Oral Daily    furosemide  20 mg Oral Daily    LIDOcaine  1 patch Transdermal Q24H    meropenem IV (PEDS and ADULTS)  1 g Intravenous Q12H    metoprolol succinate  100 mg Oral Daily    mupirocin   Nasal BID    pantoprazole  20 mg Oral BID    phenytoin  400 mg Oral Daily    vancomycin (VANCOCIN) IV (PEDS and ADULTS)  1,250 mg Intravenous Q20H     Continuous Infusions:      PRN Meds:  Current Facility-Administered Medications:     acetaminophen, 650 mg, Oral, Q8H PRN    acetaminophen, 650 mg, Oral, Q4H PRN    aluminum-magnesium hydroxide-simethicone, 30 mL, Oral, QID PRN    HYDROcodone-acetaminophen, 1 tablet, Oral, Q6H PRN    HYDROmorphone, 0.5 mg, Intravenous, Q6H PRN    magnesium oxide, 800 mg, Oral, PRN    magnesium oxide, 800 mg, Oral, PRN    melatonin, 6 mg, Oral, Nightly PRN    nitroGLYCERIN 0.4 MG/DOSE TL SPRY, 1 spray, Sublingual, Q5 Min PRN    ondansetron, 4 mg, Intravenous, Q6H PRN    potassium bicarbonate, 35 mEq, Oral, PRN    potassium bicarbonate, 50 mEq, Oral, PRN    potassium bicarbonate, 60 mEq, Oral, PRN    potassium, sodium phosphates, 2 packet, Oral, PRN    potassium, sodium phosphates, 2 packet, Oral, PRN    potassium, sodium phosphates, 2 packet, Oral, PRN    promethazine (PHENERGAN) 6.25 mg in sodium chloride 0.9% 50 mL IVPB, 6.25 mg, Intravenous, Q6H PRN    sodium chloride 0.9%, 10 mL, Intravenous, PRN    Pharmacy to dose Vancomycin consult, , , Once **AND** vancomycin - pharmacy to dose, , Intravenous, pharmacy to manage frequency    Oxygen/Ventilator Data (Last 24H):  (if applicable)            Hemodynamic Parameters (Last 24H):   (if applicable)        Laboratory and Radiology Data:  Recent Results (from the past 24 hour(s))   APTT    Collection Time: 06/17/24  9:46 AM   Result Value Ref Range    aPTT 38.1 (H) 21.0 - 32.0 sec   Protime-INR    Collection Time: 06/17/24  9:46 AM   Result Value Ref Range    Prothrombin Time 12.1 9.0 - 12.5 sec    INR 1.1 0.8 - 1.2   CBC auto  differential    Collection Time: 06/17/24  9:46 AM   Result Value Ref Range    WBC 7.04 3.90 - 12.70 K/uL    RBC 4.35 (L) 4.60 - 6.20 M/uL    Hemoglobin 12.5 (L) 14.0 - 18.0 g/dL    Hematocrit 36.8 (L) 40.0 - 54.0 %    MCV 85 82 - 98 fL    MCH 28.7 27.0 - 31.0 pg    MCHC 34.0 32.0 - 36.0 g/dL    RDW 14.8 (H) 11.5 - 14.5 %    Platelets 154 150 - 450 K/uL    MPV 11.0 9.2 - 12.9 fL    Immature Granulocytes 0.1 0.0 - 0.5 %    Gran # (ANC) 4.5 1.8 - 7.7 K/uL    Immature Grans (Abs) 0.01 0.00 - 0.04 K/uL    Lymph # 0.9 (L) 1.0 - 4.8 K/uL    Mono # 1.3 (H) 0.3 - 1.0 K/uL    Eos # 0.3 0.0 - 0.5 K/uL    Baso # 0.05 0.00 - 0.20 K/uL    nRBC 0 0 /100 WBC    Gran % 64.4 38.0 - 73.0 %    Lymph % 12.6 (L) 18.0 - 48.0 %    Mono % 17.8 (H) 4.0 - 15.0 %    Eosinophil % 4.4 0.0 - 8.0 %    Basophil % 0.7 0.0 - 1.9 %    Differential Method Automated    Troponin I High Sensitivity    Collection Time: 06/17/24  4:00 PM   Result Value Ref Range    Troponin I High Sensitivity 324.0 (HH) 0.0 - 14.9 pg/mL   APTT    Collection Time: 06/17/24  4:00 PM   Result Value Ref Range    aPTT 51.3 (H) 21.0 - 32.0 sec   VANCOMYCIN, TROUGH    Collection Time: 06/18/24 12:39 AM   Result Value Ref Range    Vancomycin-Trough 10.9 ug/mL   Comprehensive Metabolic Panel (CMP)    Collection Time: 06/18/24  4:27 AM   Result Value Ref Range    Sodium 133 (L) 136 - 145 mmol/L    Potassium 3.9 3.5 - 5.1 mmol/L    Chloride 92 (L) 95 - 110 mmol/L    CO2 34 (H) 23 - 29 mmol/L    Glucose 105 70 - 110 mg/dL    BUN 20 8 - 23 mg/dL    Creatinine 1.0 0.5 - 1.4 mg/dL    Calcium 8.8 8.7 - 10.5 mg/dL    Total Protein 6.6 6.0 - 8.4 g/dL    Albumin 3.7 3.5 - 5.2 g/dL    Total Bilirubin 1.4 (H) 0.1 - 1.0 mg/dL    Alkaline Phosphatase 92 55 - 135 U/L    AST 23 10 - 40 U/L    ALT 9 (L) 10 - 44 U/L    eGFR >60.0 >60 mL/min/1.73 m^2    Anion Gap 7 (L) 8 - 16 mmol/L   Magnesium    Collection Time: 06/18/24  4:27 AM   Result Value Ref Range    Magnesium 1.9 1.6 - 2.6 mg/dL   Troponin  I High Sensitivity    Collection Time: 06/18/24  4:27 AM   Result Value Ref Range    Troponin I High Sensitivity 301.0 (HH) 0.0 - 14.9 pg/mL   Troponin I High Sensitivity    Collection Time: 06/18/24  5:52 AM   Result Value Ref Range    Troponin I High Sensitivity 289.4 (HH) 0.0 - 14.9 pg/mL     Imaging Results               CT Head Without Contrast (Final result)  Result time 06/15/24 20:52:14      Final result by Angel Kitchen MD (06/15/24 20:52:14)                   Impression:      Bilateral hypoattenuating extra-axial collections overlying the cerebral convexities, right greater than left.  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  There is mild associated mass effect on the right vertex, although no significant midline shift or hydrocephalus appreciated.  Questionable minimal internal complexity overlying the frontal convexities may be artifactual, although component of subacute blood products not excluded.  Consider short-term CT follow-up.    Chronic senescent and microvascular ischemic changes.    This report was flagged in Epic as abnormal.      Electronically signed by: Angel Kitchen MD  Date:    06/15/2024  Time:    20:52               Narrative:    EXAMINATION:  CT HEAD WITHOUT CONTRAST    CLINICAL HISTORY:  Mental status change, unknown cause;    TECHNIQUE:  Low dose axial images were obtained through the head.  Coronal and sagittal reformations were also performed. Contrast was not administered.    COMPARISON:  No prior studies are available for comparison.    FINDINGS:  There is generalized cerebral volume loss with compensatory sulcal widening and ventricular enlargement.  There are bilateral hypoattenuating extra-axial collections overlying the cerebral convexities suggestive of subdural hygromas or chronic subdural hematomas.  These measure approximately 1.5 cm on the right (coronal series 6, image  44) and 0.8 cm on the left (coronal series 6, image 30).  There is mild associated mass effect of the right vertex.  There is questionable slight internal complexity overlying the frontal convexities.  This may reflect artifact, although component of subacute blood products not excluded.  There is no significant midline shift.  No evidence to suggest overt hydrocephalus at this time.  There is periventricular white matter hypoattenuation suggesting sequelae of chronic microvascular ischemic change.  The basal cisterns are patent. The mastoid air cells and paranasal sinuses are clear of acute process. The visualized bones of the calvarium demonstrate no acute osseous abnormality.                                       CT Lumbar Spine Without Contrast (Final result)  Result time 06/15/24 17:32:35      Final result by Angel Kitchen MD (06/15/24 17:32:35)                   Impression:      Recent appearing mild-to-moderate compression fracture deformity of the L2 vertebral body.  Multiple additional compression deformities of the T9, T10, T12, and L1 vertebral bodies as above.  These have a somewhat more chronic appearance.    Moderately advanced multilevel degenerative changes of the lumbar spine as detailed above.  Further evaluation and follow-up could be performed with MRI when clinically appropriate if there are no patient contraindications.    Significant volume bilateral pleural effusion with adjacent compressive atelectasis and/or consolidation.      Electronically signed by: Angel Kitchen MD  Date:    06/15/2024  Time:    17:32               Narrative:    EXAMINATION:  CT LUMBAR SPINE WITHOUT CONTRAST    CLINICAL HISTORY:  l2 fracture;    TECHNIQUE:  Low-dose axial, sagittal and coronal reformations are obtained through the lumbar spine.  Contrast was not administered.    COMPARISON:  Chest radiograph 09/24/2021    FINDINGS:  There is mild nonspecific diffuse heterogeneity of the visualized osseous  structures which may relate to underlying osteopenia.  Clinical correlation is advised.  There are mild-to-moderate compression deformities of the T9 and T10 vertebral bodies.  There is a mild superior endplate compression deformity of the T12 vertebral body.  There is a moderate compression deformity of the L1 vertebral body.  There is a recent appearing mild to moderate compression fracture of the L2 vertebral body.  Fracture lucency is noted through the anterior-superior endplate.  There is minimal retropulsion with mild effacement of the anterior thecal sac.  The L3, L4, and L5 vertebral body heights appear well maintained without significant height loss appreciated allowing for underlying osteopenia.    There is minimal grade 1 anterolisthesis of L4 on L5. There is mild intervertebral disc space height loss most pronounced at L5-S1.    There are degenerative changes of the lumbar spine as detailed below:    T12-L1: Broad-based disc bulge and mild ligamentum flavum thickening.  Mild bilateral neural foraminal narrowing.    L1-L2: Circumferential disc bulge, ligamentum flavum thickening, and bilateral facet arthropathy.  Mild spinal canal stenosis and moderate bilateral neural foraminal narrowing.    L2-L3: Circumferential disc bulge, ligamentum flavum thickening, and bilateral facet arthropathy.  Moderate to severe spinal canal stenosis.  Moderate bilateral neural foraminal narrowing.    L3-L4: Circumferential disc bulge, ligamentum flavum thickening, and bilateral facet arthropathy.  Moderate spinal canal stenosis.  Moderate bilateral neural foraminal narrowing.    L4-L5: Circumferential disc bulge, ligamentum flavum thickening, and bilateral facet arthropathy.  Moderate spinal canal stenosis.  Severe moderate to severe bilateral neural foraminal narrowing.    L5-S1: Broad-based circumferential disc bulge, asymmetric to the right.  Moderate to severe bilateral neural foraminal narrowing.    No grossly displaced  sacral fracture identified.  There are degenerative changes of bilateral sacroiliac joints.    Limited views of the posterior abdomen demonstrate significant volume layering bilateral pleural effusions with associated compressive atelectasis at the visualized lung bases.  There is a right renal cyst.  There is prominent aortic atherosclerosis.  No retroperitoneal hematoma identified.                                       X-Ray Hips Bilateral 2 View Incl AP Pelvis (Final result)  Result time 06/15/24 15:57:04      Final result by Francisco Nicholson MD (06/15/24 15:57:04)                   Impression:      No acute abnormality of bilateral hips.      Electronically signed by: Francisco Nicholson  Date:    06/15/2024  Time:    15:57               Narrative:    EXAMINATION:  XR HIPS BILATERAL 2 VIEW INCL AP PELVIS    CLINICAL HISTORY:  Dorsalgia, unspecified    FINDINGS:  AP pelvis and two views of bilateral hips show no fracture, dislocation, or destructive osseous lesion.  Bilateral hip joint spaces are maintained.  Pubic symphysis and sacroiliac joints are maintained.  Surgical clips lie in left inguinal region.  Vascular calcifications are present.  Stool and bowel gas partially obscures the lower lumbar spine and sacrum and coccyx.                                       X-Ray Lumbar Spine 5 View (Final result)  Result time 06/15/24 14:16:08   Procedure changed from X-Ray Lumbar Spine Ap And Lateral     Final result by Francisco Nicholson MD (06/15/24 14:16:08)                   Impression:      1. Age indeterminate superior endplate L2 compression fracture which has progressed since 2019.  Correlation for symptoms at this level is requested.  2. Compression fractures of T10, T11, T12, and L1, not significantly changed since 2016.  3. Moderate L5-S1 disc degeneration.      Electronically signed by: Francisco Nicholson  Date:    06/15/2024  Time:    14:16               Narrative:    EXAMINATION:  XR LUMBAR SPINE COMPLETE 5 VIEW    CLINICAL  HISTORY:  fall;    FINDINGS:  Five views of lumbar spine show normal lumbosacral alignment.    Compression fractures of T10, T11, T12, L1, and L2 are evident.  T10, T11, T12, and L1 compression fractures have not significantly changed since CT 04/24/2019.  Superior endplate of L2 compression fracture has progressed since that time pre wall inferior endplate compression fracture of L2 is unchanged.    Moderate L5-S1 disc degeneration is evident.  Remaining lumbar intervertebral disc space heights are relatively well maintained.    Sacroiliac joints are normal. Arterial vascular calcifications are present.  Postsurgical changes of CABG are suggested 12 partially visualize, along with cardiac pacing leads.  Mild-to-moderate stool and moderate bowel gas are present throughout the abdomen, limiting evaluation.                                       X-Ray Chest AP Portable (Final result)  Result time 06/15/24 14:18:32      Final result by Francisco Nicholson MD (06/15/24 14:18:32)                   Impression:      1. Trace bilateral pleural effusions.  2. Improvement of interstitial edema and central pulmonary vascular prominence.      Electronically signed by: Francisco Nicholson  Date:    06/15/2024  Time:    14:18               Narrative:    EXAMINATION:  XR CHEST AP PORTABLE    CLINICAL HISTORY:  hypoxia;    FINDINGS:  Portable chest at 1254 compared with 12/29/2024 shows unchanged borderline enlarged cardiac silhouette size.  Mediastinal contours are unchanged with postsurgical changes of CABG.  Prior aortic valve replacement noted.  Left transvenous pacer unchanged.    Lung volumes are low.  Blunting of the costophrenic angles bilaterally suggest trace pleural effusions.  Prior central pulmonary vascular prominence and central interstitial opacities have significantly improved.  Minimal bibasilar interstitial opacities persist.  Minimal calcification suggested along the right diaphragm and in right hemithorax suggesting pleural  plaques.  No definite acute osseous abnormality identified.  Bones are diffusely demineralized, limiting evaluation.                                        Physical Exam:   Physical Exam  Constitutional:       Comments: frail   HENT:      Head: Normocephalic and atraumatic.      Nose: Nose normal.   Eyes:      General: No scleral icterus.     Conjunctiva/sclera: Conjunctivae normal.   Cardiovascular:      Rate and Rhythm: Normal rate and regular rhythm.      Pulses: Normal pulses.      Heart sounds: Murmur heard.      No friction rub. No gallop.   Pulmonary:      Effort: Pulmonary effort is normal.      Breath sounds: No wheezing, rhonchi or rales.   Abdominal:      General: There is no distension.      Palpations: Abdomen is soft.   Musculoskeletal:      Right lower leg: No edema.      Left lower leg: No edema.   Skin:     General: Skin is warm and dry.   Neurological:      General: No focal deficit present.      Mental Status: He is alert and oriented to person, place, and time. Mental status is at baseline.   Psychiatric:         Mood and Affect: Mood normal.         Behavior: Behavior normal.         Thought Content: Thought content normal.         Judgment: Judgment normal.         ASSESSMENT/PLAN:   Assessment:   Febrile  Sepsis  Fall  S/p TAVR  pAF  AV block, s/p PPM.  CAD, s/p CABG with redo  Cardiac injury  Chronic systolic heart failure  HLD  Hypothyroidism  Subdural hygroma  Compression fracture L2, with possible fracture L1, T9-T12.    Plan:   - multiple admissions for NSTEMI. No interventional options left at this point per his primary cardiologist. He is not having chest pain or SOB this admission. He usually has symptoms of chest pain and/or SOB when admitted for nstemi. Differential for troponin elevation includes NSTEMI, infection, and acute neurologic injury. Difficult to differentiate as he had some initial encephalopathy.   - he has limited interventional options. Thus, when and if okay with  neuro, would manage medically with ASA/plavix.   -Patient is not having any chest pain or shortness of breath, will not proceed with C.   - per neurology recs, will hold off antiplatelets and anticoagulation for now with possible subdural process. Can restart plavix and eliquis once okay with Neurology.   - monitor on telemetry.  - BNP is elevated. However, he is not having any clinical symptoms of heart failure and does not appear volume up on exam.  -  He is currently on Lasix 40 mg IV BID. Now on oral dose 20 mg daily.  - echo reviewed.   - continue Zetia  - Defer infection to primary team.  No further cardiac recs.   Patient will need to follow up in clinic 1-2 weeks after discharge with Dr. Willoughby.

## 2024-06-18 NOTE — SUBJECTIVE & OBJECTIVE
Interval History:    Seen in the multidisciplinary rounds, patient is afebrile, awake alert, lower back pain is controlled, patient stated that he never passed out, no loss of consciousness, no head trauma.  No nausea vomiting diarrhea.  Able to move lower extremity.  Pending TSLO placement.  On 2 L nasal cannula.     Review of Systems   Musculoskeletal:  Positive for back pain.     Objective:     Vital Signs (Most Recent):  Temp: 97.6 °F (36.4 °C) (06/18/24 1101)  Pulse: 93 (06/18/24 0600)  Resp: (!) 23 (06/18/24 1057)  BP: 117/75 (06/18/24 0957)  SpO2: 100 % (06/18/24 0600) Vital Signs (24h Range):  Temp:  [97.6 °F (36.4 °C)-99.1 °F (37.3 °C)] 97.6 °F (36.4 °C)  Pulse:  [80-96] 93  Resp:  [12-28] 23  SpO2:  [91 %-100 %] 100 %  BP: ()/(52-99) 117/75     Weight: 75.6 kg (166 lb 10.7 oz)  Body mass index is 26.9 kg/m².    Intake/Output Summary (Last 24 hours) at 6/18/2024 1237  Last data filed at 6/18/2024 0440  Gross per 24 hour   Intake 821.39 ml   Output 550 ml   Net 271.39 ml         Physical Exam  Vitals and nursing note reviewed.   Constitutional:       Appearance: Normal appearance. He is well-developed.   HENT:      Head: Atraumatic.      Right Ear: External ear normal.      Left Ear: External ear normal.      Nose: Nose normal.      Mouth/Throat:      Mouth: Mucous membranes are moist.   Eyes:      Extraocular Movements: Extraocular movements intact.   Cardiovascular:      Rate and Rhythm: Normal rate.   Pulmonary:      Effort: Pulmonary effort is normal.   Abdominal:      Palpations: Abdomen is soft.   Musculoskeletal:         General: Normal range of motion.      Cervical back: Full passive range of motion without pain and normal range of motion.   Skin:     General: Skin is warm.   Neurological:      Mental Status: He is oriented to person, place, and time.             Significant Labs: All pertinent labs within the past 24 hours have been reviewed.  CBC:   Recent Labs   Lab 06/17/24  0946   WBC  "7.04   HGB 12.5*   HCT 36.8*        CMP:   Recent Labs   Lab 06/17/24  0257 06/18/24  0427   * 133*   K 3.6 3.9   CL 91* 92*   CO2 32* 34*   * 105   BUN 20 20   CREATININE 1.2 1.0   CALCIUM 8.8 8.8   PROT 6.7 6.6   ALBUMIN 3.7 3.7   BILITOT 1.7* 1.4*   ALKPHOS 88 92   AST 20 23   ALT 7* 9*   ANIONGAP 9 7*     Lactic Acid: No results for input(s): "LACTATE" in the last 48 hours.  Troponin:   Recent Labs   Lab 06/17/24  1600 06/18/24  0427 06/18/24  0552   TROPONINIHS 324.0* 301.0* 289.4*     TSH:   Recent Labs   Lab 01/24/24  0453   TSH 2.778     Urine Culture:   No results for input(s): "LABURIN" in the last 48 hours.    Microbiology Results (last 7 days)       Procedure Component Value Units Date/Time    Blood culture [[id number]] Collected: 06/15/24 2206    Order Status: Completed Specimen: Blood Updated: 06/18/24 0232     Blood Culture, Routine No Growth to date      No Growth to date      No Growth to date    Urine Culture High Risk [[id number]] Collected: 06/15/24 2013    Order Status: Completed Specimen: Urine, Clean Catch Updated: 06/17/24 0715     Urine Culture, Routine Multiple organisms isolated. None in predominance.  Repeat if      clinically necessary.    Narrative:      Indicated criteria for high risk culture:->Other  Other (specify):->uti/confusion          Significant Imaging:   Imaging Results               CT Head Without Contrast (Final result)  Result time 06/15/24 20:52:14      Final result by Angel Kitchen MD (06/15/24 20:52:14)                   Impression:      Bilateral hypoattenuating extra-axial collections overlying the cerebral convexities, right greater than left.  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  There is mild associated mass effect on the right vertex, although no significant midline shift or hydrocephalus appreciated.  Questionable minimal internal " complexity overlying the frontal convexities may be artifactual, although component of subacute blood products not excluded.  Consider short-term CT follow-up.    Chronic senescent and microvascular ischemic changes.    This report was flagged in Epic as abnormal.      Electronically signed by: Angel Kitchen MD  Date:    06/15/2024  Time:    20:52               Narrative:    EXAMINATION:  CT HEAD WITHOUT CONTRAST    CLINICAL HISTORY:  Mental status change, unknown cause;    TECHNIQUE:  Low dose axial images were obtained through the head.  Coronal and sagittal reformations were also performed. Contrast was not administered.    COMPARISON:  No prior studies are available for comparison.    FINDINGS:  There is generalized cerebral volume loss with compensatory sulcal widening and ventricular enlargement.  There are bilateral hypoattenuating extra-axial collections overlying the cerebral convexities suggestive of subdural hygromas or chronic subdural hematomas.  These measure approximately 1.5 cm on the right (coronal series 6, image 44) and 0.8 cm on the left (coronal series 6, image 30).  There is mild associated mass effect of the right vertex.  There is questionable slight internal complexity overlying the frontal convexities.  This may reflect artifact, although component of subacute blood products not excluded.  There is no significant midline shift.  No evidence to suggest overt hydrocephalus at this time.  There is periventricular white matter hypoattenuation suggesting sequelae of chronic microvascular ischemic change.  The basal cisterns are patent. The mastoid air cells and paranasal sinuses are clear of acute process. The visualized bones of the calvarium demonstrate no acute osseous abnormality.                                       CT Lumbar Spine Without Contrast (Final result)  Result time 06/15/24 17:32:35      Final result by Angel Kitchen MD (06/15/24 17:32:35)                   Impression:       Recent appearing mild-to-moderate compression fracture deformity of the L2 vertebral body.  Multiple additional compression deformities of the T9, T10, T12, and L1 vertebral bodies as above.  These have a somewhat more chronic appearance.    Moderately advanced multilevel degenerative changes of the lumbar spine as detailed above.  Further evaluation and follow-up could be performed with MRI when clinically appropriate if there are no patient contraindications.    Significant volume bilateral pleural effusion with adjacent compressive atelectasis and/or consolidation.      Electronically signed by: Angel Kitchen MD  Date:    06/15/2024  Time:    17:32               Narrative:    EXAMINATION:  CT LUMBAR SPINE WITHOUT CONTRAST    CLINICAL HISTORY:  l2 fracture;    TECHNIQUE:  Low-dose axial, sagittal and coronal reformations are obtained through the lumbar spine.  Contrast was not administered.    COMPARISON:  Chest radiograph 09/24/2021    FINDINGS:  There is mild nonspecific diffuse heterogeneity of the visualized osseous structures which may relate to underlying osteopenia.  Clinical correlation is advised.  There are mild-to-moderate compression deformities of the T9 and T10 vertebral bodies.  There is a mild superior endplate compression deformity of the T12 vertebral body.  There is a moderate compression deformity of the L1 vertebral body.  There is a recent appearing mild to moderate compression fracture of the L2 vertebral body.  Fracture lucency is noted through the anterior-superior endplate.  There is minimal retropulsion with mild effacement of the anterior thecal sac.  The L3, L4, and L5 vertebral body heights appear well maintained without significant height loss appreciated allowing for underlying osteopenia.    There is minimal grade 1 anterolisthesis of L4 on L5. There is mild intervertebral disc space height loss most pronounced at L5-S1.    There are degenerative changes of the lumbar spine  as detailed below:    T12-L1: Broad-based disc bulge and mild ligamentum flavum thickening.  Mild bilateral neural foraminal narrowing.    L1-L2: Circumferential disc bulge, ligamentum flavum thickening, and bilateral facet arthropathy.  Mild spinal canal stenosis and moderate bilateral neural foraminal narrowing.    L2-L3: Circumferential disc bulge, ligamentum flavum thickening, and bilateral facet arthropathy.  Moderate to severe spinal canal stenosis.  Moderate bilateral neural foraminal narrowing.    L3-L4: Circumferential disc bulge, ligamentum flavum thickening, and bilateral facet arthropathy.  Moderate spinal canal stenosis.  Moderate bilateral neural foraminal narrowing.    L4-L5: Circumferential disc bulge, ligamentum flavum thickening, and bilateral facet arthropathy.  Moderate spinal canal stenosis.  Severe moderate to severe bilateral neural foraminal narrowing.    L5-S1: Broad-based circumferential disc bulge, asymmetric to the right.  Moderate to severe bilateral neural foraminal narrowing.    No grossly displaced sacral fracture identified.  There are degenerative changes of bilateral sacroiliac joints.    Limited views of the posterior abdomen demonstrate significant volume layering bilateral pleural effusions with associated compressive atelectasis at the visualized lung bases.  There is a right renal cyst.  There is prominent aortic atherosclerosis.  No retroperitoneal hematoma identified.                                       X-Ray Hips Bilateral 2 View Incl AP Pelvis (Final result)  Result time 06/15/24 15:57:04      Final result by Francisco Nicholson MD (06/15/24 15:57:04)                   Impression:      No acute abnormality of bilateral hips.      Electronically signed by: Francisco Nicholson  Date:    06/15/2024  Time:    15:57               Narrative:    EXAMINATION:  XR HIPS BILATERAL 2 VIEW INCL AP PELVIS    CLINICAL HISTORY:  Dorsalgia, unspecified    FINDINGS:  AP pelvis and two views of  bilateral hips show no fracture, dislocation, or destructive osseous lesion.  Bilateral hip joint spaces are maintained.  Pubic symphysis and sacroiliac joints are maintained.  Surgical clips lie in left inguinal region.  Vascular calcifications are present.  Stool and bowel gas partially obscures the lower lumbar spine and sacrum and coccyx.                                       X-Ray Lumbar Spine 5 View (Final result)  Result time 06/15/24 14:16:08   Procedure changed from X-Ray Lumbar Spine Ap And Lateral     Final result by Francisco Nicholson MD (06/15/24 14:16:08)                   Impression:      1. Age indeterminate superior endplate L2 compression fracture which has progressed since 2019.  Correlation for symptoms at this level is requested.  2. Compression fractures of T10, T11, T12, and L1, not significantly changed since 2016.  3. Moderate L5-S1 disc degeneration.      Electronically signed by: Francisco Nicholson  Date:    06/15/2024  Time:    14:16               Narrative:    EXAMINATION:  XR LUMBAR SPINE COMPLETE 5 VIEW    CLINICAL HISTORY:  fall;    FINDINGS:  Five views of lumbar spine show normal lumbosacral alignment.    Compression fractures of T10, T11, T12, L1, and L2 are evident.  T10, T11, T12, and L1 compression fractures have not significantly changed since CT 04/24/2019.  Superior endplate of L2 compression fracture has progressed since that time pre wall inferior endplate compression fracture of L2 is unchanged.    Moderate L5-S1 disc degeneration is evident.  Remaining lumbar intervertebral disc space heights are relatively well maintained.    Sacroiliac joints are normal. Arterial vascular calcifications are present.  Postsurgical changes of CABG are suggested 12 partially visualize, along with cardiac pacing leads.  Mild-to-moderate stool and moderate bowel gas are present throughout the abdomen, limiting evaluation.                                       X-Ray Chest AP Portable (Final result)   Result time 06/15/24 14:18:32      Final result by Francisco Nicholson MD (06/15/24 14:18:32)                   Impression:      1. Trace bilateral pleural effusions.  2. Improvement of interstitial edema and central pulmonary vascular prominence.      Electronically signed by: Francisco Nicholson  Date:    06/15/2024  Time:    14:18               Narrative:    EXAMINATION:  XR CHEST AP PORTABLE    CLINICAL HISTORY:  hypoxia;    FINDINGS:  Portable chest at 1254 compared with 12/29/2024 shows unchanged borderline enlarged cardiac silhouette size.  Mediastinal contours are unchanged with postsurgical changes of CABG.  Prior aortic valve replacement noted.  Left transvenous pacer unchanged.    Lung volumes are low.  Blunting of the costophrenic angles bilaterally suggest trace pleural effusions.  Prior central pulmonary vascular prominence and central interstitial opacities have significantly improved.  Minimal bibasilar interstitial opacities persist.  Minimal calcification suggested along the right diaphragm and in right hemithorax suggesting pleural plaques.  No definite acute osseous abnormality identified.  Bones are diffusely demineralized, limiting evaluation.                                  ECHO:    Left Ventricle: The left ventricle is normal in size. Increased ventricular mass. Mildly increased wall thickness. There is mild concentric hypertrophy. Unable to assess wall motion. Septal motion is consistent with pacing. There is normal systolic function with a visually estimated ejection fraction of 55 - 60%. Diastolic function cannot be reliably determined in the presence of mitral annular calcification.    Right Ventricle: Right ventricle was not well visualized due to poor acoustic window. Normal right ventricular cavity size. Pacemaker lead present in the ventricle.    Aortic Valve: There is a transcatheter valve replacement in the aortic position.    Mitral Valve: Mildly thickened leaflets. There is moderate mitral  annular calcification present. There is mild regurgitation.    Tricuspid Valve: There is mild to moderate regurgitation.

## 2024-06-18 NOTE — PLAN OF CARE
Problem: Occupational Therapy  Goal: Occupational Therapy Goal  Description: Goals to be met by: 7/19/2024     Patient will increase functional independence with ADLs by performing:    UE Dressing with [place].  LE Dressing with [place] and Assistive Devices as needed.  Grooming while standing at sink with [place].  Toileting from toilet with [place] for hygiene and clothing management.   Supine to sit with [place].  Step transfer with [place]  Toilet transfer to toilet with [place].    Outcome: Progressing

## 2024-06-18 NOTE — ASSESSMENT & PLAN NOTE
Latest ECHO performed and demonstrates- Results for orders placed during the hospital encounter of 06/15/24    Echo    Interpretation Summary    Left Ventricle: The left ventricle is normal in size. Increased ventricular mass. Mildly increased wall thickness. There is mild concentric hypertrophy. Unable to assess wall motion. Septal motion is consistent with pacing. There is normal systolic function with a visually estimated ejection fraction of 55 - 60%. Diastolic function cannot be reliably determined in the presence of mitral annular calcification.    Right Ventricle: Right ventricle was not well visualized due to poor acoustic window. Normal right ventricular cavity size. Pacemaker lead present in the ventricle.    Aortic Valve: There is a transcatheter valve replacement in the aortic position.    Mitral Valve: Mildly thickened leaflets. There is moderate mitral annular calcification present. There is mild regurgitation.    Tricuspid Valve: There is mild to moderate regurgitation.    Recent Labs   Lab 06/15/24  1332   BNP 1,892*   .        Recent ECHO reviewed   Latest Reference Range & Units 02/09/24 10:09 02/29/24 07:50 06/15/24 13:32   BNP 0 - 99 pg/mL 958 (H) 1,720 (H) 1,892 (H)   Holding Entersto due to low BP.    Patient is status post IV Lasix has good urine output, now changed to oral Lasix.  Patient apparently euvolemic now.     No signs of severe sepsis, all culture has been negative, patient has been afebrile since hospitalization, discontinue IV antibiotics.   We will get a pacemaker interrogation.

## 2024-06-18 NOTE — PT/OT/SLP EVAL
Occupational Therapy   Evaluation, tx    Name: Jerome Amaro Jr.  MRN: [id number]  Admitting Diagnosis: Acute on chronic combined systolic and diastolic heart failure  Recent Surgery: * No surgery found *    Diagnoses of Hypoxia, Back pain, Chest pain, Acute on chronic combined systolic and diastolic HF (heart failure), and NSTEMI (non-ST elevated myocardial infarction) were pertinent to this visit.    Recommendations:     Discharge Recommendations: Moderate Intensity Therapy  Discharge Equipment Recommendations:  to be determined by next level of care  Barriers to discharge:  None    Assessment:     Jerome Amaro Jr. is a 78 y.o. male with a medical diagnosis of Acute on chronic combined systolic and diastolic heart failure.  He presents with back pain 6/10, awaiting TLSO arrival to room. Per nurse MD Lela okayed pt to ambulate without TLSO brace. Pt Mod A with LE dressing, Min A toileting, g/hygiene CGA standing with RW, BSC t/f CGA with RW. Pt O x person and place, not situation or time. Pt not safe to live alone at this time due to decreased cognition and high risk for falls. Post acute placement for continued therapy recommended. Continue with OT POC. . Performance deficits affecting function: weakness, impaired endurance, impaired functional mobility, impaired self care skills, gait instability, impaired balance, impaired cognition, decreased lower extremity function, decreased safety awareness, pain, orthopedic precautions, impaired joint extensibility.      Rehab Prognosis: Good; patient would benefit from acute skilled OT services to address these deficits and reach maximum level of function.       Plan:     Patient to be seen 5 x/week to address the above listed problems via self-care/home management, therapeutic activities, therapeutic exercises  Plan of Care Expires: 07/18/24  Plan of Care Reviewed with: patient    Subjective     Chief Complaint: low back pain 6/10  Patient/Family Comments/goals: pt  agreeable.   Occupational Profile:  Living Environment: pt lives alone in [facility] with walk n shower with GB and built n seat.  Previous level of function: Indep-Mod I ADSL, ambulated Indep without a device; drives, cooks and cleans home.  Roles and Routines: active-works out at the gym, father -said he has 2 daughters live nearby.  Equipment Used at Home: shower chair, grab bar, raised toilet (pt has saccess to RW, rollator, BSc, Hb and power scooter that belonged to his wifer , says he was not needing to use any DME to ambulate)  Assistance upon Discharge: daughters?    Pain/Comfort:  Pain Rating 1: 6/10  Location - Side 1: Bilateral  Location - Orientation 1: lower  Location 1: back  Pain Addressed 1: Reposition, Distraction, Cessation of Activity, Nurse notified  Pain Rating Post-Intervention 1: 6/10    Patients cultural, spiritual, Hoahaoism conflicts given the current situation: no    Objective:     Communicated with: nurse prior to session.  Patient found up in chair with telemetry, chair check, blood pressure cuff, pulse ox (continuous) upon OT entry to room.    General Precautions: Standard, fall, seizure  Orthopedic Precautions: spinal precautions  Braces: TLSO (on order, MD okayed pt to ambulate without TLSO brace per nurse annita)  Respiratory Status: Room air    Occupational Performance:    Functional Mobility/Transfers:  Patient completed Sit <> Stand Transfer with minimum assistance  with  rolling walker, vc for HP x 2 trials.  Patient completed Toilet Transfer Step Transfer technique with contact guard assistance with  rolling walker and bedside commode  Functional Mobility: ambulated 10' x 2 with RW to sink and back, unsteady, slow guarded pace due to pain in low back. No overt LOB. Pt needed repeated vc for attention to directional cues.    Activities of Daily Living:  Feeding:  independence and pt refused to eat lunch meal stated he did not like the food; pt had not deficits that limited ability to  "handle utensils and bring to mouth for self feeding seated in BS chair.    Grooming: stand by assistance to brush teeth seated in BS chair and contact guard assistance standing inside RW at sink for stability, knees soft/slightly flexed, pt able to extend knees and stand more erect with vc. Pt verbalized that his "legs are weak since he has not been walking."  Lower Body Dressing: moderate assistance . Pt doffed socks with ankle crossed over opposite knee but not able to don  due to limited bending reach, back pain and spinal no bending precautions seated in BS chair.  Toileting: minimum assistance for clothes management and CGA for standing balance stability while performing hygiene courtesy wipe; pt did not have a BM or void while on BSC.    Cognitive/Visual Perceptual:  Cognitive/Psychosocial Skills:     -       Oriented to: Person, Situation,   -       Follows Commands/attention:Inattentive, Follows one-step commands, and Follows two-step commands  -       Communication: clear/fluent and delayed    -       Memory: Impaired STM and Poor immediate recall  -       Safety awareness/insight to disability: impaired   -       Mood/Affect/Coping skills/emotional control: Cooperative  Visual/Perceptual:      -Intact grossly    Physical Exam:  Balance:    -       siting: static; good  dynamic: fair plus  standing; fair  dynamic: poor plus  Postural examination/scapula alignment:    -       posterior weight shift in standing  Dominant hand:    -       right  Upper Extremity Range of Motion:     -       Right Upper Extremity: WFL  -       Left Upper Extremity: WFL  Upper Extremity Strength:    -       Right Upper Extremity: WFL  -       Left Upper Extremity: WFL   Strength:    -       Right Upper Extremity: WFL  -       Left Upper Extremity: WFL  Neurological:    -       normal tone    AMPAC 6 Click ADL:  AMPAC Total Score: 18    Treatment & Education:  Purpose of OT and POC  Pt seen for safe ADLS and fx mobility " retraining as stated above.  Pt educated/instructed in spinal precautions with above activities: no bending, lifting or twisting.  Minimal vc need as reminders.  Pt educated in jody don't fall and call bell for BTB assist and BSC use, he acknowledged.  Chair check activated due to impaired memory and decreased safety awareness.   All questions/ concerns addressed within scope.     Patient left up in chair with all lines intact, call button in reach, nurse notified, and nurse present    GOALS:   Multidisciplinary Problems       Occupational Therapy Goals          Problem: Occupational Therapy    Goal Priority Disciplines Outcome Interventions   Occupational Therapy Goal     OT, PT/OT Progressing    Description: Goals to be met by: 7/19/2024     Patient will increase functional independence with ADLs by performing:    UE Dressing with Modified [place].  LE Dressing with Modified [place] and Assistive Devices as needed.  Grooming while standing at sink with Modified [place].  Toileting from toilet with Modified [place] for hygiene and clothing management.   Supine to sit with Modified [place].  Step transfer with Modified [place]  Toilet transfer to toilet with Modified [place].                         History:     Past Medical History:   Diagnosis Date    Anticoagulant long-term use     2014    Aortic valve disease 2014    pig valve    Arthritis     all over    Atrial fibrillation 2/29/2024    Chest pain 07/15/2019    CHF (congestive heart failure)     2014 on meds    Coronary artery disease     2007 cabg    Difficulty swallowing     Heart attack 1990    15 stents    Heart attack 02/01/2024    mild    Heart block     Hyperlipidemia     Hypertension     on meds    Hypothyroidism 2015    on meds    PVD (peripheral vascular disease)     Seizures     last episode 1990 on meds         Past Surgical History:   Procedure Laterality Date    AORTOGRAPHY WITH SERIALOGRAPHY N/A 11/16/2021     Procedure: AORTOGRAM, WITH RUNOFF;  Surgeon: Rodrigo Willoughby MD;  Location: [facility] CATH/EP LAB;  Service: Cardiology;  Laterality: N/A;    ARTERIOGRAPHY OF AORTIC ROOT N/A 11/05/2019    Procedure: ARTERIOGRAM, AORTIC ROOT;  Surgeon: Rodrigo Willoughby MD;  Location: [facility] CATH/EP LAB;  Service: Cardiology;  Laterality: N/A;    CARDIAC CATHETERIZATION      CARDIAC VALVE SURGERY      CORONARY ANGIOGRAPHY INCLUDING BYPASS GRAFTS WITH CATHETERIZATION OF LEFT HEART N/A 11/05/2019    Procedure: ANGIOGRAM, CORONARY, INCLUDING BYPASS GRAFT, WITH LEFT HEART CATHETERIZATION;  Surgeon: Rodrigo Willoughby MD;  Location: [facility] CATH/EP LAB;  Service: Cardiology;  Laterality: N/A;    CORONARY ANGIOGRAPHY INCLUDING BYPASS GRAFTS WITH CATHETERIZATION OF LEFT HEART Left 11/03/2020    Procedure: ANGIOGRAM, CORONARY, INCLUDING BYPASS GRAFT, WITH LEFT HEART CATHETERIZATION;  Surgeon: Rodrigo Willoughby MD;  Location: [facility] CATH/EP LAB;  Service: Cardiology;  Laterality: Left;    CORONARY ANGIOGRAPHY INCLUDING BYPASS GRAFTS WITH CATHETERIZATION OF LEFT HEART N/A 09/28/2021    Procedure: ANGIOGRAM, CORONARY, INCLUDING BYPASS GRAFT, WITH LEFT HEART CATHETERIZATION;  Surgeon: Rodrigo Willoughby MD;  Location: [facility] CATH/EP LAB;  Service: Cardiology;  Laterality: N/A;    CORONARY ANGIOGRAPHY INCLUDING BYPASS GRAFTS WITH CATHETERIZATION OF LEFT HEART N/A 12/05/2023    Procedure: ANGIOGRAM, CORONARY, INCLUDING BYPASS GRAFT, WITH LEFT HEART CATHETERIZATION;  Surgeon: Rodrigo Willoughby MD;  Location: [facility] CATH/EP LAB;  Service: Cardiology;  Laterality: N/A;    CORONARY ANGIOPLASTY      CORONARY ARTERY BYPASS GRAFT      x 2    1991 2006    ESOPHAGOGASTRODUODENOSCOPY N/A 2/20/2024    Procedure: EGD (ESOPHAGOGASTRODUODENOSCOPY);  Surgeon: Mal Lockhart III, MD;  Location: [facility] ENDO;  Service: Endoscopy;  Laterality: N/A;    EXTRACORPOREAL SHOCK WAVE LITHOTRIPSY Right 08/01/2019    Procedure: LITHOTRIPSY, ESWL;  Surgeon: Williams Ortega MD;  Location: [facility] OR;   Service: Urology;  Laterality: Right;    HEMORRHOID SURGERY  1990    INSERTION OF PACEMAKER      PERCUTANEOUS TRANSLUMINAL BALLOON ANGIOPLASTY OF CORONARY ARTERY N/A 11/08/2019    Procedure: Angioplasty-coronary;  Surgeon: Rodrigo Willoughby MD;  Location: [facility] CATH/EP LAB;  Service: Cardiology;  Laterality: N/A;    WRIST FRACTURE SURGERY         Time Tracking:     OT Date of Treatment: 06/18/24  OT Start Time: 1155  OT Stop Time: 1230  OT Total Time (min): 35 min    Billable Minutes:Evaluation 10  Self Care/Home Management 15  Therapeutic Activity 10  Total Time 35    6/18/2024

## 2024-06-19 PROCEDURE — 30200315 PPD INTRADERMAL TEST REV CODE 302: Performed by: HOSPITALIST

## 2024-06-19 PROCEDURE — 97535 SELF CARE MNGMENT TRAINING: CPT

## 2024-06-19 PROCEDURE — 99900035 HC TECH TIME PER 15 MIN (STAT)

## 2024-06-19 PROCEDURE — 97116 GAIT TRAINING THERAPY: CPT | Mod: CQ

## 2024-06-19 PROCEDURE — 25000003 PHARM REV CODE 250: Performed by: INTERNAL MEDICINE

## 2024-06-19 PROCEDURE — 86580 TB INTRADERMAL TEST: CPT | Performed by: HOSPITALIST

## 2024-06-19 PROCEDURE — 94761 N-INVAS EAR/PLS OXIMETRY MLT: CPT

## 2024-06-19 PROCEDURE — 21000000 HC CCU ICU ROOM CHARGE

## 2024-06-19 PROCEDURE — 99900031 HC PATIENT EDUCATION (STAT)

## 2024-06-19 PROCEDURE — 25000003 PHARM REV CODE 250: Performed by: HOSPITALIST

## 2024-06-19 RX ORDER — ISOSORBIDE MONONITRATE 30 MG/1
30 TABLET, EXTENDED RELEASE ORAL DAILY
Status: DISCONTINUED | OUTPATIENT
Start: 2024-06-19 | End: 2024-06-21 | Stop reason: HOSPADM

## 2024-06-19 RX ADMIN — APIXABAN 5 MG: 5 TABLET, FILM COATED ORAL at 09:06

## 2024-06-19 RX ADMIN — FUROSEMIDE 20 MG: 20 TABLET ORAL at 09:06

## 2024-06-19 RX ADMIN — HYDROCODONE BITARTRATE AND ACETAMINOPHEN 1 TABLET: 5; 325 TABLET ORAL at 04:06

## 2024-06-19 RX ADMIN — LIDOCAINE 5% 1 PATCH: 700 PATCH TOPICAL at 05:06

## 2024-06-19 RX ADMIN — METOPROLOL SUCCINATE 100 MG: 50 TABLET, FILM COATED, EXTENDED RELEASE ORAL at 09:06

## 2024-06-19 RX ADMIN — TUBERCULIN PURIFIED PROTEIN DERIVATIVE 5 UNITS: 5 INJECTION, SOLUTION INTRADERMAL at 12:06

## 2024-06-19 RX ADMIN — ISOSORBIDE MONONITRATE 30 MG: 30 TABLET, EXTENDED RELEASE ORAL at 01:06

## 2024-06-19 RX ADMIN — CITALOPRAM HYDROBROMIDE 20 MG: 20 TABLET ORAL at 09:06

## 2024-06-19 RX ADMIN — MUPIROCIN 1 G: 20 OINTMENT TOPICAL at 09:06

## 2024-06-19 RX ADMIN — CLOPIDOGREL BISULFATE 75 MG: 75 TABLET, FILM COATED ORAL at 09:06

## 2024-06-19 RX ADMIN — PANTOPRAZOLE SODIUM 20 MG: 20 TABLET, DELAYED RELEASE ORAL at 04:06

## 2024-06-19 RX ADMIN — PANTOPRAZOLE SODIUM 20 MG: 20 TABLET, DELAYED RELEASE ORAL at 05:06

## 2024-06-19 RX ADMIN — PHENYTOIN SODIUM 400 MG: 100 CAPSULE ORAL at 09:06

## 2024-06-19 RX ADMIN — EZETIMIBE 10 MG: 10 TABLET ORAL at 09:06

## 2024-06-19 NOTE — NURSING
Nurses Note -- 4 Eyes      6/18/2024   7:18 PM      Skin assessed during: Daily Assessment      [x] No Altered Skin Integrity Present    []Prevention Measures Documented      [] Yes- Altered Skin Integrity Present or Discovered   [] LDA Added if Not in Epic (Describe Wound)   [] New Altered Skin Integrity was Present on Admit and Documented in LDA   [] Wound Image Taken    Wound Care Consulted? No    Attending Nurse:  Ashley Wheeler RN/Staff Member:  LESLEY Carvajal

## 2024-06-19 NOTE — ASSESSMENT & PLAN NOTE
Mild-to-moderate compression fracture deformity of the L2 vertebral body   Neurosurgeon consulted , no surgical intervention, pending TLSO   Back brace has been delivered

## 2024-06-19 NOTE — PROGRESS NOTES
Critical access hospital Medicine  Progress Note    Patient Name: Jerome Amaro Jr.  MRN: [id number]  Patient Class: IP- Inpatient   Admission Date: 6/15/2024  Length of Stay: 3 days  Attending Physician: Andi Thomason MD  Primary Care Provider: Magda Ruiz FNP-C        Subjective:     Principal Problem:Acute on chronic combined systolic and diastolic heart failure        HPI:  78 year old pt getting admitted with acute CHF and L2 fracture  Pt was started on SQ Lasix inj recently   Yesterday pt had a fall and hit his back on ground  Denied hitting head/No loss of consciousness   Pt since then was unable to walk or move around due to pain  Family thought pt had Side effect from SQ Lasix  Today symptoms went worse and he was escorted to ER   Pt was found to be hypoxic in ER which was new to him  Per family , pt is not on home oxygen  Pt was alert and oriented x 3 upon arrival to ER   In ER after pt had CT Lumbar spine he appeared confused and had tachycardia & EKG showed paced rhythm       Overview/Hospital Course:  No notes on file    Interval History:    Seen in the multidisciplinary rounds, patient is afebrile, awake alert, lower back pain is controlled, TSLO back brace has been delivered,  pacemaker interrogation done- no significant arrhythmia.     Review of Systems   Musculoskeletal:  Positive for back pain.     Objective:     Vital Signs (Most Recent):  Temp: 98 °F (36.7 °C) (06/19/24 0715)  Pulse: 89 (06/19/24 1100)  Resp: (!) 26 (06/19/24 1100)  BP: 102/61 (06/19/24 1100)  SpO2: 100 % (06/19/24 1100) Vital Signs (24h Range):  Temp:  [97.6 °F (36.4 °C)-98.6 °F (37 °C)] 98 °F (36.7 °C)  Pulse:  [77-89] 89  Resp:  [16-26] 26  SpO2:  [95 %-100 %] 100 %  BP: (102-137)/(58-90) 102/61     Weight: 75.6 kg (166 lb 10.7 oz)  Body mass index is 26.9 kg/m².    Intake/Output Summary (Last 24 hours) at 6/19/2024 1233  Last data filed at 6/19/2024 0920  Gross per 24 hour   Intake 695 ml   Output 100 ml   Net  "595 ml         Physical Exam  Vitals and nursing note reviewed.   Constitutional:       Appearance: Normal appearance. He is well-developed.   HENT:      Head: Atraumatic.      Right Ear: External ear normal.      Left Ear: External ear normal.      Nose: Nose normal.      Mouth/Throat:      Mouth: Mucous membranes are moist.   Eyes:      Extraocular Movements: Extraocular movements intact.   Cardiovascular:      Rate and Rhythm: Normal rate.   Pulmonary:      Effort: Pulmonary effort is normal.   Abdominal:      Palpations: Abdomen is soft.   Musculoskeletal:         General: Normal range of motion.      Cervical back: Full passive range of motion without pain and normal range of motion.   Skin:     General: Skin is warm.   Neurological:      Mental Status: He is oriented to person, place, and time.             Significant Labs: All pertinent labs within the past 24 hours have been reviewed.  CBC:   No results for input(s): "WBC", "HGB", "HCT", "PLT" in the last 48 hours.    CMP:   Recent Labs   Lab 06/18/24  0427   *   K 3.9   CL 92*   CO2 34*      BUN 20   CREATININE 1.0   CALCIUM 8.8   PROT 6.6   ALBUMIN 3.7   BILITOT 1.4*   ALKPHOS 92   AST 23   ALT 9*   ANIONGAP 7*     Lactic Acid: No results for input(s): "LACTATE" in the last 48 hours.  Troponin:   Recent Labs   Lab 06/17/24  1600 06/18/24  0427 06/18/24  0552   TROPONINIHS 324.0* 301.0* 289.4*     TSH:   Recent Labs   Lab 01/24/24  0453   TSH 2.778     Urine Culture:   No results for input(s): "LABURIN" in the last 48 hours.    Microbiology Results (last 7 days)       Procedure Component Value Units Date/Time    Blood culture [[id number]] Collected: 06/15/24 2206    Order Status: Completed Specimen: Blood Updated: 06/19/24 0232     Blood Culture, Routine No Growth to date      No Growth to date      No Growth to date      No Growth to date    Urine Culture High Risk [[id number]] Collected: 06/15/24 2013    Order Status: Completed Specimen: " Urine, Clean Catch Updated: 06/17/24 0715     Urine Culture, Routine Multiple organisms isolated. None in predominance.  Repeat if      clinically necessary.    Narrative:      Indicated criteria for high risk culture:->Other  Other (specify):->uti/confusion          Significant Imaging:   Imaging Results               CT Head Without Contrast (Final result)  Result time 06/15/24 20:52:14      Final result by Angel Kitchen MD (06/15/24 20:52:14)                   Impression:      Bilateral hypoattenuating extra-axial collections overlying the cerebral convexities, right greater than left.  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  There is mild associated mass effect on the right vertex, although no significant midline shift or hydrocephalus appreciated.  Questionable minimal internal complexity overlying the frontal convexities may be artifactual, although component of subacute blood products not excluded.  Consider short-term CT follow-up.    Chronic senescent and microvascular ischemic changes.    This report was flagged in Epic as abnormal.      Electronically signed by: Angel Kitchen MD  Date:    06/15/2024  Time:    20:52               Narrative:    EXAMINATION:  CT HEAD WITHOUT CONTRAST    CLINICAL HISTORY:  Mental status change, unknown cause;    TECHNIQUE:  Low dose axial images were obtained through the head.  Coronal and sagittal reformations were also performed. Contrast was not administered.    COMPARISON:  No prior studies are available for comparison.    FINDINGS:  There is generalized cerebral volume loss with compensatory sulcal widening and ventricular enlargement.  There are bilateral hypoattenuating extra-axial collections overlying the cerebral convexities suggestive of subdural hygromas or chronic subdural hematomas.  These measure approximately 1.5 cm on the right (coronal series 6, image 44)  and 0.8 cm on the left (coronal series 6, image 30).  There is mild associated mass effect of the right vertex.  There is questionable slight internal complexity overlying the frontal convexities.  This may reflect artifact, although component of subacute blood products not excluded.  There is no significant midline shift.  No evidence to suggest overt hydrocephalus at this time.  There is periventricular white matter hypoattenuation suggesting sequelae of chronic microvascular ischemic change.  The basal cisterns are patent. The mastoid air cells and paranasal sinuses are clear of acute process. The visualized bones of the calvarium demonstrate no acute osseous abnormality.                                       CT Lumbar Spine Without Contrast (Final result)  Result time 06/15/24 17:32:35      Final result by Angel Kitchen MD (06/15/24 17:32:35)                   Impression:      Recent appearing mild-to-moderate compression fracture deformity of the L2 vertebral body.  Multiple additional compression deformities of the T9, T10, T12, and L1 vertebral bodies as above.  These have a somewhat more chronic appearance.    Moderately advanced multilevel degenerative changes of the lumbar spine as detailed above.  Further evaluation and follow-up could be performed with MRI when clinically appropriate if there are no patient contraindications.    Significant volume bilateral pleural effusion with adjacent compressive atelectasis and/or consolidation.      Electronically signed by: Angel Kitchen MD  Date:    06/15/2024  Time:    17:32               Narrative:    EXAMINATION:  CT LUMBAR SPINE WITHOUT CONTRAST    CLINICAL HISTORY:  l2 fracture;    TECHNIQUE:  Low-dose axial, sagittal and coronal reformations are obtained through the lumbar spine.  Contrast was not administered.    COMPARISON:  Chest radiograph 09/24/2021    FINDINGS:  There is mild nonspecific diffuse heterogeneity of the visualized osseous  structures which may relate to underlying osteopenia.  Clinical correlation is advised.  There are mild-to-moderate compression deformities of the T9 and T10 vertebral bodies.  There is a mild superior endplate compression deformity of the T12 vertebral body.  There is a moderate compression deformity of the L1 vertebral body.  There is a recent appearing mild to moderate compression fracture of the L2 vertebral body.  Fracture lucency is noted through the anterior-superior endplate.  There is minimal retropulsion with mild effacement of the anterior thecal sac.  The L3, L4, and L5 vertebral body heights appear well maintained without significant height loss appreciated allowing for underlying osteopenia.    There is minimal grade 1 anterolisthesis of L4 on L5. There is mild intervertebral disc space height loss most pronounced at L5-S1.    There are degenerative changes of the lumbar spine as detailed below:    T12-L1: Broad-based disc bulge and mild ligamentum flavum thickening.  Mild bilateral neural foraminal narrowing.    L1-L2: Circumferential disc bulge, ligamentum flavum thickening, and bilateral facet arthropathy.  Mild spinal canal stenosis and moderate bilateral neural foraminal narrowing.    L2-L3: Circumferential disc bulge, ligamentum flavum thickening, and bilateral facet arthropathy.  Moderate to severe spinal canal stenosis.  Moderate bilateral neural foraminal narrowing.    L3-L4: Circumferential disc bulge, ligamentum flavum thickening, and bilateral facet arthropathy.  Moderate spinal canal stenosis.  Moderate bilateral neural foraminal narrowing.    L4-L5: Circumferential disc bulge, ligamentum flavum thickening, and bilateral facet arthropathy.  Moderate spinal canal stenosis.  Severe moderate to severe bilateral neural foraminal narrowing.    L5-S1: Broad-based circumferential disc bulge, asymmetric to the right.  Moderate to severe bilateral neural foraminal narrowing.    No grossly displaced  sacral fracture identified.  There are degenerative changes of bilateral sacroiliac joints.    Limited views of the posterior abdomen demonstrate significant volume layering bilateral pleural effusions with associated compressive atelectasis at the visualized lung bases.  There is a right renal cyst.  There is prominent aortic atherosclerosis.  No retroperitoneal hematoma identified.                                       X-Ray Hips Bilateral 2 View Incl AP Pelvis (Final result)  Result time 06/15/24 15:57:04      Final result by Francisco Nicholson MD (06/15/24 15:57:04)                   Impression:      No acute abnormality of bilateral hips.      Electronically signed by: Francisco Nicholson  Date:    06/15/2024  Time:    15:57               Narrative:    EXAMINATION:  XR HIPS BILATERAL 2 VIEW INCL AP PELVIS    CLINICAL HISTORY:  Dorsalgia, unspecified    FINDINGS:  AP pelvis and two views of bilateral hips show no fracture, dislocation, or destructive osseous lesion.  Bilateral hip joint spaces are maintained.  Pubic symphysis and sacroiliac joints are maintained.  Surgical clips lie in left inguinal region.  Vascular calcifications are present.  Stool and bowel gas partially obscures the lower lumbar spine and sacrum and coccyx.                                       X-Ray Lumbar Spine 5 View (Final result)  Result time 06/15/24 14:16:08   Procedure changed from X-Ray Lumbar Spine Ap And Lateral     Final result by Francisco Nicholson MD (06/15/24 14:16:08)                   Impression:      1. Age indeterminate superior endplate L2 compression fracture which has progressed since 2019.  Correlation for symptoms at this level is requested.  2. Compression fractures of T10, T11, T12, and L1, not significantly changed since 2016.  3. Moderate L5-S1 disc degeneration.      Electronically signed by: Francisco Nicholson  Date:    06/15/2024  Time:    14:16               Narrative:    EXAMINATION:  XR LUMBAR SPINE COMPLETE 5 VIEW    CLINICAL  HISTORY:  fall;    FINDINGS:  Five views of lumbar spine show normal lumbosacral alignment.    Compression fractures of T10, T11, T12, L1, and L2 are evident.  T10, T11, T12, and L1 compression fractures have not significantly changed since CT 04/24/2019.  Superior endplate of L2 compression fracture has progressed since that time pre wall inferior endplate compression fracture of L2 is unchanged.    Moderate L5-S1 disc degeneration is evident.  Remaining lumbar intervertebral disc space heights are relatively well maintained.    Sacroiliac joints are normal. Arterial vascular calcifications are present.  Postsurgical changes of CABG are suggested 12 partially visualize, along with cardiac pacing leads.  Mild-to-moderate stool and moderate bowel gas are present throughout the abdomen, limiting evaluation.                                       X-Ray Chest AP Portable (Final result)  Result time 06/15/24 14:18:32      Final result by Francisco Nicholson MD (06/15/24 14:18:32)                   Impression:      1. Trace bilateral pleural effusions.  2. Improvement of interstitial edema and central pulmonary vascular prominence.      Electronically signed by: Francisco Nicholson  Date:    06/15/2024  Time:    14:18               Narrative:    EXAMINATION:  XR CHEST AP PORTABLE    CLINICAL HISTORY:  hypoxia;    FINDINGS:  Portable chest at 1254 compared with 12/29/2024 shows unchanged borderline enlarged cardiac silhouette size.  Mediastinal contours are unchanged with postsurgical changes of CABG.  Prior aortic valve replacement noted.  Left transvenous pacer unchanged.    Lung volumes are low.  Blunting of the costophrenic angles bilaterally suggest trace pleural effusions.  Prior central pulmonary vascular prominence and central interstitial opacities have significantly improved.  Minimal bibasilar interstitial opacities persist.  Minimal calcification suggested along the right diaphragm and in right hemithorax suggesting pleural  plaques.  No definite acute osseous abnormality identified.  Bones are diffusely demineralized, limiting evaluation.                                  ECHO:    Left Ventricle: The left ventricle is normal in size. Increased ventricular mass. Mildly increased wall thickness. There is mild concentric hypertrophy. Unable to assess wall motion. Septal motion is consistent with pacing. There is normal systolic function with a visually estimated ejection fraction of 55 - 60%. Diastolic function cannot be reliably determined in the presence of mitral annular calcification.    Right Ventricle: Right ventricle was not well visualized due to poor acoustic window. Normal right ventricular cavity size. Pacemaker lead present in the ventricle.    Aortic Valve: There is a transcatheter valve replacement in the aortic position.    Mitral Valve: Mildly thickened leaflets. There is moderate mitral annular calcification present. There is mild regurgitation.    Tricuspid Valve: There is mild to moderate regurgitation.    Assessment/Plan:      * Acute on chronic combined systolic and diastolic heart failure  Latest ECHO performed and demonstrates- Results for orders placed during the hospital encounter of 06/15/24    Echo    Interpretation Summary    Left Ventricle: The left ventricle is normal in size. Increased ventricular mass. Mildly increased wall thickness. There is mild concentric hypertrophy. Unable to assess wall motion. Septal motion is consistent with pacing. There is normal systolic function with a visually estimated ejection fraction of 55 - 60%. Diastolic function cannot be reliably determined in the presence of mitral annular calcification.    Right Ventricle: Right ventricle was not well visualized due to poor acoustic window. Normal right ventricular cavity size. Pacemaker lead present in the ventricle.    Aortic Valve: There is a transcatheter valve replacement in the aortic position.    Mitral Valve: Mildly thickened  leaflets. There is moderate mitral annular calcification present. There is mild regurgitation.    Tricuspid Valve: There is mild to moderate regurgitation.    Recent Labs   Lab 06/15/24  1332   BNP 1,892*     .        Recent ECHO reviewed   Latest Reference Range & Units 02/09/24 10:09 02/29/24 07:50 06/15/24 13:32   BNP 0 - 99 pg/mL 958 (H) 1,720 (H) 1,892 (H)   Holding Entersto due to low BP.    Patient is status post IV Lasix has good urine output, now changed to oral Lasix.  Patient apparently euvolemic now.     No signs of severe sepsis, all culture has been negative, patient has been afebrile since hospitalization, discontinue IV antibiotics.   Pacemaker interrogation- no significant arrhythmia.     Patient is cleared for discharge to the rehab center.   Okay to transfer to medical-surgical floor       Subdural hygroma    No signs of SDH, resume Eliquis and Plavix    Acute confusional state  Secondary to sepsis. Pt developed confusion after he came  back from CT room  Ordered blood and urine cultures  Afebrile   Pulling out lines  Received iv morphine in ER upon arrival as per records  Pt has h/o seizure disorder and on phenytoin   CT brain results reviewed.   Resolved        Hx of CABG  Aware       Hypoxia  Mostly from CHF flare  Maintain iv diuresis  Need home oxygen evaluation before discharge from hospital      L2 vertebral fracture  Mild-to-moderate compression fracture deformity of the L2 vertebral body   Neurosurgeon consulted , no surgical intervention, pending TLSO   Back brace has been delivered      Atrial fibrillation  H/o aware   Resume Eliquis    Seizure disorder  Maintain phenytoin PO  Check serum levels  Seizure precautions      NSTEMI (non-ST elevated myocardial infarction)  2D echocardiogram results reviewed; ejection fraction 55-60%.  Continue heparin gtt if ok with neurologist. Subdural hygroma - does not appear to be SDH.  Continue cardiac meds. Patient is CP free.     Previous extensive  medical records has been reviewed by me patient apparently has CABG x1 and CABG redo X1, patient also has multiple stents, patient has had multiple hospitalizations for non-STEMI, most recent cardiac catheterization on 12/2023 only shows Patent sequential saphenous vein graft to LAD as well as 1st and 2nd marginal divisions of the circumflex with right coronary artery arm of graft occluded, no intervention was done at the time.  Later patient has frequent hospitalization for refractory angina, patient initially was placed on Ranexa this has to be discontinued due to QTC prolongation, and now patient is on Imdur 60 mg once a day.  Cardiology on recommended medical management.   Resume Plavix      CAD (coronary artery disease)  H/o CABG with recent angiogram on December 2023    S/P TAVR (transcatheter aortic valve replacement)  Aware       HTN (hypertension)  Chronic, controlled. Latest blood pressure and vitals reviewed-     Temp:  [97.9 °F (36.6 °C)-102.8 °F (39.3 °C)]   Pulse:  []   Resp:  [18-38]   BP: ()/()   SpO2:  [88 %-100 %] .   Home meds for hypertension were reviewed and noted below.   Hypertension Medications               ENTRESTO 24-26 mg per tablet Take 1 tablet by mouth 2 (two) times daily.    furosemide (LASIX) 20 MG tablet Take 20 mg by mouth daily as needed (Fluid).    isosorbide mononitrate (IMDUR) 30 MG 24 hr tablet Take 2 tablets (60 mg total) by mouth once daily.    metoprolol succinate (TOPROL-XL) 100 MG 24 hr tablet Take 100 mg by mouth 2 (two) times daily.    NITROLINGUAL 400 mcg/spray spray Place 1 spray under the tongue every 5 (five) minutes as needed for Chest pain.        Hold ISBD and ARB for now due to low BP.    While in the hospital, will manage blood pressure as follows; Continue home antihypertensive regimen    Will utilize p.r.n. blood pressure medication only if patient's blood pressure greater than 140/90 and he develops symptoms such as worsening chest pain or  shortness of breath.      VTE Risk Mitigation (From admission, onward)           Ordered     apixaban tablet 5 mg  2 times daily         06/18/24 1105     IP VTE HIGH RISK PATIENT  Once         06/15/24 1635     Place sequential compression device  Until discontinued         06/15/24 1635                    Discharge Planning   AXEL: 6/20/2024     Code Status: Full Code   Is the patient medically ready for discharge?:     Reason for patient still in hospital (select all that apply): Patient trending condition and Treatment  Discharge Plan A: Skilled Nursing Facility   Discharge Delays: None known at this time        Patient is stable for discharge to the rehab center, [place] to transfer to medical-surgical  at the meantime    Andi Thomason MD  Department of Hospital Medicine   [facility]

## 2024-06-19 NOTE — SUBJECTIVE & OBJECTIVE
"Interval History:    Seen in the multidisciplinary rounds, patient is afebrile, awake alert, lower back pain is controlled, TSLO back brace has been delivered,  pacemaker interrogation done- no significant arrhythmia.     Review of Systems   Musculoskeletal:  Positive for back pain.     Objective:     Vital Signs (Most Recent):  Temp: 98 °F (36.7 °C) (06/19/24 0715)  Pulse: 89 (06/19/24 1100)  Resp: (!) 26 (06/19/24 1100)  BP: 102/61 (06/19/24 1100)  SpO2: 100 % (06/19/24 1100) Vital Signs (24h Range):  Temp:  [97.6 °F (36.4 °C)-98.6 °F (37 °C)] 98 °F (36.7 °C)  Pulse:  [77-89] 89  Resp:  [16-26] 26  SpO2:  [95 %-100 %] 100 %  BP: (102-137)/(58-90) 102/61     Weight: 75.6 kg (166 lb 10.7 oz)  Body mass index is 26.9 kg/m².    Intake/Output Summary (Last 24 hours) at 6/19/2024 1233  Last data filed at 6/19/2024 0920  Gross per 24 hour   Intake 695 ml   Output 100 ml   Net 595 ml         Physical Exam  Vitals and nursing note reviewed.   Constitutional:       Appearance: Normal appearance. He is well-developed.   HENT:      Head: Atraumatic.      Right Ear: External ear normal.      Left Ear: External ear normal.      Nose: Nose normal.      Mouth/Throat:      Mouth: Mucous membranes are moist.   Eyes:      Extraocular Movements: Extraocular movements intact.   Cardiovascular:      Rate and Rhythm: Normal rate.   Pulmonary:      Effort: Pulmonary effort is normal.   Abdominal:      Palpations: Abdomen is soft.   Musculoskeletal:         General: Normal range of motion.      Cervical back: Full passive range of motion without pain and normal range of motion.   Skin:     General: Skin is warm.   Neurological:      Mental Status: He is oriented to person, place, and time.             Significant Labs: All pertinent labs within the past 24 hours have been reviewed.  CBC:   No results for input(s): "WBC", "HGB", "HCT", "PLT" in the last 48 hours.    CMP:   Recent Labs   Lab 06/18/24  0427   *   K 3.9   CL 92*   CO2 " "34*      BUN 20   CREATININE 1.0   CALCIUM 8.8   PROT 6.6   ALBUMIN 3.7   BILITOT 1.4*   ALKPHOS 92   AST 23   ALT 9*   ANIONGAP 7*     Lactic Acid: No results for input(s): "LACTATE" in the last 48 hours.  Troponin:   Recent Labs   Lab 06/17/24  1600 06/18/24  0427 06/18/24  0552   TROPONINIHS 324.0* 301.0* 289.4*     TSH:   Recent Labs   Lab 01/24/24  0453   TSH 2.778     Urine Culture:   No results for input(s): "LABURIN" in the last 48 hours.    Microbiology Results (last 7 days)       Procedure Component Value Units Date/Time    Blood culture [[id number]] Collected: 06/15/24 2206    Order Status: Completed Specimen: Blood Updated: 06/19/24 0232     Blood Culture, Routine No Growth to date      No Growth to date      No Growth to date      No Growth to date    Urine Culture High Risk [[id number]] Collected: 06/15/24 2013    Order Status: Completed Specimen: Urine, Clean Catch Updated: 06/17/24 0715     Urine Culture, Routine Multiple organisms isolated. None in predominance.  Repeat if      clinically necessary.    Narrative:      Indicated criteria for high risk culture:->Other  Other (specify):->uti/confusion          Significant Imaging:   Imaging Results               CT Head Without Contrast (Final result)  Result time 06/15/24 20:52:14      Final result by Angel Kitchen MD (06/15/24 20:52:14)                   Impression:      Bilateral hypoattenuating extra-axial collections overlying the cerebral convexities, right greater than left.  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  There is mild associated mass effect on the right vertex, although no significant midline shift or hydrocephalus appreciated.  Questionable minimal internal complexity overlying the frontal convexities may be artifactual, although component of subacute blood products not excluded.  Consider short-term CT follow-up.    Chronic " senescent and microvascular ischemic changes.    This report was flagged in Epic as abnormal.      Electronically signed by: Angel Kitchen MD  Date:    06/15/2024  Time:    20:52               Narrative:    EXAMINATION:  CT HEAD WITHOUT CONTRAST    CLINICAL HISTORY:  Mental status change, unknown cause;    TECHNIQUE:  Low dose axial images were obtained through the head.  Coronal and sagittal reformations were also performed. Contrast was not administered.    COMPARISON:  No prior studies are available for comparison.    FINDINGS:  There is generalized cerebral volume loss with compensatory sulcal widening and ventricular enlargement.  There are bilateral hypoattenuating extra-axial collections overlying the cerebral convexities suggestive of subdural hygromas or chronic subdural hematomas.  These measure approximately 1.5 cm on the right (coronal series 6, image 44) and 0.8 cm on the left (coronal series 6, image 30).  There is mild associated mass effect of the right vertex.  There is questionable slight internal complexity overlying the frontal convexities.  This may reflect artifact, although component of subacute blood products not excluded.  There is no significant midline shift.  No evidence to suggest overt hydrocephalus at this time.  There is periventricular white matter hypoattenuation suggesting sequelae of chronic microvascular ischemic change.  The basal cisterns are patent. The mastoid air cells and paranasal sinuses are clear of acute process. The visualized bones of the calvarium demonstrate no acute osseous abnormality.                                       CT Lumbar Spine Without Contrast (Final result)  Result time 06/15/24 17:32:35      Final result by Angel Kitchen MD (06/15/24 17:32:35)                   Impression:      Recent appearing mild-to-moderate compression fracture deformity of the L2 vertebral body.  Multiple additional compression deformities of the T9, T10, T12, and L1  vertebral bodies as above.  These have a somewhat more chronic appearance.    Moderately advanced multilevel degenerative changes of the lumbar spine as detailed above.  Further evaluation and follow-up could be performed with MRI when clinically appropriate if there are no patient contraindications.    Significant volume bilateral pleural effusion with adjacent compressive atelectasis and/or consolidation.      Electronically signed by: Angel Kitchen MD  Date:    06/15/2024  Time:    17:32               Narrative:    EXAMINATION:  CT LUMBAR SPINE WITHOUT CONTRAST    CLINICAL HISTORY:  l2 fracture;    TECHNIQUE:  Low-dose axial, sagittal and coronal reformations are obtained through the lumbar spine.  Contrast was not administered.    COMPARISON:  Chest radiograph 09/24/2021    FINDINGS:  There is mild nonspecific diffuse heterogeneity of the visualized osseous structures which may relate to underlying osteopenia.  Clinical correlation is advised.  There are mild-to-moderate compression deformities of the T9 and T10 vertebral bodies.  There is a mild superior endplate compression deformity of the T12 vertebral body.  There is a moderate compression deformity of the L1 vertebral body.  There is a recent appearing mild to moderate compression fracture of the L2 vertebral body.  Fracture lucency is noted through the anterior-superior endplate.  There is minimal retropulsion with mild effacement of the anterior thecal sac.  The L3, L4, and L5 vertebral body heights appear well maintained without significant height loss appreciated allowing for underlying osteopenia.    There is minimal grade 1 anterolisthesis of L4 on L5. There is mild intervertebral disc space height loss most pronounced at L5-S1.    There are degenerative changes of the lumbar spine as detailed below:    T12-L1: Broad-based disc bulge and mild ligamentum flavum thickening.  Mild bilateral neural foraminal narrowing.    L1-L2: Circumferential disc  bulge, ligamentum flavum thickening, and bilateral facet arthropathy.  Mild spinal canal stenosis and moderate bilateral neural foraminal narrowing.    L2-L3: Circumferential disc bulge, ligamentum flavum thickening, and bilateral facet arthropathy.  Moderate to severe spinal canal stenosis.  Moderate bilateral neural foraminal narrowing.    L3-L4: Circumferential disc bulge, ligamentum flavum thickening, and bilateral facet arthropathy.  Moderate spinal canal stenosis.  Moderate bilateral neural foraminal narrowing.    L4-L5: Circumferential disc bulge, ligamentum flavum thickening, and bilateral facet arthropathy.  Moderate spinal canal stenosis.  Severe moderate to severe bilateral neural foraminal narrowing.    L5-S1: Broad-based circumferential disc bulge, asymmetric to the right.  Moderate to severe bilateral neural foraminal narrowing.    No grossly displaced sacral fracture identified.  There are degenerative changes of bilateral sacroiliac joints.    Limited views of the posterior abdomen demonstrate significant volume layering bilateral pleural effusions with associated compressive atelectasis at the visualized lung bases.  There is a right renal cyst.  There is prominent aortic atherosclerosis.  No retroperitoneal hematoma identified.                                       X-Ray Hips Bilateral 2 View Incl AP Pelvis (Final result)  Result time 06/15/24 15:57:04      Final result by Francisco Nicholson MD (06/15/24 15:57:04)                   Impression:      No acute abnormality of bilateral hips.      Electronically signed by: Francisco Nicholson  Date:    06/15/2024  Time:    15:57               Narrative:    EXAMINATION:  XR HIPS BILATERAL 2 VIEW INCL AP PELVIS    CLINICAL HISTORY:  Dorsalgia, unspecified    FINDINGS:  AP pelvis and two views of bilateral hips show no fracture, dislocation, or destructive osseous lesion.  Bilateral hip joint spaces are maintained.  Pubic symphysis and sacroiliac joints are  maintained.  Surgical clips lie in left inguinal region.  Vascular calcifications are present.  Stool and bowel gas partially obscures the lower lumbar spine and sacrum and coccyx.                                       X-Ray Lumbar Spine 5 View (Final result)  Result time 06/15/24 14:16:08   Procedure changed from X-Ray Lumbar Spine Ap And Lateral     Final result by Francisco Nicholson MD (06/15/24 14:16:08)                   Impression:      1. Age indeterminate superior endplate L2 compression fracture which has progressed since 2019.  Correlation for symptoms at this level is requested.  2. Compression fractures of T10, T11, T12, and L1, not significantly changed since 2016.  3. Moderate L5-S1 disc degeneration.      Electronically signed by: Francisco Nicholson  Date:    06/15/2024  Time:    14:16               Narrative:    EXAMINATION:  XR LUMBAR SPINE COMPLETE 5 VIEW    CLINICAL HISTORY:  fall;    FINDINGS:  Five views of lumbar spine show normal lumbosacral alignment.    Compression fractures of T10, T11, T12, L1, and L2 are evident.  T10, T11, T12, and L1 compression fractures have not significantly changed since CT 04/24/2019.  Superior endplate of L2 compression fracture has progressed since that time pre wall inferior endplate compression fracture of L2 is unchanged.    Moderate L5-S1 disc degeneration is evident.  Remaining lumbar intervertebral disc space heights are relatively well maintained.    Sacroiliac joints are normal. Arterial vascular calcifications are present.  Postsurgical changes of CABG are suggested 12 partially visualize, along with cardiac pacing leads.  Mild-to-moderate stool and moderate bowel gas are present throughout the abdomen, limiting evaluation.                                       X-Ray Chest AP Portable (Final result)  Result time 06/15/24 14:18:32      Final result by Francisco Nicholson MD (06/15/24 14:18:32)                   Impression:      1. Trace bilateral pleural effusions.  2.  Improvement of interstitial edema and central pulmonary vascular prominence.      Electronically signed by: Francisco Nicholson  Date:    06/15/2024  Time:    14:18               Narrative:    EXAMINATION:  XR CHEST AP PORTABLE    CLINICAL HISTORY:  hypoxia;    FINDINGS:  Portable chest at 1254 compared with 12/29/2024 shows unchanged borderline enlarged cardiac silhouette size.  Mediastinal contours are unchanged with postsurgical changes of CABG.  Prior aortic valve replacement noted.  Left transvenous pacer unchanged.    Lung volumes are low.  Blunting of the costophrenic angles bilaterally suggest trace pleural effusions.  Prior central pulmonary vascular prominence and central interstitial opacities have significantly improved.  Minimal bibasilar interstitial opacities persist.  Minimal calcification suggested along the right diaphragm and in right hemithorax suggesting pleural plaques.  No definite acute osseous abnormality identified.  Bones are diffusely demineralized, limiting evaluation.                                  ECHO:    Left Ventricle: The left ventricle is normal in size. Increased ventricular mass. Mildly increased wall thickness. There is mild concentric hypertrophy. Unable to assess wall motion. Septal motion is consistent with pacing. There is normal systolic function with a visually estimated ejection fraction of 55 - 60%. Diastolic function cannot be reliably determined in the presence of mitral annular calcification.    Right Ventricle: Right ventricle was not well visualized due to poor acoustic window. Normal right ventricular cavity size. Pacemaker lead present in the ventricle.    Aortic Valve: There is a transcatheter valve replacement in the aortic position.    Mitral Valve: Mildly thickened leaflets. There is moderate mitral annular calcification present. There is mild regurgitation.    Tricuspid Valve: There is mild to moderate regurgitation.

## 2024-06-19 NOTE — PT/OT/SLP PROGRESS
Physical Therapy Treatment    Patient Name:  Jerome Amaro Jr.   MRN:  [id number]    Recommendations:     Discharge Recommendations: Moderate Intensity Therapy  Discharge Equipment Recommendations: to be determined by next level of care  Barriers to discharge:  weakness, impaired endurance, impaired self care skills, impaired functional mobility, decreased LE function, decreased safety awareness, pain,     Assessment:     Jerome Amaro Jr. is a 78 y.o. male admitted with a medical diagnosis of Acute on chronic combined systolic and diastolic heart failure.  He presents with the following impairments/functional limitations: weakness, impaired endurance, impaired self care skills, impaired functional mobility, gait instability, decreased lower extremity function, decreased safety awareness, pain, impaired cardiopulmonary response to activity.    Pt found sitting up in bed side chair, agreeable to skilled physical therapy session today.     Pt noted to be up in bed side chair without TLSO. TLSO donned with pt reporting decreased pain with donning brace. He performed sit to stand from bed side chair with Ana and RW.     He ambulated 20ft with RW and CGA demonstrating slow rody and voicing fatigue. No other gait deviations noted.     He ambulated back to his bed side chair where he was left with chair alarm on, call light within reach, all needs met, and RN notified.     Rehab Prognosis: Fair; patient would benefit from acute skilled PT services to address these deficits and reach maximum level of function.    Recent Surgery: * No surgery found *      Plan:     During this hospitalization, patient to be seen 6 x/week to address the identified rehab impairments via gait training, therapeutic activities, therapeutic exercises and progress toward the following goals:    Plan of Care Expires:  07/18/24    Subjective     Chief Complaint: back pain  Patient/Family Comments/goals: to get better  Pain/Comfort:  Location - Side 1:  "Bilateral  Location - Orientation 1: lower  Location 1: back  Pain Addressed 1: Pre-medicate for activity, Reposition, Cessation of Activity, Nurse notified      Objective:     Communicated with RN prior to session.  Patient found up in chair with telemetry, blood pressure cuff, pulse ox (continuous) upon PT entry to room.     General Precautions: Standard, fall, seizure  Orthopedic Precautions: spinal precautions  Braces:  (Per nurse Lela doctor consented to  gait w/o TLSO. Pt also reported "Now the doctor tells me   I don't need that brace.")  Respiratory Status: Room air     Functional Mobility:  Transfers:     Sit to Stand:  minimum assistance with rolling walker  Gait: 20ft with RW and CGA.       AM-PAC 6 CLICK MOBILITY          Treatment & Education:  Pt educated on importance of time OOB, importance of intermittent mobility, safe techniques for transfers/ambulation, discharge recommendations/options, and use of call light for assistance and fall prevention.      Patient left up in chair with all lines intact, call button in reach, chair alarm on, and RN notified..    GOALS:   Multidisciplinary Problems       Physical Therapy Goals          Problem: Physical Therapy    Goal Priority Disciplines Outcome Goal Variances Interventions   Physical Therapy Goal     PT, PT/OT      Description: Goals to be met by: 2024     Patient will increase functional independence with mobility by performin. Supine to sit with Modified Graham  2. Sit to stand transfer with Modified Graham  3. Gait  x 150  feet with Stand-by Assistance using Rolling Walker.                          Time Tracking:     PT Received On: 24  PT Start Time: 1324     PT Stop Time: 1338  PT Total Time (min): 14 min     Billable Minutes: Gait Training 14    Treatment Type: Treatment  PT/PTA: PTA     Number of PTA visits since last PT visit: 2024  "

## 2024-06-19 NOTE — PLAN OF CARE
Problem: Adult Inpatient Plan of Care  Goal: Plan of Care Review  Outcome: Progressing  Goal: Patient-Specific Goal (Individualized)  Outcome: Progressing  Goal: Absence of Hospital-Acquired Illness or Injury  Outcome: Progressing  Goal: Optimal Comfort and Wellbeing  Outcome: Progressing  Goal: Readiness for Transition of Care  Outcome: Progressing     Problem: Skin Injury Risk Increased  Goal: Skin Health and Integrity  Outcome: Progressing     Problem: Fall Injury Risk  Goal: Absence of Fall and Fall-Related Injury  Outcome: Progressing     Problem: Sepsis/Septic Shock  Goal: Optimal Coping  Outcome: Progressing  Goal: Absence of Bleeding  Outcome: Progressing  Goal: Blood Glucose Level Within Targeted Range  Outcome: Progressing  Goal: Absence of Infection Signs and Symptoms  Outcome: Progressing  Goal: Optimal Nutrition Intake  Outcome: Progressing      Statement Selected

## 2024-06-19 NOTE — CARE UPDATE
06/18/24 2016   Patient Assessment/Suction   Level of Consciousness (AVPU) alert   Respiratory Effort Normal   Expansion/Accessory Muscles/Retractions no use of accessory muscles   PRE-TX-O2   Device (Oxygen Therapy) room air   SpO2 96 %   Pulse Oximetry Type Continuous   $ Pulse Oximetry - Multiple Charge Pulse Oximetry - Multiple   Pulse 80   Resp (!) 25   Education   $ Education 15 min   Respiratory Evaluation   $ Care Plan Tech Time 15 min

## 2024-06-19 NOTE — ASSESSMENT & PLAN NOTE
Latest ECHO performed and demonstrates- Results for orders placed during the hospital encounter of 06/15/24    Echo    Interpretation Summary    Left Ventricle: The left ventricle is normal in size. Increased ventricular mass. Mildly increased wall thickness. There is mild concentric hypertrophy. Unable to assess wall motion. Septal motion is consistent with pacing. There is normal systolic function with a visually estimated ejection fraction of 55 - 60%. Diastolic function cannot be reliably determined in the presence of mitral annular calcification.    Right Ventricle: Right ventricle was not well visualized due to poor acoustic window. Normal right ventricular cavity size. Pacemaker lead present in the ventricle.    Aortic Valve: There is a transcatheter valve replacement in the aortic position.    Mitral Valve: Mildly thickened leaflets. There is moderate mitral annular calcification present. There is mild regurgitation.    Tricuspid Valve: There is mild to moderate regurgitation.    Recent Labs   Lab 06/15/24  1332   BNP 1,892*     .        Recent ECHO reviewed   Latest Reference Range & Units 02/09/24 10:09 02/29/24 07:50 06/15/24 13:32   BNP 0 - 99 pg/mL 958 (H) 1,720 (H) 1,892 (H)   Holding Entersto due to low BP.    Patient is status post IV Lasix has good urine output, now changed to oral Lasix.  Patient apparently euvolemic now.     No signs of severe sepsis, all culture has been negative, patient has been afebrile since hospitalization, discontinue IV antibiotics.   Pacemaker interrogation- no significant arrhythmia.     Patient is cleared for discharge to the rehab center.   Okay to transfer to medical-surgical floor

## 2024-06-19 NOTE — PLAN OF CARE
[facility] accepted via [employer] and CM requested they send for auth       06/19/24 3009   Post-Acute Status   Post-Acute Authorization Placement   Post-Acute Placement Status Pending payor review/awaiting authorization (if required)

## 2024-06-19 NOTE — PT/OT/SLP PROGRESS
Occupational Therapy   Treatment    Name: Jerome Amaro Jr.  MRN: [id number]  Admitting Diagnosis:  Acute on chronic combined systolic and diastolic heart failure     The primary encounter diagnosis was Acute on chronic combined systolic and diastolic heart failure. Diagnoses of Hypoxia, Back pain, Chest pain, Acute on chronic combined systolic and diastolic HF (heart failure), and NSTEMI (non-ST elevated myocardial infarction) were also pertinent to this visit.  L2 vertebrate fracture    Recommendations:     Discharge Recommendations: Moderate Intensity Therapy  Discharge Equipment Recommendations:  to be determined by next level of care  Barriers to discharge:  None    Assessment:     Jerome Amaro Jr. is a 78 y.o. male with a medical diagnosis of Acute on chronic combined systolic and diastolic heart failure.  He presents with Performance deficits affecting function are weakness, impaired endurance, impaired functional mobility, impaired self care skills, gait instability, impaired balance, impaired cognition, decreased lower extremity function, decreased safety awareness, pain, orthopedic precautions, impaired joint extensibility.     Rehab Prognosis:  Good; patient would benefit from acute skilled OT services to address these deficits and reach maximum level of function.       Plan:     Patient to be seen 5 x/week to address the above listed problems via self-care/home management, therapeutic activities, therapeutic exercises  Plan of Care Expires: 07/18/24  Plan of Care Reviewed with: patient    Subjective     Chief Complaint: LBP  Patient/Family Comments/goals: pt agreeable.  Pain/Comfort:  Pain Rating 1: 5/10  Location - Side 1: Bilateral  Location - Orientation 1: lower  Location 1: back  Pain Addressed 1: Distraction, Cessation of Activity, Reposition, Nurse notified  Pain Rating Post-Intervention 1: 5/10    Objective:     Communicated with: nurse prior to session.  Patient found HOB elevated with telemetry,  blood pressure cuff, bed alarm, pulse ox (continuous), peripheral IV upon OT entry to room.    General Precautions: Standard, fall, seizure    Orthopedic Precautions:spinal precautions  Braces: TLSO (on order, MD okayed pt to ambulate without TLSO brace per nurse annita)  Respiratory Status: Room air     Occupational Performance:     Bed Mobility:    Patient completed Rolling/Turning to Left with  minimum assistance, with side rail, and log roll tech  Patient completed Scooting/Bridging with minimum assistance  Patient completed Supine to Sit with minimum assistance, with side rail, and log roll  Patient completed Sit to Supine with with side rail and log roll      Functional Mobility/Transfers:  Patient completed Sit <> Stand Transfer with minimum assistance  with  rolling walker   Functional Mobility: ambulated CGA-Min A with RW ~8' x 2 with RW to sink and back     Activities of Daily Living:  Grooming: minimum assistance standing at sink inside RW to brush teeth and wash face  Upper Body Dressing: maximal assistance to don TLSO brace  Lower Body Dressing: moderate assistance slier shoes seated EOB      Treatment & Education:  Purpose of OT and POC  Pt seen for safe ADLS and fx mobility retraining as stated above.' Pt re-educated in spinal precautions, min vc needed as reminders.      Patient left HOB elevated with all lines intact, call button in reach, and bed alarm on    GOALS:   Multidisciplinary Problems       Occupational Therapy Goals          Problem: Occupational Therapy    Goal Priority Disciplines Outcome Interventions   Occupational Therapy Goal     OT, PT/OT Progressing    Description: Goals to be met by: 7/19/2024     Patient will increase functional independence with ADLs by performing:    UE Dressing with Modified [place].  LE Dressing with Modified [place] and Assistive Devices as needed.  Grooming while standing at sink with Modified [place].  Toileting from toilet with Modified  [place] for hygiene and clothing management.   Supine to sit with Modified [place].  Step transfer with Modified [place]  Toilet transfer to toilet with Modified [place].                         Time Tracking:     OT Date of Treatment: 06/19/24  OT Start Time: 1628  OT Stop Time: 1646  OT Total Time (min): 18 min    Billable Minutes:Self Care/Home Management 18  Total Time 18    OT/ALTA: OT          6/20/2024

## 2024-06-19 NOTE — PLAN OF CARE
1228 Spoke with pt daughter Nilda. She states that [facility] and [facility] are not acceptable and the only 2 they prefer at this time are [facility] and [facility]. Discussed need for alternate choices as at this time other option is home with HH. Daughter does not feel that this is a good option as pt lives alone.  She will call should she have other options.    1209 Pt ARMANDOO brace in room. CM present during IDR and pt  reports that he spoke with daughter and would like to have SNF at discharge.  CM spoke with daughter Violette via phone and she reports that they discussed and prefer [facility] but she is unsure of the other facilities.  States that it is convenient to have pt in [place] so that family can visit often. Her sister is to call CM with other choices.  Referral sent to [facility] and await pt daughter's call. PPD ordered. Still needs [facility] (office closed for holiday).       06/19/24 1203   Post-Acute Status   Post-Acute Authorization Placement;HME   Post-Acute Placement Status Referrals Sent   HME Status Set-up Complete/Auth obtained   Discharge Delays None known at this time   Discharge Plan   Discharge Plan A Skilled Nursing Facility   Discharge Plan B Home Health;Home with family

## 2024-06-20 PROCEDURE — 97530 THERAPEUTIC ACTIVITIES: CPT | Mod: CQ

## 2024-06-20 PROCEDURE — 25000003 PHARM REV CODE 250: Performed by: INTERNAL MEDICINE

## 2024-06-20 PROCEDURE — 25000003 PHARM REV CODE 250: Performed by: HOSPITALIST

## 2024-06-20 PROCEDURE — 21000000 HC CCU ICU ROOM CHARGE

## 2024-06-20 PROCEDURE — 94761 N-INVAS EAR/PLS OXIMETRY MLT: CPT

## 2024-06-20 RX ADMIN — METOPROLOL SUCCINATE 100 MG: 50 TABLET, FILM COATED, EXTENDED RELEASE ORAL at 08:06

## 2024-06-20 RX ADMIN — PANTOPRAZOLE SODIUM 20 MG: 20 TABLET, DELAYED RELEASE ORAL at 05:06

## 2024-06-20 RX ADMIN — LIDOCAINE 5% 1 PATCH: 700 PATCH TOPICAL at 05:06

## 2024-06-20 RX ADMIN — EZETIMIBE 10 MG: 10 TABLET ORAL at 09:06

## 2024-06-20 RX ADMIN — ISOSORBIDE MONONITRATE 30 MG: 30 TABLET, EXTENDED RELEASE ORAL at 08:06

## 2024-06-20 RX ADMIN — FUROSEMIDE 20 MG: 20 TABLET ORAL at 08:06

## 2024-06-20 RX ADMIN — PHENYTOIN SODIUM 400 MG: 100 CAPSULE ORAL at 08:06

## 2024-06-20 RX ADMIN — MUPIROCIN 1 G: 20 OINTMENT TOPICAL at 08:06

## 2024-06-20 RX ADMIN — CITALOPRAM HYDROBROMIDE 20 MG: 20 TABLET ORAL at 08:06

## 2024-06-20 RX ADMIN — CLOPIDOGREL BISULFATE 75 MG: 75 TABLET, FILM COATED ORAL at 08:06

## 2024-06-20 RX ADMIN — APIXABAN 5 MG: 5 TABLET, FILM COATED ORAL at 08:06

## 2024-06-20 NOTE — PLAN OF CARE
Problem: Adult Inpatient Plan of Care  Goal: Plan of Care Review  Outcome: Progressing  Goal: Patient-Specific Goal (Individualized)  Outcome: Progressing  Goal: Absence of Hospital-Acquired Illness or Injury  Outcome: Progressing  Goal: Optimal Comfort and Wellbeing  Outcome: Progressing  Goal: Readiness for Transition of Care  Outcome: Progressing     Problem: Fall Injury Risk  Goal: Absence of Fall and Fall-Related Injury  Outcome: Progressing     Problem: Sepsis/Septic Shock  Goal: Optimal Coping  Outcome: Progressing  Goal: Absence of Bleeding  Outcome: Progressing  Goal: Blood Glucose Level Within Targeted Range  Outcome: Progressing  Goal: Absence of Infection Signs and Symptoms  Outcome: Progressing  Goal: Optimal Nutrition Intake  Outcome: Progressing

## 2024-06-20 NOTE — PROGRESS NOTES
[facility]  Progress Note    Patient Name: Jerome Amaro Jr.  MRN: [id number]  Patient Class: IP- Inpatient   Admission Date: 6/15/2024  Length of Stay: 4 days  Attending Physician: Andi Thomason MD  Primary Care Provider: Magda Ruiz FNP-C        Subjective:     Principal Problem:Acute on chronic combined systolic and diastolic heart failure        HPI:  78 year old pt getting admitted with acute CHF and L2 fracture  Pt was started on SQ Lasix inj recently   Yesterday pt had a fall and hit his back on ground  Denied hitting head/No loss of consciousness   Pt since then was unable to walk or move around due to pain  Family thought pt had Side effect from SQ Lasix  Today symptoms went worse and he was escorted to ER   Pt was found to be hypoxic in ER which was new to him  Per family , pt is not on home oxygen  Pt was alert and oriented x 3 upon arrival to ER   In ER after pt had CT Lumbar spine he appeared confused and had tachycardia & EKG showed paced rhythm       Overview/Hospital Course:  No notes on file    Interval History:    Seen in the multidisciplinary rounds, patient is afebrile, awake alert, lower back pain is controlled, TSLO back brace has been delivered,  pacemaker interrogation done- no significant arrhythmia.     Review of Systems   Musculoskeletal:  Positive for back pain.     Objective:     Vital Signs (Most Recent):  Temp: 98 °F (36.7 °C) (06/20/24 0301)  Pulse: 82 (06/20/24 1000)  Resp: 12 (06/20/24 1000)  BP: 111/70 (06/20/24 1000)  SpO2: 96 % (06/20/24 1000) Vital Signs (24h Range):  Temp:  [97.9 °F (36.6 °C)-98.5 °F (36.9 °C)] 98 °F (36.7 °C)  Pulse:  [76-87] 82  Resp:  [12-26] 12  SpO2:  [95 %-100 %] 96 %  BP: ()/(53-82) 111/70     Weight: 74 kg (163 lb 2.3 oz)  Body mass index is 26.33 kg/m².    Intake/Output Summary (Last 24 hours) at 6/20/2024 1113  Last data filed at 6/20/2024 0822  Gross per 24 hour   Intake 480 ml   Output 750 ml   Net -270 ml  "        Physical Exam  Vitals and nursing note reviewed.   Constitutional:       Appearance: Normal appearance. He is well-developed.   HENT:      Head: Atraumatic.      Right Ear: External ear normal.      Left Ear: External ear normal.      Nose: Nose normal.      Mouth/Throat:      Mouth: Mucous membranes are moist.   Eyes:      Extraocular Movements: Extraocular movements intact.   Cardiovascular:      Rate and Rhythm: Normal rate.   Pulmonary:      Effort: Pulmonary effort is normal.   Abdominal:      Palpations: Abdomen is soft.   Musculoskeletal:         General: Normal range of motion.      Cervical back: Full passive range of motion without pain and normal range of motion.   Skin:     General: Skin is warm.   Neurological:      Mental Status: He is oriented to person, place, and time.             Significant Labs: All pertinent labs within the past 24 hours have been reviewed.  CBC:   No results for input(s): "WBC", "HGB", "HCT", "PLT" in the last 48 hours.    CMP:   No results for input(s): "NA", "K", "CL", "CO2", "GLU", "BUN", "CREATININE", "CALCIUM", "PROT", "ALBUMIN", "BILITOT", "ALKPHOS", "AST", "ALT", "ANIONGAP", "EGFRNONAA" in the last 48 hours.    Invalid input(s): "ESTGFAFRICA"    Lactic Acid: No results for input(s): "LACTATE" in the last 48 hours.  Troponin:   No results for input(s): "TROPONINI", "TROPONINIHS" in the last 48 hours.    TSH:   Recent Labs   Lab 01/24/24  0453   TSH 2.778     Urine Culture:   No results for input(s): "LABURIN" in the last 48 hours.    Microbiology Results (last 7 days)       Procedure Component Value Units Date/Time    Blood culture [[id number]] Collected: 06/15/24 2206    Order Status: Completed Specimen: Blood Updated: 06/20/24 0232     Blood Culture, Routine No Growth to date      No Growth to date      No Growth to date      No Growth to date      No Growth to date    Urine Culture High Risk [[id number]] Collected: 06/15/24 2013    Order Status: Completed " Specimen: Urine, Clean Catch Updated: 06/17/24 0715     Urine Culture, Routine Multiple organisms isolated. None in predominance.  Repeat if      clinically necessary.    Narrative:      Indicated criteria for high risk culture:->Other  Other (specify):->uti/confusion          Significant Imaging:   Imaging Results               CT Head Without Contrast (Final result)  Result time 06/15/24 20:52:14      Final result by Angel Kitchen MD (06/15/24 20:52:14)                   Impression:      Bilateral hypoattenuating extra-axial collections overlying the cerebral convexities, right greater than left.  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  There is mild associated mass effect on the right vertex, although no significant midline shift or hydrocephalus appreciated.  Questionable minimal internal complexity overlying the frontal convexities may be artifactual, although component of subacute blood products not excluded.  Consider short-term CT follow-up.    Chronic senescent and microvascular ischemic changes.    This report was flagged in Epic as abnormal.      Electronically signed by: Angel Kitchen MD  Date:    06/15/2024  Time:    20:52               Narrative:    EXAMINATION:  CT HEAD WITHOUT CONTRAST    CLINICAL HISTORY:  Mental status change, unknown cause;    TECHNIQUE:  Low dose axial images were obtained through the head.  Coronal and sagittal reformations were also performed. Contrast was not administered.    COMPARISON:  No prior studies are available for comparison.    FINDINGS:  There is generalized cerebral volume loss with compensatory sulcal widening and ventricular enlargement.  There are bilateral hypoattenuating extra-axial collections overlying the cerebral convexities suggestive of subdural hygromas or chronic subdural hematomas.  These measure approximately 1.5 cm on the right (coronal series 6,  image 44) and 0.8 cm on the left (coronal series 6, image 30).  There is mild associated mass effect of the right vertex.  There is questionable slight internal complexity overlying the frontal convexities.  This may reflect artifact, although component of subacute blood products not excluded.  There is no significant midline shift.  No evidence to suggest overt hydrocephalus at this time.  There is periventricular white matter hypoattenuation suggesting sequelae of chronic microvascular ischemic change.  The basal cisterns are patent. The mastoid air cells and paranasal sinuses are clear of acute process. The visualized bones of the calvarium demonstrate no acute osseous abnormality.                                       CT Lumbar Spine Without Contrast (Final result)  Result time 06/15/24 17:32:35      Final result by Angel Kitchen MD (06/15/24 17:32:35)                   Impression:      Recent appearing mild-to-moderate compression fracture deformity of the L2 vertebral body.  Multiple additional compression deformities of the T9, T10, T12, and L1 vertebral bodies as above.  These have a somewhat more chronic appearance.    Moderately advanced multilevel degenerative changes of the lumbar spine as detailed above.  Further evaluation and follow-up could be performed with MRI when clinically appropriate if there are no patient contraindications.    Significant volume bilateral pleural effusion with adjacent compressive atelectasis and/or consolidation.      Electronically signed by: Angel Kitchen MD  Date:    06/15/2024  Time:    17:32               Narrative:    EXAMINATION:  CT LUMBAR SPINE WITHOUT CONTRAST    CLINICAL HISTORY:  l2 fracture;    TECHNIQUE:  Low-dose axial, sagittal and coronal reformations are obtained through the lumbar spine.  Contrast was not administered.    COMPARISON:  Chest radiograph 09/24/2021    FINDINGS:  There is mild nonspecific diffuse heterogeneity of the visualized osseous  structures which may relate to underlying osteopenia.  Clinical correlation is advised.  There are mild-to-moderate compression deformities of the T9 and T10 vertebral bodies.  There is a mild superior endplate compression deformity of the T12 vertebral body.  There is a moderate compression deformity of the L1 vertebral body.  There is a recent appearing mild to moderate compression fracture of the L2 vertebral body.  Fracture lucency is noted through the anterior-superior endplate.  There is minimal retropulsion with mild effacement of the anterior thecal sac.  The L3, L4, and L5 vertebral body heights appear well maintained without significant height loss appreciated allowing for underlying osteopenia.    There is minimal grade 1 anterolisthesis of L4 on L5. There is mild intervertebral disc space height loss most pronounced at L5-S1.    There are degenerative changes of the lumbar spine as detailed below:    T12-L1: Broad-based disc bulge and mild ligamentum flavum thickening.  Mild bilateral neural foraminal narrowing.    L1-L2: Circumferential disc bulge, ligamentum flavum thickening, and bilateral facet arthropathy.  Mild spinal canal stenosis and moderate bilateral neural foraminal narrowing.    L2-L3: Circumferential disc bulge, ligamentum flavum thickening, and bilateral facet arthropathy.  Moderate to severe spinal canal stenosis.  Moderate bilateral neural foraminal narrowing.    L3-L4: Circumferential disc bulge, ligamentum flavum thickening, and bilateral facet arthropathy.  Moderate spinal canal stenosis.  Moderate bilateral neural foraminal narrowing.    L4-L5: Circumferential disc bulge, ligamentum flavum thickening, and bilateral facet arthropathy.  Moderate spinal canal stenosis.  Severe moderate to severe bilateral neural foraminal narrowing.    L5-S1: Broad-based circumferential disc bulge, asymmetric to the right.  Moderate to severe bilateral neural foraminal narrowing.    No grossly displaced  sacral fracture identified.  There are degenerative changes of bilateral sacroiliac joints.    Limited views of the posterior abdomen demonstrate significant volume layering bilateral pleural effusions with associated compressive atelectasis at the visualized lung bases.  There is a right renal cyst.  There is prominent aortic atherosclerosis.  No retroperitoneal hematoma identified.                                       X-Ray Hips Bilateral 2 View Incl AP Pelvis (Final result)  Result time 06/15/24 15:57:04      Final result by Francisco Nicholson MD (06/15/24 15:57:04)                   Impression:      No acute abnormality of bilateral hips.      Electronically signed by: Francisco Nicholson  Date:    06/15/2024  Time:    15:57               Narrative:    EXAMINATION:  XR HIPS BILATERAL 2 VIEW INCL AP PELVIS    CLINICAL HISTORY:  Dorsalgia, unspecified    FINDINGS:  AP pelvis and two views of bilateral hips show no fracture, dislocation, or destructive osseous lesion.  Bilateral hip joint spaces are maintained.  Pubic symphysis and sacroiliac joints are maintained.  Surgical clips lie in left inguinal region.  Vascular calcifications are present.  Stool and bowel gas partially obscures the lower lumbar spine and sacrum and coccyx.                                       X-Ray Lumbar Spine 5 View (Final result)  Result time 06/15/24 14:16:08   Procedure changed from X-Ray Lumbar Spine Ap And Lateral     Final result by Francisco Nicholson MD (06/15/24 14:16:08)                   Impression:      1. Age indeterminate superior endplate L2 compression fracture which has progressed since 2019.  Correlation for symptoms at this level is requested.  2. Compression fractures of T10, T11, T12, and L1, not significantly changed since 2016.  3. Moderate L5-S1 disc degeneration.      Electronically signed by: Francisco Nicholson  Date:    06/15/2024  Time:    14:16               Narrative:    EXAMINATION:  XR LUMBAR SPINE COMPLETE 5 VIEW    CLINICAL  HISTORY:  fall;    FINDINGS:  Five views of lumbar spine show normal lumbosacral alignment.    Compression fractures of T10, T11, T12, L1, and L2 are evident.  T10, T11, T12, and L1 compression fractures have not significantly changed since CT 04/24/2019.  Superior endplate of L2 compression fracture has progressed since that time pre wall inferior endplate compression fracture of L2 is unchanged.    Moderate L5-S1 disc degeneration is evident.  Remaining lumbar intervertebral disc space heights are relatively well maintained.    Sacroiliac joints are normal. Arterial vascular calcifications are present.  Postsurgical changes of CABG are suggested 12 partially visualize, along with cardiac pacing leads.  Mild-to-moderate stool and moderate bowel gas are present throughout the abdomen, limiting evaluation.                                       X-Ray Chest AP Portable (Final result)  Result time 06/15/24 14:18:32      Final result by Francisco Nicholson MD (06/15/24 14:18:32)                   Impression:      1. Trace bilateral pleural effusions.  2. Improvement of interstitial edema and central pulmonary vascular prominence.      Electronically signed by: Francisco Nicholson  Date:    06/15/2024  Time:    14:18               Narrative:    EXAMINATION:  XR CHEST AP PORTABLE    CLINICAL HISTORY:  hypoxia;    FINDINGS:  Portable chest at 1254 compared with 12/29/2024 shows unchanged borderline enlarged cardiac silhouette size.  Mediastinal contours are unchanged with postsurgical changes of CABG.  Prior aortic valve replacement noted.  Left transvenous pacer unchanged.    Lung volumes are low.  Blunting of the costophrenic angles bilaterally suggest trace pleural effusions.  Prior central pulmonary vascular prominence and central interstitial opacities have significantly improved.  Minimal bibasilar interstitial opacities persist.  Minimal calcification suggested along the right diaphragm and in right hemithorax suggesting pleural  plaques.  No definite acute osseous abnormality identified.  Bones are diffusely demineralized, limiting evaluation.                                  ECHO:    Left Ventricle: The left ventricle is normal in size. Increased ventricular mass. Mildly increased wall thickness. There is mild concentric hypertrophy. Unable to assess wall motion. Septal motion is consistent with pacing. There is normal systolic function with a visually estimated ejection fraction of 55 - 60%. Diastolic function cannot be reliably determined in the presence of mitral annular calcification.    Right Ventricle: Right ventricle was not well visualized due to poor acoustic window. Normal right ventricular cavity size. Pacemaker lead present in the ventricle.    Aortic Valve: There is a transcatheter valve replacement in the aortic position.    Mitral Valve: Mildly thickened leaflets. There is moderate mitral annular calcification present. There is mild regurgitation.    Tricuspid Valve: There is mild to moderate regurgitation.    Assessment/Plan:      * Acute on chronic combined systolic and diastolic heart failure  Latest ECHO performed and demonstrates- Results for orders placed during the hospital encounter of 06/15/24    Echo    Interpretation Summary    Left Ventricle: The left ventricle is normal in size. Increased ventricular mass. Mildly increased wall thickness. There is mild concentric hypertrophy. Unable to assess wall motion. Septal motion is consistent with pacing. There is normal systolic function with a visually estimated ejection fraction of 55 - 60%. Diastolic function cannot be reliably determined in the presence of mitral annular calcification.    Right Ventricle: Right ventricle was not well visualized due to poor acoustic window. Normal right ventricular cavity size. Pacemaker lead present in the ventricle.    Aortic Valve: There is a transcatheter valve replacement in the aortic position.    Mitral Valve: Mildly thickened  leaflets. There is moderate mitral annular calcification present. There is mild regurgitation.    Tricuspid Valve: There is mild to moderate regurgitation.    Recent Labs   Lab 06/15/24  1332   BNP 1,892*     .        Recent ECHO reviewed   Latest Reference Range & Units 02/09/24 10:09 02/29/24 07:50 06/15/24 13:32   BNP 0 - 99 pg/mL 958 (H) 1,720 (H) 1,892 (H)   Holding Entersto due to low BP.    Patient is status post IV Lasix has good urine output, now changed to oral Lasix.  Patient apparently euvolemic now.     No signs of severe sepsis, all culture has been negative, patient has been afebrile since hospitalization, discontinue IV antibiotics.   Pacemaker interrogation- no significant arrhythmia.     Patient is cleared for discharge to the rehab center.   Okay to transfer to medical-surgical floor       Subdural hygroma    No signs of SDH, resume Eliquis and Plavix    Acute confusional state  Secondary to sepsis. Pt developed confusion after he came  back from CT room  Ordered blood and urine cultures  Afebrile   Pulling out lines  Received iv morphine in ER upon arrival as per records  Pt has h/o seizure disorder and on phenytoin   CT brain results reviewed.   Resolved        Hx of CABG  Aware       Hypoxia  Mostly from CHF flare  Maintain iv diuresis  Need home oxygen evaluation before discharge from hospital      L2 vertebral fracture  Mild-to-moderate compression fracture deformity of the L2 vertebral body   Neurosurgeon consulted , no surgical intervention, pending TLSO   Back brace has been delivered      Atrial fibrillation  H/o aware   Resume Eliquis    Seizure disorder  Maintain phenytoin PO  Check serum levels  Seizure precautions      NSTEMI (non-ST elevated myocardial infarction)  2D echocardiogram results reviewed; ejection fraction 55-60%.  Continue heparin gtt if ok with neurologist. Subdural hygroma - does not appear to be SDH.  Continue cardiac meds. Patient is CP free.     Previous extensive  medical records has been reviewed by me patient apparently has CABG x1 and CABG redo X1, patient also has multiple stents, patient has had multiple hospitalizations for non-STEMI, most recent cardiac catheterization on 12/2023 only shows Patent sequential saphenous vein graft to LAD as well as 1st and 2nd marginal divisions of the circumflex with right coronary artery arm of graft occluded, no intervention was done at the time.  Later patient has frequent hospitalization for refractory angina, patient initially was placed on Ranexa this has to be discontinued due to QTC prolongation, and now patient is on Imdur 60 mg once a day.  Cardiology on recommended medical management.   Resume Plavix      CAD (coronary artery disease)  H/o CABG with recent angiogram on December 2023    S/P TAVR (transcatheter aortic valve replacement)  Aware       HTN (hypertension)  Chronic, controlled. Latest blood pressure and vitals reviewed-     Temp:  [97.9 °F (36.6 °C)-102.8 °F (39.3 °C)]   Pulse:  []   Resp:  [18-38]   BP: ()/()   SpO2:  [88 %-100 %] .   Home meds for hypertension were reviewed and noted below.   Hypertension Medications               ENTRESTO 24-26 mg per tablet Take 1 tablet by mouth 2 (two) times daily.    furosemide (LASIX) 20 MG tablet Take 20 mg by mouth daily as needed (Fluid).    isosorbide mononitrate (IMDUR) 30 MG 24 hr tablet Take 2 tablets (60 mg total) by mouth once daily.    metoprolol succinate (TOPROL-XL) 100 MG 24 hr tablet Take 100 mg by mouth 2 (two) times daily.    NITROLINGUAL 400 mcg/spray spray Place 1 spray under the tongue every 5 (five) minutes as needed for Chest pain.        Hold ISBD and ARB for now due to low BP.    While in the hospital, will manage blood pressure as follows; Continue home antihypertensive regimen    Will utilize p.r.n. blood pressure medication only if patient's blood pressure greater than 140/90 and he develops symptoms such as worsening chest pain or  shortness of breath.      VTE Risk Mitigation (From admission, onward)           Ordered     apixaban tablet 5 mg  2 times daily         06/18/24 1105     IP VTE HIGH RISK PATIENT  Once         06/15/24 1635     Place sequential compression device  Until discontinued         06/15/24 1635                    Discharge Planning   AXEL: 6/21/2024     Code Status: Full Code   Is the patient medically ready for discharge?:     Reason for patient still in hospital (select all that apply): Patient trending condition and Treatment  Discharge Plan A: Skilled Nursing Facility   Discharge Delays: None known at this time              Andi Thomason MD  Department of Hospital Medicine   [facility]

## 2024-06-20 NOTE — PT/OT/SLP PROGRESS
Occupational Therapy      Patient Name:  Jerome Amaro JrEve   MRN:  [id number]    Patient not seen today secondary to Other (Comment) (therapist unavailable). Will follow-up next service date.    6/20/2024

## 2024-06-20 NOTE — CARE UPDATE
06/19/24 2218   Patient Assessment/Suction   Level of Consciousness (AVPU) alert   Respiratory Effort Normal   Expansion/Accessory Muscles/Retractions no use of accessory muscles   PRE-TX-O2   Device (Oxygen Therapy) room air   SpO2 98 %   Pulse Oximetry Type Continuous   $ Pulse Oximetry - Multiple Charge Pulse Oximetry - Multiple   Pulse 78   Resp (!) 21   Education   $ Education 15 min   Respiratory Evaluation   $ Care Plan Tech Time 15 min

## 2024-06-20 NOTE — PT/OT/SLP PROGRESS
"Physical Therapy Treatment    Patient Name:  Jerome Amaro Jr.   MRN:  [id number]    Recommendations:     Discharge Recommendations: Moderate Intensity Therapy  Discharge Equipment Recommendations: to be determined by next level of care  Barriers to discharge:  decreased endurance from baseline    Assessment:     Jerome Amaro Jr. is a 78 y.o. male admitted with a medical diagnosis of Acute on chronic combined systolic and diastolic heart failure.  He presents with the following impairments/functional limitations: weakness, impaired endurance, impaired functional mobility, gait instability, decreased lower extremity function, orthopedic precautions.    Pt up in chair with TLSO in place and visitor present. Performed LE exercises during preparation of lines. Ambulated 18' in room with RW, CGA, and significant fatigue by end of trial. Declined further activity.     Rehab Prognosis: Good; patient would benefit from acute skilled PT services to address these deficits and reach maximum level of function.    Recent Surgery: * No surgery found *      Plan:     During this hospitalization, patient to be seen 6 x/week to address the identified rehab impairments via gait training, therapeutic activities, therapeutic exercises and progress toward the following goals:    Plan of Care Expires:  07/18/24    Subjective     Chief Complaint: "I've lost my strength from being in that bed for so long"   Patient/Family Comments/goals: return to PLOF  Pain/Comfort:  Pain Rating 1: 0/10  Pain Rating Post-Intervention 1: 0/10      Objective:     Communicated with nurse prior to session.  Patient found up in chair with telemetry, blood pressure cuff, chair check, pulse ox (continuous), peripheral IV upon PT entry to room.     General Precautions: Standard, fall, seizure  Orthopedic Precautions: spinal precautions  Braces: N/A  Respiratory Status: Room air     Functional Mobility:  Transfers:     Sit to Stand:  minimum assistance with rolling " walker  Gait: 18' RW CGA, very slow gait speed, fatigue      AM-PAC 6 CLICK MOBILITY          Treatment & Education:  -pt educ: benefits of participation with theapry, OOB to chair as tolerated, call light, fall prevention  -Seated BLE TE: AP, LAQs, hip ab/adduction; VC for proper form and range    Patient left up in chair with all lines intact, call button in reach, chair alarm on, nurse notified, and 2 friends present..    GOALS:   Multidisciplinary Problems       Physical Therapy Goals          Problem: Physical Therapy    Goal Priority Disciplines Outcome Goal Variances Interventions   Physical Therapy Goal     PT, PT/OT      Description: Goals to be met by: 2024     Patient will increase functional independence with mobility by performin. Supine to sit with Modified [place]  2. Sit to stand transfer with Modified [place]  3. Gait  x 150  feet with Stand-by Assistance using Rolling Walker.                          Time Tracking:     PT Received On: 24  PT Start Time: 1203     PT Stop Time: 1219  PT Total Time (min): 16 min     Billable Minutes: Therapeutic Activity 16    Treatment Type: Treatment  PT/PTA: PTA     Number of PTA visits since last PT visit: 2     2024

## 2024-06-20 NOTE — SUBJECTIVE & OBJECTIVE
"Interval History:    Seen in the multidisciplinary rounds, patient is afebrile, awake alert, lower back pain is controlled, TSLO back brace has been delivered,  pacemaker interrogation done- no significant arrhythmia.     Review of Systems   Musculoskeletal:  Positive for back pain.     Objective:     Vital Signs (Most Recent):  Temp: 98 °F (36.7 °C) (06/20/24 0301)  Pulse: 82 (06/20/24 1000)  Resp: 12 (06/20/24 1000)  BP: 111/70 (06/20/24 1000)  SpO2: 96 % (06/20/24 1000) Vital Signs (24h Range):  Temp:  [97.9 °F (36.6 °C)-98.5 °F (36.9 °C)] 98 °F (36.7 °C)  Pulse:  [76-87] 82  Resp:  [12-26] 12  SpO2:  [95 %-100 %] 96 %  BP: ()/(53-82) 111/70     Weight: 74 kg (163 lb 2.3 oz)  Body mass index is 26.33 kg/m².    Intake/Output Summary (Last 24 hours) at 6/20/2024 1113  Last data filed at 6/20/2024 0822  Gross per 24 hour   Intake 480 ml   Output 750 ml   Net -270 ml         Physical Exam  Vitals and nursing note reviewed.   Constitutional:       Appearance: Normal appearance. He is well-developed.   HENT:      Head: Atraumatic.      Right Ear: External ear normal.      Left Ear: External ear normal.      Nose: Nose normal.      Mouth/Throat:      Mouth: Mucous membranes are moist.   Eyes:      Extraocular Movements: Extraocular movements intact.   Cardiovascular:      Rate and Rhythm: Normal rate.   Pulmonary:      Effort: Pulmonary effort is normal.   Abdominal:      Palpations: Abdomen is soft.   Musculoskeletal:         General: Normal range of motion.      Cervical back: Full passive range of motion without pain and normal range of motion.   Skin:     General: Skin is warm.   Neurological:      Mental Status: He is oriented to person, place, and time.             Significant Labs: All pertinent labs within the past 24 hours have been reviewed.  CBC:   No results for input(s): "WBC", "HGB", "HCT", "PLT" in the last 48 hours.    CMP:   No results for input(s): "NA", "K", "CL", "CO2", "GLU", "BUN", " ""CREATININE", "CALCIUM", "PROT", "ALBUMIN", "BILITOT", "ALKPHOS", "AST", "ALT", "ANIONGAP", "EGFRNONAA" in the last 48 hours.    Invalid input(s): "ESTGFAFRICA"    Lactic Acid: No results for input(s): "LACTATE" in the last 48 hours.  Troponin:   No results for input(s): "TROPONINI", "TROPONINIHS" in the last 48 hours.    TSH:   Recent Labs   Lab 01/24/24  0453   TSH 2.778     Urine Culture:   No results for input(s): "LABURIN" in the last 48 hours.    Microbiology Results (last 7 days)       Procedure Component Value Units Date/Time    Blood culture [[id number]] Collected: 06/15/24 2206    Order Status: Completed Specimen: Blood Updated: 06/20/24 0232     Blood Culture, Routine No Growth to date      No Growth to date      No Growth to date      No Growth to date      No Growth to date    Urine Culture High Risk [[id number]] Collected: 06/15/24 2013    Order Status: Completed Specimen: Urine, Clean Catch Updated: 06/17/24 0715     Urine Culture, Routine Multiple organisms isolated. None in predominance.  Repeat if      clinically necessary.    Narrative:      Indicated criteria for high risk culture:->Other  Other (specify):->uti/confusion          Significant Imaging:   Imaging Results               CT Head Without Contrast (Final result)  Result time 06/15/24 20:52:14      Final result by Angel Kitchen MD (06/15/24 20:52:14)                   Impression:      Bilateral hypoattenuating extra-axial collections overlying the cerebral convexities, right greater than left.  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  There is mild associated mass effect on the right vertex, although no significant midline shift or hydrocephalus appreciated.  Questionable minimal internal complexity overlying the frontal convexities may be artifactual, although component of subacute blood products not excluded.  Consider short-term CT " follow-up.    Chronic senescent and microvascular ischemic changes.    This report was flagged in Epic as abnormal.      Electronically signed by: Angel Kitchen MD  Date:    06/15/2024  Time:    20:52               Narrative:    EXAMINATION:  CT HEAD WITHOUT CONTRAST    CLINICAL HISTORY:  Mental status change, unknown cause;    TECHNIQUE:  Low dose axial images were obtained through the head.  Coronal and sagittal reformations were also performed. Contrast was not administered.    COMPARISON:  No prior studies are available for comparison.    FINDINGS:  There is generalized cerebral volume loss with compensatory sulcal widening and ventricular enlargement.  There are bilateral hypoattenuating extra-axial collections overlying the cerebral convexities suggestive of subdural hygromas or chronic subdural hematomas.  These measure approximately 1.5 cm on the right (coronal series 6, image 44) and 0.8 cm on the left (coronal series 6, image 30).  There is mild associated mass effect of the right vertex.  There is questionable slight internal complexity overlying the frontal convexities.  This may reflect artifact, although component of subacute blood products not excluded.  There is no significant midline shift.  No evidence to suggest overt hydrocephalus at this time.  There is periventricular white matter hypoattenuation suggesting sequelae of chronic microvascular ischemic change.  The basal cisterns are patent. The mastoid air cells and paranasal sinuses are clear of acute process. The visualized bones of the calvarium demonstrate no acute osseous abnormality.                                       CT Lumbar Spine Without Contrast (Final result)  Result time 06/15/24 17:32:35      Final result by Angel Kitchen MD (06/15/24 17:32:35)                   Impression:      Recent appearing mild-to-moderate compression fracture deformity of the L2 vertebral body.  Multiple additional compression deformities of the  T9, T10, T12, and L1 vertebral bodies as above.  These have a somewhat more chronic appearance.    Moderately advanced multilevel degenerative changes of the lumbar spine as detailed above.  Further evaluation and follow-up could be performed with MRI when clinically appropriate if there are no patient contraindications.    Significant volume bilateral pleural effusion with adjacent compressive atelectasis and/or consolidation.      Electronically signed by: Angel Kitchen MD  Date:    06/15/2024  Time:    17:32               Narrative:    EXAMINATION:  CT LUMBAR SPINE WITHOUT CONTRAST    CLINICAL HISTORY:  l2 fracture;    TECHNIQUE:  Low-dose axial, sagittal and coronal reformations are obtained through the lumbar spine.  Contrast was not administered.    COMPARISON:  Chest radiograph 09/24/2021    FINDINGS:  There is mild nonspecific diffuse heterogeneity of the visualized osseous structures which may relate to underlying osteopenia.  Clinical correlation is advised.  There are mild-to-moderate compression deformities of the T9 and T10 vertebral bodies.  There is a mild superior endplate compression deformity of the T12 vertebral body.  There is a moderate compression deformity of the L1 vertebral body.  There is a recent appearing mild to moderate compression fracture of the L2 vertebral body.  Fracture lucency is noted through the anterior-superior endplate.  There is minimal retropulsion with mild effacement of the anterior thecal sac.  The L3, L4, and L5 vertebral body heights appear well maintained without significant height loss appreciated allowing for underlying osteopenia.    There is minimal grade 1 anterolisthesis of L4 on L5. There is mild intervertebral disc space height loss most pronounced at L5-S1.    There are degenerative changes of the lumbar spine as detailed below:    T12-L1: Broad-based disc bulge and mild ligamentum flavum thickening.  Mild bilateral neural foraminal narrowing.    L1-L2:  Circumferential disc bulge, ligamentum flavum thickening, and bilateral facet arthropathy.  Mild spinal canal stenosis and moderate bilateral neural foraminal narrowing.    L2-L3: Circumferential disc bulge, ligamentum flavum thickening, and bilateral facet arthropathy.  Moderate to severe spinal canal stenosis.  Moderate bilateral neural foraminal narrowing.    L3-L4: Circumferential disc bulge, ligamentum flavum thickening, and bilateral facet arthropathy.  Moderate spinal canal stenosis.  Moderate bilateral neural foraminal narrowing.    L4-L5: Circumferential disc bulge, ligamentum flavum thickening, and bilateral facet arthropathy.  Moderate spinal canal stenosis.  Severe moderate to severe bilateral neural foraminal narrowing.    L5-S1: Broad-based circumferential disc bulge, asymmetric to the right.  Moderate to severe bilateral neural foraminal narrowing.    No grossly displaced sacral fracture identified.  There are degenerative changes of bilateral sacroiliac joints.    Limited views of the posterior abdomen demonstrate significant volume layering bilateral pleural effusions with associated compressive atelectasis at the visualized lung bases.  There is a right renal cyst.  There is prominent aortic atherosclerosis.  No retroperitoneal hematoma identified.                                       X-Ray Hips Bilateral 2 View Incl AP Pelvis (Final result)  Result time 06/15/24 15:57:04      Final result by Francisco Nicholson MD (06/15/24 15:57:04)                   Impression:      No acute abnormality of bilateral hips.      Electronically signed by: Francisco Nicholson  Date:    06/15/2024  Time:    15:57               Narrative:    EXAMINATION:  XR HIPS BILATERAL 2 VIEW INCL AP PELVIS    CLINICAL HISTORY:  Dorsalgia, unspecified    FINDINGS:  AP pelvis and two views of bilateral hips show no fracture, dislocation, or destructive osseous lesion.  Bilateral hip joint spaces are maintained.  Pubic symphysis and sacroiliac  joints are maintained.  Surgical clips lie in left inguinal region.  Vascular calcifications are present.  Stool and bowel gas partially obscures the lower lumbar spine and sacrum and coccyx.                                       X-Ray Lumbar Spine 5 View (Final result)  Result time 06/15/24 14:16:08   Procedure changed from X-Ray Lumbar Spine Ap And Lateral     Final result by Francisco Nicholson MD (06/15/24 14:16:08)                   Impression:      1. Age indeterminate superior endplate L2 compression fracture which has progressed since 2019.  Correlation for symptoms at this level is requested.  2. Compression fractures of T10, T11, T12, and L1, not significantly changed since 2016.  3. Moderate L5-S1 disc degeneration.      Electronically signed by: Francisco Nicholson  Date:    06/15/2024  Time:    14:16               Narrative:    EXAMINATION:  XR LUMBAR SPINE COMPLETE 5 VIEW    CLINICAL HISTORY:  fall;    FINDINGS:  Five views of lumbar spine show normal lumbosacral alignment.    Compression fractures of T10, T11, T12, L1, and L2 are evident.  T10, T11, T12, and L1 compression fractures have not significantly changed since CT 04/24/2019.  Superior endplate of L2 compression fracture has progressed since that time pre wall inferior endplate compression fracture of L2 is unchanged.    Moderate L5-S1 disc degeneration is evident.  Remaining lumbar intervertebral disc space heights are relatively well maintained.    Sacroiliac joints are normal. Arterial vascular calcifications are present.  Postsurgical changes of CABG are suggested 12 partially visualize, along with cardiac pacing leads.  Mild-to-moderate stool and moderate bowel gas are present throughout the abdomen, limiting evaluation.                                       X-Ray Chest AP Portable (Final result)  Result time 06/15/24 14:18:32      Final result by Francisco Nicholson MD (06/15/24 14:18:32)                   Impression:      1. Trace bilateral pleural  effusions.  2. Improvement of interstitial edema and central pulmonary vascular prominence.      Electronically signed by: Francisco Nicholson  Date:    06/15/2024  Time:    14:18               Narrative:    EXAMINATION:  XR CHEST AP PORTABLE    CLINICAL HISTORY:  hypoxia;    FINDINGS:  Portable chest at 1254 compared with 12/29/2024 shows unchanged borderline enlarged cardiac silhouette size.  Mediastinal contours are unchanged with postsurgical changes of CABG.  Prior aortic valve replacement noted.  Left transvenous pacer unchanged.    Lung volumes are low.  Blunting of the costophrenic angles bilaterally suggest trace pleural effusions.  Prior central pulmonary vascular prominence and central interstitial opacities have significantly improved.  Minimal bibasilar interstitial opacities persist.  Minimal calcification suggested along the right diaphragm and in right hemithorax suggesting pleural plaques.  No definite acute osseous abnormality identified.  Bones are diffusely demineralized, limiting evaluation.                                  ECHO:    Left Ventricle: The left ventricle is normal in size. Increased ventricular mass. Mildly increased wall thickness. There is mild concentric hypertrophy. Unable to assess wall motion. Septal motion is consistent with pacing. There is normal systolic function with a visually estimated ejection fraction of 55 - 60%. Diastolic function cannot be reliably determined in the presence of mitral annular calcification.    Right Ventricle: Right ventricle was not well visualized due to poor acoustic window. Normal right ventricular cavity size. Pacemaker lead present in the ventricle.    Aortic Valve: There is a transcatheter valve replacement in the aortic position.    Mitral Valve: Mildly thickened leaflets. There is moderate mitral annular calcification present. There is mild regurgitation.    Tricuspid Valve: There is mild to moderate regurgitation.

## 2024-06-20 NOTE — PLAN OF CARE
CC: weight loss    HPI:  Slow weight loss  Energy not much different  Appetite ok  Nauseated after dialysis  Not having lunch at dialysis  Missing meals  nephro shakes don't taste good so doesn't take    Labs at dialysis are ok except anemia    Lost 6 lbs since Feb  Up and down 3 lbs at home    Hb down to 8.8-- has gradually gone down over the month  Had colonoscopy last year, multiple small polyps  No awareness of bleeding    Last yr CT chest-- equivocal finding that was there 2 yrs ago  CT abd no obvious cause of weight loss    Per daughter on questioning-- generally weaker    DM-- hasn't needed insulin in a while          Patient Active Problem List   Diagnosis   • 3-vessel CAD   • Diabetes with neurologic complications (CMS/HCC)   • Dialysis patient (CMS/[facility])   • Chronic diastolic heart failure (CMS/HCC)   • Type 2 diabetes mellitus with chronic kidney disease on chronic dialysis, with long-term current use of insulin (CMS/HCC)   • Hyperparathyroidism, secondary renal (CMS/HCC)   • Incomplete quadriplegia due to spinal cord lesion between first and fourth cervical vertebra (CMS/HCC)   • Late effects of cerebrovascular disease   • LBBB (left bundle branch block)   • Macrocytosis   • Nontoxic (diffuse) goiter   • Osteoarthritis of knee   • Peripheral vascular disease (CMS/HCC)   • Peritracheal mass   • Proliferative diabetic retinopathy (CMS/[facility])   • S/P TAVR (transcatheter aortic valve replacement)   • Vascular graft occlusion (CMS/[facility])   • Cholelithiases   • Pericardial effusion   • Lung nodule   • Anemia   • ESRD (end stage renal disease) (CMS/HCC)   • Hypertensive CKD, ESRD on dialysis (CMS/HCC)   • Tubular adenoma of colon   • Thrombocytopenia (CMS/HCC)   • Erythrocytosis   • Nonobstructive atherosclerosis of coronary artery   • Age-related macular degeneration   • Abnormal gait   • Weight loss     Past Medical History:   Diagnosis Date   • Acute bronchiolitis    • Anemia    • CAD (coronary artery disease)     Problem: Adult Inpatient Plan of Care  Goal: Plan of Care Review  Outcome: Progressing  Goal: Patient-Specific Goal (Individualized)  Outcome: Progressing  Goal: Absence of Hospital-Acquired Illness or Injury  Outcome: Progressing  Goal: Optimal Comfort and Wellbeing  Outcome: Progressing  Goal: Readiness for Transition of Care  Outcome: Progressing     Problem: Skin Injury Risk Increased  Goal: Skin Health and Integrity  Outcome: Progressing     Problem: Fall Injury Risk  Goal: Absence of Fall and Fall-Related Injury  Outcome: Progressing     Problem: Sepsis/Septic Shock  Goal: Optimal Coping  Outcome: Progressing  Goal: Absence of Bleeding  Outcome: Progressing  Goal: Blood Glucose Level Within Targeted Range  Outcome: Progressing  Goal: Absence of Infection Signs and Symptoms  Outcome: Progressing  Goal: Optimal Nutrition Intake  Outcome: Progressing        • Cataract    • Diabetes with neurologic complications (CMS/[facility])    • Diastolic HF (heart failure) (CMS/[facility])    • Edema    • ESRD (end stage renal disease) (CMS/[facility])    • Goiter, non-toxic    • Gout    • Hypertensive CKD, ESRD on dialysis (CMS/[facility])    • Hyperthyroidism    • 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    • Osteoarthritis    • Peritracheal mass    • Proliferative diabetic retinopathy (CMS/[facility])    • S/P TAVR (transcatheter aortic valve replacement)    • Stroke (cerebrum) (CMS/[facility])    • Thyroid nodule      Social History     Tobacco Use   • Smoking status: Never Smoker   • Smokeless tobacco: Never Used   Substance Use Topics   • Alcohol use: No     Frequency: Never   • Drug use: No     Past Surgical History:   Procedure Laterality Date   • Anes laparo partial colectomy     • Appendectomy     • Hysterectomy     • Tavr iliofemoral-cv        Family History   Problem Relation Age of Onset   • Diabetes Sister    • Coronary Artery Disease Brother       Patient's Medications   New Prescriptions    No medications on file   Previous Medications    ALLOPURINOL (ZYLOPRIM) 100 MG TABLET    Take 1 tablet by mouth daily.    AMLODIPINE (NORVASC) 10 MG TABLET    TAKE 1 TABLET BY MOUTH EVERY DAY    ASPIRIN 81 MG EC TABLET    Take 1 tablet by mouth daily.    ATORVASTATIN (LIPITOR) 40 MG TABLET    TAKE 1 TABLET BY MOUTH EVERY DAY    B COMPLEX-C-FOLIC ACID (JUNE-SONDRA RX) 1 MG TAB    TAKE 1 TABLET BY MOUTH EVERY DAY WITH BREAKFAST    LESLIE CONTOUR TEST TEST STRIP    TEST TWICE DAILY    CARVEDILOL (COREG) 6.25 MG TABLET    TAKE 1 TABLET BY MOUTH TWICE A DAY WITH MEALS    EPOETIN AAYUSH (EPOGEN) 84108 UNIT/ML INJECTION    Inject 10,000 Units as directed 3 days a week. Do not start before November 25, 2019.    FAMOTIDINE (PEPCID) 20 MG TABLET    TAKE 1 TABLET BY MOUTH EVERYDAY AT BEDTIME    FUROSEMIDE (LASIX) 20 MG TABLET    TAKE 1 TABLET TWICE DAILY ON NON-DIALYSIS DAYS    INSULIN GLARGINE (LANTUS) 100 UNIT/ML VIAL  SOLUTION    INJECT 5 UNITS SUBCUTANEOUSLY FOR GLUCOSE PERSISTING ABOVE 200. USE ONLY AS NEEDED    INSULIN LISPRO (HUMALOG) 100 UNIT/ML INJECTABLE SOLUTION    2-5 units based on sliding scale    INSULIN SYRINGE-NEEDLE U-100 (BD INSULIN SYRINGE U/F) 31G X 5/16\" 0.3 ML MISC    USE TO INJECT LANTUS AND HUMALOG 3-4 TIMES PER DAY    ISOSORBIDE MONONITRATE (IMDUR) 30 MG 24 HR TABLET    TAKE 1 TABLET BY MOUTH EVERY DAY    LISINOPRIL (ZESTRIL) 20 MG TABLET    Take 1 tablet by mouth daily.    MICROLET LANCETS MISC    TEST TWICE A DAY    SENNOSIDES-DOCUSATE SODIUM (SENOKOT-S) 8.6-50 MG TABLET    Take by mouth daily.    SEVELAMER CARBONATE (RENVELA) 800 MG TABLET        TRAMADOL (ULTRAM) 50 MG TABLET    TAKE 1 TABLET BY MOUTH 3 TIMES A DAY AS NEEDED FOR PAIN    VITAMIN D, ERGOCALCIFEROL, 1.25 MG (50,000 UNITS) CAPSULE    TAKE ONE CAPSULE BY MOUTH ONCE A MONTH   Modified Medications    No medications on file   Discontinued Medications    No medications on file        ALLERGIES:   Allergen Reactions   • Penicillins PRURITUS   • Sulfa Antibiotics RASH   • Tylenol Other (See Comments)     Unknown         Review of Systems   Constitutional: Positive for unexpected weight change. Negative for fever.   Respiratory: Negative for cough and shortness of breath.    Cardiovascular: Negative for chest pain and leg swelling.   Gastrointestinal: Negative for blood in stool, constipation and diarrhea.       Visit Vitals  BP (!) 142/48 (BP Location: RUE - Right upper extremity)   Pulse 79   Resp 17   Ht 5' 2\" (1.575 m)   Wt 43.9 kg (96 lb 12.5 oz)   SpO2 99%   BMI 17.70 kg/m²     Physical Exam  Vitals signs and nursing note reviewed.   Cardiovascular:      Rate and Rhythm: Regular rhythm.      Heart sounds: Normal heart sounds. No murmur.   Pulmonary:      Breath sounds: Normal breath sounds.   Musculoskeletal:      Comments: Hurts to extend R knee  Strength in both legs, both UEs slightly reduced   Neurological:      Gait: Gait normal.    Psychiatric:         Mood and Affect: Mood normal.         Lab Diagnostic:  All pertinent laboratory results were reviewed.    See Dr. Diana 7/1/20 note    Problem List Items Addressed This Visit        Urinary    ESRD (end stage renal disease) (CMS/[facility])    Hypertensive CKD, ESRD on dialysis (CMS/[facility])       Endocrine    Type 2 diabetes mellitus with chronic kidney disease on chronic dialysis, with long-term current use of insulin (CMS/[facility])       Hematologic & Lymphatic    Anemia       Other    Weight loss - Primary        ASSESSMENT and PLAN  Weight loss many possible causes-- some just aging, loss muscle mass  If Hb keeps dropping will need repeat GI eval  Apparently doing weekly at dialysis  ESRD-- no problems with dialysis  HTN not ideal -- same rx-- monitor at dialysis  No orders of the defined types were placed in this encounter.       Schedule follow up: as needed daughter will call     Musa Gutierrez MD 7/7/2020

## 2024-06-20 NOTE — PLAN OF CARE
Locet completed with Amy and DA faxed  [phone] Received 142       06/20/24 1008   Post-Acute Status   Post-Acute Authorization Placement

## 2024-06-20 NOTE — ASSESSMENT & PLAN NOTE
Latest ECHO performed and demonstrates- Results for orders placed during the hospital encounter of 06/15/24    Echo    Interpretation Summary    Left Ventricle: The left ventricle is normal in size. Increased ventricular mass. Mildly increased wall thickness. There is mild concentric hypertrophy. Unable to assess wall motion. Septal motion is consistent with pacing. There is normal systolic function with a visually estimated ejection fraction of 55 - 60%. Diastolic function cannot be reliably determined in the presence of mitral annular calcification.    Right Ventricle: Right ventricle was not well visualized due to poor acoustic window. Normal right ventricular cavity size. Pacemaker lead present in the ventricle.    Aortic Valve: There is a transcatheter valve replacement in the aortic position.    Mitral Valve: Mildly thickened leaflets. There is moderate mitral annular calcification present. There is mild regurgitation.    Tricuspid Valve: There is mild to moderate regurgitation.    Recent Labs   Lab 06/15/24  1332   BNP 1,892*     .        Recent ECHO reviewed   Latest Reference Range & Units 02/09/24 10:09 02/29/24 07:50 06/15/24 13:32   BNP 0 - 99 pg/mL 958 (H) 1,720 (H) 1,892 (H)   Holding Entersto due to low BP.    Patient is status post IV Lasix has good urine output, now changed to oral Lasix.  Patient apparently euvolemic now.     No signs of severe sepsis, all culture has been negative, patient has been afebrile since hospitalization, discontinue IV antibiotics.   Pacemaker interrogation- no significant arrhythmia.     Patient is cleared for discharge to the rehab center.   Okay to transfer to medical-surgical floor

## 2024-06-20 NOTE — PLAN OF CARE
CM spoke with Alysha with [facility] and they submitted for authorization this morning.  Pt daughter is going to sign admission paperwork after 1600 today and they will be able to admit pt tomorrow as long as auth received.  They have no bed today.  CM will continue to follow.       06/20/24 1337   Post-Acute Status   Post-Acute Authorization Placement   Post-Acute Placement Status Pending payor review/awaiting authorization (if required)        Enbrel Counseling:  I discussed with the patient the risks of etanercept including but not limited to myelosuppression, immunosuppression, autoimmune hepatitis, demyelinating diseases, lymphoma, and infections.  The patient understands that monitoring is required including a PPD at baseline and must alert us or the primary physician if symptoms of infection or other concerning signs are noted.

## 2024-06-21 VITALS
TEMPERATURE: 98 F | HEIGHT: 66 IN | DIASTOLIC BLOOD PRESSURE: 61 MMHG | RESPIRATION RATE: 21 BRPM | BODY MASS INDEX: 26.29 KG/M2 | WEIGHT: 163.56 LBS | SYSTOLIC BLOOD PRESSURE: 125 MMHG | OXYGEN SATURATION: 100 % | HEART RATE: 83 BPM

## 2024-06-21 LAB
BACTERIA BLD CULT: NORMAL
TB INDURATION 48 - 72 HR READ: 0 MM
TB SKIN TEST 48 - 72 HR READ: NORMAL

## 2024-06-21 PROCEDURE — 25000003 PHARM REV CODE 250: Performed by: INTERNAL MEDICINE

## 2024-06-21 PROCEDURE — 25000003 PHARM REV CODE 250: Performed by: HOSPITALIST

## 2024-06-21 PROCEDURE — 97535 SELF CARE MNGMENT TRAINING: CPT

## 2024-06-21 PROCEDURE — 97530 THERAPEUTIC ACTIVITIES: CPT | Mod: CQ

## 2024-06-21 RX ORDER — CITALOPRAM 20 MG/1
40 TABLET, FILM COATED ORAL DAILY
Qty: 60 TABLET | Refills: 0 | Status: SHIPPED | OUTPATIENT
Start: 2024-06-22 | End: 2024-07-22

## 2024-06-21 RX ORDER — HYDROCODONE BITARTRATE AND ACETAMINOPHEN 5; 325 MG/1; MG/1
1 TABLET ORAL EVERY 6 HOURS PRN
Qty: 15 TABLET | Refills: 0 | Status: SHIPPED | OUTPATIENT
Start: 2024-06-21 | End: 2024-06-26

## 2024-06-21 RX ORDER — FUROSEMIDE 20 MG/1
20 TABLET ORAL DAILY
Qty: 30 TABLET | Refills: 0 | Status: SHIPPED | OUTPATIENT
Start: 2024-06-21 | End: 2024-07-21

## 2024-06-21 RX ADMIN — APIXABAN 5 MG: 5 TABLET, FILM COATED ORAL at 08:06

## 2024-06-21 RX ADMIN — FUROSEMIDE 20 MG: 20 TABLET ORAL at 08:06

## 2024-06-21 RX ADMIN — PHENYTOIN SODIUM 400 MG: 100 CAPSULE ORAL at 08:06

## 2024-06-21 RX ADMIN — MUPIROCIN 1 G: 20 OINTMENT TOPICAL at 08:06

## 2024-06-21 RX ADMIN — METOPROLOL SUCCINATE 100 MG: 50 TABLET, FILM COATED, EXTENDED RELEASE ORAL at 08:06

## 2024-06-21 RX ADMIN — CLOPIDOGREL BISULFATE 75 MG: 75 TABLET, FILM COATED ORAL at 08:06

## 2024-06-21 RX ADMIN — PANTOPRAZOLE SODIUM 20 MG: 20 TABLET, DELAYED RELEASE ORAL at 05:06

## 2024-06-21 RX ADMIN — CITALOPRAM HYDROBROMIDE 20 MG: 20 TABLET ORAL at 08:06

## 2024-06-21 RX ADMIN — ISOSORBIDE MONONITRATE 30 MG: 30 TABLET, EXTENDED RELEASE ORAL at 08:06

## 2024-06-21 RX ADMIN — EZETIMIBE 10 MG: 10 TABLET ORAL at 10:06

## 2024-06-21 NOTE — DISCHARGE INSTRUCTIONS
[facility]  Facility Transfer Orders        Admit to:SNF     Diagnoses:   Active Hospital Problems    Diagnosis  POA    *Acute on chronic combined systolic and diastolic heart failure [I50.43]  Yes     Priority: 25 - Low    Subdural hygroma [G96.08]  Yes    L2 vertebral fracture [S32.029A]  Yes    Hypoxia [R09.02]  Yes    Hx of CABG [Z95.1]  Not Applicable    Acute confusional state [F05]  Yes    Atrial fibrillation [I48.91]  Yes    Seizure disorder [G40.909]  Yes    NSTEMI (non-ST elevated myocardial infarction) [I21.4]  Yes    HTN (hypertension) [I10]  Yes    S/P TAVR (transcatheter aortic valve replacement) [Z95.2]  Not Applicable    CAD (coronary artery disease) [I25.10]  Yes      Resolved Hospital Problems    Diagnosis Date Resolved POA    Severe sepsis [A41.9, R65.20] 06/18/2024 Yes     Allergies:   Review of patient's allergies indicates:   Allergen Reactions    Atorvastatin Other (See Comments)     Muscle pain    Rosuvastatin Other (See Comments)     Muscle pain       Code Status:  full     Vitals: Routine       Diet: cardiac diet    Activity: Activity as tolerated    Nursing Precautions: Aspiration     Bed/Surface: Low Air Loss    Consults: PT to evaluate and treat- 5 times a week and OT to evaluate and treat- 5 times a week    Oxygen: room air    Dialysis: Patient is not on dialysis.     Labs:   Pending Diagnostic Studies:       None          Imaging:     Miscellaneous Care:     IV Access:      Medications: Discontinue all previous medication orders, if any. See new list below.  Current Discharge Medication List        CONTINUE these medications which have NOT CHANGED    Details   citalopram (CELEXA) 40 MG tablet Take 40 mg by mouth once daily. NEW Rx - NOT YET STARTED      clopidogrel (PLAVIX) 75 mg tablet Take 75 mg by mouth once daily.      CORLANOR 5 mg Tab Take 1 tablet by mouth 2 (two) times daily. NEW Rx - NOT YET STARTED      ELIQUIS 5 mg Tab Take 5 mg by mouth every morning.       ENTRESTO 24-26 mg per tablet Take 1 tablet by mouth 2 (two) times daily.      ezetimibe (ZETIA) 10 mg tablet Take 10 mg by mouth once daily.      furosemide (LASIX) 20 MG tablet Take 20 mg by mouth daily as needed (Fluid).      hydrOXYzine pamoate (VISTARIL) 25 MG Cap Take 1 capsule (25 mg total) by mouth daily as needed (anxiety).      isosorbide mononitrate (IMDUR) 30 MG 24 hr tablet Take 2 tablets (60 mg total) by mouth once daily.  Qty: 60 tablet, Refills: 11      metoprolol succinate (TOPROL-XL) 100 MG 24 hr tablet Take 100 mg by mouth 2 (two) times daily.      phenytoin (DILANTIN) 100 MG ER capsule Take 400 mg by mouth once daily.   Refills: 0      bismuth subsalicylate (BISMAREX) 262 mg Chew Take 1 tablet by mouth 2 (two) times daily.      famotidine (PEPCID) 40 MG tablet Take 40 mg by mouth Daily.      NITROLINGUAL 400 mcg/spray spray Place 1 spray under the tongue every 5 (five) minutes as needed for Chest pain.      pantoprazole (PROTONIX) 20 MG tablet Take 20 mg by mouth 2 (two) times daily.      ranolazine (RANEXA) 1,000 mg Tb12 Take 1,000 mg by mouth 2 (two) times daily.           Follow up:       Immunizations Administered as of 6/21/2024       Name Date Dose VIS Date Route Exp Date    COVID-19, MRNA, LN-S, PF (Pfizer) (Purple Cap) 3/30/2021  8:51 AM 0.3 mL 12/12/2020 Intramuscular 7/31/2021    Site: Left deltoid     Given By: Tiffani Lugo FNP-C     : [employer]     Lot: [id number]     COVID-19, MRNA, LN-S, PF (Pfizer) (Purple Cap) 3/9/2021  7:40 AM 0.3 mL 12/12/2020 Intramuscular 6/30/2021    Site: Left deltoid     Given By: Khadijah Delgado, RN     : [employer]     Lot: [id number]                 Some patients may experience side effects after vaccination.  These may include fever, headache, muscle or joint aches.  Most symptoms resolve with 24-48 hours and do not require urgent medical evaluation unless they persist for more than 72 hours or symptoms are concerning for an  unrelated medical condition.          Andi Thomason MD

## 2024-06-21 NOTE — PT/OT/SLP PROGRESS
"Physical Therapy Treatment    Patient Name:  Jerome Amaro Jr.   MRN:  [id number]    Recommendations:     Discharge Recommendations: Moderate Intensity Therapy  Discharge Equipment Recommendations: to be determined by next level of care  Barriers to discharge:  decreased mobility from baseline    Assessment:     Jerome Amaro Jr. is a 78 y.o. male admitted with a medical diagnosis of Acute on chronic combined systolic and diastolic heart failure.  He presents with the following impairments/functional limitations: weakness, impaired endurance, impaired self care skills, impaired functional mobility, gait instability .    Nurse summoned to room due to open bleeding wound on arm which pt attributed to BP cuff. After cleaning and coverage of wound, pt stood with SBA and was assisted/educated on proper TLSO fit for improved benefit.     Ambulated 70' with RW and CGA. HR 90s-low 100s with exertion. Greatly improved gait distance today. Good participation and remained up in chair.     Rehab Prognosis: Good; patient would benefit from acute skilled PT services to address these deficits and reach maximum level of function.    Recent Surgery: * No surgery found *      Plan:     During this hospitalization, patient to be seen 6 x/week to address the identified rehab impairments via gait training, therapeutic activities, therapeutic exercises and progress toward the following goals:    Plan of Care Expires:  07/18/24    Subjective     Chief Complaint: emotional today (two days ago was the 6-month anniversary of death of his wife)  Patient/Family Comments/goals: "I actually do feel a lot better after walking out of the room for a while"   Pain/Comfort:  Pain Rating 1: 0/10  Pain Rating Post-Intervention 1: 0/10      Objective:     Communicated with nurse prior to session.  Patient found up in chair with telemetry, blood pressure cuff, chair check, pulse ox (continuous), peripheral IV, and TLSO upon PT entry to room.     General " Precautions: Standard, fall  Orthopedic Precautions: spinal precautions  Braces: TLSO  Respiratory Status: Room air     Functional Mobility:  Transfers:     Sit to Stand:  SB-CGA over varied trials with rolling walker  Gait: 70' RW CGA; VC for improved step clearance and to avoid shuffling steps; fatigue by end of gait trial but completed return to chair w/o increased physical assist needed      AM-PAC 6 CLICK MOBILITY          Treatment & Education:  -Pt educ: benefits of participation with therapy, OOB to chair as tolerated, appropriate fit of TLSO for best support    Patient left up in chair with all lines intact, call button in reach, chair alarm on, and nurse notified..    GOALS:   Multidisciplinary Problems       Physical Therapy Goals          Problem: Physical Therapy    Goal Priority Disciplines Outcome Goal Variances Interventions   Physical Therapy Goal     PT, PT/OT      Description: Goals to be met by: 2024     Patient will increase functional independence with mobility by performin. Supine to sit with Modified [place]  2. Sit to stand transfer with Modified [place]  3. Gait  x 150  feet with Stand-by Assistance using Rolling Walker.                          Time Tracking:     PT Received On: 24  PT Start Time: 1129     PT Stop Time: 1146  PT Total Time (min): 17 min     Billable Minutes: Therapeutic Activity 17    Treatment Type: Treatment  PT/PTA: PTA     Number of PTA visits since last PT visit: 3     2024

## 2024-06-21 NOTE — PLAN OF CARE
1317 Message to MD as NH asking for pain medication to have for pt if needed.  New AVS sent via [employer]    1235 Updated AVS sent via [employer]    1038 Notified that authorization has been received. Discharge clinical sent via [employer] and await clearance from [facility]       06/21/24 1038   Post-Acute Status   Post-Acute Authorization Placement   Post-Acute Placement Status Pending post-acute provider review/more information requested

## 2024-06-21 NOTE — NURSING
Report given to LESLEY Rolon at [facility]. IV dc'd. Tolerated well. DC instructions, follow up apps, and meds reviewed with pt. Verbalized understanding. Pt brought down via wheelchair via [facility] transport with all belongings. Safety maintained.

## 2024-06-21 NOTE — HOSPITAL COURSE
78-year-old male with a history of PAF on Eliquis, extensive CAD on Plavix, refractory angina presenting here for mechanical fall, no LOC, found to have L2 fracture, evaluated by Neurosurgery recommended conservative management, patient has TLSO brace in place.  Initially CT head also shows questionable SDH, later confirmed it is chronic hygroma, patient was taking Eliquis and Plavix at home and this was resumed.  Patient also has acute CHF upon admission, improved with IV Lasix.  Patient also had pacemaker interrogation, which is normal.  Patient also has non-STEMI, cardiology recommended conservative management.  Patient had history of CABG x1 and CABG redo X1, patient also has multiple stents, patient has had multiple hospitalizations for non-STEMI, most recent cardiac catheterization on 12/2023 only shows Patent sequential saphenous vein graft to LAD as well as 1st and 2nd marginal divisions of the circumflex with right coronary artery arm of graft occluded, no intervention was done at the time.  Later patient has frequent hospitalization for refractory angina, patient initially was placed on Ranexa this has to be discontinued due to QTC prolongation, and now patient is on Imdur 60 mg once a day.      Now patient is pending discharge to rehab.

## 2024-06-21 NOTE — PLAN OF CARE
Problem: Adult Inpatient Plan of Care  Goal: Plan of Care Review  Outcome: Progressing  Goal: Patient-Specific Goal (Individualized)  Outcome: Progressing  Goal: Absence of Hospital-Acquired Illness or Injury  Outcome: Progressing  Goal: Optimal Comfort and Wellbeing  Outcome: Progressing  Goal: Readiness for Transition of Care  Outcome: Progressing     Problem: Skin Injury Risk Increased  Goal: Skin Health and Integrity  Outcome: Progressing     Problem: Fall Injury Risk  Goal: Absence of Fall and Fall-Related Injury  Outcome: Progressing     Problem: Sepsis/Septic Shock  Goal: Optimal Coping  Outcome: Progressing  Goal: Absence of Bleeding  Outcome: Progressing  Goal: Blood Glucose Level Within Targeted Range  Outcome: Progressing  Goal: Absence of Infection Signs and Symptoms  Outcome: Progressing  Goal: Optimal Nutrition Intake  Outcome: Progressing

## 2024-06-21 NOTE — PLAN OF CARE
Notified by Alysha that pt is approved for admission and report can be called to [phone] and ask for nurse for A3-pt nurse notified via secure chat.  Alysha reports that their van will pick pt up prior to 5. CM spoke with pt daughter Violette and informed her.  Discharge orders and chart reviewed with no further post-acute discharge needs identified at this time.  At this time, patient is cleared for discharge from Case Management standpoint.        06/21/24 1349   Final Note   Assessment Type Final Discharge Note   Anticipated Discharge Disposition SNF   What phone number can be called within the next 1-3 days to see how you are doing after discharge? [phone]   Post-Acute Status   Post-Acute Authorization Placement   Post-Acute Placement Status Set-up Complete/Auth obtained   Discharge Delays None known at this time

## 2024-06-21 NOTE — PT/OT/SLP PROGRESS
Occupational Therapy   Treatment    Name: Jerome Amaro Jr.  MRN: [id number]  Admitting Diagnosis:  Acute on chronic combined systolic and diastolic heart failure       Recommendations:     Discharge Recommendations: Moderate Intensity Therapy  Discharge Equipment Recommendations:  to be determined by next level of care  Barriers to discharge:  None    Assessment:     Jerome Amaro Jr. is a 78 y.o. male with a medical diagnosis of Acute on chronic combined systolic and diastolic heart failure.  Performance deficits affecting function are weakness, impaired endurance, impaired self care skills, orthopedic precautions, impaired functional mobility, gait instability. TLSO in place; patient up in chair.    Rehab Prognosis:  Good; patient would benefit from acute skilled OT services to address these deficits and reach maximum level of function.       Plan:     Patient to be seen 5 x/week to address the above listed problems via self-care/home management, therapeutic activities, therapeutic exercises  Plan of Care Expires: 07/18/24  Plan of Care Reviewed with: patient    Subjective     Chief Complaint: no new complaints   Patient/Family Comments/goals: return home soon   Pain/Comfort:  Did not indicate any pain      Objective:     Communicated with: nurse prior to session.  Patient found up in chair with telemetry, blood pressure cuff, chair check, pulse ox (continuous), peripheral IV upon OT entry to room.    General Precautions: Standard, fall, seizure    Orthopedic Precautions:spinal precautions  Braces: TLSO  Respiratory Status: Room air     Occupational Performance:     Activities of Daily Living:  Grooming: stand by assistance/setup for washing face/hands and oral care while seated in chair     Treatment & Education:  Patient was educated on safety during self care activities, equipment needs, discharge planning and reviewed use of call bell for assistance.     Patient left up in chair with all lines intact and call  button in reach    GOALS:   Multidisciplinary Problems       Occupational Therapy Goals          Problem: Occupational Therapy    Goal Priority Disciplines Outcome Interventions   Occupational Therapy Goal     OT, PT/OT Progressing    Description: Goals to be met by: 7/19/2024     Patient will increase functional independence with ADLs by performing:    UE Dressing with Modified [place].  LE Dressing with Modified [place] and Assistive Devices as needed.  Grooming while standing at sink with Modified [place].  Toileting from toilet with Modified [place] for hygiene and clothing management.   Supine to sit with Modified [place].  Step transfer with Modified [place]  Toilet transfer to toilet with Modified [place].                         Time Tracking:     OT Date of Treatment: 06/21/24  OT Start Time: 1035  OT Stop Time: 1045  OT Total Time (min): 10 min    Billable Minutes:Self Care/Home Management 10               6/21/2024

## 2024-06-21 NOTE — PLAN OF CARE
Problem: Adult Inpatient Plan of Care  Goal: Plan of Care Review  Outcome: Met  Goal: Patient-Specific Goal (Individualized)  Outcome: Met  Goal: Absence of Hospital-Acquired Illness or Injury  Outcome: Met  Goal: Optimal Comfort and Wellbeing  Outcome: Met  Goal: Readiness for Transition of Care  Outcome: Met     Problem: Skin Injury Risk Increased  Goal: Skin Health and Integrity  Outcome: Met     Problem: Fall Injury Risk  Goal: Absence of Fall and Fall-Related Injury  Outcome: Met     Problem: Sepsis/Septic Shock  Goal: Optimal Coping  Outcome: Met  Goal: Absence of Bleeding  Outcome: Met  Goal: Blood Glucose Level Within Targeted Range  Outcome: Met  Goal: Absence of Infection Signs and Symptoms  Outcome: Met  Goal: Optimal Nutrition Intake  Outcome: Met

## 2024-06-22 LAB
OHS QRS DURATION: 152 MS
OHS QRS DURATION: 154 MS
OHS QRS DURATION: 154 MS
OHS QRS DURATION: 156 MS
OHS QTC CALCULATION: 520 MS
OHS QTC CALCULATION: 545 MS
OHS QTC CALCULATION: 549 MS
OHS QTC CALCULATION: 560 MS

## 2024-06-23 NOTE — DISCHARGE SUMMARY
Critical access hospital Medicine  Discharge Summary      Patient Name: Jerome Amaro Jr.  MRN: [id number]  HANSEL: [id number]  Patient Class: IP- Inpatient  Admission Date: 6/15/2024  Hospital Length of Stay: 5 days  Discharge Date and Time: 6/21/2024  4:01 PM  Attending Physician: No att. providers found   Discharging Provider: Andi Thomason MD  Primary Care Provider: Magda Ruiz FNP-C    Primary Care Team: Networked reference to record PCT     HPI:   78 year old pt getting admitted with acute CHF and L2 fracture  Pt was started on SQ Lasix inj recently   Yesterday pt had a fall and hit his back on ground  Denied hitting head/No loss of consciousness   Pt since then was unable to walk or move around due to pain  Family thought pt had Side effect from SQ Lasix  Today symptoms went worse and he was escorted to ER   Pt was found to be hypoxic in ER which was new to him  Per family , pt is not on home oxygen  Pt was alert and oriented x 3 upon arrival to ER   In ER after pt had CT Lumbar spine he appeared confused and had tachycardia & EKG showed paced rhythm       * No surgery found *      Hospital Course:   78-year-old male with a history of PAF on Eliquis, extensive CAD on Plavix, refractory angina presenting here for mechanical fall, no LOC, found to have L2 fracture, evaluated by Neurosurgery recommended conservative management, patient has TLSO brace in place.  Initially CT head also shows questionable SDH, later confirmed it is chronic hygroma, patient was taking Eliquis and Plavix at home and this was resumed.  Patient also has acute CHF upon admission, improved with IV Lasix.  Patient also had pacemaker interrogation, which is normal.  Patient also has non-STEMI, cardiology recommended conservative management.  Patient had history of CABG x1 and CABG redo X1, patient also has multiple stents, patient has had multiple hospitalizations for non-STEMI, most recent cardiac catheterization on 12/2023 only  shows Patent sequential saphenous vein graft to LAD as well as 1st and 2nd marginal divisions of the circumflex with right coronary artery arm of graft occluded, no intervention was done at the time.  Later patient has frequent hospitalization for refractory angina, patient initially was placed on Ranexa this has to be discontinued due to QTC prolongation, and now patient is on Imdur 60 mg once a day.      Now patient is pending discharge to rehab.      Goals of Care Treatment Preferences:  Code Status: Full Code      Consults:   Consults (From admission, onward)          Status Ordering Provider     Inpatient consult to   Once        Provider:  (Not yet assigned)    Completed SULTAN, AQIB     Inpatient consult to Social Work/Case Management  Once        Provider:  (Not yet assigned)    Completed SULTAN, AQIB     Inpatient consult to Cardiology  Once        Provider:  Rodrigo Willoughby MD    Completed SULTAN, AQIB     Inpatient consult to Neurosurgery  Once        Provider:  Calvin Rich DO    Completed KINGS MONTILLA     Inpatient consult to Neurology  Once        Provider:  Romero Simon MD    Completed KINGS MONTILLA            Neuro  Subdural hygroma    No signs of SDH, resume Eliquis and Plavix    Acute confusional state  Secondary to sepsis. Pt developed confusion after he came  back from CT room  Ordered blood and urine cultures  Afebrile   Pulling out lines  Received iv morphine in ER upon arrival as per records  Pt has h/o seizure disorder and on phenytoin   CT brain results reviewed.   Resolved        L2 vertebral fracture  Mild-to-moderate compression fracture deformity of the L2 vertebral body   Neurosurgeon consulted , no surgical intervention, pending TLSO   Back brace has been delivered      Seizure disorder  Maintain phenytoin PO  Check serum levels  Seizure precautions      Pulmonary  Hypoxia  Mostly from CHF flare  Maintain iv diuresis  Need home oxygen evaluation before discharge  "from hospital      Cardiac/Vascular  * Acute on chronic combined systolic and diastolic heart failure  Latest ECHO performed and demonstrates- Results for orders placed during the hospital encounter of 06/15/24    Echo    Interpretation Summary    Left Ventricle: The left ventricle is normal in size. Increased ventricular mass. Mildly increased wall thickness. There is mild concentric hypertrophy. Unable to assess wall motion. Septal motion is consistent with pacing. There is normal systolic function with a visually estimated ejection fraction of 55 - 60%. Diastolic function cannot be reliably determined in the presence of mitral annular calcification.    Right Ventricle: Right ventricle was not well visualized due to poor acoustic window. Normal right ventricular cavity size. Pacemaker lead present in the ventricle.    Aortic Valve: There is a transcatheter valve replacement in the aortic position.    Mitral Valve: Mildly thickened leaflets. There is moderate mitral annular calcification present. There is mild regurgitation.    Tricuspid Valve: There is mild to moderate regurgitation.    No results for input(s): "BNP", "BNPTRIAGEBLO" in the last 168 hours.  .        Recent ECHO reviewed   Latest Reference Range & Units 02/09/24 10:09 02/29/24 07:50 06/15/24 13:32   BNP 0 - 99 pg/mL 958 (H) 1,720 (H) 1,892 (H)   Holding Entersto due to low BP.    Patient is status post IV Lasix has good urine output, now changed to oral Lasix.  Patient apparently euvolemic now.     No signs of severe sepsis, all culture has been negative, patient has been afebrile since hospitalization, discontinue IV antibiotics.   Pacemaker interrogation- no significant arrhythmia.     Patient is cleared for discharge to the rehab center.   Okay to transfer to medical-surgical floor       Hx of CABG  Aware       Atrial fibrillation  H/o aware   Resume Eliquis    NSTEMI (non-ST elevated myocardial infarction)  2D echocardiogram results reviewed; " ejection fraction 55-60%.  Continue heparin gtt if ok with neurologist. Subdural hygroma - does not appear to be SDH.  Continue cardiac meds. Patient is CP free.     Previous extensive medical records has been reviewed by me patient apparently has CABG x1 and CABG redo X1, patient also has multiple stents, patient has had multiple hospitalizations for non-STEMI, most recent cardiac catheterization on 12/2023 only shows Patent sequential saphenous vein graft to LAD as well as 1st and 2nd marginal divisions of the circumflex with right coronary artery arm of graft occluded, no intervention was done at the time.  Later patient has frequent hospitalization for refractory angina, patient initially was placed on Ranexa this has to be discontinued due to QTC prolongation, and now patient is on Imdur 60 mg once a day.  Cardiology on recommended medical management.   Resume Plavix      S/P TAVR (transcatheter aortic valve replacement)  Aware       HTN (hypertension)  Chronic, controlled. Latest blood pressure and vitals reviewed-      .   Home meds for hypertension were reviewed and noted below.   Hypertension Medications               ENTRESTO 24-26 mg per tablet Take 1 tablet by mouth 2 (two) times daily.    furosemide (LASIX) 20 MG tablet Take 20 mg by mouth daily as needed (Fluid).    isosorbide mononitrate (IMDUR) 30 MG 24 hr tablet Take 2 tablets (60 mg total) by mouth once daily.    metoprolol succinate (TOPROL-XL) 100 MG 24 hr tablet Take 100 mg by mouth 2 (two) times daily.    NITROLINGUAL 400 mcg/spray spray Place 1 spray under the tongue every 5 (five) minutes as needed for Chest pain.        Hold ISBD and ARB for now due to low BP.    While in the hospital, will manage blood pressure as follows; Continue home antihypertensive regimen    Will utilize p.r.n. blood pressure medication only if patient's blood pressure greater than 140/90 and he develops symptoms such as worsening chest pain or shortness of  "breath.      Final Active Diagnoses:    Diagnosis Date Noted POA    PRINCIPAL PROBLEM:  Acute on chronic combined systolic and diastolic heart failure [I50.43] 06/15/2024 Yes    Subdural hygroma [G96.08] 06/17/2024 Yes    L2 vertebral fracture [S32.029A] 06/15/2024 Yes    Hypoxia [R09.02] 06/15/2024 Yes    Hx of CABG [Z95.1] 06/15/2024 Not Applicable    Acute confusional state [F05] 06/15/2024 Yes    Atrial fibrillation [I48.91] 02/29/2024 Yes    Seizure disorder [G40.909] 05/07/2021 Yes    NSTEMI (non-ST elevated myocardial infarction) [I21.4] 11/05/2019 Yes    HTN (hypertension) [I10] 01/07/2015 Yes    S/P TAVR (transcatheter aortic valve replacement) [Z95.2] 01/07/2015 Not Applicable    CAD (coronary artery disease) [I25.10] 01/07/2015 Yes      Problems Resolved During this Admission:    Diagnosis Date Noted Date Resolved POA    Severe sepsis [A41.9, R65.20] 06/16/2024 06/18/2024 Yes       Discharged Condition: stable    Disposition: Skilled Nursing Facility    Follow Up:   Follow-up Information       [facility] Follow up.    Specialties: Nursing Home Agency, SNF Agency  Contact information:  [address] [place] [address]  [phone]               Jnoathan Bey MD. Schedule an appointment as soon as possible for a visit in 1 week(s).    Specialty: Cardiology  Contact information:  [address]  [address]  [facility]  [place] [address]  [phone]               Lucien Clay DO. Schedule an appointment as soon as possible for a visit in 1 week(s).    Specialty: Neurosurgery  Contact information:  [address] [place] [address]   [address]  [place] [address]  [phone]                           Patient Instructions:      Back/Cervical Brace For Home Use   Order Comments: TLSO brace when out of bed and ambulating     Order Specific Question Answer Comments   Height: 5' 6" (1.676 m)    Weight: 75.6 kg (166 lb 10.7 oz)    Length of need (1-99 months): 99    Product(s) " "ordered: TLSO BRACE    Does patient have medical equipment at home? cane, straight    Does patient have medical equipment at home? walker, rolling        Significant Diagnostic Studies: Labs: CMP No results for input(s): "NA", "K", "CL", "CO2", "GLU", "BUN", "CREATININE", "CALCIUM", "PROT", "ALBUMIN", "BILITOT", "ALKPHOS", "AST", "ALT", "ANIONGAP", "ESTGFRAFRICA", "EGFRNONAA" in the last 48 hours. and CBC No results for input(s): "WBC", "HGB", "HCT", "PLT" in the last 48 hours.    Pending Diagnostic Studies:       None           Medications:  Reconciled Home Medications:      Medication List        START taking these medications      HYDROcodone-acetaminophen 5-325 mg per tablet  Commonly known as: NORCO  Take 1 tablet by mouth every 6 (six) hours as needed.            CHANGE how you take these medications      citalopram 20 MG tablet  Commonly known as: CeleXA  Take 2 tablets (40 mg total) by mouth once daily.  What changed:   medication strength  additional instructions     furosemide 20 MG tablet  Commonly known as: LASIX  Take 1 tablet (20 mg total) by mouth once daily.  What changed:   when to take this  reasons to take this            CONTINUE taking these medications      clopidogreL 75 mg tablet  Commonly known as: PLAVIX  Take 75 mg by mouth once daily.     CORLANOR 5 mg Tab  Generic drug: ivabradine  Take 1 tablet by mouth 2 (two) times daily. NEW Rx - NOT YET STARTED     ELIQUIS 5 mg Tab  Generic drug: apixaban  Take 5 mg by mouth every morning.     ezetimibe 10 mg tablet  Commonly known as: ZETIA  Take 10 mg by mouth once daily.     famotidine 40 MG tablet  Commonly known as: PEPCID  Take 40 mg by mouth Daily.     hydrOXYzine pamoate 25 MG Cap  Commonly known as: VISTARIL  Take 1 capsule (25 mg total) by mouth daily as needed (anxiety).     isosorbide mononitrate 30 MG 24 hr tablet  Commonly known as: IMDUR  Take 2 tablets (60 mg total) by mouth once daily.     metoprolol succinate 100 MG 24 hr " tablet  Commonly known as: TOPROL-XL  Take 100 mg by mouth 2 (two) times daily.     NITROLINGUAL 400 mcg/spray spray  Generic drug: nitroGLYCERIN 0.4 MG/DOSE TL SPRY  Place 1 spray under the tongue every 5 (five) minutes as needed for Chest pain.     phenytoin 100 MG ER capsule  Commonly known as: DILANTIN  Take 400 mg by mouth once daily.            STOP taking these medications      bismuth subsalicylate 262 mg Chew  Commonly known as: BISMAREX     ENTRESTO 24-26 mg per tablet  Generic drug: sacubitriL-valsartan     pantoprazole 20 MG tablet  Commonly known as: PROTONIX     ranolazine 1,000 mg Tb12  Commonly known as: RANEXA            Echo    Result Date: 6/16/2024    Left Ventricle: The left ventricle is normal in size. Increased ventricular mass. Mildly increased wall thickness. There is mild concentric hypertrophy. Unable to assess wall motion. Septal motion is consistent with pacing. There is normal systolic function with a visually estimated ejection fraction of 55 - 60%. Diastolic function cannot be reliably determined in the presence of mitral annular calcification.   Right Ventricle: Right ventricle was not well visualized due to poor acoustic window. Normal right ventricular cavity size. Pacemaker lead present in the ventricle.   Aortic Valve: There is a transcatheter valve replacement in the aortic position.   Mitral Valve: Mildly thickened leaflets. There is moderate mitral annular calcification present. There is mild regurgitation.   Tricuspid Valve: There is mild to moderate regurgitation.     CT Head Without Contrast    Result Date: 6/16/2024  EXAMINATION: CT HEAD WITHOUT CONTRAST CLINICAL HISTORY: sdh;. TECHNIQUE: CMS MANDATED QUALITY DATA - CT RADIATION - 436 All CT scans at this facility utilize dose modulation, iterative reconstruction, and/or weight based dosing when appropriate to reduce radiation dose to as low as reasonably achievable. Non infusion images were obtained from the skull base to  the vertex. COMPARISON: 06/15/2024 FINDINGS: The ventricles and sulci are prominent compatible with generalized cerebral atrophy. There is no hemorrhage, mass or midline shift. There is stable prominence of the subdural spaces in the cerebral convexities is likely representing chronic subdural hygromas.  There is no evidence of acute hemorrhage. There is low-attenuation within the periventricular white matter compatible with chronic small vessel disease.  The cerebellum and brainstem are normal.  The orbits are normal.  The paranasal sinuses and mastoid air cells are clear.     1. Generalized cerebral atrophy with stable prominence of the subdural spaces in the cerebral convexities which may represent chronic subdural hygromas.  There is no intraparenchymal hemorrhage or midline shift 2. Mild chronic small vessel disease. Electronically signed by: Marie Laguna Date:    06/16/2024 Time:    07:38    CT Head Without Contrast    Result Date: 6/15/2024  EXAMINATION: CT HEAD WITHOUT CONTRAST CLINICAL HISTORY: Mental status change, unknown cause; TECHNIQUE: Low dose axial images were obtained through the head.  Coronal and sagittal reformations were also performed. Contrast was not administered. COMPARISON: No prior studies are available for comparison. FINDINGS: There is generalized cerebral volume loss with compensatory sulcal widening and ventricular enlargement.  There are bilateral hypoattenuating extra-axial collections overlying the cerebral convexities suggestive of subdural hygromas or chronic subdural hematomas.  These measure approximately 1.5 cm on the right (coronal series 6, image 44) and 0.8 cm on the left (coronal series 6, image 30).  There is mild associated mass effect of the right vertex.  There is questionable slight internal complexity overlying the frontal convexities.  This may reflect artifact, although component of subacute blood products not excluded.  There is no significant midline shift.   No evidence to suggest overt hydrocephalus at this time.  There is periventricular white matter hypoattenuation suggesting sequelae of chronic microvascular ischemic change.  The basal cisterns are patent. The mastoid air cells and paranasal sinuses are clear of acute process. The visualized bones of the calvarium demonstrate no acute osseous abnormality.     Bilateral hypoattenuating extra-axial collections overlying the cerebral convexities, right greater than left.  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  There is mild associated mass effect on the right vertex, although no significant midline shift or hydrocephalus appreciated.  Questionable minimal internal complexity overlying the frontal convexities may be artifactual, although component of subacute blood products not excluded.  Consider short-term CT follow-up. Chronic senescent and microvascular ischemic changes. This report was flagged in Epic as abnormal. Electronically signed by: Angel Kitchen MD Date:    06/15/2024 Time:    20:52    CT Lumbar Spine Without Contrast    Result Date: 6/15/2024  EXAMINATION: CT LUMBAR SPINE WITHOUT CONTRAST CLINICAL HISTORY: l2 fracture; TECHNIQUE: Low-dose axial, sagittal and coronal reformations are obtained through the lumbar spine.  Contrast was not administered. COMPARISON: Chest radiograph 09/24/2021 FINDINGS: There is mild nonspecific diffuse heterogeneity of the visualized osseous structures which may relate to underlying osteopenia.  Clinical correlation is advised.  There are mild-to-moderate compression deformities of the T9 and T10 vertebral bodies.  There is a mild superior endplate compression deformity of the T12 vertebral body.  There is a moderate compression deformity of the L1 vertebral body.  There is a recent appearing mild to moderate compression fracture of the L2 vertebral body.  Fracture lucency is noted  through the anterior-superior endplate.  There is minimal retropulsion with mild effacement of the anterior thecal sac.  The L3, L4, and L5 vertebral body heights appear well maintained without significant height loss appreciated allowing for underlying osteopenia. There is minimal grade 1 anterolisthesis of L4 on L5. There is mild intervertebral disc space height loss most pronounced at L5-S1. There are degenerative changes of the lumbar spine as detailed below: T12-L1: Broad-based disc bulge and mild ligamentum flavum thickening.  Mild bilateral neural foraminal narrowing. L1-L2: Circumferential disc bulge, ligamentum flavum thickening, and bilateral facet arthropathy.  Mild spinal canal stenosis and moderate bilateral neural foraminal narrowing. L2-L3: Circumferential disc bulge, ligamentum flavum thickening, and bilateral facet arthropathy.  Moderate to severe spinal canal stenosis.  Moderate bilateral neural foraminal narrowing. L3-L4: Circumferential disc bulge, ligamentum flavum thickening, and bilateral facet arthropathy.  Moderate spinal canal stenosis.  Moderate bilateral neural foraminal narrowing. L4-L5: Circumferential disc bulge, ligamentum flavum thickening, and bilateral facet arthropathy.  Moderate spinal canal stenosis.  Severe moderate to severe bilateral neural foraminal narrowing. L5-S1: Broad-based circumferential disc bulge, asymmetric to the right.  Moderate to severe bilateral neural foraminal narrowing. No grossly displaced sacral fracture identified.  There are degenerative changes of bilateral sacroiliac joints. Limited views of the posterior abdomen demonstrate significant volume layering bilateral pleural effusions with associated compressive atelectasis at the visualized lung bases.  There is a right renal cyst.  There is prominent aortic atherosclerosis.  No retroperitoneal hematoma identified.     Recent appearing mild-to-moderate compression fracture deformity of the L2 vertebral  body.  Multiple additional compression deformities of the T9, T10, T12, and L1 vertebral bodies as above.  These have a somewhat more chronic appearance. Moderately advanced multilevel degenerative changes of the lumbar spine as detailed above.  Further evaluation and follow-up could be performed with MRI when clinically appropriate if there are no patient contraindications. Significant volume bilateral pleural effusion with adjacent compressive atelectasis and/or consolidation. Electronically signed by: Angel Kitchen MD Date:    06/15/2024 Time:    17:32    X-Ray Hips Bilateral 2 View Incl AP Pelvis    Result Date: 6/15/2024  EXAMINATION: XR HIPS BILATERAL 2 VIEW INCL AP PELVIS CLINICAL HISTORY: Dorsalgia, unspecified FINDINGS: AP pelvis and two views of bilateral hips show no fracture, dislocation, or destructive osseous lesion.  Bilateral hip joint spaces are maintained.  Pubic symphysis and sacroiliac joints are maintained.  Surgical clips lie in left inguinal region.  Vascular calcifications are present.  Stool and bowel gas partially obscures the lower lumbar spine and sacrum and coccyx.     No acute abnormality of bilateral hips. Electronically signed by: Francisco Nicholson Date:    06/15/2024 Time:    15:57    X-Ray Chest AP Portable    Result Date: 6/15/2024  EXAMINATION: XR CHEST AP PORTABLE CLINICAL HISTORY: hypoxia; FINDINGS: Portable chest at 1254 compared with 12/29/2024 shows unchanged borderline enlarged cardiac silhouette size.  Mediastinal contours are unchanged with postsurgical changes of CABG.  Prior aortic valve replacement noted.  Left transvenous pacer unchanged. Lung volumes are low.  Blunting of the costophrenic angles bilaterally suggest trace pleural effusions.  Prior central pulmonary vascular prominence and central interstitial opacities have significantly improved.  Minimal bibasilar interstitial opacities persist.  Minimal calcification suggested along the right diaphragm and in right  hemithorax suggesting pleural plaques.  No definite acute osseous abnormality identified.  Bones are diffusely demineralized, limiting evaluation.     1. Trace bilateral pleural effusions. 2. Improvement of interstitial edema and central pulmonary vascular prominence. Electronically signed by: Francisco Nicholson Date:    06/15/2024 Time:    14:18    X-Ray Lumbar Spine 5 View    Result Date: 6/15/2024  EXAMINATION: XR LUMBAR SPINE COMPLETE 5 VIEW CLINICAL HISTORY: fall; FINDINGS: Five views of lumbar spine show normal lumbosacral alignment. Compression fractures of T10, T11, T12, L1, and L2 are evident.  T10, T11, T12, and L1 compression fractures have not significantly changed since CT 04/24/2019.  Superior endplate of L2 compression fracture has progressed since that time pre wall inferior endplate compression fracture of L2 is unchanged. Moderate L5-S1 disc degeneration is evident.  Remaining lumbar intervertebral disc space heights are relatively well maintained. Sacroiliac joints are normal. Arterial vascular calcifications are present.  Postsurgical changes of CABG are suggested 12 partially visualize, along with cardiac pacing leads.  Mild-to-moderate stool and moderate bowel gas are present throughout the abdomen, limiting evaluation.     1. Age indeterminate superior endplate L2 compression fracture which has progressed since 2019.  Correlation for symptoms at this level is requested. 2. Compression fractures of T10, T11, T12, and L1, not significantly changed since 2016. 3. Moderate L5-S1 disc degeneration. Electronically signed by: Francisco Nicholson Date:    06/15/2024 Time:    14:16  - pulls last radiology orders    Indwelling Lines/Drains at time of discharge:   Lines/Drains/Airways       None                   Time spent on the discharge of patient: 35 minutes         Andi Thomason MD  Department of Hospital Medicine  [facility]

## 2024-06-23 NOTE — ASSESSMENT & PLAN NOTE
"Latest ECHO performed and demonstrates- Results for orders placed during the hospital encounter of 06/15/24    Echo    Interpretation Summary    Left Ventricle: The left ventricle is normal in size. Increased ventricular mass. Mildly increased wall thickness. There is mild concentric hypertrophy. Unable to assess wall motion. Septal motion is consistent with pacing. There is normal systolic function with a visually estimated ejection fraction of 55 - 60%. Diastolic function cannot be reliably determined in the presence of mitral annular calcification.    Right Ventricle: Right ventricle was not well visualized due to poor acoustic window. Normal right ventricular cavity size. Pacemaker lead present in the ventricle.    Aortic Valve: There is a transcatheter valve replacement in the aortic position.    Mitral Valve: Mildly thickened leaflets. There is moderate mitral annular calcification present. There is mild regurgitation.    Tricuspid Valve: There is mild to moderate regurgitation.    No results for input(s): "BNP", "BNPTRIAGEBLO" in the last 168 hours.  .        Recent ECHO reviewed   Latest Reference Range & Units 02/09/24 10:09 02/29/24 07:50 06/15/24 13:32   BNP 0 - 99 pg/mL 958 (H) 1,720 (H) 1,892 (H)   Holding Entersto due to low BP.    Patient is status post IV Lasix has good urine output, now changed to oral Lasix.  Patient apparently euvolemic now.     No signs of severe sepsis, all culture has been negative, patient has been afebrile since hospitalization, discontinue IV antibiotics.   Pacemaker interrogation- no significant arrhythmia.     Patient is cleared for discharge to the rehab center.   Okay to transfer to medical-surgical floor     "

## 2024-06-23 NOTE — ASSESSMENT & PLAN NOTE
Chronic, controlled. Latest blood pressure and vitals reviewed-      .   Home meds for hypertension were reviewed and noted below.   Hypertension Medications               ENTRESTO 24-26 mg per tablet Take 1 tablet by mouth 2 (two) times daily.    furosemide (LASIX) 20 MG tablet Take 20 mg by mouth daily as needed (Fluid).    isosorbide mononitrate (IMDUR) 30 MG 24 hr tablet Take 2 tablets (60 mg total) by mouth once daily.    metoprolol succinate (TOPROL-XL) 100 MG 24 hr tablet Take 100 mg by mouth 2 (two) times daily.    NITROLINGUAL 400 mcg/spray spray Place 1 spray under the tongue every 5 (five) minutes as needed for Chest pain.        Hold ISBD and ARB for now due to low BP.    While in the hospital, will manage blood pressure as follows; Continue home antihypertensive regimen    Will utilize p.r.n. blood pressure medication only if patient's blood pressure greater than 140/90 and he develops symptoms such as worsening chest pain or shortness of breath.

## 2024-07-02 ENCOUNTER — TELEPHONE (OUTPATIENT)
Dept: NEUROSURGERY | Facility: CLINIC | Age: 79
End: 2024-07-02
Payer: MEDICARE

## 2024-07-02 DIAGNOSIS — D18.1 HYGROMA: Primary | ICD-10-CM

## 2024-07-02 NOTE — TELEPHONE ENCOUNTER
Attempted to contact pt to provide follow up appt details. Pt was unable to hear appt details. Contacted [facility] where pt still resides. Left appt details for  and contact information for our office if appt dates/times does not work for pt.

## 2024-07-02 NOTE — TELEPHONE ENCOUNTER
----- Message from Lesia Mart RN sent at 7/1/2024  4:19 PM CDT -----    ----- Message -----  From: Mayra Dawson  Sent: 7/1/2024   4:16 PM CDT  To: Lesia Mart RN; Fermín Castelan LPN      ----- Message -----  From: Elizabeth Doherty  Sent: 7/1/2024   4:13 PM CDT  To: [facility] Staff    Type:  Sooner Appointment Request    Caller is requesting a sooner appointment.  Caller declined first available appointment listed below.  Caller will not accept being placed on the waitlist and is requesting a message be sent to doctor.    Name of Caller:  Carol Ann with [facility]  When is the first available appointment?  N/A  Symptoms:  follow up after [facility] discharge  Would the patient rather a call back or a response via [facility]? Call back  Best Call Back Number: [phone]  Additional Information:  Carol Ann states that first available does not work for them and needs to be seen for a follow up after being discharged from [facility]. Pt is being discharged 07/02 and needs a one week follow up appt. Please call the pt to schedule. Please call back and advise. Thanks!

## 2024-07-03 ENCOUNTER — PATIENT OUTREACH (OUTPATIENT)
Dept: FAMILY MEDICINE | Facility: CLINIC | Age: 79
End: 2024-07-03
Payer: MEDICARE

## 2024-07-03 NOTE — TELEPHONE ENCOUNTER
Spoke with pt's daughter, states her dad is doing fine. Couldn't schedule with Magda this week, has appt. With other specialist.  Will call back to  schedule appt.

## 2024-07-15 ENCOUNTER — TELEPHONE (OUTPATIENT)
Dept: NEUROSURGERY | Facility: CLINIC | Age: 79
End: 2024-07-15
Payer: MEDICARE

## 2024-07-15 NOTE — TELEPHONE ENCOUNTER
Returned call to pt's daughter. She reports pt does not wish to proceed with CT if is not clinically indicated at this. Spoke with Dr. Clay and he reviewed pt's scans that were completed while admitted and recommends surveillance in 6 months with a head CT. She voiced understanding and I informed I will cancel CT and appt with Dr. Clay for this week.

## 2024-07-15 NOTE — TELEPHONE ENCOUNTER
----- Message from Stacey M Lefort sent at 7/15/2024  8:43 AM CDT -----  Daughter Cindy Schwab is requesting a callback - she has questions about her father's appointment. Her callback is [phone]. Thank you.

## 2024-09-04 ENCOUNTER — OFFICE VISIT (OUTPATIENT)
Dept: FAMILY MEDICINE | Facility: CLINIC | Age: 79
End: 2024-09-04
Payer: MEDICARE

## 2024-09-04 VITALS
HEIGHT: 66 IN | HEART RATE: 57 BPM | BODY MASS INDEX: 27.8 KG/M2 | WEIGHT: 173 LBS | OXYGEN SATURATION: 98 % | TEMPERATURE: 98 F | SYSTOLIC BLOOD PRESSURE: 128 MMHG | DIASTOLIC BLOOD PRESSURE: 70 MMHG

## 2024-09-04 DIAGNOSIS — L29.9 ITCHING: ICD-10-CM

## 2024-09-04 DIAGNOSIS — T14.8XXA ABRASION: ICD-10-CM

## 2024-09-04 DIAGNOSIS — L98.9 SKIN LESION: Primary | ICD-10-CM

## 2024-09-04 DIAGNOSIS — L85.3 DRY SKIN: ICD-10-CM

## 2024-09-04 PROCEDURE — 99213 OFFICE O/P EST LOW 20 MIN: CPT | Mod: HCNC,S$GLB,, | Performed by: NURSE PRACTITIONER

## 2024-09-04 PROCEDURE — 3074F SYST BP LT 130 MM HG: CPT | Mod: HCNC,CPTII,S$GLB, | Performed by: NURSE PRACTITIONER

## 2024-09-04 PROCEDURE — 1160F RVW MEDS BY RX/DR IN RCRD: CPT | Mod: HCNC,CPTII,S$GLB, | Performed by: NURSE PRACTITIONER

## 2024-09-04 PROCEDURE — 99999 PR PBB SHADOW E&M-EST. PATIENT-LVL IV: CPT | Mod: PBBFAC,HCNC,, | Performed by: NURSE PRACTITIONER

## 2024-09-04 PROCEDURE — 3288F FALL RISK ASSESSMENT DOCD: CPT | Mod: HCNC,CPTII,S$GLB, | Performed by: NURSE PRACTITIONER

## 2024-09-04 PROCEDURE — 1126F AMNT PAIN NOTED NONE PRSNT: CPT | Mod: HCNC,CPTII,S$GLB, | Performed by: NURSE PRACTITIONER

## 2024-09-04 PROCEDURE — 1159F MED LIST DOCD IN RCRD: CPT | Mod: HCNC,CPTII,S$GLB, | Performed by: NURSE PRACTITIONER

## 2024-09-04 PROCEDURE — 1101F PT FALLS ASSESS-DOCD LE1/YR: CPT | Mod: HCNC,CPTII,S$GLB, | Performed by: NURSE PRACTITIONER

## 2024-09-04 PROCEDURE — 3078F DIAST BP <80 MM HG: CPT | Mod: HCNC,CPTII,S$GLB, | Performed by: NURSE PRACTITIONER

## 2024-09-04 RX ORDER — SACUBITRIL AND VALSARTAN 24; 26 MG/1; MG/1
1 TABLET, FILM COATED ORAL 2 TIMES DAILY
COMMUNITY

## 2024-09-04 RX ORDER — RANOLAZINE 1000 MG/1
1000 TABLET, EXTENDED RELEASE ORAL 4 TIMES DAILY
COMMUNITY

## 2024-09-04 RX ORDER — HYDROCORTISONE 1 %
CREAM (GRAM) TOPICAL
COMMUNITY
Start: 2024-06-24

## 2024-09-04 RX ORDER — CAPSAICIN 0.1 %
1 CREAM (GRAM) TOPICAL 2 TIMES DAILY
COMMUNITY
Start: 2024-09-04

## 2024-09-04 RX ORDER — MUPIROCIN 20 MG/G
OINTMENT TOPICAL 3 TIMES DAILY
Qty: 22 G | Refills: 2 | Status: SHIPPED | OUTPATIENT
Start: 2024-09-04

## 2024-09-04 NOTE — PROGRESS NOTES
Subjective:       Patient ID: Jerome Amaro Jr. is a 78 y.o. male.    Chief Complaint: rash both arms    Rash  This is a recurrent problem. The current episode started 1 to 4 weeks ago. The problem has been waxing and waning since onset. The affected locations include the left axilla, right arm and chest. The rash is characterized by redness, itchiness and dryness. He was exposed to nothing. Pertinent negatives include no anorexia, congestion, cough, diarrhea, eye pain, facial edema, fatigue, fever, joint pain, nail changes, rhinorrhea, shortness of breath, sore throat or vomiting. Past treatments include moisturizer, antihistamine and anti-itch cream. The treatment provided mild relief.     Review of Systems   Constitutional:  Negative for appetite change, chills, diaphoresis, fatigue, fever and unexpected weight change.   HENT:  Negative for congestion, ear discharge, ear pain, hearing loss, rhinorrhea, sore throat, trouble swallowing and voice change.    Eyes:  Negative for photophobia, pain and visual disturbance.   Respiratory:  Negative for cough, chest tightness and shortness of breath.    Cardiovascular:  Negative for chest pain, palpitations and leg swelling.   Gastrointestinal:  Negative for abdominal pain, anorexia, constipation, diarrhea, nausea and vomiting.   Endocrine: Negative for cold intolerance and heat intolerance.   Genitourinary:  Negative for difficulty urinating, dysuria and flank pain.   Musculoskeletal:  Negative for arthralgias, gait problem, joint pain and myalgias.   Skin:  Positive for color change, rash and wound. Negative for nail changes.   Allergic/Immunologic: Negative for immunocompromised state.   Neurological:  Negative for dizziness, weakness and headaches.   Hematological:  Negative for adenopathy.   Psychiatric/Behavioral:  Negative for agitation, confusion, self-injury and suicidal ideas.        Past Medical History:   Diagnosis Date    Anticoagulant long-term use     2014     Aortic valve disease 2014    pig valve    Arthritis     all over    Atrial fibrillation 2/29/2024    Chest pain 07/15/2019    CHF (congestive heart failure)     2014 on meds    Coronary artery disease     2007 cabg    Difficulty swallowing     Heart attack 1990    15 stents    Heart attack 02/01/2024    mild    Heart block     Hyperlipidemia     Hypertension     on meds    Hypothyroidism 2015    on meds    PVD (peripheral vascular disease)     Seizures     last episode 1990 on meds      Past Surgical History:   Procedure Laterality Date    AORTOGRAPHY WITH SERIALOGRAPHY N/A 11/16/2021    Procedure: AORTOGRAM, WITH RUNOFF;  Surgeon: Rodrigo Willoughby MD;  Location: [facility] CATH/EP LAB;  Service: Cardiology;  Laterality: N/A;    ARTERIOGRAPHY OF AORTIC ROOT N/A 11/05/2019    Procedure: ARTERIOGRAM, AORTIC ROOT;  Surgeon: Rodrigo Willoughby MD;  Location: [facility] CATH/EP LAB;  Service: Cardiology;  Laterality: N/A;    CARDIAC CATHETERIZATION      CARDIAC VALVE SURGERY      CORONARY ANGIOGRAPHY INCLUDING BYPASS GRAFTS WITH CATHETERIZATION OF LEFT HEART N/A 11/05/2019    Procedure: ANGIOGRAM, CORONARY, INCLUDING BYPASS GRAFT, WITH LEFT HEART CATHETERIZATION;  Surgeon: Rodrigo Willoughby MD;  Location: [facility] CATH/EP LAB;  Service: Cardiology;  Laterality: N/A;    CORONARY ANGIOGRAPHY INCLUDING BYPASS GRAFTS WITH CATHETERIZATION OF LEFT HEART Left 11/03/2020    Procedure: ANGIOGRAM, CORONARY, INCLUDING BYPASS GRAFT, WITH LEFT HEART CATHETERIZATION;  Surgeon: Rodrigo Willoughby MD;  Location: [facility] CATH/EP LAB;  Service: Cardiology;  Laterality: Left;    CORONARY ANGIOGRAPHY INCLUDING BYPASS GRAFTS WITH CATHETERIZATION OF LEFT HEART N/A 09/28/2021    Procedure: ANGIOGRAM, CORONARY, INCLUDING BYPASS GRAFT, WITH LEFT HEART CATHETERIZATION;  Surgeon: Rodrigo Willoughby MD;  Location: [facility] CATH/EP LAB;  Service: Cardiology;  Laterality: N/A;    CORONARY ANGIOGRAPHY INCLUDING BYPASS GRAFTS WITH CATHETERIZATION OF LEFT HEART N/A 12/05/2023    Procedure:  ANGIOGRAM, CORONARY, INCLUDING BYPASS GRAFT, WITH LEFT HEART CATHETERIZATION;  Surgeon: Rodrigo Willoughby MD;  Location: [facility] CATH/EP LAB;  Service: Cardiology;  Laterality: N/A;    CORONARY ANGIOPLASTY      CORONARY ARTERY BYPASS GRAFT      x 2    1991 2006    ESOPHAGOGASTRODUODENOSCOPY N/A 2/20/2024    Procedure: EGD (ESOPHAGOGASTRODUODENOSCOPY);  Surgeon: Mal Lockhart III, MD;  Location: [facility] ENDO;  Service: Endoscopy;  Laterality: N/A;    EXTRACORPOREAL SHOCK WAVE LITHOTRIPSY Right 08/01/2019    Procedure: LITHOTRIPSY, ESWL;  Surgeon: Williams Ortega MD;  Location: [facility] OR;  Service: Urology;  Laterality: Right;    HEMORRHOID SURGERY  1990    INSERTION OF PACEMAKER      PERCUTANEOUS TRANSLUMINAL BALLOON ANGIOPLASTY OF CORONARY ARTERY N/A 11/08/2019    Procedure: Angioplasty-coronary;  Surgeon: Rodrigo Willoughby MD;  Location: [facility] CATH/EP LAB;  Service: Cardiology;  Laterality: N/A;    WRIST FRACTURE SURGERY         Family History   Problem Relation Name Age of Onset    Heart attack Mother      Heart attack Father      Heart disease Sister 2     Stroke Sister 2     Diabetes Sister 2     No Known Problems Maternal Grandmother      No Known Problems Maternal Grandfather      No Known Problems Paternal Grandmother      No Known Problems Paternal Grandfather      No Known Problems Brother      No Known Problems Maternal Aunt      No Known Problems Maternal Uncle      No Known Problems Paternal Aunt      No Known Problems Paternal Uncle      Anemia Neg Hx      Arrhythmia Neg Hx      Asthma Neg Hx      Clotting disorder Neg Hx      Fainting Neg Hx      Heart failure Neg Hx      Hyperlipidemia Neg Hx      Hypertension Neg Hx      Atrial Septal Defect Neg Hx         Social History     Socioeconomic History    Marital status:    Tobacco Use    Smoking status: Former     Current packs/day: 0.00     Average packs/day: 0.5 packs/day for 4.0 years (2.0 ttl pk-yrs)     Types: Cigarettes     Start date: 1985      Quit date:      Years since quittin.7    Smokeless tobacco: Never   Substance and Sexual Activity    Alcohol use: Yes     Comment: social    Drug use: No    Sexual activity: Not Currently     Social Determinants of Health     Financial Resource Strain: Patient Declined (2024)    Overall Financial Resource Strain (CARDIA)     Difficulty of Paying Living Expenses: Patient declined   Food Insecurity: Patient Declined (2024)    Hunger Vital Sign     Worried About Running Out of Food in the Last Year: Patient declined     Ran Out of Food in the Last Year: Patient declined   Transportation Needs: Patient Declined (3/1/2024)    PRAPARE - Transportation     Lack of Transportation (Medical): Patient declined     Lack of Transportation (Non-Medical): Patient declined   Physical Activity: Unknown (2024)    Exercise Vital Sign     Days of Exercise per Week: Patient declined   Stress: Stress Concern Present (2024)    [employer] [place] [employer] - Occupational Stress Questionnaire     Feeling of Stress : To some extent   Housing Stability: Patient Declined (3/1/2024)    Housing Stability Vital Sign     Unable to Pay for Housing in the Last Year: Patient declined     Number of Places Lived in the Last Year: 1     Unstable Housing in the Last Year: Patient declined       Current Outpatient Medications   Medication Sig Dispense Refill    citalopram (CELEXA) 20 MG tablet Take 2 tablets (40 mg total) by mouth once daily. 60 tablet 0    clopidogrel (PLAVIX) 75 mg tablet Take 75 mg by mouth once daily.      CORLANOR 5 mg Tab Take 1 tablet by mouth 2 (two) times daily. NEW Rx - NOT YET STARTED      ELIQUIS 5 mg Tab Take 5 mg by mouth every morning.      ENTRESTO 24-26 mg per tablet Take 1 tablet by mouth 2 (two) times daily.      ezetimibe (ZETIA) 10 mg tablet Take 10 mg by mouth once daily.      famotidine (PEPCID) 40 MG tablet Take 40 mg by mouth Daily.      furosemide (LASIX) 20 MG tablet Take  "1 tablet (20 mg total) by mouth once daily. 30 tablet 0    hydrocortisone 1 % cream Apply topically.      hydrOXYzine pamoate (VISTARIL) 25 MG Cap Take 1 capsule (25 mg total) by mouth daily as needed (anxiety).      isosorbide mononitrate (IMDUR) 30 MG 24 hr tablet Take 2 tablets (60 mg total) by mouth once daily. 60 tablet 11    metoprolol succinate (TOPROL-XL) 100 MG 24 hr tablet Take 100 mg by mouth 2 (two) times daily.      NITROLINGUAL 400 mcg/spray spray Place 1 spray under the tongue every 5 (five) minutes as needed for Chest pain.      phenytoin (DILANTIN) 100 MG ER capsule Take 400 mg by mouth once daily.   0    ranolazine (RANEXA) 1,000 mg Tb12 Take 1,000 mg by mouth 4 (four) times daily. 2 po q am     2 po q pm      ceramides 1,3,6-II (CERAVE) Crea Apply 1 each topically 2 (two) times a day.      mupirocin (BACTROBAN) 2 % ointment Apply topically 3 (three) times daily. 22 g 2     No current facility-administered medications for this visit.     Facility-Administered Medications Ordered in Other Visits   Medication Dose Route Frequency Provider Last Rate Last Admin    magnesium oxide tablet 800 mg  800 mg Oral PRN Rodrigo Willoughby MD   800 mg at 11/16/21 0737    sodium chloride 0.9% flush 10 mL  10 mL Intravenous PRN Rodrigo Willoughby MD           Review of patient's allergies indicates:   Allergen Reactions    Atorvastatin Other (See Comments)     Muscle pain    Rosuvastatin Other (See Comments)     Muscle pain     Objective:      Blood pressure 128/70, pulse (!) 57, temperature 98 °F (36.7 °C), height 5' 6" (1.676 m), weight 78.5 kg (173 lb), SpO2 98%. Body mass index is 27.92 kg/m².   Physical Exam  Vitals and nursing note reviewed.   Constitutional:       General: He is not in acute distress.     Appearance: Normal appearance. He is well-developed.   HENT:      Head: Normocephalic and atraumatic.      Right Ear: External ear normal.      Left Ear: External ear normal.      Nose: Nose normal.      " Mouth/Throat:      Mouth: Mucous membranes are moist.      Pharynx: Uvula midline.   Eyes:      General: Lids are normal.      Extraocular Movements: Extraocular movements intact.      Conjunctiva/sclera: Conjunctivae normal.      Pupils: Pupils are equal, round, and reactive to light.   Cardiovascular:      Rate and Rhythm: Normal rate and regular rhythm.      Pulses: Normal pulses.      Heart sounds: Normal heart sounds, S1 normal and S2 normal. No murmur heard.  Pulmonary:      Effort: Pulmonary effort is normal. No respiratory distress.      Breath sounds: Normal breath sounds.   Abdominal:      General: Bowel sounds are normal.      Palpations: Abdomen is soft.   Musculoskeletal:         General: No swelling or tenderness.      Cervical back: Normal range of motion and neck supple.   Lymphadenopathy:      Cervical: No cervical adenopathy.   Skin:     General: Skin is warm and dry.      Findings: No rash.      Comments: Various macular lesions noted from areas of scratching.  No drainage or appearance of acute infection.   Neurological:      Mental Status: He is alert and oriented to person, place, and time.   Psychiatric:         Mood and Affect: Mood normal.         Speech: Speech normal.         Behavior: Behavior normal.         Thought Content: Thought content normal.         Judgment: Judgment normal.             Assessment:       1. Skin lesion    2. Abrasion    3. Dry skin    4. Itching        Plan:       Jerome was seen today for rash both arms.    Diagnoses and all orders for this visit:    Skin lesion    Abrasion  -     mupirocin (BACTROBAN) 2 % ointment; Apply topically 3 (three) times daily.    Dry skin  -     ceramides 1,3,6-II (CERAVE) Crea; Apply 1 each topically 2 (two) times a day.    Itching  -     ceramides 1,3,6-II (CERAVE) Crea; Apply 1 each topically 2 (two) times a day.         Antibiotic ointment to open areas.  Ceramide based lotion/cream to be applied bid.     Notify me if if not  improving in 1-2 weeks.

## 2024-09-16 PROBLEM — I21.4 NSTEMI (NON-ST ELEVATED MYOCARDIAL INFARCTION): Status: RESOLVED | Noted: 2019-11-05 | Resolved: 2024-09-16

## 2024-09-23 ENCOUNTER — OFFICE VISIT (OUTPATIENT)
Dept: OPHTHALMOLOGY | Facility: CLINIC | Age: 79
End: 2024-09-23
Payer: MEDICARE

## 2024-09-23 DIAGNOSIS — H25.813 COMBINED FORMS OF AGE-RELATED CATARACT, BILATERAL: Primary | ICD-10-CM

## 2024-09-23 PROCEDURE — 1126F AMNT PAIN NOTED NONE PRSNT: CPT | Mod: HCNC,CPTII,S$GLB, | Performed by: OPHTHALMOLOGY

## 2024-09-23 PROCEDURE — 3288F FALL RISK ASSESSMENT DOCD: CPT | Mod: HCNC,CPTII,S$GLB, | Performed by: OPHTHALMOLOGY

## 2024-09-23 PROCEDURE — 1159F MED LIST DOCD IN RCRD: CPT | Mod: HCNC,CPTII,S$GLB, | Performed by: OPHTHALMOLOGY

## 2024-09-23 PROCEDURE — 99204 OFFICE O/P NEW MOD 45 MIN: CPT | Mod: HCNC,S$GLB,, | Performed by: OPHTHALMOLOGY

## 2024-09-23 PROCEDURE — 1160F RVW MEDS BY RX/DR IN RCRD: CPT | Mod: HCNC,CPTII,S$GLB, | Performed by: OPHTHALMOLOGY

## 2024-09-23 PROCEDURE — 99999 PR PBB SHADOW E&M-EST. PATIENT-LVL III: CPT | Mod: PBBFAC,HCNC,, | Performed by: OPHTHALMOLOGY

## 2024-09-23 PROCEDURE — 1101F PT FALLS ASSESS-DOCD LE1/YR: CPT | Mod: HCNC,CPTII,S$GLB, | Performed by: OPHTHALMOLOGY

## 2024-09-23 RX ORDER — CITALOPRAM 40 MG/1
40 TABLET, FILM COATED ORAL
COMMUNITY
Start: 2024-07-31

## 2024-09-23 NOTE — PROGRESS NOTES
HPI    CE     Pt states was scheduled for cataract with EC 2020 and had to cx. States   vision has gotten worse since last being seen. States no trouble with   glare.     States sees a lot of floaters and sometimes will see FOL OD.     Denies pain.     Using readers for near.     No gtts.     Last edited by Shawna Leigh on 9/23/2024  8:51 AM.            Assessment /Plan     For exam results, see Encounter Report.    Combined forms of age-related cataract, bilateral      Patient with visually significant cataract impacting abiliity ADLs (reading, driving, PM driving, glare).  Discussed options, R & B, expectations, patient voices good understanding and wishes to proceed with procedure. Patient will likely benefit from sx.    Schedule CEIOL OD then OS  Pt likely to declines toric after discussion    F/u PREOP  Will need clearance, maybe cardiology

## 2024-11-11 ENCOUNTER — TELEPHONE (OUTPATIENT)
Dept: NEUROSURGERY | Facility: CLINIC | Age: 79
End: 2024-11-11

## 2024-11-11 NOTE — TELEPHONE ENCOUNTER
----- Message from LESLEY Graf sent at 7/15/2024  1:27 PM CDT -----  Schedule 6 mon f/u w/ head [facility] f/u in jan 2024

## 2024-11-11 NOTE — TELEPHONE ENCOUNTER
Successfully called pt primary number and spoke to pt daughter, Violette. Called pt daughter to schedule pt for a 6 month f/u appt with Dr. Clay with a new CT scan, pt daughter states she would have to call this nurse back because she was currently at work. Provided callback number to pt daughter.

## 2024-11-21 ENCOUNTER — OFFICE VISIT (OUTPATIENT)
Dept: OPHTHALMOLOGY | Facility: CLINIC | Age: 79
End: 2024-11-21
Payer: MEDICARE

## 2024-11-21 DIAGNOSIS — H25.813 COMBINED FORMS OF AGE-RELATED CATARACT, BILATERAL: Primary | ICD-10-CM

## 2024-11-21 PROCEDURE — 99999 PR PBB SHADOW E&M-EST. PATIENT-LVL IV: CPT | Mod: PBBFAC,HCNC,, | Performed by: OPHTHALMOLOGY

## 2024-11-21 RX ORDER — MOXIFLOXACIN 5 MG/ML
1 SOLUTION/ DROPS OPHTHALMIC 4 TIMES DAILY
Qty: 3 ML | Refills: 0 | Status: SHIPPED | OUTPATIENT
Start: 2024-11-21 | End: 2024-11-29

## 2024-11-21 RX ORDER — KETOROLAC TROMETHAMINE 5 MG/ML
1 SOLUTION OPHTHALMIC 4 TIMES DAILY
Qty: 5 ML | Refills: 2 | Status: SHIPPED | OUTPATIENT
Start: 2024-11-21

## 2024-11-21 RX ORDER — CYCLOP/TROP/PROPA/PHEN/KET/WAT 1-1-0.1%
1 DROPS (EA) OPHTHALMIC (EYE)
OUTPATIENT
Start: 2024-11-21 | End: 2024-11-21

## 2024-11-21 RX ORDER — SODIUM CHLORIDE 9 MG/ML
INJECTION, SOLUTION INTRAVENOUS CONTINUOUS
OUTPATIENT
Start: 2024-11-21

## 2024-11-21 RX ORDER — PREDNISOLONE ACETATE 10 MG/ML
1 SUSPENSION/ DROPS OPHTHALMIC 4 TIMES DAILY
Qty: 5 ML | Refills: 2 | Status: SHIPPED | OUTPATIENT
Start: 2024-11-21

## 2024-11-21 NOTE — PROGRESS NOTES
HPI    Pre op cat sx OD scheduled for 12/11/2024     Pt c/o blurry vision and glare.       Last edited by Shawna Leigh on 11/21/2024 11:35 AM.            Assessment /Plan     For exam results, see Encounter Report.    Combined forms of age-related cataract, bilateral  -     moxifloxacin (VIGAMOX) 0.5 % ophthalmic solution; Place 1 drop into the right eye 4 (four) times daily. Start 1 day before cataract surgery. for 8 days  Dispense: 3 mL; Refill: 0  -     prednisoLONE acetate (PRED FORTE) 1 % DrpS; Place 1 drop into the right eye 4 (four) times daily. Start after surgery  Dispense: 5 mL; Refill: 2  -     ketorolac 0.5% (ACULAR) 0.5 % Drop; Place 1 drop into the right eye 4 (four) times daily. Start 3 days before surgery.  Dispense: 5 mL; Refill: 2  -     Case Request Operating Room: CEIOL OD  -     IOL Master - OD - Right Eye  -     Place in Outpatient; Standing  -     Vital signs; Standing  -     Insert peripheral IV; Standing  -     Diet NPO; Standing    Other orders  -     0.9%  NaCl infusion  -     tropicamide-cyclopentolate-PHENYLephrine-ketorolac-proparacaine ophthalmic solution 1 drop      Patient with visually significant cataract impacting abiliity ADLs (reading, driving, PM driving, glare).  Discussed options, R & B, expectations, patient voices good understanding and wishes to proceed with procedure. Patient will likely benefit from sx and signed consent.    Proceed with CEIOL OD  Monofocal dist IOL  Pt declines toric for OU

## 2024-12-10 ENCOUNTER — ANESTHESIA EVENT (OUTPATIENT)
Dept: SURGERY | Facility: HOSPITAL | Age: 79
End: 2024-12-10
Payer: MEDICARE

## 2024-12-10 RX ORDER — GLUCAGON 1 MG
1 KIT INJECTION
OUTPATIENT
Start: 2024-12-10

## 2024-12-11 ENCOUNTER — HOSPITAL ENCOUNTER (OUTPATIENT)
Facility: HOSPITAL | Age: 79
Discharge: HOME | End: 2024-12-11
Attending: OPHTHALMOLOGY | Admitting: OPHTHALMOLOGY
Payer: MEDICARE

## 2024-12-11 ENCOUNTER — ANESTHESIA (OUTPATIENT)
Dept: SURGERY | Facility: HOSPITAL | Age: 79
End: 2024-12-11
Payer: MEDICARE

## 2024-12-11 DIAGNOSIS — H25.813 COMBINED FORMS OF AGE-RELATED CATARACT, BILATERAL: ICD-10-CM

## 2024-12-11 PROCEDURE — 63600175 PHARM REV CODE 636 W HCPCS: Performed by: ANESTHESIOLOGY

## 2024-12-11 PROCEDURE — V2632 POST CHMBR INTRAOCULAR LENS: HCPCS | Performed by: OPHTHALMOLOGY

## 2024-12-11 PROCEDURE — 37000009 HC ANESTHESIA EA ADD 15 MINS: Performed by: OPHTHALMOLOGY

## 2024-12-11 PROCEDURE — 25000003 PHARM REV CODE 250: Performed by: OPHTHALMOLOGY

## 2024-12-11 PROCEDURE — 71000033 HC RECOVERY, INTIAL HOUR: Performed by: OPHTHALMOLOGY

## 2024-12-11 PROCEDURE — 66984 XCAPSL CTRC RMVL W/O ECP: CPT | Mod: RT,,, | Performed by: OPHTHALMOLOGY

## 2024-12-11 PROCEDURE — 63600175 PHARM REV CODE 636 W HCPCS: Performed by: NURSE ANESTHETIST, CERTIFIED REGISTERED

## 2024-12-11 PROCEDURE — 63600175 PHARM REV CODE 636 W HCPCS: Performed by: OPHTHALMOLOGY

## 2024-12-11 PROCEDURE — 25000003 PHARM REV CODE 250

## 2024-12-11 PROCEDURE — 37000008 HC ANESTHESIA 1ST 15 MINUTES: Performed by: OPHTHALMOLOGY

## 2024-12-11 PROCEDURE — 36000706: Performed by: OPHTHALMOLOGY

## 2024-12-11 PROCEDURE — 36000707: Performed by: OPHTHALMOLOGY

## 2024-12-11 DEVICE — TECNIS SMPLCTY TECNIS 1PC CLR MONO 21.0D
Type: IMPLANTABLE DEVICE | Site: EYE | Status: FUNCTIONAL
Brand: TECNIS SIMPLICITY

## 2024-12-11 RX ORDER — MIDAZOLAM HYDROCHLORIDE 1 MG/ML
INJECTION INTRAMUSCULAR; INTRAVENOUS
Status: DISCONTINUED | OUTPATIENT
Start: 2024-12-11 | End: 2024-12-11

## 2024-12-11 RX ORDER — ONDANSETRON HYDROCHLORIDE 2 MG/ML
INJECTION, SOLUTION INTRAVENOUS
Status: DISCONTINUED | OUTPATIENT
Start: 2024-12-11 | End: 2024-12-11

## 2024-12-11 RX ORDER — PROPARACAINE HYDROCHLORIDE 5 MG/ML
1 SOLUTION/ DROPS OPHTHALMIC
Status: COMPLETED | OUTPATIENT
Start: 2024-12-12 | End: 2024-12-11

## 2024-12-11 RX ORDER — SODIUM CHLORIDE 9 MG/ML
INJECTION, SOLUTION INTRAVENOUS CONTINUOUS
Status: DISCONTINUED | OUTPATIENT
Start: 2024-12-11 | End: 2024-12-11 | Stop reason: HOSPADM

## 2024-12-11 RX ORDER — PHENYLEPHRINE HYDROCHLORIDE 25 MG/ML
1 SOLUTION/ DROPS OPHTHALMIC
Status: COMPLETED | OUTPATIENT
Start: 2024-12-11 | End: 2024-12-11

## 2024-12-11 RX ORDER — CYCLOPENTOLATE HYDROCHLORIDE 10 MG/ML
1 SOLUTION/ DROPS OPHTHALMIC
Status: COMPLETED | OUTPATIENT
Start: 2024-12-11 | End: 2024-12-11

## 2024-12-11 RX ORDER — LIDOCAINE HYDROCHLORIDE 40 MG/ML
INJECTION, SOLUTION RETROBULBAR
Status: DISCONTINUED | OUTPATIENT
Start: 2024-12-11 | End: 2024-12-11 | Stop reason: HOSPADM

## 2024-12-11 RX ORDER — MOXIFLOXACIN 5 MG/ML
SOLUTION/ DROPS OPHTHALMIC
Status: DISCONTINUED | OUTPATIENT
Start: 2024-12-11 | End: 2024-12-11 | Stop reason: HOSPADM

## 2024-12-11 RX ORDER — TROPICAMIDE 10 MG/ML
1 SOLUTION/ DROPS OPHTHALMIC
Status: COMPLETED | OUTPATIENT
Start: 2024-12-11 | End: 2024-12-11

## 2024-12-11 RX ORDER — TETRACAINE HYDROCHLORIDE 5 MG/ML
SOLUTION OPHTHALMIC
Status: DISCONTINUED | OUTPATIENT
Start: 2024-12-11 | End: 2024-12-11 | Stop reason: HOSPADM

## 2024-12-11 RX ORDER — SODIUM CHLORIDE, SODIUM LACTATE, POTASSIUM CHLORIDE, CALCIUM CHLORIDE 600; 310; 30; 20 MG/100ML; MG/100ML; MG/100ML; MG/100ML
INJECTION, SOLUTION INTRAVENOUS CONTINUOUS
Status: DISCONTINUED | OUTPATIENT
Start: 2024-12-11 | End: 2024-12-11 | Stop reason: HOSPADM

## 2024-12-11 RX ORDER — LIDOCAINE HYDROCHLORIDE 10 MG/ML
0.5 INJECTION, SOLUTION EPIDURAL; INFILTRATION; INTRACAUDAL; PERINEURAL ONCE
Status: COMPLETED | OUTPATIENT
Start: 2024-12-11 | End: 2024-12-11

## 2024-12-11 RX ORDER — EPINEPHRINE 1 MG/ML
INJECTION, SOLUTION, CONCENTRATE INTRAVENOUS
Status: DISCONTINUED | OUTPATIENT
Start: 2024-12-11 | End: 2024-12-11 | Stop reason: HOSPADM

## 2024-12-11 RX ADMIN — PHENYLEPHRINE HYDROCHLORIDE 1 DROP: 25 SOLUTION/ DROPS OPHTHALMIC at 07:12

## 2024-12-11 RX ADMIN — ONDANSETRON 4 MG: 2 INJECTION, SOLUTION INTRAMUSCULAR; INTRAVENOUS at 08:12

## 2024-12-11 RX ADMIN — CYCLOPENTOLATE HYDROCHLORIDE 1 DROP: 10 SOLUTION/ DROPS OPHTHALMIC at 07:12

## 2024-12-11 RX ADMIN — MIDAZOLAM HYDROCHLORIDE 1 MG: 1 INJECTION, SOLUTION INTRAMUSCULAR; INTRAVENOUS at 08:12

## 2024-12-11 RX ADMIN — TROPICAMIDE 1 DROP: 10 SOLUTION/ DROPS OPHTHALMIC at 07:12

## 2024-12-11 RX ADMIN — PROPARACAINE HYDROCHLORIDE 1 DROP: 5 SOLUTION/ DROPS OPHTHALMIC at 07:12

## 2024-12-11 RX ADMIN — LIDOCAINE HYDROCHLORIDE 5 MG: 10 INJECTION, SOLUTION EPIDURAL; INFILTRATION; INTRACAUDAL at 07:12

## 2024-12-11 NOTE — H&P
History    Chief complaint:  Painless progressive vision loss right Eye    Present Ilness/Diagnosis: Visually significant cataract, right Eye    ROS: +Eyes, otherwise no significant changes    Past Medical History: refer to chart    Family History/Social History: refer to chart    Allergies:   Review of patient's allergies indicates:   Allergen Reactions    Atorvastatin Other (See Comments)     Muscle pain    Rosuvastatin Other (See Comments)     Muscle pain       Current Medications: see medcard      Physical Exam    BP: Vital signs stable  General: No apparent distress  HEENT: cataract  Lungs: adequate respirations  Heart: + pulses  Abdomen: soft  Rectal/pelvic: deferred    Labs: Labs Reviewed    Lab Results   Component Value Date    WBC 7.04 06/17/2024    HGB 12.5 (L) 06/17/2024    HCT 36.8 (L) 06/17/2024    MCV 85 06/17/2024     06/17/2024           CMP  Sodium   Date Value Ref Range Status   06/18/2024 133 (L) 136 - 145 mmol/L Final     Potassium   Date Value Ref Range Status   06/18/2024 3.9 3.5 - 5.1 mmol/L Final     Chloride   Date Value Ref Range Status   06/18/2024 92 (L) 95 - 110 mmol/L Final     CO2   Date Value Ref Range Status   06/18/2024 34 (H) 23 - 29 mmol/L Final     Glucose   Date Value Ref Range Status   06/18/2024 105 70 - 110 mg/dL Final     BUN   Date Value Ref Range Status   06/18/2024 20 8 - 23 mg/dL Final     Creatinine   Date Value Ref Range Status   06/18/2024 1.0 0.5 - 1.4 mg/dL Final     Calcium   Date Value Ref Range Status   06/18/2024 8.8 8.7 - 10.5 mg/dL Final     Total Protein   Date Value Ref Range Status   06/18/2024 6.6 6.0 - 8.4 g/dL Final     Albumin   Date Value Ref Range Status   06/18/2024 3.7 3.5 - 5.2 g/dL Final     Total Bilirubin   Date Value Ref Range Status   06/18/2024 1.4 (H) 0.1 - 1.0 mg/dL Final     Comment:     For infants and newborns, interpretation of results should be based  on gestational age, weight and in agreement with  clinical  observations.    Premature Infant recommended reference ranges:  Up to 24 hours.............<8.0 mg/dL  Up to 48 hours............<12.0 mg/dL  3-5 days..................<15.0 mg/dL  6-29 days.................<15.0 mg/dL       Alkaline Phosphatase   Date Value Ref Range Status   06/18/2024 92 55 - 135 U/L Final     AST   Date Value Ref Range Status   06/18/2024 23 10 - 40 U/L Final     ALT   Date Value Ref Range Status   06/18/2024 9 (L) 10 - 44 U/L Final     Anion Gap   Date Value Ref Range Status   06/18/2024 7 (L) 8 - 16 mmol/L Final     eGFR   Date Value Ref Range Status   06/18/2024 >60.0 >60 mL/min/1.73 m^2 Final       The patient has been cleared for surgery in an ambulatory surgery facility.     Impression: Visually significant Cataract right Eye    Plan: Phacoemulsification with implantation of Intraocular lens right Eye

## 2024-12-11 NOTE — TRANSFER OF CARE
"Anesthesia Transfer of Care Note    Patient: Jerome Amaro Jr.    Procedure(s) Performed: Procedure(s) (LRB):  CEIOL OD (Right)    Patient location: PACU    Anesthesia Type: MAC    Transport from OR: Transported from OR on room air with adequate spontaneous ventilation    Post pain: adequate analgesia    Post assessment: no apparent anesthetic complications and tolerated procedure well    Post vital signs: stable    Level of consciousness: awake, alert and oriented    Nausea/Vomiting: no nausea/vomiting    Complications: none    Transfer of care protocol was followed      Last vitals: Visit Vitals  BP (!) 168/78 (BP Location: Left arm, Patient Position: Lying)   Pulse 62   Temp 36.4 °C (97.5 °F) (Skin)   Resp 20   Ht 5' 6" (1.676 m)   Wt 78.5 kg (173 lb 1 oz)   SpO2 100%   BMI 27.93 kg/m²     "

## 2024-12-11 NOTE — ANESTHESIA PREPROCEDURE EVALUATION
12/11/2024  Jerome Amaro Jr. is a 79 y.o., male.           Pre-op Assessment    I have reviewed the NPO Status.   I have reviewed the Medications.     Review of Systems  Anesthesia Hx:  No problems with previous Anesthesia   History of prior surgery of interest to airway management or planning: heart surgery.         Denies Family Hx of Anesthesia complications.    Denies Personal Hx of Anesthesia complications.                    Social:  Former Smoker, Alcohol Use       Hematology/Oncology:    Oncology Normal    -- Anemia:                                  EENT/Dental:  EENT/Dental Normal           Cardiovascular:    Pacemaker Hypertension, well controlled Valvular problems/Murmurs Past MI CAD   CABG/stent Dysrhythmias   CHF   PVD hyperlipidemia   ECG has been reviewed. Patient post TAVR   [facility]     EF - 60%                           Pulmonary:  Pulmonary Normal                       Renal/:  Chronic Renal Disease renal calculi  History of DORIS   Hyponatremia      Kidney Function/Disease             Hepatic/GI:    Esophageal / Stomach Disorders Esophageal Disorder, dysphagia          Musculoskeletal:         Spine Disorders: lumbar            Neurological:       Seizures, well controlled    Last seizure many years ago                             Endocrine:   Hypothyroidism          Psych:  Psychiatric History  depression                Physical Exam  General: Well nourished, Cooperative, Alert and Oriented    Airway:  Mallampati: III   Mouth Opening: Normal  TM Distance: > 6 cm  Tongue: Normal  Neck ROM: Normal ROM    Dental:  Intact        Anesthesia Plan  Type of Anesthesia, risks & benefits discussed:    Anesthesia Type: MAC  Intra-op Monitoring Plan: Standard ASA Monitors  Induction:  IV  Informed Consent: Informed consent signed with the Patient and all parties understand the risks and agree with  anesthesia plan.  All questions answered.   ASA Score: 3    Ready For Surgery From Anesthesia Perspective.     .

## 2024-12-11 NOTE — OP NOTE
Operative Date:  12/11/2024    Discharge Date:  12/11/2024    Report Title: Operative Note    SURGEON: Fredrick Sharma MD    ASSISTANT: None    PREOPERATIVE DIAGNOSIS: Combined form of senile cataract, Right Eye    POSTOPERATIVE DIAGNOSIS: same    PROCEDURE PERFORMED: Phacoemulsification of the cataract with posterior chamber intraocular lens Right Eye    IMPLANTS: DCBOO 21.0    ANESTHESIA:  Topical with MAC    COMPLICATIONS: None    ESTIMATED BLOOD LOSS: Minimal    PROCEDURE: The patient was brought to the operating room, time out was performed and implant checked.  The patient was given light sedation, and topical anesthesia was instilled in the right eye.  The right eye was prepped and draped in the usual fashion for eye surgery and lid speculum used to retract the eyelid. The eyelashes were secluded within the drape.  A paracentesis was made superiorly with a sideport blade. Epishugarcaine was injected in the anterior chamber and dispersive viscoelastic was injected into the anterior chamber. A temporal corneal incision was made with a steel keratome. A cystitome was used to initiate a continuous curvilinear capsulorrhexis and completed using the capsulorrhexis forceps. Hydrodissection of the lens nucleus was performed using balanced salt solution (BSS) on hydrodissection cannula. The lens nucleus was removed using phacoemulsification in the modified stop and chop technique. The lens cortex was removed using the irrigation/aspiration handpiece. The capsular bag was filled with viscoelastic, and the intraocular lens was injected into the capsular bag under direct visualization. Viscoelastic was removed using the irrigation/aspiration handpiece. The wounds were hydrated until watertight.    The wounds were rechecked and no leakage was noted.  The speculum was removed. Topical antibiotic was applied to the eye and shield was placed over the eye. The patient tolerated the procedure well and left the operating room  in good condition.

## 2024-12-11 NOTE — DISCHARGE SUMMARY
[facility]  Discharge Note  Short Stay    Procedure(s) (LRB):  CEIOL OD (Right)      OUTCOME: Patient tolerated treatment/procedure well without complication and is now ready for discharge.    DISPOSITION: Home or Self Care    FINAL DIAGNOSIS:  cataract    FOLLOWUP: In clinic    DISCHARGE INSTRUCTIONS:  No discharge procedures on file.     TIME SPENT ON DISCHARGE: 5 minutes

## 2024-12-12 ENCOUNTER — OFFICE VISIT (OUTPATIENT)
Dept: OPHTHALMOLOGY | Facility: CLINIC | Age: 79
End: 2024-12-12
Payer: MEDICARE

## 2024-12-12 VITALS
HEIGHT: 66 IN | DIASTOLIC BLOOD PRESSURE: 69 MMHG | HEART RATE: 57 BPM | BODY MASS INDEX: 27.81 KG/M2 | WEIGHT: 173.06 LBS | TEMPERATURE: 98 F | SYSTOLIC BLOOD PRESSURE: 144 MMHG | RESPIRATION RATE: 20 BRPM | OXYGEN SATURATION: 97 %

## 2024-12-12 DIAGNOSIS — Z98.41 STATUS POST CATARACT SURGERY, RIGHT: Primary | ICD-10-CM

## 2024-12-12 PROCEDURE — 1126F AMNT PAIN NOTED NONE PRSNT: CPT | Mod: CPTII,S$GLB,, | Performed by: OPHTHALMOLOGY

## 2024-12-12 PROCEDURE — 1159F MED LIST DOCD IN RCRD: CPT | Mod: CPTII,S$GLB,, | Performed by: OPHTHALMOLOGY

## 2024-12-12 PROCEDURE — 1101F PT FALLS ASSESS-DOCD LE1/YR: CPT | Mod: CPTII,S$GLB,, | Performed by: OPHTHALMOLOGY

## 2024-12-12 PROCEDURE — 99999 PR PBB SHADOW E&M-EST. PATIENT-LVL III: CPT | Mod: PBBFAC,,, | Performed by: OPHTHALMOLOGY

## 2024-12-12 PROCEDURE — 99024 POSTOP FOLLOW-UP VISIT: CPT | Mod: S$GLB,,, | Performed by: OPHTHALMOLOGY

## 2024-12-12 PROCEDURE — 3288F FALL RISK ASSESSMENT DOCD: CPT | Mod: CPTII,S$GLB,, | Performed by: OPHTHALMOLOGY

## 2024-12-12 PROCEDURE — 1160F RVW MEDS BY RX/DR IN RCRD: CPT | Mod: CPTII,S$GLB,, | Performed by: OPHTHALMOLOGY

## 2024-12-12 NOTE — ANESTHESIA POSTPROCEDURE EVALUATION
Anesthesia Post Evaluation    Patient: Jerome Amaro Jr.    Procedure(s) Performed: Procedure(s) (LRB):  CEIOL OD (Right)    Final Anesthesia Type: MAC      Patient location during evaluation: PACU  Patient participation: Yes- Able to Participate  Level of consciousness: awake and alert  Post-procedure vital signs: reviewed and stable  Pain management: adequate  Airway patency: patent    PONV status at discharge: No PONV  Anesthetic complications: no      Cardiovascular status: blood pressure returned to baseline  Respiratory status: unassisted  Hydration status: euvolemic  Follow-up not needed.              Vitals Value Taken Time   /69 12/11/24 0920   Temp  12/12/24 0850   Pulse 57 12/11/24 0924   Resp 20 12/11/24 0920   SpO2 99 % 12/11/24 0924   Vitals shown include unfiled device data.      Event Time   Out of Recovery 09:30:00         Pain/Susanne Score: No data recorded

## 2024-12-12 NOTE — PROGRESS NOTES
HPI    1 day s/p phaco IOL OD done on 12/11/2024    Pt states OD feels well. Scratchy. Denies pain/ FOL/ floaters.     Compliant with post op gtts. Using pred, moxi, and keto as directed.     Last edited by Shawna Leigh on 12/12/2024  9:13 AM.            Assessment /Plan     For exam results, see Encounter Report.    Status post cataract surgery, right      Doing well  Post op precautions and instructions reviewed, sheet given  moxi QID  PF QID  Keto qdaily    F/u 1 week, brief refract OD, PW for OS

## 2024-12-19 ENCOUNTER — OFFICE VISIT (OUTPATIENT)
Dept: OPHTHALMOLOGY | Facility: CLINIC | Age: 79
End: 2024-12-19
Payer: MEDICARE

## 2024-12-19 DIAGNOSIS — Z98.41 STATUS POST CATARACT SURGERY, RIGHT: Primary | ICD-10-CM

## 2024-12-19 DIAGNOSIS — H25.812 COMBINED FORMS OF AGE-RELATED CATARACT, LEFT EYE: ICD-10-CM

## 2024-12-19 PROCEDURE — 99999 PR PBB SHADOW E&M-EST. PATIENT-LVL IV: CPT | Mod: PBBFAC,,, | Performed by: OPHTHALMOLOGY

## 2024-12-19 RX ORDER — PREDNISOLONE ACETATE 10 MG/ML
1 SUSPENSION/ DROPS OPHTHALMIC 4 TIMES DAILY
Qty: 5 ML | Refills: 2 | Status: SHIPPED | OUTPATIENT
Start: 2024-12-19

## 2024-12-19 RX ORDER — SODIUM CHLORIDE 9 MG/ML
INJECTION, SOLUTION INTRAVENOUS CONTINUOUS
OUTPATIENT
Start: 2024-12-19

## 2024-12-19 RX ORDER — MOXIFLOXACIN 5 MG/ML
1 SOLUTION/ DROPS OPHTHALMIC 4 TIMES DAILY
Qty: 3 ML | Refills: 1 | Status: SHIPPED | OUTPATIENT
Start: 2024-12-19

## 2024-12-19 RX ORDER — KETOROLAC TROMETHAMINE 5 MG/ML
1 SOLUTION OPHTHALMIC 4 TIMES DAILY
Qty: 5 ML | Refills: 2 | Status: SHIPPED | OUTPATIENT
Start: 2024-12-19

## 2024-12-19 RX ORDER — CYCLOP/TROP/PROPA/PHEN/KET/WAT 1-1-0.1%
1 DROPS (EA) OPHTHALMIC (EYE)
OUTPATIENT
Start: 2024-12-19 | End: 2024-12-19

## 2024-12-19 NOTE — PROGRESS NOTES
HPI    1 week  s/p phaco IOL OD done on 12/11/2024    Pt states OD is doing well. Denies pain/ FOL/ Floaters.     States compliant with Post op gtts. Using Pred, Moxi, Keto OD QID as   directed.     Last edited by Violette Wilson on 12/19/2024  9:40 AM.            Assessment /Plan     For exam results, see Encounter Report.    Status post cataract surgery, right    Combined forms of age-related cataract, left eye      1. Status post cataract surgery, right (Primary)  POW1 CEIOL  Doing well  D/c moxi  Continue PF QID with taper  Continue keto qdaily  Post op instructions reviewed      2. Combined forms of age-related cataract, left eye  Patient with visually significant cataract impacting abiliity ADLs (reading, driving, PM driving, glare).  Discussed options, R & B, expectations, patient voices good understanding and wishes to proceed with procedure. Patient will likely benefit from sx and signed consent.    Proceed with CEIOL OS  Pt declines torc

## 2024-12-25 NOTE — Clinical Note
The catheter was inserted into the and was inserted over the wire into the ostium   right coronary artery. An angiography was performed of the right coronary arteries. The angiography was performed via power injection. The injected amount was 6 mL contrast at 3 mL/s. DISPLAY PLAN FREE TEXT DISPLAY PLAN FREE TEXT DISPLAY PLAN FREE TEXT DISPLAY PLAN FREE TEXT DISPLAY PLAN FREE TEXT

## 2025-01-01 ENCOUNTER — HOSPITAL ENCOUNTER (INPATIENT)
Facility: HOSPITAL | Age: 80
LOS: 1 days | DRG: 951 | End: 2025-04-23
Attending: HOSPITALIST | Admitting: HOSPITALIST
Payer: OTHER MISCELLANEOUS

## 2025-01-01 ENCOUNTER — HOSPITAL ENCOUNTER (INPATIENT)
Facility: HOSPITAL | Age: 80
LOS: 3 days | Discharge: HOSPICE | DRG: 280 | End: 2025-04-23
Attending: STUDENT IN AN ORGANIZED HEALTH CARE EDUCATION/TRAINING PROGRAM | Admitting: INTERNAL MEDICINE
Payer: MEDICARE

## 2025-01-01 VITALS
HEART RATE: 103 BPM | DIASTOLIC BLOOD PRESSURE: 53 MMHG | HEIGHT: 66 IN | TEMPERATURE: 100 F | SYSTOLIC BLOOD PRESSURE: 95 MMHG | WEIGHT: 155.38 LBS | OXYGEN SATURATION: 85 % | BODY MASS INDEX: 24.97 KG/M2 | RESPIRATION RATE: 24 BRPM

## 2025-01-01 DIAGNOSIS — E43 SEVERE MALNUTRITION: ICD-10-CM

## 2025-01-01 DIAGNOSIS — I50.33 ACUTE ON CHRONIC HEART FAILURE WITH PRESERVED EJECTION FRACTION (HFPEF): ICD-10-CM

## 2025-01-01 DIAGNOSIS — R07.9 CHEST PAIN: ICD-10-CM

## 2025-01-01 DIAGNOSIS — I47.20 V-TACH: Primary | ICD-10-CM

## 2025-01-01 DIAGNOSIS — R10.13 EPIGASTRIC PAIN: ICD-10-CM

## 2025-01-01 DIAGNOSIS — R10.9 ABDOMINAL PAIN: ICD-10-CM

## 2025-01-01 DIAGNOSIS — Z51.5 PALLIATIVE CARE BY SPECIALIST: ICD-10-CM

## 2025-01-01 DIAGNOSIS — R79.89 ELEVATED TROPONIN: ICD-10-CM

## 2025-01-01 DIAGNOSIS — Z71.89 ACP (ADVANCE CARE PLANNING): ICD-10-CM

## 2025-01-01 DIAGNOSIS — I50.23 ACUTE ON CHRONIC SYSTOLIC (CONGESTIVE) HEART FAILURE: ICD-10-CM

## 2025-01-01 DIAGNOSIS — I25.2 HISTORY OF NON-ST ELEVATION MYOCARDIAL INFARCTION (NSTEMI): ICD-10-CM

## 2025-01-01 DIAGNOSIS — I47.20 V-TACH: ICD-10-CM

## 2025-01-01 DIAGNOSIS — J18.9 HCAP (HEALTHCARE-ASSOCIATED PNEUMONIA): ICD-10-CM

## 2025-01-01 DIAGNOSIS — Z71.89 GOALS OF CARE, COUNSELING/DISCUSSION: ICD-10-CM

## 2025-01-01 LAB
ABSOLUTE EOSINOPHIL (SMH): 0.12 K/UL
ABSOLUTE EOSINOPHIL (SMH): 0.15 K/UL
ABSOLUTE EOSINOPHIL (SMH): 0.22 K/UL
ABSOLUTE EOSINOPHIL (SMH): 0.34 K/UL
ABSOLUTE MONOCYTE (SMH): 1.01 K/UL (ref 0.3–1)
ABSOLUTE MONOCYTE (SMH): 1.52 K/UL (ref 0.3–1)
ABSOLUTE MONOCYTE (SMH): 1.59 K/UL (ref 0.3–1)
ABSOLUTE MONOCYTE (SMH): 1.73 K/UL (ref 0.3–1)
ABSOLUTE NEUTROPHIL COUNT (SMH): 10.4 K/UL (ref 1.8–7.7)
ABSOLUTE NEUTROPHIL COUNT (SMH): 11 K/UL (ref 1.8–7.7)
ABSOLUTE NEUTROPHIL COUNT (SMH): 8.5 K/UL (ref 1.8–7.7)
ABSOLUTE NEUTROPHIL COUNT (SMH): 9 K/UL (ref 1.8–7.7)
ALBUMIN SERPL-MCNC: 3.1 G/DL (ref 3.5–5.2)
ALBUMIN SERPL-MCNC: 3.4 G/DL (ref 3.5–5.2)
ALBUMIN SERPL-MCNC: 3.8 G/DL (ref 3.5–5.2)
ALLENS TEST: ABNORMAL
ALP SERPL-CCNC: 140 UNIT/L (ref 55–135)
ALP SERPL-CCNC: 142 UNIT/L (ref 55–135)
ALP SERPL-CCNC: 154 UNIT/L (ref 55–135)
ALT SERPL-CCNC: 5 UNIT/L (ref 10–44)
ALT SERPL-CCNC: 7 UNIT/L (ref 10–44)
ALT SERPL-CCNC: 9 UNIT/L (ref 10–44)
ANION GAP (SMH): 11 MMOL/L (ref 8–16)
ANION GAP (SMH): 12 MMOL/L (ref 8–16)
ANION GAP (SMH): 15 MMOL/L (ref 8–16)
APTT PPP: 42.6 SECONDS (ref 21–32)
APTT PPP: 48.5 SECONDS (ref 21–32)
APTT PPP: 52.1 SECONDS (ref 21–32)
APTT PPP: 54.6 SECONDS (ref 21–32)
AST SERPL-CCNC: 20 UNIT/L (ref 10–40)
AST SERPL-CCNC: 20 UNIT/L (ref 10–40)
AST SERPL-CCNC: 24 UNIT/L (ref 10–40)
BASOPHILS # BLD AUTO: 0.03 K/UL
BASOPHILS # BLD AUTO: 0.05 K/UL
BASOPHILS # BLD AUTO: 0.06 K/UL
BASOPHILS # BLD AUTO: 0.08 K/UL
BASOPHILS NFR BLD AUTO: 0.3 %
BASOPHILS NFR BLD AUTO: 0.4 %
BASOPHILS NFR BLD AUTO: 0.5 %
BASOPHILS NFR BLD AUTO: 0.6 %
BILIRUB SERPL-MCNC: 1.6 MG/DL (ref 0.1–1)
BILIRUB SERPL-MCNC: 1.7 MG/DL (ref 0.1–1)
BILIRUB SERPL-MCNC: 1.9 MG/DL (ref 0.1–1)
BILIRUB UR QL STRIP.AUTO: NEGATIVE
BNP SERPL-MCNC: 1443 PG/ML
BUN SERPL-MCNC: 19 MG/DL (ref 8–23)
BUN SERPL-MCNC: 21 MG/DL (ref 8–23)
BUN SERPL-MCNC: 26 MG/DL (ref 8–23)
CALCIUM SERPL-MCNC: 8.1 MG/DL (ref 8.7–10.5)
CALCIUM SERPL-MCNC: 8.6 MG/DL (ref 8.7–10.5)
CALCIUM SERPL-MCNC: 9 MG/DL (ref 8.7–10.5)
CHLORIDE SERPL-SCNC: 95 MMOL/L (ref 95–110)
CHLORIDE SERPL-SCNC: 96 MMOL/L (ref 95–110)
CHLORIDE SERPL-SCNC: 97 MMOL/L (ref 95–110)
CLARITY UR: CLEAR
CO2 SERPL-SCNC: 21 MMOL/L (ref 23–29)
CO2 SERPL-SCNC: 23 MMOL/L (ref 23–29)
CO2 SERPL-SCNC: 26 MMOL/L (ref 23–29)
COLOR UR AUTO: YELLOW
CREAT SERPL-MCNC: 1.2 MG/DL (ref 0.5–1.4)
D DIMER PPP IA.FEU-MCNC: 2.44 MG/L FEU
DELSYS: ABNORMAL
ERYTHROCYTE [DISTWIDTH] IN BLOOD BY AUTOMATED COUNT: 13.9 % (ref 11.5–14.5)
ERYTHROCYTE [DISTWIDTH] IN BLOOD BY AUTOMATED COUNT: 14.1 % (ref 11.5–14.5)
ERYTHROCYTE [DISTWIDTH] IN BLOOD BY AUTOMATED COUNT: 14.2 % (ref 11.5–14.5)
ERYTHROCYTE [DISTWIDTH] IN BLOOD BY AUTOMATED COUNT: 14.2 % (ref 11.5–14.5)
FLOW: 3
GFR SERPLBLD CREATININE-BSD FMLA CKD-EPI: >60 ML/MIN/1.73/M2
GLUCOSE SERPL-MCNC: 101 MG/DL (ref 70–110)
GLUCOSE SERPL-MCNC: 112 MG/DL (ref 70–110)
GLUCOSE SERPL-MCNC: 151 MG/DL (ref 70–110)
GLUCOSE UR QL STRIP: NEGATIVE
HCO3 UR-SCNC: 22.8 MMOL/L (ref 24–28)
HCT VFR BLD AUTO: 31.1 % (ref 40–54)
HCT VFR BLD AUTO: 32.2 % (ref 40–54)
HCT VFR BLD AUTO: 33.4 % (ref 40–54)
HCT VFR BLD AUTO: 36.1 % (ref 40–54)
HGB BLD-MCNC: 10.1 GM/DL (ref 14–18)
HGB BLD-MCNC: 10.8 GM/DL (ref 14–18)
HGB BLD-MCNC: 11.1 GM/DL (ref 14–18)
HGB BLD-MCNC: 11.9 GM/DL (ref 14–18)
HGB UR QL STRIP: NEGATIVE
IMM GRANULOCYTES # BLD AUTO: 0.06 K/UL (ref 0–0.04)
IMM GRANULOCYTES # BLD AUTO: 0.07 K/UL (ref 0–0.04)
IMM GRANULOCYTES # BLD AUTO: 0.07 K/UL (ref 0–0.04)
IMM GRANULOCYTES # BLD AUTO: 0.1 K/UL (ref 0–0.04)
IMM GRANULOCYTES NFR BLD AUTO: 0.5 % (ref 0–0.5)
IMM GRANULOCYTES NFR BLD AUTO: 0.6 % (ref 0–0.5)
IMM GRANULOCYTES NFR BLD AUTO: 0.6 % (ref 0–0.5)
IMM GRANULOCYTES NFR BLD AUTO: 0.7 % (ref 0–0.5)
INFLUENZA A MOLECULAR (OHS): NEGATIVE
INFLUENZA B MOLECULAR (OHS): NEGATIVE
INR PPP: 1.2 (ref 0.8–1.2)
KETONES UR QL STRIP: NEGATIVE
LACTATE SERPL-SCNC: 1.3 MMOL/L (ref 0.5–1.9)
LDH SERPL L TO P-CCNC: 3.19 MMOL/L (ref 0.5–2.2)
LEUKOCYTE ESTERASE UR QL STRIP: NEGATIVE
LIPASE SERPL-CCNC: 27 U/L (ref 4–60)
LYMPHOCYTES # BLD AUTO: 0.82 K/UL (ref 1–4.8)
LYMPHOCYTES # BLD AUTO: 0.99 K/UL (ref 1–4.8)
LYMPHOCYTES # BLD AUTO: 1.29 K/UL (ref 1–4.8)
LYMPHOCYTES # BLD AUTO: 1.38 K/UL (ref 1–4.8)
MAGNESIUM SERPL-MCNC: 1.6 MG/DL (ref 1.6–2.6)
MAGNESIUM SERPL-MCNC: 1.7 MG/DL (ref 1.6–2.6)
MAGNESIUM SERPL-MCNC: 2 MG/DL (ref 1.6–2.6)
MCH RBC QN AUTO: 28.6 PG (ref 27–31)
MCH RBC QN AUTO: 29.1 PG (ref 27–31)
MCH RBC QN AUTO: 29.2 PG (ref 27–31)
MCH RBC QN AUTO: 29.3 PG (ref 27–31)
MCHC RBC AUTO-ENTMCNC: 32.5 G/DL (ref 32–36)
MCHC RBC AUTO-ENTMCNC: 33 G/DL (ref 32–36)
MCHC RBC AUTO-ENTMCNC: 33.2 G/DL (ref 32–36)
MCHC RBC AUTO-ENTMCNC: 33.5 G/DL (ref 32–36)
MCV RBC AUTO: 87 FL (ref 82–98)
MCV RBC AUTO: 88 FL (ref 82–98)
MCV RBC AUTO: 88 FL (ref 82–98)
MCV RBC AUTO: 89 FL (ref 82–98)
MODE: ABNORMAL
NITRITE UR QL STRIP: NEGATIVE
NUCLEATED RBC (/100WBC) (SMH): 0 /100 WBC
OHS QRS DURATION: 148 MS
OHS QTC CALCULATION: 513 MS
PCO2 BLDA: 33.4 MMHG (ref 35–45)
PH SMN: 7.44 [PH] (ref 7.35–7.45)
PH UR STRIP: 5 [PH]
PHENYTOIN SERPL-MCNC: 17.7 UG/ML (ref 10–20)
PLATELET # BLD AUTO: 142 K/UL (ref 150–450)
PLATELET # BLD AUTO: 184 K/UL (ref 150–450)
PLATELET # BLD AUTO: 201 K/UL (ref 150–450)
PLATELET # BLD AUTO: 260 K/UL (ref 150–450)
PMV BLD AUTO: 10.9 FL (ref 9.2–12.9)
PMV BLD AUTO: 11.3 FL (ref 9.2–12.9)
PMV BLD AUTO: 11.4 FL (ref 9.2–12.9)
PMV BLD AUTO: 11.4 FL (ref 9.2–12.9)
PO2 BLDA: 47 MMHG (ref 80–100)
POC BE: -1 MMOL/L
POC SATURATED O2: 85 % (ref 95–100)
POC TCO2: 24 MMOL/L (ref 23–27)
POTASSIUM SERPL-SCNC: 3.6 MMOL/L (ref 3.5–5.1)
POTASSIUM SERPL-SCNC: 4.1 MMOL/L (ref 3.5–5.1)
POTASSIUM SERPL-SCNC: 4.3 MMOL/L (ref 3.5–5.1)
PROCALCITONIN SERPL-MCNC: 0.61 NG/ML
PROT SERPL-MCNC: 6 GM/DL (ref 6–8.4)
PROT SERPL-MCNC: 6.8 GM/DL (ref 6–8.4)
PROT SERPL-MCNC: 7.4 GM/DL (ref 6–8.4)
PROT UR QL STRIP: ABNORMAL
PROTHROMBIN TIME: 13.1 SECONDS (ref 9–12.5)
RBC # BLD AUTO: 3.53 M/UL (ref 4.6–6.2)
RBC # BLD AUTO: 3.68 M/UL (ref 4.6–6.2)
RBC # BLD AUTO: 3.82 M/UL (ref 4.6–6.2)
RBC # BLD AUTO: 4.08 M/UL (ref 4.6–6.2)
RELATIVE EOSINOPHIL (SMH): 0.9 % (ref 0–8)
RELATIVE EOSINOPHIL (SMH): 1.4 % (ref 0–8)
RELATIVE EOSINOPHIL (SMH): 1.8 % (ref 0–8)
RELATIVE EOSINOPHIL (SMH): 2.3 % (ref 0–8)
RELATIVE LYMPHOCYTE (SMH): 11.2 % (ref 18–48)
RELATIVE LYMPHOCYTE (SMH): 7.5 % (ref 18–48)
RELATIVE LYMPHOCYTE (SMH): 7.8 % (ref 18–48)
RELATIVE LYMPHOCYTE (SMH): 8.9 % (ref 18–48)
RELATIVE MONOCYTE (SMH): 11.9 % (ref 4–15)
RELATIVE MONOCYTE (SMH): 12 % (ref 4–15)
RELATIVE MONOCYTE (SMH): 12.4 % (ref 4–15)
RELATIVE MONOCYTE (SMH): 9.6 % (ref 4–15)
RELATIVE NEUTROPHIL (SMH): 73.6 % (ref 38–73)
RELATIVE NEUTROPHIL (SMH): 75.6 % (ref 38–73)
RELATIVE NEUTROPHIL (SMH): 78.6 % (ref 38–73)
RELATIVE NEUTROPHIL (SMH): 80.3 % (ref 38–73)
SAMPLE: ABNORMAL
SAMPLE: ABNORMAL
SARS-COV-2 RDRP RESP QL NAA+PROBE: NEGATIVE
SITE: ABNORMAL
SODIUM SERPL-SCNC: 131 MMOL/L (ref 136–145)
SODIUM SERPL-SCNC: 131 MMOL/L (ref 136–145)
SODIUM SERPL-SCNC: 134 MMOL/L (ref 136–145)
SP GR UR STRIP: >=1.03
SP02: 95
TROPONIN HIGH SENSITIVE (SMH): 1532.1 PG/ML
TROPONIN HIGH SENSITIVE (SMH): 1615.1 PG/ML
TROPONIN HIGH SENSITIVE (SMH): 1662.9 PG/ML
TROPONIN HIGH SENSITIVE (SMH): 57.9 PG/ML
UROBILINOGEN UR STRIP-ACNC: ABNORMAL EU/DL
VANCOMYCIN TROUGH SERPL-MCNC: 11.8 UG/ML (ref ?–20)
WBC # BLD AUTO: 10.57 K/UL (ref 3.9–12.7)
WBC # BLD AUTO: 12.28 K/UL (ref 3.9–12.7)
WBC # BLD AUTO: 13.2 K/UL (ref 3.9–12.7)
WBC # BLD AUTO: 14.54 K/UL (ref 3.9–12.7)

## 2025-01-01 PROCEDURE — 85025 COMPLETE CBC W/AUTO DIFF WBC: CPT | Performed by: INTERNAL MEDICINE

## 2025-01-01 PROCEDURE — 94640 AIRWAY INHALATION TREATMENT: CPT

## 2025-01-01 PROCEDURE — 81003 URINALYSIS AUTO W/O SCOPE: CPT | Performed by: STUDENT IN AN ORGANIZED HEALTH CARE EDUCATION/TRAINING PROGRAM

## 2025-01-01 PROCEDURE — 27000221 HC OXYGEN, UP TO 24 HOURS

## 2025-01-01 PROCEDURE — 25000003 PHARM REV CODE 250: Performed by: HOSPITALIST

## 2025-01-01 PROCEDURE — 12000002 HC ACUTE/MED SURGE SEMI-PRIVATE ROOM

## 2025-01-01 PROCEDURE — U0002 COVID-19 LAB TEST NON-CDC: HCPCS | Performed by: STUDENT IN AN ORGANIZED HEALTH CARE EDUCATION/TRAINING PROGRAM

## 2025-01-01 PROCEDURE — 85730 THROMBOPLASTIN TIME PARTIAL: CPT | Performed by: HOSPITALIST

## 2025-01-01 PROCEDURE — 63600175 PHARM REV CODE 636 W HCPCS: Performed by: STUDENT IN AN ORGANIZED HEALTH CARE EDUCATION/TRAINING PROGRAM

## 2025-01-01 PROCEDURE — 63600175 PHARM REV CODE 636 W HCPCS: Performed by: FAMILY MEDICINE

## 2025-01-01 PROCEDURE — 85025 COMPLETE CBC W/AUTO DIFF WBC: CPT | Performed by: STUDENT IN AN ORGANIZED HEALTH CARE EDUCATION/TRAINING PROGRAM

## 2025-01-01 PROCEDURE — 21000000 HC CCU ICU ROOM CHARGE

## 2025-01-01 PROCEDURE — 99223 1ST HOSP IP/OBS HIGH 75: CPT | Mod: 25,,, | Performed by: INTERNAL MEDICINE

## 2025-01-01 PROCEDURE — 99900035 HC TECH TIME PER 15 MIN (STAT)

## 2025-01-01 PROCEDURE — 87040 BLOOD CULTURE FOR BACTERIA: CPT | Performed by: INTERNAL MEDICINE

## 2025-01-01 PROCEDURE — 80202 ASSAY OF VANCOMYCIN: CPT | Performed by: INTERNAL MEDICINE

## 2025-01-01 PROCEDURE — 83690 ASSAY OF LIPASE: CPT | Performed by: STUDENT IN AN ORGANIZED HEALTH CARE EDUCATION/TRAINING PROGRAM

## 2025-01-01 PROCEDURE — 84484 ASSAY OF TROPONIN QUANT: CPT | Performed by: FAMILY MEDICINE

## 2025-01-01 PROCEDURE — 25000242 PHARM REV CODE 250 ALT 637 W/ HCPCS: Performed by: INTERNAL MEDICINE

## 2025-01-01 PROCEDURE — 85610 PROTHROMBIN TIME: CPT | Performed by: FAMILY MEDICINE

## 2025-01-01 PROCEDURE — 80053 COMPREHEN METABOLIC PANEL: CPT | Performed by: INTERNAL MEDICINE

## 2025-01-01 PROCEDURE — 63600175 PHARM REV CODE 636 W HCPCS: Performed by: HOSPITALIST

## 2025-01-01 PROCEDURE — 93005 ELECTROCARDIOGRAM TRACING: CPT | Performed by: INTERNAL MEDICINE

## 2025-01-01 PROCEDURE — 25000003 PHARM REV CODE 250: Performed by: INTERNAL MEDICINE

## 2025-01-01 PROCEDURE — 93010 ELECTROCARDIOGRAM REPORT: CPT | Mod: ,,, | Performed by: INTERNAL MEDICINE

## 2025-01-01 PROCEDURE — 25000003 PHARM REV CODE 250: Performed by: FAMILY MEDICINE

## 2025-01-01 PROCEDURE — 83735 ASSAY OF MAGNESIUM: CPT | Performed by: INTERNAL MEDICINE

## 2025-01-01 PROCEDURE — 25000003 PHARM REV CODE 250: Performed by: STUDENT IN AN ORGANIZED HEALTH CARE EDUCATION/TRAINING PROGRAM

## 2025-01-01 PROCEDURE — 94761 N-INVAS EAR/PLS OXIMETRY MLT: CPT

## 2025-01-01 PROCEDURE — 87502 INFLUENZA DNA AMP PROBE: CPT | Performed by: STUDENT IN AN ORGANIZED HEALTH CARE EDUCATION/TRAINING PROGRAM

## 2025-01-01 PROCEDURE — 63600175 PHARM REV CODE 636 W HCPCS: Performed by: INTERNAL MEDICINE

## 2025-01-01 PROCEDURE — 84484 ASSAY OF TROPONIN QUANT: CPT | Performed by: STUDENT IN AN ORGANIZED HEALTH CARE EDUCATION/TRAINING PROGRAM

## 2025-01-01 PROCEDURE — 80053 COMPREHEN METABOLIC PANEL: CPT | Performed by: STUDENT IN AN ORGANIZED HEALTH CARE EDUCATION/TRAINING PROGRAM

## 2025-01-01 PROCEDURE — 80185 ASSAY OF PHENYTOIN TOTAL: CPT | Performed by: INTERNAL MEDICINE

## 2025-01-01 PROCEDURE — 85730 THROMBOPLASTIN TIME PARTIAL: CPT | Performed by: FAMILY MEDICINE

## 2025-01-01 PROCEDURE — 99900031 HC PATIENT EDUCATION (STAT)

## 2025-01-01 PROCEDURE — 87205 SMEAR GRAM STAIN: CPT | Performed by: INTERNAL MEDICINE

## 2025-01-01 PROCEDURE — 25500020 PHARM REV CODE 255: Performed by: STUDENT IN AN ORGANIZED HEALTH CARE EDUCATION/TRAINING PROGRAM

## 2025-01-01 PROCEDURE — 83880 ASSAY OF NATRIURETIC PEPTIDE: CPT | Performed by: STUDENT IN AN ORGANIZED HEALTH CARE EDUCATION/TRAINING PROGRAM

## 2025-01-01 PROCEDURE — 84145 PROCALCITONIN (PCT): CPT | Performed by: INTERNAL MEDICINE

## 2025-01-01 PROCEDURE — 36415 COLL VENOUS BLD VENIPUNCTURE: CPT | Performed by: INTERNAL MEDICINE

## 2025-01-01 PROCEDURE — 94799 UNLISTED PULMONARY SVC/PX: CPT

## 2025-01-01 PROCEDURE — 36600 WITHDRAWAL OF ARTERIAL BLOOD: CPT

## 2025-01-01 PROCEDURE — 82803 BLOOD GASES ANY COMBINATION: CPT

## 2025-01-01 PROCEDURE — 83735 ASSAY OF MAGNESIUM: CPT | Performed by: STUDENT IN AN ORGANIZED HEALTH CARE EDUCATION/TRAINING PROGRAM

## 2025-01-01 PROCEDURE — 83605 ASSAY OF LACTIC ACID: CPT | Performed by: INTERNAL MEDICINE

## 2025-01-01 PROCEDURE — 85025 COMPLETE CBC W/AUTO DIFF WBC: CPT | Performed by: FAMILY MEDICINE

## 2025-01-01 PROCEDURE — 99497 ADVNCD CARE PLAN 30 MIN: CPT | Mod: 25,,, | Performed by: INTERNAL MEDICINE

## 2025-01-01 PROCEDURE — 85379 FIBRIN DEGRADATION QUANT: CPT | Performed by: FAMILY MEDICINE

## 2025-01-01 PROCEDURE — 36415 COLL VENOUS BLD VENIPUNCTURE: CPT | Performed by: FAMILY MEDICINE

## 2025-01-01 RX ORDER — LORAZEPAM 2 MG/ML
1 INJECTION INTRAMUSCULAR EVERY 4 HOURS PRN
Status: DISCONTINUED | OUTPATIENT
Start: 2025-01-01 | End: 2025-01-01 | Stop reason: HOSPADM

## 2025-01-01 RX ORDER — IBUPROFEN 200 MG
24 TABLET ORAL
Status: DISCONTINUED | OUTPATIENT
Start: 2025-01-01 | End: 2025-01-01

## 2025-01-01 RX ORDER — LORAZEPAM 2 MG/ML
1 INJECTION INTRAMUSCULAR EVERY 4 HOURS PRN
Status: CANCELLED | OUTPATIENT
Start: 2025-01-01

## 2025-01-01 RX ORDER — FAMOTIDINE 20 MG/1
20 TABLET, FILM COATED ORAL DAILY
Status: DISCONTINUED | OUTPATIENT
Start: 2025-01-01 | End: 2025-01-01

## 2025-01-01 RX ORDER — IBUPROFEN 200 MG
16 TABLET ORAL
Status: DISCONTINUED | OUTPATIENT
Start: 2025-01-01 | End: 2025-01-01

## 2025-01-01 RX ORDER — SODIUM CHLORIDE 9 MG/ML
500 INJECTION, SOLUTION INTRAVENOUS
Status: COMPLETED | OUTPATIENT
Start: 2025-01-01 | End: 2025-01-01

## 2025-01-01 RX ORDER — HEPARIN SODIUM,PORCINE/D5W 25000/250
0-40 INTRAVENOUS SOLUTION INTRAVENOUS CONTINUOUS
Status: DISCONTINUED | OUTPATIENT
Start: 2025-01-01 | End: 2025-01-01

## 2025-01-01 RX ORDER — MORPHINE SULFATE 1 MG/ML
0-10 INJECTION, SOLUTION INTRAVENOUS CONTINUOUS
Refills: 0 | Status: DISCONTINUED | OUTPATIENT
Start: 2025-01-01 | End: 2025-01-01 | Stop reason: HOSPADM

## 2025-01-01 RX ORDER — MORPHINE SULFATE 4 MG/ML
4 INJECTION, SOLUTION INTRAMUSCULAR; INTRAVENOUS
Status: CANCELLED | OUTPATIENT
Start: 2025-01-01

## 2025-01-01 RX ORDER — ONDANSETRON HYDROCHLORIDE 2 MG/ML
4 INJECTION, SOLUTION INTRAVENOUS
Status: COMPLETED | OUTPATIENT
Start: 2025-01-01 | End: 2025-01-01

## 2025-01-01 RX ORDER — ACETAMINOPHEN 325 MG/1
650 TABLET ORAL EVERY 8 HOURS PRN
Status: DISCONTINUED | OUTPATIENT
Start: 2025-01-01 | End: 2025-01-01 | Stop reason: HOSPADM

## 2025-01-01 RX ORDER — MORPHINE SULFATE 2 MG/ML
1 INJECTION, SOLUTION INTRAMUSCULAR; INTRAVENOUS EVERY 4 HOURS PRN
Status: DISCONTINUED | OUTPATIENT
Start: 2025-01-01 | End: 2025-01-01

## 2025-01-01 RX ORDER — LORAZEPAM 2 MG/ML
0.5 INJECTION INTRAMUSCULAR EVERY 4 HOURS PRN
Status: DISCONTINUED | OUTPATIENT
Start: 2025-01-01 | End: 2025-01-01

## 2025-01-01 RX ORDER — NALOXONE HCL 0.4 MG/ML
0.02 VIAL (ML) INJECTION
Status: DISCONTINUED | OUTPATIENT
Start: 2025-01-01 | End: 2025-01-01

## 2025-01-01 RX ORDER — BUMETANIDE 2 MG/1
2 TABLET ORAL DAILY
Status: ON HOLD | COMMUNITY
Start: 2025-01-01 | End: 2025-01-01 | Stop reason: ALTCHOICE

## 2025-01-01 RX ORDER — ONDANSETRON HYDROCHLORIDE 2 MG/ML
4 INJECTION, SOLUTION INTRAVENOUS EVERY 6 HOURS PRN
Status: DISCONTINUED | OUTPATIENT
Start: 2025-01-01 | End: 2025-01-01 | Stop reason: HOSPADM

## 2025-01-01 RX ORDER — MORPHINE SULFATE 1 MG/ML
0-10 INJECTION, SOLUTION INTRAVENOUS CONTINUOUS
Refills: 0 | Status: CANCELLED | OUTPATIENT
Start: 2025-01-01

## 2025-01-01 RX ORDER — HYDROCODONE BITARTRATE AND ACETAMINOPHEN 5; 325 MG/1; MG/1
1 TABLET ORAL EVERY 6 HOURS PRN
Refills: 0 | Status: DISCONTINUED | OUTPATIENT
Start: 2025-01-01 | End: 2025-01-01

## 2025-01-01 RX ORDER — IPRATROPIUM BROMIDE AND ALBUTEROL SULFATE 2.5; .5 MG/3ML; MG/3ML
3 SOLUTION RESPIRATORY (INHALATION) EVERY 6 HOURS PRN
Status: DISCONTINUED | OUTPATIENT
Start: 2025-01-01 | End: 2025-01-01

## 2025-01-01 RX ORDER — MORPHINE SULFATE 4 MG/ML
4 INJECTION, SOLUTION INTRAMUSCULAR; INTRAVENOUS
Refills: 0 | Status: COMPLETED | OUTPATIENT
Start: 2025-01-01 | End: 2025-01-01

## 2025-01-01 RX ORDER — CEFEPIME HYDROCHLORIDE 1 G/1
1 INJECTION, POWDER, FOR SOLUTION INTRAMUSCULAR; INTRAVENOUS
Status: DISCONTINUED | OUTPATIENT
Start: 2025-01-01 | End: 2025-01-01

## 2025-01-01 RX ORDER — SCOPOLAMINE 1 MG/3D
1 PATCH, EXTENDED RELEASE TRANSDERMAL
Status: CANCELLED | OUTPATIENT
Start: 2025-04-25

## 2025-01-01 RX ORDER — ASPIRIN 325 MG
325 TABLET ORAL
Status: DISCONTINUED | OUTPATIENT
Start: 2025-01-01 | End: 2025-01-01

## 2025-01-01 RX ORDER — FUROSEMIDE 20 MG/1
20 TABLET ORAL 2 TIMES DAILY
Status: DISCONTINUED | OUTPATIENT
Start: 2025-01-01 | End: 2025-01-01

## 2025-01-01 RX ORDER — GLUCAGON 1 MG
1 KIT INJECTION
Status: DISCONTINUED | OUTPATIENT
Start: 2025-01-01 | End: 2025-01-01

## 2025-01-01 RX ORDER — MORPHINE SULFATE 4 MG/ML
4 INJECTION, SOLUTION INTRAMUSCULAR; INTRAVENOUS
Status: DISCONTINUED | OUTPATIENT
Start: 2025-01-01 | End: 2025-04-24 | Stop reason: HOSPADM

## 2025-01-01 RX ORDER — LORAZEPAM 0.5 MG/1
0.5 TABLET ORAL ONCE
Status: COMPLETED | OUTPATIENT
Start: 2025-01-01 | End: 2025-01-01

## 2025-01-01 RX ORDER — LANOLIN ALCOHOL/MO/W.PET/CERES
800 CREAM (GRAM) TOPICAL
Status: DISCONTINUED | OUTPATIENT
Start: 2025-01-01 | End: 2025-01-01

## 2025-01-01 RX ORDER — ONDANSETRON HYDROCHLORIDE 2 MG/ML
4 INJECTION, SOLUTION INTRAVENOUS EVERY 6 HOURS PRN
Status: DISCONTINUED | OUTPATIENT
Start: 2025-01-01 | End: 2025-04-24 | Stop reason: HOSPADM

## 2025-01-01 RX ORDER — CLOPIDOGREL BISULFATE 75 MG/1
75 TABLET ORAL DAILY
Status: DISCONTINUED | OUTPATIENT
Start: 2025-01-01 | End: 2025-01-01

## 2025-01-01 RX ORDER — GUAIFENESIN 600 MG/1
600 TABLET, EXTENDED RELEASE ORAL 2 TIMES DAILY
Status: DISCONTINUED | OUTPATIENT
Start: 2025-01-01 | End: 2025-01-01

## 2025-01-01 RX ORDER — MORPHINE SULFATE 1 MG/ML
0-10 INJECTION, SOLUTION INTRAVENOUS CONTINUOUS
Status: DISCONTINUED | OUTPATIENT
Start: 2025-01-01 | End: 2025-04-24 | Stop reason: HOSPADM

## 2025-01-01 RX ORDER — SODIUM,POTASSIUM PHOSPHATES 280-250MG
2 POWDER IN PACKET (EA) ORAL
Status: DISCONTINUED | OUTPATIENT
Start: 2025-01-01 | End: 2025-01-01

## 2025-01-01 RX ORDER — SCOPOLAMINE 1 MG/3D
1 PATCH, EXTENDED RELEASE TRANSDERMAL
Status: DISCONTINUED | OUTPATIENT
Start: 2025-01-01 | End: 2025-01-01 | Stop reason: HOSPADM

## 2025-01-01 RX ORDER — EZETIMIBE 10 MG/1
10 TABLET ORAL DAILY
Status: DISCONTINUED | OUTPATIENT
Start: 2025-01-01 | End: 2025-01-01

## 2025-01-01 RX ORDER — ONDANSETRON HYDROCHLORIDE 2 MG/ML
4 INJECTION, SOLUTION INTRAVENOUS EVERY 6 HOURS PRN
Status: CANCELLED | OUTPATIENT
Start: 2025-01-01

## 2025-01-01 RX ORDER — SCOPOLAMINE 1 MG/3D
1 PATCH, EXTENDED RELEASE TRANSDERMAL
Status: DISCONTINUED | OUTPATIENT
Start: 2025-04-25 | End: 2025-04-24 | Stop reason: HOSPADM

## 2025-01-01 RX ORDER — CITALOPRAM 20 MG/1
20 TABLET, FILM COATED ORAL DAILY
Status: DISCONTINUED | OUTPATIENT
Start: 2025-01-01 | End: 2025-01-01

## 2025-01-01 RX ORDER — LORAZEPAM 2 MG/ML
1 INJECTION INTRAMUSCULAR EVERY 4 HOURS PRN
Status: DISCONTINUED | OUTPATIENT
Start: 2025-01-01 | End: 2025-04-24 | Stop reason: HOSPADM

## 2025-01-01 RX ORDER — ALUMINUM HYDROXIDE, MAGNESIUM HYDROXIDE, AND SIMETHICONE 1200; 120; 1200 MG/30ML; MG/30ML; MG/30ML
30 SUSPENSION ORAL 4 TIMES DAILY PRN
Status: DISCONTINUED | OUTPATIENT
Start: 2025-01-01 | End: 2025-01-01

## 2025-01-01 RX ORDER — NAPROXEN SODIUM 220 MG/1
81 TABLET, FILM COATED ORAL DAILY
Status: DISCONTINUED | OUTPATIENT
Start: 2025-01-01 | End: 2025-01-01

## 2025-01-01 RX ORDER — PHENYTOIN SODIUM 100 MG/1
400 CAPSULE, EXTENDED RELEASE ORAL DAILY
Status: DISCONTINUED | OUTPATIENT
Start: 2025-01-01 | End: 2025-01-01

## 2025-01-01 RX ORDER — GUAIFENESIN 100 MG/5ML
200 LIQUID ORAL EVERY 4 HOURS PRN
Status: DISCONTINUED | OUTPATIENT
Start: 2025-01-01 | End: 2025-01-01

## 2025-01-01 RX ORDER — RANOLAZINE 500 MG/1
500 TABLET, EXTENDED RELEASE ORAL 2 TIMES DAILY
Status: ON HOLD | COMMUNITY
Start: 2025-01-01 | End: 2025-01-01 | Stop reason: ALTCHOICE

## 2025-01-01 RX ORDER — CEFEPIME HYDROCHLORIDE 2 G/1
2 INJECTION, POWDER, FOR SOLUTION INTRAVENOUS
Status: DISCONTINUED | OUTPATIENT
Start: 2025-01-01 | End: 2025-01-01

## 2025-01-01 RX ORDER — ACETAMINOPHEN 325 MG/1
650 TABLET ORAL EVERY 4 HOURS PRN
Status: DISCONTINUED | OUTPATIENT
Start: 2025-01-01 | End: 2025-01-01 | Stop reason: HOSPADM

## 2025-01-01 RX ORDER — BUMETANIDE 0.25 MG/ML
2 INJECTION, SOLUTION INTRAMUSCULAR; INTRAVENOUS
Status: COMPLETED | OUTPATIENT
Start: 2025-01-01 | End: 2025-01-01

## 2025-01-01 RX ORDER — MAGNESIUM SULFATE HEPTAHYDRATE 40 MG/ML
2 INJECTION, SOLUTION INTRAVENOUS ONCE
Status: COMPLETED | OUTPATIENT
Start: 2025-01-01 | End: 2025-01-01

## 2025-01-01 RX ORDER — FUROSEMIDE 10 MG/ML
20 INJECTION INTRAMUSCULAR; INTRAVENOUS ONCE
Status: COMPLETED | OUTPATIENT
Start: 2025-01-01 | End: 2025-01-01

## 2025-01-01 RX ORDER — MORPHINE SULFATE 4 MG/ML
4 INJECTION, SOLUTION INTRAMUSCULAR; INTRAVENOUS
Status: DISCONTINUED | OUTPATIENT
Start: 2025-01-01 | End: 2025-01-01 | Stop reason: HOSPADM

## 2025-01-01 RX ORDER — TALC
6 POWDER (GRAM) TOPICAL NIGHTLY PRN
Status: DISCONTINUED | OUTPATIENT
Start: 2025-01-01 | End: 2025-01-01

## 2025-01-01 RX ADMIN — FUROSEMIDE 20 MG: 20 TABLET ORAL at 08:04

## 2025-01-01 RX ADMIN — AMIODARONE HYDROCHLORIDE 0.5 MG/MIN: 1.8 INJECTION, SOLUTION INTRAVENOUS at 09:04

## 2025-01-01 RX ADMIN — EZETIMIBE 10 MG: 10 TABLET ORAL at 08:04

## 2025-01-01 RX ADMIN — ACETAMINOPHEN 650 MG: 325 TABLET ORAL at 09:04

## 2025-01-01 RX ADMIN — IPRATROPIUM BROMIDE AND ALBUTEROL SULFATE 3 ML: 2.5; .5 SOLUTION RESPIRATORY (INHALATION) at 11:04

## 2025-01-01 RX ADMIN — SODIUM CHLORIDE 1000 MG: 9 INJECTION, SOLUTION INTRAVENOUS at 11:04

## 2025-01-01 RX ADMIN — CITALOPRAM HYDROBROMIDE 20 MG: 20 TABLET ORAL at 09:04

## 2025-01-01 RX ADMIN — HYDROCODONE BITARTRATE AND ACETAMINOPHEN 1 TABLET: 5; 325 TABLET ORAL at 06:04

## 2025-01-01 RX ADMIN — HEPARIN SODIUM 12 UNITS/KG/HR: 10000 INJECTION, SOLUTION INTRAVENOUS at 05:04

## 2025-01-01 RX ADMIN — HYDROCODONE BITARTRATE AND ACETAMINOPHEN 1 TABLET: 5; 325 TABLET ORAL at 10:04

## 2025-01-01 RX ADMIN — Medication 4 MG/HR: at 05:04

## 2025-01-01 RX ADMIN — GUAIFENESIN 600 MG: 600 TABLET, EXTENDED RELEASE ORAL at 08:04

## 2025-01-01 RX ADMIN — CEFEPIME 2 G: 2 INJECTION, POWDER, FOR SOLUTION INTRAVENOUS at 02:04

## 2025-01-01 RX ADMIN — AMIODARONE HYDROCHLORIDE 0.5 MG/MIN: 1.8 INJECTION, SOLUTION INTRAVENOUS at 10:04

## 2025-01-01 RX ADMIN — CEFEPIME 2 G: 2 INJECTION, POWDER, FOR SOLUTION INTRAVENOUS at 01:04

## 2025-01-01 RX ADMIN — AMIODARONE HYDROCHLORIDE 1 MG/MIN: 1.8 INJECTION, SOLUTION INTRAVENOUS at 03:04

## 2025-01-01 RX ADMIN — CITALOPRAM HYDROBROMIDE 20 MG: 20 TABLET ORAL at 08:04

## 2025-01-01 RX ADMIN — BUMETANIDE 2 MG: 0.25 INJECTION INTRAMUSCULAR; INTRAVENOUS at 05:04

## 2025-01-01 RX ADMIN — LORAZEPAM 0.5 MG: 0.5 TABLET ORAL at 06:04

## 2025-01-01 RX ADMIN — IOHEXOL 100 ML: 350 INJECTION, SOLUTION INTRAVENOUS at 03:04

## 2025-01-01 RX ADMIN — SCOPOLAMINE 1 PATCH: 1.5 PATCH, EXTENDED RELEASE TRANSDERMAL at 08:04

## 2025-01-01 RX ADMIN — FUROSEMIDE 20 MG: 20 TABLET ORAL at 10:04

## 2025-01-01 RX ADMIN — LORAZEPAM 1 MG: 2 INJECTION INTRAMUSCULAR; INTRAVENOUS at 05:04

## 2025-01-01 RX ADMIN — SACUBITRIL AND VALSARTAN 1 TABLET: 24; 26 TABLET, FILM COATED ORAL at 10:04

## 2025-01-01 RX ADMIN — EXTENDED PHENYTOIN SODIUM 400 MG: 100 CAPSULE, EXTENDED RELEASE ORAL at 08:04

## 2025-01-01 RX ADMIN — FUROSEMIDE 20 MG: 20 TABLET ORAL at 09:04

## 2025-01-01 RX ADMIN — SACUBITRIL AND VALSARTAN 1 TABLET: 24; 26 TABLET, FILM COATED ORAL at 09:04

## 2025-01-01 RX ADMIN — ONDANSETRON 4 MG: 2 INJECTION INTRAMUSCULAR; INTRAVENOUS at 01:04

## 2025-01-01 RX ADMIN — ACETAMINOPHEN 650 MG: 325 TABLET ORAL at 08:04

## 2025-01-01 RX ADMIN — ASPIRIN 81 MG: 81 TABLET, CHEWABLE ORAL at 08:04

## 2025-01-01 RX ADMIN — POTASSIUM BICARBONATE 50 MEQ: 978 TABLET, EFFERVESCENT ORAL at 07:04

## 2025-01-01 RX ADMIN — SODIUM CHLORIDE 500 ML: 9 INJECTION, SOLUTION INTRAVENOUS at 01:04

## 2025-01-01 RX ADMIN — CLOPIDOGREL BISULFATE 75 MG: 75 TABLET, FILM COATED ORAL at 08:04

## 2025-01-01 RX ADMIN — APIXABAN 5 MG: 5 TABLET, FILM COATED ORAL at 10:04

## 2025-01-01 RX ADMIN — MORPHINE SULFATE 4 MG: 4 INJECTION INTRAVENOUS at 01:04

## 2025-01-01 RX ADMIN — MAGNESIUM SULFATE HEPTAHYDRATE 2 G: 40 INJECTION, SOLUTION INTRAVENOUS at 03:04

## 2025-01-01 RX ADMIN — IPRATROPIUM BROMIDE AND ALBUTEROL SULFATE 3 ML: 2.5; .5 SOLUTION RESPIRATORY (INHALATION) at 06:04

## 2025-01-01 RX ADMIN — FUROSEMIDE 20 MG: 10 INJECTION, SOLUTION INTRAMUSCULAR; INTRAVENOUS at 06:04

## 2025-01-01 RX ADMIN — Medication 800 MG: at 07:04

## 2025-01-01 RX ADMIN — CLOPIDOGREL BISULFATE 75 MG: 75 TABLET, FILM COATED ORAL at 10:04

## 2025-01-01 RX ADMIN — AMIODARONE HYDROCHLORIDE 0.5 MG/MIN: 1.8 INJECTION, SOLUTION INTRAVENOUS at 12:04

## 2025-01-01 RX ADMIN — VANCOMYCIN HYDROCHLORIDE 1250 MG: 1.25 INJECTION, POWDER, LYOPHILIZED, FOR SOLUTION INTRAVENOUS at 10:04

## 2025-01-01 RX ADMIN — LORAZEPAM 0.5 MG: 2 INJECTION INTRAMUSCULAR; INTRAVENOUS at 10:04

## 2025-01-01 RX ADMIN — FAMOTIDINE 20 MG: 20 TABLET, FILM COATED ORAL at 04:04

## 2025-01-01 RX ADMIN — Medication 1 MG/HR: at 01:04

## 2025-01-01 RX ADMIN — VANCOMYCIN HYDROCHLORIDE 1250 MG: 1.25 INJECTION, POWDER, LYOPHILIZED, FOR SOLUTION INTRAVENOUS at 12:04

## 2025-01-01 RX ADMIN — AMIODARONE HYDROCHLORIDE 0.5 MG/MIN: 1.8 INJECTION, SOLUTION INTRAVENOUS at 08:04

## 2025-01-01 RX ADMIN — HYDROCODONE BITARTRATE AND ACETAMINOPHEN 1 TABLET: 5; 325 TABLET ORAL at 04:04

## 2025-01-01 RX ADMIN — GUAIFENESIN 200 MG: 200 SOLUTION ORAL at 02:04

## 2025-01-01 RX ADMIN — AMIODARONE HYDROCHLORIDE 150 MG: 1.5 INJECTION, SOLUTION INTRAVENOUS at 02:04

## 2025-01-01 RX ADMIN — ASPIRIN 81 MG: 81 TABLET, CHEWABLE ORAL at 10:04

## 2025-01-01 RX ADMIN — Medication 800 MG: at 12:04

## 2025-01-01 RX ADMIN — CEFEPIME 2 G: 2 INJECTION, POWDER, FOR SOLUTION INTRAVENOUS at 03:04

## 2025-01-01 RX ADMIN — EZETIMIBE 10 MG: 10 TABLET ORAL at 10:04

## 2025-01-01 RX ADMIN — MORPHINE SULFATE 1 MG: 2 INJECTION, SOLUTION INTRAMUSCULAR; INTRAVENOUS at 10:04

## 2025-01-01 RX ADMIN — HEPARIN SODIUM 12 UNITS/KG/HR: 10000 INJECTION, SOLUTION INTRAVENOUS at 06:04

## 2025-01-01 RX ADMIN — FAMOTIDINE 20 MG: 20 TABLET, FILM COATED ORAL at 05:04

## 2025-01-01 RX ADMIN — LORAZEPAM 1 MG: 2 INJECTION INTRAMUSCULAR; INTRAVENOUS at 06:04

## 2025-01-01 RX ADMIN — EXTENDED PHENYTOIN SODIUM 400 MG: 100 CAPSULE, EXTENDED RELEASE ORAL at 12:04

## 2025-01-01 RX ADMIN — ONDANSETRON 4 MG: 2 INJECTION INTRAMUSCULAR; INTRAVENOUS at 08:04

## 2025-01-17 ENCOUNTER — TELEPHONE (OUTPATIENT)
Dept: OPHTHALMOLOGY | Facility: CLINIC | Age: 80
End: 2025-01-17
Payer: MEDICARE

## 2025-01-17 NOTE — TELEPHONE ENCOUNTER
----- Message from Candice sent at 1/17/2025  1:07 PM CST -----  Contact: tammy osborn  Type: Needs Medical Advice  Who Called:  pt tammy osborn  Best Call Back Number: [phone]  Additional Information: please call regarding pt's surgery on 1/22 she would like to know if pt will be having surgery or does she need to reschedule.please call

## 2025-01-18 ENCOUNTER — HOSPITAL ENCOUNTER (INPATIENT)
Facility: HOSPITAL | Age: 80
LOS: 10 days | Discharge: NURSING, REHAB OR LONG-TERM CARE | DRG: 291 | End: 2025-01-29
Attending: EMERGENCY MEDICINE
Payer: MEDICARE

## 2025-01-18 DIAGNOSIS — R41.82 ALTERED MENTAL STATUS, UNSPECIFIED ALTERED MENTAL STATUS TYPE: ICD-10-CM

## 2025-01-18 DIAGNOSIS — I50.9 ACUTE CHF (CONGESTIVE HEART FAILURE): ICD-10-CM

## 2025-01-18 DIAGNOSIS — J90 PLEURAL EFFUSION, BILATERAL: Primary | ICD-10-CM

## 2025-01-18 DIAGNOSIS — R53.1 GENERALIZED WEAKNESS: ICD-10-CM
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
## 2025-01-18 DIAGNOSIS — I50.9 CHF (CONGESTIVE HEART FAILURE): ICD-10-CM

## 2025-01-18 DIAGNOSIS — I50.9 ACUTE CONGESTIVE HEART FAILURE, UNSPECIFIED HEART FAILURE TYPE: ICD-10-CM

## 2025-01-18 LAB
ALBUMIN SERPL BCP-MCNC: 3.6 G/DL (ref 3.5–5.2)
ALP SERPL-CCNC: 103 U/L (ref 55–135)
ALT SERPL W/O P-5'-P-CCNC: 8 U/L (ref 10–44)
ANION GAP SERPL CALC-SCNC: 10 MMOL/L (ref 8–16)
AST SERPL-CCNC: 15 U/L (ref 10–40)
BASOPHILS # BLD AUTO: 0.06 K/UL (ref 0–0.2)
BASOPHILS NFR BLD: 0.9 % (ref 0–1.9)
BILIRUB SERPL-MCNC: 1.3 MG/DL (ref 0.1–1)
BILIRUB UR QL STRIP: NEGATIVE
BNP SERPL-MCNC: 1309 PG/ML (ref 0–99)
BUN SERPL-MCNC: 16 MG/DL (ref 8–23)
CALCIUM SERPL-MCNC: 8.7 MG/DL (ref 8.7–10.5)
CHLORIDE SERPL-SCNC: 98 MMOL/L (ref 95–110)
CLARITY UR: CLEAR
CO2 SERPL-SCNC: 26 MMOL/L (ref 23–29)
COLOR UR: YELLOW
CREAT SERPL-MCNC: 1.1 MG/DL (ref 0.5–1.4)
DIFFERENTIAL METHOD BLD: ABNORMAL
EOSINOPHIL # BLD AUTO: 0.1 K/UL (ref 0–0.5)
EOSINOPHIL NFR BLD: 1.7 % (ref 0–8)
ERYTHROCYTE [DISTWIDTH] IN BLOOD BY AUTOMATED COUNT: 13.8 % (ref 11.5–14.5)
EST. GFR  (NO RACE VARIABLE): >60 ML/MIN/1.73 M^2
GLUCOSE SERPL-MCNC: 125 MG/DL (ref 70–110)
GLUCOSE UR QL STRIP: NEGATIVE
HCT VFR BLD AUTO: 31.1 % (ref 40–54)
HGB BLD-MCNC: 10.6 G/DL (ref 14–18)
HGB UR QL STRIP: NEGATIVE
IMM GRANULOCYTES # BLD AUTO: 0.02 K/UL (ref 0–0.04)
IMM GRANULOCYTES NFR BLD AUTO: 0.3 % (ref 0–0.5)
INFLUENZA A, MOLECULAR: NEGATIVE
INFLUENZA B, MOLECULAR: NEGATIVE
KETONES UR QL STRIP: ABNORMAL
LEUKOCYTE ESTERASE UR QL STRIP: NEGATIVE
LYMPHOCYTES # BLD AUTO: 1.1 K/UL (ref 1–4.8)
LYMPHOCYTES NFR BLD: 16.9 % (ref 18–48)
MAGNESIUM SERPL-MCNC: 1.8 MG/DL (ref 1.6–2.6)
MCH RBC QN AUTO: 29.9 PG (ref 27–31)
MCHC RBC AUTO-ENTMCNC: 34.1 G/DL (ref 32–36)
MCV RBC AUTO: 88 FL (ref 82–98)
MONOCYTES # BLD AUTO: 1.2 K/UL (ref 0.3–1)
MONOCYTES NFR BLD: 18.4 % (ref 4–15)
NEUTROPHILS # BLD AUTO: 4.1 K/UL (ref 1.8–7.7)
NEUTROPHILS NFR BLD: 61.8 % (ref 38–73)
NITRITE UR QL STRIP: NEGATIVE
NRBC BLD-RTO: 0 /100 WBC
PH UR STRIP: 6 [PH] (ref 5–8)
PHENYTOIN SERPL-MCNC: 18.5 UG/ML (ref 10–20)
PLATELET # BLD AUTO: 169 K/UL (ref 150–450)
PMV BLD AUTO: 10.4 FL (ref 9.2–12.9)
POTASSIUM SERPL-SCNC: 3.8 MMOL/L (ref 3.5–5.1)
PROCALCITONIN SERPL IA-MCNC: 0.06 NG/ML (ref 0–0.5)
PROT SERPL-MCNC: 6.3 G/DL (ref 6–8.4)
PROT UR QL STRIP: ABNORMAL
RBC # BLD AUTO: 3.54 M/UL (ref 4.6–6.2)
SARS-COV-2 RDRP RESP QL NAA+PROBE: NEGATIVE
SODIUM SERPL-SCNC: 134 MMOL/L (ref 136–145)
SP GR UR STRIP: 1.02 (ref 1–1.03)
SPECIMEN SOURCE: NORMAL
TROPONIN I SERPL HS-MCNC: 56.5 PG/ML (ref 0–14.9)
URN SPEC COLLECT METH UR: ABNORMAL
UROBILINOGEN UR STRIP-ACNC: ABNORMAL EU/DL
WBC # BLD AUTO: 6.57 K/UL (ref 3.9–12.7)

## 2025-01-18 PROCEDURE — 83735 ASSAY OF MAGNESIUM: CPT | Performed by: EMERGENCY MEDICINE

## 2025-01-18 PROCEDURE — G0378 HOSPITAL OBSERVATION PER HR: HCPCS

## 2025-01-18 PROCEDURE — 36415 COLL VENOUS BLD VENIPUNCTURE: CPT | Performed by: EMERGENCY MEDICINE

## 2025-01-18 PROCEDURE — 25000003 PHARM REV CODE 250: Performed by: NURSE PRACTITIONER

## 2025-01-18 PROCEDURE — 84145 PROCALCITONIN (PCT): CPT | Performed by: EMERGENCY MEDICINE

## 2025-01-18 PROCEDURE — 63600175 PHARM REV CODE 636 W HCPCS: Performed by: EMERGENCY MEDICINE

## 2025-01-18 PROCEDURE — 80053 COMPREHEN METABOLIC PANEL: CPT | Performed by: EMERGENCY MEDICINE

## 2025-01-18 PROCEDURE — 80185 ASSAY OF PHENYTOIN TOTAL: CPT | Performed by: EMERGENCY MEDICINE

## 2025-01-18 PROCEDURE — 86803 HEPATITIS C AB TEST: CPT | Performed by: EMERGENCY MEDICINE

## 2025-01-18 PROCEDURE — 84484 ASSAY OF TROPONIN QUANT: CPT | Performed by: EMERGENCY MEDICINE

## 2025-01-18 PROCEDURE — 87635 SARS-COV-2 COVID-19 AMP PRB: CPT | Performed by: EMERGENCY MEDICINE

## 2025-01-18 PROCEDURE — 63600175 PHARM REV CODE 636 W HCPCS: Performed by: HOSPITALIST

## 2025-01-18 PROCEDURE — 87502 INFLUENZA DNA AMP PROBE: CPT | Performed by: EMERGENCY MEDICINE

## 2025-01-18 PROCEDURE — 87389 HIV-1 AG W/HIV-1&-2 AB AG IA: CPT | Performed by: EMERGENCY MEDICINE

## 2025-01-18 PROCEDURE — 93010 ELECTROCARDIOGRAM REPORT: CPT | Mod: ,,, | Performed by: INTERNAL MEDICINE

## 2025-01-18 PROCEDURE — 83880 ASSAY OF NATRIURETIC PEPTIDE: CPT | Performed by: EMERGENCY MEDICINE

## 2025-01-18 PROCEDURE — 99285 EMERGENCY DEPT VISIT HI MDM: CPT | Mod: 25

## 2025-01-18 PROCEDURE — 93005 ELECTROCARDIOGRAM TRACING: CPT | Performed by: INTERNAL MEDICINE

## 2025-01-18 PROCEDURE — 85025 COMPLETE CBC W/AUTO DIFF WBC: CPT | Performed by: EMERGENCY MEDICINE

## 2025-01-18 PROCEDURE — 81003 URINALYSIS AUTO W/O SCOPE: CPT | Performed by: EMERGENCY MEDICINE

## 2025-01-18 PROCEDURE — 83036 HEMOGLOBIN GLYCOSYLATED A1C: CPT | Performed by: EMERGENCY MEDICINE

## 2025-01-18 PROCEDURE — 25000003 PHARM REV CODE 250: Performed by: HOSPITALIST

## 2025-01-18 RX ORDER — PREDNISOLONE ACETATE 10 MG/ML
1 SUSPENSION/ DROPS OPHTHALMIC 4 TIMES DAILY
Status: DISCONTINUED | OUTPATIENT
Start: 2025-01-18 | End: 2025-01-23

## 2025-01-18 RX ORDER — FUROSEMIDE 10 MG/ML
40 INJECTION INTRAMUSCULAR; INTRAVENOUS EVERY 12 HOURS
Status: DISCONTINUED | OUTPATIENT
Start: 2025-01-19 | End: 2025-01-18

## 2025-01-18 RX ORDER — FAMOTIDINE 20 MG/1
40 TABLET, FILM COATED ORAL DAILY
Status: DISCONTINUED | OUTPATIENT
Start: 2025-01-19 | End: 2025-01-19

## 2025-01-18 RX ORDER — CLOPIDOGREL BISULFATE 75 MG/1
75 TABLET ORAL DAILY
Status: DISCONTINUED | OUTPATIENT
Start: 2025-01-19 | End: 2025-01-29 | Stop reason: HOSPADM

## 2025-01-18 RX ORDER — FUROSEMIDE 10 MG/ML
40 INJECTION INTRAMUSCULAR; INTRAVENOUS DAILY
Status: DISCONTINUED | OUTPATIENT
Start: 2025-01-19 | End: 2025-01-19

## 2025-01-18 RX ORDER — PHENYTOIN SODIUM 100 MG/1
400 CAPSULE, EXTENDED RELEASE ORAL DAILY
Status: DISCONTINUED | OUTPATIENT
Start: 2025-01-19 | End: 2025-01-29 | Stop reason: HOSPADM

## 2025-01-18 RX ORDER — RANOLAZINE 500 MG/1
1000 TABLET, EXTENDED RELEASE ORAL 4 TIMES DAILY
Status: DISCONTINUED | OUTPATIENT
Start: 2025-01-18 | End: 2025-01-23

## 2025-01-18 RX ORDER — SACUBITRIL AND VALSARTAN 24; 26 MG/1; MG/1
1 TABLET, FILM COATED ORAL 2 TIMES DAILY
Status: DISCONTINUED | OUTPATIENT
Start: 2025-01-18 | End: 2025-01-29 | Stop reason: HOSPADM

## 2025-01-18 RX ORDER — ONDANSETRON HYDROCHLORIDE 2 MG/ML
4 INJECTION, SOLUTION INTRAVENOUS EVERY 6 HOURS PRN
Status: DISCONTINUED | OUTPATIENT
Start: 2025-01-18 | End: 2025-01-29 | Stop reason: HOSPADM

## 2025-01-18 RX ORDER — AMOXICILLIN 250 MG
1 CAPSULE ORAL 2 TIMES DAILY
Status: DISCONTINUED | OUTPATIENT
Start: 2025-01-18 | End: 2025-01-29 | Stop reason: HOSPADM

## 2025-01-18 RX ORDER — MUPIROCIN 20 MG/G
OINTMENT TOPICAL 3 TIMES DAILY
Status: DISCONTINUED | OUTPATIENT
Start: 2025-01-18 | End: 2025-01-29 | Stop reason: HOSPADM

## 2025-01-18 RX ORDER — DOXYCYCLINE 100 MG/1
100 CAPSULE ORAL EVERY 12 HOURS
Status: DISCONTINUED | OUTPATIENT
Start: 2025-01-18 | End: 2025-01-25

## 2025-01-18 RX ORDER — EZETIMIBE 10 MG/1
10 TABLET ORAL DAILY
Status: DISCONTINUED | OUTPATIENT
Start: 2025-01-19 | End: 2025-01-29 | Stop reason: HOSPADM

## 2025-01-18 RX ORDER — FUROSEMIDE 10 MG/ML
INJECTION INTRAMUSCULAR; INTRAVENOUS
Status: DISPENSED
Start: 2025-01-18 | End: 2025-01-19

## 2025-01-18 RX ORDER — MOXIFLOXACIN 5 MG/ML
1 SOLUTION/ DROPS OPHTHALMIC 4 TIMES DAILY
Status: DISCONTINUED | OUTPATIENT
Start: 2025-01-18 | End: 2025-01-23

## 2025-01-18 RX ORDER — SODIUM CHLORIDE 0.9 % (FLUSH) 0.9 %
10 SYRINGE (ML) INJECTION
Status: DISCONTINUED | OUTPATIENT
Start: 2025-01-18 | End: 2025-01-29 | Stop reason: HOSPADM

## 2025-01-18 RX ORDER — ACETAMINOPHEN 325 MG/1
650 TABLET ORAL EVERY 4 HOURS PRN
Status: DISCONTINUED | OUTPATIENT
Start: 2025-01-18 | End: 2025-01-29 | Stop reason: HOSPADM

## 2025-01-18 RX ORDER — METOPROLOL SUCCINATE 50 MG/1
100 TABLET, EXTENDED RELEASE ORAL 2 TIMES DAILY
Status: DISCONTINUED | OUTPATIENT
Start: 2025-01-18 | End: 2025-01-29

## 2025-01-18 RX ORDER — FUROSEMIDE 10 MG/ML
40 INJECTION INTRAMUSCULAR; INTRAVENOUS
Status: COMPLETED | OUTPATIENT
Start: 2025-01-18 | End: 2025-01-18

## 2025-01-18 RX ORDER — CITALOPRAM 20 MG/1
40 TABLET, FILM COATED ORAL DAILY
Status: DISCONTINUED | OUTPATIENT
Start: 2025-01-19 | End: 2025-01-26

## 2025-01-18 RX ORDER — CEFTRIAXONE 1 G/1
1 INJECTION, POWDER, FOR SOLUTION INTRAMUSCULAR; INTRAVENOUS
Status: DISCONTINUED | OUTPATIENT
Start: 2025-01-18 | End: 2025-01-25

## 2025-01-18 RX ORDER — ISOSORBIDE MONONITRATE 60 MG/1
60 TABLET, EXTENDED RELEASE ORAL DAILY
Status: DISCONTINUED | OUTPATIENT
Start: 2025-01-19 | End: 2025-01-27

## 2025-01-18 RX ORDER — HYDROXYZINE PAMOATE 25 MG/1
25 CAPSULE ORAL DAILY PRN
Status: DISCONTINUED | OUTPATIENT
Start: 2025-01-18 | End: 2025-01-24

## 2025-01-18 RX ADMIN — PREDNISOLONE ACETATE 1 DROP: 10 SUSPENSION/ DROPS OPHTHALMIC at 11:01

## 2025-01-18 RX ADMIN — MOXIFLOXACIN HYDROCHLORIDE 1 DROP: 5 SOLUTION/ DROPS OPHTHALMIC at 11:01

## 2025-01-18 RX ADMIN — FUROSEMIDE 40 MG: 10 INJECTION, SOLUTION INTRAMUSCULAR; INTRAVENOUS at 08:01

## 2025-01-18 RX ADMIN — DOXYCYCLINE HYCLATE 100 MG: 100 CAPSULE ORAL at 11:01

## 2025-01-18 RX ADMIN — SENNOSIDES AND DOCUSATE SODIUM 1 TABLET: 50; 8.6 TABLET ORAL at 11:01

## 2025-01-18 RX ADMIN — RANOLAZINE 1000 MG: 500 TABLET, EXTENDED RELEASE ORAL at 11:01

## 2025-01-18 RX ADMIN — METOPROLOL SUCCINATE 100 MG: 50 TABLET, FILM COATED, EXTENDED RELEASE ORAL at 11:01

## 2025-01-18 RX ADMIN — SACUBITRIL AND VALSARTAN 1 TABLET: 24; 26 TABLET, FILM COATED ORAL at 11:01

## 2025-01-18 RX ADMIN — MUPIROCIN 1 G: 20 OINTMENT TOPICAL at 11:01

## 2025-01-18 RX ADMIN — CEFTRIAXONE 1 G: 1 INJECTION, POWDER, FOR SOLUTION INTRAMUSCULAR; INTRAVENOUS at 11:01

## 2025-01-19 ENCOUNTER — CLINICAL SUPPORT (OUTPATIENT)
Dept: CARDIOLOGY | Facility: HOSPITAL | Age: 80
DRG: 291 | End: 2025-01-19
Payer: MEDICARE

## 2025-01-19 VITALS — BODY MASS INDEX: 26.52 KG/M2 | HEIGHT: 66 IN | WEIGHT: 165 LBS

## 2025-01-19 LAB
ANION GAP SERPL CALC-SCNC: 11 MMOL/L (ref 8–16)
BUN SERPL-MCNC: 16 MG/DL (ref 8–23)
CALCIUM SERPL-MCNC: 8.7 MG/DL (ref 8.7–10.5)
CHLORIDE SERPL-SCNC: 95 MMOL/L (ref 95–110)
CO2 SERPL-SCNC: 27 MMOL/L (ref 23–29)
CREAT SERPL-MCNC: 1.1 MG/DL (ref 0.5–1.4)
EST. GFR  (NO RACE VARIABLE): >60 ML/MIN/1.73 M^2
ESTIMATED AVG GLUCOSE: 97 MG/DL (ref 68–131)
GLUCOSE SERPL-MCNC: 105 MG/DL (ref 70–110)
HBA1C MFR BLD: 5 % (ref 4.5–6.2)
HCV AB SERPL QL IA: NEGATIVE
HIV 1+2 AB+HIV1 P24 AG SERPL QL IA: NEGATIVE
POTASSIUM SERPL-SCNC: 3.6 MMOL/L (ref 3.5–5.1)
SODIUM SERPL-SCNC: 133 MMOL/L (ref 136–145)
TROPONIN I SERPL HS-MCNC: 56.5 PG/ML (ref 0–14.9)

## 2025-01-19 PROCEDURE — 97166 OT EVAL MOD COMPLEX 45 MIN: CPT

## 2025-01-19 PROCEDURE — 93306 TTE W/DOPPLER COMPLETE: CPT | Mod: 26,,, | Performed by: STUDENT IN AN ORGANIZED HEALTH CARE EDUCATION/TRAINING PROGRAM

## 2025-01-19 PROCEDURE — 36415 COLL VENOUS BLD VENIPUNCTURE: CPT | Performed by: NURSE PRACTITIONER

## 2025-01-19 PROCEDURE — 12000002 HC ACUTE/MED SURGE SEMI-PRIVATE ROOM

## 2025-01-19 PROCEDURE — 94761 N-INVAS EAR/PLS OXIMETRY MLT: CPT

## 2025-01-19 PROCEDURE — 99900035 HC TECH TIME PER 15 MIN (STAT)

## 2025-01-19 PROCEDURE — 25000003 PHARM REV CODE 250: Performed by: NURSE PRACTITIONER

## 2025-01-19 PROCEDURE — 63600175 PHARM REV CODE 636 W HCPCS: Performed by: HOSPITALIST

## 2025-01-19 PROCEDURE — 80048 BASIC METABOLIC PNL TOTAL CA: CPT | Performed by: NURSE PRACTITIONER

## 2025-01-19 PROCEDURE — 94799 UNLISTED PULMONARY SVC/PX: CPT

## 2025-01-19 PROCEDURE — 84484 ASSAY OF TROPONIN QUANT: CPT | Performed by: HOSPITALIST

## 2025-01-19 PROCEDURE — 25000003 PHARM REV CODE 250: Performed by: STUDENT IN AN ORGANIZED HEALTH CARE EDUCATION/TRAINING PROGRAM

## 2025-01-19 PROCEDURE — 97161 PT EVAL LOW COMPLEX 20 MIN: CPT

## 2025-01-19 PROCEDURE — 97530 THERAPEUTIC ACTIVITIES: CPT

## 2025-01-19 PROCEDURE — 25000003 PHARM REV CODE 250: Performed by: HOSPITALIST

## 2025-01-19 PROCEDURE — 93306 TTE W/DOPPLER COMPLETE: CPT

## 2025-01-19 RX ORDER — IPRATROPIUM BROMIDE AND ALBUTEROL SULFATE 2.5; .5 MG/3ML; MG/3ML
3 SOLUTION RESPIRATORY (INHALATION) EVERY 4 HOURS PRN
Status: DISCONTINUED | OUTPATIENT
Start: 2025-01-19 | End: 2025-01-29 | Stop reason: HOSPADM

## 2025-01-19 RX ORDER — FUROSEMIDE 40 MG/1
40 TABLET ORAL DAILY
Status: DISCONTINUED | OUTPATIENT
Start: 2025-01-20 | End: 2025-01-21

## 2025-01-19 RX ORDER — FAMOTIDINE 20 MG/1
20 TABLET, FILM COATED ORAL DAILY
Status: DISCONTINUED | OUTPATIENT
Start: 2025-01-20 | End: 2025-01-29 | Stop reason: HOSPADM

## 2025-01-19 RX ORDER — OXYCODONE AND ACETAMINOPHEN 5; 325 MG/1; MG/1
1 TABLET ORAL ONCE AS NEEDED
Status: COMPLETED | OUTPATIENT
Start: 2025-01-19 | End: 2025-01-19

## 2025-01-19 RX ADMIN — SACUBITRIL AND VALSARTAN 1 TABLET: 24; 26 TABLET, FILM COATED ORAL at 09:01

## 2025-01-19 RX ADMIN — CLOPIDOGREL BISULFATE 75 MG: 75 TABLET, FILM COATED ORAL at 09:01

## 2025-01-19 RX ADMIN — RANOLAZINE 1000 MG: 500 TABLET, EXTENDED RELEASE ORAL at 09:01

## 2025-01-19 RX ADMIN — MOXIFLOXACIN HYDROCHLORIDE 1 DROP: 5 SOLUTION/ DROPS OPHTHALMIC at 09:01

## 2025-01-19 RX ADMIN — PREDNISOLONE ACETATE 1 DROP: 10 SUSPENSION/ DROPS OPHTHALMIC at 09:01

## 2025-01-19 RX ADMIN — DOXYCYCLINE HYCLATE 100 MG: 100 CAPSULE ORAL at 09:01

## 2025-01-19 RX ADMIN — METOPROLOL SUCCINATE 100 MG: 50 TABLET, FILM COATED, EXTENDED RELEASE ORAL at 09:01

## 2025-01-19 RX ADMIN — HYDROXYZINE PAMOATE 25 MG: 25 CAPSULE ORAL at 01:01

## 2025-01-19 RX ADMIN — CITALOPRAM HYDROBROMIDE 40 MG: 20 TABLET ORAL at 09:01

## 2025-01-19 RX ADMIN — SENNOSIDES AND DOCUSATE SODIUM 1 TABLET: 50; 8.6 TABLET ORAL at 09:01

## 2025-01-19 RX ADMIN — ISOSORBIDE MONONITRATE 60 MG: 60 TABLET, EXTENDED RELEASE ORAL at 09:01

## 2025-01-19 RX ADMIN — EZETIMIBE 10 MG: 10 TABLET ORAL at 09:01

## 2025-01-19 RX ADMIN — RANOLAZINE 1000 MG: 500 TABLET, EXTENDED RELEASE ORAL at 12:01

## 2025-01-19 RX ADMIN — MUPIROCIN 1 G: 20 OINTMENT TOPICAL at 09:01

## 2025-01-19 RX ADMIN — OXYCODONE HYDROCHLORIDE AND ACETAMINOPHEN 1 TABLET: 5; 325 TABLET ORAL at 03:01

## 2025-01-19 RX ADMIN — PHENYTOIN SODIUM 400 MG: 100 CAPSULE, EXTENDED RELEASE ORAL at 09:01

## 2025-01-19 RX ADMIN — FUROSEMIDE 40 MG: 10 INJECTION, SOLUTION INTRAMUSCULAR; INTRAVENOUS at 09:01

## 2025-01-19 RX ADMIN — RANOLAZINE 1000 MG: 500 TABLET, EXTENDED RELEASE ORAL at 04:01

## 2025-01-19 RX ADMIN — CEFTRIAXONE 1 G: 1 INJECTION, POWDER, FOR SOLUTION INTRAMUSCULAR; INTRAVENOUS at 11:01

## 2025-01-19 RX ADMIN — FAMOTIDINE 40 MG: 20 TABLET, FILM COATED ORAL at 04:01

## 2025-01-19 RX ADMIN — ACETAMINOPHEN 650 MG: 325 TABLET ORAL at 09:01

## 2025-01-19 RX ADMIN — APIXABAN 5 MG: 5 TABLET, FILM COATED ORAL at 06:01

## 2025-01-19 RX ADMIN — ACETAMINOPHEN 650 MG: 325 TABLET ORAL at 01:01

## 2025-01-19 RX ADMIN — ACETAMINOPHEN 650 MG: 325 TABLET ORAL at 12:01

## 2025-01-19 NOTE — PLAN OF CARE
01/19/25 1045   JEAN Message   Medicare Outpatient and Observation Notification regarding financial responsibility Given to patient/caregiver;Explained to patient/caregiver;Signed/date by patient/caregiver   Date JEAN was signed 01/19/25   Time JEAN was signed 1042

## 2025-01-19 NOTE — ASSESSMENT & PLAN NOTE
Patient's blood pressure range in the last 24 hours was: BP  Min: 108/66  Max: 162/89.The patient's inpatient anti-hypertensive regimen is listed below:  Current Antihypertensives  isosorbide mononitrate 24 hr tablet 60 mg, Daily, Oral  metoprolol succinate (TOPROL-XL) 24 hr tablet 100 mg, 2 times daily, Oral  ranolazine 12 hr tablet 1,000 mg, 4 times daily, Oral  furosemide tablet 40 mg, Daily, Oral    Plan  - BP is controlled, no changes needed to their regimen

## 2025-01-19 NOTE — SUBJECTIVE & OBJECTIVE
Past Medical History:   Diagnosis Date    Anticoagulant long-term use     2014    Aortic valve disease 2014    pig valve    Arthritis     all over    Atrial fibrillation 2/29/2024    Chest pain 07/15/2019    CHF (congestive heart failure)     2014 on meds    Coronary artery disease     2007 cabg    Difficulty swallowing     Heart attack 1990    15 stents    Heart attack 02/01/2024    mild    Heart block     Hyperlipidemia     Hypertension     on meds    Hypothyroidism 2015    on meds    PVD (peripheral vascular disease)     Seizures     last episode 1990 on meds       Past Surgical History:   Procedure Laterality Date    AORTOGRAPHY WITH SERIALOGRAPHY N/A 11/16/2021    Procedure: AORTOGRAM, WITH RUNOFF;  Surgeon: Rodrigo Willoughby MD;  Location: [facility] CATH/EP LAB;  Service: Cardiology;  Laterality: N/A;    ARTERIOGRAPHY OF AORTIC ROOT N/A 11/05/2019    Procedure: ARTERIOGRAM, AORTIC ROOT;  Surgeon: Rodrigo Willoughby MD;  Location: [facility] CATH/EP LAB;  Service: Cardiology;  Laterality: N/A;    CARDIAC CATHETERIZATION      CARDIAC VALVE SURGERY      CATARACT EXTRACTION W/  INTRAOCULAR LENS IMPLANT Right 12/11/2024    Procedure: CEIOL OD;  Surgeon: Fredrick Sharma MD;  Location: [facility] OR;  Service: Ophthalmology;  Laterality: Right;    CORONARY ANGIOGRAPHY INCLUDING BYPASS GRAFTS WITH CATHETERIZATION OF LEFT HEART N/A 11/05/2019    Procedure: ANGIOGRAM, CORONARY, INCLUDING BYPASS GRAFT, WITH LEFT HEART CATHETERIZATION;  Surgeon: Rodrigo Willoughby MD;  Location: [facility] CATH/EP LAB;  Service: Cardiology;  Laterality: N/A;    CORONARY ANGIOGRAPHY INCLUDING BYPASS GRAFTS WITH CATHETERIZATION OF LEFT HEART Left 11/03/2020    Procedure: ANGIOGRAM, CORONARY, INCLUDING BYPASS GRAFT, WITH LEFT HEART CATHETERIZATION;  Surgeon: Rodrigo Willoughby MD;  Location: [facility] CATH/EP LAB;  Service: Cardiology;  Laterality: Left;    CORONARY ANGIOGRAPHY INCLUDING BYPASS GRAFTS WITH CATHETERIZATION OF LEFT HEART N/A 09/28/2021    Procedure: ANGIOGRAM,  CORONARY, INCLUDING BYPASS GRAFT, WITH LEFT HEART CATHETERIZATION;  Surgeon: Rodrigo Willoughby MD;  Location: [facility] CATH/EP LAB;  Service: Cardiology;  Laterality: N/A;    CORONARY ANGIOGRAPHY INCLUDING BYPASS GRAFTS WITH CATHETERIZATION OF LEFT HEART N/A 12/05/2023    Procedure: ANGIOGRAM, CORONARY, INCLUDING BYPASS GRAFT, WITH LEFT HEART CATHETERIZATION;  Surgeon: Rodrigo Willoughby MD;  Location: [facility] CATH/EP LAB;  Service: Cardiology;  Laterality: N/A;    CORONARY ANGIOPLASTY      CORONARY ARTERY BYPASS GRAFT      x 2    1991 2006    ESOPHAGOGASTRODUODENOSCOPY N/A 2/20/2024    Procedure: EGD (ESOPHAGOGASTRODUODENOSCOPY);  Surgeon: Mal Lockhart III, MD;  Location: [facility] ENDO;  Service: Endoscopy;  Laterality: N/A;    EXTRACORPOREAL SHOCK WAVE LITHOTRIPSY Right 08/01/2019    Procedure: LITHOTRIPSY, ESWL;  Surgeon: Williams Ortega MD;  Location: [facility] OR;  Service: Urology;  Laterality: Right;    HEMORRHOID SURGERY  1990    INSERTION OF PACEMAKER      PERCUTANEOUS TRANSLUMINAL BALLOON ANGIOPLASTY OF CORONARY ARTERY N/A 11/08/2019    Procedure: Angioplasty-coronary;  Surgeon: Rodrigo Willoughby MD;  Location: [facility] CATH/EP LAB;  Service: Cardiology;  Laterality: N/A;    WRIST FRACTURE SURGERY         Review of patient's allergies indicates:   Allergen Reactions    Atorvastatin Other (See Comments)     Muscle pain    Rosuvastatin Other (See Comments)     Muscle pain       Current Facility-Administered Medications on File Prior to Encounter   Medication    magnesium oxide tablet 800 mg    sodium chloride 0.9% flush 10 mL     Current Outpatient Medications on File Prior to Encounter   Medication Sig    ceramides 1,3,6-II (CERAVE) Crea Apply 1 each topically 2 (two) times a day.    citalopram (CELEXA) 40 MG tablet Take 40 mg by mouth.    clopidogrel (PLAVIX) 75 mg tablet Take 75 mg by mouth once daily.    CORLANOR 5 mg Tab Take 1 tablet by mouth 2 (two) times daily. NEW Rx - NOT YET STARTED    ELIQUIS 5 mg Tab Take 5  mg by mouth every morning.    ENTRESTO 24-26 mg per tablet Take 1 tablet by mouth 2 (two) times daily.    ezetimibe (ZETIA) 10 mg tablet Take 10 mg by mouth once daily.    famotidine (PEPCID) 40 MG tablet Take 40 mg by mouth Daily.    furosemide (LASIX) 20 MG tablet Take 1 tablet (20 mg total) by mouth once daily.    hydrOXYzine pamoate (VISTARIL) 25 MG Cap Take 1 capsule (25 mg total) by mouth daily as needed (anxiety).    isosorbide mononitrate (IMDUR) 30 MG 24 hr tablet Take 2 tablets (60 mg total) by mouth once daily.    ketorolac 0.5% (ACULAR) 0.5 % Drop Place 1 drop into the right eye 4 (four) times daily. Start 3 days before surgery.    ketorolac 0.5% (ACULAR) 0.5 % Drop Place 1 drop into the left eye 4 (four) times daily. Start 3 days before surgery.    metoprolol succinate (TOPROL-XL) 100 MG 24 hr tablet Take 100 mg by mouth 2 (two) times daily.    moxifloxacin (VIGAMOX) 0.5 % ophthalmic solution Place 1 drop into the left eye 4 (four) times daily. Start 1 day before cataract surgery    mupirocin (BACTROBAN) 2 % ointment Apply topically 3 (three) times daily.    NITROLINGUAL 400 mcg/spray spray Place 1 spray under the tongue every 5 (five) minutes as needed for Chest pain.    phenytoin (DILANTIN) 100 MG ER capsule Take 400 mg by mouth once daily.     prednisoLONE acetate (PRED FORTE) 1 % DrpS Place 1 drop into the right eye 4 (four) times daily. Start after surgery    prednisoLONE acetate (PRED FORTE) 1 % DrpS Place 1 drop into the left eye 4 (four) times daily. Start after cataract surgery.    ranolazine (RANEXA) 1,000 mg Tb12 Take 1,000 mg by mouth 4 (four) times daily. 2 po q am     2 po q pm     Family History       Problem Relation (Age of Onset)    Diabetes Sister    Heart attack Mother, Father    Heart disease Sister    No Known Problems Maternal Grandmother, Maternal Grandfather, Paternal Grandmother, Paternal Grandfather, Brother, Maternal Aunt, Maternal Uncle, Paternal Aunt, Paternal Uncle     Stroke Sister          Tobacco Use    Smoking status: Former     Current packs/day: 0.00     Average packs/day: 0.5 packs/day for 4.0 years (2.0 ttl pk-yrs)     Types: Cigarettes     Start date:      Quit date:      Years since quittin.0    Smokeless tobacco: Never   Substance and Sexual Activity    Alcohol use: Yes     Comment: social    Drug use: No    Sexual activity: Not Currently     Review of Systems   Constitutional:  Positive for activity change, appetite change and fatigue.        Fall   HENT: Negative.     Respiratory:  Positive for shortness of breath.    Cardiovascular:  Positive for chest pain.   Gastrointestinal: Negative.    Genitourinary: Negative.    Musculoskeletal: Negative.    Skin:  Positive for wound.   Neurological:  Positive for weakness and headaches.   Hematological: Negative.    Psychiatric/Behavioral: Negative.       Objective:     Vital Signs (Most Recent):  Temp: 100.2 °F (37.9 °C) (25)  Pulse: 81 (25)  Resp: 18 (25)  BP: 138/65 (25)  SpO2: 96 % (25) Vital Signs (24h Range):  Temp:  [100.2 °F (37.9 °C)] 100.2 °F (37.9 °C)  Pulse:  [81-83] 81  Resp:  [18] 18  SpO2:  [94 %-96 %] 96 %  BP: (108-157)/(64-76) 138/65     Weight: 72.1 kg (159 lb)  Body mass index is 25.66 kg/m².     Physical Exam  Vitals reviewed.   Constitutional:       General: He is not in acute distress.     Appearance: Normal appearance. He is not ill-appearing.   HENT:      Head: Normocephalic.      Comments: Laceration, pinpoint to right occipital parietal region   No active bleeding.   Eyes:      Comments: Pinpoint pupils   Cardiovascular:      Rate and Rhythm: Normal rate.      Pulses: Normal pulses.      Heart sounds: Murmur heard.   Pulmonary:      Effort: Pulmonary effort is normal.      Breath sounds: Examination of the right-upper field reveals rales. Examination of the left-upper field reveals rales. Examination of the right-middle field reveals  rales. Examination of the left-middle field reveals rales. Rales present.   Abdominal:      General: Bowel sounds are normal.      Palpations: Abdomen is soft.   Musculoskeletal:         General: Normal range of motion.      Cervical back: Normal range of motion and neck supple.   Skin:     General: Skin is warm and dry.      Capillary Refill: Capillary refill takes less than 2 seconds.   Neurological:      General: No focal deficit present.      Mental Status: He is alert. Mental status is at baseline.      GCS: GCS eye subscore is 4. GCS verbal subscore is 5. GCS motor subscore is 6.      Cranial Nerves: Cranial nerves 2-12 are intact.      Sensory: Sensation is intact.      Comments: Strength 5/5 BUE. 4/5 BLE     Psychiatric:         Attention and Perception: Attention normal.         Mood and Affect: Mood normal.         Speech: Speech normal.         Behavior: Behavior normal.                Significant Labs: All pertinent labs within the past 24 hours have been reviewed.  Recent Lab Results         01/18/25 2056 01/18/25  1934 01/18/25  1902        Influenza A, Molecular   Negative         Influenza B, Molecular   Negative         Albumin     3.6       ALP     103       ALT     8       Anion Gap     10       Appearance, UA Clear           AST     15       Baso #     0.06       Basophil %     0.9       Bilirubin (UA) Negative           BILIRUBIN TOTAL     1.3  Comment: For infants and newborns, interpretation of results should be based  on gestational age, weight and in agreement with clinical  observations.    Premature Infant recommended reference ranges:  Up to 24 hours.............<8.0 mg/dL  Up to 48 hours............<12.0 mg/dL  3-5 days..................<15.0 mg/dL  6-29 days.................<15.0 mg/dL         BNP     1,309  Comment: Values of less than 100 pg/ml are consistent with non-CHF populations.       BUN     16       Calcium     8.7       Chloride     98       CO2     26       Color, UA  Yellow           Creatinine     1.1       Differential Method     Automated       eGFR     >60.0       Eos #     0.1       Eos %     1.7       Flu A & B Source   Nasal swab         Glucose     125       Glucose, UA Negative           Gran # (ANC)     4.1       Gran %     61.8       Hematocrit     31.1       Hemoglobin     10.6       Immature Grans (Abs)     0.02  Comment: Mild elevation in immature granulocytes is non specific and   can be seen in a variety of conditions including stress response,   acute inflammation, trauma and pregnancy. Correlation with other   laboratory and clinical findings is essential.         Immature Granulocytes     0.3       Ketones, UA Trace           Leukocyte Esterase, UA Negative           Lymph #     1.1       Lymph %     16.9       Magnesium      1.8       MCH     29.9       MCHC     34.1       MCV     88       Mono #     1.2       Mono %     18.4       MPV     10.4       NITRITE UA Negative           nRBC     0       Blood, UA Negative           pH, UA 6.0           Phenytoin Level Total   18.5         Platelet Count     169       Potassium     3.8       PROTEIN TOTAL     6.3       Protein, UA Trace  Comment: Recommend a 24 hour urine protein or a urine   protein/creatinine ratio if globulin induced proteinuria is  clinically suspected.             RBC     3.54       RDW     13.8       SARS-CoV-2 RNA, Amplification, Qual   Negative  Comment: This test utilizes isothermal nucleic acid amplification technology   to   detect the SARS-CoV-2 RdRp nucleic acid segment. The analytical   sensitivity   (limit of detection) is 500 copies/swab.     A POSITIVE result is indicative of the presence of SARS-CoV-2 RNA;   clinical   correlation with patient history and other diagnostic information is   necessary to determine patient infection status.    A NEGATIVE result means that SARS-CoV-2 nucleic acids are not present   above   the limit of detection. A NEGATIVE result should be treated as    presumptive.   It does not rule out the possibility of COVID-19 and should not be   the sole   basis for treatment decisions. If COVID-19 is strongly suspected   based on   clinical and exposure history, re-testing using an alternate   molecular assay   should be considered.     This test is FDA approved.Performance characteristics of this test   has been   independently verified by [facility] in conjunction   with   [facility] Department of Pathology and Laboratory   Medicine.           Sodium     134       Spec Grav UA 1.020           Specimen UA Urine, Clean Catch           Troponin I High Sensitivity     56.5  Comment: Troponin results differ between methods. Do not use   results between Troponin methods interchangeably.    Alkaline Phospatase levels above 400 U/L may   cause false positive results.    Access hsTnI should not be used for patients taking   Asfotase sanjay (Strensiq).  Critical result TNIHS 56.5 pg/mL called to and read back by Candice Gabriel RN ER at 18-Jan-2025 20:04 by [facility].         UROBILINOGEN UA 2.0-3.0           WBC     6.57               Significant Imaging: I have reviewed all pertinent imaging results/findings within the past 24 hours.  Imaging Results              CT Cervical Spine Without Contrast (Final result)  Result time 01/18/25 20:10:34      Final result by Barry Farrell MD (01/18/25 20:10:34)                   Impression:      As above      Electronically signed by: Barry Farrell  Date:    01/18/2025  Time:    20:10               Narrative:    EXAMINATION:  CT CERVICAL SPINE WITHOUT CONTRAST    CLINICAL HISTORY:  Neck trauma (Age >= 65y);    TECHNIQUE:  Low dose axial images, sagittal and coronal reformations were performed though the cervical spine.  Contrast was not administered.    COMPARISON:  None    FINDINGS:  Osteopenia.  No acute fracture.  Mild to moderate multilevel spondylosis.  Unremarkable prevertebral soft  tissues.    Incompletely evaluated ill-defined ground-glass opacity in the lung apices bilaterally, likely infectious or inflammatory.  Follow-up is warranted.                                       CT Head Without Contrast (Final result)  Result time 01/18/25 19:42:02      Final result by Barry Farrell MD (01/18/25 19:42:02)                   Impression:      No acute findings.      Electronically signed by: Barry Farrell  Date:    01/18/2025  Time:    19:42               Narrative:    EXAMINATION:  CT HEAD WITHOUT CONTRAST    CLINICAL HISTORY:  Head trauma, minor (Age >= 65y);    TECHNIQUE:  Low dose axial images were obtained through the head.  Coronal and sagittal reformations were also performed. Contrast was not administered.    COMPARISON:  06/16/2024    FINDINGS:  Intracranial compartment:    Ventricles and sulci remain prominent.  No extra-axial blood or fluid collections.    Mild chronic microvascular ischemia.  No parenchymal mass, hemorrhage, edema or major vascular distribution infarct.    Skull/extracranial contents (limited evaluation): No fracture. Mastoid air cells and paranasal sinuses are essentially clear.                                       X-Ray Chest 1 View (Final result)  Result time 01/18/25 19:12:52      Final result by Barry Farrell MD (01/18/25 19:12:52)                   Impression:      As above      Electronically signed by: Barry Farrell  Date:    01/18/2025  Time:    19:12               Narrative:    EXAMINATION:  XR CHEST 1 VIEW    CLINICAL HISTORY:  Unspecified fall, initial encounter    TECHNIQUE:  Single frontal view of the chest was performed.    COMPARISON:  06/15/2024    FINDINGS:  Bilateral pleural effusions with adjacent airspace disease and/or atelectasis.  Additional airspace disease in the right upper lobe.  Pulmonary vascular congestion.  Mildly enlarged cardiac silhouette.  Left chest wall cardiac pacemaker.

## 2025-01-19 NOTE — ASSESSMENT & PLAN NOTE
Patient  heart failure that is Acute on chronic. On presentation their CHF was decompensated. Evidence of decompensated CHF on presentation includes: edema, crackles on lung auscultation, dyspnea on exertion (CHURCH), and shortness of breath. The etiology of their decompensation is likely unknown . Most recent BNP and echo results are listed below.  Recent Labs     01/18/25  1902   BNP 1,309*     Latest ECHO  Results for orders placed during the hospital encounter of 06/15/24    Echo    Interpretation Summary    Left Ventricle: The left ventricle is normal in size. Increased ventricular mass. Mildly increased wall thickness. There is mild concentric hypertrophy. Unable to assess wall motion. Septal motion is consistent with pacing. There is normal systolic function with a visually estimated ejection fraction of 55 - 60%. Diastolic function cannot be reliably determined in the presence of mitral annular calcification.    Right Ventricle: Right ventricle was not well visualized due to poor acoustic window. Normal right ventricular cavity size. Pacemaker lead present in the ventricle.    Aortic Valve: There is a transcatheter valve replacement in the aortic position.    Mitral Valve: Mildly thickened leaflets. There is moderate mitral annular calcification present. There is mild regurgitation.    Tricuspid Valve: There is mild to moderate regurgitation.    Current Heart Failure Medications  furosemide injection 40 mg, Every 12 hours, Intravenous    Plan  - Monitor strict I&Os and daily weights.    - Place on telemetry  - Low sodium diet  - Place on fluid restriction of 1.5 L.   - Cardiology has not been consulted  - The patient's volume status is worsening as indicated by edema, crackles on lung auscultation, weight gain, dyspnea on exertion (CHURCH), and shortness of breath. Will continue current treatment

## 2025-01-19 NOTE — PROGRESS NOTES
[facility] Medicine  Progress Note    Patient Name: Jerome Amaro Jr.  MRN: [id number]  Patient Class: IP- Inpatient   Admission Date: 1/18/2025  Length of Stay: 0 days  Attending Physician: Mayra Miller MD  Primary Care Provider: Magda Ruiz FNP-C        Subjective     Principal Problem:Congestive heart failure        HPI:  79 year old male with a past medical history of aortic valve disease, atrial fibrillation on anticoagulation therapy, MI, HLD, PVD, seizures, difficulty swallowing, CAD, CHF, aortic valve disease who presents to the emergency room with weakness in the legs and falls. There was also concern for intermittent altered mental status. Patient has a strong cardiac history with 2 bypass surgeries, atrial fibrillation, aortic valve disease and valve replacement, multiple cardiac stents, hypertension, hyperlipidemia, PVD, seizure history. Patient has been weak in general. His daughter says that he has had several falls over the last couple of days. He has just not been feeling well. No reported fever or chills. No reported vomiting or any diarrhea. He is not have any abdominal pain. He occasionally has chest pain which she takes nitro for which is not abnormal for him. He does not have any new chest pain but sometimes feels short of breath. Today he had a fall where he hit the back of his head on the ground. He did not lose consciousness. No reported vomiting or any seizure activity. He is on Eliquis and Plavix. No other reported injuries from the fall.     In The ER, H/H 10.6/31.1, Na  134, T. Bili 1.3, BNP 1309, Troponin I high sensitivity 56.8, Mg 1.8, Influenza and flu negative, phenytoin 18.5, CT c spine no acute fracture, multilevel spondylosis. Incompletely evaluated ill-defined ground-glass opacity in the lung apices bilaterally, CT head no acute findings, chest x-ray Bilateral pleural effusions with adjacent airspace disease and/or atelectasis. Additional  airspace disease in the right upper lobe. Pulmonary vascular congestion. Mildly enlarged cardiac silhouette. Left chest wall cardiac pacemaker.   Admit to hospital medicine for pleural effusion, CHF exacerbation    Overview/Hospital Course:  This is an 79-year-old gentleman conditions atrial fibrillation on, CAD, PVD, CHF a valvular disease presents to the emergency weakness fall he states he slipped off of his slipper walking to the kitchen. He endorses feeling more weak than usual. He did not lose consciousness nor hit his head. Workup was significant for ill defined GGO in the lungs and a BNP of 1300. He is admitted and treated for CHF exacerbation and community aquired pneumonia with IV antibiotics and diuresis. His respiratory status improved and diuresis was deescalated to PO with echocardiogram still pending.     Interval History:  Patient seen and examined this morning.  He is a in his not to say much he is feeling.  He only moans in pain when I sit him up to listen to use fields.  He does endorse rib and back pain.  He denies any difficulty breathing or chest pain.  We will deescalate to p.o. diuresis.  Continue treatment community-acquired pneumonia.    Review of Systems   Reason unable to perform ROS: patient participation.     Objective:     Vital Signs (Most Recent):  Temp: 98.2 °F (36.8 °C) (01/19/25 0726)  Pulse: 75 (01/19/25 0726)  Resp: 18 (01/19/25 0726)  BP: 112/64 (01/19/25 0726)  SpO2: (!) 93 % (01/19/25 0726) Vital Signs (24h Range):  Temp:  [98.2 °F (36.8 °C)-100.2 °F (37.9 °C)] 98.2 °F (36.8 °C)  Pulse:  [75-96] 75  Resp:  [16-18] 18  SpO2:  [93 %-96 %] 93 %  BP: (108-162)/(64-89) 112/64     Weight: 75 kg (165 lb 5.5 oz)  Body mass index is 26.69 kg/m².    Intake/Output Summary (Last 24 hours) at 1/19/2025 1146  Last data filed at 1/19/2025 0645  Gross per 24 hour   Intake 240 ml   Output 1150 ml   Net -910 ml         Physical Exam  Vitals reviewed.   Constitutional:       General: He is not  in acute distress.     Appearance: Normal appearance. He is not ill-appearing.   HENT:      Head: Normocephalic.   Eyes:      Comments: Pinpoint pupils   Cardiovascular:      Rate and Rhythm: Normal rate.      Pulses: Normal pulses.      Heart sounds: Murmur heard.   Pulmonary:      Effort: Pulmonary effort is normal.      Comments: Decreased breath sounds at bases, no crackles  Abdominal:      General: Bowel sounds are normal.      Palpations: Abdomen is soft.   Musculoskeletal:         General: Normal range of motion.      Cervical back: Normal range of motion and neck supple.   Skin:     General: Skin is warm and dry.      Capillary Refill: Capillary refill takes less than 2 seconds.   Neurological:      General: No focal deficit present.      Mental Status: He is alert. Mental status is at baseline.      GCS: GCS eye subscore is 4. GCS verbal subscore is 5. GCS motor subscore is 6.      Cranial Nerves: Cranial nerves 2-12 are intact.      Sensory: Sensation is intact.      Comments: Strength 5/5 BUE. 4/5 BLE     Psychiatric:         Attention and Perception: Attention normal.         Mood and Affect: Mood normal.         Speech: Speech normal.         Behavior: Behavior normal.             Significant Labs: All pertinent labs within the past 24 hours have been reviewed.  BMP:   Recent Labs   Lab 01/18/25  1902 01/19/25  0509   * 105   * 133*   K 3.8 3.6   CL 98 95   CO2 26 27   BUN 16 16   CREATININE 1.1 1.1   CALCIUM 8.7 8.7   MG 1.8  --      CBC:   Recent Labs   Lab 01/18/25  1902   WBC 6.57   HGB 10.6*   HCT 31.1*          Significant Imaging: I have reviewed all pertinent imaging results/findings within the past 24 hours.    Assessment and Plan     * Congestive heart failure  Patient  heart failure that is Acute on chronic. On presentation their CHF was decompensated. Evidence of decompensated CHF on presentation includes: edema, crackles on lung auscultation, dyspnea on exertion (CHURCH),  and shortness of breath. The etiology of their decompensation is likely unknown . Most recent BNP and echo results are listed below.  Recent Labs     01/18/25  1902   BNP 1,309*     Latest ECHO  Results for orders placed during the hospital encounter of 06/15/24    Echo    Interpretation Summary    Left Ventricle: The left ventricle is normal in size. Increased ventricular mass. Mildly increased wall thickness. There is mild concentric hypertrophy. Unable to assess wall motion. Septal motion is consistent with pacing. There is normal systolic function with a visually estimated ejection fraction of 55 - 60%. Diastolic function cannot be reliably determined in the presence of mitral annular calcification.    Right Ventricle: Right ventricle was not well visualized due to poor acoustic window. Normal right ventricular cavity size. Pacemaker lead present in the ventricle.    Aortic Valve: There is a transcatheter valve replacement in the aortic position.    Mitral Valve: Mildly thickened leaflets. There is moderate mitral annular calcification present. There is mild regurgitation.    Tricuspid Valve: There is mild to moderate regurgitation.    Current Heart Failure Medications  sacubitriL-valsartan 24-26 mg per tablet 1 tablet, 2 times daily, Oral  metoprolol succinate (TOPROL-XL) 24 hr tablet 100 mg, 2 times daily, Oral  furosemide tablet 40 mg, Daily, Oral    Plan  - Monitor strict I&Os and daily weights.    - Place on telemetry  - Low sodium diet  - Place on fluid restriction of 1.5 L.   - Cardiology has not been consulted  - The patient's volume status is improving, no crackles on exam today transition to PO lasix from tomorrow        Pleural effusion   I have personally reviewed and interpreted the following imaging: Xray. A thoracentesis was deferred. Most likely etiology includes Congestive Heart Failure. Management to include Diuresis    Oxygen prn          Seizure disorder  Seizure precautions  Continue  phenytoin    HTN (hypertension)  Patient's blood pressure range in the last 24 hours was: BP  Min: 108/66  Max: 162/89.The patient's inpatient anti-hypertensive regimen is listed below:  Current Antihypertensives  isosorbide mononitrate 24 hr tablet 60 mg, Daily, Oral  metoprolol succinate (TOPROL-XL) 24 hr tablet 100 mg, 2 times daily, Oral  ranolazine 12 hr tablet 1,000 mg, 4 times daily, Oral  furosemide tablet 40 mg, Daily, Oral    Plan  - BP is controlled, no changes needed to their regimen      VTE Risk Mitigation (From admission, onward)           Ordered     apixaban tablet 5 mg  Every morning         01/18/25 2144     IP VTE HIGH RISK PATIENT  Once         01/18/25 2055     Place sequential compression device  Until discontinued         01/18/25 2055                    Discharge Planning   AXEL: 1/23/2025     Code Status: Full Code   Medical Readiness for Discharge Date:   Discharge Plan A: Home Health                Please place Justification for DME        Mayra Miller MD  Department of Hospital Medicine   [facility]

## 2025-01-19 NOTE — ASSESSMENT & PLAN NOTE
Patient's blood pressure range in the last 24 hours was: BP  Min: 108/66  Max: 157/72.The patient's inpatient anti-hypertensive regimen is listed below:  Current Antihypertensives  furosemide injection 40 mg, Every 12 hours, Intravenous    Plan  - BP is controlled, no changes needed to their regimen

## 2025-01-19 NOTE — CARE UPDATE
01/19/25 0252   Patient Assessment/Suction   Level of Consciousness (AVPU) alert   Respiratory Effort Normal;Unlabored   Expansion/Accessory Muscles/Retractions no use of accessory muscles;no retractions   All Lung Fields Breath Sounds diminished;crackles   Rhythm/Pattern, Respiratory unlabored;pattern regular;no shortness of breath reported   Cough Frequency no cough   PRE-TX-O2   Device (Oxygen Therapy) room air   SpO2 95 %   Pulse Oximetry Type Intermittent   $ Pulse Oximetry - Multiple Charge Pulse Oximetry - Multiple   Pulse 95   Resp 18

## 2025-01-19 NOTE — HOSPITAL COURSE
Mr. Amaro has been monitored closely during his hospitalization. He was admitted on 1/18/25 for generalized weakness and falls.  He states he slipped off of his slipper walking to the kitchen.  He endorses feeling more weak than usual. He did not lose consciousness nor hit his head. He had a CT head and C spine with no acute findings. CT chest revealed bilat pleural effusions and concern for infectious process in RUL. BNP of 1300.  He was started on IV Lasix, then transitioned to p.o. Lasix.  Ultrasound of chest showed bilateral moderate pleural effusions right greater than left.  Thoracentesis was offered to family, but they declined.  He was started on antibiotics.  PT and OT were consulted, moderate intensity therapy was recommended.  Patient is now pending SNF placement.  Patient has had 2 falls inpatient with repeat CT head negative for acute abnormality.  Fall precautions in place. He's had confusion throughout his hospitalization. Daughter requested neuro consult.  Teleneuro was consulted, recommended EEG.  EEG which was negative for epileptiform discharges, but did reveal slowing consistent with encephalopathy. Sodium has been trending down and is 130, urine osm 586, urine sodium 46, serum osm is pending. TSH is WNL. B12 is 241 and folate 8 - started on oral replacement.     Held furosemide and citalopram for hyponatremia on 1/26 and patient was monitored closely.  Citalopram was restarted at lower dose on 01/28.  Reduced isosorbide mononitrate to 30 from 60 and metoprolol 100 daily from 200 due to low blood pressure on 01/27.  Patient was medically stable and was discharged to skilled nursing facility.  Apixaban held at discharge due to has bled score greater than 4 with recurrent falls  Can restart outpatient if needed.  Patient is high risk for readmission and discussed code status and possible hospice down the road however patient and family said they were not ready yet

## 2025-01-19 NOTE — HPI
79 year old male with a past medical history of aortic valve disease, atrial fibrillation on anticoagulation therapy, MI, HLD, PVD, seizures, difficulty swallowing, CAD, CHF, aortic valve disease who presents to the emergency room with weakness in the legs and falls. There was also concern for intermittent altered mental status. Patient has a strong cardiac history with 2 bypass surgeries, atrial fibrillation, aortic valve disease and valve replacement, multiple cardiac stents, hypertension, hyperlipidemia, PVD, seizure history. Patient has been weak in general. His daughter says that he has had several falls over the last couple of days. He has just not been feeling well. No reported fever or chills. No reported vomiting or any diarrhea. He is not have any abdominal pain. He occasionally has chest pain which she takes nitro for which is not abnormal for him. He does not have any new chest pain but sometimes feels short of breath. Today he had a fall where he hit the back of his head on the ground. He did not lose consciousness. No reported vomiting or any seizure activity. He is on Eliquis and Plavix. No other reported injuries from the fall.     In [facility] ER, H/H 10.6/31.1, Na  134, T. Bili 1.3, BNP 1309, Troponin I high sensitivity 56.8, Mg 1.8, Influenza and flu negative, phenytoin 18.5, CT c spine no acute fracture, multilevel spondylosis. Incompletely evaluated ill-defined ground-glass opacity in the lung apices bilaterally, CT head no acute findings, chest x-ray Bilateral pleural effusions with adjacent airspace disease and/or atelectasis. Additional airspace disease in the right upper lobe. Pulmonary vascular congestion. Mildly enlarged cardiac silhouette. Left chest wall cardiac pacemaker.   Admit to hospital medicine for pleural effusion, CHF exacerbation

## 2025-01-19 NOTE — ASSESSMENT & PLAN NOTE
I have personally reviewed and interpreted the following imaging: Xray. A thoracentesis was deferred. Most likely etiology includes Congestive Heart Failure. Management to include Diuresis    Oxygen prn

## 2025-01-19 NOTE — ASSESSMENT & PLAN NOTE
Patient  heart failure that is Acute on chronic. On presentation their CHF was decompensated. Evidence of decompensated CHF on presentation includes: edema, crackles on lung auscultation, dyspnea on exertion (CHURCH), and shortness of breath. The etiology of their decompensation is likely unknown . Most recent BNP and echo results are listed below.  Recent Labs     01/18/25  1902   BNP 1,309*     Latest ECHO  Results for orders placed during the hospital encounter of 06/15/24    Echo    Interpretation Summary    Left Ventricle: The left ventricle is normal in size. Increased ventricular mass. Mildly increased wall thickness. There is mild concentric hypertrophy. Unable to assess wall motion. Septal motion is consistent with pacing. There is normal systolic function with a visually estimated ejection fraction of 55 - 60%. Diastolic function cannot be reliably determined in the presence of mitral annular calcification.    Right Ventricle: Right ventricle was not well visualized due to poor acoustic window. Normal right ventricular cavity size. Pacemaker lead present in the ventricle.    Aortic Valve: There is a transcatheter valve replacement in the aortic position.    Mitral Valve: Mildly thickened leaflets. There is moderate mitral annular calcification present. There is mild regurgitation.    Tricuspid Valve: There is mild to moderate regurgitation.    Current Heart Failure Medications  sacubitriL-valsartan 24-26 mg per tablet 1 tablet, 2 times daily, Oral  metoprolol succinate (TOPROL-XL) 24 hr tablet 100 mg, 2 times daily, Oral  furosemide tablet 40 mg, Daily, Oral    Plan  - Monitor strict I&Os and daily weights.    - Place on telemetry  - Low sodium diet  - Place on fluid restriction of 1.5 L.   - Cardiology has not been consulted  - The patient's volume status is improving, no crackles on exam today transition to PO lasix from tomorrow

## 2025-01-19 NOTE — ED PROVIDER NOTES
Encounter Date: 1/18/2025       History     Chief Complaint   Patient presents with    Fall     FREQUENT FALLS THIS WEEK, ANOTHER ONE TODAY, NO LOC    Head Injury     NO LOC    Leg Pain    GEN WEAKNESS     X 1 WEEK     This is an 79-year-old male who presents emergency department with weakness in the legs and falls.  There was also concern for intermittent altered mental status.  Patient has a strong cardiac history with 2 bypass surgeries, atrial fibrillation, aortic valve disease and valve replacement, multiple cardiac stents, hypertension, hyperlipidemia, PVD, seizure history.  Patient has been weak in general.  His daughter says that he has had several falls over the last couple of days.  He has just not been feeling well.  No reported fever or chills.  No reported vomiting or any diarrhea.  He is not have any abdominal pain.  He occasionally has chest pain which she takes nitro for which is not abnormal for him.  He does not have any new chest pain but sometimes feels short of breath.  Today he had a fall where he hit the back of his head on the ground.  He did not lose consciousness.  No reported vomiting or any seizure activity.  He is on Eliquis and Plavix.  No other reported injuries from the fall.      Review of patient's allergies indicates:   Allergen Reactions    Atorvastatin Other (See Comments)     Muscle pain    Rosuvastatin Other (See Comments)     Muscle pain     Past Medical History:   Diagnosis Date    Anticoagulant long-term use     2014    Aortic valve disease 2014    pig valve    Arthritis     all over    Atrial fibrillation 2/29/2024    Chest pain 07/15/2019    CHF (congestive heart failure)     2014 on meds    Coronary artery disease     2007 cabg    Difficulty swallowing     Heart attack 1990    15 stents    Heart attack 02/01/2024    mild    Heart block     Hyperlipidemia     Hypertension     on meds    Hypothyroidism 2015    on meds    PVD (peripheral vascular disease)     Seizures      last episode 1990 on meds     Past Surgical History:   Procedure Laterality Date    AORTOGRAPHY WITH SERIALOGRAPHY N/A 11/16/2021    Procedure: AORTOGRAM, WITH RUNOFF;  Surgeon: Rodrigo Willoughby MD;  Location: [facility] CATH/EP LAB;  Service: Cardiology;  Laterality: N/A;    ARTERIOGRAPHY OF AORTIC ROOT N/A 11/05/2019    Procedure: ARTERIOGRAM, AORTIC ROOT;  Surgeon: Rodrigo Willoughby MD;  Location: [facility] CATH/EP LAB;  Service: Cardiology;  Laterality: N/A;    CARDIAC CATHETERIZATION      CARDIAC VALVE SURGERY      CATARACT EXTRACTION W/  INTRAOCULAR LENS IMPLANT Right 12/11/2024    Procedure: CEIOL OD;  Surgeon: Fredrick Sharma MD;  Location: [facility] OR;  Service: Ophthalmology;  Laterality: Right;    CORONARY ANGIOGRAPHY INCLUDING BYPASS GRAFTS WITH CATHETERIZATION OF LEFT HEART N/A 11/05/2019    Procedure: ANGIOGRAM, CORONARY, INCLUDING BYPASS GRAFT, WITH LEFT HEART CATHETERIZATION;  Surgeon: Rodrigo Willoughby MD;  Location: [facility] CATH/EP LAB;  Service: Cardiology;  Laterality: N/A;    CORONARY ANGIOGRAPHY INCLUDING BYPASS GRAFTS WITH CATHETERIZATION OF LEFT HEART Left 11/03/2020    Procedure: ANGIOGRAM, CORONARY, INCLUDING BYPASS GRAFT, WITH LEFT HEART CATHETERIZATION;  Surgeon: Rodrigo Willoughby MD;  Location: [facility] CATH/EP LAB;  Service: Cardiology;  Laterality: Left;    CORONARY ANGIOGRAPHY INCLUDING BYPASS GRAFTS WITH CATHETERIZATION OF LEFT HEART N/A 09/28/2021    Procedure: ANGIOGRAM, CORONARY, INCLUDING BYPASS GRAFT, WITH LEFT HEART CATHETERIZATION;  Surgeon: Rodrigo Willoughby MD;  Location: [facility] CATH/EP LAB;  Service: Cardiology;  Laterality: N/A;    CORONARY ANGIOGRAPHY INCLUDING BYPASS GRAFTS WITH CATHETERIZATION OF LEFT HEART N/A 12/05/2023    Procedure: ANGIOGRAM, CORONARY, INCLUDING BYPASS GRAFT, WITH LEFT HEART CATHETERIZATION;  Surgeon: Rodrigo Willoughby MD;  Location: [facility] CATH/EP LAB;  Service: Cardiology;  Laterality: N/A;    CORONARY ANGIOPLASTY      CORONARY ARTERY BYPASS GRAFT      x 2    1991 2006     ESOPHAGOGASTRODUODENOSCOPY N/A 2024    Procedure: EGD (ESOPHAGOGASTRODUODENOSCOPY);  Surgeon: Mal Lockhart III, MD;  Location: [facility] ENDO;  Service: Endoscopy;  Laterality: N/A;    EXTRACORPOREAL SHOCK WAVE LITHOTRIPSY Right 2019    Procedure: LITHOTRIPSY, ESWL;  Surgeon: Williams Ortega MD;  Location: [facility] OR;  Service: Urology;  Laterality: Right;    HEMORRHOID SURGERY      INSERTION OF PACEMAKER      PERCUTANEOUS TRANSLUMINAL BALLOON ANGIOPLASTY OF CORONARY ARTERY N/A 2019    Procedure: Angioplasty-coronary;  Surgeon: Rodrigo Willoughby MD;  Location: [facility] CATH/EP LAB;  Service: Cardiology;  Laterality: N/A;    WRIST FRACTURE SURGERY       Family History   Problem Relation Name Age of Onset    Heart attack Mother      Heart attack Father      Heart disease Sister 2     Stroke Sister 2     Diabetes Sister 2     No Known Problems Maternal Grandmother      No Known Problems Maternal Grandfather      No Known Problems Paternal Grandmother      No Known Problems Paternal Grandfather      No Known Problems Brother      No Known Problems Maternal Aunt      No Known Problems Maternal Uncle      No Known Problems Paternal Aunt      No Known Problems Paternal Uncle      Anemia Neg Hx      Arrhythmia Neg Hx      Asthma Neg Hx      Clotting disorder Neg Hx      Fainting Neg Hx      Heart failure Neg Hx      Hyperlipidemia Neg Hx      Hypertension Neg Hx      Atrial Septal Defect Neg Hx       Social History     Tobacco Use    Smoking status: Former     Current packs/day: 0.00     Average packs/day: 0.5 packs/day for 4.0 years (2.0 ttl pk-yrs)     Types: Cigarettes     Start date:      Quit date:      Years since quittin.0    Smokeless tobacco: Never   Substance Use Topics    Alcohol use: Yes     Comment: social    Drug use: No     Review of Systems   Constitutional:  Positive for activity change, appetite change and fatigue. Negative for chills and fever.   HENT:  Negative for  congestion and sore throat.    Respiratory:  Positive for shortness of breath.    Cardiovascular:  Positive for chest pain. Negative for palpitations.   Gastrointestinal:  Negative for abdominal pain, nausea and vomiting.   Genitourinary:  Negative for dysuria.   Musculoskeletal:  Negative for back pain.   Skin:  Positive for wound. Negative for rash.   Neurological:  Positive for weakness and headaches.   Hematological:  Does not bruise/bleed easily.   All other systems reviewed and are negative.      Physical Exam     Initial Vitals [01/18/25 1759]   BP Pulse Resp Temp SpO2   108/66 83 18 100.2 °F (37.9 °C) 95 %      MAP       --         Physical Exam    Nursing note and vitals reviewed.  Constitutional: He appears well-developed and well-nourished. He is not diaphoretic. No distress.   HENT:   Head: Normocephalic. Mouth/Throat: Oropharynx is clear and moist. No oropharyngeal exudate.   Patient has a pinpoint abrasion/less than 0.5 cm laceration to the right occipital parietal region of his head.  No active bleeding.  No hematoma.  No allred sign or raccoon eyes.   Eyes: Conjunctivae and EOM are normal. Pupils are equal, round, and reactive to light.   Neck: Neck supple. No tracheal deviation present.   Normal range of motion.  Cardiovascular:  Normal rate, regular rhythm and intact distal pulses.           Murmur heard.  Pulmonary/Chest: No stridor. No respiratory distress. He has no wheezes. He has rales (b/l lower lobes).   Abdominal: Abdomen is soft. Bowel sounds are normal. He exhibits no distension. There is no abdominal tenderness. There is no rebound.   Musculoskeletal:         General: No tenderness or edema. Normal range of motion.      Cervical back: Normal range of motion and neck supple.     Neurological: He is alert and oriented to person, place, and time. He has normal strength. No cranial nerve deficit or sensory deficit.   Speech is normal.  5/5 strength in upper extremities.  4/5 strength in  bilateral lower extremities.  No pronator drift.  Normal finger-to-nose.  No visual field cut   Skin: Skin is warm and dry. Capillary refill takes less than 2 seconds. No rash noted. No erythema. No pallor.   Psychiatric: He has a normal mood and affect. His behavior is normal. Thought content normal.         ED Course   Procedures  Labs Reviewed   CBC W/ AUTO DIFFERENTIAL - Abnormal       Result Value    WBC 6.57      RBC 3.54 (*)     Hemoglobin 10.6 (*)     Hematocrit 31.1 (*)     MCV 88      MCH 29.9      MCHC 34.1      RDW 13.8      Platelets 169      MPV 10.4      Immature Granulocytes 0.3      Gran # (ANC) 4.1      Immature Grans (Abs) 0.02      Lymph # 1.1      Mono # 1.2 (*)     Eos # 0.1      Baso # 0.06      nRBC 0      Gran % 61.8      Lymph % 16.9 (*)     Mono % 18.4 (*)     Eosinophil % 1.7      Basophil % 0.9      Differential Method Automated     COMPREHENSIVE METABOLIC PANEL - Abnormal    Sodium 134 (*)     Potassium 3.8      Chloride 98      CO2 26      Glucose 125 (*)     BUN 16      Creatinine 1.1      Calcium 8.7      Total Protein 6.3      Albumin 3.6      Total Bilirubin 1.3 (*)     Alkaline Phosphatase 103      AST 15      ALT 8 (*)     eGFR >60.0      Anion Gap 10     B-TYPE NATRIURETIC PEPTIDE - Abnormal    BNP 1,309 (*)    TROPONIN I HIGH SENSITIVITY - Abnormal    Troponin I High Sensitivity 56.5 (*)    MAGNESIUM    Magnesium 1.8     INFLUENZA A AND B ANTIGEN    Influenza A, Molecular Negative      Influenza B, Molecular Negative      Flu A & B Source Nasal swab      Narrative:     Specimen Source->Nasopharyngeal Swab   SARS-COV-2 RNA AMPLIFICATION, QUAL    SARS-CoV-2 RNA, Amplification, Qual Negative     PHENYTOIN LEVEL, TOTAL    Phenytoin Lvl 18.5     HEPATITIS C ANTIBODY   HIV 1 / 2 ANTIBODY   URINALYSIS, REFLEX TO URINE CULTURE          Imaging Results              CT Cervical Spine Without Contrast (Final result)  Result time 01/18/25 20:10:34      Final result by Barry Farrell,  MD (01/18/25 20:10:34)                   Impression:      As above      Electronically signed by: Barry Farrell  Date:    01/18/2025  Time:    20:10               Narrative:    EXAMINATION:  CT CERVICAL SPINE WITHOUT CONTRAST    CLINICAL HISTORY:  Neck trauma (Age >= 65y);    TECHNIQUE:  Low dose axial images, sagittal and coronal reformations were performed though the cervical spine.  Contrast was not administered.    COMPARISON:  None    FINDINGS:  Osteopenia.  No acute fracture.  Mild to moderate multilevel spondylosis.  Unremarkable prevertebral soft tissues.    Incompletely evaluated ill-defined ground-glass opacity in the lung apices bilaterally, likely infectious or inflammatory.  Follow-up is warranted.                                       CT Head Without Contrast (Final result)  Result time 01/18/25 19:42:02      Final result by Barry Farrell MD (01/18/25 19:42:02)                   Impression:      No acute findings.      Electronically signed by: Barry Farrell  Date:    01/18/2025  Time:    19:42               Narrative:    EXAMINATION:  CT HEAD WITHOUT CONTRAST    CLINICAL HISTORY:  Head trauma, minor (Age >= 65y);    TECHNIQUE:  Low dose axial images were obtained through the head.  Coronal and sagittal reformations were also performed. Contrast was not administered.    COMPARISON:  06/16/2024    FINDINGS:  Intracranial compartment:    Ventricles and sulci remain prominent.  No extra-axial blood or fluid collections.    Mild chronic microvascular ischemia.  No parenchymal mass, hemorrhage, edema or major vascular distribution infarct.    Skull/extracranial contents (limited evaluation): No fracture. Mastoid air cells and paranasal sinuses are essentially clear.                                       X-Ray Chest 1 View (Final result)  Result time 01/18/25 19:12:52      Final result by Barry Farrell MD (01/18/25 19:12:52)                   Impression:      As  above      Electronically signed by: KyleRicardo Bud  Date:    01/18/2025  Time:    19:12               Narrative:    EXAMINATION:  XR CHEST 1 VIEW    CLINICAL HISTORY:  Unspecified fall, initial encounter    TECHNIQUE:  Single frontal view of the chest was performed.    COMPARISON:  06/15/2024    FINDINGS:  Bilateral pleural effusions with adjacent airspace disease and/or atelectasis.  Additional airspace disease in the right upper lobe.  Pulmonary vascular congestion.  Mildly enlarged cardiac silhouette.  Left chest wall cardiac pacemaker.                                       Medications   Tdap vaccine injection 0.5 mL (has no administration in time range)   furosemide injection 40 mg (has no administration in time range)     Medical Decision Making  Differential includes but not limited to UTI, pneumonia, pulmonary edema, electrolyte abnormality, medication side effect, dehydration, sepsis, influenza, COVID, viral illness, fracture, intracranial hemorrhage,    Emergent evaluation of a 79-year-old male presents emergency department with generalized weakness, falls, not feeling well.  Patient negative for COVID and flu, patient has chest x-ray with concerning findings of bilateral pleural effusions and pulmonary edema.  Patient needs diuresis, IV Lasix has been ordered for the patient.  Patient needs further inpatient evaluation would benefit from PT consultation possible rehab.  Patient will be admitted for further evaluation.    A dictation software program was used for this note.  Please expect some simple typographical  errors in this note.     Amount and/or Complexity of Data Reviewed  External Data Reviewed: labs, ECG and notes.  Labs: ordered. Decision-making details documented in ED Course.  Radiology: ordered and independent interpretation performed. Decision-making details documented in ED Course.  ECG/medicine tests: ordered and independent interpretation performed. Decision-making details  documented in ED Course.    Risk  OTC drugs.  Prescription drug management.  Decision regarding hospitalization.               ED Course as of 01/18/25 2050   Sat Jan 18, 2025 1908 EKG 6:10 p.m. atrial sensed ventricular paced rate of 83.  No STEMI, negative Sgarbossa criteria.  EKG interpreted independently by me. [JR]   2046 I discussed the case with Arlene from Hospital Medicine who will admit the patient. [JR]      ED Course User Index  [JR] Avinash Barajas DO                           Clinical Impression:  Final diagnoses:  [W19.XXXA] Fall  [J90] Pleural effusion, bilateral (Primary)  [I50.9] Acute congestive heart failure, unspecified heart failure type  [R53.1] Generalized weakness          ED Disposition Condition    Observation Stable                Avinash Barajas DO  01/18/25 2050

## 2025-01-19 NOTE — PT/OT/SLP EVAL
Physical Therapy Evaluation    Patient Name:  Jerome Amaro Jr.   MRN:  [id number]    Recommendations:     Discharge Recommendations: Moderate Intensity Therapy   Discharge Equipment Recommendations: to be determined by next level of care   Barriers to discharge:  medical status    Assessment:     Jerome Amaro Jr. is a 79 y.o. male admitted with a medical diagnosis of Congestive heart failure.  He presents with the following impairments/functional limitations: weakness, impaired endurance, impaired self care skills, impaired functional mobility, gait instability, impaired balance, decreased lower extremity function, decreased safety awareness, pain, impaired cardiopulmonary response to activity Patient agreeable to PT evaluation. Demonstrates CGA with bed mobility. Sat EOB unsupported with good balance. Ambulated 100ft with RW with CGA slowly with decreased heel strike, decreased step length, forward flexed posture, decreased safety awareness.     Rehab Prognosis: Good; patient would benefit from acute skilled PT services to address these deficits and reach maximum level of function.    Recent Surgery: * No surgery found *      Plan:     During this hospitalization, patient to be seen 6 x/week to address the identified rehab impairments via gait training, therapeutic activities, therapeutic exercises, neuromuscular re-education and progress toward the following goals:    Plan of Care Expires:  02/19/25    Subjective     Chief Complaint: He reports having a fall with  his cane then started using his RW  Patient/Family Comments/goals: get better  Pain/Comfort:  Pain Rating 1: 6/10  Location - Side 1: Right  Location 1:  (trunk right side)  Pain Addressed 1: Reposition, Distraction, Nurse notified    Patients cultural, spiritual, Worship conflicts given the current situation: no    Living Environment:  Lives alone, daughter checks on him 2x /week, no steps to enter  Prior to admission, patients level of function was  modIND.  Equipment used at home: cane, RW .  DME owned (not currently used): none.  Upon discharge, patient will have assistance from daughters.    Objective:     Communicated with nurse prior to session.  Patient found HOB elevated with telemetry, bed alarm  upon PT entry to room.    General Precautions: Standard, fall  Orthopedic Precautions:    Braces:    Respiratory Status: Room air    Exams:  RLE ROM: WFL  RLE Strength: WFL  LLE ROM: WFL  LLE Strength: WFL    Functional Mobility:  Bed Mobility:     Supine to Sit: contact guard assistance  Sit to Supine: contact guard assistance  Transfers:     Sit to Stand:  minimum assistance with rolling walker  Gait: 100ft with RW CGA  Balance: good sitting EOB unsupported, fair + standing with UE support      AM-PAC 6 CLICK MOBILITY  Total Score:18       Treatment & Education:  Pt educated on POC, discharge recommendation, need for assist with mobility, use of call bell to seek assistance as needed and fall prevention      Patient left HOB elevated with all lines intact, call button in reach, bed alarm on, and nurse notified.    GOALS:   Multidisciplinary Problems       Physical Therapy Goals          Problem: Physical Therapy    Goal Priority Disciplines Outcome Interventions   Physical Therapy Goal     PT, PT/OT     Description: Goals to be met by: 25     Patient will increase functional independence with mobility by performin. Supine to sit with Albia  2. Sit to stand transfer with Supervision  3. Gait  x 150 feet with Stand-by Assistance using Rolling Walker.                          DME Justifications:  No DME recommended requiring DME justifications    History:     Past Medical History:   Diagnosis Date    Anticoagulant long-term use         Aortic valve disease     pig valve    Arthritis     all over    Atrial fibrillation 2024    Chest pain 07/15/2019    CHF (congestive heart failure)     2014 on meds    Coronary artery disease      2007 cabg    Difficulty swallowing     Heart attack 1990    15 stents    Heart attack 02/01/2024    mild    Heart block     Hyperlipidemia     Hypertension     on meds    Hypothyroidism 2015    on meds    PVD (peripheral vascular disease)     Seizures     last episode 1990 on meds       Past Surgical History:   Procedure Laterality Date    AORTOGRAPHY WITH SERIALOGRAPHY N/A 11/16/2021    Procedure: AORTOGRAM, WITH RUNOFF;  Surgeon: Rodrigo Willoughby MD;  Location: [facility] CATH/EP LAB;  Service: Cardiology;  Laterality: N/A;    ARTERIOGRAPHY OF AORTIC ROOT N/A 11/05/2019    Procedure: ARTERIOGRAM, AORTIC ROOT;  Surgeon: Rodrigo Willoughby MD;  Location: [facility] CATH/EP LAB;  Service: Cardiology;  Laterality: N/A;    CARDIAC CATHETERIZATION      CARDIAC VALVE SURGERY      CATARACT EXTRACTION W/  INTRAOCULAR LENS IMPLANT Right 12/11/2024    Procedure: CEIOL OD;  Surgeon: Fredrick Sharma MD;  Location: [facility] OR;  Service: Ophthalmology;  Laterality: Right;    CORONARY ANGIOGRAPHY INCLUDING BYPASS GRAFTS WITH CATHETERIZATION OF LEFT HEART N/A 11/05/2019    Procedure: ANGIOGRAM, CORONARY, INCLUDING BYPASS GRAFT, WITH LEFT HEART CATHETERIZATION;  Surgeon: Rodrigo Willoughby MD;  Location: [facility] CATH/EP LAB;  Service: Cardiology;  Laterality: N/A;    CORONARY ANGIOGRAPHY INCLUDING BYPASS GRAFTS WITH CATHETERIZATION OF LEFT HEART Left 11/03/2020    Procedure: ANGIOGRAM, CORONARY, INCLUDING BYPASS GRAFT, WITH LEFT HEART CATHETERIZATION;  Surgeon: Rodrigo Willoughby MD;  Location: [facility] CATH/EP LAB;  Service: Cardiology;  Laterality: Left;    CORONARY ANGIOGRAPHY INCLUDING BYPASS GRAFTS WITH CATHETERIZATION OF LEFT HEART N/A 09/28/2021    Procedure: ANGIOGRAM, CORONARY, INCLUDING BYPASS GRAFT, WITH LEFT HEART CATHETERIZATION;  Surgeon: Rodrigo Willoughby MD;  Location: [facility] CATH/EP LAB;  Service: Cardiology;  Laterality: N/A;    CORONARY ANGIOGRAPHY INCLUDING BYPASS GRAFTS WITH CATHETERIZATION OF LEFT HEART N/A 12/05/2023    Procedure: ANGIOGRAM,  CORONARY, INCLUDING BYPASS GRAFT, WITH LEFT HEART CATHETERIZATION;  Surgeon: Rodrigo Willoughby MD;  Location: [facility] CATH/EP LAB;  Service: Cardiology;  Laterality: N/A;    CORONARY ANGIOPLASTY      CORONARY ARTERY BYPASS GRAFT      x 2    1991 2006    ESOPHAGOGASTRODUODENOSCOPY N/A 2/20/2024    Procedure: EGD (ESOPHAGOGASTRODUODENOSCOPY);  Surgeon: Mal Lockhart III, MD;  Location: [facility] ENDO;  Service: Endoscopy;  Laterality: N/A;    EXTRACORPOREAL SHOCK WAVE LITHOTRIPSY Right 08/01/2019    Procedure: LITHOTRIPSY, ESWL;  Surgeon: Williams Ortega MD;  Location: [facility] OR;  Service: Urology;  Laterality: Right;    HEMORRHOID SURGERY  1990    INSERTION OF PACEMAKER      PERCUTANEOUS TRANSLUMINAL BALLOON ANGIOPLASTY OF CORONARY ARTERY N/A 11/08/2019    Procedure: Angioplasty-coronary;  Surgeon: Rodrigo Willoughby MD;  Location: [facility] CATH/EP LAB;  Service: Cardiology;  Laterality: N/A;    WRIST FRACTURE SURGERY         Time Tracking:     PT Received On: 01/19/25  PT Start Time: 0927     PT Stop Time: 0952  PT Total Time (min): 25 min     Billable Minutes: Evaluation 10 and Therapeutic Activity 15      01/19/2025

## 2025-01-19 NOTE — PLAN OF CARE
[facility]  Initial Discharge Assessment       Primary Care Provider: Magda Ruiz FNP-C    Admission Diagnosis: Pleural effusion, bilateral [J90]    Admission Date: 1/18/2025  Expected Discharge Date:     Pt is a 79-year-old male who arrived from home with Congestive heart failure  problem. Information verified as correct on facesheet. The assessment was completed at the patient's bedside.  Pt lives with alone . Pt does not have a Living Will or Advance Directive. The Pt is oriented to person, places, and times. PCP last seen.  Pt denies Coumadin however is on Plavix, dialysis, and HH, and has not been readmitted into the hospital in the last thirty days Pt has hospital bed, rollator, rolling walker, shower chair, grab bars. Quad cane  and wheelchair .  Pt is capable of performing ADLs without assistance. Pt drives to and from medical appointments. Pt reports he takes medication as prescribed; pharmacy used  [facility].  Pt verbalized plan to discharge home via family transport. Pt has no other needs to be addressed at this time. CM reviewed the chart and will continue to monitor.      Transition of Care Barriers: None    Payor: [employer] MEDICARE / Plan: [email or web address] [place] / Product Type: Medicare Advantage /     Extended Emergency Contact Information  Primary Emergency Contact: Schwab,Cynthia  Mobile Phone: [phone]  Relation: Daughter  Preferred language: English   needed? No  Secondary Emergency Contact: hoa rhodes  Mobile Phone: [phone]  Relation: Daughter   needed? No    Discharge Plan A: Home Health  Discharge Plan B: Home      [id number] [place] [facility] [address]  [address] [address]  Phone: [phone] Fax: [phone]      Initial Assessment (most recent)       Adult Discharge Assessment - 01/19/25 1111          Discharge Assessment    Assessment Type Discharge Planning Assessment     Confirmed/corrected address,  phone number and insurance Yes     Confirmed Demographics Correct on Facesheet     Source of Information health record;family;patient     Does patient/caregiver understand observation status Yes     Reason For Admission Congestive heart failure     People in Home alone     Facility Arrived From: Home     Do you expect to return to your current living situation? Yes     Do you have help at home or someone to help you manage your care at home? Yes     Who are your caregiver(s) and their phone number(s)? SchwabAngela (Daughter)  [phone] (Mobile),hoa rhodes  [phone],schwab,frank  [phone]     Prior to hospitilization cognitive status: Alert/Oriented     Current cognitive status: Alert/Oriented     Walking or Climbing Stairs Difficulty yes     Walking or Climbing Stairs ambulation difficulty, requires equipment     Mobility Management Rolling walker and rollator     Dressing/Bathing Difficulty yes     Dressing/Bathing bathing difficulty, requires equipment     Dressing/Bathing Management Shower chair and grab bars     Equipment Currently Used at Home cane, quad;shower chair;grab bar;walker, rolling;hospital bed;wheelchair;rollator     Readmission within 30 days? No     Patient currently being followed by outpatient case management? No     Do you currently have service(s) that help you manage your care at home? No     Do you take prescription medications? Yes     Do you have prescription coverage? Yes     Coverage BCBS D MEDICARE - [facility] -     Do you have any problems affording any of your prescribed medications? No     Is the patient taking medications as prescribed? yes     Who is going to help you get home at discharge? daughters     How do you get to doctors appointments? family or friend will provide     Are you on dialysis? No     Do you take coumadin? No   plavix    Discharge Plan A Home Health     Discharge Plan B Home     DME Needed Upon Discharge  none     Discharge  Plan discussed with: Patient;Adult children     Transition of Care Barriers None

## 2025-01-19 NOTE — H&P
[facility] - Emergency Dept  Hospital Medicine  History & Physical    Patient Name: Jerome Amaro Jr.  MRN: [id number]  Patient Class: OP- Observation  Admission Date: 1/18/2025  Attending Physician: Faye Pennington MD   Primary Care Provider: Magda Ruiz FNP-C         Patient information was obtained from patient, past medical records, and ER records.     Subjective:     Principal Problem:Congestive heart failure    Chief Complaint:   Chief Complaint   Patient presents with    Fall     FREQUENT FALLS THIS WEEK, ANOTHER ONE TODAY, NO LOC    Head Injury     NO LOC    Leg Pain    GEN WEAKNESS     X 1 WEEK        HPI: 79 year old male with a past medical history of aortic valve disease, atrial fibrillation on anticoagulation therapy, MI, HLD, PVD, seizures, difficulty swallowing, CAD, CHF, aortic valve disease who presents to the emergency room with weakness in the legs and falls. There was also concern for intermittent altered mental status. Patient has a strong cardiac history with 2 bypass surgeries, atrial fibrillation, aortic valve disease and valve replacement, multiple cardiac stents, hypertension, hyperlipidemia, PVD, seizure history. Patient has been weak in general. His daughter says that he has had several falls over the last couple of days. He has just not been feeling well. No reported fever or chills. No reported vomiting or any diarrhea. He is not have any abdominal pain. He occasionally has chest pain which she takes nitro for which is not abnormal for him. He does not have any new chest pain but sometimes feels short of breath. Today he had a fall where he hit the back of his head on the ground. He did not lose consciousness. No reported vomiting or any seizure activity. He is on Eliquis and Plavix. No other reported injuries from the fall.     In The ER, H/H 10.6/31.1, Na  134, T. Bili 1.3, BNP 1309, Troponin I high sensitivity 56.8, Mg 1.8, Influenza and flu negative, phenytoin  18.5, CT c spine no acute fracture, multilevel spondylosis. Incompletely evaluated ill-defined ground-glass opacity in the lung apices bilaterally, CT head no acute findings, chest x-ray Bilateral pleural effusions with adjacent airspace disease and/or atelectasis. Additional airspace disease in the right upper lobe. Pulmonary vascular congestion. Mildly enlarged cardiac silhouette. Left chest wall cardiac pacemaker.   Admit to hospital medicine for pleural effusion, CHF exacerbation    Past Medical History:   Diagnosis Date    Anticoagulant long-term use     2014    Aortic valve disease 2014    pig valve    Arthritis     all over    Atrial fibrillation 2/29/2024    Chest pain 07/15/2019    CHF (congestive heart failure)     2014 on meds    Coronary artery disease     2007 cabg    Difficulty swallowing     Heart attack 1990    15 stents    Heart attack 02/01/2024    mild    Heart block     Hyperlipidemia     Hypertension     on meds    Hypothyroidism 2015    on meds    PVD (peripheral vascular disease)     Seizures     last episode 1990 on meds       Past Surgical History:   Procedure Laterality Date    AORTOGRAPHY WITH SERIALOGRAPHY N/A 11/16/2021    Procedure: AORTOGRAM, WITH RUNOFF;  Surgeon: Rodrigo Willoughby MD;  Location: [facility] CATH/EP LAB;  Service: Cardiology;  Laterality: N/A;    ARTERIOGRAPHY OF AORTIC ROOT N/A 11/05/2019    Procedure: ARTERIOGRAM, AORTIC ROOT;  Surgeon: Rodrigo Willoughby MD;  Location: [facility] CATH/EP LAB;  Service: Cardiology;  Laterality: N/A;    CARDIAC CATHETERIZATION      CARDIAC VALVE SURGERY      CATARACT EXTRACTION W/  INTRAOCULAR LENS IMPLANT Right 12/11/2024    Procedure: CEIOL OD;  Surgeon: Fredrick Sharma MD;  Location: [facility] ASU OR;  Service: Ophthalmology;  Laterality: Right;    CORONARY ANGIOGRAPHY INCLUDING BYPASS GRAFTS WITH CATHETERIZATION OF LEFT HEART N/A 11/05/2019    Procedure: ANGIOGRAM, CORONARY, INCLUDING BYPASS GRAFT, WITH LEFT HEART CATHETERIZATION;  Surgeon: Rodrigo BACON  MD Case;  Location: [facility] CATH/EP LAB;  Service: Cardiology;  Laterality: N/A;    CORONARY ANGIOGRAPHY INCLUDING BYPASS GRAFTS WITH CATHETERIZATION OF LEFT HEART Left 11/03/2020    Procedure: ANGIOGRAM, CORONARY, INCLUDING BYPASS GRAFT, WITH LEFT HEART CATHETERIZATION;  Surgeon: Rodrigo Willoughby MD;  Location: [facility] CATH/EP LAB;  Service: Cardiology;  Laterality: Left;    CORONARY ANGIOGRAPHY INCLUDING BYPASS GRAFTS WITH CATHETERIZATION OF LEFT HEART N/A 09/28/2021    Procedure: ANGIOGRAM, CORONARY, INCLUDING BYPASS GRAFT, WITH LEFT HEART CATHETERIZATION;  Surgeon: Rodrigo Willoughby MD;  Location: [facility] CATH/EP LAB;  Service: Cardiology;  Laterality: N/A;    CORONARY ANGIOGRAPHY INCLUDING BYPASS GRAFTS WITH CATHETERIZATION OF LEFT HEART N/A 12/05/2023    Procedure: ANGIOGRAM, CORONARY, INCLUDING BYPASS GRAFT, WITH LEFT HEART CATHETERIZATION;  Surgeon: Rodrigo Willoughby MD;  Location: [facility] CATH/EP LAB;  Service: Cardiology;  Laterality: N/A;    CORONARY ANGIOPLASTY      CORONARY ARTERY BYPASS GRAFT      x 2    1991 2006    ESOPHAGOGASTRODUODENOSCOPY N/A 2/20/2024    Procedure: EGD (ESOPHAGOGASTRODUODENOSCOPY);  Surgeon: Mal Lockhart III, MD;  Location: [facility] ENDO;  Service: Endoscopy;  Laterality: N/A;    EXTRACORPOREAL SHOCK WAVE LITHOTRIPSY Right 08/01/2019    Procedure: LITHOTRIPSY, ESWL;  Surgeon: Williams Ortega MD;  Location: [facility] OR;  Service: Urology;  Laterality: Right;    HEMORRHOID SURGERY  1990    INSERTION OF PACEMAKER      PERCUTANEOUS TRANSLUMINAL BALLOON ANGIOPLASTY OF CORONARY ARTERY N/A 11/08/2019    Procedure: Angioplasty-coronary;  Surgeon: Rodrigo Willoughby MD;  Location: [facility] CATH/EP LAB;  Service: Cardiology;  Laterality: N/A;    WRIST FRACTURE SURGERY         Review of patient's allergies indicates:   Allergen Reactions    Atorvastatin Other (See Comments)     Muscle pain    Rosuvastatin Other (See Comments)     Muscle pain       Current Facility-Administered Medications on File Prior to  Encounter   Medication    magnesium oxide tablet 800 mg    sodium chloride 0.9% flush 10 mL     Current Outpatient Medications on File Prior to Encounter   Medication Sig    ceramides 1,3,6-II (CERAVE) Crea Apply 1 each topically 2 (two) times a day.    citalopram (CELEXA) 40 MG tablet Take 40 mg by mouth.    clopidogrel (PLAVIX) 75 mg tablet Take 75 mg by mouth once daily.    CORLANOR 5 mg Tab Take 1 tablet by mouth 2 (two) times daily. NEW Rx - NOT YET STARTED    ELIQUIS 5 mg Tab Take 5 mg by mouth every morning.    ENTRESTO 24-26 mg per tablet Take 1 tablet by mouth 2 (two) times daily.    ezetimibe (ZETIA) 10 mg tablet Take 10 mg by mouth once daily.    famotidine (PEPCID) 40 MG tablet Take 40 mg by mouth Daily.    furosemide (LASIX) 20 MG tablet Take 1 tablet (20 mg total) by mouth once daily.    hydrOXYzine pamoate (VISTARIL) 25 MG Cap Take 1 capsule (25 mg total) by mouth daily as needed (anxiety).    isosorbide mononitrate (IMDUR) 30 MG 24 hr tablet Take 2 tablets (60 mg total) by mouth once daily.    ketorolac 0.5% (ACULAR) 0.5 % Drop Place 1 drop into the right eye 4 (four) times daily. Start 3 days before surgery.    ketorolac 0.5% (ACULAR) 0.5 % Drop Place 1 drop into the left eye 4 (four) times daily. Start 3 days before surgery.    metoprolol succinate (TOPROL-XL) 100 MG 24 hr tablet Take 100 mg by mouth 2 (two) times daily.    moxifloxacin (VIGAMOX) 0.5 % ophthalmic solution Place 1 drop into the left eye 4 (four) times daily. Start 1 day before cataract surgery    mupirocin (BACTROBAN) 2 % ointment Apply topically 3 (three) times daily.    NITROLINGUAL 400 mcg/spray spray Place 1 spray under the tongue every 5 (five) minutes as needed for Chest pain.    phenytoin (DILANTIN) 100 MG ER capsule Take 400 mg by mouth once daily.     prednisoLONE acetate (PRED FORTE) 1 % DrpS Place 1 drop into the right eye 4 (four) times daily. Start after surgery    prednisoLONE acetate (PRED FORTE) 1 % DrpS Place 1 drop  into the left eye 4 (four) times daily. Start after cataract surgery.    ranolazine (RANEXA) 1,000 mg Tb12 Take 1,000 mg by mouth 4 (four) times daily. 2 po q am     2 po q pm     Family History       Problem Relation (Age of Onset)    Diabetes Sister    Heart attack Mother, Father    Heart disease Sister    No Known Problems Maternal Grandmother, Maternal Grandfather, Paternal Grandmother, Paternal Grandfather, Brother, Maternal Aunt, Maternal Uncle, Paternal Aunt, Paternal Uncle    Stroke Sister          Tobacco Use    Smoking status: Former     Current packs/day: 0.00     Average packs/day: 0.5 packs/day for 4.0 years (2.0 ttl pk-yrs)     Types: Cigarettes     Start date:      Quit date:      Years since quittin.0    Smokeless tobacco: Never   Substance and Sexual Activity    Alcohol use: Yes     Comment: social    Drug use: No    Sexual activity: Not Currently     Review of Systems   Constitutional:  Positive for activity change, appetite change and fatigue.        Fall   HENT: Negative.     Respiratory:  Positive for shortness of breath.    Cardiovascular:  Positive for chest pain.   Gastrointestinal: Negative.    Genitourinary: Negative.    Musculoskeletal: Negative.    Skin:  Positive for wound.   Neurological:  Positive for weakness and headaches.   Hematological: Negative.    Psychiatric/Behavioral: Negative.       Objective:     Vital Signs (Most Recent):  Temp: 100.2 °F (37.9 °C) (25)  Pulse: 81 (25)  Resp: 18 (25)  BP: 138/65 (25)  SpO2: 96 % (25) Vital Signs (24h Range):  Temp:  [100.2 °F (37.9 °C)] 100.2 °F (37.9 °C)  Pulse:  [81-83] 81  Resp:  [18] 18  SpO2:  [94 %-96 %] 96 %  BP: (108-157)/(64-76) 138/65     Weight: 72.1 kg (159 lb)  Body mass index is 25.66 kg/m².     Physical Exam  Vitals reviewed.   Constitutional:       General: He is not in acute distress.     Appearance: Normal appearance. He is not ill-appearing.   HENT:       Head: Normocephalic.      Comments: Laceration, pinpoint to right occipital parietal region   No active bleeding.   Eyes:      Comments: Pinpoint pupils   Cardiovascular:      Rate and Rhythm: Normal rate.      Pulses: Normal pulses.      Heart sounds: Murmur heard.   Pulmonary:      Effort: Pulmonary effort is normal.      Breath sounds: Examination of the right-upper field reveals rales. Examination of the left-upper field reveals rales. Examination of the right-middle field reveals rales. Examination of the left-middle field reveals rales. Rales present.   Abdominal:      General: Bowel sounds are normal.      Palpations: Abdomen is soft.   Musculoskeletal:         General: Normal range of motion.      Cervical back: Normal range of motion and neck supple.   Skin:     General: Skin is warm and dry.      Capillary Refill: Capillary refill takes less than 2 seconds.   Neurological:      General: No focal deficit present.      Mental Status: He is alert. Mental status is at baseline.      GCS: GCS eye subscore is 4. GCS verbal subscore is 5. GCS motor subscore is 6.      Cranial Nerves: Cranial nerves 2-12 are intact.      Sensory: Sensation is intact.      Comments: Strength 5/5 BUE. 4/5 BLE     Psychiatric:         Attention and Perception: Attention normal.         Mood and Affect: Mood normal.         Speech: Speech normal.         Behavior: Behavior normal.                Significant Labs: All pertinent labs within the past 24 hours have been reviewed.  Recent Lab Results         01/18/25 2056 01/18/25 1934 01/18/25  1902        Influenza A, Molecular   Negative         Influenza B, Molecular   Negative         Albumin     3.6       ALP     103       ALT     8       Anion Gap     10       Appearance, UA Clear           AST     15       Baso #     0.06       Basophil %     0.9       Bilirubin (UA) Negative           BILIRUBIN TOTAL     1.3  Comment: For infants and newborns, interpretation of results  should be based  on gestational age, weight and in agreement with clinical  observations.    Premature Infant recommended reference ranges:  Up to 24 hours.............<8.0 mg/dL  Up to 48 hours............<12.0 mg/dL  3-5 days..................<15.0 mg/dL  6-29 days.................<15.0 mg/dL         BNP     1,309  Comment: Values of less than 100 pg/ml are consistent with non-CHF populations.       BUN     16       Calcium     8.7       Chloride     98       CO2     26       Color, UA Yellow           Creatinine     1.1       Differential Method     Automated       eGFR     >60.0       Eos #     0.1       Eos %     1.7       Flu A & B Source   Nasal swab         Glucose     125       Glucose, UA Negative           Gran # (ANC)     4.1       Gran %     61.8       Hematocrit     31.1       Hemoglobin     10.6       Immature Grans (Abs)     0.02  Comment: Mild elevation in immature granulocytes is non specific and   can be seen in a variety of conditions including stress response,   acute inflammation, trauma and pregnancy. Correlation with other   laboratory and clinical findings is essential.         Immature Granulocytes     0.3       Ketones, UA Trace           Leukocyte Esterase, UA Negative           Lymph #     1.1       Lymph %     16.9       Magnesium      1.8       MCH     29.9       MCHC     34.1       MCV     88       Mono #     1.2       Mono %     18.4       MPV     10.4       NITRITE UA Negative           nRBC     0       Blood, UA Negative           pH, UA 6.0           Phenytoin Level Total   18.5         Platelet Count     169       Potassium     3.8       PROTEIN TOTAL     6.3       Protein, UA Trace  Comment: Recommend a 24 hour urine protein or a urine   protein/creatinine ratio if globulin induced proteinuria is  clinically suspected.             RBC     3.54       RDW     13.8       SARS-CoV-2 RNA, Amplification, Qual   Negative  Comment: This test utilizes isothermal nucleic acid  amplification technology   to   detect the SARS-CoV-2 RdRp nucleic acid segment. The analytical   sensitivity   (limit of detection) is 500 copies/swab.     A POSITIVE result is indicative of the presence of SARS-CoV-2 RNA;   clinical   correlation with patient history and other diagnostic information is   necessary to determine patient infection status.    A NEGATIVE result means that SARS-CoV-2 nucleic acids are not present   above   the limit of detection. A NEGATIVE result should be treated as   presumptive.   It does not rule out the possibility of COVID-19 and should not be   the sole   basis for treatment decisions. If COVID-19 is strongly suspected   based on   clinical and exposure history, re-testing using an alternate   molecular assay   should be considered.     This test is FDA approved.Performance characteristics of this test   has been   independently verified by [facility] in conjunction   with   [facility] Department of Pathology and Laboratory   Medicine.           Sodium     134       Spec Grav UA 1.020           Specimen UA Urine, Clean Catch           Troponin I High Sensitivity     56.5  Comment: Troponin results differ between methods. Do not use   results between Troponin methods interchangeably.    Alkaline Phospatase levels above 400 U/L may   cause false positive results.    Access hsTnI should not be used for patients taking   Asfotase sanjay (Strensiq).  Critical result TNIHS 56.5 pg/mL called to and read back by Candice Gabriel RN ER at 18-Jan-2025 20:04 [facility].         UROBILINOGEN UA 2.0-3.0           WBC     6.57               Significant Imaging: I have reviewed all pertinent imaging results/findings within the past 24 hours.  Imaging Results              CT Cervical Spine Without Contrast (Final result)  Result time 01/18/25 20:10:34      Final result by Barry Farrell MD (01/18/25 20:10:34)                   Impression:      As  above      Electronically signed by: Barry Farrell  Date:    01/18/2025  Time:    20:10               Narrative:    EXAMINATION:  CT CERVICAL SPINE WITHOUT CONTRAST    CLINICAL HISTORY:  Neck trauma (Age >= 65y);    TECHNIQUE:  Low dose axial images, sagittal and coronal reformations were performed though the cervical spine.  Contrast was not administered.    COMPARISON:  None    FINDINGS:  Osteopenia.  No acute fracture.  Mild to moderate multilevel spondylosis.  Unremarkable prevertebral soft tissues.    Incompletely evaluated ill-defined ground-glass opacity in the lung apices bilaterally, likely infectious or inflammatory.  Follow-up is warranted.                                       CT Head Without Contrast (Final result)  Result time 01/18/25 19:42:02      Final result by Barry Farrell MD (01/18/25 19:42:02)                   Impression:      No acute findings.      Electronically signed by: Barry Farrell  Date:    01/18/2025  Time:    19:42               Narrative:    EXAMINATION:  CT HEAD WITHOUT CONTRAST    CLINICAL HISTORY:  Head trauma, minor (Age >= 65y);    TECHNIQUE:  Low dose axial images were obtained through the head.  Coronal and sagittal reformations were also performed. Contrast was not administered.    COMPARISON:  06/16/2024    FINDINGS:  Intracranial compartment:    Ventricles and sulci remain prominent.  No extra-axial blood or fluid collections.    Mild chronic microvascular ischemia.  No parenchymal mass, hemorrhage, edema or major vascular distribution infarct.    Skull/extracranial contents (limited evaluation): No fracture. Mastoid air cells and paranasal sinuses are essentially clear.                                       X-Ray Chest 1 View (Final result)  Result time 01/18/25 19:12:52      Final result by Barry Farrell MD (01/18/25 19:12:52)                   Impression:      As above      Electronically signed by: Barry  Bud  Date:    01/18/2025  Time:    19:12               Narrative:    EXAMINATION:  XR CHEST 1 VIEW    CLINICAL HISTORY:  Unspecified fall, initial encounter    TECHNIQUE:  Single frontal view of the chest was performed.    COMPARISON:  06/15/2024    FINDINGS:  Bilateral pleural effusions with adjacent airspace disease and/or atelectasis.  Additional airspace disease in the right upper lobe.  Pulmonary vascular congestion.  Mildly enlarged cardiac silhouette.  Left chest wall cardiac pacemaker.                                      Assessment/Plan:     * Congestive heart failure  Patient  heart failure that is Acute on chronic. On presentation their CHF was decompensated. Evidence of decompensated CHF on presentation includes: edema, crackles on lung auscultation, dyspnea on exertion (CHURCH), and shortness of breath. The etiology of their decompensation is likely unknown . Most recent BNP and echo results are listed below.  Recent Labs     01/18/25 1902   BNP 1,309*     Latest ECHO  Results for orders placed during the hospital encounter of 06/15/24    Echo    Interpretation Summary    Left Ventricle: The left ventricle is normal in size. Increased ventricular mass. Mildly increased wall thickness. There is mild concentric hypertrophy. Unable to assess wall motion. Septal motion is consistent with pacing. There is normal systolic function with a visually estimated ejection fraction of 55 - 60%. Diastolic function cannot be reliably determined in the presence of mitral annular calcification.    Right Ventricle: Right ventricle was not well visualized due to poor acoustic window. Normal right ventricular cavity size. Pacemaker lead present in the ventricle.    Aortic Valve: There is a transcatheter valve replacement in the aortic position.    Mitral Valve: Mildly thickened leaflets. There is moderate mitral annular calcification present. There is mild regurgitation.    Tricuspid Valve: There is mild to moderate  regurgitation.    Current Heart Failure Medications  furosemide injection 40 mg, Every 12 hours, Intravenous    Plan  - Monitor strict I&Os and daily weights.    - Place on telemetry  - Low sodium diet  - Place on fluid restriction of 1.5 L.   - Cardiology has not been consulted  - The patient's volume status is worsening as indicated by edema, crackles on lung auscultation, weight gain, dyspnea on exertion (CHURCH), and shortness of breath. Will continue current treatment        Pleural effusion   I have personally reviewed and interpreted the following imaging: Xray. A thoracentesis was deferred. Most likely etiology includes Congestive Heart Failure. Management to include Diuresis    Oxygen prn          Seizure disorder  Seizure precautions  Continue home meds.     HTN (hypertension)  Patient's blood pressure range in the last 24 hours was: BP  Min: 108/66  Max: 157/72.The patient's inpatient anti-hypertensive regimen is listed below:  Current Antihypertensives  furosemide injection 40 mg, Every 12 hours, Intravenous    Plan  - BP is controlled, no changes needed to their regimen      VTE Risk Mitigation (From admission, onward)           Ordered     apixaban tablet 5 mg  Every morning         01/18/25 2144     IP VTE HIGH RISK PATIENT  Once         01/18/25 2055     Place sequential compression device  Until discontinued         01/18/25 2055                         On 01/18/2025, patient should be placed in hospital observation services under my care in collaboration with Dr. Pennington.           Arlene Wiggins NP  Department of Hospital Medicine  [facility] - Emergency Dept

## 2025-01-19 NOTE — SUBJECTIVE & OBJECTIVE
Interval History:  Patient seen and examined this morning.  He is a in his not to say much he is feeling.  He only moans in pain when I sit him up to listen to use fields.  He does endorse rib and back pain.  He denies any difficulty breathing or chest pain.  We will deescalate to p.o. diuresis.  Continue treatment community-acquired pneumonia.    Review of Systems   Reason unable to perform ROS: patient participation.     Objective:     Vital Signs (Most Recent):  Temp: 98.2 °F (36.8 °C) (01/19/25 0726)  Pulse: 75 (01/19/25 0726)  Resp: 18 (01/19/25 0726)  BP: 112/64 (01/19/25 0726)  SpO2: (!) 93 % (01/19/25 0726) Vital Signs (24h Range):  Temp:  [98.2 °F (36.8 °C)-100.2 °F (37.9 °C)] 98.2 °F (36.8 °C)  Pulse:  [75-96] 75  Resp:  [16-18] 18  SpO2:  [93 %-96 %] 93 %  BP: (108-162)/(64-89) 112/64     Weight: 75 kg (165 lb 5.5 oz)  Body mass index is 26.69 kg/m².    Intake/Output Summary (Last 24 hours) at 1/19/2025 1146  Last data filed at 1/19/2025 0645  Gross per 24 hour   Intake 240 ml   Output 1150 ml   Net -910 ml         Physical Exam  Vitals reviewed.   Constitutional:       General: He is not in acute distress.     Appearance: Normal appearance. He is not ill-appearing.   HENT:      Head: Normocephalic.   Eyes:      Comments: Pinpoint pupils   Cardiovascular:      Rate and Rhythm: Normal rate.      Pulses: Normal pulses.      Heart sounds: Murmur heard.   Pulmonary:      Effort: Pulmonary effort is normal.      Comments: Decreased breath sounds at bases, no crackles  Abdominal:      General: Bowel sounds are normal.      Palpations: Abdomen is soft.   Musculoskeletal:         General: Normal range of motion.      Cervical back: Normal range of motion and neck supple.   Skin:     General: Skin is warm and dry.      Capillary Refill: Capillary refill takes less than 2 seconds.   Neurological:      General: No focal deficit present.      Mental Status: He is alert. Mental status is at baseline.      GCS: GCS eye  subscore is 4. GCS verbal subscore is 5. GCS motor subscore is 6.      Cranial Nerves: Cranial nerves 2-12 are intact.      Sensory: Sensation is intact.      Comments: Strength 5/5 BUE. 4/5 BLE     Psychiatric:         Attention and Perception: Attention normal.         Mood and Affect: Mood normal.         Speech: Speech normal.         Behavior: Behavior normal.             Significant Labs: All pertinent labs within the past 24 hours have been reviewed.  BMP:   Recent Labs   Lab 01/18/25  1902 01/19/25  0509   * 105   * 133*   K 3.8 3.6   CL 98 95   CO2 26 27   BUN 16 16   CREATININE 1.1 1.1   CALCIUM 8.7 8.7   MG 1.8  --      CBC:   Recent Labs   Lab 01/18/25  1902   WBC 6.57   HGB 10.6*   HCT 31.1*          Significant Imaging: I have reviewed all pertinent imaging results/findings within the past 24 hours.

## 2025-01-20 PROBLEM — R94.31 QT PROLONGATION: Status: ACTIVE | Noted: 2025-01-20

## 2025-01-20 LAB
ANION GAP SERPL CALC-SCNC: 9 MMOL/L (ref 8–16)
APICAL FOUR CHAMBER EJECTION FRACTION: 50 %
APICAL TWO CHAMBER EJECTION FRACTION: 51 %
AV INDEX (PROSTH): 0.64
AV MEAN GRADIENT: 3 MMHG
AV PEAK GRADIENT: 6 MMHG
AV VALVE AREA BY VELOCITY RATIO: 2 CM²
AV VALVE AREA: 2.2 CM²
AV VELOCITY RATIO: 0.58
BSA FOR ECHO PROCEDURE: 1.87 M2
BUN SERPL-MCNC: 21 MG/DL (ref 8–23)
CALCIUM SERPL-MCNC: 8.6 MG/DL (ref 8.7–10.5)
CHLORIDE SERPL-SCNC: 95 MMOL/L (ref 95–110)
CO2 SERPL-SCNC: 27 MMOL/L (ref 23–29)
CREAT SERPL-MCNC: 1.2 MG/DL (ref 0.5–1.4)
CV ECHO LV RWT: 0.5 CM
DOP CALC AO PEAK VEL: 1.2 M/S
DOP CALC AO VTI: 20.6 CM
DOP CALC LVOT AREA: 3.5 CM2
DOP CALC LVOT DIAMETER: 2.1 CM
DOP CALC LVOT PEAK VEL: 0.7 M/S
DOP CALC LVOT STROKE VOLUME: 45.4 CM3
DOP CALC MV VTI: 55.2 CM
DOP CALCLVOT PEAK VEL VTI: 13.1 CM
E WAVE DECELERATION TIME: 184 MSEC
E/A RATIO: 0.99
E/E' RATIO: 24 M/S
ECHO LV POSTERIOR WALL: 1.1 CM (ref 0.6–1.1)
EST. GFR  (NO RACE VARIABLE): >60 ML/MIN/1.73 M^2
FRACTIONAL SHORTENING: 27.3 % (ref 28–44)
GLUCOSE SERPL-MCNC: 91 MG/DL (ref 70–110)
HR MV ECHO: 80 BPM
INTERVENTRICULAR SEPTUM: 1.3 CM (ref 0.6–1.1)
LEFT ATRIUM AREA SYSTOLIC (APICAL 2 CHAMBER): 20.9 CM2
LEFT ATRIUM AREA SYSTOLIC (APICAL 4 CHAMBER): 24 CM2
LEFT ATRIUM SIZE: 5 CM
LEFT ATRIUM VOLUME INDEX MOD: 40 ML/M2
LEFT ATRIUM VOLUME MOD: 73 ML
LEFT INTERNAL DIMENSION IN SYSTOLE: 3.2 CM (ref 2.1–4)
LEFT VENTRICLE DIASTOLIC VOLUME INDEX: 47.66 ML/M2
LEFT VENTRICLE DIASTOLIC VOLUME: 87.7 ML
LEFT VENTRICLE END DIASTOLIC VOLUME APICAL 2 CHAMBER: 128 ML
LEFT VENTRICLE END DIASTOLIC VOLUME APICAL 4 CHAMBER: 120 ML
LEFT VENTRICLE END SYSTOLIC VOLUME APICAL 2 CHAMBER: 62.8 ML
LEFT VENTRICLE END SYSTOLIC VOLUME APICAL 4 CHAMBER: 77.9 ML
LEFT VENTRICLE MASS INDEX: 104 G/M2
LEFT VENTRICLE SYSTOLIC VOLUME INDEX: 22.3 ML/M2
LEFT VENTRICLE SYSTOLIC VOLUME: 41 ML
LEFT VENTRICULAR INTERNAL DIMENSION IN DIASTOLE: 4.4 CM (ref 3.5–6)
LEFT VENTRICULAR MASS: 191.3 G
LV LATERAL E/E' RATIO: 23.7 M/S
LV SEPTAL E/E' RATIO: 23.7 M/S
LVED V (TEICH): 87.7 ML
LVES V (TEICH): 41 ML
LVOT MG: 1 MMHG
LVOT MV: 0.46 CM/S
MV MEAN GRADIENT: 4 MMHG
MV PEAK A VEL: 1.43 M/S
MV PEAK E VEL: 1.42 M/S
MV PEAK GRADIENT: 9 MMHG
MV STENOSIS PRESSURE HALF TIME: 67 MS
MV VALVE AREA BY CONTINUITY EQUATION: 0.82 CM2
MV VALVE AREA P 1/2 METHOD: 3.28 CM2
OHS LV EJECTION FRACTION SIMPSONS BIPLANE MOD: 50 %
PISA MRMAX VEL: 5.25 M/S
PISA TR MAX VEL: 2.7 M/S
POTASSIUM SERPL-SCNC: 3.5 MMOL/L (ref 3.5–5.1)
PV MV: 0.58 M/S
PV PEAK GRADIENT: 3 MMHG
PV PEAK VELOCITY: 0.83 M/S
RIGHT ATRIAL AREA: 9.9 CM2
RIGHT ATRIUM VOLUME AREA LENGTH APICAL 4 CHAMBER: 24.7 ML
RV TISSUE DOPPLER FREE WALL SYSTOLIC VELOCITY 1 (APICAL 4 CHAMBER VIEW): 8.92 CM/S
SODIUM SERPL-SCNC: 131 MMOL/L (ref 136–145)
TDI LATERAL: 0.06 M/S
TDI SEPTAL: 0.06 M/S
TDI: 0.06 M/S
TR MAX PG: 29 MMHG
TRICUSPID ANNULAR PLANE SYSTOLIC EXCURSION: 1.69 CM
Z-SCORE OF LEFT VENTRICULAR DIMENSION IN END DIASTOLE: -1.49
Z-SCORE OF LEFT VENTRICULAR DIMENSION IN END SYSTOLE: 0.12

## 2025-01-20 PROCEDURE — 97116 GAIT TRAINING THERAPY: CPT

## 2025-01-20 PROCEDURE — 97110 THERAPEUTIC EXERCISES: CPT | Mod: CO

## 2025-01-20 PROCEDURE — 25000003 PHARM REV CODE 250

## 2025-01-20 PROCEDURE — 12000002 HC ACUTE/MED SURGE SEMI-PRIVATE ROOM

## 2025-01-20 PROCEDURE — 94761 N-INVAS EAR/PLS OXIMETRY MLT: CPT

## 2025-01-20 PROCEDURE — 36415 COLL VENOUS BLD VENIPUNCTURE: CPT | Performed by: NURSE PRACTITIONER

## 2025-01-20 PROCEDURE — 25000003 PHARM REV CODE 250: Performed by: HOSPITALIST

## 2025-01-20 PROCEDURE — 25000003 PHARM REV CODE 250: Performed by: NURSE PRACTITIONER

## 2025-01-20 PROCEDURE — 80048 BASIC METABOLIC PNL TOTAL CA: CPT | Performed by: NURSE PRACTITIONER

## 2025-01-20 PROCEDURE — 99900035 HC TECH TIME PER 15 MIN (STAT)

## 2025-01-20 PROCEDURE — 63600175 PHARM REV CODE 636 W HCPCS: Performed by: HOSPITALIST

## 2025-01-20 PROCEDURE — 99900031 HC PATIENT EDUCATION (STAT)

## 2025-01-20 RX ADMIN — PREDNISOLONE ACETATE 1 DROP: 10 SUSPENSION/ DROPS OPHTHALMIC at 03:01

## 2025-01-20 RX ADMIN — SACUBITRIL AND VALSARTAN 1 TABLET: 24; 26 TABLET, FILM COATED ORAL at 08:01

## 2025-01-20 RX ADMIN — PREDNISOLONE ACETATE 1 DROP: 10 SUSPENSION/ DROPS OPHTHALMIC at 08:01

## 2025-01-20 RX ADMIN — PREDNISOLONE ACETATE 1 DROP: 10 SUSPENSION/ DROPS OPHTHALMIC at 05:01

## 2025-01-20 RX ADMIN — ISOSORBIDE MONONITRATE 60 MG: 60 TABLET, EXTENDED RELEASE ORAL at 08:01

## 2025-01-20 RX ADMIN — CITALOPRAM HYDROBROMIDE 40 MG: 20 TABLET ORAL at 08:01

## 2025-01-20 RX ADMIN — MOXIFLOXACIN HYDROCHLORIDE 1 DROP: 5 SOLUTION/ DROPS OPHTHALMIC at 08:01

## 2025-01-20 RX ADMIN — RANOLAZINE 1000 MG: 500 TABLET, EXTENDED RELEASE ORAL at 08:01

## 2025-01-20 RX ADMIN — MUPIROCIN 1 G: 20 OINTMENT TOPICAL at 08:01

## 2025-01-20 RX ADMIN — DOXYCYCLINE HYCLATE 100 MG: 100 CAPSULE ORAL at 08:01

## 2025-01-20 RX ADMIN — PHENYTOIN SODIUM 400 MG: 100 CAPSULE, EXTENDED RELEASE ORAL at 08:01

## 2025-01-20 RX ADMIN — FAMOTIDINE 20 MG: 20 TABLET, FILM COATED ORAL at 05:01

## 2025-01-20 RX ADMIN — SENNOSIDES AND DOCUSATE SODIUM 1 TABLET: 50; 8.6 TABLET ORAL at 08:01

## 2025-01-20 RX ADMIN — MUPIROCIN 1 G: 20 OINTMENT TOPICAL at 03:01

## 2025-01-20 RX ADMIN — MOXIFLOXACIN HYDROCHLORIDE 1 DROP: 5 SOLUTION/ DROPS OPHTHALMIC at 03:01

## 2025-01-20 RX ADMIN — MOXIFLOXACIN HYDROCHLORIDE 1 DROP: 5 SOLUTION/ DROPS OPHTHALMIC at 05:01

## 2025-01-20 RX ADMIN — ACETAMINOPHEN 650 MG: 325 TABLET ORAL at 06:01

## 2025-01-20 RX ADMIN — CEFTRIAXONE 1 G: 1 INJECTION, POWDER, FOR SOLUTION INTRAMUSCULAR; INTRAVENOUS at 08:01

## 2025-01-20 RX ADMIN — RANOLAZINE 1000 MG: 500 TABLET, EXTENDED RELEASE ORAL at 05:01

## 2025-01-20 RX ADMIN — CLOPIDOGREL BISULFATE 75 MG: 75 TABLET, FILM COATED ORAL at 08:01

## 2025-01-20 RX ADMIN — METOPROLOL SUCCINATE 100 MG: 50 TABLET, FILM COATED, EXTENDED RELEASE ORAL at 08:01

## 2025-01-20 RX ADMIN — FUROSEMIDE 40 MG: 40 TABLET ORAL at 08:01

## 2025-01-20 RX ADMIN — EZETIMIBE 10 MG: 10 TABLET ORAL at 08:01

## 2025-01-20 NOTE — PT/OT/SLP EVAL
"Occupational Therapy   Evaluation, tx    Name: Jerome Amaro Jr.  MRN: [id number]  Admitting Diagnosis: Congestive heart failure  Recent Surgery: * No surgery found *    The primary encounter diagnosis was Pleural effusion, bilateral. Diagnoses of Fall, Acute congestive heart failure, unspecified heart failure type, Generalized weakness, and CHF (congestive heart failure) were also pertinent to this visit.    Recommendations:     Discharge Recommendations: Moderate Intensity Therapy  Discharge Equipment Recommendations:  to be determined by next level of care  Barriers to discharge:  Decreased caregiver support (high risk for fall, lacks good safety/judgement)    Assessment:     Jerome Amaro Jr. is a 79 y.o. male with a medical diagnosis of Congestive heart failure.  He presents s/p fall and  imaging results indicating pt has Suspect acute nondisplaced fracture right superior pubic ramus extending into the puboacetabular junction. He c/o 8/10 R groin pain at rest and movement. Pt ambulated CGA w/ RW. No increase in pain. No ortho consult noted on chart.  . Performance deficits affecting function: weakness, impaired endurance, impaired self care skills, impaired functional mobility, gait instability, impaired balance, decreased lower extremity function, decreased safety awareness, pain, impaired cardiopulmonary response to activity.      Rehab Prognosis: Good; patient would benefit from acute skilled OT services to address these deficits and reach maximum level of function.       Plan:     Patient to be seen 5 x/week to address the above listed problems via self-care/home management, therapeutic activities, therapeutic exercises  Plan of Care Expires: 02/19/25  Plan of Care Reviewed with: patient    Subjective     Chief Complaint: R groin pain w/ movement and at rest.  Patient/Family Comments/goals: pt agreeable to OT. "When am I going home?"    Occupational Profile:  Living Environment: pt lives alone in [facility] with no " steps; WIS w/ GB and shower chair.  Previous level of function: Indep-Mod I w/ SPC; drives, cooks, cleans house.   Roles and Routines: active caretaker of self  Equipment Used at Home: cane, quad, grab bar, walker, rolling, hospital bed, wheelchair, rollator, shower chair  Assistance upon Discharge: daughter pt reported.    Pain/Comfort:  Pain Rating 1: 8/10  Location - Side 1: Right  Location 1: groin (anterior hip)  Pain Addressed 1: Reposition, Distraction, Cessation of Activity  Pain Rating Post-Intervention 1:  (not rated)    Patients cultural, spiritual, Shinto conflicts given the current situation: no    Objective:     Communicated with: nurse prior to session.  Patient found HOB elevated with bed alarm, telemetry upon OT entry to room.    General Precautions: Standard, fall, seizure (fluid restriction)  Orthopedic Precautions: N/A  Braces: N/A  Respiratory Status: Room air    Occupational Performance:    Bed Mobility:    Patient completed Scooting/Bridging with minimum assistance  Patient completed Supine to Sit with minimum assistance  Patient completed Sit to Supine with minimum assistance    Functional Mobility/Transfers:  Patient completed Sit <> Stand Transfer with contact guard assistance  with  rolling walker and vc for HP on bed   Functional Mobility: ambulated ~10 FT X 2 CGA w/ RW to sink and back to bed, steady, slow, cautious.    Activities of Daily Living:  Feeding:  independence .  Grooming: contact guard assistance to wash hands standing inside RW at sink'; declined to brush teeth-already did.  Lower Body Dressing: moderate assistance to don socks. Pt donned/doffed L sock, unable to don/doff R due to groin pain.  Toileting: pt declined need to use. toilet     Cognitive/Visual Perceptual:  Cognitive/Psychosocial Skills:     -       Oriented to: Person, Place, Time, and Situation   -       Follows Commands/attention:Follows multistep  commands  -       Communication: clear/fluent  -        Memory: No Deficits noted  -       Safety awareness/insight to disability: impaired   -       Mood/Affect/Coping skills/emotional control: Cooperative  Visual/Perceptual:      -Intact .    Physical Exam:  Balance:    -       sitting: good  dynamic: fair plus  standing: fair dynamic: poor plus  Postural examination/scapula alignment:    -       Rounded shoulders  Dominant hand:    -       right  Upper Extremity Range of Motion:     -       Right Upper Extremity: WFL  -       Left Upper Extremity: WFL  Upper Extremity Strength:    -       Right Upper Extremity: WFL  -       Left Upper Extremity: WFL   Strength:    -       Right Upper Extremity: WFL  -       Left Upper Extremity: WFL    AMPAC 6 Click ADL:  AMPAC Total Score: 21    Treatment & Education:  Pt seen for safe self care  activities and fx mobility retraining as stated above.  All questions/concerns addressed within scope.  Call don't fall. Pt  acknowledged.  Purpose of OT and POC  PLB/deep breathing tech for pain control    Patient left HOB elevated with all lines intact, call button in reach, and bed alarm on    GOALS:   Multidisciplinary Problems       Occupational Therapy Goals          Problem: Occupational Therapy    Goal Priority Disciplines Outcome Interventions   Occupational Therapy Goal     OT, PT/OT Progressing    Description: Goals to be met by: 2/8/2025     Patient will increase functional independence with ADLs by performing:    UE Dressing with Modified [place].  LE Dressing with Modified [place].  Grooming while standing at sink with Modified [place].  Toileting from toilet with Modified [place] for hygiene and clothing management.   Step transfer with [place] and Modified [place]  Toilet transfer to toilet with Modified [place].                         History:     Past Medical History:   Diagnosis Date    Anticoagulant long-term use     2014    Aortic valve disease 2014    pig valve    Arthritis      all over    Atrial fibrillation 2/29/2024    Chest pain 07/15/2019    CHF (congestive heart failure)     2014 on meds    Coronary artery disease     2007 cabg    Difficulty swallowing     Heart attack 1990    15 stents    Heart attack 02/01/2024    mild    Heart block     Hyperlipidemia     Hypertension     on meds    Hypothyroidism 2015    on meds    PVD (peripheral vascular disease)     Seizures     last episode 1990 on meds         Past Surgical History:   Procedure Laterality Date    AORTOGRAPHY WITH SERIALOGRAPHY N/A 11/16/2021    Procedure: AORTOGRAM, WITH RUNOFF;  Surgeon: Rodrigo Willoughby MD;  Location: [facility] CATH/EP LAB;  Service: Cardiology;  Laterality: N/A;    ARTERIOGRAPHY OF AORTIC ROOT N/A 11/05/2019    Procedure: ARTERIOGRAM, AORTIC ROOT;  Surgeon: Rodrigo Willoughby MD;  Location: [facility] CATH/EP LAB;  Service: Cardiology;  Laterality: N/A;    CARDIAC CATHETERIZATION      CARDIAC VALVE SURGERY      CATARACT EXTRACTION W/  INTRAOCULAR LENS IMPLANT Right 12/11/2024    Procedure: CEIOL OD;  Surgeon: Fredrick Sharma MD;  Location: [facility] ASU OR;  Service: Ophthalmology;  Laterality: Right;    CORONARY ANGIOGRAPHY INCLUDING BYPASS GRAFTS WITH CATHETERIZATION OF LEFT HEART N/A 11/05/2019    Procedure: ANGIOGRAM, CORONARY, INCLUDING BYPASS GRAFT, WITH LEFT HEART CATHETERIZATION;  Surgeon: Rodrigo Willoughby MD;  Location: [facility] CATH/EP LAB;  Service: Cardiology;  Laterality: N/A;    CORONARY ANGIOGRAPHY INCLUDING BYPASS GRAFTS WITH CATHETERIZATION OF LEFT HEART Left 11/03/2020    Procedure: ANGIOGRAM, CORONARY, INCLUDING BYPASS GRAFT, WITH LEFT HEART CATHETERIZATION;  Surgeon: Rodrigo Willoughby MD;  Location: [facility] CATH/EP LAB;  Service: Cardiology;  Laterality: Left;    CORONARY ANGIOGRAPHY INCLUDING BYPASS GRAFTS WITH CATHETERIZATION OF LEFT HEART N/A 09/28/2021    Procedure: ANGIOGRAM, CORONARY, INCLUDING BYPASS GRAFT, WITH LEFT HEART CATHETERIZATION;  Surgeon: Rodrigo Willoughby MD;  Location: [facility] CATH/EP LAB;  Service:  Cardiology;  Laterality: N/A;    CORONARY ANGIOGRAPHY INCLUDING BYPASS GRAFTS WITH CATHETERIZATION OF LEFT HEART N/A 12/05/2023    Procedure: ANGIOGRAM, CORONARY, INCLUDING BYPASS GRAFT, WITH LEFT HEART CATHETERIZATION;  Surgeon: Rodrigo Willoughby MD;  Location: [facility] CATH/EP LAB;  Service: Cardiology;  Laterality: N/A;    CORONARY ANGIOPLASTY      CORONARY ARTERY BYPASS GRAFT      x 2    1991 2006    ESOPHAGOGASTRODUODENOSCOPY N/A 2/20/2024    Procedure: EGD (ESOPHAGOGASTRODUODENOSCOPY);  Surgeon: Mal Lockhart III, MD;  Location: [facility] ENDO;  Service: Endoscopy;  Laterality: N/A;    EXTRACORPOREAL SHOCK WAVE LITHOTRIPSY Right 08/01/2019    Procedure: LITHOTRIPSY, ESWL;  Surgeon: iWlliams Ortega MD;  Location: [facility] OR;  Service: Urology;  Laterality: Right;    HEMORRHOID SURGERY  1990    INSERTION OF PACEMAKER      PERCUTANEOUS TRANSLUMINAL BALLOON ANGIOPLASTY OF CORONARY ARTERY N/A 11/08/2019    Procedure: Angioplasty-coronary;  Surgeon: Rodrigo Willoughby MD;  Location: [facility] CATH/EP LAB;  Service: Cardiology;  Laterality: N/A;    WRIST FRACTURE SURGERY         Time Tracking:     OT Date of Treatment: 01/19/25  OT Start Time: 1625  OT Stop Time: 1644  OT Total Time (min): 19 min    Billable Minutes:Evaluation 10  Self Care/Home Management 9  Total Time 19 1/19/2025

## 2025-01-20 NOTE — PT/OT/SLP PROGRESS
Physical Therapy Treatment    Patient Name:  Jerome Amaro Jr.   MRN:  [id number]    Recommendations:     Discharge Recommendations: Moderate Intensity Therapy  Discharge Equipment Recommendations: to be determined by next level of care  Barriers to discharge:  medical status    Assessment:     Jerome Amaro Jr. is a 79 y.o. male admitted with a medical diagnosis of Acute CHF (congestive heart failure).  He presents with the following impairments/functional limitations: weakness, impaired endurance, impaired functional mobility, gait instability, impaired balance, decreased lower extremity function, decreased safety awareness, impaired cardiopulmonary response to activity Patient found resting in bed and agreeable to PT visit. Bed mobility CGA. Transfer sit to stand with Ana up to RW and ambulated 130ft with CGA for safety. Patient sits EOB with good balance. Scooted at EOB with CGA and sit to supine with CGA. All needs met and lines intact.    Rehab Prognosis: Good; patient would benefit from acute skilled PT services to address these deficits and reach maximum level of function.    Recent Surgery: * No surgery found *      Plan:     During this hospitalization, patient to be seen 6 x/week to address the identified rehab impairments via gait training, therapeutic activities, therapeutic exercises, neuromuscular re-education and progress toward the following goals:    Plan of Care Expires:  02/19/25    Subjective     Chief Complaint: I am better than yesterday  Patient/Family Comments/goals: I want to go home  Pain/Comfort:         Objective:     Communicated with nurse prior to session.  Patient found HOB elevated with bed alarm, telemetry upon PT entry to room.     General Precautions: Standard, seizure, fall  Orthopedic Precautions:    Braces:    Respiratory Status: Room air     Functional Mobility:  Bed Mobility:     Supine to Sit: contact guard assistance  Sit to Supine: contact guard assistance  Transfers:     Sit  to Stand:  minimum assistance with rolling walker  Gait: 130ft with RW CGA      AM-PAC 6 CLICK MOBILITY  Turning over in bed (including adjusting bedclothes, sheets and blankets)?: 4  Sitting down on and standing up from a chair with arms (e.g., wheelchair, bedside commode, etc.): 3  Moving from lying on back to sitting on the side of the bed?: 3  Moving to and from a bed to a chair (including a wheelchair)?: 3  Need to walk in hospital room?: 3  Climbing 3-5 steps with a railing?: 3  Basic Mobility Total Score: 19       Treatment & Education:  Pt educated on POC, discharge recommendation, need for assist with mobility, use of call bell to seek assistance as needed and fall prevention      Patient left HOB elevated with all lines intact, call button in reach, bed alarm on, and nurse notified..    GOALS:   Multidisciplinary Problems       Physical Therapy Goals          Problem: Physical Therapy    Goal Priority Disciplines Outcome Interventions   Physical Therapy Goal     PT, PT/OT Progressing    Description: Goals to be met by: 25     Patient will increase functional independence with mobility by performin. Supine to sit with [place]  2. Sit to stand transfer with Supervision  3. Gait  x 150 feet with Stand-by Assistance using Rolling Walker.                          DME Justifications:  No DME recommended requiring DME justifications    Time Tracking:     PT Received On: 25  PT Start Time: 1026     PT Stop Time: 1039  PT Total Time (min): 13 min     Billable Minutes: Gait Training 13    Treatment Type: Treatment  PT/PTA: PT           2025

## 2025-01-20 NOTE — PROGRESS NOTES
[facility] Medicine  Progress Note    Patient Name: Jerome Amaro Jr.  MRN: [id number]  Patient Class: IP- Inpatient   Admission Date: 1/18/2025  Length of Stay: 1 days  Attending Physician: Andi Thomason MD  Primary Care Provider: Magda Ruiz FNP-C        Subjective     Principal Problem:Acute CHF (congestive heart failure)        HPI:  79 year old male with a past medical history of aortic valve disease, atrial fibrillation on anticoagulation therapy, MI, HLD, PVD, seizures, difficulty swallowing, CAD, CHF, aortic valve disease who presents to the emergency room with weakness in the legs and falls. There was also concern for intermittent altered mental status. Patient has a strong cardiac history with 2 bypass surgeries, atrial fibrillation, aortic valve disease and valve replacement, multiple cardiac stents, hypertension, hyperlipidemia, PVD, seizure history. Patient has been weak in general. His daughter says that he has had several falls over the last couple of days. He has just not been feeling well. No reported fever or chills. No reported vomiting or any diarrhea. He is not have any abdominal pain. He occasionally has chest pain which she takes nitro for which is not abnormal for him. He does not have any new chest pain but sometimes feels short of breath. Today he had a fall where he hit the back of his head on the ground. He did not lose consciousness. No reported vomiting or any seizure activity. He is on Eliquis and Plavix. No other reported injuries from the fall.     In [facility] ER, H/H 10.6/31.1, Na  134, T. Bili 1.3, BNP 1309, Troponin I high sensitivity 56.8, Mg 1.8, Influenza and flu negative, phenytoin 18.5, CT c spine no acute fracture, multilevel spondylosis. Incompletely evaluated ill-defined ground-glass opacity in the lung apices bilaterally, CT head no acute findings, chest x-ray Bilateral pleural effusions with adjacent airspace disease and/or atelectasis. Additional  airspace disease in the right upper lobe. Pulmonary vascular congestion. Mildly enlarged cardiac silhouette. Left chest wall cardiac pacemaker.   Admit to hospital medicine for pleural effusion, CHF exacerbation    Overview/Hospital Course:  This is an 79-year-old gentleman conditions atrial fibrillation on, CAD, PVD, CHF a valvular disease presents to the emergency weakness fall he states he slipped off of his slipper walking to the kitchen. He endorses feeling more weak than usual. He did not lose consciousness nor hit his head. Workup was significant for ill defined GGO in the lungs and a BNP of 1300. He is admitted and treated for CHF exacerbation and community aquired pneumonia with IV antibiotics and diuresis. His respiratory status improved and diuresis was deescalated to PO with echocardiogram still pending.     Interval History:    Seen and examined at multidisciplinary rounds, -1.7 L since admission.  No leg edema.  Looks euvolemic now.  Afebrile.  No chest pain.  Family bedside and request the patient go to skilled nursing facility.  Patient lives alone and has multiple falls at home.     Review of Systems   Reason unable to perform ROS: patient participation.     Objective:     Vital Signs (Most Recent):  Temp: 98.2 °F (36.8 °C) (01/20/25 0816)  Pulse: 80 (01/20/25 0816)  Resp: 16 (01/20/25 0655)  BP: 100/62 (01/20/25 0829)  SpO2: (!) 93 % (01/20/25 0816) Vital Signs (24h Range):  Temp:  [97.8 °F (36.6 °C)-99.6 °F (37.6 °C)] 98.2 °F (36.8 °C)  Pulse:  [72-85] 80  Resp:  [14-20] 16  SpO2:  [92 %-95 %] 93 %  BP: (100-131)/(62-75) 100/62     Weight: 75 kg (165 lb 5.5 oz)  Body mass index is 26.69 kg/m².    Intake/Output Summary (Last 24 hours) at 1/20/2025 0926  Last data filed at 1/20/2025 0618  Gross per 24 hour   Intake 200 ml   Output 1000 ml   Net -800 ml         Physical Exam  Vitals reviewed.   Constitutional:       General: He is not in acute distress.     Appearance: Normal appearance. He is not  ill-appearing.   HENT:      Head: Normocephalic.   Eyes:      Comments: Pinpoint pupils   Cardiovascular:      Rate and Rhythm: Normal rate.      Pulses: Normal pulses.      Heart sounds: Murmur heard.   Pulmonary:      Effort: Pulmonary effort is normal.      Comments: Decreased breath sounds at bases, no crackles  Abdominal:      General: Bowel sounds are normal.      Palpations: Abdomen is soft.   Musculoskeletal:         General: Normal range of motion.      Cervical back: Normal range of motion and neck supple.   Skin:     General: Skin is warm and dry.      Capillary Refill: Capillary refill takes less than 2 seconds.   Neurological:      General: No focal deficit present.      Mental Status: He is alert. Mental status is at baseline.      GCS: GCS eye subscore is 4. GCS verbal subscore is 5. GCS motor subscore is 6.      Cranial Nerves: Cranial nerves 2-12 are intact.      Sensory: Sensation is intact.      Comments: Strength 5/5 BUE. 4/5 BLE     Psychiatric:         Attention and Perception: Attention normal.         Mood and Affect: Mood normal.         Speech: Speech normal.         Behavior: Behavior normal.             Significant Labs: All pertinent labs within the past 24 hours have been reviewed.  BMP:   Recent Labs   Lab 01/18/25  1902 01/19/25  0509 01/20/25  0509   *   < > 91   *   < > 131*   K 3.8   < > 3.5   CL 98   < > 95   CO2 26   < > 27   BUN 16   < > 21   CREATININE 1.1   < > 1.2   CALCIUM 8.7   < > 8.6*   MG 1.8  --   --     < > = values in this interval not displayed.     CBC:   Recent Labs   Lab 01/18/25  1902   WBC 6.57   HGB 10.6*   HCT 31.1*          Significant Imaging: I have reviewed all pertinent imaging results/findings within the past 24 hours.    Scheduled Meds:   apixaban  5 mg Oral QAM    cefTRIAXone (Rocephin) IV (PEDS and ADULTS)  1 g Intravenous Q24H    citalopram  40 mg Oral Daily    clopidogreL  75 mg Oral Daily    doxycycline  100 mg Oral Q12H     ezetimibe  10 mg Oral Daily    famotidine  20 mg Oral Daily    furosemide  40 mg Oral Daily    isosorbide mononitrate  60 mg Oral Daily    metoprolol succinate  100 mg Oral BID    moxifloxacin  1 drop Left Eye QID    mupirocin   Topical (Top) TID    phenytoin  400 mg Oral Daily    prednisoLONE acetate  1 drop Right Eye QID    prednisoLONE acetate  1 drop Left Eye QID    ranolazine  1,000 mg Oral QID    sacubitriL-valsartan  1 tablet Oral BID    senna-docusate 8.6-50 mg  1 tablet Oral BID     Continuous Infusions:  PRN Meds:.  Current Facility-Administered Medications:     acetaminophen, 650 mg, Oral, Q4H PRN    albuterol-ipratropium, 3 mL, Nebulization, Q4H PRN    hydrOXYzine pamoate, 25 mg, Oral, Daily PRN    ondansetron, 4 mg, Intravenous, Q6H PRN    sodium chloride 0.9%, 10 mL, Intravenous, PRN    DIPH,PERTUSS(ACELL),TET VACCINE (ADULT)(BOOSTRIX,ADACEL), 0.5 mL, Intramuscular, vaccine x 1 dose      Echo    Result Date: 1/20/2025    Left Ventricle: The left ventricle is normal in size. Mildly increased wall thickness. There is mild concentric hypertrophy. There is low normal systolic function with a visually estimated ejection fraction of 50 - 55%. Diastolic function cannot be reliably determined in the presence of mitral annular calcification.   Right Ventricle: Systolic function is mildly reduced.   Left Atrium: Left atrium is severely dilated.   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   Mitral Valve: There is severe mitral annular calcification present. There is mitral valve stenosis. The mean pressure gradient across the mitral valve is 4 mmHg at a heart rate of 80 bpm. MV area by continuity equation is 0.82 cm2. There is moderate regurgitation.   Tricuspid Valve: There is mild regurgitation.     CT Chest Without Contrast    Result Date: 1/19/2025  EXAMINATION: CT CHEST WITHOUT CONTRAST CLINICAL HISTORY: Respiratory illness, nondiagnostic xray; TECHNIQUE: Low dose axial images,  sagittal and coronal reformations were obtained from the thoracic inlet to the lung bases. Contrast was not administered. COMPARISON: CT lumbar spine 06/15/2024 FINDINGS: Moderate to large bilateral pleural effusions with adjacent airspace disease and atelectasis.  Otherwise, moderate bilateral multifocal infectious/inflammatory airspace disease most pronounced in the right upper lobe.  Background of emphysema Calcified pleural plaques bilaterally. No cardiomegaly or pericardial effusion.  Severe coronary artery atherosclerosis. Mild diffuse body wall edema. Osteopenia with multilevel compression fractures.  Previously noted L2 compression fractures demonstrates worsened height loss, now severe     Moderate to large bilateral pleural effusions with adjacent airspace disease and atelectasis.  Otherwise, moderate bilateral multifocal infectious/inflammatory airspace disease most pronounced in the right upper lobe.  Background of emphysema.  Follow-up to resolution in this high risk patient. Calcified pleural plaques.  Correlate for asbestos related lung disease Osteopenia with multilevel compression fractures.  Previously noted L2 compression fractures demonstrates worsened height loss, now severe Electronically signed by: Barry Farrell Date:    01/19/2025 Time:    04:12    X-Ray Pelvis 3 View inc Hip 2 view Bilat    Result Date: 1/18/2025  EXAMINATION: XR PELVIS 3 VIEW WITH INC HIP 2 VIEW BILAT CLINICAL HISTORY: falls; TECHNIQUE: Five views COMPARISON: 06/15/2024 FINDINGS: Osteopenia.  Suspect acute nondisplaced fracture right superior pubic ramus extending into the puboacetabular junction.  Otherwise, no detectable acute fracture or dislocation.  Mild bilateral hip osteoarthritis.  Vascular calcifications.     As above Electronically signed by: Barry Farrell Date:    01/18/2025 Time:    23:41    CT Cervical Spine Without Contrast    Result Date: 1/18/2025  EXAMINATION: CT CERVICAL SPINE WITHOUT CONTRAST  CLINICAL HISTORY: Neck trauma (Age >= 65y); TECHNIQUE: Low dose axial images, sagittal and coronal reformations were performed though the cervical spine.  Contrast was not administered. COMPARISON: None FINDINGS: Osteopenia.  No acute fracture.  Mild to moderate multilevel spondylosis.  Unremarkable prevertebral soft tissues. Incompletely evaluated ill-defined ground-glass opacity in the lung apices bilaterally, likely infectious or inflammatory.  Follow-up is warranted.     As above Electronically signed by: Barry Farrell Date:    01/18/2025 Time:    20:10    CT Head Without Contrast    Result Date: 1/18/2025  EXAMINATION: CT HEAD WITHOUT CONTRAST CLINICAL HISTORY: Head trauma, minor (Age >= 65y); TECHNIQUE: Low dose axial images were obtained through the head.  Coronal and sagittal reformations were also performed. Contrast was not administered. COMPARISON: 06/16/2024 FINDINGS: Intracranial compartment: Ventricles and sulci remain prominent.  No extra-axial blood or fluid collections. Mild chronic microvascular ischemia.  No parenchymal mass, hemorrhage, edema or major vascular distribution infarct. Skull/extracranial contents (limited evaluation): No fracture. Mastoid air cells and paranasal sinuses are essentially clear.     No acute findings. Electronically signed by: Barry Farrell Date:    01/18/2025 Time:    19:42    X-Ray Chest 1 View    Result Date: 1/18/2025  EXAMINATION: XR CHEST 1 VIEW CLINICAL HISTORY: Unspecified fall, initial encounter TECHNIQUE: Single frontal view of the chest was performed. COMPARISON: 06/15/2024 FINDINGS: Bilateral pleural effusions with adjacent airspace disease and/or atelectasis.  Additional airspace disease in the right upper lobe.  Pulmonary vascular congestion.  Mildly enlarged cardiac silhouette.  Left chest wall cardiac pacemaker.     As above Electronically signed by: Barry Farrell Date:    01/18/2025 Time:    19:12  - pulls last radiology  orders      Assessment and Plan     * Acute CHF (congestive heart failure)  Patient  heart failure that is Acute on chronic. On presentation their CHF was decompensated. Evidence of decompensated CHF on presentation includes: edema, crackles on lung auscultation, dyspnea on exertion (CHURCH), and shortness of breath. The etiology of their decompensation is likely unknown . Most recent BNP and echo results are listed below.  Recent Labs     01/18/25  1902   BNP 1,309*       Latest ECHO  Results for orders placed during the hospital encounter of 06/15/24    Echo    Interpretation Summary    Left Ventricle: The left ventricle is normal in size. Increased ventricular mass. Mildly increased wall thickness. There is mild concentric hypertrophy. Unable to assess wall motion. Septal motion is consistent with pacing. There is normal systolic function with a visually estimated ejection fraction of 55 - 60%. Diastolic function cannot be reliably determined in the presence of mitral annular calcification.    Right Ventricle: Right ventricle was not well visualized due to poor acoustic window. Normal right ventricular cavity size. Pacemaker lead present in the ventricle.    Aortic Valve: There is a transcatheter valve replacement in the aortic position.    Mitral Valve: Mildly thickened leaflets. There is moderate mitral annular calcification present. There is mild regurgitation.    Tricuspid Valve: There is mild to moderate regurgitation.    Current Heart Failure Medications  sacubitriL-valsartan 24-26 mg per tablet 1 tablet, 2 times daily, Oral  metoprolol succinate (TOPROL-XL) 24 hr tablet 100 mg, 2 times daily, Oral  furosemide tablet 40 mg, Daily, Oral    Plan  - Monitor strict I&Os and daily weights.    - Place on telemetry  - Low sodium diet  - Place on fluid restriction of 1.5 L.   - Cardiology has not been consulted  - The patient's volume status is improving, no crackles on exam today transition to PO lasix from  tomorrow        Patient has a acute CHF most likely secondary to medical noncompliance/ fluid noncompliance.  Patient told me that he skipped Lasix every now and then.  And drink plenty of water.   Now patient is euvolemic, discontinue IV Lasix, continue p.o. Lasix.     QT prolongation     milliseconds.  On Ranexa, we will get a cardiology evaluation.     Pleural effusion   I have personally reviewed and interpreted the following imaging: Xray. A thoracentesis was deferred. Most likely etiology includes Congestive Heart Failure. Management to include Diuresis    Oxygen prn    We will get chest sonogram           Seizure disorder  Seizure precautions  Continue phenytoin    Atherosclerosis of native coronary artery of native heart with unstable angina pectoris  Patient with known CAD s/p stent placement and CABG, which is controlled Will continue ASA, Plavix, and Statin and monitor for S/Sx of angina/ACS. Continue to monitor on telemetry.       Patient has extensive coronary artery disease with a refractory angina, patient is well known to me, patient has 13 stents and CABG surgery twice.   Patient's previous was on Ranexa however this was taken off due to QTC prolongation.  Patient is still has significant QTC prolongation 540 ms, now patient is back on Ranexa 1 g 4 times a day.   We will consult cardiologist Dr. Willoughby.      S/P TAVR (transcatheter aortic valve replacement)    Aware    HTN (hypertension)  Patient's blood pressure range in the last 24 hours was: BP  Min: 100/62  Max: 131/71.The patient's inpatient anti-hypertensive regimen is listed below:  Current Antihypertensives  isosorbide mononitrate 24 hr tablet 60 mg, Daily, Oral  metoprolol succinate (TOPROL-XL) 24 hr tablet 100 mg, 2 times daily, Oral  ranolazine 12 hr tablet 1,000 mg, 4 times daily, Oral  furosemide tablet 40 mg, Daily, Oral    Plan  - BP is controlled, no changes needed to their regimen      VTE Risk Mitigation (From admission,  onward)           Ordered     apixaban tablet 5 mg  Every morning         01/18/25 2144     IP VTE HIGH RISK PATIENT  Once         01/18/25 2055     Place sequential compression device  Until discontinued         01/18/25 2055                    Discharge Planning   AXEL: 1/23/2025     Code Status: Full Code   Medical Readiness for Discharge Date:   Discharge Plan A: Home Health                Please place Justification for DME        Andi Thomason MD  Department of Hospital Medicine   [facility]

## 2025-01-20 NOTE — PT/OT/SLP PROGRESS
Occupational Therapy   Treatment    Name: Jerome Amaro Jr.  MRN: [id number]  Admitting Diagnosis:  Acute CHF (congestive heart failure)       Recommendations:     Discharge Recommendations: Moderate Intensity Therapy  Discharge Equipment Recommendations:  to be determined by next level of care  Barriers to discharge:  Decreased caregiver support, Other (Comment) (poor safety and lacks good judgement)    Assessment:     Jeorme Amaro Jr. is a 79 y.o. male with a medical diagnosis of Acute CHF (congestive heart failure).  He presents with oriented to self, good participation and able to be encouraged and reoriented easily, with rest breaks given, as he has low endurance and activity tolerance. He requires demo and detailed instructions for TherEx output, and is high fall risk. Performance deficits affecting function are impaired cognition, weakness, visual deficits, impaired balance, gait instability, impaired self care skills, impaired endurance, decreased lower extremity function, decreased safety awareness, pain.     Rehab Prognosis:  Fair; patient would benefit from acute skilled OT services to address these deficits and reach maximum level of function.       Plan:     Patient to be seen 5 x/week to address the above listed problems via self-care/home management, therapeutic activities, therapeutic exercises  Plan of Care Expires: 02/19/25  Plan of Care Reviewed with: patient, daughter    Subjective     Chief Complaint: none  Patient/Family Comments/goals: go to SNF  Pain/Comfort:  Pain Rating 1: 0/10    Objective:     Communicated with: nurseDe prior to session.  Patient found sitting edge of bed with bed alarm, telemetry upon OT entry to room.    General Precautions: Standard, seizure, fall    Orthopedic Precautions:N/A  Braces: N/A  Respiratory Status: Room air     Occupational Performance:     Bed Mobility:    Patient completed Scooting/Bridging with stand by assistance, contact guard assistance, and VC for  sequencing over 5 reps to get bridged up high enough in the bed. After 3 very difficult bridges pt required 2 min rest and was reoriented to proper position of BLE by completing 10 bed marches. Then completed 2 more bridges to clear the correct distance needed.  Patient completed Sit to Supine with stand by assistance     Functional Mobility/Transfers:  Declined OOB (just finished extensive bed mobility for BM change)  Functional Mobility: BUE WFL, BLE WFL, incoordinated.    [facility] 6 Click ADL:      Treatment & Education:  -With HOB raised pt completed 2 min balloon volley with RUE reflexively but required encouragement and direction to integrate LUE. Pt instructed to complete 5 rounds balloon volley of 2 mins daily. Daughter in room and v/u.    Patient left HOB elevated with all lines intact, call button in reach, bed alarm on, and nurseDe notified    GOALS:   Multidisciplinary Problems       Occupational Therapy Goals          Problem: Occupational Therapy    Goal Priority Disciplines Outcome Interventions   Occupational Therapy Goal     OT, PT/OT Progressing    Description: Goals to be met by: 2/8/2025     Patient will increase functional independence with ADLs by performing:    UE Dressing with Modified [place].  LE Dressing with Modified [place].  Grooming while standing at sink with Modified [place].  Toileting from toilet with Modified [place] for hygiene and clothing management.   Step transfer with [place] and Modified [place]  Toilet transfer to toilet with Modified [place].                         DME Justifications:  No DME recommended requiring DME justifications    Time Tracking:     OT Date of Treatment: 01/20/25  OT Start Time: 1517  OT Stop Time: 1530  OT Total Time (min): 13 min    Billable Minutes:Therapeutic Exercise 13 min          Number of ALTA visits since last OT visit: 1 1/20/2025

## 2025-01-20 NOTE — ASSESSMENT & PLAN NOTE
Patient  heart failure that is Acute on chronic. On presentation their CHF was decompensated. Evidence of decompensated CHF on presentation includes: edema, crackles on lung auscultation, dyspnea on exertion (CHURCH), and shortness of breath. The etiology of their decompensation is likely unknown . Most recent BNP and echo results are listed below.  Recent Labs     01/18/25  1902   BNP 1,309*       Latest ECHO  Results for orders placed during the hospital encounter of 06/15/24    Echo    Interpretation Summary    Left Ventricle: The left ventricle is normal in size. Increased ventricular mass. Mildly increased wall thickness. There is mild concentric hypertrophy. Unable to assess wall motion. Septal motion is consistent with pacing. There is normal systolic function with a visually estimated ejection fraction of 55 - 60%. Diastolic function cannot be reliably determined in the presence of mitral annular calcification.    Right Ventricle: Right ventricle was not well visualized due to poor acoustic window. Normal right ventricular cavity size. Pacemaker lead present in the ventricle.    Aortic Valve: There is a transcatheter valve replacement in the aortic position.    Mitral Valve: Mildly thickened leaflets. There is moderate mitral annular calcification present. There is mild regurgitation.    Tricuspid Valve: There is mild to moderate regurgitation.    Current Heart Failure Medications  sacubitriL-valsartan 24-26 mg per tablet 1 tablet, 2 times daily, Oral  metoprolol succinate (TOPROL-XL) 24 hr tablet 100 mg, 2 times daily, Oral  furosemide tablet 40 mg, Daily, Oral    Plan  - Monitor strict I&Os and daily weights.    - Place on telemetry  - Low sodium diet  - Place on fluid restriction of 1.5 L.   - Cardiology has not been consulted  - The patient's volume status is improving, no crackles on exam today transition to PO lasix from tomorrow        Patient has a acute CHF most likely secondary to medical  noncompliance/ fluid noncompliance.  Patient told me that he skipped Lasix every now and then.  And drink plenty of water.   Now patient is euvolemic, discontinue IV Lasix, continue p.o. Lasix.

## 2025-01-20 NOTE — PLAN OF CARE
Problem: Occupational Therapy  Goal: Occupational Therapy Goal  Description: Goals to be met by: 2/8/2025     Patient will increase functional independence with ADLs by performing:    UE Dressing with Modified [place].  LE Dressing with Modified [place].  Grooming while standing at sink with Modified [place].  Toileting from toilet with Modified [place] for hygiene and clothing management.   Step transfer with [place] and Modified [place]  Toilet transfer to toilet with Modified [place].    Outcome: Progressing

## 2025-01-20 NOTE — CARE UPDATE
01/19/25 6473   Patient Assessment/Suction   Level of Consciousness (AVPU) alert   Respiratory Effort Normal;Unlabored   Expansion/Accessory Muscles/Retractions no use of accessory muscles;no retractions   All Lung Fields Breath Sounds diminished   Rhythm/Pattern, Respiratory unlabored   Cough Frequency no cough   PRE-TX-O2   Device (Oxygen Therapy) room air   SpO2 (!) 92 %   Pulse Oximetry Type Intermittent   Pulse 85   Resp 16

## 2025-01-20 NOTE — PROGRESS NOTES
Pharmacist Renal Dose Adjustment Note    Jerome Amaro Jr. is a 79 y.o. male being treated with the medication famotidine    Patient Data:    Vital Signs (Most Recent):  Temp: 98.3 °F (36.8 °C) (01/19/25 1558)  Pulse: 74 (01/19/25 1558)  Resp: 18 (01/19/25 1558)  BP: 117/67 (01/19/25 1558)  SpO2: 95 % (01/19/25 1558) Vital Signs (72h Range):  Temp:  [98.2 °F (36.8 °C)-100.2 °F (37.9 °C)]   Pulse:  [72-96]   Resp:  [16-18]   BP: (108-162)/(64-89)   SpO2:  [93 %-96 %]      Recent Labs   Lab 01/18/25  1902 01/19/25  0509   CREATININE 1.1 1.1     Serum creatinine: 1.1 mg/dL 01/19/25 0509  Estimated creatinine clearance: 48.3 mL/min    Medication:famotidine dose: 40 mg frequency q24h will be changed to medication:famotidine dose:30 mg frequency:q24h  Due to CrCl < 50 mL/min    Pharmacist's Name: Juan Berkowitz  Pharmacist's Extension: [phone]

## 2025-01-20 NOTE — ASSESSMENT & PLAN NOTE
I have personally reviewed and interpreted the following imaging: Xray. A thoracentesis was deferred. Most likely etiology includes Congestive Heart Failure. Management to include Diuresis    Oxygen prn    We will get chest sonogram

## 2025-01-20 NOTE — CONSULTS
[facility]  Adult Nutrition  Education Short Note      Nutrition Education    Previous education:  unknown    Diet at home: unknown    Written information provided on  2 gram sodium diet diet. Pt seen in room sleeping. Unable to arouse pt for education. Provided written handout re: low sodium diet.     Discussed with: patient    Educational Need? yes    Barriers: readiness to learn    Interventions: Sodium modified diet    Patient and/or family comprehend instructions:  unknown    Outcome: Needs review     Thanks for the consult!    Hermelinda Brunson RD 01/20/2025 12:08 PM

## 2025-01-20 NOTE — CARE UPDATE
01/20/25 0655   Patient Assessment/Suction   Level of Consciousness (AVPU) alert   Respiratory Effort Normal;Unlabored   Expansion/Accessory Muscles/Retractions no use of accessory muscles;no retractions   All Lung Fields Breath Sounds Anterior:;Lateral:;diminished   Rhythm/Pattern, Respiratory pattern regular;unlabored   Cough Frequency infrequent   Cough Type nonproductive   PRE-TX-O2   Device (Oxygen Therapy) room air   SpO2 (!) 93 %   Pulse Oximetry Type Intermittent   $ Pulse Oximetry - Multiple Charge Pulse Oximetry - Multiple   Pulse 78   Resp 16   Aerosol Therapy   $ Aerosol Therapy Charges PRN treatment not required   Respiratory Treatment Status (SVN) PRN treatment not required   Education   $ Education 15 min

## 2025-01-20 NOTE — ASSESSMENT & PLAN NOTE
Patient with known CAD s/p stent placement and CABG, which is controlled Will continue ASA, Plavix, and Statin and monitor for S/Sx of angina/ACS. Continue to monitor on telemetry.       Patient has extensive coronary artery disease with a refractory angina, patient is well known to me, patient has 13 stents and CABG surgery twice.   Patient's previous was on Ranexa however this was taken off due to QTC prolongation.  Patient is still has significant QTC prolongation 540 ms, now patient is back on Ranexa 1 g 4 times a day.   We will consult cardiologist Dr. Willoughby.

## 2025-01-20 NOTE — ASSESSMENT & PLAN NOTE
Patient's blood pressure range in the last 24 hours was: BP  Min: 100/62  Max: 131/71.The patient's inpatient anti-hypertensive regimen is listed below:  Current Antihypertensives  isosorbide mononitrate 24 hr tablet 60 mg, Daily, Oral  metoprolol succinate (TOPROL-XL) 24 hr tablet 100 mg, 2 times daily, Oral  ranolazine 12 hr tablet 1,000 mg, 4 times daily, Oral  furosemide tablet 40 mg, Daily, Oral    Plan  - BP is controlled, no changes needed to their regimen

## 2025-01-20 NOTE — SUBJECTIVE & OBJECTIVE
Interval History:    Seen and examined at multidisciplinary rounds, -1.7 L since admission.  No leg edema.  Looks euvolemic now.  Afebrile.  No chest pain.  Family bedside and request the patient go to skilled nursing facility.  Patient lives alone and has multiple falls at home.     Review of Systems   Reason unable to perform ROS: patient participation.     Objective:     Vital Signs (Most Recent):  Temp: 98.2 °F (36.8 °C) (01/20/25 0816)  Pulse: 80 (01/20/25 0816)  Resp: 16 (01/20/25 0655)  BP: 100/62 (01/20/25 0829)  SpO2: (!) 93 % (01/20/25 0816) Vital Signs (24h Range):  Temp:  [97.8 °F (36.6 °C)-99.6 °F (37.6 °C)] 98.2 °F (36.8 °C)  Pulse:  [72-85] 80  Resp:  [14-20] 16  SpO2:  [92 %-95 %] 93 %  BP: (100-131)/(62-75) 100/62     Weight: 75 kg (165 lb 5.5 oz)  Body mass index is 26.69 kg/m².    Intake/Output Summary (Last 24 hours) at 1/20/2025 0957  Last data filed at 1/20/2025 0618  Gross per 24 hour   Intake 200 ml   Output 1000 ml   Net -800 ml         Physical Exam  Vitals reviewed.   Constitutional:       General: He is not in acute distress.     Appearance: Normal appearance. He is not ill-appearing.   HENT:      Head: Normocephalic.   Eyes:      Comments: Pinpoint pupils   Cardiovascular:      Rate and Rhythm: Normal rate.      Pulses: Normal pulses.      Heart sounds: Murmur heard.   Pulmonary:      Effort: Pulmonary effort is normal.      Comments: Decreased breath sounds at bases, no crackles  Abdominal:      General: Bowel sounds are normal.      Palpations: Abdomen is soft.   Musculoskeletal:         General: Normal range of motion.      Cervical back: Normal range of motion and neck supple.   Skin:     General: Skin is warm and dry.      Capillary Refill: Capillary refill takes less than 2 seconds.   Neurological:      General: No focal deficit present.      Mental Status: He is alert. Mental status is at baseline.      GCS: GCS eye subscore is 4. GCS verbal subscore is 5. GCS motor subscore is 6.       Cranial Nerves: Cranial nerves 2-12 are intact.      Sensory: Sensation is intact.      Comments: Strength 5/5 BUE. 4/5 BLE     Psychiatric:         Attention and Perception: Attention normal.         Mood and Affect: Mood normal.         Speech: Speech normal.         Behavior: Behavior normal.             Significant Labs: All pertinent labs within the past 24 hours have been reviewed.  BMP:   Recent Labs   Lab 01/18/25  1902 01/19/25  0509 01/20/25  0509   *   < > 91   *   < > 131*   K 3.8   < > 3.5   CL 98   < > 95   CO2 26   < > 27   BUN 16   < > 21   CREATININE 1.1   < > 1.2   CALCIUM 8.7   < > 8.6*   MG 1.8  --   --     < > = values in this interval not displayed.     CBC:   Recent Labs   Lab 01/18/25 1902   WBC 6.57   HGB 10.6*   HCT 31.1*          Significant Imaging: I have reviewed all pertinent imaging results/findings within the past 24 hours.    Scheduled Meds:   apixaban  5 mg Oral QAM    cefTRIAXone (Rocephin) IV (PEDS and ADULTS)  1 g Intravenous Q24H    citalopram  40 mg Oral Daily    clopidogreL  75 mg Oral Daily    doxycycline  100 mg Oral Q12H    ezetimibe  10 mg Oral Daily    famotidine  20 mg Oral Daily    furosemide  40 mg Oral Daily    isosorbide mononitrate  60 mg Oral Daily    metoprolol succinate  100 mg Oral BID    moxifloxacin  1 drop Left Eye QID    mupirocin   Topical (Top) TID    phenytoin  400 mg Oral Daily    prednisoLONE acetate  1 drop Right Eye QID    prednisoLONE acetate  1 drop Left Eye QID    ranolazine  1,000 mg Oral QID    sacubitriL-valsartan  1 tablet Oral BID    senna-docusate 8.6-50 mg  1 tablet Oral BID     Continuous Infusions:  PRN Meds:.  Current Facility-Administered Medications:     acetaminophen, 650 mg, Oral, Q4H PRN    albuterol-ipratropium, 3 mL, Nebulization, Q4H PRN    hydrOXYzine pamoate, 25 mg, Oral, Daily PRN    ondansetron, 4 mg, Intravenous, Q6H PRN    sodium chloride 0.9%, 10 mL, Intravenous, PRN    DIPH,PERTUSS(ACELL),TET  VACCINE (ADULT)(BOOSTRIX,ADACEL), 0.5 mL, Intramuscular, vaccine x 1 dose      Echo    Result Date: 1/20/2025    Left Ventricle: The left ventricle is normal in size. Mildly increased wall thickness. There is mild concentric hypertrophy. There is low normal systolic function with a visually estimated ejection fraction of 50 - 55%. Diastolic function cannot be reliably determined in the presence of mitral annular calcification.   Right Ventricle: Systolic function is mildly reduced.   Left Atrium: Left atrium is severely dilated.   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   Mitral Valve: There is severe mitral annular calcification present. There is mitral valve stenosis. The mean pressure gradient across the mitral valve is 4 mmHg at a heart rate of 80 bpm. MV area by continuity equation is 0.82 cm2. There is moderate regurgitation.   Tricuspid Valve: There is mild regurgitation.     CT Chest Without Contrast    Result Date: 1/19/2025  EXAMINATION: CT CHEST WITHOUT CONTRAST CLINICAL HISTORY: Respiratory illness, nondiagnostic xray; TECHNIQUE: Low dose axial images, sagittal and coronal reformations were obtained from the thoracic inlet to the lung bases. Contrast was not administered. COMPARISON: CT lumbar spine 06/15/2024 FINDINGS: Moderate to large bilateral pleural effusions with adjacent airspace disease and atelectasis.  Otherwise, moderate bilateral multifocal infectious/inflammatory airspace disease most pronounced in the right upper lobe.  Background of emphysema Calcified pleural plaques bilaterally. No cardiomegaly or pericardial effusion.  Severe coronary artery atherosclerosis. Mild diffuse body wall edema. Osteopenia with multilevel compression fractures.  Previously noted L2 compression fractures demonstrates worsened height loss, now severe     Moderate to large bilateral pleural effusions with adjacent airspace disease and atelectasis.  Otherwise, moderate bilateral  multifocal infectious/inflammatory airspace disease most pronounced in the right upper lobe.  Background of emphysema.  Follow-up to resolution in this high risk patient. Calcified pleural plaques.  Correlate for asbestos related lung disease Osteopenia with multilevel compression fractures.  Previously noted L2 compression fractures demonstrates worsened height loss, now severe Electronically signed by: Barry Farrell Date:    01/19/2025 Time:    04:12    X-Ray Pelvis 3 View inc Hip 2 view Bilat    Result Date: 1/18/2025  EXAMINATION: XR PELVIS 3 VIEW WITH INC HIP 2 VIEW BILAT CLINICAL HISTORY: falls; TECHNIQUE: Five views COMPARISON: 06/15/2024 FINDINGS: Osteopenia.  Suspect acute nondisplaced fracture right superior pubic ramus extending into the puboacetabular junction.  Otherwise, no detectable acute fracture or dislocation.  Mild bilateral hip osteoarthritis.  Vascular calcifications.     As above Electronically signed by: Barry Farrell Date:    01/18/2025 Time:    23:41    CT Cervical Spine Without Contrast    Result Date: 1/18/2025  EXAMINATION: CT CERVICAL SPINE WITHOUT CONTRAST CLINICAL HISTORY: Neck trauma (Age >= 65y); TECHNIQUE: Low dose axial images, sagittal and coronal reformations were performed though the cervical spine.  Contrast was not administered. COMPARISON: None FINDINGS: Osteopenia.  No acute fracture.  Mild to moderate multilevel spondylosis.  Unremarkable prevertebral soft tissues. Incompletely evaluated ill-defined ground-glass opacity in the lung apices bilaterally, likely infectious or inflammatory.  Follow-up is warranted.     As above Electronically signed by: Barry Farrell Date:    01/18/2025 Time:    20:10    CT Head Without Contrast    Result Date: 1/18/2025  EXAMINATION: CT HEAD WITHOUT CONTRAST CLINICAL HISTORY: Head trauma, minor (Age >= 65y); TECHNIQUE: Low dose axial images were obtained through the head.  Coronal and sagittal reformations were also  performed. Contrast was not administered. COMPARISON: 06/16/2024 FINDINGS: Intracranial compartment: Ventricles and sulci remain prominent.  No extra-axial blood or fluid collections. Mild chronic microvascular ischemia.  No parenchymal mass, hemorrhage, edema or major vascular distribution infarct. Skull/extracranial contents (limited evaluation): No fracture. Mastoid air cells and paranasal sinuses are essentially clear.     No acute findings. Electronically signed by: Barry Farrell Date:    01/18/2025 Time:    19:42    X-Ray Chest 1 View    Result Date: 1/18/2025  EXAMINATION: XR CHEST 1 VIEW CLINICAL HISTORY: Unspecified fall, initial encounter TECHNIQUE: Single frontal view of the chest was performed. COMPARISON: 06/15/2024 FINDINGS: Bilateral pleural effusions with adjacent airspace disease and/or atelectasis.  Additional airspace disease in the right upper lobe.  Pulmonary vascular congestion.  Mildly enlarged cardiac silhouette.  Left chest wall cardiac pacemaker.     As above Electronically signed by: Barry Farrell Date:    01/18/2025 Time:    19:12  - pulls last radiology orders

## 2025-01-21 LAB
ANION GAP SERPL CALC-SCNC: 10 MMOL/L (ref 8–16)
BUN SERPL-MCNC: 27 MG/DL (ref 8–23)
CALCIUM SERPL-MCNC: 8.6 MG/DL (ref 8.7–10.5)
CHLORIDE SERPL-SCNC: 93 MMOL/L (ref 95–110)
CO2 SERPL-SCNC: 27 MMOL/L (ref 23–29)
CREAT SERPL-MCNC: 1.3 MG/DL (ref 0.5–1.4)
EST. GFR  (NO RACE VARIABLE): 55.5 ML/MIN/1.73 M^2
GLUCOSE SERPL-MCNC: 106 MG/DL (ref 70–110)
OHS QRS DURATION: 152 MS
OHS QTC CALCULATION: 540 MS
POTASSIUM SERPL-SCNC: 3.3 MMOL/L (ref 3.5–5.1)
SODIUM SERPL-SCNC: 130 MMOL/L (ref 136–145)

## 2025-01-21 PROCEDURE — 63600175 PHARM REV CODE 636 W HCPCS: Performed by: HOSPITALIST

## 2025-01-21 PROCEDURE — 12000002 HC ACUTE/MED SURGE SEMI-PRIVATE ROOM

## 2025-01-21 PROCEDURE — 25000003 PHARM REV CODE 250

## 2025-01-21 PROCEDURE — 25000003 PHARM REV CODE 250: Performed by: HOSPITALIST

## 2025-01-21 PROCEDURE — 99900031 HC PATIENT EDUCATION (STAT)

## 2025-01-21 PROCEDURE — 25000003 PHARM REV CODE 250: Performed by: NURSE PRACTITIONER

## 2025-01-21 PROCEDURE — 80048 BASIC METABOLIC PNL TOTAL CA: CPT | Performed by: NURSE PRACTITIONER

## 2025-01-21 PROCEDURE — 94761 N-INVAS EAR/PLS OXIMETRY MLT: CPT

## 2025-01-21 PROCEDURE — 99900035 HC TECH TIME PER 15 MIN (STAT)

## 2025-01-21 PROCEDURE — 36415 COLL VENOUS BLD VENIPUNCTURE: CPT | Performed by: NURSE PRACTITIONER

## 2025-01-21 RX ORDER — FUROSEMIDE 20 MG/1
20 TABLET ORAL DAILY
Status: DISCONTINUED | OUTPATIENT
Start: 2025-01-22 | End: 2025-01-26

## 2025-01-21 RX ORDER — POTASSIUM CHLORIDE 20 MEQ/1
40 TABLET, EXTENDED RELEASE ORAL ONCE
Status: COMPLETED | OUTPATIENT
Start: 2025-01-21 | End: 2025-01-21

## 2025-01-21 RX ADMIN — RANOLAZINE 1000 MG: 500 TABLET, EXTENDED RELEASE ORAL at 04:01

## 2025-01-21 RX ADMIN — SACUBITRIL AND VALSARTAN 1 TABLET: 24; 26 TABLET, FILM COATED ORAL at 08:01

## 2025-01-21 RX ADMIN — SACUBITRIL AND VALSARTAN 1 TABLET: 24; 26 TABLET, FILM COATED ORAL at 09:01

## 2025-01-21 RX ADMIN — RANOLAZINE 1000 MG: 500 TABLET, EXTENDED RELEASE ORAL at 11:01

## 2025-01-21 RX ADMIN — ACETAMINOPHEN 650 MG: 325 TABLET ORAL at 05:01

## 2025-01-21 RX ADMIN — CEFTRIAXONE 1 G: 1 INJECTION, POWDER, FOR SOLUTION INTRAMUSCULAR; INTRAVENOUS at 09:01

## 2025-01-21 RX ADMIN — PREDNISOLONE ACETATE 1 DROP: 10 SUSPENSION/ DROPS OPHTHALMIC at 12:01

## 2025-01-21 RX ADMIN — MOXIFLOXACIN HYDROCHLORIDE 1 DROP: 5 SOLUTION/ DROPS OPHTHALMIC at 09:01

## 2025-01-21 RX ADMIN — ISOSORBIDE MONONITRATE 60 MG: 60 TABLET, EXTENDED RELEASE ORAL at 08:01

## 2025-01-21 RX ADMIN — PREDNISOLONE ACETATE 1 DROP: 10 SUSPENSION/ DROPS OPHTHALMIC at 04:01

## 2025-01-21 RX ADMIN — PREDNISOLONE ACETATE 1 DROP: 10 SUSPENSION/ DROPS OPHTHALMIC at 09:01

## 2025-01-21 RX ADMIN — SENNOSIDES AND DOCUSATE SODIUM 1 TABLET: 50; 8.6 TABLET ORAL at 09:01

## 2025-01-21 RX ADMIN — CLOPIDOGREL BISULFATE 75 MG: 75 TABLET, FILM COATED ORAL at 08:01

## 2025-01-21 RX ADMIN — HYDROXYZINE PAMOATE 25 MG: 25 CAPSULE ORAL at 09:01

## 2025-01-21 RX ADMIN — FUROSEMIDE 40 MG: 40 TABLET ORAL at 08:01

## 2025-01-21 RX ADMIN — PHENYTOIN SODIUM 400 MG: 100 CAPSULE, EXTENDED RELEASE ORAL at 08:01

## 2025-01-21 RX ADMIN — POTASSIUM CHLORIDE 40 MEQ: 1500 TABLET, EXTENDED RELEASE ORAL at 11:01

## 2025-01-21 RX ADMIN — DOXYCYCLINE HYCLATE 100 MG: 100 CAPSULE ORAL at 09:01

## 2025-01-21 RX ADMIN — RANOLAZINE 1000 MG: 500 TABLET, EXTENDED RELEASE ORAL at 08:01

## 2025-01-21 RX ADMIN — MUPIROCIN 1 G: 20 OINTMENT TOPICAL at 09:01

## 2025-01-21 RX ADMIN — DOXYCYCLINE HYCLATE 100 MG: 100 CAPSULE ORAL at 08:01

## 2025-01-21 RX ADMIN — MOXIFLOXACIN HYDROCHLORIDE 1 DROP: 5 SOLUTION/ DROPS OPHTHALMIC at 12:01

## 2025-01-21 RX ADMIN — METOPROLOL SUCCINATE 100 MG: 50 TABLET, FILM COATED, EXTENDED RELEASE ORAL at 09:01

## 2025-01-21 RX ADMIN — MUPIROCIN 1 G: 20 OINTMENT TOPICAL at 04:01

## 2025-01-21 RX ADMIN — EZETIMIBE 10 MG: 10 TABLET ORAL at 08:01

## 2025-01-21 RX ADMIN — RANOLAZINE 1000 MG: 500 TABLET, EXTENDED RELEASE ORAL at 09:01

## 2025-01-21 RX ADMIN — MOXIFLOXACIN HYDROCHLORIDE 1 DROP: 5 SOLUTION/ DROPS OPHTHALMIC at 04:01

## 2025-01-21 RX ADMIN — MUPIROCIN 1 G: 20 OINTMENT TOPICAL at 08:01

## 2025-01-21 RX ADMIN — CITALOPRAM HYDROBROMIDE 40 MG: 20 TABLET ORAL at 08:01

## 2025-01-21 RX ADMIN — METOPROLOL SUCCINATE 100 MG: 50 TABLET, FILM COATED, EXTENDED RELEASE ORAL at 08:01

## 2025-01-21 RX ADMIN — FAMOTIDINE 20 MG: 20 TABLET, FILM COATED ORAL at 04:01

## 2025-01-21 NOTE — PROGRESS NOTES
[facility]  Progress Note    Patient Name: Jerome Amaro Jr.  MRN: [id number]  Patient Class: IP- Inpatient   Admission Date: 1/18/2025  Length of Stay: 2 days  Attending Physician: Edvin Almonte MD  Primary Care Provider: Magda Ruiz FNP-C        Subjective     Principal Problem:Acute CHF (congestive heart failure)        HPI:  79 year old male with a past medical history of aortic valve disease, atrial fibrillation on anticoagulation therapy, MI, HLD, PVD, seizures, difficulty swallowing, CAD, CHF, aortic valve disease who presents to the emergency room with weakness in the legs and falls. There was also concern for intermittent altered mental status. Patient has a strong cardiac history with 2 bypass surgeries, atrial fibrillation, aortic valve disease and valve replacement, multiple cardiac stents, hypertension, hyperlipidemia, PVD, seizure history. Patient has been weak in general. His daughter says that he has had several falls over the last couple of days. He has just not been feeling well. No reported fever or chills. No reported vomiting or any diarrhea. He is not have any abdominal pain. He occasionally has chest pain which she takes nitro for which is not abnormal for him. He does not have any new chest pain but sometimes feels short of breath. Today he had a fall where he hit the back of his head on the ground. He did not lose consciousness. No reported vomiting or any seizure activity. He is on Eliquis and Plavix. No other reported injuries from the fall.     In [facility] ER, H/H 10.6/31.1, Na  134, T. Bili 1.3, BNP 1309, Troponin I high sensitivity 56.8, Mg 1.8, Influenza and flu negative, phenytoin 18.5, CT c spine no acute fracture, multilevel spondylosis. Incompletely evaluated ill-defined ground-glass opacity in the lung apices bilaterally, CT head no acute findings, chest x-ray Bilateral pleural effusions with adjacent airspace disease and/or atelectasis. Additional  airspace disease in the right upper lobe. Pulmonary vascular congestion. Mildly enlarged cardiac silhouette. Left chest wall cardiac pacemaker.   Admit to hospital medicine for pleural effusion, CHF exacerbation    Overview/Hospital Course:  79-year-old male presented to the emergency department for weakness and fall.  he states he slipped off of his slipper walking to the kitchen. He endorses feeling more weak than usual. He did not lose consciousness nor hit his head.  Workup was significant for moderate pleural effusions and a BNP of 1300.  He was started on IV Lasix, then transitioned to p.o. Lasix.  Ultrasound of chest showed bilateral moderate pleural effusions right greater than left.  Thoracentesis was offered to family, but they declined.  CT of chest also showed moderate bilateral multifocal infectious/inflammatory airspace disease most pronounced in the right upper lobe.  He was started on antibiotics.  PT and OT were consulted, moderate intensity therapy was recommended.  Patient is now pending SNF placement.  Patient did have a fall yesterday, unknown if he hit head or not.  CT of head ordered today.      Interval History:    Patient seen and examined.  NAD.  Confused but awake and alert.  Appears euvolemic, decreased p.o. Lasix to 20 mg daily.  He had a fall yesterday, CT of head ordered.    Review of Systems   Respiratory:  Negative for shortness of breath.    Cardiovascular:  Negative for chest pain.   Gastrointestinal:  Negative for nausea and vomiting.     Objective:     Vital Signs (Most Recent):  Temp: 97.9 °F (36.6 °C) (01/21/25 1030)  Pulse: 79 (01/21/25 1030)  Resp: 19 (01/21/25 0931)  BP: 118/73 (01/21/25 1030)  SpO2: (!) 94 % (01/21/25 1030) Vital Signs (24h Range):  Temp:  [97.3 °F (36.3 °C)-98.8 °F (37.1 °C)] 97.9 °F (36.6 °C)  Pulse:  [74-88] 79  Resp:  [18-20] 19  SpO2:  [92 %-96 %] 94 %  BP: (118-135)/(62-80) 118/73     Weight: 75 kg (165 lb 5.5 oz)  Body mass index is 26.69  "kg/m².    Intake/Output Summary (Last 24 hours) at 1/21/2025 1219  Last data filed at 1/21/2025 0759  Gross per 24 hour   Intake 620 ml   Output --   Net 620 ml         Physical Exam  Vitals and nursing note reviewed.   Constitutional:       General: He is not in acute distress.     Appearance: Normal appearance. He is not ill-appearing.   HENT:      Head: Normocephalic.   Eyes:      Comments: Pinpoint pupils   Cardiovascular:      Rate and Rhythm: Normal rate.      Pulses: Normal pulses.      Heart sounds: Murmur heard.   Pulmonary:      Effort: Pulmonary effort is normal.      Comments: Decreased breath sounds at bases, no crackles  Abdominal:      General: Bowel sounds are normal.      Palpations: Abdomen is soft.   Musculoskeletal:         General: Normal range of motion.      Cervical back: Normal range of motion and neck supple.   Skin:     General: Skin is warm and dry.      Capillary Refill: Capillary refill takes less than 2 seconds.   Neurological:      General: No focal deficit present.      Mental Status: He is alert. Mental status is at baseline.      GCS: GCS eye subscore is 4. GCS verbal subscore is 5. GCS motor subscore is 6.      Cranial Nerves: Cranial nerves 2-12 are intact.      Sensory: Sensation is intact.      Comments: Strength 5/5 BUE. 4/5 BLE     Psychiatric:         Attention and Perception: Attention normal.         Mood and Affect: Mood normal.         Speech: Speech normal.         Behavior: Behavior normal.             Significant Labs: All pertinent labs within the past 24 hours have been reviewed.  BMP:   Recent Labs   Lab 01/21/25  0452      *   K 3.3*   CL 93*   CO2 27   BUN 27*   CREATININE 1.3   CALCIUM 8.6*     CBC:   No results for input(s): "WBC", "HGB", "HCT", "PLT" in the last 48 hours.      Significant Imaging: I have reviewed all pertinent imaging results/findings within the past 24 hours.    Scheduled Meds:   cefTRIAXone (Rocephin) IV (PEDS and ADULTS)  1 g " Intravenous Q24H    citalopram  40 mg Oral Daily    clopidogreL  75 mg Oral Daily    doxycycline  100 mg Oral Q12H    ezetimibe  10 mg Oral Daily    famotidine  20 mg Oral Daily    furosemide  40 mg Oral Daily    isosorbide mononitrate  60 mg Oral Daily    metoprolol succinate  100 mg Oral BID    moxifloxacin  1 drop Left Eye QID    mupirocin   Topical (Top) TID    phenytoin  400 mg Oral Daily    prednisoLONE acetate  1 drop Right Eye QID    prednisoLONE acetate  1 drop Left Eye QID    ranolazine  1,000 mg Oral QID    sacubitriL-valsartan  1 tablet Oral BID    senna-docusate 8.6-50 mg  1 tablet Oral BID     Continuous Infusions:  PRN Meds:.  Current Facility-Administered Medications:     acetaminophen, 650 mg, Oral, Q4H PRN    albuterol-ipratropium, 3 mL, Nebulization, Q4H PRN    hydrOXYzine pamoate, 25 mg, Oral, Daily PRN    ondansetron, 4 mg, Intravenous, Q6H PRN    sodium chloride 0.9%, 10 mL, Intravenous, PRN    DIPH,PERTUSS(ACELL),TET VACCINE (ADULT)(BOOSTRIX,ADACEL), 0.5 mL, Intramuscular, vaccine x 1 dose      Echo    Result Date: 1/20/2025    Left Ventricle: The left ventricle is normal in size. Mildly increased wall thickness. There is mild concentric hypertrophy. There is low normal systolic function with a visually estimated ejection fraction of 50 - 55%. Diastolic function cannot be reliably determined in the presence of mitral annular calcification.   Right Ventricle: Systolic function is mildly reduced.   Left Atrium: Left atrium is severely dilated.   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   Mitral Valve: There is severe mitral annular calcification present. There is mitral valve stenosis. The mean pressure gradient across the mitral valve is 4 mmHg at a heart rate of 80 bpm. MV area by continuity equation is 0.82 cm2. There is moderate regurgitation.   Tricuspid Valve: There is mild regurgitation.     CT Chest Without Contrast    Result Date: 1/19/2025  EXAMINATION: CT  CHEST WITHOUT CONTRAST CLINICAL HISTORY: Respiratory illness, nondiagnostic xray; TECHNIQUE: Low dose axial images, sagittal and coronal reformations were obtained from the thoracic inlet to the lung bases. Contrast was not administered. COMPARISON: CT lumbar spine 06/15/2024 FINDINGS: Moderate to large bilateral pleural effusions with adjacent airspace disease and atelectasis.  Otherwise, moderate bilateral multifocal infectious/inflammatory airspace disease most pronounced in the right upper lobe.  Background of emphysema Calcified pleural plaques bilaterally. No cardiomegaly or pericardial effusion.  Severe coronary artery atherosclerosis. Mild diffuse body wall edema. Osteopenia with multilevel compression fractures.  Previously noted L2 compression fractures demonstrates worsened height loss, now severe     Moderate to large bilateral pleural effusions with adjacent airspace disease and atelectasis.  Otherwise, moderate bilateral multifocal infectious/inflammatory airspace disease most pronounced in the right upper lobe.  Background of emphysema.  Follow-up to resolution in this high risk patient. Calcified pleural plaques.  Correlate for asbestos related lung disease Osteopenia with multilevel compression fractures.  Previously noted L2 compression fractures demonstrates worsened height loss, now severe Electronically signed by: Barry Farrell Date:    01/19/2025 Time:    04:12    X-Ray Pelvis 3 View inc Hip 2 view Bilat    Result Date: 1/18/2025  EXAMINATION: XR PELVIS 3 VIEW WITH INC HIP 2 VIEW BILAT CLINICAL HISTORY: falls; TECHNIQUE: Five views COMPARISON: 06/15/2024 FINDINGS: Osteopenia.  Suspect acute nondisplaced fracture right superior pubic ramus extending into the puboacetabular junction.  Otherwise, no detectable acute fracture or dislocation.  Mild bilateral hip osteoarthritis.  Vascular calcifications.     As above Electronically signed by: Barry Farrell Date:    01/18/2025  Time:    23:41    CT Cervical Spine Without Contrast    Result Date: 1/18/2025  EXAMINATION: CT CERVICAL SPINE WITHOUT CONTRAST CLINICAL HISTORY: Neck trauma (Age >= 65y); TECHNIQUE: Low dose axial images, sagittal and coronal reformations were performed though the cervical spine.  Contrast was not administered. COMPARISON: None FINDINGS: Osteopenia.  No acute fracture.  Mild to moderate multilevel spondylosis.  Unremarkable prevertebral soft tissues. Incompletely evaluated ill-defined ground-glass opacity in the lung apices bilaterally, likely infectious or inflammatory.  Follow-up is warranted.     As above Electronically signed by: Barry Farrell Date:    01/18/2025 Time:    20:10    CT Head Without Contrast    Result Date: 1/18/2025  EXAMINATION: CT HEAD WITHOUT CONTRAST CLINICAL HISTORY: Head trauma, minor (Age >= 65y); TECHNIQUE: Low dose axial images were obtained through the head.  Coronal and sagittal reformations were also performed. Contrast was not administered. COMPARISON: 06/16/2024 FINDINGS: Intracranial compartment: Ventricles and sulci remain prominent.  No extra-axial blood or fluid collections. Mild chronic microvascular ischemia.  No parenchymal mass, hemorrhage, edema or major vascular distribution infarct. Skull/extracranial contents (limited evaluation): No fracture. Mastoid air cells and paranasal sinuses are essentially clear.     No acute findings. Electronically signed by: Barry Farrell Date:    01/18/2025 Time:    19:42    X-Ray Chest 1 View    Result Date: 1/18/2025  EXAMINATION: XR CHEST 1 VIEW CLINICAL HISTORY: Unspecified fall, initial encounter TECHNIQUE: Single frontal view of the chest was performed. COMPARISON: 06/15/2024 FINDINGS: Bilateral pleural effusions with adjacent airspace disease and/or atelectasis.  Additional airspace disease in the right upper lobe.  Pulmonary vascular congestion.  Mildly enlarged cardiac silhouette.  Left chest wall cardiac pacemaker.      As above Electronically signed by: Barry Farrell Date:    01/18/2025 Time:    19:12  - pulls last radiology orders      Assessment and Plan     * Acute CHF (congestive heart failure)  Patient  heart failure that is Acute on chronic. On presentation their CHF was decompensated. Evidence of decompensated CHF on presentation includes: edema, crackles on lung auscultation, dyspnea on exertion (CHURCH), and shortness of breath. The etiology of their decompensation is likely unknown . Most recent BNP and echo results are listed below.  Recent Labs     01/18/25  1902   BNP 1,309*       Latest ECHO  Results for orders placed during the hospital encounter of 06/15/24    Echo    Interpretation Summary    Left Ventricle: The left ventricle is normal in size. Increased ventricular mass. Mildly increased wall thickness. There is mild concentric hypertrophy. Unable to assess wall motion. Septal motion is consistent with pacing. There is normal systolic function with a visually estimated ejection fraction of 55 - 60%. Diastolic function cannot be reliably determined in the presence of mitral annular calcification.    Right Ventricle: Right ventricle was not well visualized due to poor acoustic window. Normal right ventricular cavity size. Pacemaker lead present in the ventricle.    Aortic Valve: There is a transcatheter valve replacement in the aortic position.    Mitral Valve: Mildly thickened leaflets. There is moderate mitral annular calcification present. There is mild regurgitation.    Tricuspid Valve: There is mild to moderate regurgitation.    Current Heart Failure Medications  sacubitriL-valsartan 24-26 mg per tablet 1 tablet, 2 times daily, Oral  metoprolol succinate (TOPROL-XL) 24 hr tablet 100 mg, 2 times daily, Oral  furosemide tablet 20 mg, Daily, Oral    Plan  - Monitor strict I&Os and daily weights.    - Place on telemetry  - Low sodium diet  - Place on fluid restriction of 1.5 L.   - The patient's volume  status is improving, no crackles on exam today transition to PO lasix       QT prolongation     milliseconds.  On Ranexa, we will get a cardiology evaluation.     Pleural effusion  Ultrasound of chest with moderate bilateral pleural effusions, right greater than left  Thoracentesis was offered, but family declined  Was managed with IV diuresis, now on p.o. diuretic          Seizure disorder  Seizure precautions  Continue phenytoin    Atherosclerosis of native coronary artery of native heart with unstable angina pectoris  Patient with known CAD s/p stent placement and CABG, which is controlled Will continue ASA, Plavix, and Statin and monitor for S/Sx of angina/ACS. Continue to monitor on telemetry.       Patient has extensive coronary artery disease with a refractory angina, patient is well known to me, patient has 13 stents and CABG surgery twice.   Patient's previous was on Ranexa however this was taken off due to QTC prolongation.  Patient is still has significant QTC prolongation 540 ms, now patient is back on Ranexa 1 g 4 times a day.   We will consult cardiologist Dr. Willoughby.      S/P TAVR (transcatheter aortic valve replacement)    Aware    HTN (hypertension)  Patient's blood pressure range in the last 24 hours was: BP  Min: 118/73  Max: 135/80.The patient's inpatient anti-hypertensive regimen is listed below:  Current Antihypertensives  isosorbide mononitrate 24 hr tablet 60 mg, Daily, Oral  metoprolol succinate (TOPROL-XL) 24 hr tablet 100 mg, 2 times daily, Oral  ranolazine 12 hr tablet 1,000 mg, 4 times daily, Oral  furosemide tablet 20 mg, Daily, Oral    Plan  - BP is controlled, no changes needed to their regimen      VTE Risk Mitigation (From admission, onward)           Ordered     IP VTE HIGH RISK PATIENT  Once         01/18/25 2055     Place sequential compression device  Until discontinued         01/18/25 2055                    Discharge Planning   AXEL: 1/23/2025     Code Status: Full Code    Medical Readiness for Discharge Date:   Discharge Plan A: Home Health                Please place Justification for DME        SHERITA Beach  Department of Hospital Medicine   [facility]

## 2025-01-21 NOTE — SUBJECTIVE & OBJECTIVE
Interval History:    Patient seen and examined.  NAD.  Confused but awake and alert.  Appears euvolemic, decreased p.o. Lasix to 20 mg daily.  He had a fall yesterday, CT of head ordered.    Review of Systems   Respiratory:  Negative for shortness of breath.    Cardiovascular:  Negative for chest pain.   Gastrointestinal:  Negative for nausea and vomiting.     Objective:     Vital Signs (Most Recent):  Temp: 97.9 °F (36.6 °C) (01/21/25 1030)  Pulse: 79 (01/21/25 1030)  Resp: 19 (01/21/25 0931)  BP: 118/73 (01/21/25 1030)  SpO2: (!) 94 % (01/21/25 1030) Vital Signs (24h Range):  Temp:  [97.3 °F (36.3 °C)-98.8 °F (37.1 °C)] 97.9 °F (36.6 °C)  Pulse:  [74-88] 79  Resp:  [18-20] 19  SpO2:  [92 %-96 %] 94 %  BP: (118-135)/(62-80) 118/73     Weight: 75 kg (165 lb 5.5 oz)  Body mass index is 26.69 kg/m².    Intake/Output Summary (Last 24 hours) at 1/21/2025 1219  Last data filed at 1/21/2025 0759  Gross per 24 hour   Intake 620 ml   Output --   Net 620 ml         Physical Exam  Vitals and nursing note reviewed.   Constitutional:       General: He is not in acute distress.     Appearance: Normal appearance. He is not ill-appearing.   HENT:      Head: Normocephalic.   Eyes:      Comments: Pinpoint pupils   Cardiovascular:      Rate and Rhythm: Normal rate.      Pulses: Normal pulses.      Heart sounds: Murmur heard.   Pulmonary:      Effort: Pulmonary effort is normal.      Comments: Decreased breath sounds at bases, no crackles  Abdominal:      General: Bowel sounds are normal.      Palpations: Abdomen is soft.   Musculoskeletal:         General: Normal range of motion.      Cervical back: Normal range of motion and neck supple.   Skin:     General: Skin is warm and dry.      Capillary Refill: Capillary refill takes less than 2 seconds.   Neurological:      General: No focal deficit present.      Mental Status: He is alert. Mental status is at baseline.      GCS: GCS eye subscore is 4. GCS verbal subscore is 5. GCS motor  "subscore is 6.      Cranial Nerves: Cranial nerves 2-12 are intact.      Sensory: Sensation is intact.      Comments: Strength 5/5 BUE. 4/5 BLE     Psychiatric:         Attention and Perception: Attention normal.         Mood and Affect: Mood normal.         Speech: Speech normal.         Behavior: Behavior normal.             Significant Labs: All pertinent labs within the past 24 hours have been reviewed.  BMP:   Recent Labs   Lab 01/21/25  0452      *   K 3.3*   CL 93*   CO2 27   BUN 27*   CREATININE 1.3   CALCIUM 8.6*     CBC:   No results for input(s): "WBC", "HGB", "HCT", "PLT" in the last 48 hours.      Significant Imaging: I have reviewed all pertinent imaging results/findings within the past 24 hours.    Scheduled Meds:   cefTRIAXone (Rocephin) IV (PEDS and ADULTS)  1 g Intravenous Q24H    citalopram  40 mg Oral Daily    clopidogreL  75 mg Oral Daily    doxycycline  100 mg Oral Q12H    ezetimibe  10 mg Oral Daily    famotidine  20 mg Oral Daily    furosemide  40 mg Oral Daily    isosorbide mononitrate  60 mg Oral Daily    metoprolol succinate  100 mg Oral BID    moxifloxacin  1 drop Left Eye QID    mupirocin   Topical (Top) TID    phenytoin  400 mg Oral Daily    prednisoLONE acetate  1 drop Right Eye QID    prednisoLONE acetate  1 drop Left Eye QID    ranolazine  1,000 mg Oral QID    sacubitriL-valsartan  1 tablet Oral BID    senna-docusate 8.6-50 mg  1 tablet Oral BID     Continuous Infusions:  PRN Meds:.  Current Facility-Administered Medications:     acetaminophen, 650 mg, Oral, Q4H PRN    albuterol-ipratropium, 3 mL, Nebulization, Q4H PRN    hydrOXYzine pamoate, 25 mg, Oral, Daily PRN    ondansetron, 4 mg, Intravenous, Q6H PRN    sodium chloride 0.9%, 10 mL, Intravenous, PRN    DIPH,PERTUSS(ACELL),TET VACCINE (ADULT)(BOOSTRIX,ADACEL), 0.5 mL, Intramuscular, vaccine x 1 dose      Echo    Result Date: 1/20/2025    Left Ventricle: The left ventricle is normal in size. Mildly increased wall " thickness. There is mild concentric hypertrophy. There is low normal systolic function with a visually estimated ejection fraction of 50 - 55%. Diastolic function cannot be reliably determined in the presence of mitral annular calcification.   Right Ventricle: Systolic function is mildly reduced.   Left Atrium: Left atrium is severely dilated.   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   Mitral Valve: There is severe mitral annular calcification present. There is mitral valve stenosis. The mean pressure gradient across the mitral valve is 4 mmHg at a heart rate of 80 bpm. MV area by continuity equation is 0.82 cm2. There is moderate regurgitation.   Tricuspid Valve: There is mild regurgitation.     CT Chest Without Contrast    Result Date: 1/19/2025  EXAMINATION: CT CHEST WITHOUT CONTRAST CLINICAL HISTORY: Respiratory illness, nondiagnostic xray; TECHNIQUE: Low dose axial images, sagittal and coronal reformations were obtained from the thoracic inlet to the lung bases. Contrast was not administered. COMPARISON: CT lumbar spine 06/15/2024 FINDINGS: Moderate to large bilateral pleural effusions with adjacent airspace disease and atelectasis.  Otherwise, moderate bilateral multifocal infectious/inflammatory airspace disease most pronounced in the right upper lobe.  Background of emphysema Calcified pleural plaques bilaterally. No cardiomegaly or pericardial effusion.  Severe coronary artery atherosclerosis. Mild diffuse body wall edema. Osteopenia with multilevel compression fractures.  Previously noted L2 compression fractures demonstrates worsened height loss, now severe     Moderate to large bilateral pleural effusions with adjacent airspace disease and atelectasis.  Otherwise, moderate bilateral multifocal infectious/inflammatory airspace disease most pronounced in the right upper lobe.  Background of emphysema.  Follow-up to resolution in this high risk patient. Calcified pleural  plaques.  Correlate for asbestos related lung disease Osteopenia with multilevel compression fractures.  Previously noted L2 compression fractures demonstrates worsened height loss, now severe Electronically signed by: Barry Farrell Date:    01/19/2025 Time:    04:12    X-Ray Pelvis 3 View inc Hip 2 view Bilat    Result Date: 1/18/2025  EXAMINATION: XR PELVIS 3 VIEW WITH INC HIP 2 VIEW BILAT CLINICAL HISTORY: falls; TECHNIQUE: Five views COMPARISON: 06/15/2024 FINDINGS: Osteopenia.  Suspect acute nondisplaced fracture right superior pubic ramus extending into the puboacetabular junction.  Otherwise, no detectable acute fracture or dislocation.  Mild bilateral hip osteoarthritis.  Vascular calcifications.     As above Electronically signed by: Barry Farrell Date:    01/18/2025 Time:    23:41    CT Cervical Spine Without Contrast    Result Date: 1/18/2025  EXAMINATION: CT CERVICAL SPINE WITHOUT CONTRAST CLINICAL HISTORY: Neck trauma (Age >= 65y); TECHNIQUE: Low dose axial images, sagittal and coronal reformations were performed though the cervical spine.  Contrast was not administered. COMPARISON: None FINDINGS: Osteopenia.  No acute fracture.  Mild to moderate multilevel spondylosis.  Unremarkable prevertebral soft tissues. Incompletely evaluated ill-defined ground-glass opacity in the lung apices bilaterally, likely infectious or inflammatory.  Follow-up is warranted.     As above Electronically signed by: Barry Farrell Date:    01/18/2025 Time:    20:10    CT Head Without Contrast    Result Date: 1/18/2025  EXAMINATION: CT HEAD WITHOUT CONTRAST CLINICAL HISTORY: Head trauma, minor (Age >= 65y); TECHNIQUE: Low dose axial images were obtained through the head.  Coronal and sagittal reformations were also performed. Contrast was not administered. COMPARISON: 06/16/2024 FINDINGS: Intracranial compartment: Ventricles and sulci remain prominent.  No extra-axial blood or fluid collections. Mild chronic  microvascular ischemia.  No parenchymal mass, hemorrhage, edema or major vascular distribution infarct. Skull/extracranial contents (limited evaluation): No fracture. Mastoid air cells and paranasal sinuses are essentially clear.     No acute findings. Electronically signed by: Barry Farrell Date:    01/18/2025 Time:    19:42    X-Ray Chest 1 View    Result Date: 1/18/2025  EXAMINATION: XR CHEST 1 VIEW CLINICAL HISTORY: Unspecified fall, initial encounter TECHNIQUE: Single frontal view of the chest was performed. COMPARISON: 06/15/2024 FINDINGS: Bilateral pleural effusions with adjacent airspace disease and/or atelectasis.  Additional airspace disease in the right upper lobe.  Pulmonary vascular congestion.  Mildly enlarged cardiac silhouette.  Left chest wall cardiac pacemaker.     As above Electronically signed by: Barry Farrell Date:    01/18/2025 Time:    19:12  - pulls last radiology orders

## 2025-01-21 NOTE — CARE UPDATE
01/20/25 1929   Patient Assessment/Suction   Level of Consciousness (AVPU) alert   Respiratory Effort Normal;Unlabored   Expansion/Accessory Muscles/Retractions no use of accessory muscles;no retractions;expansion symmetric   All Lung Fields Breath Sounds diminished   Cough Frequency infrequent   Cough Type no productive sputum   PRE-TX-O2   Device (Oxygen Therapy) room air   SpO2 (!) 94 %   Pulse Oximetry Type Intermittent   $ Pulse Oximetry - Multiple Charge Pulse Oximetry - Multiple   Aerosol Therapy   $ Aerosol Therapy Charges PRN treatment not required   Respiratory Treatment Status (SVN) PRN treatment not required

## 2025-01-21 NOTE — ASSESSMENT & PLAN NOTE
Patient's blood pressure range in the last 24 hours was: BP  Min: 118/73  Max: 135/80.The patient's inpatient anti-hypertensive regimen is listed below:  Current Antihypertensives  isosorbide mononitrate 24 hr tablet 60 mg, Daily, Oral  metoprolol succinate (TOPROL-XL) 24 hr tablet 100 mg, 2 times daily, Oral  ranolazine 12 hr tablet 1,000 mg, 4 times daily, Oral  furosemide tablet 20 mg, Daily, Oral    Plan  - BP is controlled, no changes needed to their regimen

## 2025-01-21 NOTE — ASSESSMENT & PLAN NOTE
Patient  heart failure that is Acute on chronic. On presentation their CHF was decompensated. Evidence of decompensated CHF on presentation includes: edema, crackles on lung auscultation, dyspnea on exertion (CHURCH), and shortness of breath. The etiology of their decompensation is likely unknown . Most recent BNP and echo results are listed below.  Recent Labs     01/18/25  1902   BNP 1,309*       Latest ECHO  Results for orders placed during the hospital encounter of 06/15/24    Echo    Interpretation Summary    Left Ventricle: The left ventricle is normal in size. Increased ventricular mass. Mildly increased wall thickness. There is mild concentric hypertrophy. Unable to assess wall motion. Septal motion is consistent with pacing. There is normal systolic function with a visually estimated ejection fraction of 55 - 60%. Diastolic function cannot be reliably determined in the presence of mitral annular calcification.    Right Ventricle: Right ventricle was not well visualized due to poor acoustic window. Normal right ventricular cavity size. Pacemaker lead present in the ventricle.    Aortic Valve: There is a transcatheter valve replacement in the aortic position.    Mitral Valve: Mildly thickened leaflets. There is moderate mitral annular calcification present. There is mild regurgitation.    Tricuspid Valve: There is mild to moderate regurgitation.    Current Heart Failure Medications  sacubitriL-valsartan 24-26 mg per tablet 1 tablet, 2 times daily, Oral  metoprolol succinate (TOPROL-XL) 24 hr tablet 100 mg, 2 times daily, Oral  furosemide tablet 20 mg, Daily, Oral    Plan  - Monitor strict I&Os and daily weights.    - Place on telemetry  - Low sodium diet  - Place on fluid restriction of 1.5 L.   - The patient's volume status is improving, no crackles on exam today transition to PO lasix

## 2025-01-21 NOTE — ASSESSMENT & PLAN NOTE
Ultrasound of chest with moderate bilateral pleural effusions, right greater than left  Thoracentesis was offered, but family declined  Was managed with IV diuresis, now on p.o. diuretic

## 2025-01-21 NOTE — NURSING
Rounding to give pm report and found the patient on the floor he stood up and we placed him in the bed. No signs of trauma, no bleeding or bruising or abrasions. Notified Dr Thomason orders to monitor for any changes.

## 2025-01-21 NOTE — CARE UPDATE
01/21/25 0931   Patient Assessment/Suction   Level of Consciousness (AVPU) responds to voice   Respiratory Effort Normal;Unlabored   Expansion/Accessory Muscles/Retractions no use of accessory muscles;no retractions   All Lung Fields Breath Sounds Anterior:;Lateral:;diminished   Rhythm/Pattern, Respiratory unlabored;pattern regular   Cough Frequency infrequent   Cough Type nonproductive   PRE-TX-O2   Device (Oxygen Therapy) room air   SpO2 95 %   Pulse Oximetry Type Intermittent   $ Pulse Oximetry - Multiple Charge Pulse Oximetry - Multiple   Pulse 74   Resp 19   Aerosol Therapy   $ Aerosol Therapy Charges PRN treatment not required   Respiratory Treatment Status (SVN) PRN treatment not required   Education   $ Education 15 min

## 2025-01-22 LAB
ALBUMIN SERPL BCP-MCNC: 3.6 G/DL (ref 3.5–5.2)
ALP SERPL-CCNC: 98 U/L (ref 55–135)
ALT SERPL W/O P-5'-P-CCNC: 5 U/L (ref 10–44)
ANION GAP SERPL CALC-SCNC: 12 MMOL/L (ref 8–16)
AST SERPL-CCNC: 13 U/L (ref 10–40)
BASOPHILS # BLD AUTO: 0.05 K/UL (ref 0–0.2)
BASOPHILS NFR BLD: 0.7 % (ref 0–1.9)
BILIRUB SERPL-MCNC: 0.9 MG/DL (ref 0.1–1)
BUN SERPL-MCNC: 27 MG/DL (ref 8–23)
CALCIUM SERPL-MCNC: 8.8 MG/DL (ref 8.7–10.5)
CHLORIDE SERPL-SCNC: 93 MMOL/L (ref 95–110)
CO2 SERPL-SCNC: 25 MMOL/L (ref 23–29)
CREAT SERPL-MCNC: 1.1 MG/DL (ref 0.5–1.4)
DIFFERENTIAL METHOD BLD: ABNORMAL
EOSINOPHIL # BLD AUTO: 0.3 K/UL (ref 0–0.5)
EOSINOPHIL NFR BLD: 3.5 % (ref 0–8)
ERYTHROCYTE [DISTWIDTH] IN BLOOD BY AUTOMATED COUNT: 13.5 % (ref 11.5–14.5)
EST. GFR  (NO RACE VARIABLE): >60 ML/MIN/1.73 M^2
GLUCOSE SERPL-MCNC: 92 MG/DL (ref 70–110)
HCT VFR BLD AUTO: 36.3 % (ref 40–54)
HGB BLD-MCNC: 12.3 G/DL (ref 14–18)
IMM GRANULOCYTES # BLD AUTO: 0.02 K/UL (ref 0–0.04)
IMM GRANULOCYTES NFR BLD AUTO: 0.3 % (ref 0–0.5)
LYMPHOCYTES # BLD AUTO: 1 K/UL (ref 1–4.8)
LYMPHOCYTES NFR BLD: 14.6 % (ref 18–48)
MAGNESIUM SERPL-MCNC: 1.7 MG/DL (ref 1.6–2.6)
MCH RBC QN AUTO: 28.9 PG (ref 27–31)
MCHC RBC AUTO-ENTMCNC: 33.9 G/DL (ref 32–36)
MCV RBC AUTO: 85 FL (ref 82–98)
MONOCYTES # BLD AUTO: 1.1 K/UL (ref 0.3–1)
MONOCYTES NFR BLD: 15.8 % (ref 4–15)
NEUTROPHILS # BLD AUTO: 4.6 K/UL (ref 1.8–7.7)
NEUTROPHILS NFR BLD: 65.1 % (ref 38–73)
NRBC BLD-RTO: 0 /100 WBC
PLATELET # BLD AUTO: 216 K/UL (ref 150–450)
PMV BLD AUTO: 10.7 FL (ref 9.2–12.9)
POTASSIUM SERPL-SCNC: 3.6 MMOL/L (ref 3.5–5.1)
PROT SERPL-MCNC: 6.6 G/DL (ref 6–8.4)
RBC # BLD AUTO: 4.25 M/UL (ref 4.6–6.2)
SODIUM SERPL-SCNC: 130 MMOL/L (ref 136–145)
WBC # BLD AUTO: 7.11 K/UL (ref 3.9–12.7)

## 2025-01-22 PROCEDURE — 99900035 HC TECH TIME PER 15 MIN (STAT)

## 2025-01-22 PROCEDURE — 83735 ASSAY OF MAGNESIUM: CPT

## 2025-01-22 PROCEDURE — 80053 COMPREHEN METABOLIC PANEL: CPT

## 2025-01-22 PROCEDURE — 63600175 PHARM REV CODE 636 W HCPCS: Performed by: HOSPITALIST

## 2025-01-22 PROCEDURE — 99900031 HC PATIENT EDUCATION (STAT)

## 2025-01-22 PROCEDURE — 25000003 PHARM REV CODE 250

## 2025-01-22 PROCEDURE — 12000002 HC ACUTE/MED SURGE SEMI-PRIVATE ROOM

## 2025-01-22 PROCEDURE — 85025 COMPLETE CBC W/AUTO DIFF WBC: CPT

## 2025-01-22 PROCEDURE — 25000003 PHARM REV CODE 250: Performed by: NURSE PRACTITIONER

## 2025-01-22 PROCEDURE — G0426 INPT/ED TELECONSULT50: HCPCS | Mod: GT,,, | Performed by: PSYCHIATRY & NEUROLOGY

## 2025-01-22 PROCEDURE — 94761 N-INVAS EAR/PLS OXIMETRY MLT: CPT

## 2025-01-22 PROCEDURE — 25000003 PHARM REV CODE 250: Performed by: HOSPITALIST

## 2025-01-22 PROCEDURE — 86580 TB INTRADERMAL TEST: CPT

## 2025-01-22 PROCEDURE — 30200315 PPD INTRADERMAL TEST REV CODE 302

## 2025-01-22 PROCEDURE — 36415 COLL VENOUS BLD VENIPUNCTURE: CPT

## 2025-01-22 RX ORDER — POTASSIUM CHLORIDE 20 MEQ/1
20 TABLET, EXTENDED RELEASE ORAL ONCE
Status: COMPLETED | OUTPATIENT
Start: 2025-01-22 | End: 2025-01-22

## 2025-01-22 RX ORDER — LANOLIN ALCOHOL/MO/W.PET/CERES
400 CREAM (GRAM) TOPICAL ONCE
Status: COMPLETED | OUTPATIENT
Start: 2025-01-22 | End: 2025-01-22

## 2025-01-22 RX ADMIN — SACUBITRIL AND VALSARTAN 1 TABLET: 24; 26 TABLET, FILM COATED ORAL at 08:01

## 2025-01-22 RX ADMIN — RANOLAZINE 1000 MG: 500 TABLET, EXTENDED RELEASE ORAL at 08:01

## 2025-01-22 RX ADMIN — MOXIFLOXACIN HYDROCHLORIDE 1 DROP: 5 SOLUTION/ DROPS OPHTHALMIC at 09:01

## 2025-01-22 RX ADMIN — Medication 400 MG: at 09:01

## 2025-01-22 RX ADMIN — RANOLAZINE 1000 MG: 500 TABLET, EXTENDED RELEASE ORAL at 09:01

## 2025-01-22 RX ADMIN — CITALOPRAM HYDROBROMIDE 40 MG: 20 TABLET ORAL at 09:01

## 2025-01-22 RX ADMIN — METOPROLOL SUCCINATE 100 MG: 50 TABLET, FILM COATED, EXTENDED RELEASE ORAL at 09:01

## 2025-01-22 RX ADMIN — SENNOSIDES AND DOCUSATE SODIUM 1 TABLET: 50; 8.6 TABLET ORAL at 08:01

## 2025-01-22 RX ADMIN — PREDNISOLONE ACETATE 1 DROP: 10 SUSPENSION/ DROPS OPHTHALMIC at 05:01

## 2025-01-22 RX ADMIN — MUPIROCIN 1 G: 20 OINTMENT TOPICAL at 08:01

## 2025-01-22 RX ADMIN — FAMOTIDINE 20 MG: 20 TABLET, FILM COATED ORAL at 05:01

## 2025-01-22 RX ADMIN — METOPROLOL SUCCINATE 50 MG: 50 TABLET, FILM COATED, EXTENDED RELEASE ORAL at 08:01

## 2025-01-22 RX ADMIN — CLOPIDOGREL BISULFATE 75 MG: 75 TABLET, FILM COATED ORAL at 09:01

## 2025-01-22 RX ADMIN — DOXYCYCLINE HYCLATE 100 MG: 100 CAPSULE ORAL at 08:01

## 2025-01-22 RX ADMIN — PREDNISOLONE ACETATE 1 DROP: 10 SUSPENSION/ DROPS OPHTHALMIC at 09:01

## 2025-01-22 RX ADMIN — HYDROXYZINE PAMOATE 25 MG: 25 CAPSULE ORAL at 08:01

## 2025-01-22 RX ADMIN — PREDNISOLONE ACETATE 1 DROP: 10 SUSPENSION/ DROPS OPHTHALMIC at 08:01

## 2025-01-22 RX ADMIN — MUPIROCIN 1 G: 20 OINTMENT TOPICAL at 09:01

## 2025-01-22 RX ADMIN — PREDNISOLONE ACETATE 1 DROP: 10 SUSPENSION/ DROPS OPHTHALMIC at 12:01

## 2025-01-22 RX ADMIN — POTASSIUM CHLORIDE 20 MEQ: 1500 TABLET, EXTENDED RELEASE ORAL at 09:01

## 2025-01-22 RX ADMIN — RANOLAZINE 1000 MG: 500 TABLET, EXTENDED RELEASE ORAL at 12:01

## 2025-01-22 RX ADMIN — RANOLAZINE 1000 MG: 500 TABLET, EXTENDED RELEASE ORAL at 05:01

## 2025-01-22 RX ADMIN — MOXIFLOXACIN HYDROCHLORIDE 1 DROP: 5 SOLUTION/ DROPS OPHTHALMIC at 05:01

## 2025-01-22 RX ADMIN — DOXYCYCLINE HYCLATE 100 MG: 100 CAPSULE ORAL at 09:01

## 2025-01-22 RX ADMIN — ACETAMINOPHEN 650 MG: 325 TABLET ORAL at 11:01

## 2025-01-22 RX ADMIN — ISOSORBIDE MONONITRATE 60 MG: 60 TABLET, EXTENDED RELEASE ORAL at 09:01

## 2025-01-22 RX ADMIN — MOXIFLOXACIN HYDROCHLORIDE 1 DROP: 5 SOLUTION/ DROPS OPHTHALMIC at 08:01

## 2025-01-22 RX ADMIN — SACUBITRIL AND VALSARTAN 1 TABLET: 24; 26 TABLET, FILM COATED ORAL at 09:01

## 2025-01-22 RX ADMIN — FUROSEMIDE 20 MG: 20 TABLET ORAL at 09:01

## 2025-01-22 RX ADMIN — PHENYTOIN SODIUM 400 MG: 100 CAPSULE, EXTENDED RELEASE ORAL at 09:01

## 2025-01-22 RX ADMIN — TUBERCULIN PURIFIED PROTEIN DERIVATIVE 5 UNITS: 5 INJECTION, SOLUTION INTRADERMAL at 03:01

## 2025-01-22 RX ADMIN — CEFTRIAXONE 1 G: 1 INJECTION, POWDER, FOR SOLUTION INTRAMUSCULAR; INTRAVENOUS at 08:01

## 2025-01-22 RX ADMIN — MOXIFLOXACIN HYDROCHLORIDE 1 DROP: 5 SOLUTION/ DROPS OPHTHALMIC at 12:01

## 2025-01-22 RX ADMIN — EZETIMIBE 10 MG: 10 TABLET ORAL at 09:01

## 2025-01-22 NOTE — PROGRESS NOTES
Critical access hospital Medicine  Progress Note    Patient Name: Jerome Amaro Jr.  MRN: [id number]  Patient Class: IP- Inpatient   Admission Date: 1/18/2025  Length of Stay: 3 days  Attending Physician: Edvin Almonte MD  Primary Care Provider: Magda Ruiz FNP-C        Subjective     Principal Problem:Acute CHF (congestive heart failure)        HPI:  79 year old male with a past medical history of aortic valve disease, atrial fibrillation on anticoagulation therapy, MI, HLD, PVD, seizures, difficulty swallowing, CAD, CHF, aortic valve disease who presents to the emergency room with weakness in the legs and falls. There was also concern for intermittent altered mental status. Patient has a strong cardiac history with 2 bypass surgeries, atrial fibrillation, aortic valve disease and valve replacement, multiple cardiac stents, hypertension, hyperlipidemia, PVD, seizure history. Patient has been weak in general. His daughter says that he has had several falls over the last couple of days. He has just not been feeling well. No reported fever or chills. No reported vomiting or any diarrhea. He is not have any abdominal pain. He occasionally has chest pain which she takes nitro for which is not abnormal for him. He does not have any new chest pain but sometimes feels short of breath. Today he had a fall where he hit the back of his head on the ground. He did not lose consciousness. No reported vomiting or any seizure activity. He is on Eliquis and Plavix. No other reported injuries from the fall.     In [facility], H/H 10.6/31.1, Na  134, T. Bili 1.3, BNP 1309, Troponin I high sensitivity 56.8, Mg 1.8, Influenza and flu negative, phenytoin 18.5, CT c spine no acute fracture, multilevel spondylosis. Incompletely evaluated ill-defined ground-glass opacity in the lung apices bilaterally, CT head no acute findings, chest x-ray Bilateral pleural effusions with adjacent airspace disease and/or atelectasis. Additional  airspace disease in the right upper lobe. Pulmonary vascular congestion. Mildly enlarged cardiac silhouette. Left chest wall cardiac pacemaker.   Admit to hospital medicine for pleural effusion, CHF exacerbation    Overview/Hospital Course:  79-year-old male presented to the emergency department for weakness and fall.  he states he slipped off of his slipper walking to the kitchen. He endorses feeling more weak than usual. He did not lose consciousness nor hit his head.  Workup was significant for moderate pleural effusions and a BNP of 1300.  He was started on IV Lasix, then transitioned to p.o. Lasix.  Ultrasound of chest showed bilateral moderate pleural effusions right greater than left.  Thoracentesis was offered to family, but they declined.  CT of chest also showed moderate bilateral multifocal infectious/inflammatory airspace disease most pronounced in the right upper lobe.  He was started on antibiotics.  PT and OT were consulted, moderate intensity therapy was recommended.  Patient is now pending SNF placement.  Patient has had 2 falls inpatient, did not hit head.  Fall precautions in place.    Interval History:  Patient seen and examined.  NAD.  Confused at times but awake and alert.  Sitting up in bed, about to eat breakfast.  Nursing staff report patient had a second fall last night, did not hit head.  Fall precautions in place.    Review of Systems   Respiratory:  Negative for shortness of breath.    Cardiovascular:  Negative for chest pain.   Gastrointestinal:  Negative for nausea and vomiting.     Objective:     Vital Signs (Most Recent):  Temp: 97.9 °F (36.6 °C) (01/22/25 0740)  Pulse: 72 (01/22/25 0740)  Resp: 18 (01/22/25 0740)  BP: (!) 149/81 (01/22/25 0740)  SpO2: (!) 94 % (01/22/25 0740) Vital Signs (24h Range):  Temp:  [97.8 °F (36.6 °C)-98.3 °F (36.8 °C)] 97.9 °F (36.6 °C)  Pulse:  [71-83] 72  Resp:  [18] 18  SpO2:  [94 %-95 %] 94 %  BP: (117-149)/(69-81) 149/81     Weight: 72.5 kg (159 lb  13.3 oz)  Body mass index is 25.8 kg/m².    Intake/Output Summary (Last 24 hours) at 1/22/2025 0986  Last data filed at 1/22/2025 0534  Gross per 24 hour   Intake 150 ml   Output 110 ml   Net 40 ml         Physical Exam  Vitals and nursing note reviewed.   Constitutional:       General: He is not in acute distress.     Appearance: Normal appearance. He is not ill-appearing.   HENT:      Head: Normocephalic.   Eyes:      Comments: Pinpoint pupils   Cardiovascular:      Rate and Rhythm: Normal rate.      Pulses: Normal pulses.      Heart sounds: Murmur heard.   Pulmonary:      Effort: Pulmonary effort is normal.      Comments: Decreased breath sounds at bases, no crackles  Abdominal:      General: Bowel sounds are normal.      Palpations: Abdomen is soft.   Musculoskeletal:         General: Normal range of motion.      Cervical back: Normal range of motion and neck supple.   Skin:     General: Skin is warm and dry.      Capillary Refill: Capillary refill takes less than 2 seconds.   Neurological:      General: No focal deficit present.      Mental Status: He is alert. Mental status is at baseline.      GCS: GCS eye subscore is 4. GCS verbal subscore is 5. GCS motor subscore is 6.      Cranial Nerves: Cranial nerves 2-12 are intact.      Sensory: Sensation is intact.      Comments: Strength 5/5 BUE. 4/5 BLE     Psychiatric:         Attention and Perception: Attention normal.         Mood and Affect: Mood normal.         Speech: Speech normal.         Behavior: Behavior normal.             Significant Labs: All pertinent labs within the past 24 hours have been reviewed.  BMP:   Recent Labs   Lab 01/22/25  0421   GLU 92   *   K 3.6   CL 93*   CO2 25   BUN 27*   CREATININE 1.1   CALCIUM 8.8   MG 1.7     CBC:   Recent Labs   Lab 01/22/25  0421   WBC 7.11   HGB 12.3*   HCT 36.3*            Significant Imaging: I have reviewed all pertinent imaging results/findings within the past 24 hours.    Scheduled Meds:    cefTRIAXone (Rocephin) IV (PEDS and ADULTS)  1 g Intravenous Q24H    citalopram  40 mg Oral Daily    clopidogreL  75 mg Oral Daily    doxycycline  100 mg Oral Q12H    ezetimibe  10 mg Oral Daily    famotidine  20 mg Oral Daily    furosemide  20 mg Oral Daily    isosorbide mononitrate  60 mg Oral Daily    metoprolol succinate  100 mg Oral BID    moxifloxacin  1 drop Left Eye QID    mupirocin   Topical (Top) TID    phenytoin  400 mg Oral Daily    prednisoLONE acetate  1 drop Right Eye QID    prednisoLONE acetate  1 drop Left Eye QID    ranolazine  1,000 mg Oral QID    sacubitriL-valsartan  1 tablet Oral BID    senna-docusate 8.6-50 mg  1 tablet Oral BID     Continuous Infusions:  PRN Meds:.  Current Facility-Administered Medications:     acetaminophen, 650 mg, Oral, Q4H PRN    albuterol-ipratropium, 3 mL, Nebulization, Q4H PRN    hydrOXYzine pamoate, 25 mg, Oral, Daily PRN    ondansetron, 4 mg, Intravenous, Q6H PRN    sodium chloride 0.9%, 10 mL, Intravenous, PRN    DIPH,PERTUSS(ACELL),TET VACCINE (ADULT)(BOOSTRIX,ADACEL), 0.5 mL, Intramuscular, vaccine x 1 dose      Echo    Result Date: 1/20/2025    Left Ventricle: The left ventricle is normal in size. Mildly increased wall thickness. There is mild concentric hypertrophy. There is low normal systolic function with a visually estimated ejection fraction of 50 - 55%. Diastolic function cannot be reliably determined in the presence of mitral annular calcification.   Right Ventricle: Systolic function is mildly reduced.   Left Atrium: Left atrium is severely dilated.   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   Mitral Valve: There is severe mitral annular calcification present. There is mitral valve stenosis. The mean pressure gradient across the mitral valve is 4 mmHg at a heart rate of 80 bpm. MV area by continuity equation is 0.82 cm2. There is moderate regurgitation.   Tricuspid Valve: There is mild regurgitation.     CT Chest Without  Contrast    Result Date: 1/19/2025  EXAMINATION: CT CHEST WITHOUT CONTRAST CLINICAL HISTORY: Respiratory illness, nondiagnostic xray; TECHNIQUE: Low dose axial images, sagittal and coronal reformations were obtained from the thoracic inlet to the lung bases. Contrast was not administered. COMPARISON: CT lumbar spine 06/15/2024 FINDINGS: Moderate to large bilateral pleural effusions with adjacent airspace disease and atelectasis.  Otherwise, moderate bilateral multifocal infectious/inflammatory airspace disease most pronounced in the right upper lobe.  Background of emphysema Calcified pleural plaques bilaterally. No cardiomegaly or pericardial effusion.  Severe coronary artery atherosclerosis. Mild diffuse body wall edema. Osteopenia with multilevel compression fractures.  Previously noted L2 compression fractures demonstrates worsened height loss, now severe     Moderate to large bilateral pleural effusions with adjacent airspace disease and atelectasis.  Otherwise, moderate bilateral multifocal infectious/inflammatory airspace disease most pronounced in the right upper lobe.  Background of emphysema.  Follow-up to resolution in this high risk patient. Calcified pleural plaques.  Correlate for asbestos related lung disease Osteopenia with multilevel compression fractures.  Previously noted L2 compression fractures demonstrates worsened height loss, now severe Electronically signed by: Barry Farrell Date:    01/19/2025 Time:    04:12    X-Ray Pelvis 3 View inc Hip 2 view Bilat    Result Date: 1/18/2025  EXAMINATION: XR PELVIS 3 VIEW WITH INC HIP 2 VIEW BILAT CLINICAL HISTORY: falls; TECHNIQUE: Five views COMPARISON: 06/15/2024 FINDINGS: Osteopenia.  Suspect acute nondisplaced fracture right superior pubic ramus extending into the puboacetabular junction.  Otherwise, no detectable acute fracture or dislocation.  Mild bilateral hip osteoarthritis.  Vascular calcifications.     As above Electronically signed  by: Barry Farrell Date:    01/18/2025 Time:    23:41    CT Cervical Spine Without Contrast    Result Date: 1/18/2025  EXAMINATION: CT CERVICAL SPINE WITHOUT CONTRAST CLINICAL HISTORY: Neck trauma (Age >= 65y); TECHNIQUE: Low dose axial images, sagittal and coronal reformations were performed though the cervical spine.  Contrast was not administered. COMPARISON: None FINDINGS: Osteopenia.  No acute fracture.  Mild to moderate multilevel spondylosis.  Unremarkable prevertebral soft tissues. Incompletely evaluated ill-defined ground-glass opacity in the lung apices bilaterally, likely infectious or inflammatory.  Follow-up is warranted.     As above Electronically signed by: Barry Farrell Date:    01/18/2025 Time:    20:10    CT Head Without Contrast    Result Date: 1/18/2025  EXAMINATION: CT HEAD WITHOUT CONTRAST CLINICAL HISTORY: Head trauma, minor (Age >= 65y); TECHNIQUE: Low dose axial images were obtained through the head.  Coronal and sagittal reformations were also performed. Contrast was not administered. COMPARISON: 06/16/2024 FINDINGS: Intracranial compartment: Ventricles and sulci remain prominent.  No extra-axial blood or fluid collections. Mild chronic microvascular ischemia.  No parenchymal mass, hemorrhage, edema or major vascular distribution infarct. Skull/extracranial contents (limited evaluation): No fracture. Mastoid air cells and paranasal sinuses are essentially clear.     No acute findings. Electronically signed by: Barry Farrell Date:    01/18/2025 Time:    19:42    X-Ray Chest 1 View    Result Date: 1/18/2025  EXAMINATION: XR CHEST 1 VIEW CLINICAL HISTORY: Unspecified fall, initial encounter TECHNIQUE: Single frontal view of the chest was performed. COMPARISON: 06/15/2024 FINDINGS: Bilateral pleural effusions with adjacent airspace disease and/or atelectasis.  Additional airspace disease in the right upper lobe.  Pulmonary vascular congestion.  Mildly enlarged cardiac  "silhouette.  Left chest wall cardiac pacemaker.     As above Electronically signed by: Barry Farrell Date:    01/18/2025 Time:    19:12  - pulls last radiology orders      Assessment and Plan     * Acute CHF (congestive heart failure)  Patient  heart failure that is Acute on chronic. On presentation their CHF was decompensated. Evidence of decompensated CHF on presentation includes: edema, crackles on lung auscultation, dyspnea on exertion (CHURCH), and shortness of breath. The etiology of their decompensation is likely unknown . Most recent BNP and echo results are listed below.  No results for input(s): "BNP" in the last 72 hours.    Latest ECHO  Results for orders placed during the hospital encounter of 06/15/24    Echo    Interpretation Summary    Left Ventricle: The left ventricle is normal in size. Increased ventricular mass. Mildly increased wall thickness. There is mild concentric hypertrophy. Unable to assess wall motion. Septal motion is consistent with pacing. There is normal systolic function with a visually estimated ejection fraction of 55 - 60%. Diastolic function cannot be reliably determined in the presence of mitral annular calcification.    Right Ventricle: Right ventricle was not well visualized due to poor acoustic window. Normal right ventricular cavity size. Pacemaker lead present in the ventricle.    Aortic Valve: There is a transcatheter valve replacement in the aortic position.    Mitral Valve: Mildly thickened leaflets. There is moderate mitral annular calcification present. There is mild regurgitation.    Tricuspid Valve: There is mild to moderate regurgitation.    Current Heart Failure Medications  sacubitriL-valsartan 24-26 mg per tablet 1 tablet, 2 times daily, Oral  metoprolol succinate (TOPROL-XL) 24 hr tablet 100 mg, 2 times daily, Oral  furosemide tablet 20 mg, Daily, Oral    Plan  - Monitor strict I&Os and daily weights.    - Place on telemetry  - Low sodium diet  - Place on " fluid restriction of 1.5 L.   - The patient's volume status is improving, no crackles on exam today transition to PO lasix       QT prolongation     milliseconds.  On Ranexa, we will get a cardiology evaluation.     Pleural effusion  Ultrasound of chest with moderate bilateral pleural effusions, right greater than left  Thoracentesis was offered, but family declined  Was managed with IV diuresis, now on p.o. diuretic          Seizure disorder  Seizure precautions  Continue phenytoin    Atherosclerosis of native coronary artery of native heart  Patient with known CAD s/p stent placement and CABG, which is controlled Will continue ASA, Plavix, and Statin and monitor for S/Sx of angina/ACS. Continue to monitor on telemetry.       Patient has extensive coronary artery disease with a refractory angina, patient is well known to me, patient has 13 stents and CABG surgery twice.   Patient's previous was on Ranexa however this was taken off due to QTC prolongation.  Patient is still has significant QTC prolongation 540 ms, now patient is back on Ranexa 1 g 4 times a day.   We will consult cardiologist Dr. Willoughby.      S/P TAVR (transcatheter aortic valve replacement)    Aware    HTN (hypertension)  Patient's blood pressure range in the last 24 hours was: BP  Min: 117/69  Max: 149/81.The patient's inpatient anti-hypertensive regimen is listed below:  Current Antihypertensives  isosorbide mononitrate 24 hr tablet 60 mg, Daily, Oral  metoprolol succinate (TOPROL-XL) 24 hr tablet 100 mg, 2 times daily, Oral  ranolazine 12 hr tablet 1,000 mg, 4 times daily, Oral  furosemide tablet 20 mg, Daily, Oral    Plan  - BP is controlled, no changes needed to their regimen      VTE Risk Mitigation (From admission, onward)           Ordered     IP VTE HIGH RISK PATIENT  Once         01/18/25 2055     Place sequential compression device  Until discontinued         01/18/25 2055                    Discharge Planning   AXEL: 1/23/2025      Code Status: Full Code   Medical Readiness for Discharge Date:   Discharge Plan A: Home Health                Please place Justification for DME        SHERITA Beach  Department of Hospital Medicine   [facility]

## 2025-01-22 NOTE — NURSING
Pt cleaned ,changed and repositioned in bed. , bed linen changed. Bed rail re-padded. Am VS and medications given. Bed alarm on and PCT at bedside.

## 2025-01-22 NOTE — PLAN OF CARE
[id number] [phone] received- uploaded into chart    [employer] called in- Passr faxed and scanned.  Awaiting [phone]

## 2025-01-22 NOTE — SUBJECTIVE & OBJECTIVE
HPI:  79 y.o. male with medical Hx of atrial fibrillation, MI, CAD, CHF presented to ED after falling at home. Pt had fallen twice before arriving to ED. He was found to have bilateral pleural effusions Family is also concerned that he had episodes of confusion as well as having vivid dreams. Mr Amaro reports feeling weak all over. No hallucinations, changes in behavior. Denies loss of vision, slurred speech, vertigo. He has a remote Hx of seizures for which he has been on phenytoin for many years; no recent seizures reported. Possible lack of sleep in hospital.    His daughter state that pt has no Hx of cognitive impairment. Per notes this morning pt was found next to his bed. Probably had fallen and was incontinent of stool.      Images personally reviewed and interpreted:  Study: Head CT  Study Interpretation: FINDINGS:  There is no intracranial mass, hemorrhage, or midline shift. Ventricles and sulci are mildly prominent. There are no pathologic extra-axial fluid collections.     There is no evidence of cortical ischemic change. Changes of mild chronic microvascular white matter disease are noted. Cerebellum and brainstem are normal. The calvarium is intact.     Impression:     No acute intracranial abnormalities. Chronic findings as discussed above.        Electronically signed by:Elio Cooney  Date:                                            01/21/2025  Time:                                           16:26    Additional studies reviewed:   Study: Cardiac_Neuro: 2D echo  Study Interpretation: Summary  Show Result Comparison     Left Ventricle: The left ventricle is normal in size. Mildly increased wall thickness. There is mild concentric hypertrophy. There is low normal systolic function with a visually estimated ejection fraction of 50 - 55%. Diastolic function cannot be reliably determined in the presence of mitral annular calcification.    Right Ventricle: Systolic function is mildly reduced.    Left  Atrium: Left atrium is severely dilated.    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    Mitral Valve: There is severe mitral annular calcification present. There is mitral valve stenosis. The mean pressure gradient across the mitral valve is 4 mmHg at a heart rate of 80 bpm. MV area by continuity equation is 0.82 cm2. There is moderate regurgitation.    Tricuspid Valve: There is mild regurgitation.       Laboratory studies reviewed:  BMP:   Lab Results   Component Value Date     (L) 01/22/2025    K 3.6 01/22/2025    CL 93 (L) 01/22/2025    CO2 25 01/22/2025    BUN 27 (H) 01/22/2025    CREATININE 1.1 01/22/2025    CALCIUM 8.8 01/22/2025     CBC:   Lab Results   Component Value Date    WBC 7.11 01/22/2025    RBC 4.25 (L) 01/22/2025    HGB 12.3 (L) 01/22/2025    HCT 36.3 (L) 01/22/2025    HCT 39 12/02/2023     01/22/2025    MCV 85 01/22/2025    MCH 28.9 01/22/2025    MCHC 33.9 01/22/2025     Lipid Panel:   Lab Results   Component Value Date    CHOL 237 (H) 06/17/2024    LDLCALC 170.2 (H) 06/17/2024    HDL 33 (L) 06/17/2024    TRIG 169 (H) 06/17/2024     Coagulation:   Lab Results   Component Value Date    INR 1.1 06/17/2024    APTT 51.3 (H) 06/17/2024     Hgb A1C:   Lab Results   Component Value Date    HGBA1C 5.0 01/18/2025     TSH:   Lab Results   Component Value Date    TSH 2.778 01/24/2024       Documentation personally reviewed:  Notes: Notes: ED notes and H&P     Assessment and plan:    78 y/o male consulted for AMS. Episodic AMS could be delirium. He has no Hx of cognitive dysfunction. His head CT showed volume loss with some ventricular and sulci prominence.    No major metabolic disturbances. Phenytoin level is within normal range.    -This could be part of a delirium or acute encephalopathy but he has a remote Hx of seures. If possible obtain EEG.  -Melatonin 6 mg nightly. Low dose trazodone 12.5 mg nightly PRN may help but his QTc is already 540 ms.        Post charge  discharge plan:  Clinic follow up: General Neurology    Visit Type: in-person only  Timeframe: Next available

## 2025-01-22 NOTE — CARE UPDATE
01/22/25 0700   Patient Assessment/Suction   Level of Consciousness (AVPU) alert   Respiratory Effort Normal;Unlabored   Expansion/Accessory Muscles/Retractions no retractions;no use of accessory muscles   All Lung Fields Breath Sounds Anterior:;diminished   Rhythm/Pattern, Respiratory pattern regular;unlabored   Cough Frequency no cough   PRE-TX-O2   Device (Oxygen Therapy) room air   SpO2 95 %   Pulse Oximetry Type Intermittent   $ Pulse Oximetry - Multiple Charge Pulse Oximetry - Multiple   Aerosol Therapy   $ Aerosol Therapy Charges PRN treatment not required   Respiratory Treatment Status (SVN) PRN treatment not required   Education   $ Education 15 min

## 2025-01-22 NOTE — ASSESSMENT & PLAN NOTE
"Patient  heart failure that is Acute on chronic. On presentation their CHF was decompensated. Evidence of decompensated CHF on presentation includes: edema, crackles on lung auscultation, dyspnea on exertion (CHURCH), and shortness of breath. The etiology of their decompensation is likely unknown . Most recent BNP and echo results are listed below.  No results for input(s): "BNP" in the last 72 hours.    Latest ECHO  Results for orders placed during the hospital encounter of 06/15/24    Echo    Interpretation Summary    Left Ventricle: The left ventricle is normal in size. Increased ventricular mass. Mildly increased wall thickness. There is mild concentric hypertrophy. Unable to assess wall motion. Septal motion is consistent with pacing. There is normal systolic function with a visually estimated ejection fraction of 55 - 60%. Diastolic function cannot be reliably determined in the presence of mitral annular calcification.    Right Ventricle: Right ventricle was not well visualized due to poor acoustic window. Normal right ventricular cavity size. Pacemaker lead present in the ventricle.    Aortic Valve: There is a transcatheter valve replacement in the aortic position.    Mitral Valve: Mildly thickened leaflets. There is moderate mitral annular calcification present. There is mild regurgitation.    Tricuspid Valve: There is mild to moderate regurgitation.    Current Heart Failure Medications  sacubitriL-valsartan 24-26 mg per tablet 1 tablet, 2 times daily, Oral  metoprolol succinate (TOPROL-XL) 24 hr tablet 100 mg, 2 times daily, Oral  furosemide tablet 20 mg, Daily, Oral    Plan  - Monitor strict I&Os and daily weights.    - Place on telemetry  - Low sodium diet  - Place on fluid restriction of 1.5 L.   - The patient's volume status is improving, no crackles on exam today transition to PO lasix     "

## 2025-01-22 NOTE — NURSING
Mr Amaro was found on the floor on his knees next to his bed at 0055 by Federico Smith LPN and Cristo Hernandez RN when they heard him fall.  He was incontinent of stool.  They assisted him back to bed, cleaned him and examined him.  No injuries found.  Temp 98.2, heart rate 74, respirations 18, O2 sat 95%, /79.  Bed alarm turned on zone 2.  Patient confused, did not know he fell.

## 2025-01-22 NOTE — PHYSICIAN QUERY
Please further specify the heart failure diagnosis.     Acute on chronic diastolic heart failure (HFpEF)

## 2025-01-22 NOTE — TELEMEDICINE CONSULT
"[facility]   General Neurology  Consult Note      Consult Information  Inpatient consult to Telemedicine-General Neurology  Consult performed by: Kayode Orellana MD  Consult ordered by: Nisreen Oliver FNP  Reason for consult: AMS        Consulting Provider:    Current Providers  No providers found    Patient Location:  [place] [address] [facility]    Spoke hospital nurse at bedside with patient assisting consultant.  Patient information was obtained from patient and relative(s).       Neurology Documentation       Blood pressure 98/63, pulse 68, temperature 97.8 °F (36.6 °C), temperature source Oral, resp. rate 18, height 5' 6" (1.676 m), weight 72.5 kg (159 lb 13.3 oz), SpO2 (!) 94%.    Medical Decision Making  HPI:  79 y.o. male with medical Hx of atrial fibrillation, MI, CAD, CHF presented to ED after falling at home. Pt had fallen twice before arriving to ED. He was found to have bilateral pleural effusions Family is also concerned that he had episodes of confusion as well as having vivid dreams. Mr Amaro reports feeling weak all over. No hallucinations, changes in behavior. Denies loss of vision, slurred speech, vertigo. He has a remote Hx of seizures for which he has been on phenytoin for many years; no recent seizures reported. Possible lack of sleep in hospital.    His daughter state that pt has no Hx of cognitive impairment. Per notes this morning pt was found next to his bed. Probably had fallen and was incontinent of stool.      Images personally reviewed and interpreted:  Study: Head CT  Study Interpretation: FINDINGS:  There is no intracranial mass, hemorrhage, or midline shift. Ventricles and sulci are mildly prominent. There are no pathologic extra-axial fluid collections.     There is no evidence of cortical ischemic change. Changes of mild chronic microvascular white matter disease are noted. Cerebellum and brainstem are normal. The calvarium is intact.     Impression:     No acute intracranial " abnormalities. Chronic findings as discussed above.        Electronically signed by:Elio Cooney  Date:                                            01/21/2025  Time:                                           16:26    Additional studies reviewed:   Study: Cardiac_Neuro: 2D echo  Study Interpretation: Summary  Show Result Comparison     Left Ventricle: The left ventricle is normal in size. Mildly increased wall thickness. There is mild concentric hypertrophy. There is low normal systolic function with a visually estimated ejection fraction of 50 - 55%. Diastolic function cannot be reliably determined in the presence of mitral annular calcification.    Right Ventricle: Systolic function is mildly reduced.    Left Atrium: Left atrium is severely dilated.    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    Mitral Valve: There is severe mitral annular calcification present. There is mitral valve stenosis. The mean pressure gradient across the mitral valve is 4 mmHg at a heart rate of 80 bpm. MV area by continuity equation is 0.82 cm2. There is moderate regurgitation.    Tricuspid Valve: There is mild regurgitation.       Laboratory studies reviewed:  BMP:   Lab Results   Component Value Date     (L) 01/22/2025    K 3.6 01/22/2025    CL 93 (L) 01/22/2025    CO2 25 01/22/2025    BUN 27 (H) 01/22/2025    CREATININE 1.1 01/22/2025    CALCIUM 8.8 01/22/2025     CBC:   Lab Results   Component Value Date    WBC 7.11 01/22/2025    RBC 4.25 (L) 01/22/2025    HGB 12.3 (L) 01/22/2025    HCT 36.3 (L) 01/22/2025    HCT 39 12/02/2023     01/22/2025    MCV 85 01/22/2025    MCH 28.9 01/22/2025    MCHC 33.9 01/22/2025     Lipid Panel:   Lab Results   Component Value Date    CHOL 237 (H) 06/17/2024    LDLCALC 170.2 (H) 06/17/2024    HDL 33 (L) 06/17/2024    TRIG 169 (H) 06/17/2024     Coagulation:   Lab Results   Component Value Date    INR 1.1 06/17/2024    APTT 51.3 (H) 06/17/2024     Hgb A1C:    Lab Results   Component Value Date    HGBA1C 5.0 01/18/2025     TSH:   Lab Results   Component Value Date    TSH 2.778 01/24/2024       Documentation personally reviewed:  Notes: Notes: ED notes and H&P     Assessment and plan:    80 y/o male consulted for AMS. Episodic AMS could be delirium. He has no Hx of cognitive dysfunction. His head CT showed volume loss with some ventricular and sulci prominence.    No major metabolic disturbances. Phenytoin level is within normal range.    -This could be part of a delirium or acute encephalopathy but he has a remote Hx of seures. If possible obtain EEG.  -Melatonin 6 mg nightly. Low dose trazodone 12.5 mg nightly PRN may help but his QTc is already 540 ms.        Post charge discharge plan:  Clinic follow up: General Neurology    Visit Type: in-person only  Timeframe: Next available        Additional Physical Exam, History, & ROS  Review of Systems   Constitutional:  Negative for fever.   HENT:  Negative for hearing loss.    Eyes:  Negative for double vision.   Respiratory:  Negative for cough.    Cardiovascular:  Negative for chest pain.   Gastrointestinal:  Negative for abdominal pain.   Genitourinary:  Negative for flank pain.   Musculoskeletal:  Positive for falls. Negative for back pain.   Skin:  Negative for rash.   Neurological:  Negative for headaches.     Physical Exam  Constitutional:       General: He is not in acute distress.  Pulmonary:      Effort: No respiratory distress.   Neurological:      Mental Status: He is alert and oriented to person, place, and time.      Cranial Nerves: No dysarthria or facial asymmetry.      Motor: No tremor.      Comments: AROM of UE's and LE's against gravity       Diagnosis Date    Anticoagulant long-term use     2014    Aortic valve disease 2014    pig valve    Arthritis     all over    Atrial fibrillation 2/29/2024    Chest pain 07/15/2019    CHF (congestive heart failure)     2014 on meds    Coronary artery disease     2007  cabg    Difficulty swallowing     Heart attack 1990    15 stents    Heart attack 02/01/2024    mild    Heart block     Hyperlipidemia     Hypertension     on meds    Hypothyroidism 2015    on meds    PVD (peripheral vascular disease)     Seizures     last episode 1990 on meds     Past Surgical History:   Procedure Laterality Date    AORTOGRAPHY WITH SERIALOGRAPHY N/A 11/16/2021    Procedure: AORTOGRAM, WITH RUNOFF;  Surgeon: Rodrigo Willoughby MD;  Location: [facility] CATH/EP LAB;  Service: Cardiology;  Laterality: N/A;    ARTERIOGRAPHY OF AORTIC ROOT N/A 11/05/2019    Procedure: ARTERIOGRAM, AORTIC ROOT;  Surgeon: Rodrigo Willoughby MD;  Location: [facility] CATH/EP LAB;  Service: Cardiology;  Laterality: N/A;    CARDIAC CATHETERIZATION      CARDIAC VALVE SURGERY      CATARACT EXTRACTION W/  INTRAOCULAR LENS IMPLANT Right 12/11/2024    Procedure: CEIOL OD;  Surgeon: Fredrick Sharma MD;  Location: [facility] ASU OR;  Service: Ophthalmology;  Laterality: Right;    CORONARY ANGIOGRAPHY INCLUDING BYPASS GRAFTS WITH CATHETERIZATION OF LEFT HEART N/A 11/05/2019    Procedure: ANGIOGRAM, CORONARY, INCLUDING BYPASS GRAFT, WITH LEFT HEART CATHETERIZATION;  Surgeon: Rodrigo Willoughby MD;  Location: [facility] CATH/EP LAB;  Service: Cardiology;  Laterality: N/A;    CORONARY ANGIOGRAPHY INCLUDING BYPASS GRAFTS WITH CATHETERIZATION OF LEFT HEART Left 11/03/2020    Procedure: ANGIOGRAM, CORONARY, INCLUDING BYPASS GRAFT, WITH LEFT HEART CATHETERIZATION;  Surgeon: Rodrigo Willoughby MD;  Location: [facility] CATH/EP LAB;  Service: Cardiology;  Laterality: Left;    CORONARY ANGIOGRAPHY INCLUDING BYPASS GRAFTS WITH CATHETERIZATION OF LEFT HEART N/A 09/28/2021    Procedure: ANGIOGRAM, CORONARY, INCLUDING BYPASS GRAFT, WITH LEFT HEART CATHETERIZATION;  Surgeon: Rodrigo Willoughby MD;  Location: [facility] CATH/EP LAB;  Service: Cardiology;  Laterality: N/A;    CORONARY ANGIOGRAPHY INCLUDING BYPASS GRAFTS WITH CATHETERIZATION OF LEFT HEART N/A 12/05/2023    Procedure: ANGIOGRAM, CORONARY,  INCLUDING BYPASS GRAFT, WITH LEFT HEART CATHETERIZATION;  Surgeon: Rodrigo Willoughby MD;  Location: [facility] CATH/EP LAB;  Service: Cardiology;  Laterality: N/A;    CORONARY ANGIOPLASTY      CORONARY ARTERY BYPASS GRAFT      x 2    1991 2006    ESOPHAGOGASTRODUODENOSCOPY N/A 2/20/2024    Procedure: EGD (ESOPHAGOGASTRODUODENOSCOPY);  Surgeon: Mal Lockhart III, MD;  Location: [facility] ENDO;  Service: Endoscopy;  Laterality: N/A;    EXTRACORPOREAL SHOCK WAVE LITHOTRIPSY Right 08/01/2019    Procedure: LITHOTRIPSY, ESWL;  Surgeon: Williams Ortega MD;  Location: [facility] OR;  Service: Urology;  Laterality: Right;    HEMORRHOID SURGERY  1990    INSERTION OF PACEMAKER      PERCUTANEOUS TRANSLUMINAL BALLOON ANGIOPLASTY OF CORONARY ARTERY N/A 11/08/2019    Procedure: Angioplasty-coronary;  Surgeon: Rodrigo Willoughby MD;  Location: [facility] CATH/EP LAB;  Service: Cardiology;  Laterality: N/A;    WRIST FRACTURE SURGERY       Family History   Problem Relation Name Age of Onset    Heart attack Mother      Heart attack Father      Heart disease Sister 2     Stroke Sister 2     Diabetes Sister 2     No Known Problems Maternal Grandmother      No Known Problems Maternal Grandfather      No Known Problems Paternal Grandmother      No Known Problems Paternal Grandfather      No Known Problems Brother      No Known Problems Maternal Aunt      No Known Problems Maternal Uncle      No Known Problems Paternal Aunt      No Known Problems Paternal Uncle      Anemia Neg Hx      Arrhythmia Neg Hx      Asthma Neg Hx      Clotting disorder Neg Hx      Fainting Neg Hx      Heart failure Neg Hx      Hyperlipidemia Neg Hx      Hypertension Neg Hx      Atrial Septal Defect Neg Hx         Diagnoses  No problems updated.    Kayode Orellana MD    Neurology consultation requested by spoke provider. Audiovisual encounter with the patient performed using a secure connection.  Results and impressions from the visit are documented on this note and were  communicated to the consulting provider/team via direct communication. The note has been shared for addition to the patients electronic medical record.

## 2025-01-22 NOTE — PLAN OF CARE
Spoke with patients daughter Violette over the phone concerning recommendations for SNF.  Patient has been to [facility] previously and they would like this to be first choice.  Initial packet sent to [facility]       01/22/25 6168   Post-Acute Status   Post-Acute Authorization Placement   Post-Acute Placement Status Referrals Sent

## 2025-01-22 NOTE — NURSING
Tele medicine monitor brought to pt's room. Daughter at bedside. They talked with Dr Loco. Monitor returned to ICU 2nd floor.

## 2025-01-22 NOTE — ASSESSMENT & PLAN NOTE
Patient's blood pressure range in the last 24 hours was: BP  Min: 117/69  Max: 149/81.The patient's inpatient anti-hypertensive regimen is listed below:  Current Antihypertensives  isosorbide mononitrate 24 hr tablet 60 mg, Daily, Oral  metoprolol succinate (TOPROL-XL) 24 hr tablet 100 mg, 2 times daily, Oral  ranolazine 12 hr tablet 1,000 mg, 4 times daily, Oral  furosemide tablet 20 mg, Daily, Oral    Plan  - BP is controlled, no changes needed to their regimen

## 2025-01-22 NOTE — PLAN OF CARE
Seeking SNF placement.   Neuro consult pending.          01/22/25 7729   Discharge Reassessment   Assessment Type Discharge Planning Reassessment   Discharge Plan discussed with: Adult children   Discharge Plan A Skilled Nursing Facility   Discharge Plan B Home Health;Home

## 2025-01-22 NOTE — SUBJECTIVE & OBJECTIVE
Interval History:  Patient seen and examined.  NAD.  Confused at times but awake and alert.  Sitting up in bed, about to eat breakfast.  Nursing staff report patient had a second fall last night, did not hit head.  Fall precautions in place.    Review of Systems   Respiratory:  Negative for shortness of breath.    Cardiovascular:  Negative for chest pain.   Gastrointestinal:  Negative for nausea and vomiting.     Objective:     Vital Signs (Most Recent):  Temp: 97.9 °F (36.6 °C) (01/22/25 0740)  Pulse: 72 (01/22/25 0740)  Resp: 18 (01/22/25 0740)  BP: (!) 149/81 (01/22/25 0740)  SpO2: (!) 94 % (01/22/25 0740) Vital Signs (24h Range):  Temp:  [97.8 °F (36.6 °C)-98.3 °F (36.8 °C)] 97.9 °F (36.6 °C)  Pulse:  [71-83] 72  Resp:  [18] 18  SpO2:  [94 %-95 %] 94 %  BP: (117-149)/(69-81) 149/81     Weight: 72.5 kg (159 lb 13.3 oz)  Body mass index is 25.8 kg/m².    Intake/Output Summary (Last 24 hours) at 1/22/2025 0949  Last data filed at 1/22/2025 0534  Gross per 24 hour   Intake 150 ml   Output 110 ml   Net 40 ml         Physical Exam  Vitals and nursing note reviewed.   Constitutional:       General: He is not in acute distress.     Appearance: Normal appearance. He is not ill-appearing.   HENT:      Head: Normocephalic.   Eyes:      Comments: Pinpoint pupils   Cardiovascular:      Rate and Rhythm: Normal rate.      Pulses: Normal pulses.      Heart sounds: Murmur heard.   Pulmonary:      Effort: Pulmonary effort is normal.      Comments: Decreased breath sounds at bases, no crackles  Abdominal:      General: Bowel sounds are normal.      Palpations: Abdomen is soft.   Musculoskeletal:         General: Normal range of motion.      Cervical back: Normal range of motion and neck supple.   Skin:     General: Skin is warm and dry.      Capillary Refill: Capillary refill takes less than 2 seconds.   Neurological:      General: No focal deficit present.      Mental Status: He is alert. Mental status is at baseline.      GCS:  GCS eye subscore is 4. GCS verbal subscore is 5. GCS motor subscore is 6.      Cranial Nerves: Cranial nerves 2-12 are intact.      Sensory: Sensation is intact.      Comments: Strength 5/5 BUE. 4/5 BLE     Psychiatric:         Attention and Perception: Attention normal.         Mood and Affect: Mood normal.         Speech: Speech normal.         Behavior: Behavior normal.             Significant Labs: All pertinent labs within the past 24 hours have been reviewed.  BMP:   Recent Labs   Lab 01/22/25  0421   GLU 92   *   K 3.6   CL 93*   CO2 25   BUN 27*   CREATININE 1.1   CALCIUM 8.8   MG 1.7     CBC:   Recent Labs   Lab 01/22/25  0421   WBC 7.11   HGB 12.3*   HCT 36.3*            Significant Imaging: I have reviewed all pertinent imaging results/findings within the past 24 hours.    Scheduled Meds:   cefTRIAXone (Rocephin) IV (PEDS and ADULTS)  1 g Intravenous Q24H    citalopram  40 mg Oral Daily    clopidogreL  75 mg Oral Daily    doxycycline  100 mg Oral Q12H    ezetimibe  10 mg Oral Daily    famotidine  20 mg Oral Daily    furosemide  20 mg Oral Daily    isosorbide mononitrate  60 mg Oral Daily    metoprolol succinate  100 mg Oral BID    moxifloxacin  1 drop Left Eye QID    mupirocin   Topical (Top) TID    phenytoin  400 mg Oral Daily    prednisoLONE acetate  1 drop Right Eye QID    prednisoLONE acetate  1 drop Left Eye QID    ranolazine  1,000 mg Oral QID    sacubitriL-valsartan  1 tablet Oral BID    senna-docusate 8.6-50 mg  1 tablet Oral BID     Continuous Infusions:  PRN Meds:.  Current Facility-Administered Medications:     acetaminophen, 650 mg, Oral, Q4H PRN    albuterol-ipratropium, 3 mL, Nebulization, Q4H PRN    hydrOXYzine pamoate, 25 mg, Oral, Daily PRN    ondansetron, 4 mg, Intravenous, Q6H PRN    sodium chloride 0.9%, 10 mL, Intravenous, PRN    DIPH,PERTUSS(ACELL),TET VACCINE (ADULT)(BOOSTRIX,ADACEL), 0.5 mL, Intramuscular, vaccine x 1 dose      Echo    Result Date: 1/20/2025    Left  Ventricle: The left ventricle is normal in size. Mildly increased wall thickness. There is mild concentric hypertrophy. There is low normal systolic function with a visually estimated ejection fraction of 50 - 55%. Diastolic function cannot be reliably determined in the presence of mitral annular calcification.   Right Ventricle: Systolic function is mildly reduced.   Left Atrium: Left atrium is severely dilated.   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   Mitral Valve: There is severe mitral annular calcification present. There is mitral valve stenosis. The mean pressure gradient across the mitral valve is 4 mmHg at a heart rate of 80 bpm. MV area by continuity equation is 0.82 cm2. There is moderate regurgitation.   Tricuspid Valve: There is mild regurgitation.     CT Chest Without Contrast    Result Date: 1/19/2025  EXAMINATION: CT CHEST WITHOUT CONTRAST CLINICAL HISTORY: Respiratory illness, nondiagnostic xray; TECHNIQUE: Low dose axial images, sagittal and coronal reformations were obtained from the thoracic inlet to the lung bases. Contrast was not administered. COMPARISON: CT lumbar spine 06/15/2024 FINDINGS: Moderate to large bilateral pleural effusions with adjacent airspace disease and atelectasis.  Otherwise, moderate bilateral multifocal infectious/inflammatory airspace disease most pronounced in the right upper lobe.  Background of emphysema Calcified pleural plaques bilaterally. No cardiomegaly or pericardial effusion.  Severe coronary artery atherosclerosis. Mild diffuse body wall edema. Osteopenia with multilevel compression fractures.  Previously noted L2 compression fractures demonstrates worsened height loss, now severe     Moderate to large bilateral pleural effusions with adjacent airspace disease and atelectasis.  Otherwise, moderate bilateral multifocal infectious/inflammatory airspace disease most pronounced in the right upper lobe.  Background of emphysema.   Follow-up to resolution in this high risk patient. Calcified pleural plaques.  Correlate for asbestos related lung disease Osteopenia with multilevel compression fractures.  Previously noted L2 compression fractures demonstrates worsened height loss, now severe Electronically signed by: Barry Farrell Date:    01/19/2025 Time:    04:12    X-Ray Pelvis 3 View inc Hip 2 view Bilat    Result Date: 1/18/2025  EXAMINATION: XR PELVIS 3 VIEW WITH INC HIP 2 VIEW BILAT CLINICAL HISTORY: falls; TECHNIQUE: Five views COMPARISON: 06/15/2024 FINDINGS: Osteopenia.  Suspect acute nondisplaced fracture right superior pubic ramus extending into the puboacetabular junction.  Otherwise, no detectable acute fracture or dislocation.  Mild bilateral hip osteoarthritis.  Vascular calcifications.     As above Electronically signed by: Barry Farrell Date:    01/18/2025 Time:    23:41    CT Cervical Spine Without Contrast    Result Date: 1/18/2025  EXAMINATION: CT CERVICAL SPINE WITHOUT CONTRAST CLINICAL HISTORY: Neck trauma (Age >= 65y); TECHNIQUE: Low dose axial images, sagittal and coronal reformations were performed though the cervical spine.  Contrast was not administered. COMPARISON: None FINDINGS: Osteopenia.  No acute fracture.  Mild to moderate multilevel spondylosis.  Unremarkable prevertebral soft tissues. Incompletely evaluated ill-defined ground-glass opacity in the lung apices bilaterally, likely infectious or inflammatory.  Follow-up is warranted.     As above Electronically signed by: Barry Frarell Date:    01/18/2025 Time:    20:10    CT Head Without Contrast    Result Date: 1/18/2025  EXAMINATION: CT HEAD WITHOUT CONTRAST CLINICAL HISTORY: Head trauma, minor (Age >= 65y); TECHNIQUE: Low dose axial images were obtained through the head.  Coronal and sagittal reformations were also performed. Contrast was not administered. COMPARISON: 06/16/2024 FINDINGS: Intracranial compartment: Ventricles and sulci remain  prominent.  No extra-axial blood or fluid collections. Mild chronic microvascular ischemia.  No parenchymal mass, hemorrhage, edema or major vascular distribution infarct. Skull/extracranial contents (limited evaluation): No fracture. Mastoid air cells and paranasal sinuses are essentially clear.     No acute findings. Electronically signed by: Barry Farrell Date:    01/18/2025 Time:    19:42    X-Ray Chest 1 View    Result Date: 1/18/2025  EXAMINATION: XR CHEST 1 VIEW CLINICAL HISTORY: Unspecified fall, initial encounter TECHNIQUE: Single frontal view of the chest was performed. COMPARISON: 06/15/2024 FINDINGS: Bilateral pleural effusions with adjacent airspace disease and/or atelectasis.  Additional airspace disease in the right upper lobe.  Pulmonary vascular congestion.  Mildly enlarged cardiac silhouette.  Left chest wall cardiac pacemaker.     As above Electronically signed by: Barry Farrell Date:    01/18/2025 Time:    19:12  - pulls last radiology orders

## 2025-01-23 PROBLEM — R41.82 ALTERED MENTAL STATUS: Status: ACTIVE | Noted: 2025-01-23

## 2025-01-23 LAB
ALBUMIN SERPL BCP-MCNC: 3.5 G/DL (ref 3.5–5.2)
ALP SERPL-CCNC: 96 U/L (ref 55–135)
ALT SERPL W/O P-5'-P-CCNC: 4 U/L (ref 10–44)
ANION GAP SERPL CALC-SCNC: 10 MMOL/L (ref 8–16)
AST SERPL-CCNC: 13 U/L (ref 10–40)
BASOPHILS # BLD AUTO: 0.04 K/UL (ref 0–0.2)
BASOPHILS NFR BLD: 0.5 % (ref 0–1.9)
BILIRUB SERPL-MCNC: 0.7 MG/DL (ref 0.1–1)
BUN SERPL-MCNC: 31 MG/DL (ref 8–23)
CALCIUM SERPL-MCNC: 8.6 MG/DL (ref 8.7–10.5)
CHLORIDE SERPL-SCNC: 96 MMOL/L (ref 95–110)
CO2 SERPL-SCNC: 26 MMOL/L (ref 23–29)
CREAT SERPL-MCNC: 1.2 MG/DL (ref 0.5–1.4)
DIFFERENTIAL METHOD BLD: ABNORMAL
EOSINOPHIL # BLD AUTO: 0.3 K/UL (ref 0–0.5)
EOSINOPHIL NFR BLD: 4.1 % (ref 0–8)
ERYTHROCYTE [DISTWIDTH] IN BLOOD BY AUTOMATED COUNT: 13.7 % (ref 11.5–14.5)
EST. GFR  (NO RACE VARIABLE): >60 ML/MIN/1.73 M^2
GLUCOSE SERPL-MCNC: 92 MG/DL (ref 70–110)
HCT VFR BLD AUTO: 36.4 % (ref 40–54)
HGB BLD-MCNC: 12.1 G/DL (ref 14–18)
IMM GRANULOCYTES # BLD AUTO: 0.02 K/UL (ref 0–0.04)
IMM GRANULOCYTES NFR BLD AUTO: 0.3 % (ref 0–0.5)
LYMPHOCYTES # BLD AUTO: 1.5 K/UL (ref 1–4.8)
LYMPHOCYTES NFR BLD: 20 % (ref 18–48)
MAGNESIUM SERPL-MCNC: 1.7 MG/DL (ref 1.6–2.6)
MCH RBC QN AUTO: 29.2 PG (ref 27–31)
MCHC RBC AUTO-ENTMCNC: 33.2 G/DL (ref 32–36)
MCV RBC AUTO: 88 FL (ref 82–98)
MONOCYTES # BLD AUTO: 1.1 K/UL (ref 0.3–1)
MONOCYTES NFR BLD: 15.3 % (ref 4–15)
NEUTROPHILS # BLD AUTO: 4.4 K/UL (ref 1.8–7.7)
NEUTROPHILS NFR BLD: 59.8 % (ref 38–73)
NRBC BLD-RTO: 0 /100 WBC
PLATELET # BLD AUTO: 214 K/UL (ref 150–450)
PMV BLD AUTO: 11.4 FL (ref 9.2–12.9)
POTASSIUM SERPL-SCNC: 4 MMOL/L (ref 3.5–5.1)
PROT SERPL-MCNC: 6.3 G/DL (ref 6–8.4)
RBC # BLD AUTO: 4.15 M/UL (ref 4.6–6.2)
SODIUM SERPL-SCNC: 132 MMOL/L (ref 136–145)
WBC # BLD AUTO: 7.3 K/UL (ref 3.9–12.7)

## 2025-01-23 PROCEDURE — 97116 GAIT TRAINING THERAPY: CPT

## 2025-01-23 PROCEDURE — 99900031 HC PATIENT EDUCATION (STAT)

## 2025-01-23 PROCEDURE — 85025 COMPLETE CBC W/AUTO DIFF WBC: CPT

## 2025-01-23 PROCEDURE — 25000003 PHARM REV CODE 250: Performed by: NURSE PRACTITIONER

## 2025-01-23 PROCEDURE — 80053 COMPREHEN METABOLIC PANEL: CPT

## 2025-01-23 PROCEDURE — 36415 COLL VENOUS BLD VENIPUNCTURE: CPT

## 2025-01-23 PROCEDURE — 95819 EEG AWAKE AND ASLEEP: CPT | Mod: 26,,, | Performed by: PSYCHIATRY & NEUROLOGY

## 2025-01-23 PROCEDURE — 95819 EEG AWAKE AND ASLEEP: CPT

## 2025-01-23 PROCEDURE — 63600175 PHARM REV CODE 636 W HCPCS: Performed by: HOSPITALIST

## 2025-01-23 PROCEDURE — 99900035 HC TECH TIME PER 15 MIN (STAT)

## 2025-01-23 PROCEDURE — 25000003 PHARM REV CODE 250

## 2025-01-23 PROCEDURE — 94761 N-INVAS EAR/PLS OXIMETRY MLT: CPT

## 2025-01-23 PROCEDURE — 83735 ASSAY OF MAGNESIUM: CPT

## 2025-01-23 PROCEDURE — 12000002 HC ACUTE/MED SURGE SEMI-PRIVATE ROOM

## 2025-01-23 PROCEDURE — 25000003 PHARM REV CODE 250: Performed by: HOSPITALIST

## 2025-01-23 RX ADMIN — MOXIFLOXACIN HYDROCHLORIDE 1 DROP: 5 SOLUTION/ DROPS OPHTHALMIC at 09:01

## 2025-01-23 RX ADMIN — METOPROLOL SUCCINATE 100 MG: 50 TABLET, FILM COATED, EXTENDED RELEASE ORAL at 09:01

## 2025-01-23 RX ADMIN — PREDNISOLONE ACETATE 1 DROP: 10 SUSPENSION/ DROPS OPHTHALMIC at 09:01

## 2025-01-23 RX ADMIN — CLOPIDOGREL BISULFATE 75 MG: 75 TABLET, FILM COATED ORAL at 09:01

## 2025-01-23 RX ADMIN — CEFTRIAXONE 1 G: 1 INJECTION, POWDER, FOR SOLUTION INTRAMUSCULAR; INTRAVENOUS at 09:01

## 2025-01-23 RX ADMIN — ISOSORBIDE MONONITRATE 60 MG: 60 TABLET, EXTENDED RELEASE ORAL at 09:01

## 2025-01-23 RX ADMIN — MOXIFLOXACIN HYDROCHLORIDE 1 DROP: 5 SOLUTION/ DROPS OPHTHALMIC at 01:01

## 2025-01-23 RX ADMIN — SENNOSIDES AND DOCUSATE SODIUM 1 TABLET: 50; 8.6 TABLET ORAL at 08:01

## 2025-01-23 RX ADMIN — DOXYCYCLINE HYCLATE 100 MG: 100 CAPSULE ORAL at 08:01

## 2025-01-23 RX ADMIN — EZETIMIBE 10 MG: 10 TABLET ORAL at 09:01

## 2025-01-23 RX ADMIN — ACETAMINOPHEN 650 MG: 325 TABLET ORAL at 03:01

## 2025-01-23 RX ADMIN — CITALOPRAM HYDROBROMIDE 40 MG: 20 TABLET ORAL at 09:01

## 2025-01-23 RX ADMIN — FAMOTIDINE 20 MG: 20 TABLET, FILM COATED ORAL at 05:01

## 2025-01-23 RX ADMIN — FUROSEMIDE 20 MG: 20 TABLET ORAL at 09:01

## 2025-01-23 RX ADMIN — SACUBITRIL AND VALSARTAN 1 TABLET: 24; 26 TABLET, FILM COATED ORAL at 09:01

## 2025-01-23 RX ADMIN — PHENYTOIN SODIUM 400 MG: 100 CAPSULE, EXTENDED RELEASE ORAL at 09:01

## 2025-01-23 RX ADMIN — PREDNISOLONE ACETATE 1 DROP: 10 SUSPENSION/ DROPS OPHTHALMIC at 01:01

## 2025-01-23 RX ADMIN — MUPIROCIN 1 G: 20 OINTMENT TOPICAL at 08:01

## 2025-01-23 RX ADMIN — SACUBITRIL AND VALSARTAN 1 TABLET: 24; 26 TABLET, FILM COATED ORAL at 08:01

## 2025-01-23 RX ADMIN — MUPIROCIN 1 G: 20 OINTMENT TOPICAL at 03:01

## 2025-01-23 RX ADMIN — METOPROLOL SUCCINATE 100 MG: 50 TABLET, FILM COATED, EXTENDED RELEASE ORAL at 08:01

## 2025-01-23 RX ADMIN — DOXYCYCLINE HYCLATE 100 MG: 100 CAPSULE ORAL at 09:01

## 2025-01-23 RX ADMIN — RANOLAZINE 1000 MG: 500 TABLET, EXTENDED RELEASE ORAL at 09:01

## 2025-01-23 RX ADMIN — MUPIROCIN 1 G: 20 OINTMENT TOPICAL at 09:01

## 2025-01-23 NOTE — PT/OT/SLP PROGRESS
Patient not seen Tuesday, 1/21/2025 and Wednesday 1/22/2025 secondary to therapist unavailable . Will follow-up.  CHANCE Carpenter, SAM

## 2025-01-23 NOTE — PT/OT/SLP PROGRESS
Physical Therapy      Patient Name:  Jerome Amaro JrEve   MRN:  [id number]    Patient not seen today secondary to inclement weather and therapist unavailable on 1/21/25 and 1/22/25. Will follow-up 1/24/25.

## 2025-01-23 NOTE — SUBJECTIVE & OBJECTIVE
Interval History:  Patient seen and examined.  NAD.  Confused at times but awake and alert.  Sitting up in bed.  EEG ordered and pending.  Pending SNF placement.  Discussed plan of care with daughter (Nilda) over the phone.    Review of Systems   Respiratory:  Negative for shortness of breath.    Cardiovascular:  Negative for chest pain.   Gastrointestinal:  Negative for nausea and vomiting.     Objective:     Vital Signs (Most Recent):  Temp: 97.7 °F (36.5 °C) (01/23/25 0709)  Pulse: (!) 51 (01/23/25 0745)  Resp: 18 (01/23/25 0709)  BP: (!) 119/59 (01/23/25 0709)  SpO2: (!) 94 % (01/23/25 0709) Vital Signs (24h Range):  Temp:  [97.5 °F (36.4 °C)-98.2 °F (36.8 °C)] 97.7 °F (36.5 °C)  Pulse:  [51-76] 51  Resp:  [18-20] 18  SpO2:  [94 %-96 %] 94 %  BP: ()/(59-79) 119/59     Weight: 72.5 kg (159 lb 13.3 oz)  Body mass index is 25.8 kg/m².    Intake/Output Summary (Last 24 hours) at 1/23/2025 0908  Last data filed at 1/23/2025 0343  Gross per 24 hour   Intake 400 ml   Output 375 ml   Net 25 ml         Physical Exam  Vitals and nursing note reviewed.   Constitutional:       General: He is not in acute distress.     Appearance: Normal appearance. He is not ill-appearing.   HENT:      Head: Normocephalic.   Eyes:      Comments: Pinpoint pupils   Cardiovascular:      Rate and Rhythm: Normal rate.      Pulses: Normal pulses.      Heart sounds: Murmur heard.   Pulmonary:      Effort: Pulmonary effort is normal.      Comments: Decreased breath sounds at bases, no crackles  Abdominal:      General: Bowel sounds are normal.      Palpations: Abdomen is soft.   Musculoskeletal:         General: Normal range of motion.      Cervical back: Normal range of motion and neck supple.   Skin:     General: Skin is warm and dry.      Capillary Refill: Capillary refill takes less than 2 seconds.   Neurological:      General: No focal deficit present.      Mental Status: He is alert. Mental status is at baseline.      GCS: GCS eye  subscore is 4. GCS verbal subscore is 5. GCS motor subscore is 6.      Cranial Nerves: Cranial nerves 2-12 are intact.      Sensory: Sensation is intact.      Comments: Strength 5/5 BUE. 4/5 BLE     Psychiatric:         Attention and Perception: Attention normal.         Mood and Affect: Mood normal.         Speech: Speech normal.         Behavior: Behavior normal.             Significant Labs: All pertinent labs within the past 24 hours have been reviewed.  BMP:   Recent Labs   Lab 01/23/25  0350   GLU 92   *   K 4.0   CL 96   CO2 26   BUN 31*   CREATININE 1.2   CALCIUM 8.6*   MG 1.7     CBC:   Recent Labs   Lab 01/22/25  0421 01/23/25  0350   WBC 7.11 7.30   HGB 12.3* 12.1*   HCT 36.3* 36.4*    214         Significant Imaging: I have reviewed all pertinent imaging results/findings within the past 24 hours.    Scheduled Meds:   cefTRIAXone (Rocephin) IV (PEDS and ADULTS)  1 g Intravenous Q24H    citalopram  40 mg Oral Daily    clopidogreL  75 mg Oral Daily    doxycycline  100 mg Oral Q12H    ezetimibe  10 mg Oral Daily    famotidine  20 mg Oral Daily    furosemide  20 mg Oral Daily    isosorbide mononitrate  60 mg Oral Daily    metoprolol succinate  100 mg Oral BID    moxifloxacin  1 drop Left Eye QID    mupirocin   Topical (Top) TID    phenytoin  400 mg Oral Daily    prednisoLONE acetate  1 drop Right Eye QID    prednisoLONE acetate  1 drop Left Eye QID    ranolazine  1,000 mg Oral QID    sacubitriL-valsartan  1 tablet Oral BID    senna-docusate 8.6-50 mg  1 tablet Oral BID     Continuous Infusions:  PRN Meds:.  Current Facility-Administered Medications:     acetaminophen, 650 mg, Oral, Q4H PRN    albuterol-ipratropium, 3 mL, Nebulization, Q4H PRN    hydrOXYzine pamoate, 25 mg, Oral, Daily PRN    ondansetron, 4 mg, Intravenous, Q6H PRN    sodium chloride 0.9%, 10 mL, Intravenous, PRN    DIPH,PERTUSS(ACELL),TET VACCINE (ADULT)(BOOSTRIX,ADACEL), 0.5 mL, Intramuscular, vaccine x 1 dose

## 2025-01-23 NOTE — PT/OT/SLP PROGRESS
"Physical Therapy Treatment    Patient Name:  Jerome Amaro Jr.   MRN:  [id number]    Recommendations:     Discharge Recommendations: Moderate Intensity Therapy  Discharge Equipment Recommendations: to be determined by next level of care  Barriers to discharge: Decreased caregiver support and pt with impaired insight and safety awareness needing 24/7 care    Assessment:     Jerome Amaro Jr. is a 79 y.o. male admitted with a medical diagnosis of Acute CHF (congestive heart failure).  He presents with the following impairments/functional limitations: weakness, impaired endurance, impaired functional mobility, gait instability, impaired self care skills, impaired balance, decreased safety awareness, impaired cognition, pain, orthopedic precautions.    Rehab Prognosis: Fair; patient would benefit from acute skilled PT services to address these deficits and reach maximum level of function.    Recent Surgery: * No surgery found *      Plan:     During this hospitalization, patient to be seen 6 x/week to address the identified rehab impairments via gait training, therapeutic activities, therapeutic exercises, neuromuscular re-education and progress toward the following goals:    Plan of Care Expires:  02/19/25    Subjective     Chief Complaint: weakness, fatigue and discomfort  Patient/Family Comments/goals: "a nap"  Pain/Comfort:  Pain Rating 1:  (not rated)  Location - Side 1: Right  Location 1: groin  Pain Addressed 1: Reposition, Distraction, Cessation of Activity      Objective:     Communicated with LESLEY Harvey prior to session.  Patient found  on BSC with PCT assisting pt  with bed alarm, peripheral IV, telemetry upon PT entry to room.     General Precautions: Standard, fall, seizure  Orthopedic Precautions: RLE weight bearing as tolerated  Braces: N/A  Respiratory Status: Room air     Functional Mobility:  Bed Mobility:     Sit to Supine: minimum assistance and of 1-2 persons  Transfers:     Sit to Stand:  minimum " assistance with rolling walker  Toilet Transfer: minimum assistance with  rolling walker  using  Step Transfer  Gait: x 40' with rolling walker and minimum assistance for balance and vc for safe RW management. Pt exhibited slow, shuffling, flat-footed pattern.      AM-PAC 6 CLICK MOBILITY  Turning over in bed (including adjusting bedclothes, sheets and blankets)?: 4  Sitting down on and standing up from a chair with arms (e.g., wheelchair, bedside commode, etc.): 3  Moving from lying on back to sitting on the side of the bed?: 3  Moving to and from a bed to a chair (including a wheelchair)?: 3  Need to walk in hospital room?: 3  Climbing 3-5 steps with a railing?: 2  Basic Mobility Total Score: 18       Treatment & Education:  Pt was educated on the following: call light use, importance of OOB activity and functional mobility to negate the negative effects of prolonged bed rest during this hospitalization, safe transfers/ambulation and discharge planning recommendations/options.      Patient left HOB elevated with all lines intact, call button in reach, bed alarm on, RN notified, and PCT & pt's grandson present..    GOALS:   Multidisciplinary Problems       Physical Therapy Goals          Problem: Physical Therapy    Goal Priority Disciplines Outcome Interventions   Physical Therapy Goal     PT, PT/OT Progressing    Description: Goals to be met by: 25     Patient will increase functional independence with mobility by performin. Supine to sit with Grant  2. Sit to stand transfer with Supervision  3. Gait  x 150 feet with Stand-by Assistance using Rolling Walker.                          DME Justifications:  No DME recommended requiring DME justifications    Time Tracking:     PT Received On: 25  PT Start Time: 1555     PT Stop Time: 1610  PT Total Time (min): 15 min     Billable Minutes: Gait Training 15    Treatment Type: Treatment  PT/PTA: PT     Number of PTA visits since last PT visit: 0      01/23/2025

## 2025-01-23 NOTE — ASSESSMENT & PLAN NOTE
"Patient  heart failure that is Acute on chronic. Most recent BNP and echo results are listed below.  No results for input(s): "BNP" in the last 72 hours.    Latest ECHO  Results for orders placed during the hospital encounter of 06/15/24    Echo    Interpretation Summary    Left Ventricle: The left ventricle is normal in size. Increased ventricular mass. Mildly increased wall thickness. There is mild concentric hypertrophy. Unable to assess wall motion. Septal motion is consistent with pacing. There is normal systolic function with a visually estimated ejection fraction of 55 - 60%. Diastolic function cannot be reliably determined in the presence of mitral annular calcification.    Right Ventricle: Right ventricle was not well visualized due to poor acoustic window. Normal right ventricular cavity size. Pacemaker lead present in the ventricle.    Aortic Valve: There is a transcatheter valve replacement in the aortic position.    Mitral Valve: Mildly thickened leaflets. There is moderate mitral annular calcification present. There is mild regurgitation.    Tricuspid Valve: There is mild to moderate regurgitation.    Current Heart Failure Medications  sacubitriL-valsartan 24-26 mg per tablet 1 tablet, 2 times daily, Oral  metoprolol succinate (TOPROL-XL) 24 hr tablet 100 mg, 2 times daily, Oral  furosemide tablet 20 mg, Daily, Oral    Plan  - Monitor strict I&Os and daily weights.    - Place on telemetry  - Low sodium diet  - Place on fluid restriction of 1.5 L.   - Continue PO lasix daily    "

## 2025-01-23 NOTE — CARE UPDATE
01/23/25 0659   Patient Assessment/Suction   Level of Consciousness (AVPU) alert   Respiratory Effort Normal;Unlabored   Expansion/Accessory Muscles/Retractions no use of accessory muscles;no retractions   All Lung Fields Breath Sounds Anterior:;Lateral:;diminished   Rhythm/Pattern, Respiratory pattern regular;unlabored   Cough Frequency infrequent   Cough Type nonproductive   PRE-TX-O2   Device (Oxygen Therapy) room air   SpO2 96 %   Pulse Oximetry Type Intermittent   $ Pulse Oximetry - Multiple Charge Pulse Oximetry - Multiple   Pulse 72   Resp 19   Aerosol Therapy   $ Aerosol Therapy Charges PRN treatment not required   Respiratory Treatment Status (SVN) PRN treatment not required   Education   $ Education 15 min

## 2025-01-23 NOTE — PROGRESS NOTES
[facility] Medicine  Progress Note    Patient Name: Jerome Amaro Jr.  MRN: [id number]  Patient Class: IP- Inpatient   Admission Date: 1/18/2025  Length of Stay: 4 days  Attending Physician: Edvin Almonte MD  Primary Care Provider: Magda Ruiz FNP-C        Subjective     Principal Problem:Acute CHF (congestive heart failure)        HPI:  79 year old male with a past medical history of aortic valve disease, atrial fibrillation on anticoagulation therapy, MI, HLD, PVD, seizures, difficulty swallowing, CAD, CHF, aortic valve disease who presents to the emergency room with weakness in the legs and falls. There was also concern for intermittent altered mental status. Patient has a strong cardiac history with 2 bypass surgeries, atrial fibrillation, aortic valve disease and valve replacement, multiple cardiac stents, hypertension, hyperlipidemia, PVD, seizure history. Patient has been weak in general. His daughter says that he has had several falls over the last couple of days. He has just not been feeling well. No reported fever or chills. No reported vomiting or any diarrhea. He is not have any abdominal pain. He occasionally has chest pain which she takes nitro for which is not abnormal for him. He does not have any new chest pain but sometimes feels short of breath. Today he had a fall where he hit the back of his head on the ground. He did not lose consciousness. No reported vomiting or any seizure activity. He is on Eliquis and Plavix. No other reported injuries from the fall.     In [facility] ER, H/H 10.6/31.1, Na  134, T. Bili 1.3, BNP 1309, Troponin I high sensitivity 56.8, Mg 1.8, Influenza and flu negative, phenytoin 18.5, CT c spine no acute fracture, multilevel spondylosis. Incompletely evaluated ill-defined ground-glass opacity in the lung apices bilaterally, CT head no acute findings, chest x-ray Bilateral pleural effusions with adjacent airspace disease and/or atelectasis. Additional  airspace disease in the right upper lobe. Pulmonary vascular congestion. Mildly enlarged cardiac silhouette. Left chest wall cardiac pacemaker.   Admit to hospital medicine for pleural effusion, CHF exacerbation    Overview/Hospital Course:  79-year-old male presented to the emergency department for weakness and fall.  he states he slipped off of his slipper walking to the kitchen.  He endorses feeling more weak than usual. He did not lose consciousness nor hit his head.  Workup was significant for moderate pleural effusions and a BNP of 1300.  He was started on IV Lasix, then transitioned to p.o. Lasix.  Ultrasound of chest showed bilateral moderate pleural effusions right greater than left.  Thoracentesis was offered to family, but they declined.  CT of chest also showed moderate bilateral multifocal infectious/inflammatory airspace disease most pronounced in the right upper lobe.  He was started on antibiotics.  PT and OT were consulted, moderate intensity therapy was recommended.  Patient is now pending SNF placement.  Patient has had 2 falls inpatient.  Fall precautions in place.  He has remained with intermittent confusion throughout hospital stay.  He has had two CTs of his head, which were negative for acute abnormality.  Daughter requesting neuro consult.  Teleneuro was consulted, recommended EEG.  EEG has been ordered and is pending.    Interval History:  Patient seen and examined.  NAD.  Confused at times but awake and alert.  Sitting up in bed.  EEG ordered and pending.  Pending SNF placement.  Discussed plan of care with daughter (Nilda) over the phone.    Review of Systems   Respiratory:  Negative for shortness of breath.    Cardiovascular:  Negative for chest pain.   Gastrointestinal:  Negative for nausea and vomiting.     Objective:     Vital Signs (Most Recent):  Temp: 97.7 °F (36.5 °C) (01/23/25 0709)  Pulse: (!) 51 (01/23/25 0745)  Resp: 18 (01/23/25 0709)  BP: (!) 119/59 (01/23/25 0709)  SpO2:  (!) 94 % (01/23/25 0709) Vital Signs (24h Range):  Temp:  [97.5 °F (36.4 °C)-98.2 °F (36.8 °C)] 97.7 °F (36.5 °C)  Pulse:  [51-76] 51  Resp:  [18-20] 18  SpO2:  [94 %-96 %] 94 %  BP: ()/(59-79) 119/59     Weight: 72.5 kg (159 lb 13.3 oz)  Body mass index is 25.8 kg/m².    Intake/Output Summary (Last 24 hours) at 1/23/2025 0908  Last data filed at 1/23/2025 0343  Gross per 24 hour   Intake 400 ml   Output 375 ml   Net 25 ml         Physical Exam  Vitals and nursing note reviewed.   Constitutional:       General: He is not in acute distress.     Appearance: Normal appearance. He is not ill-appearing.   HENT:      Head: Normocephalic.   Eyes:      Comments: Pinpoint pupils   Cardiovascular:      Rate and Rhythm: Normal rate.      Pulses: Normal pulses.      Heart sounds: Murmur heard.   Pulmonary:      Effort: Pulmonary effort is normal.      Comments: Decreased breath sounds at bases, no crackles  Abdominal:      General: Bowel sounds are normal.      Palpations: Abdomen is soft.   Musculoskeletal:         General: Normal range of motion.      Cervical back: Normal range of motion and neck supple.   Skin:     General: Skin is warm and dry.      Capillary Refill: Capillary refill takes less than 2 seconds.   Neurological:      General: No focal deficit present.      Mental Status: He is alert. Mental status is at baseline.      GCS: GCS eye subscore is 4. GCS verbal subscore is 5. GCS motor subscore is 6.      Cranial Nerves: Cranial nerves 2-12 are intact.      Sensory: Sensation is intact.      Comments: Strength 5/5 BUE. 4/5 BLE     Psychiatric:         Attention and Perception: Attention normal.         Mood and Affect: Mood normal.         Speech: Speech normal.         Behavior: Behavior normal.             Significant Labs: All pertinent labs within the past 24 hours have been reviewed.  BMP:   Recent Labs   Lab 01/23/25  0350   GLU 92   *   K 4.0   CL 96   CO2 26   BUN 31*   CREATININE 1.2   CALCIUM  "8.6*   MG 1.7     CBC:   Recent Labs   Lab 01/22/25  0421 01/23/25  0350   WBC 7.11 7.30   HGB 12.3* 12.1*   HCT 36.3* 36.4*    214         Significant Imaging: I have reviewed all pertinent imaging results/findings within the past 24 hours.    Scheduled Meds:   cefTRIAXone (Rocephin) IV (PEDS and ADULTS)  1 g Intravenous Q24H    citalopram  40 mg Oral Daily    clopidogreL  75 mg Oral Daily    doxycycline  100 mg Oral Q12H    ezetimibe  10 mg Oral Daily    famotidine  20 mg Oral Daily    furosemide  20 mg Oral Daily    isosorbide mononitrate  60 mg Oral Daily    metoprolol succinate  100 mg Oral BID    moxifloxacin  1 drop Left Eye QID    mupirocin   Topical (Top) TID    phenytoin  400 mg Oral Daily    prednisoLONE acetate  1 drop Right Eye QID    prednisoLONE acetate  1 drop Left Eye QID    ranolazine  1,000 mg Oral QID    sacubitriL-valsartan  1 tablet Oral BID    senna-docusate 8.6-50 mg  1 tablet Oral BID     Continuous Infusions:  PRN Meds:.  Current Facility-Administered Medications:     acetaminophen, 650 mg, Oral, Q4H PRN    albuterol-ipratropium, 3 mL, Nebulization, Q4H PRN    hydrOXYzine pamoate, 25 mg, Oral, Daily PRN    ondansetron, 4 mg, Intravenous, Q6H PRN    sodium chloride 0.9%, 10 mL, Intravenous, PRN    DIPH,PERTUSS(ACELL),TET VACCINE (ADULT)(BOOSTRIX,ADACEL), 0.5 mL, Intramuscular, vaccine x 1 dose        Assessment and Plan     * Acute CHF (congestive heart failure)  Patient  heart failure that is Acute on chronic. Most recent BNP and echo results are listed below.  No results for input(s): "BNP" in the last 72 hours.    Latest ECHO  Results for orders placed during the hospital encounter of 06/15/24    Echo    Interpretation Summary    Left Ventricle: The left ventricle is normal in size. Increased ventricular mass. Mildly increased wall thickness. There is mild concentric hypertrophy. Unable to assess wall motion. Septal motion is consistent with pacing. There is normal systolic " function with a visually estimated ejection fraction of 55 - 60%. Diastolic function cannot be reliably determined in the presence of mitral annular calcification.    Right Ventricle: Right ventricle was not well visualized due to poor acoustic window. Normal right ventricular cavity size. Pacemaker lead present in the ventricle.    Aortic Valve: There is a transcatheter valve replacement in the aortic position.    Mitral Valve: Mildly thickened leaflets. There is moderate mitral annular calcification present. There is mild regurgitation.    Tricuspid Valve: There is mild to moderate regurgitation.    Current Heart Failure Medications  sacubitriL-valsartan 24-26 mg per tablet 1 tablet, 2 times daily, Oral  metoprolol succinate (TOPROL-XL) 24 hr tablet 100 mg, 2 times daily, Oral  furosemide tablet 20 mg, Daily, Oral    Plan  - Monitor strict I&Os and daily weights.    - Place on telemetry  - Low sodium diet  - Place on fluid restriction of 1.5 L.   - Continue PO lasix daily      QT prolongation   milliseconds.  Ranexa d/c'd    Pleural effusion  Ultrasound of chest with moderate bilateral pleural effusions, right greater than left  Thoracentesis was offered, but family declined  Was managed with IV diuresis, now on p.o. diuretic          Seizure disorder  Seizure precautions  Continue phenytoin    Atherosclerosis of native coronary artery of native heart  Patient with known CAD s/p stent placement and CABG, which is controlled Will continue ASA, Plavix, and Statin and monitor for S/Sx of angina/ACS. Continue to monitor on telemetry.       Patient has extensive coronary artery disease with a refractory angina, patient is well known to me, patient has 13 stents and CABG surgery twice.   Ranexa d/c'd due to QTc prolongation    S/P TAVR (transcatheter aortic valve replacement)    Aware    HTN (hypertension)  Patient's blood pressure range in the last 24 hours was: BP  Min: 98/63  Max: 143/68.The patient's  inpatient anti-hypertensive regimen is listed below:  Current Antihypertensives  isosorbide mononitrate 24 hr tablet 60 mg, Daily, Oral  metoprolol succinate (TOPROL-XL) 24 hr tablet 100 mg, 2 times daily, Oral  furosemide tablet 20 mg, Daily, Oral    Plan  - BP is controlled, no changes needed to their regimen      VTE Risk Mitigation (From admission, onward)           Ordered     IP VTE HIGH RISK PATIENT  Once         01/18/25 2055     Place sequential compression device  Until discontinued         01/18/25 2055                    Discharge Planning   AXEL: 1/24/2025     Code Status: Full Code   Medical Readiness for Discharge Date:   Discharge Plan A: Skilled Nursing Facility                Please place Justification for DME        SHERITA Beach  Department of Hospital Medicine   [facility]

## 2025-01-23 NOTE — ASSESSMENT & PLAN NOTE
Patient with known CAD s/p stent placement and CABG, which is controlled Will continue ASA, Plavix, and Statin and monitor for S/Sx of angina/ACS. Continue to monitor on telemetry.       Patient has extensive coronary artery disease with a refractory angina, patient is well known to me, patient has 13 stents and CABG surgery twice.   Ranexa d/c'd due to QTc prolongation

## 2025-01-23 NOTE — PLAN OF CARE
1528 - notified by Melinda at [facility] that patient will need to be without sitter for 72 hours prior to admission to their facility. Per primary nurse, patient with sitter d/t falls    1430 - [facility] requesting updated chest xray. Hospital medicine notified, new order received. Per Melinda at [facility], patient clinically accepted and to submit for insurance authorization on today. CM attempted to call daughter Smiley to notify of above, no answer, voicemail left with update. CM awaiting SNF auth at this time    1333 - CM met with patient and family at bedside. CM explained updated SNF referrals sent to local SNF facilities seeking acceptance. CM explained will need to submit for insurance auth prior to transfer to SNF facility. Daughter at bedside confirmed they are seeking placement at [facility], as patient has been to that facility before. CM closely following for accepting facilities and to re-present to patient's bedside to notify of bed availability.     1240 - Patient/family seeking SNF placement. Per chart review, patient requesting to discharge to [facility]. Updated clinicals sent to local SNF facilities. No accepting SNF at this time. Patient to require auth prior to transport. CM following     01/23/25 1240   Discharge Reassessment   Assessment Type Discharge Planning Reassessment   Did the patient's condition or plan change since previous assessment? Yes   Discharge Plan discussed with: Patient;Adult children   Discharge Plan A Skilled Nursing Facility   Discharge Plan B Home with family;Home Health   Why the patient remains in the hospital Requires continued medical care   Post-Acute Status   Post-Acute Authorization Placement   Post-Acute Placement Status Referrals Sent   Patient choice form signed by patient/caregiver List with quality metrics by geographic area provided

## 2025-01-23 NOTE — PROCEDURES
Date of service  01/23/2025    Introduction  Electroencephalographic (EEG) recording is performed with electrodes placed according to the International 10-20 placement system. Thirty two (32) channels of digital signal (sampling rate of 512/sec) including T1 and T2 was simultaneously recorded from the scalp and may include EKG, EMG, and/or eye monitors. Recording band pass was 0.1 to 512 Hz. Digital video recording of the patient is simultaneously recorded with the EEG. The patient is instructed to report clinical symptoms which may occur during the recording session. EEG and video recording is stored and archived in digital format. Activation procedures which include photic stimulation, hyperventilation and instructing patients to perform simple tasks are done in selected patients.    The EEG is displayed on a monitor screen and can be reviewed using different montages. Computer assisted analysis is employed to detect spike and electrographic seizure activity. The entire record is submitted for computer analysis. The entire recording is visually reviewed and, the times identified by computer analysis as being spikes or seizures are reviewed again.     Compressed spectral analysis (CSA) is also performed on the activity recorded from each individual channel. This is displayed as a power display of frequencies from 0 to 30 Hz over time. The CSA is reviewed looking for asymmetries in power between homologous areas of the scalp and then compared with the original EEG recording.     Findings  The short-term video EEG recording during wakefulness contains diffuse 4-5 Hz delta to theta slowing with up to 5 Hz posteriorly.                    No abnormal activity occurred during photic stimulation. Hyperventilation was not performed.     During the recording, the patient fell asleep spontaneously with poorly formed sleep architecture seen.     The EKG channel shows regular rhythm.     Interpretation  The short-term video  EEG shows diffuse nonspecific slowing of the background, indicative of encephalopathy but nonspecific for etiology.  No potentially epileptiform activity was seen.

## 2025-01-23 NOTE — ASSESSMENT & PLAN NOTE
Patient's blood pressure range in the last 24 hours was: BP  Min: 98/63  Max: 143/68.The patient's inpatient anti-hypertensive regimen is listed below:  Current Antihypertensives  isosorbide mononitrate 24 hr tablet 60 mg, Daily, Oral  metoprolol succinate (TOPROL-XL) 24 hr tablet 100 mg, 2 times daily, Oral  furosemide tablet 20 mg, Daily, Oral    Plan  - BP is controlled, no changes needed to their regimen

## 2025-01-23 NOTE — NURSING
EEG completed, sitter at bedside, lunch being served, bed alarm on and sounded when prompted, call light within easy reach.

## 2025-01-23 NOTE — NURSING
EEG consult called in. Spoke with Dustin Naegele, stated that someone would be here tomorrow morning.

## 2025-01-24 PROBLEM — G93.40 ACUTE ENCEPHALOPATHY: Status: ACTIVE | Noted: 2025-01-24

## 2025-01-24 LAB
ALBUMIN SERPL BCP-MCNC: 3.4 G/DL (ref 3.5–5.2)
ALP SERPL-CCNC: 95 U/L (ref 55–135)
ALT SERPL W/O P-5'-P-CCNC: 4 U/L (ref 10–44)
ANION GAP SERPL CALC-SCNC: 10 MMOL/L (ref 8–16)
AST SERPL-CCNC: 13 U/L (ref 10–40)
BASOPHILS # BLD AUTO: 0.07 K/UL (ref 0–0.2)
BASOPHILS NFR BLD: 0.8 % (ref 0–1.9)
BILIRUB SERPL-MCNC: 0.7 MG/DL (ref 0.1–1)
BILIRUB UR QL STRIP: NEGATIVE
BUN SERPL-MCNC: 33 MG/DL (ref 8–23)
CALCIUM SERPL-MCNC: 8.4 MG/DL (ref 8.7–10.5)
CHLORIDE SERPL-SCNC: 96 MMOL/L (ref 95–110)
CLARITY UR: CLEAR
CO2 SERPL-SCNC: 24 MMOL/L (ref 23–29)
COLOR UR: YELLOW
CORTIS SERPL-MCNC: 15.4 UG/DL
CREAT SERPL-MCNC: 1.3 MG/DL (ref 0.5–1.4)
DIFFERENTIAL METHOD BLD: ABNORMAL
EOSINOPHIL # BLD AUTO: 0.3 K/UL (ref 0–0.5)
EOSINOPHIL NFR BLD: 3.5 % (ref 0–8)
ERYTHROCYTE [DISTWIDTH] IN BLOOD BY AUTOMATED COUNT: 13.7 % (ref 11.5–14.5)
EST. GFR  (NO RACE VARIABLE): 55.5 ML/MIN/1.73 M^2
FOLATE SERPL-MCNC: 8.9 NG/ML (ref 4–24)
GLUCOSE SERPL-MCNC: 90 MG/DL (ref 70–110)
GLUCOSE UR QL STRIP: NEGATIVE
HCT VFR BLD AUTO: 35.5 % (ref 40–54)
HGB BLD-MCNC: 12 G/DL (ref 14–18)
HGB UR QL STRIP: NEGATIVE
IMM GRANULOCYTES # BLD AUTO: 0.04 K/UL (ref 0–0.04)
IMM GRANULOCYTES NFR BLD AUTO: 0.5 % (ref 0–0.5)
KETONES UR QL STRIP: NEGATIVE
LEUKOCYTE ESTERASE UR QL STRIP: NEGATIVE
LYMPHOCYTES # BLD AUTO: 1.6 K/UL (ref 1–4.8)
LYMPHOCYTES NFR BLD: 18.7 % (ref 18–48)
MAGNESIUM SERPL-MCNC: 1.7 MG/DL (ref 1.6–2.6)
MCH RBC QN AUTO: 29.3 PG (ref 27–31)
MCHC RBC AUTO-ENTMCNC: 33.8 G/DL (ref 32–36)
MCV RBC AUTO: 87 FL (ref 82–98)
MONOCYTES # BLD AUTO: 1.1 K/UL (ref 0.3–1)
MONOCYTES NFR BLD: 13 % (ref 4–15)
NEUTROPHILS # BLD AUTO: 5.4 K/UL (ref 1.8–7.7)
NEUTROPHILS NFR BLD: 63.5 % (ref 38–73)
NITRITE UR QL STRIP: NEGATIVE
NRBC BLD-RTO: 0 /100 WBC
OSMOLALITY UR: 586 MOSM/KG (ref 50–1200)
PH UR STRIP: 6 [PH] (ref 5–8)
PLATELET # BLD AUTO: 247 K/UL (ref 150–450)
PMV BLD AUTO: 10.9 FL (ref 9.2–12.9)
POTASSIUM SERPL-SCNC: 4 MMOL/L (ref 3.5–5.1)
PROT SERPL-MCNC: 6.1 G/DL (ref 6–8.4)
PROT UR QL STRIP: ABNORMAL
RBC # BLD AUTO: 4.1 M/UL (ref 4.6–6.2)
SODIUM SERPL-SCNC: 130 MMOL/L (ref 136–145)
SODIUM UR-SCNC: 46 MMOL/L (ref 20–250)
SP GR UR STRIP: 1.02 (ref 1–1.03)
TB INDURATION 48 - 72 HR READ: NORMAL
TB SKIN TEST 48 - 72 HR READ: NORMAL
TSH SERPL DL<=0.005 MIU/L-ACNC: 2.12 UIU/ML (ref 0.34–5.6)
URN SPEC COLLECT METH UR: ABNORMAL
UROBILINOGEN UR STRIP-ACNC: NEGATIVE EU/DL
VIT B12 SERPL-MCNC: 241 PG/ML (ref 210–950)
WBC # BLD AUTO: 8.46 K/UL (ref 3.9–12.7)

## 2025-01-24 PROCEDURE — 25000003 PHARM REV CODE 250

## 2025-01-24 PROCEDURE — 99900035 HC TECH TIME PER 15 MIN (STAT)

## 2025-01-24 PROCEDURE — 25000003 PHARM REV CODE 250: Performed by: NURSE PRACTITIONER

## 2025-01-24 PROCEDURE — 81003 URINALYSIS AUTO W/O SCOPE: CPT | Performed by: NURSE PRACTITIONER

## 2025-01-24 PROCEDURE — 85025 COMPLETE CBC W/AUTO DIFF WBC: CPT

## 2025-01-24 PROCEDURE — 84443 ASSAY THYROID STIM HORMONE: CPT

## 2025-01-24 PROCEDURE — 36415 COLL VENOUS BLD VENIPUNCTURE: CPT

## 2025-01-24 PROCEDURE — 12000002 HC ACUTE/MED SURGE SEMI-PRIVATE ROOM

## 2025-01-24 PROCEDURE — 63600175 PHARM REV CODE 636 W HCPCS: Performed by: HOSPITALIST

## 2025-01-24 PROCEDURE — 97535 SELF CARE MNGMENT TRAINING: CPT | Mod: CO

## 2025-01-24 PROCEDURE — 97116 GAIT TRAINING THERAPY: CPT | Mod: CQ

## 2025-01-24 PROCEDURE — 83935 ASSAY OF URINE OSMOLALITY: CPT | Performed by: NURSE PRACTITIONER

## 2025-01-24 PROCEDURE — 94761 N-INVAS EAR/PLS OXIMETRY MLT: CPT

## 2025-01-24 PROCEDURE — 99900031 HC PATIENT EDUCATION (STAT)

## 2025-01-24 PROCEDURE — 80053 COMPREHEN METABOLIC PANEL: CPT

## 2025-01-24 PROCEDURE — 84300 ASSAY OF URINE SODIUM: CPT | Performed by: NURSE PRACTITIONER

## 2025-01-24 PROCEDURE — 83735 ASSAY OF MAGNESIUM: CPT

## 2025-01-24 PROCEDURE — 97530 THERAPEUTIC ACTIVITIES: CPT | Mod: CO

## 2025-01-24 PROCEDURE — 82746 ASSAY OF FOLIC ACID SERUM: CPT

## 2025-01-24 PROCEDURE — 63600175 PHARM REV CODE 636 W HCPCS: Performed by: NURSE PRACTITIONER

## 2025-01-24 PROCEDURE — 82607 VITAMIN B-12: CPT

## 2025-01-24 PROCEDURE — 25000003 PHARM REV CODE 250: Performed by: HOSPITALIST

## 2025-01-24 PROCEDURE — 82533 TOTAL CORTISOL: CPT

## 2025-01-24 RX ORDER — LANOLIN ALCOHOL/MO/W.PET/CERES
800 CREAM (GRAM) TOPICAL
Status: DISCONTINUED | OUTPATIENT
Start: 2025-01-24 | End: 2025-01-29 | Stop reason: HOSPADM

## 2025-01-24 RX ORDER — SODIUM,POTASSIUM PHOSPHATES 280-250MG
2 POWDER IN PACKET (EA) ORAL
Status: DISCONTINUED | OUTPATIENT
Start: 2025-01-24 | End: 2025-01-29 | Stop reason: HOSPADM

## 2025-01-24 RX ORDER — MAGNESIUM SULFATE HEPTAHYDRATE 40 MG/ML
2 INJECTION, SOLUTION INTRAVENOUS ONCE
Status: COMPLETED | OUTPATIENT
Start: 2025-01-24 | End: 2025-01-24

## 2025-01-24 RX ORDER — THIAMINE HCL 100 MG
100 TABLET ORAL DAILY
Status: DISCONTINUED | OUTPATIENT
Start: 2025-01-24 | End: 2025-01-29 | Stop reason: HOSPADM

## 2025-01-24 RX ADMIN — CITALOPRAM HYDROBROMIDE 40 MG: 20 TABLET ORAL at 08:01

## 2025-01-24 RX ADMIN — FAMOTIDINE 20 MG: 20 TABLET, FILM COATED ORAL at 05:01

## 2025-01-24 RX ADMIN — ACETAMINOPHEN 650 MG: 325 TABLET ORAL at 04:01

## 2025-01-24 RX ADMIN — DOXYCYCLINE HYCLATE 100 MG: 100 CAPSULE ORAL at 08:01

## 2025-01-24 RX ADMIN — SACUBITRIL AND VALSARTAN 1 TABLET: 24; 26 TABLET, FILM COATED ORAL at 09:01

## 2025-01-24 RX ADMIN — CEFTRIAXONE 1 G: 1 INJECTION, POWDER, FOR SOLUTION INTRAMUSCULAR; INTRAVENOUS at 09:01

## 2025-01-24 RX ADMIN — ISOSORBIDE MONONITRATE 60 MG: 60 TABLET, EXTENDED RELEASE ORAL at 08:01

## 2025-01-24 RX ADMIN — MUPIROCIN 1 G: 20 OINTMENT TOPICAL at 08:01

## 2025-01-24 RX ADMIN — SACUBITRIL AND VALSARTAN 1 TABLET: 24; 26 TABLET, FILM COATED ORAL at 08:01

## 2025-01-24 RX ADMIN — PHENYTOIN SODIUM 400 MG: 100 CAPSULE, EXTENDED RELEASE ORAL at 08:01

## 2025-01-24 RX ADMIN — MAGNESIUM SULFATE HEPTAHYDRATE 2 G: 40 INJECTION, SOLUTION INTRAVENOUS at 08:01

## 2025-01-24 RX ADMIN — THIAMINE HCL TAB 100 MG 100 MG: 100 TAB at 03:01

## 2025-01-24 RX ADMIN — METOPROLOL SUCCINATE 100 MG: 50 TABLET, FILM COATED, EXTENDED RELEASE ORAL at 09:01

## 2025-01-24 RX ADMIN — MUPIROCIN 1 G: 20 OINTMENT TOPICAL at 03:01

## 2025-01-24 RX ADMIN — SENNOSIDES AND DOCUSATE SODIUM 1 TABLET: 50; 8.6 TABLET ORAL at 08:01

## 2025-01-24 RX ADMIN — CLOPIDOGREL BISULFATE 75 MG: 75 TABLET, FILM COATED ORAL at 08:01

## 2025-01-24 RX ADMIN — METOPROLOL SUCCINATE 100 MG: 50 TABLET, FILM COATED, EXTENDED RELEASE ORAL at 08:01

## 2025-01-24 RX ADMIN — FUROSEMIDE 20 MG: 20 TABLET ORAL at 08:01

## 2025-01-24 RX ADMIN — HYDROXYZINE PAMOATE 25 MG: 25 CAPSULE ORAL at 04:01

## 2025-01-24 RX ADMIN — EZETIMIBE 10 MG: 10 TABLET ORAL at 08:01

## 2025-01-24 NOTE — RESPIRATORY THERAPY
01/23/25 1938   Patient Assessment/Suction   Level of Consciousness (AVPU) alert   Respiratory Effort Normal;Unlabored   Expansion/Accessory Muscles/Retractions no use of accessory muscles;no retractions   All Lung Fields Breath Sounds Anterior:;diminished   Rhythm/Pattern, Respiratory no shortness of breath reported;unlabored   PRE-TX-O2   Device (Oxygen Therapy) room air   SpO2 96 %   Pulse Oximetry Type Intermittent   $ Pulse Oximetry - Multiple Charge Pulse Oximetry - Multiple   Pulse 69   Resp 17   Positioning HOB elevated 30 degrees   Aerosol Therapy   $ Aerosol Therapy Charges PRN treatment not required   Daily Review of Necessity (SVN) completed   Respiratory Treatment Status (SVN) PRN treatment not required   Education   $ Education 15 min

## 2025-01-24 NOTE — ASSESSMENT & PLAN NOTE
Patient's blood pressure range in the last 24 hours was: BP  Min: 106/64  Max: 127/68.The patient's inpatient anti-hypertensive regimen is listed below:  Current Antihypertensives  isosorbide mononitrate 24 hr tablet 60 mg, Daily, Oral  metoprolol succinate (TOPROL-XL) 24 hr tablet 100 mg, 2 times daily, Oral  furosemide tablet 20 mg, Daily, Oral    Plan  - BP is controlled, no changes needed to their regimen

## 2025-01-24 NOTE — SUBJECTIVE & OBJECTIVE
Interval History: no acute events overnight. He seems quite confused. Suspect hospital delirium, r/o medical causes. B12 WNL, but on the low end, will start with oral replacement. Presumptively replace thiamine as well. TSH WNL. Sodium is mildly low, and currently evaluating for underlying causes  - work up pending.     Review of Systems   Constitutional:  Positive for fatigue.   Respiratory:  Negative for shortness of breath.    Cardiovascular:  Negative for chest pain.   Neurological:  Positive for weakness.     Objective:     Vital Signs (Most Recent):  Temp: 98 °F (36.7 °C) (01/24/25 1102)  Pulse: 61 (01/24/25 1102)  Resp: 16 (01/24/25 1102)  BP: 127/68 (01/24/25 1102)  SpO2: 95 % (01/24/25 1102) Vital Signs (24h Range):  Temp:  [97.8 °F (36.6 °C)-98.1 °F (36.7 °C)] 98 °F (36.7 °C)  Pulse:  [61-79] 61  Resp:  [15-17] 16  SpO2:  [95 %-96 %] 95 %  BP: (106-127)/(64-72) 127/68     Weight: 73.3 kg (161 lb 9.6 oz)  Body mass index is 26.08 kg/m².    Intake/Output Summary (Last 24 hours) at 1/24/2025 1201  Last data filed at 1/24/2025 0845  Gross per 24 hour   Intake 580 ml   Output 400 ml   Net 180 ml         Physical Exam  Vitals and nursing note reviewed.   Constitutional:       Comments: Chronically ill appearing   HENT:      Head: Normocephalic and atraumatic.      Nose: Nose normal.      Mouth/Throat:      Mouth: Mucous membranes are moist.      Pharynx: Oropharynx is clear.   Eyes:      Conjunctiva/sclera: Conjunctivae normal.      Pupils: Pupils are equal, round, and reactive to light.   Cardiovascular:      Rate and Rhythm: Regular rhythm. Bradycardia present.      Heart sounds: Murmur heard.   Pulmonary:      Effort: Pulmonary effort is normal.      Comments: Diminished in bases  Abdominal:      General: Bowel sounds are normal.      Palpations: Abdomen is soft.   Musculoskeletal:         General: Normal range of motion.      Cervical back: Normal range of motion and neck supple.   Skin:     General: Skin is  "warm and dry.      Capillary Refill: Capillary refill takes less than 2 seconds.   Neurological:      Mental Status: He is alert.      Comments: Oriented to person and location and somewhat to situation, states president "isn't goode, judi, or the other lucia. It's the lucia with the hair". Could not tell me year. States he's here "because I passed out or something"   Psychiatric:         Behavior: Behavior normal.             Significant Labs: All pertinent labs within the past 24 hours have been reviewed.  CBC:   Recent Labs   Lab 01/23/25  0350 01/24/25  0501   WBC 7.30 8.46   HGB 12.1* 12.0*   HCT 36.4* 35.5*    247     CMP:   Recent Labs   Lab 01/23/25  0350 01/24/25  0501   * 130*   K 4.0 4.0   CL 96 96   CO2 26 24   GLU 92 90   BUN 31* 33*   CREATININE 1.2 1.3   CALCIUM 8.6* 8.4*   PROT 6.3 6.1   ALBUMIN 3.5 3.4*   BILITOT 0.7 0.7   ALKPHOS 96 95   AST 13 13   ALT 4* 4*   ANIONGAP 10 10     Magnesium:   Recent Labs   Lab 01/23/25  0350 01/24/25  0501   MG 1.7 1.7     TSH:   Recent Labs   Lab 01/24/25  0501   TSH 2.116     Urine Studies:   Recent Labs   Lab 01/24/25  0901   COLORU Yellow   APPEARANCEUA Clear   PHUR 6.0   SPECGRAV 1.020   PROTEINUA Trace*   GLUCUA Negative   KETONESU Negative   BILIRUBINUA Negative   OCCULTUA Negative   NITRITE Negative   UROBILINOGEN Negative   LEUKOCYTESUR Negative       Significant Imaging: I have reviewed all pertinent imaging results/findings within the past 24 hours.    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  There are median sternotomy wires, mediastinal surgical clips, and changes of trans arterial aortic valvuloplasty. The cardiomediastinal silhouette and pulmonary vasculature are stable and within normal limits, with aortic vascular " calcifications.  The lungs are normally expanded, with bilateral lower lung opacities and hazy densities, suggesting atelectasis and bilateral pleural effusions, unchanged.  There is interval improved aeration in the right upper lobe, with no pneumothorax or acute fracture.     Interval improved aeration in the right upper lobe, with otherwise no significant interval change. Electronically signed by: Maikel Biswas Date:    01/23/2025 Time:    15:05    CT Head Without Contrast    Result Date: 1/21/2025  EXAMINATION: CT HEAD WITHOUT CONTRAST CLINICAL HISTORY: Head trauma, moderate-severe;fall;. TECHNIQUE: Noninfusion images were obtained from the skull base to the vertex. All CT scans at this facility utilize dose modulation, iterative reconstruction, and/or weight based dosing when appropriate to reduce radiation dose to as low as reasonably achievable CMS MANDATED QUALITY DATA - CT RADIATION - 436 COMPARISON: January 18, 2025 FINDINGS: There is no intracranial mass, hemorrhage, or midline shift. Ventricles and sulci are mildly prominent. There are no pathologic extra-axial fluid collections. There is no evidence of cortical ischemic change. Changes of mild chronic microvascular white matter disease are noted. Cerebellum and brainstem are normal. The calvarium is intact.     No acute intracranial abnormalities. Chronic findings as discussed above. Electronically signed by: Elio Cooney Date:    01/21/2025 Time:    16:26    US Chest Mediastinum    Result Date: 1/20/2025  EXAMINATION: US CHEST MEDIASTINUM CLINICAL HISTORY: pleural effusion; COMPARISON: None FINDINGS: There is a moderate to large right pleural effusion and moderate left pleural effusion.     Bilateral moderate pleural effusions, right greater than left Electronically signed by: Mraie Laguna Date:    01/20/2025 Time:    13:05    Echo    Result Date: 1/20/2025    Left Ventricle: The left ventricle is normal in size. Mildly increased wall thickness.  There is mild concentric hypertrophy. There is low normal systolic function with a visually estimated ejection fraction of 50 - 55%. Diastolic function cannot be reliably determined in the presence of mitral annular calcification.   Right Ventricle: Systolic function is mildly reduced.   Left Atrium: Left atrium is severely dilated.   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   Mitral Valve: There is severe mitral annular calcification present. There is mitral valve stenosis. The mean pressure gradient across the mitral valve is 4 mmHg at a heart rate of 80 bpm. MV area by continuity equation is 0.82 cm2. There is moderate regurgitation.   Tricuspid Valve: There is mild regurgitation.     CT Chest Without Contrast    Result Date: 1/19/2025  EXAMINATION: CT CHEST WITHOUT CONTRAST CLINICAL HISTORY: Respiratory illness, nondiagnostic xray; TECHNIQUE: Low dose axial images, sagittal and coronal reformations were obtained from the thoracic inlet to the lung bases. Contrast was not administered. COMPARISON: CT lumbar spine 06/15/2024 FINDINGS: Moderate to large bilateral pleural effusions with adjacent airspace disease and atelectasis.  Otherwise, moderate bilateral multifocal infectious/inflammatory airspace disease most pronounced in the right upper lobe.  Background of emphysema Calcified pleural plaques bilaterally. No cardiomegaly or pericardial effusion.  Severe coronary artery atherosclerosis. Mild diffuse body wall edema. Osteopenia with multilevel compression fractures.  Previously noted L2 compression fractures demonstrates worsened height loss, now severe     Moderate to large bilateral pleural effusions with adjacent airspace disease and atelectasis.  Otherwise, moderate bilateral multifocal infectious/inflammatory airspace disease most pronounced in the right upper lobe.  Background of emphysema.  Follow-up to resolution in this high risk patient. Calcified pleural plaques.   Correlate for asbestos related lung disease Osteopenia with multilevel compression fractures.  Previously noted L2 compression fractures demonstrates worsened height loss, now severe Electronically signed by: Barry Farrell Date:    01/19/2025 Time:    04:12    X-Ray Pelvis 3 View inc Hip 2 view Bilat    Result Date: 1/18/2025  EXAMINATION: XR PELVIS 3 VIEW WITH INC HIP 2 VIEW BILAT CLINICAL HISTORY: falls; TECHNIQUE: Five views COMPARISON: 06/15/2024 FINDINGS: Osteopenia.  Suspect acute nondisplaced fracture right superior pubic ramus extending into the puboacetabular junction.  Otherwise, no detectable acute fracture or dislocation.  Mild bilateral hip osteoarthritis.  Vascular calcifications.     As above Electronically signed by: Barry Farrell Date:    01/18/2025 Time:    23:41    CT Cervical Spine Without Contrast    Result Date: 1/18/2025  EXAMINATION: CT CERVICAL SPINE WITHOUT CONTRAST CLINICAL HISTORY: Neck trauma (Age >= 65y); TECHNIQUE: Low dose axial images, sagittal and coronal reformations were performed though the cervical spine.  Contrast was not administered. COMPARISON: None FINDINGS: Osteopenia.  No acute fracture.  Mild to moderate multilevel spondylosis.  Unremarkable prevertebral soft tissues. Incompletely evaluated ill-defined ground-glass opacity in the lung apices bilaterally, likely infectious or inflammatory.  Follow-up is warranted.     As above Electronically signed by: Barry Farrell Date:    01/18/2025 Time:    20:10    CT Head Without Contrast    Result Date: 1/18/2025  EXAMINATION: CT HEAD WITHOUT CONTRAST CLINICAL HISTORY: Head trauma, minor (Age >= 65y); TECHNIQUE: Low dose axial images were obtained through the head.  Coronal and sagittal reformations were also performed. Contrast was not administered. COMPARISON: 06/16/2024 FINDINGS: Intracranial compartment: Ventricles and sulci remain prominent.  No extra-axial blood or fluid collections. Mild chronic  microvascular ischemia.  No parenchymal mass, hemorrhage, edema or major vascular distribution infarct. Skull/extracranial contents (limited evaluation): No fracture. Mastoid air cells and paranasal sinuses are essentially clear.     No acute findings. Electronically signed by: Barry Farrell Date:    01/18/2025 Time:    19:42    X-Ray Chest 1 View    Result Date: 1/18/2025  EXAMINATION: XR CHEST 1 VIEW CLINICAL HISTORY: Unspecified fall, initial encounter TECHNIQUE: Single frontal view of the chest was performed. COMPARISON: 06/15/2024 FINDINGS: Bilateral pleural effusions with adjacent airspace disease and/or atelectasis.  Additional airspace disease in the right upper lobe.  Pulmonary vascular congestion.  Mildly enlarged cardiac silhouette.  Left chest wall cardiac pacemaker.     As above Electronically signed by: Barry Farrell Date:    01/18/2025 Time:    19:12

## 2025-01-24 NOTE — PLAN OF CARE
Problem: Occupational Therapy  Goal: Occupational Therapy Goal  Description: Goals to be met by: 2/8/2025     Patient will increase functional independence with ADLs by performing:    UE Dressing with Modified [place].  LE Dressing with Modified [place].  Grooming while standing at sink with Modified [place].  Toileting from toilet with Modified [place] for hygiene and clothing management. From BS with ModA 2° diaper mgmnt, but would be less assist with his own pull ups.  Step transfer with [place] and Modified [place]  Toilet transfer to toilet with Modified [place]. Bed>BSC CGA and VC for safe sequencing and stepping.    Outcome: Progressing; pt with increased mentation after ambulating/RW and CGA this date and requesting to know what happened to him, why he is here, and where he is going to go. Son In Law, Armaan in room to assist with pt's question out of GREENE's scope.

## 2025-01-24 NOTE — ASSESSMENT & PLAN NOTE
"Patient  heart failure that is Acute on chronic. Most recent BNP and echo results are listed below.  No results for input(s): "BNP" in the last 72 hours.    Latest ECHO  Results for orders placed during the hospital encounter of 06/15/24    Echo    Interpretation Summary    Left Ventricle: The left ventricle is normal in size. Increased ventricular mass. Mildly increased wall thickness. There is mild concentric hypertrophy. Unable to assess wall motion. Septal motion is consistent with pacing. There is normal systolic function with a visually estimated ejection fraction of 55 - 60%. Diastolic function cannot be reliably determined in the presence of mitral annular calcification.    Right Ventricle: Right ventricle was not well visualized due to poor acoustic window. Normal right ventricular cavity size. Pacemaker lead present in the ventricle.    Aortic Valve: There is a transcatheter valve replacement in the aortic position.    Mitral Valve: Mildly thickened leaflets. There is moderate mitral annular calcification present. There is mild regurgitation.    Tricuspid Valve: There is mild to moderate regurgitation.    Current Heart Failure Medications  sacubitriL-valsartan 24-26 mg per tablet 1 tablet, 2 times daily, Oral  metoprolol succinate (TOPROL-XL) 24 hr tablet 100 mg, 2 times daily, Oral  furosemide tablet 20 mg, Daily, Oral    Plan  - Monitor strict I&Os and daily weights.    - Place on telemetry  - Low sodium diet  - Place on fluid restriction of 1.5 L.   - Continue PO lasix daily    "

## 2025-01-24 NOTE — PT/OT/SLP PROGRESS
"Occupational Therapy   Treatment    Name: Jerome Amaro Jr.  MRN: [id number]  Admitting Diagnosis:  Acute CHF (congestive heart failure)       Recommendations:     Discharge Recommendations: Moderate Intensity Therapy  Discharge Equipment Recommendations:  to be determined by next level of care  Barriers to discharge:  Decreased caregiver support, Other (Comment) (mentation waxes/wanes resulting in poor judgement and safety awareness)    Assessment:     Jerome Amaro Jr. is a 79 y.o. male with a medical diagnosis of Acute CHF (congestive heart failure).  He presents with telling me that "we were all dropped off here in a boat, left us here, couldn't walk around until today. Where have you been?" And required reorientation to setting, assisted by son-in-law Armaan, which resulted in improved mentation. Pt was on BSC with nurse upon [facility] arrival and is continent, requesting to ambulate and brush his teeth. Pt required 3 standing rest breaks during ambulation in [address] way and was able to cover a total of 92 ft with RW and CGA. Pt BLE began to tremble after the last standing rest break, which left him 36 more feet to go and he remarked that he would be able to do it, and he did. Due to fatigue post ambulation pt brushed his teeth sitting up in the bedside tray and required assist due to unfamiliarity of objects and setting. Pt will benefit from global strengthening and integration of functional activity into his daily routine. Performance deficits affecting function are impaired cognition, decreased lower extremity function, decreased safety awareness, gait instability, impaired balance, impaired self care skills, impaired functional mobility, weakness, impaired endurance.     Rehab Prognosis:  Good; patient would benefit from acute skilled OT services to address these deficits and reach maximum level of function.       Plan:     Patient to be seen 5 x/week to address the above listed problems via self-care/home management, " therapeutic activities, therapeutic exercises  Plan of Care Expires: 02/19/25  Plan of Care Reviewed with: patient, family    Subjective     Chief Complaint: I don't know why I'm here.  Patient/Family Comments/goals: go to rehab  Pain/Comfort:  Pain Rating 1: 0/10    Objective:     Communicated with: nurseRoberta prior to session.  Patient found  on BSC with nurse  with bed alarm, telemetry, peripheral IV upon OT entry to room.    General Precautions: Standard, fall, seizure    Orthopedic Precautions:RLE weight bearing as tolerated  Braces: N/A  Respiratory Status: Room air     Occupational Performance:     Bed Mobility:    Pt was received on BSC and did not get back in bed this session.    Functional Mobility/Transfers:  Patient completed Sit <> Stand Transfer with contact guard assistance  with  hand-held assist and from EOB with Rolling walker   Patient completed Bed <> Chair Transfer using Step Transfer technique with contact guard assistance with rolling walker  Patient completed Toilet Transfer Step Transfer technique with contact guard assistance with  bedside commode and VC for safe sequencing and hand placement.  Functional Mobility: ambulation in the hallway with RW and CGA, 3 standing rest breaks: 25ft (rest) 21ft (rest) 10ft (rest) 36ft.    Activities of Daily Living:  Grooming: stand by assistance for brushing teeth in the chair with set up at tray table; increased time 2° unfamiliar with setting and set up  Toileting: minimum assistance as pt maintains stand to perform BM hygiene but cannot manage diaper due to velcro tabs- his own pull-up would be less assist.    [facility] 6 Click ADL: 21    Treatment & Education:  -[facility] ed for use of call bell, purpose of chair alarm, feedback on session with focus on improved safety awareness when pt stated he was getting tired and needed to sit, and improving memory recall of setting (hospital, etc). Pt and son in law v/u and agreeable.    Patient left up in chair with  all lines intact, call button in reach, chair alarm on, nurse, Roberta notified, and son in law Armaan present    GOALS:   Multidisciplinary Problems       Occupational Therapy Goals          Problem: Occupational Therapy    Goal Priority Disciplines Outcome Interventions   Occupational Therapy Goal     OT, PT/OT Progressing    Description: Goals to be met by: 2/8/2025     Patient will increase functional independence with ADLs by performing:    UE Dressing with Modified [place].  LE Dressing with Modified [place].  Grooming while standing at sink with Modified [place].  Toileting from toilet with Modified [place] for hygiene and clothing management.   Step transfer with [place] and Modified [place]  Toilet transfer to toilet with Modified [place].                         DME Justifications:  No DME recommended requiring DME justifications    Time Tracking:     OT Date of Treatment: 01/24/25  OT Start Time: 1308  OT Stop Time: 1348  OT Total Time (min): 40 min    Billable Minutes:Self Care/Home Management 30 mins  Therapeutic Activity 10 mins    OT/ALTA: ALTA     Number of ALTA visits since last OT visit: 2    1/24/2025

## 2025-01-24 NOTE — CARE UPDATE
01/24/25 0653   Patient Assessment/Suction   Level of Consciousness (AVPU) alert   Respiratory Effort Unlabored   Expansion/Accessory Muscles/Retractions no use of accessory muscles   All Lung Fields Breath Sounds diminished;crackles, fine   Rhythm/Pattern, Respiratory depth regular;pattern regular;unlabored   Cough Frequency infrequent   Cough Type nonproductive   PRE-TX-O2   Device (Oxygen Therapy) room air   SpO2 96 %   Pulse Oximetry Type Intermittent   $ Pulse Oximetry - Multiple Charge Pulse Oximetry - Multiple   Pulse 79   Resp 15   Aerosol Therapy   $ Aerosol Therapy Charges PRN treatment not required   Education   $ Education Bronchodilator;15 min   Respiratory Evaluation   $ Care Plan Tech Time 15 min

## 2025-01-24 NOTE — PROGRESS NOTES
Critical access hospital Medicine  Progress Note    Patient Name: Jerome Amaro Jr.  MRN: [id number]  Patient Class: IP- Inpatient   Admission Date: 1/18/2025  Length of Stay: 5 days  Attending Physician: Pino Segal MD  Primary Care Provider: Magda Ruiz FNP-C        Subjective     Principal Problem:Acute CHF (congestive heart failure)        HPI:  79 year old male with a past medical history of aortic valve disease, atrial fibrillation on anticoagulation therapy, MI, HLD, PVD, seizures, difficulty swallowing, CAD, CHF, aortic valve disease who presents to the emergency room with weakness in the legs and falls. There was also concern for intermittent altered mental status. Patient has a strong cardiac history with 2 bypass surgeries, atrial fibrillation, aortic valve disease and valve replacement, multiple cardiac stents, hypertension, hyperlipidemia, PVD, seizure history. Patient has been weak in general. His daughter says that he has had several falls over the last couple of days. He has just not been feeling well. No reported fever or chills. No reported vomiting or any diarrhea. He is not have any abdominal pain. He occasionally has chest pain which she takes nitro for which is not abnormal for him. He does not have any new chest pain but sometimes feels short of breath. Today he had a fall where he hit the back of his head on the ground. He did not lose consciousness. No reported vomiting or any seizure activity. He is on Eliquis and Plavix. No other reported injuries from the fall.     In [facility], H/H 10.6/31.1, Na  134, T. Bili 1.3, BNP 1309, Troponin I high sensitivity 56.8, Mg 1.8, Influenza and flu negative, phenytoin 18.5, CT c spine no acute fracture, multilevel spondylosis. Incompletely evaluated ill-defined ground-glass opacity in the lung apices bilaterally, CT head no acute findings, chest x-ray Bilateral pleural effusions with adjacent airspace disease and/or atelectasis.  Additional airspace disease in the right upper lobe. Pulmonary vascular congestion. Mildly enlarged cardiac silhouette. Left chest wall cardiac pacemaker.   Admit to hospital medicine for pleural effusion, CHF exacerbation    Overview/Hospital Course:  Mr. Amaro has been monitored closely during his hospitalization. He was admitted on 1/18/25 for generalized weakness and falls.  He states he slipped off of his slipper walking to the kitchen.  He endorses feeling more weak than usual. He did not lose consciousness nor hit his head. He had a CT head and C spine with no acute findings. CT chest revealed bilat pleural effusions and concern for infectious process in RUL. BNP of 1300.  He was started on IV Lasix, then transitioned to p.o. Lasix.  Ultrasound of chest showed bilateral moderate pleural effusions right greater than left.  Thoracentesis was offered to family, but they declined.  He was started on antibiotics.  PT and OT were consulted, moderate intensity therapy was recommended.  Patient is now pending SNF placement.  Patient has had 2 falls inpatient with repeat CT head negative for acute abnormality.  Fall precautions in place. He's had confusion throughout his hospitalization. Daughter requested neuro consult.  Teleneuro was consulted, recommended EEG.  EEG which was negative for epileptiform discharges, but did reveal slowing consistent with encephalopathy. Sodium has been trending down and is 130, urine osm 586, urine sodium 46, serum osm is pending. TSH is WNL. B12 is 241 and folate 8 - started on oral replacement.     Interval History: no acute events overnight. He seems quite confused. Suspect hospital delirium, r/o medical causes. B12 WNL, but on the low end, will start with oral replacement. Presumptively replace thiamine as well. TSH WNL. Sodium is mildly low, and currently evaluating for underlying causes  - work up pending.     Review of Systems   Constitutional:  Positive for fatigue.  "  Respiratory:  Negative for shortness of breath.    Cardiovascular:  Negative for chest pain.   Neurological:  Positive for weakness.     Objective:     Vital Signs (Most Recent):  Temp: 98 °F (36.7 °C) (01/24/25 1102)  Pulse: 61 (01/24/25 1102)  Resp: 16 (01/24/25 1102)  BP: 127/68 (01/24/25 1102)  SpO2: 95 % (01/24/25 1102) Vital Signs (24h Range):  Temp:  [97.8 °F (36.6 °C)-98.1 °F (36.7 °C)] 98 °F (36.7 °C)  Pulse:  [61-79] 61  Resp:  [15-17] 16  SpO2:  [95 %-96 %] 95 %  BP: (106-127)/(64-72) 127/68     Weight: 73.3 kg (161 lb 9.6 oz)  Body mass index is 26.08 kg/m².    Intake/Output Summary (Last 24 hours) at 1/24/2025 1201  Last data filed at 1/24/2025 0845  Gross per 24 hour   Intake 580 ml   Output 400 ml   Net 180 ml         Physical Exam  Vitals and nursing note reviewed.   Constitutional:       Comments: Chronically ill appearing   HENT:      Head: Normocephalic and atraumatic.      Nose: Nose normal.      Mouth/Throat:      Mouth: Mucous membranes are moist.      Pharynx: Oropharynx is clear.   Eyes:      Conjunctiva/sclera: Conjunctivae normal.      Pupils: Pupils are equal, round, and reactive to light.   Cardiovascular:      Rate and Rhythm: Regular rhythm. Bradycardia present.      Heart sounds: Murmur heard.   Pulmonary:      Effort: Pulmonary effort is normal.      Comments: Diminished in bases  Abdominal:      General: Bowel sounds are normal.      Palpations: Abdomen is soft.   Musculoskeletal:         General: Normal range of motion.      Cervical back: Normal range of motion and neck supple.   Skin:     General: Skin is warm and dry.      Capillary Refill: Capillary refill takes less than 2 seconds.   Neurological:      Mental Status: He is alert.      Comments: Oriented to person and location and somewhat to situation, states president "isn't goode, judi, or the other lucia. It's the lucia with the hair". Could not tell me year. States he's here "because I passed out or something"   Psychiatric: "         Behavior: Behavior normal.             Significant Labs: All pertinent labs within the past 24 hours have been reviewed.  CBC:   Recent Labs   Lab 01/23/25  0350 01/24/25  0501   WBC 7.30 8.46   HGB 12.1* 12.0*   HCT 36.4* 35.5*    247     CMP:   Recent Labs   Lab 01/23/25  0350 01/24/25  0501   * 130*   K 4.0 4.0   CL 96 96   CO2 26 24   GLU 92 90   BUN 31* 33*   CREATININE 1.2 1.3   CALCIUM 8.6* 8.4*   PROT 6.3 6.1   ALBUMIN 3.5 3.4*   BILITOT 0.7 0.7   ALKPHOS 96 95   AST 13 13   ALT 4* 4*   ANIONGAP 10 10     Magnesium:   Recent Labs   Lab 01/23/25  0350 01/24/25  0501   MG 1.7 1.7     TSH:   Recent Labs   Lab 01/24/25  0501   TSH 2.116     Urine Studies:   Recent Labs   Lab 01/24/25  0901   COLORU Yellow   APPEARANCEUA Clear   PHUR 6.0   SPECGRAV 1.020   PROTEINUA Trace*   GLUCUA Negative   KETONESU Negative   BILIRUBINUA Negative   OCCULTUA Negative   NITRITE Negative   UROBILINOGEN Negative   LEUKOCYTESUR Negative       Significant Imaging: I have reviewed all pertinent imaging results/findings within the past 24 hours.    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  There are median sternotomy wires, mediastinal surgical clips, and changes of trans arterial aortic valvuloplasty. The cardiomediastinal silhouette and pulmonary vasculature are stable and within normal limits, with aortic vascular calcifications.  The lungs are normally expanded, with bilateral lower lung opacities and hazy densities, suggesting atelectasis and bilateral pleural effusions, unchanged.  There is interval improved aeration in the right upper lobe, with no pneumothorax or acute fracture.     Interval improved aeration in the right upper lobe, with otherwise no significant interval change.  Electronically signed by: Maikel Biswas Date:    01/23/2025 Time:    15:05    CT Head Without Contrast    Result Date: 1/21/2025  EXAMINATION: CT HEAD WITHOUT CONTRAST CLINICAL HISTORY: Head trauma, moderate-severe;fall;. TECHNIQUE: Noninfusion images were obtained from the skull base to the vertex. All CT scans at this facility utilize dose modulation, iterative reconstruction, and/or weight based dosing when appropriate to reduce radiation dose to as low as reasonably achievable CMS MANDATED QUALITY DATA - CT RADIATION - 436 COMPARISON: January 18, 2025 FINDINGS: There is no intracranial mass, hemorrhage, or midline shift. Ventricles and sulci are mildly prominent. There are no pathologic extra-axial fluid collections. There is no evidence of cortical ischemic change. Changes of mild chronic microvascular white matter disease are noted. Cerebellum and brainstem are normal. The calvarium is intact.     No acute intracranial abnormalities. Chronic findings as discussed above. Electronically signed by: Elio Cooney Date:    01/21/2025 Time:    16:26    US Chest Mediastinum    Result Date: 1/20/2025  EXAMINATION: US CHEST MEDIASTINUM CLINICAL HISTORY: pleural effusion; COMPARISON: None FINDINGS: There is a moderate to large right pleural effusion and moderate left pleural effusion.     Bilateral moderate pleural effusions, right greater than left Electronically signed by: Marie Laguna Date:    01/20/2025 Time:    13:05    Echo    Result Date: 1/20/2025    Left Ventricle: The left ventricle is normal in size. Mildly increased wall thickness. There is mild concentric hypertrophy. There is low normal systolic function with a visually estimated ejection fraction of 50 - 55%. Diastolic function cannot be reliably determined in the presence of mitral annular calcification.   Right Ventricle: Systolic function is mildly reduced.   Left Atrium: Left atrium is severely dilated.   Aortic Valve: There is a bioprosthetic  valve in the aortic position.   Mitral Valve: There is severe mitral annular calcification present. There is mitral valve stenosis. The mean pressure gradient across the mitral valve is 4 mmHg at a heart rate of 80 bpm. MV area by continuity equation is 0.82 cm2. There is moderate regurgitation.   Tricuspid Valve: There is mild regurgitation.     CT Chest Without Contrast    Result Date: 1/19/2025  EXAMINATION: CT CHEST WITHOUT CONTRAST CLINICAL HISTORY: Respiratory illness, nondiagnostic xray; TECHNIQUE: Low dose axial images, sagittal and coronal reformations were obtained from the thoracic inlet to the lung bases. Contrast was not administered. COMPARISON: CT lumbar spine 06/15/2024 FINDINGS: Moderate to large bilateral pleural effusions with adjacent airspace disease and atelectasis.  Otherwise, moderate bilateral multifocal infectious/inflammatory airspace disease most pronounced in the right upper lobe.  Background of emphysema Calcified pleural plaques bilaterally. No cardiomegaly or pericardial effusion.  Severe coronary artery atherosclerosis. Mild diffuse body wall edema. Osteopenia with multilevel compression fractures.  Previously noted L2 compression fractures demonstrates worsened height loss, now severe     Moderate to large bilateral pleural effusions with adjacent airspace disease and atelectasis.  Otherwise, moderate bilateral multifocal infectious/inflammatory airspace disease most pronounced in the right upper lobe.  Background of emphysema.  Follow-up to resolution in this high risk patient. Calcified pleural plaques.  Correlate for asbestos related lung disease Osteopenia with multilevel compression fractures.  Previously noted L2 compression fractures demonstrates worsened height loss, now severe Electronically signed by: Barry Farrell Date:    01/19/2025 Time:    04:12    X-Ray Pelvis 3 View inc Hip 2 view Bilat    Result Date: 1/18/2025  EXAMINATION: XR PELVIS 3 VIEW WITH INC HIP 2  VIEW BILAT CLINICAL HISTORY: falls; TECHNIQUE: Five views COMPARISON: 06/15/2024 FINDINGS: Osteopenia.  Suspect acute nondisplaced fracture right superior pubic ramus extending into the puboacetabular junction.  Otherwise, no detectable acute fracture or dislocation.  Mild bilateral hip osteoarthritis.  Vascular calcifications.     As above Electronically signed by: Barry Farrell Date:    01/18/2025 Time:    23:41    CT Cervical Spine Without Contrast    Result Date: 1/18/2025  EXAMINATION: CT CERVICAL SPINE WITHOUT CONTRAST CLINICAL HISTORY: Neck trauma (Age >= 65y); TECHNIQUE: Low dose axial images, sagittal and coronal reformations were performed though the cervical spine.  Contrast was not administered. COMPARISON: None FINDINGS: Osteopenia.  No acute fracture.  Mild to moderate multilevel spondylosis.  Unremarkable prevertebral soft tissues. Incompletely evaluated ill-defined ground-glass opacity in the lung apices bilaterally, likely infectious or inflammatory.  Follow-up is warranted.     As above Electronically signed by: Barry Farrell Date:    01/18/2025 Time:    20:10    CT Head Without Contrast    Result Date: 1/18/2025  EXAMINATION: CT HEAD WITHOUT CONTRAST CLINICAL HISTORY: Head trauma, minor (Age >= 65y); TECHNIQUE: Low dose axial images were obtained through the head.  Coronal and sagittal reformations were also performed. Contrast was not administered. COMPARISON: 06/16/2024 FINDINGS: Intracranial compartment: Ventricles and sulci remain prominent.  No extra-axial blood or fluid collections. Mild chronic microvascular ischemia.  No parenchymal mass, hemorrhage, edema or major vascular distribution infarct. Skull/extracranial contents (limited evaluation): No fracture. Mastoid air cells and paranasal sinuses are essentially clear.     No acute findings. Electronically signed by: Barry Farrell Date:    01/18/2025 Time:    19:42    X-Ray Chest 1 View    Result Date:  "1/18/2025  EXAMINATION: XR CHEST 1 VIEW CLINICAL HISTORY: Unspecified fall, initial encounter TECHNIQUE: Single frontal view of the chest was performed. COMPARISON: 06/15/2024 FINDINGS: Bilateral pleural effusions with adjacent airspace disease and/or atelectasis.  Additional airspace disease in the right upper lobe.  Pulmonary vascular congestion.  Mildly enlarged cardiac silhouette.  Left chest wall cardiac pacemaker.     As above Electronically signed by: Barry Farrell Date:    01/18/2025 Time:    19:12       Assessment and Plan     * Acute CHF (congestive heart failure)  Patient  heart failure that is Acute on chronic. Most recent BNP and echo results are listed below.  No results for input(s): "BNP" in the last 72 hours.    Latest ECHO  Results for orders placed during the hospital encounter of 06/15/24    Echo    Interpretation Summary    Left Ventricle: The left ventricle is normal in size. Increased ventricular mass. Mildly increased wall thickness. There is mild concentric hypertrophy. Unable to assess wall motion. Septal motion is consistent with pacing. There is normal systolic function with a visually estimated ejection fraction of 55 - 60%. Diastolic function cannot be reliably determined in the presence of mitral annular calcification.    Right Ventricle: Right ventricle was not well visualized due to poor acoustic window. Normal right ventricular cavity size. Pacemaker lead present in the ventricle.    Aortic Valve: There is a transcatheter valve replacement in the aortic position.    Mitral Valve: Mildly thickened leaflets. There is moderate mitral annular calcification present. There is mild regurgitation.    Tricuspid Valve: There is mild to moderate regurgitation.    Current Heart Failure Medications  sacubitriL-valsartan 24-26 mg per tablet 1 tablet, 2 times daily, Oral  metoprolol succinate (TOPROL-XL) 24 hr tablet 100 mg, 2 times daily, Oral  furosemide tablet 20 mg, Daily, " Oral    Plan  - Monitor strict I&Os and daily weights.    - Place on telemetry  - Low sodium diet  - Place on fluid restriction of 1.5 L.   - Continue PO lasix daily      Acute encephalopathy  UA with no sign of infection   Currently being treated for pna/procal is WNL  Sodium 130 for several days- urine sodium 46, serum osm pending, urine osm 586  Check cortisol  TSH WNL  B12- 241 - low end of normal - start oral replacement  Folate WNL - folic acid in B complex  Presumptively start thiamine replacement   Likely hospital delirium    QT prolongation   milliseconds.  Ranexa d/c'd    Pleural effusion  Ultrasound of chest with moderate bilateral pleural effusions, right greater than left  Thoracentesis was offered, but family declined  Was managed with IV diuresis, now on p.o. diuretic          Seizure disorder  Seizure precautions  Continue phenytoin    Atherosclerosis of native coronary artery of native heart  Patient with known CAD s/p stent placement and CABG, which is controlled Will continue ASA, Plavix, and Statin and monitor for S/Sx of angina/ACS. Continue to monitor on telemetry.       Patient has extensive coronary artery disease with a refractory angina, patient is well known to me, patient has 13 stents and CABG surgery twice.   Ranexa d/c'd due to QTc prolongation    S/P TAVR (transcatheter aortic valve replacement)    Aware    HTN (hypertension)  Patient's blood pressure range in the last 24 hours was: BP  Min: 106/64  Max: 127/68.The patient's inpatient anti-hypertensive regimen is listed below:  Current Antihypertensives  isosorbide mononitrate 24 hr tablet 60 mg, Daily, Oral  metoprolol succinate (TOPROL-XL) 24 hr tablet 100 mg, 2 times daily, Oral  furosemide tablet 20 mg, Daily, Oral    Plan  - BP is controlled, no changes needed to their regimen      VTE Risk Mitigation (From admission, onward)           Ordered     IP VTE HIGH RISK PATIENT  Once         01/18/25 2055     Place sequential  compression device  Until discontinued         01/18/25 2055                    Discharge Planning   AXEL: 1/27/2025     Code Status: Full Code   Medical Readiness for Discharge Date:   Discharge Plan A: Skilled Nursing Facility                Please place Justification for DME        Ana Paula Varghese NP  Department of Hospital Medicine   [facility]

## 2025-01-24 NOTE — PT/OT/SLP PROGRESS
Physical Therapy Treatment    Patient Name:  Jerome Amaro Jr.   MRN:  [id number]    Recommendations:     Discharge Recommendations: Moderate Intensity Therapy  Discharge Equipment Recommendations: to be determined by next level of care  Barriers to discharge:  increased assist with mobility, decreased activity tolerance, balance deficits    Assessment:     Jerome Amaro Jr. is a 79 y.o. male admitted with a medical diagnosis of Acute CHF (congestive heart failure).  He presents with the following impairments/functional limitations: weakness, impaired endurance, impaired functional mobility, gait instability, impaired self care skills, impaired balance, decreased safety awareness, impaired cognition, pain, orthopedic precautions.    Pt up in chair with visitor present. Pt required min assist for sit to stand transfer with RW.    Pt ambulated 70' with RW and min assist. Pt with hunched posture and decreased foot clearance, step height, and stride length.    Pt returned to chair at end of session. Alarm on and visitor present.    Rehab Prognosis: Fair; patient would benefit from acute skilled PT services to address these deficits and reach maximum level of function.    Recent Surgery: * No surgery found *      Plan:     During this hospitalization, patient to be seen 6 x/week to address the identified rehab impairments via gait training, therapeutic exercises, therapeutic activities, neuromuscular re-education and progress toward the following goals:    Plan of Care Expires:  02/19/25    Subjective     Chief Complaint: none verbalized  Patient/Family Comments/goals: to get better  Pain/Comfort:  Pain Rating 1: 0/10      Objective:     Communicated with nurse prior to session.  Patient found up in chair with chair check, telemetry upon PT entry to room.     General Precautions: Standard, fall, seizure  Orthopedic Precautions: RLE weight bearing as tolerated  Braces: N/A  Respiratory Status: Room air     Functional  Mobility:  Transfers:     Sit to Stand:  minimum assistance with rolling walker  Gait: x 70' with RW and Ana, decreased foot clearance, step height, stride length      AM-PAC 6 CLICK MOBILITY          Treatment & Education:  Pt educated on importance of time OOB, importance of intermittent mobility, safe techniques for transfers/ambulation, discharge recommendations/options, and use of call light for assistance and fall prevention.      Patient left up in chair with all lines intact, call button in reach, chair alarm on, nurse notified, and visitor present..    GOALS:   Multidisciplinary Problems       Physical Therapy Goals          Problem: Physical Therapy    Goal Priority Disciplines Outcome Interventions   Physical Therapy Goal     PT, PT/OT Progressing    Description: Goals to be met by: 25     Patient will increase functional independence with mobility by performin. Supine to sit with [place]  2. Sit to stand transfer with Supervision  3. Gait  x 150 feet with Stand-by Assistance using Rolling Walker.                          DME Justifications:  To be determined    Time Tracking:     PT Received On: 25  PT Start Time: 1358     PT Stop Time: 1407  PT Total Time (min): 9 min     Billable Minutes: Gait Training 9    Treatment Type: Treatment  PT/PTA: PTA     Number of PTA visits since last PT visit: 2025

## 2025-01-24 NOTE — ASSESSMENT & PLAN NOTE
UA with no sign of infection   Currently being treated for pna/procal is WNL  Sodium 130 for several days- urine sodium 46, serum osm pending, urine osm 586  Check cortisol  TSH WNL  B12- 241 - low end of normal - start oral replacement  Folate WNL - folic acid in B complex  Presumptively start thiamine replacement   Likely hospital delirium

## 2025-01-25 LAB
ALBUMIN SERPL BCP-MCNC: 3.5 G/DL (ref 3.5–5.2)
ALP SERPL-CCNC: 105 U/L (ref 55–135)
ALT SERPL W/O P-5'-P-CCNC: 6 U/L (ref 10–44)
ANION GAP SERPL CALC-SCNC: 10 MMOL/L (ref 8–16)
AST SERPL-CCNC: 14 U/L (ref 10–40)
BASOPHILS # BLD AUTO: 0.06 K/UL (ref 0–0.2)
BASOPHILS NFR BLD: 0.8 % (ref 0–1.9)
BILIRUB SERPL-MCNC: 0.8 MG/DL (ref 0.1–1)
BUN SERPL-MCNC: 31 MG/DL (ref 8–23)
CALCIUM SERPL-MCNC: 8.6 MG/DL (ref 8.7–10.5)
CHLORIDE SERPL-SCNC: 94 MMOL/L (ref 95–110)
CO2 SERPL-SCNC: 28 MMOL/L (ref 23–29)
CREAT SERPL-MCNC: 1.1 MG/DL (ref 0.5–1.4)
DIFFERENTIAL METHOD BLD: ABNORMAL
EOSINOPHIL # BLD AUTO: 0.3 K/UL (ref 0–0.5)
EOSINOPHIL NFR BLD: 4.3 % (ref 0–8)
ERYTHROCYTE [DISTWIDTH] IN BLOOD BY AUTOMATED COUNT: 13.6 % (ref 11.5–14.5)
EST. GFR  (NO RACE VARIABLE): >60 ML/MIN/1.73 M^2
GLUCOSE SERPL-MCNC: 82 MG/DL (ref 70–110)
HCT VFR BLD AUTO: 38.6 % (ref 40–54)
HGB BLD-MCNC: 13.1 G/DL (ref 14–18)
IMM GRANULOCYTES # BLD AUTO: 0.04 K/UL (ref 0–0.04)
IMM GRANULOCYTES NFR BLD AUTO: 0.5 % (ref 0–0.5)
LYMPHOCYTES # BLD AUTO: 1.6 K/UL (ref 1–4.8)
LYMPHOCYTES NFR BLD: 20 % (ref 18–48)
MAGNESIUM SERPL-MCNC: 1.9 MG/DL (ref 1.6–2.6)
MCH RBC QN AUTO: 29.2 PG (ref 27–31)
MCHC RBC AUTO-ENTMCNC: 33.9 G/DL (ref 32–36)
MCV RBC AUTO: 86 FL (ref 82–98)
MONOCYTES # BLD AUTO: 1.1 K/UL (ref 0.3–1)
MONOCYTES NFR BLD: 13.5 % (ref 4–15)
NEUTROPHILS # BLD AUTO: 4.7 K/UL (ref 1.8–7.7)
NEUTROPHILS NFR BLD: 60.9 % (ref 38–73)
NRBC BLD-RTO: 0 /100 WBC
OSMOLALITY SERPL: 283 MOSM/KG (ref 280–300)
PLATELET # BLD AUTO: 280 K/UL (ref 150–450)
PMV BLD AUTO: 10.9 FL (ref 9.2–12.9)
POTASSIUM SERPL-SCNC: 4.1 MMOL/L (ref 3.5–5.1)
PROT SERPL-MCNC: 6.3 G/DL (ref 6–8.4)
RBC # BLD AUTO: 4.48 M/UL (ref 4.6–6.2)
SODIUM SERPL-SCNC: 132 MMOL/L (ref 136–145)
WBC # BLD AUTO: 7.75 K/UL (ref 3.9–12.7)

## 2025-01-25 PROCEDURE — 63600175 PHARM REV CODE 636 W HCPCS: Performed by: NURSE PRACTITIONER

## 2025-01-25 PROCEDURE — 27000207 HC ISOLATION

## 2025-01-25 PROCEDURE — 83735 ASSAY OF MAGNESIUM: CPT

## 2025-01-25 PROCEDURE — 80053 COMPREHEN METABOLIC PANEL: CPT

## 2025-01-25 PROCEDURE — 99900031 HC PATIENT EDUCATION (STAT)

## 2025-01-25 PROCEDURE — 97535 SELF CARE MNGMENT TRAINING: CPT

## 2025-01-25 PROCEDURE — 97116 GAIT TRAINING THERAPY: CPT | Mod: CQ

## 2025-01-25 PROCEDURE — 99900035 HC TECH TIME PER 15 MIN (STAT)

## 2025-01-25 PROCEDURE — 36415 COLL VENOUS BLD VENIPUNCTURE: CPT

## 2025-01-25 PROCEDURE — 94761 N-INVAS EAR/PLS OXIMETRY MLT: CPT

## 2025-01-25 PROCEDURE — 12000002 HC ACUTE/MED SURGE SEMI-PRIVATE ROOM

## 2025-01-25 PROCEDURE — 25000003 PHARM REV CODE 250: Performed by: FAMILY MEDICINE

## 2025-01-25 PROCEDURE — 25000003 PHARM REV CODE 250

## 2025-01-25 PROCEDURE — 25000003 PHARM REV CODE 250: Performed by: NURSE PRACTITIONER

## 2025-01-25 PROCEDURE — 83930 ASSAY OF BLOOD OSMOLALITY: CPT

## 2025-01-25 PROCEDURE — 25000003 PHARM REV CODE 250: Performed by: HOSPITALIST

## 2025-01-25 PROCEDURE — 85025 COMPLETE CBC W/AUTO DIFF WBC: CPT

## 2025-01-25 RX ADMIN — SENNOSIDES AND DOCUSATE SODIUM 1 TABLET: 50; 8.6 TABLET ORAL at 09:01

## 2025-01-25 RX ADMIN — SACUBITRIL AND VALSARTAN 1 TABLET: 24; 26 TABLET, FILM COATED ORAL at 09:01

## 2025-01-25 RX ADMIN — MUPIROCIN 1 G: 20 OINTMENT TOPICAL at 01:01

## 2025-01-25 RX ADMIN — Medication 1 TABLET: at 12:01

## 2025-01-25 RX ADMIN — METOPROLOL SUCCINATE 100 MG: 50 TABLET, FILM COATED, EXTENDED RELEASE ORAL at 09:01

## 2025-01-25 RX ADMIN — DIPHENHYDRAMINE HYDROCHLORIDE 5 ML: 12.5 SOLUTION ORAL at 06:01

## 2025-01-25 RX ADMIN — FAMOTIDINE 20 MG: 20 TABLET, FILM COATED ORAL at 04:01

## 2025-01-25 RX ADMIN — CITALOPRAM HYDROBROMIDE 40 MG: 20 TABLET ORAL at 09:01

## 2025-01-25 RX ADMIN — THIAMINE HCL TAB 100 MG 100 MG: 100 TAB at 09:01

## 2025-01-25 RX ADMIN — CLOPIDOGREL BISULFATE 75 MG: 75 TABLET, FILM COATED ORAL at 09:01

## 2025-01-25 RX ADMIN — MUPIROCIN 1 G: 20 OINTMENT TOPICAL at 09:01

## 2025-01-25 RX ADMIN — DOXYCYCLINE HYCLATE 100 MG: 100 CAPSULE ORAL at 09:01

## 2025-01-25 RX ADMIN — ACETAMINOPHEN 650 MG: 325 TABLET ORAL at 01:01

## 2025-01-25 RX ADMIN — ONDANSETRON 4 MG: 2 INJECTION INTRAMUSCULAR; INTRAVENOUS at 12:01

## 2025-01-25 RX ADMIN — FUROSEMIDE 20 MG: 20 TABLET ORAL at 09:01

## 2025-01-25 RX ADMIN — EZETIMIBE 10 MG: 10 TABLET ORAL at 09:01

## 2025-01-25 RX ADMIN — MUPIROCIN 2 G: 20 OINTMENT TOPICAL at 09:01

## 2025-01-25 RX ADMIN — PHENYTOIN SODIUM 400 MG: 100 CAPSULE, EXTENDED RELEASE ORAL at 12:01

## 2025-01-25 RX ADMIN — ISOSORBIDE MONONITRATE 60 MG: 60 TABLET, EXTENDED RELEASE ORAL at 09:01

## 2025-01-25 RX ADMIN — ACETAMINOPHEN 650 MG: 325 TABLET ORAL at 06:01

## 2025-01-25 NOTE — PLAN OF CARE
SNF placement pending. Patient still with 1:1 sitter at this time.       01/25/25 1411   Post-Acute Status   Post-Acute Authorization Placement   Post-Acute Placement Status Pending post-acute provider review/more information requested

## 2025-01-25 NOTE — RESPIRATORY THERAPY
01/24/25 1957   Patient Assessment/Suction   Level of Consciousness (AVPU)   (pt sleeping)   Respiratory Effort Unlabored;Normal   Expansion/Accessory Muscles/Retractions no use of accessory muscles;no retractions   All Lung Fields Breath Sounds Anterior:;diminished   Rhythm/Pattern, Respiratory unlabored;no shortness of breath reported   Cough Frequency no cough   PRE-TX-O2   Device (Oxygen Therapy) room air   SpO2 96 %   Pulse Oximetry Type Intermittent   $ Pulse Oximetry - Multiple Charge Pulse Oximetry - Multiple   Pulse 62   Resp 18   Positioning HOB elevated 30 degrees   Aerosol Therapy   $ Aerosol Therapy Charges PRN treatment not required   Daily Review of Necessity (SVN) completed   Respiratory Treatment Status (SVN) PRN treatment not required   Education   $ Education 15 min

## 2025-01-25 NOTE — PROGRESS NOTES
[facility] Medicine  Progress Note    Patient Name: Jerome Amaro Jr.  MRN: [id number]  Patient Class: IP- Inpatient   Admission Date: 1/18/2025  Length of Stay: 6 days  Attending Physician: Mayra Miller MD  Primary Care Provider: Magda Ruiz FNP-C        Subjective     Principal Problem:Acute CHF (congestive heart failure)        HPI:  79 year old male with a past medical history of aortic valve disease, atrial fibrillation on anticoagulation therapy, MI, HLD, PVD, seizures, difficulty swallowing, CAD, CHF, aortic valve disease who presents to the emergency room with weakness in the legs and falls. There was also concern for intermittent altered mental status. Patient has a strong cardiac history with 2 bypass surgeries, atrial fibrillation, aortic valve disease and valve replacement, multiple cardiac stents, hypertension, hyperlipidemia, PVD, seizure history. Patient has been weak in general. His daughter says that he has had several falls over the last couple of days. He has just not been feeling well. No reported fever or chills. No reported vomiting or any diarrhea. He is not have any abdominal pain. He occasionally has chest pain which she takes nitro for which is not abnormal for him. He does not have any new chest pain but sometimes feels short of breath. Today he had a fall where he hit the back of his head on the ground. He did not lose consciousness. No reported vomiting or any seizure activity. He is on Eliquis and Plavix. No other reported injuries from the fall.     In [facility] ER, H/H 10.6/31.1, Na  134, T. Bili 1.3, BNP 1309, Troponin I high sensitivity 56.8, Mg 1.8, Influenza and flu negative, phenytoin 18.5, CT c spine no acute fracture, multilevel spondylosis. Incompletely evaluated ill-defined ground-glass opacity in the lung apices bilaterally, CT head no acute findings, chest x-ray Bilateral pleural effusions with adjacent airspace disease and/or atelectasis.  Additional airspace disease in the right upper lobe. Pulmonary vascular congestion. Mildly enlarged cardiac silhouette. Left chest wall cardiac pacemaker.   Admit to hospital medicine for pleural effusion, CHF exacerbation    Overview/Hospital Course:  Mr. Amaro has been monitored closely during his hospitalization. He was admitted on 1/18/25 for generalized weakness and falls.  He states he slipped off of his slipper walking to the kitchen.  He endorses feeling more weak than usual. He did not lose consciousness nor hit his head. He had a CT head and C spine with no acute findings. CT chest revealed bilat pleural effusions and concern for infectious process in RUL. BNP of 1300.  He was started on IV Lasix, then transitioned to p.o. Lasix.  Ultrasound of chest showed bilateral moderate pleural effusions right greater than left.  Thoracentesis was offered to family, but they declined.  He was started on antibiotics.  PT and OT were consulted, moderate intensity therapy was recommended.  Patient is now pending SNF placement.  Patient has had 2 falls inpatient with repeat CT head negative for acute abnormality.  Fall precautions in place. He's had confusion throughout his hospitalization. Daughter requested neuro consult.  Teleneuro was consulted, recommended EEG.  EEG which was negative for epileptiform discharges, but did reveal slowing consistent with encephalopathy. Sodium has been trending down and is 130, urine osm 586, urine sodium 46, serum osm is pending. TSH is WNL. B12 is 241 and folate 8 - started on oral replacement.     Interval History:  Patient said he feels okay but he is slightly nauseous today.    Review of Systems   All other systems reviewed and are negative.    Objective:     Vital Signs (Most Recent):  Temp: 97.6 °F (36.4 °C) (01/25/25 0749)  Pulse: 63 (01/25/25 0749)  Resp: 18 (01/25/25 0749)  BP: 134/69 (01/25/25 0749)  SpO2: 98 % (01/25/25 0749) Vital Signs (24h Range):  Temp:  [97.4 °F (36.3  "°C)-98 °F (36.7 °C)] 97.6 °F (36.4 °C)  Pulse:  [60-77] 63  Resp:  [16-18] 18  SpO2:  [93 %-98 %] 98 %  BP: ()/(54-79) 134/69     Weight: 73.3 kg (161 lb 9.6 oz)  Body mass index is 26.08 kg/m².    Intake/Output Summary (Last 24 hours) at 1/25/2025 1705  Last data filed at 1/25/2025 0705  Gross per 24 hour   Intake 120 ml   Output 600 ml   Net -480 ml         Physical Exam  Cardiovascular:      Rate and Rhythm: Normal rate.      Pulses: Normal pulses.   Abdominal:      General: Abdomen is flat. There is no distension.      Palpations: Abdomen is soft.      Tenderness: There is no abdominal tenderness.   Neurological:      Mental Status: He is alert and oriented to person, place, and time.             Significant Labs: All pertinent labs within the past 24 hours have been reviewed.    Significant Imaging: I have reviewed all pertinent imaging results/findings within the past 24 hours.    Assessment and Plan     * Acute CHF (congestive heart failure)  Patient  heart failure that is Acute on chronic. Most recent BNP and echo results are listed below.  No results for input(s): "BNP" in the last 72 hours.    Latest ECHO  Results for orders placed during the hospital encounter of 06/15/24    Echo    Interpretation Summary    Left Ventricle: The left ventricle is normal in size. Increased ventricular mass. Mildly increased wall thickness. There is mild concentric hypertrophy. Unable to assess wall motion. Septal motion is consistent with pacing. There is normal systolic function with a visually estimated ejection fraction of 55 - 60%. Diastolic function cannot be reliably determined in the presence of mitral annular calcification.    Right Ventricle: Right ventricle was not well visualized due to poor acoustic window. Normal right ventricular cavity size. Pacemaker lead present in the ventricle.    Aortic Valve: There is a transcatheter valve replacement in the aortic position.    Mitral Valve: Mildly thickened " leaflets. There is moderate mitral annular calcification present. There is mild regurgitation.    Tricuspid Valve: There is mild to moderate regurgitation.    Current Heart Failure Medications  sacubitriL-valsartan 24-26 mg per tablet 1 tablet, 2 times daily, Oral  metoprolol succinate (TOPROL-XL) 24 hr tablet 100 mg, 2 times daily, Oral  furosemide tablet 20 mg, Daily, Oral    Plan  - Monitor strict I&Os and daily weights.    - Place on telemetry  - Low sodium diet  - Place on fluid restriction of 1.5 L.   - Continue PO lasix daily      Acute encephalopathy  UA with no sign of infection   Currently being treated for pna/procal is WNL  Sodium 130 for several days- urine sodium 46, serum osm pending, urine osm 586  Check cortisol  TSH WNL  B12- 241 - low end of normal - start oral replacement  Folate WNL - folic acid in B complex  Presumptively start thiamine replacement   Likely hospital delirium    QT prolongation   milliseconds.  Ranexa d/c'd    Pleural effusion  Ultrasound of chest with moderate bilateral pleural effusions, right greater than left  Thoracentesis was offered, but family declined  Was managed with IV diuresis, now on p.o. diuretic          Seizure disorder  Seizure precautions  Continue phenytoin    Atherosclerosis of native coronary artery of native heart  Patient with known CAD s/p stent placement and CABG, which is controlled Will continue ASA, Plavix, and Statin and monitor for S/Sx of angina/ACS. Continue to monitor on telemetry.       Patient has extensive coronary artery disease with a refractory angina, patient is well known to me, patient has 13 stents and CABG surgery twice.   Ranexa d/c'd due to QTc prolongation    S/P TAVR (transcatheter aortic valve replacement)    Aware    HTN (hypertension)  Patient's blood pressure range in the last 24 hours was: BP  Min: 106/64  Max: 127/68.The patient's inpatient anti-hypertensive regimen is listed below:  Current  Antihypertensives  isosorbide mononitrate 24 hr tablet 60 mg, Daily, Oral  metoprolol succinate (TOPROL-XL) 24 hr tablet 100 mg, 2 times daily, Oral  furosemide tablet 20 mg, Daily, Oral    Plan  - BP is controlled, no changes needed to their regimen      VTE Risk Mitigation (From admission, onward)           Ordered     IP VTE HIGH RISK PATIENT  Once         01/18/25 2055     Place sequential compression device  Until discontinued         01/18/25 2055                    Discharge Planning   AXEL: 1/27/2025     Code Status: Full Code   Medical Readiness for Discharge Date:   Discharge Plan A: Skilled Nursing Facility                Please place Justification for DME        Yumiko Oropeza DO  Department of Hospital Medicine   [facility]

## 2025-01-25 NOTE — SUBJECTIVE & OBJECTIVE
Interval History:  Patient said he feels okay but he is slightly nauseous today.    Review of Systems   All other systems reviewed and are negative.    Objective:     Vital Signs (Most Recent):  Temp: 97.6 °F (36.4 °C) (01/25/25 0749)  Pulse: 63 (01/25/25 0749)  Resp: 18 (01/25/25 0749)  BP: 134/69 (01/25/25 0749)  SpO2: 98 % (01/25/25 0749) Vital Signs (24h Range):  Temp:  [97.4 °F (36.3 °C)-98 °F (36.7 °C)] 97.6 °F (36.4 °C)  Pulse:  [60-77] 63  Resp:  [16-18] 18  SpO2:  [93 %-98 %] 98 %  BP: ()/(54-79) 134/69     Weight: 73.3 kg (161 lb 9.6 oz)  Body mass index is 26.08 kg/m².    Intake/Output Summary (Last 24 hours) at 1/25/2025 1705  Last data filed at 1/25/2025 0705  Gross per 24 hour   Intake 120 ml   Output 600 ml   Net -480 ml         Physical Exam  Cardiovascular:      Rate and Rhythm: Normal rate.      Pulses: Normal pulses.   Abdominal:      General: Abdomen is flat. There is no distension.      Palpations: Abdomen is soft.      Tenderness: There is no abdominal tenderness.   Neurological:      Mental Status: He is alert and oriented to person, place, and time.             Significant Labs: All pertinent labs within the past 24 hours have been reviewed.    Significant Imaging: I have reviewed all pertinent imaging results/findings within the past 24 hours.

## 2025-01-25 NOTE — PT/OT/SLP PROGRESS
"Physical Therapy Treatment    Patient Name:  Jerome Amaro Jr.   MRN:  [id number]    Recommendations:     Discharge Recommendations: Moderate Intensity Therapy  Discharge Equipment Recommendations: to be determined by next level of care  Barriers to discharge:  fall risk, decreased mobility from baseline    Assessment:     Jerome Amaro Jr. is a 79 y.o. male admitted with a medical diagnosis of Acute CHF (congestive heart failure).  He presents with the following impairments/functional limitations: weakness, impaired endurance, gait instability, impaired functional mobility, decreased safety awareness, impaired cognition.    Pt roused to voice and agreed to participate. Increased gait distance to 110' with RW, CGA, multiple standing stops to converse, and frequent VC for increased step clearance (displays shuffling gait L>R). Remained up in chair with family present. Good participation and tolerance.     Rehab Prognosis: Good; patient would benefit from acute skilled PT services to address these deficits and reach maximum level of function.    Recent Surgery: * No surgery found *      Plan:     During this hospitalization, patient to be seen 6 x/week to address the identified rehab impairments via gait training, therapeutic activities, therapeutic exercises, neuromuscular re-education and progress toward the following goals:    Plan of Care Expires:  02/19/25    Subjective     Chief Complaint: none  Patient/Family Comments/goals: "they keep moving me in the middle of the night and then I don't know where I am when I wake up"  Pain/Comfort:  Pain Rating 1: 0/10  Pain Rating Post-Intervention 1: 0/10      Objective:     Communicated with nurse prior to session.  Patient found HOB elevated with bed alarm, telemetry, peripheral IV upon PT entry to room.     General Precautions: Standard, fall, seizure  Orthopedic Precautions: RLE weight bearing as tolerated  Braces: N/A  Respiratory Status: Room air     Functional " Mobility:  Bed Mobility:     Supine to Sit: stand by assistance  Transfers:     Sit to Stand:  contact guard assistance with rolling walker  Bed to Chair: contact guard assistance with  rolling walker  using  Step Transfer  Gait: 110' RW CGA, slow pace, frequent stops to converse; shuffling gait w/ VC for increased step clearance L>R      AM-PAC 6 CLICK MOBILITY          Treatment & Education:    -Pt educ: benefits of participation with therapy, OOB to chair during the day and for all meals, staff assist for mobility, fall prevention, call light, frequent independent BLE therex  -Gait training with RW      Patient left up in chair with all lines intact, call button in reach, nurse notified, and daughter and son in law present..    GOALS:   Multidisciplinary Problems       Physical Therapy Goals          Problem: Physical Therapy    Goal Priority Disciplines Outcome Interventions   Physical Therapy Goal     PT, PT/OT Progressing    Description: Goals to be met by: 25     Patient will increase functional independence with mobility by performin. Supine to sit with [place]  2. Sit to stand transfer with Supervision  3. Gait  x 150 feet with Stand-by Assistance using Rolling Walker.                          DME Justifications:      Time Tracking:     PT Received On: 25  PT Start Time: 1125     PT Stop Time: 1140  PT Total Time (min): 15 min     Billable Minutes: Gait Training 15    Treatment Type: Treatment  PT/PTA: PTA     Number of PTA visits since last PT visit: 2025

## 2025-01-26 LAB
ALBUMIN SERPL BCP-MCNC: 3.4 G/DL (ref 3.5–5.2)
ALP SERPL-CCNC: 107 U/L (ref 55–135)
ALT SERPL W/O P-5'-P-CCNC: 4 U/L (ref 10–44)
ANION GAP SERPL CALC-SCNC: 10 MMOL/L (ref 8–16)
AST SERPL-CCNC: 18 U/L (ref 10–40)
BASOPHILS # BLD AUTO: 0.07 K/UL (ref 0–0.2)
BASOPHILS NFR BLD: 0.9 % (ref 0–1.9)
BILIRUB SERPL-MCNC: 0.8 MG/DL (ref 0.1–1)
BUN SERPL-MCNC: 35 MG/DL (ref 8–23)
CALCIUM SERPL-MCNC: 8.6 MG/DL (ref 8.7–10.5)
CHLORIDE SERPL-SCNC: 95 MMOL/L (ref 95–110)
CO2 SERPL-SCNC: 23 MMOL/L (ref 23–29)
CREAT SERPL-MCNC: 1.2 MG/DL (ref 0.5–1.4)
DIFFERENTIAL METHOD BLD: ABNORMAL
EOSINOPHIL # BLD AUTO: 0.4 K/UL (ref 0–0.5)
EOSINOPHIL NFR BLD: 4.4 % (ref 0–8)
ERYTHROCYTE [DISTWIDTH] IN BLOOD BY AUTOMATED COUNT: 13.7 % (ref 11.5–14.5)
EST. GFR  (NO RACE VARIABLE): >60 ML/MIN/1.73 M^2
GLUCOSE SERPL-MCNC: 78 MG/DL (ref 70–110)
HCT VFR BLD AUTO: 42 % (ref 40–54)
HGB BLD-MCNC: 14.2 G/DL (ref 14–18)
IMM GRANULOCYTES # BLD AUTO: 0.04 K/UL (ref 0–0.04)
IMM GRANULOCYTES NFR BLD AUTO: 0.5 % (ref 0–0.5)
LYMPHOCYTES # BLD AUTO: 1.8 K/UL (ref 1–4.8)
LYMPHOCYTES NFR BLD: 22.6 % (ref 18–48)
MAGNESIUM SERPL-MCNC: 1.9 MG/DL (ref 1.6–2.6)
MCH RBC QN AUTO: 29.2 PG (ref 27–31)
MCHC RBC AUTO-ENTMCNC: 33.8 G/DL (ref 32–36)
MCV RBC AUTO: 86 FL (ref 82–98)
MONOCYTES # BLD AUTO: 1.1 K/UL (ref 0.3–1)
MONOCYTES NFR BLD: 13.1 % (ref 4–15)
NEUTROPHILS # BLD AUTO: 4.7 K/UL (ref 1.8–7.7)
NEUTROPHILS NFR BLD: 58.5 % (ref 38–73)
NRBC BLD-RTO: 0 /100 WBC
PLATELET # BLD AUTO: 258 K/UL (ref 150–450)
PMV BLD AUTO: 10.5 FL (ref 9.2–12.9)
POTASSIUM SERPL-SCNC: 4 MMOL/L (ref 3.5–5.1)
PROT SERPL-MCNC: 6.2 G/DL (ref 6–8.4)
RBC # BLD AUTO: 4.86 M/UL (ref 4.6–6.2)
SODIUM SERPL-SCNC: 128 MMOL/L (ref 136–145)
WBC # BLD AUTO: 8.04 K/UL (ref 3.9–12.7)

## 2025-01-26 PROCEDURE — 99900035 HC TECH TIME PER 15 MIN (STAT)

## 2025-01-26 PROCEDURE — 36415 COLL VENOUS BLD VENIPUNCTURE: CPT

## 2025-01-26 PROCEDURE — 25000003 PHARM REV CODE 250: Performed by: NURSE PRACTITIONER

## 2025-01-26 PROCEDURE — 25000003 PHARM REV CODE 250

## 2025-01-26 PROCEDURE — 12000002 HC ACUTE/MED SURGE SEMI-PRIVATE ROOM

## 2025-01-26 PROCEDURE — 94761 N-INVAS EAR/PLS OXIMETRY MLT: CPT

## 2025-01-26 PROCEDURE — 83735 ASSAY OF MAGNESIUM: CPT

## 2025-01-26 PROCEDURE — 27000207 HC ISOLATION

## 2025-01-26 PROCEDURE — 80053 COMPREHEN METABOLIC PANEL: CPT

## 2025-01-26 PROCEDURE — 85025 COMPLETE CBC W/AUTO DIFF WBC: CPT

## 2025-01-26 RX ADMIN — ISOSORBIDE MONONITRATE 60 MG: 60 TABLET, EXTENDED RELEASE ORAL at 09:01

## 2025-01-26 RX ADMIN — CITALOPRAM HYDROBROMIDE 40 MG: 20 TABLET ORAL at 09:01

## 2025-01-26 RX ADMIN — MUPIROCIN 1 G: 20 OINTMENT TOPICAL at 09:01

## 2025-01-26 RX ADMIN — PHENYTOIN SODIUM 400 MG: 100 CAPSULE, EXTENDED RELEASE ORAL at 09:01

## 2025-01-26 RX ADMIN — SACUBITRIL AND VALSARTAN 1 TABLET: 24; 26 TABLET, FILM COATED ORAL at 08:01

## 2025-01-26 RX ADMIN — SACUBITRIL AND VALSARTAN 1 TABLET: 24; 26 TABLET, FILM COATED ORAL at 09:01

## 2025-01-26 RX ADMIN — SENNOSIDES AND DOCUSATE SODIUM 1 TABLET: 50; 8.6 TABLET ORAL at 09:01

## 2025-01-26 RX ADMIN — SENNOSIDES AND DOCUSATE SODIUM 1 TABLET: 50; 8.6 TABLET ORAL at 08:01

## 2025-01-26 RX ADMIN — MUPIROCIN 1 G: 20 OINTMENT TOPICAL at 08:01

## 2025-01-26 RX ADMIN — FAMOTIDINE 20 MG: 20 TABLET, FILM COATED ORAL at 05:01

## 2025-01-26 RX ADMIN — THIAMINE HCL TAB 100 MG 100 MG: 100 TAB at 09:01

## 2025-01-26 RX ADMIN — EZETIMIBE 10 MG: 10 TABLET ORAL at 09:01

## 2025-01-26 RX ADMIN — METOPROLOL SUCCINATE 100 MG: 50 TABLET, FILM COATED, EXTENDED RELEASE ORAL at 08:01

## 2025-01-26 RX ADMIN — FUROSEMIDE 20 MG: 20 TABLET ORAL at 09:01

## 2025-01-26 RX ADMIN — MUPIROCIN: 20 OINTMENT TOPICAL at 03:01

## 2025-01-26 RX ADMIN — CLOPIDOGREL BISULFATE 75 MG: 75 TABLET, FILM COATED ORAL at 09:01

## 2025-01-26 RX ADMIN — Medication 1 TABLET: at 09:01

## 2025-01-26 NOTE — SUBJECTIVE & OBJECTIVE
Interval History:  Patient said he feels better today    Review of Systems   All other systems reviewed and are negative.    Objective:     Vital Signs (Most Recent):  Temp: 97.6 °F (36.4 °C) (01/26/25 1510)  Pulse: 72 (01/26/25 1510)  Resp: 17 (01/26/25 1110)  BP: (!) 118/58 (01/26/25 1510)  SpO2: 97 % (01/26/25 1510) Vital Signs (24h Range):  Temp:  [97.3 °F (36.3 °C)-97.6 °F (36.4 °C)] 97.6 °F (36.4 °C)  Pulse:  [57-72] 72  Resp:  [17-19] 17  SpO2:  [90 %-98 %] 97 %  BP: (105-156)/(53-87) 118/58     Weight: 73.3 kg (161 lb 9.6 oz)  Body mass index is 26.08 kg/m².    Intake/Output Summary (Last 24 hours) at 1/26/2025 1653  Last data filed at 1/26/2025 0554  Gross per 24 hour   Intake 320 ml   Output 400 ml   Net -80 ml         Physical Exam  Cardiovascular:      Rate and Rhythm: Normal rate.      Pulses: Normal pulses.   Abdominal:      General: Abdomen is flat. There is no distension.      Palpations: Abdomen is soft.      Tenderness: There is no abdominal tenderness.   Neurological:      Mental Status: He is alert and oriented to person, place, and time.             Significant Labs: All pertinent labs within the past 24 hours have been reviewed.    Significant Imaging: I have reviewed all pertinent imaging results/findings within the past 24 hours.

## 2025-01-26 NOTE — PROGRESS NOTES
[facility] Medicine  Progress Note    Patient Name: Jerome Amaro Jr.  MRN: [id number]  Patient Class: IP- Inpatient   Admission Date: 1/18/2025  Length of Stay: 7 days  Attending Physician: Yumiko Oropeza DO  Primary Care Provider: Magda Ruiz FNP-C        Subjective     Principal Problem:Acute CHF (congestive heart failure)        HPI:  79 year old male with a past medical history of aortic valve disease, atrial fibrillation on anticoagulation therapy, MI, HLD, PVD, seizures, difficulty swallowing, CAD, CHF, aortic valve disease who presents to the emergency room with weakness in the legs and falls. There was also concern for intermittent altered mental status. Patient has a strong cardiac history with 2 bypass surgeries, atrial fibrillation, aortic valve disease and valve replacement, multiple cardiac stents, hypertension, hyperlipidemia, PVD, seizure history. Patient has been weak in general. His daughter says that he has had several falls over the last couple of days. He has just not been feeling well. No reported fever or chills. No reported vomiting or any diarrhea. He is not have any abdominal pain. He occasionally has chest pain which she takes nitro for which is not abnormal for him. He does not have any new chest pain but sometimes feels short of breath. Today he had a fall where he hit the back of his head on the ground. He did not lose consciousness. No reported vomiting or any seizure activity. He is on Eliquis and Plavix. No other reported injuries from the fall.     In The ER, H/H 10.6/31.1, Na  134, T. Bili 1.3, BNP 1309, Troponin I high sensitivity 56.8, Mg 1.8, Influenza and flu negative, phenytoin 18.5, CT c spine no acute fracture, multilevel spondylosis. Incompletely evaluated ill-defined ground-glass opacity in the lung apices bilaterally, CT head no acute findings, chest x-ray Bilateral pleural effusions with adjacent airspace disease and/or atelectasis. Additional  airspace disease in the right upper lobe. Pulmonary vascular congestion. Mildly enlarged cardiac silhouette. Left chest wall cardiac pacemaker.   Admit to hospital medicine for pleural effusion, CHF exacerbation    Overview/Hospital Course:  Mr. Amaro has been monitored closely during his hospitalization. He was admitted on 1/18/25 for generalized weakness and falls.  He states he slipped off of his slipper walking to the kitchen.  He endorses feeling more weak than usual. He did not lose consciousness nor hit his head. He had a CT head and C spine with no acute findings. CT chest revealed bilat pleural effusions and concern for infectious process in RUL. BNP of 1300.  He was started on IV Lasix, then transitioned to p.o. Lasix.  Ultrasound of chest showed bilateral moderate pleural effusions right greater than left.  Thoracentesis was offered to family, but they declined.  He was started on antibiotics.  PT and OT were consulted, moderate intensity therapy was recommended.  Patient is now pending SNF placement.  Patient has had 2 falls inpatient with repeat CT head negative for acute abnormality.  Fall precautions in place. He's had confusion throughout his hospitalization. Daughter requested neuro consult.  Teleneuro was consulted, recommended EEG.  EEG which was negative for epileptiform discharges, but did reveal slowing consistent with encephalopathy. Sodium has been trending down and is 130, urine osm 586, urine sodium 46, serum osm is pending. TSH is WNL. B12 is 241 and folate 8 - started on oral replacement.     Held furosemide and citalopram for hyponatremia on 1/26 and patient was monitored closely    Interval History:  Patient said he feels better today    Review of Systems   All other systems reviewed and are negative.    Objective:     Vital Signs (Most Recent):  Temp: 97.6 °F (36.4 °C) (01/26/25 1510)  Pulse: 72 (01/26/25 1510)  Resp: 17 (01/26/25 1110)  BP: (!) 118/58 (01/26/25 1510)  SpO2: 97 %  "(01/26/25 1510) Vital Signs (24h Range):  Temp:  [97.3 °F (36.3 °C)-97.6 °F (36.4 °C)] 97.6 °F (36.4 °C)  Pulse:  [57-72] 72  Resp:  [17-19] 17  SpO2:  [90 %-98 %] 97 %  BP: (105-156)/(53-87) 118/58     Weight: 73.3 kg (161 lb 9.6 oz)  Body mass index is 26.08 kg/m².    Intake/Output Summary (Last 24 hours) at 1/26/2025 1653  Last data filed at 1/26/2025 0554  Gross per 24 hour   Intake 320 ml   Output 400 ml   Net -80 ml         Physical Exam  Cardiovascular:      Rate and Rhythm: Normal rate.      Pulses: Normal pulses.   Abdominal:      General: Abdomen is flat. There is no distension.      Palpations: Abdomen is soft.      Tenderness: There is no abdominal tenderness.   Neurological:      Mental Status: He is alert and oriented to person, place, and time.             Significant Labs: All pertinent labs within the past 24 hours have been reviewed.    Significant Imaging: I have reviewed all pertinent imaging results/findings within the past 24 hours.    Assessment and Plan     * Acute CHF (congestive heart failure)  Patient  heart failure that is Acute on chronic. Most recent BNP and echo results are listed below.  No results for input(s): "BNP" in the last 72 hours.    Latest ECHO  Results for orders placed during the hospital encounter of 06/15/24    Echo    Interpretation Summary    Left Ventricle: The left ventricle is normal in size. Increased ventricular mass. Mildly increased wall thickness. There is mild concentric hypertrophy. Unable to assess wall motion. Septal motion is consistent with pacing. There is normal systolic function with a visually estimated ejection fraction of 55 - 60%. Diastolic function cannot be reliably determined in the presence of mitral annular calcification.    Right Ventricle: Right ventricle was not well visualized due to poor acoustic window. Normal right ventricular cavity size. Pacemaker lead present in the ventricle.    Aortic Valve: There is a transcatheter valve " replacement in the aortic position.    Mitral Valve: Mildly thickened leaflets. There is moderate mitral annular calcification present. There is mild regurgitation.    Tricuspid Valve: There is mild to moderate regurgitation.    Current Heart Failure Medications  sacubitriL-valsartan 24-26 mg per tablet 1 tablet, 2 times daily, Oral  metoprolol succinate (TOPROL-XL) 24 hr tablet 100 mg, 2 times daily, Oral  furosemide tablet 20 mg, Daily, Oral    Plan  - Monitor strict I&Os and daily weights.    - Place on telemetry  - Low sodium diet  - Place on fluid restriction of 1.5 L.   - Continue PO lasix daily      Acute encephalopathy  UA with no sign of infection   Currently being treated for pna/procal is WNL  Sodium 130 for several days- urine sodium 46, serum osm pending, urine osm 586  Check cortisol  TSH WNL  B12- 241 - low end of normal - start oral replacement  Folate WNL - folic acid in B complex  Presumptively start thiamine replacement   Likely hospital delirium    QT prolongation   milliseconds.  Ranexa d/c'd    Pleural effusion  Ultrasound of chest with moderate bilateral pleural effusions, right greater than left  Thoracentesis was offered, but family declined  Was managed with IV diuresis, now on p.o. diuretic          Seizure disorder  Seizure precautions  Continue phenytoin    Atherosclerosis of native coronary artery of native heart  Patient with known CAD s/p stent placement and CABG, which is controlled Will continue ASA, Plavix, and Statin and monitor for S/Sx of angina/ACS. Continue to monitor on telemetry.       Patient has extensive coronary artery disease with a refractory angina, patient is well known to me, patient has 13 stents and CABG surgery twice.   Ranexa d/c'd due to QTc prolongation    S/P TAVR (transcatheter aortic valve replacement)    Aware    HTN (hypertension)  Patient's blood pressure range in the last 24 hours was: BP  Min: 106/64  Max: 127/68.The patient's inpatient  anti-hypertensive regimen is listed below:  Current Antihypertensives  isosorbide mononitrate 24 hr tablet 60 mg, Daily, Oral  metoprolol succinate (TOPROL-XL) 24 hr tablet 100 mg, 2 times daily, Oral  furosemide tablet 20 mg, Daily, Oral    Plan  - BP is controlled, no changes needed to their regimen      VTE Risk Mitigation (From admission, onward)           Ordered     IP VTE HIGH RISK PATIENT  Once         01/18/25 2055     Place sequential compression device  Until discontinued         01/18/25 2055                    Discharge Planning   AXEL: 1/29/2025     Code Status: Full Code   Medical Readiness for Discharge Date:   Discharge Plan A: Skilled Nursing Facility                Please place Justification for DME        Yumiko Oropeza DO  Department of Hospital Medicine   [facility]

## 2025-01-26 NOTE — PLAN OF CARE
01/25/25 2009   Patient Assessment/Suction   Level of Consciousness (AVPU) alert   Respiratory Effort Normal;Unlabored   Expansion/Accessory Muscles/Retractions no use of accessory muscles;expansion symmetric   Rhythm/Pattern, Respiratory depth regular;pattern regular;unlabored;no shortness of breath reported   Cough Frequency no cough   PRE-TX-O2   Device (Oxygen Therapy) room air   SpO2 96 %   Pulse Oximetry Type Intermittent   $ Pulse Oximetry - Multiple Charge Pulse Oximetry - Multiple   Pulse 62   Resp 18   Aerosol Therapy   $ Aerosol Therapy Charges PRN treatment not required   Respiratory Treatment Status (SVN) PRN treatment not required   Education   $ Education Bronchodilator;15 min

## 2025-01-26 NOTE — PT/OT/SLP PROGRESS
Occupational Therapy   Treatment    Name: Jerome Amaro Jr.  MRN: [id number]  Admitting Diagnosis:  Acute CHF (congestive heart failure)       Recommendations:     Discharge Recommendations: Moderate Intensity Therapy  Discharge Equipment Recommendations:  to be determined by next level of care  Barriers to discharge:  Decreased caregiver support (high risk for fall, lacks good safety/judgement)    Assessment:     Jerome Amaro Jr. is a 79 y.o. male with a medical diagnosis of Acute CHF (congestive heart failure).  He presents with Performance deficits affecting function are impaired cognition, decreased lower extremity function, decreased safety awareness, gait instability, impaired balance, impaired self care skills, impaired functional mobility, weakness, impaired endurance.     Pt ambulated CGA w/ RW to bathroom, shuffling feet. He performed g/hygiene standing at sink~3 minutes, c/o back pain and dizziness and needed to quickly ambulate BTB w/ Min A and RW due to feeling faint. Pt returned to supine w/ SBA . Nurse as notified.     Rehab Prognosis:  Good and Fair; patient would benefit from acute skilled OT services to address these deficits and reach maximum level of function.       Plan:     Patient to be seen 5 x/week to address the above listed problems via self-care/home management, therapeutic activities, therapeutic exercises  Plan of Care Expires: 02/19/25  Plan of Care Reviewed with: patient    Subjective     Chief Complaint: back pain, R lower abd pain.  Patient/Family Comments/goals: pt agreeable. Pt declined need to use toilet.   Pain/Comfort:  Pain Rating 1: 8/10  Location - Side 1: Bilateral  Location - Orientation 1: lower  Location 1: back  Pain Addressed 1: Reposition, Distraction, Cessation of Activity, Nurse notified  Pain Rating Post-Intervention 1: 8/10  Pain Rating 2: 6/10  Location - Side 2: Right  Location - Orientation 2: lower  Location 2: abdomen  Pain Addressed 2: Reposition, Distraction,  Cessation of Activity, Nurse notified  Pain Rating Post-Intervention 2: 6/10    Objective:     Communicated with: nurse prior to session.  Patient found HOB elevated with bed alarm, telemetry, peripheral IV upon OT entry to room.    General Precautions: Standard, fall, seizure (fluid)    Orthopedic Precautions:RLE weight bearing as tolerated  Braces: N/A  Respiratory Status: Room air     Occupational Performance:     Bed Mobility:    Patient completed Rolling/Turning to Right with stand by assistance  Patient completed Scooting/Bridging with stand by assistance  Patient completed Supine to Sit with stand by assistance  Patient completed Sit to Supine with stand by assistance     Functional Mobility/Transfers:  Patient completed Sit <> Stand Transfer with contact guard assistance with  rolling walker   Functional Mobility: ambulated 10ft   CGA w/ RW to bathroom shuffling feet and min A from bathroom back to bed 2/2 feeling faint and increased unsteadiness.     Activities of Daily Living:  Grooming: contact guard assistance to brush teeth standing inside RW at sink. Pt felt dizzy and faint and needed to quickly get back to sit down.      Treatment & Education:  Pt seen for safe self care  activities and fx mobility retraining as stated above.  All questions/concerns addressed within scope.  Call don't fall. Pt  acknowledged.  Purpose of OT and POC      Patient left HOB elevated with all lines intact, call button in reach, bed alarm on, and nurse notified    GOALS:   Multidisciplinary Problems       Occupational Therapy Goals          Problem: Occupational Therapy    Goal Priority Disciplines Outcome Interventions   Occupational Therapy Goal     OT, PT/OT Progressing    Description: Goals to be met by: 2/8/2025     Patient will increase functional independence with ADLs by performing:    UE Dressing with Modified [place].  LE Dressing with Modified [place].  Grooming while standing at sink with Modified  [place].  Toileting from toilet with Modified [place] for hygiene and clothing management.   Step transfer with [place] and Modified [place]  Toilet transfer to toilet with Modified [place].                         Time Tracking:     OT Date of Treatment: 01/25/25  OT Start Time: 1340  OT Stop Time: 1352  OT Total Time (min): 12 min    Billable Minutes:Therapeutic Activity 12  Total Time 12    OT/ALTA: OT     Number of ALTA visits since last OT visit: 2    1/25/2025

## 2025-01-27 DIAGNOSIS — J90 PLEURAL EFFUSION: Primary | ICD-10-CM

## 2025-01-27 LAB
ALBUMIN SERPL BCP-MCNC: 3.3 G/DL (ref 3.5–5.2)
ALP SERPL-CCNC: 105 U/L (ref 55–135)
ALT SERPL W/O P-5'-P-CCNC: 6 U/L (ref 10–44)
ANION GAP SERPL CALC-SCNC: 10 MMOL/L (ref 8–16)
AST SERPL-CCNC: 17 U/L (ref 10–40)
BASOPHILS # BLD AUTO: 0.06 K/UL (ref 0–0.2)
BASOPHILS NFR BLD: 0.8 % (ref 0–1.9)
BILIRUB SERPL-MCNC: 0.9 MG/DL (ref 0.1–1)
BUN SERPL-MCNC: 40 MG/DL (ref 8–23)
CALCIUM SERPL-MCNC: 8.4 MG/DL (ref 8.7–10.5)
CHLORIDE SERPL-SCNC: 96 MMOL/L (ref 95–110)
CO2 SERPL-SCNC: 24 MMOL/L (ref 23–29)
CREAT SERPL-MCNC: 1.3 MG/DL (ref 0.5–1.4)
DIFFERENTIAL METHOD BLD: ABNORMAL
EOSINOPHIL # BLD AUTO: 0.2 K/UL (ref 0–0.5)
EOSINOPHIL NFR BLD: 2.8 % (ref 0–8)
ERYTHROCYTE [DISTWIDTH] IN BLOOD BY AUTOMATED COUNT: 13.9 % (ref 11.5–14.5)
EST. GFR  (NO RACE VARIABLE): 55.5 ML/MIN/1.73 M^2
GLUCOSE SERPL-MCNC: 101 MG/DL (ref 70–110)
HCT VFR BLD AUTO: 38.1 % (ref 40–54)
HGB BLD-MCNC: 13.1 G/DL (ref 14–18)
IMM GRANULOCYTES # BLD AUTO: 0.04 K/UL (ref 0–0.04)
IMM GRANULOCYTES NFR BLD AUTO: 0.5 % (ref 0–0.5)
LYMPHOCYTES # BLD AUTO: 1.8 K/UL (ref 1–4.8)
LYMPHOCYTES NFR BLD: 22.4 % (ref 18–48)
MAGNESIUM SERPL-MCNC: 1.9 MG/DL (ref 1.6–2.6)
MCH RBC QN AUTO: 29.4 PG (ref 27–31)
MCHC RBC AUTO-ENTMCNC: 34.4 G/DL (ref 32–36)
MCV RBC AUTO: 86 FL (ref 82–98)
MONOCYTES # BLD AUTO: 1.1 K/UL (ref 0.3–1)
MONOCYTES NFR BLD: 13.5 % (ref 4–15)
NEUTROPHILS # BLD AUTO: 4.7 K/UL (ref 1.8–7.7)
NEUTROPHILS NFR BLD: 60 % (ref 38–73)
NRBC BLD-RTO: 0 /100 WBC
PLATELET # BLD AUTO: 306 K/UL (ref 150–450)
PMV BLD AUTO: 10.6 FL (ref 9.2–12.9)
POTASSIUM SERPL-SCNC: 3.7 MMOL/L (ref 3.5–5.1)
PROT SERPL-MCNC: 5.8 G/DL (ref 6–8.4)
RBC # BLD AUTO: 4.45 M/UL (ref 4.6–6.2)
SODIUM SERPL-SCNC: 130 MMOL/L (ref 136–145)
WBC # BLD AUTO: 7.84 K/UL (ref 3.9–12.7)

## 2025-01-27 PROCEDURE — 97535 SELF CARE MNGMENT TRAINING: CPT

## 2025-01-27 PROCEDURE — 83735 ASSAY OF MAGNESIUM: CPT

## 2025-01-27 PROCEDURE — 25000003 PHARM REV CODE 250: Performed by: NURSE PRACTITIONER

## 2025-01-27 PROCEDURE — 99900035 HC TECH TIME PER 15 MIN (STAT)

## 2025-01-27 PROCEDURE — 94761 N-INVAS EAR/PLS OXIMETRY MLT: CPT

## 2025-01-27 PROCEDURE — 97530 THERAPEUTIC ACTIVITIES: CPT

## 2025-01-27 PROCEDURE — 36415 COLL VENOUS BLD VENIPUNCTURE: CPT

## 2025-01-27 PROCEDURE — 80053 COMPREHEN METABOLIC PANEL: CPT

## 2025-01-27 PROCEDURE — 85025 COMPLETE CBC W/AUTO DIFF WBC: CPT

## 2025-01-27 PROCEDURE — 12000002 HC ACUTE/MED SURGE SEMI-PRIVATE ROOM

## 2025-01-27 PROCEDURE — 97116 GAIT TRAINING THERAPY: CPT | Mod: CQ

## 2025-01-27 PROCEDURE — 25000003 PHARM REV CODE 250

## 2025-01-27 RX ORDER — ISOSORBIDE MONONITRATE 30 MG/1
30 TABLET, EXTENDED RELEASE ORAL DAILY
Status: DISCONTINUED | OUTPATIENT
Start: 2025-01-28 | End: 2025-01-29 | Stop reason: HOSPADM

## 2025-01-27 RX ADMIN — MUPIROCIN 1 G: 20 OINTMENT TOPICAL at 08:01

## 2025-01-27 RX ADMIN — Medication 1 TABLET: at 08:01

## 2025-01-27 RX ADMIN — FAMOTIDINE 20 MG: 20 TABLET, FILM COATED ORAL at 05:01

## 2025-01-27 RX ADMIN — EZETIMIBE 10 MG: 10 TABLET ORAL at 08:01

## 2025-01-27 RX ADMIN — THIAMINE HCL TAB 100 MG 100 MG: 100 TAB at 08:01

## 2025-01-27 RX ADMIN — ISOSORBIDE MONONITRATE 60 MG: 60 TABLET, EXTENDED RELEASE ORAL at 08:01

## 2025-01-27 RX ADMIN — SACUBITRIL AND VALSARTAN 1 TABLET: 24; 26 TABLET, FILM COATED ORAL at 08:01

## 2025-01-27 RX ADMIN — CLOPIDOGREL BISULFATE 75 MG: 75 TABLET, FILM COATED ORAL at 08:01

## 2025-01-27 RX ADMIN — PHENYTOIN SODIUM 400 MG: 100 CAPSULE, EXTENDED RELEASE ORAL at 08:01

## 2025-01-27 RX ADMIN — SENNOSIDES AND DOCUSATE SODIUM 1 TABLET: 50; 8.6 TABLET ORAL at 08:01

## 2025-01-27 RX ADMIN — METOPROLOL SUCCINATE 100 MG: 50 TABLET, FILM COATED, EXTENDED RELEASE ORAL at 08:01

## 2025-01-27 RX ADMIN — MUPIROCIN 1 G: 20 OINTMENT TOPICAL at 04:01

## 2025-01-27 NOTE — PT/OT/SLP PROGRESS
Physical Therapy Treatment    Patient Name:  Jerome Amaro Jr.   MRN:  [id number]    Recommendations:     Discharge Recommendations: Moderate Intensity Therapy  Discharge Equipment Recommendations: to be determined by next level of care  Barriers to discharge:  Medical status    Assessment:     Jerome Amaro Jr. is a 79 y.o. male admitted with a medical diagnosis of Acute CHF (congestive heart failure).  He presents with the following impairments/functional limitations: weakness, impaired endurance, impaired functional mobility, gait instability, decreased lower extremity function, impaired cognition . Pt SBA from sup<>sit with increased time.   Gait trials completed, ambulating 70 feet x 2 CGA with rw; he displayed decreased step length and foot clearance of B LE.     Rehab Prognosis: Fair; patient would benefit from acute skilled PT services to address these deficits and reach maximum level of function.    Recent Surgery: * No surgery found *      Plan:     During this hospitalization, patient to be seen 6 x/week to address the identified rehab impairments via gait training, therapeutic activities, therapeutic exercises, neuromuscular re-education and progress toward the following goals:    Plan of Care Expires:  02/19/25    Subjective     Chief Complaint: Fatigue  Patient/Family Comments/goals: Pt agreeable to PT.  Pain/Comfort:  Pain Rating 1: 0/10  Pain Rating Post-Intervention 1: 0/10      Objective:     Communicated with RN prior to session.  Patient found HOB elevated with bed alarm, PureWick, peripheral IV, telemetry upon PT entry to room.     General Precautions: Standard, fall, seizure  Orthopedic Precautions: RLE weight bearing as tolerated  Braces: N/A  Respiratory Status: Room air     Functional Mobility:  Bed Mobility:     Supine to Sit: stand by assistance  Sit to Supine: stand by assistance  Transfers:     Sit to Stand:  contact guard assistance with rolling walker  Gait: 70 feet x2 CGA with  radha      AM-PAC 6 CLICK MOBILITY          Treatment & Education:  Pt was educated on the following: call light use, importance of OOB activity and functional mobility to negate the negative effects of prolonged bed rest during this hospitalization, safe transfers/ambulation and discharge planning recommendations/options.     Patient left HOB elevated with all lines intact, call button in reach, bed alarm on, and sitter present..    GOALS:   Multidisciplinary Problems       Physical Therapy Goals          Problem: Physical Therapy    Goal Priority Disciplines Outcome Interventions   Physical Therapy Goal     PT, PT/OT Progressing    Description: Goals to be met by: 25     Patient will increase functional independence with mobility by performin. Supine to sit with [place]  2. Sit to stand transfer with Supervision  3. Gait  x 150 feet with Stand-by Assistance using Rolling Walker.                          Time Tracking:     PT Received On: 25  PT Start Time: 1020     PT Stop Time: 1038  PT Total Time (min): 18 min     Billable Minutes: Gait Training 18    Treatment Type: Treatment  PT/PTA: PTA     Number of PTA visits since last PT visit: 2     2025

## 2025-01-27 NOTE — NURSING
1/26/25 2137:pt was aao to person; reoriented to day/place. Hob elevated. Respirations even and unlabored. No c/o pain nor sob. Pt used urinal with assistance. Call light was in place. Fall precautions in place. Nonskid socks to feet. Bed low, locked. Nad was noted further.

## 2025-01-27 NOTE — PLAN OF CARE
Per note and MD  Pt have not had a sitter since 1/23 updated clinicals sent to [facility] for acceptance or denial for SNF as Pt family  request is for [facility]. If [facility] cannot accept [facility]. [facility] will continue to follow     10:44am-  Per [facility] Pt had sitter last night; CM notified [facility] is discounted    01/27/25 1022   Post-Acute Status   Post-Acute Authorization Placement   Discharge Plan   Discharge Plan A Skilled Nursing Facility   Discharge Plan B Skilled Nursing Facility

## 2025-01-28 LAB
ALBUMIN SERPL BCP-MCNC: 3.3 G/DL (ref 3.5–5.2)
ALP SERPL-CCNC: 112 U/L (ref 55–135)
ALT SERPL W/O P-5'-P-CCNC: 7 U/L (ref 10–44)
ANION GAP SERPL CALC-SCNC: 8 MMOL/L (ref 8–16)
AST SERPL-CCNC: 19 U/L (ref 10–40)
BASOPHILS # BLD AUTO: 0.05 K/UL (ref 0–0.2)
BASOPHILS NFR BLD: 0.7 % (ref 0–1.9)
BILIRUB SERPL-MCNC: 0.9 MG/DL (ref 0.1–1)
BUN SERPL-MCNC: 38 MG/DL (ref 8–23)
CALCIUM SERPL-MCNC: 8.5 MG/DL (ref 8.7–10.5)
CHLORIDE SERPL-SCNC: 98 MMOL/L (ref 95–110)
CO2 SERPL-SCNC: 27 MMOL/L (ref 23–29)
CREAT SERPL-MCNC: 1.3 MG/DL (ref 0.5–1.4)
DIFFERENTIAL METHOD BLD: ABNORMAL
EOSINOPHIL # BLD AUTO: 0.2 K/UL (ref 0–0.5)
EOSINOPHIL NFR BLD: 3.2 % (ref 0–8)
ERYTHROCYTE [DISTWIDTH] IN BLOOD BY AUTOMATED COUNT: 13.7 % (ref 11.5–14.5)
EST. GFR  (NO RACE VARIABLE): 55.5 ML/MIN/1.73 M^2
GLUCOSE SERPL-MCNC: 86 MG/DL (ref 70–110)
HCT VFR BLD AUTO: 38.1 % (ref 40–54)
HGB BLD-MCNC: 12.9 G/DL (ref 14–18)
IMM GRANULOCYTES # BLD AUTO: 0.03 K/UL (ref 0–0.04)
IMM GRANULOCYTES NFR BLD AUTO: 0.4 % (ref 0–0.5)
LYMPHOCYTES # BLD AUTO: 2 K/UL (ref 1–4.8)
LYMPHOCYTES NFR BLD: 29 % (ref 18–48)
MAGNESIUM SERPL-MCNC: 1.8 MG/DL (ref 1.6–2.6)
MCH RBC QN AUTO: 29.2 PG (ref 27–31)
MCHC RBC AUTO-ENTMCNC: 33.9 G/DL (ref 32–36)
MCV RBC AUTO: 86 FL (ref 82–98)
MONOCYTES # BLD AUTO: 1 K/UL (ref 0.3–1)
MONOCYTES NFR BLD: 14.1 % (ref 4–15)
NEUTROPHILS # BLD AUTO: 3.6 K/UL (ref 1.8–7.7)
NEUTROPHILS NFR BLD: 52.6 % (ref 38–73)
NRBC BLD-RTO: 0 /100 WBC
PLATELET # BLD AUTO: 290 K/UL (ref 150–450)
PMV BLD AUTO: 10.7 FL (ref 9.2–12.9)
POTASSIUM SERPL-SCNC: 4 MMOL/L (ref 3.5–5.1)
PROT SERPL-MCNC: 5.9 G/DL (ref 6–8.4)
RBC # BLD AUTO: 4.42 M/UL (ref 4.6–6.2)
SODIUM SERPL-SCNC: 133 MMOL/L (ref 136–145)
WBC # BLD AUTO: 6.86 K/UL (ref 3.9–12.7)

## 2025-01-28 PROCEDURE — 12000002 HC ACUTE/MED SURGE SEMI-PRIVATE ROOM

## 2025-01-28 PROCEDURE — 25000003 PHARM REV CODE 250: Performed by: FAMILY MEDICINE

## 2025-01-28 PROCEDURE — 99900035 HC TECH TIME PER 15 MIN (STAT)

## 2025-01-28 PROCEDURE — 25000003 PHARM REV CODE 250

## 2025-01-28 PROCEDURE — 85025 COMPLETE CBC W/AUTO DIFF WBC: CPT

## 2025-01-28 PROCEDURE — 36415 COLL VENOUS BLD VENIPUNCTURE: CPT

## 2025-01-28 PROCEDURE — 97535 SELF CARE MNGMENT TRAINING: CPT

## 2025-01-28 PROCEDURE — 97116 GAIT TRAINING THERAPY: CPT | Mod: CQ

## 2025-01-28 PROCEDURE — 80053 COMPREHEN METABOLIC PANEL: CPT

## 2025-01-28 PROCEDURE — 25000003 PHARM REV CODE 250: Performed by: NURSE PRACTITIONER

## 2025-01-28 PROCEDURE — 99900031 HC PATIENT EDUCATION (STAT)

## 2025-01-28 PROCEDURE — 94761 N-INVAS EAR/PLS OXIMETRY MLT: CPT

## 2025-01-28 PROCEDURE — 83735 ASSAY OF MAGNESIUM: CPT

## 2025-01-28 RX ORDER — CITALOPRAM 20 MG/1
20 TABLET, FILM COATED ORAL DAILY
Status: DISCONTINUED | OUTPATIENT
Start: 2025-01-28 | End: 2025-01-29 | Stop reason: HOSPADM

## 2025-01-28 RX ADMIN — THIAMINE HCL TAB 100 MG 100 MG: 100 TAB at 08:01

## 2025-01-28 RX ADMIN — MUPIROCIN 1 G: 20 OINTMENT TOPICAL at 08:01

## 2025-01-28 RX ADMIN — PHENYTOIN SODIUM 400 MG: 100 CAPSULE, EXTENDED RELEASE ORAL at 08:01

## 2025-01-28 RX ADMIN — SENNOSIDES AND DOCUSATE SODIUM 1 TABLET: 50; 8.6 TABLET ORAL at 08:01

## 2025-01-28 RX ADMIN — ACETAMINOPHEN 650 MG: 325 TABLET ORAL at 05:01

## 2025-01-28 RX ADMIN — EZETIMIBE 10 MG: 10 TABLET ORAL at 08:01

## 2025-01-28 RX ADMIN — ISOSORBIDE MONONITRATE 30 MG: 30 TABLET, EXTENDED RELEASE ORAL at 08:01

## 2025-01-28 RX ADMIN — Medication 1 TABLET: at 08:01

## 2025-01-28 RX ADMIN — SACUBITRIL AND VALSARTAN 1 TABLET: 24; 26 TABLET, FILM COATED ORAL at 08:01

## 2025-01-28 RX ADMIN — CITALOPRAM HYDROBROMIDE 20 MG: 20 TABLET ORAL at 11:01

## 2025-01-28 RX ADMIN — MUPIROCIN 1 G: 20 OINTMENT TOPICAL at 04:01

## 2025-01-28 RX ADMIN — METOPROLOL SUCCINATE 100 MG: 50 TABLET, FILM COATED, EXTENDED RELEASE ORAL at 08:01

## 2025-01-28 RX ADMIN — FAMOTIDINE 20 MG: 20 TABLET, FILM COATED ORAL at 04:01

## 2025-01-28 RX ADMIN — CLOPIDOGREL BISULFATE 75 MG: 75 TABLET, FILM COATED ORAL at 08:01

## 2025-01-28 NOTE — PLAN OF CARE
Problem: Skin Injury Risk Increased  Goal: Skin Health and Integrity  Intervention: Promote and Optimize Oral Intake  Flowsheets (Taken 1/28/2025 1618)  Nutrition Interventions:   food preferences provided   supplemental drinks provided     Problem: Oral Intake Inadequate  Goal: Improved Oral Intake  Intervention: Promote and Optimize Oral Intake  Flowsheets (Taken 1/28/2025 1618)  Nutrition Interventions:   food preferences provided   supplemental drinks provided

## 2025-01-28 NOTE — PLAN OF CARE
Problem: Physical Therapy  Goal: Physical Therapy Goal  Description: Goals to be met by: 25     Patient will increase functional independence with mobility by performin. Supine to sit with [place]  2. Sit to stand transfer with Supervision  3. Gait  x 150 feet with Stand-by Assistance using Rolling Walker.     Outcome: Progressing

## 2025-01-28 NOTE — PLAN OF CARE
Problem: Occupational Therapy  Goal: Occupational Therapy Goal  Description: Goals to be met by: 2/8/2025     Patient will increase functional independence with ADLs by performing:    UE Dressing with Modified [place].  LE Dressing with Modified [place].  Grooming while standing at sink with Modified [place].  Toileting from toilet with Modified [place] for hygiene and clothing management.   Step transfer with [place] and Modified [place]  Toilet transfer to toilet with Modified [place].    Outcome: Progressing

## 2025-01-28 NOTE — PROGRESS NOTES
Replaced by [facility]  Progress Note    Patient Name: Jerome Amaro Jr.  MRN: [id number]  Patient Class: IP- Inpatient   Admission Date: 1/18/2025  Length of Stay: 9 days  Attending Physician: Yumiko Oropeza DO  Primary Care Provider: Magda Ruiz FNP-C        Subjective     Principal Problem:Acute encephalopathy        HPI:  79 year old male with a past medical history of aortic valve disease, atrial fibrillation on anticoagulation therapy, MI, HLD, PVD, seizures, difficulty swallowing, CAD, CHF, aortic valve disease who presents to the emergency room with weakness in the legs and falls. There was also concern for intermittent altered mental status. Patient has a strong cardiac history with 2 bypass surgeries, atrial fibrillation, aortic valve disease and valve replacement, multiple cardiac stents, hypertension, hyperlipidemia, PVD, seizure history. Patient has been weak in general. His daughter says that he has had several falls over the last couple of days. He has just not been feeling well. No reported fever or chills. No reported vomiting or any diarrhea. He is not have any abdominal pain. He occasionally has chest pain which she takes nitro for which is not abnormal for him. He does not have any new chest pain but sometimes feels short of breath. Today he had a fall where he hit the back of his head on the ground. He did not lose consciousness. No reported vomiting or any seizure activity. He is on Eliquis and Plavix. No other reported injuries from the fall.     In [facility] ER, H/H 10.6/31.1, Na  134, T. Bili 1.3, BNP 1309, Troponin I high sensitivity 56.8, Mg 1.8, Influenza and flu negative, phenytoin 18.5, CT c spine no acute fracture, multilevel spondylosis. Incompletely evaluated ill-defined ground-glass opacity in the lung apices bilaterally, CT head no acute findings, chest x-ray Bilateral pleural effusions with adjacent airspace disease and/or atelectasis. Additional airspace disease  in the right upper lobe. Pulmonary vascular congestion. Mildly enlarged cardiac silhouette. Left chest wall cardiac pacemaker.   Admit to hospital medicine for pleural effusion, CHF exacerbation    Overview/Hospital Course:  Mr. Amaro has been monitored closely during his hospitalization. He was admitted on 1/18/25 for generalized weakness and falls.  He states he slipped off of his slipper walking to the kitchen.  He endorses feeling more weak than usual. He did not lose consciousness nor hit his head. He had a CT head and C spine with no acute findings. CT chest revealed bilat pleural effusions and concern for infectious process in RUL. BNP of 1300.  He was started on IV Lasix, then transitioned to p.o. Lasix.  Ultrasound of chest showed bilateral moderate pleural effusions right greater than left.  Thoracentesis was offered to family, but they declined.  He was started on antibiotics.  PT and OT were consulted, moderate intensity therapy was recommended.  Patient is now pending SNF placement.  Patient has had 2 falls inpatient with repeat CT head negative for acute abnormality.  Fall precautions in place. He's had confusion throughout his hospitalization. Daughter requested neuro consult.  Teleneuro was consulted, recommended EEG.  EEG which was negative for epileptiform discharges, but did reveal slowing consistent with encephalopathy. Sodium has been trending down and is 130, urine osm 586, urine sodium 46, serum osm is pending. TSH is WNL. B12 is 241 and folate 8 - started on oral replacement.     Held furosemide and citalopram for hyponatremia on 1/26 and patient was monitored closely.  Citalopram was restarted at lower dose on 01/28.  Reduced isosorbide mononitrate to 30 from 60 due to low blood pressure on 01/27.  Patient is medically stable and awaiting placement    Interval History:  Patient said he feels better today    Review of Systems   All other systems reviewed and are negative.    Objective:  "    Vital Signs (Most Recent):  Temp: 97.1 °F (36.2 °C) (01/27/25 1754)  Pulse: 62 (01/27/25 1754)  Resp: 18 (01/27/25 0301)  BP: (!) 96/58 (01/27/25 1754)  SpO2: 96 % (01/27/25 1754) Vital Signs (24h Range):  Temp:  [97.1 °F (36.2 °C)-97.8 °F (36.6 °C)] 97.1 °F (36.2 °C)  Pulse:  [62-76] 62  Resp:  [18] 18  SpO2:  [94 %-98 %] 96 %  BP: ()/(56-73) 96/58     Weight: 72.5 kg (159 lb 13.3 oz)  Body mass index is 25.8 kg/m².    Intake/Output Summary (Last 24 hours) at 1/27/2025 1807  Last data filed at 1/27/2025 1750  Gross per 24 hour   Intake 680 ml   Output 550 ml   Net 130 ml         Physical Exam  Cardiovascular:      Rate and Rhythm: Normal rate.      Pulses: Normal pulses.   Abdominal:      General: Abdomen is flat. There is no distension.      Palpations: Abdomen is soft.      Tenderness: There is no abdominal tenderness.   Neurological:      Mental Status: He is alert and oriented to person, place, and time.             Significant Labs: All pertinent labs within the past 24 hours have been reviewed.    Significant Imaging: I have reviewed all pertinent imaging results/findings within the past 24 hours.    Assessment and Plan     * Acute encephalopathy  Resolved      QT prolongation   milliseconds.  Ranexa d/c'd    Pleural effusion  Ultrasound of chest with moderate bilateral pleural effusions, right greater than left  Thoracentesis was offered, but family declined  Was managed with IV diuresis, now on p.o. diuretic          Acute CHF (congestive heart failure)  Patient  heart failure that is Acute on chronic. Most recent BNP and echo results are listed below.  No results for input(s): "BNP" in the last 72 hours.    Latest ECHO  Results for orders placed during the hospital encounter of 06/15/24    Echo    Interpretation Summary    Left Ventricle: The left ventricle is normal in size. Increased ventricular mass. Mildly increased wall thickness. There is mild concentric hypertrophy. Unable to assess " wall motion. Septal motion is consistent with pacing. There is normal systolic function with a visually estimated ejection fraction of 55 - 60%. Diastolic function cannot be reliably determined in the presence of mitral annular calcification.    Right Ventricle: Right ventricle was not well visualized due to poor acoustic window. Normal right ventricular cavity size. Pacemaker lead present in the ventricle.    Aortic Valve: There is a transcatheter valve replacement in the aortic position.    Mitral Valve: Mildly thickened leaflets. There is moderate mitral annular calcification present. There is mild regurgitation.    Tricuspid Valve: There is mild to moderate regurgitation.    Current Heart Failure Medications  sacubitriL-valsartan 24-26 mg per tablet 1 tablet, 2 times daily, Oral  metoprolol succinate (TOPROL-XL) 24 hr tablet 100 mg, 2 times daily, Oral  furosemide tablet 20 mg, Daily, Oral    Plan  - Monitor strict I&Os and daily weights.    - Place on telemetry  - Low sodium diet  - Place on fluid restriction of 1.5 L.   - Continue PO lasix daily      Seizure disorder  Seizure precautions  Continue phenytoin    Atherosclerosis of native coronary artery of native heart  Patient with known CAD s/p stent placement and CABG, which is controlled Will continue ASA, Plavix, and Statin and monitor for S/Sx of angina/ACS. Continue to monitor on telemetry.       Patient has extensive coronary artery disease with a refractory angina, patient is well known to me, patient has 13 stents and CABG surgery twice.   Ranexa d/c'd due to QTc prolongation    S/P TAVR (transcatheter aortic valve replacement)    Aware    HTN (hypertension)  Patient's blood pressure range in the last 24 hours was: BP  Min: 96/58  Max: 128/73.The patient's inpatient anti-hypertensive regimen is listed below:  Current Antihypertensives  metoprolol succinate (TOPROL-XL) 24 hr tablet 100 mg, 2 times daily, Oral  isosorbide mononitrate 24 hr tablet 30 mg,  Daily, Oral    Plan  - BP is controlled, no changes needed to their regimen      VTE Risk Mitigation (From admission, onward)           Ordered     IP VTE HIGH RISK PATIENT  Once         01/18/25 2055     Place sequential compression device  Until discontinued         01/18/25 2055                    Discharge Planning   AXEL: 1/29/2025     Code Status: Full Code   Medical Readiness for Discharge Date:   Discharge Plan A: Skilled Nursing Facility                Please place Justification for DME        Yumiko Oropeza DO  Department of Hospital Medicine   [facility]

## 2025-01-28 NOTE — SUBJECTIVE & OBJECTIVE
Interval History:  Patient said he feels better today    Review of Systems   All other systems reviewed and are negative.    Objective:     Vital Signs (Most Recent):  Temp: 97.1 °F (36.2 °C) (01/27/25 1754)  Pulse: 62 (01/27/25 1754)  Resp: 18 (01/27/25 0301)  BP: (!) 96/58 (01/27/25 1754)  SpO2: 96 % (01/27/25 1754) Vital Signs (24h Range):  Temp:  [97.1 °F (36.2 °C)-97.8 °F (36.6 °C)] 97.1 °F (36.2 °C)  Pulse:  [62-76] 62  Resp:  [18] 18  SpO2:  [94 %-98 %] 96 %  BP: ()/(56-73) 96/58     Weight: 72.5 kg (159 lb 13.3 oz)  Body mass index is 25.8 kg/m².    Intake/Output Summary (Last 24 hours) at 1/27/2025 1803  Last data filed at 1/27/2025 1750  Gross per 24 hour   Intake 680 ml   Output 550 ml   Net 130 ml         Physical Exam  Cardiovascular:      Rate and Rhythm: Normal rate.      Pulses: Normal pulses.   Abdominal:      General: Abdomen is flat. There is no distension.      Palpations: Abdomen is soft.      Tenderness: There is no abdominal tenderness.   Neurological:      Mental Status: He is alert and oriented to person, place, and time.             Significant Labs: All pertinent labs within the past 24 hours have been reviewed.    Significant Imaging: I have reviewed all pertinent imaging results/findings within the past 24 hours.

## 2025-01-28 NOTE — PROGRESS NOTES
[facility]  Adult Nutrition   Progress Note (Initial Assessment)     SUMMARY     Recommendations  Recommendation/Intervention: 1. Continue cardiac diet with fluids per MD. 2. Patient with decreased appetite and activity level, wants orange flavored supplement. Will order Alfonzo BID  Nutrition Goal Status: new    Nutrition Goals:   PO intake will meet >50% of estimated needs by RD follow up. and Oral supplement consumption of >50% by RD follow up.    Nutrition Interventions: Cholesterol modified diet, Fluid modified diet, and Sodium modified diet    Nutrition Diagnosis PES Statement: Inadequate oral intake related to decreased appetite as evidenced by intake < 50% most meals since admit.       Nutrition Diagnosis Status:   New    Dietitian Rounds Brief  RD screen for LOS. Patient educated per MD consult on CHF diet 1/20/25.. Pt admitted to ED with weakness in the legs, falls and AMS.Patient at risk for wounds and malnutrition. Pt agreed to supplement, wants orange flavor. RD to follow for intake, labs, and status change PRN.    Nutrition Related Social Determinants of Health: SDOH: Unable to assess at this time.   Food Insecurity: Patient Declined (1/19/2025)    Hunger Vital Sign     Worried About Running Out of Food in the Last Year: Patient declined     Ran Out of Food in the Last Year: Patient declined       Malnutrition Assessment   No evidence of malnutrition at this time.         Diet order:   Current Diet Order: Cardiacdiet. 1500 mL fluid restrictiion                 Evaluation of Received Nutrient/Fluid Intake  Energy Calories Required: not meeting needs  Protein Required: not meeting needs  Fluid Required: meeting needs  Tolerance: tolerating     % Intake of Estimated Energy Needs: 25 - 50 %  % Meal Intake: 25 - 50 %      Intake/Output Summary (Last 24 hours) at 1/28/2025 1610  Last data filed at 1/28/2025 1232  Gross per 24 hour   Intake 610 ml   Output 1750 ml   Net -1140 ml     "    Anthropometrics  Height: 5' 6" (167.6 cm)  Height (inches): 66 in  Height Method: Stated  Weight: 72.5 kg (159 lb 13.3 oz)  Weight (lb): 159.84 lb  Weight Method: Bed Scale  Ideal Body Weight (IBW), Male: 142 lb  % Ideal Body Weight, Male (lb): 116.44 %  BMI (Calculated): 25.8  BMI Grade: 25 - 29.9 - overweight       Estimated/Assessed Needs  Weight Used For Calorie Calculations: 72.5 kg (159 lb 13.3 oz)  Energy Calorie Requirements (kcal): 9194-4967 kcal/day (20-25 kcal/kg).  Energy Need Method: Kcal/kg  Protein Requirements: 58-87 gm/day (0.8-1.2 gm/day)  Weight Used For Protein Calculations: 72.5 kg (159 lb 13.3 oz)     Estimated Fluid Requirement Method: RDA Method  RDA Method (mL): 1450       Reason for Assessment  Reason For Assessment: length of stay  Diagnosis: other (see comments) (acute encephalopathy and pleural effusion)  General Information Comments: Patient with history of CAD, CHF, aortic valve diseas, and PVD who presents to the emergency room with weakness in the legs and falls. There was also concern for intermittent altered mental status.  Nutrition Discharge Planning: Cardiac diet. 1500 mL fluid restriction. Patient was educated for CHF 1/20/25.    Final diagnoses:  [W19.XXXA] Fall  [J90] Pleural effusion, bilateral (Primary)  [I50.9] Acute congestive heart failure, unspecified heart failure type  [R53.1] Generalized weakness     Past Medical History:   Diagnosis Date    Anticoagulant long-term use     2014    Aortic valve disease 2014    pig valve    Arthritis     all over    Atrial fibrillation 2/29/2024    Chest pain 07/15/2019    CHF (congestive heart failure)     2014 on meds    Coronary artery disease     2007 cabg    Difficulty swallowing     Heart attack 1990    15 stents    Heart attack 02/01/2024    mild    Heart block     Hyperlipidemia     Hypertension     on meds    Hypothyroidism 2015    on meds    PVD (peripheral vascular disease)     Seizures     last episode 1990 on meds    "     Nutrition/Diet History  Nutrition Intake History: Fair intake this admit.  Food Preferences:  obtained.  Spiritual, Cultural Beliefs, Episcopal Practices, Values that Affect Care: no  Food Allergies: NKFA  Factors Affecting Nutritional Intake: decreased appetite    Nutrition Risk Screen        MST Score: 0  Have you recently lost weight without trying?: No  Weight loss score: 0  Have you been eating poorly because of a decreased appetite?: No  Appetite score: 0       Weight History:  Wt Readings from Last 10 Encounters:   01/27/25 72.5 kg (159 lb 13.3 oz)   01/19/25 74.8 kg (165 lb)   12/05/24 78.5 kg (173 lb 1 oz)   09/04/24 78.5 kg (173 lb)   06/21/24 74.2 kg (163 lb 9.3 oz)   06/16/24 74.8 kg (165 lb)   03/02/24 76.3 kg (168 lb 3.4 oz)   03/01/24 77.7 kg (171 lb 4.8 oz)   02/19/24 80.3 kg (177 lb)   02/09/24 77.6 kg (171 lb)        Lab/Procedures/Meds: Pertinent Labs/Meds Reviewed    Medications:Pertinent Medications Reviewed  Scheduled Meds:   citalopram  20 mg Oral Daily    clopidogreL  75 mg Oral Daily    ezetimibe  10 mg Oral Daily    famotidine  20 mg Oral Daily    folic acid-vit B6-vit B12  1 tablet Oral Daily    isosorbide mononitrate  30 mg Oral Daily    metoprolol succinate  100 mg Oral BID    mupirocin   Topical (Top) TID    phenytoin  400 mg Oral Daily    sacubitriL-valsartan  1 tablet Oral BID    senna-docusate 8.6-50 mg  1 tablet Oral BID    thiamine  100 mg Oral Daily     Continuous Infusions:  PRN Meds:.  Current Facility-Administered Medications:     (Magic mouthwash) 1:1:1 diphenhydrAMINE(Benadryl) 12.5mg/5ml liq, aluminum & magnesium hydroxide-simethicone (Maalox), LIDOcaine viscous 2%, 5 mL, Swish & Spit, Q4H PRN    acetaminophen, 650 mg, Oral, Q4H PRN    albuterol-ipratropium, 3 mL, Nebulization, Q4H PRN    magnesium oxide, 800 mg, Oral, PRN    magnesium oxide, 800 mg, Oral, PRN    ondansetron, 4 mg, Intravenous, Q6H PRN    potassium bicarbonate, 35 mEq, Oral, PRN    potassium  "bicarbonate, 50 mEq, Oral, PRN    potassium bicarbonate, 60 mEq, Oral, PRN    potassium, sodium phosphates, 2 packet, Oral, PRN    potassium, sodium phosphates, 2 packet, Oral, PRN    potassium, sodium phosphates, 2 packet, Oral, PRN    sodium chloride 0.9%, 10 mL, Intravenous, PRN    DIPH,PERTUSS(ACELL),TET VACCINE (ADULT)(BOOSTRIX,ADACEL), 0.5 mL, Intramuscular, vaccine x 1 dose    Labs: Pertinent Labs Reviewed  Clinical Chemistry:  Recent Labs   Lab 01/26/25  0553 01/27/25  0626 01/28/25  0612   * 130* 133*   K 4.0 3.7 4.0   CL 95 96 98   CO2 23 24 27   GLU 78 101 86   BUN 35* 40* 38*   CREATININE 1.2 1.3 1.3   CALCIUM 8.6* 8.4* 8.5*   PROT 6.2 5.8* 5.9*   ALBUMIN 3.4* 3.3* 3.3*   BILITOT 0.8 0.9 0.9   ALKPHOS 107 105 112   AST 18 17 19   ALT 4* 6* 7*   ANIONGAP 10 10 8   MG 1.9 1.9 1.8     CBC:   Recent Labs   Lab 01/28/25  0612   WBC 6.86   RBC 4.42*   HGB 12.9*   HCT 38.1*      MCV 86   MCH 29.2   MCHC 33.9     Lipid Panel:  No results for input(s): "CHOL", "HDL", "LDLCALC", "TRIG", "CHOLHDL" in the last 168 hours.  Cardiac Profile:  No results for input(s): "BNP", "CPK", "CPKMB", "TROPONINI", "CKTOTAL" in the last 168 hours.  Inflammatory Labs:  No results for input(s): "CRP" in the last 168 hours.  Diabetes:  No results for input(s): "HGBA1C", "POCTGLUCOSE" in the last 168 hours.  Thyroid & Parathyroid:  Recent Labs   Lab 01/24/25  0501   TSH 2.116       Monitor and Evaluation  Food and Nutrient Intake: energy intake, food and beverage intake  Food and Nutrient Adminstration: diet order  Knowledge/Beliefs/Attitudes: food and nutrition knowledge/skill  Physical Activity and Function: nutrition-related ADLs and IADLs  Anthropometric Measurements: weight change, body mass index, weight  Biochemical Data, Medical Tests and Procedures: gastrointestinal profile, lipid profile, glucose/endocrine profile, electrolyte and renal panel  Nutrition-Focused Physical Findings: overall appearance     Nutrition " Risk  Level of Risk/Frequency of Follow-up:  (1 x / week)     Nutrition Follow-Up  RD Follow-up?: Yes

## 2025-01-28 NOTE — PLAN OF CARE
Updated clinicals sent to lanyd again today as Pt has not have sitter since Saturday per nurse.  Pending a answer of acceptance and auth. Supervisor aware and reach out to Melinda for a final decision so that auth can be submitted. MARLEN will continue to follow     01/28/25 3516   Post-Acute Status   Post-Acute Authorization Placement

## 2025-01-28 NOTE — PROGRESS NOTES
[facility] Medicine  Progress Note    Patient Name: Jerome Amaro Jr.  MRN: [id number]  Patient Class: IP- Inpatient   Admission Date: 1/18/2025  Length of Stay: 8 days  Attending Physician: Yumiko Oropeza DO  Primary Care Provider: Magda Ruiz FNP-C        Subjective     Principal Problem:Acute encephalopathy        HPI:  79 year old male with a past medical history of aortic valve disease, atrial fibrillation on anticoagulation therapy, MI, HLD, PVD, seizures, difficulty swallowing, CAD, CHF, aortic valve disease who presents to the emergency room with weakness in the legs and falls. There was also concern for intermittent altered mental status. Patient has a strong cardiac history with 2 bypass surgeries, atrial fibrillation, aortic valve disease and valve replacement, multiple cardiac stents, hypertension, hyperlipidemia, PVD, seizure history. Patient has been weak in general. His daughter says that he has had several falls over the last couple of days. He has just not been feeling well. No reported fever or chills. No reported vomiting or any diarrhea. He is not have any abdominal pain. He occasionally has chest pain which she takes nitro for which is not abnormal for him. He does not have any new chest pain but sometimes feels short of breath. Today he had a fall where he hit the back of his head on the ground. He did not lose consciousness. No reported vomiting or any seizure activity. He is on Eliquis and Plavix. No other reported injuries from the fall.     In The ER, H/H 10.6/31.1, Na  134, T. Bili 1.3, BNP 1309, Troponin I high sensitivity 56.8, Mg 1.8, Influenza and flu negative, phenytoin 18.5, CT c spine no acute fracture, multilevel spondylosis. Incompletely evaluated ill-defined ground-glass opacity in the lung apices bilaterally, CT head no acute findings, chest x-ray Bilateral pleural effusions with adjacent airspace disease and/or atelectasis. Additional airspace disease  in the right upper lobe. Pulmonary vascular congestion. Mildly enlarged cardiac silhouette. Left chest wall cardiac pacemaker.   Admit to hospital medicine for pleural effusion, CHF exacerbation    Overview/Hospital Course:  Mr. Amaro has been monitored closely during his hospitalization. He was admitted on 1/18/25 for generalized weakness and falls.  He states he slipped off of his slipper walking to the kitchen.  He endorses feeling more weak than usual. He did not lose consciousness nor hit his head. He had a CT head and C spine with no acute findings. CT chest revealed bilat pleural effusions and concern for infectious process in RUL. BNP of 1300.  He was started on IV Lasix, then transitioned to p.o. Lasix.  Ultrasound of chest showed bilateral moderate pleural effusions right greater than left.  Thoracentesis was offered to family, but they declined.  He was started on antibiotics.  PT and OT were consulted, moderate intensity therapy was recommended.  Patient is now pending SNF placement.  Patient has had 2 falls inpatient with repeat CT head negative for acute abnormality.  Fall precautions in place. He's had confusion throughout his hospitalization. Daughter requested neuro consult.  Teleneuro was consulted, recommended EEG.  EEG which was negative for epileptiform discharges, but did reveal slowing consistent with encephalopathy. Sodium has been trending down and is 130, urine osm 586, urine sodium 46, serum osm is pending. TSH is WNL. B12 is 241 and folate 8 - started on oral replacement.     Held furosemide and citalopram for hyponatremia on 1/26 and patient was monitored closely.  Reduced isosorbide mononitrate to 30 from 60 due to low blood pressure on 01/27.  Patient is medically stable and awaiting placement    Interval History:  Patient said he feels better today    Review of Systems   All other systems reviewed and are negative.    Objective:     Vital Signs (Most Recent):  Temp: 97.1 °F (36.2 °C)  "(01/27/25 1754)  Pulse: 62 (01/27/25 1754)  Resp: 18 (01/27/25 0301)  BP: (!) 96/58 (01/27/25 1754)  SpO2: 96 % (01/27/25 1754) Vital Signs (24h Range):  Temp:  [97.1 °F (36.2 °C)-97.8 °F (36.6 °C)] 97.1 °F (36.2 °C)  Pulse:  [62-76] 62  Resp:  [18] 18  SpO2:  [94 %-98 %] 96 %  BP: ()/(56-73) 96/58     Weight: 72.5 kg (159 lb 13.3 oz)  Body mass index is 25.8 kg/m².    Intake/Output Summary (Last 24 hours) at 1/27/2025 1807  Last data filed at 1/27/2025 1750  Gross per 24 hour   Intake 680 ml   Output 550 ml   Net 130 ml         Physical Exam  Cardiovascular:      Rate and Rhythm: Normal rate.      Pulses: Normal pulses.   Abdominal:      General: Abdomen is flat. There is no distension.      Palpations: Abdomen is soft.      Tenderness: There is no abdominal tenderness.   Neurological:      Mental Status: He is alert and oriented to person, place, and time.             Significant Labs: All pertinent labs within the past 24 hours have been reviewed.    Significant Imaging: I have reviewed all pertinent imaging results/findings within the past 24 hours.      Assessment and Plan     * Acute encephalopathy  Resolved      QT prolongation   milliseconds.  Ranexa d/c'd    Pleural effusion  Ultrasound of chest with moderate bilateral pleural effusions, right greater than left  Thoracentesis was offered, but family declined  Was managed with IV diuresis, now on p.o. diuretic          Acute CHF (congestive heart failure)  Patient  heart failure that is Acute on chronic. Most recent BNP and echo results are listed below.  No results for input(s): "BNP" in the last 72 hours.    Latest ECHO  Results for orders placed during the hospital encounter of 06/15/24    Echo    Interpretation Summary    Left Ventricle: The left ventricle is normal in size. Increased ventricular mass. Mildly increased wall thickness. There is mild concentric hypertrophy. Unable to assess wall motion. Septal motion is consistent with pacing. " There is normal systolic function with a visually estimated ejection fraction of 55 - 60%. Diastolic function cannot be reliably determined in the presence of mitral annular calcification.    Right Ventricle: Right ventricle was not well visualized due to poor acoustic window. Normal right ventricular cavity size. Pacemaker lead present in the ventricle.    Aortic Valve: There is a transcatheter valve replacement in the aortic position.    Mitral Valve: Mildly thickened leaflets. There is moderate mitral annular calcification present. There is mild regurgitation.    Tricuspid Valve: There is mild to moderate regurgitation.    Current Heart Failure Medications  sacubitriL-valsartan 24-26 mg per tablet 1 tablet, 2 times daily, Oral  metoprolol succinate (TOPROL-XL) 24 hr tablet 100 mg, 2 times daily, Oral  furosemide tablet 20 mg, Daily, Oral    Plan  - Monitor strict I&Os and daily weights.    - Place on telemetry  - Low sodium diet  - Place on fluid restriction of 1.5 L.   - Continue PO lasix daily      Seizure disorder  Seizure precautions  Continue phenytoin    Atherosclerosis of native coronary artery of native heart  Patient with known CAD s/p stent placement and CABG, which is controlled Will continue ASA, Plavix, and Statin and monitor for S/Sx of angina/ACS. Continue to monitor on telemetry.       Patient has extensive coronary artery disease with a refractory angina, patient is well known to me, patient has 13 stents and CABG surgery twice.   Ranexa d/c'd due to QTc prolongation    S/P TAVR (transcatheter aortic valve replacement)    Aware    HTN (hypertension)  Patient's blood pressure range in the last 24 hours was: BP  Min: 96/58  Max: 128/73.The patient's inpatient anti-hypertensive regimen is listed below:  Current Antihypertensives  metoprolol succinate (TOPROL-XL) 24 hr tablet 100 mg, 2 times daily, Oral  isosorbide mononitrate 24 hr tablet 30 mg, Daily, Oral    Plan  - BP is controlled, no changes  needed to their regimen      VTE Risk Mitigation (From admission, onward)           Ordered     IP VTE HIGH RISK PATIENT  Once         01/18/25 2055     Place sequential compression device  Until discontinued         01/18/25 2055                    Discharge Planning   AXEL: 1/29/2025     Code Status: Full Code   Medical Readiness for Discharge Date:   Discharge Plan A: Skilled Nursing Facility                Please place Justification for DME        Yumiko Oropeza DO  Department of Hospital Medicine   [facility]

## 2025-01-28 NOTE — ASSESSMENT & PLAN NOTE
Patient's blood pressure range in the last 24 hours was: BP  Min: 96/58  Max: 128/73.The patient's inpatient anti-hypertensive regimen is listed below:  Current Antihypertensives  metoprolol succinate (TOPROL-XL) 24 hr tablet 100 mg, 2 times daily, Oral  isosorbide mononitrate 24 hr tablet 30 mg, Daily, Oral    Plan  - BP is controlled, no changes needed to their regimen

## 2025-01-28 NOTE — PT/OT/SLP PROGRESS
Occupational Therapy   Treatment    Name: Jerome Amaro Jr.  MRN: [id number]  Admitting Diagnosis:  Acute encephalopathy     The primary encounter diagnosis was Pleural effusion, bilateral. Diagnoses of Fall, Acute congestive heart failure, unspecified heart failure type, Generalized weakness, CHF (congestive heart failure), Altered mental status, unspecified altered mental status type, and Acute CHF (congestive heart failure) were also pertinent to this visit.    Recommendations:     Discharge Recommendations: Moderate Intensity Therapy  Discharge Equipment Recommendations:  to be determined by next level of care  Barriers to discharge:  Decreased caregiver support (high risk for fall, lacks good safety/judgement)    Assessment:     Jerome Amaro Jr. is a 79 y.o. male with a medical diagnosis of Acute encephalopathy.  He presents with  Performance deficits affecting function are impaired cognition, decreased lower extremity function, decreased safety awareness, gait instability, impaired balance, impaired self care skills, impaired functional mobility, weakness, impaired endurance.     Rehab Prognosis:  Good; patient would benefit from acute skilled OT services to address these deficits and reach maximum level of function.       Plan:     Patient to be seen 5 x/week to address the above listed problems via self-care/home management, therapeutic activities, therapeutic exercises  Plan of Care Expires: 02/19/25  Plan of Care Reviewed with: patient    Subjective     Chief Complaint: none  Patient/Family Comments/goals: I have to use the bathroom.  Pain/Comfort:  Pain Rating 1: 0/10  Pain Rating Post-Intervention 1: 0/10    Objective:     Communicated with: nurse prior to session.  Patient found HOB elevated with bed alarm, telemetry (sitter) upon OT entry to room.    General Precautions: Standard, fall, seizure (fluid restriction)    Orthopedic Precautions:RLE weight bearing as tolerated  Braces: N/A  Respiratory Status:  Room air     Occupational Performance:     Bed Mobility:    Patient completed Rolling/Turning to Right with supervision  Patient completed Scooting/Bridging with supervision  Patient completed Supine to Sit with supervision     Functional Mobility/Transfers:  Patient completed Sit <> Stand Transfer with stand by assistance  with  rolling walker   Patient completed bed to chair stand step transfer with contact guard assistance w/ hand held assistance.   Patient completed Toilet Transfer Step Transfer technique with contact guard assistance with  rolling walkerand grab bar  Functional Mobility: ambulated ~12ft to bathroom, sink and back to bed, CGA w/ RW. No LOB; shuffles feet, corrects and picks up feet w/ cues. Slippers appears too big.    Activities of Daily Living:  Grooming: stand by assistance to wash hands standing inside RW at sink. No dizziness.   Lower Body Dressing: supervision donned/doffed socks and slippers seated EOB  Toileting: contact guard assistance steadying assist for clothes management in stance w/ RW   Pt stood next to bed w/ RW and used urinal w/ SBA.  Pt changed his gown w/ min a to reach garment around back when donning gown like robe while seated on toilet due to gown soiled from BM accident while headed to bathroom.     Treatment & Education:  Pt seen for safe self care  activities and fx mobility retraining as stated above.  All questions/concerns addressed within scope.  Call don't fall. Pt  acknowledged.  Purpose of OT and POC  Assessed BP in sitting and standing: dropped to 99/65, MAP 76 in standing. Pt denied dizziness.      Patient left up in chair with all lines intact, call button in reach, and sitter present    GOALS:   Multidisciplinary Problems       Occupational Therapy Goals          Problem: Occupational Therapy    Goal Priority Disciplines Outcome Interventions   Occupational Therapy Goal     OT, PT/OT Progressing    Description: Goals to be met by: 2/8/2025     Patient will  increase functional independence with ADLs by performing:    UE Dressing with Modified [place].  LE Dressing with Modified [place].  Grooming while standing at sink with Modified [place].  Toileting from toilet with Modified [place] for hygiene and clothing management.   Step transfer with [place] and Modified [place]  Toilet transfer to toilet with Modified [place].                           Time Tracking:     OT Date of Treatment: 01/27/25  OT Start Time: 1500  OT Stop Time: 1528  OT Total Time (min): 28 min    Billable Minutes:Self Care/Home Management 14  Therapeutic Activity 14  Total Time 28    OT/ALTA: OT     Number of ALTA visits since last OT visit: 2    1/27/2025

## 2025-01-28 NOTE — PT/OT/SLP PROGRESS
"Occupational Therapy   Treatment    Name: Jerome Amaro Jr.  MRN: [id number]  Admitting Diagnosis:  Acute encephalopathy       Recommendations:     Discharge Recommendations: Moderate Intensity Therapy  Discharge Equipment Recommendations:  to be determined by next level of care  Barriers to discharge:   none    Assessment:     Jerome Amaro Jr. is a 79 y.o. male with a medical diagnosis of Acute encephalopathy.  He presents with fatigue after toileting with nurse this PM. Performance deficits affecting function are impaired cognition, decreased lower extremity function, decreased safety awareness, gait instability, impaired balance, impaired self care skills, impaired functional mobility, weakness, impaired endurance.     Rehab Prognosis:  Good and Fair; patient would benefit from acute skilled OT services to address these deficits and reach maximum level of function.       Plan:     Patient to be seen 5 x/week to address the above listed problems via self-care/home management, therapeutic activities, therapeutic exercises  Plan of Care Expires: 02/19/25  Plan of Care Reviewed with: patient    Subjective     Chief Complaint: "my lips are dried", fatigue  Patient/Family Comments/goals: none stated  Pain/Comfort:   None reported    Objective:     Communicated with: nurse Ruano prior to session.  Patient found supine with telemetry, peripheral IV upon OT entry to room.    General Precautions: Standard, fall    Orthopedic Precautions:N/A  Braces: N/A  Respiratory Status: Room air     Occupational Performance:     Bed Mobility:    Patient completed Supine to Sit with stand by assistance  Patient completed Sit to Supine with stand by assistance     Activities of Daily Living:  Grooming: stand by assistance for oral hygiene      [facility] 6 Click ADL: 18    Treatment & Education:  Pt educated on role of OT/POC, importance of OOB/EOB activity, use of call bell, and safety during ADLs, transfers, and functional mobility.     Patient " left supine with all lines intact, call button in reach, bed alarm on, and nurse notified    GOALS:   Multidisciplinary Problems       Occupational Therapy Goals          Problem: Occupational Therapy    Goal Priority Disciplines Outcome Interventions   Occupational Therapy Goal     OT, PT/OT Progressing    Description: Goals to be met by: 2/8/2025     Patient will increase functional independence with ADLs by performing:    UE Dressing with Modified [place].  LE Dressing with Modified [place].  Grooming while standing at sink with Modified [place].  Toileting from toilet with Modified [place] for hygiene and clothing management.   Step transfer with [place] and Modified [place]  Toilet transfer to toilet with Modified [place].                             Time Tracking:     OT Date of Treatment: 01/28/25  OT Start Time: 1431  OT Stop Time: 1444  OT Total Time (min): 13 min    Billable Minutes:Self Care/Home Management 13    OT/ALTA: OT     Number of ALTA visits since last OT visit: 2 1/28/2025

## 2025-01-28 NOTE — SUBJECTIVE & OBJECTIVE
Interval History:  Patient said he feels better today    Review of Systems   All other systems reviewed and are negative.    Objective:     Vital Signs (Most Recent):  Temp: 97.1 °F (36.2 °C) (01/27/25 1754)  Pulse: 62 (01/27/25 1754)  Resp: 18 (01/27/25 0301)  BP: (!) 96/58 (01/27/25 1754)  SpO2: 96 % (01/27/25 1754) Vital Signs (24h Range):  Temp:  [97.1 °F (36.2 °C)-97.8 °F (36.6 °C)] 97.1 °F (36.2 °C)  Pulse:  [62-76] 62  Resp:  [18] 18  SpO2:  [94 %-98 %] 96 %  BP: ()/(56-73) 96/58     Weight: 72.5 kg (159 lb 13.3 oz)  Body mass index is 25.8 kg/m².    Intake/Output Summary (Last 24 hours) at 1/27/2025 1807  Last data filed at 1/27/2025 1750  Gross per 24 hour   Intake 680 ml   Output 550 ml   Net 130 ml         Physical Exam  Cardiovascular:      Rate and Rhythm: Normal rate.      Pulses: Normal pulses.   Abdominal:      General: Abdomen is flat. There is no distension.      Palpations: Abdomen is soft.      Tenderness: There is no abdominal tenderness.   Neurological:      Mental Status: He is alert and oriented to person, place, and time.             Significant Labs: All pertinent labs within the past 24 hours have been reviewed.    Significant Imaging: I have reviewed all pertinent imaging results/findings within the past 24 hours.

## 2025-01-29 VITALS
WEIGHT: 159.81 LBS | SYSTOLIC BLOOD PRESSURE: 124 MMHG | TEMPERATURE: 98 F | BODY MASS INDEX: 25.68 KG/M2 | HEART RATE: 206 BPM | RESPIRATION RATE: 16 BRPM | OXYGEN SATURATION: 80 % | DIASTOLIC BLOOD PRESSURE: 69 MMHG | HEIGHT: 66 IN

## 2025-01-29 LAB
ALBUMIN SERPL BCP-MCNC: 3.1 G/DL (ref 3.5–5.2)
ALP SERPL-CCNC: 106 U/L (ref 55–135)
ALT SERPL W/O P-5'-P-CCNC: 8 U/L (ref 10–44)
ANION GAP SERPL CALC-SCNC: 6 MMOL/L (ref 8–16)
AST SERPL-CCNC: 20 U/L (ref 10–40)
BASOPHILS # BLD AUTO: 0.05 K/UL (ref 0–0.2)
BASOPHILS NFR BLD: 0.7 % (ref 0–1.9)
BILIRUB SERPL-MCNC: 0.7 MG/DL (ref 0.1–1)
BUN SERPL-MCNC: 39 MG/DL (ref 8–23)
CALCIUM SERPL-MCNC: 8.2 MG/DL (ref 8.7–10.5)
CHLORIDE SERPL-SCNC: 102 MMOL/L (ref 95–110)
CO2 SERPL-SCNC: 26 MMOL/L (ref 23–29)
CREAT SERPL-MCNC: 1.3 MG/DL (ref 0.5–1.4)
DIFFERENTIAL METHOD BLD: ABNORMAL
EOSINOPHIL # BLD AUTO: 0.2 K/UL (ref 0–0.5)
EOSINOPHIL NFR BLD: 2.7 % (ref 0–8)
ERYTHROCYTE [DISTWIDTH] IN BLOOD BY AUTOMATED COUNT: 13.9 % (ref 11.5–14.5)
EST. GFR  (NO RACE VARIABLE): 55.5 ML/MIN/1.73 M^2
GLUCOSE SERPL-MCNC: 84 MG/DL (ref 70–110)
HCT VFR BLD AUTO: 37 % (ref 40–54)
HGB BLD-MCNC: 12.5 G/DL (ref 14–18)
IMM GRANULOCYTES # BLD AUTO: 0.04 K/UL (ref 0–0.04)
IMM GRANULOCYTES NFR BLD AUTO: 0.5 % (ref 0–0.5)
LYMPHOCYTES # BLD AUTO: 2.2 K/UL (ref 1–4.8)
LYMPHOCYTES NFR BLD: 30 % (ref 18–48)
MAGNESIUM SERPL-MCNC: 1.9 MG/DL (ref 1.6–2.6)
MCH RBC QN AUTO: 29.8 PG (ref 27–31)
MCHC RBC AUTO-ENTMCNC: 33.8 G/DL (ref 32–36)
MCV RBC AUTO: 88 FL (ref 82–98)
MONOCYTES # BLD AUTO: 0.9 K/UL (ref 0.3–1)
MONOCYTES NFR BLD: 12.2 % (ref 4–15)
NEUTROPHILS # BLD AUTO: 3.9 K/UL (ref 1.8–7.7)
NEUTROPHILS NFR BLD: 53.9 % (ref 38–73)
NRBC BLD-RTO: 0 /100 WBC
PLATELET # BLD AUTO: 273 K/UL (ref 150–450)
PMV BLD AUTO: 10.5 FL (ref 9.2–12.9)
POTASSIUM SERPL-SCNC: 4 MMOL/L (ref 3.5–5.1)
PROT SERPL-MCNC: 5.4 G/DL (ref 6–8.4)
RBC # BLD AUTO: 4.2 M/UL (ref 4.6–6.2)
SODIUM SERPL-SCNC: 134 MMOL/L (ref 136–145)
WBC # BLD AUTO: 7.31 K/UL (ref 3.9–12.7)

## 2025-01-29 PROCEDURE — 25000003 PHARM REV CODE 250: Performed by: FAMILY MEDICINE

## 2025-01-29 PROCEDURE — 99900035 HC TECH TIME PER 15 MIN (STAT)

## 2025-01-29 PROCEDURE — 97110 THERAPEUTIC EXERCISES: CPT

## 2025-01-29 PROCEDURE — 85025 COMPLETE CBC W/AUTO DIFF WBC: CPT

## 2025-01-29 PROCEDURE — 97116 GAIT TRAINING THERAPY: CPT | Mod: CQ

## 2025-01-29 PROCEDURE — 30200315 PPD INTRADERMAL TEST REV CODE 302: Performed by: FAMILY MEDICINE

## 2025-01-29 PROCEDURE — 97530 THERAPEUTIC ACTIVITIES: CPT

## 2025-01-29 PROCEDURE — 83735 ASSAY OF MAGNESIUM: CPT

## 2025-01-29 PROCEDURE — 25000003 PHARM REV CODE 250: Performed by: NURSE PRACTITIONER

## 2025-01-29 PROCEDURE — 94761 N-INVAS EAR/PLS OXIMETRY MLT: CPT

## 2025-01-29 PROCEDURE — 80053 COMPREHEN METABOLIC PANEL: CPT

## 2025-01-29 PROCEDURE — 36415 COLL VENOUS BLD VENIPUNCTURE: CPT

## 2025-01-29 PROCEDURE — 86580 TB INTRADERMAL TEST: CPT | Performed by: FAMILY MEDICINE

## 2025-01-29 RX ORDER — METOPROLOL SUCCINATE 100 MG/1
100 TABLET, EXTENDED RELEASE ORAL DAILY
Qty: 30 TABLET | Refills: 0 | Status: SHIPPED | OUTPATIENT
Start: 2025-01-29 | End: 2025-02-28

## 2025-01-29 RX ORDER — METOPROLOL SUCCINATE 50 MG/1
100 TABLET, EXTENDED RELEASE ORAL DAILY
Status: DISCONTINUED | OUTPATIENT
Start: 2025-01-30 | End: 2025-01-29 | Stop reason: HOSPADM

## 2025-01-29 RX ORDER — FAMOTIDINE 20 MG/1
20 TABLET, FILM COATED ORAL DAILY
Qty: 30 TABLET | Refills: 0 | Status: SHIPPED | OUTPATIENT
Start: 2025-01-29 | End: 2025-02-28

## 2025-01-29 RX ORDER — ISOSORBIDE MONONITRATE 30 MG/1
30 TABLET, EXTENDED RELEASE ORAL DAILY
Qty: 30 TABLET | Refills: 0 | Status: SHIPPED | OUTPATIENT
Start: 2025-01-29 | End: 2025-02-28

## 2025-01-29 RX ADMIN — ISOSORBIDE MONONITRATE 30 MG: 30 TABLET, EXTENDED RELEASE ORAL at 09:01

## 2025-01-29 RX ADMIN — THIAMINE HCL TAB 100 MG 100 MG: 100 TAB at 09:01

## 2025-01-29 RX ADMIN — TUBERCULIN PURIFIED PROTEIN DERIVATIVE 5 UNITS: 5 INJECTION INTRADERMAL at 04:01

## 2025-01-29 RX ADMIN — METOPROLOL SUCCINATE 100 MG: 50 TABLET, FILM COATED, EXTENDED RELEASE ORAL at 09:01

## 2025-01-29 RX ADMIN — CITALOPRAM HYDROBROMIDE 20 MG: 20 TABLET ORAL at 09:01

## 2025-01-29 RX ADMIN — SACUBITRIL AND VALSARTAN 1 TABLET: 24; 26 TABLET, FILM COATED ORAL at 09:01

## 2025-01-29 RX ADMIN — EZETIMIBE 10 MG: 10 TABLET ORAL at 09:01

## 2025-01-29 RX ADMIN — MUPIROCIN 1 G: 20 OINTMENT TOPICAL at 03:01

## 2025-01-29 RX ADMIN — SENNOSIDES AND DOCUSATE SODIUM 1 TABLET: 50; 8.6 TABLET ORAL at 09:01

## 2025-01-29 RX ADMIN — CLOPIDOGREL BISULFATE 75 MG: 75 TABLET, FILM COATED ORAL at 09:01

## 2025-01-29 RX ADMIN — MUPIROCIN 1 G: 20 OINTMENT TOPICAL at 09:01

## 2025-01-29 RX ADMIN — PHENYTOIN SODIUM 400 MG: 100 CAPSULE, EXTENDED RELEASE ORAL at 09:01

## 2025-01-29 RX ADMIN — Medication 1 TABLET: at 09:01

## 2025-01-29 NOTE — PT/OT/SLP PROGRESS
"Occupational Therapy   Treatment    Name: Jerome Amaro Jr.  MRN: [id number]  Admitting Diagnosis:  Acute encephalopathy     The primary encounter diagnosis was Pleural effusion, bilateral. Diagnoses of Fall, Acute congestive heart failure, unspecified heart failure type, Generalized weakness, CHF (congestive heart failure), Altered mental status, unspecified altered mental status type, and Acute CHF (congestive heart failure) were also pertinent to this visit.    Recommendations:     Discharge Recommendations: Moderate Intensity Therapy  Discharge Equipment Recommendations:  to be determined by next level of care  Barriers to discharge:  Decreased caregiver support (high risk for fall, lacks good safety/judgement)    Assessment:     Jerome Amaro Jr. is a 79 y.o. male with a medical diagnosis of Acute encephalopathy.  He presents with orthostatic BP and c/o :weakness and slight "wooziness feeling" in prolong standing and after short distance ambulation. Symptoms resolved when seated.     Orthostatic BP readings:  Lyin/56, MAP 72  Sittin/64, MAP 77  Standin/51, MAP 62  Sittin/58, MAP 72  After ambulation: 92/58, MAP 69.     MD notified. Didn't think automatic readings were very accurate and so she asked for manual BP assessment to be done. RN arrived. OT session ended. Performance deficits affecting function are impaired cognition, decreased lower extremity function, decreased safety awareness, gait instability, impaired balance, impaired self care skills, impaired functional mobility, weakness, impaired endurance.     Rehab Prognosis:  Good; patient would benefit from acute skilled OT services to address these deficits and reach maximum level of function.       Plan:     Patient to be seen 5 x/week to address the above listed problems via self-care/home management, therapeutic activities, therapeutic exercises  Plan of Care Expires: 25  Plan of Care Reviewed with: patient    Subjective " "    Chief Complaint: slight wooziness in prolong stance.  Patient/Family Comments/goals: pt agreeable to OT. Declined ADLS because "I did that already."  Pain/Comfort:  Pain Rating 1: 0/10  Pain Rating Post-Intervention 1: 0/10    Objective:     Communicated with: nurse prior to session.  Patient found HOB elevated with bed alarm, telemetry, peripheral IV upon OT entry to room.    General Precautions: Standard, fall (fluid restriction)    Orthopedic Precautions:N/A  Braces: N/A  Respiratory Status: Room air     Occupational Performance:     Bed Mobility:    Patient completed Rolling/Turning to Right with stand by assistance and with side rail  Patient completed Scooting/Bridging with stand by assistance and with side rail  Patient completed Supine to Sit with stand by assistance and with side rail  Patient completed Sit to Supine with stand by assistance     Functional Mobility/Transfers:  Patient completed Sit <> Stand Transfer with contact guard assistance  with  rolling walker   Functional Mobility: ambulated 25 ft CGA w/ RW in room, unsteady, no LOB.    Activities of Daily Living:  Lower Body Dressing: stand by assistance to don slippers seated EOB; pt educated on safe footwear for ambulation, his slippers are too big and could cause him to fall. He was agreeable and doffed slippers and ambulated in nonskid socks.      Treatment & Education:  Pt seen for safe self care  activities and fx mobility retraining as stated above.  All questions/concerns addressed within scope.  Call don't fall. Pt  acknowledged.  Fall prevention, proper foot wear education.  UE AROM ex 10 x 1-2 sets shld and 15 reps elbows seated EOB w/ Supervision.  Postural ex seated EOB w/ SBA/demonstration.      Patient left HOB elevated with all lines intact, call button in reach, bed alarm on, MD notified, and nurse present    GOALS:   Multidisciplinary Problems       Occupational Therapy Goals          Problem: Occupational Therapy    Goal " Priority Disciplines Outcome Interventions   Occupational Therapy Goal     OT, PT/OT Progressing    Description: Goals to be met by: 2/8/2025     Patient will increase functional independence with ADLs by performing:    UE Dressing with Modified [place].  LE Dressing with Modified [place].  Grooming while standing at sink with Modified [place].  Toileting from toilet with Modified [place] for hygiene and clothing management.   Step transfer with [place] and Modified [place]  Toilet transfer to toilet with Modified [place].                         Time Tracking:     OT Date of Treatment: 01/29/25  OT Start Time: 1145  OT Stop Time: 1210  OT Total Time (min): 25 min    Billable Minutes:Self Care/Home Management 10  Therapeutic Exercise 15  Total Time 25    OT/ALTA: OT     Number of ALTA visits since last OT visit: 2 1/29/2025

## 2025-01-29 NOTE — PT/OT/SLP PROGRESS
"Physical Therapy Treatment    Patient Name:  Jerome Amaro Jr.   MRN:  [id number]    Recommendations:     Discharge Recommendations: Moderate Intensity Therapy  Discharge Equipment Recommendations: to be determined by next level of care  Barriers to discharge: None    Assessment:     Jerome Amaro Jr. is a 79 y.o. male admitted with a medical diagnosis of Acute encephalopathy.  He presents with the following impairments/functional limitations: weakness, impaired endurance, impaired functional mobility, impaired cardiopulmonary response to activity .    Rehab Prognosis: Fair; patient would benefit from acute skilled PT services to address these deficits and reach maximum level of function.    Recent Surgery: * No surgery found *      Plan:     During this hospitalization, patient to be seen 6 x/week to address the identified rehab impairments via gait training, therapeutic activities, therapeutic exercises, neuromuscular re-education and progress toward the following goals:    Plan of Care Expires:  02/19/25    Subjective     Chief Complaint: "I'm cold"  Patient/Family Comments/goals: Pt agreeable to PT.   Pain/Comfort:  Pain Rating 1: 0/10  Pain Rating Post-Intervention 1: 0/10      Objective:     Communicated with RN prior to session.  Patient found up in chair with telemetry, peripheral IV upon PT entry to room.     General Precautions: Standard, fall, seizure  Orthopedic Precautions: RLE weight bearing as tolerated  Braces: N/A  Respiratory Status: Room air     Functional Mobility:  Transfers:     Sit to Stand:  contact guard assistance with rolling walker  Gait: 75' x 2 CGA with rw      AM-PAC 6 CLICK MOBILITY          Treatment & Education:  Pt was educated on the following: call light use, importance of OOB activity and functional mobility to negate the negative effects of prolonged bed rest during this hospitalization, safe transfers/ambulation and discharge planning recommendations/options.     Patient left  on " commode  with call button in reach..    GOALS:   Multidisciplinary Problems       Physical Therapy Goals          Problem: Physical Therapy    Goal Priority Disciplines Outcome Interventions   Physical Therapy Goal     PT, PT/OT Progressing    Description: Goals to be met by: 25     Patient will increase functional independence with mobility by performin. Supine to sit with [place]  2. Sit to stand transfer with Supervision  3. Gait  x 150 feet with Stand-by Assistance using Rolling Walker.                              Time Tracking:     PT Received On: 25  PT Start Time: 1049     PT Stop Time: 1104  PT Total Time (min): 15 min     Billable Minutes: Gait Training 15    Treatment Type: Treatment  PT/PTA: PTA     Number of PTA visits since last PT visit: 4     2025

## 2025-01-29 NOTE — PLAN OF CARE
[facility] accepted auth submitted late yesterday about (5:00pm). Pt can dc to [facility] once auth is in. Family to complete paperwork today.The latest [facility] can take dc orders is 4pm today. SW will continue to follow    9:51am- 142 pasrr cxr and ppd sent over to [facility] via epic    01/29/25 0976   Post-Acute Status   Post-Acute Authorization Placement   Post-Acute Placement Status Pending payor review/awaiting authorization (if required)   Coverage Payor: ROBERT [employer] MEDICARE - BCBS [facility] -   Discharge Delays None known at this time   Discharge Plan   Discharge Plan A Skilled Nursing Facility   Discharge Plan B Skilled Nursing Facility

## 2025-01-29 NOTE — PLAN OF CARE
Left message for Pricilla CABALLERO at [facility] checking status of SNF auth; requested a requested a return call.

## 2025-01-29 NOTE — PLAN OF CARE
Patient cleared for discharge from case management standpoint.    SW informed CeeRN that  report can be called  to [phone] nurse for [facility]. Facility to provide transport.    Chart and discharge orders reviewed.  Patient discharged to SNF at [facility]  with no further case management needs.     01/29/25 1609   Final Note   Assessment Type Final Discharge Note   Anticipated Discharge Disposition SNF   Post-Acute Status   Post-Acute Authorization Placement   Post-Acute Placement Status Set-up Complete/Auth obtained   Discharge Delays None known at this time

## 2025-01-29 NOTE — PLAN OF CARE
Problem: Skin Injury Risk Increased  Goal: Skin Health and Integrity  Outcome: Progressing     Problem: Infection  Goal: Absence of Infection Signs and Symptoms  Outcome: Progressing     Problem: Oral Intake Inadequate  Goal: Improved Oral Intake  Outcome: Progressing

## 2025-01-29 NOTE — DISCHARGE INSTRUCTIONS
[facility]  Facility Transfer Orders        Admit to: SNF    Diagnoses:   Active Hospital Problems    Diagnosis  POA    *Acute encephalopathy [G93.40]  Yes    Altered mental status [R41.82]  Yes    QT prolongation [R94.31]  Yes    Pleural effusion [J90]  Yes    Acute CHF (congestive heart failure) [I50.9]  Yes    Seizure disorder [G40.909]  Yes    HTN (hypertension) [I10]  Yes    Atherosclerosis of native coronary artery of native heart [I25.10]  Yes    S/P TAVR (transcatheter aortic valve replacement) [Z95.2]  Not Applicable      Resolved Hospital Problems   No resolved problems to display.     Allergies:   Review of patient's allergies indicates:   Allergen Reactions    Atorvastatin Other (See Comments)     Muscle pain    Rosuvastatin Other (See Comments)     Muscle pain       Code Status: Full    Vitals: Routine       Diet: cardiac diet fluid restriction 1500 ml    Activity: Activity as tolerated    Nursing Precautions: Fall    Bed/Surface: Pressure Redistribution    Consults: PT to evaluate and treat- 5 times a week and OT to evaluate and treat- 5 times a week    Oxygen: room air    Dialysis: Patient is not on dialysis.       Pending Diagnostic Studies:       None               Medications: Discontinue all previous medication orders, if any. See new list below.  Current Discharge Medication List        START taking these medications    Details   (Magic mouthwash) 1:1:1 diphenhydrAMINE(Benadryl) 12.5mg/5ml liq, aluminum & magnesium hydroxide-simethicone (Maalox), LIDOcaine viscous 2% Swish and spit 5 mLs every 4 (four) hours as needed (Aphthous ulcer). for mouth sores  Qty: 1 each, Refills: 0           CONTINUE these medications which have CHANGED    Details   famotidine (PEPCID) 20 MG tablet Take 1 tablet (20 mg total) by mouth Daily.  Qty: 30 tablet, Refills: 0      isosorbide mononitrate (IMDUR) 30 MG 24 hr tablet Take 1 tablet (30 mg total) by mouth once daily.  Qty: 30 tablet, Refills: 0       metoprolol succinate (TOPROL-XL) 100 MG 24 hr tablet Take 1 tablet (100 mg total) by mouth once daily.  Qty: 30 tablet, Refills: 0    Comments: .           CONTINUE these medications which have NOT CHANGED    Details   citalopram (CELEXA) 40 MG tablet Take 40 mg by mouth once daily.      clopidogrel (PLAVIX) 75 mg tablet Take 75 mg by mouth once daily.      ENTRESTO 24-26 mg per tablet Take 1 tablet by mouth 2 (two) times daily. Samples from MD      ezetimibe (ZETIA) 10 mg tablet Take 10 mg by mouth once daily.      NITROLINGUAL 400 mcg/spray spray Place 1 spray under the tongue every 5 (five) minutes as needed for Chest pain.      phenytoin (DILANTIN) 100 MG ER capsule Take 400 mg by mouth once daily.   Refills: 0           STOP taking these medications       CORLANOR 5 mg Tab Comments:   Reason for Stopping:         ELIQUIS 5 mg Tab Comments:   Reason for Stopping:         furosemide (LASIX) 20 MG tablet Comments:   Reason for Stopping:         hydrOXYzine pamoate (VISTARIL) 25 MG Cap Comments:   Reason for Stopping:         mupirocin (BACTROBAN) 2 % ointment Comments:   Reason for Stopping:         ranolazine (RANEXA) 1,000 mg Tb12 Comments:   Reason for Stopping:         ketorolac 0.5% (ACULAR) 0.5 % Drop Comments:   Reason for Stopping:         ketorolac 0.5% (ACULAR) 0.5 % Drop Comments:   Reason for Stopping:         moxifloxacin (VIGAMOX) 0.5 % ophthalmic solution Comments:   Reason for Stopping:         prednisoLONE acetate (PRED FORTE) 1 % DrpS Comments:   Reason for Stopping:         prednisoLONE acetate (PRED FORTE) 1 % DrpS Comments:   Reason for Stopping:             Follow up:    Follow-up Information       Magda Ruiz FNP-C Follow up in 1 week(s).    Specialty: Family Medicine  Contact information:  [address]  [facility] [address]  [phone]                               Immunizations Administered as of 1/29/2025       Name Date Dose VIS Date Route Exp Date    COVID-19, MRNA, LN-S, PF  (Pfizer) (Purple Cap) 3/30/2021  8:51 AM 0.3 mL 12/12/2020 Intramuscular 7/31/2021    Site: Left deltoid     Given By: Tiffani Lugo FNP-C     : [employer]     Lot: [id number]     COVID-19, MRNA, LN-S, PF (Pfizer) (Purple Cap) 3/9/2021  7:40 AM 0.3 mL 12/12/2020 Intramuscular 6/30/2021    Site: Left deltoid     Given By: Khadijah Delgado, RN     : [employer]     Lot: [id number]                 Some patients may experience side effects after vaccination.  These may include fever, headache, muscle or joint aches.  Most symptoms resolve with 24-48 hours and do not require urgent medical evaluation unless they persist for more than 72 hours or symptoms are concerning for an unrelated medical condition.          Yumiko Oropeza, DO

## 2025-01-29 NOTE — PLAN OF CARE
Problem: Adult Inpatient Plan of Care  Goal: Plan of Care Review  1/29/2025 1720 by Cee Preciado RN  Outcome: Met  1/29/2025 1104 by Cee Preciado RN  Outcome: Progressing  1/29/2025 1103 by Cee Preciado RN  Outcome: Progressing  Goal: Patient-Specific Goal (Individualized)  1/29/2025 1720 by Cee Preciado RN  Outcome: Met  1/29/2025 1104 by Cee Preciado RN  Outcome: Progressing  1/29/2025 1103 by Cee Preciado RN  Outcome: Progressing  Goal: Absence of Hospital-Acquired Illness or Injury  1/29/2025 1720 by Cee Preciado RN  Outcome: Met  1/29/2025 1104 by Cee Preciado RN  Outcome: Progressing  1/29/2025 1103 by Cee Preciado RN  Outcome: Progressing  Goal: Optimal Comfort and Wellbeing  1/29/2025 1720 by Cee Preciado RN  Outcome: Met  1/29/2025 1104 by Cee Preciado RN  Outcome: Progressing  1/29/2025 1103 by Cee Preciado RN  Outcome: Progressing  Goal: Readiness for Transition of Care  1/29/2025 1720 by Cee Preciado RN  Outcome: Met  1/29/2025 1104 by Cee Preciado RN  Outcome: Progressing  1/29/2025 1103 by Cee Preciado RN  Outcome: Progressing     Problem: Wound  Goal: Optimal Coping  1/29/2025 1720 by Cee Preciado RN  Outcome: Met  1/29/2025 1104 by Cee Preciado RN  Outcome: Progressing  1/29/2025 1103 by Cee Preciado RN  Outcome: Progressing  Goal: Optimal Functional Ability  1/29/2025 1720 by Cee Preciado RN  Outcome: Met  1/29/2025 1104 by Cee Preciado RN  Outcome: Progressing  1/29/2025 1103 by Cee Preciado RN  Outcome: Progressing  Goal: Absence of Infection Signs and Symptoms  1/29/2025 1720 by Cee Preciado RN  Outcome: Met  1/29/2025 1104 by Cee Preciado RN  Outcome: Progressing  1/29/2025 1103 by Cee Preciado RN  Outcome: Progressing  Goal: Improved Oral Intake  1/29/2025 1720 by Cee Preciado RN  Outcome: Met  1/29/2025 1104 by Ozzy, Cee, RN  Outcome: Progressing  1/29/2025 1103 by Cee Preciado RN  Outcome: Progressing  Goal: Optimal Pain Control and Function  1/29/2025 1720 by Cee Preciado RN  Outcome: Met  1/29/2025 1104 by Ozzy  LESLEY Mike  Outcome: Progressing  1/29/2025 1103 by Cee Preciado RN  Outcome: Progressing  Goal: Skin Health and Integrity  1/29/2025 1720 by Cee Preciado RN  Outcome: Met  1/29/2025 1104 by Cee Preciado RN  Outcome: Progressing  1/29/2025 1103 by Cee Preciado RN  Outcome: Progressing  Goal: Optimal Wound Healing  1/29/2025 1720 by Cee Preciado RN  Outcome: Met  1/29/2025 1104 by Cee Preciado RN  Outcome: Progressing  1/29/2025 1103 by Cee Preciado RN  Outcome: Progressing     Problem: Fall Injury Risk  Goal: Absence of Fall and Fall-Related Injury  1/29/2025 1720 by Cee Preciado RN  Outcome: Met  1/29/2025 1104 by Cee Preciado RN  Outcome: Progressing  1/29/2025 1103 by Cee Preciado RN  Outcome: Progressing     Problem: Skin Injury Risk Increased  Goal: Skin Health and Integrity  1/29/2025 1720 by Cee Preciado RN  Outcome: Met  1/29/2025 1104 by Cee Preciado RN  Outcome: Progressing  1/29/2025 1103 by Cee Preciado RN  Outcome: Progressing     Problem: Infection  Goal: Absence of Infection Signs and Symptoms  1/29/2025 1720 by Cee Preciado RN  Outcome: Met  1/29/2025 1104 by Cee Preciado RN  Outcome: Progressing  1/29/2025 1103 by Cee Preciado RN  Outcome: Progressing     Problem: Oral Intake Inadequate  Goal: Improved Oral Intake  1/29/2025 1720 by Cee Preciado RN  Outcome: Met  1/29/2025 1104 by Cee Preciado RN  Outcome: Progressing  1/29/2025 1103 by Cee Preciado RN  Outcome: Progressing

## 2025-01-29 NOTE — DISCHARGE SUMMARY
[facility] Medicine  Discharge Summary      Patient Name: Jerome Amaro Jr.  MRN: [id number]  HANSEL: [id number]  Patient Class: IP- Inpatient  Admission Date: 1/18/2025  Hospital Length of Stay: 10 days  Discharge Date and Time:  01/29/2025 4:55 PM  Attending Physician: Yumiko Oropeza DO   Discharging Provider: Yumiko Oropeza DO  Primary Care Provider: Magda Ruiz FNP-C    Primary Care Team: Networked reference to record PCT     HPI:   79 year old male with a past medical history of aortic valve disease, atrial fibrillation on anticoagulation therapy, MI, HLD, PVD, seizures, difficulty swallowing, CAD, CHF, aortic valve disease who presents to the emergency room with weakness in the legs and falls. There was also concern for intermittent altered mental status. Patient has a strong cardiac history with 2 bypass surgeries, atrial fibrillation, aortic valve disease and valve replacement, multiple cardiac stents, hypertension, hyperlipidemia, PVD, seizure history. Patient has been weak in general. His daughter says that he has had several falls over the last couple of days. He has just not been feeling well. No reported fever or chills. No reported vomiting or any diarrhea. He is not have any abdominal pain. He occasionally has chest pain which she takes nitro for which is not abnormal for him. He does not have any new chest pain but sometimes feels short of breath. Today he had a fall where he hit the back of his head on the ground. He did not lose consciousness. No reported vomiting or any seizure activity. He is on Eliquis and Plavix. No other reported injuries from the fall.     In The ER, H/H 10.6/31.1, Na  134, T. Bili 1.3, BNP 1309, Troponin I high sensitivity 56.8, Mg 1.8, Influenza and flu negative, phenytoin 18.5, CT c spine no acute fracture, multilevel spondylosis. Incompletely evaluated ill-defined ground-glass opacity in the lung apices bilaterally, CT head no acute findings, chest  x-ray Bilateral pleural effusions with adjacent airspace disease and/or atelectasis. Additional airspace disease in the right upper lobe. Pulmonary vascular congestion. Mildly enlarged cardiac silhouette. Left chest wall cardiac pacemaker.   Admit to hospital medicine for pleural effusion, CHF exacerbation    * No surgery found *      Hospital Course:   Mr. Amaro has been monitored closely during his hospitalization. He was admitted on 1/18/25 for generalized weakness and falls.  He states he slipped off of his slipper walking to the kitchen.  He endorses feeling more weak than usual. He did not lose consciousness nor hit his head. He had a CT head and C spine with no acute findings. CT chest revealed bilat pleural effusions and concern for infectious process in RUL. BNP of 1300.  He was started on IV Lasix, then transitioned to p.o. Lasix.  Ultrasound of chest showed bilateral moderate pleural effusions right greater than left.  Thoracentesis was offered to family, but they declined.  He was started on antibiotics.  PT and OT were consulted, moderate intensity therapy was recommended.  Patient is now pending SNF placement.  Patient has had 2 falls inpatient with repeat CT head negative for acute abnormality.  Fall precautions in place. He's had confusion throughout his hospitalization. Daughter requested neuro consult.  Teleneuro was consulted, recommended EEG.  EEG which was negative for epileptiform discharges, but did reveal slowing consistent with encephalopathy. Sodium has been trending down and is 130, urine osm 586, urine sodium 46, serum osm is pending. TSH is WNL. B12 is 241 and folate 8 - started on oral replacement.     Held furosemide and citalopram for hyponatremia on 1/26 and patient was monitored closely.  Citalopram was restarted at lower dose on 01/28.  Reduced isosorbide mononitrate to 30 from 60 and metoprolol 100 daily from 200 due to low blood pressure on 01/27.  Patient was medically stable  "and was discharged to skilled nursing facility.  Apixaban held at discharge due to has bled score greater than 4 with recurrent falls  Can restart outpatient if needed.  Patient is high risk for readmission and discussed code status and possible hospice down the road however patient and family said they were not ready yet     Goals of Care Treatment Preferences:  Code Status: Full Code      SDOH Screening:  The patient declined to be screened for utility difficulties, food insecurity, transport difficulties, housing insecurity, and interpersonal safety, so no concerns could be identified this admission.     Consults:   Consults (From admission, onward)          Status Ordering Provider     Inpatient consult to Telemedicine-General Neurology  Once        Provider:  (Not yet assigned)    Completed GEREMIAS CUMMINGS     Inpatient consult to Social Work/Case Management  Once        Provider:  (Not yet assigned)    Completed CHRISTINE CASTRO     Inpatient consult to Registered Dietitian/Nutritionist  Once        Provider:  (Not yet assigned)    Completed CHRISTINE CASTRO            * Acute encephalopathy  Resolved      QT prolongation   milliseconds.  Ranexa d/c'd    Pleural effusion  Ultrasound of chest with moderate bilateral pleural effusions, right greater than left  Thoracentesis was offered, but family declined  Was managed with IV diuresis, now on p.o. diuretic          Acute CHF (congestive heart failure)  Patient  heart failure that is Acute on chronic. Most recent BNP and echo results are listed below.  No results for input(s): "BNP" in the last 72 hours.    Latest ECHO  Results for orders placed during the hospital encounter of 06/15/24    Echo    Interpretation Summary    Left Ventricle: The left ventricle is normal in size. Increased ventricular mass. Mildly increased wall thickness. There is mild concentric hypertrophy. Unable to assess wall motion. Septal motion is consistent with pacing. There is " normal systolic function with a visually estimated ejection fraction of 55 - 60%. Diastolic function cannot be reliably determined in the presence of mitral annular calcification.    Right Ventricle: Right ventricle was not well visualized due to poor acoustic window. Normal right ventricular cavity size. Pacemaker lead present in the ventricle.    Aortic Valve: There is a transcatheter valve replacement in the aortic position.    Mitral Valve: Mildly thickened leaflets. There is moderate mitral annular calcification present. There is mild regurgitation.    Tricuspid Valve: There is mild to moderate regurgitation.    Current Heart Failure Medications  sacubitriL-valsartan 24-26 mg per tablet 1 tablet, 2 times daily, Oral  metoprolol succinate (TOPROL-XL) 24 hr tablet 100 mg, 2 times daily, Oral  furosemide tablet 20 mg, Daily, Oral    Plan  - Monitor strict I&Os and daily weights.    - Place on telemetry  - Low sodium diet  - Place on fluid restriction of 1.5 L.   - Continue PO lasix daily      Seizure disorder  Seizure precautions  Continue phenytoin    Atherosclerosis of native coronary artery of native heart  Patient with known CAD s/p stent placement and CABG, which is controlled Will continue ASA, Plavix, and Statin and monitor for S/Sx of angina/ACS. Continue to monitor on telemetry.       Patient has extensive coronary artery disease with a refractory angina, patient is well known to me, patient has 13 stents and CABG surgery twice.   Ranexa d/c'd due to QTc prolongation    S/P TAVR (transcatheter aortic valve replacement)    Aware    HTN (hypertension)  Patient's blood pressure range in the last 24 hours was: BP  Min: 96/58  Max: 128/73.The patient's inpatient anti-hypertensive regimen is listed below:  Current Antihypertensives  metoprolol succinate (TOPROL-XL) 24 hr tablet 100 mg, 2 times daily, Oral  isosorbide mononitrate 24 hr tablet 30 mg, Daily, Oral    Plan  - BP is controlled, no changes needed to  their regimen      Final Active Diagnoses:    Diagnosis Date Noted POA    PRINCIPAL PROBLEM:  Acute encephalopathy [G93.40] 01/24/2025 Yes    Altered mental status [R41.82] 01/23/2025 Yes    QT prolongation [R94.31] 01/20/2025 Yes    Pleural effusion [J90] 01/18/2025 Yes    Acute CHF (congestive heart failure) [I50.9] 12/02/2023 Yes    Seizure disorder [G40.909] 05/07/2021 Yes    HTN (hypertension) [I10] 01/07/2015 Yes    Atherosclerosis of native coronary artery of native heart [I25.10] 01/07/2015 Yes    S/P TAVR (transcatheter aortic valve replacement) [Z95.2] 01/07/2015 Not Applicable      Problems Resolved During this Admission:       Discharged Condition: poor    Disposition: Skilled Nursing Facility    Follow Up:   Follow-up Information       Magda Ruiz FNP-C Follow up in 1 week(s).    Specialty: Family Medicine  Contact information:  [facility] [id number]  [phone]                           Patient Instructions:      Ambulatory referral/consult to Cardiac Rehab   Standing Status: Future   Referral Priority: Routine Referral Type: Consultation   Referral Reason: Specialty Services Required   Requested Specialty: Cardiac Rehabilitation   Number of Visits Requested: 1       Significant Diagnostic Studies: Labs: BMP:   Recent Labs   Lab 01/28/25  0612 01/29/25  0512   GLU 86 84   * 134*   K 4.0 4.0   CL 98 102   CO2 27 26   BUN 38* 39*   CREATININE 1.3 1.3   CALCIUM 8.5* 8.2*   MG 1.8 1.9   , CMP   Recent Labs   Lab 01/28/25  0612 01/29/25  0512   * 134*   K 4.0 4.0   CL 98 102   CO2 27 26   GLU 86 84   BUN 38* 39*   CREATININE 1.3 1.3   CALCIUM 8.5* 8.2*   PROT 5.9* 5.4*   ALBUMIN 3.3* 3.1*   BILITOT 0.9 0.7   ALKPHOS 112 106   AST 19 20   ALT 7* 8*   ANIONGAP 8 6*   , and CBC   Recent Labs   Lab 01/28/25  0612 01/29/25  0512   WBC 6.86 7.31   HGB 12.9* 12.5*   HCT 38.1* 37.0*    273       Pending Diagnostic Studies:       None           Medications:  Reconciled Home  Medications:      Medication List        START taking these medications      (MAGIC MOUTHWASH) 1:1:1 BENADRYL 12.5MG/5ML LIQ, ALUMINUM & MAGNESIUM  Swish and spit 5 mLs every 4 (four) hours as needed (Aphthous ulcer). for mouth sores            CHANGE how you take these medications      famotidine 20 MG tablet  Commonly known as: PEPCID  Take 1 tablet (20 mg total) by mouth Daily.  What changed:   medication strength  how much to take     isosorbide mononitrate 30 MG 24 hr tablet  Commonly known as: IMDUR  Take 1 tablet (30 mg total) by mouth once daily.  What changed: how much to take     metoprolol succinate 100 MG 24 hr tablet  Commonly known as: TOPROL-XL  Take 1 tablet (100 mg total) by mouth once daily.  What changed: when to take this            CONTINUE taking these medications      citalopram 40 MG tablet  Commonly known as: CeleXA  Take 40 mg by mouth once daily.     clopidogreL 75 mg tablet  Commonly known as: PLAVIX  Take 75 mg by mouth once daily.     ENTRESTO 24-26 mg per tablet  Generic drug: sacubitriL-valsartan  Take 1 tablet by mouth 2 (two) times daily. Samples from MD     ezetimibe 10 mg tablet  Commonly known as: ZETIA  Take 10 mg by mouth once daily.     NITROLINGUAL 400 mcg/spray spray  Generic drug: nitroGLYCERIN 0.4 MG/DOSE TL SPRY  Place 1 spray under the tongue every 5 (five) minutes as needed for Chest pain.     phenytoin 100 MG ER capsule  Commonly known as: DILANTIN  Take 400 mg by mouth once daily.            STOP taking these medications      CORLANOR 5 mg Tab  Generic drug: ivabradine     ELIQUIS 5 mg Tab  Generic drug: apixaban     furosemide 20 MG tablet  Commonly known as: LASIX     hydrOXYzine pamoate 25 MG Cap  Commonly known as: VISTARIL     ketorolac 0.5% 0.5 % Drop  Commonly known as: ACULAR     moxifloxacin 0.5 % ophthalmic solution  Commonly known as: VIGAMOX     mupirocin 2 % ointment  Commonly known as: BACTROBAN     prednisoLONE acetate 1 % Drps  Commonly known as: PRED  FORTE     ranolazine 1,000 mg Tb12  Commonly known as: RANEXA              Indwelling Lines/Drains at time of discharge:   Lines/Drains/Airways       None                   Time spent on the discharge of patient: 32 minutes         Yumiko Oropeza DO  Department of Hospital Medicine  [facility]

## 2025-01-30 ENCOUNTER — PATIENT OUTREACH (OUTPATIENT)
Dept: FAMILY MEDICINE | Facility: CLINIC | Age: 80
End: 2025-01-30
Payer: MEDICARE

## 2025-01-30 NOTE — TELEPHONE ENCOUNTER
Called pt to [facility] hosp call. Spoke with daughter, he is still in hosp. They transferred him to the  rehab and will call us to [facility] hosp. F/u once discharged.

## 2025-02-04 ENCOUNTER — HOSPITAL ENCOUNTER (INPATIENT)
Facility: HOSPITAL | Age: 80
LOS: 3 days | Discharge: NURSING, REHAB OR LONG-TERM CARE | DRG: 291 | End: 2025-02-07
Attending: EMERGENCY MEDICINE | Admitting: INTERNAL MEDICINE
Payer: MEDICARE

## 2025-02-04 DIAGNOSIS — I50.23 ACUTE ON CHRONIC SYSTOLIC (CONGESTIVE) HEART FAILURE: Primary | ICD-10-CM

## 2025-02-04 DIAGNOSIS — R06.02 SHORTNESS OF BREATH: ICD-10-CM

## 2025-02-04 PROBLEM — I25.10 CAD (CORONARY ARTERY DISEASE): Status: ACTIVE | Noted: 2025-02-04

## 2025-02-04 LAB
ALBUMIN SERPL BCP-MCNC: 3.3 G/DL (ref 3.5–5.2)
ALP SERPL-CCNC: 118 U/L (ref 55–135)
ALT SERPL W/O P-5'-P-CCNC: 22 U/L (ref 10–44)
AMPHET+METHAMPHET UR QL: NEGATIVE
ANION GAP SERPL CALC-SCNC: 6 MMOL/L (ref 8–16)
AST SERPL-CCNC: 32 U/L (ref 10–40)
BARBITURATES UR QL SCN>200 NG/ML: NEGATIVE
BASOPHILS # BLD AUTO: 0.05 K/UL (ref 0–0.2)
BASOPHILS NFR BLD: 0.7 % (ref 0–1.9)
BENZODIAZ UR QL SCN>200 NG/ML: NEGATIVE
BILIRUB SERPL-MCNC: 0.6 MG/DL (ref 0.1–1)
BNP SERPL-MCNC: 995 PG/ML (ref 0–99)
BUN SERPL-MCNC: 26 MG/DL (ref 8–23)
BZE UR QL SCN: NEGATIVE
CALCIUM SERPL-MCNC: 8.2 MG/DL (ref 8.7–10.5)
CANNABINOIDS UR QL SCN: NEGATIVE
CHLORIDE SERPL-SCNC: 102 MMOL/L (ref 95–110)
CK SERPL-CCNC: 53 U/L (ref 20–200)
CO2 SERPL-SCNC: 26 MMOL/L (ref 23–29)
CREAT SERPL-MCNC: 1.1 MG/DL (ref 0.5–1.4)
CREAT UR-MCNC: 72.5 MG/DL (ref 23–375)
DIFFERENTIAL METHOD BLD: ABNORMAL
EOSINOPHIL # BLD AUTO: 0.3 K/UL (ref 0–0.5)
EOSINOPHIL NFR BLD: 3.5 % (ref 0–8)
ERYTHROCYTE [DISTWIDTH] IN BLOOD BY AUTOMATED COUNT: 14.1 % (ref 11.5–14.5)
EST. GFR  (NO RACE VARIABLE): >60 ML/MIN/1.73 M^2
GLUCOSE SERPL-MCNC: 98 MG/DL (ref 70–110)
HCT VFR BLD AUTO: 31.7 % (ref 40–54)
HGB BLD-MCNC: 10.4 G/DL (ref 14–18)
IMM GRANULOCYTES # BLD AUTO: 0.02 K/UL (ref 0–0.04)
IMM GRANULOCYTES NFR BLD AUTO: 0.3 % (ref 0–0.5)
INFLUENZA A, MOLECULAR: NEGATIVE
INFLUENZA B, MOLECULAR: NEGATIVE
LIPASE SERPL-CCNC: 100 U/L (ref 4–60)
LYMPHOCYTES # BLD AUTO: 1.4 K/UL (ref 1–4.8)
LYMPHOCYTES NFR BLD: 19.4 % (ref 18–48)
MAGNESIUM SERPL-MCNC: 1.8 MG/DL (ref 1.6–2.6)
MAGNESIUM SERPL-MCNC: 1.8 MG/DL (ref 1.6–2.6)
MCH RBC QN AUTO: 29.1 PG (ref 27–31)
MCHC RBC AUTO-ENTMCNC: 32.8 G/DL (ref 32–36)
MCV RBC AUTO: 89 FL (ref 82–98)
MONOCYTES # BLD AUTO: 0.9 K/UL (ref 0.3–1)
MONOCYTES NFR BLD: 12.6 % (ref 4–15)
NEUTROPHILS # BLD AUTO: 4.6 K/UL (ref 1.8–7.7)
NEUTROPHILS NFR BLD: 63.5 % (ref 38–73)
NRBC BLD-RTO: 0 /100 WBC
OPIATES UR QL SCN: NEGATIVE
PCP UR QL SCN>25 NG/ML: NEGATIVE
PLATELET # BLD AUTO: 203 K/UL (ref 150–450)
PMV BLD AUTO: 10.8 FL (ref 9.2–12.9)
POTASSIUM SERPL-SCNC: 4.1 MMOL/L (ref 3.5–5.1)
PROT SERPL-MCNC: 5.8 G/DL (ref 6–8.4)
RBC # BLD AUTO: 3.57 M/UL (ref 4.6–6.2)
SARS-COV-2 RDRP RESP QL NAA+PROBE: NEGATIVE
SODIUM SERPL-SCNC: 134 MMOL/L (ref 136–145)
SPECIMEN SOURCE: NORMAL
TOXICOLOGY INFORMATION: NORMAL
TROPONIN I SERPL HS-MCNC: 15 PG/ML (ref 0–14.9)
TROPONIN I SERPL HS-MCNC: 39.7 PG/ML (ref 0–14.9)
TSH SERPL DL<=0.005 MIU/L-ACNC: 3.22 UIU/ML (ref 0.34–5.6)
WBC # BLD AUTO: 7.21 K/UL (ref 3.9–12.7)

## 2025-02-04 PROCEDURE — 63600175 PHARM REV CODE 636 W HCPCS: Performed by: EMERGENCY MEDICINE

## 2025-02-04 PROCEDURE — 83690 ASSAY OF LIPASE: CPT | Performed by: EMERGENCY MEDICINE

## 2025-02-04 PROCEDURE — 85025 COMPLETE CBC W/AUTO DIFF WBC: CPT | Performed by: EMERGENCY MEDICINE

## 2025-02-04 PROCEDURE — 84484 ASSAY OF TROPONIN QUANT: CPT | Mod: 91 | Performed by: EMERGENCY MEDICINE

## 2025-02-04 PROCEDURE — 25000242 PHARM REV CODE 250 ALT 637 W/ HCPCS: Performed by: EMERGENCY MEDICINE

## 2025-02-04 PROCEDURE — 94761 N-INVAS EAR/PLS OXIMETRY MLT: CPT

## 2025-02-04 PROCEDURE — 12000002 HC ACUTE/MED SURGE SEMI-PRIVATE ROOM

## 2025-02-04 PROCEDURE — 93010 ELECTROCARDIOGRAM REPORT: CPT | Mod: ,,, | Performed by: INTERNAL MEDICINE

## 2025-02-04 PROCEDURE — 21400001 HC TELEMETRY ROOM

## 2025-02-04 PROCEDURE — 83880 ASSAY OF NATRIURETIC PEPTIDE: CPT | Performed by: EMERGENCY MEDICINE

## 2025-02-04 PROCEDURE — 83735 ASSAY OF MAGNESIUM: CPT | Performed by: EMERGENCY MEDICINE

## 2025-02-04 PROCEDURE — 80053 COMPREHEN METABOLIC PANEL: CPT | Performed by: EMERGENCY MEDICINE

## 2025-02-04 PROCEDURE — 96376 TX/PRO/DX INJ SAME DRUG ADON: CPT

## 2025-02-04 PROCEDURE — 99900035 HC TECH TIME PER 15 MIN (STAT)

## 2025-02-04 PROCEDURE — 96365 THER/PROPH/DIAG IV INF INIT: CPT

## 2025-02-04 PROCEDURE — 94799 UNLISTED PULMONARY SVC/PX: CPT

## 2025-02-04 PROCEDURE — 63600150 PHARM REV CODE 636: Performed by: INTERNAL MEDICINE

## 2025-02-04 PROCEDURE — 27000221 HC OXYGEN, UP TO 24 HOURS

## 2025-02-04 PROCEDURE — 87635 SARS-COV-2 COVID-19 AMP PRB: CPT | Performed by: EMERGENCY MEDICINE

## 2025-02-04 PROCEDURE — 84443 ASSAY THYROID STIM HORMONE: CPT | Performed by: EMERGENCY MEDICINE

## 2025-02-04 PROCEDURE — 94640 AIRWAY INHALATION TREATMENT: CPT

## 2025-02-04 PROCEDURE — 87502 INFLUENZA DNA AMP PROBE: CPT | Performed by: EMERGENCY MEDICINE

## 2025-02-04 PROCEDURE — 93005 ELECTROCARDIOGRAM TRACING: CPT | Performed by: INTERNAL MEDICINE

## 2025-02-04 PROCEDURE — 99285 EMERGENCY DEPT VISIT HI MDM: CPT | Mod: 25

## 2025-02-04 PROCEDURE — 80307 DRUG TEST PRSMV CHEM ANLYZR: CPT | Performed by: EMERGENCY MEDICINE

## 2025-02-04 PROCEDURE — 82550 ASSAY OF CK (CPK): CPT | Performed by: EMERGENCY MEDICINE

## 2025-02-04 PROCEDURE — 25000003 PHARM REV CODE 250: Performed by: INTERNAL MEDICINE

## 2025-02-04 RX ORDER — SACUBITRIL AND VALSARTAN 24; 26 MG/1; MG/1
1 TABLET, FILM COATED ORAL 2 TIMES DAILY
Status: DISCONTINUED | OUTPATIENT
Start: 2025-02-04 | End: 2025-02-07 | Stop reason: HOSPADM

## 2025-02-04 RX ORDER — IPRATROPIUM BROMIDE 0.5 MG/2.5ML
0.5 SOLUTION RESPIRATORY (INHALATION) ONCE
Status: COMPLETED | OUTPATIENT
Start: 2025-02-04 | End: 2025-02-04

## 2025-02-04 RX ORDER — PHENYTOIN SODIUM 100 MG/1
400 CAPSULE, EXTENDED RELEASE ORAL DAILY
Status: DISCONTINUED | OUTPATIENT
Start: 2025-02-05 | End: 2025-02-07 | Stop reason: HOSPADM

## 2025-02-04 RX ORDER — BUDESONIDE 0.5 MG/2ML
0.5 INHALANT ORAL ONCE
Status: COMPLETED | OUTPATIENT
Start: 2025-02-04 | End: 2025-02-04

## 2025-02-04 RX ORDER — LANOLIN ALCOHOL/MO/W.PET/CERES
800 CREAM (GRAM) TOPICAL
Status: DISCONTINUED | OUTPATIENT
Start: 2025-02-04 | End: 2025-02-07 | Stop reason: HOSPADM

## 2025-02-04 RX ORDER — FUROSEMIDE 20 MG/1
20 TABLET ORAL 2 TIMES DAILY
COMMUNITY
Start: 2025-02-03

## 2025-02-04 RX ORDER — SODIUM CHLORIDE 0.9 % (FLUSH) 0.9 %
10 SYRINGE (ML) INJECTION
Status: DISCONTINUED | OUTPATIENT
Start: 2025-02-04 | End: 2025-02-07 | Stop reason: HOSPADM

## 2025-02-04 RX ORDER — MUPIROCIN 20 MG/G
OINTMENT TOPICAL 2 TIMES DAILY
Status: DISCONTINUED | OUTPATIENT
Start: 2025-02-04 | End: 2025-02-07 | Stop reason: HOSPADM

## 2025-02-04 RX ORDER — IVABRADINE 5 MG/1
1 TABLET, FILM COATED ORAL 2 TIMES DAILY
COMMUNITY
Start: 2025-01-30

## 2025-02-04 RX ORDER — CITALOPRAM 20 MG/1
20 TABLET, FILM COATED ORAL DAILY
COMMUNITY
Start: 2025-01-30

## 2025-02-04 RX ORDER — EZETIMIBE 10 MG/1
10 TABLET ORAL DAILY
Status: DISCONTINUED | OUTPATIENT
Start: 2025-02-05 | End: 2025-02-07 | Stop reason: HOSPADM

## 2025-02-04 RX ORDER — FAMOTIDINE 20 MG/1
20 TABLET, FILM COATED ORAL DAILY
Status: DISCONTINUED | OUTPATIENT
Start: 2025-02-05 | End: 2025-02-07 | Stop reason: HOSPADM

## 2025-02-04 RX ORDER — CLOPIDOGREL BISULFATE 75 MG/1
75 TABLET ORAL DAILY
Status: DISCONTINUED | OUTPATIENT
Start: 2025-02-05 | End: 2025-02-07 | Stop reason: HOSPADM

## 2025-02-04 RX ORDER — LEVALBUTEROL INHALATION SOLUTION 0.63 MG/3ML
0.63 SOLUTION RESPIRATORY (INHALATION)
Status: COMPLETED | OUTPATIENT
Start: 2025-02-04 | End: 2025-02-04

## 2025-02-04 RX ORDER — SODIUM,POTASSIUM PHOSPHATES 280-250MG
2 POWDER IN PACKET (EA) ORAL
Status: DISCONTINUED | OUTPATIENT
Start: 2025-02-04 | End: 2025-02-07 | Stop reason: HOSPADM

## 2025-02-04 RX ORDER — FUROSEMIDE 10 MG/ML
40 INJECTION INTRAMUSCULAR; INTRAVENOUS
Status: COMPLETED | OUTPATIENT
Start: 2025-02-04 | End: 2025-02-04

## 2025-02-04 RX ORDER — MUPIROCIN 20 MG/G
1 OINTMENT TOPICAL 3 TIMES DAILY
COMMUNITY
Start: 2025-01-29

## 2025-02-04 RX ORDER — METOPROLOL SUCCINATE 50 MG/1
100 TABLET, EXTENDED RELEASE ORAL DAILY
Status: DISCONTINUED | OUTPATIENT
Start: 2025-02-05 | End: 2025-02-07 | Stop reason: HOSPADM

## 2025-02-04 RX ADMIN — FUROSEMIDE 2.5 MG/HR: 10 INJECTION, SOLUTION INTRAMUSCULAR; INTRAVENOUS at 08:02

## 2025-02-04 RX ADMIN — BUDESONIDE INHALATION 0.5 MG: 0.5 SUSPENSION RESPIRATORY (INHALATION) at 06:02

## 2025-02-04 RX ADMIN — SACUBITRIL AND VALSARTAN 1 TABLET: 24; 26 TABLET, FILM COATED ORAL at 09:02

## 2025-02-04 RX ADMIN — IPRATROPIUM BROMIDE 0.5 MG: 0.5 SOLUTION RESPIRATORY (INHALATION) at 06:02

## 2025-02-04 RX ADMIN — MUPIROCIN 1 G: 20 OINTMENT TOPICAL at 09:02

## 2025-02-04 RX ADMIN — FUROSEMIDE 40 MG: 10 INJECTION, SOLUTION INTRAMUSCULAR; INTRAVENOUS at 07:02

## 2025-02-04 RX ADMIN — LEVALBUTEROL HYDROCHLORIDE 0.63 MG: 0.63 SOLUTION RESPIRATORY (INHALATION) at 06:02

## 2025-02-04 NOTE — Clinical Note
Diagnosis: Acute on chronic systolic (congestive) heart failure [[id number]]   Future Attending Provider: KINGS MONTILLA [[id number]]   Place in Observation: [facility] [[id number]]   Special Needs:: No Special Needs [1]

## 2025-02-04 NOTE — ED PROVIDER NOTES
Encounter Date: 2/4/2025       History     Chief Complaint   Patient presents with    Leg Swelling     Increased fluid over last week, greater than 13 lbs     79 year old male with a past medical history of aortic valve disease, atrial fibrillation on anticoagulation therapy, MI, HLD, PVD, seizures, difficulty swallowing, CAD, CHF, aortic valve disease currently at [facility] for rehabilitation after discharge from [facility] on January 29th after an admission for falling/generalized weakness/altered mental status who presents now due to a 13 lb weight gain over the past several days with increasing shortness of breath.  The patient also reports that he had to take a nitro this morning for left arm and left lateral neck pain the nitro alleviated his symptoms.  Did not have chest pain.  This however has been his cardiac equivalent in the past.  Does have to take nitro periodically and did not feel this was completely out of the ordinary for him.  Reports the shortness of breath is worse with activity and lying flat.  He is not on oxygen at the nursing facility by his report.  He denies any chest pain currently.  He denies shortness of breath since being placed on supplemental oxygen.  The shortness of breath has been worse with exertion as well.  He reports he has had some decreased urination but he denies any sensation of inability to void or bladder retention.  He denies fever chills cough or cold symptoms.  He denies any flu-like symptoms.  He denies any other problems or complaints.            Review of patient's allergies indicates:   Allergen Reactions    Atorvastatin Other (See Comments)     Muscle pain    Rosuvastatin Other (See Comments)     Muscle pain     Past Medical History:   Diagnosis Date    Anticoagulant long-term use     2014    Aortic valve disease 2014    pig valve    Arthritis     all over    Atrial fibrillation 2/29/2024    Chest pain 07/15/2019    CHF (congestive heart  failure)     2014 on meds    Coronary artery disease     2007 cabg    Difficulty swallowing     Heart attack 1990    15 stents    Heart attack 02/01/2024    mild    Heart block     Hyperlipidemia     Hypertension     on meds    Hypothyroidism 2015    on meds    PVD (peripheral vascular disease)     Seizures     last episode 1990 on meds     Past Surgical History:   Procedure Laterality Date    AORTOGRAPHY WITH SERIALOGRAPHY N/A 11/16/2021    Procedure: AORTOGRAM, WITH RUNOFF;  Surgeon: Rodrigo Willoughby MD;  Location: [facility] CATH/EP LAB;  Service: Cardiology;  Laterality: N/A;    ARTERIOGRAPHY OF AORTIC ROOT N/A 11/05/2019    Procedure: ARTERIOGRAM, AORTIC ROOT;  Surgeon: Rodrigo Willoughby MD;  Location: [facility] CATH/EP LAB;  Service: Cardiology;  Laterality: N/A;    CARDIAC CATHETERIZATION      CARDIAC VALVE SURGERY      CATARACT EXTRACTION W/  INTRAOCULAR LENS IMPLANT Right 12/11/2024    Procedure: CEIOL OD;  Surgeon: Fredrick Sharma MD;  Location: [facility] ASU OR;  Service: Ophthalmology;  Laterality: Right;    CORONARY ANGIOGRAPHY INCLUDING BYPASS GRAFTS WITH CATHETERIZATION OF LEFT HEART N/A 11/05/2019    Procedure: ANGIOGRAM, CORONARY, INCLUDING BYPASS GRAFT, WITH LEFT HEART CATHETERIZATION;  Surgeon: Rodrigo Willoughby MD;  Location: [facility] CATH/EP LAB;  Service: Cardiology;  Laterality: N/A;    CORONARY ANGIOGRAPHY INCLUDING BYPASS GRAFTS WITH CATHETERIZATION OF LEFT HEART Left 11/03/2020    Procedure: ANGIOGRAM, CORONARY, INCLUDING BYPASS GRAFT, WITH LEFT HEART CATHETERIZATION;  Surgeon: Rodrigo Willoughby MD;  Location: [facility] CATH/EP LAB;  Service: Cardiology;  Laterality: Left;    CORONARY ANGIOGRAPHY INCLUDING BYPASS GRAFTS WITH CATHETERIZATION OF LEFT HEART N/A 09/28/2021    Procedure: ANGIOGRAM, CORONARY, INCLUDING BYPASS GRAFT, WITH LEFT HEART CATHETERIZATION;  Surgeon: Rodrigo Willoughby MD;  Location: [facility] CATH/EP LAB;  Service: Cardiology;  Laterality: N/A;    CORONARY ANGIOGRAPHY INCLUDING BYPASS GRAFTS WITH CATHETERIZATION  OF LEFT HEART N/A 12/05/2023    Procedure: ANGIOGRAM, CORONARY, INCLUDING BYPASS GRAFT, WITH LEFT HEART CATHETERIZATION;  Surgeon: Rodrigo Willoughby MD;  Location: [facility] CATH/EP LAB;  Service: Cardiology;  Laterality: N/A;    CORONARY ANGIOPLASTY      CORONARY ARTERY BYPASS GRAFT      x 2    1991 2006    ESOPHAGOGASTRODUODENOSCOPY N/A 2/20/2024    Procedure: EGD (ESOPHAGOGASTRODUODENOSCOPY);  Surgeon: Mal Lockhart III, MD;  Location: [facility] ENDO;  Service: Endoscopy;  Laterality: N/A;    EXTRACORPOREAL SHOCK WAVE LITHOTRIPSY Right 08/01/2019    Procedure: LITHOTRIPSY, ESWL;  Surgeon: Williams Ortega MD;  Location: [facility] OR;  Service: Urology;  Laterality: Right;    HEMORRHOID SURGERY  1990    INSERTION OF PACEMAKER      PERCUTANEOUS TRANSLUMINAL BALLOON ANGIOPLASTY OF CORONARY ARTERY N/A 11/08/2019    Procedure: Angioplasty-coronary;  Surgeon: Rodrigo Willoughby MD;  Location: [facility] CATH/EP LAB;  Service: Cardiology;  Laterality: N/A;    WRIST FRACTURE SURGERY       Family History   Problem Relation Name Age of Onset    Heart attack Mother      Heart attack Father      Heart disease Sister 2     Stroke Sister 2     Diabetes Sister 2     No Known Problems Maternal Grandmother      No Known Problems Maternal Grandfather      No Known Problems Paternal Grandmother      No Known Problems Paternal Grandfather      No Known Problems Brother      No Known Problems Maternal Aunt      No Known Problems Maternal Uncle      No Known Problems Paternal Aunt      No Known Problems Paternal Uncle      Anemia Neg Hx      Arrhythmia Neg Hx      Asthma Neg Hx      Clotting disorder Neg Hx      Fainting Neg Hx      Heart failure Neg Hx      Hyperlipidemia Neg Hx      Hypertension Neg Hx      Atrial Septal Defect Neg Hx       Social History     Tobacco Use    Smoking status: Former     Current packs/day: 0.00     Average packs/day: 0.5 packs/day for 4.0 years (2.0 ttl pk-yrs)     Types: Cigarettes     Start date: 1985     Quit  date:      Years since quittin.1    Smokeless tobacco: Never   Substance Use Topics    Alcohol use: Yes     Comment: social    Drug use: No     Review of Systems   Constitutional:  Positive for activity change and fatigue. Negative for fever.   HENT: Negative.  Negative for postnasal drip and sore throat.    Respiratory:  Positive for shortness of breath. Negative for wheezing and stridor.    Cardiovascular:  Positive for leg swelling. Negative for chest pain.   Gastrointestinal: Negative.  Negative for abdominal distention, abdominal pain, blood in stool, diarrhea, nausea and vomiting.   Genitourinary:  Positive for decreased urine volume. Negative for difficulty urinating, flank pain, frequency, hematuria, penile discharge, penile pain, scrotal swelling and testicular pain.   Musculoskeletal:  Positive for myalgias (had left arm pain earlier that resolved after nitro). Negative for arthralgias, back pain and neck pain.   Skin: Negative.  Negative for color change and rash.   Neurological: Negative.  Negative for dizziness, syncope, facial asymmetry, speech difficulty, light-headedness and headaches.   Psychiatric/Behavioral: Negative.     All other systems reviewed and are negative.      Physical Exam     Initial Vitals [25 1449]   BP Pulse Resp Temp SpO2   116/75 72 18 98.5 °F (36.9 °C) 100 %      MAP       --         Physical Exam    Nursing note and vitals reviewed.  Constitutional: He is cooperative. He does not appear ill. No distress.   HENT:   Head: Normocephalic and atraumatic.   Nose: Nose normal. No mucosal edema or rhinorrhea. Mouth/Throat: Uvula is midline, oropharynx is clear and moist and mucous membranes are normal. No uvula swelling. No oropharyngeal exudate, posterior oropharyngeal edema or posterior oropharyngeal erythema.   Eyes: Conjunctivae, EOM and lids are normal. Pupils are equal, round, and reactive to light. Right eye exhibits no discharge. Left eye exhibits no discharge.    Neck: Trachea normal and phonation normal. Neck supple. No stridor present. No JVD present.   Normal range of motion.   Full passive range of motion without pain.     Cardiovascular:  Normal rate, regular rhythm, normal heart sounds, intact distal pulses and normal pulses.     Exam reveals no gallop, no distant heart sounds, no friction rub and no decreased pulses.       No murmur heard.  Pulmonary/Chest: Effort normal. No stridor. Tachypnea noted. No respiratory distress. He has no decreased breath sounds. He has no wheezes. He has rhonchi. He has rales.   Abdominal: Abdomen is soft. Bowel sounds are normal. He exhibits no distension, no pulsatile midline mass and no mass. There is no abdominal tenderness. No hernia.   No right CVA tenderness.  No left CVA tenderness. There is no rebound and no guarding.   Musculoskeletal:         General: No tenderness. Normal range of motion.      Right hand: Normal. Normal capillary refill. Normal pulse.      Left hand: Normal. Normal capillary refill. Normal pulse.      Cervical back: Normal, full passive range of motion without pain, normal range of motion and neck supple. No tenderness or bony tenderness. No pain with movement. Normal range of motion and normal range of motion. Normal.      Thoracic back: Normal. No tenderness or bony tenderness. Normal range of motion.      Lumbar back: Normal. No tenderness or bony tenderness. Normal range of motion.      Right lower leg: Swelling present. No tenderness. 4+ Pitting Edema present.      Left lower leg: Swelling present. No tenderness. 4+ Pitting Edema present.      Right foot: Normal capillary refill. Swelling present. Normal pulse.      Left foot: Normal capillary refill. Swelling present. Normal pulse.      Comments: Pulses are 2+ throughout, cap refill is less than 2 sec throughout,  extremities are nontender throughout with full range of motion  No spinal tenderness throughout.     Neurological: He is alert and oriented  to person, place, and time. He has normal strength. No cranial nerve deficit or sensory deficit. Coordination and gait normal.   No focal deficits   Skin: Skin is warm and dry. Capillary refill takes less than 2 seconds. No petechiae and no rash noted. No cyanosis or erythema. No pallor.   Psychiatric: He has a normal mood and affect. His speech is normal and behavior is normal.         ED Course   Procedures  Labs Reviewed   CBC W/ AUTO DIFFERENTIAL - Abnormal       Result Value    WBC 7.21      RBC 3.57 (*)     Hemoglobin 10.4 (*)     Hematocrit 31.7 (*)     MCV 89      MCH 29.1      MCHC 32.8      RDW 14.1      Platelets 203      MPV 10.8      Immature Granulocytes 0.3      Gran # (ANC) 4.6      Immature Grans (Abs) 0.02      Lymph # 1.4      Mono # 0.9      Eos # 0.3      Baso # 0.05      nRBC 0      Gran % 63.5      Lymph % 19.4      Mono % 12.6      Eosinophil % 3.5      Basophil % 0.7      Differential Method Automated     COMPREHENSIVE METABOLIC PANEL - Abnormal    Sodium 134 (*)     Potassium 4.1      Chloride 102      CO2 26      Glucose 98      BUN 26 (*)     Creatinine 1.1      Calcium 8.2 (*)     Total Protein 5.8 (*)     Albumin 3.3 (*)     Total Bilirubin 0.6      Alkaline Phosphatase 118      AST 32      ALT 22      eGFR >60.0      Anion Gap 6 (*)    TROPONIN I HIGH SENSITIVITY - Abnormal    Troponin I High Sensitivity 39.7 (*)    B-TYPE NATRIURETIC PEPTIDE - Abnormal     (*)    LIPASE - Abnormal    Lipase 100 (*)    TROPONIN I HIGH SENSITIVITY - Abnormal    Troponin I High Sensitivity 15.0 (*)    MAGNESIUM    Magnesium 1.8     DRUG SCREEN PANEL, URINE EMERGENCY    Benzodiazepines Negative      Cocaine (Metab.) Negative      Opiate Scrn, Ur Negative      Barbiturate Screen, Ur Negative      Amphetamine Screen, Ur Negative      THC Negative      Phencyclidine Negative      Creatinine, Urine 72.5      Toxicology Information SEE COMMENT      Narrative:     Specimen Source->Urine   CK    CPK 53      MAGNESIUM    Magnesium 1.8     TSH    TSH 3.222     SARS-COV-2 RNA AMPLIFICATION, QUAL    SARS-CoV-2 RNA, Amplification, Qual Negative     INFLUENZA A AND B ANTIGEN    Influenza A, Molecular Negative      Influenza B, Molecular Negative      Flu A & B Source Nasal swab      Narrative:     Specimen Source->Nasopharyngeal Swab        ECG Results              EKG 12-lead (In process)        Collection Time Result Time QRS Duration OHS QTC Calculation    02/04/25 14:57:13 02/04/25 15:02:52 156 534                     In process by Interface, Lab In [facility] (02/04/25 15:03:01)                   Narrative:    Test Reason : R06.02,    Vent. Rate :  66 BPM     Atrial Rate :  66 BPM     P-R Int : 146 ms          QRS Dur : 156 ms      QT Int : 510 ms       P-R-T Axes :    -69  97 degrees    QTcB Int : 534 ms    Atrial-sensed ventricular-paced rhythm  Biventricular pacemaker detected  Abnormal ECG  When compared with ECG of 18-Jan-2025 18:10,  Vent. rate has decreased by  17 bpm    Referred By:            Confirmed By:                                   Imaging Results              X-Ray Chest AP Portable (Final result)  Result time 02/04/25 15:44:55      Final result by Fox Dowd DO (02/04/25 15:44:55)                   Impression:      1. Borderline enlarged cardiomediastinal silhouette with mild bilateral interstitial thickening could reflect CHF with mild pulmonary edema.  2. Linear and hazy opacities of the lung bases with blunting of the costophrenic angles are felt to reflect small pleural effusions with overlying passive atelectasis.      Electronically signed by: Fox Dowd  Date:    02/04/2025  Time:    15:44               Narrative:    EXAMINATION:  XR CHEST AP PORTABLE    CLINICAL HISTORY:  shortness of breath;    FINDINGS:  Portable chest with comparison chest x-ray 01/23/2025.  Median sternotomy wires and left dual lead cardiac pacemaker again observed.  Borderline enlarged cardiomediastinal  silhouette.There are linear and hazy opacities of the lung bases with mild blunting of the costophrenic angles.  Mild bilateral interstitial thickening.  Pulmonary vasculature is normal. No acute osseous abnormality.                                       Medications   clopidogreL tablet 75 mg (has no administration in time range)   sacubitriL-valsartan 24-26 mg per tablet 1 tablet (has no administration in time range)   ezetimibe tablet 10 mg (has no administration in time range)   famotidine tablet 20 mg (has no administration in time range)   metoprolol succinate (TOPROL-XL) 24 hr tablet 100 mg (has no administration in time range)   phenytoin (DILANTIN) ER capsule 400 mg (has no administration in time range)   sodium chloride 0.9% flush 10 mL (has no administration in time range)   furosemide (Lasix) 200 mg in 0.9% NaCl SolP 100 mL continuous infusion (conc: 2 mg/mL) (has no administration in time range)   potassium bicarbonate disintegrating tablet 50 mEq (has no administration in time range)   potassium bicarbonate disintegrating tablet 35 mEq (has no administration in time range)   potassium bicarbonate disintegrating tablet 60 mEq (has no administration in time range)   magnesium oxide tablet 800 mg (has no administration in time range)   magnesium oxide tablet 800 mg (has no administration in time range)   potassium, sodium phosphates 280-160-250 mg packet 2 packet (has no administration in time range)   potassium, sodium phosphates 280-160-250 mg packet 2 packet (has no administration in time range)   potassium, sodium phosphates 280-160-250 mg packet 2 packet (has no administration in time range)   mupirocin 2 % ointment (has no administration in time range)   levalbuterol nebulizer solution 0.63 mg (0.63 mg Nebulization Given 2/4/25 1826)   ipratropium 0.02 % nebulizer solution 0.5 mg (0.5 mg Nebulization Given 2/4/25 1826)   budesonide nebulizer solution 0.5 mg (0.5 mg Nebulization Given 2/4/25 1826)    furosemide injection 40 mg (40 mg Intravenous Given 2/4/25 1901)     Medical Decision Making  Patient with 4+ lower extremity edema, fine rales at bases and increasing shortness of breath improved with supplemental oxygen.  Reportedly patient had a pulse ox of 90% prior to being placed on oxygen.  He does report feeling better while on the oxygen.  He denies chest pain.  Labs and diagnostic imaging reviewed.  He is in no acute distress.  I have discussed the case with the hospitalist provider who has assumed care will admit.    Amount and/or Complexity of Data Reviewed  Labs: ordered. Decision-making details documented in ED Course.  Radiology: ordered.    Risk  Prescription drug management.  Decision regarding hospitalization.               ED Course as of 02/04/25 1946 Tue Feb 04, 2025   1751 BNP(!): 995 [AR]   1751 BNP(!): 995 [AR]   1752 Flu A & B Source: Nasal swab [AR]   1752 Influenza B, Molecular: Negative [AR]   1752 Influenza A, Molecular: Negative [AR]      ED Course User Index  [AR] Ileana Mims MD                           Clinical Impression:  Final diagnoses:  [R06.02] Shortness of breath  [I50.23] Acute on chronic systolic (congestive) heart failure          ED Disposition Condition    Admit                 Ileana Mims MD  02/04/25 1946

## 2025-02-05 LAB
ALBUMIN SERPL BCP-MCNC: 3.1 G/DL (ref 3.5–5.2)
ALP SERPL-CCNC: 114 U/L (ref 55–135)
ALT SERPL W/O P-5'-P-CCNC: 18 U/L (ref 10–44)
ANION GAP SERPL CALC-SCNC: 6 MMOL/L (ref 8–16)
AST SERPL-CCNC: 24 U/L (ref 10–40)
BILIRUB SERPL-MCNC: 0.7 MG/DL (ref 0.1–1)
BUN SERPL-MCNC: 23 MG/DL (ref 8–23)
CALCIUM SERPL-MCNC: 8.3 MG/DL (ref 8.7–10.5)
CHLORIDE SERPL-SCNC: 102 MMOL/L (ref 95–110)
CO2 SERPL-SCNC: 30 MMOL/L (ref 23–29)
CREAT SERPL-MCNC: 1 MG/DL (ref 0.5–1.4)
ERYTHROCYTE [DISTWIDTH] IN BLOOD BY AUTOMATED COUNT: 14.1 % (ref 11.5–14.5)
EST. GFR  (NO RACE VARIABLE): >60 ML/MIN/1.73 M^2
GLUCOSE SERPL-MCNC: 92 MG/DL (ref 70–110)
HCT VFR BLD AUTO: 30.3 % (ref 40–54)
HGB BLD-MCNC: 10.2 G/DL (ref 14–18)
LIPASE SERPL-CCNC: 76 U/L (ref 4–60)
MAGNESIUM SERPL-MCNC: 1.7 MG/DL (ref 1.6–2.6)
MCH RBC QN AUTO: 29.3 PG (ref 27–31)
MCHC RBC AUTO-ENTMCNC: 33.7 G/DL (ref 32–36)
MCV RBC AUTO: 87 FL (ref 82–98)
PHENYTOIN SERPL-MCNC: 19.2 UG/ML (ref 10–20)
PLATELET # BLD AUTO: 174 K/UL (ref 150–450)
PMV BLD AUTO: 10.8 FL (ref 9.2–12.9)
POTASSIUM SERPL-SCNC: 3.8 MMOL/L (ref 3.5–5.1)
PROT SERPL-MCNC: 5.6 G/DL (ref 6–8.4)
RBC # BLD AUTO: 3.48 M/UL (ref 4.6–6.2)
SODIUM SERPL-SCNC: 138 MMOL/L (ref 136–145)
TROPONIN I SERPL HS-MCNC: 25.2 PG/ML (ref 0–14.9)
TROPONIN I SERPL HS-MCNC: 31.3 PG/ML (ref 0–14.9)
WBC # BLD AUTO: 6.22 K/UL (ref 3.9–12.7)

## 2025-02-05 PROCEDURE — 80053 COMPREHEN METABOLIC PANEL: CPT | Performed by: INTERNAL MEDICINE

## 2025-02-05 PROCEDURE — 25000003 PHARM REV CODE 250: Performed by: INTERNAL MEDICINE

## 2025-02-05 PROCEDURE — 84484 ASSAY OF TROPONIN QUANT: CPT | Mod: 91 | Performed by: INTERNAL MEDICINE

## 2025-02-05 PROCEDURE — 83690 ASSAY OF LIPASE: CPT | Performed by: INTERNAL MEDICINE

## 2025-02-05 PROCEDURE — 84484 ASSAY OF TROPONIN QUANT: CPT | Performed by: INTERNAL MEDICINE

## 2025-02-05 PROCEDURE — 25000003 PHARM REV CODE 250: Performed by: FAMILY MEDICINE

## 2025-02-05 PROCEDURE — 80185 ASSAY OF PHENYTOIN TOTAL: CPT | Performed by: INTERNAL MEDICINE

## 2025-02-05 PROCEDURE — 94761 N-INVAS EAR/PLS OXIMETRY MLT: CPT

## 2025-02-05 PROCEDURE — 85027 COMPLETE CBC AUTOMATED: CPT | Performed by: INTERNAL MEDICINE

## 2025-02-05 PROCEDURE — 83735 ASSAY OF MAGNESIUM: CPT | Performed by: INTERNAL MEDICINE

## 2025-02-05 PROCEDURE — 27000221 HC OXYGEN, UP TO 24 HOURS

## 2025-02-05 PROCEDURE — 21400001 HC TELEMETRY ROOM

## 2025-02-05 PROCEDURE — 36415 COLL VENOUS BLD VENIPUNCTURE: CPT | Performed by: INTERNAL MEDICINE

## 2025-02-05 RX ORDER — AMMONIUM LACTATE 12 G/100G
LOTION TOPICAL 2 TIMES DAILY
Status: DISCONTINUED | OUTPATIENT
Start: 2025-02-05 | End: 2025-02-07 | Stop reason: HOSPADM

## 2025-02-05 RX ADMIN — POTASSIUM BICARBONATE 50 MEQ: 978 TABLET, EFFERVESCENT ORAL at 09:02

## 2025-02-05 RX ADMIN — MUPIROCIN 1 G: 20 OINTMENT TOPICAL at 08:02

## 2025-02-05 RX ADMIN — Medication 800 MG: at 09:02

## 2025-02-05 RX ADMIN — SACUBITRIL AND VALSARTAN 1 TABLET: 24; 26 TABLET, FILM COATED ORAL at 08:02

## 2025-02-05 RX ADMIN — EXTENDED PHENYTOIN SODIUM 400 MG: 100 CAPSULE, EXTENDED RELEASE ORAL at 09:02

## 2025-02-05 RX ADMIN — METOPROLOL SUCCINATE 100 MG: 50 TABLET, FILM COATED, EXTENDED RELEASE ORAL at 09:02

## 2025-02-05 RX ADMIN — FAMOTIDINE 20 MG: 20 TABLET ORAL at 05:02

## 2025-02-05 RX ADMIN — EZETIMIBE 10 MG: 10 TABLET ORAL at 09:02

## 2025-02-05 RX ADMIN — MUPIROCIN 1 G: 20 OINTMENT TOPICAL at 09:02

## 2025-02-05 RX ADMIN — SACUBITRIL AND VALSARTAN 1 TABLET: 24; 26 TABLET, FILM COATED ORAL at 09:02

## 2025-02-05 RX ADMIN — CLOPIDOGREL BISULFATE 75 MG: 75 TABLET, FILM COATED ORAL at 09:02

## 2025-02-05 RX ADMIN — AMMONIUM LACTATE: 12 LOTION TOPICAL at 05:02

## 2025-02-05 RX ADMIN — Medication 800 MG: at 05:02

## 2025-02-05 NOTE — SUBJECTIVE & OBJECTIVE
Past Medical History:   Diagnosis Date    Anticoagulant long-term use     2014    Aortic valve disease 2014    pig valve    Arthritis     all over    Atrial fibrillation 2/29/2024    Chest pain 07/15/2019    CHF (congestive heart failure)     2014 on meds    Coronary artery disease     2007 cabg    Difficulty swallowing     Heart attack 1990    15 stents    Heart attack 02/01/2024    mild    Heart block     Hyperlipidemia     Hypertension     on meds    Hypothyroidism 2015    on meds    PVD (peripheral vascular disease)     Seizures     last episode 1990 on meds       Past Surgical History:   Procedure Laterality Date    AORTOGRAPHY WITH SERIALOGRAPHY N/A 11/16/2021    Procedure: AORTOGRAM, WITH RUNOFF;  Surgeon: Rodrigo Willoughby MD;  Location: [facility] CATH/EP LAB;  Service: Cardiology;  Laterality: N/A;    ARTERIOGRAPHY OF AORTIC ROOT N/A 11/05/2019    Procedure: ARTERIOGRAM, AORTIC ROOT;  Surgeon: Rodrigo Willoughby MD;  Location: [facility] CATH/EP LAB;  Service: Cardiology;  Laterality: N/A;    CARDIAC CATHETERIZATION      CARDIAC VALVE SURGERY      CATARACT EXTRACTION W/  INTRAOCULAR LENS IMPLANT Right 12/11/2024    Procedure: CEIOL OD;  Surgeon: Fredrick Sharma MD;  Location: [facility] OR;  Service: Ophthalmology;  Laterality: Right;    CORONARY ANGIOGRAPHY INCLUDING BYPASS GRAFTS WITH CATHETERIZATION OF LEFT HEART N/A 11/05/2019    Procedure: ANGIOGRAM, CORONARY, INCLUDING BYPASS GRAFT, WITH LEFT HEART CATHETERIZATION;  Surgeon: Rodrigo Willoughby MD;  Location: [facility] CATH/EP LAB;  Service: Cardiology;  Laterality: N/A;    CORONARY ANGIOGRAPHY INCLUDING BYPASS GRAFTS WITH CATHETERIZATION OF LEFT HEART Left 11/03/2020    Procedure: ANGIOGRAM, CORONARY, INCLUDING BYPASS GRAFT, WITH LEFT HEART CATHETERIZATION;  Surgeon: Rodrigo Willoughby MD;  Location: [facility] CATH/EP LAB;  Service: Cardiology;  Laterality: Left;    CORONARY ANGIOGRAPHY INCLUDING BYPASS GRAFTS WITH CATHETERIZATION OF LEFT HEART N/A 09/28/2021    Procedure: ANGIOGRAM,  CORONARY, INCLUDING BYPASS GRAFT, WITH LEFT HEART CATHETERIZATION;  Surgeon: Rodrigo Willoughby MD;  Location: [facility] CATH/EP LAB;  Service: Cardiology;  Laterality: N/A;    CORONARY ANGIOGRAPHY INCLUDING BYPASS GRAFTS WITH CATHETERIZATION OF LEFT HEART N/A 12/05/2023    Procedure: ANGIOGRAM, CORONARY, INCLUDING BYPASS GRAFT, WITH LEFT HEART CATHETERIZATION;  Surgeon: Rodrigo Willoughby MD;  Location: [facility] CATH/EP LAB;  Service: Cardiology;  Laterality: N/A;    CORONARY ANGIOPLASTY      CORONARY ARTERY BYPASS GRAFT      x 2    1991 2006    ESOPHAGOGASTRODUODENOSCOPY N/A 2/20/2024    Procedure: EGD (ESOPHAGOGASTRODUODENOSCOPY);  Surgeon: Mal Lockhart III, MD;  Location: [facility] ENDO;  Service: Endoscopy;  Laterality: N/A;    EXTRACORPOREAL SHOCK WAVE LITHOTRIPSY Right 08/01/2019    Procedure: LITHOTRIPSY, ESWL;  Surgeon: Williams Ortega MD;  Location: [facility] OR;  Service: Urology;  Laterality: Right;    HEMORRHOID SURGERY  1990    INSERTION OF PACEMAKER      PERCUTANEOUS TRANSLUMINAL BALLOON ANGIOPLASTY OF CORONARY ARTERY N/A 11/08/2019    Procedure: Angioplasty-coronary;  Surgeon: Rodrigo Willoughby MD;  Location: [facility] CATH/EP LAB;  Service: Cardiology;  Laterality: N/A;    WRIST FRACTURE SURGERY         Review of patient's allergies indicates:   Allergen Reactions    Atorvastatin Other (See Comments)     Muscle pain    Rosuvastatin Other (See Comments)     Muscle pain       Current Facility-Administered Medications on File Prior to Encounter   Medication    magnesium oxide tablet 800 mg    sodium chloride 0.9% flush 10 mL     Current Outpatient Medications on File Prior to Encounter   Medication Sig    (Magic mouthwash) 1:1:1 diphenhydrAMINE(Benadryl) 12.5mg/5ml liq, aluminum & magnesium hydroxide-simethicone (Maalox), LIDOcaine viscous 2% Swish and spit 5 mLs every 4 (four) hours as needed (Aphthous ulcer). for mouth sores    citalopram (CELEXA) 20 MG tablet Take 20 mg by mouth once daily.    clopidogrel (PLAVIX) 75  mg tablet Take 75 mg by mouth once daily.    CORLANOR 5 mg Tab Take 1 tablet by mouth 2 (two) times daily.    ENTRESTO 24-26 mg per tablet Take 1 tablet by mouth 2 (two) times daily. Samples from MD    ezetimibe (ZETIA) 10 mg tablet Take 10 mg by mouth once daily.    famotidine (PEPCID) 20 MG tablet Take 1 tablet (20 mg total) by mouth Daily.    isosorbide mononitrate (IMDUR) 30 MG 24 hr tablet Take 1 tablet (30 mg total) by mouth once daily.    metoprolol succinate (TOPROL-XL) 100 MG 24 hr tablet Take 1 tablet (100 mg total) by mouth once daily.    multivitamin with minerals tablet Take 1 tablet by mouth once daily.    mupirocin (BACTROBAN) 2 % ointment Apply 1 g topically 3 (three) times daily.    NITROLINGUAL 400 mcg/spray spray Place 1 spray under the tongue every 5 (five) minutes as needed for Chest pain.    phenytoin (DILANTIN) 100 MG ER capsule Take 400 mg by mouth once daily.     UNABLE TO FIND Take 45 mLs by mouth 2 (two) times a day. medication name: PROSTAT LIQUID    furosemide (LASIX) 20 MG tablet Take 20 mg by mouth 2 (two) times daily.    [DISCONTINUED] citalopram (CELEXA) 40 MG tablet Take 40 mg by mouth once daily.     Family History       Problem Relation (Age of Onset)    Diabetes Sister    Heart attack Mother, Father    Heart disease Sister    No Known Problems Maternal Grandmother, Maternal Grandfather, Paternal Grandmother, Paternal Grandfather, Brother, Maternal Aunt, Maternal Uncle, Paternal Aunt, Paternal Uncle    Stroke Sister          Tobacco Use    Smoking status: Former     Current packs/day: 0.00     Average packs/day: 0.5 packs/day for 4.0 years (2.0 ttl pk-yrs)     Types: Cigarettes     Start date:      Quit date:      Years since quittin.1    Smokeless tobacco: Never   Substance and Sexual Activity    Alcohol use: Yes     Comment: social    Drug use: No    Sexual activity: Not Currently     Review of Systems   Constitutional:  Negative for activity change and appetite  change.   HENT:  Negative for congestion and dental problem.    Eyes:  Negative for discharge and itching.   Respiratory:  Positive for shortness of breath.    Cardiovascular:  Positive for chest pain.   Gastrointestinal:  Negative for abdominal distention and abdominal pain.   Endocrine: Negative for cold intolerance.   Genitourinary:  Negative for difficulty urinating and dysuria.   Musculoskeletal:  Negative for arthralgias and back pain.   Skin:  Negative for color change.   Neurological:  Negative for dizziness and facial asymmetry.   Hematological:  Negative for adenopathy.   Psychiatric/Behavioral:  Negative for agitation and behavioral problems.      Objective:     Vital Signs (Most Recent):  Temp: 98.5 °F (36.9 °C) (02/04/25 1449)  Pulse: 68 (02/04/25 1904)  Resp: 18 (02/04/25 1826)  BP: 119/68 (02/04/25 1904)  SpO2: 100 % (02/04/25 1904) Vital Signs (24h Range):  Temp:  [98.5 °F (36.9 °C)] 98.5 °F (36.9 °C)  Pulse:  [64-72] 68  Resp:  [18] 18  SpO2:  [100 %] 100 %  BP: (105-119)/(56-75) 119/68     Weight: 74.8 kg (165 lb)  Body mass index is 26.63 kg/m².     Physical Exam  Vitals and nursing note reviewed.   Constitutional:       Appearance: He is well-developed.   HENT:      Head: Atraumatic.      Right Ear: External ear normal.      Left Ear: External ear normal.      Nose: Nose normal.      Mouth/Throat:      Mouth: Mucous membranes are moist.   Eyes:      Extraocular Movements: Extraocular movements intact.   Cardiovascular:      Rate and Rhythm: Normal rate.   Pulmonary:      Effort: Pulmonary effort is normal.      Breath sounds: Rales present.   Abdominal:      Palpations: Abdomen is soft.   Musculoskeletal:         General: Normal range of motion.      Cervical back: Full passive range of motion without pain and normal range of motion.      Right lower leg: Edema present.      Left lower leg: Edema present.   Skin:     General: Skin is warm.   Neurological:      Mental Status: He is alert and  oriented to person, place, and time.   Psychiatric:         Behavior: Behavior normal.                Significant Labs: All pertinent labs within the past 24 hours have been reviewed.  CBC:   Recent Labs   Lab 02/04/25  1545   WBC 7.21   HGB 10.4*   HCT 31.7*        CMP:   Recent Labs   Lab 02/04/25  1545   *   K 4.1      CO2 26   GLU 98   BUN 26*   CREATININE 1.1   CALCIUM 8.2*   PROT 5.8*   ALBUMIN 3.3*   BILITOT 0.6   ALKPHOS 118   AST 32   ALT 22   ANIONGAP 6*       Significant Imaging: I have reviewed all pertinent imaging results/findings within the past 24 hours.

## 2025-02-05 NOTE — ED NOTES
"REPORTED LE EDEMA.  DROWSY BUT ANSWERS QUESTIONS WHEN PROMPTED.  NO RD.  BILATERAL LE EDEMA MODERATE, WORSE AT L ANKLE.  FAMILY STATES PT USUALLY "SKINNY".  NON DISTENDED ABDOMEN. SKIN WDI.  FALLS PRECAUTIONS.   "

## 2025-02-05 NOTE — CONSULTS
Right heel red slow to randy no broken skin. Waffle in use, multiple scratches legs, feet, abdomen, back, bilateral hips, bilateral buttock no broken or redness.  Apply thin layer of Triad, patient incontinent.  Removed brief patient has a Quvi in place.  Float heels

## 2025-02-05 NOTE — CONSULTS
"[facility]  Adult Nutrition   Consult Note (Initial Assessment)     SUMMARY     Recommendations  Upgrade diet to Cardiac as medically able.   Encourage good PO intake.   RD to complete NFPE at follow up.     Communication of RD Recs:  (plan of care)    Nutrition Goals: Advance diet as tolerated by RD follow up Meal consumption of >50% by RD follow up.  Nutrition Goal Status: new    Nutrition Interventions: Fluid modified diet and Sodium modified diet    Nutrition Diagnosis PES Statement: Inadequate (suboptimal) protein-energy intake related to diet held for procedure as evidenced by NPO status.      Nutrition Diagnosis Status:   New      Dietitian Rounds Brief  Pt admitted with acute CHF flare and leg swelling; >15# fluid gain. Spoke with daughter. Reports pt with good appetite and intake PTA. Daughter plans to provide pt with Mrs. Bravo or other salt alternatives for discharge to SNF. Currently with Pt currently resides at [facility]. Pt NPO for procedure.       Nutrition Related Social Determinants of Health:   SDOH: None Identified      Malnutrition Assessment  RD to re-assess on follow up.       Diet order:   Current Diet Order: NPO       Evaluation of Received Nutrient/Fluid Intake  % Intake of Estimated Energy Needs: 0%  % Meal Intake: NPO      Intake/Output Summary (Last 24 hours) at 2/5/2025 1524  Last data filed at 2/5/2025 1437  Gross per 24 hour   Intake 102.56 ml   Output 2400 ml   Net -2297.44 ml        Anthropometrics  Height: 5' 6" (167.6 cm)  Height (inches): 66 in  Weight: 102.3 kg (225 lb 8 oz)  Weight (lb): 225.5 lb  Weight Method: Bed Scale  Ideal Body Weight (IBW), Male: 142 lb  % Ideal Body Weight, Male (lb): 159.72 %  BMI (Calculated): 36.4  BMI Grade: 35 - 39.9 - obesity - grade II       Estimated/Assessed Needs  Weight Used For Calorie Calculations: 102.3 kg (225 lb 8.5 oz)  Energy Calorie Requirements (kcal): 7404-5993 kcal daily  Energy Need Method: Kcal/kg (20-25 " kcal/kg)  Protein Requirements: 103-154 gm daily  Weight Used For Protein Calculations: 102.3 kg (225 lb 8.5 oz) (1.0-1.5 - older adult)  Fluid Requirements (mL): 1 mL/kcal or per MD  Estimated Fluid Requirement Method: RDA Method  RDA Method (mL): 2046  CHO Requirement: 256 gm daily    Reason for Assessment  Reason For Assessment: consult, identified at risk by screening criteria (fluid and salt education)  Nutrition Discharge Planning: Pt to discharge on a low sodium diet.    Final diagnoses:  [R06.02] Shortness of breath  [I50.23] Acute on chronic systolic (congestive) heart failure     Past Medical History:   Diagnosis Date    Anticoagulant long-term use     2014    Aortic valve disease 2014    pig valve    Arthritis     all over    Atrial fibrillation 2/29/2024    Chest pain 07/15/2019    CHF (congestive heart failure)     2014 on meds    Coronary artery disease     2007 cabg    Difficulty swallowing     Heart attack 1990    15 stents    Heart attack 02/01/2024    mild    Heart block     Hyperlipidemia     Hypertension     on meds    Hypothyroidism 2015    on meds    PVD (peripheral vascular disease)     Seizures     last episode 1990 on meds        Nutrition/Diet History  Spiritual, Cultural Beliefs, Mosque Practices, Values that Affect Care: no  Food Allergies: NKFA      Nutrition Risk Screen          Wound 02/04/25 2217 Right Leg-Wound Image: Images linked       Wound 02/04/25 2217 Left Leg-Wound Image: Images linked       Wound 02/04/25 2217 Right Arm-Wound Image: Images linked       Wound 02/04/25 2217 Left Arm-Wound Image: Images linked       Wound 02/04/25 2217 Sacral spine-Wound Image: Images linked       Wound 02/04/25 2217 Thoracic spine-Wound Image: Images linked       Wound 02/04/25 2217 Left Upper quadrant-Wound Image: Images linked  MST Score: 3  Have you recently lost weight without trying?: Unsure  Weight loss score: 2  Have you been eating poorly because of a decreased appetite?:  Yes  Appetite score: 1       Weight History:  Wt Readings from Last 10 Encounters:   02/05/25 102.3 kg (225 lb 8 oz)   01/27/25 72.5 kg (159 lb 13.3 oz)   01/19/25 74.8 kg (165 lb)   12/05/24 78.5 kg (173 lb 1 oz)   09/04/24 78.5 kg (173 lb)   06/21/24 74.2 kg (163 lb 9.3 oz)   06/16/24 74.8 kg (165 lb)   03/02/24 76.3 kg (168 lb 3.4 oz)   03/01/24 77.7 kg (171 lb 4.8 oz)   02/19/24 80.3 kg (177 lb)        Lab/Procedures/Meds: Pertinent Labs/Meds Reviewed    Medications:Pertinent Medications Reviewed  Scheduled Meds:   ammonium lactate   Topical (Top) BID    clopidogreL  75 mg Oral Daily    ezetimibe  10 mg Oral Daily    famotidine  20 mg Oral Daily    metoprolol succinate  100 mg Oral Daily    mupirocin   Nasal BID    phenytoin  400 mg Oral Daily    sacubitriL-valsartan  1 tablet Oral BID     Continuous Infusions:   furosemide (LASIX) 2 mg/mL continuous infusion (non-titrating)  2.5 mg/hr Intravenous Continuous 1.25 mL/hr at 02/05/25 0700 2.5 mg/hr at 02/05/25 0700         Labs: Pertinent Labs Reviewed  Clinical Chemistry:  Recent Labs   Lab 02/04/25  1545 02/05/25  0727   * 138   K 4.1 3.8    102   CO2 26 30*   GLU 98 92   BUN 26* 23   CREATININE 1.1 1.0   CALCIUM 8.2* 8.3*   PROT 5.8* 5.6*   ALBUMIN 3.3* 3.1*   BILITOT 0.6 0.7   ALKPHOS 118 114   AST 32 24   ALT 22 18   ANIONGAP 6* 6*   MG 1.8  1.8 1.7   LIPASE 100* 76*       CBC:   Recent Labs   Lab 02/05/25  0727   WBC 6.22   RBC 3.48*   HGB 10.2*   HCT 30.3*      MCV 87   MCH 29.3   MCHC 33.7       Cardiac Profile:  Recent Labs   Lab 02/04/25  1545   *   CPK 53       Thyroid & Parathyroid:  Recent Labs   Lab 02/04/25  1545   TSH 3.222       Monitor and Evaluation  Food and Nutrient Intake: energy intake  Food and Nutrient Adminstration: diet order  Knowledge/Beliefs/Attitudes: food and nutrition knowledge/skill  Physical Activity and Function: nutrition-related ADLs and IADLs  Anthropometric Measurements: weight change,  weight  Biochemical Data, Medical Tests and Procedures: electrolyte and renal panel, gastrointestinal profile, glucose/endocrine profile, inflammatory profile, lipid profile  Nutrition-Focused Physical Findings: overall appearance       Nutrition Risk  Level of Risk/Frequency of Follow-up:  (2x/weekly)       Nutrition Follow-Up  RD Follow-up?: Yes

## 2025-02-05 NOTE — PLAN OF CARE
Problem: Oral Intake Inadequate  Goal: Improved Oral Intake  Outcome: Progressing  Intervention: Promote and Optimize Oral Intake  Flowsheets (Taken 2/5/2025 1532)  Nutrition Interventions: diet adjustment recommended

## 2025-02-05 NOTE — PLAN OF CARE
[facility]  Initial Discharge Assessment       Primary Care Provider: Magda Ruiz FNP-C    Assessment completed at bedside. Patient was recently discharged to [place] for SNF. TERESOK is daughter Violette.  Patient uses rollator, cane, shower chair and grab bars. Anticipating d/c back to [facility].      Admission Diagnosis: Acute on chronic systolic (congestive) heart failure [I50.23]    Admission Date: 2/4/2025  Expected Discharge Date: 2/8/2025    Transition of Care Barriers: None    Payor: BCBS MGD MEDICARE / Plan: [facility] / Product Type: Medicare Advantage /     Extended Emergency Contact Information  Primary Emergency Contact: Schwab,Cynthia  Mobile Phone: [phone]  Relation: Daughter  Preferred language: English   needed? No  Secondary Emergency Contact: hoa rhodes  Mobile Phone: [phone]  Relation: Daughter   needed? No    Discharge Plan A: Skilled Nursing Facility  Discharge Plan B: Home with family, Home Health      [facility] - [place] - [address]  [address] [address]  Phone: [phone] Fax: [phone]    [id number] - [place] - [address]  [address] [place] [address]  Phone: [phone] Fax: [phone]      Initial Assessment (most recent)       Adult Discharge Assessment - 02/05/25 1129          Discharge Assessment    Assessment Type Discharge Planning Assessment     Source of Information family;patient     Reason For Admission chf     People in Home facility resident     Facility Arrived From: [facility]     Do you expect to return to your current living situation? Yes     Do you have help at home or someone to help you manage your care at home? Yes     Prior to hospitilization cognitive status: Alert/Oriented     Current cognitive status: Alert/Oriented     Walking or Climbing Stairs Difficulty yes     Walking or Climbing Stairs ambulation  difficulty, requires equipment     Mobility Management rollator, cane     Dressing/Bathing Difficulty yes     Dressing/Bathing Management shower chair, grab bars     Equipment Currently Used at Home rollator;cane, quad;shower chair     Readmission within 30 days? Yes     Patient currently being followed by outpatient case management? No     Do you currently have service(s) that help you manage your care at home? No     Do you take prescription medications? Yes     Do you have prescription coverage? Yes     Do you have any problems affording any of your prescribed medications? No     Is the patient taking medications as prescribed? yes     Who is going to help you get home at discharge? [employer] transportation     How do you get to doctors appointments? agency     Are you on dialysis? No     Do you take coumadin? No     Discharge Plan A Skilled Nursing Facility     Discharge Plan B Home with family;Home Health     DME Needed Upon Discharge  none     Transition of Care Barriers None

## 2025-02-05 NOTE — ASSESSMENT & PLAN NOTE
Serial cardiac enzymes  Tele monitoring  Because of persistent chest pain will consult Cardiology

## 2025-02-05 NOTE — ASSESSMENT & PLAN NOTE
Started on lasix drip  Maintain it         Most recent BNP and echo results are listed below.  Recent Labs     02/04/25  1545   *     Latest ECHO  Results for orders placed during the hospital encounter of 01/18/25    Echo    Interpretation Summary    Left Ventricle: The left ventricle is normal in size. Mildly increased wall thickness. There is mild concentric hypertrophy. There is low normal systolic function with a visually estimated ejection fraction of 50 - 55%. Diastolic function cannot be reliably determined in the presence of mitral annular calcification.    Right Ventricle: Systolic function is mildly reduced.    Left Atrium: Left atrium is severely dilated.    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    Mitral Valve: There is severe mitral annular calcification present. There is mitral valve stenosis. The mean pressure gradient across the mitral valve is 4 mmHg at a heart rate of 80 bpm. MV area by continuity equation is 0.82 cm2. There is moderate regurgitation.    Tricuspid Valve: There is mild regurgitation.    Current Heart Failure Medications  , 2 times daily, Oral  , 2 times daily, Oral  sacubitriL-valsartan 24-26 mg per tablet 1 tablet, 2 times daily, Oral  metoprolol succinate (TOPROL-XL) 24 hr tablet 100 mg, Daily, Oral  furosemide (Lasix) 200 mg in 0.9% NaCl SolP 100 mL continuous infusion (conc: 2 mg/mL), Continuous, Intravenous

## 2025-02-05 NOTE — HPI
79 year old pt getting admitted with acute CHF flare and Chest pain  Pt was at this hospital reecntly and got discharged to SNF  He started having shortness of breath very next day of discharge  and later chest pain  Pt said he can't walk and lie flat because of SOB  He noticed swelling on legs and gained 15 pounds  Pt was told not to take lasix and he was not taking it  Today he developed LUE and Neck pain and came to ER

## 2025-02-05 NOTE — CARE UPDATE
02/05/25 0019   Patient Assessment/Suction   Level of Consciousness (AVPU) alert   Respiratory Effort Normal;Unlabored   Expansion/Accessory Muscles/Retractions no retractions;no use of accessory muscles   HARINDER Breath Sounds clear   LLL Breath Sounds diminished   RUL Breath Sounds clear   RML Breath Sounds clear   RLL Breath Sounds diminished   Rhythm/Pattern, Respiratory unlabored;pattern regular;no shortness of breath reported   Cough Frequency no cough   PRE-TX-O2   Device (Oxygen Therapy) nasal cannula   $ Is the patient on Low Flow Oxygen? Yes   Flow (L/min) (Oxygen Therapy) 1   SpO2 99 %   Pulse Oximetry Type Intermittent   $ Pulse Oximetry - Multiple Charge Pulse Oximetry - Multiple

## 2025-02-05 NOTE — H&P
[facility] - Emergency Dept  Hospital Medicine  History & Physical    Patient Name: Jerome Amaro Jr.  MRN: [id number]  Patient Class: IP- Inpatient  Admission Date: 2/4/2025  Attending Physician: Maicol Zendejas MD   Primary Care Provider: Magda Ruiz FNP-C         Patient information was obtained from patient, past medical records, and ER records.     Subjective:     Principal Problem:Acute on chronic systolic (congestive) heart failure    Chief Complaint:   Chief Complaint   Patient presents with    Leg Swelling     Increased fluid over last week, greater than 13 lbs        HPI: 79 year old pt getting admitted with acute CHF flare and Chest pain  Pt was at this hospital reecntly and got discharged to SNF  He started having shortness of breath very next day of discharge  and later chest pain  Pt said he can't walk and lie flat because of SOB  He noticed swelling on legs and gained 15 pounds  Pt was told not to take lasix and he was not taking it  Today he developed LUE and Neck pain and came to ER     Past Medical History:   Diagnosis Date    Anticoagulant long-term use     2014    Aortic valve disease 2014    pig valve    Arthritis     all over    Atrial fibrillation 2/29/2024    Chest pain 07/15/2019    CHF (congestive heart failure)     2014 on meds    Coronary artery disease     2007 cabg    Difficulty swallowing     Heart attack 1990    15 stents    Heart attack 02/01/2024    mild    Heart block     Hyperlipidemia     Hypertension     on meds    Hypothyroidism 2015    on meds    PVD (peripheral vascular disease)     Seizures     last episode 1990 on meds       Past Surgical History:   Procedure Laterality Date    AORTOGRAPHY WITH SERIALOGRAPHY N/A 11/16/2021    Procedure: AORTOGRAM, WITH RUNOFF;  Surgeon: Rodrigo Willoughby MD;  Location: [facility] CATH/EP LAB;  Service: Cardiology;  Laterality: N/A;    ARTERIOGRAPHY OF AORTIC ROOT N/A 11/05/2019    Procedure: ARTERIOGRAM, AORTIC ROOT;  Surgeon: Rodrigo BACON  MD Case;  Location: [facility] CATH/EP LAB;  Service: Cardiology;  Laterality: N/A;    CARDIAC CATHETERIZATION      CARDIAC VALVE SURGERY      CATARACT EXTRACTION W/  INTRAOCULAR LENS IMPLANT Right 12/11/2024    Procedure: CEIOL OD;  Surgeon: Fredrick Sharma MD;  Location: [facility] OR;  Service: Ophthalmology;  Laterality: Right;    CORONARY ANGIOGRAPHY INCLUDING BYPASS GRAFTS WITH CATHETERIZATION OF LEFT HEART N/A 11/05/2019    Procedure: ANGIOGRAM, CORONARY, INCLUDING BYPASS GRAFT, WITH LEFT HEART CATHETERIZATION;  Surgeon: Rodrigo Willoughby MD;  Location: [facility] CATH/EP LAB;  Service: Cardiology;  Laterality: N/A;    CORONARY ANGIOGRAPHY INCLUDING BYPASS GRAFTS WITH CATHETERIZATION OF LEFT HEART Left 11/03/2020    Procedure: ANGIOGRAM, CORONARY, INCLUDING BYPASS GRAFT, WITH LEFT HEART CATHETERIZATION;  Surgeon: Rodrigo Willoughby MD;  Location: [facility] CATH/EP LAB;  Service: Cardiology;  Laterality: Left;    CORONARY ANGIOGRAPHY INCLUDING BYPASS GRAFTS WITH CATHETERIZATION OF LEFT HEART N/A 09/28/2021    Procedure: ANGIOGRAM, CORONARY, INCLUDING BYPASS GRAFT, WITH LEFT HEART CATHETERIZATION;  Surgeon: Rodrigo Willoughby MD;  Location: [facility] CATH/EP LAB;  Service: Cardiology;  Laterality: N/A;    CORONARY ANGIOGRAPHY INCLUDING BYPASS GRAFTS WITH CATHETERIZATION OF LEFT HEART N/A 12/05/2023    Procedure: ANGIOGRAM, CORONARY, INCLUDING BYPASS GRAFT, WITH LEFT HEART CATHETERIZATION;  Surgeon: Rodrigo Willoughby MD;  Location: [facility] CATH/EP LAB;  Service: Cardiology;  Laterality: N/A;    CORONARY ANGIOPLASTY      CORONARY ARTERY BYPASS GRAFT      x 2    1991 2006    ESOPHAGOGASTRODUODENOSCOPY N/A 2/20/2024    Procedure: EGD (ESOPHAGOGASTRODUODENOSCOPY);  Surgeon: Mal Lockhart III, MD;  Location: [facility] ENDO;  Service: Endoscopy;  Laterality: N/A;    EXTRACORPOREAL SHOCK WAVE LITHOTRIPSY Right 08/01/2019    Procedure: LITHOTRIPSY, ESWL;  Surgeon: Williams Ortega MD;  Location: [facility] OR;  Service: Urology;  Laterality: Right;     HEMORRHOID SURGERY  1990    INSERTION OF PACEMAKER      PERCUTANEOUS TRANSLUMINAL BALLOON ANGIOPLASTY OF CORONARY ARTERY N/A 11/08/2019    Procedure: Angioplasty-coronary;  Surgeon: Rodrigo Willoughby MD;  Location: [facility] CATH/EP LAB;  Service: Cardiology;  Laterality: N/A;    WRIST FRACTURE SURGERY         Review of patient's allergies indicates:   Allergen Reactions    Atorvastatin Other (See Comments)     Muscle pain    Rosuvastatin Other (See Comments)     Muscle pain       Current Facility-Administered Medications on File Prior to Encounter   Medication    magnesium oxide tablet 800 mg    sodium chloride 0.9% flush 10 mL     Current Outpatient Medications on File Prior to Encounter   Medication Sig    (Magic mouthwash) 1:1:1 diphenhydrAMINE(Benadryl) 12.5mg/5ml liq, aluminum & magnesium hydroxide-simethicone (Maalox), LIDOcaine viscous 2% Swish and spit 5 mLs every 4 (four) hours as needed (Aphthous ulcer). for mouth sores    citalopram (CELEXA) 20 MG tablet Take 20 mg by mouth once daily.    clopidogrel (PLAVIX) 75 mg tablet Take 75 mg by mouth once daily.    CORLANOR 5 mg Tab Take 1 tablet by mouth 2 (two) times daily.    ENTRESTO 24-26 mg per tablet Take 1 tablet by mouth 2 (two) times daily. Samples from MD    ezetimibe (ZETIA) 10 mg tablet Take 10 mg by mouth once daily.    famotidine (PEPCID) 20 MG tablet Take 1 tablet (20 mg total) by mouth Daily.    isosorbide mononitrate (IMDUR) 30 MG 24 hr tablet Take 1 tablet (30 mg total) by mouth once daily.    metoprolol succinate (TOPROL-XL) 100 MG 24 hr tablet Take 1 tablet (100 mg total) by mouth once daily.    multivitamin with minerals tablet Take 1 tablet by mouth once daily.    mupirocin (BACTROBAN) 2 % ointment Apply 1 g topically 3 (three) times daily.    NITROLINGUAL 400 mcg/spray spray Place 1 spray under the tongue every 5 (five) minutes as needed for Chest pain.    phenytoin (DILANTIN) 100 MG ER capsule Take 400 mg by mouth once daily.     UNABLE TO FIND  Take 45 mLs by mouth 2 (two) times a day. medication name: PROSTAT LIQUID    furosemide (LASIX) 20 MG tablet Take 20 mg by mouth 2 (two) times daily.    [DISCONTINUED] citalopram (CELEXA) 40 MG tablet Take 40 mg by mouth once daily.     Family History       Problem Relation (Age of Onset)    Diabetes Sister    Heart attack Mother, Father    Heart disease Sister    No Known Problems Maternal Grandmother, Maternal Grandfather, Paternal Grandmother, Paternal Grandfather, Brother, Maternal Aunt, Maternal Uncle, Paternal Aunt, Paternal Uncle    Stroke Sister          Tobacco Use    Smoking status: Former     Current packs/day: 0.00     Average packs/day: 0.5 packs/day for 4.0 years (2.0 ttl pk-yrs)     Types: Cigarettes     Start date:      Quit date:      Years since quittin.1    Smokeless tobacco: Never   Substance and Sexual Activity    Alcohol use: Yes     Comment: social    Drug use: No    Sexual activity: Not Currently     Review of Systems   Constitutional:  Negative for activity change and appetite change.   HENT:  Negative for congestion and dental problem.    Eyes:  Negative for discharge and itching.   Respiratory:  Positive for shortness of breath.    Cardiovascular:  Positive for chest pain.   Gastrointestinal:  Negative for abdominal distention and abdominal pain.   Endocrine: Negative for cold intolerance.   Genitourinary:  Negative for difficulty urinating and dysuria.   Musculoskeletal:  Negative for arthralgias and back pain.   Skin:  Negative for color change.   Neurological:  Negative for dizziness and facial asymmetry.   Hematological:  Negative for adenopathy.   Psychiatric/Behavioral:  Negative for agitation and behavioral problems.      Objective:     Vital Signs (Most Recent):  Temp: 98.5 °F (36.9 °C) (25 1449)  Pulse: 68 (25 1904)  Resp: 18 (25 1826)  BP: 119/68 (25 1904)  SpO2: 100 % (25 190) Vital Signs (24h Range):  Temp:  [98.5 °F (36.9 °C)] 98.5  °F (36.9 °C)  Pulse:  [64-72] 68  Resp:  [18] 18  SpO2:  [100 %] 100 %  BP: (105-119)/(56-75) 119/68     Weight: 74.8 kg (165 lb)  Body mass index is 26.63 kg/m².     Physical Exam  Vitals and nursing note reviewed.   Constitutional:       Appearance: He is well-developed.   HENT:      Head: Atraumatic.      Right Ear: External ear normal.      Left Ear: External ear normal.      Nose: Nose normal.      Mouth/Throat:      Mouth: Mucous membranes are moist.   Eyes:      Extraocular Movements: Extraocular movements intact.   Cardiovascular:      Rate and Rhythm: Normal rate.   Pulmonary:      Effort: Pulmonary effort is normal.      Breath sounds: Rales present.   Abdominal:      Palpations: Abdomen is soft.   Musculoskeletal:         General: Normal range of motion.      Cervical back: Full passive range of motion without pain and normal range of motion.      Right lower leg: Edema present.      Left lower leg: Edema present.   Skin:     General: Skin is warm.   Neurological:      Mental Status: He is alert and oriented to person, place, and time.   Psychiatric:         Behavior: Behavior normal.                Significant Labs: All pertinent labs within the past 24 hours have been reviewed.  CBC:   Recent Labs   Lab 02/04/25  1545   WBC 7.21   HGB 10.4*   HCT 31.7*        CMP:   Recent Labs   Lab 02/04/25  1545   *   K 4.1      CO2 26   GLU 98   BUN 26*   CREATININE 1.1   CALCIUM 8.2*   PROT 5.8*   ALBUMIN 3.3*   BILITOT 0.6   ALKPHOS 118   AST 32   ALT 22   ANIONGAP 6*       Significant Imaging: I have reviewed all pertinent imaging results/findings within the past 24 hours.    Assessment/Plan:     * Acute on chronic systolic (congestive) heart failure  Started on lasix drip  Maintain it         Most recent BNP and echo results are listed below.  Recent Labs     02/04/25  1545   *     Latest ECHO  Results for orders placed during the hospital encounter of 01/18/25    Echo    Interpretation  Summary    Left Ventricle: The left ventricle is normal in size. Mildly increased wall thickness. There is mild concentric hypertrophy. There is low normal systolic function with a visually estimated ejection fraction of 50 - 55%. Diastolic function cannot be reliably determined in the presence of mitral annular calcification.    Right Ventricle: Systolic function is mildly reduced.    Left Atrium: Left atrium is severely dilated.    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    Mitral Valve: There is severe mitral annular calcification present. There is mitral valve stenosis. The mean pressure gradient across the mitral valve is 4 mmHg at a heart rate of 80 bpm. MV area by continuity equation is 0.82 cm2. There is moderate regurgitation.    Tricuspid Valve: There is mild regurgitation.    Current Heart Failure Medications  , 2 times daily, Oral  , 2 times daily, Oral  sacubitriL-valsartan 24-26 mg per tablet 1 tablet, 2 times daily, Oral  metoprolol succinate (TOPROL-XL) 24 hr tablet 100 mg, Daily, Oral  furosemide (Lasix) 200 mg in 0.9% NaCl SolP 100 mL continuous infusion (conc: 2 mg/mL), Continuous, Intravenous      Chest pain  Serial cardiac enzymes  Tele monitoring  Because of persistent chest pain will consult Cardiology       Hx of CABG  Aware       Frequent hospital admissions  Aware       Seizure disorder  Maintain phenytoin PO  Check serum levels      S/P TAVR (transcatheter aortic valve replacement)  Aware         VTE Risk Mitigation (From admission, onward)           Ordered     IP VTE HIGH RISK PATIENT  Once         02/04/25 1909     Place sequential compression device  Until discontinued         02/04/25 1909                       On 02/04/2025, patient should be placed in hospital observation services under my care.             Maicol Zendejas MD  Department of Hospital Medicine  Formerly [facility] - Emergency Dept

## 2025-02-06 LAB
ALBUMIN SERPL BCP-MCNC: 3.3 G/DL (ref 3.5–5.2)
ALP SERPL-CCNC: 118 U/L (ref 55–135)
ALT SERPL W/O P-5'-P-CCNC: 15 U/L (ref 10–44)
ANION GAP SERPL CALC-SCNC: 8 MMOL/L (ref 8–16)
AST SERPL-CCNC: 26 U/L (ref 10–40)
BILIRUB SERPL-MCNC: 0.6 MG/DL (ref 0.1–1)
BUN SERPL-MCNC: 27 MG/DL (ref 8–23)
CALCIUM SERPL-MCNC: 8 MG/DL (ref 8.7–10.5)
CHLORIDE SERPL-SCNC: 97 MMOL/L (ref 95–110)
CO2 SERPL-SCNC: 29 MMOL/L (ref 23–29)
CREAT SERPL-MCNC: 1.3 MG/DL (ref 0.5–1.4)
ERYTHROCYTE [DISTWIDTH] IN BLOOD BY AUTOMATED COUNT: 13.9 % (ref 11.5–14.5)
EST. GFR  (NO RACE VARIABLE): 55.5 ML/MIN/1.73 M^2
GLUCOSE SERPL-MCNC: 87 MG/DL (ref 70–110)
HCT VFR BLD AUTO: 33.1 % (ref 40–54)
HGB BLD-MCNC: 10.7 G/DL (ref 14–18)
MAGNESIUM SERPL-MCNC: 1.8 MG/DL (ref 1.6–2.6)
MCH RBC QN AUTO: 29.1 PG (ref 27–31)
MCHC RBC AUTO-ENTMCNC: 32.3 G/DL (ref 32–36)
MCV RBC AUTO: 90 FL (ref 82–98)
PLATELET # BLD AUTO: 135 K/UL (ref 150–450)
PMV BLD AUTO: 11.4 FL (ref 9.2–12.9)
POTASSIUM SERPL-SCNC: 4.2 MMOL/L (ref 3.5–5.1)
PROT SERPL-MCNC: 6 G/DL (ref 6–8.4)
RBC # BLD AUTO: 3.68 M/UL (ref 4.6–6.2)
SODIUM SERPL-SCNC: 134 MMOL/L (ref 136–145)
WBC # BLD AUTO: 6.9 K/UL (ref 3.9–12.7)

## 2025-02-06 PROCEDURE — 27000207 HC ISOLATION

## 2025-02-06 PROCEDURE — 83735 ASSAY OF MAGNESIUM: CPT | Performed by: INTERNAL MEDICINE

## 2025-02-06 PROCEDURE — 85027 COMPLETE CBC AUTOMATED: CPT | Performed by: INTERNAL MEDICINE

## 2025-02-06 PROCEDURE — 36415 COLL VENOUS BLD VENIPUNCTURE: CPT | Performed by: INTERNAL MEDICINE

## 2025-02-06 PROCEDURE — 80053 COMPREHEN METABOLIC PANEL: CPT | Performed by: INTERNAL MEDICINE

## 2025-02-06 PROCEDURE — 25000003 PHARM REV CODE 250: Performed by: INTERNAL MEDICINE

## 2025-02-06 PROCEDURE — 25000003 PHARM REV CODE 250: Performed by: STUDENT IN AN ORGANIZED HEALTH CARE EDUCATION/TRAINING PROGRAM

## 2025-02-06 PROCEDURE — 21400001 HC TELEMETRY ROOM

## 2025-02-06 RX ORDER — POLYETHYLENE GLYCOL 3350 17 G/17G
17 POWDER, FOR SOLUTION ORAL 2 TIMES DAILY
Status: DISCONTINUED | OUTPATIENT
Start: 2025-02-06 | End: 2025-02-07 | Stop reason: HOSPADM

## 2025-02-06 RX ORDER — DIPHENHYDRAMINE HYDROCHLORIDE 50 MG/ML
25 INJECTION INTRAMUSCULAR; INTRAVENOUS ONCE
Status: DISCONTINUED | OUTPATIENT
Start: 2025-02-06 | End: 2025-02-07 | Stop reason: HOSPADM

## 2025-02-06 RX ORDER — FUROSEMIDE 20 MG/1
20 TABLET ORAL DAILY
Status: DISCONTINUED | OUTPATIENT
Start: 2025-02-07 | End: 2025-02-07 | Stop reason: HOSPADM

## 2025-02-06 RX ADMIN — EXTENDED PHENYTOIN SODIUM 400 MG: 100 CAPSULE, EXTENDED RELEASE ORAL at 12:02

## 2025-02-06 RX ADMIN — POLYETHYLENE GLYCOL 3350 17 G: 17 POWDER, FOR SOLUTION ORAL at 08:02

## 2025-02-06 RX ADMIN — EZETIMIBE 10 MG: 10 TABLET ORAL at 09:02

## 2025-02-06 RX ADMIN — FAMOTIDINE 20 MG: 20 TABLET ORAL at 06:02

## 2025-02-06 RX ADMIN — METOPROLOL SUCCINATE 100 MG: 50 TABLET, FILM COATED, EXTENDED RELEASE ORAL at 09:02

## 2025-02-06 RX ADMIN — SACUBITRIL AND VALSARTAN 1 TABLET: 24; 26 TABLET, FILM COATED ORAL at 08:02

## 2025-02-06 RX ADMIN — CLOPIDOGREL BISULFATE 75 MG: 75 TABLET, FILM COATED ORAL at 09:02

## 2025-02-06 RX ADMIN — SACUBITRIL AND VALSARTAN 1 TABLET: 24; 26 TABLET, FILM COATED ORAL at 09:02

## 2025-02-06 RX ADMIN — MUPIROCIN 1 G: 20 OINTMENT TOPICAL at 09:02

## 2025-02-06 RX ADMIN — MUPIROCIN 1 G: 20 OINTMENT TOPICAL at 08:02

## 2025-02-06 RX ADMIN — AMMONIUM LACTATE: 12 LOTION TOPICAL at 06:02

## 2025-02-06 RX ADMIN — AMMONIUM LACTATE: 12 LOTION TOPICAL at 09:02

## 2025-02-06 NOTE — PROGRESS NOTES
Counts include 234 beds at the [facility] Medicine  Progress Note    Patient Name: Jerome Amaro Jr.  MRN: [id number]  Patient Class: IP- Inpatient   Admission Date: 2/4/2025  Length of Stay: 2 days  Attending Physician: Castillo Mercado DO  Primary Care Provider: Magda Ruiz FNP-C        Subjective     Principal Problem:Acute on chronic systolic (congestive) heart failure        HPI:  79 year old pt getting admitted with acute CHF flare and Chest pain  Pt was at this hospital reecntly and got discharged to SNF  He started having shortness of breath very next day of discharge  and later chest pain  Pt said he can't walk and lie flat because of SOB  He noticed swelling on legs and gained 15 pounds  Pt was told not to take lasix and he was not taking it  Today he developed LUE and Neck pain and came to ER     Overview/Hospital Course:  No notes on file    Interval History:  Patient was seen and examined at bedside t IV Lasix stopped, oral Lasix restarted.  Discharge back to SNF tomorrow.    Review of Systems   Constitutional:  Negative for activity change and appetite change.   HENT:  Negative for congestion and dental problem.    Eyes:  Negative for discharge and itching.   Respiratory:  Positive for shortness of breath.    Cardiovascular:  Positive for chest pain.   Gastrointestinal:  Negative for abdominal distention and abdominal pain.   Endocrine: Negative for cold intolerance.   Genitourinary:  Negative for difficulty urinating and dysuria.   Musculoskeletal:  Negative for arthralgias and back pain.   Skin:  Negative for color change.   Neurological:  Negative for dizziness and facial asymmetry.   Hematological:  Negative for adenopathy.   Psychiatric/Behavioral:  Negative for agitation and behavioral problems.      Objective:     Vital Signs (Most Recent):  Temp: 97.5 °F (36.4 °C) (02/06/25 1547)  Pulse: 71 (02/06/25 1547)  Resp: 18 (02/06/25 1547)  BP: (!) 97/59 (02/06/25 1547)  SpO2: 98 % (02/06/25 1547) Vital  Signs (24h Range):  Temp:  [97.1 °F (36.2 °C)-98.3 °F (36.8 °C)] 97.5 °F (36.4 °C)  Pulse:  [70-85] 71  Resp:  [16-20] 18  SpO2:  [97 %-99 %] 98 %  BP: ()/(58-67) 97/59     Weight: 100.8 kg (222 lb 4.8 oz)  Body mass index is 35.88 kg/m².    Intake/Output Summary (Last 24 hours) at 2/6/2025 1609  Last data filed at 2/6/2025 0818  Gross per 24 hour   Intake 460 ml   Output 1250 ml   Net -790 ml         Physical Exam  Vitals and nursing note reviewed.   Constitutional:       Appearance: He is well-developed.   HENT:      Head: Atraumatic.      Right Ear: External ear normal.      Left Ear: External ear normal.      Nose: Nose normal.      Mouth/Throat:      Mouth: Mucous membranes are moist.   Eyes:      Extraocular Movements: Extraocular movements intact.   Cardiovascular:      Rate and Rhythm: Normal rate.   Pulmonary:      Effort: Pulmonary effort is normal.      Breath sounds: Rales present.   Abdominal:      Palpations: Abdomen is soft.   Musculoskeletal:         General: Normal range of motion.      Cervical back: Full passive range of motion without pain and normal range of motion.      Right lower leg: Edema present.      Left lower leg: Edema present.   Skin:     General: Skin is warm.   Neurological:      Mental Status: He is alert and oriented to person, place, and time.   Psychiatric:         Behavior: Behavior normal.             Significant Labs: All pertinent labs within the past 24 hours have been reviewed.  CBC:   Recent Labs   Lab 02/05/25  0727 02/06/25  0500   WBC 6.22 6.90   HGB 10.2* 10.7*   HCT 30.3* 33.1*    135*     CMP:   Recent Labs   Lab 02/05/25  0727 02/06/25  0500    134*   K 3.8 4.2    97   CO2 30* 29   GLU 92 87   BUN 23 27*   CREATININE 1.0 1.3   CALCIUM 8.3* 8.0*   PROT 5.6* 6.0   ALBUMIN 3.1* 3.3*   BILITOT 0.7 0.6   ALKPHOS 114 118   AST 24 26   ALT 18 15   ANIONGAP 6* 8       Significant Imaging: I have reviewed all pertinent imaging results/findings  within the past 24 hours.  Imaging Results              X-Ray Chest AP Portable (Final result)  Result time 02/04/25 15:44:55      Final result by Fox Dowd DO (02/04/25 15:44:55)                   Impression:      1. Borderline enlarged cardiomediastinal silhouette with mild bilateral interstitial thickening could reflect CHF with mild pulmonary edema.  2. Linear and hazy opacities of the lung bases with blunting of the costophrenic angles are felt to reflect small pleural effusions with overlying passive atelectasis.      Electronically signed by: Fox Dowd  Date:    02/04/2025  Time:    15:44               Narrative:    EXAMINATION:  XR CHEST AP PORTABLE    CLINICAL HISTORY:  shortness of breath;    FINDINGS:  Portable chest with comparison chest x-ray 01/23/2025.  Median sternotomy wires and left dual lead cardiac pacemaker again observed.  Borderline enlarged cardiomediastinal silhouette.There are linear and hazy opacities of the lung bases with mild blunting of the costophrenic angles.  Mild bilateral interstitial thickening.  Pulmonary vasculature is normal. No acute osseous abnormality.                                        Assessment and Plan     * Acute on chronic systolic (congestive) heart failure  Seen and evaluated by Cardiology   IV Lasix infusion.    Restart oral Lasix   DC tomorrow        Most recent BNP and echo results are listed below.  Recent Labs     02/04/25  1545   *       Latest ECHO  Results for orders placed during the hospital encounter of 01/18/25    Echo    Interpretation Summary    Left Ventricle: The left ventricle is normal in size. Mildly increased wall thickness. There is mild concentric hypertrophy. There is low normal systolic function with a visually estimated ejection fraction of 50 - 55%. Diastolic function cannot be reliably determined in the presence of mitral annular calcification.    Right Ventricle: Systolic function is mildly reduced.    Left Atrium:  Left atrium is severely dilated.    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    Mitral Valve: There is severe mitral annular calcification present. There is mitral valve stenosis. The mean pressure gradient across the mitral valve is 4 mmHg at a heart rate of 80 bpm. MV area by continuity equation is 0.82 cm2. There is moderate regurgitation.    Tricuspid Valve: There is mild regurgitation.    Current Heart Failure Medications  , 2 times daily, Oral  , 2 times daily, Oral  sacubitriL-valsartan 24-26 mg per tablet 1 tablet, 2 times daily, Oral  metoprolol succinate (TOPROL-XL) 24 hr tablet 100 mg, Daily, Oral  furosemide tablet 20 mg, Daily, Oral      Hx of CABG  Aware       Frequent hospital admissions  Aware       Chest pain  Serial cardiac enzymes  Tele monitoring  Because of persistent chest pain will consult Cardiology       Seizure disorder  Maintain phenytoin PO  Check serum levels      S/P TAVR (transcatheter aortic valve replacement)  Aware         VTE Risk Mitigation (From admission, onward)           Ordered     IP VTE HIGH RISK PATIENT  Once         02/04/25 1909     Place sequential compression device  Until discontinued         02/04/25 1909                    Discharge Planning   AXEL: 2/7/2025     Code Status: Full Code   Medical Readiness for Discharge Date:   Discharge Plan A: Skilled Nursing Facility                Please place Justification for DME        Castillo Mercado DO  Department of Hospital Medicine   Former[facility]

## 2025-02-06 NOTE — SUBJECTIVE & OBJECTIVE
Interval History:  Patient was seen and examined at bedside t IV Lasix stopped, oral Lasix restarted.  Discharge back to SNF tomorrow.    Review of Systems   Constitutional:  Negative for activity change and appetite change.   HENT:  Negative for congestion and dental problem.    Eyes:  Negative for discharge and itching.   Respiratory:  Positive for shortness of breath.    Cardiovascular:  Positive for chest pain.   Gastrointestinal:  Negative for abdominal distention and abdominal pain.   Endocrine: Negative for cold intolerance.   Genitourinary:  Negative for difficulty urinating and dysuria.   Musculoskeletal:  Negative for arthralgias and back pain.   Skin:  Negative for color change.   Neurological:  Negative for dizziness and facial asymmetry.   Hematological:  Negative for adenopathy.   Psychiatric/Behavioral:  Negative for agitation and behavioral problems.      Objective:     Vital Signs (Most Recent):  Temp: 97.5 °F (36.4 °C) (02/06/25 1547)  Pulse: 71 (02/06/25 1547)  Resp: 18 (02/06/25 1547)  BP: (!) 97/59 (02/06/25 1547)  SpO2: 98 % (02/06/25 1547) Vital Signs (24h Range):  Temp:  [97.1 °F (36.2 °C)-98.3 °F (36.8 °C)] 97.5 °F (36.4 °C)  Pulse:  [70-85] 71  Resp:  [16-20] 18  SpO2:  [97 %-99 %] 98 %  BP: ()/(58-67) 97/59     Weight: 100.8 kg (222 lb 4.8 oz)  Body mass index is 35.88 kg/m².    Intake/Output Summary (Last 24 hours) at 2/6/2025 1609  Last data filed at 2/6/2025 0818  Gross per 24 hour   Intake 460 ml   Output 1250 ml   Net -790 ml         Physical Exam  Vitals and nursing note reviewed.   Constitutional:       Appearance: He is well-developed.   HENT:      Head: Atraumatic.      Right Ear: External ear normal.      Left Ear: External ear normal.      Nose: Nose normal.      Mouth/Throat:      Mouth: Mucous membranes are moist.   Eyes:      Extraocular Movements: Extraocular movements intact.   Cardiovascular:      Rate and Rhythm: Normal rate.   Pulmonary:      Effort: Pulmonary  effort is normal.      Breath sounds: Rales present.   Abdominal:      Palpations: Abdomen is soft.   Musculoskeletal:         General: Normal range of motion.      Cervical back: Full passive range of motion without pain and normal range of motion.      Right lower leg: Edema present.      Left lower leg: Edema present.   Skin:     General: Skin is warm.   Neurological:      Mental Status: He is alert and oriented to person, place, and time.   Psychiatric:         Behavior: Behavior normal.             Significant Labs: All pertinent labs within the past 24 hours have been reviewed.  CBC:   Recent Labs   Lab 02/05/25  0727 02/06/25  0500   WBC 6.22 6.90   HGB 10.2* 10.7*   HCT 30.3* 33.1*    135*     CMP:   Recent Labs   Lab 02/05/25  0727 02/06/25  0500    134*   K 3.8 4.2    97   CO2 30* 29   GLU 92 87   BUN 23 27*   CREATININE 1.0 1.3   CALCIUM 8.3* 8.0*   PROT 5.6* 6.0   ALBUMIN 3.1* 3.3*   BILITOT 0.7 0.6   ALKPHOS 114 118   AST 24 26   ALT 18 15   ANIONGAP 6* 8       Significant Imaging: I have reviewed all pertinent imaging results/findings within the past 24 hours.  Imaging Results              X-Ray Chest AP Portable (Final result)  Result time 02/04/25 15:44:55      Final result by Fox Dowd DO (02/04/25 15:44:55)                   Impression:      1. Borderline enlarged cardiomediastinal silhouette with mild bilateral interstitial thickening could reflect CHF with mild pulmonary edema.  2. Linear and hazy opacities of the lung bases with blunting of the costophrenic angles are felt to reflect small pleural effusions with overlying passive atelectasis.      Electronically signed by: Fox Dowd  Date:    02/04/2025  Time:    15:44               Narrative:    EXAMINATION:  XR CHEST AP PORTABLE    CLINICAL HISTORY:  shortness of breath;    FINDINGS:  Portable chest with comparison chest x-ray 01/23/2025.  Median sternotomy wires and left dual lead cardiac pacemaker again  observed.  Borderline enlarged cardiomediastinal silhouette.There are linear and hazy opacities of the lung bases with mild blunting of the costophrenic angles.  Mild bilateral interstitial thickening.  Pulmonary vasculature is normal. No acute osseous abnormality.

## 2025-02-06 NOTE — CONSULTS
[facility]   Cardiology Note    Consult Requested By: hospital medicine  Reason for Consult: CHF    SUBJECTIVE:     History of Present Illness: Pt is an 80 yo male with PMHX CAD s/p CABg - 1 patent graft, HFrEF s/p BiV AICD, Pafib, TAVR, PAD who presented to the ED with edema and shortness of breath. He was recently in the hospital and was discharged to SNF. After discharge he started experiencing shortness of breath, reports associated LE edema with a 15 lb weight gain. He was not taking lasix at home. He also reports neck and left arm pain. ED workup showed BP stable. O2 sat 100% on 2 L NC, now on room air. Troponin minimally elevated to 25 and flat. . EKG atrial sensed V pacing, no ischemic changes. CXR showed small pleural effusions. He was started on IV lasix. Today reports he is feeling well. Denies shortness of breath and lower extremity edema has resolved. He denies any chest pain or recent episodes of chest pain.     Review of patient's allergies indicates:   Allergen Reactions    Atorvastatin Other (See Comments)     Muscle pain    Rosuvastatin Other (See Comments)     Muscle pain       Past Medical History:   Diagnosis Date    Anticoagulant long-term use     2014    Aortic valve disease 2014    pig valve    Arthritis     all over    Atrial fibrillation 2/29/2024    Chest pain 07/15/2019    CHF (congestive heart failure)     2014 on meds    Coronary artery disease     2007 cabg    Difficulty swallowing     Heart attack 1990    15 stents    Heart attack 02/01/2024    mild    Heart block     Hyperlipidemia     Hypertension     on meds    Hypothyroidism 2015    on meds    PVD (peripheral vascular disease)     Seizures     last episode 1990 on meds     Past Surgical History:   Procedure Laterality Date    AORTOGRAPHY WITH SERIALOGRAPHY N/A 11/16/2021    Procedure: AORTOGRAM, WITH RUNOFF;  Surgeon: Rodrigo Willoughby MD;  Location: [facility] CATH/EP LAB;  Service: Cardiology;  Laterality: N/A;     ARTERIOGRAPHY OF AORTIC ROOT N/A 11/05/2019    Procedure: ARTERIOGRAM, AORTIC ROOT;  Surgeon: Rodrigo Willoughby MD;  Location: [facility] CATH/EP LAB;  Service: Cardiology;  Laterality: N/A;    CARDIAC CATHETERIZATION      CARDIAC VALVE SURGERY      CATARACT EXTRACTION W/  INTRAOCULAR LENS IMPLANT Right 12/11/2024    Procedure: CEIOL OD;  Surgeon: Fredrick Sharma MD;  Location: [facility] ASU OR;  Service: Ophthalmology;  Laterality: Right;    CORONARY ANGIOGRAPHY INCLUDING BYPASS GRAFTS WITH CATHETERIZATION OF LEFT HEART N/A 11/05/2019    Procedure: ANGIOGRAM, CORONARY, INCLUDING BYPASS GRAFT, WITH LEFT HEART CATHETERIZATION;  Surgeon: Rodrigo Willoughby MD;  Location: [facility] CATH/EP LAB;  Service: Cardiology;  Laterality: N/A;    CORONARY ANGIOGRAPHY INCLUDING BYPASS GRAFTS WITH CATHETERIZATION OF LEFT HEART Left 11/03/2020    Procedure: ANGIOGRAM, CORONARY, INCLUDING BYPASS GRAFT, WITH LEFT HEART CATHETERIZATION;  Surgeon: Rodrigo Willoughby MD;  Location: [facility] CATH/EP LAB;  Service: Cardiology;  Laterality: Left;    CORONARY ANGIOGRAPHY INCLUDING BYPASS GRAFTS WITH CATHETERIZATION OF LEFT HEART N/A 09/28/2021    Procedure: ANGIOGRAM, CORONARY, INCLUDING BYPASS GRAFT, WITH LEFT HEART CATHETERIZATION;  Surgeon: Rodrigo Willoughby MD;  Location: [facility] CATH/EP LAB;  Service: Cardiology;  Laterality: N/A;    CORONARY ANGIOGRAPHY INCLUDING BYPASS GRAFTS WITH CATHETERIZATION OF LEFT HEART N/A 12/05/2023    Procedure: ANGIOGRAM, CORONARY, INCLUDING BYPASS GRAFT, WITH LEFT HEART CATHETERIZATION;  Surgeon: Rodrigo Willoughby MD;  Location: [facility] CATH/EP LAB;  Service: Cardiology;  Laterality: N/A;    CORONARY ANGIOPLASTY      CORONARY ARTERY BYPASS GRAFT      x 2    1991 2006    ESOPHAGOGASTRODUODENOSCOPY N/A 2/20/2024    Procedure: EGD (ESOPHAGOGASTRODUODENOSCOPY);  Surgeon: Mal Lockhart III, MD;  Location: [facility] ENDO;  Service: Endoscopy;  Laterality: N/A;    EXTRACORPOREAL SHOCK WAVE LITHOTRIPSY Right 08/01/2019    Procedure: LITHOTRIPSY,  ESWL;  Surgeon: Williams Ortega MD;  Location: [facility] OR;  Service: Urology;  Laterality: Right;    HEMORRHOID SURGERY      INSERTION OF PACEMAKER      PERCUTANEOUS TRANSLUMINAL BALLOON ANGIOPLASTY OF CORONARY ARTERY N/A 2019    Procedure: Angioplasty-coronary;  Surgeon: Rodrigo Willoughby MD;  Location: [facility] CATH/EP LAB;  Service: Cardiology;  Laterality: N/A;    WRIST FRACTURE SURGERY       Family History   Problem Relation Name Age of Onset    Heart attack Mother      Heart attack Father      Heart disease Sister 2     Stroke Sister 2     Diabetes Sister 2     No Known Problems Maternal Grandmother      No Known Problems Maternal Grandfather      No Known Problems Paternal Grandmother      No Known Problems Paternal Grandfather      No Known Problems Brother      No Known Problems Maternal Aunt      No Known Problems Maternal Uncle      No Known Problems Paternal Aunt      No Known Problems Paternal Uncle      Anemia Neg Hx      Arrhythmia Neg Hx      Asthma Neg Hx      Clotting disorder Neg Hx      Fainting Neg Hx      Heart failure Neg Hx      Hyperlipidemia Neg Hx      Hypertension Neg Hx      Atrial Septal Defect Neg Hx       Social History     Tobacco Use    Smoking status: Former     Current packs/day: 0.00     Average packs/day: 0.5 packs/day for 4.0 years (2.0 ttl pk-yrs)     Types: Cigarettes     Start date:      Quit date:      Years since quittin.1    Smokeless tobacco: Never   Substance Use Topics    Alcohol use: Yes     Comment: social    Drug use: No       Review of Systems:  Review of Systems   Constitutional:  Negative for chills, diaphoresis and fever.   Respiratory:  Positive for shortness of breath. Negative for cough.    Cardiovascular:  Positive for chest pain and leg swelling. Negative for palpitations and orthopnea.   Gastrointestinal:  Negative for nausea and vomiting.   Neurological:  Negative for dizziness.   All other systems reviewed and are  negative.      OBJECTIVE:     Vital Signs (Most Recent)  Temp: 98.3 °F (36.8 °C) (02/06/25 0338)  Pulse: 82 (02/06/25 0338)  Resp: 20 (02/06/25 0338)  BP: 112/65 (02/06/25 0338)  SpO2: 97 % (02/06/25 0338)    Vital Signs Range (Last 24H):  Temp:  [97.7 °F (36.5 °C)-98.3 °F (36.8 °C)]   Pulse:  [56-85]   Resp:  [12-20]   BP: ()/(56-65)   SpO2:  [95 %-100 %]     I & O (Last 24H):    Intake/Output Summary (Last 24 hours) at 2/6/2025 0854  Last data filed at 2/6/2025 0818  Gross per 24 hour   Intake 460 ml   Output 2700 ml   Net -2240 ml       Current Diet:     Current Diet Order   Procedures    Diet Low Sodium, 2gm Fluid - 1500mL; Standard Tray     Order Specific Question:   Fluid restriction:     Answer:   Fluid - 1500mL     Order Specific Question:   Tray type:     Answer:   Standard Tray        Allergies:  Review of patient's allergies indicates:   Allergen Reactions    Atorvastatin Other (See Comments)     Muscle pain    Rosuvastatin Other (See Comments)     Muscle pain       Meds:  Scheduled Meds:   ammonium lactate   Topical (Top) BID    clopidogreL  75 mg Oral Daily    diphenhydrAMINE  25 mg Intravenous Once    ezetimibe  10 mg Oral Daily    famotidine  20 mg Oral Daily    metoprolol succinate  100 mg Oral Daily    mupirocin   Nasal BID    phenytoin  400 mg Oral Daily    sacubitriL-valsartan  1 tablet Oral BID     Continuous Infusions:   furosemide (LASIX) 2 mg/mL continuous infusion (non-titrating)  2.5 mg/hr Intravenous Continuous 1.25 mL/hr at 02/05/25 0700 2.5 mg/hr at 02/05/25 0700     PRN Meds:  Current Facility-Administered Medications:     magnesium oxide, 800 mg, Oral, PRN    magnesium oxide, 800 mg, Oral, PRN    potassium bicarbonate, 35 mEq, Oral, PRN    potassium bicarbonate, 50 mEq, Oral, PRN    potassium bicarbonate, 60 mEq, Oral, PRN    potassium, sodium phosphates, 2 packet, Oral, PRN    potassium, sodium phosphates, 2 packet, Oral, PRN    potassium, sodium phosphates, 2 packet, Oral, PRN     sodium chloride 0.9%, 10 mL, Intravenous, PRN    Oxygen/Ventilator Data (Last 24H):  (if applicable)            Hemodynamic Parameters (Last 24H):   (if applicable)        Laboratory and Radiology Data:  Recent Results (from the past 24 hours)   CBC Without Differential    Collection Time: 02/06/25  5:00 AM   Result Value Ref Range    WBC 6.90 3.90 - 12.70 K/uL    RBC 3.68 (L) 4.60 - 6.20 M/uL    Hemoglobin 10.7 (L) 14.0 - 18.0 g/dL    Hematocrit 33.1 (L) 40.0 - 54.0 %    MCV 90 82 - 98 fL    MCH 29.1 27.0 - 31.0 pg    MCHC 32.3 32.0 - 36.0 g/dL    RDW 13.9 11.5 - 14.5 %    Platelets 135 (L) 150 - 450 K/uL    MPV 11.4 9.2 - 12.9 fL   Comprehensive Metabolic Panel    Collection Time: 02/06/25  5:00 AM   Result Value Ref Range    Sodium 134 (L) 136 - 145 mmol/L    Potassium 4.2 3.5 - 5.1 mmol/L    Chloride 97 95 - 110 mmol/L    CO2 29 23 - 29 mmol/L    Glucose 87 70 - 110 mg/dL    BUN 27 (H) 8 - 23 mg/dL    Creatinine 1.3 0.5 - 1.4 mg/dL    Calcium 8.0 (L) 8.7 - 10.5 mg/dL    Total Protein 6.0 6.0 - 8.4 g/dL    Albumin 3.3 (L) 3.5 - 5.2 g/dL    Total Bilirubin 0.6 0.1 - 1.0 mg/dL    Alkaline Phosphatase 118 55 - 135 U/L    AST 26 10 - 40 U/L    ALT 15 10 - 44 U/L    eGFR 55.5 (A) >60 mL/min/1.73 m^2    Anion Gap 8 8 - 16 mmol/L   Magnesium    Collection Time: 02/06/25  5:00 AM   Result Value Ref Range    Magnesium 1.8 1.6 - 2.6 mg/dL     Imaging Results              X-Ray Chest AP Portable (Final result)  Result time 02/04/25 15:44:55      Final result by Fox Dowd DO (02/04/25 15:44:55)                   Impression:      1. Borderline enlarged cardiomediastinal silhouette with mild bilateral interstitial thickening could reflect CHF with mild pulmonary edema.  2. Linear and hazy opacities of the lung bases with blunting of the costophrenic angles are felt to reflect small pleural effusions with overlying passive atelectasis.      Electronically signed by: Fox Dowd  Date:    02/04/2025  Time:    15:44                Narrative:    EXAMINATION:  XR CHEST AP PORTABLE    CLINICAL HISTORY:  shortness of breath;    FINDINGS:  Portable chest with comparison chest x-ray 01/23/2025.  Median sternotomy wires and left dual lead cardiac pacemaker again observed.  Borderline enlarged cardiomediastinal silhouette.There are linear and hazy opacities of the lung bases with mild blunting of the costophrenic angles.  Mild bilateral interstitial thickening.  Pulmonary vasculature is normal. No acute osseous abnormality.                                      12-lead EKG interpretation:  (if applicable)      Current Cardiac Rhythm:   (if applicable)    Physical Exam:   Physical Exam  Vitals and nursing note reviewed.   Constitutional:       General: He is not in acute distress.     Appearance: Normal appearance.   Cardiovascular:      Rate and Rhythm: Normal rate and regular rhythm.      Heart sounds: Normal heart sounds. No murmur heard.  Pulmonary:      Effort: Pulmonary effort is normal. No respiratory distress.   Musculoskeletal:      Right lower leg: No edema.      Left lower leg: No edema.   Skin:     General: Skin is warm and dry.   Neurological:      Mental Status: He is alert.         ASSESSMENT/PLAN:   Assessment:   Acute on Chronic Systolic Heart Failure - , CXR mild pulmonary edema, small pleural effusions. Echo from 1/2025 showed EF 50-55%, bioprosthetic aortic valve, moderate MR. EF previously 40%. At home he is on entresto, corlanor and BB.   CAD s/p CABG- Cath from 12/2023 shows he has one functioning graft supplying 4 vessels. He experiences chronic angina that it treated with isosorbide, ranexa, BB and PRN nitro. EKG atrial sensed V paced with no ischemic changes. Troponins minimally elevated at 25 and flat. This is not ACS, would not recommend angiogram at this time.   S/p BiV AICD  S/p TAVR  Paroxysmal Atrial Fibrillation - on eliquis, BB  Peripheral Artery Disease      Plan:   Stop lasix gtt   UOP 2100 cc  Cr  1.3, stable.   BP stable.   Patient can be discharged from a cardiac standpoint, will go back to SNF. Would like for him to follow  in 1-2 weeks.   Discharge with lasix 20 mg daily for 1 week. Then can do every other day which is what he was doing before admission

## 2025-02-06 NOTE — PLAN OF CARE
Problem: Adult Inpatient Plan of Care  Goal: Plan of Care Review  Outcome: Progressing  Goal: Absence of Hospital-Acquired Illness or Injury  Outcome: Progressing     Problem: Skin Injury Risk Increased  Goal: Skin Health and Integrity  Outcome: Progressing     Problem: Fall Injury Risk  Goal: Absence of Fall and Fall-Related Injury  Outcome: Progressing     Problem: Heart Failure  Goal: Optimal Coping  Outcome: Progressing  Goal: Stable Heart Rate and Rhythm  Outcome: Progressing  Goal: Fluid and Electrolyte Balance  Outcome: Progressing  Goal: Effective Oxygenation and Ventilation  Outcome: Progressing

## 2025-02-06 NOTE — SUBJECTIVE & OBJECTIVE
Interval History:  Patient was seen and examined at bedside this morning.  His swelling is improving with IV diuresis.  We will be seen and evaluated by Cardiology.    Review of Systems   Constitutional:  Negative for activity change and appetite change.   HENT:  Negative for congestion and dental problem.    Eyes:  Negative for discharge and itching.   Respiratory:  Positive for shortness of breath.    Cardiovascular:  Positive for chest pain.   Gastrointestinal:  Negative for abdominal distention and abdominal pain.   Endocrine: Negative for cold intolerance.   Genitourinary:  Negative for difficulty urinating and dysuria.   Musculoskeletal:  Negative for arthralgias and back pain.   Skin:  Negative for color change.   Neurological:  Negative for dizziness and facial asymmetry.   Hematological:  Negative for adenopathy.   Psychiatric/Behavioral:  Negative for agitation and behavioral problems.      Objective:     Vital Signs (Most Recent):  Temp: 97.7 °F (36.5 °C) (02/05/25 1533)  Pulse: (!) 56 (02/05/25 1539)  Resp: 12 (02/05/25 1533)  BP: 106/61 (02/05/25 1539)  SpO2: 100 % (02/05/25 1539) Vital Signs (24h Range):  Temp:  [97.6 °F (36.4 °C)-98 °F (36.7 °C)] 97.7 °F (36.5 °C)  Pulse:  [56-80] 56  Resp:  [12-20] 12  SpO2:  [95 %-100 %] 100 %  BP: ()/(56-73) 106/61     Weight: 102.3 kg (225 lb 8 oz)  Body mass index is 36.4 kg/m².    Intake/Output Summary (Last 24 hours) at 2/5/2025 1851  Last data filed at 2/5/2025 1822  Gross per 24 hour   Intake 422.56 ml   Output 2650 ml   Net -2227.44 ml         Physical Exam  Vitals and nursing note reviewed.   Constitutional:       Appearance: He is well-developed.   HENT:      Head: Atraumatic.      Right Ear: External ear normal.      Left Ear: External ear normal.      Nose: Nose normal.      Mouth/Throat:      Mouth: Mucous membranes are moist.   Eyes:      Extraocular Movements: Extraocular movements intact.   Cardiovascular:      Rate and Rhythm: Normal rate.    Pulmonary:      Effort: Pulmonary effort is normal.      Breath sounds: Rales present.   Abdominal:      Palpations: Abdomen is soft.   Musculoskeletal:         General: Normal range of motion.      Cervical back: Full passive range of motion without pain and normal range of motion.      Right lower leg: Edema present.      Left lower leg: Edema present.   Skin:     General: Skin is warm.   Neurological:      Mental Status: He is alert and oriented to person, place, and time.   Psychiatric:         Behavior: Behavior normal.             Significant Labs: All pertinent labs within the past 24 hours have been reviewed.  CBC:   Recent Labs   Lab 02/04/25  1545 02/05/25  0727   WBC 7.21 6.22   HGB 10.4* 10.2*   HCT 31.7* 30.3*    174     CMP:   Recent Labs   Lab 02/04/25  1545 02/05/25  0727   * 138   K 4.1 3.8    102   CO2 26 30*   GLU 98 92   BUN 26* 23   CREATININE 1.1 1.0   CALCIUM 8.2* 8.3*   PROT 5.8* 5.6*   ALBUMIN 3.3* 3.1*   BILITOT 0.6 0.7   ALKPHOS 118 114   AST 32 24   ALT 22 18   ANIONGAP 6* 6*       Significant Imaging: I have reviewed all pertinent imaging results/findings within the past 24 hours.  Imaging Results              X-Ray Chest AP Portable (Final result)  Result time 02/04/25 15:44:55      Final result by Fox Dowd DO (02/04/25 15:44:55)                   Impression:      1. Borderline enlarged cardiomediastinal silhouette with mild bilateral interstitial thickening could reflect CHF with mild pulmonary edema.  2. Linear and hazy opacities of the lung bases with blunting of the costophrenic angles are felt to reflect small pleural effusions with overlying passive atelectasis.      Electronically signed by: Fox Dowd  Date:    02/04/2025  Time:    15:44               Narrative:    EXAMINATION:  XR CHEST AP PORTABLE    CLINICAL HISTORY:  shortness of breath;    FINDINGS:  Portable chest with comparison chest x-ray 01/23/2025.  Median sternotomy wires and left  dual lead cardiac pacemaker again observed.  Borderline enlarged cardiomediastinal silhouette.There are linear and hazy opacities of the lung bases with mild blunting of the costophrenic angles.  Mild bilateral interstitial thickening.  Pulmonary vasculature is normal. No acute osseous abnormality.

## 2025-02-06 NOTE — PROGRESS NOTES
[facility] Medicine  Progress Note    Patient Name: Jerome Amaro Jr.  MRN: [id number]  Patient Class: IP- Inpatient   Admission Date: 2/4/2025  Length of Stay: 1 days  Attending Physician: Castillo Mercado DO  Primary Care Provider: Magda Ruiz FNP-C        Subjective     Principal Problem:Acute on chronic systolic (congestive) heart failure        HPI:  79 year old pt getting admitted with acute CHF flare and Chest pain  Pt was at this hospital reecntly and got discharged to SNF  He started having shortness of breath very next day of discharge  and later chest pain  Pt said he can't walk and lie flat because of SOB  He noticed swelling on legs and gained 15 pounds  Pt was told not to take lasix and he was not taking it  Today he developed LUE and Neck pain and came to ER     Overview/Hospital Course:  No notes on file    Interval History:  Patient was seen and examined at bedside this morning.  His swelling is improving with IV diuresis.  We will be seen and evaluated by Cardiology.    Review of Systems   Constitutional:  Negative for activity change and appetite change.   HENT:  Negative for congestion and dental problem.    Eyes:  Negative for discharge and itching.   Respiratory:  Positive for shortness of breath.    Cardiovascular:  Positive for chest pain.   Gastrointestinal:  Negative for abdominal distention and abdominal pain.   Endocrine: Negative for cold intolerance.   Genitourinary:  Negative for difficulty urinating and dysuria.   Musculoskeletal:  Negative for arthralgias and back pain.   Skin:  Negative for color change.   Neurological:  Negative for dizziness and facial asymmetry.   Hematological:  Negative for adenopathy.   Psychiatric/Behavioral:  Negative for agitation and behavioral problems.      Objective:     Vital Signs (Most Recent):  Temp: 97.7 °F (36.5 °C) (02/05/25 1533)  Pulse: (!) 56 (02/05/25 1539)  Resp: 12 (02/05/25 1533)  BP: 106/61 (02/05/25  1539)  SpO2: 100 % (02/05/25 1539) Vital Signs (24h Range):  Temp:  [97.6 °F (36.4 °C)-98 °F (36.7 °C)] 97.7 °F (36.5 °C)  Pulse:  [56-80] 56  Resp:  [12-20] 12  SpO2:  [95 %-100 %] 100 %  BP: ()/(56-73) 106/61     Weight: 102.3 kg (225 lb 8 oz)  Body mass index is 36.4 kg/m².    Intake/Output Summary (Last 24 hours) at 2/5/2025 1851  Last data filed at 2/5/2025 1822  Gross per 24 hour   Intake 422.56 ml   Output 2650 ml   Net -2227.44 ml         Physical Exam  Vitals and nursing note reviewed.   Constitutional:       Appearance: He is well-developed.   HENT:      Head: Atraumatic.      Right Ear: External ear normal.      Left Ear: External ear normal.      Nose: Nose normal.      Mouth/Throat:      Mouth: Mucous membranes are moist.   Eyes:      Extraocular Movements: Extraocular movements intact.   Cardiovascular:      Rate and Rhythm: Normal rate.   Pulmonary:      Effort: Pulmonary effort is normal.      Breath sounds: Rales present.   Abdominal:      Palpations: Abdomen is soft.   Musculoskeletal:         General: Normal range of motion.      Cervical back: Full passive range of motion without pain and normal range of motion.      Right lower leg: Edema present.      Left lower leg: Edema present.   Skin:     General: Skin is warm.   Neurological:      Mental Status: He is alert and oriented to person, place, and time.   Psychiatric:         Behavior: Behavior normal.             Significant Labs: All pertinent labs within the past 24 hours have been reviewed.  CBC:   Recent Labs   Lab 02/04/25  1545 02/05/25  0727   WBC 7.21 6.22   HGB 10.4* 10.2*   HCT 31.7* 30.3*    174     CMP:   Recent Labs   Lab 02/04/25  1545 02/05/25  0727   * 138   K 4.1 3.8    102   CO2 26 30*   GLU 98 92   BUN 26* 23   CREATININE 1.1 1.0   CALCIUM 8.2* 8.3*   PROT 5.8* 5.6*   ALBUMIN 3.3* 3.1*   BILITOT 0.6 0.7   ALKPHOS 118 114   AST 32 24   ALT 22 18   ANIONGAP 6* 6*       Significant Imaging: I have  reviewed all pertinent imaging results/findings within the past 24 hours.  Imaging Results              X-Ray Chest AP Portable (Final result)  Result time 02/04/25 15:44:55      Final result by Fox Dowd DO (02/04/25 15:44:55)                   Impression:      1. Borderline enlarged cardiomediastinal silhouette with mild bilateral interstitial thickening could reflect CHF with mild pulmonary edema.  2. Linear and hazy opacities of the lung bases with blunting of the costophrenic angles are felt to reflect small pleural effusions with overlying passive atelectasis.      Electronically signed by: Fox Dowd  Date:    02/04/2025  Time:    15:44               Narrative:    EXAMINATION:  XR CHEST AP PORTABLE    CLINICAL HISTORY:  shortness of breath;    FINDINGS:  Portable chest with comparison chest x-ray 01/23/2025.  Median sternotomy wires and left dual lead cardiac pacemaker again observed.  Borderline enlarged cardiomediastinal silhouette.There are linear and hazy opacities of the lung bases with mild blunting of the costophrenic angles.  Mild bilateral interstitial thickening.  Pulmonary vasculature is normal. No acute osseous abnormality.                                        Assessment and Plan     * Acute on chronic systolic (congestive) heart failure  Started on lasix drip  Maintain it         Most recent BNP and echo results are listed below.  Recent Labs     02/04/25  1545   *     Latest ECHO  Results for orders placed during the hospital encounter of 01/18/25    Echo    Interpretation Summary    Left Ventricle: The left ventricle is normal in size. Mildly increased wall thickness. There is mild concentric hypertrophy. There is low normal systolic function with a visually estimated ejection fraction of 50 - 55%. Diastolic function cannot be reliably determined in the presence of mitral annular calcification.    Right Ventricle: Systolic function is mildly reduced.    Left Atrium: Left  atrium is severely dilated.    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    Mitral Valve: There is severe mitral annular calcification present. There is mitral valve stenosis. The mean pressure gradient across the mitral valve is 4 mmHg at a heart rate of 80 bpm. MV area by continuity equation is 0.82 cm2. There is moderate regurgitation.    Tricuspid Valve: There is mild regurgitation.    Current Heart Failure Medications  , 2 times daily, Oral  , 2 times daily, Oral  sacubitriL-valsartan 24-26 mg per tablet 1 tablet, 2 times daily, Oral  metoprolol succinate (TOPROL-XL) 24 hr tablet 100 mg, Daily, Oral  furosemide (Lasix) 200 mg in 0.9% NaCl SolP 100 mL continuous infusion (conc: 2 mg/mL), Continuous, Intravenous      Hx of CABG  Aware       Frequent hospital admissions  Aware       Chest pain  Serial cardiac enzymes  Tele monitoring  Because of persistent chest pain will consult Cardiology       Seizure disorder  Maintain phenytoin PO  Check serum levels      S/P TAVR (transcatheter aortic valve replacement)  Aware         VTE Risk Mitigation (From admission, onward)           Ordered     IP VTE HIGH RISK PATIENT  Once         02/04/25 1909     Place sequential compression device  Until discontinued         02/04/25 1909                    Discharge Planning   AXEL: 2/7/2025     Code Status: Full Code   Medical Readiness for Discharge Date:   Discharge Plan A: Skilled Nursing Facility                        Castillo Mercado DO  Department of Hospital Medicine   [facility]

## 2025-02-06 NOTE — ASSESSMENT & PLAN NOTE
Seen and evaluated by Cardiology   IV Lasix infusion.    Restart oral Lasix   DC tomorrow        Most recent BNP and echo results are listed below.  Recent Labs     02/04/25  1545   *       Latest ECHO  Results for orders placed during the hospital encounter of 01/18/25    Echo    Interpretation Summary    Left Ventricle: The left ventricle is normal in size. Mildly increased wall thickness. There is mild concentric hypertrophy. There is low normal systolic function with a visually estimated ejection fraction of 50 - 55%. Diastolic function cannot be reliably determined in the presence of mitral annular calcification.    Right Ventricle: Systolic function is mildly reduced.    Left Atrium: Left atrium is severely dilated.    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    Mitral Valve: There is severe mitral annular calcification present. There is mitral valve stenosis. The mean pressure gradient across the mitral valve is 4 mmHg at a heart rate of 80 bpm. MV area by continuity equation is 0.82 cm2. There is moderate regurgitation.    Tricuspid Valve: There is mild regurgitation.    Current Heart Failure Medications  , 2 times daily, Oral  , 2 times daily, Oral  sacubitriL-valsartan 24-26 mg per tablet 1 tablet, 2 times daily, Oral  metoprolol succinate (TOPROL-XL) 24 hr tablet 100 mg, Daily, Oral  furosemide tablet 20 mg, Daily, Oral

## 2025-02-06 NOTE — PLAN OF CARE
MARLEN messaged Alysha with  and she stated pt would be able to return to  for SNF with AVS.      02/06/25 0915   Post-Acute Status   Post-Acute Authorization Placement   Post-Acute Placement Status Set-up Complete/Auth obtained   Discharge Plan   Discharge Plan A Skilled Nursing Facility       1117- MARLEN messaged Alysha with  to let her know pt would d/c today.    1134- MARLEN sent SNF packet to Alysha at  via [email or web address].     1315- Per Alysha pt cannot return to  today due to Lasix drip being d/c @ 1026 this am. Scheduled d/c tomorrow.    1517- Alysha states pt's payor source is req PT/OT notes. MARLEN sent a message to JOSE E RN req PT/OT.

## 2025-02-07 VITALS
HEART RATE: 71 BPM | DIASTOLIC BLOOD PRESSURE: 57 MMHG | BODY MASS INDEX: 35.73 KG/M2 | RESPIRATION RATE: 20 BRPM | WEIGHT: 222.31 LBS | OXYGEN SATURATION: 99 % | SYSTOLIC BLOOD PRESSURE: 109 MMHG | HEIGHT: 66 IN | TEMPERATURE: 98 F

## 2025-02-07 LAB
ALBUMIN SERPL BCP-MCNC: 3.5 G/DL (ref 3.5–5.2)
ALP SERPL-CCNC: 122 U/L (ref 55–135)
ALT SERPL W/O P-5'-P-CCNC: 16 U/L (ref 10–44)
ANION GAP SERPL CALC-SCNC: 7 MMOL/L (ref 8–16)
AST SERPL-CCNC: 27 U/L (ref 10–40)
BILIRUB SERPL-MCNC: 0.6 MG/DL (ref 0.1–1)
BUN SERPL-MCNC: 29 MG/DL (ref 8–23)
CALCIUM SERPL-MCNC: 8.4 MG/DL (ref 8.7–10.5)
CHLORIDE SERPL-SCNC: 98 MMOL/L (ref 95–110)
CO2 SERPL-SCNC: 29 MMOL/L (ref 23–29)
CREAT SERPL-MCNC: 1.2 MG/DL (ref 0.5–1.4)
ERYTHROCYTE [DISTWIDTH] IN BLOOD BY AUTOMATED COUNT: 13.7 % (ref 11.5–14.5)
EST. GFR  (NO RACE VARIABLE): >60 ML/MIN/1.73 M^2
GLUCOSE SERPL-MCNC: 93 MG/DL (ref 70–110)
HCT VFR BLD AUTO: 33.7 % (ref 40–54)
HGB BLD-MCNC: 11.4 G/DL (ref 14–18)
MAGNESIUM SERPL-MCNC: 1.8 MG/DL (ref 1.6–2.6)
MCH RBC QN AUTO: 29.5 PG (ref 27–31)
MCHC RBC AUTO-ENTMCNC: 33.8 G/DL (ref 32–36)
MCV RBC AUTO: 87 FL (ref 82–98)
PLATELET # BLD AUTO: 156 K/UL (ref 150–450)
PMV BLD AUTO: 11 FL (ref 9.2–12.9)
POTASSIUM SERPL-SCNC: 4 MMOL/L (ref 3.5–5.1)
PROT SERPL-MCNC: 6.3 G/DL (ref 6–8.4)
RBC # BLD AUTO: 3.87 M/UL (ref 4.6–6.2)
SODIUM SERPL-SCNC: 134 MMOL/L (ref 136–145)
WBC # BLD AUTO: 7.85 K/UL (ref 3.9–12.7)

## 2025-02-07 PROCEDURE — 97165 OT EVAL LOW COMPLEX 30 MIN: CPT

## 2025-02-07 PROCEDURE — 97161 PT EVAL LOW COMPLEX 20 MIN: CPT

## 2025-02-07 PROCEDURE — 85027 COMPLETE CBC AUTOMATED: CPT | Performed by: INTERNAL MEDICINE

## 2025-02-07 PROCEDURE — 25000003 PHARM REV CODE 250: Performed by: INTERNAL MEDICINE

## 2025-02-07 PROCEDURE — 25000003 PHARM REV CODE 250: Performed by: STUDENT IN AN ORGANIZED HEALTH CARE EDUCATION/TRAINING PROGRAM

## 2025-02-07 PROCEDURE — 36415 COLL VENOUS BLD VENIPUNCTURE: CPT | Performed by: INTERNAL MEDICINE

## 2025-02-07 PROCEDURE — 83735 ASSAY OF MAGNESIUM: CPT | Performed by: INTERNAL MEDICINE

## 2025-02-07 PROCEDURE — 80053 COMPREHEN METABOLIC PANEL: CPT | Performed by: INTERNAL MEDICINE

## 2025-02-07 RX ADMIN — MUPIROCIN 1 G: 20 OINTMENT TOPICAL at 09:02

## 2025-02-07 RX ADMIN — METOPROLOL SUCCINATE 100 MG: 50 TABLET, FILM COATED, EXTENDED RELEASE ORAL at 09:02

## 2025-02-07 RX ADMIN — FUROSEMIDE 20 MG: 20 TABLET ORAL at 09:02

## 2025-02-07 RX ADMIN — EZETIMIBE 10 MG: 10 TABLET ORAL at 09:02

## 2025-02-07 RX ADMIN — CLOPIDOGREL BISULFATE 75 MG: 75 TABLET, FILM COATED ORAL at 09:02

## 2025-02-07 RX ADMIN — SACUBITRIL AND VALSARTAN 1 TABLET: 24; 26 TABLET, FILM COATED ORAL at 09:02

## 2025-02-07 RX ADMIN — POLYETHYLENE GLYCOL 3350 17 G: 17 POWDER, FOR SOLUTION ORAL at 09:02

## 2025-02-07 NOTE — NURSING
1500-Pt discharged. Pt went back to [facility]. Daughter was at bedside. He received verbal and written instructions for upcoming appointments and updated prescriptions. IV removed and tele monitor off. Pt discharged.

## 2025-02-07 NOTE — PROGRESS NOTES
[facility]   Cardiology Note    Consult Requested By: hospital medicine  Reason for Consult: CHF    SUBJECTIVE:     History of Present Illness: Pt is an 78 yo male with PMHX CAD s/p CABg - 1 patent graft, HFrEF s/p BiV AICD, Pafib, TAVR, PAD who presented to the ED with edema and shortness of breath. He was recently in the hospital and was discharged to SNF. After discharge he started experiencing shortness of breath, reports associated LE edema with a 15 lb weight gain. He was not taking lasix at home. He also reports neck and left arm pain. ED workup showed BP stable. O2 sat 100% on 2 L NC, now on room air. Troponin minimally elevated to 25 and flat. . EKG atrial sensed V pacing, no ischemic changes. CXR showed small pleural effusions. He was started on IV lasix. Today reports he is feeling well. Denies shortness of breath and lower extremity edema has resolved. He denies any chest pain or recent episodes of chest pain.     2/7: Pt seen and examined. No events overnight. No complaints this morning. BP stable. Labs reviewed.     Review of patient's allergies indicates:   Allergen Reactions    Atorvastatin Other (See Comments)     Muscle pain    Rosuvastatin Other (See Comments)     Muscle pain       Past Medical History:   Diagnosis Date    Anticoagulant long-term use     2014    Aortic valve disease 2014    pig valve    Arthritis     all over    Atrial fibrillation 2/29/2024    Chest pain 07/15/2019    CHF (congestive heart failure)     2014 on meds    Coronary artery disease     2007 cabg    Difficulty swallowing     Heart attack 1990    15 stents    Heart attack 02/01/2024    mild    Heart block     Hyperlipidemia     Hypertension     on meds    Hypothyroidism 2015    on meds    PVD (peripheral vascular disease)     Seizures     last episode 1990 on meds     Past Surgical History:   Procedure Laterality Date    AORTOGRAPHY WITH SERIALOGRAPHY N/A 11/16/2021    Procedure: AORTOGRAM, WITH RUNOFF;   Surgeon: Rodrigo Willoughby MD;  Location: [facility] CATH/EP LAB;  Service: Cardiology;  Laterality: N/A;    ARTERIOGRAPHY OF AORTIC ROOT N/A 11/05/2019    Procedure: ARTERIOGRAM, AORTIC ROOT;  Surgeon: Rodrigo Willoughby MD;  Location: [facility] CATH/EP LAB;  Service: Cardiology;  Laterality: N/A;    CARDIAC CATHETERIZATION      CARDIAC VALVE SURGERY      CATARACT EXTRACTION W/  INTRAOCULAR LENS IMPLANT Right 12/11/2024    Procedure: CEIOL OD;  Surgeon: Fredrick Sharma MD;  Location: [facility] ASU OR;  Service: Ophthalmology;  Laterality: Right;    CORONARY ANGIOGRAPHY INCLUDING BYPASS GRAFTS WITH CATHETERIZATION OF LEFT HEART N/A 11/05/2019    Procedure: ANGIOGRAM, CORONARY, INCLUDING BYPASS GRAFT, WITH LEFT HEART CATHETERIZATION;  Surgeon: Rodrigo Willoughby MD;  Location: [facility] CATH/EP LAB;  Service: Cardiology;  Laterality: N/A;    CORONARY ANGIOGRAPHY INCLUDING BYPASS GRAFTS WITH CATHETERIZATION OF LEFT HEART Left 11/03/2020    Procedure: ANGIOGRAM, CORONARY, INCLUDING BYPASS GRAFT, WITH LEFT HEART CATHETERIZATION;  Surgeon: Rodrigo Willoughby MD;  Location: [facility] CATH/EP LAB;  Service: Cardiology;  Laterality: Left;    CORONARY ANGIOGRAPHY INCLUDING BYPASS GRAFTS WITH CATHETERIZATION OF LEFT HEART N/A 09/28/2021    Procedure: ANGIOGRAM, CORONARY, INCLUDING BYPASS GRAFT, WITH LEFT HEART CATHETERIZATION;  Surgeon: Rodrigo Willoughby MD;  Location: [facility] CATH/EP LAB;  Service: Cardiology;  Laterality: N/A;    CORONARY ANGIOGRAPHY INCLUDING BYPASS GRAFTS WITH CATHETERIZATION OF LEFT HEART N/A 12/05/2023    Procedure: ANGIOGRAM, CORONARY, INCLUDING BYPASS GRAFT, WITH LEFT HEART CATHETERIZATION;  Surgeon: Rodrigo Willoughby MD;  Location: [facility] CATH/EP LAB;  Service: Cardiology;  Laterality: N/A;    CORONARY ANGIOPLASTY      CORONARY ARTERY BYPASS GRAFT      x 2    1991 2006    ESOPHAGOGASTRODUODENOSCOPY N/A 2/20/2024    Procedure: EGD (ESOPHAGOGASTRODUODENOSCOPY);  Surgeon: Mal Lockhart III, MD;  Location: [facility] ENDO;  Service: Endoscopy;   Laterality: N/A;    EXTRACORPOREAL SHOCK WAVE LITHOTRIPSY Right 2019    Procedure: LITHOTRIPSY, ESWL;  Surgeon: Williams Ortega MD;  Location: [facility] OR;  Service: Urology;  Laterality: Right;    HEMORRHOID SURGERY      INSERTION OF PACEMAKER      PERCUTANEOUS TRANSLUMINAL BALLOON ANGIOPLASTY OF CORONARY ARTERY N/A 2019    Procedure: Angioplasty-coronary;  Surgeon: Rodrigo Willoughby MD;  Location: [facility] CATH/EP LAB;  Service: Cardiology;  Laterality: N/A;    WRIST FRACTURE SURGERY       Family History   Problem Relation Name Age of Onset    Heart attack Mother      Heart attack Father      Heart disease Sister 2     Stroke Sister 2     Diabetes Sister 2     No Known Problems Maternal Grandmother      No Known Problems Maternal Grandfather      No Known Problems Paternal Grandmother      No Known Problems Paternal Grandfather      No Known Problems Brother      No Known Problems Maternal Aunt      No Known Problems Maternal Uncle      No Known Problems Paternal Aunt      No Known Problems Paternal Uncle      Anemia Neg Hx      Arrhythmia Neg Hx      Asthma Neg Hx      Clotting disorder Neg Hx      Fainting Neg Hx      Heart failure Neg Hx      Hyperlipidemia Neg Hx      Hypertension Neg Hx      Atrial Septal Defect Neg Hx       Social History     Tobacco Use    Smoking status: Former     Current packs/day: 0.00     Average packs/day: 0.5 packs/day for 4.0 years (2.0 ttl pk-yrs)     Types: Cigarettes     Start date:      Quit date:      Years since quittin.1    Smokeless tobacco: Never   Substance Use Topics    Alcohol use: Yes     Comment: social    Drug use: No       Review of Systems:  Review of Systems   Constitutional:  Negative for chills, diaphoresis and fever.   Respiratory:  Positive for shortness of breath. Negative for cough.    Cardiovascular:  Positive for chest pain and leg swelling. Negative for palpitations and orthopnea.   Gastrointestinal:  Negative for nausea and vomiting.    Neurological:  Negative for dizziness.   All other systems reviewed and are negative.      OBJECTIVE:     Vital Signs (Most Recent)  Temp: 97.7 °F (36.5 °C) (02/07/25 0423)  Pulse: 72 (02/07/25 0423)  Resp: 18 (02/07/25 0423)  BP: 116/60 (02/07/25 0423)  SpO2: 99 % (02/07/25 0423)    Vital Signs Range (Last 24H):  Temp:  [97.1 °F (36.2 °C)-98.2 °F (36.8 °C)]   Pulse:  [55-79]   Resp:  [16-19]   BP: ()/(59-67)   SpO2:  [97 %-99 %]     I & O (Last 24H):    Intake/Output Summary (Last 24 hours) at 2/7/2025 0825  Last data filed at 2/6/2025 2219  Gross per 24 hour   Intake 720 ml   Output 1020 ml   Net -300 ml       Current Diet:     Current Diet Order   Procedures    Diet Low Sodium, 2gm Fluid - 1500mL; Standard Tray     Order Specific Question:   Fluid restriction:     Answer:   Fluid - 1500mL     Order Specific Question:   Tray type:     Answer:   Standard Tray        Allergies:  Review of patient's allergies indicates:   Allergen Reactions    Atorvastatin Other (See Comments)     Muscle pain    Rosuvastatin Other (See Comments)     Muscle pain       Meds:  Scheduled Meds:   ammonium lactate   Topical (Top) BID    clopidogreL  75 mg Oral Daily    diphenhydrAMINE  25 mg Intravenous Once    ezetimibe  10 mg Oral Daily    famotidine  20 mg Oral Daily    furosemide  20 mg Oral Daily    metoprolol succinate  100 mg Oral Daily    mupirocin   Nasal BID    phenytoin  400 mg Oral Daily    polyethylene glycol  17 g Oral BID    sacubitriL-valsartan  1 tablet Oral BID     Continuous Infusions:      PRN Meds:  Current Facility-Administered Medications:     magnesium oxide, 800 mg, Oral, PRN    magnesium oxide, 800 mg, Oral, PRN    potassium bicarbonate, 35 mEq, Oral, PRN    potassium bicarbonate, 50 mEq, Oral, PRN    potassium bicarbonate, 60 mEq, Oral, PRN    potassium, sodium phosphates, 2 packet, Oral, PRN    potassium, sodium phosphates, 2 packet, Oral, PRN    potassium, sodium phosphates, 2 packet, Oral, PRN     sodium chloride 0.9%, 10 mL, Intravenous, PRN    Oxygen/Ventilator Data (Last 24H):  (if applicable)            Hemodynamic Parameters (Last 24H):   (if applicable)        Laboratory and Radiology Data:  Recent Results (from the past 24 hours)   CBC Without Differential    Collection Time: 02/07/25  3:40 AM   Result Value Ref Range    WBC 7.85 3.90 - 12.70 K/uL    RBC 3.87 (L) 4.60 - 6.20 M/uL    Hemoglobin 11.4 (L) 14.0 - 18.0 g/dL    Hematocrit 33.7 (L) 40.0 - 54.0 %    MCV 87 82 - 98 fL    MCH 29.5 27.0 - 31.0 pg    MCHC 33.8 32.0 - 36.0 g/dL    RDW 13.7 11.5 - 14.5 %    Platelets 156 150 - 450 K/uL    MPV 11.0 9.2 - 12.9 fL   Comprehensive Metabolic Panel    Collection Time: 02/07/25  3:40 AM   Result Value Ref Range    Sodium 134 (L) 136 - 145 mmol/L    Potassium 4.0 3.5 - 5.1 mmol/L    Chloride 98 95 - 110 mmol/L    CO2 29 23 - 29 mmol/L    Glucose 93 70 - 110 mg/dL    BUN 29 (H) 8 - 23 mg/dL    Creatinine 1.2 0.5 - 1.4 mg/dL    Calcium 8.4 (L) 8.7 - 10.5 mg/dL    Total Protein 6.3 6.0 - 8.4 g/dL    Albumin 3.5 3.5 - 5.2 g/dL    Total Bilirubin 0.6 0.1 - 1.0 mg/dL    Alkaline Phosphatase 122 55 - 135 U/L    AST 27 10 - 40 U/L    ALT 16 10 - 44 U/L    eGFR >60.0 >60 mL/min/1.73 m^2    Anion Gap 7 (L) 8 - 16 mmol/L   Magnesium    Collection Time: 02/07/25  3:40 AM   Result Value Ref Range    Magnesium 1.8 1.6 - 2.6 mg/dL     Imaging Results              X-Ray Chest AP Portable (Final result)  Result time 02/04/25 15:44:55      Final result by Fox Dowd DO (02/04/25 15:44:55)                   Impression:      1. Borderline enlarged cardiomediastinal silhouette with mild bilateral interstitial thickening could reflect CHF with mild pulmonary edema.  2. Linear and hazy opacities of the lung bases with blunting of the costophrenic angles are felt to reflect small pleural effusions with overlying passive atelectasis.      Electronically signed by: Fox Dowd  Date:    02/04/2025  Time:    15:44                Narrative:    EXAMINATION:  XR CHEST AP PORTABLE    CLINICAL HISTORY:  shortness of breath;    FINDINGS:  Portable chest with comparison chest x-ray 01/23/2025.  Median sternotomy wires and left dual lead cardiac pacemaker again observed.  Borderline enlarged cardiomediastinal silhouette.There are linear and hazy opacities of the lung bases with mild blunting of the costophrenic angles.  Mild bilateral interstitial thickening.  Pulmonary vasculature is normal. No acute osseous abnormality.                                      12-lead EKG interpretation:  (if applicable)      Current Cardiac Rhythm:   (if applicable)    Physical Exam:   Physical Exam  Vitals and nursing note reviewed.   Constitutional:       General: He is not in acute distress.     Appearance: Normal appearance.   Cardiovascular:      Rate and Rhythm: Normal rate and regular rhythm.      Heart sounds: Normal heart sounds. No murmur heard.  Pulmonary:      Effort: Pulmonary effort is normal. No respiratory distress.   Musculoskeletal:      Right lower leg: No edema.      Left lower leg: No edema.   Skin:     General: Skin is warm and dry.   Neurological:      Mental Status: He is alert.         ASSESSMENT/PLAN:   Assessment:   Acute on Chronic Systolic Heart Failure - , CXR mild pulmonary edema, small pleural effusions. Echo from 1/2025 showed EF 50-55%, bioprosthetic aortic valve, moderate MR. EF previously 40%. At home he is on entresto, corlanor and BB.   CAD s/p CABG- Cath from 12/2023 shows he has one functioning graft supplying 4 vessels. He experiences chronic angina that it treated with isosorbide, ranexa, BB and PRN nitro. EKG atrial sensed V paced with no ischemic changes. Troponins minimally elevated at 25 and flat. This is not ACS, would not recommend angiogram at this time.   S/p BiV AICD  S/p TAVR  Paroxysmal Atrial Fibrillation - on eliquis, BB  Peripheral Artery Disease      Plan:   On lasix PO  UOP 1620 cc  Cr 1.2,  stable.   BP stable.   Patient can be discharged from a cardiac standpoint, will go back to SNF. Would like for him to follow  in 1-2 weeks.   Discharge with lasix 20 mg daily for 1 week. Then can do every other day which is what he was doing before admission, discussed with daughter yesterday.

## 2025-02-07 NOTE — PLAN OF CARE
Patient to discharge to [facility] for SNF stay.  Alysha at [facility] confirms auth has been received.  All needed documents have been faxed and are in order per Alysha.  Report can be called to [phone] to the [facility]3 Nurse.  The facility will set up transport as soon as report is called.  No Case Management barriers to discharge.  CM called and notified patient's daughter, Violette of discharge.  CM messaged patient's nurse regarding need to call report.       02/07/25 2709   Final Note   Assessment Type Final Discharge Note   Anticipated Discharge Disposition SNF   Hospital Resources/Appts/Education Provided Provided patient/caregiver with written discharge plan information   Post-Acute Status   Post-Acute Authorization Placement   Post-Acute Placement Status Set-up Complete/Auth obtained   Discharge Delays (!) Ambulance Transport/Facility Transport

## 2025-02-07 NOTE — PT/OT/SLP EVAL
Occupational Therapy   Evaluation    Name: Jerome Amaro Jr.  MRN: [id number]  Admitting Diagnosis: Acute on chronic systolic (congestive) heart failure  Recent Surgery: * No surgery found *      Recommendations:     Discharge Recommendations: Moderate Intensity Therapy  Discharge Equipment Recommendations:  none  Barriers to discharge:  Decreased caregiver support    Assessment:     Jerome Amaro Jr. is a 79 y.o. male with a medical diagnosis of Acute on chronic systolic (congestive) heart failure.  Performance deficits affecting function: weakness, impaired endurance, impaired self care skills, impaired functional mobility, gait instability, impaired balance, impaired cardiopulmonary response to activity.      Rehab Prognosis: Good; patient would benefit from acute skilled OT services to address these deficits and reach maximum level of function.       Plan:     Patient to be seen 3 x/week to address the above listed problems via self-care/home management, therapeutic activities, therapeutic exercises  Plan of Care Expires: 03/09/25  Plan of Care Reviewed with: patient, family    Subjective     Chief Complaint: none  Patient/Family Comments/goals: ready to go back to SNF    Occupational Profile:  Living Environment: Lives alone  Previous level of function: Modified Independent with RW  Equipment Used at Home: walker, rolling  Assistance upon Discharge: limited; pt lives alone    Pain/Comfort:  Pain Rating 1: 0/10  Pain Rating Post-Intervention 1: 0/10    Objective:     Communicated with: nruse prior to session.  Patient found sitting edge of bed with telemetry upon OT entry to room.    General Precautions: Standard, fall, contact    Respiratory Status: Room air    Occupational Performance:    Functional Mobility/Transfers:  Patient completed Sit <> Stand Transfer with contact guard assistance  with  rolling walker   Functional Mobility: 25ft CGA with RW    Activities of Daily Living:  Lower Body Dressing: stand by  assistance      Cognitive/Visual Perceptual:  Cognitive/Psychosocial Skills:     -       Oriented to: Person, Place, Time, and Situation   -       Follows Commands/attention:Follows multistep  commands    Physical Exam:  Upper Extremity Range of Motion:     -       Right Upper Extremity: WFL  -       Left Upper Extremity: WFL  Upper Extremity Strength:    -       Right Upper Extremity: WFL  -       Left Upper Extremity: WFL   Strength:    -       Right Upper Extremity: WFL  -       Left Upper Extremity: WFL  Fine Motor Coordination:    -       Intact  Gross motor coordination:   WFL    AMPAC 6 Click ADL:  AMPAC Total Score: 21    Treatment & Education:  Therapist provided facilitation and instruction of proper body mechanics and fall prevention strategies during tasks listed above.   Instructed patient to sit in bedside chair as tolerated daily to increase OOB/activity tolerance.   Instructed patient to use call light to have nursing staff assist with needs/transfers.   Discussed OT POC and answered all questions within OT scope of practice.    Patient left sitting edge of bed with all lines intact, call button in reach, and bed alarm on    GOALS:   Multidisciplinary Problems       Occupational Therapy Goals          Problem: Occupational Therapy    Goal Priority Disciplines Outcome Interventions   Occupational Therapy Goal     OT, PT/OT     Description: Goals to be met by: 3/7/2025     Patient will increase functional independence with ADLs by performing:    UE Dressing with Modified [place].  LE Dressing with Modified [place].  Grooming while standing at sink with Modified [place].  Toileting from toilet with Modified [place] for hygiene and clothing management.   Toilet transfer to toilet with Modified [place].                         DME Justifications:  No DME recommended requiring DME justifications    History:     Past Medical History:   Diagnosis Date    Anticoagulant long-term  use     2014    Aortic valve disease 2014    pig valve    Arthritis     all over    Atrial fibrillation 2/29/2024    Chest pain 07/15/2019    CHF (congestive heart failure)     2014 on meds    Coronary artery disease     2007 cabg    Difficulty swallowing     Heart attack 1990    15 stents    Heart attack 02/01/2024    mild    Heart block     Hyperlipidemia     Hypertension     on meds    Hypothyroidism 2015    on meds    PVD (peripheral vascular disease)     Seizures     last episode 1990 on meds         Past Surgical History:   Procedure Laterality Date    AORTOGRAPHY WITH SERIALOGRAPHY N/A 11/16/2021    Procedure: AORTOGRAM, WITH RUNOFF;  Surgeon: Rodrigo Willoughby MD;  Location: [facility] CATH/EP LAB;  Service: Cardiology;  Laterality: N/A;    ARTERIOGRAPHY OF AORTIC ROOT N/A 11/05/2019    Procedure: ARTERIOGRAM, AORTIC ROOT;  Surgeon: Rodrigo Willoughby MD;  Location: [facility] CATH/EP LAB;  Service: Cardiology;  Laterality: N/A;    CARDIAC CATHETERIZATION      CARDIAC VALVE SURGERY      CATARACT EXTRACTION W/  INTRAOCULAR LENS IMPLANT Right 12/11/2024    Procedure: CEIOL OD;  Surgeon: Fredrick Sharma MD;  Location: [facility] ASU OR;  Service: Ophthalmology;  Laterality: Right;    CORONARY ANGIOGRAPHY INCLUDING BYPASS GRAFTS WITH CATHETERIZATION OF LEFT HEART N/A 11/05/2019    Procedure: ANGIOGRAM, CORONARY, INCLUDING BYPASS GRAFT, WITH LEFT HEART CATHETERIZATION;  Surgeon: Rodrigo Willoughby MD;  Location: [facility] CATH/EP LAB;  Service: Cardiology;  Laterality: N/A;    CORONARY ANGIOGRAPHY INCLUDING BYPASS GRAFTS WITH CATHETERIZATION OF LEFT HEART Left 11/03/2020    Procedure: ANGIOGRAM, CORONARY, INCLUDING BYPASS GRAFT, WITH LEFT HEART CATHETERIZATION;  Surgeon: Rodrigo Willoughby MD;  Location: [facility] CATH/EP LAB;  Service: Cardiology;  Laterality: Left;    CORONARY ANGIOGRAPHY INCLUDING BYPASS GRAFTS WITH CATHETERIZATION OF LEFT HEART N/A 09/28/2021    Procedure: ANGIOGRAM, CORONARY, INCLUDING BYPASS GRAFT, WITH LEFT HEART CATHETERIZATION;   Surgeon: Rodrigo Willoughby MD;  Location: [facility] CATH/EP LAB;  Service: Cardiology;  Laterality: N/A;    CORONARY ANGIOGRAPHY INCLUDING BYPASS GRAFTS WITH CATHETERIZATION OF LEFT HEART N/A 12/05/2023    Procedure: ANGIOGRAM, CORONARY, INCLUDING BYPASS GRAFT, WITH LEFT HEART CATHETERIZATION;  Surgeon: Rodrigo Willoughby MD;  Location: [facility] CATH/EP LAB;  Service: Cardiology;  Laterality: N/A;    CORONARY ANGIOPLASTY      CORONARY ARTERY BYPASS GRAFT      x 2    1991    2006    ESOPHAGOGASTRODUODENOSCOPY N/A 2/20/2024    Procedure: EGD (ESOPHAGOGASTRODUODENOSCOPY);  Surgeon: Mal Lockhart III, MD;  Location: [facility] ENDO;  Service: Endoscopy;  Laterality: N/A;    EXTRACORPOREAL SHOCK WAVE LITHOTRIPSY Right 08/01/2019    Procedure: LITHOTRIPSY, ESWL;  Surgeon: Williams Ortega MD;  Location: [facility] OR;  Service: Urology;  Laterality: Right;    HEMORRHOID SURGERY  1990    INSERTION OF PACEMAKER      PERCUTANEOUS TRANSLUMINAL BALLOON ANGIOPLASTY OF CORONARY ARTERY N/A 11/08/2019    Procedure: Angioplasty-coronary;  Surgeon: Rodrigo Willoughby MD;  Location: [facility] CATH/EP LAB;  Service: Cardiology;  Laterality: N/A;    WRIST FRACTURE SURGERY         Time Tracking:     OT Date of Treatment: 02/07/25  OT Start Time: 0938  OT Stop Time: 0947  OT Total Time (min): 9 min    Billable Minutes:Evaluation 09 2/7/2025

## 2025-02-07 NOTE — PLAN OF CARE
Problem: Adult Inpatient Plan of Care  Goal: Plan of Care Review  Outcome: Met  Goal: Patient-Specific Goal (Individualized)  Outcome: Met  Goal: Absence of Hospital-Acquired Illness or Injury  Outcome: Met  Goal: Optimal Comfort and Wellbeing  Outcome: Met  Goal: Readiness for Transition of Care  Outcome: Met     Problem: Wound  Goal: Optimal Coping  Outcome: Met  Goal: Optimal Functional Ability  Outcome: Met  Goal: Absence of Infection Signs and Symptoms  Outcome: Met  Goal: Improved Oral Intake  Outcome: Met  Goal: Optimal Pain Control and Function  Outcome: Met  Goal: Skin Health and Integrity  Outcome: Met  Goal: Optimal Wound Healing  Outcome: Met     Problem: Skin Injury Risk Increased  Goal: Skin Health and Integrity  Outcome: Met     Problem: Fall Injury Risk  Goal: Absence of Fall and Fall-Related Injury  Outcome: Met     Problem: Oral Intake Inadequate  Goal: Improved Oral Intake  Outcome: Met     Problem: Heart Failure  Goal: Optimal Coping  Outcome: Met  Goal: Optimal Cardiac Output  Outcome: Met  Goal: Stable Heart Rate and Rhythm  Outcome: Met  Goal: Optimal Functional Ability  Outcome: Met  Goal: Fluid and Electrolyte Balance  Outcome: Met  Goal: Improved Oral Intake  Outcome: Met  Goal: Effective Oxygenation and Ventilation  Outcome: Met  Goal: Effective Breathing Pattern During Sleep  Outcome: Met     Problem: Infection  Goal: Absence of Infection Signs and Symptoms  Outcome: Met

## 2025-02-07 NOTE — PLAN OF CARE
CM advised that patient is to dc back to [facility] today to resume SNF stay.  Needed documents sent to [facility] via [employer].  Appears, according to chart, that pt/ot notes are needed.  Notified PT/OT of this need in order to be able to dc.  Await response from [facility] regarding anything further that may be needed.

## 2025-02-07 NOTE — PLAN OF CARE
Spoke with Cynthia Schwab(Daughter)- [phone], regarding discharge IMM, Understands statement and IMM will be scanned to chart.

## 2025-02-07 NOTE — PT/OT/SLP EVAL
Physical Therapy Evaluation    Patient Name:  Jerome Amaro Jr.   MRN:  [id number]    Recommendations:     Discharge Recommendations: Moderate Intensity Therapy   Discharge Equipment Recommendations: none   Barriers to discharge: decreased functional mobility, endurance deficits, increased need for assistance    Assessment:     Jerome Amaro Jr. is a 79 y.o. male admitted with a medical diagnosis of Acute on chronic systolic (congestive) heart failure.  He presents with the following impairments/functional limitations: weakness, impaired endurance, impaired balance, impaired self care skills, impaired functional mobility, gait instability, decreased lower extremity function, decreased safety awareness, impaired cardiopulmonary response to activity .    Pt found sitting EOB with family member in room. Pt agreeable to therapy. Pt transferred sit<>stand with CGA and ambulated 25' in room CGA/RW. Pt returned sitting EOB with all needs in reach.    Rehab Prognosis: Fair; patient would benefit from acute skilled PT services to address these deficits and reach maximum level of function.    Recent Surgery: * No surgery found *      Plan:     During this hospitalization, patient to be seen 5 x/week to address the identified rehab impairments via gait training, therapeutic activities, therapeutic exercises and progress toward the following goals:    Plan of Care Expires:  03/07/25    Subjective     Chief Complaint: none stated  Patient/Family Comments/goals: to return to SNF  Pain/Comfort:  Pain Rating 1: 0/10    Patients cultural, spiritual, [employer]ism conflicts given the current situation: no    Living Environment:  Pt lives alone, daughter checks on him 2x/week. No steps to enter  Prior to admission, patients level of function was Mod I.  Equipment used at home: walker, rolling.  DME owned (not currently used): rolling walker and single point cane.  Upon discharge, patient will have assistance from daughter.    Objective:      Communicated with RN prior to session.  Patient found sitting edge of bed with telemetry  upon PT entry to room.    General Precautions: Standard, fall  Orthopedic Precautions:N/A   Braces: N/A  Respiratory Status: Room air    Exams:  RLE ROM: WFL  RLE Strength: WFL  LLE ROM: WFL  LLE Strength: WFL    Functional Mobility:  Transfers:     Sit to Stand:  contact guard assistance with rolling walker  Gait: 25' with CGA/RW      AM-PAC 6 CLICK MOBILITY  Total Score:16       Treatment & Education:  Pt educated on PT POC.    Patient left sitting edge of bed with all lines intact, call button in reach, and bed alarm on.    GOALS:   Multidisciplinary Problems       Physical Therapy Goals          Problem: Physical Therapy    Goal Priority Disciplines Outcome Interventions   Physical Therapy Goal     PT, PT/OT     Description: Goals to be met by: 3/7/25     Patient will increase functional independence with mobility by performin. Supine to sit with Modified [place]  2. Sit to supine with Modified [place]  3. Sit to stand transfer with Stand-by Assistance  4. Gait  x 100 feet with Contact Guard Assistance using Rolling Walker.                          DME Justifications:  No DME recommended requiring DME justifications    History:     Past Medical History:   Diagnosis Date    Anticoagulant long-term use         Aortic valve disease     pig valve    Arthritis     all over    Atrial fibrillation 2024    Chest pain 07/15/2019    CHF (congestive heart failure)     2014 on meds    Coronary artery disease      cabg    Difficulty swallowing     Heart attack     15 stents    Heart attack 2024    mild    Heart block     Hyperlipidemia     Hypertension     on meds    Hypothyroidism 2015    on meds    PVD (peripheral vascular disease)     Seizures     last episode  on meds       Past Surgical History:   Procedure Laterality Date    AORTOGRAPHY WITH SERIALOGRAPHY N/A 2021     Procedure: AORTOGRAM, WITH RUNOFF;  Surgeon: Rodrigo Willoughby MD;  Location: [facility] CATH/EP LAB;  Service: Cardiology;  Laterality: N/A;    ARTERIOGRAPHY OF AORTIC ROOT N/A 11/05/2019    Procedure: ARTERIOGRAM, AORTIC ROOT;  Surgeon: Rodrigo Willoughby MD;  Location: [facility] CATH/EP LAB;  Service: Cardiology;  Laterality: N/A;    CARDIAC CATHETERIZATION      CARDIAC VALVE SURGERY      CATARACT EXTRACTION W/  INTRAOCULAR LENS IMPLANT Right 12/11/2024    Procedure: CEIOL OD;  Surgeon: Fredrick Sharma MD;  Location: [facility] ASU OR;  Service: Ophthalmology;  Laterality: Right;    CORONARY ANGIOGRAPHY INCLUDING BYPASS GRAFTS WITH CATHETERIZATION OF LEFT HEART N/A 11/05/2019    Procedure: ANGIOGRAM, CORONARY, INCLUDING BYPASS GRAFT, WITH LEFT HEART CATHETERIZATION;  Surgeon: Rodrigo Willoughby MD;  Location: [facility] CATH/EP LAB;  Service: Cardiology;  Laterality: N/A;    CORONARY ANGIOGRAPHY INCLUDING BYPASS GRAFTS WITH CATHETERIZATION OF LEFT HEART Left 11/03/2020    Procedure: ANGIOGRAM, CORONARY, INCLUDING BYPASS GRAFT, WITH LEFT HEART CATHETERIZATION;  Surgeon: Rodrigo Willoughby MD;  Location: [facility] CATH/EP LAB;  Service: Cardiology;  Laterality: Left;    CORONARY ANGIOGRAPHY INCLUDING BYPASS GRAFTS WITH CATHETERIZATION OF LEFT HEART N/A 09/28/2021    Procedure: ANGIOGRAM, CORONARY, INCLUDING BYPASS GRAFT, WITH LEFT HEART CATHETERIZATION;  Surgeon: Rodrigo Willoughby MD;  Location: [facility] CATH/EP LAB;  Service: Cardiology;  Laterality: N/A;    CORONARY ANGIOGRAPHY INCLUDING BYPASS GRAFTS WITH CATHETERIZATION OF LEFT HEART N/A 12/05/2023    Procedure: ANGIOGRAM, CORONARY, INCLUDING BYPASS GRAFT, WITH LEFT HEART CATHETERIZATION;  Surgeon: Rodrigo Willoughby MD;  Location: [facility] CATH/EP LAB;  Service: Cardiology;  Laterality: N/A;    CORONARY ANGIOPLASTY      CORONARY ARTERY BYPASS GRAFT      x 2    1991 2006    ESOPHAGOGASTRODUODENOSCOPY N/A 2/20/2024    Procedure: EGD (ESOPHAGOGASTRODUODENOSCOPY);  Surgeon: Mal Lockhart III, MD;  Location:  [facility] ENDO;  Service: Endoscopy;  Laterality: N/A;    EXTRACORPOREAL SHOCK WAVE LITHOTRIPSY Right 08/01/2019    Procedure: LITHOTRIPSY, ESWL;  Surgeon: Williams Ortega MD;  Location: [facility] OR;  Service: Urology;  Laterality: Right;    HEMORRHOID SURGERY  1990    INSERTION OF PACEMAKER      PERCUTANEOUS TRANSLUMINAL BALLOON ANGIOPLASTY OF CORONARY ARTERY N/A 11/08/2019    Procedure: Angioplasty-coronary;  Surgeon: Rodrigo Willoughby MD;  Location: [facility] CATH/EP LAB;  Service: Cardiology;  Laterality: N/A;    WRIST FRACTURE SURGERY         Time Tracking:     PT Received On: 02/07/25  PT Start Time: 0937     PT Stop Time: 0946  PT Total Time (min): 9 min     Billable Minutes: Evaluation 9 02/07/2025

## 2025-02-07 NOTE — PLAN OF CARE
Problem: Adult Inpatient Plan of Care  Goal: Plan of Care Review  Outcome: Progressing  Goal: Patient-Specific Goal (Individualized)  Outcome: Progressing     Problem: Skin Injury Risk Increased  Goal: Skin Health and Integrity  Outcome: Progressing     Problem: Fall Injury Risk  Goal: Absence of Fall and Fall-Related Injury  Outcome: Progressing     Problem: Oral Intake Inadequate  Goal: Improved Oral Intake  Outcome: Progressing     Problem: Heart Failure  Goal: Stable Heart Rate and Rhythm  Outcome: Progressing  Goal: Optimal Functional Ability  Outcome: Progressing

## 2025-02-07 NOTE — PLAN OF CARE
Secure message has been sent to PT/OT to notify of need.    ADDENDUM  1105  CM sent PT/OT notes via [employer] to [facility].  JOSE E called and spoke with Alysha at [facility].  She states she has already submitted the PT/OT notes to [facility] and she is now awaiting auth.  She says she did request auth yesterday but they were waiting on that.  She said they are currently reviewing the AVS and will call this CM if there is any issue with the AVS and also notify this CM when  auth is received.       02/07/25 0934   Post-Acute Status   Post-Acute Authorization Placement   Post-Acute Placement Status Set-up Complete/Auth obtained   Discharge Delays   (Await PT/OT recs)   Discharge Plan   Discharge Plan A Skilled Nursing Facility   Discharge Plan B Skilled Nursing Facility

## 2025-02-07 NOTE — DISCHARGE INSTRUCTIONS
[facility]  Facility Transfer Orders        Admit to: snf    Diagnoses:   Active Hospital Problems    Diagnosis  POA    *Acute on chronic systolic (congestive) heart failure [I50.23]  Yes    CAD (coronary artery disease) [I25.10]  Unknown     Cardiac cath from 2023 as follows     Left ventricular ejection fraction was calculated to be 45%. with inferior wall hypokinesis    Diastolic dysfunction.    Patent sequential saphenous vein graft to LAD as well as 1st and 2nd marginal divisions of the circumflex with right coronary artery arm of graft occluded         Hx of CABG [Z95.1]  Not Applicable    Frequent hospital admissions [Z78.9]  Yes    Chest pain [R07.9]  Yes    Seizure disorder [G40.909]  Yes    S/P TAVR (transcatheter aortic valve replacement) [Z95.2]  Not Applicable      Resolved Hospital Problems   No resolved problems to display.     Allergies:   Review of patient's allergies indicates:   Allergen Reactions    Atorvastatin Other (See Comments)     Muscle pain    Rosuvastatin Other (See Comments)     Muscle pain       Code Status: full    Vitals: Routine       Diet: cardiac diet    Activity: Activity as tolerated    Nursing Precautions:     Bed/Surface: Low Air Loss    Consults: PT to evaluate and treat- 7 times a week and OT to evaluate and treat- 7 times a week    Oxygen: room air    Dialysis: Patient is not on dialysis.     Labs:                Pending Diagnostic Studies:       None          Imaging: none    Miscellaneous Care:       IV Access:      Medications: Discontinue all previous medication orders, if any. See new list below.  Current Discharge Medication List        CONTINUE these medications which have NOT CHANGED    Details   (Magic mouthwash) 1:1:1 diphenhydrAMINE(Benadryl) 12.5mg/5ml liq, aluminum & magnesium hydroxide-simethicone (Maalox), LIDOcaine viscous 2% Swish and spit 5 mLs every 4 (four) hours as needed (Aphthous ulcer). for mouth sores  Qty: 1 each, Refills: 0       citalopram (CELEXA) 20 MG tablet Take 20 mg by mouth once daily.      clopidogrel (PLAVIX) 75 mg tablet Take 75 mg by mouth once daily.      CORLANOR 5 mg Tab Take 1 tablet by mouth 2 (two) times daily.      ENTRESTO 24-26 mg per tablet Take 1 tablet by mouth 2 (two) times daily. Samples from MD      ezetimibe (ZETIA) 10 mg tablet Take 10 mg by mouth once daily.      famotidine (PEPCID) 20 MG tablet Take 1 tablet (20 mg total) by mouth Daily.  Qty: 30 tablet, Refills: 0      isosorbide mononitrate (IMDUR) 30 MG 24 hr tablet Take 1 tablet (30 mg total) by mouth once daily.  Qty: 30 tablet, Refills: 0      metoprolol succinate (TOPROL-XL) 100 MG 24 hr tablet Take 1 tablet (100 mg total) by mouth once daily.  Qty: 30 tablet, Refills: 0    Comments: .      multivitamin with minerals tablet Take 1 tablet by mouth once daily.      mupirocin (BACTROBAN) 2 % ointment Apply 1 g topically 3 (three) times daily.      NITROLINGUAL 400 mcg/spray spray Place 1 spray under the tongue every 5 (five) minutes as needed for Chest pain.      phenytoin (DILANTIN) 100 MG ER capsule Take 400 mg by mouth once daily.   Refills: 0      UNABLE TO FIND Take 45 mLs by mouth 2 (two) times a day. medication name: PROSTAT LIQUID      furosemide (LASIX) 20 MG tablet Take 20 mg by mouth 2 (two) times daily.           Follow up:    Follow-up Information       Magda Ruiz FNP-C Follow up in 1 week(s).    Specialty: Family Medicine  Contact information:  [address] [facility]  [place] [address]  [phone]               Magda Salinas PA Follow up in 2 week(s).    Specialty: Cardiology  Contact information:  [address]  [facility]  [place] [address]  [phone]                               Immunizations Administered as of 2/7/2025       Name Date Dose VIS Date Route Exp Date    COVID-19, MRNA, LN-S, PF (Pfizer) (Purple Cap) 3/30/2021  8:51 AM 0.3 mL 12/12/2020 Intramuscular 7/31/2021    Site: Left deltoid     Given By: Tiffani Lugo,  FNP-C     : [employer]     Lot: [id number]     COVID-19, MRNA, LN-S, PF (Pfizer) (Purple Cap) 3/9/2021  7:40 AM 0.3 mL 12/12/2020 Intramuscular 6/30/2021    Site: Left deltoid     Given By: Khadijah Delgado, RN     : [employer]     Lot: [id number]             This patient has had both covid vaccinations    Some patients may experience side effects after vaccination.  These may include fever, headache, muscle or joint aches.  Most symptoms resolve with 24-48 hours and do not require urgent medical evaluation unless they persist for more than 72 hours or symptoms are concerning for an unrelated medical condition.          Castillo Mercado, DO

## 2025-02-08 LAB
OHS QRS DURATION: 156 MS
OHS QTC CALCULATION: 534 MS

## 2025-02-08 NOTE — HOSPITAL COURSE
Patient's shortness of breath resolved with Lasix infusion diuresis.  He was seen and evaluated by Cardiology.  No ACS.  Has remained hemodynamically stable.  We will resume oral Lasix daily for a week, then every 48 hours.  Patient will follow up outpatient with the primary care physician, and we will follow up with Cardiology in 1-2 weeks.  Patient will be discharged back to SNF.  Patient was seen and examined on day of discharge, he is okay for discharge at this time.

## 2025-02-17 ENCOUNTER — TELEPHONE (OUTPATIENT)
Dept: FAMILY MEDICINE | Facility: CLINIC | Age: 80
End: 2025-02-17
Payer: MEDICARE

## 2025-02-17 NOTE — TELEPHONE ENCOUNTER
----- Message from Candice sent at 2/17/2025 12:45 PM CST -----  Veronica bill is calling to get a HFU, discharging Wednesday Please call fabio daughter [phone]

## 2025-02-22 DIAGNOSIS — Z00.00 ENCOUNTER FOR MEDICARE ANNUAL WELLNESS EXAM: ICD-10-CM

## 2025-03-05 ENCOUNTER — TELEPHONE (OUTPATIENT)
Dept: FAMILY MEDICINE | Facility: CLINIC | Age: 80
End: 2025-03-05
Payer: MEDICARE

## 2025-03-05 NOTE — TELEPHONE ENCOUNTER
Spoke with Rebeca with . States this is just a FYI. His daughter is suppose to be taking him to  this evening.

## 2025-03-05 NOTE — TELEPHONE ENCOUNTER
----- Message from Mana sent at 3/5/2025 12:23 PM CST -----  Rebeca called from [facility] called. The patient just had the Flu and Pneumonia. He still has a cough and feels weak. He did fall yesterday. Please call Rebeca. # [phone] GH

## 2025-03-06 ENCOUNTER — HOSPITAL ENCOUNTER (INPATIENT)
Facility: HOSPITAL | Age: 80
LOS: 6 days | Discharge: NURSING, REHAB OR LONG-TERM CARE | DRG: 280 | End: 2025-03-12
Attending: EMERGENCY MEDICINE | Admitting: INTERNAL MEDICINE
Payer: MEDICARE

## 2025-03-06 ENCOUNTER — CLINICAL SUPPORT (OUTPATIENT)
Dept: CARDIOLOGY | Facility: HOSPITAL | Age: 80
End: 2025-03-06
Attending: SPECIALIST
Payer: MEDICARE

## 2025-03-06 VITALS — WEIGHT: 166.69 LBS | BODY MASS INDEX: 26.79 KG/M2 | HEIGHT: 66 IN

## 2025-03-06 DIAGNOSIS — J96.01 ACUTE HYPOXIC RESPIRATORY FAILURE: ICD-10-CM

## 2025-03-06 DIAGNOSIS — R20.0 LEFT ARM NUMBNESS: ICD-10-CM

## 2025-03-06 DIAGNOSIS — R07.9 CHEST PAIN: ICD-10-CM

## 2025-03-06 DIAGNOSIS — U07.1 COVID-19: ICD-10-CM

## 2025-03-06 DIAGNOSIS — I21.4 NSTEMI (NON-ST ELEVATED MYOCARDIAL INFARCTION): ICD-10-CM

## 2025-03-06 DIAGNOSIS — I50.9 CHF (CONGESTIVE HEART FAILURE): ICD-10-CM

## 2025-03-06 DIAGNOSIS — Z13.6 SCREENING FOR CARDIOVASCULAR CONDITION: ICD-10-CM

## 2025-03-06 DIAGNOSIS — I50.9 ACUTE ON CHRONIC CONGESTIVE HEART FAILURE, UNSPECIFIED HEART FAILURE TYPE: Primary | ICD-10-CM

## 2025-03-06 PROBLEM — B34.2 CORONAVIRUS INFECTION: Status: ACTIVE | Noted: 2025-03-06

## 2025-03-06 LAB
ADENOVIRUS: NOT DETECTED
ALBUMIN SERPL BCP-MCNC: 4 G/DL (ref 3.5–5.2)
ALLENS TEST: ABNORMAL
ALLENS TEST: ABNORMAL
ALP SERPL-CCNC: 149 U/L (ref 55–135)
ALT SERPL W/O P-5'-P-CCNC: 13 U/L (ref 10–44)
AMMONIA PLAS-SCNC: 15 UMOL/L (ref 10–50)
AMPHET+METHAMPHET UR QL: NEGATIVE
ANION GAP SERPL CALC-SCNC: 11 MMOL/L (ref 8–16)
APTT PPP: 36.2 SEC (ref 21–32)
APTT PPP: 37.1 SEC (ref 21–32)
APTT PPP: 37.1 SEC (ref 21–32)
AST SERPL-CCNC: 22 U/L (ref 10–40)
BARBITURATES UR QL SCN>200 NG/ML: NEGATIVE
BASOPHILS # BLD AUTO: 0.07 K/UL (ref 0–0.2)
BASOPHILS # BLD AUTO: 0.1 K/UL (ref 0–0.2)
BASOPHILS NFR BLD: 0.6 % (ref 0–1.9)
BASOPHILS NFR BLD: 0.7 % (ref 0–1.9)
BENZODIAZ UR QL SCN>200 NG/ML: NEGATIVE
BILIRUB SERPL-MCNC: 1.1 MG/DL (ref 0.1–1)
BNP SERPL-MCNC: 2268 PG/ML (ref 0–99)
BORDETELLA PARAPERTUSSIS (IS1001): NOT DETECTED
BORDETELLA PERTUSSIS (PTXP): NOT DETECTED
BUN SERPL-MCNC: 22 MG/DL (ref 8–23)
BZE UR QL SCN: NEGATIVE
CALCIUM SERPL-MCNC: 8.9 MG/DL (ref 8.7–10.5)
CANNABINOIDS UR QL SCN: NEGATIVE
CHLAMYDIA PNEUMONIAE: NOT DETECTED
CHLORIDE SERPL-SCNC: 94 MMOL/L (ref 95–110)
CHOLEST SERPL-MCNC: 217 MG/DL (ref 120–199)
CHOLEST/HDLC SERPL: 5 {RATIO} (ref 2–5)
CO2 SERPL-SCNC: 25 MMOL/L (ref 23–29)
CORONAVIRUS 229E, COMMON COLD VIRUS: NOT DETECTED
CORONAVIRUS HKU1, COMMON COLD VIRUS: NOT DETECTED
CORONAVIRUS NL63, COMMON COLD VIRUS: NOT DETECTED
CORONAVIRUS OC43, COMMON COLD VIRUS: NOT DETECTED
CREAT SERPL-MCNC: 1.3 MG/DL (ref 0.5–1.4)
CREAT UR-MCNC: 49.6 MG/DL (ref 23–375)
D DIMER PPP IA.FEU-MCNC: 1.53 MG/L FEU (ref 0–0.49)
DELSYS: ABNORMAL
DELSYS: ABNORMAL
DIFFERENTIAL METHOD BLD: ABNORMAL
DIFFERENTIAL METHOD BLD: ABNORMAL
EOSINOPHIL # BLD AUTO: 0.1 K/UL (ref 0–0.5)
EOSINOPHIL # BLD AUTO: 0.1 K/UL (ref 0–0.5)
EOSINOPHIL NFR BLD: 0.4 % (ref 0–8)
EOSINOPHIL NFR BLD: 0.7 % (ref 0–8)
ERYTHROCYTE [DISTWIDTH] IN BLOOD BY AUTOMATED COUNT: 14.6 % (ref 11.5–14.5)
ERYTHROCYTE [DISTWIDTH] IN BLOOD BY AUTOMATED COUNT: 14.8 % (ref 11.5–14.5)
EST. GFR  (NO RACE VARIABLE): 55.5 ML/MIN/1.73 M^2
ESTIMATED AVG GLUCOSE: 100 MG/DL (ref 68–131)
FLOW: 5
FLUBV RNA NPH QL NAA+NON-PROBE: NOT DETECTED
GLUCOSE SERPL-MCNC: 136 MG/DL (ref 70–110)
HBA1C MFR BLD: 5.1 % (ref 4.5–6.2)
HCO3 UR-SCNC: 24.4 MMOL/L (ref 24–28)
HCO3 UR-SCNC: 25.6 MMOL/L (ref 24–28)
HCT VFR BLD AUTO: 30.8 % (ref 40–54)
HCT VFR BLD AUTO: 31.7 % (ref 40–54)
HDLC SERPL-MCNC: 43 MG/DL (ref 40–75)
HDLC SERPL: 19.8 % (ref 20–50)
HGB BLD-MCNC: 10.3 G/DL (ref 14–18)
HGB BLD-MCNC: 10.7 G/DL (ref 14–18)
HPIV1 RNA NPH QL NAA+NON-PROBE: NOT DETECTED
HPIV2 RNA NPH QL NAA+NON-PROBE: NOT DETECTED
HPIV3 RNA NPH QL NAA+NON-PROBE: NOT DETECTED
HPIV4 RNA NPH QL NAA+NON-PROBE: NOT DETECTED
HUMAN METAPNEUMOVIRUS: NOT DETECTED
IMM GRANULOCYTES # BLD AUTO: 0.07 K/UL (ref 0–0.04)
IMM GRANULOCYTES # BLD AUTO: 0.08 K/UL (ref 0–0.04)
IMM GRANULOCYTES NFR BLD AUTO: 0.6 % (ref 0–0.5)
IMM GRANULOCYTES NFR BLD AUTO: 0.6 % (ref 0–0.5)
INFLUENZA A, MOLECULAR: NEGATIVE
INFLUENZA A: NOT DETECTED
INFLUENZA B, MOLECULAR: NEGATIVE
INR PPP: 1.1 (ref 0.8–1.2)
LACTATE SERPL-SCNC: 1.8 MMOL/L (ref 0.5–1.9)
LDH SERPL L TO P-CCNC: 2.44 MMOL/L (ref 0.5–2.2)
LDLC SERPL CALC-MCNC: 151 MG/DL (ref 63–159)
LYMPHOCYTES # BLD AUTO: 1.1 K/UL (ref 1–4.8)
LYMPHOCYTES # BLD AUTO: 2.3 K/UL (ref 1–4.8)
LYMPHOCYTES NFR BLD: 19.9 % (ref 18–48)
LYMPHOCYTES NFR BLD: 7.7 % (ref 18–48)
MAGNESIUM SERPL-MCNC: 1.7 MG/DL (ref 1.6–2.6)
MCH RBC QN AUTO: 29.2 PG (ref 27–31)
MCH RBC QN AUTO: 29.6 PG (ref 27–31)
MCHC RBC AUTO-ENTMCNC: 33.4 G/DL (ref 32–36)
MCHC RBC AUTO-ENTMCNC: 33.8 G/DL (ref 32–36)
MCV RBC AUTO: 87 FL (ref 82–98)
MCV RBC AUTO: 88 FL (ref 82–98)
MODE: ABNORMAL
MONOCYTES # BLD AUTO: 0.5 K/UL (ref 0.3–1)
MONOCYTES # BLD AUTO: 1.7 K/UL (ref 0.3–1)
MONOCYTES NFR BLD: 12.4 % (ref 4–15)
MONOCYTES NFR BLD: 4.3 % (ref 4–15)
MYCOPLASMA PNEUMONIAE: NOT DETECTED
NEUTROPHILS # BLD AUTO: 10.6 K/UL (ref 1.8–7.7)
NEUTROPHILS # BLD AUTO: 8.5 K/UL (ref 1.8–7.7)
NEUTROPHILS NFR BLD: 74.2 % (ref 38–73)
NEUTROPHILS NFR BLD: 77.9 % (ref 38–73)
NONHDLC SERPL-MCNC: 174 MG/DL
NRBC BLD-RTO: 0 /100 WBC
NRBC BLD-RTO: 0 /100 WBC
OHS QRS DURATION: 148 MS
OHS QTC CALCULATION: 539 MS
OPIATES UR QL SCN: NEGATIVE
PCO2 BLDA: 37.3 MMHG (ref 35–45)
PCO2 BLDA: 38.8 MMHG (ref 35–45)
PCP UR QL SCN>25 NG/ML: NEGATIVE
PH SMN: 7.42 [PH] (ref 7.35–7.45)
PH SMN: 7.43 [PH] (ref 7.35–7.45)
PHENYTOIN SERPL-MCNC: 13.9 UG/ML (ref 10–20)
PLATELET # BLD AUTO: 194 K/UL (ref 150–450)
PLATELET # BLD AUTO: 227 K/UL (ref 150–450)
PMV BLD AUTO: 10.5 FL (ref 9.2–12.9)
PMV BLD AUTO: 10.8 FL (ref 9.2–12.9)
PO2 BLDA: 113 MMHG (ref 80–100)
PO2 BLDA: 25 MMHG (ref 40–60)
POC BE: 0 MMOL/L
POC BE: 1 MMOL/L
POC SATURATED O2: 48 % (ref 95–100)
POC SATURATED O2: 99 % (ref 95–100)
POC TCO2: 25 MMOL/L (ref 24–29)
POC TCO2: 27 MMOL/L (ref 23–27)
POTASSIUM SERPL-SCNC: 3.5 MMOL/L (ref 3.5–5.1)
POTASSIUM SERPL-SCNC: 3.9 MMOL/L (ref 3.5–5.1)
PROT SERPL-MCNC: 6.9 G/DL (ref 6–8.4)
PROTHROMBIN TIME: 12.4 SEC (ref 9–12.5)
RBC # BLD AUTO: 3.53 M/UL (ref 4.6–6.2)
RBC # BLD AUTO: 3.61 M/UL (ref 4.6–6.2)
RESPIRATORY INFECTION PANEL SOURCE: NORMAL
RSV RNA NPH QL NAA+NON-PROBE: NOT DETECTED
RV+EV RNA NPH QL NAA+NON-PROBE: NOT DETECTED
SAMPLE: ABNORMAL
SARS-COV-2 RDRP RESP QL NAA+PROBE: NEGATIVE
SARS-COV-2 RNA RESP QL NAA+PROBE: NOT DETECTED
SITE: ABNORMAL
SITE: ABNORMAL
SODIUM SERPL-SCNC: 130 MMOL/L (ref 136–145)
SPECIMEN SOURCE: NORMAL
TOXICOLOGY INFORMATION: NORMAL
TRIGL SERPL-MCNC: 115 MG/DL (ref 30–150)
TROPONIN I SERPL HS-MCNC: 1395.9 PG/ML (ref 0–14.9)
TROPONIN I SERPL HS-MCNC: 2116.7 PG/ML (ref 0–14.9)
TROPONIN I SERPL HS-MCNC: 2327.5 PG/ML (ref 0–14.9)
TROPONIN I SERPL HS-MCNC: 609.9 PG/ML (ref 0–14.9)
WBC # BLD AUTO: 11.47 K/UL (ref 3.9–12.7)
WBC # BLD AUTO: 13.67 K/UL (ref 3.9–12.7)

## 2025-03-06 PROCEDURE — 85730 THROMBOPLASTIN TIME PARTIAL: CPT | Performed by: INTERNAL MEDICINE

## 2025-03-06 PROCEDURE — 36415 COLL VENOUS BLD VENIPUNCTURE: CPT | Performed by: INTERNAL MEDICINE

## 2025-03-06 PROCEDURE — 36415 COLL VENOUS BLD VENIPUNCTURE: CPT

## 2025-03-06 PROCEDURE — 25000242 PHARM REV CODE 250 ALT 637 W/ HCPCS

## 2025-03-06 PROCEDURE — 80053 COMPREHEN METABOLIC PANEL: CPT

## 2025-03-06 PROCEDURE — 25000003 PHARM REV CODE 250

## 2025-03-06 PROCEDURE — 85730 THROMBOPLASTIN TIME PARTIAL: CPT | Mod: 91 | Performed by: STUDENT IN AN ORGANIZED HEALTH CARE EDUCATION/TRAINING PROGRAM

## 2025-03-06 PROCEDURE — 94761 N-INVAS EAR/PLS OXIMETRY MLT: CPT | Mod: XB

## 2025-03-06 PROCEDURE — 99900031 HC PATIENT EDUCATION (STAT)

## 2025-03-06 PROCEDURE — 84484 ASSAY OF TROPONIN QUANT: CPT | Mod: 91 | Performed by: STUDENT IN AN ORGANIZED HEALTH CARE EDUCATION/TRAINING PROGRAM

## 2025-03-06 PROCEDURE — 82803 BLOOD GASES ANY COMBINATION: CPT

## 2025-03-06 PROCEDURE — 82140 ASSAY OF AMMONIA: CPT | Performed by: STUDENT IN AN ORGANIZED HEALTH CARE EDUCATION/TRAINING PROGRAM

## 2025-03-06 PROCEDURE — 94660 CPAP INITIATION&MGMT: CPT

## 2025-03-06 PROCEDURE — 85025 COMPLETE CBC W/AUTO DIFF WBC: CPT | Mod: 91 | Performed by: INTERNAL MEDICINE

## 2025-03-06 PROCEDURE — 99900035 HC TECH TIME PER 15 MIN (STAT)

## 2025-03-06 PROCEDURE — 25000003 PHARM REV CODE 250: Performed by: INTERNAL MEDICINE

## 2025-03-06 PROCEDURE — 63600175 PHARM REV CODE 636 W HCPCS: Mod: JZ

## 2025-03-06 PROCEDURE — 87502 INFLUENZA DNA AMP PROBE: CPT | Performed by: EMERGENCY MEDICINE

## 2025-03-06 PROCEDURE — 84484 ASSAY OF TROPONIN QUANT: CPT

## 2025-03-06 PROCEDURE — 85610 PROTHROMBIN TIME: CPT | Performed by: INTERNAL MEDICINE

## 2025-03-06 PROCEDURE — 94799 UNLISTED PULMONARY SVC/PX: CPT

## 2025-03-06 PROCEDURE — 21000000 HC CCU ICU ROOM CHARGE

## 2025-03-06 PROCEDURE — 83605 ASSAY OF LACTIC ACID: CPT

## 2025-03-06 PROCEDURE — 27000221 HC OXYGEN, UP TO 24 HOURS

## 2025-03-06 PROCEDURE — 93306 TTE W/DOPPLER COMPLETE: CPT

## 2025-03-06 PROCEDURE — 80185 ASSAY OF PHENYTOIN TOTAL: CPT | Performed by: INTERNAL MEDICINE

## 2025-03-06 PROCEDURE — 0202U NFCT DS 22 TRGT SARS-COV-2: CPT

## 2025-03-06 PROCEDURE — 36600 WITHDRAWAL OF ARTERIAL BLOOD: CPT

## 2025-03-06 PROCEDURE — 63600175 PHARM REV CODE 636 W HCPCS: Performed by: INTERNAL MEDICINE

## 2025-03-06 PROCEDURE — 84484 ASSAY OF TROPONIN QUANT: CPT | Mod: 91 | Performed by: INTERNAL MEDICINE

## 2025-03-06 PROCEDURE — 85379 FIBRIN DEGRADATION QUANT: CPT | Performed by: STUDENT IN AN ORGANIZED HEALTH CARE EDUCATION/TRAINING PROGRAM

## 2025-03-06 PROCEDURE — 83735 ASSAY OF MAGNESIUM: CPT | Performed by: INTERNAL MEDICINE

## 2025-03-06 PROCEDURE — 83036 HEMOGLOBIN GLYCOSYLATED A1C: CPT | Performed by: INTERNAL MEDICINE

## 2025-03-06 PROCEDURE — 84132 ASSAY OF SERUM POTASSIUM: CPT | Performed by: STUDENT IN AN ORGANIZED HEALTH CARE EDUCATION/TRAINING PROGRAM

## 2025-03-06 PROCEDURE — 87635 SARS-COV-2 COVID-19 AMP PRB: CPT | Performed by: STUDENT IN AN ORGANIZED HEALTH CARE EDUCATION/TRAINING PROGRAM

## 2025-03-06 PROCEDURE — 12000002 HC ACUTE/MED SURGE SEMI-PRIVATE ROOM

## 2025-03-06 PROCEDURE — 94640 AIRWAY INHALATION TREATMENT: CPT

## 2025-03-06 PROCEDURE — 25500020 PHARM REV CODE 255: Performed by: STUDENT IN AN ORGANIZED HEALTH CARE EDUCATION/TRAINING PROGRAM

## 2025-03-06 PROCEDURE — 99291 CRITICAL CARE FIRST HOUR: CPT

## 2025-03-06 PROCEDURE — 36415 COLL VENOUS BLD VENIPUNCTURE: CPT | Performed by: STUDENT IN AN ORGANIZED HEALTH CARE EDUCATION/TRAINING PROGRAM

## 2025-03-06 PROCEDURE — 63600175 PHARM REV CODE 636 W HCPCS

## 2025-03-06 PROCEDURE — 96374 THER/PROPH/DIAG INJ IV PUSH: CPT

## 2025-03-06 PROCEDURE — 5A09357 ASSISTANCE WITH RESPIRATORY VENTILATION, LESS THAN 24 CONSECUTIVE HOURS, CONTINUOUS POSITIVE AIRWAY PRESSURE: ICD-10-PCS | Performed by: EMERGENCY MEDICINE

## 2025-03-06 PROCEDURE — 63600175 PHARM REV CODE 636 W HCPCS: Performed by: STUDENT IN AN ORGANIZED HEALTH CARE EDUCATION/TRAINING PROGRAM

## 2025-03-06 PROCEDURE — 80307 DRUG TEST PRSMV CHEM ANLYZR: CPT | Performed by: INTERNAL MEDICINE

## 2025-03-06 PROCEDURE — 85025 COMPLETE CBC W/AUTO DIFF WBC: CPT

## 2025-03-06 PROCEDURE — 25000003 PHARM REV CODE 250: Performed by: STUDENT IN AN ORGANIZED HEALTH CARE EDUCATION/TRAINING PROGRAM

## 2025-03-06 PROCEDURE — 87040 BLOOD CULTURE FOR BACTERIA: CPT | Mod: 59

## 2025-03-06 PROCEDURE — 80061 LIPID PANEL: CPT | Performed by: INTERNAL MEDICINE

## 2025-03-06 PROCEDURE — 81003 URINALYSIS AUTO W/O SCOPE: CPT | Mod: 59 | Performed by: INTERNAL MEDICINE

## 2025-03-06 PROCEDURE — 5A0935A ASSISTANCE WITH RESPIRATORY VENTILATION, LESS THAN 24 CONSECUTIVE HOURS, HIGH NASAL FLOW/VELOCITY: ICD-10-PCS | Performed by: EMERGENCY MEDICINE

## 2025-03-06 PROCEDURE — XW033E5 INTRODUCTION OF REMDESIVIR ANTI-INFECTIVE INTO PERIPHERAL VEIN, PERCUTANEOUS APPROACH, NEW TECHNOLOGY GROUP 5: ICD-10-PCS | Performed by: EMERGENCY MEDICINE

## 2025-03-06 PROCEDURE — 83880 ASSAY OF NATRIURETIC PEPTIDE: CPT

## 2025-03-06 RX ORDER — DEXAMETHASONE SODIUM PHOSPHATE 4 MG/ML
6 INJECTION, SOLUTION INTRA-ARTICULAR; INTRALESIONAL; INTRAMUSCULAR; INTRAVENOUS; SOFT TISSUE
Status: DISCONTINUED | OUTPATIENT
Start: 2025-03-06 | End: 2025-03-06

## 2025-03-06 RX ORDER — NAPROXEN SODIUM 220 MG/1
81 TABLET, FILM COATED ORAL DAILY
Status: DISCONTINUED | OUTPATIENT
Start: 2025-03-06 | End: 2025-03-07

## 2025-03-06 RX ORDER — IPRATROPIUM BROMIDE AND ALBUTEROL SULFATE 2.5; .5 MG/3ML; MG/3ML
3 SOLUTION RESPIRATORY (INHALATION)
Status: COMPLETED | OUTPATIENT
Start: 2025-03-06 | End: 2025-03-06

## 2025-03-06 RX ORDER — AMOXICILLIN 250 MG
1 CAPSULE ORAL 2 TIMES DAILY PRN
Status: DISCONTINUED | OUTPATIENT
Start: 2025-03-06 | End: 2025-03-12 | Stop reason: HOSPADM

## 2025-03-06 RX ORDER — IBUPROFEN 200 MG
16 TABLET ORAL
Status: DISCONTINUED | OUTPATIENT
Start: 2025-03-06 | End: 2025-03-12 | Stop reason: HOSPADM

## 2025-03-06 RX ORDER — IBUPROFEN 200 MG
24 TABLET ORAL
Status: DISCONTINUED | OUTPATIENT
Start: 2025-03-06 | End: 2025-03-12 | Stop reason: HOSPADM

## 2025-03-06 RX ORDER — SODIUM CHLORIDE 0.9 % (FLUSH) 0.9 %
10 SYRINGE (ML) INJECTION
Status: DISCONTINUED | OUTPATIENT
Start: 2025-03-06 | End: 2025-03-12 | Stop reason: HOSPADM

## 2025-03-06 RX ORDER — ISOSORBIDE MONONITRATE 30 MG/1
30 TABLET, EXTENDED RELEASE ORAL DAILY
Status: DISCONTINUED | OUTPATIENT
Start: 2025-03-06 | End: 2025-03-12 | Stop reason: HOSPADM

## 2025-03-06 RX ORDER — NALOXONE HCL 0.4 MG/ML
0.02 VIAL (ML) INJECTION
Status: DISCONTINUED | OUTPATIENT
Start: 2025-03-06 | End: 2025-03-12 | Stop reason: HOSPADM

## 2025-03-06 RX ORDER — FAMOTIDINE 20 MG/1
20 TABLET, FILM COATED ORAL DAILY
Status: DISCONTINUED | OUTPATIENT
Start: 2025-03-06 | End: 2025-03-11

## 2025-03-06 RX ORDER — IPRATROPIUM BROMIDE AND ALBUTEROL SULFATE 2.5; .5 MG/3ML; MG/3ML
3 SOLUTION RESPIRATORY (INHALATION) EVERY 6 HOURS PRN
COMMUNITY
Start: 2025-03-05

## 2025-03-06 RX ORDER — SODIUM,POTASSIUM PHOSPHATES 280-250MG
2 POWDER IN PACKET (EA) ORAL
Status: DISCONTINUED | OUTPATIENT
Start: 2025-03-06 | End: 2025-03-12 | Stop reason: HOSPADM

## 2025-03-06 RX ORDER — HYDROXYZINE HYDROCHLORIDE 25 MG/1
12.5-25 TABLET, FILM COATED ORAL NIGHTLY PRN
COMMUNITY
Start: 2025-02-25

## 2025-03-06 RX ORDER — TRIAMCINOLONE ACETONIDE 1 MG/G
1 CREAM TOPICAL 2 TIMES DAILY PRN
COMMUNITY
Start: 2025-02-25

## 2025-03-06 RX ORDER — ONDANSETRON HYDROCHLORIDE 2 MG/ML
4 INJECTION, SOLUTION INTRAVENOUS EVERY 6 HOURS PRN
Status: DISCONTINUED | OUTPATIENT
Start: 2025-03-06 | End: 2025-03-12 | Stop reason: HOSPADM

## 2025-03-06 RX ORDER — MORPHINE SULFATE 2 MG/ML
1 INJECTION, SOLUTION INTRAMUSCULAR; INTRAVENOUS
Status: DISCONTINUED | OUTPATIENT
Start: 2025-03-06 | End: 2025-03-12 | Stop reason: HOSPADM

## 2025-03-06 RX ORDER — GLUCAGON 1 MG
1 KIT INJECTION
Status: DISCONTINUED | OUTPATIENT
Start: 2025-03-06 | End: 2025-03-12 | Stop reason: HOSPADM

## 2025-03-06 RX ORDER — MUPIROCIN 20 MG/G
OINTMENT TOPICAL 2 TIMES DAILY
Status: COMPLETED | OUTPATIENT
Start: 2025-03-06 | End: 2025-03-10

## 2025-03-06 RX ORDER — ACETAMINOPHEN 325 MG/1
650 TABLET ORAL EVERY 4 HOURS PRN
Status: DISCONTINUED | OUTPATIENT
Start: 2025-03-06 | End: 2025-03-12 | Stop reason: HOSPADM

## 2025-03-06 RX ORDER — HEPARIN SODIUM,PORCINE/D5W 25000/250
0-40 INTRAVENOUS SOLUTION INTRAVENOUS CONTINUOUS
Status: DISCONTINUED | OUTPATIENT
Start: 2025-03-06 | End: 2025-03-07

## 2025-03-06 RX ORDER — CITALOPRAM 20 MG/1
20 TABLET, FILM COATED ORAL DAILY
Status: DISCONTINUED | OUTPATIENT
Start: 2025-03-06 | End: 2025-03-12 | Stop reason: HOSPADM

## 2025-03-06 RX ORDER — AMOXICILLIN AND CLAVULANATE POTASSIUM 875; 125 MG/1; MG/1
1 TABLET, FILM COATED ORAL 2 TIMES DAILY
Status: ON HOLD | COMMUNITY
Start: 2025-03-05 | End: 2025-03-12 | Stop reason: HOSPADM

## 2025-03-06 RX ORDER — IPRATROPIUM BROMIDE AND ALBUTEROL SULFATE 2.5; .5 MG/3ML; MG/3ML
3 SOLUTION RESPIRATORY (INHALATION) EVERY 6 HOURS PRN
Status: DISCONTINUED | OUTPATIENT
Start: 2025-03-06 | End: 2025-03-12 | Stop reason: HOSPADM

## 2025-03-06 RX ORDER — DEXAMETHASONE SODIUM PHOSPHATE 10 MG/ML
6 INJECTION INTRAMUSCULAR; INTRAVENOUS EVERY 24 HOURS
Status: DISCONTINUED | OUTPATIENT
Start: 2025-03-06 | End: 2025-03-07

## 2025-03-06 RX ORDER — LANOLIN ALCOHOL/MO/W.PET/CERES
800 CREAM (GRAM) TOPICAL
Status: DISCONTINUED | OUTPATIENT
Start: 2025-03-06 | End: 2025-03-12 | Stop reason: HOSPADM

## 2025-03-06 RX ORDER — EZETIMIBE 10 MG/1
10 TABLET ORAL DAILY
Status: DISCONTINUED | OUTPATIENT
Start: 2025-03-06 | End: 2025-03-12 | Stop reason: HOSPADM

## 2025-03-06 RX ORDER — TALC
9 POWDER (GRAM) TOPICAL NIGHTLY PRN
Status: DISCONTINUED | OUTPATIENT
Start: 2025-03-06 | End: 2025-03-12 | Stop reason: HOSPADM

## 2025-03-06 RX ORDER — CITALOPRAM 40 MG/1
40 TABLET, FILM COATED ORAL DAILY
COMMUNITY
Start: 2025-03-03

## 2025-03-06 RX ORDER — NITROGLYCERIN 0.4 MG/1
0.4 TABLET SUBLINGUAL EVERY 5 MIN PRN
Status: DISCONTINUED | OUTPATIENT
Start: 2025-03-06 | End: 2025-03-12 | Stop reason: HOSPADM

## 2025-03-06 RX ORDER — FUROSEMIDE 10 MG/ML
40 INJECTION INTRAMUSCULAR; INTRAVENOUS
Status: COMPLETED | OUTPATIENT
Start: 2025-03-06 | End: 2025-03-06

## 2025-03-06 RX ORDER — IPRATROPIUM BROMIDE AND ALBUTEROL SULFATE 2.5; .5 MG/3ML; MG/3ML
3 SOLUTION RESPIRATORY (INHALATION) EVERY 6 HOURS PRN
Status: DISCONTINUED | OUTPATIENT
Start: 2025-03-06 | End: 2025-03-06

## 2025-03-06 RX ORDER — FUROSEMIDE 10 MG/ML
40 INJECTION INTRAMUSCULAR; INTRAVENOUS EVERY 12 HOURS
Status: DISCONTINUED | OUTPATIENT
Start: 2025-03-06 | End: 2025-03-09

## 2025-03-06 RX ORDER — DEXAMETHASONE SODIUM PHOSPHATE 10 MG/ML
6 INJECTION INTRAMUSCULAR; INTRAVENOUS EVERY 24 HOURS
Status: DISCONTINUED | OUTPATIENT
Start: 2025-03-07 | End: 2025-03-06

## 2025-03-06 RX ORDER — CLOPIDOGREL BISULFATE 75 MG/1
75 TABLET ORAL DAILY
Status: DISCONTINUED | OUTPATIENT
Start: 2025-03-06 | End: 2025-03-12 | Stop reason: HOSPADM

## 2025-03-06 RX ORDER — PHENYTOIN SODIUM 100 MG/1
400 CAPSULE, EXTENDED RELEASE ORAL DAILY
Status: DISCONTINUED | OUTPATIENT
Start: 2025-03-06 | End: 2025-03-12 | Stop reason: HOSPADM

## 2025-03-06 RX ADMIN — FUROSEMIDE 40 MG: 10 INJECTION, SOLUTION INTRAVENOUS at 10:03

## 2025-03-06 RX ADMIN — DEXAMETHASONE SODIUM PHOSPHATE 6 MG: 10 INJECTION INTRAMUSCULAR; INTRAVENOUS at 04:03

## 2025-03-06 RX ADMIN — IPRATROPIUM BROMIDE AND ALBUTEROL SULFATE 3 ML: .5; 3 SOLUTION RESPIRATORY (INHALATION) at 03:03

## 2025-03-06 RX ADMIN — CLOPIDOGREL BISULFATE 75 MG: 75 TABLET, FILM COATED ORAL at 09:03

## 2025-03-06 RX ADMIN — ACETAMINOPHEN 650 MG: 325 TABLET ORAL at 10:03

## 2025-03-06 RX ADMIN — EZETIMIBE 10 MG: 10 TABLET ORAL at 09:03

## 2025-03-06 RX ADMIN — SACUBITRIL AND VALSARTAN 1 TABLET: 24; 26 TABLET, FILM COATED ORAL at 09:03

## 2025-03-06 RX ADMIN — ASPIRIN 81 MG: 81 TABLET, CHEWABLE ORAL at 10:03

## 2025-03-06 RX ADMIN — CITALOPRAM HYDROBROMIDE 20 MG: 20 TABLET ORAL at 09:03

## 2025-03-06 RX ADMIN — POTASSIUM BICARBONATE 50 MEQ: 978 TABLET, EFFERVESCENT ORAL at 10:03

## 2025-03-06 RX ADMIN — FUROSEMIDE 40 MG: 10 INJECTION, SOLUTION INTRAVENOUS at 04:03

## 2025-03-06 RX ADMIN — MUPIROCIN 1 G: 20 OINTMENT TOPICAL at 10:03

## 2025-03-06 RX ADMIN — REMDESIVIR 200 MG: 100 INJECTION, POWDER, LYOPHILIZED, FOR SOLUTION INTRAVENOUS at 10:03

## 2025-03-06 RX ADMIN — HEPARIN SODIUM 2900 UNITS/KG/HR: 10000 INJECTION, SOLUTION INTRAVENOUS at 10:03

## 2025-03-06 RX ADMIN — FAMOTIDINE 20 MG: 20 TABLET ORAL at 04:03

## 2025-03-06 RX ADMIN — EXTENDED PHENYTOIN SODIUM 400 MG: 100 CAPSULE, EXTENDED RELEASE ORAL at 10:03

## 2025-03-06 RX ADMIN — ISOSORBIDE MONONITRATE 30 MG: 30 TABLET, EXTENDED RELEASE ORAL at 09:03

## 2025-03-06 RX ADMIN — IOHEXOL 100 ML: 350 INJECTION, SOLUTION INTRAVENOUS at 03:03

## 2025-03-06 RX ADMIN — FUROSEMIDE 40 MG: 10 INJECTION, SOLUTION INTRAVENOUS at 09:03

## 2025-03-06 RX ADMIN — MUPIROCIN 1 G: 20 OINTMENT TOPICAL at 09:03

## 2025-03-06 NOTE — ED PROVIDER NOTES
Encounter Date: 3/6/2025       History     Chief Complaint   Patient presents with    Shortness of Breath    Altered Mental Status    COVID-19 Concerns     Patient daughter states that patient tested positive for covid today. They also report that patient has been confused and c/o shortness of breath.     Jerome Amaro Jr. Is a 79 y.o. male with history of coronary artery disease status post CABG and aortic valve dysfunction status post TAVR presenting to emergency department with shortness of breath.  Per daughter symptoms started 4 days prior to arrival.  Was seen in urgent care and diagnosed with COVID and prescribed amoxicillin today.  Patient contacted his daughter approximately 1 hour prior to arrival stating he could not breathe.  Review of symptoms limited secondary to patient's respiratory status.     Review of patient's allergies indicates:   Allergen Reactions    Atorvastatin Other (See Comments)     Muscle pain    Rosuvastatin Other (See Comments)     Muscle pain     Past Medical History:   Diagnosis Date    Anticoagulant long-term use     2014    Aortic valve disease 2014    pig valve    Arthritis     all over    Atrial fibrillation 2/29/2024    Chest pain 07/15/2019    CHF (congestive heart failure)     2014 on meds    Coronary artery disease     2007 cabg    Difficulty swallowing     Heart attack 1990    15 stents    Heart attack 02/01/2024    mild    Heart block     Hyperlipidemia     Hypertension     on meds    Hypothyroidism 2015    on meds    PVD (peripheral vascular disease)     Seizures     last episode 1990 on meds     Past Surgical History:   Procedure Laterality Date    AORTOGRAPHY WITH SERIALOGRAPHY N/A 11/16/2021    Procedure: AORTOGRAM, WITH RUNOFF;  Surgeon: Rodrigo Willoughby MD;  Location: [facility] [place] CATH/EP LAB;  Service: Cardiology;  Laterality: N/A;    ARTERIOGRAPHY OF AORTIC ROOT N/A 11/05/2019    Procedure: ARTERIOGRAM, AORTIC ROOT;  Surgeon: Rodrigo Willoughby MD;  Location: [facility] [place] CATH/EP LAB;   Service: Cardiology;  Laterality: N/A;    CARDIAC CATHETERIZATION      CARDIAC VALVE SURGERY      CATARACT EXTRACTION W/  INTRAOCULAR LENS IMPLANT Right 12/11/2024    Procedure: CEIOL OD;  Surgeon: Fredrick Sharma MD;  Location: [facility] OR;  Service: Ophthalmology;  Laterality: Right;    CORONARY ANGIOGRAPHY INCLUDING BYPASS GRAFTS WITH CATHETERIZATION OF LEFT HEART N/A 11/05/2019    Procedure: ANGIOGRAM, CORONARY, INCLUDING BYPASS GRAFT, WITH LEFT HEART CATHETERIZATION;  Surgeon: Rodrigo Willoughby MD;  Location: [facility] CATH/EP LAB;  Service: Cardiology;  Laterality: N/A;    CORONARY ANGIOGRAPHY INCLUDING BYPASS GRAFTS WITH CATHETERIZATION OF LEFT HEART Left 11/03/2020    Procedure: ANGIOGRAM, CORONARY, INCLUDING BYPASS GRAFT, WITH LEFT HEART CATHETERIZATION;  Surgeon: Rodrigo Willoughby MD;  Location: [facility] CATH/EP LAB;  Service: Cardiology;  Laterality: Left;    CORONARY ANGIOGRAPHY INCLUDING BYPASS GRAFTS WITH CATHETERIZATION OF LEFT HEART N/A 09/28/2021    Procedure: ANGIOGRAM, CORONARY, INCLUDING BYPASS GRAFT, WITH LEFT HEART CATHETERIZATION;  Surgeon: Rodrigo Willoughby MD;  Location: [facility] CATH/EP LAB;  Service: Cardiology;  Laterality: N/A;    CORONARY ANGIOGRAPHY INCLUDING BYPASS GRAFTS WITH CATHETERIZATION OF LEFT HEART N/A 12/05/2023    Procedure: ANGIOGRAM, CORONARY, INCLUDING BYPASS GRAFT, WITH LEFT HEART CATHETERIZATION;  Surgeon: Rodrigo Willoughby MD;  Location: [facility] CATH/EP LAB;  Service: Cardiology;  Laterality: N/A;    CORONARY ANGIOPLASTY      CORONARY ARTERY BYPASS GRAFT      x 2    1991 2006    ESOPHAGOGASTRODUODENOSCOPY N/A 2/20/2024    Procedure: EGD (ESOPHAGOGASTRODUODENOSCOPY);  Surgeon: Mal Lockhart III, MD;  Location: [facility] ENDO;  Service: Endoscopy;  Laterality: N/A;    EXTRACORPOREAL SHOCK WAVE LITHOTRIPSY Right 08/01/2019    Procedure: LITHOTRIPSY, ESWL;  Surgeon: Williams Ortega MD;  Location: [facility] OR;  Service: Urology;  Laterality: Right;    HEMORRHOID SURGERY  1990    INSERTION OF  PACEMAKER      PERCUTANEOUS TRANSLUMINAL BALLOON ANGIOPLASTY OF CORONARY ARTERY N/A 11/08/2019    Procedure: Angioplasty-coronary;  Surgeon: Rodrigo Willoughby MD;  Location: [facility] CATH/EP LAB;  Service: Cardiology;  Laterality: N/A;    WRIST FRACTURE SURGERY       Family History   Problem Relation Name Age of Onset    Heart attack Mother      Heart attack Father      Heart disease Sister 2     Stroke Sister 2     Diabetes Sister 2     No Known Problems Maternal Grandmother      No Known Problems Maternal Grandfather      No Known Problems Paternal Grandmother      No Known Problems Paternal Grandfather      No Known Problems Brother      No Known Problems Maternal Aunt      No Known Problems Maternal Uncle      No Known Problems Paternal Aunt      No Known Problems Paternal Uncle      Anemia Neg Hx      Arrhythmia Neg Hx      Asthma Neg Hx      Clotting disorder Neg Hx      Fainting Neg Hx      Heart failure Neg Hx      Hyperlipidemia Neg Hx      Hypertension Neg Hx      Atrial Septal Defect Neg Hx       Social History[1]  Review of Systems   Unable to perform ROS: Acuity of condition       Physical Exam     Initial Vitals [03/06/25 0252]   BP Pulse Resp Temp SpO2   109/62 102 (!) 24 99.7 °F (37.6 °C) (!) 94 %      MAP       --         Physical Exam    Nursing note and vitals reviewed.  Constitutional: He appears distressed.   HENT:   Head: Normocephalic and atraumatic.   Right Ear: External ear normal.   Left Ear: External ear normal.   Nose: Nose normal. Mouth/Throat: Oropharynx is clear and moist.   Eyes: Conjunctivae are normal.   Neck: No tracheal deviation present.   Cardiovascular:  Normal rate and regular rhythm.           Pulmonary/Chest: Tachypnea noted. He is in respiratory distress. He has decreased breath sounds. He has wheezes. He has no rhonchi. He has rales. He exhibits no tenderness.   Abdominal: Abdomen is soft. He exhibits no distension. There is no abdominal tenderness. There is no rebound and no  guarding.   Musculoskeletal:         General: Edema (Bilateral lower extremities) present. Normal range of motion.     Neurological: GCS score is 15.   Skin: Skin is warm and dry. Capillary refill takes less than 2 seconds.   Psychiatric: He has a normal mood and affect. His behavior is normal. Thought content normal.         ED Course   Critical Care    Date/Time: 3/6/2025 5:46 AM    Performed by: Zabrina Dubose MD  Authorized by: Belkys Herr MD  Total critical care time (exclusive of procedural time) : 30 minutes  Critical care was necessary to treat or prevent imminent or life-threatening deterioration of the following conditions: respiratory failure and sepsis.  Critical care was time spent personally by me on the following activities: discussions with consultants, development of treatment plan with patient or surrogate, examination of patient, evaluation of patient's response to treatment, obtaining history from patient or surrogate, ordering and review of laboratory studies, ordering and review of radiographic studies, pulse oximetry and re-evaluation of patient's condition.        Labs Reviewed   CBC W/ AUTO DIFFERENTIAL - Abnormal       Result Value    WBC 13.67 (*)     RBC 3.61 (*)     Hemoglobin 10.7 (*)     Hematocrit 31.7 (*)     MCV 88      MCH 29.6      MCHC 33.8      RDW 14.6 (*)     Platelets 227      MPV 10.8      Immature Granulocytes 0.6 (*)     Gran # (ANC) 10.6 (*)     Immature Grans (Abs) 0.08 (*)     Lymph # 1.1      Mono # 1.7 (*)     Eos # 0.1      Baso # 0.10      nRBC 0      Gran % 77.9 (*)     Lymph % 7.7 (*)     Mono % 12.4      Eosinophil % 0.7      Basophil % 0.7      Differential Method Automated     COMPREHENSIVE METABOLIC PANEL - Abnormal    Sodium 130 (*)     Potassium 3.9      Chloride 94 (*)     CO2 25      Glucose 136 (*)     BUN 22      Creatinine 1.3      Calcium 8.9      Total Protein 6.9      Albumin 4.0      Total Bilirubin 1.1 (*)     Alkaline Phosphatase 149 (*)      AST 22      ALT 13      eGFR 55.5 (*)     Anion Gap 11     TROPONIN I HIGH SENSITIVITY - Abnormal    Troponin I High Sensitivity 609.9 (*)    B-TYPE NATRIURETIC PEPTIDE - Abnormal    BNP 2,268 (*)    ISTAT LACTATE - Abnormal    POC Lactate 2.44 (*)     Sample VENOUS     ISTAT PROCEDURE - Abnormal    POC PH 7.423      POC PCO2 37.3      POC PO2 25 (*)     POC HCO3 24.4      POC BE 0      POC SATURATED O2 48      POC TCO2 25      Sample VENOUS      Site Other      Allens Test N/A      DelSys Room Air     RESPIRATORY INFECTION PANEL (PCR), NASOPHARYNGEAL    Respiratory Infection Panel Source NP swab      Narrative:     Respiratory Infection Panel source->NP Swab   CULTURE, BLOOD   CULTURE, BLOOD   INFLUENZA A AND B ANTIGEN    Influenza A, Molecular Negative      Influenza B, Molecular Negative      Flu A & B Source Nasal swab      Narrative:     Specimen Source->Nasopharyngeal Swab   LACTIC ACID, PLASMA   POCT LACTATE        ECG Results              EKG 12-lead (In process)        Collection Time Result Time QRS Duration OHS QTC Calculation    03/06/25 02:52:53 03/06/25 05:22:44 148 539                     In process by Interface, Lab In [facility] [place] (03/06/25 05:22:46)                   Narrative:    Test Reason : Z13.6,    Vent. Rate :  99 BPM     Atrial Rate :  99 BPM     P-R Int : 164 ms          QRS Dur : 148 ms      QT Int : 420 ms       P-R-T Axes :  59 -65 123 degrees    QTcB Int : 539 ms    Atrial-sensed ventricular-paced rhythm  Biventricular pacemaker detected  Abnormal ECG  When compared with ECG of 06-Mar-2025 02:51,  No significant change was found    Referred By: ERON DILL           Confirmed By:                                   Imaging Results              X-Ray Chest AP Portable (Final result)  Result time 03/06/25 04:01:41      Final result by Kit Randolph MD (03/06/25 04:01:41)                   Impression:      Stable cardiomegaly with increased bilateral edema.  Small bibasilar pleural  effusions.  Pacer present.      Electronically signed by: Kit Randolph MD  Date:    03/06/2025  Time:    04:01               Narrative:    EXAMINATION:  XR CHEST AP PORTABLE    CLINICAL HISTORY:  Sepsis;    TECHNIQUE:  Single frontal view of the chest was performed.    COMPARISON:  02/04/2025.    FINDINGS:  Pacer leads and sternotomy wires, similar to prior.    Stable cardiomegaly.  Increased bilateral edema.  Small bibasilar pleural effusions.    Heart and lungs otherwise appear unchanged when allowing for differences in technique and positioning.                                       Medications   remdesivir 200 mg in 0.9% NaCl 250 mL infusion (has no administration in time range)     Followed by   remdesivir 100 mg in 0.9% NaCl 250 mL infusion (has no administration in time range)   potassium bicarbonate disintegrating tablet 25 mEq (25 mEq Oral Not Given 3/6/25 7332)   dexAMETHasone injection 6 mg (has no administration in time range)   albuterol-ipratropium 2.5 mg-0.5 mg/3 mL nebulizer solution 3 mL (3 mLs Nebulization Given 3/6/25 4343)   furosemide injection 40 mg (40 mg Intravenous Given 3/6/25 8799)     Medical Decision Making  79 y.o. male presenting to the ED for shortness of breath and altered mental status. Vitals on arrival notable for tachypnea and hypoxia. Physical exam pertinent for acute distress.  Tachypnea with 2-3 word dyspnea.  Lungs with inspiratory and expiratory wheezing and globally decreased breath sounds with diffuse rales.  Abdomen is soft and nontender.  Patient is A&O x3.  Moving all extremities.  No obvious focal deficits.  1+ pitting edema in bilateral lower extremities.  Initial differential includes but is not limited to:  ACS, PE, heart failure exacerbation, COVID pneumonia, ARDS, obstructive lung disease.     Patient given DuoNebs on arrival on arrival with slight increase in breath sounds.  Placed on BiPAP with improvement in respiratory rate and oxygenation.     Amount  and/or Complexity of Data Reviewed  Independent Historian: caregiver     Details: Daughter at bedside providing HPI  External Data Reviewed: labs, radiology, ECG and notes.     Details: Compared per ED course and below  Labs: ordered. Decision-making details documented in ED Course.  Radiology: ordered and independent interpretation performed. Decision-making details documented in ED Course.  ECG/medicine tests: ordered and independent interpretation performed.     Details: Ventricularly paced rhythm, rate of 100.  Left axis deviation.  Prolonged QRS consistent with ventricular pacing.  No Sgarbossa criteria.  QRS complex changes in V1 and V2 when compared to previous in February of this year.  Discussion of management or test interpretation with external provider(s): Discussed HPI, exam and workup findings with hospital medicine    Risk  OTC drugs.  Prescription drug management.  Decision regarding hospitalization.  Risk Details: Patient given Lasix for heart failure exacerbation in the setting of NSTEMI and elevated BNP with hypoxia and fluid overload.  Given patient's concurrent COVID infection concern for ARDS versus other inflammatory process related to infection.  Remdesivir and dexamethasone ordered.  Patient admitted to hospital for further treatment and workup.               ED Course as of 03/06/25 0547   Thu Mar 06, 2025   0328 POC Lactate(!): 2.44 [SM]   0328 WBC(!): 13.67 [SM]   0329 Hemoglobin(!): 10.7  Similar to last several measured, do not believe this is acute or related to patient's presentation. [SM]   0329 Platelet Count: 227 [SM]   0406 Troponin I High Sensitivity(!!): 609.9 [SM]   0406 BNP(!): 2,268  Elevated consistent with known cardiomyopathy exacerbation. [SM]   0406 Influenza A, Molecular: Negative [SM]   0407 Influenza B, Molecular: Negative [SM]   0407 Sodium(!): 130 [SM]   0407 Chloride(!): 94 [SM]   0407 Anion Gap: 11 [SM]   0407 BILIRUBIN TOTAL(!): 1.1  New elevation [SM]   0407  Creatinine: 1.3 []   0407 BUN: 22 [SM]   0408 X-Ray Chest AP Portable  Increased vascular congestion with patchy bilateral opacities consistent with COVID infection and likely heart failure exacerbation.  Pleural effusion bilaterally.  Defibrillator in place. []   0539 ISTAT PROCEDURE(!)  7.42 []      ED Course User Index  [] Zabrina Dubose MD                           Clinical Impression:  Final diagnoses:  [Z13.6] Screening for cardiovascular condition  [U07.1] COVID-19 (Primary)  [J96.01] Acute hypoxic respiratory failure  [I50.9] Acute on chronic congestive heart failure, unspecified heart failure type          ED Disposition Condition    Admit Stable                    [1]   Social History  Tobacco Use    Smoking status: Former     Current packs/day: 0.00     Average packs/day: 0.5 packs/day for 4.0 years (2.0 ttl pk-yrs)     Types: Cigarettes     Start date:      Quit date:      Years since quittin.2    Smokeless tobacco: Never   Substance Use Topics    Alcohol use: Yes     Comment: social    Drug use: No        Zabrina Dubose MD  Resident  [id number]

## 2025-03-06 NOTE — HPI
"Patient is an 79-year-old gentleman with a history of systolic congestive heart failure, CAD/CABG, status post TAVR, status post pacemaker implantation and seizure disorder who was admitted between 2/4-2/7 for CHF exacerbation  Echo performed in January showed an EF of 50-55% but diastolic function could not be determined  Patient underwent cardiac catheterization in 2023 which showed an EF of 45% with inferior wall hypokinesis, diastolic dysfunction, and SVG sequential graft to the LAD; he did not require any interventions during that procedure  Per-details are unclear as the patient is critically ill and thus an expedited evaluation was performed-> it was reported that the patient had had shortness of breath for about 4 days prior to arrival  Patient was reportedly diagnosed with COVID in the outpatient venue but there is no demonstrable result available within our records or in CareEverywhere-> daughter Layla with whom I spoke on the phone was able to text a photograph of the positive result which I reviewed  Here, the patient presented with respiratory distress with tachypnea and was satting 91%; he had a low-grade temperature of 99.7° F and patient was tachycardic between the 90s-100s; he was tachypneic between 22-25 breaths per minute and was hemodynamically stable; sats went to 96% on BiPAP  WBC was 13.67 with a lactate of 2.44; influenza testing was negative  VBG pH was 7.423 with a CO2 of 37.3  CMP showed a creatinine of 1.3 (at baseline) and a sodium of 130  BNP was elevated at 2268 and troponin was 609.9 (on baseline tropes are in the 20s as of February)  EKG showed, per my interpretation, V paced rhythm at 100 beats per minute with a QTC of 536; follow up EKG showed, per my interpretation, a paced at 99 beats per minute with a QTC of 539  Chest x-ray showed, per radiologist Dr. Randolph:  "Stable cardiomegaly with increased bilateral edema.  Small bibasilar pleural effusions.  Pacer present."   The patient " received DuoNebs and 40 mg of IV Lasix in his admitted for further diagnosis, treatment, and care    Upon my assessment, the patient was lethargic on BiPAP (10/5, rate of 12, FiO2 50%); this was subsequently removed    Patient became more alert but pleasantly confused; it was clear that he was non decisional as he was incompletely oriented (oriented to only self and place) and could not meaningfully participate in discussions regarding his healthcare    The patient does not have a formal power-of- nor living spouse and therefore has 2 daughters would be his daughter facto power-of- is (spoke with daughter at bedside as well as daughter Layla on speaker phone; note that daughter at bedside reports to me that she purposely failed to inform her sister, Layla, that the patient was ill in the ER (as they had had a verbal altercation earlier)    Discussed with family that the patient was critically ill from a combination of congestive heart failure, coronavirus infection, and possibly NSTEMI; as the patient can not answer on his own behalf, the 2 daughters then must function as power-of- in concert on his behalf    Thus, It was explained by myself to family at bedside as well as via speaker phone (Layla)  that the next step would be intubation but after careful consideration both daughters agree in concert to not intubate    Furthermore, given his precipitous state, we did discuss what their wishes would be should he terminally deteriorate; we discussed all aspects of coding including chest compressions, shocking (if appropriate), medications, and intubation as well as pressor therapy-> daughter's universally consent to all these aforementioned modalities except they do not want him intubated, should he terminally deteriorate    Thus, patient will be FULL CODE but DNI-> discussed with Dr. Dubose and Dr. Herr

## 2025-03-06 NOTE — SUBJECTIVE & OBJECTIVE
Past Medical History:   Diagnosis Date    Anticoagulant long-term use     2014    Aortic valve disease 2014    pig valve    Arthritis     all over    Atrial fibrillation 2/29/2024    Chest pain 07/15/2019    CHF (congestive heart failure)     2014 on meds    Coronary artery disease     2007 cabg    Difficulty swallowing     Heart attack 1990    15 stents    Heart attack 02/01/2024    mild    Heart block     Hyperlipidemia     Hypertension     on meds    Hypothyroidism 2015    on meds    PVD (peripheral vascular disease)     Seizures     last episode 1990 on meds       Past Surgical History:   Procedure Laterality Date    AORTOGRAPHY WITH SERIALOGRAPHY N/A 11/16/2021    Procedure: AORTOGRAM, WITH RUNOFF;  Surgeon: Rodrigo Willoughby MD;  Location: [facility] CATH/EP LAB;  Service: Cardiology;  Laterality: N/A;    ARTERIOGRAPHY OF AORTIC ROOT N/A 11/05/2019    Procedure: ARTERIOGRAM, AORTIC ROOT;  Surgeon: Rodrigo Willoughby MD;  Location: [facility] CATH/EP LAB;  Service: Cardiology;  Laterality: N/A;    CARDIAC CATHETERIZATION      CARDIAC VALVE SURGERY      CATARACT EXTRACTION W/  INTRAOCULAR LENS IMPLANT Right 12/11/2024    Procedure: CEIOL OD;  Surgeon: Fredrick Sharma MD;  Location: [facility] OR;  Service: Ophthalmology;  Laterality: Right;    CORONARY ANGIOGRAPHY INCLUDING BYPASS GRAFTS WITH CATHETERIZATION OF LEFT HEART N/A 11/05/2019    Procedure: ANGIOGRAM, CORONARY, INCLUDING BYPASS GRAFT, WITH LEFT HEART CATHETERIZATION;  Surgeon: Rodrigo Willoughby MD;  Location: [facility] CATH/EP LAB;  Service: Cardiology;  Laterality: N/A;    CORONARY ANGIOGRAPHY INCLUDING BYPASS GRAFTS WITH CATHETERIZATION OF LEFT HEART Left 11/03/2020    Procedure: ANGIOGRAM, CORONARY, INCLUDING BYPASS GRAFT, WITH LEFT HEART CATHETERIZATION;  Surgeon: Rodrigo Willoughby MD;  Location: [facility] CATH/EP LAB;  Service: Cardiology;  Laterality: Left;    CORONARY ANGIOGRAPHY INCLUDING BYPASS GRAFTS WITH CATHETERIZATION OF LEFT HEART N/A 09/28/2021    Procedure: ANGIOGRAM,  CORONARY, INCLUDING BYPASS GRAFT, WITH LEFT HEART CATHETERIZATION;  Surgeon: Rodrigo Willoughby MD;  Location: [facility] CATH/EP LAB;  Service: Cardiology;  Laterality: N/A;    CORONARY ANGIOGRAPHY INCLUDING BYPASS GRAFTS WITH CATHETERIZATION OF LEFT HEART N/A 12/05/2023    Procedure: ANGIOGRAM, CORONARY, INCLUDING BYPASS GRAFT, WITH LEFT HEART CATHETERIZATION;  Surgeon: Rodrigo Willoughby MD;  Location: [facility] CATH/EP LAB;  Service: Cardiology;  Laterality: N/A;    CORONARY ANGIOPLASTY      CORONARY ARTERY BYPASS GRAFT      x 2    1991 2006    ESOPHAGOGASTRODUODENOSCOPY N/A 2/20/2024    Procedure: EGD (ESOPHAGOGASTRODUODENOSCOPY);  Surgeon: Mal Lockhart III, MD;  Location: [facility] ENDO;  Service: Endoscopy;  Laterality: N/A;    EXTRACORPOREAL SHOCK WAVE LITHOTRIPSY Right 08/01/2019    Procedure: LITHOTRIPSY, ESWL;  Surgeon: Williams Ortega MD;  Location: [facility] OR;  Service: Urology;  Laterality: Right;    HEMORRHOID SURGERY  1990    INSERTION OF PACEMAKER      PERCUTANEOUS TRANSLUMINAL BALLOON ANGIOPLASTY OF CORONARY ARTERY N/A 11/08/2019    Procedure: Angioplasty-coronary;  Surgeon: Rodrigo Willoughby MD;  Location: [facility] CATH/EP LAB;  Service: Cardiology;  Laterality: N/A;    WRIST FRACTURE SURGERY         Review of patient's allergies indicates:   Allergen Reactions    Atorvastatin Other (See Comments)     Muscle pain    Rosuvastatin Other (See Comments)     Muscle pain       Current Facility-Administered Medications on File Prior to Encounter   Medication    magnesium oxide tablet 800 mg    sodium chloride 0.9% flush 10 mL     Current Outpatient Medications on File Prior to Encounter   Medication Sig    citalopram (CELEXA) 20 MG tablet Take 20 mg by mouth once daily.    clopidogrel (PLAVIX) 75 mg tablet Take 75 mg by mouth once daily.    CORLANOR 5 mg Tab Take 1 tablet by mouth 2 (two) times daily.    ENTRESTO 24-26 mg per tablet Take 1 tablet by mouth 2 (two) times daily. Samples from MD    ezetimibe (ZETIA) 10 mg  tablet Take 10 mg by mouth once daily.    famotidine (PEPCID) 20 MG tablet Take 1 tablet (20 mg total) by mouth Daily.    furosemide (LASIX) 20 MG tablet Take 20 mg by mouth 2 (two) times daily.    isosorbide mononitrate (IMDUR) 30 MG 24 hr tablet Take 1 tablet (30 mg total) by mouth once daily.    metoprolol succinate (TOPROL-XL) 100 MG 24 hr tablet Take 1 tablet (100 mg total) by mouth once daily.    multivitamin with minerals tablet Take 1 tablet by mouth once daily.    mupirocin (BACTROBAN) 2 % ointment Apply 1 g topically 3 (three) times daily.    NITROLINGUAL 400 mcg/spray spray Place 1 spray under the tongue every 5 (five) minutes as needed for Chest pain.    phenytoin (DILANTIN) 100 MG ER capsule Take 400 mg by mouth once daily.     UNABLE TO FIND Take 45 mLs by mouth 2 (two) times a day. medication name: PROSTAT LIQUID     Family History       Problem Relation (Age of Onset)    Diabetes Sister    Heart attack Mother, Father    Heart disease Sister    No Known Problems Maternal Grandmother, Maternal Grandfather, Paternal Grandmother, Paternal Grandfather, Brother, Maternal Aunt, Maternal Uncle, Paternal Aunt, Paternal Uncle    Stroke Sister          Tobacco Use    Smoking status: Former     Current packs/day: 0.00     Average packs/day: 0.5 packs/day for 4.0 years (2.0 ttl pk-yrs)     Types: Cigarettes     Start date:      Quit date:      Years since quittin.2    Smokeless tobacco: Never   Substance and Sexual Activity    Alcohol use: Yes     Comment: social    Drug use: No    Sexual activity: Not Currently     Review of Systems   Unable to perform ROS: Acuity of condition     Objective:     Vital Signs (Most Recent):  Temp: 99.7 °F (37.6 °C) (25)  Pulse: 93 (25)  Resp: (!) 21 (25)  BP: 133/63 (25)  SpO2: 98 % (25) Vital Signs (24h Range):  Temp:  [99.7 °F (37.6 °C)] 99.7 °F (37.6 °C)  Pulse:  [] 93  Resp:  [21-25] 21  SpO2:  [94 %-98  %] 98 %  BP: (109-133)/(62-63) 133/63     Weight: 73.9 kg (163 lb)  Body mass index is 26.31 kg/m².     Physical Exam  Constitutional:       General: He is in acute distress (Mild distress).      Appearance: He is ill-appearing. He is not toxic-appearing or diaphoretic.      Comments: Initially lethargic, then becomes more responsive but still evidently confused   HENT:      Head: Normocephalic and atraumatic.   Eyes:      General: No scleral icterus.     Pupils: Pupils are equal, round, and reactive to light.   Neck:      Comments: No JVD/retractions  Cardiovascular:      Rate and Rhythm: Regular rhythm.      Heart sounds: Murmur (Status post TAVR) heard.      No friction rub. No gallop.   Pulmonary:      Effort: Respiratory distress present.      Breath sounds: Normal breath sounds.      Comments: Effort assisted by BiPAP  Abdominal:      General: Bowel sounds are normal. There is no distension.      Palpations: Abdomen is soft. There is no mass.      Tenderness: There is no abdominal tenderness.   Musculoskeletal:      Right lower leg: Edema present.      Left lower leg: Edema present.      Comments: Pitting edema bilateral calves with +2 edema in the thighs bilaterally   Skin:     General: Skin is warm and dry.      Coloration: Skin is not jaundiced.   Neurological:      Comments: Oriented to self and place only.  When asked the date, he answers inappropriately   Psychiatric:      Comments: Cannot formally assess               Significant studies: Reviewed.

## 2025-03-06 NOTE — CARE UPDATE
Patient with CHF, CAD, seizure disorder presented with complaints of shortness of breath and confusion.  Per family patient has intermittent confusion that has been ongoing for a while. Underlying dementia? AAOx2 currently. Became short of breath over the last several days.  Was taken to urgent care prior to admission, diagnosed with COVID and prescribed antibiotics.  COVID PCR negative on admission.  Did have elevated BNP and troponin, symptoms improved after Lasix was given.  Cardiology consulted.  Not convince patient has COVID, no recent sick contacts and patient remains at home.  Symptoms likely secondary to acute decompensated heart failure.  Repeat COVID, if negative can likely discontinue Decadron and remdesivir.  Weaned off of BiPAP, transfer out of the ICU.

## 2025-03-06 NOTE — ASSESSMENT & PLAN NOTE
Head CT unremarkable   Per family patient has been having on and off confusion for the last several months   Questionable dementia   Neurology follow up outpatient

## 2025-03-06 NOTE — ASSESSMENT & PLAN NOTE
Troponins downtrending   Per Cardiology patient's left heart catheterization showed 1 patent SVG graft  Cardiology recommended medical management, heparin drip discontinued   Eliquis restarted   Aspirin discontinued

## 2025-03-06 NOTE — PLAN OF CARE
[facility]  Initial Discharge Assessment    CM spoke with daughter Nilda via phone to complete assessment. Pt lives alone in a SSH with a small threshold step. Pt was independent prior to admission. Does not drive but family takes to appointments and will drive home at discharge. Pt has HH with [facility]. DME is a cane, rollator, sc. No dialysis. Has not seen PCP in some time and daughter notes that pt generally sees cardiologist, Dr Willoughby. Pt will need to be able to care for self at discharge. Daughter does live close by and can assist as able. Was previously at [facility]. Will continue to follow.    Spoke with Candelaria with [facility] to verify pt is current with them and also to notify of admission.     Primary Care Provider: Magda Ruiz FNP-C    Admission Diagnosis: Acute hypoxic respiratory failure [J96.01]  COVID-19 [U07.1]    Admission Date: 3/6/2025  Expected Discharge Date: 3/10/2025    Transition of Care Barriers: None    Payor: BCBS M[place] MEDICARE / Plan: [facility] [employer] [place] / Product Type: Medicare Advantage /     Extended Emergency Contact Information  Primary Emergency Contact: nilda rhodes  Mobile Phone: [phone]  Relation: Daughter   needed? No  Secondary Emergency Contact: schwab,frank  Mobile Phone: [phone]  Relation: Other    Discharge Plan A: Home Health  Discharge Plan B: Skilled Nursing Facility      [facility] [place] [facility] [address]  [address] [place] [address]  Phone: [phone] Fax: [phone]    [id number] - [place] - [address]  [address] [place] [address]  Phone: [phone] Fax: [phone]      Initial Assessment (most recent)       Adult Discharge Assessment - 03/06/25 1419          Discharge Assessment    Assessment Type Discharge Planning Assessment     Confirmed/corrected address, phone number and insurance Yes     Confirmed Demographics Correct on Facesheet     Source  of Information family     If unable to respond/provide information was family/caregiver contacted? Yes     Contact Name/Number hoa rhodes (Daughter)  [phone]     When was your last doctors appointment? --   Past year    Communicated AXEL with patient/caregiver Date not available/Unable to determine     Reason For Admission SOB/AMS     People in Home alone     Facility Arrived From: Home     Do you expect to return to your current living situation? Yes     Do you have help at home or someone to help you manage your care at home? No     Prior to hospitilization cognitive status: Unable to Assess     Current cognitive status: Unable to Assess     Walking or Climbing Stairs Difficulty yes     Walking or Climbing Stairs ambulation difficulty, requires equipment     Mobility Management Rollator     Dressing/Bathing Difficulty yes     Dressing/Bathing bathing difficulty, requires equipment     Dressing/Bathing Management SC     Home Accessibility not wheelchair accessible     Home Layout Other (see comments)   H with small threshold step    Equipment Currently Used at Home cane, straight;rollator;shower chair     Readmission within 30 days? Yes     Patient currently being followed by outpatient case management? No     Do you currently have service(s) that help you manage your care at home? Yes     Name and Contact number of agency [facility]     Is the pt/caregiver preference to resume services with current agency Yes     Do you take prescription medications? Yes     Do you have prescription coverage? Yes     Coverage BCBS Medicare     Do you have any problems affording any of your prescribed medications? No     Is the patient taking medications as prescribed? yes     Who is going to help you get home at discharge? hoa rhodes (Daughter)  [phone]     How do you get to doctors appointments? family or friend will provide     Are you on dialysis? No     Do you take coumadin? No     Discharge Plan A Home  Health     Discharge Plan B Skilled Nursing Facility     DME Needed Upon Discharge  none     Discharge Plan discussed with: Adult children     Transition of Care Barriers None                     Readmission Assessment (most recent)       Readmission Assessment - 03/06/25 1034          Readmission    Was this a planned readmission? No     Why were you hospitalized in the last 30 days? CHF     Why were you readmitted? New medical problem     When you left the hospital where did you go? SNF     Did patient/caregiver refused recommended DC plan? No     Did you try to manage your symptoms your self? No

## 2025-03-06 NOTE — ASSESSMENT & PLAN NOTE
Doubt COVID infection, outpatient probably false-positive  Patient has no recent sick contacts, remains at home mainly   2 COVID test negative here   Symptoms improved with Lasix prior to administration of remdesivir and Decadron   Discontinue Decadron and remdesivir monitor, discussed with family

## 2025-03-06 NOTE — H&P
[facility] Medicine  History & Physical    Patient Name: Jerome Amaro Jr.  MRN: [id number]  Patient Class: IP- Inpatient  Admission Date: 3/6/2025  Attending Physician: Therese Clay MD  Primary Care Provider: Magda Ruiz FNP-C    Patient seen at 5:11 a.m. on 03/06/2025.  History is obtained from the patient/ER physician/records/family  Subjective:     Principal Problem:Acute respiratory failure with hypoxia    Chief Complaint:   Chief Complaint   Patient presents with    Shortness of Breath    Altered Mental Status    COVID-19 Concerns     Patient daughter states that patient tested positive for covid today. They also report that patient has been confused and c/o shortness of breath.      HPI: Patient is an 79-year-old gentleman with a history of systolic/diastolic congestive heart failure, CAD/CABG, status post TAVR, status post pacemaker implantation, and seizure disorder who was admitted between 2/4-2/7 for CHF exacerbation  -Echo performed in January showed an EF of 50-55% but diastolic function could not be determined  -Patient underwent cardiac catheterization in 2023 which showed an EF of 45% with inferior wall hypokinesis, diastolic dysfunction, and SVG sequential graft to the LAD; he did not require any interventions during that procedure  -Details are unclear as the patient is critically ill and thus an expedited evaluation was performed-> it was reported to the ER that the patient had had shortness of breath for about 4 days prior to arrival  -Patient was reportedly diagnosed with COVID in the outpatient venue but there is no demonstrable result available within our records or in CareEverywhere-> daughter Layla with whom I spoke on the phone was able to text a photograph of the positive result to the phone of her sister at bedside, who permitted me to review the result  -ER physician reports that the patient initially presented with respiratory distress with tachypnea and  "was satting 91%; he had a low-grade temperature of 99.7° F and patient was tachycardic between the 90s-100s; -He was tachypneic between 22-25 breaths per minute and was hemodynamically stable; sats went to 96% on BiPAP  -WBC was 13.67 with a lactate of 2.44 (resolved to 1.8); influenza testing was negative  -VBG pH was 7.423 with a CO2 of 37.3  -CMP showed a creatinine of 1.3 (at baseline) and a sodium of 130  -BNP was elevated at 2268 and troponin was 609.9 (baseline troponins are in the 20s as of February)->1395.9  -EKG showed, per my interpretation, V paced rhythm at 100 beats per minute with a QTC of 536; follow up EKG showed, per my interpretation, A paced at 99 beats per minute with a QTC of 539  -Chest x-ray showed, per radiologist Dr. Randolph:    "Stable cardiomegaly with increased bilateral edema.  Small bibasilar pleural effusions.  Pacer present."     -The patient received DuoNebs and 40 mg of IV Lasix and his case was presented to our service for admission for further diagnosis, treatment, and care  -Upon my assessment, the patient was lethargic on BiPAP (10/5, rate of 12, FiO2 50%); this was subsequently removed    It was thought by the ER that the patient was a DNR; patient became more alert (after BiPAP removal) but was pleasantly confused; it was clear that he was nondecisional as he was incompletely oriented (oriented to only self and place) and could not meaningfully participate in discussions regarding his healthcare    Expedited review of the chart revealed that previously the patient had consistently been a full code and he did not have a [place] document on file    The patient does not have a formal power-of- nor living spouse and therefore has 2 daughters would be his de facto power-of-attorneys     Spoke with daughter at bedside (Nilda) as well as daughter Layla on speaker phone; Nilda reports to me that she purposely failed to inform her sister, Layla, that the patient was ill in " the ER (as the 2 ladies had had a verbal altercation earlier)    Given that the patient was critically ill and code status/plan of care needed to be quickly determined, an expedited conversation was undertaken by myself with Nilda while her sister, Layla, was on speaker phone     Discussed with both daughters at the same time that the patient was critically ill from a combination of congestive heart failure, coronavirus infection, and possibly NSTEMI; as the patient cannot answer on his own behalf, the two daughters then must function as the de facto power-of- (in concert) on his behalf    Thus, It was explained by myself to both daughters that the next step (should the patient further decompensate) would be intubation, but after careful consideration, both daughters agree in concert to not intubate    Furthermore, given his precipitous state, we did discuss what their wishes would be should he terminally deteriorate; we discussed all aspects of coding including chest compressions, shocking (if appropriate), medications, and intubation as well as pressor therapy-> daughter's universally consent to all these aforementioned modalities except they do not want him intubated, should he terminally deteriorate    Thus, patient will be FULL CODE but DNI-> discussed with Dr. Dubose (ER resident physician) and Dr. Herr (supervising ER attending physician)    Past Medical History:   Diagnosis Date    Anticoagulant long-term use     2014    Aortic valve disease 2014    pig valve    Arthritis     all over    Atrial fibrillation 2/29/2024    Chest pain 07/15/2019    CHF (congestive heart failure)     2014 on meds    Coronary artery disease     2007 cabg    Difficulty swallowing     Heart attack 1990    15 stents    Heart attack 02/01/2024    mild    Heart block     Hyperlipidemia     Hypertension     on meds    Hypothyroidism 2015    on meds    PVD (peripheral vascular disease)     Seizures     last episode 1990  on meds       Past Surgical History:   Procedure Laterality Date    AORTOGRAPHY WITH SERIALOGRAPHY N/A 11/16/2021    Procedure: AORTOGRAM, WITH RUNOFF;  Surgeon: Rodrigo Willoughby MD;  Location: [facility] CATH/EP LAB;  Service: Cardiology;  Laterality: N/A;    ARTERIOGRAPHY OF AORTIC ROOT N/A 11/05/2019    Procedure: ARTERIOGRAM, AORTIC ROOT;  Surgeon: Rodrigo Willoughby MD;  Location: [facility] CATH/EP LAB;  Service: Cardiology;  Laterality: N/A;    CARDIAC CATHETERIZATION      CARDIAC VALVE SURGERY      CATARACT EXTRACTION W/  INTRAOCULAR LENS IMPLANT Right 12/11/2024    Procedure: CEIOL OD;  Surgeon: Fredrick Sharma MD;  Location: [facility] ASU OR;  Service: Ophthalmology;  Laterality: Right;    CORONARY ANGIOGRAPHY INCLUDING BYPASS GRAFTS WITH CATHETERIZATION OF LEFT HEART N/A 11/05/2019    Procedure: ANGIOGRAM, CORONARY, INCLUDING BYPASS GRAFT, WITH LEFT HEART CATHETERIZATION;  Surgeon: Rodrigo Willoughby MD;  Location: [facility] CATH/EP LAB;  Service: Cardiology;  Laterality: N/A;    CORONARY ANGIOGRAPHY INCLUDING BYPASS GRAFTS WITH CATHETERIZATION OF LEFT HEART Left 11/03/2020    Procedure: ANGIOGRAM, CORONARY, INCLUDING BYPASS GRAFT, WITH LEFT HEART CATHETERIZATION;  Surgeon: Rodrigo Willoughby MD;  Location: [facility] CATH/EP LAB;  Service: Cardiology;  Laterality: Left;    CORONARY ANGIOGRAPHY INCLUDING BYPASS GRAFTS WITH CATHETERIZATION OF LEFT HEART N/A 09/28/2021    Procedure: ANGIOGRAM, CORONARY, INCLUDING BYPASS GRAFT, WITH LEFT HEART CATHETERIZATION;  Surgeon: Rodrigo Willoughby MD;  Location: [facility] CATH/EP LAB;  Service: Cardiology;  Laterality: N/A;    CORONARY ANGIOGRAPHY INCLUDING BYPASS GRAFTS WITH CATHETERIZATION OF LEFT HEART N/A 12/05/2023    Procedure: ANGIOGRAM, CORONARY, INCLUDING BYPASS GRAFT, WITH LEFT HEART CATHETERIZATION;  Surgeon: Rodrigo Willoughby MD;  Location: [facility] CATH/EP LAB;  Service: Cardiology;  Laterality: N/A;    CORONARY ANGIOPLASTY      CORONARY ARTERY BYPASS GRAFT      x 2    1991 2006     ESOPHAGOGASTRODUODENOSCOPY N/A 2/20/2024    Procedure: EGD (ESOPHAGOGASTRODUODENOSCOPY);  Surgeon: Mal Lockhart III, MD;  Location: [facility] ENDO;  Service: Endoscopy;  Laterality: N/A;    EXTRACORPOREAL SHOCK WAVE LITHOTRIPSY Right 08/01/2019    Procedure: LITHOTRIPSY, ESWL;  Surgeon: Williams Ortega MD;  Location: [facility] OR;  Service: Urology;  Laterality: Right;    HEMORRHOID SURGERY  1990    INSERTION OF PACEMAKER      PERCUTANEOUS TRANSLUMINAL BALLOON ANGIOPLASTY OF CORONARY ARTERY N/A 11/08/2019    Procedure: Angioplasty-coronary;  Surgeon: Rodrigo Willoughby MD;  Location: [facility] CATH/EP LAB;  Service: Cardiology;  Laterality: N/A;    WRIST FRACTURE SURGERY         Review of patient's allergies indicates:   Allergen Reactions    Atorvastatin Other (See Comments)     Muscle pain    Rosuvastatin Other (See Comments)     Muscle pain       Current Facility-Administered Medications on File Prior to Encounter   Medication    magnesium oxide tablet 800 mg    sodium chloride 0.9% flush 10 mL     Current Outpatient Medications on File Prior to Encounter   Medication Sig    citalopram (CELEXA) 20 MG tablet Take 20 mg by mouth once daily.    clopidogrel (PLAVIX) 75 mg tablet Take 75 mg by mouth once daily.    CORLANOR 5 mg Tab Take 1 tablet by mouth 2 (two) times daily.    ENTRESTO 24-26 mg per tablet Take 1 tablet by mouth 2 (two) times daily. Samples from MD    ezetimibe (ZETIA) 10 mg tablet Take 10 mg by mouth once daily.    famotidine (PEPCID) 20 MG tablet Take 1 tablet (20 mg total) by mouth Daily.    furosemide (LASIX) 20 MG tablet Take 20 mg by mouth 2 (two) times daily.    isosorbide mononitrate (IMDUR) 30 MG 24 hr tablet Take 1 tablet (30 mg total) by mouth once daily.    metoprolol succinate (TOPROL-XL) 100 MG 24 hr tablet Take 1 tablet (100 mg total) by mouth once daily.    multivitamin with minerals tablet Take 1 tablet by mouth once daily.    mupirocin (BACTROBAN) 2 % ointment Apply 1 g topically 3 (three)  times daily.    NITROLINGUAL 400 mcg/spray spray Place 1 spray under the tongue every 5 (five) minutes as needed for Chest pain.    phenytoin (DILANTIN) 100 MG ER capsule Take 400 mg by mouth once daily.     UNABLE TO FIND Take 45 mLs by mouth 2 (two) times a day. medication name: PROSTAT LIQUID     Family History       Problem Relation (Age of Onset)    Diabetes Sister    Heart attack Mother, Father    Heart disease Sister    No Known Problems Maternal Grandmother, Maternal Grandfather, Paternal Grandmother, Paternal Grandfather, Brother, Maternal Aunt, Maternal Uncle, Paternal Aunt, Paternal Uncle    Stroke Sister          Tobacco Use    Smoking status: Former     Current packs/day: 0.00     Average packs/day: 0.5 packs/day for 4.0 years (2.0 ttl pk-yrs)     Types: Cigarettes     Start date:      Quit date:      Years since quittin.2    Smokeless tobacco: Never   Substance and Sexual Activity    Alcohol use: Yes     Comment: social    Drug use: No          Review of Systems   Unable to perform ROS: Acuity of condition     Objective:     Vital Signs (Most Recent):  Temp: 99.7 °F (37.6 °C) (25 0252)  Pulse: 93 (25 0532)  Resp: (!) 21 (25 0532)  BP: 133/63 (25 0532)  SpO2: 98 % (25 0532) Vital Signs (24h Range):  Temp:  [99.7 °F (37.6 °C)] 99.7 °F (37.6 °C)  Pulse:  [] 93  Resp:  [21-25] 21  SpO2:  [94 %-98 %] 98 %  BP: (109-133)/(62-63) 133/63     Weight: 73.9 kg (163 lb)  Body mass index is 26.31 kg/m².     Physical Exam  Constitutional:       General: He is in acute distress (Mild distress).      Appearance: He is ill-appearing. He is not toxic-appearing or diaphoretic.      Comments: Initially lethargic, then becomes more responsive after BiPAP removal but he is still evidently confused   HENT:      Head: Normocephalic and atraumatic.   Eyes:      General: No scleral icterus.     Pupils: Pupils are equal, round, and reactive to light.   Neck:      Comments: No  JVD/retractions  Cardiovascular:      Rate and Rhythm: Regular rhythm.      Heart sounds: Murmur (Status post TAVR) heard.      No friction rub. No gallop.   Pulmonary:      Effort: Respiratory distress present.      Breath sounds: Normal breath sounds.      Comments: Effort assisted by BiPAP  Abdominal:      General: Bowel sounds are normal. There is no distension.      Palpations: Abdomen is soft. There is no mass.      Tenderness: There is no abdominal tenderness.   Musculoskeletal:      Right lower leg: Edema present.      Left lower leg: Edema present.      Comments: Pitting edema bilateral calves with +2 edema in the thighs bilaterally   Skin:     General: Skin is warm and dry.      Coloration: Skin is not jaundiced.   Neurological:      Comments: Oriented to self and place only.  When asked the date, he answers inappropriately   Psychiatric:      Comments: Cannot formally assess     Significant studies: Reviewed.  Assessment/Plan:     Assessment & Plan      Acute respiratory failure with hypoxia/Acute on chronic combined systolic and diastolic heart failure/Coronavirus infection, in the context of S/P TAVR (transcatheter aortic valve replacement)    -Positive COVID result confirmed with outpatient testing (daughter Layla texted a copy of the result to Nilda's phone, which Nilda permitted me to review  -Most likely, respiratory failure is a combination of combined acute on chronic systolic/diastolic congestive heart failure exacerbation, COVID infection, and NSTEMI (please see below)    -In regards to Coronavirus infection:  -IV Decadron  -IV Remdesevir  -Given hypoxia and degree of initial respiratory distress, did consider CT chest per PE protocol to investigate for pulmonary embolus (as coronavirus imparts a hypercoagulable state); renal failure precludes this study at this time  -As patient is anticoagulated for NSTEMI (please see below), he is already covered for pulmonary embolus, if present    -In  regards to CHF:  -Diurese: Lasix 40 mg IV q.12 hours  -2 g sodium diet/1500 cc fluid restriction diet  -Follow daily weights, strict Is&Os, and clinical exam  -Home Entresto resumed  -Telemetry monitoring  -Cardiology consultation->beta blocker/Corlanor held until assessed by Cardiology    -Patient on BiPAP when I presented to bedside, but became more lethargic, so BiPAP discontinued-> patient became more alert after BiPAP was discontinued and was pleasantly confused  -Post-BiPAP ABG obtained by me showed pH of 7.427, CO2 38.8, O2 113, and bicarb of 25.6-> hold on BiPAP; patient is a FULL CODE/DNI (should he decompensate again)  -Although patient appears to be stabilizing, given his advanced age and concomitant/competing medical illnesses of acute on chronic systolic/diastolic congestive heart failure, coronavirus infection, and NSTEMI, he will be admitted to the ICU for close monitoring, given his high risk of further decompensation    NSTEMI (non-ST elevated myocardial infarction)/CAD (coronary artery disease)/Hx of CABG    -Now that head CT is resulted as negative, will initiate anticoagulation:  -Start aspirin 81 mg daily; home Plavix resumed  -Heparin drip per protocol  -Prn SL nitro/IV Morphine  -Home Imdur/Zetia resumed  -Trend troponins  -Check lipids/hemoglobin A1c  -Telemetry monitoring  -Cardiology consultation    Atrial fibrillation/Pacemaker    -CHADS-VASc score of 3 per records-> heparin drip per protocol  -Beta blocker held due to be stabilized congestive heart failure exacerbation  -Telemetry monitoring  -Cardiology consult     Altered mental status    -Given the need for expedited evaluation, baseline mental status is not precisely known  -?If confusion is due to hypoxia  -Head CT obtained by me (resulted after my assessment) was fortuitously negative  -No hypercapnia noted on ABG  -Expand workup to obtain ammonia level and tox screen  -Panculture    Seizure disorder    -Home phenytoin  resumed  -Check phenytoin level  -Seizure precautions       Therese Clay MD  Department of Hospital Medicine  [facility]

## 2025-03-06 NOTE — NURSING
Echo in progress.     Patient arrives via triage for complaints of numbness/tingling in her bilateral arms and legs onset around 2000 last night. Patient states this started after she took a dose of steroids last night. Patient was prescribed steroids yesterday for an ear infection and laryngitis.   Patient states the numbness has ow spread to her right face onset an hour ago.

## 2025-03-06 NOTE — ED NOTES
"Patient aaox4. Patient stated "and I know I need a urinal." Urinal given to patient. Patient able to use urinal alone without assistance in bed. Denies SOB. Dr. Clay aware.   "

## 2025-03-06 NOTE — ED NOTES
Patient in room tearful after conversation with MD Obed. Patient family remains at bedside with patient.

## 2025-03-06 NOTE — ED NOTES
Patient more confused and not answering nurse at this time. Attempt to get patient to drink potassium. Patient refused to. Attempted to educate patient on the need for potassium. Notifoed Dr. Herr and Dr. Dubose.

## 2025-03-07 LAB
ANION GAP SERPL CALC-SCNC: 12 MMOL/L (ref 8–16)
AORTIC ROOT ANNULUS: 2.9 CM
AORTIC VALVE CUSP SEPERATION: 1.8 CM
APICAL FOUR CHAMBER EJECTION FRACTION: 60 %
APICAL TWO CHAMBER EJECTION FRACTION: 55 %
APTT PPP: 46.1 SEC (ref 21–32)
APTT PPP: 46.6 SEC (ref 21–32)
AV INDEX (PROSTH): 0.98
AV MEAN GRADIENT: 3 MMHG
AV PEAK GRADIENT: 6 MMHG
AV VALVE AREA BY VELOCITY RATIO: 2.6 CM²
AV VALVE AREA: 2.8 CM²
AV VELOCITY RATIO: 0.92
BASOPHILS # BLD AUTO: 0.04 K/UL (ref 0–0.2)
BASOPHILS NFR BLD: 0.5 % (ref 0–1.9)
BILIRUB UR QL STRIP: NEGATIVE
BSA FOR ECHO PROCEDURE: 1.88 M2
BUN SERPL-MCNC: 15 MG/DL (ref 8–23)
CALCIUM SERPL-MCNC: 8.5 MG/DL (ref 8.7–10.5)
CHLORIDE SERPL-SCNC: 95 MMOL/L (ref 95–110)
CLARITY UR: CLEAR
CO2 SERPL-SCNC: 27 MMOL/L (ref 23–29)
COLOR UR: YELLOW
CREAT SERPL-MCNC: 0.9 MG/DL (ref 0.5–1.4)
CV ECHO LV RWT: 0.32 CM
DIFFERENTIAL METHOD BLD: ABNORMAL
DOP CALC AO PEAK VEL: 1.2 M/S
DOP CALC AO VTI: 20.5 CM
DOP CALC LVOT AREA: 2.8 CM2
DOP CALC LVOT DIAMETER: 1.9 CM
DOP CALC LVOT PEAK VEL: 1.1 M/S
DOP CALC LVOT STROKE VOLUME: 56.7 CM3
DOP CALC MV VTI: 42.7 CM
DOP CALCLVOT PEAK VEL VTI: 20 CM
E WAVE DECELERATION TIME: 363 MSEC
E/A RATIO: 0.89
E/E' RATIO: 17 M/S
ECHO LV POSTERIOR WALL: 0.8 CM (ref 0.6–1.1)
EOSINOPHIL # BLD AUTO: 0 K/UL (ref 0–0.5)
EOSINOPHIL NFR BLD: 0.3 % (ref 0–8)
ERYTHROCYTE [DISTWIDTH] IN BLOOD BY AUTOMATED COUNT: 14.7 % (ref 11.5–14.5)
EST. GFR  (NO RACE VARIABLE): >60 ML/MIN/1.73 M^2
FRACTIONAL SHORTENING: 26 % (ref 28–44)
GLUCOSE SERPL-MCNC: 88 MG/DL (ref 70–110)
GLUCOSE UR QL STRIP: NEGATIVE
HCT VFR BLD AUTO: 33.4 % (ref 40–54)
HGB BLD-MCNC: 10.9 G/DL (ref 14–18)
HGB UR QL STRIP: NEGATIVE
IMM GRANULOCYTES # BLD AUTO: 0.03 K/UL (ref 0–0.04)
IMM GRANULOCYTES NFR BLD AUTO: 0.4 % (ref 0–0.5)
INTERVENTRICULAR SEPTUM: 0.9 CM (ref 0.6–1.1)
KETONES UR QL STRIP: ABNORMAL
LEFT ATRIUM AREA SYSTOLIC (APICAL 4 CHAMBER): 20.7 CM2
LEFT ATRIUM SIZE: 5.2 CM
LEFT INTERNAL DIMENSION IN SYSTOLE: 3.7 CM (ref 2.1–4)
LEFT VENTRICLE DIASTOLIC VOLUME INDEX: 64.32 ML/M2
LEFT VENTRICLE DIASTOLIC VOLUME: 119 ML
LEFT VENTRICLE END DIASTOLIC VOLUME APICAL 2 CHAMBER: 118 ML
LEFT VENTRICLE END DIASTOLIC VOLUME APICAL 4 CHAMBER: 125 ML
LEFT VENTRICLE END SYSTOLIC VOLUME APICAL 4 CHAMBER: 56.9 ML
LEFT VENTRICLE MASS INDEX: 79.4 G/M2
LEFT VENTRICLE SYSTOLIC VOLUME INDEX: 31.4 ML/M2
LEFT VENTRICLE SYSTOLIC VOLUME: 58 ML
LEFT VENTRICULAR INTERNAL DIMENSION IN DIASTOLE: 5 CM (ref 3.5–6)
LEFT VENTRICULAR MASS: 146.8 G
LEUKOCYTE ESTERASE UR QL STRIP: NEGATIVE
LV LATERAL E/E' RATIO: 17 M/S
LV SEPTAL E/E' RATIO: 17 M/S
LVED V (TEICH): 119 ML
LVES V (TEICH): 57.8 ML
LVOT MG: 3 MMHG
LVOT MV: 0.72 CM/S
LYMPHOCYTES # BLD AUTO: 1 K/UL (ref 1–4.8)
LYMPHOCYTES NFR BLD: 13.3 % (ref 18–48)
MAGNESIUM SERPL-MCNC: 1.6 MG/DL (ref 1.6–2.6)
MCH RBC QN AUTO: 28.7 PG (ref 27–31)
MCHC RBC AUTO-ENTMCNC: 32.6 G/DL (ref 32–36)
MCV RBC AUTO: 88 FL (ref 82–98)
MONOCYTES # BLD AUTO: 1 K/UL (ref 0.3–1)
MONOCYTES NFR BLD: 13.3 % (ref 4–15)
MR PISA EROA: 0.02 CM2
MV MEAN GRADIENT: 4 MMHG
MV PEAK A VEL: 1.34 M/S
MV PEAK E VEL: 1.19 M/S
MV PEAK GRADIENT: 7 MMHG
MV STENOSIS PRESSURE HALF TIME: 121 MS
MV VALVE AREA BY CONTINUITY EQUATION: 1.33 CM2
MV VALVE AREA P 1/2 METHOD: 1.82 CM2
NEUTROPHILS # BLD AUTO: 5.4 K/UL (ref 1.8–7.7)
NEUTROPHILS NFR BLD: 72.2 % (ref 38–73)
NITRITE UR QL STRIP: NEGATIVE
NRBC BLD-RTO: 0 /100 WBC
OHS CV RV/LV RATIO: 0.5 CM
OHS LV EJECTION FRACTION SIMPSONS BIPLANE MOD: 60 %
PH UR STRIP: 6 [PH] (ref 5–8)
PISA MRMAX VEL: 4.64 M/S
PISA RADIUS: 0.2 CM
PISA TR MAX VEL: 2.5 M/S
PISA VN NYQUIST MS: 0.36 M/S
PISA VN NYQUIST: 0.36 M/S
PLATELET # BLD AUTO: 189 K/UL (ref 150–450)
PMV BLD AUTO: 11.2 FL (ref 9.2–12.9)
POTASSIUM SERPL-SCNC: 3.9 MMOL/L (ref 3.5–5.1)
PROT UR QL STRIP: NEGATIVE
PV MV: 0.74 M/S
PV PEAK GRADIENT: 5 MMHG
PV PEAK VELOCITY: 1.11 M/S
RA PRESSURE ESTIMATED: 8 MMHG
RBC # BLD AUTO: 3.8 M/UL (ref 4.6–6.2)
RIGHT ATRIUM VOLUME AREA LENGTH APICAL 4 CHAMBER: 37.7 ML
RIGHT VENTRICLE DIASTOLIC BASEL DIMENSION: 2.5 CM
RIGHT VENTRICULAR END-DIASTOLIC DIMENSION: 2.52 CM
RV TB RVSP: 11 MMHG
RV TISSUE DOPPLER FREE WALL SYSTOLIC VELOCITY 1 (APICAL 4 CHAMBER VIEW): 12.6 CM/S
SODIUM SERPL-SCNC: 134 MMOL/L (ref 136–145)
SP GR UR STRIP: >1.03 (ref 1–1.03)
TDI LATERAL: 0.07 M/S
TDI SEPTAL: 0.07 M/S
TDI: 0.07 M/S
TR MAX PG: 25 MMHG
TRICUSPID ANNULAR PLANE SYSTOLIC EXCURSION: 1.6 CM
TROPONIN I SERPL HS-MCNC: 1072 PG/ML (ref 0–14.9)
TV REST PULMONARY ARTERY PRESSURE: 33 MMHG
URN SPEC COLLECT METH UR: ABNORMAL
UROBILINOGEN UR STRIP-ACNC: NEGATIVE EU/DL
WBC # BLD AUTO: 7.52 K/UL (ref 3.9–12.7)
Z-SCORE OF LEFT VENTRICULAR DIMENSION IN END DIASTOLE: -0.27
Z-SCORE OF LEFT VENTRICULAR DIMENSION IN END SYSTOLE: 1.22

## 2025-03-07 PROCEDURE — 97535 SELF CARE MNGMENT TRAINING: CPT

## 2025-03-07 PROCEDURE — 27000221 HC OXYGEN, UP TO 24 HOURS

## 2025-03-07 PROCEDURE — 63600175 PHARM REV CODE 636 W HCPCS: Performed by: STUDENT IN AN ORGANIZED HEALTH CARE EDUCATION/TRAINING PROGRAM

## 2025-03-07 PROCEDURE — 85025 COMPLETE CBC W/AUTO DIFF WBC: CPT | Performed by: INTERNAL MEDICINE

## 2025-03-07 PROCEDURE — 36415 COLL VENOUS BLD VENIPUNCTURE: CPT | Performed by: INTERNAL MEDICINE

## 2025-03-07 PROCEDURE — 63600175 PHARM REV CODE 636 W HCPCS: Performed by: INTERNAL MEDICINE

## 2025-03-07 PROCEDURE — 85730 THROMBOPLASTIN TIME PARTIAL: CPT | Mod: 91 | Performed by: STUDENT IN AN ORGANIZED HEALTH CARE EDUCATION/TRAINING PROGRAM

## 2025-03-07 PROCEDURE — 97165 OT EVAL LOW COMPLEX 30 MIN: CPT

## 2025-03-07 PROCEDURE — 25000003 PHARM REV CODE 250: Performed by: INTERNAL MEDICINE

## 2025-03-07 PROCEDURE — 84484 ASSAY OF TROPONIN QUANT: CPT | Performed by: STUDENT IN AN ORGANIZED HEALTH CARE EDUCATION/TRAINING PROGRAM

## 2025-03-07 PROCEDURE — 12000002 HC ACUTE/MED SURGE SEMI-PRIVATE ROOM

## 2025-03-07 PROCEDURE — 99900031 HC PATIENT EDUCATION (STAT)

## 2025-03-07 PROCEDURE — 25000003 PHARM REV CODE 250

## 2025-03-07 PROCEDURE — 21000000 HC CCU ICU ROOM CHARGE

## 2025-03-07 PROCEDURE — 94761 N-INVAS EAR/PLS OXIMETRY MLT: CPT

## 2025-03-07 PROCEDURE — 21400001 HC TELEMETRY ROOM

## 2025-03-07 PROCEDURE — 80048 BASIC METABOLIC PNL TOTAL CA: CPT | Performed by: INTERNAL MEDICINE

## 2025-03-07 PROCEDURE — 83735 ASSAY OF MAGNESIUM: CPT | Performed by: INTERNAL MEDICINE

## 2025-03-07 PROCEDURE — 85730 THROMBOPLASTIN TIME PARTIAL: CPT | Performed by: STUDENT IN AN ORGANIZED HEALTH CARE EDUCATION/TRAINING PROGRAM

## 2025-03-07 PROCEDURE — 99900035 HC TECH TIME PER 15 MIN (STAT)

## 2025-03-07 PROCEDURE — 36415 COLL VENOUS BLD VENIPUNCTURE: CPT | Performed by: STUDENT IN AN ORGANIZED HEALTH CARE EDUCATION/TRAINING PROGRAM

## 2025-03-07 PROCEDURE — 63600175 PHARM REV CODE 636 W HCPCS: Mod: JZ

## 2025-03-07 PROCEDURE — 25000003 PHARM REV CODE 250: Performed by: STUDENT IN AN ORGANIZED HEALTH CARE EDUCATION/TRAINING PROGRAM

## 2025-03-07 RX ORDER — METOPROLOL SUCCINATE 50 MG/1
100 TABLET, EXTENDED RELEASE ORAL 2 TIMES DAILY
Status: DISCONTINUED | OUTPATIENT
Start: 2025-03-07 | End: 2025-03-11

## 2025-03-07 RX ORDER — MILRINONE LACTATE 0.2 MG/ML
0.5 INJECTION, SOLUTION INTRAVENOUS CONTINUOUS
Status: DISCONTINUED | OUTPATIENT
Start: 2025-03-07 | End: 2025-03-09

## 2025-03-07 RX ORDER — HYDROCODONE BITARTRATE AND ACETAMINOPHEN 5; 325 MG/1; MG/1
1 TABLET ORAL EVERY 6 HOURS PRN
Refills: 0 | Status: DISCONTINUED | OUTPATIENT
Start: 2025-03-07 | End: 2025-03-12 | Stop reason: HOSPADM

## 2025-03-07 RX ORDER — HYDROCODONE BITARTRATE AND ACETAMINOPHEN 10; 325 MG/1; MG/1
1 TABLET ORAL EVERY 6 HOURS PRN
Refills: 0 | Status: DISCONTINUED | OUTPATIENT
Start: 2025-03-07 | End: 2025-03-12 | Stop reason: HOSPADM

## 2025-03-07 RX ADMIN — MUPIROCIN 1 G: 20 OINTMENT TOPICAL at 08:03

## 2025-03-07 RX ADMIN — REMDESIVIR 100 MG: 100 INJECTION, POWDER, LYOPHILIZED, FOR SOLUTION INTRAVENOUS at 09:03

## 2025-03-07 RX ADMIN — FUROSEMIDE 40 MG: 10 INJECTION, SOLUTION INTRAVENOUS at 08:03

## 2025-03-07 RX ADMIN — Medication 800 MG: at 09:03

## 2025-03-07 RX ADMIN — MILRINONE LACTATE 0.5 MCG/KG/MIN: 0.2 INJECTION, SOLUTION INTRAVENOUS at 08:03

## 2025-03-07 RX ADMIN — ASPIRIN 81 MG: 81 TABLET, CHEWABLE ORAL at 08:03

## 2025-03-07 RX ADMIN — HYDROCODONE BITARTRATE AND ACETAMINOPHEN 1 TABLET: 5; 325 TABLET ORAL at 08:03

## 2025-03-07 RX ADMIN — EZETIMIBE 10 MG: 10 TABLET ORAL at 08:03

## 2025-03-07 RX ADMIN — METOPROLOL SUCCINATE 100 MG: 50 TABLET, FILM COATED, EXTENDED RELEASE ORAL at 09:03

## 2025-03-07 RX ADMIN — ONDANSETRON 4 MG: 2 INJECTION INTRAMUSCULAR; INTRAVENOUS at 10:03

## 2025-03-07 RX ADMIN — METOPROLOL SUCCINATE 100 MG: 50 TABLET, FILM COATED, EXTENDED RELEASE ORAL at 10:03

## 2025-03-07 RX ADMIN — MILRINONE LACTATE 0.5 MCG/KG/MIN: 0.2 INJECTION, SOLUTION INTRAVENOUS at 02:03

## 2025-03-07 RX ADMIN — CITALOPRAM HYDROBROMIDE 20 MG: 20 TABLET ORAL at 08:03

## 2025-03-07 RX ADMIN — ISOSORBIDE MONONITRATE 30 MG: 30 TABLET, EXTENDED RELEASE ORAL at 08:03

## 2025-03-07 RX ADMIN — Medication 800 MG: at 05:03

## 2025-03-07 RX ADMIN — FAMOTIDINE 20 MG: 20 TABLET ORAL at 05:03

## 2025-03-07 RX ADMIN — APIXABAN 5 MG: 5 TABLET, FILM COATED ORAL at 08:03

## 2025-03-07 RX ADMIN — CLOPIDOGREL BISULFATE 75 MG: 75 TABLET, FILM COATED ORAL at 08:03

## 2025-03-07 RX ADMIN — SACUBITRIL AND VALSARTAN 1 TABLET: 24; 26 TABLET, FILM COATED ORAL at 08:03

## 2025-03-07 RX ADMIN — SACUBITRIL AND VALSARTAN 1 TABLET: 24; 26 TABLET, FILM COATED ORAL at 10:03

## 2025-03-07 RX ADMIN — DEXAMETHASONE SODIUM PHOSPHATE 6 MG: 10 INJECTION INTRAMUSCULAR; INTRAVENOUS at 08:03

## 2025-03-07 RX ADMIN — APIXABAN 5 MG: 5 TABLET, FILM COATED ORAL at 09:03

## 2025-03-07 RX ADMIN — EXTENDED PHENYTOIN SODIUM 400 MG: 100 CAPSULE, EXTENDED RELEASE ORAL at 09:03

## 2025-03-07 NOTE — SUBJECTIVE & OBJECTIVE
Interval History:  Patient seen and examined this morning, reports improvement in symptoms.  Good urine output.  Heparin drip discontinued by Cardiology.  Troponins downtrending.  Echo pending.    Review of Systems   Constitutional:  Negative for diaphoresis.   Respiratory:  Negative for shortness of breath.    Cardiovascular:  Negative for chest pain and palpitations.   Gastrointestinal:  Negative for abdominal pain.     Objective:     Vital Signs (Most Recent):  Temp: 98.6 °F (37 °C) (03/07/25 0701)  Pulse: 100 (03/07/25 0800)  Resp: 20 (03/07/25 0847)  BP: (!) 144/76 (03/07/25 0800)  SpO2: 96 % (03/07/25 0800) Vital Signs (24h Range):  Temp:  [97.6 °F (36.4 °C)-99.6 °F (37.6 °C)] 98.6 °F (37 °C)  Pulse:  [] 100  Resp:  [13-28] 20  SpO2:  [90 %-99 %] 96 %  BP: (103-151)/(54-76) 144/76     Weight: 75.4 kg (166 lb 3.6 oz)  Body mass index is 26.83 kg/m².    Intake/Output Summary (Last 24 hours) at 3/7/2025 0915  Last data filed at 3/7/2025 0301  Gross per 24 hour   Intake 509.9 ml   Output 1300 ml   Net -790.1 ml         Physical Exam  Constitutional:       General: He is not in acute distress.  Cardiovascular:      Rate and Rhythm: Normal rate. Rhythm irregular.   Pulmonary:      Effort: No respiratory distress.      Breath sounds: No wheezing.   Abdominal:      Palpations: Abdomen is soft.   Musculoskeletal:      Right lower leg: Edema present.      Left lower leg: Edema present.   Neurological:      General: No focal deficit present.      Mental Status: He is alert. Mental status is at baseline.               Significant Labs: All pertinent labs within the past 24 hours have been reviewed.  CBC:   Recent Labs   Lab 03/06/25 0317 03/06/25  0928 03/07/25  0308   WBC 13.67* 11.47 7.52   HGB 10.7* 10.3* 10.9*   HCT 31.7* 30.8* 33.4*    194 189     CMP:   Recent Labs   Lab 03/06/25 0317 03/06/25 2152 03/07/25  0308   *  --  134*   K 3.9 3.5 3.9   CL 94*  --  95   CO2 25  --  27   *  --  88    BUN 22  --  15   CREATININE 1.3  --  0.9   CALCIUM 8.9  --  8.5*   PROT 6.9  --   --    ALBUMIN 4.0  --   --    BILITOT 1.1*  --   --    ALKPHOS 149*  --   --    AST 22  --   --    ALT 13  --   --    ANIONGAP 11  --  12       Significant Imaging: I have reviewed all pertinent imaging results/findings within the past 24 hours.

## 2025-03-07 NOTE — RESPIRATORY THERAPY
03/06/25 1902   Patient Assessment/Suction   Level of Consciousness (AVPU) alert   Respiratory Effort Unlabored;Normal   Expansion/Accessory Muscles/Retractions no use of accessory muscles   Rhythm/Pattern, Respiratory unlabored;pattern regular;depth regular;no shortness of breath reported   Skin Integrity   $ Wound Care Tech Time 15 min   Area Observed Left;Right;Cheek;Behind ear;Upper lip;Nares   Skin Appearance without discoloration   PRE-TX-O2   Device (Oxygen Therapy) high flow nasal cannula   SpO2 (!) 94 %   Pulse Oximetry Type Continuous   $ Pulse Oximetry - Multiple Charge Pulse Oximetry - Multiple   Pulse 86   Resp (!) 22   /71   Education   $ Education 15 min;Oxygen;Other (see comment)

## 2025-03-07 NOTE — CONSULTS
[facility]  Adult Nutrition  Education Short Note      Nutrition Education    Previous education: yes    Diet at home: regular    Written information provided and explained on  Low sodium    diet. Pt states he is very tired and just wants to sleep. States his daughters help with meals at home. Provided pt a handout for daughters to read and explained that he will need to reduce the intake of salt in his diet.     Discussed with: patient    Educational Need? yes    Barriers: none identified    Interventions: Sodium modified diet    Patient and/or family comprehend instructions: Pt states that his daughters help him with his meals at home.     Outcome: Verbalizes understanding     Thanks for the consult!    Hermelinda Brunson RD 03/07/2025 1:22 PM

## 2025-03-07 NOTE — PROGRESS NOTES
[facility]  Progress Note    Patient Name: Jerome Amaro Jr.  MRN: [id number]  Patient Class: IP- Inpatient   Admission Date: 3/6/2025  Length of Stay: 1 days  Attending Physician: Richardson Michaels MD  Primary Care Provider: Magda Ruiz FNP-C        Subjective     Principal Problem:Acute respiratory failure with hypoxia        HPI:  Patient is an 79-year-old gentleman with a history of systolic congestive heart failure, CAD/CABG, status post TAVR, status post pacemaker implantation and seizure disorder who was admitted between 2/4-2/7 for CHF exacerbation  Echo performed in January showed an EF of 50-55% but diastolic function could not be determined  Patient underwent cardiac catheterization in 2023 which showed an EF of 45% with inferior wall hypokinesis, diastolic dysfunction, and SVG sequential graft to the LAD; he did not require any interventions during that procedure  Per-details are unclear as the patient is critically ill and thus an expedited evaluation was performed-> it was reported that the patient had had shortness of breath for about 4 days prior to arrival  Patient was reportedly diagnosed with COVID in the outpatient venue but there is no demonstrable result available within our records or in CareEverywhere-> daughter Layla with whom I spoke on the phone was able to text a photograph of the positive result which I reviewed  Here, the patient presented with respiratory distress with tachypnea and was satting 91%; he had a low-grade temperature of 99.7° F and patient was tachycardic between the 90s-100s; he was tachypneic between 22-25 breaths per minute and was hemodynamically stable; sats went to 96% on BiPAP  WBC was 13.67 with a lactate of 2.44; influenza testing was negative  VBG pH was 7.423 with a CO2 of 37.3  CMP showed a creatinine of 1.3 (at baseline) and a sodium of 130  BNP was elevated at 2268 and troponin was 609.9 (on baseline tropes are in the 20s as of  "February)  EKG showed, per my interpretation, V paced rhythm at 100 beats per minute with a QTC of 536; follow up EKG showed, per my interpretation, a paced at 99 beats per minute with a QTC of 539  Chest x-ray showed, per radiologist Dr. Randolph:  "Stable cardiomegaly with increased bilateral edema.  Small bibasilar pleural effusions.  Pacer present."   The patient received DuoNebs and 40 mg of IV Lasix in his admitted for further diagnosis, treatment, and care    Upon my assessment, the patient was lethargic on BiPAP (10/5, rate of 12, FiO2 50%); this was subsequently removed    Patient became more alert but pleasantly confused; it was clear that he was non decisional as he was incompletely oriented (oriented to only self and place) and could not meaningfully participate in discussions regarding his healthcare    The patient does not have a formal power-of- nor living spouse and therefore has 2 daughters would be his daughter facto power-of- is (spoke with daughter at bedside as well as daughter Layla on speaker phone; note that daughter at bedside reports to me that she purposely failed to inform her sister, Layla, that the patient was ill in the ER (as they had had a verbal altercation earlier)    Discussed with family that the patient was critically ill from a combination of congestive heart failure, coronavirus infection, and possibly NSTEMI; as the patient can not answer on his own behalf, the 2 daughters then must function as power-of- in concert on his behalf    Thus, It was explained by myself to family at bedside as well as via speaker phone (Layla)  that the next step would be intubation but after careful consideration both daughters agree in concert to not intubate    Furthermore, given his precipitous state, we did discuss what their wishes would be should he terminally deteriorate; we discussed all aspects of coding including chest compressions, shocking (if appropriate), " medications, and intubation as well as pressor therapy-> daughter's universally consent to all these aforementioned modalities except they do not want him intubated, should he terminally deteriorate    Thus, patient will be FULL CODE but DNI-> discussed with Dr. Dubose and Dr. Herr    Overview/Hospital Course:  Patient with CHF, CAD, seizure disorder presented with complaints of shortness of breath and confusion.  Per family patient has intermittent confusion that has been ongoing for a while. Underlying dementia? AAOx2 currently. Became short of breath over the last several days.  Was taken to urgent care prior to admission, diagnosed with COVID and prescribed antibiotics.  COVID PCR negative on admission.  Did have elevated BNP and troponin, symptoms improved after Lasix was given.  Cardiology consulted.  Not convince patient has COVID, no recent sick contacts and patient remains at home.  Symptoms likely secondary to acute decompensated heart failure.  Repeat COVID negative, will dc remdesivir and decadron since symptoms improved with diuresis.  Weaned off of BiPAP, transfer out of the ICU. Cardio recommended conservative management.     Interval History:  Patient seen and examined this morning, reports improvement in symptoms.  Good urine output.  Heparin drip discontinued by Cardiology.  Troponins downtrending.  Echo pending.    Review of Systems   Constitutional:  Negative for diaphoresis.   Respiratory:  Negative for shortness of breath.    Cardiovascular:  Negative for chest pain and palpitations.   Gastrointestinal:  Negative for abdominal pain.     Objective:     Vital Signs (Most Recent):  Temp: 98.6 °F (37 °C) (03/07/25 0701)  Pulse: 100 (03/07/25 0800)  Resp: 20 (03/07/25 0847)  BP: (!) 144/76 (03/07/25 0800)  SpO2: 96 % (03/07/25 0800) Vital Signs (24h Range):  Temp:  [97.6 °F (36.4 °C)-99.6 °F (37.6 °C)] 98.6 °F (37 °C)  Pulse:  [] 100  Resp:  [13-28] 20  SpO2:  [90 %-99 %] 96 %  BP:  (103-151)/(54-76) 144/76     Weight: 75.4 kg (166 lb 3.6 oz)  Body mass index is 26.83 kg/m².    Intake/Output Summary (Last 24 hours) at 3/7/2025 0915  Last data filed at 3/7/2025 0301  Gross per 24 hour   Intake 509.9 ml   Output 1300 ml   Net -790.1 ml         Physical Exam  Constitutional:       General: He is not in acute distress.  Cardiovascular:      Rate and Rhythm: Normal rate. Rhythm irregular.   Pulmonary:      Effort: No respiratory distress.      Breath sounds: No wheezing.   Abdominal:      Palpations: Abdomen is soft.   Musculoskeletal:      Right lower leg: Edema present.      Left lower leg: Edema present.   Neurological:      General: No focal deficit present.      Mental Status: He is alert. Mental status is at baseline.               Significant Labs: All pertinent labs within the past 24 hours have been reviewed.  CBC:   Recent Labs   Lab 03/06/25 0317 03/06/25  0928 03/07/25  0308   WBC 13.67* 11.47 7.52   HGB 10.7* 10.3* 10.9*   HCT 31.7* 30.8* 33.4*    194 189     CMP:   Recent Labs   Lab 03/06/25 0317 03/06/25  2152 03/07/25  0308   *  --  134*   K 3.9 3.5 3.9   CL 94*  --  95   CO2 25  --  27   *  --  88   BUN 22  --  15   CREATININE 1.3  --  0.9   CALCIUM 8.9  --  8.5*   PROT 6.9  --   --    ALBUMIN 4.0  --   --    BILITOT 1.1*  --   --    ALKPHOS 149*  --   --    AST 22  --   --    ALT 13  --   --    ANIONGAP 11  --  12       Significant Imaging: I have reviewed all pertinent imaging results/findings within the past 24 hours.      Assessment & Plan  Acute respiratory failure with hypoxia  Likely secondary to decompensated heart failure   Continue IV Lasix   Wean oxygen as tolerated  S/P TAVR (transcatheter aortic valve replacement)  Aware  Pacemaker  noted  NSTEMI (non-ST elevated myocardial infarction)  Troponins downtrending   Per Cardiology patient's left heart catheterization showed 1 patent SVG graft  Cardiology recommended medical management, heparin drip  discontinued   Eliquis restarted   Aspirin discontinued    Seizure disorder  Home phenytoin resumed  Seizure precautions  Atrial fibrillation  Resume Eliquis per Cardiology  Resume beta-blocker      Acute on chronic combined systolic and diastolic heart failure  Continue IV Lasix   repeat echocardiogram pending   Cardiology follow up  Hx of CABG  Aware  Altered mental status  Head CT unremarkable   Per family patient has been having on and off confusion for the last several months   Questionable dementia   Neurology follow up outpatient  CAD (coronary artery disease)  As above  Coronavirus infection  Doubt COVID infection, outpatient probably false-positive  Patient has no recent sick contacts, remains at home mainly   2 COVID test negative here   Symptoms improved with Lasix prior to administration of remdesivir and Decadron   Discontinue Decadron and remdesivir monitor, discussed with family    VTE Risk Mitigation (From admission, onward)           Ordered     apixaban tablet 5 mg  2 times daily         03/07/25 0909     IP VTE HIGH RISK PATIENT  Once         03/06/25 0604                    Discharge Planning   AXEL: 3/10/2025     Code Status: Partial Code   Medical Readiness for Discharge Date:   Discharge Plan A: Home Health            Please place Justification for DME        Richardson Michaels MD  Department of Hospital Medicine   [facility]

## 2025-03-07 NOTE — CARE UPDATE
03/07/25 0800   Patient Assessment/Suction   Level of Consciousness (AVPU) alert   Respiratory Effort Unlabored   Expansion/Accessory Muscles/Retractions no use of accessory muscles   Rhythm/Pattern, Respiratory unlabored;pattern regular;depth regular   Cough Frequency infrequent   Cough Type dry;nonproductive  (No Sputum)   PRE-TX-O2   Device (Oxygen Therapy) nasal cannula with humidification   $ Is the patient on Low Flow Oxygen? Yes   Flow (L/min) (Oxygen Therapy) 2   SpO2 96 %   Pulse Oximetry Type Continuous   $ Pulse Oximetry - Multiple Charge Pulse Oximetry - Multiple   Pulse 100   Resp (!) 25   BP (!) 144/76   Aerosol Therapy   $ Aerosol Therapy Charges PRN treatment not required   Education   $ Education Bronchodilator;Oxygen;15 min

## 2025-03-07 NOTE — CONSULTS
[facility]   Cardiology Note    Consult Requested By: tomi  Reason for Consult: ARF     SUBJECTIVE:     History of Present Illness: The pt is 80 y/o Male pt of  with pmh CAD PAfib HLP PAD S/P PTA/Atherectomy LSFA TAVR CABG twice Seizure disorder Recent hospitalization for  congestive heart failure     This am h/h 10/33 vt 0.9 ytop 2327/2116/1072-trending down  BP stable  HR aflutter 120  Pt denies CP ongoing SOB. Mild LE edema  Pt awake and alert , seems oriented, daughter at bedside.   Review of patient's allergies indicates:   Allergen Reactions    Atorvastatin Other (See Comments)     Muscle pain    Rosuvastatin Other (See Comments)     Muscle pain       Past Medical History:   Diagnosis Date    Anticoagulant long-term use     2014    Aortic valve disease 2014    pig valve    Arthritis     all over    Atrial fibrillation 2/29/2024    Chest pain 07/15/2019    CHF (congestive heart failure)     2014 on meds    Coronary artery disease     2007 cabg    Difficulty swallowing     Heart attack 1990    15 stents    Heart attack 02/01/2024    mild    Heart block     Hyperlipidemia     Hypertension     on meds    Hypothyroidism 2015    on meds    PVD (peripheral vascular disease)     Seizures     last episode 1990 on meds     Past Surgical History:   Procedure Laterality Date    AORTOGRAPHY WITH SERIALOGRAPHY N/A 11/16/2021    Procedure: AORTOGRAM, WITH RUNOFF;  Surgeon: Rodrigo Willoughby MD;  Location: [facility] CATH/EP LAB;  Service: Cardiology;  Laterality: N/A;    ARTERIOGRAPHY OF AORTIC ROOT N/A 11/05/2019    Procedure: ARTERIOGRAM, AORTIC ROOT;  Surgeon: Rodrigo Willoughby MD;  Location: [facility] CATH/EP LAB;  Service: Cardiology;  Laterality: N/A;    CARDIAC CATHETERIZATION      CARDIAC VALVE SURGERY      CATARACT EXTRACTION W/  INTRAOCULAR LENS IMPLANT Right 12/11/2024    Procedure: CEIOL OD;  Surgeon: Fredrick Sharma MD;  Location: [facility] ASU OR;  Service: Ophthalmology;  Laterality: Right;    CORONARY  ANGIOGRAPHY INCLUDING BYPASS GRAFTS WITH CATHETERIZATION OF LEFT HEART N/A 11/05/2019    Procedure: ANGIOGRAM, CORONARY, INCLUDING BYPASS GRAFT, WITH LEFT HEART CATHETERIZATION;  Surgeon: Rodrigo Willoughby MD;  Location: [facility] CATH/EP LAB;  Service: Cardiology;  Laterality: N/A;    CORONARY ANGIOGRAPHY INCLUDING BYPASS GRAFTS WITH CATHETERIZATION OF LEFT HEART Left 11/03/2020    Procedure: ANGIOGRAM, CORONARY, INCLUDING BYPASS GRAFT, WITH LEFT HEART CATHETERIZATION;  Surgeon: Rodrigo Willoughby MD;  Location: [facility] CATH/EP LAB;  Service: Cardiology;  Laterality: Left;    CORONARY ANGIOGRAPHY INCLUDING BYPASS GRAFTS WITH CATHETERIZATION OF LEFT HEART N/A 09/28/2021    Procedure: ANGIOGRAM, CORONARY, INCLUDING BYPASS GRAFT, WITH LEFT HEART CATHETERIZATION;  Surgeon: Rodrigo Willoughby MD;  Location: [facility] CATH/EP LAB;  Service: Cardiology;  Laterality: N/A;    CORONARY ANGIOGRAPHY INCLUDING BYPASS GRAFTS WITH CATHETERIZATION OF LEFT HEART N/A 12/05/2023    Procedure: ANGIOGRAM, CORONARY, INCLUDING BYPASS GRAFT, WITH LEFT HEART CATHETERIZATION;  Surgeon: Rodrigo Willoughby MD;  Location: [facility] CATH/EP LAB;  Service: Cardiology;  Laterality: N/A;    CORONARY ANGIOPLASTY      CORONARY ARTERY BYPASS GRAFT      x 2    1991 2006    ESOPHAGOGASTRODUODENOSCOPY N/A 2/20/2024    Procedure: EGD (ESOPHAGOGASTRODUODENOSCOPY);  Surgeon: Mal Lockhart III, MD;  Location: [facility] ENDO;  Service: Endoscopy;  Laterality: N/A;    EXTRACORPOREAL SHOCK WAVE LITHOTRIPSY Right 08/01/2019    Procedure: LITHOTRIPSY, ESWL;  Surgeon: Williams Ortega MD;  Location: [facility] OR;  Service: Urology;  Laterality: Right;    HEMORRHOID SURGERY  1990    INSERTION OF PACEMAKER      PERCUTANEOUS TRANSLUMINAL BALLOON ANGIOPLASTY OF CORONARY ARTERY N/A 11/08/2019    Procedure: Angioplasty-coronary;  Surgeon: Rodrigo Willoughby MD;  Location: [facility] CATH/EP LAB;  Service: Cardiology;  Laterality: N/A;    WRIST FRACTURE SURGERY       Family History   Problem Relation Name  Age of Onset    Heart attack Mother      Heart attack Father      Heart disease Sister 2     Stroke Sister 2     Diabetes Sister 2     No Known Problems Maternal Grandmother      No Known Problems Maternal Grandfather      No Known Problems Paternal Grandmother      No Known Problems Paternal Grandfather      No Known Problems Brother      No Known Problems Maternal Aunt      No Known Problems Maternal Uncle      No Known Problems Paternal Aunt      No Known Problems Paternal Uncle      Anemia Neg Hx      Arrhythmia Neg Hx      Asthma Neg Hx      Clotting disorder Neg Hx      Fainting Neg Hx      Heart failure Neg Hx      Hyperlipidemia Neg Hx      Hypertension Neg Hx      Atrial Septal Defect Neg Hx       Social History[1]    Review of Systems:  Review of Systems   Constitutional:  Negative for chills and fever.   Respiratory:  Positive for cough and shortness of breath.    Cardiovascular:  Positive for chest pain, leg swelling and PND.   Gastrointestinal:  Negative for abdominal pain, heartburn, nausea and vomiting.       OBJECTIVE:     Vital Signs (Most Recent)  Temp: 98.6 °F (37 °C) (03/07/25 0701)  Pulse: 100 (03/07/25 0800)  Resp: (!) 25 (03/07/25 0800)  BP: (!) 144/76 (03/07/25 0800)  SpO2: 96 % (03/07/25 0800)    Vital Signs Range (Last 24H):  Temp:  [97.6 °F (36.4 °C)-99.6 °F (37.6 °C)]   Pulse:  []   Resp:  [13-28]   BP: (103-151)/(54-76)   SpO2:  [90 %-99 %]     I & O (Last 24H):    Intake/Output Summary (Last 24 hours) at 3/7/2025 0828  Last data filed at 3/7/2025 0301  Gross per 24 hour   Intake 509.9 ml   Output 1300 ml   Net -790.1 ml       Current Diet:     Current Diet Order   Procedures    Diet Cardiac Fluid - 1500mL     Fluid restriction::   Fluid - 1500mL        Allergies:  Review of patient's allergies indicates:   Allergen Reactions    Atorvastatin Other (See Comments)     Muscle pain    Rosuvastatin Other (See Comments)     Muscle pain       Meds:  Scheduled Meds:   aspirin  81 mg Oral  Daily    citalopram  20 mg Oral Daily    clopidogreL  75 mg Oral Daily    dexAMETHasone injection  6 mg Intravenous Daily    ezetimibe  10 mg Oral Daily    famotidine  20 mg Oral Daily    furosemide (LASIX) injection  40 mg Intravenous Q12H    isosorbide mononitrate  30 mg Oral Daily    mupirocin   Nasal BID    phenytoin  400 mg Oral Daily    remdesivir infusion  100 mg Intravenous Daily    sacubitriL-valsartan  1 tablet Oral BID     Continuous Infusions:   heparin (porcine) in D5W  0-40 Units/kg/hr Intravenous Continuous 10.3 mL/hr at 03/07/25 0000 14 Units/kg/hr at 03/07/25 0000     PRN Meds:  Current Facility-Administered Medications:     acetaminophen, 650 mg, Oral, Q4H PRN    albuterol-ipratropium, 3 mL, Nebulization, Q6H PRN    dextrose 50%, 12.5 g, Intravenous, PRN    dextrose 50%, 25 g, Intravenous, PRN    glucagon (human recombinant), 1 mg, Intramuscular, PRN    glucose, 16 g, Oral, PRN    glucose, 24 g, Oral, PRN    heparin (PORCINE), 54.1 Units/kg, Intravenous, PRN    heparin (PORCINE), 30 Units/kg, Intravenous, PRN    magnesium oxide, 800 mg, Oral, PRN    magnesium oxide, 800 mg, Oral, PRN    melatonin, 9 mg, Oral, Nightly PRN    morphine, 1 mg, Intravenous, Q2H PRN    naloxone, 0.02 mg, Intravenous, PRN    nitroGLYCERIN, 0.4 mg, Sublingual, Q5 Min PRN    ondansetron, 4 mg, Intravenous, Q6H PRN    potassium bicarbonate, 35 mEq, Oral, PRN    potassium bicarbonate, 50 mEq, Oral, PRN    potassium bicarbonate, 60 mEq, Oral, PRN    potassium, sodium phosphates, 2 packet, Oral, PRN    potassium, sodium phosphates, 2 packet, Oral, PRN    potassium, sodium phosphates, 2 packet, Oral, PRN    senna-docusate 8.6-50 mg, 1 tablet, Oral, BID PRN    sodium chloride 0.9%, 10 mL, Intravenous, PRN    Oxygen/Ventilator Data (Last 24H):  (if applicable)            Hemodynamic Parameters (Last 24H):   (if applicable)        Laboratory and Radiology Data:  Recent Results (from the past 24 hours)   Ammonia    Collection Time:  03/06/25  9:28 AM   Result Value Ref Range    Ammonia 15 10 - 50 umol/L   APTT    Collection Time: 03/06/25  9:28 AM   Result Value Ref Range    aPTT 37.1 (H) 21.0 - 32.0 sec   APTT    Collection Time: 03/06/25  9:28 AM   Result Value Ref Range    aPTT 37.1 (H) 21.0 - 32.0 sec   Protime-INR    Collection Time: 03/06/25  9:28 AM   Result Value Ref Range    Prothrombin Time 12.4 9.0 - 12.5 sec    INR 1.1 0.8 - 1.2   CBC auto differential    Collection Time: 03/06/25  9:28 AM   Result Value Ref Range    WBC 11.47 3.90 - 12.70 K/uL    RBC 3.53 (L) 4.60 - 6.20 M/uL    Hemoglobin 10.3 (L) 14.0 - 18.0 g/dL    Hematocrit 30.8 (L) 40.0 - 54.0 %    MCV 87 82 - 98 fL    MCH 29.2 27.0 - 31.0 pg    MCHC 33.4 32.0 - 36.0 g/dL    RDW 14.8 (H) 11.5 - 14.5 %    Platelets 194 150 - 450 K/uL    MPV 10.5 9.2 - 12.9 fL    Immature Granulocytes 0.6 (H) 0.0 - 0.5 %    Gran # (ANC) 8.5 (H) 1.8 - 7.7 K/uL    Immature Grans (Abs) 0.07 (H) 0.00 - 0.04 K/uL    Lymph # 2.3 1.0 - 4.8 K/uL    Mono # 0.5 0.3 - 1.0 K/uL    Eos # 0.1 0.0 - 0.5 K/uL    Baso # 0.07 0.00 - 0.20 K/uL    nRBC 0 0 /100 WBC    Gran % 74.2 (H) 38.0 - 73.0 %    Lymph % 19.9 18.0 - 48.0 %    Mono % 4.3 4.0 - 15.0 %    Eosinophil % 0.4 0.0 - 8.0 %    Basophil % 0.6 0.0 - 1.9 %    Differential Method Automated    Lipid panel    Collection Time: 03/06/25  9:28 AM   Result Value Ref Range    Cholesterol 217 (H) 120 - 199 mg/dL    Triglycerides 115 30 - 150 mg/dL    HDL 43 40 - 75 mg/dL    LDL Cholesterol 151.0 63.0 - 159.0 mg/dL    HDL/Cholesterol Ratio 19.8 (L) 20.0 - 50.0 %    Total Cholesterol/HDL Ratio 5.0 2.0 - 5.0    Non-HDL Cholesterol 174 mg/dL   Phenytoin level, total    Collection Time: 03/06/25  9:28 AM   Result Value Ref Range    Phenytoin Lvl 13.9 10.0 - 20.0 ug/mL   D dimer, quantitative    Collection Time: 03/06/25  9:28 AM   Result Value Ref Range    D-Dimer 1.53 (HH) 0.00 - 0.49 mg/L FEU   COVID-19 Rapid Screening    Collection Time: 03/06/25 10:15 AM   Result  Value Ref Range    SARS-CoV-2 RNA, Amplification, Qual Negative Negative   Troponin I High Sensitivity    Collection Time: 03/06/25 11:32 AM   Result Value Ref Range    Troponin I High Sensitivity 2327.5 () 0.0 - 14.9 pg/mL   Echo    Collection Time: 03/06/25  2:30 PM   Result Value Ref Range    TR Max Bradly 2.5 m/s    Radius 0.20 cm    TDI SEPTAL 0.07 m/s    MV valve area p 1/2 method 1.82 cm2    MV stenosis pressure 1/2 time 121.00 ms    Ao root annulus 2.90 cm    Pulmonary Valve Mean Velocity 0.74 m/s    PV peak gradient 5 mmHg    PV PEAK VELOCITY 1.11 m/s    Triscuspid Valve Regurgitation Peak Gradient 25 mmHg    Left Ventricular Outflow Tract Mean Gradient 3.00 mmHg    Left Ventricular Outflow Tract Mean Velocity 0.72 cm/s    LVOT peak bradly 1.1 m/s    MV VTI 42.7 cm    MV valve area by continuity eq 1.33 cm2    MV peak gradient 7 mmHg    MV mean gradient 4 mmHg    MR PISA EROA 0.02 cm2    Mr max bradly 4.64 m/s    Vn Nyquist 0.36 m/s    CASSIE by Velocity Ratio 2.6 cm²    AV index (prosthetic) 0.98     AV Velocity Ratio 0.92     AV valve area 2.8 cm²    LVOT peak VTI 20.0 cm    Ao VTI 20.5 cm    Ao peak bradly 1.2 m/s    AV peak gradient 6 mmHg    AV mean gradient 3 mmHg    Vn Nyquist MS 0.36 m/s    RA area length vol 37.70 mL    LA size 5.2 cm    RV/LV Ratio 0.50 cm    TAPSE 1.60 cm    MV Peak A Bradly 1.34 m/s    RV S' 12.60 cm/s    E/E' ratio 17 m/s    RV- douglas basal diam 2.5 cm    LV mass 146.8 g    LV LATERAL E/E' RATIO 17.0 m/s    LV SEPTAL E/E' RATIO 17.0 m/s    E wave deceleration time 363 msec    E/A ratio 0.89     TDI LATERAL 0.07 m/s    MV Peak E Bradly 1.19 m/s    LV Mass Index 79.4 g/m2    PW 0.8 0.6 - 1.1 cm    Left Ventricle Relative Wall Thickness 0.32 cm    FS 26.0 (A) 28 - 44 %    LVOT area 2.8 cm2    LVOT diameter 1.9 cm    IVS 0.9 0.6 - 1.1 cm    Left Ventricular End Diastolic Volume by Teichholz Method 118.79 mL    Left Ventricular End Systolic Volume by Teichholz Method 57.75 mL    LV EDV A4C 125.00 mL     LV EDV A2C 118 mL    LV Diastolic Volume Index 64.32 mL/m2    LV ESV A4C 56.90 mL    LV Diastolic Volume 119 mL    LVIDs 3.7 2.1 - 4.0 cm    LV Systolic Volume Index 31.4 mL/m2    LV Systolic Volume 58 mL    LVIDd 5.0 3.5 - 6.0 cm    LVOT stroke volume 56.7 cm3    A4C EF 60 %    A2C EF 55 %    Loco's Biplane MOD Ejection Fraction 60 %    Mean e' 0.07 m/s    ZLVIDS 1.22     ZLVIDD -0.27     LA area A4C 20.70 cm2    RVDD 2.52 cm    AORTIC VALVE CUSP SEPERATION 1.80 cm    BSA 1.88 m2   APTT    Collection Time: 03/06/25  4:37 PM   Result Value Ref Range    aPTT 36.2 (H) 21.0 - 32.0 sec   Troponin I High Sensitivity    Collection Time: 03/06/25  4:37 PM   Result Value Ref Range    Troponin I High Sensitivity 2116.7 (HH) 0.0 - 14.9 pg/mL   Drug screen panel, in-house    Collection Time: 03/06/25  9:14 PM   Result Value Ref Range    Benzodiazepines Negative Negative    Cocaine (Metab.) Negative Negative    Opiate Scrn, Ur Negative Negative    Barbiturate Screen, Ur Negative Negative    Amphetamine Screen, Ur Negative Negative    THC Negative Negative    Phencyclidine Negative Negative    Creatinine, Urine 49.6 23.0 - 375.0 mg/dL    Toxicology Information SEE COMMENT    Potassium    Collection Time: 03/06/25  9:52 PM   Result Value Ref Range    Potassium 3.5 3.5 - 5.1 mmol/L   APTT    Collection Time: 03/07/25 12:38 AM   Result Value Ref Range    aPTT 46.1 (H) 21.0 - 32.0 sec   Magnesium    Collection Time: 03/07/25  3:08 AM   Result Value Ref Range    Magnesium 1.6 1.6 - 2.6 mg/dL   CBC auto differential    Collection Time: 03/07/25  3:08 AM   Result Value Ref Range    WBC 7.52 3.90 - 12.70 K/uL    RBC 3.80 (L) 4.60 - 6.20 M/uL    Hemoglobin 10.9 (L) 14.0 - 18.0 g/dL    Hematocrit 33.4 (L) 40.0 - 54.0 %    MCV 88 82 - 98 fL    MCH 28.7 27.0 - 31.0 pg    MCHC 32.6 32.0 - 36.0 g/dL    RDW 14.7 (H) 11.5 - 14.5 %    Platelets 189 150 - 450 K/uL    MPV 11.2 9.2 - 12.9 fL    Immature Granulocytes 0.4 0.0 - 0.5 %    Gran #  (ANC) 5.4 1.8 - 7.7 K/uL    Immature Grans (Abs) 0.03 0.00 - 0.04 K/uL    Lymph # 1.0 1.0 - 4.8 K/uL    Mono # 1.0 0.3 - 1.0 K/uL    Eos # 0.0 0.0 - 0.5 K/uL    Baso # 0.04 0.00 - 0.20 K/uL    nRBC 0 0 /100 WBC    Gran % 72.2 38.0 - 73.0 %    Lymph % 13.3 (L) 18.0 - 48.0 %    Mono % 13.3 4.0 - 15.0 %    Eosinophil % 0.3 0.0 - 8.0 %    Basophil % 0.5 0.0 - 1.9 %    Differential Method Automated    Basic metabolic panel    Collection Time: 03/07/25  3:08 AM   Result Value Ref Range    Sodium 134 (L) 136 - 145 mmol/L    Potassium 3.9 3.5 - 5.1 mmol/L    Chloride 95 95 - 110 mmol/L    CO2 27 23 - 29 mmol/L    Glucose 88 70 - 110 mg/dL    BUN 15 8 - 23 mg/dL    Creatinine 0.9 0.5 - 1.4 mg/dL    Calcium 8.5 (L) 8.7 - 10.5 mg/dL    Anion Gap 12 8 - 16 mmol/L    eGFR >60.0 >60 mL/min/1.73 m^2   Troponin I High Sensitivity    Collection Time: 03/07/25  3:08 AM   Result Value Ref Range    Troponin I High Sensitivity 1072.0 (HH) 0.0 - 14.9 pg/mL   APTT    Collection Time: 03/07/25  6:38 AM   Result Value Ref Range    aPTT 46.6 (H) 21.0 - 32.0 sec     Imaging Results              CT Head Without Contrast (Final result)  Result time 03/06/25 07:24:59      Final result by Fox Dowd, DO (03/06/25 07:24:59)                   Impression:      1. No acute intracranial abnormality.  2. Chronic and involutional changes of the brain.      Electronically signed by: Fox Dowd  Date:    03/06/2025  Time:    07:24               Narrative:      CMS MANDATED QUALITY DATA - CT RADIATION - 436    All CT scans at this facility utilize dose modulation, iterative reconstruction, and/or weight based dosing when appropriate to reduce radiation dose to as low as reasonably achievable.    EXAMINATION:  CT HEAD WITHOUT CONTRAST    CLINICAL HISTORY:  Altered mental status;    TECHNIQUE:  Head CT without IV contrast.    COMPARISON:  CT brain 01/21/2025.    FINDINGS:  Gray-white differentiation is maintained without hemorrhage, midline shift,  or mass effect.  There are involutional changes of the brain parenchyma with ex vacuo dilatation of the ventricles.  Periventricular and deep white matter hypoattenuation noted consistent with chronic changes of microvascular ischemia.  Calvarium is intact. Visualized sinuses are clear.                                       X-Ray Chest AP Portable (Final result)  Result time 03/06/25 04:01:41      Final result by Kit Randolph MD (03/06/25 04:01:41)                   Impression:      Stable cardiomegaly with increased bilateral edema.  Small bibasilar pleural effusions.  Pacer present.      Electronically signed by: Kit Randolph MD  Date:    03/06/2025  Time:    04:01               Narrative:    EXAMINATION:  XR CHEST AP PORTABLE    CLINICAL HISTORY:  Sepsis;    TECHNIQUE:  Single frontal view of the chest was performed.    COMPARISON:  02/04/2025.    FINDINGS:  Pacer leads and sternotomy wires, similar to prior.    Stable cardiomegaly.  Increased bilateral edema.  Small bibasilar pleural effusions.    Heart and lungs otherwise appear unchanged when allowing for differences in technique and positioning.                                      12-lead EKG interpretation:  (if applicable)      Current Cardiac Rhythm:   (if applicable)    Physical Exam:   Physical Exam  Cardiovascular:      Rate and Rhythm: Regular rhythm. Tachycardia present.      Pulses: Normal pulses.   Pulmonary:      Effort: Pulmonary effort is normal.      Breath sounds: Normal breath sounds.   Abdominal:      General: Abdomen is flat.      Palpations: Abdomen is soft.   Skin:     General: Skin is warm and dry.   Neurological:      Mental Status: He is alert and oriented to person, place, and time.   Psychiatric:         Mood and Affect: Mood normal.         ASSESSMENT/PLAN:   Assessment:   ARF  Covid  NSTEMI  HFrEf  CAD  Aflutter  Altered mental status   Seizure disorder    obtuse of the circ, and distal RCA occlusion of LAD and RCA--1  patent graft   2022--carotid u/s no HDSS 3/2023-- LE art u/s reviewed RT SFA 80-99% , right tibial disease Bi-V DDD 3/26/24 pacemaker upgrade     which showed an EF of 45% with inferior wall hypokinesis, diastolic dysfunction, and SVG sequential graft to the LAD; he did not require any interventions during that procedure     2024 Echo showed EF 40-45%, grade 3 DD, moderate to severe MR, RVSP 73 mmHg.    -1300 cc u/o       Recent echo 2025--EF 50-55 %      Plan:   Trop elevated but decreasing  Per last [facility] pt has one patent graft  SVG sequential graft to LAD  Unable to intervene  Treat medically   D/c heparin drip  restart Eliquis for pAF/flutter  D/c ASA  Continue IV lasix   Restart home dose bb  Monitor renal function and electrolytes  Strict I/O   Thank you for your consult  Following.   Discussed case with .                   [1]   Social History  Tobacco Use    Smoking status: Former     Current packs/day: 0.00     Average packs/day: 0.5 packs/day for 4.0 years (2.0 ttl pk-yrs)     Types: Cigarettes     Start date:      Quit date:      Years since quittin.2    Smokeless tobacco: Never   Substance Use Topics    Alcohol use: Yes     Comment: social    Drug use: No

## 2025-03-07 NOTE — PT/OT/SLP EVAL
Occupational Therapy   Evaluation    Name: Jerome Amaro Jr.  MRN: [id number]  Admitting Diagnosis: Acute respiratory failure with hypoxia  Recent Surgery: * No surgery found *      Recommendations:     Discharge Recommendations: Moderate Intensity Therapy  Discharge Equipment Recommendations:  none  Barriers to discharge:   (increased physical assistance for ADLs and functional mobility.)    Assessment:     Jerome Amaro Jr. is a 79 y.o. male with a medical diagnosis of Acute respiratory failure with hypoxia.  He presents with general weakness. Performance deficits affecting function: weakness, impaired endurance, impaired self care skills, impaired functional mobility, gait instability, impaired balance, decreased safety awareness, decreased lower extremity function, decreased upper extremity function, impaired cardiopulmonary response to activity.      Rehab Prognosis: Fair; patient would benefit from acute skilled OT services to address these deficits and reach maximum level of function.       Plan:     Patient to be seen 5 x/week to address the above listed problems via self-care/home management, therapeutic activities, therapeutic exercises  Plan of Care Expires: 04/07/25  Plan of Care Reviewed with: patient    Subjective     Chief Complaint: General weakness  Patient/Family Comments/goals: Improved functional mobility and ADL independence.     Occupational Profile:  Living Environment: lives alone in 1 story home, threshold to enter. Family and neighbor can provide intermittent assistance.   Previous level of function: modified independence using rollator for ambulation, ADLs, household management and relies on family and friend for transportation.  Roles and Routines: limited homemaker  Equipment Used at Home: cane, straight, rollator, shower chair  Assistance upon Discharge: family and friend, intermittent assistance    Pain/Comfort:  Pain Rating 1: 0/10  Pain Rating Post-Intervention 1: 0/10    Patients  cultural, spiritual, Anabaptist conflicts given the current situation: no    Objective:     Communicated with: nurse prior to session.  Patient found HOB elevated with telemetry, peripheral IV, pulse ox (continuous), oxygen upon OT entry to room.    General Precautions: Standard, fall, seizure  Orthopedic Precautions: N/A  Braces: N/A  Respiratory Status: Nasal cannula, flow 2 L/min    Occupational Performance:    Bed Mobility:    Patient completed Scooting/Bridging with minimum assistance  Patient completed Supine to Sit with minimum assistance  Patient completed Sit to Supine with minimum assistance  Performed unsupported sitting EOB with contact guard assistance.     Functional Mobility/Transfers:  Patient completed Sit <> Stand Transfer with minimum assistance  with  rolling walker     Activities of Daily Living:  Lower Body Dressing: moderate assistance to don/doff socks sitting EOB.    Cognitive/Visual Perceptual:  Cognitive/Psychosocial Skills:     -       Oriented to: Person and Place   -       Follows Commands/attention:Follows one-step commands  -       Communication: clear/fluent  -       Memory: Impaired STM  -       Safety awareness/insight to disability: impaired   -       Mood/Affect/Coping skills/emotional control: Cooperative and Pleasant  Visual/Perceptual:      -Intact Acuity    Physical Exam:  Balance:    -       Sitting: Contact Guard, Standing: Minimal Assist  Upper Extremity Range of Motion:     -       Right Upper Extremity: WFL  -       Left Upper Extremity: WFL  Upper Extremity Strength:    -       Right Upper Extremity: 3+/5  -       Left Upper Extremity: 3+/5   Strength:    -       Right Upper Extremity: fair  -       Left Upper Extremity: fair  Fine Motor Coordination:    -       Intact    AMPAC 6 Click ADL:  AMPAC Total Score: 19    Treatment & Education:  Patient educated on the purpose of Occupational Therapy and the importance of getting OOB.     Patient left HOB elevated with all  lines intact, call button in reach, and bed alarm on    GOALS:   Multidisciplinary Problems       Occupational Therapy Goals          Problem: Occupational Therapy    Goal Priority Disciplines Outcome Interventions   Occupational Therapy Goal     OT, PT/OT     Description: Goals to be met by: 4/7/2025     Patient will increase functional independence with ADLs by performing:    UE Dressing with Supervision.  LE Dressing with Supervision.  Grooming while standing at sink with Supervision.  Toileting from toilet with Supervision for hygiene and clothing management.   Toilet transfer to toilet with Supervision.  Perform sitting/standing ADL activity with no LOB.                         History:     Past Medical History:   Diagnosis Date    Anticoagulant long-term use     2014    Aortic valve disease 2014    pig valve    Arthritis     all over    Atrial fibrillation 2/29/2024    Chest pain 07/15/2019    CHF (congestive heart failure)     2014 on meds    Coronary artery disease     2007 cabg    Difficulty swallowing     Heart attack 1990    15 stents    Heart attack 02/01/2024    mild    Heart block     Hyperlipidemia     Hypertension     on meds    Hypothyroidism 2015    on meds    PVD (peripheral vascular disease)     Seizures     last episode 1990 on meds         Past Surgical History:   Procedure Laterality Date    AORTOGRAPHY WITH SERIALOGRAPHY N/A 11/16/2021    Procedure: AORTOGRAM, WITH RUNOFF;  Surgeon: Rodrigo Willoughby MD;  Location: [facility] CATH/EP LAB;  Service: Cardiology;  Laterality: N/A;    ARTERIOGRAPHY OF AORTIC ROOT N/A 11/05/2019    Procedure: ARTERIOGRAM, AORTIC ROOT;  Surgeon: Rodrigo Willoughby MD;  Location: [facility] CATH/EP LAB;  Service: Cardiology;  Laterality: N/A;    CARDIAC CATHETERIZATION      CARDIAC VALVE SURGERY      CATARACT EXTRACTION W/  INTRAOCULAR LENS IMPLANT Right 12/11/2024    Procedure: CEIOL OD;  Surgeon: Fredrick Sharma MD;  Location: [facility] ASU OR;  Service: Ophthalmology;  Laterality:  Right;    CORONARY ANGIOGRAPHY INCLUDING BYPASS GRAFTS WITH CATHETERIZATION OF LEFT HEART N/A 11/05/2019    Procedure: ANGIOGRAM, CORONARY, INCLUDING BYPASS GRAFT, WITH LEFT HEART CATHETERIZATION;  Surgeon: Rodrigo Willoughby MD;  Location: [facility] CATH/EP LAB;  Service: Cardiology;  Laterality: N/A;    CORONARY ANGIOGRAPHY INCLUDING BYPASS GRAFTS WITH CATHETERIZATION OF LEFT HEART Left 11/03/2020    Procedure: ANGIOGRAM, CORONARY, INCLUDING BYPASS GRAFT, WITH LEFT HEART CATHETERIZATION;  Surgeon: Rodrigo Willoughby MD;  Location: [facility] CATH/EP LAB;  Service: Cardiology;  Laterality: Left;    CORONARY ANGIOGRAPHY INCLUDING BYPASS GRAFTS WITH CATHETERIZATION OF LEFT HEART N/A 09/28/2021    Procedure: ANGIOGRAM, CORONARY, INCLUDING BYPASS GRAFT, WITH LEFT HEART CATHETERIZATION;  Surgeon: Rodrigo Willoughby MD;  Location: [facility] CATH/EP LAB;  Service: Cardiology;  Laterality: N/A;    CORONARY ANGIOGRAPHY INCLUDING BYPASS GRAFTS WITH CATHETERIZATION OF LEFT HEART N/A 12/05/2023    Procedure: ANGIOGRAM, CORONARY, INCLUDING BYPASS GRAFT, WITH LEFT HEART CATHETERIZATION;  Surgeon: Rodrigo Willoughby MD;  Location: [facility] CATH/EP LAB;  Service: Cardiology;  Laterality: N/A;    CORONARY ANGIOPLASTY      CORONARY ARTERY BYPASS GRAFT      x 2    1991 2006    ESOPHAGOGASTRODUODENOSCOPY N/A 2/20/2024    Procedure: EGD (ESOPHAGOGASTRODUODENOSCOPY);  Surgeon: Mal Lockhart III, MD;  Location: [facility] ENDO;  Service: Endoscopy;  Laterality: N/A;    EXTRACORPOREAL SHOCK WAVE LITHOTRIPSY Right 08/01/2019    Procedure: LITHOTRIPSY, ESWL;  Surgeon: Williams Ortega MD;  Location: [facility] OR;  Service: Urology;  Laterality: Right;    HEMORRHOID SURGERY  1990    INSERTION OF PACEMAKER      PERCUTANEOUS TRANSLUMINAL BALLOON ANGIOPLASTY OF CORONARY ARTERY N/A 11/08/2019    Procedure: Angioplasty-coronary;  Surgeon: Rodrigo Willoughby MD;  Location: [facility] CATH/EP LAB;  Service: Cardiology;  Laterality: N/A;    WRIST FRACTURE SURGERY         Time Tracking:      OT Date of Treatment: 03/07/25  OT Start Time: 1120  OT Stop Time: 1138  OT Total Time (min): 18 min    Billable Minutes:Evaluation 8  Self Care/Home Management 10    3/7/2025

## 2025-03-07 NOTE — HOSPITAL COURSE
Patient with CHF, CAD, seizure disorder presented with complaints of shortness of breath and confusion.  Per family patient has intermittent confusion that has been ongoing for a while. Underlying dementia? AAOx2 currently. Became short of breath over the last several days.  Was taken to urgent care prior to admission, diagnosed with COVID and prescribed antibiotics.  COVID PCR negative on admission.  Did have elevated BNP and troponin, symptoms improved after Lasix was given.  Cardiology consulted.  Not convince patient has COVID, no recent sick contacts and patient remains at home.  Symptoms likely secondary to acute decompensated heart failure.  Repeat COVID negative, will dc remdesivir and decadron since symptoms improved with diuresis.  Weaned off of BiPAP, transfer out of the ICU. Cardio recommended conservative management.  Patient was started on IV Lasix which was transitioned to oral Lasix.  PT/OT were consulted and recommended moderate intensity therapy.  Case management was consulted for discharge planning. Patient was noted to have labile blood pressure and home metoprolol was decreased.  Blood pressure improved.  Patient reported left arm tingling and shortness for breath.  EKG and troponin were obtained.  Troponins were elevated but trended down.  Discussed case with Cardiology.  Patient was re-evaluated by Cardiology who recommended further medical management with continue Lasix and outpatient follow up.  Cardiology signed off.  Patient was accepted to [place].  Home O2 eval was obtained and patient was did qualify for home oxygen.  Patient to follow up with PCP and Cardiology.  Home metoprolol decreased on discharge per Cardiology recommendations.  Patient was discharged to [facility].

## 2025-03-07 NOTE — PT/OT/SLP PROGRESS
Physical Therapy      Patient Name:  Jerome Amaro Jr.   MRN:  [id number]    Patient not seen today secondary to Other (Comment) (x2 attempt, pt refusing 1st due to weakness/fatigue, 2nd attempt pt had just worked with OT and fatigue. Does not want to participate today). Will follow-up 3/8/25.

## 2025-03-08 LAB
ANION GAP SERPL CALC-SCNC: 8 MMOL/L (ref 8–16)
BASOPHILS # BLD AUTO: 0.07 K/UL (ref 0–0.2)
BASOPHILS NFR BLD: 0.6 % (ref 0–1.9)
BUN SERPL-MCNC: 28 MG/DL (ref 8–23)
CALCIUM SERPL-MCNC: 9 MG/DL (ref 8.7–10.5)
CHLORIDE SERPL-SCNC: 95 MMOL/L (ref 95–110)
CO2 SERPL-SCNC: 31 MMOL/L (ref 23–29)
CREAT SERPL-MCNC: 1.6 MG/DL (ref 0.5–1.4)
DIFFERENTIAL METHOD BLD: ABNORMAL
EOSINOPHIL # BLD AUTO: 0.2 K/UL (ref 0–0.5)
EOSINOPHIL NFR BLD: 1.4 % (ref 0–8)
ERYTHROCYTE [DISTWIDTH] IN BLOOD BY AUTOMATED COUNT: 15 % (ref 11.5–14.5)
EST. GFR  (NO RACE VARIABLE): 43.3 ML/MIN/1.73 M^2
GLUCOSE SERPL-MCNC: 97 MG/DL (ref 70–110)
HCT VFR BLD AUTO: 30.7 % (ref 40–54)
HGB BLD-MCNC: 10.1 G/DL (ref 14–18)
IMM GRANULOCYTES # BLD AUTO: 0.05 K/UL (ref 0–0.04)
IMM GRANULOCYTES NFR BLD AUTO: 0.4 % (ref 0–0.5)
LYMPHOCYTES # BLD AUTO: 1.5 K/UL (ref 1–4.8)
LYMPHOCYTES NFR BLD: 12.8 % (ref 18–48)
MCH RBC QN AUTO: 29 PG (ref 27–31)
MCHC RBC AUTO-ENTMCNC: 32.9 G/DL (ref 32–36)
MCV RBC AUTO: 88 FL (ref 82–98)
MONOCYTES # BLD AUTO: 2 K/UL (ref 0.3–1)
MONOCYTES NFR BLD: 17.2 % (ref 4–15)
NEUTROPHILS # BLD AUTO: 7.9 K/UL (ref 1.8–7.7)
NEUTROPHILS NFR BLD: 67.6 % (ref 38–73)
NRBC BLD-RTO: 0 /100 WBC
PLATELET # BLD AUTO: 255 K/UL (ref 150–450)
PMV BLD AUTO: 10.6 FL (ref 9.2–12.9)
POTASSIUM SERPL-SCNC: 4.2 MMOL/L (ref 3.5–5.1)
RBC # BLD AUTO: 3.48 M/UL (ref 4.6–6.2)
SODIUM SERPL-SCNC: 134 MMOL/L (ref 136–145)
WBC # BLD AUTO: 11.6 K/UL (ref 3.9–12.7)

## 2025-03-08 PROCEDURE — 25000003 PHARM REV CODE 250: Performed by: STUDENT IN AN ORGANIZED HEALTH CARE EDUCATION/TRAINING PROGRAM

## 2025-03-08 PROCEDURE — 80048 BASIC METABOLIC PNL TOTAL CA: CPT | Performed by: STUDENT IN AN ORGANIZED HEALTH CARE EDUCATION/TRAINING PROGRAM

## 2025-03-08 PROCEDURE — 27100171 HC OXYGEN HIGH FLOW UP TO 24 HOURS

## 2025-03-08 PROCEDURE — 21400001 HC TELEMETRY ROOM

## 2025-03-08 PROCEDURE — 25000003 PHARM REV CODE 250

## 2025-03-08 PROCEDURE — 63600175 PHARM REV CODE 636 W HCPCS: Performed by: INTERNAL MEDICINE

## 2025-03-08 PROCEDURE — 36415 COLL VENOUS BLD VENIPUNCTURE: CPT | Performed by: STUDENT IN AN ORGANIZED HEALTH CARE EDUCATION/TRAINING PROGRAM

## 2025-03-08 PROCEDURE — 97161 PT EVAL LOW COMPLEX 20 MIN: CPT

## 2025-03-08 PROCEDURE — 85025 COMPLETE CBC W/AUTO DIFF WBC: CPT | Performed by: STUDENT IN AN ORGANIZED HEALTH CARE EDUCATION/TRAINING PROGRAM

## 2025-03-08 PROCEDURE — 99900035 HC TECH TIME PER 15 MIN (STAT)

## 2025-03-08 PROCEDURE — 94761 N-INVAS EAR/PLS OXIMETRY MLT: CPT

## 2025-03-08 PROCEDURE — 25000003 PHARM REV CODE 250: Performed by: INTERNAL MEDICINE

## 2025-03-08 PROCEDURE — 99900031 HC PATIENT EDUCATION (STAT)

## 2025-03-08 PROCEDURE — 97530 THERAPEUTIC ACTIVITIES: CPT

## 2025-03-08 PROCEDURE — 27000221 HC OXYGEN, UP TO 24 HOURS

## 2025-03-08 RX ADMIN — MUPIROCIN 1 G: 20 OINTMENT TOPICAL at 08:03

## 2025-03-08 RX ADMIN — METOPROLOL SUCCINATE 100 MG: 50 TABLET, FILM COATED, EXTENDED RELEASE ORAL at 09:03

## 2025-03-08 RX ADMIN — FUROSEMIDE 40 MG: 10 INJECTION, SOLUTION INTRAVENOUS at 08:03

## 2025-03-08 RX ADMIN — APIXABAN 5 MG: 5 TABLET, FILM COATED ORAL at 08:03

## 2025-03-08 RX ADMIN — EZETIMIBE 10 MG: 10 TABLET ORAL at 09:03

## 2025-03-08 RX ADMIN — CLOPIDOGREL BISULFATE 75 MG: 75 TABLET, FILM COATED ORAL at 09:03

## 2025-03-08 RX ADMIN — ISOSORBIDE MONONITRATE 30 MG: 30 TABLET, EXTENDED RELEASE ORAL at 09:03

## 2025-03-08 RX ADMIN — APIXABAN 5 MG: 5 TABLET, FILM COATED ORAL at 09:03

## 2025-03-08 RX ADMIN — EXTENDED PHENYTOIN SODIUM 400 MG: 100 CAPSULE, EXTENDED RELEASE ORAL at 09:03

## 2025-03-08 RX ADMIN — SACUBITRIL AND VALSARTAN 1 TABLET: 24; 26 TABLET, FILM COATED ORAL at 09:03

## 2025-03-08 RX ADMIN — METOPROLOL SUCCINATE 100 MG: 50 TABLET, FILM COATED, EXTENDED RELEASE ORAL at 08:03

## 2025-03-08 RX ADMIN — CITALOPRAM HYDROBROMIDE 20 MG: 20 TABLET ORAL at 09:03

## 2025-03-08 RX ADMIN — MILRINONE LACTATE 0.5 MCG/KG/MIN: 0.2 INJECTION, SOLUTION INTRAVENOUS at 06:03

## 2025-03-08 NOTE — ASSESSMENT & PLAN NOTE
Doubt COVID infection, outpatient probably false-positive  Patient has no recent sick contacts, remains at home mainly   2 COVID test negative here   Symptoms improved with Lasix prior to administration of remdesivir and Decadron   Discontinue Decadron and remdesivir monitor, discussed with family        Start PT and OT.  Transfer the patient to medicine telemetry floor.

## 2025-03-08 NOTE — PT/OT/SLP EVAL
Physical Therapy Evaluation    Patient Name:  Jerome Amaro Jr.   MRN:  [id number]    Recommendations:     Discharge Recommendations: Moderate Intensity Therapy   Discharge Equipment Recommendations: none   Barriers to discharge:  increase assist with mobility, decrease caregiver support, decrease activity tolerance    Assessment:     Jerome Amaro Jr. is a 79 y.o. male admitted with a medical diagnosis of Acute respiratory failure with hypoxia.  He presents with the following impairments/functional limitations: weakness, impaired endurance, impaired self care skills, impaired functional mobility, gait instability, impaired balance, decreased lower extremity function, decreased safety awareness, pain, impaired cardiopulmonary response to activity.    Pt found in bed with HOB elevated. Pt agreeable to visit. Pt found on 2L o2 via NC with all vital WFL. Pt required CGA with bed mobility. Good sitting balance at EOB. Sit to stand with min A. Pt ambulated 5 ft to bedside chair with RW and min A. Limited due to generalized weakness and decrease activity tolerance.     Rehab Prognosis: Fair; patient would benefit from acute skilled PT services to address these deficits and reach maximum level of function.    Recent Surgery: * No surgery found *      Plan:     During this hospitalization, patient to be seen 6 x/week to address the identified rehab impairments via gait training, therapeutic activities, therapeutic exercises, neuromuscular re-education and progress toward the following goals:    Plan of Care Expires:  04/08/25    Subjective     Chief Complaint: none stated  Patient/Family Comments/goals: none stated but agreeable to OOB to chair  Pain/Comfort:  Pain Rating 1: 0/10    Patients cultural, spiritual, Cheondoism conflicts given the current situation: no    Living Environment:  Pt lives alone but daughter   Prior to admission, patients level of function was MI RW no O2.  Equipment used at home: rollator, cane,  straight, shower chair.  DME owned (not currently used): none.  Upon discharge, patient will have assistance from daughter intermittently.    Objective:     Communicated with RN prior to session.  Patient found HOB elevated with peripheral IV, telemetry, blood pressure cuff, pulse ox (continuous), oxygen  upon PT entry to room.    General Precautions: Standard, fall  Orthopedic Precautions:N/A   Braces: N/A  Respiratory Status: Nasal cannula, flow 2 L/min    Exams:  RLE ROM: WFL  RLE Strength: WFL  LLE ROM: WFL  LLE Strength: WFL    Functional Mobility:  Bed Mobility:     Supine to Sit: contact guard assistance  Transfers:     Sit to Stand:  minimum assistance with rolling walker  Gait: 5 ft with RW and min A      AM-PAC 6 CLICK MOBILITY  Total Score:17       Treatment & Education:  Pt educated on POC, discharge recommendation, importance of time OOB, proper form with mobility, need for assist with mobility, use of call bell to seek assistance as needed and fall prevention      Patient left up in chair with all lines intact, call button in reach, chair alarm on, and RN notified.    GOALS:   Multidisciplinary Problems       Physical Therapy Goals          Problem: Physical Therapy    Goal Priority Disciplines Outcome Interventions   Physical Therapy Goal     PT, PT/OT Progressing    Description: Goals to be met by: 25     Patient will increase functional independence with mobility by performin. Supine to sit with Supervision  2. Sit to stand transfer with Supervision  3. Bed to chair transfer with Supervision using Rolling Walker  4. Gait  x 150 feet with Supervision using Rolling Walker.                              DME Justifications:  No DME recommended requiring DME justifications    History:     Past Medical History:   Diagnosis Date    Anticoagulant long-term use         Aortic valve disease     pig valve    Arthritis     all over    Atrial fibrillation 2024    Chest pain 07/15/2019     CHF (congestive heart failure)     2014 on meds    Coronary artery disease     2007 cabg    Difficulty swallowing     Heart attack 1990    15 stents    Heart attack 02/01/2024    mild    Heart block     Hyperlipidemia     Hypertension     on meds    Hypothyroidism 2015    on meds    PVD (peripheral vascular disease)     Seizures     last episode 1990 on meds       Past Surgical History:   Procedure Laterality Date    AORTOGRAPHY WITH SERIALOGRAPHY N/A 11/16/2021    Procedure: AORTOGRAM, WITH RUNOFF;  Surgeon: Rodrigo Willoughby MD;  Location: [facility] CATH/EP LAB;  Service: Cardiology;  Laterality: N/A;    ARTERIOGRAPHY OF AORTIC ROOT N/A 11/05/2019    Procedure: ARTERIOGRAM, AORTIC ROOT;  Surgeon: Rodrigo Willoughby MD;  Location: [facility] CATH/EP LAB;  Service: Cardiology;  Laterality: N/A;    CARDIAC CATHETERIZATION      CARDIAC VALVE SURGERY      CATARACT EXTRACTION W/  INTRAOCULAR LENS IMPLANT Right 12/11/2024    Procedure: CEIOL OD;  Surgeon: Fredrick Sharma MD;  Location: [facility] ASU OR;  Service: Ophthalmology;  Laterality: Right;    CORONARY ANGIOGRAPHY INCLUDING BYPASS GRAFTS WITH CATHETERIZATION OF LEFT HEART N/A 11/05/2019    Procedure: ANGIOGRAM, CORONARY, INCLUDING BYPASS GRAFT, WITH LEFT HEART CATHETERIZATION;  Surgeon: Rodrigo Willoughby MD;  Location: [facility] CATH/EP LAB;  Service: Cardiology;  Laterality: N/A;    CORONARY ANGIOGRAPHY INCLUDING BYPASS GRAFTS WITH CATHETERIZATION OF LEFT HEART Left 11/03/2020    Procedure: ANGIOGRAM, CORONARY, INCLUDING BYPASS GRAFT, WITH LEFT HEART CATHETERIZATION;  Surgeon: Rodrigo Willoughby MD;  Location: [facility] CATH/EP LAB;  Service: Cardiology;  Laterality: Left;    CORONARY ANGIOGRAPHY INCLUDING BYPASS GRAFTS WITH CATHETERIZATION OF LEFT HEART N/A 09/28/2021    Procedure: ANGIOGRAM, CORONARY, INCLUDING BYPASS GRAFT, WITH LEFT HEART CATHETERIZATION;  Surgeon: Rodrigo Willoughby MD;  Location: [facility] CATH/EP LAB;  Service: Cardiology;  Laterality: N/A;    CORONARY ANGIOGRAPHY INCLUDING BYPASS  GRAFTS WITH CATHETERIZATION OF LEFT HEART N/A 12/05/2023    Procedure: ANGIOGRAM, CORONARY, INCLUDING BYPASS GRAFT, WITH LEFT HEART CATHETERIZATION;  Surgeon: Rodrigo Willoughby MD;  Location: [facility] CATH/EP LAB;  Service: Cardiology;  Laterality: N/A;    CORONARY ANGIOPLASTY      CORONARY ARTERY BYPASS GRAFT      x 2    1991 2006    ESOPHAGOGASTRODUODENOSCOPY N/A 2/20/2024    Procedure: EGD (ESOPHAGOGASTRODUODENOSCOPY);  Surgeon: Mal Lockhart III, MD;  Location: [facility] ENDO;  Service: Endoscopy;  Laterality: N/A;    EXTRACORPOREAL SHOCK WAVE LITHOTRIPSY Right 08/01/2019    Procedure: LITHOTRIPSY, ESWL;  Surgeon: Williams Ortega MD;  Location: [facility] OR;  Service: Urology;  Laterality: Right;    HEMORRHOID SURGERY  1990    INSERTION OF PACEMAKER      PERCUTANEOUS TRANSLUMINAL BALLOON ANGIOPLASTY OF CORONARY ARTERY N/A 11/08/2019    Procedure: Angioplasty-coronary;  Surgeon: Rodrigo Willoughby MD;  Location: [facility] CATH/EP LAB;  Service: Cardiology;  Laterality: N/A;    WRIST FRACTURE SURGERY         Time Tracking:     PT Received On: 03/08/25  PT Start Time: 0905     PT Stop Time: 0920  PT Total Time (min): 15 min     Billable Minutes: Evaluation 7 and Therapeutic Activity 8      03/08/2025

## 2025-03-08 NOTE — RESPIRATORY THERAPY
03/07/25 4702   Patient Assessment/Suction   Level of Consciousness (AVPU) alert   Respiratory Effort Normal;Unlabored   Expansion/Accessory Muscles/Retractions no use of accessory muscles   Rhythm/Pattern, Respiratory unlabored;pattern regular;depth regular;no shortness of breath reported   PRE-TX-O2   Device (Oxygen Therapy) high flow nasal cannula   Flow (L/min) (Oxygen Therapy) 2   SpO2 95 %   Pulse Oximetry Type Continuous   $ Pulse Oximetry - Multiple Charge Pulse Oximetry - Multiple   Pulse 86   Resp 17   BP (!) 90/51   Aerosol Therapy   $ Aerosol Therapy Charges PRN treatment not required   Daily Review of Necessity (SVN) completed   Respiratory Treatment Status (SVN) PRN treatment not required   Education   $ Education 15 min;Bronchodilator;Oxygen;Other (see comment)

## 2025-03-08 NOTE — PROGRESS NOTES
[facility]    Critical Care Cardiology Progress Note    Subjective:  Patient is feeling better he denies any chest pains.  Shortness of breath is improved.  He did diurese yesterday however chest x-ray still shows significant infiltrates versus congestive heart failure.  No fever or chills.  Echo reviewed    Objective:  Vital Signs (Most Recent)  Temp: 98.5 °F (36.9 °C) (03/08/25 0730)  Pulse: 106 (03/08/25 0730)  Resp: (!) 26 (03/08/25 0730)  BP: (!) 95/50 (03/08/25 0700)  SpO2: (!) 92 % (03/08/25 0730)    Vital Signs Range (Last 24H):  Temp:  [97.9 °F (36.6 °C)-98.7 °F (37.1 °C)]   Pulse:  []   Resp:  [14-50]   BP: ()/(50-76)   SpO2:  [80 %-98 %]     I & O (Last 24H):    Intake/Output Summary (Last 24 hours) at 3/8/2025 0753  Last data filed at 3/8/2025 0600  Gross per 24 hour   Intake 790.8 ml   Output 1100 ml   Net -309.2 ml       Current Diet:     Current Diet Order   Procedures    Diet Cardiac Fluid - 1500mL     Fluid restriction::   Fluid - 1500mL        Allergies:  Review of patient's allergies indicates:   Allergen Reactions    Atorvastatin Other (See Comments)     Muscle pain    Rosuvastatin Other (See Comments)     Muscle pain       Meds:  Scheduled Meds:   apixaban  5 mg Oral BID    citalopram  20 mg Oral Daily    clopidogreL  75 mg Oral Daily    ezetimibe  10 mg Oral Daily    famotidine  20 mg Oral Daily    furosemide (LASIX) injection  40 mg Intravenous Q12H    isosorbide mononitrate  30 mg Oral Daily    metoprolol succinate  100 mg Oral BID    mupirocin   Nasal BID    phenytoin  400 mg Oral Daily    remdesivir infusion  100 mg Intravenous Daily    sacubitriL-valsartan  1 tablet Oral BID     Continuous Infusions:   milrinone  0.5 mcg/kg/min Intravenous Continuous 11.3 mL/hr at 03/08/25 0649 0.5 mcg/kg/min at 03/08/25 0649     PRN Meds:  Current Facility-Administered Medications:     acetaminophen, 650 mg, Oral, Q4H PRN    albuterol-ipratropium, 3 mL, Nebulization, Q6H PRN     dextrose 50%, 12.5 g, Intravenous, PRN    dextrose 50%, 25 g, Intravenous, PRN    glucagon (human recombinant), 1 mg, Intramuscular, PRN    glucose, 16 g, Oral, PRN    glucose, 24 g, Oral, PRN    HYDROcodone-acetaminophen, 1 tablet, Oral, Q6H PRN    HYDROcodone-acetaminophen, 1 tablet, Oral, Q6H PRN    magnesium oxide, 800 mg, Oral, PRN    magnesium oxide, 800 mg, Oral, PRN    melatonin, 9 mg, Oral, Nightly PRN    morphine, 1 mg, Intravenous, Q2H PRN    naloxone, 0.02 mg, Intravenous, PRN    nitroGLYCERIN, 0.4 mg, Sublingual, Q5 Min PRN    ondansetron, 4 mg, Intravenous, Q6H PRN    potassium bicarbonate, 35 mEq, Oral, PRN    potassium bicarbonate, 50 mEq, Oral, PRN    potassium bicarbonate, 60 mEq, Oral, PRN    potassium, sodium phosphates, 2 packet, Oral, PRN    potassium, sodium phosphates, 2 packet, Oral, PRN    potassium, sodium phosphates, 2 packet, Oral, PRN    senna-docusate 8.6-50 mg, 1 tablet, Oral, BID PRN    sodium chloride 0.9%, 10 mL, Intravenous, PRN    Oxygen/Ventilator Data (Last 24H):  (if applicable)            Hemodynamic Parameters (Last 24H):   (if applicable)        Lines/Drains:  (if applicable)       Peripheral IV - Single Lumen 03/06/25 0315 20 G Left Antecubital (Active)   Site Assessment Dry;Clean;Intact;No redness;No swelling 03/08/25 0701   Line Securement Device Secured with sutureless device 03/08/25 0701   Extremity Assessment Distal to IV No abnormal discoloration;No swelling;No redness;No warmth 03/08/25 0701   Line Status Saline locked 03/08/25 0701   Dressing Status Dry;Clean;Intact 03/08/25 0701   Dressing Intervention Integrity maintained 03/08/25 0701   Dressing Change Due 03/10/25 03/08/25 0701   Site Change Due 03/10/25 03/08/25 0701   Reason Not Rotated Not due 03/08/25 0701   Number of days: 2            Peripheral IV - Single Lumen 03/06/25 1045 20 G Posterior;Right Forearm (Active)   Site Assessment Dry;Clean;Intact;No redness;No swelling 03/08/25 0701   Line Securement  Device Secured with sutureless device 03/08/25 0701   Extremity Assessment Distal to IV No redness;No abnormal discoloration;No swelling;No warmth 03/08/25 0701   Line Status Infusing 03/08/25 0701   Dressing Status Dry;Clean;Intact 03/08/25 0701   Dressing Intervention Integrity maintained 03/08/25 0701   Dressing Change Due 03/10/25 03/08/25 0701   Site Change Due 03/10/25 03/08/25 0701   Reason Not Rotated Not due 03/08/25 0701   Number of days: 1       Lab Results :  Recent Results (from the past 24 hours)   CBC Auto Differential    Collection Time: 03/08/25  3:02 AM   Result Value Ref Range    WBC 11.60 3.90 - 12.70 K/uL    RBC 3.48 (L) 4.60 - 6.20 M/uL    Hemoglobin 10.1 (L) 14.0 - 18.0 g/dL    Hematocrit 30.7 (L) 40.0 - 54.0 %    MCV 88 82 - 98 fL    MCH 29.0 27.0 - 31.0 pg    MCHC 32.9 32.0 - 36.0 g/dL    RDW 15.0 (H) 11.5 - 14.5 %    Platelets 255 150 - 450 K/uL    MPV 10.6 9.2 - 12.9 fL    Immature Granulocytes 0.4 0.0 - 0.5 %    Gran # (ANC) 7.9 (H) 1.8 - 7.7 K/uL    Immature Grans (Abs) 0.05 (H) 0.00 - 0.04 K/uL    Lymph # 1.5 1.0 - 4.8 K/uL    Mono # 2.0 (H) 0.3 - 1.0 K/uL    Eos # 0.2 0.0 - 0.5 K/uL    Baso # 0.07 0.00 - 0.20 K/uL    nRBC 0 0 /100 WBC    Gran % 67.6 38.0 - 73.0 %    Lymph % 12.8 (L) 18.0 - 48.0 %    Mono % 17.2 (H) 4.0 - 15.0 %    Eosinophil % 1.4 0.0 - 8.0 %    Basophil % 0.6 0.0 - 1.9 %    Differential Method Automated    Basic Metabolic Panel    Collection Time: 03/08/25  3:02 AM   Result Value Ref Range    Sodium 134 (L) 136 - 145 mmol/L    Potassium 4.2 3.5 - 5.1 mmol/L    Chloride 95 95 - 110 mmol/L    CO2 31 (H) 23 - 29 mmol/L    Glucose 97 70 - 110 mg/dL    BUN 28 (H) 8 - 23 mg/dL    Creatinine 1.6 (H) 0.5 - 1.4 mg/dL    Calcium 9.0 8.7 - 10.5 mg/dL    Anion Gap 8 8 - 16 mmol/L    eGFR 43.3 (A) >60 mL/min/1.73 m^2       Diagnostic Results:  Imaging Results              CT Head Without Contrast (Final result)  Result time 03/06/25 07:24:59      Final result by Fox Dowd,  DO (03/06/25 07:24:59)                   Impression:      1. No acute intracranial abnormality.  2. Chronic and involutional changes of the brain.      Electronically signed by: Fox Dowd  Date:    03/06/2025  Time:    07:24               Narrative:      CMS MANDATED QUALITY DATA - CT RADIATION - 436    All CT scans at this facility utilize dose modulation, iterative reconstruction, and/or weight based dosing when appropriate to reduce radiation dose to as low as reasonably achievable.    EXAMINATION:  CT HEAD WITHOUT CONTRAST    CLINICAL HISTORY:  Altered mental status;    TECHNIQUE:  Head CT without IV contrast.    COMPARISON:  CT brain 01/21/2025.    FINDINGS:  Gray-white differentiation is maintained without hemorrhage, midline shift, or mass effect.  There are involutional changes of the brain parenchyma with ex vacuo dilatation of the ventricles.  Periventricular and deep white matter hypoattenuation noted consistent with chronic changes of microvascular ischemia.  Calvarium is intact. Visualized sinuses are clear.                                       X-Ray Chest AP Portable (Final result)  Result time 03/06/25 04:01:41      Final result by Kit Randolph MD (03/06/25 04:01:41)                   Impression:      Stable cardiomegaly with increased bilateral edema.  Small bibasilar pleural effusions.  Pacer present.      Electronically signed by: Kit Randolph MD  Date:    03/06/2025  Time:    04:01               Narrative:    EXAMINATION:  XR CHEST AP PORTABLE    CLINICAL HISTORY:  Sepsis;    TECHNIQUE:  Single frontal view of the chest was performed.    COMPARISON:  02/04/2025.    FINDINGS:  Pacer leads and sternotomy wires, similar to prior.    Stable cardiomegaly.  Increased bilateral edema.  Small bibasilar pleural effusions.    Heart and lungs otherwise appear unchanged when allowing for differences in technique and positioning.                                      12-lead EKG interpretation:  "  (if applicable)    Recent Cardiac Rhythm:  (if applicable)      Physical Exam:  Objective:  General Appearance:  In no acute distress.    Vital signs: (most recent): Blood pressure (!) 95/50, pulse 106, temperature 98.5 °F (36.9 °C), temperature source Oral, resp. rate (!) 26, height 5' 6" (1.676 m), weight 73.5 kg (162 lb 0.6 oz), SpO2 (!) 92%.    Lungs:  There are decreased breath sounds.    Heart: Tachycardia.  Regular rhythm.  S1 normal and S2 normal.  Positive for murmur.    Abdomen: Abdomen is soft.  Bowel sounds are normal.   There is no abdominal tenderness.         Current Consults:  IP CONSULT TO SOCIAL WORK/CASE MANAGEMENT  IP CONSULT TO REGISTERED DIETITIAN/NUTRITIONIST  IP CONSULT TO CARDIOLOGY    Assessment/Plan:  Assessment:   Congestive heart failure  Possible ARDS like picture from chest x-ray  CAD with SVG to LAD supplying the coronary circulation  Status post TAVR  History of atrial arrhythmias  Plan:   Will stop Milrinone, continue Lasix will monitor chest x-ray     "

## 2025-03-08 NOTE — CARE UPDATE
03/08/25 0638   Patient Assessment/Suction   Level of Consciousness (AVPU) alert   Respiratory Effort Unlabored;Normal   Expansion/Accessory Muscles/Retractions no use of accessory muscles   All Lung Fields Breath Sounds Anterior:;Lateral:   Rhythm/Pattern, Respiratory no shortness of breath reported   Cough Frequency infrequent   Cough Type dry;nonproductive   PRE-TX-O2   Device (Oxygen Therapy) high flow nasal cannula   $ Is the patient on Low Flow Oxygen? Yes   Flow (L/min) (Oxygen Therapy) 2   SpO2 96 %   Pulse Oximetry Type Continuous   $ Pulse Oximetry - Multiple Charge Pulse Oximetry - Multiple   Pulse 105   Resp 14   Aerosol Therapy   $ Aerosol Therapy Charges PRN treatment not required   Respiratory Treatment Status (SVN) PRN treatment not required   Education   $ Education 15 min;Bronchodilator;Oxygen

## 2025-03-08 NOTE — NURSING
Report given to me by John SUTTON. Patient transferred to room 2532 from ICU pt alert and oriented to room and unit rules. Pt reports no pain or needs at this time. Telemetry box 9445 placed on pt.

## 2025-03-08 NOTE — PROGRESS NOTES
[facility] Medicine  Progress Note    Patient Name: Jerome Amaro Jr.  MRN: [id number]  Patient Class: IP- Inpatient   Admission Date: 3/6/2025  Length of Stay: 2 days  Attending Physician: Richardson Michaels MD  Primary Care Provider: Magda Ruiz FNP-C        Subjective     Principal Problem:Acute respiratory failure with hypoxia        HPI:  Patient is an 79-year-old gentleman with a history of systolic congestive heart failure, CAD/CABG, status post TAVR, status post pacemaker implantation and seizure disorder who was admitted between 2/4-2/7 for CHF exacerbation  Echo performed in January showed an EF of 50-55% but diastolic function could not be determined  Patient underwent cardiac catheterization in 2023 which showed an EF of 45% with inferior wall hypokinesis, diastolic dysfunction, and SVG sequential graft to the LAD; he did not require any interventions during that procedure  Per-details are unclear as the patient is critically ill and thus an expedited evaluation was performed-> it was reported that the patient had had shortness of breath for about 4 days prior to arrival  Patient was reportedly diagnosed with COVID in the outpatient venue but there is no demonstrable result available within our records or in CareEverywhere-> daughter Layla with whom I spoke on the phone was able to text a photograph of the positive result which I reviewed  Here, the patient presented with respiratory distress with tachypnea and was satting 91%; he had a low-grade temperature of 99.7° F and patient was tachycardic between the 90s-100s; he was tachypneic between 22-25 breaths per minute and was hemodynamically stable; sats went to 96% on BiPAP  WBC was 13.67 with a lactate of 2.44; influenza testing was negative  VBG pH was 7.423 with a CO2 of 37.3  CMP showed a creatinine of 1.3 (at baseline) and a sodium of 130  BNP was elevated at 2268 and troponin was 609.9 (on baseline tropes are in the 20s as of  "February)  EKG showed, per my interpretation, V paced rhythm at 100 beats per minute with a QTC of 536; follow up EKG showed, per my interpretation, a paced at 99 beats per minute with a QTC of 539  Chest x-ray showed, per radiologist Dr. Randolph:  "Stable cardiomegaly with increased bilateral edema.  Small bibasilar pleural effusions.  Pacer present."   The patient received DuoNebs and 40 mg of IV Lasix in his admitted for further diagnosis, treatment, and care    Upon my assessment, the patient was lethargic on BiPAP (10/5, rate of 12, FiO2 50%); this was subsequently removed    Patient became more alert but pleasantly confused; it was clear that he was non decisional as he was incompletely oriented (oriented to only self and place) and could not meaningfully participate in discussions regarding his healthcare    The patient does not have a formal power-of- nor living spouse and therefore has 2 daughters would be his daughter facto power-of- is (spoke with daughter at bedside as well as daughter Layla on speaker phone; note that daughter at bedside reports to me that she purposely failed to inform her sister, Layla, that the patient was ill in the ER (as they had had a verbal altercation earlier)    Discussed with family that the patient was critically ill from a combination of congestive heart failure, coronavirus infection, and possibly NSTEMI; as the patient can not answer on his own behalf, the 2 daughters then must function as power-of- in concert on his behalf    Thus, It was explained by myself to family at bedside as well as via speaker phone (Layla)  that the next step would be intubation but after careful consideration both daughters agree in concert to not intubate    Furthermore, given his precipitous state, we did discuss what their wishes would be should he terminally deteriorate; we discussed all aspects of coding including chest compressions, shocking (if appropriate), " medications, and intubation as well as pressor therapy-> daughter's universally consent to all these aforementioned modalities except they do not want him intubated, should he terminally deteriorate    Thus, patient will be FULL CODE but DNI-> discussed with Dr. Dubose and Dr. Herr    Overview/Hospital Course:  Patient with CHF, CAD, seizure disorder presented with complaints of shortness of breath and confusion.  Per family patient has intermittent confusion that has been ongoing for a while. Underlying dementia? AAOx2 currently. Became short of breath over the last several days.  Was taken to urgent care prior to admission, diagnosed with COVID and prescribed antibiotics.  COVID PCR negative on admission.  Did have elevated BNP and troponin, symptoms improved after Lasix was given.  Cardiology consulted.  Not convince patient has COVID, no recent sick contacts and patient remains at home.  Symptoms likely secondary to acute decompensated heart failure.  Repeat COVID negative, will dc remdesivir and decadron since symptoms improved with diuresis.  Weaned off of BiPAP, transfer out of the ICU. Cardio recommended conservative management.     Interval History:    Patient seen and examined during multidisciplinary rounds.  Patient is without any subjective complaints.  Patient was evaluated by Dr. Louie who is patient's cardiologist.  Milrinone infusion has been discontinued.  Patient is being kept on intravenous Lasix 40 mg twice daily.  Patient diuresing well.  Currently on 2 liter/minute supplemental oxygen via nasal cannula.  Patient is starting to develop contraction alkalosis.  Denies any chest pain.    Review of Systems   Constitutional:  Negative for diaphoresis.   Respiratory:  Negative for shortness of breath.    Cardiovascular:  Negative for chest pain and palpitations.   Gastrointestinal:  Negative for abdominal pain.     Objective:     Vital Signs (Most Recent):  Temp: 98.5 °F (36.9 °C) (03/08/25  0730)  Pulse: 106 (03/08/25 0730)  Resp: (!) 26 (03/08/25 0730)  BP: (!) 95/50 (03/08/25 0700)  SpO2: (!) 92 % (03/08/25 0730) Vital Signs (24h Range):  Temp:  [97.9 °F (36.6 °C)-98.7 °F (37.1 °C)] 98.5 °F (36.9 °C)  Pulse:  [] 106  Resp:  [14-50] 26  SpO2:  [80 %-98 %] 92 %  BP: ()/(50-76) 95/50     Weight: 73.5 kg (162 lb 0.6 oz)  Body mass index is 26.15 kg/m².    Intake/Output Summary (Last 24 hours) at 3/8/2025 0754  Last data filed at 3/8/2025 0600  Gross per 24 hour   Intake 790.8 ml   Output 1100 ml   Net -309.2 ml         Physical Exam  Constitutional:       General: He is not in acute distress.  Cardiovascular:      Rate and Rhythm: Normal rate. Rhythm irregular.   Pulmonary:      Effort: No respiratory distress.      Breath sounds: No wheezing.   Abdominal:      Palpations: Abdomen is soft.   Musculoskeletal:      Right lower leg: Edema present.      Left lower leg: Edema present.   Neurological:      General: No focal deficit present.      Mental Status: He is alert. Mental status is at baseline.               Significant Labs: All pertinent labs within the past 24 hours have been reviewed.  CBC:   Recent Labs   Lab 03/06/25  0928 03/07/25  0308 03/08/25  0302   WBC 11.47 7.52 11.60   HGB 10.3* 10.9* 10.1*   HCT 30.8* 33.4* 30.7*    189 255     CMP:   Recent Labs   Lab 03/06/25  2152 03/07/25  0308 03/08/25  0302   NA  --  134* 134*   K 3.5 3.9 4.2   CL  --  95 95   CO2  --  27 31*   GLU  --  88 97   BUN  --  15 28*   CREATININE  --  0.9 1.6*   CALCIUM  --  8.5* 9.0   ANIONGAP  --  12 8     Microbiology Results (last 7 days)       Procedure Component Value Units Date/Time    Blood culture x two cultures. Draw prior to antibiotics. [[id number]] Collected: 03/06/25 0354    Order Status: Completed Specimen: Blood Updated: 03/08/25 0632     Blood Culture, Routine No Growth to date      No Growth to date      No Growth to date    Narrative:      Aerobic and anaerobic    Blood culture x two  cultures. Draw prior to antibiotics. [[id number]] Collected: 03/06/25 0359    Order Status: Completed Specimen: Blood Updated: 03/08/25 0632     Blood Culture, Routine No Growth to date      No Growth to date      No Growth to date    Narrative:      Aerobic and anaerobic    Respiratory Infection Panel (PCR), Nasopharyngeal [[id number]] Collected: 03/06/25 0525    Order Status: Completed Specimen: Nasopharyngeal Swab Updated: 03/06/25 0712     Respiratory Infection Panel Source NP swab     Adenovirus Not Detected     Coronavirus 229E, Common Cold Virus Not Detected     Coronavirus HKU1, Common Cold Virus Not Detected     Coronavirus NL63, Common Cold Virus Not Detected     Coronavirus OC43, Common Cold Virus Not Detected     Comment: Coronavirus strains 229E, HKU1, NL63, and OC43 can cause the common   cold   and are not associated with the respiratory disease outbreak caused   by  the COVID-19 (SARS-CoV-2 novel Coronavirus) strain.           SARS-CoV2 (COVID-19) Qualitative PCR Not Detected     Human Metapneumovirus Not Detected     Human Rhinovirus/Enterovirus Not Detected     Influenza A Not Detected     Influenza B Not Detected     Parainfluenza Virus 1 Not Detected     Parainfluenza Virus 2 Not Detected     Parainfluenza Virus 3 Not Detected     Parainfluenza Virus 4 Not Detected     Respiratory Syncytial Virus Not Detected     Bordetella Parapertussis (ML1412) Not Detected     Bordetella pertussis (ptxP) Not Detected     Chlamydia pneumoniae Not Detected     Mycoplasma pneumoniae Not Detected     Comment: Respiratory Infection Panel testing performed by Multiplex PCR.       Narrative:      Respiratory Infection Panel source->NP Swab    Culture, Respiratory with Gram Stain [[id number]]     Order Status: No result Specimen: Respiratory           Significant Imaging:  ECHO:    Left Ventricle: There is low normal systolic function with a visually estimated ejection fraction of 50 - 55%. Grade II diastolic  dysfunction. Elevated left ventricular filling pressure.    Left Atrium: mildly dilated    Aortic Valve: There is mild aortic regurgitation.    Mitral Valve: Mildly calcified leaflets. There is moderate regurgitation.    IVC/SVC: Intermediate venous pressure at 8 mmHg.  Pulmonary systolic pressure between 30 and 35 mmHg    CXR:  Stable cardiomegaly with increased bilateral edema. Small bibasilar pleural effusions. Pacer present.     CT Head:  1. No acute intracranial abnormality.  2. Chronic and involutional changes of the brain.    CTA Chest:  1.  Low quality study for evaluation of pulmonary emboli secondary to contrast bolusing and motion artifact.  The main pulmonary arteries and segmental arteries are patent.  There is limited opacification of the subsegmental arteries in the lung bases  Moderate size pleural effusions with bibasilar atelectasis.  There is improvement of the ground-glass opacities in the upper lobes.  Calcified pleural plaques  Multi-vessel coronary artery calcification  Osteopenia with multilevel compression fractures and kyphosis of the thoracic spine      Assessment & Plan  Acute respiratory failure with hypoxia  Acute on chronic combined systolic and diastolic heart failure  Likely secondary to decompensated heart failure   Receiving intravenous Lasix as per Cardiology team.  Wean oxygen as tolerated    2D echocardiogram results reviewed with ejection fraction 50-55%.    Repeat chest x-ray today.  Cardiology follow up  S/P TAVR (transcatheter aortic valve replacement)  Aware  Pacemaker  noted  NSTEMI (non-ST elevated myocardial infarction)  Troponins downtrending   Per Cardiology patient's left heart catheterization showed 1 patent SVG graft  Cardiology recommended medical management, heparin drip discontinued   Eliquis restarted   Aspirin discontinued    Seizure disorder  Home phenytoin resumed  Seizure precautions  Atrial fibrillation  Resume Eliquis per Cardiology  Resume  beta-blocker      Hx of CABG  Aware  Altered mental status  Head CT unremarkable   Per family patient has been having on and off confusion for the last several months   Questionable dementia   Neurology follow up outpatient  CAD (coronary artery disease)  As above  Coronavirus infection  Doubt COVID infection, outpatient probably false-positive  Patient has no recent sick contacts, remains at home mainly   2 COVID test negative here   Symptoms improved with Lasix prior to administration of remdesivir and Decadron   Discontinue Decadron and remdesivir monitor, discussed with family        Start PT and OT.  Transfer the patient to medicine telemetry floor.  VTE Risk Mitigation (From admission, onward)           Ordered     apixaban tablet 5 mg  2 times daily         03/07/25 0909     IP VTE HIGH RISK PATIENT  Once         03/06/25 0604                    Discharge Planning   AXEL: 3/10/2025     Code Status: Partial Code   Medical Readiness for Discharge Date:   Discharge Plan A: Home Health            Critical care time spent on the evaluation and treatment of severe organ dysfunction, review of pertinent labs and imaging studies, discussions with consulting providers and discussions with patient/family: 35 minutes.    Please place Justification for DME        Edvin Almonte MD  Department of Hospital Medicine   [facility] hospital

## 2025-03-08 NOTE — ASSESSMENT & PLAN NOTE
Likely secondary to decompensated heart failure   Receiving intravenous Lasix as per Cardiology team.  Wean oxygen as tolerated    2D echocardiogram results reviewed with ejection fraction 50-55%.    Repeat chest x-ray today.  Cardiology follow up

## 2025-03-08 NOTE — SUBJECTIVE & OBJECTIVE
Interval History:    Patient seen and examined during multidisciplinary rounds.  Patient is without any subjective complaints.  Patient was evaluated by Dr. Louie who is patient's cardiologist.  Milrinone infusion has been discontinued.  Patient is being kept on intravenous Lasix 40 mg twice daily.  Patient diuresing well.  Currently on 2 liter/minute supplemental oxygen via nasal cannula.  Patient is starting to develop contraction alkalosis.  Denies any chest pain.    Review of Systems   Constitutional:  Negative for diaphoresis.   Respiratory:  Negative for shortness of breath.    Cardiovascular:  Negative for chest pain and palpitations.   Gastrointestinal:  Negative for abdominal pain.     Objective:     Vital Signs (Most Recent):  Temp: 98.5 °F (36.9 °C) (03/08/25 0730)  Pulse: 106 (03/08/25 0730)  Resp: (!) 26 (03/08/25 0730)  BP: (!) 95/50 (03/08/25 0700)  SpO2: (!) 92 % (03/08/25 0730) Vital Signs (24h Range):  Temp:  [97.9 °F (36.6 °C)-98.7 °F (37.1 °C)] 98.5 °F (36.9 °C)  Pulse:  [] 106  Resp:  [14-50] 26  SpO2:  [80 %-98 %] 92 %  BP: ()/(50-76) 95/50     Weight: 73.5 kg (162 lb 0.6 oz)  Body mass index is 26.15 kg/m².    Intake/Output Summary (Last 24 hours) at 3/8/2025 0754  Last data filed at 3/8/2025 0600  Gross per 24 hour   Intake 790.8 ml   Output 1100 ml   Net -309.2 ml         Physical Exam  Constitutional:       General: He is not in acute distress.  Cardiovascular:      Rate and Rhythm: Normal rate. Rhythm irregular.   Pulmonary:      Effort: No respiratory distress.      Breath sounds: No wheezing.   Abdominal:      Palpations: Abdomen is soft.   Musculoskeletal:      Right lower leg: Edema present.      Left lower leg: Edema present.   Neurological:      General: No focal deficit present.      Mental Status: He is alert. Mental status is at baseline.               Significant Labs: All pertinent labs within the past 24 hours have been reviewed.  CBC:   Recent Labs   Lab  03/06/25  0928 03/07/25  0308 03/08/25  0302   WBC 11.47 7.52 11.60   HGB 10.3* 10.9* 10.1*   HCT 30.8* 33.4* 30.7*    189 255     CMP:   Recent Labs   Lab 03/06/25  2152 03/07/25  0308 03/08/25  0302   NA  --  134* 134*   K 3.5 3.9 4.2   CL  --  95 95   CO2  --  27 31*   GLU  --  88 97   BUN  --  15 28*   CREATININE  --  0.9 1.6*   CALCIUM  --  8.5* 9.0   ANIONGAP  --  12 8     Microbiology Results (last 7 days)       Procedure Component Value Units Date/Time    Blood culture x two cultures. Draw prior to antibiotics. [[id number]] Collected: 03/06/25 0354    Order Status: Completed Specimen: Blood Updated: 03/08/25 0632     Blood Culture, Routine No Growth to date      No Growth to date      No Growth to date    Narrative:      Aerobic and anaerobic    Blood culture x two cultures. Draw prior to antibiotics. [[id number]] Collected: 03/06/25 0359    Order Status: Completed Specimen: Blood Updated: 03/08/25 0632     Blood Culture, Routine No Growth to date      No Growth to date      No Growth to date    Narrative:      Aerobic and anaerobic    Respiratory Infection Panel (PCR), Nasopharyngeal [[id number]] Collected: 03/06/25 0525    Order Status: Completed Specimen: Nasopharyngeal Swab Updated: 03/06/25 0712     Respiratory Infection Panel Source NP swab     Adenovirus Not Detected     Coronavirus 229E, Common Cold Virus Not Detected     Coronavirus HKU1, Common Cold Virus Not Detected     Coronavirus NL63, Common Cold Virus Not Detected     Coronavirus OC43, Common Cold Virus Not Detected     Comment: Coronavirus strains 229E, HKU1, NL63, and OC43 can cause the common   cold   and are not associated with the respiratory disease outbreak caused   by  the COVID-19 (SARS-CoV-2 novel Coronavirus) strain.           SARS-CoV2 (COVID-19) Qualitative PCR Not Detected     Human Metapneumovirus Not Detected     Human Rhinovirus/Enterovirus Not Detected     Influenza A Not Detected     Influenza B Not Detected      Parainfluenza Virus 1 Not Detected     Parainfluenza Virus 2 Not Detected     Parainfluenza Virus 3 Not Detected     Parainfluenza Virus 4 Not Detected     Respiratory Syncytial Virus Not Detected     Bordetella Parapertussis ([id number]) Not Detected     Bordetella pertussis (ptxP) Not Detected     Chlamydia pneumoniae Not Detected     Mycoplasma pneumoniae Not Detected     Comment: Respiratory Infection Panel testing performed by Multiplex PCR.       Narrative:      Respiratory Infection Panel source->NP Swab    Culture, Respiratory with Gram Stain [[id number]]     Order Status: No result Specimen: Respiratory           Significant Imaging:  ECHO:    Left Ventricle: There is low normal systolic function with a visually estimated ejection fraction of 50 - 55%. Grade II diastolic dysfunction. Elevated left ventricular filling pressure.    Left Atrium: mildly dilated    Aortic Valve: There is mild aortic regurgitation.    Mitral Valve: Mildly calcified leaflets. There is moderate regurgitation.    IVC/SVC: Intermediate venous pressure at 8 mmHg.  Pulmonary systolic pressure between 30 and 35 mmHg    CXR:  Stable cardiomegaly with increased bilateral edema. Small bibasilar pleural effusions. Pacer present.     CT Head:  1. No acute intracranial abnormality.  2. Chronic and involutional changes of the brain.    CTA Chest:  1.  Low quality study for evaluation of pulmonary emboli secondary to contrast bolusing and motion artifact.  The main pulmonary arteries and segmental arteries are patent.  There is limited opacification of the subsegmental arteries in the lung bases  Moderate size pleural effusions with bibasilar atelectasis.  There is improvement of the ground-glass opacities in the upper lobes.  Calcified pleural plaques  Multi-vessel coronary artery calcification  Osteopenia with multilevel compression fractures and kyphosis of the thoracic spine

## 2025-03-09 LAB
ANION GAP SERPL CALC-SCNC: 7 MMOL/L (ref 8–16)
BASOPHILS # BLD AUTO: 0.05 K/UL (ref 0–0.2)
BASOPHILS NFR BLD: 0.8 % (ref 0–1.9)
BUN SERPL-MCNC: 34 MG/DL (ref 8–23)
CALCIUM SERPL-MCNC: 8.4 MG/DL (ref 8.7–10.5)
CHLORIDE SERPL-SCNC: 95 MMOL/L (ref 95–110)
CO2 SERPL-SCNC: 31 MMOL/L (ref 23–29)
CREAT SERPL-MCNC: 1.5 MG/DL (ref 0.5–1.4)
DIFFERENTIAL METHOD BLD: ABNORMAL
EOSINOPHIL # BLD AUTO: 0.2 K/UL (ref 0–0.5)
EOSINOPHIL NFR BLD: 3.1 % (ref 0–8)
ERYTHROCYTE [DISTWIDTH] IN BLOOD BY AUTOMATED COUNT: 14.8 % (ref 11.5–14.5)
EST. GFR  (NO RACE VARIABLE): 46.8 ML/MIN/1.73 M^2
GLUCOSE SERPL-MCNC: 89 MG/DL (ref 70–110)
HCT VFR BLD AUTO: 27.6 % (ref 40–54)
HGB BLD-MCNC: 9.2 G/DL (ref 14–18)
IMM GRANULOCYTES # BLD AUTO: 0.04 K/UL (ref 0–0.04)
IMM GRANULOCYTES NFR BLD AUTO: 0.6 % (ref 0–0.5)
LYMPHOCYTES # BLD AUTO: 1.2 K/UL (ref 1–4.8)
LYMPHOCYTES NFR BLD: 18 % (ref 18–48)
MCH RBC QN AUTO: 29.2 PG (ref 27–31)
MCHC RBC AUTO-ENTMCNC: 33.3 G/DL (ref 32–36)
MCV RBC AUTO: 88 FL (ref 82–98)
MONOCYTES # BLD AUTO: 1 K/UL (ref 0.3–1)
MONOCYTES NFR BLD: 15.9 % (ref 4–15)
NEUTROPHILS # BLD AUTO: 4 K/UL (ref 1.8–7.7)
NEUTROPHILS NFR BLD: 61.6 % (ref 38–73)
NRBC BLD-RTO: 0 /100 WBC
PLATELET # BLD AUTO: 184 K/UL (ref 150–450)
PMV BLD AUTO: 10.6 FL (ref 9.2–12.9)
POTASSIUM SERPL-SCNC: 3.9 MMOL/L (ref 3.5–5.1)
RBC # BLD AUTO: 3.15 M/UL (ref 4.6–6.2)
SODIUM SERPL-SCNC: 133 MMOL/L (ref 136–145)
WBC # BLD AUTO: 6.54 K/UL (ref 3.9–12.7)

## 2025-03-09 PROCEDURE — 25000003 PHARM REV CODE 250: Performed by: STUDENT IN AN ORGANIZED HEALTH CARE EDUCATION/TRAINING PROGRAM

## 2025-03-09 PROCEDURE — 25000003 PHARM REV CODE 250: Performed by: INTERNAL MEDICINE

## 2025-03-09 PROCEDURE — 80048 BASIC METABOLIC PNL TOTAL CA: CPT | Performed by: STUDENT IN AN ORGANIZED HEALTH CARE EDUCATION/TRAINING PROGRAM

## 2025-03-09 PROCEDURE — 85025 COMPLETE CBC W/AUTO DIFF WBC: CPT | Performed by: STUDENT IN AN ORGANIZED HEALTH CARE EDUCATION/TRAINING PROGRAM

## 2025-03-09 PROCEDURE — 94761 N-INVAS EAR/PLS OXIMETRY MLT: CPT

## 2025-03-09 PROCEDURE — 25000003 PHARM REV CODE 250

## 2025-03-09 PROCEDURE — 36415 COLL VENOUS BLD VENIPUNCTURE: CPT | Performed by: STUDENT IN AN ORGANIZED HEALTH CARE EDUCATION/TRAINING PROGRAM

## 2025-03-09 PROCEDURE — 99900035 HC TECH TIME PER 15 MIN (STAT)

## 2025-03-09 PROCEDURE — 27000221 HC OXYGEN, UP TO 24 HOURS

## 2025-03-09 PROCEDURE — 21400001 HC TELEMETRY ROOM

## 2025-03-09 RX ORDER — FUROSEMIDE 10 MG/ML
40 INJECTION INTRAMUSCULAR; INTRAVENOUS DAILY
Status: DISCONTINUED | OUTPATIENT
Start: 2025-03-10 | End: 2025-03-10

## 2025-03-09 RX ADMIN — MUPIROCIN 1 G: 20 OINTMENT TOPICAL at 08:03

## 2025-03-09 RX ADMIN — APIXABAN 5 MG: 5 TABLET, FILM COATED ORAL at 11:03

## 2025-03-09 RX ADMIN — FAMOTIDINE 20 MG: 20 TABLET ORAL at 04:03

## 2025-03-09 RX ADMIN — APIXABAN 5 MG: 5 TABLET, FILM COATED ORAL at 08:03

## 2025-03-09 RX ADMIN — EXTENDED PHENYTOIN SODIUM 400 MG: 100 CAPSULE, EXTENDED RELEASE ORAL at 11:03

## 2025-03-09 RX ADMIN — CLOPIDOGREL BISULFATE 75 MG: 75 TABLET, FILM COATED ORAL at 11:03

## 2025-03-09 RX ADMIN — CITALOPRAM HYDROBROMIDE 20 MG: 20 TABLET ORAL at 11:03

## 2025-03-09 RX ADMIN — EZETIMIBE 10 MG: 10 TABLET ORAL at 11:03

## 2025-03-09 RX ADMIN — ACETAMINOPHEN 650 MG: 325 TABLET ORAL at 01:03

## 2025-03-09 RX ADMIN — MUPIROCIN 1 G: 20 OINTMENT TOPICAL at 11:03

## 2025-03-09 NOTE — PROGRESS NOTES
[facility]  Progress Note    Patient Name: Jerome Amaro Jr.  MRN: [id number]  Patient Class: IP- Inpatient   Admission Date: 3/6/2025  Length of Stay: 3 days  Attending Physician: Najma Roy DO  Primary Care Provider: Magda Ruiz FNP-C        Subjective     Principal Problem:Acute respiratory failure with hypoxia        HPI:  Patient is an 79-year-old gentleman with a history of systolic congestive heart failure, CAD/CABG, status post TAVR, status post pacemaker implantation and seizure disorder who was admitted between 2/4-2/7 for CHF exacerbation  Echo performed in January showed an EF of 50-55% but diastolic function could not be determined  Patient underwent cardiac catheterization in 2023 which showed an EF of 45% with inferior wall hypokinesis, diastolic dysfunction, and SVG sequential graft to the LAD; he did not require any interventions during that procedure  Per-details are unclear as the patient is critically ill and thus an expedited evaluation was performed-> it was reported that the patient had had shortness of breath for about 4 days prior to arrival  Patient was reportedly diagnosed with COVID in the outpatient venue but there is no demonstrable result available within our records or in CareEverywhere-> daughter Layla with whom I spoke on the phone was able to text a photograph of the positive result which I reviewed  Here, the patient presented with respiratory distress with tachypnea and was satting 91%; he had a low-grade temperature of 99.7° F and patient was tachycardic between the 90s-100s; he was tachypneic between 22-25 breaths per minute and was hemodynamically stable; sats went to 96% on BiPAP  WBC was 13.67 with a lactate of 2.44; influenza testing was negative  VBG pH was 7.423 with a CO2 of 37.3  CMP showed a creatinine of 1.3 (at baseline) and a sodium of 130  BNP was elevated at 2268 and troponin was 609.9 (on baseline tropes are in the 20s  "as of February)  EKG showed, per my interpretation, V paced rhythm at 100 beats per minute with a QTC of 536; follow up EKG showed, per my interpretation, a paced at 99 beats per minute with a QTC of 539  Chest x-ray showed, per radiologist Dr. Randolph:  "Stable cardiomegaly with increased bilateral edema.  Small bibasilar pleural effusions.  Pacer present."   The patient received DuoNebs and 40 mg of IV Lasix in his admitted for further diagnosis, treatment, and care    Upon my assessment, the patient was lethargic on BiPAP (10/5, rate of 12, FiO2 50%); this was subsequently removed    Patient became more alert but pleasantly confused; it was clear that he was non decisional as he was incompletely oriented (oriented to only self and place) and could not meaningfully participate in discussions regarding his healthcare    The patient does not have a formal power-of- nor living spouse and therefore has 2 daughters would be his daughter facto power-of- is (spoke with daughter at bedside as well as daughter Layla on speaker phone; note that daughter at bedside reports to me that she purposely failed to inform her sister, Layla, that the patient was ill in the ER (as they had had a verbal altercation earlier)    Discussed with family that the patient was critically ill from a combination of congestive heart failure, coronavirus infection, and possibly NSTEMI; as the patient can not answer on his own behalf, the 2 daughters then must function as power-of- in concert on his behalf    Thus, It was explained by myself to family at bedside as well as via speaker phone (Layla)  that the next step would be intubation but after careful consideration both daughters agree in concert to not intubate    Furthermore, given his precipitous state, we did discuss what their wishes would be should he terminally deteriorate; we discussed all aspects of coding including chest compressions, shocking (if appropriate), " medications, and intubation as well as pressor therapy-> daughter's universally consent to all these aforementioned modalities except they do not want him intubated, should he terminally deteriorate    Thus, patient will be FULL CODE but DNI-> discussed with Dr. Dubose and Dr. Herr    Overview/Hospital Course:  Patient with CHF, CAD, seizure disorder presented with complaints of shortness of breath and confusion.  Per family patient has intermittent confusion that has been ongoing for a while. Underlying dementia? AAOx2 currently. Became short of breath over the last several days.  Was taken to urgent care prior to admission, diagnosed with COVID and prescribed antibiotics.  COVID PCR negative on admission.  Did have elevated BNP and troponin, symptoms improved after Lasix was given.  Cardiology consulted.  Not convince patient has COVID, no recent sick contacts and patient remains at home.  Symptoms likely secondary to acute decompensated heart failure.  Repeat COVID negative, will dc remdesivir and decadron since symptoms improved with diuresis.  Weaned off of BiPAP, transfer out of the ICU. Cardio recommended conservative management.     Interval History:  Patient was seen and examined.  Overnight notes reviewed.  Continue IV diuresis per cardiology recs.  Discussed with staff importance of strict I's and O's.  Dietitian consulted for CHF and heart healthy diet education.  Patient reports CHURCH and orthopnea.  He denies chest pain, abdominal pain or nausea.  PT/OT following and recommended moderate intensive therapy.  Case management following for discharge planning.  Cardiology following.    Review of Systems   Respiratory:  Positive for shortness of breath (CHURCH and orthopnea).    Cardiovascular:  Negative for chest pain.   Gastrointestinal:  Negative for abdominal pain and nausea.     Objective:     Vital Signs (Most Recent):  Temp: 97.9 °F (36.6 °C) (03/09/25 1130)  Pulse: 80 (03/09/25 1130)  Resp: 18  (03/09/25 1130)  BP: 107/67 (03/09/25 1130)  SpO2: 97 % (03/09/25 1130) Vital Signs (24h Range):  Temp:  [97.9 °F (36.6 °C)-99.5 °F (37.5 °C)] 97.9 °F (36.6 °C)  Pulse:  [59-91] 80  Resp:  [17-33] 18  SpO2:  [92 %-97 %] 97 %  BP: ()/(46-67) 107/67     Weight: 79.9 kg (176 lb 2.4 oz)  Body mass index is 28.43 kg/m².    Intake/Output Summary (Last 24 hours) at 3/9/2025 1354  Last data filed at 3/9/2025 1137  Gross per 24 hour   Intake 482 ml   Output 540 ml   Net -58 ml         Physical Exam  Vitals and nursing note reviewed.   Constitutional:       General: He is not in acute distress.     Appearance: Normal appearance. He is normal weight.   HENT:      Head: Normocephalic and atraumatic.      Mouth/Throat:      Mouth: Mucous membranes are moist.      Pharynx: Oropharynx is clear.   Eyes:      Extraocular Movements: Extraocular movements intact.   Cardiovascular:      Rate and Rhythm: Normal rate and regular rhythm.   Pulmonary:      Effort: Pulmonary effort is normal.      Breath sounds: Rales (BL bases) present.   Abdominal:      General: Abdomen is flat. Bowel sounds are normal.      Palpations: Abdomen is soft.      Tenderness: There is no abdominal tenderness. There is no guarding or rebound.   Musculoskeletal:         General: Normal range of motion.      Right lower leg: No edema.      Left lower leg: No edema.   Neurological:      General: No focal deficit present.      Mental Status: He is alert and oriented to person, place, and time. Mental status is at baseline.   Psychiatric:         Mood and Affect: Mood normal.         Behavior: Behavior normal.               Significant Labs: All pertinent labs within the past 24 hours have been reviewed.  CBC:   Recent Labs   Lab 03/08/25 0302 03/09/25  0454   WBC 11.60 6.54   HGB 10.1* 9.2*   HCT 30.7* 27.6*    184     CMP:   Recent Labs   Lab 03/08/25  0302 03/09/25  0453   * 133*   K 4.2 3.9   CL 95 95   CO2 31* 31*   GLU 97 89   BUN 28* 34*    CREATININE 1.6* 1.5*   CALCIUM 9.0 8.4*   ANIONGAP 8 7*       Significant Imaging: I have reviewed all pertinent imaging results/findings within the past 24 hours.      Assessment & Plan  Acute respiratory failure with hypoxia  Acute on chronic combined systolic and diastolic heart failure  Likely secondary to decompensated heart failure   Receiving intravenous Lasix as per Cardiology team.  Wean oxygen as tolerated    2D echocardiogram results reviewed with ejection fraction 50-55%.    Repeat chest x-ray today.  Cardiology follow up  Likely secondary to decompensated heart failure   Receiving intravenous Lasix as per Cardiology team.  Wean oxygen as tolerated   2D echocardiogram results reviewed with ejection fraction 50-55%.  Cardiology following  S/P TAVR (transcatheter aortic valve replacement)  Aware  Pacemaker  noted  NSTEMI (non-ST elevated myocardial infarction)  Troponins downtrending   Per Cardiology patient's left heart catheterization showed 1 patent SVG graft  Cardiology recommended medical management, heparin drip discontinued   Eliquis restarted   Aspirin discontinued    Seizure disorder  Home phenytoin resumed  Seizure precautions  Atrial fibrillation  Resume Eliquis per Cardiology  Resume beta-blocker      Hx of CABG  Aware  Altered mental status  Head CT unremarkable   Per family patient has been having on and off confusion for the last several months   Questionable dementia   Neurology follow up outpatient  CAD (coronary artery disease)  As above  Coronavirus infection  Doubt COVID infection, outpatient probably false-positive  Patient has no recent sick contacts, remains at home mainly   2 COVID test negative here   Symptoms improved with Lasix prior to administration of remdesivir and Decadron   Discontinue Decadron and remdesivir monitor, discussed with family  PT/OT consulted  VTE Risk Mitigation (From admission, onward)           Ordered     apixaban tablet 5 mg  2 times daily          03/07/25 0909     IP VTE HIGH RISK PATIENT  Once         03/06/25 0604                    Discharge Planning   AXEL: 3/13/2025     Code Status: Partial Code   Medical Readiness for Discharge Date:   Discharge Plan A: Skilled Nursing Facility   Discharge Delays: None known at this time            Please place Justification for DME        Della Santiago PA-C  Department of Hospital Medicine   [facility]

## 2025-03-09 NOTE — CARE UPDATE
03/09/25 0744   Patient Assessment/Suction   Level of Consciousness (AVPU) alert   All Lung Fields Breath Sounds clear   Rhythm/Pattern, Respiratory pattern regular   PRE-TX-O2   Device (Oxygen Therapy) nasal cannula   $ Is the patient on Low Flow Oxygen? Yes   Flow (L/min) (Oxygen Therapy) 2   SpO2 96 %   Pulse Oximetry Type Continuous   $ Pulse Oximetry - Multiple Charge Pulse Oximetry - Multiple   Pulse (!) 59   Resp 20

## 2025-03-09 NOTE — SUBJECTIVE & OBJECTIVE
Interval History:  Patient was seen and examined.  Overnight notes reviewed.  Continue IV diuresis per cardiology recs.  Discussed with staff importance of strict I's and O's.  Dietitian consulted for CHF and heart healthy diet education.  Patient reports CHURCH and orthopnea.  He denies chest pain, abdominal pain or nausea.  PT/OT following and recommended moderate intensive therapy.  Case management following for discharge planning.  Cardiology following.    Review of Systems   Respiratory:  Positive for shortness of breath (CHURCH and orthopnea).    Cardiovascular:  Negative for chest pain.   Gastrointestinal:  Negative for abdominal pain and nausea.     Objective:     Vital Signs (Most Recent):  Temp: 97.9 °F (36.6 °C) (03/09/25 1130)  Pulse: 80 (03/09/25 1130)  Resp: 18 (03/09/25 1130)  BP: 107/67 (03/09/25 1130)  SpO2: 97 % (03/09/25 1130) Vital Signs (24h Range):  Temp:  [97.9 °F (36.6 °C)-99.5 °F (37.5 °C)] 97.9 °F (36.6 °C)  Pulse:  [59-91] 80  Resp:  [17-33] 18  SpO2:  [92 %-97 %] 97 %  BP: ()/(46-67) 107/67     Weight: 79.9 kg (176 lb 2.4 oz)  Body mass index is 28.43 kg/m².    Intake/Output Summary (Last 24 hours) at 3/9/2025 1354  Last data filed at 3/9/2025 1137  Gross per 24 hour   Intake 482 ml   Output 540 ml   Net -58 ml         Physical Exam  Vitals and nursing note reviewed.   Constitutional:       General: He is not in acute distress.     Appearance: Normal appearance. He is normal weight.   HENT:      Head: Normocephalic and atraumatic.      Mouth/Throat:      Mouth: Mucous membranes are moist.      Pharynx: Oropharynx is clear.   Eyes:      Extraocular Movements: Extraocular movements intact.   Cardiovascular:      Rate and Rhythm: Normal rate and regular rhythm.   Pulmonary:      Effort: Pulmonary effort is normal.      Breath sounds: Rales (BL bases) present.   Abdominal:      General: Abdomen is flat. Bowel sounds are normal.      Palpations: Abdomen is soft.      Tenderness: There is no  abdominal tenderness. There is no guarding or rebound.   Musculoskeletal:         General: Normal range of motion.      Right lower leg: No edema.      Left lower leg: No edema.   Neurological:      General: No focal deficit present.      Mental Status: He is alert and oriented to person, place, and time. Mental status is at baseline.   Psychiatric:         Mood and Affect: Mood normal.         Behavior: Behavior normal.               Significant Labs: All pertinent labs within the past 24 hours have been reviewed.  CBC:   Recent Labs   Lab 03/08/25  0302 03/09/25  0454   WBC 11.60 6.54   HGB 10.1* 9.2*   HCT 30.7* 27.6*    184     CMP:   Recent Labs   Lab 03/08/25  0302 03/09/25  0453   * 133*   K 4.2 3.9   CL 95 95   CO2 31* 31*   GLU 97 89   BUN 28* 34*   CREATININE 1.6* 1.5*   CALCIUM 9.0 8.4*   ANIONGAP 8 7*       Significant Imaging: I have reviewed all pertinent imaging results/findings within the past 24 hours.

## 2025-03-09 NOTE — ASSESSMENT & PLAN NOTE
Doubt COVID infection, outpatient probably false-positive  Patient has no recent sick contacts, remains at home mainly   2 COVID test negative here   Symptoms improved with Lasix prior to administration of remdesivir and Decadron   Discontinue Decadron and remdesivir monitor, discussed with family  PT/OT consulted

## 2025-03-09 NOTE — CARE UPDATE
03/08/25 2040   Patient Assessment/Suction   Level of Consciousness (AVPU) alert   Respiratory Effort Unlabored   Expansion/Accessory Muscles/Retractions no use of accessory muscles   All Lung Fields Breath Sounds clear;equal bilaterally   Rhythm/Pattern, Respiratory no shortness of breath reported   Cough Frequency no cough   PRE-TX-O2   Device (Oxygen Therapy) high flow nasal cannula   $ Is the patient on High Flow Oxygen? Yes   Flow (L/min) (Oxygen Therapy) 2   SpO2 95 %   Pulse Oximetry Type Continuous   $ Pulse Oximetry - Multiple Charge Pulse Oximetry - Multiple   Positioning   Head of Bed (HOB) Positioning HOB elevated;HOB at 30 degrees

## 2025-03-09 NOTE — ASSESSMENT & PLAN NOTE
Likely secondary to decompensated heart failure   Receiving intravenous Lasix as per Cardiology team.  Wean oxygen as tolerated    2D echocardiogram results reviewed with ejection fraction 50-55%.    Repeat chest x-ray today.  Cardiology follow up  Likely secondary to decompensated heart failure   Receiving intravenous Lasix as per Cardiology team.  Wean oxygen as tolerated   2D echocardiogram results reviewed with ejection fraction 50-55%.  Cardiology following

## 2025-03-10 LAB
ANION GAP SERPL CALC-SCNC: 7 MMOL/L (ref 8–16)
BASOPHILS # BLD AUTO: 0.04 K/UL (ref 0–0.2)
BASOPHILS NFR BLD: 0.6 % (ref 0–1.9)
BUN SERPL-MCNC: 27 MG/DL (ref 8–23)
CALCIUM SERPL-MCNC: 8.5 MG/DL (ref 8.7–10.5)
CHLORIDE SERPL-SCNC: 95 MMOL/L (ref 95–110)
CO2 SERPL-SCNC: 31 MMOL/L (ref 23–29)
CREAT SERPL-MCNC: 1 MG/DL (ref 0.5–1.4)
DIFFERENTIAL METHOD BLD: ABNORMAL
EOSINOPHIL # BLD AUTO: 0.2 K/UL (ref 0–0.5)
EOSINOPHIL NFR BLD: 3.5 % (ref 0–8)
ERYTHROCYTE [DISTWIDTH] IN BLOOD BY AUTOMATED COUNT: 14.7 % (ref 11.5–14.5)
EST. GFR  (NO RACE VARIABLE): >60 ML/MIN/1.73 M^2
GLUCOSE SERPL-MCNC: 87 MG/DL (ref 70–110)
HCT VFR BLD AUTO: 30.8 % (ref 40–54)
HGB BLD-MCNC: 10.1 G/DL (ref 14–18)
IMM GRANULOCYTES # BLD AUTO: 0.04 K/UL (ref 0–0.04)
IMM GRANULOCYTES NFR BLD AUTO: 0.6 % (ref 0–0.5)
LYMPHOCYTES # BLD AUTO: 1.2 K/UL (ref 1–4.8)
LYMPHOCYTES NFR BLD: 17.2 % (ref 18–48)
MAGNESIUM SERPL-MCNC: 2 MG/DL (ref 1.6–2.6)
MCH RBC QN AUTO: 28.9 PG (ref 27–31)
MCHC RBC AUTO-ENTMCNC: 32.8 G/DL (ref 32–36)
MCV RBC AUTO: 88 FL (ref 82–98)
MONOCYTES # BLD AUTO: 0.9 K/UL (ref 0.3–1)
MONOCYTES NFR BLD: 13 % (ref 4–15)
NEUTROPHILS # BLD AUTO: 4.5 K/UL (ref 1.8–7.7)
NEUTROPHILS NFR BLD: 65.1 % (ref 38–73)
NRBC BLD-RTO: 0 /100 WBC
PLATELET # BLD AUTO: 205 K/UL (ref 150–450)
PMV BLD AUTO: 10.2 FL (ref 9.2–12.9)
POTASSIUM SERPL-SCNC: 4.4 MMOL/L (ref 3.5–5.1)
RBC # BLD AUTO: 3.5 M/UL (ref 4.6–6.2)
SODIUM SERPL-SCNC: 133 MMOL/L (ref 136–145)
WBC # BLD AUTO: 6.93 K/UL (ref 3.9–12.7)

## 2025-03-10 PROCEDURE — 99900035 HC TECH TIME PER 15 MIN (STAT)

## 2025-03-10 PROCEDURE — 94761 N-INVAS EAR/PLS OXIMETRY MLT: CPT

## 2025-03-10 PROCEDURE — 25000003 PHARM REV CODE 250

## 2025-03-10 PROCEDURE — 27000221 HC OXYGEN, UP TO 24 HOURS

## 2025-03-10 PROCEDURE — 36415 COLL VENOUS BLD VENIPUNCTURE: CPT

## 2025-03-10 PROCEDURE — 36415 COLL VENOUS BLD VENIPUNCTURE: CPT | Performed by: STUDENT IN AN ORGANIZED HEALTH CARE EDUCATION/TRAINING PROGRAM

## 2025-03-10 PROCEDURE — 63600175 PHARM REV CODE 636 W HCPCS: Performed by: INTERNAL MEDICINE

## 2025-03-10 PROCEDURE — 25000003 PHARM REV CODE 250: Performed by: INTERNAL MEDICINE

## 2025-03-10 PROCEDURE — 97535 SELF CARE MNGMENT TRAINING: CPT

## 2025-03-10 PROCEDURE — 85025 COMPLETE CBC W/AUTO DIFF WBC: CPT | Performed by: STUDENT IN AN ORGANIZED HEALTH CARE EDUCATION/TRAINING PROGRAM

## 2025-03-10 PROCEDURE — 25000003 PHARM REV CODE 250: Performed by: STUDENT IN AN ORGANIZED HEALTH CARE EDUCATION/TRAINING PROGRAM

## 2025-03-10 PROCEDURE — 94799 UNLISTED PULMONARY SVC/PX: CPT | Mod: XB

## 2025-03-10 PROCEDURE — 80048 BASIC METABOLIC PNL TOTAL CA: CPT | Performed by: STUDENT IN AN ORGANIZED HEALTH CARE EDUCATION/TRAINING PROGRAM

## 2025-03-10 PROCEDURE — 21400001 HC TELEMETRY ROOM

## 2025-03-10 PROCEDURE — 97116 GAIT TRAINING THERAPY: CPT

## 2025-03-10 PROCEDURE — 83735 ASSAY OF MAGNESIUM: CPT

## 2025-03-10 PROCEDURE — 99900031 HC PATIENT EDUCATION (STAT)

## 2025-03-10 RX ORDER — FUROSEMIDE 20 MG/1
20 TABLET ORAL 2 TIMES DAILY
Status: DISCONTINUED | OUTPATIENT
Start: 2025-03-10 | End: 2025-03-12 | Stop reason: HOSPADM

## 2025-03-10 RX ADMIN — MUPIROCIN 1 G: 20 OINTMENT TOPICAL at 09:03

## 2025-03-10 RX ADMIN — APIXABAN 5 MG: 5 TABLET, FILM COATED ORAL at 09:03

## 2025-03-10 RX ADMIN — EZETIMIBE 10 MG: 10 TABLET ORAL at 09:03

## 2025-03-10 RX ADMIN — ONDANSETRON 4 MG: 2 INJECTION INTRAMUSCULAR; INTRAVENOUS at 12:03

## 2025-03-10 RX ADMIN — FUROSEMIDE 20 MG: 20 TABLET ORAL at 06:03

## 2025-03-10 RX ADMIN — FAMOTIDINE 20 MG: 20 TABLET ORAL at 06:03

## 2025-03-10 RX ADMIN — CLOPIDOGREL BISULFATE 75 MG: 75 TABLET, FILM COATED ORAL at 09:03

## 2025-03-10 RX ADMIN — EXTENDED PHENYTOIN SODIUM 400 MG: 100 CAPSULE, EXTENDED RELEASE ORAL at 09:03

## 2025-03-10 RX ADMIN — SACUBITRIL AND VALSARTAN 1 TABLET: 24; 26 TABLET, FILM COATED ORAL at 09:03

## 2025-03-10 RX ADMIN — CITALOPRAM HYDROBROMIDE 20 MG: 20 TABLET ORAL at 09:03

## 2025-03-10 RX ADMIN — Medication 9 MG: at 09:03

## 2025-03-10 NOTE — PLAN OF CARE
MARLEN contacted daughter/Nilda [phone] to go over SNF copay. MARLEN informed daughter the copay would be $165 per day. Daughter stated she needs to talk this over with her sister to determine if they can afford, will call back within a hour.

## 2025-03-10 NOTE — PROGRESS NOTES
[place]    Cardiology Progress Note    Subjective:  Patient is resting comfortably appears to be somewhat confused.  No obvious shortness of breath or chest pain.  Patient has had no recent chest x-ray      Objective:  Vital Signs (Most Recent)  Temp: 98.1 °F (36.7 °C) (03/10/25 0731)  Pulse: 77 (03/10/25 0751)  Resp: 17 (03/10/25 0751)  BP: 102/68 (03/10/25 0731)  SpO2: 98 % (03/10/25 0751)    Vital Signs Range (Last 24H):  Temp:  [97.9 °F (36.6 °C)-98.1 °F (36.7 °C)]   Pulse:  [77-91]   Resp:  [16-18]   BP: (102-119)/(65-76)   SpO2:  [96 %-98 %]     I & O (Last 24H):    Intake/Output Summary (Last 24 hours) at 3/10/2025 0837  Last data filed at 3/10/2025 0833  Gross per 24 hour   Intake 120 ml   Output 850 ml   Net -730 ml       Current Diet:     Current Diet Order   Procedures    Diet Cardiac Fluid - 1500mL     Fluid restriction::   Fluid - 1500mL        Allergies:  Review of patient's allergies indicates:   Allergen Reactions    Atorvastatin Other (See Comments)     Muscle pain    Rosuvastatin Other (See Comments)     Muscle pain       Meds:  Scheduled Meds:   apixaban  5 mg Oral BID    citalopram  20 mg Oral Daily    clopidogreL  75 mg Oral Daily    ezetimibe  10 mg Oral Daily    famotidine  20 mg Oral Daily    furosemide (LASIX) injection  40 mg Intravenous Daily    isosorbide mononitrate  30 mg Oral Daily    metoprolol succinate  100 mg Oral BID    mupirocin   Nasal BID    phenytoin  400 mg Oral Daily    sacubitriL-valsartan  1 tablet Oral BID     Continuous Infusions:  PRN Meds:  Current Facility-Administered Medications:     acetaminophen, 650 mg, Oral, Q4H PRN    albuterol-ipratropium, 3 mL, Nebulization, Q6H PRN    dextrose 50%, 12.5 g, Intravenous, PRN    dextrose 50%, 25 g, Intravenous, PRN    glucagon (human recombinant), 1 mg, Intramuscular, PRN    glucose, 16 g, Oral, PRN    glucose, 24 g, Oral, PRN    HYDROcodone-acetaminophen, 1 tablet, Oral, Q6H PRN    HYDROcodone-acetaminophen, 1  tablet, Oral, Q6H PRN    magnesium oxide, 800 mg, Oral, PRN    magnesium oxide, 800 mg, Oral, PRN    melatonin, 9 mg, Oral, Nightly PRN    morphine, 1 mg, Intravenous, Q2H PRN    naloxone, 0.02 mg, Intravenous, PRN    nitroGLYCERIN, 0.4 mg, Sublingual, Q5 Min PRN    ondansetron, 4 mg, Intravenous, Q6H PRN    potassium bicarbonate, 35 mEq, Oral, PRN    potassium bicarbonate, 50 mEq, Oral, PRN    potassium bicarbonate, 60 mEq, Oral, PRN    potassium, sodium phosphates, 2 packet, Oral, PRN    potassium, sodium phosphates, 2 packet, Oral, PRN    potassium, sodium phosphates, 2 packet, Oral, PRN    senna-docusate 8.6-50 mg, 1 tablet, Oral, BID PRN    sodium chloride 0.9%, 10 mL, Intravenous, PRN    Lab Results :  Recent Results (from the past 24 hours)   CBC Auto Differential    Collection Time: 03/10/25  4:45 AM   Result Value Ref Range    WBC 6.93 3.90 - 12.70 K/uL    RBC 3.50 (L) 4.60 - 6.20 M/uL    Hemoglobin 10.1 (L) 14.0 - 18.0 g/dL    Hematocrit 30.8 (L) 40.0 - 54.0 %    MCV 88 82 - 98 fL    MCH 28.9 27.0 - 31.0 pg    MCHC 32.8 32.0 - 36.0 g/dL    RDW 14.7 (H) 11.5 - 14.5 %    Platelets 205 150 - 450 K/uL    MPV 10.2 9.2 - 12.9 fL    Immature Granulocytes 0.6 (H) 0.0 - 0.5 %    Gran # (ANC) 4.5 1.8 - 7.7 K/uL    Immature Grans (Abs) 0.04 0.00 - 0.04 K/uL    Lymph # 1.2 1.0 - 4.8 K/uL    Mono # 0.9 0.3 - 1.0 K/uL    Eos # 0.2 0.0 - 0.5 K/uL    Baso # 0.04 0.00 - 0.20 K/uL    nRBC 0 0 /100 WBC    Gran % 65.1 38.0 - 73.0 %    Lymph % 17.2 (L) 18.0 - 48.0 %    Mono % 13.0 4.0 - 15.0 %    Eosinophil % 3.5 0.0 - 8.0 %    Basophil % 0.6 0.0 - 1.9 %    Differential Method Automated    Basic Metabolic Panel    Collection Time: 03/10/25  4:45 AM   Result Value Ref Range    Sodium 133 (L) 136 - 145 mmol/L    Potassium 4.4 3.5 - 5.1 mmol/L    Chloride 95 95 - 110 mmol/L    CO2 31 (H) 23 - 29 mmol/L    Glucose 87 70 - 110 mg/dL    BUN 27 (H) 8 - 23 mg/dL    Creatinine 1.0 0.5 - 1.4 mg/dL    Calcium 8.5 (L) 8.7 - 10.5 mg/dL     Anion Gap 7 (L) 8 - 16 mmol/L    eGFR >60.0 >60 mL/min/1.73 m^2       Diagnostic Results:  Imaging Results              CT Head Without Contrast (Final result)  Result time 03/06/25 07:24:59      Final result by Fox Dowd DO (03/06/25 07:24:59)                   Impression:      1. No acute intracranial abnormality.  2. Chronic and involutional changes of the brain.      Electronically signed by: Fox Dowd  Date:    03/06/2025  Time:    07:24               Narrative:      CMS MANDATED QUALITY DATA - CT RADIATION - 436    All CT scans at this facility utilize dose modulation, iterative reconstruction, and/or weight based dosing when appropriate to reduce radiation dose to as low as reasonably achievable.    EXAMINATION:  CT HEAD WITHOUT CONTRAST    CLINICAL HISTORY:  Altered mental status;    TECHNIQUE:  Head CT without IV contrast.    COMPARISON:  CT brain 01/21/2025.    FINDINGS:  Gray-white differentiation is maintained without hemorrhage, midline shift, or mass effect.  There are involutional changes of the brain parenchyma with ex vacuo dilatation of the ventricles.  Periventricular and deep white matter hypoattenuation noted consistent with chronic changes of microvascular ischemia.  Calvarium is intact. Visualized sinuses are clear.                                       X-Ray Chest AP Portable (Final result)  Result time 03/06/25 04:01:41      Final result by Kit Randolph MD (03/06/25 04:01:41)                   Impression:      Stable cardiomegaly with increased bilateral edema.  Small bibasilar pleural effusions.  Pacer present.      Electronically signed by: Kit Randolph MD  Date:    03/06/2025  Time:    04:01               Narrative:    EXAMINATION:  XR CHEST AP PORTABLE    CLINICAL HISTORY:  Sepsis;    TECHNIQUE:  Single frontal view of the chest was performed.    COMPARISON:  02/04/2025.    FINDINGS:  Pacer leads and sternotomy wires, similar to prior.    Stable cardiomegaly.   "Increased bilateral edema.  Small bibasilar pleural effusions.    Heart and lungs otherwise appear unchanged when allowing for differences in technique and positioning.                                      Recent Cardiac Rhythm   (if applicable)      Physical Exam:  Objective:  General Appearance:  In no acute distress.    Vital signs: (most recent): Blood pressure 102/68, pulse 77, temperature 98.1 °F (36.7 °C), resp. rate 17, height 5' 6" (1.676 m), weight 79.9 kg (176 lb 2.4 oz), SpO2 98%.    Lungs:  There are decreased breath sounds.    Heart: Normal rate.  Regular rhythm.  S1 normal and S2 normal.  Positive for murmur.    Abdomen: Abdomen is soft.  Bowel sounds are normal.         Current Consults:  IP CONSULT TO SOCIAL WORK/CASE MANAGEMENT  IP CONSULT TO REGISTERED DIETITIAN/NUTRITIONIST  IP CONSULT TO CARDIOLOGY  IP CONSULT TO REGISTERED DIETITIAN/NUTRITIONIST    Assessment/Plan:  Assessment:   Congestive heart failure  Chest x-ray consistent with possible ARDS like picture  Non-STEMI  History of seizure  History of atrial fibrillation    Plan:  Continue present medical therapy.  No new cardiac [place] at this time would repeat chest x-ray  "

## 2025-03-10 NOTE — PROGRESS NOTES
[facility]  Progress Note    Patient Name: Jerome Amaro Jr.  MRN: [id number]  Patient Class: IP- Inpatient   Admission Date: 3/6/2025  Length of Stay: 4 days  Attending Physician: Najma Roy DO  Primary Care Provider: Magda Ruiz FNP-C        Subjective     Principal Problem:Acute respiratory failure with hypoxia        HPI:  Patient is an 79-year-old gentleman with a history of systolic congestive heart failure, CAD/CABG, status post TAVR, status post pacemaker implantation and seizure disorder who was admitted between 2/4-2/7 for CHF exacerbation  Echo performed in January showed an EF of 50-55% but diastolic function could not be determined  Patient underwent cardiac catheterization in 2023 which showed an EF of 45% with inferior wall hypokinesis, diastolic dysfunction, and SVG sequential graft to the LAD; he did not require any interventions during that procedure  Per-details are unclear as the patient is critically ill and thus an expedited evaluation was performed-> it was reported that the patient had had shortness of breath for about 4 days prior to arrival  Patient was reportedly diagnosed with COVID in the outpatient venue but there is no demonstrable result available within our records or in CareEverywhere-> daughter Layla with whom I spoke on the phone was able to text a photograph of the positive result which I reviewed  Here, the patient presented with respiratory distress with tachypnea and was satting 91%; he had a low-grade temperature of 99.7° F and patient was tachycardic between the 90s-100s; he was tachypneic between 22-25 breaths per minute and was hemodynamically stable; sats went to 96% on BiPAP  WBC was 13.67 with a lactate of 2.44; influenza testing was negative  VBG pH was 7.423 with a CO2 of 37.3  CMP showed a creatinine of 1.3 (at baseline) and a sodium of 130  BNP was elevated at 2268 and troponin was 609.9 (on baseline tropes are in the 20s  "as of February)  EKG showed, per my interpretation, V paced rhythm at 100 beats per minute with a QTC of 536; follow up EKG showed, per my interpretation, a paced at 99 beats per minute with a QTC of 539  Chest x-ray showed, per radiologist Dr. Randolph:  "Stable cardiomegaly with increased bilateral edema.  Small bibasilar pleural effusions.  Pacer present."   The patient received DuoNebs and 40 mg of IV Lasix in his admitted for further diagnosis, treatment, and care    Upon my assessment, the patient was lethargic on BiPAP (10/5, rate of 12, FiO2 50%); this was subsequently removed    Patient became more alert but pleasantly confused; it was clear that he was non decisional as he was incompletely oriented (oriented to only self and place) and could not meaningfully participate in discussions regarding his healthcare    The patient does not have a formal power-of- nor living spouse and therefore has 2 daughters would be his daughter facto power-of- is (spoke with daughter at bedside as well as daughter Layla on speaker phone; note that daughter at bedside reports to me that she purposely failed to inform her sister, Layla, that the patient was ill in the ER (as they had had a verbal altercation earlier)    Discussed with family that the patient was critically ill from a combination of congestive heart failure, coronavirus infection, and possibly NSTEMI; as the patient can not answer on his own behalf, the 2 daughters then must function as power-of- in concert on his behalf    Thus, It was explained by myself to family at bedside as well as via speaker phone (Layla)  that the next step would be intubation but after careful consideration both daughters agree in concert to not intubate    Furthermore, given his precipitous state, we did discuss what their wishes would be should he terminally deteriorate; we discussed all aspects of coding including chest compressions, shocking (if appropriate), " medications, and intubation as well as pressor therapy-> daughter's universally consent to all these aforementioned modalities except they do not want him intubated, should he terminally deteriorate    Thus, patient will be FULL CODE but DNI-> discussed with Dr. Dubose and Dr. Herr    Overview/Hospital Course:  Patient with CHF, CAD, seizure disorder presented with complaints of shortness of breath and confusion.  Per family patient has intermittent confusion that has been ongoing for a while. Underlying dementia? AAOx2 currently. Became short of breath over the last several days.  Was taken to urgent care prior to admission, diagnosed with COVID and prescribed antibiotics.  COVID PCR negative on admission.  Did have elevated BNP and troponin, symptoms improved after Lasix was given.  Cardiology consulted.  Not convince patient has COVID, no recent sick contacts and patient remains at home.  Symptoms likely secondary to acute decompensated heart failure.  Repeat COVID negative, will dc remdesivir and decadron since symptoms improved with diuresis.  Weaned off of BiPAP, transfer out of the ICU. Cardio recommended conservative management.     Interval History:  Patient was seen and examined.  Overnight notes reviewed.  IV diuresis transitioned to PO per cardiology recs. Patient reports CHURCH and orthopnea.  He denies chest pain, abdominal pain or nausea.  PT/OT following and recommended moderate intensive therapy.  Case management following for discharge planning.  Cardiology following.    Review of Systems   Respiratory:  Positive for shortness of breath (CHURCH and orthopnea).    Cardiovascular:  Negative for chest pain.   Gastrointestinal:  Negative for abdominal pain and nausea.     Objective:     Vital Signs (Most Recent):  Temp: 97.4 °F (36.3 °C) (03/10/25 1159)  Pulse: 87 (03/10/25 1203)  Resp: 18 (03/10/25 1203)  BP: 106/67 (03/10/25 1159)  SpO2: 95 % (03/10/25 1203) Vital Signs (24h Range):  Temp:  [97.4 °F  (36.3 °C)-98.1 °F (36.7 °C)] 97.4 °F (36.3 °C)  Pulse:  [77-88] 87  Resp:  [16-18] 18  SpO2:  [95 %-98 %] 95 %  BP: (102-119)/(65-76) 106/67     Weight: 79.9 kg (176 lb 2.4 oz)  Body mass index is 28.43 kg/m².    Intake/Output Summary (Last 24 hours) at 3/10/2025 1541  Last data filed at 3/10/2025 0900  Gross per 24 hour   Intake 240 ml   Output 500 ml   Net -260 ml         Physical Exam  Vitals and nursing note reviewed.   Constitutional:       General: He is not in acute distress.     Appearance: Normal appearance. He is normal weight.   HENT:      Head: Normocephalic and atraumatic.      Mouth/Throat:      Mouth: Mucous membranes are moist.      Pharynx: Oropharynx is clear.   Eyes:      Extraocular Movements: Extraocular movements intact.   Cardiovascular:      Rate and Rhythm: Normal rate and regular rhythm.   Pulmonary:      Effort: Pulmonary effort is normal.      Breath sounds: Rales (trace BL bases) present.   Abdominal:      General: Abdomen is flat. Bowel sounds are normal.      Palpations: Abdomen is soft.      Tenderness: There is no abdominal tenderness. There is no guarding or rebound.   Musculoskeletal:         General: Normal range of motion.      Right lower leg: No edema.      Left lower leg: No edema.   Neurological:      General: No focal deficit present.      Mental Status: He is alert and oriented to person, place, and time. Mental status is at baseline.   Psychiatric:         Mood and Affect: Mood normal.         Behavior: Behavior normal.               Significant Labs: All pertinent labs within the past 24 hours have been reviewed.  CBC:   Recent Labs   Lab 03/09/25  0454 03/10/25  0445   WBC 6.54 6.93   HGB 9.2* 10.1*   HCT 27.6* 30.8*    205     CMP:   Recent Labs   Lab 03/09/25  0453 03/10/25  0445   * 133*   K 3.9 4.4   CL 95 95   CO2 31* 31*   GLU 89 87   BUN 34* 27*   CREATININE 1.5* 1.0   CALCIUM 8.4* 8.5*   ANIONGAP 7* 7*       Significant Imaging: I have reviewed all  pertinent imaging results/findings within the past 24 hours.      Assessment & Plan  Acute respiratory failure with hypoxia  Acute on chronic combined systolic and diastolic heart failure  Likely secondary to decompensated heart failure   Receiving intravenous Lasix as per Cardiology team.  Wean oxygen as tolerated    2D echocardiogram results reviewed with ejection fraction 50-55%.    Repeat chest x-ray today.  Cardiology following  Likely secondary to decompensated heart failure   Receiving intravenous Lasix as per Cardiology team.  Wean oxygen as tolerated    2D echocardiogram results reviewed with ejection fraction 50-55%.    Repeat chest x-ray today.  Cardiology follow up  Likely secondary to decompensated heart failure   Receiving intravenous Lasix as per Cardiology team.  Wean oxygen as tolerated   2D echocardiogram results reviewed with ejection fraction 50-55%.  Cardiology following  3/10: cards following; IV lasix transitioned to PO per cards recs; PT/OT following and recommending moderate intensity therapy  S/P TAVR (transcatheter aortic valve replacement)  Aware  Pacemaker  noted  NSTEMI (non-ST elevated myocardial infarction)  Troponins downtrending   Per Cardiology patient's left heart catheterization showed 1 patent SVG graft  Cardiology recommended medical management, heparin drip discontinued   Eliquis restarted   Aspirin discontinued    Seizure disorder  Home phenytoin resumed  Seizure precautions  Atrial fibrillation  Resume Eliquis per Cardiology  Resume beta-blocker      Hx of CABG  Aware  Altered mental status  Head CT unremarkable   Per family patient has been having on and off confusion for the last several months   Questionable dementia   Neurology follow up outpatient  CAD (coronary artery disease)  As above  Coronavirus infection  Doubt COVID infection, outpatient probably false-positive  Patient has no recent sick contacts, remains at home mainly   2 COVID test negative here   Symptoms  improved with Lasix prior to administration of remdesivir and Decadron   Discontinue Decadron and remdesivir monitor, discussed with family  PT/OT consulted  VTE Risk Mitigation (From admission, onward)           Ordered     apixaban tablet 5 mg  2 times daily         03/07/25 0909     IP VTE HIGH RISK PATIENT  Once         03/06/25 0604                    Discharge Planning   AXEL: 3/13/2025     Code Status: Partial Code   Medical Readiness for Discharge Date:   Discharge Plan A: Skilled Nursing Facility   Discharge Delays: None known at this time            Please place Justification for DME        Della Santiago PA-C  Department of Hospital Medicine   [facility]

## 2025-03-10 NOTE — PT/OT/SLP PROGRESS
Physical Therapy Treatment    Patient Name:  Jerome Amaro Jr.   MRN:  [id number]    Recommendations:     Discharge Recommendations: Moderate Intensity Therapy  Discharge Equipment Recommendations: none  Barriers to discharge:  increase assist with mobility, decrease caregiver assist    Assessment:     Jerome Amaro Jr. is a 79 y.o. male admitted with a medical diagnosis of Acute respiratory failure with hypoxia.  He presents with the following impairments/functional limitations: weakness, impaired endurance, impaired self care skills, impaired functional mobility, gait instability, impaired balance, decreased lower extremity function, decreased safety awareness, pain, impaired cardiopulmonary response to activity.    Pt found in bed with HOB elevated. Pt required CGA with bed mobility good sitting balance at EOB. While at EOB noted IV came out. RN notified and charge nurses arrived. No blood noted and able to continue to session. Pt requires min A with sit to stand to RW. Pt ambulated 15 ft with RW and min A no loss of balance or shortness of breath on 2L O2 via NC.     Rehab Prognosis: Fair; patient would benefit from acute skilled PT services to address these deficits and reach maximum level of function.    Recent Surgery: * No surgery found *      Plan:     During this hospitalization, patient to be seen 6 x/week to address the identified rehab impairments via gait training, therapeutic activities, therapeutic exercises, neuromuscular re-education and progress toward the following goals:    Plan of Care Expires:  04/10/25    Subjective     Chief Complaint: reports feeling better  Patient/Family Comments/goals: get better  Pain/Comfort:  Pain Rating 1: 0/10      Objective:     Communicated with RN prior to session.  Patient found HOB elevated with peripheral IV, peripheral IV, telemetry, blood pressure cuff, pulse ox (continuous), oxygen upon PT entry to room.     General Precautions: Standard, fall  Orthopedic  Precautions: N/A  Braces: N/A  Respiratory Status: Nasal cannula, flow 2 L/min     Functional Mobility:  Bed Mobility:     Supine to Sit: contact guard assistance  Transfers:     Sit to Stand:  minimum assistance with rolling walker  Gait: 15 ft with RW and min A      AM-PAC 6 CLICK MOBILITY  Turning over in bed (including adjusting bedclothes, sheets and blankets)?: 3  Sitting down on and standing up from a chair with arms (e.g., wheelchair, bedside commode, etc.): 3  Moving from lying on back to sitting on the side of the bed?: 3  Moving to and from a bed to a chair (including a wheelchair)?: 3  Need to walk in hospital room?: 3  Climbing 3-5 steps with a railing?: 3  Basic Mobility Total Score: 18       Treatment & Education:  Pt educated on POC, discharge recommendation, importance of time OOB, pacing/energy conservation, need for assist with mobility, use of call bell to seek assistance as needed and fall prevention      Patient left up in chair with all lines intact, call button in reach, chair alarm on, and charge nurse notified..    GOALS:   Multidisciplinary Problems       Physical Therapy Goals          Problem: Physical Therapy    Goal Priority Disciplines Outcome Interventions   Physical Therapy Goal     PT, PT/OT Progressing    Description: Goals to be met by: 25     Patient will increase functional independence with mobility by performin. Supine to sit with Supervision  2. Sit to stand transfer with Supervision  3. Bed to chair transfer with Supervision using Rolling Walker  4. Gait  x 150 feet with Supervision using Rolling Walker.                              DME Justifications:  No DME recommended requiring DME justifications    Time Tracking:     PT Received On: 03/10/25  PT Start Time: 1001     PT Stop Time: 1015  PT Total Time (min): 14 min     Billable Minutes: Gait Training 14    Treatment Type: Treatment  PT/PTA: PT     Number of PTA visits since last PT visit: 0     03/10/2025

## 2025-03-10 NOTE — SUBJECTIVE & OBJECTIVE
Interval History:  Patient was seen and examined.  Overnight notes reviewed.  IV diuresis transitioned to PO per cardiology recs. Patient reports CHURCH and orthopnea.  He denies chest pain, abdominal pain or nausea.  PT/OT following and recommended moderate intensive therapy.  Case management following for discharge planning.  Cardiology following.    Review of Systems   Respiratory:  Positive for shortness of breath (CHURCH and orthopnea).    Cardiovascular:  Negative for chest pain.   Gastrointestinal:  Negative for abdominal pain and nausea.     Objective:     Vital Signs (Most Recent):  Temp: 97.4 °F (36.3 °C) (03/10/25 1159)  Pulse: 87 (03/10/25 1203)  Resp: 18 (03/10/25 1203)  BP: 106/67 (03/10/25 1159)  SpO2: 95 % (03/10/25 1203) Vital Signs (24h Range):  Temp:  [97.4 °F (36.3 °C)-98.1 °F (36.7 °C)] 97.4 °F (36.3 °C)  Pulse:  [77-88] 87  Resp:  [16-18] 18  SpO2:  [95 %-98 %] 95 %  BP: (102-119)/(65-76) 106/67     Weight: 79.9 kg (176 lb 2.4 oz)  Body mass index is 28.43 kg/m².    Intake/Output Summary (Last 24 hours) at 3/10/2025 1541  Last data filed at 3/10/2025 0900  Gross per 24 hour   Intake 240 ml   Output 500 ml   Net -260 ml         Physical Exam  Vitals and nursing note reviewed.   Constitutional:       General: He is not in acute distress.     Appearance: Normal appearance. He is normal weight.   HENT:      Head: Normocephalic and atraumatic.      Mouth/Throat:      Mouth: Mucous membranes are moist.      Pharynx: Oropharynx is clear.   Eyes:      Extraocular Movements: Extraocular movements intact.   Cardiovascular:      Rate and Rhythm: Normal rate and regular rhythm.   Pulmonary:      Effort: Pulmonary effort is normal.      Breath sounds: Rales (trace BL bases) present.   Abdominal:      General: Abdomen is flat. Bowel sounds are normal.      Palpations: Abdomen is soft.      Tenderness: There is no abdominal tenderness. There is no guarding or rebound.   Musculoskeletal:         General: Normal  range of motion.      Right lower leg: No edema.      Left lower leg: No edema.   Neurological:      General: No focal deficit present.      Mental Status: He is alert and oriented to person, place, and time. Mental status is at baseline.   Psychiatric:         Mood and Affect: Mood normal.         Behavior: Behavior normal.               Significant Labs: All pertinent labs within the past 24 hours have been reviewed.  CBC:   Recent Labs   Lab 03/09/25 0454 03/10/25  0445   WBC 6.54 6.93   HGB 9.2* 10.1*   HCT 27.6* 30.8*    205     CMP:   Recent Labs   Lab 03/09/25 0453 03/10/25  0445   * 133*   K 3.9 4.4   CL 95 95   CO2 31* 31*   GLU 89 87   BUN 34* 27*   CREATININE 1.5* 1.0   CALCIUM 8.4* 8.5*   ANIONGAP 7* 7*       Significant Imaging: I have reviewed all pertinent imaging results/findings within the past 24 hours.

## 2025-03-10 NOTE — ASSESSMENT & PLAN NOTE
Likely secondary to decompensated heart failure   Receiving intravenous Lasix as per Cardiology team.  Wean oxygen as tolerated    2D echocardiogram results reviewed with ejection fraction 50-55%.    Repeat chest x-ray today.  Cardiology following  Likely secondary to decompensated heart failure   Receiving intravenous Lasix as per Cardiology team.  Wean oxygen as tolerated    2D echocardiogram results reviewed with ejection fraction 50-55%.    Repeat chest x-ray today.  Cardiology follow up  Likely secondary to decompensated heart failure   Receiving intravenous Lasix as per Cardiology team.  Wean oxygen as tolerated   2D echocardiogram results reviewed with ejection fraction 50-55%.  Cardiology following  3/10: cards following; IV lasix transitioned to PO per cards recs; PT/OT following and recommending moderate intensity therapy

## 2025-03-10 NOTE — CARE UPDATE
03/09/25 1950   Patient Assessment/Suction   Level of Consciousness (AVPU) alert   Respiratory Effort Normal;Unlabored   Expansion/Accessory Muscles/Retractions no use of accessory muscles   All Lung Fields Breath Sounds clear   Rhythm/Pattern, Respiratory no shortness of breath reported   Cough Frequency no cough   PRE-TX-O2   Device (Oxygen Therapy) nasal cannula   $ Is the patient on Low Flow Oxygen? Yes   Flow (L/min) (Oxygen Therapy) 3   SpO2 98 %   Pulse Oximetry Type Intermittent   Aerosol Therapy   $ Aerosol Therapy Charges PRN treatment not required   Respiratory Treatment Status (SVN) PRN treatment not required

## 2025-03-10 NOTE — PLAN OF CARE
LOCET completed with Reji completed and successfully faxed to the [facility] at [phone].  Awaiting form 142.

## 2025-03-10 NOTE — CARE UPDATE
03/10/25 0751   Patient Assessment/Suction   Level of Consciousness (AVPU) responds to voice   Respiratory Effort Normal;Unlabored   Expansion/Accessory Muscles/Retractions no use of accessory muscles;expansion symmetric;no retractions   All Lung Fields Breath Sounds Anterior:   HARINDER Breath Sounds clear;diminished   RUL Breath Sounds clear;diminished   Rhythm/Pattern, Respiratory unlabored   Cough Frequency with stimulation   PRE-TX-O2   Device (Oxygen Therapy) nasal cannula   $ Is the patient on Low Flow Oxygen? Yes   Flow (L/min) (Oxygen Therapy) 2   SpO2 98 %   Pulse Oximetry Type Intermittent   $ Pulse Oximetry - Multiple Charge Pulse Oximetry - Multiple   Pulse 77   Resp 17   Aerosol Therapy   $ Aerosol Therapy Charges PRN treatment not required   Education   $ Education 15 min;Oxygen

## 2025-03-11 LAB
ANION GAP SERPL CALC-SCNC: 8 MMOL/L (ref 8–16)
BACTERIA BLD CULT: NORMAL
BACTERIA BLD CULT: NORMAL
BASOPHILS # BLD AUTO: 0.04 K/UL (ref 0–0.2)
BASOPHILS NFR BLD: 0.6 % (ref 0–1.9)
BUN SERPL-MCNC: 20 MG/DL (ref 8–23)
CALCIUM SERPL-MCNC: 8.2 MG/DL (ref 8.7–10.5)
CHLORIDE SERPL-SCNC: 96 MMOL/L (ref 95–110)
CO2 SERPL-SCNC: 31 MMOL/L (ref 23–29)
CREAT SERPL-MCNC: 0.9 MG/DL (ref 0.5–1.4)
DIFFERENTIAL METHOD BLD: ABNORMAL
EOSINOPHIL # BLD AUTO: 0.3 K/UL (ref 0–0.5)
EOSINOPHIL NFR BLD: 3.7 % (ref 0–8)
ERYTHROCYTE [DISTWIDTH] IN BLOOD BY AUTOMATED COUNT: 14.6 % (ref 11.5–14.5)
EST. GFR  (NO RACE VARIABLE): >60 ML/MIN/1.73 M^2
GLUCOSE SERPL-MCNC: 91 MG/DL (ref 70–110)
HCT VFR BLD AUTO: 31.9 % (ref 40–54)
HGB BLD-MCNC: 10.6 G/DL (ref 14–18)
IMM GRANULOCYTES # BLD AUTO: 0.03 K/UL (ref 0–0.04)
IMM GRANULOCYTES NFR BLD AUTO: 0.4 % (ref 0–0.5)
LYMPHOCYTES # BLD AUTO: 1.1 K/UL (ref 1–4.8)
LYMPHOCYTES NFR BLD: 16.4 % (ref 18–48)
MAGNESIUM SERPL-MCNC: 1.8 MG/DL (ref 1.6–2.6)
MCH RBC QN AUTO: 29.1 PG (ref 27–31)
MCHC RBC AUTO-ENTMCNC: 33.2 G/DL (ref 32–36)
MCV RBC AUTO: 88 FL (ref 82–98)
MONOCYTES # BLD AUTO: 0.8 K/UL (ref 0.3–1)
MONOCYTES NFR BLD: 12.3 % (ref 4–15)
NEUTROPHILS # BLD AUTO: 4.6 K/UL (ref 1.8–7.7)
NEUTROPHILS NFR BLD: 66.6 % (ref 38–73)
NRBC BLD-RTO: 0 /100 WBC
OHS QRS DURATION: 144 MS
OHS QTC CALCULATION: 563 MS
PLATELET # BLD AUTO: 205 K/UL (ref 150–450)
PMV BLD AUTO: 10 FL (ref 9.2–12.9)
POTASSIUM SERPL-SCNC: 4 MMOL/L (ref 3.5–5.1)
RBC # BLD AUTO: 3.64 M/UL (ref 4.6–6.2)
SODIUM SERPL-SCNC: 135 MMOL/L (ref 136–145)
TROPONIN I SERPL HS-MCNC: 2087.7 PG/ML (ref 0–14.9)
TROPONIN I SERPL HS-MCNC: 2246.4 PG/ML (ref 0–14.9)
WBC # BLD AUTO: 6.84 K/UL (ref 3.9–12.7)

## 2025-03-11 PROCEDURE — 97535 SELF CARE MNGMENT TRAINING: CPT

## 2025-03-11 PROCEDURE — 83735 ASSAY OF MAGNESIUM: CPT

## 2025-03-11 PROCEDURE — 25000003 PHARM REV CODE 250: Performed by: INTERNAL MEDICINE

## 2025-03-11 PROCEDURE — 84484 ASSAY OF TROPONIN QUANT: CPT

## 2025-03-11 PROCEDURE — 99900031 HC PATIENT EDUCATION (STAT)

## 2025-03-11 PROCEDURE — 97530 THERAPEUTIC ACTIVITIES: CPT | Mod: CQ

## 2025-03-11 PROCEDURE — 25000003 PHARM REV CODE 250

## 2025-03-11 PROCEDURE — 94799 UNLISTED PULMONARY SVC/PX: CPT | Mod: XB

## 2025-03-11 PROCEDURE — 36415 COLL VENOUS BLD VENIPUNCTURE: CPT | Performed by: STUDENT IN AN ORGANIZED HEALTH CARE EDUCATION/TRAINING PROGRAM

## 2025-03-11 PROCEDURE — 94761 N-INVAS EAR/PLS OXIMETRY MLT: CPT

## 2025-03-11 PROCEDURE — 25000003 PHARM REV CODE 250: Performed by: STUDENT IN AN ORGANIZED HEALTH CARE EDUCATION/TRAINING PROGRAM

## 2025-03-11 PROCEDURE — 85025 COMPLETE CBC W/AUTO DIFF WBC: CPT | Performed by: STUDENT IN AN ORGANIZED HEALTH CARE EDUCATION/TRAINING PROGRAM

## 2025-03-11 PROCEDURE — 99900035 HC TECH TIME PER 15 MIN (STAT)

## 2025-03-11 PROCEDURE — 86580 TB INTRADERMAL TEST: CPT

## 2025-03-11 PROCEDURE — 80048 BASIC METABOLIC PNL TOTAL CA: CPT | Performed by: STUDENT IN AN ORGANIZED HEALTH CARE EDUCATION/TRAINING PROGRAM

## 2025-03-11 PROCEDURE — 21400001 HC TELEMETRY ROOM

## 2025-03-11 PROCEDURE — 30200315 PPD INTRADERMAL TEST REV CODE 302

## 2025-03-11 PROCEDURE — 97116 GAIT TRAINING THERAPY: CPT | Mod: CQ

## 2025-03-11 PROCEDURE — 25000003 PHARM REV CODE 250: Performed by: FAMILY MEDICINE

## 2025-03-11 PROCEDURE — 36415 COLL VENOUS BLD VENIPUNCTURE: CPT

## 2025-03-11 RX ORDER — FAMOTIDINE 20 MG/1
20 TABLET, FILM COATED ORAL 2 TIMES DAILY
Status: DISCONTINUED | OUTPATIENT
Start: 2025-03-11 | End: 2025-03-12 | Stop reason: HOSPADM

## 2025-03-11 RX ORDER — METOPROLOL SUCCINATE 25 MG/1
25 TABLET, EXTENDED RELEASE ORAL 2 TIMES DAILY
Status: DISCONTINUED | OUTPATIENT
Start: 2025-03-11 | End: 2025-03-12 | Stop reason: HOSPADM

## 2025-03-11 RX ADMIN — METOPROLOL SUCCINATE 25 MG: 25 TABLET, EXTENDED RELEASE ORAL at 08:03

## 2025-03-11 RX ADMIN — EXTENDED PHENYTOIN SODIUM 400 MG: 100 CAPSULE, EXTENDED RELEASE ORAL at 10:03

## 2025-03-11 RX ADMIN — FUROSEMIDE 20 MG: 20 TABLET ORAL at 05:03

## 2025-03-11 RX ADMIN — TUBERCULIN PURIFIED PROTEIN DERIVATIVE 5 UNITS: 5 INJECTION, SOLUTION INTRADERMAL at 12:03

## 2025-03-11 RX ADMIN — CLOPIDOGREL BISULFATE 75 MG: 75 TABLET, FILM COATED ORAL at 10:03

## 2025-03-11 RX ADMIN — FAMOTIDINE 20 MG: 20 TABLET, FILM COATED ORAL at 08:03

## 2025-03-11 RX ADMIN — APIXABAN 5 MG: 5 TABLET, FILM COATED ORAL at 10:03

## 2025-03-11 RX ADMIN — FUROSEMIDE 20 MG: 20 TABLET ORAL at 10:03

## 2025-03-11 RX ADMIN — Medication 800 MG: at 10:03

## 2025-03-11 RX ADMIN — EZETIMIBE 10 MG: 10 TABLET ORAL at 10:03

## 2025-03-11 RX ADMIN — SACUBITRIL AND VALSARTAN 1 TABLET: 24; 26 TABLET, FILM COATED ORAL at 10:03

## 2025-03-11 RX ADMIN — APIXABAN 5 MG: 5 TABLET, FILM COATED ORAL at 08:03

## 2025-03-11 RX ADMIN — SACUBITRIL AND VALSARTAN 1 TABLET: 24; 26 TABLET, FILM COATED ORAL at 08:03

## 2025-03-11 RX ADMIN — CITALOPRAM HYDROBROMIDE 20 MG: 20 TABLET ORAL at 10:03

## 2025-03-11 RX ADMIN — HYDROCODONE BITARTRATE AND ACETAMINOPHEN 1 TABLET: 10; 325 TABLET ORAL at 05:03

## 2025-03-11 RX ADMIN — HYDROCODONE BITARTRATE AND ACETAMINOPHEN 1 TABLET: 10; 325 TABLET ORAL at 10:03

## 2025-03-11 NOTE — PLAN OF CARE
Problem: Occupational Therapy  Goal: Occupational Therapy Goal  Description: Goals to be met by: 4/7/2025     Patient will increase functional independence with ADLs by performing:    UE Dressing with Supervision.  LE Dressing with Supervision.  Grooming while standing at sink with Supervision.  Toileting from toilet with Supervision for hygiene and clothing management.   Toilet transfer to toilet with Supervision.  Perform sitting/standing ADL activity with no LOB.    Outcome: Progressing

## 2025-03-11 NOTE — MEDICAL/APP STUDENT
[facility] Medicine  Progress Note     Patient Name: Jerome Amaro Jr.  MRN: [id number]  Patient Class: IP- Inpatient     Admission Date: 3/6/2025  Length of Stay: 5 days  Attending Physician: Najma Roy DO  Primary Care Provider: Magda Ruiz FNP-C           Subjective  Principal Problem:Acute respiratory failure with hypoxia           HPI:  Patient is an 79-year-old gentleman with a history of systolic congestive heart failure, CAD/CABG, status post TAVR, status post pacemaker implantation and seizure disorder who was admitted between 2/4-2/7 for CHF exacerbation  Echo performed in January showed an EF of 50-55% but diastolic function could not be determined  Patient underwent cardiac catheterization in 2023 which showed an EF of 45% with inferior wall hypokinesis, diastolic dysfunction, and SVG sequential graft to the LAD; he did not require any interventions during that procedure  Per-details are unclear as the patient is critically ill and thus an expedited evaluation was performed-> it was reported that the patient had had shortness of breath for about 4 days prior to arrival  Patient was reportedly diagnosed with COVID in the outpatient venue but there is no demonstrable result available within our records or in CareEverywhere-> daughter Layla with whom I spoke on the phone was able to text a photograph of the positive result which I reviewed  Here, the patient presented with respiratory distress with tachypnea and was satting 91%; he had a low-grade temperature of 99.7° F and patient was tachycardic between the 90s-100s; he was tachypneic between 22-25 breaths per minute and was hemodynamically stable; sats went to 96% on BiPAP  WBC was 13.67 with a lactate of 2.44; influenza testing was negative  VBG pH was 7.423 with a CO2 of 37.3  CMP showed a creatinine of 1.3 (at baseline) and a sodium of 130  BNP was elevated at 2268 and troponin was 609.9 (on baseline tropes are in  "the 20s as of February)  EKG showed, per my interpretation, V paced rhythm at 100 beats per minute with a QTC of 536; follow up EKG showed, per my interpretation, a paced at 99 beats per minute with a QTC of 539  Chest x-ray showed, per radiologist Dr. Randolph:  "Stable cardiomegaly with increased bilateral edema.  Small bibasilar pleural effusions.  Pacer present."   The patient received DuoNebs and 40 mg of IV Lasix in his admitted for further diagnosis, treatment, and care     Upon my assessment, the patient was lethargic on BiPAP (10/5, rate of 12, FiO2 50%); this was subsequently removed     Patient became more alert but pleasantly confused; it was clear that he was non decisional as he was incompletely oriented (oriented to only self and place) and could not meaningfully participate in discussions regarding his healthcare     The patient does not have a formal power-of- nor living spouse and therefore has 2 daughters would be his daughter facto power-of- is (spoke with daughter at bedside as well as daughter Layla on speaker phone; note that daughter at bedside reports to me that she purposely failed to inform her sister, Layla, that the patient was ill in the ER (as they had had a verbal altercation earlier)     Discussed with family that the patient was critically ill from a combination of congestive heart failure, coronavirus infection, and possibly NSTEMI; as the patient can not answer on his own behalf, the 2 daughters then must function as power-of- in concert on his behalf     Thus, It was explained by myself to family at bedside as well as via speaker phone (Layla)  that the next step would be intubation but after careful consideration both daughters agree in concert to not intubate     Furthermore, given his precipitous state, we did discuss what their wishes would be should he terminally deteriorate; we discussed all aspects of coding including chest compressions, shocking (if " appropriate), medications, and intubation as well as pressor therapy-> daughter's universally consent to all these aforementioned modalities except they do not want him intubated, should he terminally deteriorate     Thus, patient will be FULL CODE but DNI-> discussed with Dr. Dubose and Dr. Herr     Overview/Hospital Course:  Patient with CHF, CAD, seizure disorder presented with complaints of shortness of breath and confusion.  Per family patient has intermittent confusion that has been ongoing for a while. Underlying dementia? AAOx2 currently. Became short of breath over the last several days.  Was taken to urgent care prior to admission, diagnosed with COVID and prescribed antibiotics.  COVID PCR negative on admission.  Did have elevated BNP and troponin, symptoms improved after Lasix was given.  Cardiology consulted.  Not convince patient has COVID, no recent sick contacts and patient remains at home.  Symptoms likely secondary to acute decompensated heart failure.  Repeat COVID negative, will dc remdesivir and decadron since symptoms improved with diuresis.  Weaned off of BiPAP, transfer out of the ICU. Cardio recommended conservative management.      Interval History:  Patient was seen and examined.  Overnight notes reviewed.  Patient says that he is tolerating his lasix since it was transitioned from IV to PO yesterday. Patient states that he did not sleep well last night because he was choking and spitting due to his congestion. He reports that he still has SOB when he lies flat on his back and says 2 pillows usually improve this. He reports he still has a loss of appetite. Patient reports cough, CHURCH, SOB, weakness and dizziness on exertion. Patient denies chest pain, N/V/D/C.      Review of Systems   Constitutional:  Negative for fever.   HENT:  Positive for congestion.    Respiratory:  Positive for cough and shortness of breath.    Cardiovascular:  Positive for orthopnea. Negative for chest pain.    Gastrointestinal:  Negative for constipation, diarrhea, nausea and vomiting.   Neurological:  Positive for dizziness and weakness.          Objective:         Vital Signs (Most Recent):  Temp: 97.2 °F (36.2 °C) (03/11/25 0745)  Pulse: 98 (03/11/25 1300)  Resp: 18 (03/11/25 1300)  BP: 116/68 (03/11/25 0745)  SpO2: 95 % (03/11/25 1300) Vital Signs (24h Range):  Temp:  [97.2 °F (36.2 °C)-97.9 °F (36.6 °C)] 97.2 °F (36.2 °C)  Pulse:  [84-98] 98  Resp:  [16-18] 18  SpO2:  [91 %-96 %] 95 %  BP: (101-119)/(61-72) 116/68     Weight: 78.8 kg (173 lb 11.6 oz)  Body mass index is 28.04 kg/m².       Intake/Output Summary (Last 24 hours) at 3/11/2025 1347  Last data filed at 3/11/2025 1006  Gross per 24 hour   Intake 240 ml   Output 1275 ml   Net -1035 ml            Physical Exam  Constitutional:       Appearance: Normal appearance.   HENT:      Head: Normocephalic and atraumatic.      Mouth/Throat:      Mouth: Mucous membranes are moist.   Eyes:      Extraocular Movements: Extraocular movements intact.   Cardiovascular:      Rate and Rhythm: Normal rate and regular rhythm.   Pulmonary:      Effort: Pulmonary effort is normal.      Breath sounds: Rales present.   Abdominal:      General: Abdomen is flat. Bowel sounds are normal.      Palpations: Abdomen is soft.   Musculoskeletal:      Right lower leg: No edema.      Left lower leg: No edema.   Skin:     General: Skin is warm and dry.   Neurological:      General: No focal deficit present.      Mental Status: He is alert.   Psychiatric:         Mood and Affect: Mood normal.         Behavior: Behavior normal.          Significant Labs: All pertinent labs within the past 24 hours have been reviewed.  CBC:   Recent Labs   Lab 03/10/25  0445 03/11/25  0500   WBC 6.93 6.84   HGB 10.1* 10.6*   HCT 30.8* 31.9*    205      CMP:        Recent Labs   Lab 03/10/25  0445 03/11/25  0500   * 135*   K 4.4 4.0   CL 95 96   CO2 31* 31*   GLU 87 91   BUN 27* 20   CREATININE 1.0 0.9    CALCIUM 8.5* 8.2*   ANIONGAP 7* 8       Significant Imaging: I have reviewed all pertinent imaging results/findings within the past 24 hours.          Assessment & Plan  Acute respiratory failure with hypoxia  Acute on chronic combined systolic and diastolic heart failure  Likely secondary to decompensated heart failure   Receiving intravenous Lasix as per Cardiology team.  Wean oxygen as tolerated    2D echocardiogram results reviewed with ejection fraction 50-55%.    Repeat chest x-ray today.  Cardiology following  Likely secondary to decompensated heart failure   Receiving intravenous Lasix as per Cardiology team.  Wean oxygen as tolerated    2D echocardiogram results reviewed with ejection fraction 50-55%.    Repeat chest x-ray today.  Cardiology follow up  Likely secondary to decompensated heart failure   Receiving intravenous Lasix as per Cardiology team.  Wean oxygen as tolerated   2D echocardiogram results reviewed with ejection fraction 50-55%.  Cardiology following  3/10: cards following; IV lasix transitioned to PO per cards recs; PT/OT following and recommending moderate intensity therapy  3/11: continue PO Lasix  S/P TAVR (transcatheter aortic valve replacement)  Aware  Pacemaker  noted  NSTEMI (non-ST elevated myocardial infarction)  Troponins downtrending   Per Cardiology patient's left heart catheterization showed 1 patent SVG graft  Cardiology recommended medical management, heparin drip discontinued   Eliquis restarted   Aspirin discontinued     Seizure disorder  Home phenytoin resumed  Seizure precautions  Atrial fibrillation  Resume Eliquis per Cardiology  Resume beta-blocker        Hx of CABG  Aware  Altered mental status  Head CT unremarkable   Per family patient has been having on and off confusion for the last several months   Questionable dementia   Neurology follow up outpatient  CAD (coronary artery disease)  As above  Coronavirus infection  Doubt COVID infection, outpatient probably  false-positive  Patient has no recent sick contacts, remains at home mainly   2 COVID test negative here   Symptoms improved with Lasix prior to administration of remdesivir and Decadron   Discontinue Decadron and remdesivir monitor, discussed with family  PT/OT consulted    VTE Risk Mitigation (From admission, onward)           Ordered     apixaban tablet 5 mg  2 times daily         03/07/25 0909     IP VTE HIGH RISK PATIENT  Once         03/06/25 0604                      Discharge Planning   AXEL: 3/13/2025   Code Status: Partial Code   Medical Readiness for Discharge Date:   Discharge Plan A: Skilled Nursing Facility   Discharge Delays: None known at this time    VANESSA LloydS

## 2025-03-11 NOTE — ASSESSMENT & PLAN NOTE
Kylah a 22-year-old  1 para 0 whose last menstrual period is 2023.  Patient presents to confirm pregnancy.  Patient reports that she knew she was pregnant because she missed her menstrual cycle.  She reports that she has no symptoms of pregnancy at present.  She has not taking any prenatal vitamins.  She is taking Synthroid 150 mcg daily.  However she reports that this dose was increased earlier this year and has not had her TFTs checked since then.  Patient currently does not have a PCP who prescribes or manages her thyroid.  She denies any other medical problems and is not taking any prenatal vitamins yet.    Physical exam  General healthy in no acute distress  HEENT good range of motion no obvious lesions  Respiratory breathing comfortably  Abdomen soft nontender  External genitalia revealed no lesions  Musculoskeletal good mobility  Psych appropriately oriented with normal mood and affect    Transvaginal ultrasound revealed a viable pregnancy of 8 weeks and 3 days consistent with her last menstrual period of 2023 final due date is May 7, 2024.  Fetal heart tones documented at 167    Patient here for confirmation of pregnancy.  22 years old  1 para 0.  Pregnancy confirmed at 8 weeks and 6 days.  Since patient has not had her thyroid levels checked since they were changed we will do prenatal labs and thyroid function test today.  Also we will send a prescription to her pharmacy for prenatal vitamins.  We will contact patient with any abnormal test results and start prenatal care in 4 weeks.   Troponins downtrending   Per Cardiology patient's left heart catheterization showed 1 patent SVG graft  Cardiology recommended medical management, heparin drip discontinued   Eliquis restarted   Aspirin discontinued

## 2025-03-11 NOTE — CARE UPDATE
03/11/25 0757   Patient Assessment/Suction   Level of Consciousness (AVPU) alert   Respiratory Effort Normal   Expansion/Accessory Muscles/Retractions no use of accessory muscles   All Lung Fields Breath Sounds clear   Rhythm/Pattern, Respiratory unlabored   Cough Frequency no cough   PRE-TX-O2   Device (Oxygen Therapy) room air   SpO2 95 %   Pulse Oximetry Type Intermittent   $ Pulse Oximetry - Multiple Charge Pulse Oximetry - Multiple   Pulse 92   Resp 16   Incentive Spirometer   $ Incentive Spirometer Charges done with encouragement   Incentive Spirometer Predicted Level (mL) 860   Administration (IS) instruction provided, follow-up   Number of Repetitions (IS) 10   Level Incentive Spirometer (mL) 900   Patient Tolerance (IS) good   Education   $ Education 15 min;Incentive Spirometry

## 2025-03-11 NOTE — PT/OT/SLP PROGRESS
Physical Therapy Treatment    Patient Name:  Jerome Amaro Jr.   MRN:  [id number]    Recommendations:     Discharge Recommendations: Moderate Intensity Therapy  Discharge Equipment Recommendations: none  Barriers to discharge: None    Assessment:     Jerome Amaro Jr. is a 79 y.o. male admitted with a medical diagnosis of Acute respiratory failure with hypoxia.  He presents with the following impairments/functional limitations: weakness, impaired endurance, impaired self care skills, impaired functional mobility, gait instability, impaired balance, decreased lower extremity function, impaired cardiopulmonary response to activity . Vitals assessed prior to and during mobility with no abnormal findings. Pt required min a with sit to stand t/f and VI for safety with hand placement and anterior weight shift over COG. Pt ambulated 35 feet with rw min a displaying a slowed and unsteady gait. Gait distance limited to c/o general weakness and fatigue with ambulation.     Rehab Prognosis: Fair; patient would benefit from acute skilled PT services to address these deficits and reach maximum level of function.    Recent Surgery: * No surgery found *      Plan:     During this hospitalization, patient to be seen 6 x/week to address the identified rehab impairments via gait training, therapeutic activities, therapeutic exercises, neuromuscular re-education and progress toward the following goals:    Plan of Care Expires:  04/10/25    Subjective     Chief Complaint: Dizziness  Patient/Family Comments/goals: I don't know how much I can do, but I will try.   Pain/Comfort:  Pain Rating 1: 0/10      Objective:     Communicated with RN prior to session.  Patient found up in chair with telemetry, peripheral IV upon PT entry to room.     General Precautions: Standard, fall  Orthopedic Precautions: N/A  Braces: N/A  Respiratory Status: Room air     Functional Mobility:  Transfers:     Sit to Stand:  minimum assistance with rolling  walker  Gait: 35 feet min a with rw      AM-PAC 6 CLICK MOBILITY          Treatment & Education:  Pt was educated on the following: call light use, importance of OOB activity and functional mobility to negate the negative effects of prolonged bed rest during this hospitalization, safe transfers/ambulation and discharge planning recommendations/options.     Patient left up in chair with all lines intact, call button in reach, and chair alarm on..    GOALS:   Multidisciplinary Problems       Physical Therapy Goals          Problem: Physical Therapy    Goal Priority Disciplines Outcome Interventions   Physical Therapy Goal     PT, PT/OT Progressing    Description: Goals to be met by: 25     Patient will increase functional independence with mobility by performin. Supine to sit with Supervision  2. Sit to stand transfer with Supervision  3. Bed to chair transfer with Supervision using Rolling Walker  4. Gait  x 150 feet with Supervision using Rolling Walker.                                  Time Tracking:     PT Received On: 25  PT Start Time: 1030     PT Stop Time: 1053  PT Total Time (min): 23 min     Billable Minutes: Gait Training 13 and Therapeutic Activity 10    Treatment Type: Treatment  PT/PTA: PTA     Number of PTA visits since last PT visit: 2025

## 2025-03-11 NOTE — PT/OT/SLP PROGRESS
Occupational Therapy   Treatment    Name: Jerome Amaro Jr.  MRN: [id number]  Admitting Diagnosis:  Acute respiratory failure with hypoxia       Recommendations:     Discharge Recommendations: Moderate Intensity Therapy  Discharge Equipment Recommendations:  none  Barriers to discharge:   none    Assessment:     Jerome Amaro Jr. is a 79 y.o. male with a medical diagnosis of Acute respiratory failure with hypoxia.  He presents agreeable for OT session but fatigue. Performance deficits affecting function are weakness, impaired endurance, impaired self care skills, impaired functional mobility, gait instability, impaired balance, decreased safety awareness, decreased lower extremity function, decreased upper extremity function, impaired cardiopulmonary response to activity.     Rehab Prognosis:  Fair; patient would benefit from acute skilled OT services to address these deficits and reach maximum level of function.       Plan:     Patient to be seen 5 x/week to address the above listed problems via self-care/home management, therapeutic activities, therapeutic exercises  Plan of Care Expires: 04/07/25  Plan of Care Reviewed with: patient    Subjective     Chief Complaint: pt fatigue but agreeable for OT session  Patient/Family Comments/goals: get better  Pain/Comfort:   None reported    Objective:     Communicated with: nurse prior to session.  Patient found up in chair with telemetry, peripheral IV, chair check upon OT entry to room.    General Precautions: Standard, fall    Orthopedic Precautions:N/A  Braces: N/A  Respiratory Status: Room air. Pt reported he was told he was ok w/out O2. Pt at 96%.      Occupational Performance:     Functional Mobility/Transfers:  Pt declined all out of the chair activity dt fatigue    Activities of Daily Living:  Grooming: supervision sitting up in chair.       [facility] 6 Click ADL: 18    Treatment & Education:  Pt educated on role of OT/POC, importance of OOB/EOB activity, use of call  bell, and safety during ADLs, transfers, and functional mobility.     Patient left up in chair with all lines intact, call button in reach, and chair alarm on    GOALS:   Multidisciplinary Problems       Occupational Therapy Goals          Problem: Occupational Therapy    Goal Priority Disciplines Outcome Interventions   Occupational Therapy Goal     OT, PT/OT Progressing    Description: Goals to be met by: 4/7/2025     Patient will increase functional independence with ADLs by performing:    UE Dressing with Supervision.  LE Dressing with Supervision.  Grooming while standing at sink with Supervision.  Toileting from toilet with Supervision for hygiene and clothing management.   Toilet transfer to toilet with Supervision.  Perform sitting/standing ADL activity with no LOB.                         DME Justifications:  No DME recommended requiring DME justifications    Time Tracking:     OT Date of Treatment: 03/11/25  OT Start Time: 1124  OT Stop Time: 1139  OT Total Time (min): 15 min    Billable Minutes:Self Care/Home Management 15    OT/ALTA: OT          3/11/2025

## 2025-03-11 NOTE — PROGRESS NOTES
[facility] Medicine  Progress Note    Patient Name: Jerome Amaro Jr.  MRN: [id number]  Patient Class: IP- Inpatient   Admission Date: 3/6/2025  Length of Stay: 5 days  Attending Physician: Najma Roy DO  Primary Care Provider: Magda Ruiz FNP-C        Subjective     Principal Problem:Acute respiratory failure with hypoxia        HPI:  Patient is an 79-year-old gentleman with a history of systolic congestive heart failure, CAD/CABG, status post TAVR, status post pacemaker implantation and seizure disorder who was admitted between 2/4-2/7 for CHF exacerbation  Echo performed in January showed an EF of 50-55% but diastolic function could not be determined  Patient underwent cardiac catheterization in 2023 which showed an EF of 45% with inferior wall hypokinesis, diastolic dysfunction, and SVG sequential graft to the LAD; he did not require any interventions during that procedure  Per-details are unclear as the patient is critically ill and thus an expedited evaluation was performed-> it was reported that the patient had had shortness of breath for about 4 days prior to arrival  Patient was reportedly diagnosed with COVID in the outpatient venue but there is no demonstrable result available within our records or in CareEverywhere-> daughter Layla with whom I spoke on the phone was able to text a photograph of the positive result which I reviewed  Here, the patient presented with respiratory distress with tachypnea and was satting 91%; he had a low-grade temperature of 99.7° F and patient was tachycardic between the 90s-100s; he was tachypneic between 22-25 breaths per minute and was hemodynamically stable; sats went to 96% on BiPAP  WBC was 13.67 with a lactate of 2.44; influenza testing was negative  VBG pH was 7.423 with a CO2 of 37.3  CMP showed a creatinine of 1.3 (at baseline) and a sodium of 130  BNP was elevated at 2268 and troponin was 609.9 (on baseline tropes are in the 20s  "as of February)  EKG showed, per my interpretation, V paced rhythm at 100 beats per minute with a QTC of 536; follow up EKG showed, per my interpretation, a paced at 99 beats per minute with a QTC of 539  Chest x-ray showed, per radiologist Dr. Randolph:  "Stable cardiomegaly with increased bilateral edema.  Small bibasilar pleural effusions.  Pacer present."   The patient received DuoNebs and 40 mg of IV Lasix in his admitted for further diagnosis, treatment, and care    Upon my assessment, the patient was lethargic on BiPAP (10/5, rate of 12, FiO2 50%); this was subsequently removed    Patient became more alert but pleasantly confused; it was clear that he was non decisional as he was incompletely oriented (oriented to only self and place) and could not meaningfully participate in discussions regarding his healthcare    The patient does not have a formal power-of- nor living spouse and therefore has 2 daughters would be his daughter facto power-of- is (spoke with daughter at bedside as well as daughter Layla on speaker phone; note that daughter at bedside reports to me that she purposely failed to inform her sister, Layla, that the patient was ill in the ER (as they had had a verbal altercation earlier)    Discussed with family that the patient was critically ill from a combination of congestive heart failure, coronavirus infection, and possibly NSTEMI; as the patient can not answer on his own behalf, the 2 daughters then must function as power-of- in concert on his behalf    Thus, It was explained by myself to family at bedside as well as via speaker phone (Layla)  that the next step would be intubation but after careful consideration both daughters agree in concert to not intubate    Furthermore, given his precipitous state, we did discuss what their wishes would be should he terminally deteriorate; we discussed all aspects of coding including chest compressions, shocking (if appropriate), " medications, and intubation as well as pressor therapy-> daughter's universally consent to all these aforementioned modalities except they do not want him intubated, should he terminally deteriorate    Thus, patient will be FULL CODE but DNI-> discussed with Dr. Dubose and Dr. Herr    Overview/Hospital Course:  Patient with CHF, CAD, seizure disorder presented with complaints of shortness of breath and confusion.  Per family patient has intermittent confusion that has been ongoing for a while. Underlying dementia? AAOx2 currently. Became short of breath over the last several days.  Was taken to urgent care prior to admission, diagnosed with COVID and prescribed antibiotics.  COVID PCR negative on admission.  Did have elevated BNP and troponin, symptoms improved after Lasix was given.  Cardiology consulted.  Not convince patient has COVID, no recent sick contacts and patient remains at home.  Symptoms likely secondary to acute decompensated heart failure.  Repeat COVID negative, will dc remdesivir and decadron since symptoms improved with diuresis.  Weaned off of BiPAP, transfer out of the ICU. Cardio recommended conservative management.     Interval History:  Patient was seen and examined.  Overnight notes reviewed. PO lasix per cardiology recs. Patient BP soft and metoprolol  per cardiology recommendations. Patient reports CHURCH, and light headedness, and orthopnea.  He denies chest pain, abdominal pain or nausea.  PT/OT following and recommended moderate intensive therapy.  Case management following for discharge planning.  Cardiology following.    Review of Systems   Respiratory:  Positive for shortness of breath (CHURCH and orthopnea).    Cardiovascular:  Negative for chest pain.   Gastrointestinal:  Negative for abdominal pain and nausea.   Neurological:  Positive for light-headedness (intermittent).     Objective:     Vital Signs (Most Recent):  Temp: 97.2 °F (36.2 °C) (25 0745)  Pulse: 98  (03/11/25 1300)  Resp: 18 (03/11/25 1300)  BP: 116/68 (03/11/25 0745)  SpO2: 95 % (03/11/25 1300) Vital Signs (24h Range):  Temp:  [97.2 °F (36.2 °C)-97.9 °F (36.6 °C)] 97.2 °F (36.2 °C)  Pulse:  [84-98] 98  Resp:  [16-18] 18  SpO2:  [91 %-96 %] 95 %  BP: (101-119)/(61-72) 116/68     Weight: 78.8 kg (173 lb 11.6 oz)  Body mass index is 28.04 kg/m².    Intake/Output Summary (Last 24 hours) at 3/11/2025 1321  Last data filed at 3/11/2025 1006  Gross per 24 hour   Intake 240 ml   Output 1275 ml   Net -1035 ml         Physical Exam  Vitals and nursing note reviewed.   Constitutional:       General: He is not in acute distress.     Appearance: Normal appearance. He is normal weight.   HENT:      Head: Normocephalic and atraumatic.      Mouth/Throat:      Mouth: Mucous membranes are moist.      Pharynx: Oropharynx is clear.   Eyes:      Extraocular Movements: Extraocular movements intact.   Cardiovascular:      Rate and Rhythm: Normal rate and regular rhythm.   Pulmonary:      Effort: Pulmonary effort is normal.      Breath sounds: Rales (trace BL bases) present.   Abdominal:      General: Abdomen is flat. Bowel sounds are normal.      Palpations: Abdomen is soft.      Tenderness: There is no abdominal tenderness. There is no guarding or rebound.   Musculoskeletal:         General: Normal range of motion.      Right lower leg: No edema.      Left lower leg: No edema.   Neurological:      General: No focal deficit present.      Mental Status: He is alert and oriented to person, place, and time. Mental status is at baseline.   Psychiatric:         Mood and Affect: Mood normal.         Behavior: Behavior normal.               Significant Labs: All pertinent labs within the past 24 hours have been reviewed.  CBC:   Recent Labs   Lab 03/10/25  0445 03/11/25  0500   WBC 6.93 6.84   HGB 10.1* 10.6*   HCT 30.8* 31.9*    205     CMP:   Recent Labs   Lab 03/10/25  0445 03/11/25  0500   * 135*   K 4.4 4.0   CL 95 96    CO2 31* 31*   GLU 87 91   BUN 27* 20   CREATININE 1.0 0.9   CALCIUM 8.5* 8.2*   ANIONGAP 7* 8       Significant Imaging: I have reviewed all pertinent imaging results/findings within the past 24 hours.      Assessment & Plan  Acute respiratory failure with hypoxia  Acute on chronic combined systolic and diastolic heart failure  Likely secondary to decompensated heart failure   Receiving intravenous Lasix as per Cardiology team.  Wean oxygen as tolerated    2D echocardiogram results reviewed with ejection fraction 50-55%.    Repeat chest x-ray today.  Cardiology following  Likely secondary to decompensated heart failure   Receiving intravenous Lasix as per Cardiology team.  Wean oxygen as tolerated    2D echocardiogram results reviewed with ejection fraction 50-55%.    Repeat chest x-ray today.  Cardiology follow up  Likely secondary to decompensated heart failure   Receiving intravenous Lasix as per Cardiology team.  Wean oxygen as tolerated   2D echocardiogram results reviewed with ejection fraction 50-55%.  Cardiology following  3/10: cards following; IV lasix transitioned to PO per cards recs; PT/OT following and recommending moderate intensity therapy  3/11: cards following; PO lasix; metoprolol decreased per cards recs; PT/OT recommending mod intensity therapy; CM consulted for SNF  Likely secondary to decompensated heart failure   Receiving intravenous Lasix as per Cardiology team.  Wean oxygen as tolerated    2D echocardiogram results reviewed with ejection fraction 50-55%.    Repeat chest x-ray today.  Cardiology following  Likely secondary to decompensated heart failure   Receiving intravenous Lasix as per Cardiology team.  Wean oxygen as tolerated    2D echocardiogram results reviewed with ejection fraction 50-55%.    Repeat chest x-ray today.  Cardiology follow up  Likely secondary to decompensated heart failure   Receiving intravenous Lasix as per Cardiology team.  Wean oxygen as tolerated   2D  echocardiogram results reviewed with ejection fraction 50-55%.  Cardiology following  3/10: cards following; IV lasix transitioned to PO per cards recs; PT/OT following and recommending moderate intensity therapy  3/11: cards following; PO lasix; strict I&Os; metoprolol decreased per cards recs for soft BP  S/P TAVR (transcatheter aortic valve replacement)  Aware  Pacemaker  noted  NSTEMI (non-ST elevated myocardial infarction)  Troponins downtrending   Per Cardiology patient's left heart catheterization showed 1 patent SVG graft  Cardiology recommended medical management, heparin drip discontinued   Eliquis restarted   Aspirin discontinued    Seizure disorder  Home phenytoin resumed  Seizure precautions  Atrial fibrillation  Resume Eliquis per Cardiology  Resume beta-blocker      Hx of CABG  Aware  Altered mental status  Head CT unremarkable   Per family patient has been having on and off confusion for the last several months   Questionable dementia   Neurology follow up outpatient  CAD (coronary artery disease)  As above  Coronavirus infection  Doubt COVID infection, outpatient probably false-positive  Patient has no recent sick contacts, remains at home mainly   2 COVID test negative here   Symptoms improved with Lasix prior to administration of remdesivir and Decadron   Discontinue Decadron and remdesivir monitor, discussed with family  PT/OT consulted  VTE Risk Mitigation (From admission, onward)           Ordered     apixaban tablet 5 mg  2 times daily         03/07/25 0909     IP VTE HIGH RISK PATIENT  Once         03/06/25 0604                    Discharge Planning   AEXL: 3/13/2025     Code Status: Partial Code   Medical Readiness for Discharge Date:   Discharge Plan A: Skilled Nursing Facility   Discharge Delays: None known at this time            Please place Justification for DME        Della Santiago PA-C  Department of Hospital Medicine   [facility] hospital

## 2025-03-11 NOTE — ASSESSMENT & PLAN NOTE
Likely secondary to decompensated heart failure   Receiving intravenous Lasix as per Cardiology team.  Wean oxygen as tolerated    2D echocardiogram results reviewed with ejection fraction 50-55%.    Repeat chest x-ray today.  Cardiology following  Likely secondary to decompensated heart failure   Receiving intravenous Lasix as per Cardiology team.  Wean oxygen as tolerated    2D echocardiogram results reviewed with ejection fraction 50-55%.    Repeat chest x-ray today.  Cardiology follow up  Likely secondary to decompensated heart failure   Receiving intravenous Lasix as per Cardiology team.  Wean oxygen as tolerated   2D echocardiogram results reviewed with ejection fraction 50-55%.  Cardiology following  3/10: cards following; IV lasix transitioned to PO per cards recs; PT/OT following and recommending moderate intensity therapy  3/11: cards following; PO lasix; metoprolol decreased per cards recs; PT/OT recommending mod intensity therapy; CM consulted for SNF  Likely secondary to decompensated heart failure   Receiving intravenous Lasix as per Cardiology team.  Wean oxygen as tolerated    2D echocardiogram results reviewed with ejection fraction 50-55%.    Repeat chest x-ray today.  Cardiology following  Likely secondary to decompensated heart failure   Receiving intravenous Lasix as per Cardiology team.  Wean oxygen as tolerated    2D echocardiogram results reviewed with ejection fraction 50-55%.    Repeat chest x-ray today.  Cardiology follow up  Likely secondary to decompensated heart failure   Receiving intravenous Lasix as per Cardiology team.  Wean oxygen as tolerated   2D echocardiogram results reviewed with ejection fraction 50-55%.  Cardiology following  3/10: cards following; IV lasix transitioned to PO per cards recs; PT/OT following and recommending moderate intensity therapy  3/11: cards following; PO lasix; strict I&Os; metoprolol decreased per cards recs for soft BP

## 2025-03-11 NOTE — SUBJECTIVE & OBJECTIVE
Interval History:  Patient was seen and examined.  Overnight notes reviewed. PO lasix per cardiology recs. Patient BP soft and metoprolol  per cardiology recommendations. Patient reports CHURCH, and light headedness, and orthopnea.  He denies chest pain, abdominal pain or nausea.  PT/OT following and recommended moderate intensive therapy.  Case management following for discharge planning.  Cardiology following.    Review of Systems   Respiratory:  Positive for shortness of breath (CHURCH and orthopnea).    Cardiovascular:  Negative for chest pain.   Gastrointestinal:  Negative for abdominal pain and nausea.   Neurological:  Positive for light-headedness (intermittent).     Objective:     Vital Signs (Most Recent):  Temp: 97.2 °F (36.2 °C) (25 0745)  Pulse: 98 (25 1300)  Resp: 18 (25 1300)  BP: 116/68 (25 0745)  SpO2: 95 % (25 1300) Vital Signs (24h Range):  Temp:  [97.2 °F (36.2 °C)-97.9 °F (36.6 °C)] 97.2 °F (36.2 °C)  Pulse:  [84-98] 98  Resp:  [16-18] 18  SpO2:  [91 %-96 %] 95 %  BP: (101-119)/(61-72) 116/68     Weight: 78.8 kg (173 lb 11.6 oz)  Body mass index is 28.04 kg/m².    Intake/Output Summary (Last 24 hours) at 3/11/2025 1321  Last data filed at 3/11/2025 1006  Gross per 24 hour   Intake 240 ml   Output 1275 ml   Net -1035 ml         Physical Exam  Vitals and nursing note reviewed.   Constitutional:       General: He is not in acute distress.     Appearance: Normal appearance. He is normal weight.   HENT:      Head: Normocephalic and atraumatic.      Mouth/Throat:      Mouth: Mucous membranes are moist.      Pharynx: Oropharynx is clear.   Eyes:      Extraocular Movements: Extraocular movements intact.   Cardiovascular:      Rate and Rhythm: Normal rate and regular rhythm.   Pulmonary:      Effort: Pulmonary effort is normal.      Breath sounds: Rales (trace BL bases) present.   Abdominal:      General: Abdomen is flat. Bowel sounds are normal.      Palpations: Abdomen is  soft.      Tenderness: There is no abdominal tenderness. There is no guarding or rebound.   Musculoskeletal:         General: Normal range of motion.      Right lower leg: No edema.      Left lower leg: No edema.   Neurological:      General: No focal deficit present.      Mental Status: He is alert and oriented to person, place, and time. Mental status is at baseline.   Psychiatric:         Mood and Affect: Mood normal.         Behavior: Behavior normal.               Significant Labs: All pertinent labs within the past 24 hours have been reviewed.  CBC:   Recent Labs   Lab 03/10/25  0445 03/11/25  0500   WBC 6.93 6.84   HGB 10.1* 10.6*   HCT 30.8* 31.9*    205     CMP:   Recent Labs   Lab 03/10/25  0445 03/11/25  0500   * 135*   K 4.4 4.0   CL 95 96   CO2 31* 31*   GLU 87 91   BUN 27* 20   CREATININE 1.0 0.9   CALCIUM 8.5* 8.2*   ANIONGAP 7* 8       Significant Imaging: I have reviewed all pertinent imaging results/findings within the past 24 hours.

## 2025-03-11 NOTE — ASSESSMENT & PLAN NOTE
"  Caller: Lasha Grant \"Yasmani\"    Relationship: Self       What was the call regarding: WORK COMP        " -Continue Plavix and ranolazine  -Continue metoprolol  -Telemetry  -Cardiology following   12-Apr-2024

## 2025-03-11 NOTE — PLAN OF CARE
Per Alysha Reynoso, auth was submitted on 3/10/25.       03/11/25 0945   Post-Acute Status   Post-Acute Authorization Placement   Post-Acute Placement Status Pending payor review/awaiting authorization (if required)   Discharge Delays None known at this time   Discharge Plan   Discharge Plan A Skilled Nursing Facility   Discharge Plan B Skilled Nursing Facility

## 2025-03-12 VITALS
HEIGHT: 66 IN | BODY MASS INDEX: 27.92 KG/M2 | OXYGEN SATURATION: 96 % | SYSTOLIC BLOOD PRESSURE: 112 MMHG | HEART RATE: 92 BPM | TEMPERATURE: 97 F | RESPIRATION RATE: 18 BRPM | DIASTOLIC BLOOD PRESSURE: 64 MMHG | WEIGHT: 173.75 LBS

## 2025-03-12 LAB
ANION GAP SERPL CALC-SCNC: 8 MMOL/L (ref 8–16)
BASOPHILS # BLD AUTO: 0.06 K/UL (ref 0–0.2)
BASOPHILS NFR BLD: 0.9 % (ref 0–1.9)
BUN SERPL-MCNC: 14 MG/DL (ref 8–23)
CALCIUM SERPL-MCNC: 8.8 MG/DL (ref 8.7–10.5)
CHLORIDE SERPL-SCNC: 96 MMOL/L (ref 95–110)
CO2 SERPL-SCNC: 30 MMOL/L (ref 23–29)
CREAT SERPL-MCNC: 0.8 MG/DL (ref 0.5–1.4)
DIFFERENTIAL METHOD BLD: ABNORMAL
EOSINOPHIL # BLD AUTO: 0.3 K/UL (ref 0–0.5)
EOSINOPHIL NFR BLD: 4.2 % (ref 0–8)
ERYTHROCYTE [DISTWIDTH] IN BLOOD BY AUTOMATED COUNT: 14.8 % (ref 11.5–14.5)
EST. GFR  (NO RACE VARIABLE): >60 ML/MIN/1.73 M^2
GLUCOSE SERPL-MCNC: 89 MG/DL (ref 70–110)
HCT VFR BLD AUTO: 35.9 % (ref 40–54)
HGB BLD-MCNC: 11.9 G/DL (ref 14–18)
IMM GRANULOCYTES # BLD AUTO: 0.03 K/UL (ref 0–0.04)
IMM GRANULOCYTES NFR BLD AUTO: 0.4 % (ref 0–0.5)
LYMPHOCYTES # BLD AUTO: 1.3 K/UL (ref 1–4.8)
LYMPHOCYTES NFR BLD: 18.7 % (ref 18–48)
MCH RBC QN AUTO: 29.4 PG (ref 27–31)
MCHC RBC AUTO-ENTMCNC: 33.1 G/DL (ref 32–36)
MCV RBC AUTO: 89 FL (ref 82–98)
MONOCYTES # BLD AUTO: 0.7 K/UL (ref 0.3–1)
MONOCYTES NFR BLD: 11 % (ref 4–15)
NEUTROPHILS # BLD AUTO: 4.4 K/UL (ref 1.8–7.7)
NEUTROPHILS NFR BLD: 64.8 % (ref 38–73)
NRBC BLD-RTO: 0 /100 WBC
PLATELET # BLD AUTO: 236 K/UL (ref 150–450)
PMV BLD AUTO: 10.3 FL (ref 9.2–12.9)
POTASSIUM SERPL-SCNC: 3.7 MMOL/L (ref 3.5–5.1)
RBC # BLD AUTO: 4.05 M/UL (ref 4.6–6.2)
SODIUM SERPL-SCNC: 134 MMOL/L (ref 136–145)
TROPONIN I SERPL HS-MCNC: 1816.7 PG/ML (ref 0–14.9)
WBC # BLD AUTO: 6.74 K/UL (ref 3.9–12.7)

## 2025-03-12 PROCEDURE — 99900035 HC TECH TIME PER 15 MIN (STAT)

## 2025-03-12 PROCEDURE — 25000003 PHARM REV CODE 250

## 2025-03-12 PROCEDURE — 27000221 HC OXYGEN, UP TO 24 HOURS

## 2025-03-12 PROCEDURE — 85025 COMPLETE CBC W/AUTO DIFF WBC: CPT | Performed by: STUDENT IN AN ORGANIZED HEALTH CARE EDUCATION/TRAINING PROGRAM

## 2025-03-12 PROCEDURE — 97116 GAIT TRAINING THERAPY: CPT

## 2025-03-12 PROCEDURE — 80048 BASIC METABOLIC PNL TOTAL CA: CPT | Performed by: STUDENT IN AN ORGANIZED HEALTH CARE EDUCATION/TRAINING PROGRAM

## 2025-03-12 PROCEDURE — 99900031 HC PATIENT EDUCATION (STAT)

## 2025-03-12 PROCEDURE — 97535 SELF CARE MNGMENT TRAINING: CPT

## 2025-03-12 PROCEDURE — 94761 N-INVAS EAR/PLS OXIMETRY MLT: CPT

## 2025-03-12 PROCEDURE — 25000003 PHARM REV CODE 250: Performed by: FAMILY MEDICINE

## 2025-03-12 PROCEDURE — 25000003 PHARM REV CODE 250: Performed by: INTERNAL MEDICINE

## 2025-03-12 PROCEDURE — 36415 COLL VENOUS BLD VENIPUNCTURE: CPT

## 2025-03-12 PROCEDURE — 94799 UNLISTED PULMONARY SVC/PX: CPT

## 2025-03-12 PROCEDURE — 36415 COLL VENOUS BLD VENIPUNCTURE: CPT | Performed by: STUDENT IN AN ORGANIZED HEALTH CARE EDUCATION/TRAINING PROGRAM

## 2025-03-12 PROCEDURE — 84484 ASSAY OF TROPONIN QUANT: CPT

## 2025-03-12 RX ORDER — METOPROLOL SUCCINATE 25 MG/1
25 TABLET, EXTENDED RELEASE ORAL 2 TIMES DAILY
Qty: 60 TABLET | Refills: 0 | Status: SHIPPED | OUTPATIENT
Start: 2025-03-12 | End: 2025-04-11

## 2025-03-12 RX ADMIN — EXTENDED PHENYTOIN SODIUM 400 MG: 100 CAPSULE, EXTENDED RELEASE ORAL at 09:03

## 2025-03-12 RX ADMIN — METOPROLOL SUCCINATE 25 MG: 25 TABLET, EXTENDED RELEASE ORAL at 10:03

## 2025-03-12 RX ADMIN — POTASSIUM BICARBONATE 50 MEQ: 978 TABLET, EFFERVESCENT ORAL at 09:03

## 2025-03-12 RX ADMIN — FAMOTIDINE 20 MG: 20 TABLET, FILM COATED ORAL at 09:03

## 2025-03-12 RX ADMIN — EZETIMIBE 10 MG: 10 TABLET ORAL at 09:03

## 2025-03-12 RX ADMIN — FUROSEMIDE 20 MG: 20 TABLET ORAL at 10:03

## 2025-03-12 RX ADMIN — APIXABAN 5 MG: 5 TABLET, FILM COATED ORAL at 10:03

## 2025-03-12 RX ADMIN — CLOPIDOGREL BISULFATE 75 MG: 75 TABLET, FILM COATED ORAL at 09:03

## 2025-03-12 RX ADMIN — CITALOPRAM HYDROBROMIDE 20 MG: 20 TABLET ORAL at 09:03

## 2025-03-12 RX ADMIN — SACUBITRIL AND VALSARTAN 1 TABLET: 24; 26 TABLET, FILM COATED ORAL at 10:03

## 2025-03-12 NOTE — PT/OT/SLP PROGRESS
Physical Therapy Treatment    Patient Name:  Jerome Amrao Jr.   MRN:  [id number]    Recommendations:     Discharge Recommendations: Moderate Intensity Therapy  Discharge Equipment Recommendations: none  Barriers to discharge: None    Assessment:     Jerome Amaro Jr. is a 79 y.o. male admitted with a medical diagnosis of Acute respiratory failure with hypoxia.  He presents with the following impairments/functional limitations: weakness, impaired endurance, gait instability, impaired functional mobility, impaired cardiopulmonary response to activity . Pt found up in BS chair. Agreeable to PT. Complains of being cold.Unable to assess O2 sat as pt's hands are too cold and machine not reading. Pt ambulated 45 ft with RW CGA, slow, rody.After 3 minute seated rest, Performed sit<> stand x 10 reps with CGA and VC's for erect stance and slow eccentric control. Pt requesting BTB.     Rehab Prognosis: Fair; patient would benefit from acute skilled PT services to address these deficits and reach maximum level of function.    Recent Surgery: * No surgery found *      Plan:     During this hospitalization, patient to be seen 6 x/week to address the identified rehab impairments via gait training, therapeutic activities, therapeutic exercises, neuromuscular re-education and progress toward the following goals:    Plan of Care Expires:  04/10/25    Subjective     Chief Complaint: cold, found wrapped in 4 blankets in bedside chair  Patient/Family Comments/goals: I need to do better   Pain/Comfort:  Pain Rating 1: 0/10  Pain Rating Post-Intervention 1: 0/10      Objective:     Communicated with RN prior to session.  Patient found up in chair with telemetry, peripheral IV, oxygen, chair check upon PT entry to room.     General Precautions: Standard, fall  Orthopedic Precautions: Full weight bearing  Braces: N/A  Respiratory Status: Room air     Functional Mobility:  Bed Mobility:     Sit to Supine: stand by assistance  Transfers:      Sit to Stand:  contact guard assistance with rolling walker  Bed to Chair: contact guard assistance and minimum assistance with  rolling walker  using  Stand Pivot  Gait: 45 feet min a with rw      AM-PAC 6 CLICK MOBILITY  Turning over in bed (including adjusting bedclothes, sheets and blankets)?: 4  Sitting down on and standing up from a chair with arms (e.g., wheelchair, bedside commode, etc.): 3  Moving from lying on back to sitting on the side of the bed?: 3  Moving to and from a bed to a chair (including a wheelchair)?: 3  Need to walk in hospital room?: 3  Climbing 3-5 steps with a railing?: 3  Basic Mobility Total Score: 19       Treatment & Education:  Pt was educated on the following: call light use, importance of OOB activity and functional mobility to negate the negative effects of prolonged bed rest during this hospitalization, safe transfers/ambulation using RW and discharge planning recommendations/options.     Patient left HOB elevated with all lines intact, call button in reach, and bed alarm on..    GOALS:   Multidisciplinary Problems       Physical Therapy Goals          Problem: Physical Therapy    Goal Priority Disciplines Outcome Interventions   Physical Therapy Goal     PT, PT/OT Progressing    Description: Goals to be met by: 25     Patient will increase functional independence with mobility by performin. Supine to sit with Supervision  2. Sit to stand transfer with Supervision  3. Bed to chair transfer with Supervision using Rolling Walker  4. Gait  x 150 feet with Supervision using Rolling Walker.                                  Time Tracking:     PT Received On: 25  PT Start Time: 1330     PT Stop Time: 1347  PT Total Time (min): 17 min     Billable Minutes: Gait Training 12 and Therapeutic Activity 5    Treatment Type: Treatment  PT/PTA: PT     Number of PTA visits since last PT visit: 2025

## 2025-03-12 NOTE — DISCHARGE SUMMARY
[facility]  Discharge Summary      Patient Name: Jerome Amaro Jr.  MRN: [id number]  [facility]: [id number]  Patient Class: IP- Inpatient  Admission Date: 3/6/2025  Hospital Length of Stay: 6 days  Discharge Date and Time:  03/12/2025 3:32 PM  Attending Physician: Najma Roy DO   Discharging Provider: Della Santiago PA-C  Primary Care Provider: Magda Ruiz FNP-C    Primary Care Team: Networked reference to record PCT     HPI:   Patient is an 79-year-old gentleman with a history of systolic congestive heart failure, CAD/CABG, status post TAVR, status post pacemaker implantation and seizure disorder who was admitted between 2/4-2/7 for CHF exacerbation  Echo performed in January showed an EF of 50-55% but diastolic function could not be determined  Patient underwent cardiac catheterization in 2023 which showed an EF of 45% with inferior wall hypokinesis, diastolic dysfunction, and SVG sequential graft to the LAD; he did not require any interventions during that procedure  Per-details are unclear as the patient is critically ill and thus an expedited evaluation was performed-> it was reported that the patient had had shortness of breath for about 4 days prior to arrival  Patient was reportedly diagnosed with COVID in the outpatient venue but there is no demonstrable result available within our records or in CareEverywhere-> daughter Layla with whom I spoke on the phone was able to text a photograph of the positive result which I reviewed  Here, the patient presented with respiratory distress with tachypnea and was satting 91%; he had a low-grade temperature of 99.7° F and patient was tachycardic between the 90s-100s; he was tachypneic between 22-25 breaths per minute and was hemodynamically stable; sats went to 96% on BiPAP  WBC was 13.67 with a lactate of 2.44; influenza testing was negative  VBG pH was 7.423 with a CO2 of 37.3  CMP showed a creatinine of 1.3 (at baseline) and a  "sodium of 130  BNP was elevated at 2268 and troponin was 609.9 (on baseline tropes are in the 20s as of February)  EKG showed, per my interpretation, V paced rhythm at 100 beats per minute with a QTC of 536; follow up EKG showed, per my interpretation, a paced at 99 beats per minute with a QTC of 539  Chest x-ray showed, per radiologist Dr. Randolph:  "Stable cardiomegaly with increased bilateral edema.  Small bibasilar pleural effusions.  Pacer present."   The patient received DuoNebs and 40 mg of IV Lasix in his admitted for further diagnosis, treatment, and care    Upon my assessment, the patient was lethargic on BiPAP (10/5, rate of 12, FiO2 50%); this was subsequently removed    Patient became more alert but pleasantly confused; it was clear that he was non decisional as he was incompletely oriented (oriented to only self and place) and could not meaningfully participate in discussions regarding his healthcare    The patient does not have a formal power-of- nor living spouse and therefore has 2 daughters would be his daughter facto power-of- is (spoke with daughter at bedside as well as daughter Layla on speaker phone; note that daughter at bedside reports to me that she purposely failed to inform her sister, Layla, that the patient was ill in the ER (as they had had a verbal altercation earlier)    Discussed with family that the patient was critically ill from a combination of congestive heart failure, coronavirus infection, and possibly NSTEMI; as the patient can not answer on his own behalf, the 2 daughters then must function as power-of- in concert on his behalf    Thus, It was explained by myself to family at bedside as well as via speaker phone (Layla)  that the next step would be intubation but after careful consideration both daughters agree in concert to not intubate    Furthermore, given his precipitous state, we did discuss what their wishes would be should he terminally " deteriorate; we discussed all aspects of coding including chest compressions, shocking (if appropriate), medications, and intubation as well as pressor therapy-> daughter's universally consent to all these aforementioned modalities except they do not want him intubated, should he terminally deteriorate    Thus, patient will be FULL CODE but DNI-> discussed with Dr. Dubose and Dr. Herr    * No surgery found *      Hospital Course:   Patient with CHF, CAD, seizure disorder presented with complaints of shortness of breath and confusion.  Per family patient has intermittent confusion that has been ongoing for a while. Underlying dementia? AAOx2 currently. Became short of breath over the last several days.  Was taken to urgent care prior to admission, diagnosed with COVID and prescribed antibiotics.  COVID PCR negative on admission.  Did have elevated BNP and troponin, symptoms improved after Lasix was given.  Cardiology consulted.  Not convince patient has COVID, no recent sick contacts and patient remains at home.  Symptoms likely secondary to acute decompensated heart failure.  Repeat COVID negative, will dc remdesivir and decadron since symptoms improved with diuresis.  Weaned off of BiPAP, transfer out of the ICU. Cardio recommended conservative management.  Patient was started on IV Lasix which was transitioned to oral Lasix.  PT/OT were consulted and recommended moderate intensity therapy.  Case management was consulted for discharge planning. Patient was noted to have labile blood pressure and home metoprolol was decreased.  Blood pressure improved.  Patient reported left arm tingling and shortness for breath.  EKG and troponin were obtained.  Troponins were elevated but trended down.  Discussed case with Cardiology.  Patient was re-evaluated by Cardiology who recommended further medical management with continue Lasix and outpatient follow up.  Cardiology signed off.  Patient was accepted to [facility].   Home O2 eval was obtained and patient was did qualify for home oxygen.  Patient to follow up with PCP and Cardiology.  Home metoprolol decreased on discharge per Cardiology recommendations.  Patient was discharged to [facility].     Goals of Care Treatment Preferences:  Code Status: Partial Code         Consults:   Consults (From admission, onward)          Status Ordering Provider     Inpatient consult to   Once        Provider:  (Not yet assigned)    Acknowledged EMILY WELDON     Inpatient consult to Registered Dietitian/Nutritionist  Once        Provider:  (Not yet assigned)    Completed EMILY WELDON     Inpatient consult to Cardiology  Once        Provider:  Rodrigo Willoughby MD    Completed SALOME LITTLE     Inpatient consult to Social Work/Case Management  Once        Provider:  (Not yet assigned)    Completed ELIAS HURTADO     Inpatient consult to Registered Dietitian/Nutritionist  Once        Provider:  (Not yet assigned)    Completed ELIAS HURTADO            Assessment & Plan  Acute respiratory failure with hypoxia  Acute on chronic combined systolic and diastolic heart failure  Likely secondary to decompensated heart failure   Receiving intravenous Lasix as per Cardiology team.  Wean oxygen as tolerated    2D echocardiogram results reviewed with ejection fraction 50-55%.    Repeat chest x-ray today.  Cardiology following  Likely secondary to decompensated heart failure   Receiving intravenous Lasix as per Cardiology team.  Wean oxygen as tolerated    2D echocardiogram results reviewed with ejection fraction 50-55%.    Repeat chest x-ray today.  Cardiology follow up  Likely secondary to decompensated heart failure   Receiving intravenous Lasix as per Cardiology team.  Wean oxygen as tolerated   2D echocardiogram results reviewed with ejection fraction 50-55%.  Cardiology following  3/10: cards following; IV lasix transitioned to PO per cards recs; PT/OT following and recommending  moderate intensity therapy  3/11: cards following; PO lasix; metoprolol decreased per cards recs; PT/OT recommending mod intensity therapy; CM consulted for SNF  Likely secondary to decompensated heart failure   Receiving intravenous Lasix as per Cardiology team.  Wean oxygen as tolerated    2D echocardiogram results reviewed with ejection fraction 50-55%.    Repeat chest x-ray today.  Cardiology following  Likely secondary to decompensated heart failure   Receiving intravenous Lasix as per Cardiology team.  Wean oxygen as tolerated    2D echocardiogram results reviewed with ejection fraction 50-55%.    Repeat chest x-ray today.  Cardiology follow up  Likely secondary to decompensated heart failure   Receiving intravenous Lasix as per Cardiology team.  Wean oxygen as tolerated   2D echocardiogram results reviewed with ejection fraction 50-55%.  Cardiology following  3/10: cards following; IV lasix transitioned to PO per cards recs; PT/OT following and recommending moderate intensity therapy  3/11: cards following; PO lasix; strict I&Os; metoprolol decreased per cards recs for soft BP  S/P TAVR (transcatheter aortic valve replacement)  Aware  Pacemaker  noted  NSTEMI (non-ST elevated myocardial infarction)  Troponins downtrending   Per Cardiology patient's left heart catheterization showed 1 patent SVG graft  Cardiology recommended medical management, heparin drip discontinued   Eliquis restarted   Aspirin discontinued    Seizure disorder  Home phenytoin resumed  Seizure precautions  Atrial fibrillation  Resume Eliquis per Cardiology  Resume beta-blocker      Hx of CABG  Aware  Altered mental status  Head CT unremarkable   Per family patient has been having on and off confusion for the last several months   Questionable dementia   Neurology follow up outpatient  CAD (coronary artery disease)  As above  Coronavirus infection  Doubt COVID infection, outpatient probably false-positive  Patient has no recent sick  contacts, remains at home mainly   2 COVID test negative here   Symptoms improved with Lasix prior to administration of remdesivir and Decadron   Discontinue Decadron and remdesivir monitor, discussed with family  PT/OT consulted  Final Active Diagnoses:    Diagnosis Date Noted POA    PRINCIPAL PROBLEM:  Acute respiratory failure with hypoxia [J96.01] 03/06/2025 Yes    Acute on chronic combined systolic and diastolic heart failure [I50.43] 06/15/2024 Yes    Coronavirus infection [B34.2] 03/06/2025 Yes    CAD (coronary artery disease) [I25.10] 02/04/2025 Yes    Altered mental status [R41.82] 01/23/2025 Yes    Hx of CABG [Z95.1] 06/15/2024 Not Applicable    Atrial fibrillation [I48.91] 02/29/2024 Yes    Seizure disorder [G40.909] 05/07/2021 Yes    NSTEMI (non-ST elevated myocardial infarction) [I21.4] 11/05/2019 Yes    Pacemaker [Z95.0] 04/21/2015 Yes    S/P TAVR (transcatheter aortic valve replacement) [Z95.3] 01/07/2015 Not Applicable      Problems Resolved During this Admission:       Discharged Condition: good    Disposition: Skilled Nursing Facility    Follow Up:   Follow-up Information       Magda Ruiz FNP-C Follow up.    Specialty: Family Medicine  Contact information:  [address]  [facility] [address]  [phone]               Jeffy Louie MD Follow up.    Specialties: Cardiology, Interventional Cardiology  Contact information:  [address]  [facility] [address]  [phone]                           Patient Instructions:      Notify your health care provider if you experience any of the following:  temperature >100.4     Notify your health care provider if you experience any of the following:  persistent nausea and vomiting or diarrhea     Notify your health care provider if you experience any of the following:  severe uncontrolled pain     Notify your health care provider if you experience any of the following:  redness, tenderness, or signs of infection (pain, swelling, redness,  "odor or green/yellow discharge around incision site)     Notify your health care provider if you experience any of the following:  difficulty breathing or increased cough     Notify your health care provider if you experience any of the following:  increased confusion or weakness     Activity as tolerated       Significant Diagnostic Studies: Labs: CMP   Recent Labs   Lab 03/11/25  0500 03/12/25  0438   * 134*   K 4.0 3.7   CL 96 96   CO2 31* 30*   GLU 91 89   BUN 20 14   CREATININE 0.9 0.8   CALCIUM 8.2* 8.8   ANIONGAP 8 8   , CBC   Recent Labs   Lab 03/11/25  0500 03/12/25  0438   WBC 6.84 6.74   HGB 10.6* 11.9*   HCT 31.9* 35.9*    236   , and Troponin No results for input(s): "TROPONINI" in the last 168 hours.    Pending Diagnostic Studies:       Procedure Component Value Units Date/Time    COVID-19 Routine Screening [[id number]] Collected: 03/06/25 1003    Order Status: Sent Lab Status: No result     Specimen: Nasopharyngeal            Medications:  Reconciled Home Medications:      Medication List        PAUSE taking these medications      CORLANOR 5 mg Tab  Wait to take this until your doctor or other care provider tells you to start again.  Generic drug: ivabradine  Take 1 tablet by mouth 2 (two) times daily.     isosorbide mononitrate 30 MG 24 hr tablet  Wait to take this until your doctor or other care provider tells you to start again.  Commonly known as: IMDUR  Take 1 tablet (30 mg total) by mouth once daily.            START taking these medications      apixaban 5 mg Tab  Commonly known as: ELIQUIS  Take 1 tablet (5 mg total) by mouth 2 (two) times daily.            CHANGE how you take these medications      metoprolol succinate 25 MG 24 hr tablet  Commonly known as: TOPROL-XL  Take 1 tablet (25 mg total) by mouth 2 (two) times daily.  What changed:   medication strength  how much to take  when to take this            CONTINUE taking these medications      albuterol-ipratropium 2.5 mg-0.5 " mg/3 mL nebulizer solution  Commonly known as: DUO-NEB  Take 3 mLs by nebulization every 6 (six) hours as needed for Shortness of Breath.     citalopram 40 MG tablet  Commonly known as: CeleXA  Take 40 mg by mouth once daily.     clopidogreL 75 mg tablet  Commonly known as: PLAVIX  Take 75 mg by mouth once daily.     ENTRESTO 24-26 mg per tablet  Generic drug: sacubitriL-valsartan  Take 1 tablet by mouth 2 (two) times daily. Samples from MD     ezetimibe 10 mg tablet  Commonly known as: ZETIA  Take 10 mg by mouth once daily.     famotidine 20 MG tablet  Commonly known as: PEPCID  Take 1 tablet (20 mg total) by mouth Daily.     furosemide 20 MG tablet  Commonly known as: LASIX  Take 20 mg by mouth 2 (two) times daily.     hydrOXYzine HCL 25 MG tablet  Commonly known as: ATARAX  Take 12.5-25 mg by mouth nightly as needed for Anxiety.     multivitamin with minerals tablet  Take 1 tablet by mouth once daily.     mupirocin 2 % ointment  Commonly known as: BACTROBAN  Apply 1 g topically 3 (three) times daily.     NITROLINGUAL 400 mcg/spray spray  Generic drug: nitroGLYCERIN 0.4 MG/DOSE TL SPRY  Place 1 spray under the tongue every 5 (five) minutes as needed for Chest pain.     phenytoin 100 MG ER capsule  Commonly known as: DILANTIN  Take 400 mg by mouth once daily.     triamcinolone acetonide 0.1% 0.1 % cream  Commonly known as: KENALOG  Apply 1 g topically 2 (two) times daily as needed (Rash).            STOP taking these medications      amoxicillin-clavulanate 875-125mg 875-125 mg per tablet  Commonly known as: AUGMENTIN              Indwelling Lines/Drains at time of discharge:   Lines/Drains/Airways       None                   Time spent on the discharge of patient: 35 minutes         Della Santiago PA-C  Department of Hospital Medicine  [facility]

## 2025-03-12 NOTE — PLAN OF CARE
Cleared from case management standpoint.   03/12/25 1431   Final Note   Assessment Type Final Discharge Note   Anticipated Discharge Disposition SNF   What phone number can be called within the next 1-3 days to see how you are doing after discharge? [phone]   Post-Acute Status   Post-Acute Authorization Placement   Post-Acute Placement Status Set-up Complete/Auth obtained   Discharge Delays None known at this time     Discharge orders and AVS sent to [facility].    Facility to provide transportation.    Bedside RN to call report to [phone].    No further case management needs at this time.

## 2025-03-12 NOTE — PT/OT/SLP PROGRESS
Occupational Therapy   Treatment    Name: Jerome Amaro Jr.  MRN: [id number]  Admitting Diagnosis:  Acute respiratory failure with hypoxia       Recommendations:     Discharge Recommendations: Moderate Intensity Therapy  Discharge Equipment Recommendations:  none  Barriers to discharge:   none    Assessment:     Jerome Amaro Jr. is a 79 y.o. male with a medical diagnosis of Acute respiratory failure with hypoxia.  He presents agreeable for OT session today. Performance deficits affecting function are weakness, impaired endurance, impaired self care skills, impaired functional mobility, gait instability, impaired balance, decreased safety awareness, decreased lower extremity function, decreased upper extremity function, impaired cardiopulmonary response to activity.     Rehab Prognosis:  Good; patient would benefit from acute skilled OT services to address these deficits and reach maximum level of function.       Plan:     Patient to be seen 5 x/week to address the above listed problems via self-care/home management, therapeutic activities, therapeutic exercises  Plan of Care Expires: 04/07/25  Plan of Care Reviewed with: patient    Subjective     Chief Complaint: cold and hungry  Patient/Family Comments/goals: get dc soon  Pain/Comfort:   None reported    Objective:     Communicated with: nurse prior to session.  Patient found supine with telemetry, peripheral IV, oxygen upon OT entry to room.    General Precautions: Standard, fall    Orthopedic Precautions:N/A  Braces: N/A  Respiratory Status: Nasal cannula, flow 3 L/min     Occupational Performance:     Bed Mobility:    Patient completed Rolling/Turning to Left with  supervision  Patient completed Rolling/Turning to Right with supervision  Patient completed Scooting/Bridging with supervision  Patient completed Supine to Sit with supervision     Functional Mobility/Transfers:  Patient completed Sit <> Stand Transfer with stand by assistance  with  rolling walker  "  Functional Mobility: pt able to ambulate to sink and stood up by sink for ~4 min >chair with CGA    Activities of Daily Living:  Grooming: contact guard assistance standing at the sink  Upper Body Dressing: supervision to beto "jacket" sitting EOB  Toileting: independence using urinal while sitting up in the chair      AMPA 6 Click ADL: 18    Treatment & Education:  Pt educated on role of OT/POC, importance of OOB/EOB activity, use of call bell, and safety during ADLs, transfers, and functional mobility.     Patient left up in chair with all lines intact, call button in reach, and chair alarm on    GOALS:   Multidisciplinary Problems       Occupational Therapy Goals          Problem: Occupational Therapy    Goal Priority Disciplines Outcome Interventions   Occupational Therapy Goal     OT, PT/OT Progressing    Description: Goals to be met by: 4/7/2025     Patient will increase functional independence with ADLs by performing:    UE Dressing with Supervision.  LE Dressing with Supervision.  Grooming while standing at sink with Supervision.  Toileting from toilet with Supervision for hygiene and clothing management.   Toilet transfer to toilet with Supervision.  Perform sitting/standing ADL activity with no LOB.                         DME Justifications:  No DME recommended requiring DME justifications    Time Tracking:     OT Date of Treatment: 03/12/25  OT Start Time: 1023  OT Stop Time: 1048  OT Total Time (min): 25 min    Billable Minutes:Self Care/Home Management 25    OT/ALTA: OT          3/12/2025  "

## 2025-03-12 NOTE — CARE UPDATE
03/12/25 1144   Home Oxygen Qualification   $ Home O2 Qualification Tech time 15 minutes   Room Air SpO2 At Rest (!) 88 %   SpO2 During Ambulation on O2 94 %   Ambulation O2 LPM 3 LPM   SpO2 Post Ambulation 94 %   Post Ambulation O2 LPM 3 LPM

## 2025-03-12 NOTE — CARE UPDATE
03/11/25 1950   Patient Assessment/Suction   Level of Consciousness (AVPU) alert   Respiratory Effort Normal   Expansion/Accessory Muscles/Retractions no use of accessory muscles   All Lung Fields Breath Sounds clear   Rhythm/Pattern, Respiratory unlabored   Cough Frequency no cough   PRE-TX-O2   Device (Oxygen Therapy) room air   SpO2 95 %   Pulse Oximetry Type Intermittent   $ Pulse Oximetry - Multiple Charge Pulse Oximetry - Multiple   Pulse 102   Resp 19   Aerosol Therapy   $ Aerosol Therapy Charges PRN treatment not required   Education   $ Education Bronchodilator;15 min   Respiratory Evaluation   $ Care Plan Tech Time 15 min

## 2025-03-12 NOTE — PLAN OF CARE
Per Alysha Reynoso, auth received #[id number], can admit when medically ready.     03/12/25 1020   Post-Acute Status   Post-Acute Authorization Placement   Post-Acute Placement Status Pending medical clearance/testing   Discharge Delays None known at this time   Discharge Plan   Discharge Plan A Skilled Nursing Facility   Discharge Plan B Skilled Nursing Facility

## 2025-03-12 NOTE — DISCHARGE INSTRUCTIONS
[facility]  Facility Transfer Orders        Admit to: SNF    Diagnoses:   Active Hospital Problems    Diagnosis  POA    *Acute respiratory failure with hypoxia [J96.01]  Yes     Priority: 1 - High    Acute on chronic combined systolic and diastolic heart failure [I50.43]  Yes     Priority: 2     Coronavirus infection [B34.2]  Yes    CAD (coronary artery disease) [I25.10]  Yes     Cardiac cath from 2023 as follows     Left ventricular ejection fraction was calculated to be 45%. with inferior wall hypokinesis    Diastolic dysfunction.    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     Allergies:   Review of patient's allergies indicates:   Allergen Reactions    Atorvastatin Other (See Comments)     Muscle pain    Rosuvastatin Other (See Comments)     Muscle pain       Code Status: Partial: no intubation    Vitals: Routine       Diet: cardiac diet, fluid restriction: 1800 ml, and 2 gram sodium diet    Activity: Activity as tolerated    Nursing Precautions: Aspiration  and Fall    Bed/Surface: Low Air Loss    Consults: PT to evaluate and treat- 5 times a week and OT to evaluate and treat- 5 times a week    Oxygen: 3 liters/min via nasal cannula    Dialysis: Patient is not on dialysis.     Labs:               BMP weekly CBC weekly   Pending Diagnostic Studies:       Procedure Component Value Units Date/Time    COVID-19 Routine Screening [[id number]] Collected: 03/06/25 1003    Order Status: Sent Lab Status: No result      Specimen: Nasopharyngeal           Imaging:     Miscellaneous Care:   CHF Care: Daily Weight with notification of MD/NP of 2lb or > increase in 24 hours    v/s and O2 sat every shift    Oxygen as needed for sats <90%    Report abnormal breath sounds to MD/NP    Edema checks q shift- notify MD/NP of increased edema    Task segmentation by nursing for daily care to decrease exertion    Schedule appointment with cardiologist, Dr. Roberts in 1 Weeks    CHF education to include diet ,medication, and CHF flags for MD notification    IV Access:      Medications: Discontinue all previous medication orders, if any. See new list below.  Current Discharge Medication List        START taking these medications    Details   apixaban (ELIQUIS) 5 mg Tab Take 1 tablet (5 mg total) by mouth 2 (two) times daily.  Qty: 60 tablet, Refills: 1           CONTINUE these medications which have CHANGED    Details   metoprolol succinate (TOPROL-XL) 25 MG 24 hr tablet Take 1 tablet (25 mg total) by mouth 2 (two) times daily.  Qty: 60 tablet, Refills: 0    Comments: .           CONTINUE these medications which have NOT CHANGED    Details   albuterol-ipratropium (DUO-NEB) 2.5 mg-0.5 mg/3 mL nebulizer solution Take 3 mLs by nebulization every 6 (six) hours as needed for Shortness of Breath.      citalopram (CELEXA) 40 MG tablet Take 40 mg by mouth once daily.      clopidogrel (PLAVIX) 75 mg tablet Take 75 mg by mouth once daily.      CORLANOR 5 mg Tab Take 1 tablet by mouth 2 (two) times daily.      ENTRESTO 24-26 mg per tablet Take 1 tablet by mouth 2 (two) times daily. Samples from MD      ezetimibe (ZETIA) 10 mg tablet Take 10 mg by mouth once daily.      famotidine (PEPCID) 20 MG tablet Take 1 tablet (20 mg total) by mouth Daily.  Qty: 30 tablet, Refills: 0      furosemide (LASIX) 20 MG tablet Take 20 mg by mouth 2 (two) times daily.      hydrOXYzine HCL (ATARAX) 25 MG tablet Take 12.5-25 mg by mouth nightly as needed for Anxiety.       isosorbide mononitrate (IMDUR) 30 MG 24 hr tablet Take 1 tablet (30 mg total) by mouth once daily.  Qty: 30 tablet, Refills: 0      multivitamin with minerals tablet Take 1 tablet by mouth once daily.      mupirocin (BACTROBAN) 2 % ointment Apply 1 g topically 3 (three) times daily.      phenytoin (DILANTIN) 100 MG ER capsule Take 400 mg by mouth once daily.   Refills: 0      triamcinolone acetonide 0.1% (KENALOG) 0.1 % cream Apply 1 g topically 2 (two) times daily as needed (Rash).      NITROLINGUAL 400 mcg/spray spray Place 1 spray under the tongue every 5 (five) minutes as needed for Chest pain.           STOP taking these medications       amoxicillin-clavulanate 875-125mg (AUGMENTIN) 875-125 mg per tablet Comments:   Reason for Stopping:             Follow up:    Follow-up Information       Magda Ruiz FNP-C Follow up.    Specialty: Family Medicine  Contact information:  [address]  [facility] [address]  [phone]               Jeffy Louie MD Follow up.    Specialties: Cardiology, Interventional Cardiology  Contact information:  [address]  [place] [address]  [phone]                               Immunizations Administered as of 3/12/2025       Name Date Dose VIS Date Route Exp Date    COVID-19, MRNA, LN-S, PF (Pfizer) (Purple Cap) 3/30/2021  8:51 AM 0.3 mL 12/12/2020 Intramuscular 7/31/2021    Site: Left deltoid     Given By: Tiffani Lugo FNP-C     : [employer]     Lot: [id number]     COVID-19, MRNA, LN-S, PF (Pfizer) (Purple Cap) 3/9/2021  7:40 AM 0.3 mL 12/12/2020 Intramuscular 6/30/2021    Site: Left deltoid     Given By: Khadijah Delgado RN     : [employer]     Lot: [id number]                 Some patients may experience side effects after vaccination.  These may include fever, headache, muscle or joint aches.  Most symptoms resolve with 24-48 hours and do not require urgent medical evaluation unless they persist for more than 72 hours or  symptoms are concerning for an unrelated medical condition.          Della Santiago PA-C

## 2025-03-12 NOTE — PROGRESS NOTES
[facility]    Cardiology Progress Note    Subjective:  NAEO. Patient feeling better this morning. States that he slept well. No acute complains at this time, wondering when he is able to go home. States that his shortness of breath and lower extremity swelling have improved. Labs wnl. Vitals stable.       Objective:  Vital Signs (Most Recent)  Temp: 97.8 °F (36.6 °C) (03/12/25 0715)  Pulse: 89 (03/12/25 0715)  Resp: 18 (03/12/25 0715)  BP: 117/65 (03/12/25 0715)  SpO2: 96 % (03/12/25 0744)    Vital Signs Range (Last 24H):  Temp:  [97.3 °F (36.3 °C)-98 °F (36.7 °C)]   Pulse:  []   Resp:  [18-19]   BP: (115-124)/(65-80)   SpO2:  [95 %-100 %]     I & O (Last 24H):    Intake/Output Summary (Last 24 hours) at 3/12/2025 0928  Last data filed at 3/12/2025 0565  Gross per 24 hour   Intake 360 ml   Output 675 ml   Net -315 ml       Current Diet:     Current Diet Order   Procedures    Diet NPO Except for: Medication, Sips with Medication     Except for::   Medication     Except for::   Sips with Medication        Allergies:  Review of patient's allergies indicates:   Allergen Reactions    Atorvastatin Other (See Comments)     Muscle pain    Rosuvastatin Other (See Comments)     Muscle pain       Meds:  Scheduled Meds:   apixaban  5 mg Oral BID    citalopram  20 mg Oral Daily    clopidogreL  75 mg Oral Daily    ezetimibe  10 mg Oral Daily    famotidine  20 mg Oral BID    furosemide  20 mg Oral BID    isosorbide mononitrate  30 mg Oral Daily    metoprolol succinate  25 mg Oral BID    phenytoin  400 mg Oral Daily    sacubitriL-valsartan  1 tablet Oral BID     Continuous Infusions:  PRN Meds:  Current Facility-Administered Medications:     acetaminophen, 650 mg, Oral, Q4H PRN    albuterol-ipratropium, 3 mL, Nebulization, Q6H PRN    dextrose 50%, 12.5 g, Intravenous, PRN    dextrose 50%, 25 g, Intravenous, PRN    glucagon (human recombinant), 1 mg, Intramuscular, PRN    glucose, 16 g, Oral, PRN    glucose, 24 g,  Oral, PRN    HYDROcodone-acetaminophen, 1 tablet, Oral, Q6H PRN    HYDROcodone-acetaminophen, 1 tablet, Oral, Q6H PRN    magnesium oxide, 800 mg, Oral, PRN    magnesium oxide, 800 mg, Oral, PRN    melatonin, 9 mg, Oral, Nightly PRN    morphine, 1 mg, Intravenous, Q2H PRN    naloxone, 0.02 mg, Intravenous, PRN    nitroGLYCERIN, 0.4 mg, Sublingual, Q5 Min PRN    ondansetron, 4 mg, Intravenous, Q6H PRN    potassium bicarbonate, 35 mEq, Oral, PRN    potassium bicarbonate, 50 mEq, Oral, PRN    potassium bicarbonate, 60 mEq, Oral, PRN    potassium, sodium phosphates, 2 packet, Oral, PRN    potassium, sodium phosphates, 2 packet, Oral, PRN    potassium, sodium phosphates, 2 packet, Oral, PRN    senna-docusate 8.6-50 mg, 1 tablet, Oral, BID PRN    sodium chloride 0.9%, 10 mL, Intravenous, PRN    Lab Results :  Recent Results (from the past 24 hours)   EKG 12-lead    Collection Time: 03/11/25  2:26 PM   Result Value Ref Range    QRS Duration 144 ms    [facility] QTC Calculation 563 ms   Troponin I High Sensitivity    Collection Time: 03/11/25  4:55 PM   Result Value Ref Range    Troponin I High Sensitivity 2246.4 (HH) 0.0 - 14.9 pg/mL   Troponin I High Sensitivity    Collection Time: 03/11/25  8:06 PM   Result Value Ref Range    Troponin I High Sensitivity 2087.7 (HH) 0.0 - 14.9 pg/mL   Troponin I High Sensitivity    Collection Time: 03/12/25 12:21 AM   Result Value Ref Range    Troponin I High Sensitivity 1816.7 (HH) 0.0 - 14.9 pg/mL   CBC Auto Differential    Collection Time: 03/12/25  4:38 AM   Result Value Ref Range    WBC 6.74 3.90 - 12.70 K/uL    RBC 4.05 (L) 4.60 - 6.20 M/uL    Hemoglobin 11.9 (L) 14.0 - 18.0 g/dL    Hematocrit 35.9 (L) 40.0 - 54.0 %    MCV 89 82 - 98 fL    MCH 29.4 27.0 - 31.0 pg    MCHC 33.1 32.0 - 36.0 g/dL    RDW 14.8 (H) 11.5 - 14.5 %    Platelets 236 150 - 450 K/uL    MPV 10.3 9.2 - 12.9 fL    Immature Granulocytes 0.4 0.0 - 0.5 %    Gran # (ANC) 4.4 1.8 - 7.7 K/uL    Immature Grans (Abs) 0.03 0.00 -  0.04 K/uL    Lymph # 1.3 1.0 - 4.8 K/uL    Mono # 0.7 0.3 - 1.0 K/uL    Eos # 0.3 0.0 - 0.5 K/uL    Baso # 0.06 0.00 - 0.20 K/uL    nRBC 0 0 /100 WBC    Gran % 64.8 38.0 - 73.0 %    Lymph % 18.7 18.0 - 48.0 %    Mono % 11.0 4.0 - 15.0 %    Eosinophil % 4.2 0.0 - 8.0 %    Basophil % 0.9 0.0 - 1.9 %    Differential Method Automated    Basic Metabolic Panel    Collection Time: 03/12/25  4:38 AM   Result Value Ref Range    Sodium 134 (L) 136 - 145 mmol/L    Potassium 3.7 3.5 - 5.1 mmol/L    Chloride 96 95 - 110 mmol/L    CO2 30 (H) 23 - 29 mmol/L    Glucose 89 70 - 110 mg/dL    BUN 14 8 - 23 mg/dL    Creatinine 0.8 0.5 - 1.4 mg/dL    Calcium 8.8 8.7 - 10.5 mg/dL    Anion Gap 8 8 - 16 mmol/L    eGFR >60.0 >60 mL/min/1.73 m^2       Diagnostic Results:  Imaging Results              CT Head Without Contrast (Final result)  Result time 03/06/25 07:24:59      Final result by Fox Dowd DO (03/06/25 07:24:59)                   Impression:      1. No acute intracranial abnormality.  2. Chronic and involutional changes of the brain.      Electronically signed by: Fox Dowd  Date:    03/06/2025  Time:    07:24               Narrative:      CMS MANDATED QUALITY DATA - CT RADIATION - 436    All CT scans at this facility utilize dose modulation, iterative reconstruction, and/or weight based dosing when appropriate to reduce radiation dose to as low as reasonably achievable.    EXAMINATION:  CT HEAD WITHOUT CONTRAST    CLINICAL HISTORY:  Altered mental status;    TECHNIQUE:  Head CT without IV contrast.    COMPARISON:  CT brain 01/21/2025.    FINDINGS:  Gray-white differentiation is maintained without hemorrhage, midline shift, or mass effect.  There are involutional changes of the brain parenchyma with ex vacuo dilatation of the ventricles.  Periventricular and deep white matter hypoattenuation noted consistent with chronic changes of microvascular ischemia.  Calvarium is intact. Visualized sinuses are clear.             "                           X-Ray Chest AP Portable (Final result)  Result time 03/06/25 04:01:41      Final result by Kit Randolph MD (03/06/25 04:01:41)                   Impression:      Stable cardiomegaly with increased bilateral edema.  Small bibasilar pleural effusions.  Pacer present.      Electronically signed by: Kit Randolph MD  Date:    03/06/2025  Time:    04:01               Narrative:    EXAMINATION:  XR CHEST AP PORTABLE    CLINICAL HISTORY:  Sepsis;    TECHNIQUE:  Single frontal view of the chest was performed.    COMPARISON:  02/04/2025.    FINDINGS:  Pacer leads and sternotomy wires, similar to prior.    Stable cardiomegaly.  Increased bilateral edema.  Small bibasilar pleural effusions.    Heart and lungs otherwise appear unchanged when allowing for differences in technique and positioning.                                      Recent Cardiac Rhythm   (if applicable)      Physical Exam:  Objective:  General Appearance:  Comfortable and in no acute distress.    Vital signs: (most recent): Blood pressure 117/65, pulse 89, temperature 97.8 °F (36.6 °C), temperature source Oral, resp. rate 18, height 5' 6" (1.676 m), weight 78.8 kg (173 lb 11.6 oz), SpO2 96%.    Lungs:  Normal effort and normal respiratory rate.    Heart: Normal rate.  Regular rhythm.    Abdomen: There is no abdominal tenderness.     Skin:  Warm and dry.        Current Consults:  IP CONSULT TO SOCIAL WORK/CASE MANAGEMENT  IP CONSULT TO REGISTERED DIETITIAN/NUTRITIONIST  IP CONSULT TO CARDIOLOGY  IP CONSULT TO REGISTERED DIETITIAN/NUTRITIONIST  IP CONSULT TO SOCIAL WORK/CASE MANAGEMENT    Assessment/Plan:  Assessment:   Congestive Heart Failure  CXR consistent with ARDS  NSTEMI  H/x seizure  H/o atrial fibrillation    Plan:    - Continue current medical therapy  - Continue lasix PO  - Start Ranexa for chest pain if needed  - Once patient is medically stable for discharge, follow up in outpatient clinic in 1-2 weeks.   - Will " sign off for now. Call if needed.

## 2025-03-18 ENCOUNTER — TELEPHONE (OUTPATIENT)
Facility: CLINIC | Age: 80
End: 2025-03-18
Payer: MEDICARE

## 2025-03-18 NOTE — TELEPHONE ENCOUNTER
Called and left msg nilsa Martin at [facility] to give me a call at the Discharge Clinic [phone], so we can get pt in for a hospital follow up after being discharged from [facility].

## 2025-03-19 ENCOUNTER — PATIENT MESSAGE (OUTPATIENT)
Dept: CASE MANAGEMENT | Facility: HOSPITAL | Age: 80
End: 2025-03-19

## 2025-04-20 PROBLEM — I47.20 V-TACH: Status: ACTIVE | Noted: 2025-01-01

## 2025-04-20 PROBLEM — J18.9 HCAP (HEALTHCARE-ASSOCIATED PNEUMONIA): Status: ACTIVE | Noted: 2025-01-01

## 2025-04-20 NOTE — ASSESSMENT & PLAN NOTE
Home Entresto resumed  Beta blocker held for now given concern for acute on chronic systolic congestive heart failure exacerbation

## 2025-04-20 NOTE — CONSULTS
[facility]   Cardiology Note    Consult Requested By:MAYCO  Reason for Consult: VTACH    SUBJECTIVE:     History of Present Illness: 78 YO M--LAST EF EARLIER THIS YEAR NORMAL.  PRESENTS WITH ABD PAIN  NSVT SEEN ON ECG--WCT--PT FELT DIZZY    NO CP    LAST CATH A FEW YEARS AGO==SEVERE NATIVE DISEASE  ONE SEQUNTIAL GRAFT OPEN??        Review of patient's allergies indicates:   Allergen Reactions    Atorvastatin Other (See Comments)     Muscle pain    Rosuvastatin Other (See Comments)     Muscle pain       Past Medical History:   Diagnosis Date    Anticoagulant long-term use     2014    Aortic valve disease 2014    pig valve    Arthritis     all over    Atrial fibrillation 2/29/2024    Chest pain 07/15/2019    CHF (congestive heart failure)     2014 on meds    Coronary artery disease     2007 cabg    Difficulty swallowing     Heart attack 1990    15 stents    Heart attack 02/01/2024    mild    Heart block     Hyperlipidemia     Hypertension     on meds    Hypothyroidism 2015    on meds    PVD (peripheral vascular disease)     Seizures     last episode 1990 on meds    V-tach 4/20/2025     Past Surgical History:   Procedure Laterality Date    AORTOGRAPHY WITH SERIALOGRAPHY N/A 11/16/2021    Procedure: AORTOGRAM, WITH RUNOFF;  Surgeon: Rodrigo Willoughby MD;  Location: [facility] CATH/EP LAB;  Service: Cardiology;  Laterality: N/A;    ARTERIOGRAPHY OF AORTIC ROOT N/A 11/05/2019    Procedure: ARTERIOGRAM, AORTIC ROOT;  Surgeon: Rodrigo Willoughby MD;  Location: [facility] CATH/EP LAB;  Service: Cardiology;  Laterality: N/A;    CARDIAC CATHETERIZATION      CARDIAC VALVE SURGERY      CATARACT EXTRACTION W/  INTRAOCULAR LENS IMPLANT Right 12/11/2024    Procedure: CEIOL OD;  Surgeon: Fredrick Sharma MD;  Location: [facility] ASU OR;  Service: Ophthalmology;  Laterality: Right;    CORONARY ANGIOGRAPHY INCLUDING BYPASS GRAFTS WITH CATHETERIZATION OF LEFT HEART N/A 11/05/2019    Procedure: ANGIOGRAM, CORONARY, INCLUDING BYPASS GRAFT, WITH LEFT  HEART CATHETERIZATION;  Surgeon: Rodrigo Willoughby MD;  Location: [facility] CATH/EP LAB;  Service: Cardiology;  Laterality: N/A;    CORONARY ANGIOGRAPHY INCLUDING BYPASS GRAFTS WITH CATHETERIZATION OF LEFT HEART Left 11/03/2020    Procedure: ANGIOGRAM, CORONARY, INCLUDING BYPASS GRAFT, WITH LEFT HEART CATHETERIZATION;  Surgeon: Rodrigo Willoughby MD;  Location: [facility] CATH/EP LAB;  Service: Cardiology;  Laterality: Left;    CORONARY ANGIOGRAPHY INCLUDING BYPASS GRAFTS WITH CATHETERIZATION OF LEFT HEART N/A 09/28/2021    Procedure: ANGIOGRAM, CORONARY, INCLUDING BYPASS GRAFT, WITH LEFT HEART CATHETERIZATION;  Surgeon: Rodrigo Willoughby MD;  Location: [facility] CATH/EP LAB;  Service: Cardiology;  Laterality: N/A;    CORONARY ANGIOGRAPHY INCLUDING BYPASS GRAFTS WITH CATHETERIZATION OF LEFT HEART N/A 12/05/2023    Procedure: ANGIOGRAM, CORONARY, INCLUDING BYPASS GRAFT, WITH LEFT HEART CATHETERIZATION;  Surgeon: Rodrigo Willoughby MD;  Location: [facility] CATH/EP LAB;  Service: Cardiology;  Laterality: N/A;    CORONARY ANGIOPLASTY      CORONARY ARTERY BYPASS GRAFT      x 2    1991 2006    ESOPHAGOGASTRODUODENOSCOPY N/A 2/20/2024    Procedure: EGD (ESOPHAGOGASTRODUODENOSCOPY);  Surgeon: Mal Lockhart III, MD;  Location: [facility] ENDO;  Service: Endoscopy;  Laterality: N/A;    EXTRACORPOREAL SHOCK WAVE LITHOTRIPSY Right 08/01/2019    Procedure: LITHOTRIPSY, ESWL;  Surgeon: Williams Ortega MD;  Location: [facility] OR;  Service: Urology;  Laterality: Right;    HEMORRHOID SURGERY  1990    INSERTION OF PACEMAKER      PERCUTANEOUS TRANSLUMINAL BALLOON ANGIOPLASTY OF CORONARY ARTERY N/A 11/08/2019    Procedure: Angioplasty-coronary;  Surgeon: Rodrigo Willoughby MD;  Location: [facility] CATH/EP LAB;  Service: Cardiology;  Laterality: N/A;    WRIST FRACTURE SURGERY       Family History   Problem Relation Name Age of Onset    Heart attack Mother      Heart attack Father      Heart disease Sister 2     Stroke Sister 2     Diabetes Sister 2     No Known Problems  Maternal Grandmother      No Known Problems Maternal Grandfather      No Known Problems Paternal Grandmother      No Known Problems Paternal Grandfather      No Known Problems Brother      No Known Problems Maternal Aunt      No Known Problems Maternal Uncle      No Known Problems Paternal Aunt      No Known Problems Paternal Uncle      Anemia Neg Hx      Arrhythmia Neg Hx      Asthma Neg Hx      Clotting disorder Neg Hx      Fainting Neg Hx      Heart failure Neg Hx      Hyperlipidemia Neg Hx      Hypertension Neg Hx      Atrial Septal Defect Neg Hx       Social History[1]    Review of Systems:  ROS    OBJECTIVE:     Vital Signs (Most Recent)  Temp: 98.7 °F (37.1 °C) (04/20/25 0136)  Pulse: 95 (2lnc) (04/20/25 0914)  Resp: (!) 37 (2lnc) (04/20/25 0900)  BP: 120/73 (2lnc) (04/20/25 0900)  SpO2: (!) 91 % (2lnc) (04/20/25 0914)    Vital Signs Range (Last 24H):  Temp:  [98.7 °F (37.1 °C)]   Pulse:  []   Resp:  [21-37]   BP: ()/(55-78)   SpO2:  [91 %-95 %]     I & O (Last 24H):  No intake or output data in the 24 hours ending 04/20/25 0951    Current Diet:     Current Diet Order   Procedures    Diet Heart Healthy Fluid - 1500mL     2 gm Na     Fluid restriction::   Fluid - 1500mL        Allergies:  Review of patient's allergies indicates:   Allergen Reactions    Atorvastatin Other (See Comments)     Muscle pain    Rosuvastatin Other (See Comments)     Muscle pain       Meds:  Scheduled Meds:   apixaban  5 mg Oral BID    aspirin  81 mg Oral Daily    ceFEPime IV (PEDS and ADULTS)  1 g Intravenous Q6H    citalopram  20 mg Oral Daily    clopidogreL  75 mg Oral Daily    ezetimibe  10 mg Oral Daily    famotidine  20 mg Oral Daily    furosemide  20 mg Oral BID    phenytoin  400 mg Oral Daily    sacubitriL-valsartan  1 tablet Oral BID    vancomycin (VANCOCIN) IV (PEDS and ADULTS)  20 mg/kg Intravenous Once     Continuous Infusions:   amiodarone in dextrose 5%  0.5 mg/min Intravenous Continuous 16.7 mL/hr at 04/20/25  0925 0.5 mg/min at 04/20/25 0925     PRN Meds:  Current Facility-Administered Medications:     acetaminophen, 650 mg, Oral, Q8H PRN    acetaminophen, 650 mg, Oral, Q4H PRN    albuterol-ipratropium, 3 mL, Nebulization, Q6H PRN    dextrose 50%, 12.5 g, Intravenous, PRN    dextrose 50%, 25 g, Intravenous, PRN    glucagon (human recombinant), 1 mg, Intramuscular, PRN    glucose, 16 g, Oral, PRN    glucose, 24 g, Oral, PRN    HYDROcodone-acetaminophen, 1 tablet, Oral, Q6H PRN    magnesium oxide, 800 mg, Oral, PRN    magnesium oxide, 800 mg, Oral, PRN    melatonin, 6 mg, Oral, Nightly PRN    naloxone, 0.02 mg, Intravenous, PRN    ondansetron, 4 mg, Intravenous, Q6H PRN    potassium bicarbonate, 35 mEq, Oral, PRN    potassium bicarbonate, 50 mEq, Oral, PRN    potassium bicarbonate, 60 mEq, Oral, PRN    potassium, sodium phosphates, 2 packet, Oral, PRN    potassium, sodium phosphates, 2 packet, Oral, PRN    potassium, sodium phosphates, 2 packet, Oral, PRN    Pharmacy to dose Vancomycin consult, , , Once **AND** vancomycin - pharmacy to dose, , Intravenous, pharmacy to manage frequency    Oxygen/Ventilator Data (Last 24H):  (if applicable)            Hemodynamic Parameters (Last 24H):   (if applicable)        Laboratory and Radiology Data:  Recent Results (from the past 24 hours)   Comprehensive metabolic panel    Collection Time: 04/20/25  1:47 AM   Result Value Ref Range    Sodium 131 (L) 136 - 145 mmol/L    Potassium 4.1 3.5 - 5.1 mmol/L    Chloride 95 95 - 110 mmol/L    CO2 21 (L) 23 - 29 mmol/L    Glucose 151 (H) 70 - 110 mg/dL    BUN 19 8 - 23 mg/dL    Creatinine 1.2 0.5 - 1.4 mg/dL    Calcium 9.0 8.7 - 10.5 mg/dL    Protein Total 7.4 6.0 - 8.4 gm/dL    Albumin 3.8 3.5 - 5.2 g/dL    Bilirubin Total 1.9 (H) 0.1 - 1.0 mg/dL     (H) 55 - 135 unit/L    AST 24 10 - 40 unit/L    ALT 9 (L) 10 - 44 unit/L    Anion Gap 15 8 - 16 mmol/L    eGFR >60 >60 mL/min/1.73/m2   Lipase    Collection Time: 04/20/25  1:47 AM    Result Value Ref Range    Lipase Level 27 4 - 60 U/L   Troponin I High Sensitivity    Collection Time: 04/20/25  1:47 AM   Result Value Ref Range    Troponin High Sensitive 57.9 (HH) <=14.9 pg/mL   Brain natriuretic peptide    Collection Time: 04/20/25  1:47 AM   Result Value Ref Range    BNP 1,443 (H) <=99 pg/mL   CBC with Differential    Collection Time: 04/20/25  1:47 AM   Result Value Ref Range    WBC 14.54 (H) 3.90 - 12.70 K/uL    RBC 4.08 (L) 4.60 - 6.20 M/uL    Hgb 11.9 (L) 14.0 - 18.0 gm/dL    Hct 36.1 (L) 40.0 - 54.0 %    MCV 89 82 - 98 fL    MCH 29.2 27.0 - 31.0 pg    MCHC 33.0 32.0 - 36.0 g/dL    RDW 13.9 11.5 - 14.5 %    Platelet Count 260 150 - 450 K/uL    MPV 11.3 9.2 - 12.9 fL    Nucleated RBC 0 <=0 /100 WBC    Neut % 75.6 (H) 38 - 73 %    Lymph % 8.9 (L) 18 - 48 %    Mono % 11.9 4 - 15 %    Eos % 2.3 0 - 8 %    Basophil % 0.6 <=1.9 %    Imm Grans % 0.7 (H) 0.0 - 0.5 %    Neut # 11.0 (H) 1.8 - 7.7 K/uL    Lymph # 1.29 1 - 4.8 K/uL    Mono # 1.73 (H) 0.3 - 1 K/uL    Eos # 0.34 <=0.5 K/uL    Baso # 0.08 <=0.2 K/uL    Imm Grans # 0.10 (H) 0.00 - 0.04 K/uL   Magnesium    Collection Time: 04/20/25  1:47 AM   Result Value Ref Range    Magnesium 1.6 1.6 - 2.6 mg/dL   Influenza A & B by Molecular    Collection Time: 04/20/25  1:55 AM    Specimen: Nasal Swab   Result Value Ref Range    INFLUENZA A MOLECULAR Negative Negative    INFLUENZA B MOLECULAR  Negative Negative   COVID-19 Rapid Screening    Collection Time: 04/20/25  1:55 AM   Result Value Ref Range    SARS COV-2 Molecular Negative Negative   Troponin I High Sensitivity    Collection Time: 04/20/25  7:00 AM   Result Value Ref Range    Troponin High Sensitive 1,662.9 (HH) <=14.9 pg/mL   Urinalysis, Reflex to Urine Culture    Collection Time: 04/20/25  7:01 AM    Specimen: Urine   Result Value Ref Range    Color, UA Yellow Yellow, Straw, [place]    Appearance, UA Clear Clear    Spec Grav UA >=1.030 (A) 1.005 - 1.030    pH, UA 5.0 5.0 - 8.0    Protein, UA  Trace (A) Negative    Glucose, UA Negative Negative    Ketones, UA Negative Negative    Blood, UA Negative Negative    Bilirubin, UA Negative Negative    Urobilinogen, UA 2.0-3.0 (A) <2.0 EU/dL    Nitrites, UA Negative Negative    Leukocyte Esterase, UA Negative Negative   ISTAT Lactate    Collection Time: 04/20/25  9:19 AM   Result Value Ref Range    POC Lactate 3.19 (H) 0.5 - 2.2 mmol/L    Sample VENOUS      Imaging Results              CT Abdomen Pelvis With IV Contrast NO Oral Contrast (Final result)  Result time 04/20/25 07:21:07      Final result by Desmond Landers MD (04/20/25 07:21:07)                   Impression:      1. Moderate to large bilateral pleural effusions with atelectasis.  New patchy solid and ground-glass airspace consolidation with smooth interlobular septal thickening, possibly reflecting alveolar and interstitial edema, respectively.  Note that a component of infectious or noninfectious inflammatory etiology or hemorrhage would not be excluded in appropriate clinical context.  2. Otherwise, no acute findings identified in the abdomen or pelvis to account for the patient's reported symptoms.  3. Grossly similar bowel containing left inguinal hernia, for which correlation with physical examination would be recommended.  4. Additional details of chronic and incidental findings, as provided in the body of report.      Electronically signed by: Desmond Landers  Date:    04/20/2025  Time:    07:21               Narrative:    EXAMINATION:  CT ABDOMEN PELVIS WITH IV CONTRAST    CLINICAL HISTORY:  Epigastric pain;    TECHNIQUE:  Low dose axial images, sagittal and coronal reformations were obtained from the lung bases to the pubic symphysis following the IV administration of 100 mL of Omnipaque 350    COMPARISON:  Abdominal ultrasound 04/20/2025.  CT of the abdomen and pelvis 01/25/2025.    FINDINGS:  Lower chest: Moderate to large bilateral pleural effusions with atelectasis, felt relatively  similar to dedicated CT of the chest performed 03/06/2025.  Patchy solid and ground-glass airspace consolidation in the visualized lower lungs appear progressed/new since that time point.  Areas of smooth interlobular septal thickening are seen.  Pleural calcifications are identified, which would be in keeping prior asbestos exposure.  Status post TAVR.    Liver: Normal contour.  Subcentimeter hypodense lesion right hepatic lobe too small to definitely characterize.    Gallbladder and bile ducts: Unremarkable. No biliary ductal dilatation.    Pancreas: Unremarkable.    Spleen: Unremarkable.    Adrenals: Unremarkable.    Kidneys: Cortical atrophy of both kidneys.  Simple cyst upper to midpole right kidney.  Additional subcentimeter hypodense lesions in both kidneys are too small to definitely characterize.  Nonspecific bilateral perinephric stranding.    Lymph nodes: No abdominal or pelvic lymphadenopathy.    Bowel and mesentery: No evidence of bowel obstruction.  Colonic diverticulosis.  Moderate volume colonic stool.  Appendix not confidently identified.  No definite focal pericecal inflammation.    Abdominal aorta: Nonaneurysmal.  Heavy atherosclerosis.    Inferior vena cava: Unremarkable.    Free fluid or free air: None.    Pelvis: Unremarkable.    Body wall: Status post median sternotomy.  Relatively similar bowel containing left inguinal hernia compared to 01/25/2025 CT.  Question injection granuloma gluteal soft tissues.    Bones: No definite acute abnormality.  Suspect osseous demineralization.  Compression fractures of T8-T10, T12-L2 vertebral bodies appear similar to the 01/25/2025 CT                                       US Abdomen Limited (Final result)  Result time 04/20/25 03:12:42      Final result by Kit Randolph MD (04/20/25 03:12:42)                   Impression:      No gallstones or bile duct dilatation demonstrated.    Right pleural effusion noted.    Limited views liver demonstrate upper  limits of normal in size and coarse echotexture.  Suggest correlation with LFTs.    Additional findings as above.      Electronically signed by: Kit Randolph MD  Date:    04/20/2025  Time:    03:12               Narrative:    EXAMINATION:  US ABDOMEN LIMITED    CLINICAL HISTORY:  RUQ pain;    TECHNIQUE:  Limited ultrasound of the right upper quadrant of the abdomen focused on the biliary system was performed.    COMPARISON:  None    FINDINGS:  Gallbladder: No calculi, wall thickening, or pericholecystic fluid.  Technologist reports unable to assess for Ryan sign due to pain medication.    Biliary system: The common duct is not dilated, measuring 4.7 mm.  No intrahepatic ductal dilatation.    Pancreas: Obscured by overlying bowel gas.    Miscellaneous: Right pleural effusion noted.  2 cm cyst upper pole right kidney.  Limited hepatic views demonstrate liver is upper limits of normal in size with mildly coarse echotexture.                                       X-Ray Chest AP Portable (Final result)  Result time 04/20/25 03:07:40      Final result by Kit Randolph MD (04/20/25 03:07:40)                   Impression:      Increasing bilateral edema/airspace infiltrate compared to prior. Bilateral effusions appears similar to prior. Stable cardiomegaly.    Pacer present.      Electronically signed by: Kit Randolph MD  Date:    04/20/2025  Time:    03:07               Narrative:    EXAMINATION:  XR CHEST AP PORTABLE    CLINICAL HISTORY:  Unspecified abdominal pain    TECHNIQUE:  Single frontal view of the chest was performed.    COMPARISON:  03/10/2025.    FINDINGS:  Sternotomy wires and pacer leads appears similar to prior.    Increasing bilateral edema/airspace infiltrate compared to prior.  Bilateral effusions appears similar to prior.  Stable cardiomegaly.  No pneumothorax.    Heart and lungs otherwise appear unchanged when allowing for differences in technique and positioning.                                       12-lead EKG interpretation:  (if applicable)      Current Cardiac Rhythm:   (if applicable)    Physical Exam:   Physical Exam  CACHECTIC  MILD DISTRESS  NO CP  S1 S2 NORMAL  LUNGS NOT CLEAR  ASSESSMENT/PLAN:   Assessment:     Plan:    NSTMI--IS HE A CANDIDATE FOR CATH??  CURRENTLY ON ELIQUIS AND PLAVIX  WILL TRY TO GET DEVICE INTERROGATED   ON AMIMEGGAN URRUTIA.  MAG WAS 1.6--REPLETE IF NOT DONE ALREADY    GI EVAL PENDING    DR WEEKS/FRED CAN DECIDE IF CATH WARRANTED    WAS DEFERRED ON LAST ADMIT    THANKS       [1]   Social History  Tobacco Use    Smoking status: Former     Current packs/day: 0.00     Average packs/day: 0.5 packs/day for 4.0 years (2.0 ttl pk-yrs)     Types: Cigarettes     Start date:      Quit date:      Years since quittin.3    Smokeless tobacco: Never   Substance Use Topics    Alcohol use: Yes     Comment: social    Drug use: No

## 2025-04-20 NOTE — ED PROVIDER NOTES
Encounter Date: 4/20/2025       History     Chief Complaint   Patient presents with    Nausea    Abdominal Pain     Pt reports being nauseated without vomiting for a few days.  Pt says he has not had a normal BM.  Daught reports minimal intake     HPI    Jerome Amaro Jr. is a 79 y.o. male with a past medical history of systolic congestive heart failure, CAD status post CABG, status post TAVR, status post pacemaker implantation, and seizure disorder that presents to the ED for evaluation of epigastric abdominal pain.  Patient has a recent hospitalization for shortness of breath and confusion.  Was found to have an elevated BNP and troponin.  Symptoms improved with Lasix.  There was concern for possible NSTEMI, but cardiology recommended medical management at that time.  Ultimately they signed off.  He continued to be diuresed and was discharged to [facility].  Did not qualify for home O2.  Discharge from [facility] a proximally 1 week ago and started to have epigastric abdominal pain after discharge.  Patient states that he has been nauseated with worsening pain in the epigastrium.  Not having any vomiting.  Does have generalized weakness and minimal p.o. intake.  Not complaining of any chest pain, shortness of breath, fevers, or changes in bowel habits, and urinary symptoms.      Review of patient's allergies indicates:   Allergen Reactions    Atorvastatin Other (See Comments)     Muscle pain    Rosuvastatin Other (See Comments)     Muscle pain     Past Medical History:   Diagnosis Date    Anticoagulant long-term use     2014    Aortic valve disease 2014    pig valve    Arthritis     all over    Atrial fibrillation 2/29/2024    Chest pain 07/15/2019    CHF (congestive heart failure)     2014 on meds    Coronary artery disease     2007 cabg    Difficulty swallowing     Heart attack 1990    15 stents    Heart attack 02/01/2024    mild    Heart block     Hyperlipidemia     Hypertension     on meds    Hypothyroidism  2015    on meds    PVD (peripheral vascular disease)     Seizures     last episode 1990 on meds     Past Surgical History:   Procedure Laterality Date    AORTOGRAPHY WITH SERIALOGRAPHY N/A 11/16/2021    Procedure: AORTOGRAM, WITH RUNOFF;  Surgeon: Rodrigo Willoughby MD;  Location: [facility] CATH/EP LAB;  Service: Cardiology;  Laterality: N/A;    ARTERIOGRAPHY OF AORTIC ROOT N/A 11/05/2019    Procedure: ARTERIOGRAM, AORTIC ROOT;  Surgeon: Rodrigo Willoughby MD;  Location: [facility] CATH/EP LAB;  Service: Cardiology;  Laterality: N/A;    CARDIAC CATHETERIZATION      CARDIAC VALVE SURGERY      CATARACT EXTRACTION W/  INTRAOCULAR LENS IMPLANT Right 12/11/2024    Procedure: CEIOL OD;  Surgeon: Fredrick Sharma MD;  Location: [facility] AS OR;  Service: Ophthalmology;  Laterality: Right;    CORONARY ANGIOGRAPHY INCLUDING BYPASS GRAFTS WITH CATHETERIZATION OF LEFT HEART N/A 11/05/2019    Procedure: ANGIOGRAM, CORONARY, INCLUDING BYPASS GRAFT, WITH LEFT HEART CATHETERIZATION;  Surgeon: Rodrigo Willoughby MD;  Location: [facility] CATH/EP LAB;  Service: Cardiology;  Laterality: N/A;    CORONARY ANGIOGRAPHY INCLUDING BYPASS GRAFTS WITH CATHETERIZATION OF LEFT HEART Left 11/03/2020    Procedure: ANGIOGRAM, CORONARY, INCLUDING BYPASS GRAFT, WITH LEFT HEART CATHETERIZATION;  Surgeon: Rodrigo Willoughby MD;  Location: [facility] CATH/EP LAB;  Service: Cardiology;  Laterality: Left;    CORONARY ANGIOGRAPHY INCLUDING BYPASS GRAFTS WITH CATHETERIZATION OF LEFT HEART N/A 09/28/2021    Procedure: ANGIOGRAM, CORONARY, INCLUDING BYPASS GRAFT, WITH LEFT HEART CATHETERIZATION;  Surgeon: Rodrigo Willoughby MD;  Location: [facility] CATH/EP LAB;  Service: Cardiology;  Laterality: N/A;    CORONARY ANGIOGRAPHY INCLUDING BYPASS GRAFTS WITH CATHETERIZATION OF LEFT HEART N/A 12/05/2023    Procedure: ANGIOGRAM, CORONARY, INCLUDING BYPASS GRAFT, WITH LEFT HEART CATHETERIZATION;  Surgeon: Rodrigo Willoughby MD;  Location: [facility] CATH/EP LAB;  Service: Cardiology;  Laterality: N/A;    CORONARY ANGIOPLASTY       CORONARY ARTERY BYPASS GRAFT      x 2    1991 2006    ESOPHAGOGASTRODUODENOSCOPY N/A 2/20/2024    Procedure: EGD (ESOPHAGOGASTRODUODENOSCOPY);  Surgeon: Mal Lockhart III, MD;  Location: [facility] ENDO;  Service: Endoscopy;  Laterality: N/A;    EXTRACORPOREAL SHOCK WAVE LITHOTRIPSY Right 08/01/2019    Procedure: LITHOTRIPSY, ESWL;  Surgeon: Williams Ortega MD;  Location: [facility] OR;  Service: Urology;  Laterality: Right;    HEMORRHOID SURGERY  1990    INSERTION OF PACEMAKER      PERCUTANEOUS TRANSLUMINAL BALLOON ANGIOPLASTY OF CORONARY ARTERY N/A 11/08/2019    Procedure: Angioplasty-coronary;  Surgeon: Rodrigo Willoughby MD;  Location: [facility] CATH/EP LAB;  Service: Cardiology;  Laterality: N/A;    WRIST FRACTURE SURGERY       Family History   Problem Relation Name Age of Onset    Heart attack Mother      Heart attack Father      Heart disease Sister 2     Stroke Sister 2     Diabetes Sister 2     No Known Problems Maternal Grandmother      No Known Problems Maternal Grandfather      No Known Problems Paternal Grandmother      No Known Problems Paternal Grandfather      No Known Problems Brother      No Known Problems Maternal Aunt      No Known Problems Maternal Uncle      No Known Problems Paternal Aunt      No Known Problems Paternal Uncle      Anemia Neg Hx      Arrhythmia Neg Hx      Asthma Neg Hx      Clotting disorder Neg Hx      Fainting Neg Hx      Heart failure Neg Hx      Hyperlipidemia Neg Hx      Hypertension Neg Hx      Atrial Septal Defect Neg Hx       Social History[1]  Review of Systems   Constitutional:  Negative for fever.   HENT:  Negative for sore throat.    Respiratory:  Positive for cough. Negative for shortness of breath.    Cardiovascular:  Negative for chest pain and leg swelling.   Gastrointestinal:  Positive for abdominal pain, constipation and nausea. Negative for diarrhea and vomiting.   Genitourinary:  Negative for dysuria, frequency and hematuria.   Musculoskeletal:  Negative for  back pain and neck pain.   Skin:  Negative for rash.   Neurological:  Negative for dizziness, weakness, numbness and headaches.   Hematological:  Does not bruise/bleed easily.       Physical Exam     Initial Vitals [04/20/25 0136]   BP Pulse Resp Temp SpO2   130/78 (!) 122 (!) 24 98.7 °F (37.1 °C) (!) 94 %      MAP       --         Physical Exam    Nursing note and vitals reviewed.  Constitutional: He appears well-developed and well-nourished.   HENT:   Head: Normocephalic and atraumatic.   Eyes: EOM are normal. Pupils are equal, round, and reactive to light.   Neck:   Normal range of motion.  Cardiovascular:  Normal rate, regular rhythm and normal heart sounds.           Pulmonary/Chest: No respiratory distress. He has no wheezes. He has no rhonchi. He has no rales.   Diminished breath sounds at the lung bases.   Abdominal: Abdomen is soft. He exhibits no distension. There is abdominal tenderness (epigastric and right upper quadrant). There is no rebound.   Musculoskeletal:         General: Normal range of motion.      Cervical back: Normal range of motion.     Neurological: He is alert and oriented to person, place, and time. GCS eye subscore is 4. GCS verbal subscore is 5. GCS motor subscore is 6.   Skin: Capillary refill takes less than 2 seconds.   Psychiatric: He has a normal mood and affect.         ED Course   Critical Care    Date/Time: 4/20/2025 6:44 AM    Performed by: Angel Lopez MD  Authorized by: Angel Lopez MD  Direct patient critical care time: 10 minutes  Ordering / reviewing critical care time: 10 minutes  Documentation critical care time: 10 minutes  Consulting other physicians critical care time: 5 minutes  Total critical care time (exclusive of procedural time) : 35 minutes  Critical care was necessary to treat or prevent imminent or life-threatening deterioration of the following conditions: cardiac failure.  Critical care was time spent personally by me on the  following activities: discussions with consultants, interpretation of cardiac output measurements, evaluation of patient's response to treatment, examination of patient, obtaining history from patient or surrogate, ordering and performing treatments and interventions, ordering and review of laboratory studies, ordering and review of radiographic studies, pulse oximetry, re-evaluation of patient's condition and review of old charts.        Labs Reviewed   COMPREHENSIVE METABOLIC PANEL - Abnormal       Result Value    Sodium 131 (*)     Potassium 4.1      Chloride 95      CO2 21 (*)     Glucose 151 (*)     BUN 19      Creatinine 1.2      Calcium 9.0      Protein Total 7.4      Albumin 3.8      Bilirubin Total 1.9 (*)      (*)     AST 24      ALT 9 (*)     Anion Gap 15      eGFR >60     TROPONIN I HIGH SENSITIVITY - Abnormal    Troponin High Sensitive 57.9 (*)    B-TYPE NATRIURETIC PEPTIDE - Abnormal    BNP 1,443 (*)    CBC WITH DIFFERENTIAL - Abnormal    WBC 14.54 (*)     RBC 4.08 (*)     Hgb 11.9 (*)     Hct 36.1 (*)     MCV 89      MCH 29.2      MCHC 33.0      RDW 13.9      Platelet Count 260      MPV 11.3      Nucleated RBC 0      Neut % 75.6 (*)     Lymph % 8.9 (*)     Mono % 11.9      Eos % 2.3      Basophil % 0.6      Imm Grans % 0.7 (*)     Neut # 11.0 (*)     Lymph # 1.29      Mono # 1.73 (*)     Eos # 0.34      Baso # 0.08      Imm Grans # 0.10 (*)    INFLUENZA A & B BY MOLECULAR - Normal    INFLUENZA A MOLECULAR Negative      INFLUENZA B MOLECULAR  Negative     LIPASE - Normal    Lipase Level 27     SARS-COV-2 RNA AMPLIFICATION, QUAL - Normal    SARS COV-2 Molecular Negative     MAGNESIUM - Normal    Magnesium 1.6     CBC W/ AUTO DIFFERENTIAL    Narrative:     The following orders were created for panel order CBC auto differential.  Procedure                               Abnormality         Status                     ---------                               -----------         ------                      CBC with Differential[[id number]]       Abnormal            Final result                 Please view results for these tests on the individual orders.   URINALYSIS, REFLEX TO URINE CULTURE   EXTRA TUBES    Narrative:     The following orders were created for panel order EXTRA TUBES.  Procedure                               Abnormality         Status                     ---------                               -----------         ------                     Light Blue Top Hold[[id number]]                             In process                 Light Green Top Hold[[id number]]                            In process                 Gold Top Hold[[id number]]                                   In process                   Please view results for these tests on the individual orders.   LIGHT BLUE TOP HOLD   LIGHT GREEN TOP HOLD   GOLD TOP HOLD   TROPONIN I HIGH SENSITIVITY     EKG Readings: (Independently Interpreted)   Initial Reading: No STEMI.   Ventricularly paced rhythm at a rate of 125.  ST segment and T-waves appeared to be at baseline.       Imaging Results              CT Abdomen Pelvis With IV Contrast NO Oral Contrast (In process)                      US Abdomen Limited (Final result)  Result time 04/20/25 03:12:42      Final result by Kit Randolph MD (04/20/25 03:12:42)                   Impression:      No gallstones or bile duct dilatation demonstrated.    Right pleural effusion noted.    Limited views liver demonstrate upper limits of normal in size and coarse echotexture.  Suggest correlation with LFTs.    Additional findings as above.      Electronically signed by: Kit Randolph MD  Date:    04/20/2025  Time:    03:12               Narrative:    EXAMINATION:  US ABDOMEN LIMITED    CLINICAL HISTORY:  RUQ pain;    TECHNIQUE:  Limited ultrasound of the right upper quadrant of the abdomen focused on the biliary system was performed.    COMPARISON:  None    FINDINGS:  Gallbladder: No calculi, wall  thickening, or pericholecystic fluid.  Technologist reports unable to assess for Ryan sign due to pain medication.    Biliary system: The common duct is not dilated, measuring 4.7 mm.  No intrahepatic ductal dilatation.    Pancreas: Obscured by overlying bowel gas.    Miscellaneous: Right pleural effusion noted.  2 cm cyst upper pole right kidney.  Limited hepatic views demonstrate liver is upper limits of normal in size with mildly coarse echotexture.                                       X-Ray Chest AP Portable (Final result)  Result time 04/20/25 03:07:40      Final result by Kit Randolph MD (04/20/25 03:07:40)                   Impression:      Increasing bilateral edema/airspace infiltrate compared to prior. Bilateral effusions appears similar to prior. Stable cardiomegaly.    Pacer present.      Electronically signed by: Kit Randolph MD  Date:    04/20/2025  Time:    03:07               Narrative:    EXAMINATION:  XR CHEST AP PORTABLE    CLINICAL HISTORY:  Unspecified abdominal pain    TECHNIQUE:  Single frontal view of the chest was performed.    COMPARISON:  03/10/2025.    FINDINGS:  Sternotomy wires and pacer leads appears similar to prior.    Increasing bilateral edema/airspace infiltrate compared to prior.  Bilateral effusions appears similar to prior.  Stable cardiomegaly.  No pneumothorax.    Heart and lungs otherwise appear unchanged when allowing for differences in technique and positioning.                                       Medications   amiodarone 360 mg/200 mL (1.8 mg/mL) infusion (1 mg/min Intravenous New Bag 4/20/25 0309)   amiodarone 360 mg/200 mL (1.8 mg/mL) infusion (has no administration in time range)   aspirin tablet 325 mg (has no administration in time range)   0.9% NaCl infusion (0 mLs Intravenous Stopped 4/20/25 0249)   ondansetron injection 4 mg (4 mg Intravenous Given 4/20/25 0152)   morphine injection 4 mg (4 mg Intravenous Given 4/20/25 0158)   magnesium sulfate 2g  in water 50mL IVPB (premix) (0 g Intravenous Stopped 4/20/25 2746)   amiodarone in dextrose 150 mg/100 mL (1.5 mg/mL) loading dose 150 mg (0 mg Intravenous Stopped 4/20/25 0308)   iohexoL (OMNIPAQUE 350) injection 100 mL (100 mLs Intravenous Given 4/20/25 1897)   bumetanide injection 2 mg (2 mg Intravenous Given 4/20/25 0512)     Medical Decision Making  Jerome Amaro Jr. is a 79 y.o. male with a past medical history of systolic congestive heart failure, CAD status post CABG, status post TAVR, status post pacemaker implantation, and seizure disorder that presents to the ED for evaluation of epigastric abdominal pain.  Recent hospitalization for CHF exacerbation in which he was found to have an NSTEMI.  Vitals upon arrival notable for heart rate of 122, respiratory rate of 24, and O2 sat of 94%.  Placed on 2 L nasal cannula.  Exam with uncomfortable appearing male.  Epigastric and right upper quadrant tenderness noted.  Diminished lung sounds at the lung bases.  Differential includes but isn't limited to ACS, pneumonia, pneumothorax, pleural effusion, anemia, electrolyte disturbance, biliary colic, volume overload, COVID and flu, and colitis.  Initial EKG showed ventricularly paced rhythm at a rate of 125.  Appears to be atrial sensed.  Initial troponin of 57.9.  This has down trending from previous troponins, but still elevated.  Giving aspirin.  BNP of 14 43.  Chest x-ray shows bilateral pleural effusions with diffuse interstitial opacities concerning for volume overload.  Initially gave 500 cc of fluid, but we will treat with Bumex at this time.  Given 2 mg of Bumex.  Lipase is negative.  Not having urinary symptoms, so we will defer UA.  CMP with minor hyponatremia but otherwise unremarkable.  CT of the abdomen is pending.  While being observed in the emergency department, patient had a extended run of V-tach which self-terminated.  Given 2 g of magnesium and placed on amiodarone drip.  Patient is on supplemental  oxygen which is anything for him.  Likely secondary to volume overload.  We will admit to Hospital Medicine for telemetry monitoring and further diuresis.    Amount and/or Complexity of Data Reviewed  Labs: ordered.  Radiology: ordered.    Risk  OTC drugs.  Prescription drug management.  Decision regarding hospitalization.               ED Course as of 25 0700   Sun 2025   025 Patient had long run of apparent V-tach seen on the monitor. [RI]      ED Course User Index  [RI] Angel Lopez MD                           Clinical Impression:  Final diagnoses:  [R10.9] Abdominal pain  [I47.20] V-tach (Primary)  [R10.13] Epigastric pain  [R79.89] Elevated troponin  [I25.2] History of non-ST elevation myocardial infarction (NSTEMI)          ED Disposition Condition    Admit                   Angel Lopez MD  25 0700         [1]   Social History  Tobacco Use    Smoking status: Former     Current packs/day: 0.00     Average packs/day: 0.5 packs/day for 4.0 years (2.0 ttl pk-yrs)     Types: Cigarettes     Start date:      Quit date:      Years since quittin.3    Smokeless tobacco: Never   Substance Use Topics    Alcohol use: Yes     Comment: social    Drug use: No        Angel Lopez MD  25 07

## 2025-04-20 NOTE — PLAN OF CARE
[facility] - Emergency Dept  Initial Discharge Assessment       Primary Care Provider: Magda Ruiz FNP-C    Admission Diagnosis: V-tach [I47.20]    Admission Date: 4/20/2025  Expected Discharge Date:     Pt is a 79-year-old female/male who arrived from home with V-tach  problem. Information verified as correct on facesheet. The assessment was completed with patient's daughter via telephone(nilda).  Pt lives with daughter Nilda   Pt's daughter denied Coumadin however take Eliquis and Plavix , dialysis, and has not been readmitted into the hospital in the last thirty days. Pt has home health with [facility]  Pt is capable of performing ADLs with assistance of shower chair, grab bars, rollator , rolling walker and aramis walker. Pt's daughter drives to and from medical appointments. Pt reports he takes medication as prescribed; pharmacy used mail order pharmacy.  Pt's daughter verbalized plan to discharge home via family transport. Pt has no other needs to be addressed at this time. CM reviewed the chart and will continue to monitor.       Transition of Care Barriers: None    Payor: [employer]BS [place] MEDICARE / Plan: [facility] [employer] [place] / Product Type: Medicare Advantage /     Extended Emergency Contact Information  Primary Emergency Contact: Nilda Amaro  Home Phone: [phone]  Mobile Phone: [phone]  Relation: Daughter   needed? No  Secondary Emergency Contact: Schwab,Cynthia Ramos  Address: [address] [place] [address]  Home Phone: [phone]  Mobile Phone: [phone]  Relation: Daughter  Preferred language: English   needed? No    Discharge Plan A: Home Health  Discharge Plan B: Home with family      [facility] - [address]  [address]  [place] [address]  Phone: [phone] Fax: [phone]      Initial Assessment (most recent)       Adult Discharge Assessment -  04/20/25 1351          Discharge Assessment    Assessment Type Discharge Planning Assessment     Confirmed/corrected address, phone number and insurance Yes     Confirmed Demographics Correct on Facesheet     Source of Information family;health record     Reason For Admission V-tach     People in Home child(abdirahman), adult     Facility Arrived From: Home     Do you expect to return to your current living situation? Yes     Do you have help at home or someone to help you manage your care at home? Yes     Who are your caregiver(s) and their phone number(s)? Nilda Amaro (Daughter)  [phone] (Mobile)     Prior to hospitilization cognitive status: Alert/Oriented     Current cognitive status: Unable to Assess     Walking or Climbing Stairs Difficulty yes     Walking or Climbing Stairs ambulation difficulty, requires equipment     Mobility Management Rolling Walker, Rollator or Aramis Walker     Dressing/Bathing Difficulty yes     Dressing/Bathing bathing difficulty, requires equipment     Dressing/Bathing Management Shower Chair and Grabs     Home Layout Able to live on 1st floor     Equipment Currently Used at Home shower chair;grab bar;walker, aramis;walker, rolling;rollator     Readmission within 30 days? No     Patient currently being followed by outpatient case management? No     Do you currently have service(s) that help you manage your care at home? Yes     Name and Contact number of agency [facility]     Is the pt/caregiver preference to resume services with current agency Yes     Do you take prescription medications? Yes     Do you have prescription coverage? Yes     Coverage [employer] [place] MEDICARE - [facility]BS [facility] -     Do you have any problems affording any of your prescribed medications? No     Is the patient taking medications as prescribed? yes     Who is going to help you get home at discharge? daughter     How do you get to doctors appointments? family or friend will provide     Are you  on dialysis? No     Do you take coumadin? No   Eliquis and Plavix    Discharge Plan A Home Health     Discharge Plan B Home with family     DME Needed Upon Discharge  none     Discharge Plan discussed with: Adult children     Transition of Care Barriers None

## 2025-04-20 NOTE — CARE UPDATE
79-year-old male with CHF, CAD, see CABG status post ventricular pacemaker and TAVR.  Seen and examined,   Trop sharply elevated - cards consult to consider angiogram, trend q3 until peaked.  tachycardia subsiding, pacemaker interrogation pending.  Cardiology consulted, possible fluid overload picture from being taken off diuretic, restarting here.  Also possible pneumonia with elevated procalcitonin, treating for Hcap with vanc and Zosyn, blood cultures and sputum cultures pending.

## 2025-04-20 NOTE — ASSESSMENT & PLAN NOTE
Cefepime/Vancomycin  Vancomycin level per pharmacy protocol, to monitor for nephrotoxicity  Check blood/sputum cultures

## 2025-04-20 NOTE — ASSESSMENT & PLAN NOTE
Patient was 75-80 beat run of V-tach (I personally reviewed the strips)  Most likely due to hypoxia but must entertain other causes (such as a primary cardiac event)  Troponin elevated, raising the question if this is a consequence of V-tach or if the patient is having an NSTEMI which is precipitating V-tach; for now:  Continue Eliquis for anticoagulation  Amiodarone infusion  Monitor and replace electrolytes 4 K number and magnesium greater than 2  Interrogate pacemaker  Telemetry monitoring on the step-down unit  Cardiology consultation

## 2025-04-20 NOTE — H&P
Former[facility] - Emergency Dept  Hospital Medicine  History & Physical    Patient Name: Jerome Amaro Jr.  MRN: [id number]  Patient Class: Emergency  Admission Date: 4/20/2025  Attending Physician: Therese Clay MD  Primary Care Provider: Magda Ruiz FNP-C    Patient seen at 7:48 a.m. on 04/20/2025.  History is obtained from the patient/ER physician/records  Subjective:     Principal Problem:  Couldn't eat    Chief Complaint:   Chief Complaint   Patient presents with    Nausea    Abdominal Pain     Pt reports being nauseated without vomiting for a few days.  Pt says he has not had a normal BM.  Daught reports minimal intake      HPI: Patient is an 79-year-old male with a history of systolic congestive heart failure, CAD/CABG, status post pacemaker implantation, TAVR, and seizure disorder  -He was recently admitted in March of this year for CHF exacerbation  -Most recent echo performed around that time showed an EF of 50-55% and grade 2 diastolic dysfunction and moderate mitral regurgitation; PASP was 30-35 mm Hg  -Patient was recently discharged from rehab about a week ago and developed epigastric abdominal pain with poor oral intake (he denies abdominal pain to me, but admits he has not been eating well and has lost an undisclosed amount of weight)  -He denies chest pain and shortness of breath but has felt poorly for 5 days, with fever, chills, and sore throat; he did have nausea/diarrhea (but no GI bleeding)  -In the ER, patient was afebrile and tachycardic in the 120s (now 80s) and was tachypneic at 21-24 breaths per minute; he remains hemodynamically stable although sats has been marginal as low as 91% necessitating 2 L nasal cannula (does not have a home oxygen requirement)  -WBC was 14.54 (ANC 11) and CMP was unrevealing; lipase was negative but Mag was 1.6  -BNP was 1443 (down from 2268 last month) and troponin was 57.9 (previously has been in the 2000s last month)->now 1662.9 at the time of  "this editing  -Influenza/COVID testing were negative; chest x-ray showed, per radiologist Dr. Randolph:    "Increasing bilateral edema/airspace infiltrate compared to prior. Bilateral effusions appears similar to prior. Stable cardiomegaly.     Pacer present."    -Given the patient's abdominal pain, right upper quadrant ultrasound was performed and showed right pleural effusion but no gallstones/bile duct dilatation  -CT abdomen/pelvis with IV contrast was thus performed and showed, per radiologist Dr. Landers:    "1. Moderate to large bilateral pleural effusions with atelectasis.  New patchy solid and ground-glass airspace consolidation with smooth interlobular septal thickening, possibly reflecting alveolar and interstitial edema, respectively.  Note that a component of infectious or noninfectious inflammatory etiology or hemorrhage would not be excluded in appropriate clinical context.  2. Otherwise, no acute findings identified in the abdomen or pelvis to account for the patient's reported symptoms.  3. Grossly similar bowel containing left inguinal hernia, for which correlation with physical examination would be recommended.  4. Additional details of chronic and incidental findings, as provided in the body of report."    -During the patient's stay in the ER, he had an approximately 75-80 beat run of V-tach which spontaneously aborted (did not require shocking)  -Magnesium sulfate and a 500 cc bolus of fluids were given (followed by 2 mg of IV Bumex) and he was placed on an Amiodarone infusion; he is status post IV Morphine/Zofran    Past Medical History:   Diagnosis Date    Anticoagulant long-term use     2014    Aortic valve disease 2014    pig valve    Arthritis     all over    Atrial fibrillation 2/29/2024    Chest pain 07/15/2019    CHF (congestive heart failure)     2014 on meds    Coronary artery disease     2007 cabg    Difficulty swallowing     Heart attack 1990    15 stents    Heart attack 02/01/2024    " mild    Heart block     Hyperlipidemia     Hypertension     on meds    Hypothyroidism 2015    on meds    PVD (peripheral vascular disease)     Seizures     last episode 1990 on meds       Past Surgical History:   Procedure Laterality Date    AORTOGRAPHY WITH SERIALOGRAPHY N/A 11/16/2021    Procedure: AORTOGRAM, WITH RUNOFF;  Surgeon: Rodrigo Willoughby MD;  Location: [facility] CATH/EP LAB;  Service: Cardiology;  Laterality: N/A;    ARTERIOGRAPHY OF AORTIC ROOT N/A 11/05/2019    Procedure: ARTERIOGRAM, AORTIC ROOT;  Surgeon: Rodrigo Willoughby MD;  Location: [facility] CATH/EP LAB;  Service: Cardiology;  Laterality: N/A;    CARDIAC CATHETERIZATION      CARDIAC VALVE SURGERY      CATARACT EXTRACTION W/  INTRAOCULAR LENS IMPLANT Right 12/11/2024    Procedure: CEIOL OD;  Surgeon: Fredrick Sharma MD;  Location: [facility] ASU OR;  Service: Ophthalmology;  Laterality: Right;    CORONARY ANGIOGRAPHY INCLUDING BYPASS GRAFTS WITH CATHETERIZATION OF LEFT HEART N/A 11/05/2019    Procedure: ANGIOGRAM, CORONARY, INCLUDING BYPASS GRAFT, WITH LEFT HEART CATHETERIZATION;  Surgeon: Rodrigo Willoughby MD;  Location: [facility] CATH/EP LAB;  Service: Cardiology;  Laterality: N/A;    CORONARY ANGIOGRAPHY INCLUDING BYPASS GRAFTS WITH CATHETERIZATION OF LEFT HEART Left 11/03/2020    Procedure: ANGIOGRAM, CORONARY, INCLUDING BYPASS GRAFT, WITH LEFT HEART CATHETERIZATION;  Surgeon: Rodrigo Willoughby MD;  Location: [facility] CATH/EP LAB;  Service: Cardiology;  Laterality: Left;    CORONARY ANGIOGRAPHY INCLUDING BYPASS GRAFTS WITH CATHETERIZATION OF LEFT HEART N/A 09/28/2021    Procedure: ANGIOGRAM, CORONARY, INCLUDING BYPASS GRAFT, WITH LEFT HEART CATHETERIZATION;  Surgeon: Rodrigo Willoughby MD;  Location: [facility] CATH/EP LAB;  Service: Cardiology;  Laterality: N/A;    CORONARY ANGIOGRAPHY INCLUDING BYPASS GRAFTS WITH CATHETERIZATION OF LEFT HEART N/A 12/05/2023    Procedure: ANGIOGRAM, CORONARY, INCLUDING BYPASS GRAFT, WITH LEFT HEART CATHETERIZATION;  Surgeon: Rodrigo Willoughby MD;   Location: [facility] CATH/EP LAB;  Service: Cardiology;  Laterality: N/A;    CORONARY ANGIOPLASTY      CORONARY ARTERY BYPASS GRAFT      x 2    1991 2006    ESOPHAGOGASTRODUODENOSCOPY N/A 2/20/2024    Procedure: EGD (ESOPHAGOGASTRODUODENOSCOPY);  Surgeon: Mal Lockhart III, MD;  Location: [facility] ENDO;  Service: Endoscopy;  Laterality: N/A;    EXTRACORPOREAL SHOCK WAVE LITHOTRIPSY Right 08/01/2019    Procedure: LITHOTRIPSY, ESWL;  Surgeon: Williams Ortega MD;  Location: [facility] OR;  Service: Urology;  Laterality: Right;    HEMORRHOID SURGERY  1990    INSERTION OF PACEMAKER      PERCUTANEOUS TRANSLUMINAL BALLOON ANGIOPLASTY OF CORONARY ARTERY N/A 11/08/2019    Procedure: Angioplasty-coronary;  Surgeon: Rodrigo Willoughby MD;  Location: [facility] CATH/EP LAB;  Service: Cardiology;  Laterality: N/A;    WRIST FRACTURE SURGERY         Review of patient's allergies indicates:   Allergen Reactions    Atorvastatin Other (See Comments)     Muscle pain    Rosuvastatin Other (See Comments)     Muscle pain       Current Facility-Administered Medications on File Prior to Encounter   Medication    magnesium oxide tablet 800 mg    sodium chloride 0.9% flush 10 mL     Current Outpatient Medications on File Prior to Encounter   Medication Sig    albuterol-ipratropium (DUO-NEB) 2.5 mg-0.5 mg/3 mL nebulizer solution Take 3 mLs by nebulization every 6 (six) hours as needed for Shortness of Breath.    apixaban (ELIQUIS) 5 mg Tab Take 1 tablet (5 mg total) by mouth 2 (two) times daily.    citalopram (CELEXA) 40 MG tablet Take 40 mg by mouth once daily.    clopidogrel (PLAVIX) 75 mg tablet Take 75 mg by mouth once daily.    [Paused] CORLANOR 5 mg Tab Take 1 tablet by mouth 2 (two) times daily.    ENTRESTO 24-26 mg per tablet Take 1 tablet by mouth 2 (two) times daily. Samples from MD    ezetimibe (ZETIA) 10 mg tablet Take 10 mg by mouth once daily.    famotidine (PEPCID) 20 MG tablet Take 1 tablet (20 mg total) by mouth Daily.    furosemide  (LASIX) 20 MG tablet Take 20 mg by mouth 2 (two) times daily.    hydrOXYzine HCL (ATARAX) 25 MG tablet Take 12.5-25 mg by mouth nightly as needed for Anxiety.    [Paused] isosorbide mononitrate (IMDUR) 30 MG 24 hr tablet Take 1 tablet (30 mg total) by mouth once daily.    metoprolol succinate (TOPROL-XL) 25 MG 24 hr tablet Take 1 tablet (25 mg total) by mouth 2 (two) times daily.    multivitamin with minerals tablet Take 1 tablet by mouth once daily.    mupirocin (BACTROBAN) 2 % ointment Apply 1 g topically 3 (three) times daily.    NITROLINGUAL 400 mcg/spray spray Place 1 spray under the tongue every 5 (five) minutes as needed for Chest pain.    phenytoin (DILANTIN) 100 MG ER capsule Take 400 mg by mouth once daily.     triamcinolone acetonide 0.1% (KENALOG) 0.1 % cream Apply 1 g topically 2 (two) times daily as needed (Rash).     Family History       Problem Relation (Age of Onset)    Diabetes Sister    Heart attack Mother, Father    Heart disease Sister    No Known Problems Maternal Grandmother, Maternal Grandfather, Paternal Grandmother, Paternal Grandfather, Brother, Maternal Aunt, Maternal Uncle, Paternal Aunt, Paternal Uncle    Stroke Sister          Tobacco Use    Smoking status: Former     Current packs/day: 0.00     Average packs/day: 0.5 packs/day for 4.0 years (2.0 ttl pk-yrs)     Types: Cigarettes     Start date:      Quit date:      Years since quittin.3    Smokeless tobacco: Never   Substance and Sexual Activity    Alcohol use: No         Drug use: No          Review of Systems   Constitutional:  Positive for chills, fever and unexpected weight change.   HENT:  Positive for sore throat. Negative for rhinorrhea.    Respiratory:  Negative for shortness of breath.    Cardiovascular:  Negative for chest pain and leg swelling.   Gastrointestinal:  Positive for abdominal pain (as endorsed to ER), diarrhea and nausea. Negative for blood in stool.   Genitourinary:  Negative for dysuria.    Musculoskeletal:  Negative for myalgias.   Skin:  Negative for rash.   Neurological:  Negative for numbness.   Hematological:  Negative for adenopathy.     Objective:     Vital Signs (Most Recent):  Temp: 98.7 °F (37.1 °C) (04/20/25 0136)  Pulse: 88 (04/20/25 0600)  Resp: (!) 21 (04/20/25 0600)  BP: (!) 99/55 (04/20/25 0600)  SpO2: 95 % (04/20/25 0600) Vital Signs (24h Range):  Temp:  [98.7 °F (37.1 °C)] 98.7 °F (37.1 °C)  Pulse:  [] 88  Resp:  [21-24] 21  SpO2:  [91 %-95 %] 95 %  BP: ()/(55-78) 99/55     Weight: 68.5 kg (151 lb)  Body mass index is 24.37 kg/m².     Physical Exam  Constitutional:       General: He is not in acute distress.     Appearance: He is ill-appearing (Chronically ill-appearing; cachectic). He is not toxic-appearing or diaphoretic.   HENT:      Head: Normocephalic and atraumatic.      Mouth/Throat:      Mouth: Mucous membranes are moist.   Eyes:      General: No scleral icterus.  Neck:      Comments: No JVD/retractions  Cardiovascular:      Rate and Rhythm: Regular rhythm.      Heart sounds: Murmur (3/6 systolic murmur) heard.      No friction rub. No gallop.   Pulmonary:      Effort: Pulmonary effort is normal. No respiratory distress.      Breath sounds: Normal breath sounds.      Comments: Speaks in complete sentences  Abdominal:      General: Bowel sounds are normal. There is no distension.      Palpations: Abdomen is soft. There is no mass.      Tenderness: There is no abdominal tenderness.      Hernia: A hernia (Patient has an easily reducible left inguinal hernia (exam performed in front of RN)) is present.   Musculoskeletal:      Right lower leg: No edema.      Left lower leg: No edema.   Skin:     General: Skin is warm and dry.   Neurological:      Mental Status: He is alert.      Comments: Oriented to self, place, year.  Thought it was March.   Psychiatric:         Behavior: Behavior normal.         Thought Content: Thought content normal.         Judgment: Judgment  normal.      Comments: Tearful at times (Grieves the loss of his wife)     Significant studies: Reviewed.  Assessment/Plan:     Assessment & Plan    V-tach/NSTEMI (non-ST elevated myocardial infarction)/Hx of CABG/Pacemaker    -Patient was 75-80 beat run of V-tach (I personally reviewed the strips)  -Most likely due to hypoxia but must entertain other causes (such as a primary cardiac event)  -Troponin elevated, raising the question if this is a consequence of V-tach or if the patient is having an NSTEMI which is precipitating V-tach; for now:  -Continue Eliquis for anticoagulation  -Start baby ASA  -Amiodarone infusion  -Monitor and replace electrolytes: K>4 and magnesium> 2  -Interrogate pacemaker  -Telemetry monitoring on the Step-down unit  -Cardiology consultation    Acute on chronic systolic (congestive) heart failure/Aortic valvar stenosis/S/P TAVR (transcatheter aortic valve replacement)    -Patient appears clinically euvolemic, at this time  -Home Lasix resumed-> will not aggressively diurese (until obtaining further guidance from Cardiology) given his recent poor oral intake  -1.5 L fluid restriction/2 g sodium diet  -Follow daily weights, strict Is&Os, and clinical exam  -Home Entresto resumed; beta blocker held for now  -Cardiology consultation (Dr. Willoughby's group)    HCAP (healthcare-associated pneumonia)    -?If presentation is solely cardiogenic or if there is an infectious component-> procalcitonin pending  -Patient with new oxygen requirement; continue nasal cannula  -Cefepime/Vancomycin  -Vancomycin level per pharmacy protocol, to monitor for nephrotoxicity  -Check blood/sputum cultures  -Lactate pending    HTN (hypertension)    -Home Entresto resumed  -Beta blocker held for now given concern for acute on chronic systolic congestive heart failure exacerbation    Seizure disorder    -Home Dilantin resumed  -Dilantin level pending  -Seizure precautions    Anxiety and depression/Grief    -Home Celexa  resumed  -Patient declines Pastoral Care visit       Therese Clay MD  Department of Hospital Medicine  [facility] - Emergency Dept           decreased po intake

## 2025-04-20 NOTE — ASSESSMENT & PLAN NOTE
Patient appears clinically euvolemic, at this time  Home Lasix resumed  1.5 L fluid restriction/2 g sodium diet  Follow daily weights, strict Is&Os, and clinical exam  Home Entresto resumed; beta blocker held for now  Cardiology consultation

## 2025-04-20 NOTE — CARE UPDATE
04/20/25 0838   Patient Assessment/Suction   Level of Consciousness (AVPU) alert   Respiratory Effort Unlabored   Expansion/Accessory Muscles/Retractions no use of accessory muscles   All Lung Fields Breath Sounds clear   LLL Breath Sounds diminished   RLL Breath Sounds diminished   Rhythm/Pattern, Respiratory unlabored   Cough Frequency no cough   PRE-TX-O2   Device (Oxygen Therapy) nasal cannula   $ Is the patient on Low Flow Oxygen? Yes   Flow (L/min) (Oxygen Therapy) 2   SpO2 (!) 94 %   Pulse Oximetry Type Continuous   $ Pulse Oximetry - Multiple Charge Pulse Oximetry - Multiple   Pulse 88   Resp (!) 25   Aerosol Therapy   $ Aerosol Therapy Charges PRN treatment not required   Respiratory Treatment Status (SVN) PRN treatment not required   Education   $ Education Oxygen;15 min   Tobacco Cessation Intervention   Do you use any type of tobacco product? No   Respiratory Evaluation   $ Care Plan Tech Time 15 min   $ Respiratory Evaluation Complete   Evaluation For New Orders   Admitting Diagnosis abdominal pain   Cardiac Diagnosis chf,TAVR,CABG, afib, pacemaker   Pulmonary Diagnosis na   Current Surgeries na   Home Oxygen   Has Home Oxygen? No   Home Aerosol, MDI, DPI, and Other Treatments/Therapies   Home Respiratory Therapy Per Patient/Review of Chart Yes   Aerosol Home Meds/Freq duoneb q6prn   Oxygen Care Plan   Oxygen Care Plan Per Protocol   SPO2 Goal (%) MD order   Rationale SpO2 is <MD Goal   Bronchodilator Care Plan   Rationale Maintain home respiratory medicine   Atelectasis Care Plan   Rationale No Rational Found   Airway Clearance Care Plan   Rationale No rationale found

## 2025-04-20 NOTE — PROGRESS NOTES
Pharmacist Renal Dose Adjustment Note    Jerome Amaro Jr. is a 79 y.o. male being treated with the medication Cefepime.    Patient Data:    Vital Signs (Most Recent):  Temp: 98.7 °F (37.1 °C) (04/20/25 0136)  Pulse: 95 (2lnc) (04/20/25 0914)  Resp: (!) 37 (2lnc) (04/20/25 0900)  BP: (!) 116/59 (04/20/25 1042)  SpO2: (!) 91 % (2lnc) (04/20/25 0914) Vital Signs (72h Range):  Temp:  [98.7 °F (37.1 °C)]   Pulse:  []   Resp:  [21-37]   BP: ()/(55-78)   SpO2:  [91 %-95 %]      Recent Labs   Lab 04/20/25  0147   CREATININE 1.2     Serum creatinine: 1.2 mg/dL 04/20/25 0147  Estimated creatinine clearance: 44.3 mL/min    Cefepime 1 gram IV every 6 hours will be changed to Cefepime 2 grams IV every 12 hours due to CrCl - 30-60 ml/min.    Pharmacist's Name: Rubi Agee  Pharmacist's Extension: [phone]

## 2025-04-20 NOTE — HPI
"Patient is an 79-year-old male with a history of systolic congestive heart failure, CAD/CABG, status post pacemaker implantation, TAVR, and seizure disorder; he was recently admitted in March of this year for CHF exacerbation  Most recent echo performed around that time showed an EF of 50-55% and grade 2 diastolic dysfunction and moderate mitral regurgitation; PA SP was 30-35 mm Hg  Patient was recently discharged from rehab about a week ago and developed epigastric abdominal pain with nausea and poor oral intake (he denies abdominal pain to me, but admits he has not been eating well and has lost an undisclosed amount of weight)  He denies chest pain and shortness of breaths but has felt poorly for 5 days, fever, chills, sore throat and did have diarrhea (but no GI bleeding)  Patient was afebrile and tachycardic in the 120s (now 80s) and was tachypneic at 21-24 breaths per minute; he remains hemodynamically stable although sats has been marginal as low as 91% necessitating 2 L nasal cannula (does not have a home oxygen requirement)  WBC was 14.54 (ANC 11) and CMP was unrevealing; lipase was negative but Mag was 1.6  BNP was 1443 (down from 2268 last month) and troponin was 57.9 (previously has been in the 2000s last month)  Influenza/COVID testing were negative; chest x-ray showed, per radiologist Dr. Randolph:  "Increasing bilateral edema/airspace infiltrate compared to prior. Bilateral effusions appears similar to prior. Stable cardiomegaly.     Pacer present."  Given the patient's abdominal pain, right upper quadrant ultrasound was performed and showed right pleural effusion but no gallstones/bile duct dilatation  CT abdomen/pelvis with IV contrast was thus performed and showed, per radiologist Dr. Landers:  "1. Moderate to large bilateral pleural effusions with atelectasis.  New patchy solid and ground-glass airspace consolidation with smooth interlobular septal thickening, possibly reflecting alveolar and " "interstitial edema, respectively.  Note that a component of infectious or noninfectious inflammatory etiology or hemorrhage would not be excluded in appropriate clinical context.  2. Otherwise, no acute findings identified in the abdomen or pelvis to account for the patient's reported symptoms.  3. Grossly similar bowel containing left inguinal hernia, for which correlation with physical examination would be recommended.  4. Additional details of chronic and incidental findings, as provided in the body of report."  During the patient's stay in the ER, he had a proximally 75-80 beat run of V-tach which spontaneously boarded and did not require shocking; magnesium sulfate and a 500 cc bolus of fluids were given (followed by 2 mg of IV Bumex) and he was placed on an Amiodarone infusion; he is status post IV Morphine/Zofran  "

## 2025-04-20 NOTE — SUBJECTIVE & OBJECTIVE
Past Medical History:   Diagnosis Date    Anticoagulant long-term use     2014    Aortic valve disease 2014    pig valve    Arthritis     all over    Atrial fibrillation 2/29/2024    Chest pain 07/15/2019    CHF (congestive heart failure)     2014 on meds    Coronary artery disease     2007 cabg    Difficulty swallowing     Heart attack 1990    15 stents    Heart attack 02/01/2024    mild    Heart block     Hyperlipidemia     Hypertension     on meds    Hypothyroidism 2015    on meds    PVD (peripheral vascular disease)     Seizures     last episode 1990 on meds       Past Surgical History:   Procedure Laterality Date    AORTOGRAPHY WITH SERIALOGRAPHY N/A 11/16/2021    Procedure: AORTOGRAM, WITH RUNOFF;  Surgeon: Rodrigo Willoughby MD;  Location: [facility] CATH/EP LAB;  Service: Cardiology;  Laterality: N/A;    ARTERIOGRAPHY OF AORTIC ROOT N/A 11/05/2019    Procedure: ARTERIOGRAM, AORTIC ROOT;  Surgeon: Rodrigo Willoughby MD;  Location: [facility] CATH/EP LAB;  Service: Cardiology;  Laterality: N/A;    CARDIAC CATHETERIZATION      CARDIAC VALVE SURGERY      CATARACT EXTRACTION W/  INTRAOCULAR LENS IMPLANT Right 12/11/2024    Procedure: CEIOL OD;  Surgeon: Fredrick Sharma MD;  Location: [facility] OR;  Service: Ophthalmology;  Laterality: Right;    CORONARY ANGIOGRAPHY INCLUDING BYPASS GRAFTS WITH CATHETERIZATION OF LEFT HEART N/A 11/05/2019    Procedure: ANGIOGRAM, CORONARY, INCLUDING BYPASS GRAFT, WITH LEFT HEART CATHETERIZATION;  Surgeon: Rodrigo Willoughby MD;  Location: [facility] CATH/EP LAB;  Service: Cardiology;  Laterality: N/A;    CORONARY ANGIOGRAPHY INCLUDING BYPASS GRAFTS WITH CATHETERIZATION OF LEFT HEART Left 11/03/2020    Procedure: ANGIOGRAM, CORONARY, INCLUDING BYPASS GRAFT, WITH LEFT HEART CATHETERIZATION;  Surgeon: Rodrigo Willoughby MD;  Location: [facility] CATH/EP LAB;  Service: Cardiology;  Laterality: Left;    CORONARY ANGIOGRAPHY INCLUDING BYPASS GRAFTS WITH CATHETERIZATION OF LEFT HEART N/A 09/28/2021    Procedure: ANGIOGRAM,  CORONARY, INCLUDING BYPASS GRAFT, WITH LEFT HEART CATHETERIZATION;  Surgeon: Rodrigo Willoughby MD;  Location: [facility] CATH/EP LAB;  Service: Cardiology;  Laterality: N/A;    CORONARY ANGIOGRAPHY INCLUDING BYPASS GRAFTS WITH CATHETERIZATION OF LEFT HEART N/A 12/05/2023    Procedure: ANGIOGRAM, CORONARY, INCLUDING BYPASS GRAFT, WITH LEFT HEART CATHETERIZATION;  Surgeon: Rodrigo Willoughby MD;  Location: [facility] CATH/EP LAB;  Service: Cardiology;  Laterality: N/A;    CORONARY ANGIOPLASTY      CORONARY ARTERY BYPASS GRAFT      x 2    1991 2006    ESOPHAGOGASTRODUODENOSCOPY N/A 2/20/2024    Procedure: EGD (ESOPHAGOGASTRODUODENOSCOPY);  Surgeon: Mal Lockhart III, MD;  Location: [facility] ENDO;  Service: Endoscopy;  Laterality: N/A;    EXTRACORPOREAL SHOCK WAVE LITHOTRIPSY Right 08/01/2019    Procedure: LITHOTRIPSY, ESWL;  Surgeon: Williams Ortega MD;  Location: [facility] OR;  Service: Urology;  Laterality: Right;    HEMORRHOID SURGERY  1990    INSERTION OF PACEMAKER      PERCUTANEOUS TRANSLUMINAL BALLOON ANGIOPLASTY OF CORONARY ARTERY N/A 11/08/2019    Procedure: Angioplasty-coronary;  Surgeon: Rodrigo Willoughby MD;  Location: [facility] CATH/EP LAB;  Service: Cardiology;  Laterality: N/A;    WRIST FRACTURE SURGERY         Review of patient's allergies indicates:   Allergen Reactions    Atorvastatin Other (See Comments)     Muscle pain    Rosuvastatin Other (See Comments)     Muscle pain       Current Facility-Administered Medications on File Prior to Encounter   Medication    magnesium oxide tablet 800 mg    sodium chloride 0.9% flush 10 mL     Current Outpatient Medications on File Prior to Encounter   Medication Sig    albuterol-ipratropium (DUO-NEB) 2.5 mg-0.5 mg/3 mL nebulizer solution Take 3 mLs by nebulization every 6 (six) hours as needed for Shortness of Breath.    apixaban (ELIQUIS) 5 mg Tab Take 1 tablet (5 mg total) by mouth 2 (two) times daily.    citalopram (CELEXA) 40 MG tablet Take 40 mg by mouth once daily.     clopidogrel (PLAVIX) 75 mg tablet Take 75 mg by mouth once daily.    [Paused] CORLANOR 5 mg Tab Take 1 tablet by mouth 2 (two) times daily.    ENTRESTO 24-26 mg per tablet Take 1 tablet by mouth 2 (two) times daily. Samples from MD    ezetimibe (ZETIA) 10 mg tablet Take 10 mg by mouth once daily.    famotidine (PEPCID) 20 MG tablet Take 1 tablet (20 mg total) by mouth Daily.    furosemide (LASIX) 20 MG tablet Take 20 mg by mouth 2 (two) times daily.    hydrOXYzine HCL (ATARAX) 25 MG tablet Take 12.5-25 mg by mouth nightly as needed for Anxiety.    [Paused] isosorbide mononitrate (IMDUR) 30 MG 24 hr tablet Take 1 tablet (30 mg total) by mouth once daily.    metoprolol succinate (TOPROL-XL) 25 MG 24 hr tablet Take 1 tablet (25 mg total) by mouth 2 (two) times daily.    multivitamin with minerals tablet Take 1 tablet by mouth once daily.    mupirocin (BACTROBAN) 2 % ointment Apply 1 g topically 3 (three) times daily.    NITROLINGUAL 400 mcg/spray spray Place 1 spray under the tongue every 5 (five) minutes as needed for Chest pain.    phenytoin (DILANTIN) 100 MG ER capsule Take 400 mg by mouth once daily.     triamcinolone acetonide 0.1% (KENALOG) 0.1 % cream Apply 1 g topically 2 (two) times daily as needed (Rash).     Family History       Problem Relation (Age of Onset)    Diabetes Sister    Heart attack Mother, Father    Heart disease Sister    No Known Problems Maternal Grandmother, Maternal Grandfather, Paternal Grandmother, Paternal Grandfather, Brother, Maternal Aunt, Maternal Uncle, Paternal Aunt, Paternal Uncle    Stroke Sister          Tobacco Use    Smoking status: Former     Current packs/day: 0.00     Average packs/day: 0.5 packs/day for 4.0 years (2.0 ttl pk-yrs)     Types: Cigarettes     Start date:      Quit date:      Years since quittin.3    Smokeless tobacco: Never   Substance and Sexual Activity    Alcohol use: Yes     Comment: social    Drug use: No    Sexual activity: Not Currently      Review of Systems   Constitutional:  Positive for chills, fever and unexpected weight change.   HENT:  Positive for sore throat. Negative for rhinorrhea.    Respiratory:  Negative for shortness of breath.    Cardiovascular:  Negative for chest pain and leg swelling.   Gastrointestinal:  Positive for abdominal pain (as endorsed to ER), diarrhea and nausea. Negative for blood in stool.   Genitourinary:  Negative for dysuria.   Musculoskeletal:  Negative for myalgias.   Skin:  Negative for rash.   Neurological:  Negative for numbness.   Hematological:  Negative for adenopathy.     Objective:     Vital Signs (Most Recent):  Temp: 98.7 °F (37.1 °C) (04/20/25 0136)  Pulse: 88 (04/20/25 0600)  Resp: (!) 21 (04/20/25 0600)  BP: (!) 99/55 (04/20/25 0600)  SpO2: 95 % (04/20/25 0600) Vital Signs (24h Range):  Temp:  [98.7 °F (37.1 °C)] 98.7 °F (37.1 °C)  Pulse:  [] 88  Resp:  [21-24] 21  SpO2:  [91 %-95 %] 95 %  BP: ()/(55-78) 99/55     Weight: 68.5 kg (151 lb)  Body mass index is 24.37 kg/m².     Physical Exam  Constitutional:       General: He is not in acute distress.     Appearance: He is ill-appearing (Chronically ill-appearing; cachectic). He is not toxic-appearing or diaphoretic.   HENT:      Head: Normocephalic and atraumatic.      Mouth/Throat:      Mouth: Mucous membranes are moist.   Eyes:      General: No scleral icterus.  Neck:      Comments: No JVD/retractions  Cardiovascular:      Rate and Rhythm: Regular rhythm.      Heart sounds: Murmur (3/6 systolic murmur) heard.      No friction rub. No gallop.   Pulmonary:      Effort: Pulmonary effort is normal. No respiratory distress.      Breath sounds: Normal breath sounds.      Comments: Speaks in complete sentences  Abdominal:      General: Bowel sounds are normal. There is no distension.      Palpations: Abdomen is soft. There is no mass.      Tenderness: There is no abdominal tenderness.      Hernia: A hernia (Patient has an easily reducible left  inguinal hernia (exam performed in front of RN)) is present.   Musculoskeletal:      Right lower leg: No edema.      Left lower leg: No edema.   Skin:     General: Skin is warm and dry.   Neurological:      Mental Status: He is alert.      Comments: Oriented to self, place, year.  Thought it was March.   Psychiatric:         Behavior: Behavior normal.         Thought Content: Thought content normal.         Judgment: Judgment normal.      Comments: Tearful at times (Grieves the loss of his wife)                Significant studies: Reviewed.

## 2025-04-21 PROBLEM — E43 SEVERE MALNUTRITION: Status: ACTIVE | Noted: 2025-01-01

## 2025-04-21 NOTE — ED NOTES
NIGHT MD NOTIFIED OF ELEVATED D DIMER AND TROPONIN PER MESSAGE. .  NAD SITTING UP IN BED, 8 L OXY MASK O2 CONTINUES.  IV SITES CLEAR. MAEW. NON DISTENDED ABDOMEN. HEPARIN AND AMIODARONE CONTINUE AS ORDERED.  MALE EXTERNAL URINARY DEVICE AT LOW SUCTION 40MM/HG.  CALL LIGHT IN REACH.

## 2025-04-21 NOTE — PLAN OF CARE
Problem: Adult Inpatient Plan of Care  Goal: Plan of Care Review  Outcome: Progressing  Goal: Patient-Specific Goal (Individualized)  Outcome: Progressing  Goal: Absence of Hospital-Acquired Illness or Injury  Outcome: Progressing  Goal: Optimal Comfort and Wellbeing  Outcome: Progressing  Goal: Readiness for Transition of Care  Outcome: Progressing     Problem: Pneumonia  Goal: Fluid Balance  Outcome: Progressing  Goal: Resolution of Infection Signs and Symptoms  Outcome: Progressing  Goal: Effective Oxygenation and Ventilation  Outcome: Progressing     Problem: Wound  Goal: Optimal Coping  Outcome: Progressing  Goal: Optimal Functional Ability  Outcome: Progressing  Goal: Absence of Infection Signs and Symptoms  Outcome: Progressing  Goal: Improved Oral Intake  Outcome: Progressing  Goal: Optimal Pain Control and Function  Outcome: Progressing  Goal: Skin Health and Integrity  Outcome: Progressing  Goal: Optimal Wound Healing  Outcome: Progressing     Problem: Oral Intake Inadequate  Goal: Improved Oral Intake  Outcome: Progressing

## 2025-04-21 NOTE — PLAN OF CARE
Pt continues to require inpatient medical care at this time. Pt is requiring amiodarone drip at this time. Discharge plan is to resume care with [facility] at discharge. No additional discharge needs known at this time. Case management to continue to follow.      04/21/25 1558   Discharge Reassessment   Assessment Type Discharge Planning Reassessment   Did the patient's condition or plan change since previous assessment? No   Discharge Plan discussed with: Patient   Communicated AXEL with patient/caregiver Yes   Discharge Plan A Home Health   Why the patient remains in the hospital Requires continued medical care

## 2025-04-21 NOTE — PROGRESS NOTES
[facility]  Adult Nutrition   Progress Note (Initial Assessment)     SUMMARY     Recommendations  Add Boost VHC once daily.   Encourage good po intake at meals.   Monitor tolerance and intake.  Nutrition Goal Status: new  Communication of RD Recs: reviewed with RN    Nutrition Goals: PO intake will meet >50% of estimated needs by RD follow up.    Nutrition Interventions: Fluid modified diet and Sodium modified diet    Nutrition Diagnosis PES Statement:   Malnutrition Type:  Context: acute illness or injury  Level: severe     Related to (etiology):   CHF     Signs and Symptoms (as evidenced by):   Energy Intake (Malnutrition): less than or equal to 50% for greater than or equal to 5 days  Orbital Region (Subcutaneous Fat Loss): moderate depletion  Upper Arm Region (Subcutaneous Fat Loss): moderate depletion  Nondenominational Region (Muscle Loss): moderate depletion  Scapular Bone Region (Muscle Loss): moderate depletion  Dorsal Hand (Muscle Loss): moderate depletion     Malnutrition Characteristic Summary:  Energy Intake (Malnutrition): less than or equal to 50% for greater than or equal to 5 days  Orbital Region (Subcutaneous Fat Loss): moderate depletion  Upper Arm Region (Subcutaneous Fat Loss): moderate depletion  Nondenominational Region (Muscle Loss): moderate depletion  Scapular Bone Region (Muscle Loss): moderate depletion  Dorsal Hand (Muscle Loss): moderate depletion     Interventions (treatment strategy):  Collaboration of care with other providers  Modified diet  Commercial beverage  Nutrition Related Medication Management        Nutrition Diagnosis Status:   New    Dietitian Rounds Brief  MST 3: 78 yo M admitted with c/o nausea and abdominal pain. Pt reports not feeling well. Ate very little today <25%. Pt lethargic at time of visit. Did not want to talk. LBM 4/20.    Nutrition Related Social Determinants of Health: SDOH: None Identified    Malnutrition Assessment  Malnutrition Context: acute illness or  "injury  Malnutrition Level: severe    Energy Intake (Malnutrition): less than or equal to 50% for greater than or equal to 5 days   Orbital Region (Subcutaneous Fat Loss): moderate depletion  Upper Arm Region (Subcutaneous Fat Loss): moderate depletion   Rastafari Region (Muscle Loss): moderate depletion  Scapular Bone Region (Muscle Loss): moderate depletion  Dorsal Hand (Muscle Loss): moderate depletion       Diet order:   Current Diet Order: Heart Healthy diet - 1500 mL fluid restriction.       Evaluation of Received Nutrient/Fluid Intake  % Intake of Estimated Energy Needs: 0 - 25 %  % Meal Intake: 0 - 25 %    Intake/Output Summary (Last 24 hours) at 4/21/2025 1536  Last data filed at 4/21/2025 1420  Gross per 24 hour   Intake 1196.76 ml   Output 350 ml   Net 846.76 ml        Anthropometrics  Height: 5' 6" (167.6 cm)  Height (inches): 66 in  Height Method: Stated  Weight: 70.5 kg (155 lb 6.4 oz)  Weight (lb): 155.4 lb  Weight Method: Bed Scale  Ideal Body Weight (IBW), Male: 142 lb  % Ideal Body Weight, Male (lb): 109.44 %  BMI (Calculated): 25.1  BMI Grade: 25 - 29.9 - overweight       Estimated/Assessed Needs  Weight Used For Calorie Calculations: 70.5 kg (155 lb 6.8 oz)  Energy Calorie Requirements (kcal): 6789-6367 kcal daily  Energy Need Method: Kcal/kg (25-30 kcal/kg)  Protein Requirements:  gm daily  Weight Used For Protein Calculations: 70.5 kg (155 lb 6.8 oz) (1.0-1.5 kg)  Fluid Requirements (mL): 1 mL/kcal or per MD  Estimated Fluid Requirement Method: RDA Method  RDA Method (mL): 1762  CHO Requirement: 220 gm    Reason for Assessment  Reason For Assessment: identified at risk by screening criteria (MST 3)  Diagnosis:  (v-tach)    Final diagnoses:  [R10.9] Abdominal pain  [I47.20] V-tach (Primary)  [R10.13] Epigastric pain  [R79.89] Elevated troponin  [I25.2] History of non-ST elevation myocardial infarction (NSTEMI)     Past Medical History:   Diagnosis Date    Anticoagulant long-term use     2014 "    Aortic valve disease 2014    pig valve    Arthritis     all over    Atrial fibrillation 2/29/2024    Chest pain 07/15/2019    CHF (congestive heart failure)     2014 on meds    Coronary artery disease     2007 cabg    Difficulty swallowing     Heart attack 1990    15 stents    Heart attack 02/01/2024    mild    Heart block     Hyperlipidemia     Hypertension     on meds    Hypothyroidism 2015    on meds    PVD (peripheral vascular disease)     Seizures     last episode 1990 on meds    V-tach 4/20/2025        Nutrition/Diet History  Spiritual, Cultural Beliefs, Mu-ism Practices, Values that Affect Care: no  Food Allergies: NKFA  Factors Affecting Nutritional Intake: abdominal pain, decreased appetite    Nutrition Risk Screen        MST Score: 3  Have you recently lost weight without trying?: Unsure  Weight loss score: 2  Have you been eating poorly because of a decreased appetite?: Yes  Appetite score: 1       Weight History:  Wt Readings from Last 10 Encounters:   04/20/25 70.5 kg (155 lb 6.4 oz)   03/11/25 78.8 kg (173 lb 11.6 oz)   03/06/25 75.6 kg (166 lb 10.7 oz)   02/06/25 100.8 kg (222 lb 4.8 oz)   01/27/25 72.5 kg (159 lb 13.3 oz)   01/19/25 74.8 kg (165 lb)   12/05/24 78.5 kg (173 lb 1 oz)   09/04/24 78.5 kg (173 lb)   06/21/24 74.2 kg (163 lb 9.3 oz)   06/16/24 74.8 kg (165 lb)        Lab/Procedures/Meds: Pertinent Labs/Meds Reviewed    Medications:Pertinent Medications Reviewed  Scheduled Meds:   aspirin  81 mg Oral Daily    ceFEPime IV (PEDS and ADULTS)  2 g Intravenous Q12H    citalopram  20 mg Oral Daily    clopidogreL  75 mg Oral Daily    ezetimibe  10 mg Oral Daily    famotidine  20 mg Oral Daily    furosemide  20 mg Oral BID    guaiFENesin  600 mg Oral BID    phenytoin  400 mg Oral Daily    vancomycin (VANCOCIN) IV (PEDS and ADULTS)  1,000 mg Intravenous Q24H     Continuous Infusions:   amiodarone in dextrose 5%  0.5 mg/min Intravenous Continuous 16.7 mL/hr at 04/21/25 1244 0.5 mg/min at  04/21/25 1244    heparin (porcine) in D5W  0-40 Units/kg/hr Intravenous Continuous 8.2 mL/hr at 04/21/25 1244 12 Units/kg/hr at 04/21/25 1244       Labs: Pertinent Labs Reviewed  Clinical Chemistry:  Recent Labs   Lab 04/20/25  0147 04/21/25  0323   * 134*   K 4.1 4.3   CO2 21* 26   BUN 19 21   CREATININE 1.2 1.2   CALCIUM 9.0 8.6*   ALBUMIN 3.8 3.4*   BILITOT 1.9* 1.6*   ALKPHOS 154* 142*   AST 24 20   ALT 9* 7*   GLUCOSE 151* 101   MG 1.6 2.0   LIPASE 27  --      CBC:   Recent Labs   Lab 04/21/25  0323   WBC 12.28   RBC 3.82*   HGB 11.1*   HCT 33.4*      MCV 87   MCH 29.1   MCHC 33.2     Cardiac Profile:  Recent Labs   Lab 04/20/25 0147   BNP 1,443*         Monitor and Evaluation  Monitor and Evaluation: Energy intake, Food and beverage intake, Weight     Discharge Planning  Nutrition Discharge Planning: Therapeutic diet (comments)  Therapeutic diet (comments): heart healthy diet    Nutrition Risk  Level of Risk/Frequency of Follow-up:  (f/u 2x/weekly)     Nutrition Follow-Up  RD Follow-up?: Yes

## 2025-04-21 NOTE — PROGRESS NOTES
VANCOMYCIN PHARMACOKINETIC NOTE:  Vancomycin Day # 1    Objective/Assessment:    Diagnosis/Indication for Vancomycin:Pneumonia      79 y.o., male; Actual Body Weight = 68.5 kg (151 lb).    The patient has the following labs:  4/20/2025 Estimated Creatinine Clearance: 44.3 mL/min (based on SCr of 1.2 mg/dL). Lab Results   Component Value Date    BUN 19 04/20/2025     Lab Results   Component Value Date    WBC 13.20 (H) 04/20/2025          Plan:  Adjust vancomycin dose and/or frequency based on the patient's actual weight and renal function:  Initiate Vancomycin 1000 mg IV every 24 hours following 1250 mg loading dose.  Orders have been entered into patient's chart.        Vancomycin trough level has been ordered for 1000 on 04/22.    Pharmacy will manage vancomycin therapy, monitor serum vancomycin levels, monitor renal function and adjust regimen as necessary.    Thank you for allowing us to participate in this patient's care.     Sabina Berkowitz 4/20/2025   Department of Pharmacy  Ext [phone]

## 2025-04-21 NOTE — PLAN OF CARE
Problem: Oral Intake Inadequate  Goal: Improved Oral Intake  Outcome: Not Progressing  Intervention: Promote and Optimize Oral Intake  Flowsheets (Taken 4/21/2025 9787)  Nutrition Interventions: supplemental drinks provided

## 2025-04-21 NOTE — PLAN OF CARE
04/20/25 2134   Patient Assessment/Suction   Level of Consciousness (AVPU) alert   Respiratory Effort Normal;Unlabored   Expansion/Accessory Muscles/Retractions no use of accessory muscles   All Lung Fields Breath Sounds Anterior:;Lateral:;diminished;clear   Rhythm/Pattern, Respiratory pattern regular   Cough Frequency no cough   Skin Integrity   $ Wound Care Tech Time 15 min   Area Observed Left;Right;Behind ear;Cheek;Bridge of nose;Chin   Skin Appearance without discoloration   PRE-TX-O2   Device (Oxygen Therapy) simple face mask   $ Is the patient on Low Flow Oxygen? Yes   Flow (L/min) (Oxygen Therapy) 7   SpO2 97 %   Pulse Oximetry Type Continuous   $ Pulse Oximetry - Multiple Charge Pulse Oximetry - Multiple   Pulse 91   Resp (!) 26   Aerosol Therapy   $ Aerosol Therapy Charges PRN treatment not required   Respiratory Treatment Status (SVN) PRN treatment not required   General Safety Checklist   Safety Promotion/Fall Prevention side rails raised   Education   $ Education Bronchodilator;Oxygen;15 min

## 2025-04-21 NOTE — PROGRESS NOTES
[facility]    Cardiology Progress Note    Subjective:  He states that he is not doing too good. Feels like his heart is racing and having shortness of breath. Tachycardic. Respirations increased, on 8L O2 simple face mask. O2 sat %. BP controlled. WBC 12. Procal 0.615.       Objective:  Vital Signs (Most Recent)  Temp: 98.8 °F (37.1 °C) (04/21/25 0400)  Pulse: 106 (04/21/25 0701)  Resp: 16 (04/21/25 0701)  BP: 120/68 (04/21/25 0848)  SpO2: 100 % (04/21/25 0701)    Vital Signs Range (Last 24H):  Temp:  [98.8 °F (37.1 °C)-101 °F (38.3 °C)]   Pulse:  []   Resp:  [16-33]   BP: ()/(51-72)   SpO2:  [91 %-100 %]     I & O (Last 24H):    Intake/Output Summary (Last 24 hours) at 4/21/2025 0901  Last data filed at 4/21/2025 0856  Gross per 24 hour   Intake 1118.52 ml   Output 350 ml   Net 768.52 ml       Current Diet:     Current Diet Order   Procedures    Diet Heart Healthy Fluid - 1500mL     2 gm Na     Fluid restriction::   Fluid - 1500mL        Allergies:  Review of patient's allergies indicates:   Allergen Reactions    Atorvastatin Other (See Comments)     Muscle pain    Rosuvastatin Other (See Comments)     Muscle pain       Meds:  Scheduled Meds:   aspirin  81 mg Oral Daily    ceFEPime IV (PEDS and ADULTS)  2 g Intravenous Q12H    citalopram  20 mg Oral Daily    clopidogreL  75 mg Oral Daily    ezetimibe  10 mg Oral Daily    famotidine  20 mg Oral Daily    furosemide  20 mg Oral BID    phenytoin  400 mg Oral Daily    vancomycin (VANCOCIN) IV (PEDS and ADULTS)  1,000 mg Intravenous Q24H     Continuous Infusions:   amiodarone in dextrose 5%  0.5 mg/min Intravenous Continuous 16.7 mL/hr at 04/21/25 0848 0.5 mg/min at 04/21/25 0848    heparin (porcine) in D5W  0-40 Units/kg/hr Intravenous Continuous 8.2 mL/hr at 04/21/25 0655 12 Units/kg/hr at 04/21/25 0655     PRN Meds:  Current Facility-Administered Medications:     acetaminophen, 650 mg, Oral, Q8H PRN    acetaminophen, 650 mg, Oral, Q4H  PRN    albuterol-ipratropium, 3 mL, Nebulization, Q6H PRN    dextrose 50%, 12.5 g, Intravenous, PRN    dextrose 50%, 25 g, Intravenous, PRN    glucagon (human recombinant), 1 mg, Intramuscular, PRN    glucose, 16 g, Oral, PRN    glucose, 24 g, Oral, PRN    heparin (PORCINE), 58.4 Units/kg, Intravenous, PRN    heparin (PORCINE), 30 Units/kg, Intravenous, PRN    HYDROcodone-acetaminophen, 1 tablet, Oral, Q6H PRN    magnesium oxide, 800 mg, Oral, PRN    magnesium oxide, 800 mg, Oral, PRN    melatonin, 6 mg, Oral, Nightly PRN    naloxone, 0.02 mg, Intravenous, PRN    ondansetron, 4 mg, Intravenous, Q6H PRN    potassium bicarbonate, 35 mEq, Oral, PRN    potassium bicarbonate, 50 mEq, Oral, PRN    potassium bicarbonate, 60 mEq, Oral, PRN    potassium, sodium phosphates, 2 packet, Oral, PRN    potassium, sodium phosphates, 2 packet, Oral, PRN    potassium, sodium phosphates, 2 packet, Oral, PRN    Pharmacy to dose Vancomycin consult, , , Once **AND** vancomycin - pharmacy to dose, , Intravenous, pharmacy to manage frequency    Lab Results :  Recent Results (from the past 24 hours)   Phenytoin level, total    Collection Time: 04/20/25  9:18 AM   Result Value Ref Range    Phenytoin Level Total 17.7 10.0 - 20.0 ug/ml   Blood culture    Collection Time: 04/20/25  9:18 AM    Specimen: Blood   Result Value Ref Range    CULTURE, BLOOD ([facility]) No Growth After 6 Hours    ISTAT Lactate    Collection Time: 04/20/25  9:19 AM   Result Value Ref Range    POC Lactate 3.19 (H) 0.5 - 2.2 mmol/L    Sample VENOUS    Blood culture    Collection Time: 04/20/25  9:25 AM    Specimen: Blood   Result Value Ref Range    CULTURE, BLOOD ([facility]) No Growth After 6 Hours    Lactic acid, plasma    Collection Time: 04/20/25  9:31 AM   Result Value Ref Range    Lactic Acid Level 1.3 0.5 - 1.9 mmol/L   Procalcitonin    Collection Time: 04/20/25  9:33 AM   Result Value Ref Range    Procalcitonin 0.615 (H) <=0.500 ng/mL   Troponin I High Sensitivity    Collection  Time: 04/20/25  4:24 PM   Result Value Ref Range    Troponin High Sensitive 1,615.1 (HH) <=14.9 pg/mL   ISTAT PROCEDURE    Collection Time: 04/20/25  4:32 PM   Result Value Ref Range    POC PH 7.441 7.35 - 7.45    POC PCO2 33.4 (L) 35 - 45 mmHg    POC PO2 47 (LL) 80 - 100 mmHg    POC HCO3 22.8 (L) 24 - 28 mmol/L    POC BE -1 -2 to 2 mmol/L    POC SATURATED O2 85 95 - 100 %    POC TCO2 24 23 - 27 mmol/L    Sample ARTERIAL     Site RR     Allens Test Pass     DelSys Nasal Can     Mode SPONT     Flow 3     Sp02 95    Troponin I High Sensitivity    Collection Time: 04/20/25  6:13 PM   Result Value Ref Range    Troponin High Sensitive 1,532.1 (HH) <=14.9 pg/mL   D dimer, quantitative    Collection Time: 04/20/25  6:13 PM   Result Value Ref Range    D-Dimer 2.44 (HH) <0.50 mg/L FEU   APTT    Collection Time: 04/20/25  6:13 PM   Result Value Ref Range    PTT 42.6 (H) 21.0 - 32.0 seconds   Protime-INR    Collection Time: 04/20/25  6:13 PM   Result Value Ref Range    PT 13.1 (H) 9.0 - 12.5 seconds    INR 1.2 0.8 - 1.2   CBC with Differential    Collection Time: 04/20/25  6:13 PM   Result Value Ref Range    WBC 13.20 (H) 3.90 - 12.70 K/uL    RBC 3.68 (L) 4.60 - 6.20 M/uL    Hgb 10.8 (L) 14.0 - 18.0 gm/dL    Hct 32.2 (L) 40.0 - 54.0 %    MCV 88 82 - 98 fL    MCH 29.3 27.0 - 31.0 pg    MCHC 33.5 32.0 - 36.0 g/dL    RDW 14.1 11.5 - 14.5 %    Platelet Count 201 150 - 450 K/uL    MPV 10.9 9.2 - 12.9 fL    Nucleated RBC 0 <=0 /100 WBC    Neut % 78.6 (H) 38 - 73 %    Lymph % 7.5 (L) 18 - 48 %    Mono % 12.0 4 - 15 %    Eos % 0.9 0 - 8 %    Basophil % 0.5 <=1.9 %    Imm Grans % 0.5 0.0 - 0.5 %    Neut # 10.4 (H) 1.8 - 7.7 K/uL    Lymph # 0.99 (L) 1 - 4.8 K/uL    Mono # 1.59 (H) 0.3 - 1 K/uL    Eos # 0.12 <=0.5 K/uL    Baso # 0.06 <=0.2 K/uL    Imm Grans # 0.07 (H) 0.00 - 0.04 K/uL   Comprehensive Metabolic Panel (CMP)    Collection Time: 04/21/25  3:23 AM   Result Value Ref Range    Sodium 134 (L) 136 - 145 mmol/L    Potassium 4.3  3.5 - 5.1 mmol/L    Chloride 96 95 - 110 mmol/L    CO2 26 23 - 29 mmol/L    Glucose 101 70 - 110 mg/dL    BUN 21 8 - 23 mg/dL    Creatinine 1.2 0.5 - 1.4 mg/dL    Calcium 8.6 (L) 8.7 - 10.5 mg/dL    Protein Total 6.8 6.0 - 8.4 gm/dL    Albumin 3.4 (L) 3.5 - 5.2 g/dL    Bilirubin Total 1.6 (H) 0.1 - 1.0 mg/dL     (H) 55 - 135 unit/L    AST 20 10 - 40 unit/L    ALT 7 (L) 10 - 44 unit/L    Anion Gap 12 8 - 16 mmol/L    eGFR >60 >60 mL/min/1.73/m2   Magnesium    Collection Time: 04/21/25  3:23 AM   Result Value Ref Range    Magnesium 2.0 1.6 - 2.6 mg/dL   CBC with Differential    Collection Time: 04/21/25  3:23 AM   Result Value Ref Range    WBC 12.28 3.90 - 12.70 K/uL    RBC 3.82 (L) 4.60 - 6.20 M/uL    Hgb 11.1 (L) 14.0 - 18.0 gm/dL    Hct 33.4 (L) 40.0 - 54.0 %    MCV 87 82 - 98 fL    MCH 29.1 27.0 - 31.0 pg    MCHC 33.2 32.0 - 36.0 g/dL    RDW 14.2 11.5 - 14.5 %    Platelet Count 184 150 - 450 K/uL    MPV 11.4 9.2 - 12.9 fL    Nucleated RBC 0 <=0 /100 WBC    Neut % 73.6 (H) 38 - 73 %    Lymph % 11.2 (L) 18 - 48 %    Mono % 12.4 4 - 15 %    Eos % 1.8 0 - 8 %    Basophil % 0.4 <=1.9 %    Imm Grans % 0.6 (H) 0.0 - 0.5 %    Neut # 9.0 (H) 1.8 - 7.7 K/uL    Lymph # 1.38 1 - 4.8 K/uL    Mono # 1.52 (H) 0.3 - 1 K/uL    Eos # 0.22 <=0.5 K/uL    Baso # 0.05 <=0.2 K/uL    Imm Grans # 0.07 (H) 0.00 - 0.04 K/uL   APTT    Collection Time: 04/21/25  4:53 AM   Result Value Ref Range    PTT 48.5 (H) 21.0 - 32.0 seconds       Diagnostic Results:  Imaging Results              US Lower Extremity Veins Bilateral (Final result)  Result time 04/20/25 21:23:42      Final result by Kit Randolph MD (04/20/25 21:23:42)                   Impression:      No evidence of deep venous thrombosis in either lower extremity common femoral, femoral or popliteal veins.      Electronically signed by: Kit Randolph MD  Date:    04/20/2025  Time:    21:23               Narrative:    EXAMINATION:  US LOWER EXTREMITY VEINS  BILATERAL    CLINICAL HISTORY:  Elevated d-dimer;    TECHNIQUE:  Duplex and color flow Doppler and dynamic compression was performed of the bilateral lower extremity veins was performed.    COMPARISON:  None    FINDINGS:  Right thigh veins: The common femoral, femoral, popliteal, upper greater saphenous, and deep femoral veins are patent and free of thrombus. The veins are normally compressible and have normal phasic flow and augmentation response.    Right calf veins: No Doppler imaging performed.    Left thigh veins: The common femoral, femoral, popliteal, upper greater saphenous, and deep femoral veins are patent and free of thrombus. The veins are normally compressible and have normal phasic flow and augmentation response.    Left calf veins: No Doppler imaging performed.    Miscellaneous: None                                       CT Abdomen Pelvis With IV Contrast NO Oral Contrast (Final result)  Result time 04/20/25 07:21:07      Final result by Desmond Landers MD (04/20/25 07:21:07)                   Impression:      1. Moderate to large bilateral pleural effusions with atelectasis.  New patchy solid and ground-glass airspace consolidation with smooth interlobular septal thickening, possibly reflecting alveolar and interstitial edema, respectively.  Note that a component of infectious or noninfectious inflammatory etiology or hemorrhage would not be excluded in appropriate clinical context.  2. Otherwise, no acute findings identified in the abdomen or pelvis to account for the patient's reported symptoms.  3. Grossly similar bowel containing left inguinal hernia, for which correlation with physical examination would be recommended.  4. Additional details of chronic and incidental findings, as provided in the body of report.      Electronically signed by: Desmond Landers  Date:    04/20/2025  Time:    07:21               Narrative:    EXAMINATION:  CT ABDOMEN PELVIS WITH IV CONTRAST    CLINICAL  HISTORY:  Epigastric pain;    TECHNIQUE:  Low dose axial images, sagittal and coronal reformations were obtained from the lung bases to the pubic symphysis following the IV administration of 100 mL of Omnipaque 350    COMPARISON:  Abdominal ultrasound 04/20/2025.  CT of the abdomen and pelvis 01/25/2025.    FINDINGS:  Lower chest: Moderate to large bilateral pleural effusions with atelectasis, felt relatively similar to dedicated CT of the chest performed 03/06/2025.  Patchy solid and ground-glass airspace consolidation in the visualized lower lungs appear progressed/new since that time point.  Areas of smooth interlobular septal thickening are seen.  Pleural calcifications are identified, which would be in keeping prior asbestos exposure.  Status post TAVR.    Liver: Normal contour.  Subcentimeter hypodense lesion right hepatic lobe too small to definitely characterize.    Gallbladder and bile ducts: Unremarkable. No biliary ductal dilatation.    Pancreas: Unremarkable.    Spleen: Unremarkable.    Adrenals: Unremarkable.    Kidneys: Cortical atrophy of both kidneys.  Simple cyst upper to midpole right kidney.  Additional subcentimeter hypodense lesions in both kidneys are too small to definitely characterize.  Nonspecific bilateral perinephric stranding.    Lymph nodes: No abdominal or pelvic lymphadenopathy.    Bowel and mesentery: No evidence of bowel obstruction.  Colonic diverticulosis.  Moderate volume colonic stool.  Appendix not confidently identified.  No definite focal pericecal inflammation.    Abdominal aorta: Nonaneurysmal.  Heavy atherosclerosis.    Inferior vena cava: Unremarkable.    Free fluid or free air: None.    Pelvis: Unremarkable.    Body wall: Status post median sternotomy.  Relatively similar bowel containing left inguinal hernia compared to 01/25/2025 CT.  Question injection granuloma gluteal soft tissues.    Bones: No definite acute abnormality.  Suspect osseous demineralization.   Compression fractures of T8-T10, T12-L2 vertebral bodies appear similar to the 01/25/2025 CT                                       US Abdomen Limited (Final result)  Result time 04/20/25 03:12:42      Final result by Kit Randolph MD (04/20/25 03:12:42)                   Impression:      No gallstones or bile duct dilatation demonstrated.    Right pleural effusion noted.    Limited views liver demonstrate upper limits of normal in size and coarse echotexture.  Suggest correlation with LFTs.    Additional findings as above.      Electronically signed by: Kit Randolph MD  Date:    04/20/2025  Time:    03:12               Narrative:    EXAMINATION:  US ABDOMEN LIMITED    CLINICAL HISTORY:  RUQ pain;    TECHNIQUE:  Limited ultrasound of the right upper quadrant of the abdomen focused on the biliary system was performed.    COMPARISON:  None    FINDINGS:  Gallbladder: No calculi, wall thickening, or pericholecystic fluid.  Technologist reports unable to assess for Ryan sign due to pain medication.    Biliary system: The common duct is not dilated, measuring 4.7 mm.  No intrahepatic ductal dilatation.    Pancreas: Obscured by overlying bowel gas.    Miscellaneous: Right pleural effusion noted.  2 cm cyst upper pole right kidney.  Limited hepatic views demonstrate liver is upper limits of normal in size with mildly coarse echotexture.                                       X-Ray Chest AP Portable (Final result)  Result time 04/20/25 03:07:40      Final result by Kit Randolph MD (04/20/25 03:07:40)                   Impression:      Increasing bilateral edema/airspace infiltrate compared to prior. Bilateral effusions appears similar to prior. Stable cardiomegaly.    Pacer present.      Electronically signed by: Kit Randolph MD  Date:    04/20/2025  Time:    03:07               Narrative:    EXAMINATION:  XR CHEST AP PORTABLE    CLINICAL HISTORY:  Unspecified abdominal pain    TECHNIQUE:  Single frontal  "view of the chest was performed.    COMPARISON:  03/10/2025.    FINDINGS:  Sternotomy wires and pacer leads appears similar to prior.    Increasing bilateral edema/airspace infiltrate compared to prior.  Bilateral effusions appears similar to prior.  Stable cardiomegaly.  No pneumothorax.    Heart and lungs otherwise appear unchanged when allowing for differences in technique and positioning.                                      Recent Cardiac Rhythm   (if applicable)      Physical Exam:  Objective:  General Appearance:  In distress and uncomfortable.    Vital signs: (most recent): Blood pressure 120/68, pulse 106, temperature 98.8 °F (37.1 °C), resp. rate 16, height 5' 6" (1.676 m), weight 70.5 kg (155 lb 6.4 oz), SpO2 100%.    Lungs:  Normal effort and normal respiratory rate.    Heart: Tachycardia.  Regular rhythm.    Skin:  Warm and dry.        Current Consults:  PHARMACY TO DOSE VANCOMYCIN CONSULT  IP CONSULT TO CARDIOLOGY    Assessment/Plan:  Assessment:   Vtach/NSTEMI   - 75-80 beat run of Vtach   - Trop elevated   - BNP elevated   - pt hypoxic  Acute on chronic HFrEF  Aortic valvular stenosis s/p TAVR  CAD s/p CABG  Pacemaker implant  HTN  Pneumonia?   - WBC elevated   - procal abnormal    Plan:   - No need for cath as he has severe native disease with only one sequential graft open.   - Continue ASA  - Continue amio gtt for now  - Pacemaker interrogation  - Replete lytes  - On abx per protocol  - Continue current medical regimen      "

## 2025-04-21 NOTE — ASSESSMENT & PLAN NOTE
Malnutrition Type:  Context: acute illness or injury  Level: severe    Related to (etiology):   CHF    Signs and Symptoms (as evidenced by):   Energy Intake (Malnutrition): less than or equal to 50% for greater than or equal to 5 days  Orbital Region (Subcutaneous Fat Loss): moderate depletion  Upper Arm Region (Subcutaneous Fat Loss): moderate depletion  Yarsani Region (Muscle Loss): moderate depletion  Scapular Bone Region (Muscle Loss): moderate depletion  Dorsal Hand (Muscle Loss): moderate depletion    Malnutrition Characteristic Summary:  Energy Intake (Malnutrition): less than or equal to 50% for greater than or equal to 5 days  Orbital Region (Subcutaneous Fat Loss): moderate depletion  Upper Arm Region (Subcutaneous Fat Loss): moderate depletion  Yarsani Region (Muscle Loss): moderate depletion  Scapular Bone Region (Muscle Loss): moderate depletion  Dorsal Hand (Muscle Loss): moderate depletion    Interventions (treatment strategy):  Collaboration of care with other providers  Modified diet  Commercial beverage  Nutrition Related Medication Management      Nutrition Diagnosis Status:   New

## 2025-04-22 NOTE — PROGRESS NOTES
[facility]    Cardiology Progress Note    Subjective:  Patient states that he has not been feeling well. He has no appetite. Tried eating some pineapple and vomited. Continues to be short of breath requiring oxygen. Denies any chest pain or palpitations. 5L O2 NC. HR slightly tachycardic. BP hypotensive during the night. Temp 101.2 yesterday. Labs reviewed. Na 131. Blood cultures still pending.       Objective:  Vital Signs (Most Recent)  Temp: 98.7 °F (37.1 °C) (04/22/25 0701)  Pulse: 101 (04/22/25 0701)  Resp: 20 (04/22/25 0701)  BP: (!) 120/58 (04/22/25 0832)  SpO2: 99 % (04/22/25 0701)    Vital Signs Range (Last 24H):  Temp:  [98 °F (36.7 °C)-101.2 °F (38.4 °C)]   Pulse:  []   Resp:  [17-34]   BP: ()/(51-73)   SpO2:  [90 %-100 %]     I & O (Last 24H):    Intake/Output Summary (Last 24 hours) at 4/22/2025 0923  Last data filed at 4/22/2025 0752  Gross per 24 hour   Intake 1025.45 ml   Output 350 ml   Net 675.45 ml       Current Diet:     Current Diet Order   Procedures    Diet Heart Healthy Fluid - 1500mL     2 gm Na    Please send mashed potatoes with trays     Fluid restriction::   Fluid - 1500mL        Allergies:  Review of patient's allergies indicates:   Allergen Reactions    Atorvastatin Other (See Comments)     Muscle pain    Rosuvastatin Other (See Comments)     Muscle pain       Meds:  Scheduled Meds:   aspirin  81 mg Oral Daily    ceFEPime IV (PEDS and ADULTS)  2 g Intravenous Q12H    citalopram  20 mg Oral Daily    clopidogreL  75 mg Oral Daily    ezetimibe  10 mg Oral Daily    famotidine  20 mg Oral Daily    furosemide  20 mg Oral BID    guaiFENesin  600 mg Oral BID    phenytoin  400 mg Oral Daily    vancomycin (VANCOCIN) IV (PEDS and ADULTS)  1,000 mg Intravenous Q24H     Continuous Infusions:   amiodarone in dextrose 5%  0.5 mg/min Intravenous Continuous 16.7 mL/hr at 04/22/25 0659 0.5 mg/min at 04/22/25 0659    heparin (porcine) in D5W  0-40 Units/kg/hr Intravenous  Continuous 8.2 mL/hr at 04/22/25 0659 12 Units/kg/hr at 04/22/25 0659     PRN Meds:  Current Facility-Administered Medications:     acetaminophen, 650 mg, Oral, Q8H PRN    acetaminophen, 650 mg, Oral, Q4H PRN    albuterol-ipratropium, 3 mL, Nebulization, Q6H PRN    dextrose 50%, 12.5 g, Intravenous, PRN    dextrose 50%, 25 g, Intravenous, PRN    glucagon (human recombinant), 1 mg, Intramuscular, PRN    glucose, 16 g, Oral, PRN    glucose, 24 g, Oral, PRN    guaiFENesin 100 mg/5 ml, 200 mg, Oral, Q4H PRN    heparin (PORCINE), 58.4 Units/kg, Intravenous, PRN    heparin (PORCINE), 30 Units/kg, Intravenous, PRN    HYDROcodone-acetaminophen, 1 tablet, Oral, Q6H PRN    magnesium oxide, 800 mg, Oral, PRN    magnesium oxide, 800 mg, Oral, PRN    melatonin, 6 mg, Oral, Nightly PRN    naloxone, 0.02 mg, Intravenous, PRN    ondansetron, 4 mg, Intravenous, Q6H PRN    potassium bicarbonate, 35 mEq, Oral, PRN    potassium bicarbonate, 50 mEq, Oral, PRN    potassium bicarbonate, 60 mEq, Oral, PRN    potassium, sodium phosphates, 2 packet, Oral, PRN    potassium, sodium phosphates, 2 packet, Oral, PRN    potassium, sodium phosphates, 2 packet, Oral, PRN    Pharmacy to dose Vancomycin consult, , , Once **AND** vancomycin - pharmacy to dose, , Intravenous, pharmacy to manage frequency    Lab Results :  Recent Results (from the past 24 hours)   APTT    Collection Time: 04/21/25 11:11 AM   Result Value Ref Range    PTT 54.6 (H) 21.0 - 32.0 seconds   EKG 12-lead    Collection Time: 04/21/25 10:01 PM   Result Value Ref Range    QRS Duration 156 ms    [facility] QTC Calculation 574 ms   Comprehensive Metabolic Panel (CMP)    Collection Time: 04/22/25  4:35 AM   Result Value Ref Range    Sodium 131 (L) 136 - 145 mmol/L    Potassium 3.6 3.5 - 5.1 mmol/L    Chloride 97 95 - 110 mmol/L    CO2 23 23 - 29 mmol/L    Glucose 112 (H) 70 - 110 mg/dL    BUN 26 (H) 8 - 23 mg/dL    Creatinine 1.2 0.5 - 1.4 mg/dL    Calcium 8.1 (L) 8.7 - 10.5 mg/dL    Protein  Total 6.0 6.0 - 8.4 gm/dL    Albumin 3.1 (L) 3.5 - 5.2 g/dL    Bilirubin Total 1.7 (H) 0.1 - 1.0 mg/dL     (H) 55 - 135 unit/L    AST 20 10 - 40 unit/L    ALT 5 (L) 10 - 44 unit/L    Anion Gap 11 8 - 16 mmol/L    eGFR >60 >60 mL/min/1.73/m2   Magnesium    Collection Time: 04/22/25  4:35 AM   Result Value Ref Range    Magnesium 1.7 1.6 - 2.6 mg/dL   APTT    Collection Time: 04/22/25  4:35 AM   Result Value Ref Range    PTT 52.1 (H) 21.0 - 32.0 seconds   CBC with Differential    Collection Time: 04/22/25  4:35 AM   Result Value Ref Range    WBC 10.57 3.90 - 12.70 K/uL    RBC 3.53 (L) 4.60 - 6.20 M/uL    Hgb 10.1 (L) 14.0 - 18.0 gm/dL    Hct 31.1 (L) 40.0 - 54.0 %    MCV 88 82 - 98 fL    MCH 28.6 27.0 - 31.0 pg    MCHC 32.5 32.0 - 36.0 g/dL    RDW 14.2 11.5 - 14.5 %    Platelet Count 142 (L) 150 - 450 K/uL    MPV 11.4 9.2 - 12.9 fL    Nucleated RBC 0 <=0 /100 WBC    Neut % 80.3 (H) 38 - 73 %    Lymph % 7.8 (L) 18 - 48 %    Mono % 9.6 4 - 15 %    Eos % 1.4 0 - 8 %    Basophil % 0.3 <=1.9 %    Imm Grans % 0.6 (H) 0.0 - 0.5 %    Neut # 8.5 (H) 1.8 - 7.7 K/uL    Lymph # 0.82 (L) 1 - 4.8 K/uL    Mono # 1.01 (H) 0.3 - 1 K/uL    Eos # 0.15 <=0.5 K/uL    Baso # 0.03 <=0.2 K/uL    Imm Grans # 0.06 (H) 0.00 - 0.04 K/uL       Diagnostic Results:  Imaging Results              US Lower Extremity Veins Bilateral (Final result)  Result time 04/20/25 21:23:42      Final result by Kit Randolph MD (04/20/25 21:23:42)                   Impression:      No evidence of deep venous thrombosis in either lower extremity common femoral, femoral or popliteal veins.      Electronically signed by: Kit Randolph MD  Date:    04/20/2025  Time:    21:23               Narrative:    EXAMINATION:  US LOWER EXTREMITY VEINS BILATERAL    CLINICAL HISTORY:  Elevated d-dimer;    TECHNIQUE:  Duplex and color flow Doppler and dynamic compression was performed of the bilateral lower extremity veins was  performed.    COMPARISON:  None    FINDINGS:  Right thigh veins: The common femoral, femoral, popliteal, upper greater saphenous, and deep femoral veins are patent and free of thrombus. The veins are normally compressible and have normal phasic flow and augmentation response.    Right calf veins: No Doppler imaging performed.    Left thigh veins: The common femoral, femoral, popliteal, upper greater saphenous, and deep femoral veins are patent and free of thrombus. The veins are normally compressible and have normal phasic flow and augmentation response.    Left calf veins: No Doppler imaging performed.    Miscellaneous: None                                       CT Abdomen Pelvis With IV Contrast NO Oral Contrast (Final result)  Result time 04/20/25 07:21:07      Final result by Desmond Landers MD (04/20/25 07:21:07)                   Impression:      1. Moderate to large bilateral pleural effusions with atelectasis.  New patchy solid and ground-glass airspace consolidation with smooth interlobular septal thickening, possibly reflecting alveolar and interstitial edema, respectively.  Note that a component of infectious or noninfectious inflammatory etiology or hemorrhage would not be excluded in appropriate clinical context.  2. Otherwise, no acute findings identified in the abdomen or pelvis to account for the patient's reported symptoms.  3. Grossly similar bowel containing left inguinal hernia, for which correlation with physical examination would be recommended.  4. Additional details of chronic and incidental findings, as provided in the body of report.      Electronically signed by: Desmond Landers  Date:    04/20/2025  Time:    07:21               Narrative:    EXAMINATION:  CT ABDOMEN PELVIS WITH IV CONTRAST    CLINICAL HISTORY:  Epigastric pain;    TECHNIQUE:  Low dose axial images, sagittal and coronal reformations were obtained from the lung bases to the pubic symphysis following the IV administration  of 100 mL of Omnipaque 350    COMPARISON:  Abdominal ultrasound 04/20/2025.  CT of the abdomen and pelvis 01/25/2025.    FINDINGS:  Lower chest: Moderate to large bilateral pleural effusions with atelectasis, felt relatively similar to dedicated CT of the chest performed 03/06/2025.  Patchy solid and ground-glass airspace consolidation in the visualized lower lungs appear progressed/new since that time point.  Areas of smooth interlobular septal thickening are seen.  Pleural calcifications are identified, which would be in keeping prior asbestos exposure.  Status post TAVR.    Liver: Normal contour.  Subcentimeter hypodense lesion right hepatic lobe too small to definitely characterize.    Gallbladder and bile ducts: Unremarkable. No biliary ductal dilatation.    Pancreas: Unremarkable.    Spleen: Unremarkable.    Adrenals: Unremarkable.    Kidneys: Cortical atrophy of both kidneys.  Simple cyst upper to midpole right kidney.  Additional subcentimeter hypodense lesions in both kidneys are too small to definitely characterize.  Nonspecific bilateral perinephric stranding.    Lymph nodes: No abdominal or pelvic lymphadenopathy.    Bowel and mesentery: No evidence of bowel obstruction.  Colonic diverticulosis.  Moderate volume colonic stool.  Appendix not confidently identified.  No definite focal pericecal inflammation.    Abdominal aorta: Nonaneurysmal.  Heavy atherosclerosis.    Inferior vena cava: Unremarkable.    Free fluid or free air: None.    Pelvis: Unremarkable.    Body wall: Status post median sternotomy.  Relatively similar bowel containing left inguinal hernia compared to 01/25/2025 CT.  Question injection granuloma gluteal soft tissues.    Bones: No definite acute abnormality.  Suspect osseous demineralization.  Compression fractures of T8-T10, T12-L2 vertebral bodies appear similar to the 01/25/2025 CT                                       US Abdomen Limited (Final result)  Result time 04/20/25 03:12:42       Final result by Kit Randolph MD (04/20/25 03:12:42)                   Impression:      No gallstones or bile duct dilatation demonstrated.    Right pleural effusion noted.    Limited views liver demonstrate upper limits of normal in size and coarse echotexture.  Suggest correlation with LFTs.    Additional findings as above.      Electronically signed by: Kit Randolph MD  Date:    04/20/2025  Time:    03:12               Narrative:    EXAMINATION:  US ABDOMEN LIMITED    CLINICAL HISTORY:  RUQ pain;    TECHNIQUE:  Limited ultrasound of the right upper quadrant of the abdomen focused on the biliary system was performed.    COMPARISON:  None    FINDINGS:  Gallbladder: No calculi, wall thickening, or pericholecystic fluid.  Technologist reports unable to assess for Ryan sign due to pain medication.    Biliary system: The common duct is not dilated, measuring 4.7 mm.  No intrahepatic ductal dilatation.    Pancreas: Obscured by overlying bowel gas.    Miscellaneous: Right pleural effusion noted.  2 cm cyst upper pole right kidney.  Limited hepatic views demonstrate liver is upper limits of normal in size with mildly coarse echotexture.                                       X-Ray Chest AP Portable (Final result)  Result time 04/20/25 03:07:40      Final result by Kit Randolph MD (04/20/25 03:07:40)                   Impression:      Increasing bilateral edema/airspace infiltrate compared to prior. Bilateral effusions appears similar to prior. Stable cardiomegaly.    Pacer present.      Electronically signed by: Kit Randolph MD  Date:    04/20/2025  Time:    03:07               Narrative:    EXAMINATION:  XR CHEST AP PORTABLE    CLINICAL HISTORY:  Unspecified abdominal pain    TECHNIQUE:  Single frontal view of the chest was performed.    COMPARISON:  03/10/2025.    FINDINGS:  Sternotomy wires and pacer leads appears similar to prior.    Increasing bilateral edema/airspace infiltrate compared to  "prior.  Bilateral effusions appears similar to prior.  Stable cardiomegaly.  No pneumothorax.    Heart and lungs otherwise appear unchanged when allowing for differences in technique and positioning.                                      Recent Cardiac Rhythm   (if applicable)      Physical Exam:  Objective:  General Appearance:  Ill-appearing and uncomfortable.    Vital signs: (most recent): Blood pressure (!) 120/58, pulse 101, temperature 98.7 °F (37.1 °C), temperature source Axillary, resp. rate 20, height 5' 6" (1.676 m), weight 70.5 kg (155 lb 6.4 oz), SpO2 99%.    Lungs:  Normal effort and normal respiratory rate.    Heart: Tachycardia.  Regular rhythm.    Abdomen: There is no abdominal tenderness.     Extremities: There is no local swelling.    Skin:  Dry and pale.        Current Consults:  PHARMACY TO DOSE VANCOMYCIN CONSULT  IP CONSULT TO CARDIOLOGY    Assessment/Plan:  Assessment:   Vtach/NSTEMI              - 75-80 beat run of Vtach              - Trop elevated              - BNP elevated              - pt hypoxic upon arrival  Acute on chronic HFrEF  Aortic valvular stenosis s/p TAVR  CAD s/p CABG  Pacemaker implant  HTN  Pneumonia?              - WBC elevated              - procal abnormal   - blood cultures still pending     Plan:   - No need for cath as he has severe native disease with only one sequential graft open.   - Continue ASA  - Continue amio gtt for now  - Pacemaker interrogation  - Replete lytes  - On abx per protocol  - Continue current medical regimen        "

## 2025-04-22 NOTE — ASSESSMENT & PLAN NOTE
Nutrition consulted. Most recent weight and BMI monitored-     Measurements:  Wt Readings from Last 1 Encounters:   04/20/25 70.5 kg (155 lb 6.4 oz)   Body mass index is 25.08 kg/m².    Patient has been screened and assessed by RD.    Malnutrition Type:  Context: acute illness or injury  Level: severe    Malnutrition Characteristic Summary:  Energy Intake (Malnutrition): less than or equal to 50% for greater than or equal to 5 days  Subcutaneous Fat (Malnutrition): moderate depletion  Muscle Mass (Malnutrition): moderate depletion    Interventions/Recommendations (treatment strategy):

## 2025-04-22 NOTE — CARE UPDATE
04/22/25 1828   Patient Assessment/Suction   Level of Consciousness (AVPU) alert   Respiratory Effort Labored;Short of breath   Expansion/Accessory Muscles/Retractions no retractions;no use of accessory muscles   LLL Breath Sounds crackles, coarse   RUL Breath Sounds diminished   RML Breath Sounds clear   RLL Breath Sounds diminished   Rhythm/Pattern, Respiratory labored;shortness of breath   Cough Frequency no cough   PRE-TX-O2   Device (Oxygen Therapy) other (comment)  (oXYMASK)   $ Is the patient on Low Flow Oxygen? Yes   Flow (L/min) (Oxygen Therapy) 8   SpO2 95 %   Pulse Oximetry Type Continuous   $ Pulse Oximetry - Multiple Charge Pulse Oximetry - Multiple   Pulse 96   Resp (!) 38   Aerosol Therapy   $ Aerosol Therapy Charges Aerosol Treatment  (Called to given prn treatment)   Respiratory Treatment Status (SVN) given   Treatment Route (SVN) mask;oxygen   Patient Position semi-Phillips's   Post Treatment Assessment (SVN) increased aeration   Signs of Intolerance (SVN) none   Education   $ Education Bronchodilator;15 min        No

## 2025-04-22 NOTE — PROGRESS NOTES
"[facility] Medicine  Progress Note    Patient Name: Jerome Amaro Jr.  MRN: [id number]  Patient Class: IP- Inpatient   Admission Date: 4/20/2025  Length of Stay: 1 days  Attending Physician: Celena Andersen MD  Primary Care Provider: Magda Ruiz FNP-C        Subjective     Principal Problem:V-tach        HPI:  Patient is an 79-year-old male with a history of systolic congestive heart failure, CAD/CABG, status post pacemaker implantation, TAVR, and seizure disorder; he was recently admitted in March of this year for CHF exacerbation  Most recent echo performed around that time showed an EF of 50-55% and grade 2 diastolic dysfunction and moderate mitral regurgitation; PA SP was 30-35 mm Hg  Patient was recently discharged from rehab about a week ago and developed epigastric abdominal pain with nausea and poor oral intake (he denies abdominal pain to me, but admits he has not been eating well and has lost an undisclosed amount of weight)  He denies chest pain and shortness of breaths but has felt poorly for 5 days, fever, chills, sore throat and did have diarrhea (but no GI bleeding)  Patient was afebrile and tachycardic in the 120s (now 80s) and was tachypneic at 21-24 breaths per minute; he remains hemodynamically stable although sats has been marginal as low as 91% necessitating 2 L nasal cannula (does not have a home oxygen requirement)  WBC was 14.54 (ANC 11) and CMP was unrevealing; lipase was negative but Mag was 1.6  BNP was 1443 (down from 2268 last month) and troponin was 57.9 (previously has been in the 2000s last month)  Influenza/COVID testing were negative; chest x-ray showed, per radiologist Dr. Randolph:  "Increasing bilateral edema/airspace infiltrate compared to prior. Bilateral effusions appears similar to prior. Stable cardiomegaly.     Pacer present."  Given the patient's abdominal pain, right upper quadrant ultrasound was performed and showed right pleural effusion but " "no gallstones/bile duct dilatation  CT abdomen/pelvis with IV contrast was thus performed and showed, per radiologist Dr. Landers:  "1. Moderate to large bilateral pleural effusions with atelectasis.  New patchy solid and ground-glass airspace consolidation with smooth interlobular septal thickening, possibly reflecting alveolar and interstitial edema, respectively.  Note that a component of infectious or noninfectious inflammatory etiology or hemorrhage would not be excluded in appropriate clinical context.  2. Otherwise, no acute findings identified in the abdomen or pelvis to account for the patient's reported symptoms.  3. Grossly similar bowel containing left inguinal hernia, for which correlation with physical examination would be recommended.  4. Additional details of chronic and incidental findings, as provided in the body of report."  During the patient's stay in the ER, he had a proximally 75-80 beat run of V-tach which spontaneously boarded and did not require shocking; magnesium sulfate and a 500 cc bolus of fluids were given (followed by 2 mg of IV Bumex) and he was placed on an Amiodarone infusion; he is status post IV Morphine/Zofran    Overview/Hospital Course:  Patient admitted with pneumonia, pulmonary edema and Vtach.  He was placed on amiodarone and heparin infusion. Cardiology consulted. VAnc and cefepime initiated for HCAP as he recently discharged from rehab facility. Holding some BP meds for hypotension.   Weaning O2 as tolerated.     Interval History: patient c/o productive cough but feels better since admission, denies CP     Review of Systems   Constitutional:  Positive for appetite change, fatigue and fever.   Respiratory:  Positive for cough and shortness of breath. Negative for wheezing.    Cardiovascular: Negative.    Gastrointestinal:  Positive for nausea. Negative for diarrhea and vomiting.   Genitourinary: Negative.    Musculoskeletal: Negative.      Objective:     Vital Signs (Most " Recent):  Temp: (!) 101.2 °F (38.4 °C) (04/21/25 2018)  Pulse: 102 (04/21/25 1901)  Resp: (!) 28 (04/21/25 1901)  BP: 123/73 (04/21/25 2018)  SpO2: (!) 93 % (04/21/25 1901) Vital Signs (24h Range):  Temp:  [98 °F (36.7 °C)-101.2 °F (38.4 °C)] 101.2 °F (38.4 °C)  Pulse:  [] 102  Resp:  [13-32] 28  SpO2:  [93 %-100 %] 93 %  BP: ()/(51-73) 123/73     Weight: 70.5 kg (155 lb 6.4 oz)  Body mass index is 25.08 kg/m².    Intake/Output Summary (Last 24 hours) at 4/21/2025 2103  Last data filed at 4/21/2025 1839  Gross per 24 hour   Intake 1358 ml   Output 700 ml   Net 658 ml         Physical Exam  Constitutional:       Appearance: He is ill-appearing.   HENT:      Head: Normocephalic and atraumatic.   Cardiovascular:      Rate and Rhythm: Regular rhythm. Tachycardia present.      Heart sounds: Murmur heard.   Pulmonary:      Effort: No respiratory distress.      Breath sounds: Rhonchi present. No wheezing.   Abdominal:      General: Bowel sounds are normal. There is no distension.      Tenderness: There is no abdominal tenderness. There is no guarding.   Musculoskeletal:      Cervical back: Normal range of motion and neck supple.   Lymphadenopathy:      Cervical: No cervical adenopathy.   Skin:     General: Skin is warm and dry.      Capillary Refill: Capillary refill takes less than 2 seconds.   Neurological:      General: No focal deficit present.      Mental Status: He is alert and oriented to person, place, and time. Mental status is at baseline.   Psychiatric:         Mood and Affect: Mood normal.         Behavior: Behavior normal.               Significant Labs: All pertinent labs within the past 24 hours have been reviewed.  Recent Lab Results         04/21/25  1111   04/21/25  0453   04/21/25  0323        Albumin     3.4       ALP     142       ALT     7       Anion Gap     12       PTT 54.6  Comment: Refer to local heparin nomogram for intensity/dose specific therapeutic range.   48.5  Comment: Refer to  local heparin nomogram for intensity/dose specific therapeutic range.         AST     20       Baso #     0.05       Basophil %     0.4       BILIRUBIN TOTAL     1.6  Comment: For infants and newborns, interpretation of results should be based   on gestational age, weight and in agreement with clinical   observations.    Premature Infant recommended reference ranges:   0-24 hours:  <8.0 mg/dL   24-48 hours: <12.0 mg/dL   3-5 days:    <15.0 mg/dL   6-29 days:   <15.0 mg/dL       BUN     21       Calcium     8.6       Chloride     96       CO2     26       Creatinine     1.2       eGFR     >60       Eos #     0.22       Eos %     1.8       Glucose     101       Hematocrit     33.4       Hemoglobin     11.1       Immature Grans (Abs)     0.07  Comment: Mild elevation in immature granulocytes is non specific and can be seen in a variety of conditions including stress response, acute inflammation, trauma and pregnancy. Correlation with other laboratory and clinical findings is essential.       Immature Granulocytes     0.6       Lymph #     1.38       LYMPH %     11.2       Magnesium      2.0       MCH     29.1       MCHC     33.2       MCV     87       Mono #     1.52       Mono %     12.4       MPV     11.4       Neut #     9.0       Neut %     73.6       nRBC     0       Platelet Count     184       Potassium     4.3       PROTEIN TOTAL     6.8       RBC     3.82       RDW     14.2       Sodium     134       WBC     12.28               Significant Imaging: I have reviewed all pertinent imaging results/findings within the past 24 hours.      Assessment & Plan  V-tach  Patient was 75-80 beat run of V-tach (I personally reviewed the strips)  Most likely due to hypoxia but must entertain other causes (such as a primary cardiac event)  Troponin elevated, raising the question if this is a consequence of V-tach or if the patient is having an NSTEMI which is precipitating V-tach; for now:  Continue Eliquis for  anticoagulation  Amiodarone infusion  Monitor and replace electrolytes 4 K number and magnesium greater than 2  Interrogate pacemaker  Telemetry monitoring on the step-down unit  Cardiology consultation  Aortic valvar stenosis  As above  HTN (hypertension)  Home Entresto resumed  Beta blocker held for now given concern for acute on chronic systolic congestive heart failure exacerbation  S/P TAVR (transcatheter aortic valve replacement)    As above  Pacemaker  As above    NSTEMI (non-ST elevated myocardial infarction)    As above  Seizure disorder    Home Dilantin resumed  Dilantin level pending  Seizure precautions  Anxiety and depression  Home Celexa resumed  Patient declines Pastoral Care visit    Grief    As above  Hx of CABG    As above  Acute on chronic systolic (congestive) heart failure  Patient appears clinically euvolemic, at this time  Home Lasix resumed  1.5 L fluid restriction/2 g sodium diet  Follow daily weights, strict Is&Os, and clinical exam  Home Entresto resumed; beta blocker held for now  Cardiology consultation  HCAP (healthcare-associated pneumonia)  Cefepime/Vancomycin  Vancomycin level per pharmacy protocol, to monitor for nephrotoxicity  Check blood/sputum cultures    Severe malnutrition  Nutrition consulted. Most recent weight and BMI monitored-     Measurements:  Wt Readings from Last 1 Encounters:   04/20/25 70.5 kg (155 lb 6.4 oz)   Body mass index is 25.08 kg/m².    Patient has been screened and assessed by RD.    Malnutrition Type:  Context: acute illness or injury  Level: severe    Malnutrition Characteristic Summary:  Energy Intake (Malnutrition): less than or equal to 50% for greater than or equal to 5 days  Subcutaneous Fat (Malnutrition): moderate depletion  Muscle Mass (Malnutrition): moderate depletion    Interventions/Recommendations (treatment strategy):         VTE Risk Mitigation (From admission, onward)           Ordered     heparin 25,000 units in dextrose 5% (100  units/ml) IV bolus from bag LOW INTENSITY nomogram - [facility]  As needed (PRN)        Question:  Heparin Infusion Adjustment (DO NOT MODIFY ANSWER)  Answer:  [email or web address]\epic\Images\Pharmacy\HeparinInfusions\heparin LOW INTENSITY nomogram for [facility] [id number]    04/20/25 1728     heparin 25,000 units in dextrose 5% (100 units/ml) IV bolus from bag LOW INTENSITY nomogram - [facility]  As needed (PRN)        Question:  Heparin Infusion Adjustment (DO NOT MODIFY ANSWER)  Answer:  [email or web address]\epic\Images\Pharmacy\HeparinInfusions\heparin LOW INTENSITY nomogram for [facility] [id number]    04/20/25 1728     heparin 25,000 units in dextrose 5% 250 mL (100 units/mL) infusion LOW INTENSITY nomogram - [facility]  Continuous        Question:  Begin at (units/kg/hr)  Answer:  12    04/20/25 1728                    Discharge Planning   AXEL: 4/23/2025     Code Status: Full Code   Medical Readiness for Discharge Date:   Discharge Plan A: Home Health                        Celena Andersen MD  Department of Hospital Medicine   [facility]

## 2025-04-22 NOTE — PLAN OF CARE
SW sent patient information to [facility] via [employer] for resumption of home health services.       04/22/25 0988   Post-Acute Status   Post-Acute Authorization Home Health   Home Health Status Referrals Sent

## 2025-04-22 NOTE — SUBJECTIVE & OBJECTIVE
Interval History: patient c/o productive cough but feels better since admission, denies CP     Review of Systems   Constitutional:  Positive for appetite change, fatigue and fever.   Respiratory:  Positive for cough and shortness of breath. Negative for wheezing.    Cardiovascular: Negative.    Gastrointestinal:  Positive for nausea. Negative for diarrhea and vomiting.   Genitourinary: Negative.    Musculoskeletal: Negative.      Objective:     Vital Signs (Most Recent):  Temp: (!) 101.2 °F (38.4 °C) (04/21/25 2018)  Pulse: 102 (04/21/25 1901)  Resp: (!) 28 (04/21/25 1901)  BP: 123/73 (04/21/25 2018)  SpO2: (!) 93 % (04/21/25 1901) Vital Signs (24h Range):  Temp:  [98 °F (36.7 °C)-101.2 °F (38.4 °C)] 101.2 °F (38.4 °C)  Pulse:  [] 102  Resp:  [13-32] 28  SpO2:  [93 %-100 %] 93 %  BP: ()/(51-73) 123/73     Weight: 70.5 kg (155 lb 6.4 oz)  Body mass index is 25.08 kg/m².    Intake/Output Summary (Last 24 hours) at 4/21/2025 2103  Last data filed at 4/21/2025 1839  Gross per 24 hour   Intake 1358 ml   Output 700 ml   Net 658 ml         Physical Exam  Constitutional:       Appearance: He is ill-appearing.   HENT:      Head: Normocephalic and atraumatic.   Cardiovascular:      Rate and Rhythm: Regular rhythm. Tachycardia present.      Heart sounds: Murmur heard.   Pulmonary:      Effort: No respiratory distress.      Breath sounds: Rhonchi present. No wheezing.   Abdominal:      General: Bowel sounds are normal. There is no distension.      Tenderness: There is no abdominal tenderness. There is no guarding.   Musculoskeletal:      Cervical back: Normal range of motion and neck supple.   Lymphadenopathy:      Cervical: No cervical adenopathy.   Skin:     General: Skin is warm and dry.      Capillary Refill: Capillary refill takes less than 2 seconds.   Neurological:      General: No focal deficit present.      Mental Status: He is alert and oriented to person, place, and time. Mental status is at baseline.    Psychiatric:         Mood and Affect: Mood normal.         Behavior: Behavior normal.               Significant Labs: All pertinent labs within the past 24 hours have been reviewed.  Recent Lab Results         04/21/25  1111   04/21/25  0453   04/21/25  0323        Albumin     3.4       ALP     142       ALT     7       Anion Gap     12       PTT 54.6  Comment: Refer to local heparin nomogram for intensity/dose specific therapeutic range.   48.5  Comment: Refer to local heparin nomogram for intensity/dose specific therapeutic range.         AST     20       Baso #     0.05       Basophil %     0.4       BILIRUBIN TOTAL     1.6  Comment: For infants and newborns, interpretation of results should be based   on gestational age, weight and in agreement with clinical   observations.    Premature Infant recommended reference ranges:   0-24 hours:  <8.0 mg/dL   24-48 hours: <12.0 mg/dL   3-5 days:    <15.0 mg/dL   6-29 days:   <15.0 mg/dL       BUN     21       Calcium     8.6       Chloride     96       CO2     26       Creatinine     1.2       eGFR     >60       Eos #     0.22       Eos %     1.8       Glucose     101       Hematocrit     33.4       Hemoglobin     11.1       Immature Grans (Abs)     0.07  Comment: Mild elevation in immature granulocytes is non specific and can be seen in a variety of conditions including stress response, acute inflammation, trauma and pregnancy. Correlation with other laboratory and clinical findings is essential.       Immature Granulocytes     0.6       Lymph #     1.38       LYMPH %     11.2       Magnesium      2.0       MCH     29.1       MCHC     33.2       MCV     87       Mono #     1.52       Mono %     12.4       MPV     11.4       Neut #     9.0       Neut %     73.6       nRBC     0       Platelet Count     184       Potassium     4.3       PROTEIN TOTAL     6.8       RBC     3.82       RDW     14.2       Sodium     134       WBC     12.28               Significant Imaging:  I have reviewed all pertinent imaging results/findings within the past 24 hours.

## 2025-04-22 NOTE — HOSPITAL COURSE
Patient admitted with pneumonia, pulmonary edema and Vtach.  He was placed on amiodarone and heparin infusion. Cardiology consulted. VAnc and cefepime initiated for HCAP as he recently discharged from rehab facility. Holding some BP meds for hypotension.   Weaning O2 as tolerated. 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 Unfortunately, he is declining from a cardiopulomary status and cannot undergo that procedure. He is on escalating O2 with aggressive treatment for pneumonia. Dr. Fox introduced the palliative discussion and family has agreed. He will be made a DNR and gentle comfort meds will be given until palliative team speaks with family.  History by daughters is that he has had a steady decline since his spouse passed away in 2023.     Patient continued to decline overnight and nocturnist transitioned to full comfort care. Palliative care met with patient and family. Proceeding with inpatient hospice with [facility].

## 2025-04-22 NOTE — PLAN OF CARE
Problem: Adult Inpatient Plan of Care  Goal: Patient-Specific Goal (Individualized)  Outcome: Progressing  Goal: Absence of Hospital-Acquired Illness or Injury  Outcome: Progressing  Goal: Optimal Comfort and Wellbeing  Outcome: Progressing  Goal: Readiness for Transition of Care  Outcome: Progressing     Problem: Pneumonia  Goal: Fluid Balance  Outcome: Progressing  Goal: Resolution of Infection Signs and Symptoms  Outcome: Progressing  Goal: Effective Oxygenation and Ventilation  Outcome: Progressing     Problem: Wound  Goal: Optimal Coping  Outcome: Progressing  Goal: Optimal Functional Ability  Outcome: Progressing  Goal: Absence of Infection Signs and Symptoms  Outcome: Progressing  Goal: Improved Oral Intake  Outcome: Progressing  Goal: Optimal Pain Control and Function  Outcome: Progressing  Goal: Skin Health and Integrity  Outcome: Progressing  Goal: Optimal Wound Healing  Outcome: Progressing     Problem: Oral Intake Inadequate  Goal: Improved Oral Intake  Outcome: Progressing     Problem: Fall Injury Risk  Goal: Absence of Fall and Fall-Related Injury  Outcome: Progressing     Problem: Skin Injury Risk Increased  Goal: Skin Health and Integrity  Outcome: Progressing

## 2025-04-22 NOTE — PROGRESS NOTES
Pharmacokinetic Assessment Follow Up: IV Vancomycin    Vancomycin serum concentration assessment(s):    The trough level was drawn correctly and can be used to guide therapy at this time. The measurement is below the desired definitive target range of 15 to 20 mcg/mL.    Vancomycin Regimen Plan:    Change regimen to Vancomycin 1250  mg IV every 24 hours with next serum trough concentration measured at 1000 prior to 3rd dose on 4/24    Drug levels (last 3 results):  Recent Labs   Lab Result Units 04/22/25  1008   Vancomycin Trough ug/ml 11.8       Pharmacy will continue to follow and monitor vancomycin.    Please contact pharmacy at extension [phone] for questions regarding this assessment.    Thank you for the consult,   Juan Berkowitz       Patient brief summary:  Jerome Amaro Jr. is a 79 y.o. male initiated on antimicrobial therapy with IV Vancomycin for treatment of lower respiratory infection    The patient's current regimen is vancomycin 1000 mg Q24H    Drug Allergies:   Review of patient's allergies indicates:   Allergen Reactions    Atorvastatin Other (See Comments)     Muscle pain    Rosuvastatin Other (See Comments)     Muscle pain       Actual Body Weight:   70.5 kg    Renal Function:   Estimated Creatinine Clearance: 44.3 mL/min (based on SCr of 1.2 mg/dL).,     Dialysis Method (if applicable):  N/A    CBC (last 72 hours):  Recent Labs   Lab Result Units 04/20/25  0147 04/20/25  1813 04/21/25  0323 04/22/25  0435   WBC K/uL 14.54* 13.20* 12.28 10.57   Hgb gm/dL 11.9* 10.8* 11.1* 10.1*   Hct % 36.1* 32.2* 33.4* 31.1*   Platelet Count K/uL 260 201 184 142*   Mono % % 11.9 12.0 12.4 9.6   Eos % % 2.3 0.9 1.8 1.4   Basophil % % 0.6 0.5 0.4 0.3       Metabolic Panel (last 72 hours):  Recent Labs   Lab Result Units 04/20/25  0147 04/20/25  0701 04/21/25  0323 04/22/25  0435   Sodium mmol/L 131*  --  134* 131*   Potassium mmol/L 4.1  --  4.3 3.6   Chloride mmol/L 95  --  96 97   CO2 mmol/L 21*  --  26 23   Glucose  mg/dL 151*  --  101 112*   Glucose, UA   --  Negative  --   --    BUN mg/dL 19  --  21 26*   Creatinine mg/dL 1.2  --  1.2 1.2   Albumin g/dL 3.8  --  3.4* 3.1*   Bilirubin Total mg/dL 1.9*  --  1.6* 1.7*   ALP unit/L 154*  --  142* 140*   AST unit/L 24  --  20 20   ALT unit/L 9*  --  7* 5*   Magnesium mg/dL 1.6  --  2.0 1.7       Vancomycin Administrations:  vancomycin given in the last 96 hours                     vancomycin (VANCOCIN) 1,000 mg in 0.9% NaCl 250 mL IVPB (admixture device) ()  Restarted 04/21/25 1136      Restarted  1126      Restarted  1123     1,000 mg New Bag  1105    vancomycin 1,250 mg in 0.9% NaCl 250 mL IVPB (admixture device) (mg) 1,250 mg New Bag 04/20/25 1047                    Microbiologic Results:  Microbiology Results (last 7 days)       Procedure Component Value Units Date/Time    Blood culture [[id number]]  (Normal) Collected: 04/20/25 0918    Order Status: Completed Specimen: Blood Updated: 04/22/25 1000     CULTURE, BLOOD ([facility]) No Growth After 48 Hours    Blood culture [[id number]]  (Normal) Collected: 04/20/25 0925    Order Status: Completed Specimen: Blood Updated: 04/22/25 1000     CULTURE, BLOOD ([facility]) No Growth After 48 Hours    Culture, Respiratory with Gram Stain [[id number]] Collected: 04/22/25 0851    Order Status: Sent Specimen: Respiratory Updated: 04/22/25 0851    Influenza A & B by Molecular [[id number]]  (Normal) Collected: 04/20/25 0155    Order Status: Completed Specimen: Nasal Swab Updated: 04/20/25 0233     INFLUENZA A MOLECULAR Negative     INFLUENZA B MOLECULAR  Negative

## 2025-04-22 NOTE — CARE UPDATE
04/21/25 7894   Patient Assessment/Suction   Level of Consciousness (AVPU) responds to voice   Respiratory Effort Normal   Expansion/Accessory Muscles/Retractions no use of accessory muscles   All Lung Fields Breath Sounds diminished   Rhythm/Pattern, Respiratory pattern regular   Cough Frequency no cough   PRE-TX-O2   Device (Oxygen Therapy) high flow mask   $ Is the patient on Low Flow Oxygen? Yes   Flow (L/min) (Oxygen Therapy) 8   Pulse Oximetry Type Continuous   $ Pulse Oximetry - Multiple Charge Pulse Oximetry - Multiple   Positioning Left side   Positioning   Head of Bed (HOB) Positioning HOB at 20 degrees   Positioning/Transfer Devices pillows;in use   Aerosol Therapy   $ Aerosol Therapy Charges PRN treatment not required

## 2025-04-23 PROBLEM — Z71.89 ACP (ADVANCE CARE PLANNING): Status: ACTIVE | Noted: 2025-01-01

## 2025-04-23 PROBLEM — Z51.5 PALLIATIVE CARE BY SPECIALIST: Status: ACTIVE | Noted: 2025-01-01

## 2025-04-23 PROBLEM — Z71.89 GOALS OF CARE, COUNSELING/DISCUSSION: Status: ACTIVE | Noted: 2025-01-01

## 2025-04-23 NOTE — ASSESSMENT & PLAN NOTE
Patient does not have capacity to make medical decisions and family has asked that we start palliative discussions - team has been consulted    Ativan IV Q4 hr PRN - not safe to swallow at this point

## 2025-04-23 NOTE — CONSULTS
GASTROENTEROLOGY INPATIENT CONSULT NOTE  Patient Name: Jerome Amaro Jr.  Patient MRN: [id number]  Patient : 1945    Admit Date: 2025  Service date: 2025    Reason for Consult: dysphagia    PCP: Magda Ruiz FNP-C    Chief Complaint   Patient presents with    Nausea    Abdominal Pain     Pt reports being nauseated without vomiting for a few days.  Pt says he has not had a normal BM.  Daught reports minimal intake       HPI: Patient is a 79 y.o. male with PMHx  CAD, AV stensosis post TAVR, CHF, pacemaker admitted with PNA and SOB. Reports chronic persistent progressive dysphagia.  Tolerating liquids.  On plavix/eliquis.  Past endoscopy  with empiric dilation of slightly tortuous esophagus but unremarkable otherwise. No improvement in symptoms post dilation. Pt is ill appearing and dyspneic.  Moderate to large pleural effusions noted. Low grade temps this admission. On abx.    Past Medical History:  Past Medical History:   Diagnosis Date    Anticoagulant long-term use         Aortic valve disease     pig valve    Arthritis     all over    Atrial fibrillation 2024    Chest pain 07/15/2019    CHF (congestive heart failure)      on meds    Coronary artery disease      cabg    Difficulty swallowing     Heart attack     15 stents    Heart attack 2024    mild    Heart block     Hyperlipidemia     Hypertension     on meds    Hypothyroidism     on meds    PVD (peripheral vascular disease)     Seizures     last episode  on meds    V-tach 2025        Past Surgical History:  Past Surgical History:   Procedure Laterality Date    AORTOGRAPHY WITH SERIALOGRAPHY N/A 2021    Procedure: AORTOGRAM, WITH RUNOFF;  Surgeon: Rodrigo Willoughby MD;  Location: [facility] CATH/EP LAB;  Service: Cardiology;  Laterality: N/A;    ARTERIOGRAPHY OF AORTIC ROOT N/A 2019    Procedure: ARTERIOGRAM, AORTIC ROOT;  Surgeon: Rodrigo Willoughby MD;  Location: [facility] CATH/EP LAB;  Service:  Cardiology;  Laterality: N/A;    CARDIAC CATHETERIZATION      CARDIAC VALVE SURGERY      CATARACT EXTRACTION W/  INTRAOCULAR LENS IMPLANT Right 12/11/2024    Procedure: CEIOL OD;  Surgeon: Fredrick Sharma MD;  Location: [facility] ASU OR;  Service: Ophthalmology;  Laterality: Right;    CORONARY ANGIOGRAPHY INCLUDING BYPASS GRAFTS WITH CATHETERIZATION OF LEFT HEART N/A 11/05/2019    Procedure: ANGIOGRAM, CORONARY, INCLUDING BYPASS GRAFT, WITH LEFT HEART CATHETERIZATION;  Surgeon: Rodrigo Willoughby MD;  Location: [facility] CATH/EP LAB;  Service: Cardiology;  Laterality: N/A;    CORONARY ANGIOGRAPHY INCLUDING BYPASS GRAFTS WITH CATHETERIZATION OF LEFT HEART Left 11/03/2020    Procedure: ANGIOGRAM, CORONARY, INCLUDING BYPASS GRAFT, WITH LEFT HEART CATHETERIZATION;  Surgeon: Rodrigo Willoughby MD;  Location: [facility] CATH/EP LAB;  Service: Cardiology;  Laterality: Left;    CORONARY ANGIOGRAPHY INCLUDING BYPASS GRAFTS WITH CATHETERIZATION OF LEFT HEART N/A 09/28/2021    Procedure: ANGIOGRAM, CORONARY, INCLUDING BYPASS GRAFT, WITH LEFT HEART CATHETERIZATION;  Surgeon: Rodrigo Willoughby MD;  Location: [facility] CATH/EP LAB;  Service: Cardiology;  Laterality: N/A;    CORONARY ANGIOGRAPHY INCLUDING BYPASS GRAFTS WITH CATHETERIZATION OF LEFT HEART N/A 12/05/2023    Procedure: ANGIOGRAM, CORONARY, INCLUDING BYPASS GRAFT, WITH LEFT HEART CATHETERIZATION;  Surgeon: Rodrigo Willoughby MD;  Location: [facility] CATH/EP LAB;  Service: Cardiology;  Laterality: N/A;    CORONARY ANGIOPLASTY      CORONARY ARTERY BYPASS GRAFT      x 2    1991 2006    ESOPHAGOGASTRODUODENOSCOPY N/A 2/20/2024    Procedure: EGD (ESOPHAGOGASTRODUODENOSCOPY);  Surgeon: Mal Lockhart III, MD;  Location: [facility] ENDO;  Service: Endoscopy;  Laterality: N/A;    EXTRACORPOREAL SHOCK WAVE LITHOTRIPSY Right 08/01/2019    Procedure: LITHOTRIPSY, ESWL;  Surgeon: Williams Ortega MD;  Location: [facility] OR;  Service: Urology;  Laterality: Right;    HEMORRHOID SURGERY  1990    INSERTION OF PACEMAKER     Prolonged emergence from general anesthesia     Recurrent ventral incisional hernia 01/18/2016    Sleep apnea     [facility]roke [facility] [address]     Stroke-like symptom     SVT (supraventricular tachycardia) (HCC)     SVT (supraventricular tachycardia) (HCC)     Swelling of extremity     Unspecified sleep apnea     clinicallybi-pap    Ventricular tachyarrhythmia ([facility] [address] 09/2015    svt       Past Surgical History:   Procedure Laterality Date    CARDIAC CATHETERIZATION  8/18/15  JDT    EF 50%    CARDIAC SURGERY      CATARACT REMOVAL      CHOLECYSTECTOMY  8/4/14    BY Dr. 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 She has never used smokeless tobacco. She reports that she does not drink alcohol and does not use drugs.     Family History   Problem Relation Age of Onset    Heart Disease Mother     Diabetes Mother     High Blood Pressure Father     Cancer Father 79        lung CA    Cancer Brother         leukemia    Alzheimer's Disease Brother    PERCUTANEOUS TRANSLUMINAL BALLOON ANGIOPLASTY OF CORONARY ARTERY N/A 11/08/2019    Procedure: Angioplasty-coronary;  Surgeon: Rodrigo Willoughby MD;  Location: [facility] CATH/EP LAB;  Service: Cardiology;  Laterality: N/A;    WRIST FRACTURE SURGERY          Home Medications:  Prescriptions Prior to Admission[1]    Inpatient Medications:   aspirin  81 mg Oral Daily    ceFEPime IV (PEDS and ADULTS)  2 g Intravenous Q12H    citalopram  20 mg Oral Daily    clopidogreL  75 mg Oral Daily    ezetimibe  10 mg Oral Daily    famotidine  20 mg Oral Daily    furosemide  20 mg Oral BID    guaiFENesin  600 mg Oral BID    phenytoin  400 mg Oral Daily    scopolamine  1 patch Transdermal Q3 Days    vancomycin (VANCOCIN) IV (PEDS and ADULTS)  1,250 mg Intravenous Q24H       Current Facility-Administered Medications:     acetaminophen, 650 mg, Oral, Q8H PRN    acetaminophen, 650 mg, Oral, Q4H PRN    albuterol-ipratropium, 3 mL, Nebulization, Q6H PRN    dextrose 50%, 12.5 g, Intravenous, PRN    dextrose 50%, 25 g, Intravenous, PRN    glucagon (human recombinant), 1 mg, Intramuscular, PRN    glucose, 16 g, Oral, PRN    glucose, 24 g, Oral, PRN    guaiFENesin 100 mg/5 ml, 200 mg, Oral, Q4H PRN    heparin (PORCINE), 58.4 Units/kg, Intravenous, PRN    heparin (PORCINE), 30 Units/kg, Intravenous, PRN    lorazepam, 0.5 mg, Intravenous, Q4H PRN    magnesium oxide, 800 mg, Oral, PRN    magnesium oxide, 800 mg, Oral, PRN    melatonin, 6 mg, Oral, Nightly PRN    morphine, 1 mg, Intravenous, Q4H PRN    naloxone, 0.02 mg, Intravenous, PRN    ondansetron, 4 mg, Intravenous, Q6H PRN    potassium bicarbonate, 35 mEq, Oral, PRN    potassium bicarbonate, 50 mEq, Oral, PRN    potassium bicarbonate, 60 mEq, Oral, PRN    potassium, sodium phosphates, 2 packet, Oral, PRN    potassium, sodium phosphates, 2 packet, Oral, PRN    potassium, sodium phosphates, 2 packet, Oral, PRN    Pharmacy to dose Vancomycin consult, , , Once **AND** vancomycin - pharmacy to dose,  Social History  Stepan Simental is , lives in [place], [place], and is retired. Medications:  Current Outpatient Medications   Medication Sig Dispense Refill    aspirin 81 MG chewable tablet Take 81 mg by mouth daily      montelukast (SINGULAIR) 10 MG tablet       levocetirizine (XYZAL) 5 MG tablet Take 5 mg by mouth nightly      ALBUTEROL IN Inhale into the lungs      oxybutynin (DITROPAN XL) 5 MG extended release tablet Take 1 tablet by mouth daily 30 tablet 3    gabapentin (NEURONTIN) 800 MG tablet TAKE 1 TABLET BY MOUTH THREE TIMES DAILY 90 tablet 5    DULoxetine (CYMBALTA) 60 MG extended release capsule TAKE 1 CAPSULE BY MOUTH TWICE DAILY 180 capsule 3    amitriptyline (ELAVIL) 25 MG tablet TAKE 2 TABLETS BY MOUTH EVERY NIGHT 180 tablet 3    nystatin-triamcinolone (MYCOLOG II) 654795-6.1 UNIT/GM-% cream Apply topically 2 times daily. 1 Tube 1    triamcinolone acetonide (KENALOG) 0.1 % paste APPLY TWICE DAILY AS NEEDED TO TEETH 5 g 0    levothyroxine (SYNTHROID) 137 MCG tablet Take 137 mcg by mouth Daily      DEXILANT 60 MG CPDR delayed release capsule Take 60 mg by mouth daily       alendronate (FOSAMAX) 70 MG tablet TK 1 T PO ONCE A WEEK IN THE MORNING 30 MINUTES BEFORE FIRST MEAL OF THE DAY  3    flecainide (TAMBOCOR) 100 MG tablet TAKE 1 TABLET BY MOUTH TWICE DAILY 180 tablet 3    metoprolol tartrate (LOPRESSOR) 25 MG tablet TAKE 1/2 TABLET BY MOUTH TWICE DAILY 90 tablet 3    diclofenac (VOLTAREN) 50 MG EC tablet Take 50 mg by mouth daily      LORazepam (ATIVAN) 1 MG tablet Take 1 mg by mouth every 8 hours as needed . 2    rOPINIRole (REQUIP) 3 MG tablet Take 3 mg by mouth 2 times daily 2 tabs in AM and 3 tabs in PM       fluticasone (FLONASE) 50 MCG/ACT nasal spray 2 sprays by Nasal route as needed       famciclovir (FAMVIR) 500 MG tablet   Take 500 mg by mouth 2 times daily        No current facility-administered medications for this visit. Allergies:   Allergies as of , Intravenous, pharmacy to manage frequency    Review of patient's allergies indicates:   Allergen Reactions    Atorvastatin Other (See Comments)     Muscle pain    Rosuvastatin Other (See Comments)     Muscle pain       Social History:   Social History     Occupational History    Not on file   Tobacco Use    Smoking status: Former     Current packs/day: 0.00     Average packs/day: 0.5 packs/day for 4.0 years (2.0 ttl pk-yrs)     Types: Cigarettes     Start date:      Quit date:      Years since quittin.3    Smokeless tobacco: Never   Substance and Sexual Activity    Alcohol use: Yes     Comment: social    Drug use: No    Sexual activity: Not Currently       Family History:   Family History   Problem Relation Name Age of Onset    Heart attack Mother      Heart attack Father      Heart disease Sister 2     Stroke Sister 2     Diabetes Sister 2     No Known Problems Maternal Grandmother      No Known Problems Maternal Grandfather      No Known Problems Paternal Grandmother      No Known Problems Paternal Grandfather      No Known Problems Brother      No Known Problems Maternal Aunt      No Known Problems Maternal Uncle      No Known Problems Paternal Aunt      No Known Problems Paternal Uncle      Anemia Neg Hx      Arrhythmia Neg Hx      Asthma Neg Hx      Clotting disorder Neg Hx      Fainting Neg Hx      Heart failure Neg Hx      Hyperlipidemia Neg Hx      Hypertension Neg Hx      Atrial Septal Defect Neg Hx         Review of Systems:  A 10 point review of systems was performed and was normal, except as mentioned in the HPI, including constitutional, HEENT, heme, lymph, cardiovascular, respiratory, gastrointestinal, genitourinary, neurologic, endocrine, psychiatric and musculoskeletal.      OBJECTIVE:    Physical Exam:  24 Hour Vital Sign Ranges: Temp:  [97.7 °F (36.5 °C)-100.2 °F (37.9 °C)] 97.7 °F (36.5 °C)  Pulse:  [] 96  Resp:  [17-38] 38  SpO2:  [90 %-100 %] 95 %  BP: ()/(54-62)  03/22/2022 - Fully Reviewed 03/22/2022   Allergen Reaction Noted    Azithromycin  07/09/2019    Codeine Nausea Only 07/29/2014    Mobic [meloxicam] Rash 12/13/2017    Morphine and related Itching 03/02/2018       Review of Systems:  Constitutional: negative for - chills and fever  Eyes:  negative for - visual disturbance and photophobia  HENMT: negative for - headaches and sinus pain  Respiratory: negative for - cough, hemoptysis  Positive for - shortness of breath  Cardiovascular: negative for - chest pain and palpitations  Gastrointestinal: negative for - blood in stools, constipation, diarrhea, nausea, and vomiting  Genito-Urinary: negative for - hematuria, urinary frequency, urinary urgency, and urinary retention  Musculoskeletal: positive for - joint pain, joint stiffness, and joint swelling  Hematological and Lymphatic: negative for - bleeding problems, abnormal bruising, and swollen lymph nodes  Endocrine:  negative for - polydipsia and polyphagia  Allergy/Immunology:  negative for - rhinorrhea, sinus congestion, hives  Integument:  negative for - negative for - rash, change in moles, new or changing lesions  Psychological: negative for - anxiety and depression  Neurological: positive for - memory loss, numbness/tingling, and weakness     PHYSICAL EXAMINATION:  Vitals:  /73   Pulse 83   Resp 18   Ht 5' 4\" (1.626 m)   Wt 295 lb (133.8 kg)   BMI 50.64 kg/m²   General appearance:  Alert, well developed, well nourished, in no distress  HEENT:  normocephalic, atraumatic, sclera appear normal, no nasal abnormalities, no rhinorrhea, Ears appear normal, oral mucous membranes are moist without erythema, trachea midline, thyroid is normal, no lymphadenopathy or neck mass. Cardiovascular:  Regular rate and rhythm without murmer. moderate peripheral edema, No cyanosis or clubbing. No carotid bruits. Pulmonary:  Lungs are clear to auscultation.   Breathing appears normal, good expansion, normal effort "110/60  Most recent vitals: /60   Pulse 96   Temp 97.7 °F (36.5 °C) (Temporal)   Resp (!) 38   Ht 5' 6" (1.676 m)   Wt 70.5 kg (155 lb 6.4 oz)   SpO2 95%   BMI 25.08 kg/m²    GEN: cachexia, ill appearing, 5 liters O2, tachypneic  HEENT: PERRL, sclera anicteric, oral mucosa pink and dry  without lesion  NECK: trachea midline; Good ROM  CV: tachy, alma  RESP: clear to auscultation bilaterally, no wheezes, rhonci or rales  ABD: soft, non-tender, non-distended, normal bowel sounds  EXT: no swelling or edema, 2+ pulses distally  SKIN: no rashes or jaundice  PSYCH: normal affect    Labs:   Recent Labs     04/20/25  1813 04/21/25  0323 04/22/25  0435   WBC 13.20* 12.28 10.57   MCV 88 87 88    184 142*     Recent Labs     04/20/25  0147 04/21/25  0323 04/22/25  0435   * 134* 131*   K 4.1 4.3 3.6   CO2 21* 26 23   BUN 19 21 26*     No results for input(s): "ALB" in the last 72 hours.    Invalid input(s): "ALKP", "SGOT", "SGPT", "TBIL", "DBIL", "TPRO"  Recent Labs     04/20/25  1813 04/21/25  0453 04/21/25  1111 04/22/25  0435   INR 1.2  --   --   --    PTT 42.6* 48.5* 54.6* 52.1*         Radiology Review:  US Lower Extremity Veins Bilateral   Final Result      No evidence of deep venous thrombosis in either lower extremity common femoral, femoral or popliteal veins.         Electronically signed by: Kit Randolph MD   Date:    04/20/2025   Time:    21:23      CT Abdomen Pelvis With IV Contrast NO Oral Contrast   Final Result      1. Moderate to large bilateral pleural effusions with atelectasis.  New patchy solid and ground-glass airspace consolidation with smooth interlobular septal thickening, possibly reflecting alveolar and interstitial edema, respectively.  Note that a component of infectious or noninfectious inflammatory etiology or hemorrhage would not be excluded in appropriate clinical context.   2. Otherwise, no acute findings identified in the abdomen or pelvis to account for the " without use of accessory muscles  Musculoskeletal:  Joints are osteoarthritic   Psychiatric:  Mood, affect, and behavior appear normal      NEUROLOGIC EXAMINATION:  Mental Status:  alert, oriented to person, place, and time. Speech:  Clear without dysarthria or dysphonia  Language:  Fluent without aphasia  Cranial Nerves:   II Visual fields are full to confrontation   III,IV, VI Extraocular movements are full   VII Facial movements are symmetrical without weakness   VIII Hearing is intact   IX,X Shoulder shrug and head rotation strength are intact   XII No tongue atrophy or fasciculations. Normal tongue protrusion. No tongue weakness  Motor:  Normal strength in both upper and lower extremities. Normal muscle tone and bulk. Deep tendon reflexes are absent. Issa's signs are absent bilaterally. There is no ankle clonus on either side. Sensation:  Sensation is reduced to light touch, temperature, and vibration in distal extremities. Coordination:  Rapid alternating movements are normal in both upper and lower extremities. Finger to nose testing is unimpaired bilaterally. Gait:  Unsteady    Pertinent Diagnostic Studies:  She has a new BiPAP    Narrative   MRI BRAIN WO CONTRAST    12/29/2021 1:16 PM   History: Worsening myoclonus. Noncontrast MR imaging of the brain. Images are affected by patient motion. Diffuse cortical atrophy with frontal predominance. Clear paranasal sinuses. A few small nonspecific FLAIR hyperintense foci are seen within the   subcortical white matter. No brain hemorrhage or abnormal calcification. No acute infarct based on the DWI sequence.       Impression   1. Atrophy. 2. No mass or infarct. 3. No acute intracranial abnormality is seen. 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 Obstructive sleep apnea  G47.33    3. Memory loss  R41.3    4.  Asterixis  R27.8    The gabapentin is likely patient's reported symptoms.   3. Grossly similar bowel containing left inguinal hernia, for which correlation with physical examination would be recommended.   4. Additional details of chronic and incidental findings, as provided in the body of report.         Electronically signed by: Desmond Landers   Date:    04/20/2025   Time:    07:21      US Abdomen Limited   Final Result      No gallstones or bile duct dilatation demonstrated.      Right pleural effusion noted.      Limited views liver demonstrate upper limits of normal in size and coarse echotexture.  Suggest correlation with LFTs.      Additional findings as above.         Electronically signed by: Kit Randolph MD   Date:    04/20/2025   Time:    03:12      X-Ray Chest AP Portable   Final Result      Increasing bilateral edema/airspace infiltrate compared to prior. Bilateral effusions appears similar to prior. Stable cardiomegaly.      Pacer present.         Electronically signed by: Kit Randolph MD   Date:    04/20/2025   Time:    03:07            IMPRESSION / RECOMMENDATIONS:  Chronic dysphagia  -suspect more oropharyngeal  -recommend speech therapy evaluation when able to complete assessment  -no plan for endoscopy while patient unstable medically  -family interested to DNR conversation and even hospice discussion    Thank you for this consult.    Scott Fox  4/22/2025  9:19 PM             [1]   Medications Prior to Admission   Medication Sig Dispense Refill Last Dose/Taking    albuterol-ipratropium (DUO-NEB) 2.5 mg-0.5 mg/3 mL nebulizer solution Take 3 mLs by nebulization every 6 (six) hours as needed for Shortness of Breath.   4/19/2025    apixaban (ELIQUIS) 5 mg Tab Take 1 tablet (5 mg total) by mouth 2 (two) times daily. 60 tablet 1 4/19/2025 Bedtime    bumetanide (BUMEX) 2 MG tablet Take 2 mg by mouth once daily.   4/19/2025 Morning    citalopram (CELEXA) 40 MG tablet Take 40 mg by mouth once daily.   4/19/2025 Evening    clopidogrel (PLAVIX)  to blame for her jerking (asterixis). She has a new BiPAP device. Her forgetfulness has not been progressive. She has risks for dementia but at this time does not seem to suffer from it. Plan:   1. Continue present medications. 2. Need better sleep habits and more regular BiPAP use during sleep  3.  FU in 6 months    Electronically signed by Jasbir Li MD on 3/22/22 75 mg tablet Take 75 mg by mouth once daily.   4/19/2025 Noon    [Paused] CORLANOR 5 mg Tab Take 1 tablet by mouth 2 (two) times daily.   4/19/2025 Bedtime    ENTRESTO 24-26 mg per tablet Take 1 tablet by mouth 2 (two) times daily. Samples from MD   4/19/2025 Bedtime    ezetimibe (ZETIA) 10 mg tablet Take 10 mg by mouth once daily.   4/19/2025 Morning    famotidine (PEPCID) 20 MG tablet Take 1 tablet (20 mg total) by mouth Daily. 30 tablet 0 4/19/2025 Morning    hydrOXYzine HCL (ATARAX) 25 MG tablet Take 25 mg by mouth nightly as needed for Anxiety.   4/19/2025 Morning    [Paused] isosorbide mononitrate (IMDUR) 30 MG 24 hr tablet Take 1 tablet (30 mg total) by mouth once daily. 30 tablet 0 Taking    metoprolol succinate (TOPROL-XL) 25 MG 24 hr tablet Take 1 tablet (25 mg total) by mouth 2 (two) times daily. 60 tablet 0 4/19/2025 Bedtime    multivitamin with minerals tablet Take 1 tablet by mouth once daily.   4/19/2025 Morning    mupirocin (BACTROBAN) 2 % ointment Apply 1 g topically 3 (three) times daily.   Taking    NITROLINGUAL 400 mcg/spray spray Place 1 spray under the tongue every 5 (five) minutes as needed for Chest pain.   Taking As Needed    phenytoin (DILANTIN) 100 MG ER capsule Take 400 mg by mouth once daily.   0 4/19/2025 Noon    ranolazine (RANEXA) 500 MG Tb12 Take 500 mg by mouth 2 (two) times daily.   4/19/2025 Bedtime    triamcinolone acetonide 0.1% (KENALOG) 0.1 % cream Apply 1 g topically 2 (two) times daily as needed (Rash).   4/19/2025

## 2025-04-23 NOTE — ASSESSMENT & PLAN NOTE
Home Celexa resumed  Patient declines Pastoral Care visit    Patient does not have capacity to make medical decisions and family has asked that we start palliative discussions - team has been consulted    Ativan IV Q4 hr PRN - not safe to swallow at this point

## 2025-04-23 NOTE — PLAN OF CARE
Flipped to GIP with Lloyd.     04/23/25 1631   Final Note   Assessment Type Final Discharge Note   Anticipated Discharge Disposition [facility]   Post-Acute Status   Post-Acute Authorization Hospice   Hospice Status Set-up Complete/Auth obtained   Discharge Delays None known at this time

## 2025-04-23 NOTE — SUBJECTIVE & OBJECTIVE
Interval History:  See HPI    Past Medical History:   Diagnosis Date    Anticoagulant long-term use     2014    Aortic valve disease 2014    pig valve    Arthritis     all over    Atrial fibrillation 2/29/2024    Chest pain 07/15/2019    CHF (congestive heart failure)     2014 on meds    Coronary artery disease     2007 cabg    Difficulty swallowing     Heart attack 1990    15 stents    Heart attack 02/01/2024    mild    Heart block     Hyperlipidemia     Hypertension     on meds    Hypothyroidism 2015    on meds    PVD (peripheral vascular disease)     Seizures     last episode 1990 on meds    V-tach 4/20/2025       Past Surgical History:   Procedure Laterality Date    AORTOGRAPHY WITH SERIALOGRAPHY N/A 11/16/2021    Procedure: AORTOGRAM, WITH RUNOFF;  Surgeon: Rodrigo Willoughby MD;  Location: [facility] CATH/EP LAB;  Service: Cardiology;  Laterality: N/A;    ARTERIOGRAPHY OF AORTIC ROOT N/A 11/05/2019    Procedure: ARTERIOGRAM, AORTIC ROOT;  Surgeon: Rodrigo Willoughby MD;  Location: [facility] CATH/EP LAB;  Service: Cardiology;  Laterality: N/A;    CARDIAC CATHETERIZATION      CARDIAC VALVE SURGERY      CATARACT EXTRACTION W/  INTRAOCULAR LENS IMPLANT Right 12/11/2024    Procedure: CEIOL OD;  Surgeon: Fredrick Sharma MD;  Location: [facility] OR;  Service: Ophthalmology;  Laterality: Right;    CORONARY ANGIOGRAPHY INCLUDING BYPASS GRAFTS WITH CATHETERIZATION OF LEFT HEART N/A 11/05/2019    Procedure: ANGIOGRAM, CORONARY, INCLUDING BYPASS GRAFT, WITH LEFT HEART CATHETERIZATION;  Surgeon: Rodrigo Willoughby MD;  Location: [facility] CATH/EP LAB;  Service: Cardiology;  Laterality: N/A;    CORONARY ANGIOGRAPHY INCLUDING BYPASS GRAFTS WITH CATHETERIZATION OF LEFT HEART Left 11/03/2020    Procedure: ANGIOGRAM, CORONARY, INCLUDING BYPASS GRAFT, WITH LEFT HEART CATHETERIZATION;  Surgeon: Rodrigo Willoughby MD;  Location: [facility] CATH/EP LAB;  Service: Cardiology;  Laterality: Left;    CORONARY ANGIOGRAPHY INCLUDING BYPASS GRAFTS WITH CATHETERIZATION OF LEFT  HEART N/A 09/28/2021    Procedure: ANGIOGRAM, CORONARY, INCLUDING BYPASS GRAFT, WITH LEFT HEART CATHETERIZATION;  Surgeon: Rodrigo Willoughby MD;  Location: [facility] CATH/EP LAB;  Service: Cardiology;  Laterality: N/A;    CORONARY ANGIOGRAPHY INCLUDING BYPASS GRAFTS WITH CATHETERIZATION OF LEFT HEART N/A 12/05/2023    Procedure: ANGIOGRAM, CORONARY, INCLUDING BYPASS GRAFT, WITH LEFT HEART CATHETERIZATION;  Surgeon: Rodrigo Willoughby MD;  Location: [facility] CATH/EP LAB;  Service: Cardiology;  Laterality: N/A;    CORONARY ANGIOPLASTY      CORONARY ARTERY BYPASS GRAFT      x 2    1991 2006    ESOPHAGOGASTRODUODENOSCOPY N/A 2/20/2024    Procedure: EGD (ESOPHAGOGASTRODUODENOSCOPY);  Surgeon: Mal Lockahrt III, MD;  Location: [facility] ENDO;  Service: Endoscopy;  Laterality: N/A;    EXTRACORPOREAL SHOCK WAVE LITHOTRIPSY Right 08/01/2019    Procedure: LITHOTRIPSY, ESWL;  Surgeon: Williams Ortega MD;  Location: [facility] OR;  Service: Urology;  Laterality: Right;    HEMORRHOID SURGERY  1990    INSERTION OF PACEMAKER      PERCUTANEOUS TRANSLUMINAL BALLOON ANGIOPLASTY OF CORONARY ARTERY N/A 11/08/2019    Procedure: Angioplasty-coronary;  Surgeon: Rodrigo Willoughby MD;  Location: [facility] CATH/EP LAB;  Service: Cardiology;  Laterality: N/A;    WRIST FRACTURE SURGERY         Review of patient's allergies indicates:   Allergen Reactions    Atorvastatin Other (See Comments)     Muscle pain    Rosuvastatin Other (See Comments)     Muscle pain       Medications:  Continuous Infusions:   morphine  0-10 mg/hr Intravenous Continuous 4 mL/hr at 04/23/25 1510 4 mg/hr at 04/23/25 1510     Scheduled Meds:   scopolamine  1 patch Transdermal Q3 Days     PRN Meds:  Current Facility-Administered Medications:     acetaminophen, 650 mg, Oral, Q8H PRN    acetaminophen, 650 mg, Oral, Q4H PRN    lorazepam, 1 mg, Intravenous, Q4H PRN    morphine, 4 mg, Intravenous, Q15 Min PRN    ondansetron, 4 mg, Intravenous, Q6H PRN    Family History       Problem Relation (Age  of Onset)    Diabetes Sister    Heart attack Mother, Father    Heart disease Sister    No Known Problems Maternal Grandmother, Maternal Grandfather, Paternal Grandmother, Paternal Grandfather, Brother, Maternal Aunt, Maternal Uncle, Paternal Aunt, Paternal Uncle    Stroke Sister          Tobacco Use    Smoking status: Former     Current packs/day: 0.00     Average packs/day: 0.5 packs/day for 4.0 years (2.0 ttl pk-yrs)     Types: Cigarettes     Start date:      Quit date:      Years since quittin.3    Smokeless tobacco: Never   Substance and Sexual Activity    Alcohol use: Yes     Comment: social    Drug use: No    Sexual activity: Not Currently       Review of Systems   Unable to perform ROS: Mental status change     Objective:     Vital Signs (Most Recent):  Temp: 100.3 °F (37.9 °C) (25 0725)  Pulse: 103 (25 1200)  Resp: (!) 24 (25 1200)  BP: (!) 95/53 (25 1200)  SpO2: (!) 85 % (25 1200) Vital Signs (24h Range):  Temp:  [98.4 °F (36.9 °C)-100.3 °F (37.9 °C)] 100.3 °F (37.9 °C)  Pulse:  [] 103  Resp:  [18-38] 24  SpO2:  [82 %-98 %] 85 %  BP: ()/(49-61) 95/53     Weight: 70.5 kg (155 lb 6.4 oz)  Body mass index is 25.08 kg/m².       Physical Exam  Vitals reviewed.   Constitutional:       General: He is not in acute distress.     Appearance: He is ill-appearing.   HENT:      Head: Normocephalic and atraumatic.      Right Ear: External ear normal.      Left Ear: External ear normal.      Nose: Nose normal.      Mouth/Throat:      Pharynx: No oropharyngeal exudate or posterior oropharyngeal erythema.   Eyes:      General:         Right eye: No discharge.         Left eye: No discharge.   Cardiovascular:      Rate and Rhythm: Tachycardia present.      Comments: Radial and dorsalis pedis pulses are absent  Pulmonary:      Comments: Tachypnea present  Abdominal:      General: There is no distension.      Palpations: Abdomen is soft.      Tenderness: There is no  "abdominal tenderness.   Musculoskeletal:      Comments: Not mottled   Neurological:      Comments: Obtunded and unable to assess   Psychiatric:      Comments: Obtunded and unable to assess            Review of Symptoms      Symptom Assessment (ESAS 0-10 Scale)  Unable to complete assessment due to Mental status change         Pain Assessment in Advanced Demential Scale (PAINAD)   Breathing - Independent of vocalization:  0  Negative vocalization:  0  Facial expression:  0  Body language:  0  Consolability:  0  Total:  0    Performance Status:  10    Psychosocial/Cultural:   See Palliative Psychosocial Note: No  **Primary  to Follow**  Palliative Care  Consult: No        Advance Care Planning   Advance Directives:   Do Not Resuscitate Status: Yes      Decision Making:  Family answered questions and Patient unable to communicate due to disease severity/cognitive impairment  Goals of Care: The family endorses that what is most important right now is to focus on comfort and QOL     Accordingly, we have decided that the best plan to meet the patient's goals includes enrolling in hospice care and pivot to comfort-focused care         Significant Labs: BMP:   Recent Labs   Lab 04/22/25 0435   *   K 3.6   CO2 23   BUN 26*   CREATININE 1.2   CALCIUM 8.1*   MG 1.7     CBC:   Recent Labs   Lab 04/22/25 0435   WBC 10.57   HGB 10.1*   HCT 31.1*   *     CBC:   Recent Labs   Lab 04/22/25 0435   WBC 10.57   HGB 10.1*   HCT 31.1*   MCV 88   *     BMP:  No results for input(s): "GLU", "NA", "K", "CL", "CO2", "BUN", "CREATININE", "CALCIUM", "MG" in the last 24 hours.  LFT:  Lab Results   Component Value Date    AST 20 04/22/2025    ALKPHOS 140 (H) 04/22/2025    BILITOT 1.7 (H) 04/22/2025     Albumin:   Albumin   Date Value Ref Range Status   04/22/2025 3.1 (L) 3.5 - 5.2 g/dL Final     Protein:   Total Protein   Date Value Ref Range Status   03/06/2025 6.9 6.0 - 8.4 g/dL Final     Lactic " acid:   Lab Results   Component Value Date    LACTATE 1.8 03/06/2025       Significant Imaging: I have reviewed all pertinent imaging results/findings within the past 24 hours.

## 2025-04-23 NOTE — SUBJECTIVE & OBJECTIVE
Interval History: patient resting comfortably on morphine infusion     Review of Systems   Constitutional:  Positive for appetite change, fatigue and fever.   Respiratory:  Positive for cough and shortness of breath. Negative for wheezing.    Cardiovascular: Negative.    Gastrointestinal:  Positive for nausea. Negative for diarrhea and vomiting.   Genitourinary: Negative.    Musculoskeletal: Negative.      Objective:     Vital Signs (Most Recent):  Resp: 20 (04/23/25 1744) Vital Signs (24h Range):  Temp:  [98.4 °F (36.9 °C)-100.3 °F (37.9 °C)] 100.3 °F (37.9 °C)  Pulse:  [] 103  Resp:  [18-38] 20  SpO2:  [82 %-98 %] 85 %  BP: ()/(49-61) 95/53        There is no height or weight on file to calculate BMI.    Intake/Output Summary (Last 24 hours) at 4/23/2025 1800  Last data filed at 4/23/2025 1524  Gross per 24 hour   Intake 227.7 ml   Output --   Net 227.7 ml         Physical Exam  Constitutional:       General: He is not in acute distress.     Appearance: He is ill-appearing.   HENT:      Head: Normocephalic and atraumatic.   Cardiovascular:      Rate and Rhythm: Regular rhythm. Tachycardia present.      Heart sounds: Murmur heard.   Pulmonary:      Effort: No respiratory distress.      Breath sounds: Rhonchi present. No wheezing.   Abdominal:      General: Bowel sounds are normal. There is no distension.      Tenderness: There is no abdominal tenderness. There is no guarding.   Skin:     General: Skin is warm and dry.      Capillary Refill: Capillary refill takes 2 to 3 seconds.               Significant Labs: All pertinent labs within the past 24 hours have been reviewed.  Recent Lab Results       None            Significant Imaging: I have reviewed all pertinent imaging results/findings within the past 24 hours.

## 2025-04-23 NOTE — ACP (ADVANCE CARE PLANNING)
Nocturnists    Events noted.  Chart reviewed.  Patient assessed.  Discussed with family (via phone) and nursing staff    Briefly, patient is an 79-year-old male with multiple medical issues.  Please refer to previous commentary, but the patient was admitted for CHF and he also has CAD/CABG, pacemaker implantation, TAVR, and seizure disorder    Over the last couple of days, the patient has continued to deteriorate.  He was seen by GI because of persistent/progressive dysphagia but given the patient's precipitous clinical status, the patient is too unstable to undergo endoscopy.  At that point, conversations were had with family regarding DNR and even Hospice.      After discussion with family, it was decided to make the patient DNR and it appears that there was some discussion of Comfort Care but this order had not yet been definitively placed.  A Palliative Care consult was placed and is pending.    However, overnight, the patient has continued to deteriorate.  He has had escalating oxygen requirements, desatting to the 80s, and now is on a non-rebreather mask.  At times, he is agitated and pulling at things.      He has prn IV Ativan/IV Morphine but his care needs are exceeding (and will continue to exceed, given the rate of his deterioration) what the current doses of Ativan and Morphine can provide (while he is still under aggressive medical care).     Upon my assessment of the patient, he is currently on a non-rebreather mask and remains unresponsive to voice and to touch.  He is therefore non-decisional and cannot meaningfully discuss his own care.    Thus, I spoke with his 2 adult children Abida and Violette via phone (patient does not have a formal power-of- and his wife has unfortunately passed, so they would be the de facto power of attorneys).      During our conferencing, it was discussed that the patient continues to deteriorate and at times appears to be actively struggling/suffering, requiring  medication management.  Given the rate of deterioration I foresee that he will require higher doses of sedative/anxiolytic/pain medicines than what we can safely give (while he is still under aggressive medical care).    We did talk about the various treatment options available including the current plan of aggressive medical care (medications, labs, and testing), contrasted with comfort care, where medicines are employed to alleviate pain, anxiety, and suffering until the patient is eventually consumed by his illnesses and passes.    After much discussion, family report that they would like to definitively pivot in the patient's current management and proceed to comfort care.  For now:    -Change status to DNR/comfort care  -Ativan 1 mg IV q.4 hours prn  -Titratable Morphine infusion    Discussed with nursing staff.  Daughters are both on route to see patient.    Advanced care planning time: 12:00 a.m.-12:24 a.m. (24 minutes)

## 2025-04-23 NOTE — CARE UPDATE
04/22/25 2308   Patient Assessment/Suction   Level of Consciousness (AVPU) responds to voice   Respiratory Effort Moderate   Expansion/Accessory Muscles/Retractions accessory muscle use   All Lung Fields Breath Sounds diminished   Rhythm/Pattern, Respiratory labored   Cough Frequency with stimulation   Cough Type nonproductive;dry   PRE-TX-O2   Device (Oxygen Therapy) non rebreather mask   $ Is the patient on Low Flow Oxygen? Yes   Flow (L/min) (Oxygen Therapy) 15   SpO2 (!) 86 %   Pulse Oximetry Type Continuous   $ Pulse Oximetry - Multiple Charge Pulse Oximetry - Multiple   Pulse 94   Resp (!) 30   Positioning HOB elevated 30 degrees   Positioning   Positioning/Transfer Devices pillows;in use   Aerosol Therapy   $ Aerosol Therapy Charges Aerosol Treatment   Daily Review of Necessity (SVN) completed   Respiratory Treatment Status (SVN) given   Treatment Route (SVN) mask;oxygen   Patient Position HOB elevated   Post Treatment Assessment (SVN) breath sounds improved   Signs of Intolerance (SVN) other (see comments)  (pt had no reaction to treatment, given as needed to assist with pts SOB)   Education   $ Education Bronchodilator;Oxygen;15 min

## 2025-04-23 NOTE — PLAN OF CARE
[facility] approved for [facility] informed via secure chat.         04/23/25 1422   Post-Acute Status   Post-Acute Authorization Hospice   Hospice Status Set-up Complete/Auth obtained   Patient choice form signed by patient/caregiver List with quality metrics by geographic area provided   Discharge Delays None known at this time   Discharge Plan   Discharge Plan A Inpatient Hospice   Discharge Plan B Inpatient Hospice

## 2025-04-23 NOTE — ASSESSMENT & PLAN NOTE
Advance Care Planning     Today a voluntary meeting took place: bedside    Patient Participation: Patient is unable to participate     Attendees (Name and  Relationship to patient): daughters/POA    Staff attendees (Name and  Role): Hospital medicine and palliative medicine     ACP Conversation (General): Understanding of current condition       Code Status: DNR; status confirmed/order placed in chart    ACP Documents: Provided ACP documents    Goals of care: The family and healthcare power of   endorses that what is most important right now is to focus on symptom/pain control and comfort and QOL     Accordingly, we have decided that the best plan to meet the patient's goals includes enrolling in hospice care      Recommendations/  Follow-up task Hospice consulted      Length of ACP   conversation in minutes: A total of 30 min was spent on advance care planning, goals of care discussion, emotional support, formulating and communicating prognosis and exploring burden/benefit of various approaches of treatment. This discussion occurred on a fully voluntary basis with the verbal consent of the patient and/or family.

## 2025-04-23 NOTE — PROGRESS NOTES
"[facility] Medicine  Progress Note    Patient Name: Jerome Amaro Jr.  MRN: [id number]  Patient Class: IP- Inpatient   Admission Date: 4/20/2025  Length of Stay: 2 days  Attending Physician: Celena Andersen MD  Primary Care Provider: Magda Ruiz FNP-C        Subjective     Principal Problem:V-tach        HPI:  Patient is an 79-year-old male with a history of systolic congestive heart failure, CAD/CABG, status post pacemaker implantation, TAVR, and seizure disorder; he was recently admitted in March of this year for CHF exacerbation  Most recent echo performed around that time showed an EF of 50-55% and grade 2 diastolic dysfunction and moderate mitral regurgitation; PA SP was 30-35 mm Hg  Patient was recently discharged from rehab about a week ago and developed epigastric abdominal pain with nausea and poor oral intake (he denies abdominal pain to me, but admits he has not been eating well and has lost an undisclosed amount of weight)  He denies chest pain and shortness of breaths but has felt poorly for 5 days, fever, chills, sore throat and did have diarrhea (but no GI bleeding)  Patient was afebrile and tachycardic in the 120s (now 80s) and was tachypneic at 21-24 breaths per minute; he remains hemodynamically stable although sats has been marginal as low as 91% necessitating 2 L nasal cannula (does not have a home oxygen requirement)  WBC was 14.54 (ANC 11) and CMP was unrevealing; lipase was negative but Mag was 1.6  BNP was 1443 (down from 2268 last month) and troponin was 57.9 (previously has been in the 2000s last month)  Influenza/COVID testing were negative; chest x-ray showed, per radiologist Dr. Randolph:  "Increasing bilateral edema/airspace infiltrate compared to prior. Bilateral effusions appears similar to prior. Stable cardiomegaly.     Pacer present."  Given the patient's abdominal pain, right upper quadrant ultrasound was performed and showed right pleural effusion but " "no gallstones/bile duct dilatation  CT abdomen/pelvis with IV contrast was thus performed and showed, per radiologist Dr. Landers:  "1. Moderate to large bilateral pleural effusions with atelectasis.  New patchy solid and ground-glass airspace consolidation with smooth interlobular septal thickening, possibly reflecting alveolar and interstitial edema, respectively.  Note that a component of infectious or noninfectious inflammatory etiology or hemorrhage would not be excluded in appropriate clinical context.  2. Otherwise, no acute findings identified in the abdomen or pelvis to account for the patient's reported symptoms.  3. Grossly similar bowel containing left inguinal hernia, for which correlation with physical examination would be recommended.  4. Additional details of chronic and incidental findings, as provided in the body of report."  During the patient's stay in the ER, he had a proximally 75-80 beat run of V-tach which spontaneously boarded and did not require shocking; magnesium sulfate and a 500 cc bolus of fluids were given (followed by 2 mg of IV Bumex) and he was placed on an Amiodarone infusion; he is status post IV Morphine/Zofran    Overview/Hospital Course:  Patient admitted with pneumonia, pulmonary edema and Vtach.  He was placed on amiodarone and heparin infusion. Cardiology consulted. VAnc and cefepime initiated for HCAP as he recently discharged from rehab facility. Holding some BP meds for hypotension.   Weaning O2 as tolerated. 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 Unfortunately, he is declining from a cardiopulomary status and cannot undergo that procedure. He is on escalating O2 with aggressive treatment for pneumonia. Dr. Fox introduced the palliative discussion and family has agreed. He will be made a DNR and gentle comfort meds will be given until palliative team speaks with family.  History by daughters is that he has had a " steady decline since his spouse passed away in 2023.     Interval History: patient c/o productive cough and more anxious today. He reports pain and difficulty swallowing. Cannot undergo further testing for that due to his clinical status. D/w patient and family at bedside that it is recommended a transition to comfort measures be considered.     Review of Systems   Constitutional:  Positive for appetite change, fatigue and fever.   Respiratory:  Positive for cough and shortness of breath. Negative for wheezing.    Cardiovascular: Negative.    Gastrointestinal:  Positive for nausea. Negative for diarrhea and vomiting.   Genitourinary: Negative.    Musculoskeletal: Negative.      Objective:     Vital Signs (Most Recent):  Temp: 97.7 °F (36.5 °C) (04/22/25 1256)  Pulse: 96 (04/22/25 1828)  Resp: (!) 38 (04/22/25 1828)  BP: 110/60 (04/22/25 1700)  SpO2: 95 % (04/22/25 1828) Vital Signs (24h Range):  Temp:  [97.7 °F (36.5 °C)-101.2 °F (38.4 °C)] 97.7 °F (36.5 °C)  Pulse:  [] 96  Resp:  [17-38] 38  SpO2:  [90 %-100 %] 95 %  BP: ()/(54-73) 110/60     Weight: 70.5 kg (155 lb 6.4 oz)  Body mass index is 25.08 kg/m².    Intake/Output Summary (Last 24 hours) at 4/22/2025 1902  Last data filed at 4/22/2025 1342  Gross per 24 hour   Intake 1158.16 ml   Output --   Net 1158.16 ml         Physical Exam  Constitutional:       Appearance: He is ill-appearing.   HENT:      Head: Normocephalic and atraumatic.   Cardiovascular:      Rate and Rhythm: Regular rhythm. Tachycardia present.      Heart sounds: Murmur heard.   Pulmonary:      Effort: No respiratory distress.      Breath sounds: Rhonchi present. No wheezing.   Abdominal:      General: Bowel sounds are normal. There is no distension.      Tenderness: There is no abdominal tenderness. There is no guarding.   Musculoskeletal:      Cervical back: Normal range of motion and neck supple.   Lymphadenopathy:      Cervical: No cervical adenopathy.   Skin:     General: Skin  is warm and dry.      Capillary Refill: Capillary refill takes less than 2 seconds.   Neurological:      General: No focal deficit present.      Mental Status: He is alert and oriented to person, place, and time. Mental status is at baseline.   Psychiatric:         Mood and Affect: Mood normal.         Behavior: Behavior normal.               Significant Labs: All pertinent labs within the past 24 hours have been reviewed.  Recent Lab Results         04/22/25  1008   04/22/25  0851   04/22/25  0435        Albumin     3.1       ALP     140       ALT     5       Anion Gap     11       PTT     52.1  Comment: Refer to local heparin nomogram for intensity/dose specific therapeutic range.       AST     20       Baso #     0.03       Basophil %     0.3       BILIRUBIN TOTAL     1.7  Comment: For infants and newborns, interpretation of results should be based   on gestational age, weight and in agreement with clinical   observations.    Premature Infant recommended reference ranges:   0-24 hours:  <8.0 mg/dL   24-48 hours: <12.0 mg/dL   3-5 days:    <15.0 mg/dL   6-29 days:   <15.0 mg/dL       BUN     26       Calcium     8.1       Chloride     97       CO2     23       Creatinine     1.2       eGFR     >60       Eos #     0.15       Eos %     1.4       Glucose     112       Gram Stain (Respiratory)   >10 Epithelial Cells/LPF  [P]            Many WBC seen  [P]            Rare Gram positive cocci  [P]            Few Gram Negative Rods  [P]            Rare Gram Positive Rods  [P]            Rare Yeast  [P]         Hematocrit     31.1       Hemoglobin     10.1       Immature Grans (Abs)     0.06  Comment: Mild elevation in immature granulocytes is non specific and can be seen in a variety of conditions including stress response, acute inflammation, trauma and pregnancy. Correlation with other laboratory and clinical findings is essential.       Immature Granulocytes     0.6       Lymph #     0.82       LYMPH %     7.8        Magnesium      1.7       MCH     28.6       MCHC     32.5       MCV     88       Mono #     1.01       Mono %     9.6       MPV     11.4       Neut #     8.5       Neut %     80.3       nRBC     0       Platelet Count     142       Potassium     3.6       PROTEIN TOTAL     6.0       RBC     3.53       RDW     14.2       Sodium     131       Vancomycin-Trough 11.8  Comment: Therapeutic Range: 10.0-20.0 ug/mL           WBC     10.57                [P] - Preliminary Result               Significant Imaging: I have reviewed all pertinent imaging results/findings within the past 24 hours.      Assessment & Plan  V-tach  Patient was 75-80 beat run of V-tach (I personally reviewed the strips)  Most likely due to hypoxia but must entertain other causes (such as a primary cardiac event)  Troponin elevated, raising the question if this is a consequence of V-tach or if the patient is having an NSTEMI which is precipitating V-tach; for now:  Continue Eliquis for anticoagulation  Amiodarone infusion  Monitor and replace electrolytes 4 K number and magnesium greater than 2  Interrogate pacemaker  Telemetry monitoring on the step-down unit  Cardiology consultation  Aortic valvar stenosis  As above  HTN (hypertension)  Home Entresto resumed  Beta blocker held for now given concern for acute on chronic systolic congestive heart failure exacerbation  S/P TAVR (transcatheter aortic valve replacement)    As above  Pacemaker  As above    NSTEMI (non-ST elevated myocardial infarction)    As above  Seizure disorder    Home Dilantin resumed  Dilantin level pending  Seizure precautions  Anxiety and depression  Home Celexa resumed  Patient declines Pastoral Care visit    Patient does not have capacity to make medical decisions and family has asked that we start palliative discussions - team has been consulted    Ativan IV Q4 hr PRN - not safe to swallow at this point     Grief    As above  Hx of CABG    As above  Acute on chronic systolic  (congestive) heart failure  Patient appears clinically euvolemic, at this time  Home Lasix resumed  1.5 L fluid restriction/2 g sodium diet  Follow daily weights, strict Is&Os, and clinical exam  Home Entresto resumed; beta blocker held for now  Cardiology consultation    Gentle dose of morphine and ativan for comfort  Titrate O2 for comfort  Palliative care consulted  DNR   HCAP (healthcare-associated pneumonia)  Cefepime/Vancomycin  Vancomycin level per pharmacy protocol, to monitor for nephrotoxicity  Check blood/sputum cultures    Severe malnutrition  Nutrition consulted. Most recent weight and BMI monitored-     Measurements:  Wt Readings from Last 1 Encounters:   04/20/25 70.5 kg (155 lb 6.4 oz)   Body mass index is 25.08 kg/m².    Patient has been screened and assessed by RD.    Malnutrition Type:  Context: acute illness or injury  Level: severe    Malnutrition Characteristic Summary:  Energy Intake (Malnutrition): less than or equal to 50% for greater than or equal to 5 days  Subcutaneous Fat (Malnutrition): moderate depletion  Muscle Mass (Malnutrition): moderate depletion    Interventions/Recommendations (treatment strategy):         VTE Risk Mitigation (From admission, onward)           Ordered     heparin 25,000 units in dextrose 5% (100 units/ml) IV bolus from bag LOW INTENSITY nomogram - [facility]  As needed (PRN)        Question:  Heparin Infusion Adjustment (DO NOT MODIFY ANSWER)  Answer:  \\[email or web address]\Images\Pharmacy\HeparinInfusions\heparin LOW INTENSITY nomogram for [facility] [id number]    04/20/25 1728     heparin 25,000 units in dextrose 5% (100 units/ml) IV bolus from bag LOW INTENSITY nomogram - [facility]  As needed (PRN)        Question:  Heparin Infusion Adjustment (DO NOT MODIFY ANSWER)  Answer:  \\[email or web address]\Images\Pharmacy\HeparinInfusions\heparin LOW INTENSITY nomogram for [facility] [id number]    04/20/25 1728     heparin 25,000 units in dextrose 5% 250 mL (100 units/mL) infusion LOW INTENSITY  nomogram - [facility]  Continuous        Question:  Begin at (units/kg/hr)  Answer:  12    04/20/25 1728                    Discharge Planning   AXEL: 4/25/2025     Code Status: DNR   Medical Readiness for Discharge Date:   Discharge Plan A: Home Health                        Celena Andersen MD  Department of Hospital Medicine   [facility]

## 2025-04-23 NOTE — DISCHARGE SUMMARY
"[facility] Medicine  Discharge Summary      Patient Name: Jerome Amaro Jr.  MRN: [id number]  HANSEL: [id number]  Patient Class: IP- Inpatient  Admission Date: 4/20/2025  Hospital Length of Stay: 3 days  Discharge Date and Time: No discharge date for patient encounter.  Attending Physician: Celena Andersen MD   Discharging Provider: Celena Andersen MD  Primary Care Provider: Magda Ruiz FNP-C    Primary Care Team: Networked reference to record PCT     HPI:   Patient is an 79-year-old male with a history of systolic congestive heart failure, CAD/CABG, status post pacemaker implantation, TAVR, and seizure disorder; he was recently admitted in March of this year for CHF exacerbation  Most recent echo performed around that time showed an EF of 50-55% and grade 2 diastolic dysfunction and moderate mitral regurgitation; PA SP was 30-35 mm Hg  Patient was recently discharged from rehab about a week ago and developed epigastric abdominal pain with nausea and poor oral intake (he denies abdominal pain to me, but admits he has not been eating well and has lost an undisclosed amount of weight)  He denies chest pain and shortness of breaths but has felt poorly for 5 days, fever, chills, sore throat and did have diarrhea (but no GI bleeding)  Patient was afebrile and tachycardic in the 120s (now 80s) and was tachypneic at 21-24 breaths per minute; he remains hemodynamically stable although sats has been marginal as low as 91% necessitating 2 L nasal cannula (does not have a home oxygen requirement)  WBC was 14.54 (ANC 11) and CMP was unrevealing; lipase was negative but Mag was 1.6  BNP was 1443 (down from 2268 last month) and troponin was 57.9 (previously has been in the 2000s last month)  Influenza/COVID testing were negative; chest x-ray showed, per radiologist Dr. Randolph:  "Increasing bilateral edema/airspace infiltrate compared to prior. Bilateral effusions appears similar to prior. Stable " "cardiomegaly.     Pacer present."  Given the patient's abdominal pain, right upper quadrant ultrasound was performed and showed right pleural effusion but no gallstones/bile duct dilatation  CT abdomen/pelvis with IV contrast was thus performed and showed, per radiologist Dr. Landers:  "1. Moderate to large bilateral pleural effusions with atelectasis.  New patchy solid and ground-glass airspace consolidation with smooth interlobular septal thickening, possibly reflecting alveolar and interstitial edema, respectively.  Note that a component of infectious or noninfectious inflammatory etiology or hemorrhage would not be excluded in appropriate clinical context.  2. Otherwise, no acute findings identified in the abdomen or pelvis to account for the patient's reported symptoms.  3. Grossly similar bowel containing left inguinal hernia, for which correlation with physical examination would be recommended.  4. Additional details of chronic and incidental findings, as provided in the body of report."  During the patient's stay in the ER, he had a proximally 75-80 beat run of V-tach which spontaneously boarded and did not require shocking; magnesium sulfate and a 500 cc bolus of fluids were given (followed by 2 mg of IV Bumex) and he was placed on an Amiodarone infusion; he is status post IV Morphine/Zofran    * No surgery found *      Hospital Course:   Patient admitted with pneumonia, pulmonary edema and Vtach.  He was placed on amiodarone and heparin infusion. Cardiology consulted. VAnc and cefepime initiated for HCAP as he recently discharged from rehab facility. Holding some BP meds for hypotension.   Weaning O2 as tolerated. 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 Unfortunately, he is declining from a cardiopulomary status and cannot undergo that procedure. He is on escalating O2 with aggressive treatment for pneumonia. Dr. Fox introduced the palliative discussion " and family has agreed. He will be made a DNR and gentle comfort meds will be given until palliative team speaks with family.  History by daughters is that he has had a steady decline since his spouse passed away in 2023.     Patient continued to decline overnight and nocturnist transitioned to full comfort care. Palliative care met with patient and family. Proceeding with inpatient hospice with [facility].      Goals of Care Treatment Preferences:  Code Status: DNR          What is most important right now is to focus on comfort and QOL .  Accordingly, we have decided that the best plan to meet the patient's goals includes enrolling in hospice care, pivot to comfort-focused care.      SDOH Screening:  The patient declined to be screened for utility difficulties, food insecurity, transport difficulties, housing insecurity, and interpersonal safety, so no concerns could be identified this admission.     Consults:   Consults (From admission, onward)          Status Ordering Provider     Inpatient consult to Social Work/Case Management  Once        Provider:  (Not yet assigned)    Acknowledged SAMEERA MERAZ     Inpatient consult to Palliative Care  Once        Provider:  Fco Reinoso MD    Acknowledged SAMEERA MERAZ     Inpatient consult to Gastroenterology  Once        Provider:  Scott Fox MD    Completed SAMEERA MERAZ     Inpatient consult to Cardiology  Once        Provider:  Rodrigo Willoughby MD    Completed ELIAS HURTADO            Assessment & Plan  V-tach  Comfort care  Magnet to device   Aortic valvar stenosis  As above  HTN (hypertension)  Comfort care   S/P TAVR (transcatheter aortic valve replacement)    As above  Pacemaker  As above    NSTEMI (non-ST elevated myocardial infarction)    As above  Seizure disorder  Comfort care   Anxiety and depression      Patient does not have capacity to make medical decisions and family has asked that we start palliative discussions - team has been  consulted    Ativan IV Q4 hr PRN - not safe to swallow at this point     Grief    As above  Hx of CABG    As above  Acute on chronic systolic (congestive) heart failure       morphine and ativan for comfort  Titrate O2 for comfort  Palliative care consulted  DNR   HCAP (healthcare-associated pneumonia)  Comfort care    Severe malnutrition  Nutrition consulted. Most recent weight and BMI monitored-     Comfort care            Final Active Diagnoses:    Diagnosis Date Noted POA    PRINCIPAL PROBLEM:  V-tach [I47.20] 04/20/2025 Yes    Severe malnutrition [E43] 04/21/2025 Yes    HCAP (healthcare-associated pneumonia) [J18.9] 04/20/2025 Yes    Acute on chronic systolic (congestive) heart failure [I50.23] 02/04/2025 Yes    Hx of CABG [Z95.1] 06/15/2024 Not Applicable    Grief [F43.21] 01/24/2024 Yes    Anxiety and depression [F41.9, F32.A] 12/03/2023 Yes    Seizure disorder [G40.909] 05/07/2021 Yes    NSTEMI (non-ST elevated myocardial infarction) [I21.4] 11/05/2019 Yes    Pacemaker [Z95.0] 04/21/2015 Yes    HTN (hypertension) [I10] 01/07/2015 Yes    S/P TAVR (transcatheter aortic valve replacement) [Z95.3] 01/07/2015 Not Applicable    Aortic valvar stenosis [I35.0] 12/08/2014 Yes      Problems Resolved During this Admission:       Discharged Condition: stable    Disposition:     Follow Up:    Patient Instructions:      Activity as tolerated       Significant Diagnostic Studies: N/A    Pending Diagnostic Studies:       Procedure Component Value Units Date/Time    EKG 12-lead [[id number]] Collected: 04/21/25 2201    Order Status: Sent Lab Status: In process Updated: 04/22/25 0542     QRS Duration 156 ms      [facility] QTC Calculation 574 ms     Narrative:      Test Reason : R07.9,    Vent. Rate :  99 BPM     Atrial Rate :  99 BPM     P-R Int : 128 ms          QRS Dur : 156 ms      QT Int : 448 ms       P-R-T Axes :  72 -64  95 degrees    QTcB Int : 574 ms    Sinus rhythm with occasional Premature ventricular complexes  Left axis  deviation  Right bundle branch block  Inferior infarct ,age undetermined  Possible Anterolateral infarct ,age undetermined  Abnormal ECG  When compared with ECG of 20-Apr-2025 01:31,  Sinus rhythm has replaced Electronic ventricular pacemaker    Referred By: LILAERRAL SELF           Confirmed By:     EXTRA TUBES [[id number]] Collected: 04/20/25 0145    Order Status: Sent Lab Status: In process Updated: 04/20/25 0203    Specimen: Blood, Venous     Narrative:      The following orders were created for panel order EXTRA TUBES.  Procedure                               Abnormality         Status                     ---------                               -----------         ------                     Light Blue Top Hold[[id number]]                             In process                 Light Green Top Hold[[id number]]                            In process                 Gold Top Hold[[id number]]                                   In process                   Please view results for these tests on the individual orders.           Medications:  Transfer Medications (for Discharge Readmit only): Current Medications[1]    Indwelling Lines/Drains at time of discharge:   Lines/Drains/Airways       None                   Time spent on the discharge of patient: 35 minutes         Celena Andersen MD  Department of Hospital Medicine  [facility]       [1]   Current Facility-Administered Medications   Medication Dose Route Frequency Provider Last Rate Last Admin    acetaminophen tablet 650 mg  650 mg Oral Q8H PRN Therese Clay MD        acetaminophen tablet 650 mg  650 mg Oral Q4H PRN Therese Clay MD   650 mg at 04/21/25 2018    LORazepam injection 1 mg  1 mg Intravenous Q4H PRN Therese Clay MD   1 mg at 04/23/25 0512    morphine 100 mg (1 mg/mL) in NACL 100 mL infusion (TITRATING)  0-10 mg/hr Intravenous Continuous Fco Reinoso MD 4 mL/hr at 04/23/25 1510 4 mg/hr at 04/23/25 1510    morphine injection  4 mg  4 mg Intravenous Q15 Min PRN Fco Reinoso MD        ondansetron injection 4 mg  4 mg Intravenous Q6H PRN Therese Clya MD   4 mg at 04/22/25 0846    scopolamine 1.3-1.5 mg (1 mg over 3 days) 1 patch  1 patch Transdermal Q3 Days Celena Andersen MD   1 patch at 04/22/25 2020     Facility-Administered Medications Ordered in Other Encounters   Medication Dose Route Frequency Provider Last Rate Last Admin    magnesium oxide tablet 800 mg  800 mg Oral PRN Rodrigo Willoughby MD   800 mg at 11/16/21 0737    sodium chloride 0.9% flush 10 mL  10 mL Intravenous PRN Rodrigo Willoughby MD

## 2025-04-23 NOTE — NURSING
Patient becoming increasingly agitated and confused. Pulled out IV where heparin was infusing to. Patient's oxygen requirement increased, as he is now on a nonrebreather. Dr. Clay notified and has been at the bedside. Family made aware and on the way here to the hospital.

## 2025-04-23 NOTE — CONSULTS
[facility]  Palliative Medicine  Consult Note    Patient Name: Jerome Amaro Jr.  MRN: [id number]  Admission Date: 4/20/2025  Hospital Length of Stay: 3 days  Code Status: DNR   Attending Provider: Celena Andersen MD  Consulting Provider: Fco Reinoso MD  Primary Care Physician: Magda Ruiz FNP-C  Principal Problem:V-tach    Patient information was obtained from patient, relative(s), past medical records, and primary team.      Inpatient consult to Palliative Care  Consult performed by: Fco Reinoso MD  Consult ordered by: Celena Andersen MD        Assessment/Plan:     Palliative Care  ACP (advance care planning)  Advance Care Planning    Date: 04/23/2025    Hospice  I did explain the role for hospice care at this stage of the patient's illness, including its ability to help the patient live with the best quality of life possible.  We will be making a hospice referral.          Goals of care, counseling/discussion  I reviewed the patient's chart and discussed the case with the patient's team.      I examined Jerome Amaro Jr. at bedside.  The patient lacks capacity for complex medical decision-making.  I spoke with his 2 daughters at bedside.    I introduced myself and my role as palliative care physician. They were agreeable to speaking.    We discussed the patient's medical illness, prognosis, and values in detail.  Below is a brief summary of our discussion.    They are well up-to-date on his illness.  They discussed his decline over the last year and a half.  They understand that he is currently being treated for pneumonia and heart failure.  They understand he has had a dramatic decline over the last 24 hours.  There primary goals at this point her comfort.    We discussed prognosis.  Based on my exam today I suspect his prognosis is best estimate in terms of hours to maybe days.  They understood.    They affirmed that his goals are comfort focused.    He appears relatively  "comfortable outside of tachypnea.  I have asked his nursing staff to increase his morphine infusion.  I also adjusted his p.r.n. morphine and titration schedule.        Thank you for your consult. I will follow-up with patient. Please contact us if you have any additional questions.    Subjective:     HPI:   Per admit H&P: " Patient is an 79-year-old male with a history of systolic congestive heart failure, CAD/CABG, status post pacemaker implantation, TAVR, and seizure disorder  -He was recently admitted in March of this year for CHF exacerbation  -Most recent echo performed around that time showed an EF of 50-55% and grade 2 diastolic dysfunction and moderate mitral regurgitation; PASP was 30-35 mm Hg  -Patient was recently discharged from rehab about a week ago and developed epigastric abdominal pain with poor oral intake (he denies abdominal pain to me, but admits he has not been eating well and has lost an undisclosed amount of weight)  -He denies chest pain and shortness of breath but has felt poorly for 5 days, with fever, chills, and sore throat; he did have nausea/diarrhea (but no GI bleeding)  -In the ER, patient was afebrile and tachycardic in the 120s (now 80s) and was tachypneic at 21-24 breaths per minute; he remains hemodynamically stable although sats has been marginal as low as 91% necessitating 2 L nasal cannula (does not have a home oxygen requirement)  -WBC was 14.54 (ANC 11) and CMP was unrevealing; lipase was negative but Mag was 1.6  -BNP was 1443 (down from 2268 last month) and troponin was 57.9 (previously has been in the 2000s last month)->now 1662.9 at the time of this editing  -Influenza/COVID testing were negative; chest x-ray showed, per radiologist Dr. Randolph:     "Increasing bilateral edema/airspace infiltrate compared to prior. Bilateral effusions appears similar to prior. Stable cardiomegaly.     Pacer present."     -Given the patient's abdominal pain, right upper quadrant " "ultrasound was performed and showed right pleural effusion but no gallstones/bile duct dilatation  -CT abdomen/pelvis with IV contrast was thus performed and showed, per radiologist Dr. Landers:     "1. Moderate to large bilateral pleural effusions with atelectasis.  New patchy solid and ground-glass airspace consolidation with smooth interlobular septal thickening, possibly reflecting alveolar and interstitial edema, respectively.  Note that a component of infectious or noninfectious inflammatory etiology or hemorrhage would not be excluded in appropriate clinical context.  2. Otherwise, no acute findings identified in the abdomen or pelvis to account for the patient's reported symptoms.  3. Grossly similar bowel containing left inguinal hernia, for which correlation with physical examination would be recommended.  4. Additional details of chronic and incidental findings, as provided in the body of report."     -During the patient's stay in the ER, he had an approximately 75-80 beat run of V-tach which spontaneously aborted (did not require shocking)  -Magnesium sulfate and a 500 cc bolus of fluids were given (followed by 2 mg of IV Bumex) and he was placed on an Amiodarone infusion; he is status post IV Morphine/Zofran"    He is currently admitted and being treated for pneumonia complicated by underlying heart failure.  He has been transition to comfort care measures.  I have been asked to assist.    Hospital Course:  No notes on file    Interval History:  See HPI    Past Medical History:   Diagnosis Date    Anticoagulant long-term use     2014    Aortic valve disease 2014    pig valve    Arthritis     all over    Atrial fibrillation 2/29/2024    Chest pain 07/15/2019    CHF (congestive heart failure)     2014 on meds    Coronary artery disease     2007 cabg    Difficulty swallowing     Heart attack 1990    15 stents    Heart attack 02/01/2024    mild    Heart block     Hyperlipidemia     Hypertension     on meds "    Hypothyroidism 2015    on meds    PVD (peripheral vascular disease)     Seizures     last episode 1990 on meds    V-tach 4/20/2025       Past Surgical History:   Procedure Laterality Date    AORTOGRAPHY WITH SERIALOGRAPHY N/A 11/16/2021    Procedure: AORTOGRAM, WITH RUNOFF;  Surgeon: Rodrigo Willoughby MD;  Location: [facility] CATH/EP LAB;  Service: Cardiology;  Laterality: N/A;    ARTERIOGRAPHY OF AORTIC ROOT N/A 11/05/2019    Procedure: ARTERIOGRAM, AORTIC ROOT;  Surgeon: Rodrigo Willoughby MD;  Location: [facility] CATH/EP LAB;  Service: Cardiology;  Laterality: N/A;    CARDIAC CATHETERIZATION      CARDIAC VALVE SURGERY      CATARACT EXTRACTION W/  INTRAOCULAR LENS IMPLANT Right 12/11/2024    Procedure: CEIOL OD;  Surgeon: Fredrick Sharma MD;  Location: [facility] OR;  Service: Ophthalmology;  Laterality: Right;    CORONARY ANGIOGRAPHY INCLUDING BYPASS GRAFTS WITH CATHETERIZATION OF LEFT HEART N/A 11/05/2019    Procedure: ANGIOGRAM, CORONARY, INCLUDING BYPASS GRAFT, WITH LEFT HEART CATHETERIZATION;  Surgeon: Rodrigo Willoughby MD;  Location: [facility] CATH/EP LAB;  Service: Cardiology;  Laterality: N/A;    CORONARY ANGIOGRAPHY INCLUDING BYPASS GRAFTS WITH CATHETERIZATION OF LEFT HEART Left 11/03/2020    Procedure: ANGIOGRAM, CORONARY, INCLUDING BYPASS GRAFT, WITH LEFT HEART CATHETERIZATION;  Surgeon: Rodrigo Willoughby MD;  Location: [facility] CATH/EP LAB;  Service: Cardiology;  Laterality: Left;    CORONARY ANGIOGRAPHY INCLUDING BYPASS GRAFTS WITH CATHETERIZATION OF LEFT HEART N/A 09/28/2021    Procedure: ANGIOGRAM, CORONARY, INCLUDING BYPASS GRAFT, WITH LEFT HEART CATHETERIZATION;  Surgeon: Rodrigo Willoughby MD;  Location: [facility] CATH/EP LAB;  Service: Cardiology;  Laterality: N/A;    CORONARY ANGIOGRAPHY INCLUDING BYPASS GRAFTS WITH CATHETERIZATION OF LEFT HEART N/A 12/05/2023    Procedure: ANGIOGRAM, CORONARY, INCLUDING BYPASS GRAFT, WITH LEFT HEART CATHETERIZATION;  Surgeon: Rodrigo Willoughby MD;  Location: [facility] CATH/EP LAB;  Service: Cardiology;   Laterality: N/A;    CORONARY ANGIOPLASTY      CORONARY ARTERY BYPASS GRAFT      x 2    1991 2006    ESOPHAGOGASTRODUODENOSCOPY N/A 2/20/2024    Procedure: EGD (ESOPHAGOGASTRODUODENOSCOPY);  Surgeon: Mal Lockhart III, MD;  Location: [facility] ENDO;  Service: Endoscopy;  Laterality: N/A;    EXTRACORPOREAL SHOCK WAVE LITHOTRIPSY Right 08/01/2019    Procedure: LITHOTRIPSY, ESWL;  Surgeon: Williams Ortega MD;  Location: [facility] OR;  Service: Urology;  Laterality: Right;    HEMORRHOID SURGERY  1990    INSERTION OF PACEMAKER      PERCUTANEOUS TRANSLUMINAL BALLOON ANGIOPLASTY OF CORONARY ARTERY N/A 11/08/2019    Procedure: Angioplasty-coronary;  Surgeon: Rodrigo Willoughby MD;  Location: [facility] CATH/EP LAB;  Service: Cardiology;  Laterality: N/A;    WRIST FRACTURE SURGERY         Review of patient's allergies indicates:   Allergen Reactions    Atorvastatin Other (See Comments)     Muscle pain    Rosuvastatin Other (See Comments)     Muscle pain       Medications:  Continuous Infusions:   morphine  0-10 mg/hr Intravenous Continuous 4 mL/hr at 04/23/25 1510 4 mg/hr at 04/23/25 1510     Scheduled Meds:   scopolamine  1 patch Transdermal Q3 Days     PRN Meds:  Current Facility-Administered Medications:     acetaminophen, 650 mg, Oral, Q8H PRN    acetaminophen, 650 mg, Oral, Q4H PRN    lorazepam, 1 mg, Intravenous, Q4H PRN    morphine, 4 mg, Intravenous, Q15 Min PRN    ondansetron, 4 mg, Intravenous, Q6H PRN    Family History       Problem Relation (Age of Onset)    Diabetes Sister    Heart attack Mother, Father    Heart disease Sister    No Known Problems Maternal Grandmother, Maternal Grandfather, Paternal Grandmother, Paternal Grandfather, Brother, Maternal Aunt, Maternal Uncle, Paternal Aunt, Paternal Uncle    Stroke Sister          Tobacco Use    Smoking status: Former     Current packs/day: 0.00     Average packs/day: 0.5 packs/day for 4.0 years (2.0 ttl pk-yrs)     Types: Cigarettes     Start date: 1985     Quit date:  1989     Years since quittin.3    Smokeless tobacco: Never   Substance and Sexual Activity    Alcohol use: Yes     Comment: social    Drug use: No    Sexual activity: Not Currently       Review of Systems   Unable to perform ROS: Mental status change     Objective:     Vital Signs (Most Recent):  Temp: 100.3 °F (37.9 °C) (25 0725)  Pulse: 103 (25 1200)  Resp: (!) 24 (25 1200)  BP: (!) 95/53 (25 1200)  SpO2: (!) 85 % (25 1200) Vital Signs (24h Range):  Temp:  [98.4 °F (36.9 °C)-100.3 °F (37.9 °C)] 100.3 °F (37.9 °C)  Pulse:  [] 103  Resp:  [18-38] 24  SpO2:  [82 %-98 %] 85 %  BP: ()/(49-61) 95/53     Weight: 70.5 kg (155 lb 6.4 oz)  Body mass index is 25.08 kg/m².       Physical Exam  Vitals reviewed.   Constitutional:       General: He is not in acute distress.     Appearance: He is ill-appearing.   HENT:      Head: Normocephalic and atraumatic.      Right Ear: External ear normal.      Left Ear: External ear normal.      Nose: Nose normal.      Mouth/Throat:      Pharynx: No oropharyngeal exudate or posterior oropharyngeal erythema.   Eyes:      General:         Right eye: No discharge.         Left eye: No discharge.   Cardiovascular:      Rate and Rhythm: Tachycardia present.      Comments: Radial and dorsalis pedis pulses are absent  Pulmonary:      Comments: Tachypnea present  Abdominal:      General: There is no distension.      Palpations: Abdomen is soft.      Tenderness: There is no abdominal tenderness.   Musculoskeletal:      Comments: Not mottled   Neurological:      Comments: Obtunded and unable to assess   Psychiatric:      Comments: Obtunded and unable to assess            Review of Symptoms      Symptom Assessment (ESAS 0-10 Scale)  Unable to complete assessment due to Mental status change         Pain Assessment in Advanced Demential Scale (PAINAD)   Breathing - Independent of vocalization:  0  Negative vocalization:  0  Facial expression:  0  Body  "language:  0  Consolability:  0  Total:  0    Performance Status:  10    Psychosocial/Cultural:   See Palliative Psychosocial Note: No  **Primary  to Follow**  Palliative Care  Consult: No        Advance Care Planning  Advance Directives:   Do Not Resuscitate Status: Yes      Decision Making:  Family answered questions and Patient unable to communicate due to disease severity/cognitive impairment  Goals of Care: The family endorses that what is most important right now is to focus on comfort and QOL     Accordingly, we have decided that the best plan to meet the patient's goals includes enrolling in hospice care and pivot to comfort-focused care         Significant Labs: BMP:   Recent Labs   Lab 04/22/25 0435   *   K 3.6   CO2 23   BUN 26*   CREATININE 1.2   CALCIUM 8.1*   MG 1.7     CBC:   Recent Labs   Lab 04/22/25 0435   WBC 10.57   HGB 10.1*   HCT 31.1*   *     CBC:   Recent Labs   Lab 04/22/25 0435   WBC 10.57   HGB 10.1*   HCT 31.1*   MCV 88   *     BMP:  No results for input(s): "GLU", "NA", "K", "CL", "CO2", "BUN", "CREATININE", "CALCIUM", "MG" in the last 24 hours.  LFT:  Lab Results   Component Value Date    AST 20 04/22/2025    ALKPHOS 140 (H) 04/22/2025    BILITOT 1.7 (H) 04/22/2025     Albumin:   Albumin   Date Value Ref Range Status   04/22/2025 3.1 (L) 3.5 - 5.2 g/dL Final     Protein:   Total Protein   Date Value Ref Range Status   03/06/2025 6.9 6.0 - 8.4 g/dL Final     Lactic acid:   Lab Results   Component Value Date    LACTATE 1.8 03/06/2025       Significant Imaging: I have reviewed all pertinent imaging results/findings within the past 24 hours.      I spent a total of 75 minutes on the day of the visit. This includes face to face time in discussion of goals of care, symptom assessment, coordination of care and emotional support.  This also includes non-face to face time preparing to see the patient (eg, review of tests/imaging), obtaining and/or " reviewing separately obtained history, documenting clinical information in the electronic or other health record, independently interpreting results and communicating results to the patient/family/caregiver, or care coordinator.    An additional 20 minutes was specifically spent in advance care planning.      Fco Reinoso MD  Palliative Medicine  [facility]

## 2025-04-23 NOTE — ASSESSMENT & PLAN NOTE
As above   Pt is A/O; no s/s of distress; calm and relaxed; has no needs at this time. Pt is ambulating to the BRM with steady gate accompanied by the sitter

## 2025-04-23 NOTE — H&P
[facility] Medicine  History & Physical    Patient Name: Jerome Amaro Jr.  MRN: [id number]  Patient Class: IP- Hospice  Admission Date: 4/23/2025  Attending Physician: Celena Andersen MD   Primary Care Provider: Magda Ruiz FNP-C         Patient information was obtained from relative(s) and past medical records.     Subjective:     Principal Problem:Palliative care by specialist    Chief Complaint:   Chief Complaint   Patient presents with    comfort care        HPI: Patient admitted with pneumonia, pulmonary edema and Vtach.  He was placed on amiodarone and heparin infusion. Cardiology consulted. VAnc and cefepime initiated for HCAP as he recently discharged from rehab facility. Holding some BP meds for hypotension.   Weaning O2 as tolerated. 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 Unfortunately, he is declining from a cardiopulomary status and cannot undergo that procedure. He is on escalating O2 with aggressive treatment for pneumonia. Dr. Fox introduced the palliative discussion and family has agreed. He will be made a DNR and gentle comfort meds will be given until palliative team speaks with family.  History by daughters is that he has had a steady decline since his spouse passed away in 2023.     Patient continued to decline overnight and nocturnist transitioned to full comfort care. Palliative care met with patient and family. Proceeding with inpatient hospice with [facility].          Interval History: patient resting comfortably on morphine infusion     Review of Systems   Constitutional:  Positive for appetite change, fatigue and fever.   Respiratory:  Positive for cough and shortness of breath. Negative for wheezing.    Cardiovascular: Negative.    Gastrointestinal:  Positive for nausea. Negative for diarrhea and vomiting.   Genitourinary: Negative.    Musculoskeletal: Negative.      Objective:     Vital Signs (Most  Recent):  Resp: 20 (04/23/25 1744) Vital Signs (24h Range):  Temp:  [98.4 °F (36.9 °C)-100.3 °F (37.9 °C)] 100.3 °F (37.9 °C)  Pulse:  [] 103  Resp:  [18-38] 20  SpO2:  [82 %-98 %] 85 %  BP: ()/(49-61) 95/53        There is no height or weight on file to calculate BMI.    Intake/Output Summary (Last 24 hours) at 4/23/2025 1800  Last data filed at 4/23/2025 1524  Gross per 24 hour   Intake 227.7 ml   Output --   Net 227.7 ml         Physical Exam  Constitutional:       General: He is not in acute distress.     Appearance: He is ill-appearing.   HENT:      Head: Normocephalic and atraumatic.   Cardiovascular:      Rate and Rhythm: Regular rhythm. Tachycardia present.      Heart sounds: Murmur heard.   Pulmonary:      Effort: No respiratory distress.      Breath sounds: Rhonchi present. No wheezing.   Abdominal:      General: Bowel sounds are normal. There is no distension.      Tenderness: There is no abdominal tenderness. There is no guarding.   Skin:     General: Skin is warm and dry.      Capillary Refill: Capillary refill takes 2 to 3 seconds.               Significant Labs: All pertinent labs within the past 24 hours have been reviewed.  Recent Lab Results       None            Significant Imaging: I have reviewed all pertinent imaging results/findings within the past 24 hours.     Assessment/Plan:     Assessment & Plan  Palliative care by specialist      Acute respiratory failure with hypoxia  Patient with admission secondary to VTACH, bilateral pneumonia  O2 for comfort  Dc antibiotics   Goals of care, counseling/discussion  Advance Care Planning     Today a voluntary meeting took place: bedside    Patient Participation: Patient is unable to participate     Attendees (Name and  Relationship to patient): daughters/POA    Staff attendees (Name and  Role): Hospital medicine and palliative medicine     ACP Conversation (General): Understanding of current condition       Code Status: DNR; status  confirmed/order placed in chart    ACP Documents: Provided ACP documents    Goals of care: The family and healthcare power of   endorses that what is most important right now is to focus on symptom/pain control and comfort and QOL     Accordingly, we have decided that the best plan to meet the patient's goals includes enrolling in hospice care      Recommendations/  Follow-up task Hospice consulted      Length of ACP   conversation in minutes: A total of 30 min was spent on advance care planning, goals of care discussion, emotional support, formulating and communicating prognosis and exploring burden/benefit of various approaches of treatment. This discussion occurred on a fully voluntary basis with the verbal consent of the patient and/or family.       VTE Risk Mitigation (From admission, onward)      None                            Celena Andersen MD  Department of Hospital Medicine  [facility]

## 2025-04-23 NOTE — HPI
"Per admit H&P: " Patient is an 79-year-old male with a history of systolic congestive heart failure, CAD/CABG, status post pacemaker implantation, TAVR, and seizure disorder  -He was recently admitted in March of this year for CHF exacerbation  -Most recent echo performed around that time showed an EF of 50-55% and grade 2 diastolic dysfunction and moderate mitral regurgitation; PASP was 30-35 mm Hg  -Patient was recently discharged from rehab about a week ago and developed epigastric abdominal pain with poor oral intake (he denies abdominal pain to me, but admits he has not been eating well and has lost an undisclosed amount of weight)  -He denies chest pain and shortness of breath but has felt poorly for 5 days, with fever, chills, and sore throat; he did have nausea/diarrhea (but no GI bleeding)  -In the ER, patient was afebrile and tachycardic in the 120s (now 80s) and was tachypneic at 21-24 breaths per minute; he remains hemodynamically stable although sats has been marginal as low as 91% necessitating 2 L nasal cannula (does not have a home oxygen requirement)  -WBC was 14.54 (ANC 11) and CMP was unrevealing; lipase was negative but Mag was 1.6  -BNP was 1443 (down from 2268 last month) and troponin was 57.9 (previously has been in the 2000s last month)->now 1662.9 at the time of this editing  -Influenza/COVID testing were negative; chest x-ray showed, per radiologist Dr. Randolph:     "Increasing bilateral edema/airspace infiltrate compared to prior. Bilateral effusions appears similar to prior. Stable cardiomegaly.     Pacer present."     -Given the patient's abdominal pain, right upper quadrant ultrasound was performed and showed right pleural effusion but no gallstones/bile duct dilatation  -CT abdomen/pelvis with IV contrast was thus performed and showed, per radiologist Dr. Landers:     "1. Moderate to large bilateral pleural effusions with atelectasis.  New patchy solid and ground-glass airspace " "consolidation with smooth interlobular septal thickening, possibly reflecting alveolar and interstitial edema, respectively.  Note that a component of infectious or noninfectious inflammatory etiology or hemorrhage would not be excluded in appropriate clinical context.  2. Otherwise, no acute findings identified in the abdomen or pelvis to account for the patient's reported symptoms.  3. Grossly similar bowel containing left inguinal hernia, for which correlation with physical examination would be recommended.  4. Additional details of chronic and incidental findings, as provided in the body of report."     -During the patient's stay in the ER, he had an approximately 75-80 beat run of V-tach which spontaneously aborted (did not require shocking)  -Magnesium sulfate and a 500 cc bolus of fluids were given (followed by 2 mg of IV Bumex) and he was placed on an Amiodarone infusion; he is status post IV Morphine/Zofran"    He is currently admitted and being treated for pneumonia complicated by underlying heart failure.  He has been transition to comfort care measures.  I have been asked to assist.  "

## 2025-04-23 NOTE — ASSESSMENT & PLAN NOTE
Patient appears clinically euvolemic, at this time  Home Lasix resumed  1.5 L fluid restriction/2 g sodium diet  Follow daily weights, strict Is&Os, and clinical exam  Home Entresto resumed; beta blocker held for now  Cardiology consultation    Gentle dose of morphine and ativan for comfort  Titrate O2 for comfort  Palliative care consulted  DNR

## 2025-04-23 NOTE — ASSESSMENT & PLAN NOTE
Advance Care Planning     Date: 04/23/2025    Hospice  I did explain the role for hospice care at this stage of the patient's illness, including its ability to help the patient live with the best quality of life possible.  We will be making a hospice referral.

## 2025-04-23 NOTE — ASSESSMENT & PLAN NOTE
I reviewed the patient's chart and discussed the case with the patient's team.      I examined Jerome Amaro Jr. at bedside.  The patient lacks capacity for complex medical decision-making.  I spoke with his 2 daughters at bedside.    I introduced myself and my role as palliative care physician. They were agreeable to speaking.    We discussed the patient's medical illness, prognosis, and values in detail.  Below is a brief summary of our discussion.    They are well up-to-date on his illness.  They discussed his decline over the last year and a half.  They understand that he is currently being treated for pneumonia and heart failure.  They understand he has had a dramatic decline over the last 24 hours.  There primary goals at this point her comfort.    We discussed prognosis.  Based on my exam today I suspect his prognosis is best estimate in terms of hours to maybe days.  They understood.    They affirmed that his goals are comfort focused.    He appears relatively comfortable outside of tachypnea.  I have asked his nursing staff to increase his morphine infusion.  I also adjusted his p.r.n. morphine and titration schedule.

## 2025-04-23 NOTE — SUBJECTIVE & OBJECTIVE
Interval History: patient c/o productive cough and more anxious today. He reports pain and difficulty swallowing. Cannot undergo further testing for that due to his clinical status. D/w patient and family at bedside that it is recommended a transition to comfort measures be considered.     Review of Systems   Constitutional:  Positive for appetite change, fatigue and fever.   Respiratory:  Positive for cough and shortness of breath. Negative for wheezing.    Cardiovascular: Negative.    Gastrointestinal:  Positive for nausea. Negative for diarrhea and vomiting.   Genitourinary: Negative.    Musculoskeletal: Negative.      Objective:     Vital Signs (Most Recent):  Temp: 97.7 °F (36.5 °C) (04/22/25 1256)  Pulse: 96 (04/22/25 1828)  Resp: (!) 38 (04/22/25 1828)  BP: 110/60 (04/22/25 1700)  SpO2: 95 % (04/22/25 1828) Vital Signs (24h Range):  Temp:  [97.7 °F (36.5 °C)-101.2 °F (38.4 °C)] 97.7 °F (36.5 °C)  Pulse:  [] 96  Resp:  [17-38] 38  SpO2:  [90 %-100 %] 95 %  BP: ()/(54-73) 110/60     Weight: 70.5 kg (155 lb 6.4 oz)  Body mass index is 25.08 kg/m².    Intake/Output Summary (Last 24 hours) at 4/22/2025 1902  Last data filed at 4/22/2025 1342  Gross per 24 hour   Intake 1158.16 ml   Output --   Net 1158.16 ml         Physical Exam  Constitutional:       Appearance: He is ill-appearing.   HENT:      Head: Normocephalic and atraumatic.   Cardiovascular:      Rate and Rhythm: Regular rhythm. Tachycardia present.      Heart sounds: Murmur heard.   Pulmonary:      Effort: No respiratory distress.      Breath sounds: Rhonchi present. No wheezing.   Abdominal:      General: Bowel sounds are normal. There is no distension.      Tenderness: There is no abdominal tenderness. There is no guarding.   Musculoskeletal:      Cervical back: Normal range of motion and neck supple.   Lymphadenopathy:      Cervical: No cervical adenopathy.   Skin:     General: Skin is warm and dry.      Capillary Refill: Capillary refill  takes less than 2 seconds.   Neurological:      General: No focal deficit present.      Mental Status: He is alert and oriented to person, place, and time. Mental status is at baseline.   Psychiatric:         Mood and Affect: Mood normal.         Behavior: Behavior normal.               Significant Labs: All pertinent labs within the past 24 hours have been reviewed.  Recent Lab Results         04/22/25  1008   04/22/25  0851   04/22/25  0435        Albumin     3.1       ALP     140       ALT     5       Anion Gap     11       PTT     52.1  Comment: Refer to local heparin nomogram for intensity/dose specific therapeutic range.       AST     20       Baso #     0.03       Basophil %     0.3       BILIRUBIN TOTAL     1.7  Comment: For infants and newborns, interpretation of results should be based   on gestational age, weight and in agreement with clinical   observations.    Premature Infant recommended reference ranges:   0-24 hours:  <8.0 mg/dL   24-48 hours: <12.0 mg/dL   3-5 days:    <15.0 mg/dL   6-29 days:   <15.0 mg/dL       BUN     26       Calcium     8.1       Chloride     97       CO2     23       Creatinine     1.2       eGFR     >60       Eos #     0.15       Eos %     1.4       Glucose     112       Gram Stain (Respiratory)   >10 Epithelial Cells/LPF  [P]            Many WBC seen  [P]            Rare Gram positive cocci  [P]            Few Gram Negative Rods  [P]            Rare Gram Positive Rods  [P]            Rare Yeast  [P]         Hematocrit     31.1       Hemoglobin     10.1       Immature Grans (Abs)     0.06  Comment: Mild elevation in immature granulocytes is non specific and can be seen in a variety of conditions including stress response, acute inflammation, trauma and pregnancy. Correlation with other laboratory and clinical findings is essential.       Immature Granulocytes     0.6       Lymph #     0.82       LYMPH %     7.8       Magnesium      1.7       MCH     28.6       MCHC     32.5        MCV     88       Mono #     1.01       Mono %     9.6       MPV     11.4       Neut #     8.5       Neut %     80.3       nRBC     0       Platelet Count     142       Potassium     3.6       PROTEIN TOTAL     6.0       RBC     3.53       RDW     14.2       Sodium     131       Vancomycin-Trough 11.8  Comment: Therapeutic Range: 10.0-20.0 ug/mL           WBC     10.57                [P] - Preliminary Result               Significant Imaging: I have reviewed all pertinent imaging results/findings within the past 24 hours.

## 2025-04-23 NOTE — PLAN OF CARE
SW met with family at bedside to discuss GIP due to comfort measure being started on 4/22.    SW reached out to Liaison Ok with [facility], coming to eval patient for GIP.     04/23/25 1131   Post-Acute Status   Post-Acute Authorization Hospice   Hospice Status Referrals Sent   Discharge Delays None known at this time   Discharge Plan   Discharge Plan A Inpatient Hospice   Discharge Plan B Hospice/home

## 2025-04-24 LAB
BACTERIA SPEC CULT: NORMAL
GRAM STAIN (RESPIRATORY) (SMH): NORMAL

## 2025-04-24 NOTE — NURSING
Patient asystole with zero respirations at 19:18. Magda from [facility] notified at this time and will be here around 2100.

## 2025-04-25 LAB
BACTERIA BLD CULT: NORMAL
BACTERIA BLD CULT: NORMAL

## 2025-04-29 NOTE — HOSPITAL COURSE
Patient admitted with pneumonia, pulmonary edema and Vtach.  He was placed on amiodarone and heparin infusion. Cardiology consulted. VAnc and cefepime initiated for HCAP as he recently discharged from rehab facility. Holding some BP meds for hypotension.   Weaning O2 as tolerated. 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 Unfortunately, he is declining from a cardiopulomary status and cannot undergo that procedure. He is on escalating O2 with aggressive treatment for pneumonia. Dr. Fox introduced the palliative discussion and family has agreed. He will be made a DNR and gentle comfort meds will be given until palliative team speaks with family.  History by daughters is that he has had a steady decline since his spouse passed away in 2023.      Patient continued to decline overnight and nocturnist transitioned to full comfort care. Palliative care met with patient and family. Proceeding with inpatient hospice with [facility].           Patient asystole with zero respirations at 19:18. Magda from [facility] notified at this time and will be here around 2100.

## 2025-04-30 LAB
OHS QRS DURATION: 156 MS
OHS QTC CALCULATION: 574 MS

## (undated) DEVICE — TAPE SILICONE 1 X1 1/2

## (undated) DEVICE — CATHETER GUIDE LAUNCHER JL5 6X110

## (undated) DEVICE — Device

## (undated) DEVICE — CATHETER GUIDE LAUNCHER EBU3.5 6X110

## (undated) DEVICE — CATHETER DIAGNOSTIC DXTERITY 6FR JR 4.0

## (undated) DEVICE — GUIDEWIRE WHOLEY 260CMX0.035IN

## (undated) DEVICE — CATHETER GUIDE LAUNCHER JL 6FR 4.5 X 110

## (undated) DEVICE — CATHETER DIAGNOSTIC DXTERITY 6FR JL 4

## (undated) DEVICE — SHEATH INTRODUCER PRECISION ACCESS 6FX10

## (undated) DEVICE — CATHETER TRAILBLAZER 35X135

## (undated) DEVICE — BLLN SAPPHIRE NC3.5 X 12

## (undated) DEVICE — CATHETER ANGIO OMNI FLUSH 10732201

## (undated) DEVICE — SHEATH PINNACLE 6FRX10CM W/GUIDEWIRE

## (undated) DEVICE — VALVE BLEEDBACK CONTROL 1003330

## (undated) DEVICE — CATHETER DIAGNOSTIC DXTERITY 6FR JL 3.5

## (undated) DEVICE — GUIDEWIRE AMPLATZ X-STIFF .035X260CM

## (undated) DEVICE — GUIDEWIRE J TIP EXCHANGE FIXED CORE 260

## (undated) DEVICE — GUIDEWIRE DOUBLE ENDED .035 DIA. 150CML

## (undated) DEVICE — PACK CUSTOM EYE KIT

## (undated) DEVICE — CATHETER DIAGNOSTIC DXTERITY 6FR JL 5.0

## (undated) DEVICE — GUIDEWIRE PT GRAPHIX 3CM J TIP .014X182

## (undated) DEVICE — CYSTOTOME IRRIG 24G 13MM

## (undated) DEVICE — KIT INFLATION MERIT (IN8152, HP9200E)

## (undated) DEVICE — BALLOON NCEUP3508X NCEUPHORA RX

## (undated) DEVICE — CATHETER EXPO MLTPK 6FR 100X110CM

## (undated) DEVICE — SHEATH PINNACLE 5FRX10CM W/GUIDEWIRE

## (undated) DEVICE — SHEATH INTRODUCER DESTINATION 6FX45

## (undated) DEVICE — KIT MICROINTRODUCE MINI 5X10CM

## (undated) DEVICE — CATHETER RADIAL TIG 4.0 OPTITORQUE 5X110

## (undated) DEVICE — CATHETER DIAGNOSTIC DXTERITY 6F PIGST

## (undated) DEVICE — CATHETER DIAGNOSTIC DXTERITY 6F MPA 110

## (undated) DEVICE — DEVICE EMBOLIC PROTECTION SPIDER  6X320

## (undated) DEVICE — KIT POST CATARACT SURGERY

## (undated) DEVICE — GUIDEWIRE STIFF ANGLED 035X260 LAUREATE

## (undated) DEVICE — CATHETER GUIDE LAUNCHER EBU 3.75 6FX110

## (undated) DEVICE — CATHETER BALLOON NC EUPHORA 2.5X12MM

## (undated) DEVICE — CATHETER BALLOON NC EUPHORA 2.5X15MM

## (undated) DEVICE — CATHETER GUIDE LAUNCHER EBU30 6X110

## (undated) DEVICE — CATHETER DIAGNOSTIC DXTERITY 6FR JL45

## (undated) DEVICE — SOL BETADINE 5%

## (undated) DEVICE — BLLN SAPPHIRE NC3.5 X 18

## (undated) DEVICE — GLOVE SENSICARE PI ALOE 7.5

## (undated) DEVICE — CATHETER DIAGNOSTIC DXTERITY 5FR LCB

## (undated) DEVICE — CATHETER EAGLE EYE 5FR 85900P

## (undated) DEVICE — DEVICE ATHERECTOMY HAWKONE

## (undated) DEVICE — SHEATH INTRODUCER SLENDER 6FX10CM